# Patient Record
Sex: FEMALE | Race: BLACK OR AFRICAN AMERICAN | NOT HISPANIC OR LATINO | Employment: OTHER | URBAN - METROPOLITAN AREA
[De-identification: names, ages, dates, MRNs, and addresses within clinical notes are randomized per-mention and may not be internally consistent; named-entity substitution may affect disease eponyms.]

---

## 2018-01-26 ENCOUNTER — HOSPITAL ENCOUNTER (EMERGENCY)
Facility: OTHER | Age: 68
Discharge: HOME | End: 2018-01-26
Attending: EMERGENCY MEDICINE
Payer: MEDICARE

## 2018-01-26 VITALS
DIASTOLIC BLOOD PRESSURE: 98 MMHG | HEART RATE: 65 BPM | WEIGHT: 190 LBS | SYSTOLIC BLOOD PRESSURE: 124 MMHG | RESPIRATION RATE: 19 BRPM | TEMPERATURE: 98 F | OXYGEN SATURATION: 99 % | HEIGHT: 64 IN | BODY MASS INDEX: 32.44 KG/M2

## 2018-01-26 DIAGNOSIS — N28.89 RIGHT RENAL MASS: Primary | ICD-10-CM

## 2018-01-26 DIAGNOSIS — R10.9 LEFT FLANK PAIN: ICD-10-CM

## 2018-01-26 LAB
ALBUMIN SERPL-MCNC: 4.1 G/DL (ref 3.3–5.5)
ALBUMIN SERPL-MCNC: 4.4 G/DL (ref 3.3–5.5)
ALP SERPL-CCNC: 107 U/L (ref 42–141)
ALP SERPL-CCNC: 98 U/L (ref 42–141)
BILIRUB SERPL-MCNC: 0.4 MG/DL (ref 0.2–1.6)
BILIRUB SERPL-MCNC: 0.4 MG/DL (ref 0.2–1.6)
BILIRUBIN, POC UA: NEGATIVE
BLOOD, POC UA: ABNORMAL
BUN SERPL-MCNC: 13 MG/DL (ref 7–22)
CALCIUM SERPL-MCNC: 9.5 MG/DL (ref 8–10.3)
CHLORIDE SERPL-SCNC: 105 MMOL/L (ref 98–108)
CLARITY, POC UA: CLEAR
COLOR, POC UA: YELLOW
CREAT SERPL-MCNC: 0.7 MG/DL (ref 0.6–1.2)
GLUCOSE SERPL-MCNC: 120 MG/DL (ref 73–118)
GLUCOSE, POC UA: NEGATIVE
KETONES, POC UA: NEGATIVE
LEUKOCYTE EST, POC UA: NEGATIVE
NITRITE, POC UA: NEGATIVE
PH UR STRIP: 6.5 [PH]
POC ALT (SGPT): 10 U/L (ref 10–47)
POC ALT (SGPT): 11 U/L (ref 10–47)
POC AMYLASE: 61 U/L (ref 14–97)
POC AST (SGOT): 21 U/L (ref 11–38)
POC AST (SGOT): 21 U/L (ref 11–38)
POC GGT: 22 U/L (ref 5–65)
POC TCO2: 29 MMOL/L (ref 18–33)
POCT GLUCOSE: 108 MG/DL (ref 70–110)
POTASSIUM BLD-SCNC: 3.8 MMOL/L (ref 3.6–5.1)
PROTEIN, POC UA: NEGATIVE
PROTEIN, POC: 8.1 G/DL (ref 6.4–8.1)
PROTEIN, POC: 8.1 G/DL (ref 6.4–8.1)
SODIUM BLD-SCNC: 146 MMOL/L (ref 128–145)
SPECIFIC GRAVITY, POC UA: 1.02
UROBILINOGEN, POC UA: 1 E.U./DL

## 2018-01-26 PROCEDURE — 81003 URINALYSIS AUTO W/O SCOPE: CPT

## 2018-01-26 PROCEDURE — 99284 EMERGENCY DEPT VISIT MOD MDM: CPT

## 2018-01-26 PROCEDURE — 85025 COMPLETE CBC W/AUTO DIFF WBC: CPT

## 2018-01-26 PROCEDURE — 80053 COMPREHEN METABOLIC PANEL: CPT

## 2018-01-26 PROCEDURE — 25000003 PHARM REV CODE 250: Performed by: EMERGENCY MEDICINE

## 2018-01-26 PROCEDURE — 25500020 PHARM REV CODE 255

## 2018-01-26 PROCEDURE — 82150 ASSAY OF AMYLASE: CPT

## 2018-01-26 RX ORDER — KETOROLAC TROMETHAMINE 30 MG/ML
15 INJECTION, SOLUTION INTRAMUSCULAR; INTRAVENOUS
Status: DISCONTINUED | OUTPATIENT
Start: 2018-01-26 | End: 2018-01-26 | Stop reason: HOSPADM

## 2018-01-26 RX ORDER — TRAMADOL HYDROCHLORIDE 50 MG/1
50 TABLET ORAL EVERY 6 HOURS PRN
Qty: 12 TABLET | Refills: 0 | Status: SHIPPED | OUTPATIENT
Start: 2018-01-26 | End: 2018-02-05

## 2018-01-26 RX ORDER — METHOCARBAMOL 500 MG/1
1000 TABLET, FILM COATED ORAL 3 TIMES DAILY
Qty: 24 TABLET | Refills: 0 | Status: SHIPPED | OUTPATIENT
Start: 2018-01-26 | End: 2018-01-31

## 2018-01-26 RX ORDER — HYDROCODONE BITARTRATE AND ACETAMINOPHEN 5; 325 MG/1; MG/1
2 TABLET ORAL
Status: COMPLETED | OUTPATIENT
Start: 2018-01-26 | End: 2018-01-26

## 2018-01-26 RX ADMIN — HYDROCODONE BITARTRATE AND ACETAMINOPHEN 2 TABLET: 5; 325 TABLET ORAL at 07:01

## 2018-01-26 RX ADMIN — IOHEXOL 100 ML: 350 INJECTION, SOLUTION INTRAVENOUS at 06:01

## 2018-01-26 NOTE — LETTER
[address] [facility]  [place] [address]  Phone: [phone]  Fax: [phone]   Dr. Hernandez,   I evaluated Mrs. Foster in the emergency department on Friday.  She presented with left flank pain.  Workup was unremarkable except for a mass on her right kidney.  Etiology of her pain is unclear at this time.  I started her on tramadol and Robaxin.  However she does need close follow-up.  I would appreciate it if you could read the radiology report and follow her up in your office.  Thanks, Brooke Martinez

## 2018-01-26 NOTE — ED PROVIDER NOTES
"Encounter Date: 1/26/2018       History     Chief Complaint   Patient presents with    Abdominal Pain     LEft sided abdominal pain and rib pain, denies injury, started two weeks ago. Pt states she recently had a cold with lots of coughing. Pt also states she feels a bump under her left breast. Denies urinary symptoms, last BM today but "smaller than normal"     Chief complaint: Rib pain  67-year-old complains of pain to her left flank area.  Patient's pain has been ongoing for 2 weeks.  The pain worsens when she moves certain ways.  She has been taken Aleve with some improvement.  Patient has normal appetite.  No vomiting.  She has a mild cough.  She is having bowel movements.  Patient says she feels a lump underneath her breast.          Review of patient's allergies indicates:  No Known Allergies  Past Medical History:   Diagnosis Date    Diabetes mellitus     Hypertension      Past Surgical History:   Procedure Laterality Date    HYSTERECTOMY       History reviewed. No pertinent family history.  Social History   Substance Use Topics    Smoking status: Never Smoker    Smokeless tobacco: Not on file    Alcohol use No     Review of Systems   Constitutional: Negative for fever.   HENT: Negative for sore throat.    Respiratory: Negative for shortness of breath.    Cardiovascular: Negative for chest pain.   Gastrointestinal: Positive for abdominal pain. Negative for nausea.   Genitourinary: Negative for dysuria.   Musculoskeletal: Negative for back pain.   Skin: Negative for rash.   Neurological: Negative for weakness.   Hematological: Does not bruise/bleed easily.       Physical Exam     Initial Vitals [01/26/18 1632]   BP Pulse Resp Temp SpO2   (!) 174/87 77 18 97.9 °F (36.6 °C) 100 %      MAP       116         Physical Exam    Nursing note and vitals reviewed.  Constitutional: She appears well-developed and well-nourished.   HENT:   Head: Normocephalic and atraumatic.   Eyes: Conjunctivae and EOM are normal. " Pupils are equal, round, and reactive to light.   Neck: Normal range of motion. Neck supple.   Cardiovascular: Normal rate and regular rhythm.   Pulmonary/Chest: Breath sounds normal.   Abdominal: Soft. There is no tenderness. There is no rebound and no guarding. A hernia is present.       Musculoskeletal: Normal range of motion.   Neurological: She is alert and oriented to person, place, and time. She has normal strength.   Skin: Skin is warm and dry.   Psychiatric: She has a normal mood and affect.         ED Course   Procedures  Labs Reviewed   POCT URINALYSIS W/O SCOPE - Abnormal; Notable for the following:        Result Value    Glucose, UA Negative (*)     Bilirubin, UA Negative (*)     Ketones, UA Negative (*)     Blood, UA Trace-intact (*)     Protein, UA Negative (*)     Nitrite, UA Negative (*)     Leukocytes, UA Negative (*)     All other components within normal limits   POCT CBC   POCT GLUCOSE   POCT CMP   POCT AMYLASE             Medical Decision Making:   Initial Assessment:   67-year-old complains of tenderness to her left flank area.  On exam patient has a hernia to her left upper quadrant that is reducible.  She is nontoxic-appearing  ED Management:  CT of abdomen and pelvis will be done.  Etiology of the patient's pain on the left flank is unclear.  She does have a small mass on her right kidney that is concerning.  I did send a note to the patient's primary care physician regarding this.  Patient's labs and urinalysis and a significant abnormalities.  Patient will be discharged on tramadol and Robaxin.  She was informed of her abnormal CT and the need to follow-up.                   ED Course      Clinical Impression:   The primary encounter diagnosis was Right renal mass. A diagnosis of Left flank pain was also pertinent to this visit.                           Brooke Martinez MD  01/26/18 7002

## 2018-01-29 ENCOUNTER — LAB VISIT (OUTPATIENT)
Dept: LAB | Facility: HOSPITAL | Age: 68
End: 2018-01-29
Attending: NURSE PRACTITIONER
Payer: MEDICARE

## 2018-01-29 ENCOUNTER — OFFICE VISIT (OUTPATIENT)
Dept: FAMILY MEDICINE | Facility: CLINIC | Age: 68
End: 2018-01-29
Payer: MEDICARE

## 2018-01-29 VITALS
HEART RATE: 85 BPM | WEIGHT: 194.31 LBS | HEIGHT: 64 IN | BODY MASS INDEX: 33.17 KG/M2 | OXYGEN SATURATION: 95 % | SYSTOLIC BLOOD PRESSURE: 164 MMHG | TEMPERATURE: 98 F | DIASTOLIC BLOOD PRESSURE: 92 MMHG

## 2018-01-29 DIAGNOSIS — E78.5 HYPERLIPIDEMIA, UNSPECIFIED HYPERLIPIDEMIA TYPE: ICD-10-CM

## 2018-01-29 DIAGNOSIS — E11.9 TYPE 2 DIABETES MELLITUS WITHOUT COMPLICATION, WITHOUT LONG-TERM CURRENT USE OF INSULIN: ICD-10-CM

## 2018-01-29 DIAGNOSIS — K44.9 HIATAL HERNIA: ICD-10-CM

## 2018-01-29 DIAGNOSIS — I10 BENIGN HYPERTENSION: ICD-10-CM

## 2018-01-29 DIAGNOSIS — N28.89 RIGHT KIDNEY MASS: Primary | ICD-10-CM

## 2018-01-29 DIAGNOSIS — Z23 NEED FOR PNEUMOCOCCAL VACCINE: ICD-10-CM

## 2018-01-29 DIAGNOSIS — Z23 NEED FOR INFLUENZA VACCINATION: ICD-10-CM

## 2018-01-29 DIAGNOSIS — M62.830 SPASM OF THORACIC BACK MUSCLE: ICD-10-CM

## 2018-01-29 LAB
ALBUMIN SERPL BCP-MCNC: 3.5 G/DL
ALP SERPL-CCNC: 104 U/L
ALT SERPL W/O P-5'-P-CCNC: 6 U/L
ANION GAP SERPL CALC-SCNC: 9 MMOL/L
AST SERPL-CCNC: 15 U/L
BASOPHILS # BLD AUTO: 0.01 K/UL
BASOPHILS NFR BLD: 0.2 %
BILIRUB SERPL-MCNC: 0.2 MG/DL
BUN SERPL-MCNC: 14 MG/DL
CALCIUM SERPL-MCNC: 9.1 MG/DL
CHLORIDE SERPL-SCNC: 105 MMOL/L
CHOLEST SERPL-MCNC: 191 MG/DL
CHOLEST/HDLC SERPL: 3.5 {RATIO}
CO2 SERPL-SCNC: 26 MMOL/L
CREAT SERPL-MCNC: 0.8 MG/DL
DIFFERENTIAL METHOD: ABNORMAL
EOSINOPHIL # BLD AUTO: 0 K/UL
EOSINOPHIL NFR BLD: 0.5 %
ERYTHROCYTE [DISTWIDTH] IN BLOOD BY AUTOMATED COUNT: 14.6 %
EST. GFR  (AFRICAN AMERICAN): >60 ML/MIN/1.73 M^2
EST. GFR  (NON AFRICAN AMERICAN): >60 ML/MIN/1.73 M^2
GLUCOSE SERPL-MCNC: 104 MG/DL
HCT VFR BLD AUTO: 39.6 %
HDLC SERPL-MCNC: 54 MG/DL
HDLC SERPL: 28.3 %
HGB BLD-MCNC: 12.6 G/DL
IMM GRANULOCYTES # BLD AUTO: 0.02 K/UL
IMM GRANULOCYTES NFR BLD AUTO: 0.3 %
LDLC SERPL CALC-MCNC: 111.4 MG/DL
LYMPHOCYTES # BLD AUTO: 1.5 K/UL
LYMPHOCYTES NFR BLD: 23.8 %
MCH RBC QN AUTO: 27 PG
MCHC RBC AUTO-ENTMCNC: 31.8 G/DL
MCV RBC AUTO: 85 FL
MONOCYTES # BLD AUTO: 0.4 K/UL
MONOCYTES NFR BLD: 6.7 %
NEUTROPHILS # BLD AUTO: 4.2 K/UL
NEUTROPHILS NFR BLD: 68.5 %
NONHDLC SERPL-MCNC: 137 MG/DL
NRBC BLD-RTO: 0 /100 WBC
PLATELET # BLD AUTO: 287 K/UL
PMV BLD AUTO: 11.7 FL
POTASSIUM SERPL-SCNC: 4.1 MMOL/L
PROT SERPL-MCNC: 7.6 G/DL
RBC # BLD AUTO: 4.66 M/UL
SODIUM SERPL-SCNC: 140 MMOL/L
TRIGL SERPL-MCNC: 128 MG/DL
TSH SERPL DL<=0.005 MIU/L-ACNC: 0.57 UIU/ML
WBC # BLD AUTO: 6.1 K/UL

## 2018-01-29 PROCEDURE — 80061 LIPID PANEL: CPT

## 2018-01-29 PROCEDURE — G0009 ADMIN PNEUMOCOCCAL VACCINE: HCPCS | Mod: S$GLB,,, | Performed by: FAMILY MEDICINE

## 2018-01-29 PROCEDURE — 36415 COLL VENOUS BLD VENIPUNCTURE: CPT | Mod: PO

## 2018-01-29 PROCEDURE — 99999 PR PBB SHADOW E&M-EST. PATIENT-LVL V: CPT | Mod: PBBFAC,,, | Performed by: NURSE PRACTITIONER

## 2018-01-29 PROCEDURE — 85025 COMPLETE CBC W/AUTO DIFF WBC: CPT

## 2018-01-29 PROCEDURE — 90662 IIV NO PRSV INCREASED AG IM: CPT | Mod: S$GLB,,, | Performed by: FAMILY MEDICINE

## 2018-01-29 PROCEDURE — 99214 OFFICE O/P EST MOD 30 MIN: CPT | Mod: S$GLB,,, | Performed by: NURSE PRACTITIONER

## 2018-01-29 PROCEDURE — G0008 ADMIN INFLUENZA VIRUS VAC: HCPCS | Mod: S$GLB,,, | Performed by: FAMILY MEDICINE

## 2018-01-29 PROCEDURE — 84443 ASSAY THYROID STIM HORMONE: CPT

## 2018-01-29 PROCEDURE — 80053 COMPREHEN METABOLIC PANEL: CPT

## 2018-01-29 PROCEDURE — 90670 PCV13 VACCINE IM: CPT | Mod: S$GLB,,, | Performed by: FAMILY MEDICINE

## 2018-01-29 PROCEDURE — 83036 HEMOGLOBIN GLYCOSYLATED A1C: CPT

## 2018-01-29 NOTE — PROGRESS NOTES
Pt tolerated flu vaccine to right deltoid without difficulty; no adverse reaction noted; VIS given; pt also tolerated pneumococcal 13 vaccine to left deltoid without difficulty; no adverse reaction noted; VIS given

## 2018-01-29 NOTE — PROGRESS NOTES
Subjective:       Patient ID: Valarie Foster is a 67 y.o. female.    Chief Complaint: Hospital Follow Up (pt states right renal mass ) and Flank Pain (left side pain)    66 yo female, new to me and new to this clinic, presents for a hospital follow up. She was seen at the ED on 01/26/2018 for left flank pain, but a mass was found on the right kidney. She denies dysuria, hematuria, or urinary frequency. She is still having pain. She rates the pain 9/10. She has tramadol and muscle relaxer. She reports medication not really working. Patient reports metformin, Lipitor and a blood pressure medication. She stopped taking a year ago. She denies numbness or tingling in your feet. She denies chest pain, dizziness, shortness of breath or headache.         Past Medical History:   Diagnosis Date    Diabetes mellitus     Hypertension        Social History     Social History    Marital status:      Spouse name: N/A    Number of children: N/A    Years of education: N/A     Occupational History    Not on file.     Social History Main Topics    Smoking status: Never Smoker    Smokeless tobacco: Never Used    Alcohol use No    Drug use: No    Sexual activity: Not on file     Other Topics Concern    Not on file     Social History Narrative    No narrative on file       Past Surgical History:   Procedure Laterality Date    HYSTERECTOMY         Review of Systems   Constitutional: Negative for fatigue and unexpected weight change.   Eyes: Negative for photophobia, redness and visual disturbance.   Respiratory: Negative for chest tightness and shortness of breath.    Cardiovascular: Negative for chest pain, palpitations and leg swelling.   Gastrointestinal: Negative for abdominal pain, nausea and vomiting.   Genitourinary: Positive for flank pain (Left). Negative for decreased urine volume, difficulty urinating, dysuria, frequency, hematuria and urgency.   Skin: Negative for pallor.   Neurological: Negative for  "dizziness, tremors, seizures, syncope, weakness, numbness and headaches.   Hematological: Does not bruise/bleed easily.   All other systems reviewed and are negative.      Objective:   BP (!) 164/92 (BP Location: Left arm, Patient Position: Sitting, BP Method: Medium (Manual))   Pulse 85   Temp 97.9 °F (36.6 °C) (Oral)   Ht 5' 4" (1.626 m)   Wt 88.2 kg (194 lb 5.4 oz)   SpO2 95%   BMI 33.36 kg/m²      Physical Exam   Constitutional: She is oriented to person, place, and time. She appears well-developed and well-nourished.   HENT:   Head: Normocephalic and atraumatic.   Right Ear: Hearing, tympanic membrane, external ear and ear canal normal.   Left Ear: Hearing, tympanic membrane, external ear and ear canal normal.   Nose: No epistaxis.   Eyes: Conjunctivae, EOM and lids are normal. Pupils are equal, round, and reactive to light.   Neck: Normal carotid pulses and no JVD present. Carotid bruit is not present. No thyroid mass and no thyromegaly present.   Cardiovascular: Normal rate, regular rhythm and normal heart sounds.    Pulmonary/Chest: Effort normal and breath sounds normal. No respiratory distress. She has no decreased breath sounds.   Abdominal: Soft. Bowel sounds are normal. She exhibits no distension and no mass. There is no tenderness. There is no rebound and no guarding.   Musculoskeletal: Normal range of motion.        Arms:  Neurological: She is alert and oriented to person, place, and time.   Skin: Skin is warm, dry and intact. She is not diaphoretic. No pallor.   Psychiatric: She has a normal mood and affect. Her speech is normal and behavior is normal.       Assessment:       1. Right kidney mass    2. Type 2 diabetes mellitus without complication, without long-term current use of insulin    3. Benign hypertension    4. Hyperlipidemia, unspecified hyperlipidemia type    5. Hiatal hernia    6. Spasm of thoracic back muscle    7. Need for influenza vaccination    8. Need for pneumococcal vaccine  "       Plan:       Valarie was seen today for hospital follow up and flank pain.    Diagnoses and all orders for this visit:    Right kidney mass  -     Ambulatory referral to Urology    Type 2 diabetes mellitus without complication, without long-term current use of insulin  -     TSH; Future  -     Hemoglobin A1c; Future    Benign hypertension  -     CBC auto differential; Future  -     Comprehensive metabolic panel; Future    Hyperlipidemia, unspecified hyperlipidemia type  -     TSH; Future  -     Lipid panel; Future    Hiatal hernia  -     Ambulatory referral to General Surgery    Need for influenza vaccination  -     Influenza - High Dose (65+) (PF) (IM)    Need for pneumococcal vaccine  -     Pneumococcal Conjugate Vaccine (13 Valent) (IM)    Problem List Items Addressed This Visit     Type 2 diabetes mellitus without complication, without long-term current use of insulin    Current Assessment & Plan     This problem is currently not controlled. Please follow up with your PCP as planned to discuss adjustments to your treatment plan.  The patient is asked to make an attempt to improve diet and exercise patterns to aid in medical management of this problem.  Will get labs. She stopped medication over a year ago.         Relevant Orders    TSH    Hemoglobin A1c    Hyperlipemia    Current Assessment & Plan     This problem is currently not controlled. Please follow up with your PCP as planned to discuss adjustments to your treatment plan.  The patient is asked to make an attempt to improve diet and exercise patterns to aid in medical management of this problem.  Will get labs. She stopped medication over a year ago.         Relevant Orders    TSH    Lipid panel    Benign hypertension    Current Assessment & Plan     This problem is currently not controlled. Please follow up with your PCP as planned to discuss adjustments to your treatment plan.  The patient is asked to make an attempt to improve diet and exercise  patterns to aid in medical management of this problem.  Will get labs. She stopped medication over a year ago.         Relevant Orders    CBC auto differential    Comprehensive metabolic panel      Other Visit Diagnoses     Right kidney mass    -  Primary    Relevant Orders    Ambulatory referral to Urology    Hiatal hernia        Relevant Orders    Ambulatory referral to General Surgery    Spasm of thoracic back muscle        Need for influenza vaccination        Relevant Orders    Influenza - High Dose (65+) (PF) (IM) (Completed)    Need for pneumococcal vaccine        Relevant Orders    Pneumococcal Conjugate Vaccine (13 Valent) (IM) (Completed)          She will call back with her medication list. Will schedule an appt for her to follow up with Dr. Hernandez once lab results available as she has not seen her.  Follow-up if symptoms worsen or fail to improve.

## 2018-01-29 NOTE — ASSESSMENT & PLAN NOTE
This problem is currently not controlled. Please follow up with your PCP as planned to discuss adjustments to your treatment plan.  The patient is asked to make an attempt to improve diet and exercise patterns to aid in medical management of this problem.  Will get labs. She stopped medication over a year ago.

## 2018-01-29 NOTE — PATIENT INSTRUCTIONS
Myalgias  Myalgias are another word for muscle aches and soreness. This is a symptom, not a disease. Myalgias can have many causes. A cold, the flu, or an acute infection can cause them. So can any illness with a high fever. They may happen after exertion (such as heavy exercise) or injury (such as an accident or fall). Some medicines (such as statins and certain antidepressants) can cause myalgias. They can also be a symptom of chronic or ongoing medical problems (such as lupus, chronic fatigue, or hypothyroidism). With these illnesses, other serious symptoms often occur in addition to muscle pain and soreness.    Myalgias most often go away on their own. If they don't go away, come back, or are severe, testing may be needed to help find the cause.  Home care  · Rest until you feel better.  · Follow instructions that you were given for how to care for yourself. This may depend on the cause of your myalgias.   · If myalgia is thought to be due to a medicine, be sure to talk to the doctor that prescribed the medicine about the best course of action.  · To control pain, take prescription or over-the-counter medicines as directed. Unless told not to, you can try acetaminophen or ibuprofen.  Follow-up care  Follow up with your healthcare provider or as advised. If your symptoms do not go away in a few days or if they come back, follow up with your healthcare provider for an exam and testing.  When to see medical advice  Call your healthcare provider for any of the following:  · Fever of 100.4°F (38ºC) or higher, or as directed by your healthcare provider  · Pain that gets worse and not better, or that goes away and comes back  · New joint pains  · New rash  · Severe headache, neck pain, drowsiness, or confusion  Date Last Reviewed: 3/1/2017  © 3491-0113 [employer]. [address] [place] [address], [place] [address]. All rights reserved. This information is not intended as a substitute for professional medical  care. Always follow your healthcare professional's instructions.        Back Spasm (No Trauma)    Spasm of the back muscles can occur after a sudden forceful twisting or bending force (such as in a car accident), after a simple awkward movement, or after lifting something heavy with poor body positioning. In any case, muscle spasm adds to the pain. Sleeping in an awkward position or on a poor quality mattress can also cause this. Some people respond to emotional stress by tensing the muscles of their back.  Pain that continues may need further evaluation or other types of treatment such as physical therapy.  You don't always need X-rays for the initial evaluation of back pain, unless you had a physical injury such as from a car accident or fall. If your pain continues and doesn't respond to medical treatment, X-rays and other tests may then be done.   Home care  · As soon as possible, start sitting or walking again to avoid problems from prolonged bed rest (muscle weakness, worsening back stiffness and pain, blood clots in the legs).  · When in bed, try to find a position of comfort. A firm mattress is best. Try lying flat on your back with pillows under your knees. You can also try lying on your side with your knees bent up toward your chest and a pillow between your knees.  · Avoid prolonged sitting, long car rides, or travel. This puts more stress on the lower back than standing or walking.   · During the first 24 to 72 hours after an injury or flare-up, apply an ice pack to the painful area for 20 minutes, then remove it for 20 minutes. Do this over a period of 60 to 90 minutes or several times a day. This will reduce swelling and pain. Always wrap ice packs in a thin towel.  · You can start with ice, then switch to heat. Heat (hot shower, hot bath, or heating pad) reduces pain, and works well for muscle spasms. Apply heat to the painful area for 20 minutes, then remove it for 20 minutes. Do this over a period  of 60 to 90 minutes or several times a day. Do not sleep on a heating pad as it can burn or damage skin.  · Alternate ice and heat therapies.  · Be aware of safe lifting methods and do not lift anything over 15 pounds until all the pain is gone.  Gentle stretching will help your back heal faster. Do this simple routine 2 to 3 times a day until your back is feeling better.  · Lie on your back with your knees bent and both feet on the ground  · Slowly raise your left knee to your chest as you flatten your lower back against the floor. Hold for 20 to 30 seconds.  · Relax and repeat the exercise with your right knee.  · Do 2 to 3 of these exercises for each leg.  · Repeat, hugging both knees to your chest at the same time.  · Do not bounce, but use a gentle pull.  Medicines  Talk to your doctor before using medicine, especially if you have other medical problems or are taking other medicines.  You may use acetaminophen or ibuprofen to control pain, unless your healthcare provider prescribed another pain medicine. If you have a chronic condition such as diabetes, liver or kidney disease, stomach ulcer, or gastrointestinal bleeding, or are taking blood thinners, talk with your healthcare provider before taking any medicines.  Be careful if you are given prescription pain medicine, narcotics, or medicine for muscle spasm. They can cause drowsiness, affect your coordination, reflexes, or judgment. Do not drive or operate heavy machinery when taking these medicines. Take pain medicine only as prescribed by your healthcare provider.  Follow-up care  Follow up with your doctor, or as advised. Physical therapy or further tests may be needed.  If X-rays were taken, they may be reviewed by a radiologist. You will be notified of any new findings that may affect your care.  Call 911  Seek emergency medical care if any of these occur:  · Trouble breathing  · Confusion  · Drowsiness or trouble awakening  · Fainting or loss of  consciousness  · Rapid or very slow heart rate  · Loss of bowel or bladder control  When to seek medical advice  Call your healthcare provider right away if any of these occur:  · Pain becomes worse or spreads to your legs  · Weakness or numbness in one or both legs  · Numbness in the groin or genital area  · Unexplained fever over 100.4ºF (38.0ºC)  · Burning or pain when passing urine  Date Last Reviewed: 6/1/2016  © 6473-6108 [employer]. [address] [place] [address]. All rights reserved. This information is not intended as a substitute for professional medical care. Always follow your healthcare professional's instructions.

## 2018-01-30 ENCOUNTER — TELEPHONE (OUTPATIENT)
Dept: FAMILY MEDICINE | Facility: CLINIC | Age: 68
End: 2018-01-30

## 2018-01-30 LAB
ESTIMATED AVG GLUCOSE: 131 MG/DL
HBA1C MFR BLD HPLC: 6.2 %

## 2018-01-30 NOTE — TELEPHONE ENCOUNTER
Patient informed regarding results. States she will call our office back with the name of her BP medication.   Also  scheduled patient with her PCP for follow up on 1/31/18.

## 2018-01-30 NOTE — TELEPHONE ENCOUNTER
----- Message from KULDEEP Bass-DILLON sent at 1/30/2018  7:40 AM CST -----  Please inform patient her blood count, cholesterol, kidney and liver labs are normal. Her thyroid labs are normal. Her HgbA1c is 6.2. Her diabetes is well controlled. Will not restart diabetes medication at this time. Please ask her if she found the name of her blood pressure medication?

## 2018-01-31 ENCOUNTER — TELEPHONE (OUTPATIENT)
Dept: FAMILY MEDICINE | Facility: CLINIC | Age: 68
End: 2018-01-31

## 2018-01-31 ENCOUNTER — OFFICE VISIT (OUTPATIENT)
Dept: FAMILY MEDICINE | Facility: CLINIC | Age: 68
End: 2018-01-31
Payer: MEDICARE

## 2018-01-31 VITALS
DIASTOLIC BLOOD PRESSURE: 78 MMHG | HEIGHT: 64 IN | OXYGEN SATURATION: 97 % | TEMPERATURE: 98 F | HEART RATE: 94 BPM | WEIGHT: 191.56 LBS | SYSTOLIC BLOOD PRESSURE: 140 MMHG | BODY MASS INDEX: 32.7 KG/M2

## 2018-01-31 DIAGNOSIS — E11.9 TYPE 2 DIABETES MELLITUS WITHOUT COMPLICATION, WITHOUT LONG-TERM CURRENT USE OF INSULIN: ICD-10-CM

## 2018-01-31 DIAGNOSIS — I10 BENIGN HYPERTENSION: ICD-10-CM

## 2018-01-31 DIAGNOSIS — Z12.31 ENCOUNTER FOR SCREENING MAMMOGRAM FOR MALIGNANT NEOPLASM OF BREAST: ICD-10-CM

## 2018-01-31 DIAGNOSIS — Z12.11 SCREENING FOR MALIGNANT NEOPLASM OF COLON: ICD-10-CM

## 2018-01-31 DIAGNOSIS — E78.5 HYPERLIPIDEMIA, UNSPECIFIED HYPERLIPIDEMIA TYPE: ICD-10-CM

## 2018-01-31 DIAGNOSIS — R10.9 LEFT FLANK PAIN: Primary | ICD-10-CM

## 2018-01-31 LAB
BILIRUB SERPL-MCNC: NORMAL MG/DL
BLOOD URINE, POC: 50
COLOR, POC UA: YELLOW
GLUCOSE UR QL STRIP: NORMAL
KETONES UR QL STRIP: NORMAL
LEUKOCYTE ESTERASE URINE, POC: NORMAL
NITRITE, POC UA: NORMAL
PH, POC UA: 5
PROTEIN, POC: NORMAL
SPECIFIC GRAVITY, POC UA: 1.02
UROBILINOGEN, POC UA: NORMAL

## 2018-01-31 PROCEDURE — 1125F AMNT PAIN NOTED PAIN PRSNT: CPT | Mod: S$GLB,,, | Performed by: FAMILY MEDICINE

## 2018-01-31 PROCEDURE — 99999 PR PBB SHADOW E&M-EST. PATIENT-LVL IV: CPT | Mod: PBBFAC,,, | Performed by: FAMILY MEDICINE

## 2018-01-31 PROCEDURE — 99214 OFFICE O/P EST MOD 30 MIN: CPT | Mod: 25,S$GLB,, | Performed by: FAMILY MEDICINE

## 2018-01-31 PROCEDURE — 1159F MED LIST DOCD IN RCRD: CPT | Mod: S$GLB,,, | Performed by: FAMILY MEDICINE

## 2018-01-31 PROCEDURE — 3008F BODY MASS INDEX DOCD: CPT | Mod: S$GLB,,, | Performed by: FAMILY MEDICINE

## 2018-01-31 PROCEDURE — 81002 URINALYSIS NONAUTO W/O SCOPE: CPT | Mod: S$GLB,,, | Performed by: FAMILY MEDICINE

## 2018-01-31 PROCEDURE — 87086 URINE CULTURE/COLONY COUNT: CPT

## 2018-01-31 RX ORDER — LISINOPRIL 10 MG/1
10 TABLET ORAL DAILY
COMMUNITY
End: 2018-01-31 | Stop reason: SDUPTHER

## 2018-01-31 RX ORDER — LISINOPRIL 10 MG/1
10 TABLET ORAL DAILY
Qty: 90 TABLET | Refills: 1 | Status: SHIPPED | OUTPATIENT
Start: 2018-01-31 | End: 2018-04-30 | Stop reason: SDUPTHER

## 2018-01-31 NOTE — TELEPHONE ENCOUNTER
----- Message from Earl Sharma sent at 1/30/2018  3:52 PM CST -----  Contact: Self  Pt called to provide name of BP medication-Lisinopril 10MG. Pt can be reached @ [phone].

## 2018-01-31 NOTE — LETTER
January 31, 2018      Margarito Love, FNP-C  [address] [facility]  [place] [address]           [facility]  [address] [facility]  [place] [address]  Phone: [phone]  Fax: [phone]          Patient: Valarie Foster   MR Number: [id number]   YOB: 1950   Date of Visit: 1/31/2018       Dear Margarito Love:    Thank you for referring Valarie Foster to me for evaluation. Attached you will find relevant portions of my assessment and plan of care.    If you have questions, please do not hesitate to call me. I look forward to following Valarie Foster along with you.    Sincerely,    Alena Hernandez MD    Enclosure  CC:  No Recipients    If you would like to receive this communication electronically, please contact [email or web address] or [phone] to request more information on [employer] [facility] access.    For providers and/or their staff who would like to refer a patient to [facility], please contact us through our one-stop-shop provider referral line, [facility], at [phone].    If you feel you have received this communication in error or would no longer like to receive these types of communications, please e-mail [email or web address]

## 2018-01-31 NOTE — PROGRESS NOTES
Routine Office Visit    Patient Name: Valarie Foster    : 1950  MRN: [id number]    Subjective:  Valarie is a 67 y.o. female who presents today for     1. Left side / flank pain - started approximately 2 weeks ago - pain is described as a throbbing pain that radiates down her side and to her lower pelvis. Pain is aggravated with walking and laying down. Pain is alleviated with pain medication prescribed and hot and cold compresses. Patient states pain is sometimes a gas-like pain, associated with pressure and bloat.  Pt states pain has improved with current regimen, but not resolved. Pt was evaluated for this pain in the ER. She was noted to have a right renal mass. She is scheduled to see urology - Dr. Palm tomorrow. No urinary / bowel complaints. No history of colonoscopy.   2. Establish care / new to me     Review of Systems   Constitutional: Negative for chills and fever.   HENT: Negative for congestion.    Eyes: Negative for blurred vision.   Respiratory: Negative for cough.    Cardiovascular: Negative for chest pain.   Gastrointestinal: Positive for abdominal pain. Negative for constipation, diarrhea, heartburn, nausea and vomiting.   Genitourinary: Negative for dysuria.   Musculoskeletal: Negative for myalgias.   Skin: Negative for itching and rash.   Neurological: Negative for dizziness and headaches.   Psychiatric/Behavioral: Negative for depression.       Active Problem List  Patient Active Problem List   Diagnosis    Type 2 diabetes mellitus without complication, without long-term current use of insulin    Benign hypertension    Hyperlipemia       Past Surgical History  Past Surgical History:   Procedure Laterality Date    HYSTERECTOMY         Family History  History reviewed. No pertinent family history.    Social History  Social History     Social History    Marital status:      Spouse name: N/A    Number of children: N/A    Years of education: N/A     Occupational History    Not on  "file.     Social History Main Topics    Smoking status: Never Smoker    Smokeless tobacco: Never Used    Alcohol use No    Drug use: No    Sexual activity: Not on file     Other Topics Concern    Not on file     Social History Narrative    No narrative on file       Medications and Allergies  Reviewed and updated.   Current Outpatient Prescriptions   Medication Sig    lisinopril 10 MG tablet Take 1 tablet (10 mg total) by mouth once daily.    methocarbamol (ROBAXIN) 500 MG Tab Take 2 tablets (1,000 mg total) by mouth 3 (three) times daily.    traMADol (ULTRAM) 50 mg tablet Take 1 tablet (50 mg total) by mouth every 6 (six) hours as needed for Pain.     No current facility-administered medications for this visit.        Physical Exam  BP (!) 140/78 (BP Location: Left arm, Patient Position: Sitting, BP Method: Large (Manual))   Pulse 94   Temp 98 °F (36.7 °C) (Oral)   Ht 5' 4" (1.626 m)   Wt 86.9 kg (191 lb 9.3 oz)   SpO2 97%   BMI 32.88 kg/m²   Physical Exam   Constitutional: She is oriented to person, place, and time. She appears well-developed and well-nourished.   HENT:   Head: Normocephalic and atraumatic.   Eyes: Conjunctivae and EOM are normal. Pupils are equal, round, and reactive to light.   Neck: Normal range of motion. Neck supple.   Cardiovascular: Normal rate, regular rhythm and normal heart sounds.  Exam reveals no gallop and no friction rub.    No murmur heard.  Pulmonary/Chest: Breath sounds normal. No respiratory distress.   Abdominal: Soft. Bowel sounds are normal. She exhibits no distension. There is no tenderness.   Musculoskeletal: Normal range of motion.   Lymphadenopathy:     She has no cervical adenopathy.   Neurological: She is alert and oriented to person, place, and time.   Skin: Skin is warm.   Psychiatric: She has a normal mood and affect.         Assessment/Plan:  Valarie Foster is a 67 y.o. female who presents today for :    Problem List Items Addressed This Visit        " Cardiac/Vascular    Benign hypertension (Chronic)    Relevant Medications    lisinopril 10 MG tablet  The current medical regimen is effective;  continue present plan and medications.      Hyperlipemia (Chronic)  Noted in patient history; lipids on lab WNL  Continue to monitor          Endocrine    Type 2 diabetes mellitus without complication, without long-term current use of insulin (Chronic)  a1c 6.2  Well controlled. Diet controlled.   continue present plan and medications.        Other Visit Diagnoses     Left flank pain    -  Primary    Relevant Orders    POCT URINE DIPSTICK WITHOUT MICROSCOPE (Completed)    Urine culture  F/u with urine culture   Continue conservative treatment with stretching / tylenol and muscle relaxer       Screening for malignant neoplasm of colon        Relevant Orders    Case request GI: COLONOSCOPY (Completed)    Encounter for screening mammogram for malignant neoplasm of breast         Relevant Orders    Mammo Digital Screening Bilat with CAD            Follow-up in about 3 months (around 4/30/2018).

## 2018-01-31 NOTE — PATIENT INSTRUCTIONS
Flank Pain, Uncertain Cause  The flank is the area between your upper abdomen and your back. Pain there is often caused by a problem with your kidneys. It might be a kidney infection or a kidney stone. Other causes of flank pain include spinal arthritis, a pinched nerve from a back injury, or a back muscle strain or spasm.  The cause of your flank pain is not certain. You may need other tests.  Home care  Follow these tips when caring for yourself at home:  · You may use acetaminophen or ibuprofen to control pain, unless your health care provider prescribed another medicine. If you have chronic liver or kidney disease, talk with your provider before taking these medicines. Also talk with your provider first if youve ever had a stomach ulcer or GI bleeding.  · If the pain is coming from your muscles, you may get relief with ice or heat. During the first 2 days after the injury, put an ice pack on the painful area for 20 minutes every 2 to 4 hours. This will reduce swelling and pain. A hot shower, hot bath, or heating pad works well for a muscle spasm. You can start with ice, then switch to heat after 2 days. You might find that alternating ice and heat works well. Use the method that feels the best to you.  Follow-up care  Follow up with your healthcare provider if your symptoms dont get better over the next few days.  When to seek medical advice  Call your healthcare provider right away if any of these happen:  · Repeated vomiting  · Fever of 100.4ºF (38ºC) or higher, or as directed by your health care provider  · Flank pain that gets worse  · Pain that spreads to the front of your belly (abdomen)  · Dizziness, weakness, or fainting  · Blood in your urine  · Burning feeling when you urinate or the need to urinate often  · Pain in one of your legs that gets worse  · Numbness or weakness in a leg  Date Last Reviewed: 10/1/2016  © 1465-8690 [employer]. [address] [place] [address], [place] [address]. All  rights reserved. This information is not intended as a substitute for professional medical care. Always follow your healthcare professional's instructions.

## 2018-02-01 ENCOUNTER — TELEPHONE (OUTPATIENT)
Dept: UROLOGY | Facility: CLINIC | Age: 68
End: 2018-02-01

## 2018-02-01 ENCOUNTER — OFFICE VISIT (OUTPATIENT)
Dept: UROLOGY | Facility: CLINIC | Age: 68
End: 2018-02-01
Payer: MEDICARE

## 2018-02-01 VITALS
DIASTOLIC BLOOD PRESSURE: 84 MMHG | RESPIRATION RATE: 14 BRPM | WEIGHT: 191.13 LBS | HEIGHT: 64 IN | BODY MASS INDEX: 32.63 KG/M2 | SYSTOLIC BLOOD PRESSURE: 128 MMHG | HEART RATE: 60 BPM

## 2018-02-01 DIAGNOSIS — R31.29 MICROHEMATURIA: ICD-10-CM

## 2018-02-01 DIAGNOSIS — N28.89 RIGHT RENAL MASS: Primary | ICD-10-CM

## 2018-02-01 LAB
BACTERIA #/AREA URNS HPF: ABNORMAL /HPF
MICROSCOPIC COMMENT: ABNORMAL
RBC #/AREA URNS HPF: 0 /HPF (ref 0–4)
SQUAMOUS #/AREA URNS HPF: 5 /HPF
WBC #/AREA URNS HPF: 2 /HPF (ref 0–5)
YEAST URNS QL MICRO: ABNORMAL

## 2018-02-01 PROCEDURE — 99204 OFFICE O/P NEW MOD 45 MIN: CPT | Mod: S$GLB,,, | Performed by: UROLOGY

## 2018-02-01 PROCEDURE — 99999 PR PBB SHADOW E&M-EST. PATIENT-LVL III: CPT | Mod: PBBFAC,,, | Performed by: UROLOGY

## 2018-02-01 PROCEDURE — 1159F MED LIST DOCD IN RCRD: CPT | Mod: S$GLB,,, | Performed by: UROLOGY

## 2018-02-01 PROCEDURE — 81000 URINALYSIS NONAUTO W/SCOPE: CPT

## 2018-02-01 PROCEDURE — 1125F AMNT PAIN NOTED PAIN PRSNT: CPT | Mod: S$GLB,,, | Performed by: UROLOGY

## 2018-02-01 PROCEDURE — 3008F BODY MASS INDEX DOCD: CPT | Mod: S$GLB,,, | Performed by: UROLOGY

## 2018-02-01 NOTE — PROGRESS NOTES
"  Subjective:       Valarie Foster is a 67 y.o. female who is a new patient who was referred by Ivan CARDONA for evaluation of renal mass.      CT a/p with contrast 1/26/18 showed a 2.6cm endophytic lesion in R MP, concerning for solid mass vs complex cyst.     She reports L flank pain, contralateral to renal lesion. L flank pain has almost completely resolved. Denies hematuria, LUTS, UTIs. UA macro did show microhematuria recently. Denies family history of  malignancy. She reports being told she had a small kidney on one side in the past. Denies nephrolithiasis. Nonsmoker. Denies previous abdominal surgery though she is s/p hysterectomy. +DM2/HTN.     Cr (1/18) - 0.8  A1c (1/18) - 6.2      The following portions of the patient's history were reviewed and updated as appropriate: allergies, current medications, past family history, past medical history, past social history, past surgical history and problem list.    Review of Systems  Constitutional: no fever or chills  ENT: no nasal congestion or sore throat  Respiratory: no cough or shortness of breath  Cardiovascular: no chest pain or palpitations  Gastrointestinal: no nausea or vomiting, tolerating diet  Genitourinary: as per HPI  Hematologic/Lymphatic: no easy bruising or lymphadenopathy  Musculoskeletal: no arthralgias or myalgias  Skin: no rashes or lesions  Neurological: no seizures or tremors  Behavioral/Psych: no auditory or visual hallucinations        Objective:    Vitals: /84   Pulse 60   Resp 14   Ht 5' 4" (1.626 m)   Wt 86.7 kg (191 lb 2.2 oz)   BMI 32.81 kg/m²     Physical Exam   General: well developed, well nourished in no acute distress  Head: normocephalic, atraumatic  Neck: supple, trachea midline, no obvious enlargement of thyroid  HEENT: EOMI, mucus membranes moist, sclera anicteric, no hearing impairment  Lungs: symmetric expansion, non-labored breathing  Cardiovascular: regular rate and rhythm, normal pulses  Abdomen: soft, non " tender, non distended, no palpable masses, no hepatosplenomegaly, no hernias, no CVA tenderness  Musculoskeletal: no peripheral edema, normal ROM in bilateral upper and lower extremities  Lymphatics: no cervical or inguinal lymphadenopathy  Skin: no rashes or lesions  Neuro: alert and oriented x 3, no gross deficits  Psych: normal judgment and insight, normal mood/affect and non-anxious  Genitourinary:   patient declined exam      Lab Review   Urine analysis today in clinic shows positive for red blood cells 5-10    Lab Results   Component Value Date    WBC 6.10 01/29/2018    HGB 12.6 01/29/2018    HCT 39.6 01/29/2018    MCV 85 01/29/2018     01/29/2018     Lab Results   Component Value Date    CREATININE 0.8 01/29/2018    BUN 14 01/29/2018         Imaging  Images and reports were personally reviewed by me and discussed with patient  CT reviewed       Assessment/Plan:      1. Right renal mass    - 2.6cm lesion in R MP kidney.   - Recommend CT renal protocol for better evaluation of lesion (?enhancement)   - If RCC suspected after CT, will possibly require radical nephrectomy due to location of lesion. Partial nephrectomy would be difficult due to location of endophytic lesion. Lesion is deep to the collecting system.   - Discussed possibility of perc biopsy. Discussed possibility of surgical excision of lesion and/or kidney.    - CXR     2. Microhematuria    - UA micro for confirmation today         Follow up in 1 week

## 2018-02-01 NOTE — LETTER
February 1, 2018      Margarito Love, FNP-C  [address] [facility] [address]           [facility]  [address] [facility] [address]  [facility] [address]  Phone: [phone]          Patient: Valarie Foster   MR Number: [id number]   YOB: 1950   Date of Visit: 2/1/2018       Dear Margarito Love:    Thank you for referring Valarie Foster to me for evaluation. Attached you will find relevant portions of my assessment and plan of care.    If you have questions, please do not hesitate to call me. I look forward to following Valarie Foster along with you.    Sincerely,    Alejandra Palm MD    Enclosure  CC:  No Recipients    If you would like to receive this communication electronically, please contact [email or web address] or [phone] to request more information on [employer] access.    For providers and/or their staff who would like to refer a patient to [facility], please contact us through our one-stop-shop provider referral line, [facility], at [phone].    If you feel you have received this communication in error or would no longer like to receive these types of communications, please e-mail [email or web address]

## 2018-02-01 NOTE — TELEPHONE ENCOUNTER
----- Message from Valentine Dawkins sent at 2/1/2018  2:23 PM CST -----  Contact: Self   Patient returned your call. Please call again at [phone].

## 2018-02-02 ENCOUNTER — HOSPITAL ENCOUNTER (OUTPATIENT)
Dept: RADIOLOGY | Facility: HOSPITAL | Age: 68
Discharge: HOME | End: 2018-02-02
Attending: FAMILY MEDICINE
Payer: MEDICARE

## 2018-02-02 ENCOUNTER — TELEPHONE (OUTPATIENT)
Dept: FAMILY MEDICINE | Facility: CLINIC | Age: 68
End: 2018-02-02

## 2018-02-02 DIAGNOSIS — Z12.31 ENCOUNTER FOR SCREENING MAMMOGRAM FOR MALIGNANT NEOPLASM OF BREAST: ICD-10-CM

## 2018-02-02 LAB
BACTERIA UR CULT: NORMAL
BACTERIA UR CULT: NORMAL

## 2018-02-02 PROCEDURE — 77063 BREAST TOMOSYNTHESIS BI: CPT | Mod: 26,,, | Performed by: RADIOLOGY

## 2018-02-02 PROCEDURE — 77067 SCR MAMMO BI INCL CAD: CPT | Mod: TC,PO

## 2018-02-02 PROCEDURE — 77067 SCR MAMMO BI INCL CAD: CPT | Mod: 26,,, | Performed by: RADIOLOGY

## 2018-02-02 NOTE — TELEPHONE ENCOUNTER
----- Message from Alena Hernandez MD sent at 2/2/2018  8:23 AM CST -----  Urine culture did not grow out a specific organism.   No antibiotics needed at this time.

## 2018-02-05 ENCOUNTER — ANESTHESIA (OUTPATIENT)
Dept: ENDOSCOPY | Facility: HOSPITAL | Age: 68
End: 2018-02-05
Payer: MEDICARE

## 2018-02-05 ENCOUNTER — ANESTHESIA EVENT (OUTPATIENT)
Dept: ENDOSCOPY | Facility: HOSPITAL | Age: 68
End: 2018-02-05
Payer: MEDICARE

## 2018-02-05 ENCOUNTER — HOSPITAL ENCOUNTER (OUTPATIENT)
Facility: HOSPITAL | Age: 68
Discharge: HOME | End: 2018-02-05
Attending: INTERNAL MEDICINE | Admitting: INTERNAL MEDICINE
Payer: MEDICARE

## 2018-02-05 ENCOUNTER — SURGERY (OUTPATIENT)
Age: 68
End: 2018-02-05

## 2018-02-05 VITALS
OXYGEN SATURATION: 98 % | SYSTOLIC BLOOD PRESSURE: 164 MMHG | HEART RATE: 61 BPM | TEMPERATURE: 98 F | RESPIRATION RATE: 18 BRPM | DIASTOLIC BLOOD PRESSURE: 79 MMHG

## 2018-02-05 DIAGNOSIS — Z12.11 COLON CANCER SCREENING: ICD-10-CM

## 2018-02-05 PROCEDURE — 63600175 PHARM REV CODE 636 W HCPCS: Performed by: NURSE ANESTHETIST, CERTIFIED REGISTERED

## 2018-02-05 PROCEDURE — D9220A PRA ANESTHESIA: Mod: PT,CRNA,, | Performed by: NURSE ANESTHETIST, CERTIFIED REGISTERED

## 2018-02-05 PROCEDURE — 88305 TISSUE EXAM BY PATHOLOGIST: CPT | Performed by: PATHOLOGY

## 2018-02-05 PROCEDURE — 37000009 HC ANESTHESIA EA ADD 15 MINS: Performed by: INTERNAL MEDICINE

## 2018-02-05 PROCEDURE — D9220A PRA ANESTHESIA: Mod: PT,ANES,, | Performed by: ANESTHESIOLOGY

## 2018-02-05 PROCEDURE — 27201012 HC FORCEPS, HOT/COLD, DISP: Performed by: INTERNAL MEDICINE

## 2018-02-05 PROCEDURE — 37000008 HC ANESTHESIA 1ST 15 MINUTES: Performed by: INTERNAL MEDICINE

## 2018-02-05 PROCEDURE — 25000003 PHARM REV CODE 250: Performed by: ANESTHESIOLOGY

## 2018-02-05 PROCEDURE — 88305 TISSUE EXAM BY PATHOLOGIST: CPT | Mod: 26,,, | Performed by: PATHOLOGY

## 2018-02-05 PROCEDURE — 45380 COLONOSCOPY AND BIOPSY: CPT | Performed by: INTERNAL MEDICINE

## 2018-02-05 RX ORDER — LIDOCAINE HYDROCHLORIDE 20 MG/ML
INJECTION, SOLUTION EPIDURAL; INFILTRATION; INTRACAUDAL; PERINEURAL
Status: DISCONTINUED
Start: 2018-02-05 | End: 2018-02-05 | Stop reason: HOSPADM

## 2018-02-05 RX ORDER — SODIUM CHLORIDE 9 MG/ML
INJECTION, SOLUTION INTRAVENOUS CONTINUOUS
Status: DISCONTINUED | OUTPATIENT
Start: 2018-02-05 | End: 2018-02-05 | Stop reason: HOSPADM

## 2018-02-05 RX ORDER — PROPOFOL 10 MG/ML
VIAL (ML) INTRAVENOUS
Status: COMPLETED
Start: 2018-02-05 | End: 2018-02-05

## 2018-02-05 RX ORDER — PROPOFOL 10 MG/ML
VIAL (ML) INTRAVENOUS
Status: DISCONTINUED | OUTPATIENT
Start: 2018-02-05 | End: 2018-02-05

## 2018-02-05 RX ORDER — LIDOCAINE HCL/PF 100 MG/5ML
SYRINGE (ML) INTRAVENOUS
Status: DISCONTINUED | OUTPATIENT
Start: 2018-02-05 | End: 2018-02-05

## 2018-02-05 RX ADMIN — LIDOCAINE HYDROCHLORIDE 100 MG: 20 INJECTION, SOLUTION INTRAVENOUS at 10:02

## 2018-02-05 RX ADMIN — SODIUM CHLORIDE: 0.9 INJECTION, SOLUTION INTRAVENOUS at 09:02

## 2018-02-05 RX ADMIN — PROPOFOL 50 MG: 10 INJECTION, EMULSION INTRAVENOUS at 10:02

## 2018-02-05 RX ADMIN — PROPOFOL 100 MG: 10 INJECTION, EMULSION INTRAVENOUS at 10:02

## 2018-02-05 NOTE — DISCHARGE INSTRUCTIONS
I have to just get a check up. I have a right Kidney  mass. My doctor wants me to get a few things checked

## 2018-02-05 NOTE — H&P (VIEW-ONLY)
Routine Office Visit    Patient Name: Valarie Foster    : 1950  MRN: [id number]    Subjective:  Valarie is a 67 y.o. female who presents today for     1. Left side / flank pain - started approximately 2 weeks ago - pain is described as a throbbing pain that radiates down her side and to her lower pelvis. Pain is aggravated with walking and laying down. Pain is alleviated with pain medication prescribed and hot and cold compresses. Patient states pain is sometimes a gas-like pain, associated with pressure and bloat.  Pt states pain has improved with current regimen, but not resolved. Pt was evaluated for this pain in the ER. She was noted to have a right renal mass. She is scheduled to see urology - Dr. Palm tomorrow. No urinary / bowel complaints. No history of colonoscopy.   2. Establish care / new to me     Review of Systems   Constitutional: Negative for chills and fever.   HENT: Negative for congestion.    Eyes: Negative for blurred vision.   Respiratory: Negative for cough.    Cardiovascular: Negative for chest pain.   Gastrointestinal: Positive for abdominal pain. Negative for constipation, diarrhea, heartburn, nausea and vomiting.   Genitourinary: Negative for dysuria.   Musculoskeletal: Negative for myalgias.   Skin: Negative for itching and rash.   Neurological: Negative for dizziness and headaches.   Psychiatric/Behavioral: Negative for depression.       Active Problem List  Patient Active Problem List   Diagnosis    Type 2 diabetes mellitus without complication, without long-term current use of insulin    Benign hypertension    Hyperlipemia       Past Surgical History  Past Surgical History:   Procedure Laterality Date    HYSTERECTOMY         Family History  History reviewed. No pertinent family history.    Social History  Social History     Social History    Marital status:      Spouse name: N/A    Number of children: N/A    Years of education: N/A     Occupational History    Not on  "file.     Social History Main Topics    Smoking status: Never Smoker    Smokeless tobacco: Never Used    Alcohol use No    Drug use: No    Sexual activity: Not on file     Other Topics Concern    Not on file     Social History Narrative    No narrative on file       Medications and Allergies  Reviewed and updated.   Current Outpatient Prescriptions   Medication Sig    lisinopril 10 MG tablet Take 1 tablet (10 mg total) by mouth once daily.    methocarbamol (ROBAXIN) 500 MG Tab Take 2 tablets (1,000 mg total) by mouth 3 (three) times daily.    traMADol (ULTRAM) 50 mg tablet Take 1 tablet (50 mg total) by mouth every 6 (six) hours as needed for Pain.     No current facility-administered medications for this visit.        Physical Exam  BP (!) 140/78 (BP Location: Left arm, Patient Position: Sitting, BP Method: Large (Manual))   Pulse 94   Temp 98 °F (36.7 °C) (Oral)   Ht 5' 4" (1.626 m)   Wt 86.9 kg (191 lb 9.3 oz)   SpO2 97%   BMI 32.88 kg/m²   Physical Exam   Constitutional: She is oriented to person, place, and time. She appears well-developed and well-nourished.   HENT:   Head: Normocephalic and atraumatic.   Eyes: Conjunctivae and EOM are normal. Pupils are equal, round, and reactive to light.   Neck: Normal range of motion. Neck supple.   Cardiovascular: Normal rate, regular rhythm and normal heart sounds.  Exam reveals no gallop and no friction rub.    No murmur heard.  Pulmonary/Chest: Breath sounds normal. No respiratory distress.   Abdominal: Soft. Bowel sounds are normal. She exhibits no distension. There is no tenderness.   Musculoskeletal: Normal range of motion.   Lymphadenopathy:     She has no cervical adenopathy.   Neurological: She is alert and oriented to person, place, and time.   Skin: Skin is warm.   Psychiatric: She has a normal mood and affect.         Assessment/Plan:  Valarie Foster is a 67 y.o. female who presents today for :    Problem List Items Addressed This Visit        " Cardiac/Vascular    Benign hypertension (Chronic)    Relevant Medications    lisinopril 10 MG tablet  The current medical regimen is effective;  continue present plan and medications.      Hyperlipemia (Chronic)  Noted in patient history; lipids on lab WNL  Continue to monitor          Endocrine    Type 2 diabetes mellitus without complication, without long-term current use of insulin (Chronic)  a1c 6.2  Well controlled. Diet controlled.   continue present plan and medications.        Other Visit Diagnoses     Left flank pain    -  Primary    Relevant Orders    POCT URINE DIPSTICK WITHOUT MICROSCOPE (Completed)    Urine culture  F/u with urine culture   Continue conservative treatment with stretching / tylenol and muscle relaxer       Screening for malignant neoplasm of colon        Relevant Orders    Case request GI: COLONOSCOPY (Completed)    Encounter for screening mammogram for malignant neoplasm of breast         Relevant Orders    Mammo Digital Screening Bilat with CAD            Follow-up in about 3 months (around 4/30/2018).

## 2018-02-05 NOTE — TRANSFER OF CARE
Anesthesia Transfer of Care Note    Patient: Valarie Foster    Procedure(s) Performed: Procedure(s) (LRB):  COLONOSCOPY (N/A)    Patient location: PACU    Anesthesia Type: general    Transport from OR: Transported from OR on room air with adequate spontaneous ventilation    Post pain: adequate analgesia    Post assessment: no apparent anesthetic complications and tolerated procedure well    Post vital signs: stable    Level of consciousness: awake, alert and oriented    Nausea/Vomiting: no nausea/vomiting    Complications: none    Transfer of care protocol was followed      Last vitals:   Visit Vitals  BP (!) 140/74   Pulse 68   Temp 36.5 °C (97.7 °F) (Oral)   Resp 14   SpO2 100%   Breastfeeding? No

## 2018-02-06 ENCOUNTER — HOSPITAL ENCOUNTER (OUTPATIENT)
Dept: RADIOLOGY | Facility: HOSPITAL | Age: 68
Discharge: HOME | End: 2018-02-06
Attending: UROLOGY
Payer: MEDICARE

## 2018-02-06 DIAGNOSIS — N28.89 RIGHT RENAL MASS: ICD-10-CM

## 2018-02-06 PROCEDURE — 25500020 PHARM REV CODE 255: Performed by: UROLOGY

## 2018-02-06 PROCEDURE — 74178 CT ABD&PLV WO CNTR FLWD CNTR: CPT | Mod: 26,,, | Performed by: RADIOLOGY

## 2018-02-06 PROCEDURE — 71046 X-RAY EXAM CHEST 2 VIEWS: CPT | Mod: TC,FY

## 2018-02-06 PROCEDURE — 74178 CT ABD&PLV WO CNTR FLWD CNTR: CPT | Mod: TC

## 2018-02-06 PROCEDURE — 71046 X-RAY EXAM CHEST 2 VIEWS: CPT | Mod: 26,,, | Performed by: RADIOLOGY

## 2018-02-06 RX ADMIN — IOHEXOL 100 ML: 350 INJECTION, SOLUTION INTRAVENOUS at 09:02

## 2018-02-06 RX ADMIN — IOHEXOL 30 ML: 300 INJECTION, SOLUTION INTRAVENOUS at 09:02

## 2018-02-06 NOTE — ANESTHESIA PREPROCEDURE EVALUATION
02/06/2018  Valarie Foster is a 67 y.o., female.    Anesthesia Evaluation     I have reviewed the Nursing Notes.      Review of Systems  Anesthesia Hx:  No problems with previous Anesthesia   Social:  Non-Smoker    Cardiovascular:   Exercise tolerance: good Denies Pacemaker. Hypertension  Denies Valvular problems/Murmurs.  Denies MI.  Denies CAD.    Denies CABG/stent.  Denies Dysrhythmias.   Denies Angina.             hyperlipidemia    Pulmonary:  Pulmonary Normal    Renal/:   Right renal mass   Hepatic/GI:   Bowel Prep. Denies PUD. Denies Hiatal Hernia.  Denies GERD. Denies Liver Disease.  Denies Hepatitis.    Neurological:  Neurology Normal    Endocrine:   Diabetes, type 2 Denies Hypothyroidism. Denies Hyperthyroidism.        Physical Exam  General:  Obesity    Airway/Jaw/Neck:  AIRWAY FINDINGS: Normal           Mental Status:  Mental Status Findings: Normal        Anesthesia Plan  Type of Anesthesia, risks & benefits discussed:  Anesthesia Type:  general  Patient's Preference:   Intra-op Monitoring Plan: standard ASA monitors  Intra-op Monitoring Plan Comments:   Post Op Pain Control Plan:   Post Op Pain Control Plan Comments:   Induction:   IV  Beta Blocker:  Patient is not currently on a Beta-Blocker (No further documentation required).       Informed Consent: Patient understands risks and agrees with Anesthesia plan.  Questions answered. Anesthesia consent signed with patient.  ASA Score: 2     Day of Surgery Review of History & Physical:    H&P update referred to the surgeon.         Ready For Surgery From Anesthesia Perspective.

## 2018-02-06 NOTE — ANESTHESIA POSTPROCEDURE EVALUATION
Anesthesia Post Evaluation    Patient: Valarie Foster    Procedure(s) Performed: Procedure(s) (LRB):  COLONOSCOPY (N/A)    Final Anesthesia Type: general  Patient location during evaluation: GI PACU  Patient participation: Yes- Able to Participate  Level of consciousness: awake and alert  Post-procedure vital signs: reviewed and stable  Pain management: adequate  Airway patency: patent  PONV status at discharge: No PONV  Anesthetic complications: no      Cardiovascular status: blood pressure returned to baseline and hemodynamically stable  Respiratory status: unassisted and spontaneous ventilation  Hydration status: euvolemic  Follow-up not needed.        Visit Vitals  BP (!) 164/79 (BP Location: Left arm, Patient Position: Lying)   Pulse 61   Temp 36.5 °C (97.7 °F) (Oral)   Resp 18   SpO2 98%   Breastfeeding? No       Pain/Fabricio Score: Pain Assessment Performed: Yes (2/5/2018 11:00 AM)  Presence of Pain: denies (2/5/2018 11:00 AM)  Fabricio Score: 10 (2/5/2018 11:00 AM)

## 2018-02-08 ENCOUNTER — TELEPHONE (OUTPATIENT)
Dept: FAMILY MEDICINE | Facility: CLINIC | Age: 68
End: 2018-02-08

## 2018-02-08 ENCOUNTER — OFFICE VISIT (OUTPATIENT)
Dept: UROLOGY | Facility: CLINIC | Age: 68
End: 2018-02-08
Payer: MEDICARE

## 2018-02-08 VITALS
DIASTOLIC BLOOD PRESSURE: 70 MMHG | HEIGHT: 64 IN | WEIGHT: 191.81 LBS | HEART RATE: 60 BPM | RESPIRATION RATE: 14 BRPM | SYSTOLIC BLOOD PRESSURE: 122 MMHG | BODY MASS INDEX: 32.74 KG/M2

## 2018-02-08 DIAGNOSIS — N28.89 RIGHT RENAL MASS: Primary | ICD-10-CM

## 2018-02-08 DIAGNOSIS — R10.9 LEFT FLANK PAIN: ICD-10-CM

## 2018-02-08 DIAGNOSIS — R31.29 MICROHEMATURIA: ICD-10-CM

## 2018-02-08 PROCEDURE — 1159F MED LIST DOCD IN RCRD: CPT | Mod: S$GLB,,, | Performed by: UROLOGY

## 2018-02-08 PROCEDURE — 99999 PR PBB SHADOW E&M-EST. PATIENT-LVL III: CPT | Mod: PBBFAC,,, | Performed by: UROLOGY

## 2018-02-08 PROCEDURE — 3008F BODY MASS INDEX DOCD: CPT | Mod: S$GLB,,, | Performed by: UROLOGY

## 2018-02-08 PROCEDURE — 99213 OFFICE O/P EST LOW 20 MIN: CPT | Performed by: UROLOGY

## 2018-02-08 PROCEDURE — 99214 OFFICE O/P EST MOD 30 MIN: CPT | Mod: S$GLB,,, | Performed by: UROLOGY

## 2018-02-08 PROCEDURE — 1126F AMNT PAIN NOTED NONE PRSNT: CPT | Mod: S$GLB,,, | Performed by: UROLOGY

## 2018-02-08 NOTE — TELEPHONE ENCOUNTER
----- Message from Corey Alcocer sent at 2/8/2018  1:50 PM CST -----  Contact: Valarie [phone]  REFILL: tramodol 50mg    PHARMACY:[facility] [address] [place], [place] [address] ([place], [place] - [address] [place] [address]

## 2018-02-08 NOTE — PROGRESS NOTES
"  Subjective:       Valarie Foster is a 67 y.o. female who is an established patient who was referred by Ivan CARDONA for evaluation of renal mass.      CT a/p with contrast 1/26/18 showed a 2.6cm endophytic lesion in R MP, concerning for solid mass vs complex cyst.     She reports L flank pain, contralateral to renal lesion. L flank pain has almost completely resolved. Denies hematuria, LUTS, UTIs. UA macro did show microhematuria recently. Denies family history of  malignancy. She reports being told she had a small kidney on one side in the past. Denies nephrolithiasis. Nonsmoker. Denies previous abdominal surgery though she is s/p hysterectomy. +DM2/HTN.     Cr (1/18) - 0.8  A1c (1/18) - 6.2  UA micro (2/18) - 0 RBCs    CT renal protocol - 2.9cm completely endophytic solid enhancing renal mass. Homogenous in appearance.     Still with L flank pain, contralateral to mass.       The following portions of the patient's history were reviewed and updated as appropriate: allergies, current medications, past family history, past medical history, past social history, past surgical history and problem list.    Review of Systems  Constitutional: no fever or chills  ENT: no nasal congestion or sore throat  Respiratory: no cough or shortness of breath  Cardiovascular: no chest pain or palpitations  Gastrointestinal: no nausea or vomiting, tolerating diet  Genitourinary: as per HPI  Hematologic/Lymphatic: no easy bruising or lymphadenopathy  Musculoskeletal: no arthralgias or myalgias  Skin: no rashes or lesions  Neurological: no seizures or tremors  Behavioral/Psych: no auditory or visual hallucinations        Objective:    Vitals: /70   Pulse 60   Resp 14   Ht 5' 4" (1.626 m)   Wt 87 kg (191 lb 12.8 oz)   BMI 32.92 kg/m²     Physical Exam   General: well developed, well nourished in no acute distress  Head: normocephalic, atraumatic  Neck: supple, trachea midline, no obvious enlargement of thyroid  HEENT: EOMI, " mucus membranes moist, sclera anicteric, no hearing impairment  Lungs: symmetric expansion, non-labored breathing  Cardiovascular: regular rate and rhythm, normal pulses  Abdomen: soft, non tender, non distended, no palpable masses, no hepatosplenomegaly, no hernias, no CVA tenderness  Musculoskeletal: no peripheral edema, normal ROM in bilateral upper and lower extremities  Lymphatics: no cervical or inguinal lymphadenopathy  Skin: no rashes or lesions  Neuro: alert and oriented x 3, no gross deficits  Psych: normal judgment and insight, normal mood/affect and non-anxious  Genitourinary:   patient declined exam      Lab Review   Urine analysis today in clinic shows positive for red blood cells 5-10    Lab Results   Component Value Date    WBC 6.10 01/29/2018    HGB 12.6 01/29/2018    HCT 39.6 01/29/2018    MCV 85 01/29/2018     01/29/2018     Lab Results   Component Value Date    CREATININE 0.8 01/29/2018    BUN 14 01/29/2018         Imaging  Images and reports were personally reviewed by me and discussed with patient  CT reviewed       Assessment/Plan:      1. Right renal mass    - 2.6cm lesion in R MP kidney.   - CT renal protocol 2.5cm endophytic solid enhancing renal mass   - Due to location of tumor, open partial would be difficult with higher complication rate of bleeding and/or urine leak. Radical nephrectomy with less complication rate.   - Due to homogenous look of renal mass, possibility of oncocytoma remains. Concern for performing radical nephrectomy in diabetic patient for possible benign lesion.   - Recommend perc biopsy to evaluate for possible benign lesion prior to radical nephrectomy. Discussed limitations/risks of perc biopsy. Understands that perc biopsy may also not be definitive in diagnosis   - Discussed with Dr Crouch, reviewed images. Agrees with perc biopsy.     2. Microhematuria    - UA micro - 0 RBCs     3. L flank pain   - Unsure etiology   - CT negative      Follow up in 2  weeks

## 2018-02-12 ENCOUNTER — OFFICE VISIT (OUTPATIENT)
Dept: OPTOMETRY | Facility: CLINIC | Age: 68
End: 2018-02-12
Payer: MEDICARE

## 2018-02-12 DIAGNOSIS — H52.7 REFRACTIVE ERROR: ICD-10-CM

## 2018-02-12 DIAGNOSIS — H25.13 NUCLEAR SCLEROSIS OF BOTH EYES: ICD-10-CM

## 2018-02-12 DIAGNOSIS — H04.123 DRY EYE SYNDROME, BILATERAL: ICD-10-CM

## 2018-02-12 DIAGNOSIS — Z01.00 DIABETIC EYE EXAM: Primary | ICD-10-CM

## 2018-02-12 DIAGNOSIS — E11.9 DIABETIC EYE EXAM: Primary | ICD-10-CM

## 2018-02-12 PROCEDURE — 92015 DETERMINE REFRACTIVE STATE: CPT | Mod: S$GLB,,, | Performed by: OPTOMETRIST

## 2018-02-12 PROCEDURE — 99999 PR PBB SHADOW E&M-EST. PATIENT-LVL II: CPT | Mod: PBBFAC,,, | Performed by: OPTOMETRIST

## 2018-02-12 PROCEDURE — 92004 COMPRE OPH EXAM NEW PT 1/>: CPT | Mod: S$GLB,,, | Performed by: OPTOMETRIST

## 2018-02-12 NOTE — PROGRESS NOTES
Subjective:       Patient ID: Valarie Foster is a 67 y.o. female      Chief Complaint   Patient presents with    Diabetic Eye Exam     A1c = 6.2 (1/29/18), LBS 98 this AM     History of Present Illness  Last Eye Exam: 2-3 years ago.    Pt here for diabetic eye exam. Pt states no problems with near vision.Pt   c/o of blurry vision at distance.    (+) Eye pain/discomfort; od  (+) Itching  (+) Watery Eyes  (+) Dryness  (+) Floaters/Black Spots; ou  (--) Headaches  (+) Photophobia; am and pm    Eye Meds: none  Glasses: PALs  Contacts: none    Hemoglobin A1C       Date                     Value               Ref Range           Status                01/29/2018               6.2 (H)             4.0 - 5.6 %         Final             ----------     Assessment/Plan:     1. Diabetic eye exam  - Eyemed vision exam  - No diabetic retinopathy. Educated patient on importance of good blood sugar control, compliance with meds, and follow up care with PCP. Return in 1 year for dilated eye exam, sooner PRN.    2. Nuclear sclerosis of both eyes  No diabetic retinopathy. Educated patient on importance of good blood sugar control, compliance with meds, and follow up care with PCP. Return in 1 year for dilated eye exam, sooner PRN.    3. Dry eye syndrome, bilateral  Recommend artificial tears. 1 drop 4x per day and PRN. Discussed different drop options - AT/PFAT/gel/ointment. Chronicity of disease and treatment discussed.    4. Refractive error  Educated patient on refractive error and discussed lens options. Dispensed updated spectacle Rx. Educated about adaptation period to new specs.    Eyeglass Final Rx     Eyeglass Final Rx       Sphere Cylinder Axis Add    Right +1.75 +0.50 015 +2.75    Left +1.75 +0.25 005 +2.75    Type:  PAL    Expiration Date:  2/13/2019                  Follow-up in about 1 year (around 2/12/2019) for Diabetic Eye Exam.

## 2018-02-14 ENCOUNTER — TELEPHONE (OUTPATIENT)
Dept: UROLOGY | Facility: CLINIC | Age: 68
End: 2018-02-14

## 2018-02-14 NOTE — TELEPHONE ENCOUNTER
----- Message from Fabi Quezada sent at 2/14/2018  2:09 PM CST -----  Contact: [phone]  Pt is requesting a call back to find out when she is scheduled to come in for her biopsy

## 2018-02-14 NOTE — TELEPHONE ENCOUNTER
Spoke with patient, she was notified per Davin in IR that she would receive a phone call today before 5:00pm to schedule her biopsy.

## 2018-02-16 ENCOUNTER — HOSPITAL ENCOUNTER (OUTPATIENT)
Dept: PREADMISSION TESTING | Facility: HOSPITAL | Age: 68
Discharge: HOME | End: 2018-02-16
Attending: RADIOLOGY
Payer: MEDICARE

## 2018-02-16 VITALS
TEMPERATURE: 98 F | RESPIRATION RATE: 18 BRPM | HEART RATE: 70 BPM | OXYGEN SATURATION: 99 % | SYSTOLIC BLOOD PRESSURE: 188 MMHG | WEIGHT: 188 LBS | BODY MASS INDEX: 31.32 KG/M2 | HEIGHT: 65 IN | DIASTOLIC BLOOD PRESSURE: 87 MMHG

## 2018-02-16 DIAGNOSIS — N28.89 RENAL MASS: ICD-10-CM

## 2018-02-16 DIAGNOSIS — Z01.818 PRE-OP TESTING: Primary | ICD-10-CM

## 2018-02-16 DIAGNOSIS — Z01.811 PRE-OP CHEST EXAM: ICD-10-CM

## 2018-02-16 DIAGNOSIS — N28.89 KIDNEY MASS: ICD-10-CM

## 2018-02-16 LAB
INR PPP: 1.1
PROTHROMBIN TIME: 11.3 SEC

## 2018-02-16 PROCEDURE — 85610 PROTHROMBIN TIME: CPT

## 2018-02-16 PROCEDURE — 36415 COLL VENOUS BLD VENIPUNCTURE: CPT

## 2018-02-16 NOTE — DISCHARGE INSTRUCTIONS
Your procedure is scheduled for____2/19/2018_____________.        Report to SAME DAY SURGERY UNIT at __8:00_____am on the 2nd floor of the hospital.  Use the front entrance of the hospital before 6 am.  If you need wheelchair assistance, call [phone] from your cell phone,  or call 0 from the courtesy phone in the hospital lobby.    Important instructions:   Do not eat or drink after 12 midnight, including water.  It is okay to brush your teeth.  Do not have gum, candy or mints.     Take only these medications with a small swallow of water on the morning of your surgery.___lisinopril__________         Please shower the night before and the morning of your surgery.       Use Hibiclens soap to your surgery site if instructed by your pre op nurse.   If your surgery is on your abdomen, be sure to wash your naval.  Be sure to rinse off Hibiclens after it is on your skin for several minutes.  Do not use Hibiclens on your face or genitals.      Do not wear make- up, including mascara.     You may wear deodorant only.      Do not wear powder, body lotion or cologne.     Do not wear any jewelry or have any metal on your body.     Please bring any documents given to you by your doctor.     If you are going home on the same day of surgery, you must have arrangements for a ride home.  You will not be able to drive home if you were given anesthesia or sedation.     Do not take any diabetic medication on the morning of surgery unless instructed to do so by your doctor or pre op nurse.     Stop taking Aspirin, Ibuprofen, Motrin and Aleve at least 7 days before your surgery. You may use Tylenol.     Stop taking fish oil and vitamin E for least 7 days before surgery.     Wear loose fitting clothes allowing for bandages.     Please leave money and valuables home.       You may bring your cell phone.     Call the doctor if fever or illness should occur before your surgery.    Call [phone] to contact us here at [facility] if needed.

## 2018-02-19 ENCOUNTER — HOSPITAL ENCOUNTER (OUTPATIENT)
Facility: HOSPITAL | Age: 68
Discharge: HOME | End: 2018-02-19
Attending: RADIOLOGY | Admitting: RADIOLOGY
Payer: MEDICARE

## 2018-02-19 ENCOUNTER — SURGERY (OUTPATIENT)
Age: 68
End: 2018-02-19

## 2018-02-19 VITALS
RESPIRATION RATE: 14 BRPM | HEART RATE: 70 BPM | OXYGEN SATURATION: 100 % | TEMPERATURE: 98 F | DIASTOLIC BLOOD PRESSURE: 59 MMHG | WEIGHT: 188 LBS | BODY MASS INDEX: 31.32 KG/M2 | HEIGHT: 65 IN | SYSTOLIC BLOOD PRESSURE: 127 MMHG

## 2018-02-19 DIAGNOSIS — N28.89 RENAL MASS: ICD-10-CM

## 2018-02-19 DIAGNOSIS — N28.89 RIGHT RENAL MASS: ICD-10-CM

## 2018-02-19 DIAGNOSIS — N28.89 KIDNEY MASS: ICD-10-CM

## 2018-02-19 PROCEDURE — 63600175 PHARM REV CODE 636 W HCPCS: Performed by: RADIOLOGY

## 2018-02-19 PROCEDURE — 88342 IMHCHEM/IMCYTCHM 1ST ANTB: CPT | Mod: 26,,, | Performed by: PATHOLOGY

## 2018-02-19 PROCEDURE — 88341 IMHCHEM/IMCYTCHM EA ADD ANTB: CPT | Mod: 26,,, | Performed by: PATHOLOGY

## 2018-02-19 PROCEDURE — 88305 TISSUE EXAM BY PATHOLOGIST: CPT | Performed by: PATHOLOGY

## 2018-02-19 PROCEDURE — 88305 TISSUE EXAM BY PATHOLOGIST: CPT | Mod: 26,,, | Performed by: PATHOLOGY

## 2018-02-19 PROCEDURE — 25000003 PHARM REV CODE 250: Performed by: RADIOLOGY

## 2018-02-19 RX ORDER — SODIUM CHLORIDE 9 MG/ML
INJECTION, SOLUTION INTRAVENOUS CONTINUOUS
Status: DISCONTINUED | OUTPATIENT
Start: 2018-02-19 | End: 2018-02-19 | Stop reason: HOSPADM

## 2018-02-19 RX ORDER — HYDROCODONE BITARTRATE AND ACETAMINOPHEN 5; 325 MG/1; MG/1
1 TABLET ORAL EVERY 4 HOURS PRN
Status: DISCONTINUED | OUTPATIENT
Start: 2018-02-19 | End: 2018-02-19 | Stop reason: HOSPADM

## 2018-02-19 RX ORDER — MIDAZOLAM HYDROCHLORIDE 1 MG/ML
INJECTION INTRAMUSCULAR; INTRAVENOUS CODE/TRAUMA/SEDATION MEDICATION
Status: COMPLETED | OUTPATIENT
Start: 2018-02-19 | End: 2018-02-19

## 2018-02-19 RX ORDER — FENTANYL CITRATE 50 UG/ML
INJECTION, SOLUTION INTRAMUSCULAR; INTRAVENOUS CODE/TRAUMA/SEDATION MEDICATION
Status: COMPLETED | OUTPATIENT
Start: 2018-02-19 | End: 2018-02-19

## 2018-02-19 RX ADMIN — SODIUM CHLORIDE: 0.9 INJECTION, SOLUTION INTRAVENOUS at 08:02

## 2018-02-19 RX ADMIN — MIDAZOLAM HYDROCHLORIDE 1 MG: 1 INJECTION, SOLUTION INTRAMUSCULAR; INTRAVENOUS at 10:02

## 2018-02-19 RX ADMIN — HYDROCODONE BITARTRATE AND ACETAMINOPHEN 1 TABLET: 5; 325 TABLET ORAL at 02:02

## 2018-02-19 RX ADMIN — HYDROCODONE BITARTRATE AND ACETAMINOPHEN 1 TABLET: 5; 325 TABLET ORAL at 10:02

## 2018-02-19 RX ADMIN — FENTANYL CITRATE 50 MCG: 50 INJECTION INTRAMUSCULAR; INTRAVENOUS at 10:02

## 2018-02-19 RX ADMIN — FENTANYL CITRATE 25 MCG: 50 INJECTION INTRAMUSCULAR; INTRAVENOUS at 10:02

## 2018-02-19 NOTE — DISCHARGE INSTRUCTIONS
BATHING:  ? You may shower tomorrow.  DRESSING:  ? Remove dressing tomorrow.        ACTIVITY LEVEL: If you have received sedation or an anesthetic, you may feel sleepy for several hours. Rest until you are more awake. Gradually resume your normal activities    Do not drive, drink alcohol, or sign legal documents for 24 hours, or if taking narcotic pain medication.      DIET: You may resume your home diet. If nausea is present, increase your diet gradually with fluids and bland foods.    Medications: Pain medication should be taken only if needed and as directed. If antibiotics are prescribed, the medication should be taken until completed. You will be given an updated list of you medications.  ? No driving, alcoholic beverages or signing legal documents for next 24 hours if you have had sedation, or while taking pain medication    CALL THE DOCTOR:   For any obvious bleeding (some dried blood over the incision is normal).     Redness, swelling, foul smell around incision or fever over 101.  Shortness of breath.  Persistent pain or nausea not relieved by medication.  Call  [phone]     to speak with an Interventional Radiologist    If any unusual problems or difficulties occur contact your doctor. If you cannot contact your doctor but feel your signs and symptoms warrant a physicians attention return to the emergency room.       Fall Prevention  Millions of people fall every year and injure themselves. You may have had anesthesia or sedation which may increase your risk of falling. You may have health issues that put you at an increased risk of falling.     Here are ways to reduce your risk of falling.  ·   · Make your home safe by keeping walkways clear of objects you may trip over.  · Use non-slip pads under rugs. Do not use area rugs or small throw rugs.  · Use non-slip mats in bathtubs and showers.  · Install handrails and lights on staircases.  · Do not walk in poorly lit areas.  · Do not stand on chairs or  wobbly ladders.  · Use caution when reaching overhead or looking upward. This position can cause a loss of balance.  · Be sure your shoes fit properly, have non-slip bottoms and are in good condition.   · Wear shoes both inside and out. Avoid going barefoot or wearing slippers.  · Be cautious when going up and down stairs, curbs, and when walking on uneven sidewalks.  · If your balance is poor, consider using a cane or walker.  · If your fall was related to alcohol use, stop or limit alcohol intake.   · If your fall was related to use of sleeping medicines, talk to your doctor about this. You may need to reduce your dosage at bedtime if you awaken during the night to go to the bathroom.    · To reduce the need for nighttime bathroom trips:  ¨ Avoid drinking fluids for several hours before going to bed  ¨ Empty your bladder before going to bed  ¨ Men can keep a urinal at the bedside  · Stay as active as you can. Balance, flexibility, strength, and endurance all come from exercise. They all play a role in preventing falls. Ask your healthcare provider which types of activity are right for you.  · Get your vision checked on a regular basis.  · If you have pets, know where they are before you stand up or walk so you don't trip over them.  Use night lights.      Discharge Instructions for Kidney Biopsy  You had a procedure called a kidney biopsy. Your healthcare provider used a special needle to remove a small piece of tissue from your kidney to examine it for signs of damage and disease. A kidney biopsy is ordered after other tests have shown that there may be a problem with your kidney. Kidney biopsies are also performed when kidney disease is suspected and to rule out cancer.  Home care  · Rest for 24 hours to 48 hours. Get up only to use the bathroom.  · Dont drive for 24 hours to after the procedure.  · Dont shower for 24 hours after the biopsy. If you wish, you may wash yourself with a sponge or washcloth. When  you are able to shower, dont scrub the site. Gently wash the area and pat it dry.  · Remove the bandage covering the biopsy site 24 hours to 48 hours after the procedure.  · Dont lift anything heavier than 10 pounds for 3 days to 4 days after the procedure.  · Ask your healthcare provider when you can return to work. Be sure to tell your healthcare provider if your job involves heavy lifting.  When to seek medical care  Call your healthcare provider right away if you have any of the following:  · Blood in your urine  · Exhaustion or extreme weakness  · Dizziness or lightheadedness  · Sudden or increased shortness of breath  · Sudden chest pain  · Fever of 100.4°F (38°C) or higher, or chills  · Increasing redness, tenderness, or swelling at the biopsy site  · Opening of or drainage or bleeding from the biopsy site  · Increasing pain, with or without activity

## 2018-02-19 NOTE — DISCHARGE SUMMARY
Radiology Discharge Summary      Admit date: 2/19/2018  7:42 AM  Discharge date: February 19, 2018    Instructions Given to patient: YesVerbal    Diet: Regular    Activity:Restriction as listed: No strenuous activities for 48 hours    Medications on discharge (List): Refer to Discharge Medication List    Hospital Course: CT guided biopsy of right kidney mass.    Description of Condition on Discharge: stable    Discharge Disposition: Home    Discharge Diagnosis: Right renal mass    Follow up with Dr. Palm as scheduled.

## 2018-02-19 NOTE — OP NOTE
[facility]  Interventional Radiology  Procedure - Outpatient    Date: 02/19/2018 Time: 10:41 AM    Pre-Op Diagnosis: Right renal mass    Post-Op Diagnosis: same    Procedure Performed by: Iglesia Byrd MD    Assistant: none    Procedure: CT guided right kidney mass biopsy    Specimen/Tissue Removed: 4 x 18 gauge cores    Estimated Blood Loss: Less than 10 mL    Procedure Note/Findings: CT guided right renal mass biopsy performed with 17 gauge coaxial needle and 18 gauge cores obtained. No immediate post-procedure complications noted.    Please refer to dictated report for additional details.

## 2018-03-01 ENCOUNTER — TELEPHONE (OUTPATIENT)
Dept: FAMILY MEDICINE | Facility: CLINIC | Age: 68
End: 2018-03-01

## 2018-03-01 ENCOUNTER — OFFICE VISIT (OUTPATIENT)
Dept: UROLOGY | Facility: CLINIC | Age: 68
End: 2018-03-01
Payer: MEDICARE

## 2018-03-01 VITALS — HEIGHT: 64 IN | WEIGHT: 187.38 LBS | BODY MASS INDEX: 31.99 KG/M2 | RESPIRATION RATE: 16 BRPM

## 2018-03-01 DIAGNOSIS — R31.29 MICROHEMATURIA: ICD-10-CM

## 2018-03-01 DIAGNOSIS — N28.89 RIGHT RENAL MASS: Primary | ICD-10-CM

## 2018-03-01 DIAGNOSIS — R10.9 LEFT FLANK PAIN: ICD-10-CM

## 2018-03-01 PROCEDURE — 99999 PR PBB SHADOW E&M-EST. PATIENT-LVL III: CPT | Mod: PBBFAC,,, | Performed by: UROLOGY

## 2018-03-01 PROCEDURE — 99214 OFFICE O/P EST MOD 30 MIN: CPT | Mod: S$GLB,,, | Performed by: UROLOGY

## 2018-03-01 NOTE — TELEPHONE ENCOUNTER
----- Message from Kati Ospina sent at 3/1/2018  3:24 PM CST -----  Contact: Self   Patient still is experiencing side pains. Please call at [phone]

## 2018-03-01 NOTE — PROGRESS NOTES
"  Subjective:       Valarie Foster is a 67 y.o. female who is an established patient who was referred by Ivan CARDONA for evaluation of renal mass.      CT a/p with contrast 1/26/18 showed a 2.6cm endophytic lesion in R MP, concerning for solid mass vs complex cyst.     She reports L flank pain, contralateral to renal lesion. L flank pain has almost completely resolved. Denies hematuria, LUTS, UTIs. UA macro did show microhematuria recently. Denies family history of  malignancy. She reports being told she had a small kidney on one side in the past. Denies nephrolithiasis. Nonsmoker. Denies previous abdominal surgery though she is s/p hysterectomy. +DM2/HTN.     Cr (1/18) - 0.8  A1c (1/18) - 6.2  UA micro (2/18) - 0 RBCs    CT renal protocol - 2.9cm completely endophytic solid enhancing renal mass. Homogenous in appearance.     She is now s/p perc renal biopsy to better evaluate lesion - oncocytic neoplasm. Mild hematuria < 24 hrs.            The following portions of the patient's history were reviewed and updated as appropriate: allergies, current medications, past family history, past medical history, past social history, past surgical history and problem list.    Review of Systems  Constitutional: no fever or chills  ENT: no nasal congestion or sore throat  Respiratory: no cough or shortness of breath  Cardiovascular: no chest pain or palpitations  Gastrointestinal: no nausea or vomiting, tolerating diet  Genitourinary: as per HPI  Hematologic/Lymphatic: no easy bruising or lymphadenopathy  Musculoskeletal: no arthralgias or myalgias  Skin: no rashes or lesions  Neurological: no seizures or tremors  Behavioral/Psych: no auditory or visual hallucinations        Objective:    Vitals: Resp 16   Ht 5' 4" (1.626 m)   Wt 85 kg (187 lb 6.3 oz)   BMI 32.17 kg/m²     Physical Exam   General: well developed, well nourished in no acute distress  Head: normocephalic, atraumatic  Neck: supple, trachea midline, no obvious " enlargement of thyroid  HEENT: EOMI, mucus membranes moist, sclera anicteric, no hearing impairment  Lungs: symmetric expansion, non-labored breathing  Cardiovascular: regular rate and rhythm, normal pulses  Abdomen: soft, non tender, non distended, no palpable masses, no hepatosplenomegaly, no hernias, no CVA tenderness  Musculoskeletal: no peripheral edema, normal ROM in bilateral upper and lower extremities  Lymphatics: no cervical or inguinal lymphadenopathy  Skin: no rashes or lesions  Neuro: alert and oriented x 3, no gross deficits  Psych: normal judgment and insight, normal mood/affect and non-anxious  Genitourinary:   patient declined exam      Lab Review   Urine analysis today in clinic shows positive for red blood cells 5-10    Lab Results   Component Value Date    WBC 6.10 01/29/2018    HGB 12.6 01/29/2018    HCT 39.6 01/29/2018    MCV 85 01/29/2018     01/29/2018     Lab Results   Component Value Date    CREATININE 0.8 01/29/2018    BUN 14 01/29/2018         Imaging  Images and reports were personally reviewed by me and discussed with patient  CT reviewed       Assessment/Plan:      1. Right renal mass    - 2.6cm lesion in R MP kidney.   - CT renal protocol 2.5cm endophytic solid enhancing renal mass   - Due to location of tumor, open partial would be difficult with higher complication rate of bleeding and/or urine leak. Radical nephrectomy with less complication rate.   - Due to homogenous look of renal mass, possibility of oncocytoma remains. Concern for performing radical nephrectomy in diabetic patient for possible benign lesion.   - Recommend perc biopsy to evaluate for possible benign lesion prior to radical nephrectomy. Discussed limitations/risks of perc biopsy. Understands that perc biopsy may also not be definitive in diagnosis   - Perc biopsy - oncocytic neoplasm   - Discussed options: observation, lap radical Nx, open partial Nx. She has opted to observe for now.    - CT in 3  months.     2. Microhematuria    - UA micro - 0 RBCs     3. L flank pain   - Unsure etiology   - CT negative   - f/u with PCP      Follow up in 3 months with CT scan

## 2018-03-02 ENCOUNTER — OFFICE VISIT (OUTPATIENT)
Dept: FAMILY MEDICINE | Facility: CLINIC | Age: 68
End: 2018-03-02
Payer: MEDICARE

## 2018-03-02 VITALS
BODY MASS INDEX: 31.69 KG/M2 | HEART RATE: 68 BPM | WEIGHT: 185.63 LBS | DIASTOLIC BLOOD PRESSURE: 80 MMHG | OXYGEN SATURATION: 99 % | SYSTOLIC BLOOD PRESSURE: 140 MMHG | TEMPERATURE: 98 F | HEIGHT: 64 IN

## 2018-03-02 DIAGNOSIS — E11.40 TYPE 2 DIABETES MELLITUS WITH DIABETIC NEUROPATHY, WITHOUT LONG-TERM CURRENT USE OF INSULIN: ICD-10-CM

## 2018-03-02 DIAGNOSIS — E78.5 HYPERLIPIDEMIA, UNSPECIFIED HYPERLIPIDEMIA TYPE: Chronic | ICD-10-CM

## 2018-03-02 DIAGNOSIS — N28.89 RIGHT RENAL MASS: ICD-10-CM

## 2018-03-02 DIAGNOSIS — I10 BENIGN HYPERTENSION: Primary | Chronic | ICD-10-CM

## 2018-03-02 DIAGNOSIS — R10.9 LEFT FLANK PAIN: ICD-10-CM

## 2018-03-02 PROCEDURE — 99499 UNLISTED E&M SERVICE: CPT | Mod: S$GLB,,, | Performed by: FAMILY MEDICINE

## 2018-03-02 PROCEDURE — 99214 OFFICE O/P EST MOD 30 MIN: CPT | Mod: 25,S$GLB,, | Performed by: FAMILY MEDICINE

## 2018-03-02 PROCEDURE — 96372 THER/PROPH/DIAG INJ SC/IM: CPT | Mod: S$GLB,,, | Performed by: FAMILY MEDICINE

## 2018-03-02 PROCEDURE — 3077F SYST BP >= 140 MM HG: CPT | Mod: S$GLB,,, | Performed by: FAMILY MEDICINE

## 2018-03-02 PROCEDURE — 99999 PR PBB SHADOW E&M-EST. PATIENT-LVL III: CPT | Mod: PBBFAC,,, | Performed by: FAMILY MEDICINE

## 2018-03-02 PROCEDURE — 3079F DIAST BP 80-89 MM HG: CPT | Mod: S$GLB,,, | Performed by: FAMILY MEDICINE

## 2018-03-02 RX ORDER — KETOROLAC TROMETHAMINE 30 MG/ML
30 INJECTION, SOLUTION INTRAMUSCULAR; INTRAVENOUS
Status: COMPLETED | OUTPATIENT
Start: 2018-03-02 | End: 2018-03-02

## 2018-03-02 RX ORDER — GABAPENTIN 300 MG/1
300 CAPSULE ORAL NIGHTLY
Qty: 90 CAPSULE | Refills: 1 | Status: SHIPPED | OUTPATIENT
Start: 2018-03-02 | End: 2018-04-30 | Stop reason: SDUPTHER

## 2018-03-02 RX ORDER — HYDROCODONE BITARTRATE AND ACETAMINOPHEN 5; 325 MG/1; MG/1
1 TABLET ORAL EVERY 12 HOURS PRN
Qty: 60 TABLET | Refills: 0 | Status: SHIPPED | OUTPATIENT
Start: 2018-03-02 | End: 2018-04-30 | Stop reason: SDUPTHER

## 2018-03-02 RX ADMIN — KETOROLAC TROMETHAMINE 30 MG: 30 INJECTION, SOLUTION INTRAMUSCULAR; INTRAVENOUS at 02:03

## 2018-03-02 NOTE — PROGRESS NOTES
Routine Office Visit    Patient Name: Valarie Foster    : 1950  MRN: [id number]    Subjective:  Valarie is a 67 y.o. female who presents today for     1.  Back pain - started approximately 2 months ago - pain is described as a throbbing pain that radiates down her side and to her lower pelvis. Pain is left sided flank and radiates to the front. Pain is aggravated with walking and laying down. Pain is alleviated with pain medication prescribed and hot and cold compresses. Patient states pain is sometimes a gas-like pain, associated with pressure and bloat.  pt did have colonoscopy and noted to have 1 polyp. She is being evaluated by urology for right kidney mass.     Review of Systems   Constitutional: Negative for chills and fever.   HENT: Negative for congestion.    Eyes: Negative for blurred vision.   Respiratory: Negative for cough.    Cardiovascular: Negative for chest pain.   Gastrointestinal: Negative for abdominal pain, constipation, diarrhea, heartburn, nausea and vomiting.   Genitourinary: Negative for dysuria.   Musculoskeletal: Positive for back pain. Negative for myalgias.   Skin: Negative for itching and rash.   Neurological: Negative for dizziness and headaches.   Psychiatric/Behavioral: Negative for depression.       Active Problem List  Patient Active Problem List   Diagnosis    Type 2 diabetes mellitus with diabetic neuropathy, without long-term current use of insulin    Benign hypertension    Hyperlipemia    Right renal mass    Microhematuria    Left flank pain    Kidney mass       Past Surgical History  Past Surgical History:   Procedure Laterality Date    COLONOSCOPY N/A 2018    Procedure: COLONOSCOPY;  Surgeon: Bacilio Mancia MD;  Location: [facility];  Service: Endoscopy;  Laterality: N/A;  appt confirmed-ss    HYSTERECTOMY      OOPHORECTOMY         Family History  Family History   Problem Relation Age of Onset    Glaucoma Sister     Cancer Sister      breast cancer    No  "Known Problems Mother     Glaucoma Father     Diabetes Brother     No Known Problems Maternal Aunt     No Known Problems Maternal Uncle     No Known Problems Paternal Aunt     No Known Problems Paternal Uncle     No Known Problems Maternal Grandmother     No Known Problems Maternal Grandfather     No Known Problems Paternal Grandmother     No Known Problems Paternal Grandfather     Thyroid disease Sister     Blindness Sister      due to trauma during car accident    Diabetes Sister     Amblyopia Neg Hx     Cataracts Neg Hx     Hypertension Neg Hx     Macular degeneration Neg Hx     Retinal detachment Neg Hx     Strabismus Neg Hx     Stroke Neg Hx     Breast cancer Neg Hx        Social History  Social History     Social History    Marital status: Legally      Spouse name: N/A    Number of children: N/A    Years of education: N/A     Occupational History    Not on file.     Social History Main Topics    Smoking status: Never Smoker    Smokeless tobacco: Never Used    Alcohol use No    Drug use: No    Sexual activity: Not on file     Other Topics Concern    Not on file     Social History Narrative    No narrative on file       Medications and Allergies  Reviewed and updated.   Current Outpatient Prescriptions   Medication Sig    lisinopril 10 MG tablet Take 1 tablet (10 mg total) by mouth once daily.    gabapentin (NEURONTIN) 300 MG capsule Take 1 capsule (300 mg total) by mouth every evening.    hydrocodone-acetaminophen 5-325mg (NORCO) 5-325 mg per tablet Take 1 tablet by mouth every 12 (twelve) hours as needed for Pain.     No current facility-administered medications for this visit.        Physical Exam  BP (!) 140/80 (BP Location: Right arm, Patient Position: Sitting)   Pulse 68   Temp 97.8 °F (36.6 °C) (Oral)   Ht 5' 4" (1.626 m)   Wt 84.2 kg (185 lb 10 oz)   SpO2 99%   BMI 31.86 kg/m²   Physical Exam   Constitutional: She is oriented to person, place, and time. " She appears well-developed and well-nourished.   HENT:   Head: Normocephalic and atraumatic.   Eyes: Conjunctivae and EOM are normal. Pupils are equal, round, and reactive to light.   Neck: Normal range of motion. Neck supple.   Cardiovascular: Normal rate, regular rhythm and normal heart sounds.  Exam reveals no gallop and no friction rub.    No murmur heard.  Pulmonary/Chest: Breath sounds normal. No respiratory distress.   Abdominal: Soft. Bowel sounds are normal. She exhibits no distension. There is no tenderness.   Musculoskeletal: Normal range of motion.   Lymphadenopathy:     She has no cervical adenopathy.   Neurological: She is alert and oriented to person, place, and time.   Skin: Skin is warm.   Psychiatric: She has a normal mood and affect.         Assessment/Plan:  Valarie Foster is a 67 y.o. female who presents today for :    Problem List Items Addressed This Visit        Cardiac/Vascular    Benign hypertension - Primary (Chronic)  The current medical regimen is effective;  continue present plan and medications.      Hyperlipemia (Chronic)  The current medical regimen is effective;  continue present plan and medications.         Renal/    Right renal mass  Pt is following up with urology - Dr. Palm       Endocrine    Type 2 diabetes mellitus with diabetic neuropathy, without long-term current use of insulin (Chronic)    Relevant Medications    gabapentin (NEURONTIN) 300 MG capsule  The current medical regimen is effective;  continue present plan and medications.         GI    Left flank pain    Relevant Medications    hydrocodone-acetaminophen 5-325mg (NORCO) 5-325 mg per tablet    ketorolac injection 30 mg (Completed)  Common side effects of this medication were discussed with the patient. Questions regarding medications were discussed during this visit.   Advise to use tylenol prn pain and norco for severe pain   Unknown cause. Reviewed imaging. Reviewed ultrasound. Reviewed blood work.    Advised will refer to pain management if pt needs chronic pain medications             Follow-up if symptoms worsen or fail to improve.

## 2018-03-14 ENCOUNTER — TELEPHONE (OUTPATIENT)
Dept: UROLOGY | Facility: CLINIC | Age: 68
End: 2018-03-14

## 2018-03-14 NOTE — TELEPHONE ENCOUNTER
Spoke to pt advised per  pain she is having is not coming for mass or kidney. Pt states she did see her pcp an was told she had a pulled muscle an was given pain medication./wayne

## 2018-03-14 NOTE — TELEPHONE ENCOUNTER
----- Message from Luis Alberto Fox sent at 3/14/2018  4:30 PM CDT -----  Contact: self  Pt states she has additional questions regarding her kidney mass; states she is still feeling some discomfort. Contact pt at [phone].    Thanks-

## 2018-03-16 ENCOUNTER — PES CALL (OUTPATIENT)
Dept: ADMINISTRATIVE | Facility: CLINIC | Age: 68
End: 2018-03-16

## 2018-03-29 ENCOUNTER — LAB VISIT (OUTPATIENT)
Dept: LAB | Facility: HOSPITAL | Age: 68
End: 2018-03-29
Attending: NURSE PRACTITIONER
Payer: MEDICARE

## 2018-03-29 ENCOUNTER — OFFICE VISIT (OUTPATIENT)
Dept: FAMILY MEDICINE | Facility: CLINIC | Age: 68
End: 2018-03-29
Payer: MEDICARE

## 2018-03-29 VITALS
HEIGHT: 64 IN | HEART RATE: 77 BPM | WEIGHT: 185.06 LBS | DIASTOLIC BLOOD PRESSURE: 80 MMHG | TEMPERATURE: 98 F | OXYGEN SATURATION: 98 % | SYSTOLIC BLOOD PRESSURE: 150 MMHG | BODY MASS INDEX: 31.59 KG/M2

## 2018-03-29 DIAGNOSIS — Z78.0 POSTMENOPAUSAL STATE: ICD-10-CM

## 2018-03-29 DIAGNOSIS — E78.5 HYPERLIPIDEMIA, UNSPECIFIED HYPERLIPIDEMIA TYPE: Chronic | ICD-10-CM

## 2018-03-29 DIAGNOSIS — Z00.00 ENCOUNTER FOR PREVENTIVE HEALTH EXAMINATION: ICD-10-CM

## 2018-03-29 DIAGNOSIS — E11.40 TYPE 2 DIABETES MELLITUS WITH DIABETIC NEUROPATHY, WITHOUT LONG-TERM CURRENT USE OF INSULIN: Chronic | ICD-10-CM

## 2018-03-29 DIAGNOSIS — N28.89 KIDNEY MASS: ICD-10-CM

## 2018-03-29 DIAGNOSIS — I10 BENIGN HYPERTENSION: Chronic | ICD-10-CM

## 2018-03-29 DIAGNOSIS — Z00.00 ENCOUNTER FOR PREVENTIVE HEALTH EXAMINATION: Primary | ICD-10-CM

## 2018-03-29 LAB — HCV AB SERPL QL IA: NEGATIVE

## 2018-03-29 PROCEDURE — G0439 PPPS, SUBSEQ VISIT: HCPCS | Mod: S$GLB,,, | Performed by: NURSE PRACTITIONER

## 2018-03-29 PROCEDURE — 86803 HEPATITIS C AB TEST: CPT

## 2018-03-29 PROCEDURE — 99999 PR PBB SHADOW E&M-EST. PATIENT-LVL V: CPT | Mod: PBBFAC,,, | Performed by: NURSE PRACTITIONER

## 2018-03-29 PROCEDURE — 36415 COLL VENOUS BLD VENIPUNCTURE: CPT | Mod: PO

## 2018-03-29 PROCEDURE — 99499 UNLISTED E&M SERVICE: CPT | Mod: S$GLB,,, | Performed by: NURSE PRACTITIONER

## 2018-03-29 NOTE — PROGRESS NOTES
"Valarie Foster presented for an initial Medicare AWV today. The following components were reviewed and updated:    · Medical history  · Family History  · Social history  · Allergies and Current Medications  · Health Risk Assessment  · Health Maintenance  · Care Team    **See Completed Assessments for Annual Wellness visit with in the encounter summary    The following assessments were completed:  · Depression Screening  · Cognitive function Screening  · Timed Get Up Test  · Whisper Test    Vitals:    03/29/18 1041   BP: (!) 150/80   BP Location: Right arm   Patient Position: Sitting   BP Method: Small (Manual)   Pulse: 77   Temp: 98.2 °F (36.8 °C)   TempSrc: Oral   SpO2: 98%   Weight: 84 kg (185 lb 1.2 oz)   Height: 5' 4" (1.626 m)     Body mass index is 31.77 kg/m².   ]                Diagnoses and health risks identified today and associated recommendations/orders:  1. Encounter for preventive health examination  Education provided about preventive health examinations and procedures; discussed patient's health concerns, holistically addressed patient's health plan.    - Hepatitis C antibody; Future    2. Postmenopausal state  - DXA Bone Density Spine And Hip; Future    3. Type 2 diabetes mellitus with diabetic neuropathy, without long-term current use of insulin  Education provided about preventive health examinations and procedures; discussed patient's health concerns, holistically addressed patient's health plan.      - Ambulatory referral to Podiatry    4. Hyperlipidemia, unspecified hyperlipidemia type  We discussed low fat diet and regular exercise.The current medical regimen is effective; continue present plan and medications.       5. Benign hypertension  Discussed sodium restriction, maintaining ideal body weight and regular exercise program as physiologic means to achieve blood pressure control. The patient will strive towards this. The current medical regimen is effective; continue present plan and " medications. Recommended patient to check home readings to monitor and see me for followup as scheduled or sooner as needed. Patient was educated that both decongestant and anti-inflammatory medication may raise blood pressure      6. Kidney mass  The current medical regimen is effective;  continue present plan and medications. Patient followed by urology         Provided [place] with a 5-10 year written screening schedule and personal prevention plan. Recommendations were developed using the USPSTF age appropriate recommendations. Education, counseling, and referrals were provided as needed.  After Visit Summary printed and given to patient which includes a list of additional screenings\tests needed.    Follow-up in about 6 months (around 9/29/2018), or if symptoms worsen or fail to improve.      Monica Landeros, DULCE

## 2018-03-29 NOTE — PATIENT INSTRUCTIONS
Counseling and Referral of Other Preventative  (Italic type indicates deductible and co-insurance are waived)    Patient Name: Valarie Foster  Today's Date: 3/29/2018    Health Maintenance       Date Due Completion Date    Hepatitis C Screening 1950 ---    Foot Exam 05/31/1958 ---    TETANUS VACCINE 05/31/1966 ---    Sign Pain Contract 05/31/1966 ---    Complete Opioid Risk Tool 05/31/1966 ---    Low Dose Statin 05/31/1969 ---    DEXA SCAN 05/31/1988 ---    Zoster Vaccine 05/31/2008 ---    Hemoglobin A1c 07/29/2018 1/29/2018    Pneumococcal (65+) (2 of 2 - PPSV23) 01/29/2019 1/29/2018    Lipid Panel 01/29/2019 1/29/2018    Eye Exam 02/12/2019 2/12/2018    Override on 2/12/2018: Done    Mammogram 02/02/2020 2/2/2018    Colonoscopy 02/05/2028 2/5/2018        Orders Placed This Encounter   Procedures    DXA Bone Density Spine And Hip     The following information is provided to all patients.  This information is to help you find resources for any of the problems found today that may be affecting your health:                Living healthy guide: www.[facility].[place].gov      Understanding Diabetes: www.diabetes.org      Eating healthy: www.cdc.gov/healthyweight      CDC home safety checklist: www.cdc.gov/steadi/patient.html      Agency on Aging: www.goea.[place].[facility]      Alcoholics anonymous (AA): www.aa.org      Physical Activity: www.[place].nih.gov/do2nbft      Tobacco use: www.quitwithusla.org

## 2018-04-02 ENCOUNTER — HOSPITAL ENCOUNTER (OUTPATIENT)
Dept: RADIOLOGY | Facility: CLINIC | Age: 68
Discharge: HOME | End: 2018-04-02
Attending: NURSE PRACTITIONER
Payer: MEDICARE

## 2018-04-02 ENCOUNTER — OFFICE VISIT (OUTPATIENT)
Dept: FAMILY MEDICINE | Facility: CLINIC | Age: 68
End: 2018-04-02
Payer: MEDICARE

## 2018-04-02 VITALS
HEART RATE: 68 BPM | TEMPERATURE: 98 F | HEIGHT: 64 IN | WEIGHT: 184.44 LBS | SYSTOLIC BLOOD PRESSURE: 122 MMHG | OXYGEN SATURATION: 96 % | DIASTOLIC BLOOD PRESSURE: 90 MMHG | BODY MASS INDEX: 31.49 KG/M2

## 2018-04-02 DIAGNOSIS — M25.562 ACUTE PAIN OF LEFT KNEE: Primary | ICD-10-CM

## 2018-04-02 DIAGNOSIS — I10 BENIGN HYPERTENSION: ICD-10-CM

## 2018-04-02 DIAGNOSIS — J06.9 UPPER RESPIRATORY TRACT INFECTION, UNSPECIFIED TYPE: ICD-10-CM

## 2018-04-02 DIAGNOSIS — Z78.0 POSTMENOPAUSAL STATE: ICD-10-CM

## 2018-04-02 PROCEDURE — 77080 DXA BONE DENSITY AXIAL: CPT | Mod: TC,PO

## 2018-04-02 PROCEDURE — 99999 PR PBB SHADOW E&M-EST. PATIENT-LVL III: CPT | Mod: PBBFAC,,, | Performed by: FAMILY MEDICINE

## 2018-04-02 PROCEDURE — 3074F SYST BP LT 130 MM HG: CPT | Mod: CPTII,S$GLB,, | Performed by: FAMILY MEDICINE

## 2018-04-02 PROCEDURE — 3080F DIAST BP >= 90 MM HG: CPT | Mod: CPTII,S$GLB,, | Performed by: FAMILY MEDICINE

## 2018-04-02 PROCEDURE — 77080 DXA BONE DENSITY AXIAL: CPT | Mod: 26,,, | Performed by: INTERNAL MEDICINE

## 2018-04-02 PROCEDURE — 99214 OFFICE O/P EST MOD 30 MIN: CPT | Mod: S$GLB,,, | Performed by: FAMILY MEDICINE

## 2018-04-02 PROCEDURE — 99499 UNLISTED E&M SERVICE: CPT | Mod: S$GLB,,, | Performed by: FAMILY MEDICINE

## 2018-04-02 RX ORDER — PROMETHAZINE HYDROCHLORIDE AND DEXTROMETHORPHAN HYDROBROMIDE 6.25; 15 MG/5ML; MG/5ML
5 SYRUP ORAL 3 TIMES DAILY PRN
Qty: 118 ML | Refills: 1 | Status: SHIPPED | OUTPATIENT
Start: 2018-04-02 | End: 2018-04-30 | Stop reason: ALTCHOICE

## 2018-04-02 NOTE — PROGRESS NOTES
Office Visit    Patient Name: Valarie Foster    : 1950  MRN: [id number]      Assessment/Plan:  Valarie Foster is a 67 y.o. female who presents today for :    Acute pain of left knee    -activity modification d/w patient: avoid prolonged standing, avoid heavy lifting and provocative movements, advised icing and elevation  -knee ROM stretching and strengthening exercises demonstrated in clinic and handouts given to patient  -may use brace for additional support  -RTC in 4-6 weeks if not better, or sooner if pain worsens.    -patient advised to use OTC Tylenol (325mg tablets) once every 4-6 hours as needed for pain - avoid regular use of NSAIDs due to potential GI/BP side effects      Upper respiratory tract infection, unspecified type  -     promethazine-dextromethorphan (PROMETHAZINE-DM) 6.25-15 mg/5 mL Syrp; Take 5 mLs by mouth 3 (three) times daily as needed (cough).  Dispense: 118 mL; Refill: 1  -Mild symptoms, most likely viral etiology  -AFVSS - appears to be improving.  -Cepacol prn for sore throat  -advised pt that cough may last up to 2-3 weeks  -advised frequent hand washing, rest, and plenty of fluids.     Benign hypertension  -BP borderline - continue ACE  -advised medication compliance, and avoid regular use of NSAIDs as it can increase BP.  -advised DASH diet, regular exercise, and weight loss  - as well as portion control.   -advised to keep regular BP logs and bring it back with each f/u visit.    -f/u with BP logs in 2 weeks if BP is not consistently <140/90    Follow-up for worsening Sx. Urgent care/ED precautions provided.     This note was created by combination of typed  and Dragon dictation.  Transcription errors may be present.  If there are any questions, please contact me.                  ----------------------------------------------------------------------------------------------------------------------      HPI:  Valarie is a 67 y.o. female who presents today for:    Knee  Pain; Rash; Cough; and Nasal Congestion        This patient has multiple medical diagnoses as noted below.    This patient is new to me     Patient is here today for congestion and cough for the past week, feels like it's getting better. +cough productive of clear phlegm, +drainage. Medications tried at home: OTC meds.  No sore throat, No body aches, No ear pain, No F/C  She also has L knee pain the past 2 weeks, no injuries, pain is tolerable, but feels that sometimes her knees want to give out. No falls. Pain is tolerable, she had been taking some leftover Norco from a prior Rx a month ago for back pain, which helped.  She is also on Lisinopril for HTN - BP log at home usually in the 130s/80s. Pt tries to eat less salt in diet. No leg swelling.      Additional ROS    No dysphagia  No CP/SOB/palpitations/swelling  No nausea/vomiting/abd pain/no diarrhea  No rashes    Patient Active Problem List   Diagnosis    Type 2 diabetes mellitus with diabetic neuropathy, without long-term current use of insulin    Benign hypertension    Hyperlipemia    Right renal mass    Microhematuria    Left flank pain    Kidney mass       Current Medications  Medications reviewed and updated.       Current Outpatient Prescriptions:     gabapentin (NEURONTIN) 300 MG capsule, Take 1 capsule (300 mg total) by mouth every evening., Disp: 90 capsule, Rfl: 1    hydrocodone-acetaminophen 5-325mg (NORCO) 5-325 mg per tablet, Take 1 tablet by mouth every 12 (twelve) hours as needed for Pain., Disp: 60 tablet, Rfl: 0    lisinopril 10 MG tablet, Take 1 tablet (10 mg total) by mouth once daily., Disp: 90 tablet, Rfl: 1    promethazine-dextromethorphan (PROMETHAZINE-DM) 6.25-15 mg/5 mL Syrp, Take 5 mLs by mouth 3 (three) times daily as needed (cough)., Disp: 118 mL, Rfl: 1    Past Surgical History:   Procedure Laterality Date    COLONOSCOPY N/A 2/5/2018    Procedure: COLONOSCOPY;  Surgeon: Bacilio Mancia MD;  Location: [facility];   "Service: Endoscopy;  Laterality: N/A;  appt confirmed-ss    HYSTERECTOMY      OOPHORECTOMY         Family History   Problem Relation Age of Onset    Glaucoma Sister     Cancer Sister      breast cancer    No Known Problems Mother     Glaucoma Father     Diabetes Brother     No Known Problems Maternal Aunt     No Known Problems Maternal Uncle     No Known Problems Paternal Aunt     No Known Problems Paternal Uncle     No Known Problems Maternal Grandmother     No Known Problems Maternal Grandfather     No Known Problems Paternal Grandmother     No Known Problems Paternal Grandfather     Thyroid disease Sister     Blindness Sister      due to trauma during car accident    Diabetes Sister     Amblyopia Neg Hx     Cataracts Neg Hx     Hypertension Neg Hx     Macular degeneration Neg Hx     Retinal detachment Neg Hx     Strabismus Neg Hx     Stroke Neg Hx     Breast cancer Neg Hx        Social History     Social History    Marital status: Legally      Spouse name: N/A    Number of children: N/A    Years of education: N/A     Occupational History    Not on file.     Social History Main Topics    Smoking status: Never Smoker    Smokeless tobacco: Never Used    Alcohol use No    Drug use: No    Sexual activity: Not on file     Other Topics Concern    Not on file     Social History Narrative    No narrative on file             Allergies   Review of patient's allergies indicates:  No Known Allergies          Review of Systems  See HPI      Physical Exam  BP (!) 122/90   Pulse 68   Temp 98 °F (36.7 °C) (Oral)   Ht 5' 4" (1.626 m)   Wt 83.7 kg (184 lb 6.6 oz)   SpO2 96%   BMI 31.65 kg/m²     GEN: NAD, well developed, appears tired but nontoxic  HEENT: NCAT, PERRLA, EOMI, sclera clear, anicteric, TM clear bilaterally with normal light reflex, mild nasal turbinate swelling, MMM with no lesions, O/P clear - no tonsillar swelling/discharge, +mild drainage, +minimal clear nasal " discharge, no frontal/maxillary TTP  NECK: normal, supple with midline trachea, no LAD, no thyromegaly  LUNGS: CTAB, no w/r/r, no increased work of breathing  HEART: RRR, normal S1 and S2, no m/r/g  ABD: s/nt/nd, NABS  SKIN: normal turgor, no rashes, no other lesions.   KNEE: no gross abn on visual exam, bilateral knee with FROM.  LEFT knee with mild tenderness to deep palpation, no joint line TTP. NEG anterior/posterior drawer and Lachman's test. No laxity. Mild limping gait.  No swelling/redness.

## 2018-04-04 ENCOUNTER — TELEPHONE (OUTPATIENT)
Dept: FAMILY MEDICINE | Facility: CLINIC | Age: 68
End: 2018-04-04

## 2018-04-04 NOTE — TELEPHONE ENCOUNTER
----- Message from SerenaBroadClip Ruddy sent at 4/4/2018  2:23 PM CDT -----  Contact: self  Pt called requesting results of Bone Density Scan. Contact pt at [phone].    Thanks-

## 2018-04-06 ENCOUNTER — TELEPHONE (OUTPATIENT)
Dept: FAMILY MEDICINE | Facility: CLINIC | Age: 68
End: 2018-04-06

## 2018-04-06 NOTE — TELEPHONE ENCOUNTER
Patient notified she has osteopenia of the femoral neck and total hip. Adult scoliosis associated with increased lumbar spine BMD. FRAX calculation does not support treatment for osteoporosis.    RECOMMENDATIONS:  1) Adequate calcium and Vitamin D therapy  2) Appropriate exercise  3) Consider repeat BMD in 2- 4 years.

## 2018-04-06 NOTE — TELEPHONE ENCOUNTER
Patient notified she has osteopenia of the femoral neck and total hip. Adult scoliosis associated with increased lumbar spine BMD. FRAX calculation does not support treatment for osteoporosis.    RECOMMENDATIONS:  1) Adequate calcium and Vitamin D therapy  2) Appropriate exercise  3) Consider repeat BMD in 2- 4 years.       Patient verbalized her understanding of her BMD results.

## 2018-04-06 NOTE — TELEPHONE ENCOUNTER
----- Message from Valentine Dawkins sent at 4/6/2018  4:14 PM CDT -----  Contact: Self   Patient says she need to get her results from her Bone Density Test. Please call patient at [phone].

## 2018-04-30 ENCOUNTER — OFFICE VISIT (OUTPATIENT)
Dept: FAMILY MEDICINE | Facility: CLINIC | Age: 68
End: 2018-04-30
Payer: MEDICARE

## 2018-04-30 VITALS
TEMPERATURE: 98 F | BODY MASS INDEX: 31.54 KG/M2 | WEIGHT: 184.75 LBS | HEIGHT: 64 IN | DIASTOLIC BLOOD PRESSURE: 80 MMHG | HEART RATE: 72 BPM | OXYGEN SATURATION: 98 % | SYSTOLIC BLOOD PRESSURE: 130 MMHG

## 2018-04-30 DIAGNOSIS — M62.830 SPASM OF THORACIC BACK MUSCLE: ICD-10-CM

## 2018-04-30 DIAGNOSIS — R05.9 COUGH: ICD-10-CM

## 2018-04-30 DIAGNOSIS — R10.9 LEFT FLANK PAIN: ICD-10-CM

## 2018-04-30 DIAGNOSIS — M19.90 OSTEOARTHRITIS, UNSPECIFIED OSTEOARTHRITIS TYPE, UNSPECIFIED SITE: ICD-10-CM

## 2018-04-30 DIAGNOSIS — Z23 NEED FOR TD VACCINE: ICD-10-CM

## 2018-04-30 DIAGNOSIS — I10 BENIGN HYPERTENSION: ICD-10-CM

## 2018-04-30 DIAGNOSIS — E11.40 TYPE 2 DIABETES MELLITUS WITH DIABETIC NEUROPATHY, WITHOUT LONG-TERM CURRENT USE OF INSULIN: Primary | Chronic | ICD-10-CM

## 2018-04-30 PROCEDURE — 90471 IMMUNIZATION ADMIN: CPT | Mod: S$GLB,,, | Performed by: FAMILY MEDICINE

## 2018-04-30 PROCEDURE — 99999 PR PBB SHADOW E&M-EST. PATIENT-LVL IV: CPT | Mod: PBBFAC,,, | Performed by: FAMILY MEDICINE

## 2018-04-30 PROCEDURE — 90714 TD VACC NO PRESV 7 YRS+ IM: CPT | Mod: S$GLB,,, | Performed by: FAMILY MEDICINE

## 2018-04-30 PROCEDURE — 3079F DIAST BP 80-89 MM HG: CPT | Mod: CPTII,S$GLB,, | Performed by: FAMILY MEDICINE

## 2018-04-30 PROCEDURE — 99214 OFFICE O/P EST MOD 30 MIN: CPT | Mod: 25,S$GLB,, | Performed by: FAMILY MEDICINE

## 2018-04-30 PROCEDURE — 3075F SYST BP GE 130 - 139MM HG: CPT | Mod: CPTII,S$GLB,, | Performed by: FAMILY MEDICINE

## 2018-04-30 PROCEDURE — 3044F HG A1C LEVEL LT 7.0%: CPT | Mod: CPTII,S$GLB,, | Performed by: FAMILY MEDICINE

## 2018-04-30 PROCEDURE — 99499 UNLISTED E&M SERVICE: CPT | Mod: S$GLB,,, | Performed by: FAMILY MEDICINE

## 2018-04-30 RX ORDER — PROMETHAZINE HYDROCHLORIDE AND DEXTROMETHORPHAN HYDROBROMIDE 6.25; 15 MG/5ML; MG/5ML
5 SYRUP ORAL 3 TIMES DAILY PRN
Qty: 120 ML | Refills: 1 | Status: SHIPPED | OUTPATIENT
Start: 2018-04-30 | End: 2019-03-25 | Stop reason: SDUPTHER

## 2018-04-30 RX ORDER — HYDROCODONE BITARTRATE AND ACETAMINOPHEN 5; 325 MG/1; MG/1
1 TABLET ORAL
Qty: 30 TABLET | Refills: 0 | Status: SHIPPED | OUTPATIENT
Start: 2018-04-30 | End: 2018-09-10 | Stop reason: ALTCHOICE

## 2018-04-30 RX ORDER — GABAPENTIN 300 MG/1
600 CAPSULE ORAL NIGHTLY
Qty: 180 CAPSULE | Refills: 1 | Status: SHIPPED | OUTPATIENT
Start: 2018-04-30 | End: 2018-11-09 | Stop reason: SDUPTHER

## 2018-04-30 RX ORDER — LISINOPRIL 10 MG/1
10 TABLET ORAL DAILY
Qty: 30 TABLET | Refills: 5 | Status: ON HOLD | OUTPATIENT
Start: 2018-04-30 | End: 2018-10-16 | Stop reason: HOSPADM

## 2018-04-30 RX ORDER — TIZANIDINE 4 MG/1
4 TABLET ORAL
Qty: 90 TABLET | Refills: 1 | Status: SHIPPED | OUTPATIENT
Start: 2018-04-30 | End: 2018-05-10

## 2018-04-30 RX ORDER — LISINOPRIL 10 MG/1
10 TABLET ORAL DAILY
Qty: 90 TABLET | Refills: 1 | Status: SHIPPED | OUTPATIENT
Start: 2018-04-30 | End: 2018-04-30 | Stop reason: SDUPTHER

## 2018-04-30 RX ORDER — PRAVASTATIN SODIUM 10 MG/1
10 TABLET ORAL DAILY
Qty: 30 TABLET | Refills: 5 | Status: SHIPPED | OUTPATIENT
Start: 2018-04-30 | End: 2019-06-28

## 2018-04-30 RX ORDER — PRAVASTATIN SODIUM 10 MG/1
10 TABLET ORAL DAILY
Qty: 90 TABLET | Refills: 3 | Status: SHIPPED | OUTPATIENT
Start: 2018-04-30 | End: 2018-04-30 | Stop reason: SDUPTHER

## 2018-04-30 RX ORDER — DICLOFENAC SODIUM 30 MG/G
GEL TOPICAL 2 TIMES DAILY
Qty: 100 G | Refills: 1 | Status: SHIPPED | OUTPATIENT
Start: 2018-04-30 | End: 2018-09-27

## 2018-04-30 NOTE — PROGRESS NOTES
Routine Office Visit    Patient Name: Valarie Foster    : 1950  MRN: [id number]    Subjective:  Valarie is a 67 y.o. female who presents today for     1. Cough - started 3 weeks ago - cough is dry, non productive and improving. No known triggers. Cough was alleviated by cough medication. Pt would like RX refill.   2. Left flank / rib pain - started approximately 4 months ago - pain is described as a throbbing pain that radiates down her side and to her lower pelvis. Pain is left sided flank and radiates to the front. Pain is aggravated with walking and laying down. Pain is alleviated with pain medication prescribed and hot and cold compresses. Patient states pain is sometimes a gas-like pain, associated with pressure and bloat.  pt did have colonoscopy and noted to have 1 polyp. Pt states she is out of pain medication. She is willing to try antiinflammatories.   3. Hypertension - pt needs prescriptions sent to the pharmacy for refill. She is doing well on current medication regimen/ no issues/complaints.   4. Left leg pain / neuropathy. Pt wants to see pediatry for her foot pain. She is taking gabapentin with some relief of symptoms but pain is still present throughout the day. Pain is described as a burning pain, associated with stiffness. There is also associated swelling of her left knee.     Review of Systems   Constitutional: Negative for chills and fever.   HENT: Negative for congestion.    Eyes: Negative for blurred vision.   Respiratory: Positive for cough and shortness of breath.    Cardiovascular: Negative for chest pain.   Gastrointestinal: Negative for abdominal pain, constipation, diarrhea, heartburn, nausea and vomiting.   Genitourinary: Negative for dysuria.   Musculoskeletal: Negative for myalgias.   Skin: Negative for itching and rash.   Neurological: Negative for dizziness and headaches.   Psychiatric/Behavioral: Negative for depression.       Active Problem List  Patient Active Problem List    Diagnosis    Type 2 diabetes mellitus with diabetic neuropathy, without long-term current use of insulin    Benign hypertension    Hyperlipemia    Right renal mass    Microhematuria    Left flank pain    Kidney mass       Past Surgical History  Past Surgical History:   Procedure Laterality Date    COLONOSCOPY N/A 2/5/2018    Procedure: COLONOSCOPY;  Surgeon: Bacilio Mancia MD;  Location: [facility];  Service: Endoscopy;  Laterality: N/A;  appt confirmed-ss    HYSTERECTOMY      OOPHORECTOMY         Family History  Family History   Problem Relation Age of Onset    Glaucoma Sister     Cancer Sister      breast cancer    No Known Problems Mother     Glaucoma Father     Diabetes Brother     No Known Problems Maternal Aunt     No Known Problems Maternal Uncle     No Known Problems Paternal Aunt     No Known Problems Paternal Uncle     No Known Problems Maternal Grandmother     No Known Problems Maternal Grandfather     No Known Problems Paternal Grandmother     No Known Problems Paternal Grandfather     Thyroid disease Sister     Blindness Sister      due to trauma during car accident    Diabetes Sister     Amblyopia Neg Hx     Cataracts Neg Hx     Hypertension Neg Hx     Macular degeneration Neg Hx     Retinal detachment Neg Hx     Strabismus Neg Hx     Stroke Neg Hx     Breast cancer Neg Hx        Social History  Social History     Social History    Marital status: Legally      Spouse name: N/A    Number of children: N/A    Years of education: N/A     Occupational History    Not on file.     Social History Main Topics    Smoking status: Never Smoker    Smokeless tobacco: Never Used    Alcohol use No    Drug use: No    Sexual activity: Not on file     Other Topics Concern    Not on file     Social History Narrative    No narrative on file       Medications and Allergies  Reviewed and updated.   Current Outpatient Prescriptions   Medication Sig    gabapentin  "(NEURONTIN) 300 MG capsule Take 2 capsules (600 mg total) by mouth every evening.    lisinopril 10 MG tablet Take 1 tablet (10 mg total) by mouth once daily.    diclofenac sodium (SOLARAZE) 3 % gel Apply topically 2 (two) times daily.    hydrocodone-acetaminophen 5-325mg (NORCO) 5-325 mg per tablet Take 1 tablet by mouth every 24 hours as needed for Pain.    pravastatin (PRAVACHOL) 10 MG tablet Take 1 tablet (10 mg total) by mouth once daily.    promethazine-dextromethorphan (PROMETHAZINE-DM) 6.25-15 mg/5 mL Syrp Take 5 mLs by mouth 3 (three) times daily as needed (cough).    tiZANidine (ZANAFLEX) 4 MG tablet Take 1 tablet (4 mg total) by mouth every 6 to 8 hours as needed (muscle spasm).     No current facility-administered medications for this visit.        Physical Exam  /80 (BP Location: Right arm, Patient Position: Sitting, BP Method: Medium (Manual))   Pulse 72   Temp 97.7 °F (36.5 °C) (Oral)   Ht 5' 4" (1.626 m)   Wt 83.8 kg (184 lb 11.9 oz)   SpO2 98%   BMI 31.71 kg/m²   Physical Exam   Constitutional: She is oriented to person, place, and time. She appears well-developed and well-nourished.   HENT:   Head: Normocephalic and atraumatic.   Eyes: Conjunctivae and EOM are normal. Pupils are equal, round, and reactive to light.   Neck: Normal range of motion. Neck supple.   Cardiovascular: Normal rate, regular rhythm and normal heart sounds.  Exam reveals no gallop and no friction rub.    No murmur heard.  Pulmonary/Chest: Breath sounds normal. No respiratory distress.   Abdominal: Soft. Bowel sounds are normal. She exhibits no distension. There is no tenderness.   Musculoskeletal: Normal range of motion.   Lymphadenopathy:     She has no cervical adenopathy.   Neurological: She is alert and oriented to person, place, and time.   Skin: Skin is warm.   Psychiatric: She has a normal mood and affect.     Protective Sensation (w/ 10 gram monofilament):  Right: Intact  Left: Absent    Visual " Inspection:  Normal -  Bilateral    Pedal Pulses:   Right: Present  Left: Present    Posterior tibialis:   Right:Present  Left: Present        Assessment/Plan:  Valarie Foster is a 67 y.o. female who presents today for :    Problem List Items Addressed This Visit        Cardiac/Vascular    Benign hypertension (Chronic)    Relevant Medications    lisinopril 10 MG tablet  The current medical regimen is effective;  continue present plan and medications.         Endocrine    Type 2 diabetes mellitus with diabetic neuropathy, without long-term current use of insulin - Primary (Chronic)    Relevant Medications    gabapentin (NEURONTIN) 300 MG capsule    pravastatin (PRAVACHOL) 10 MG tablet  The current medical regimen is effective;  continue present plan and medications.      Other Relevant Orders    Ambulatory referral to Podiatry      Other Visit Diagnoses     Need for Td vaccine        Relevant Orders    (In Office Administered) Td Vaccine - Preservative Free (Completed)    Cough        Relevant Medications    promethazine-dextromethorphan (PROMETHAZINE-DM) 6.25-15 mg/5 mL Syrp    Spasm of thoracic back muscle        Relevant Medications    tiZANidine (ZANAFLEX) 4 MG tablet  Common side effects of this medication were discussed with the patient. Questions regarding medications were discussed during this visit.   Pt also requesting pain medication refill (after patient left). norco sent to pharmacy   Reviewed [place]       Osteoarthritis, unspecified osteoarthritis type, unspecified site        Relevant Medications    diclofenac sodium (SOLARAZE) 3 % gel    tiZANidine (ZANAFLEX) 4 MG tablet            Follow-up in about 3 months (around 7/30/2018), or if symptoms worsen or fail to improve.

## 2018-04-30 NOTE — PATIENT INSTRUCTIONS

## 2018-04-30 NOTE — TELEPHONE ENCOUNTER
----- Message from Valentine Dawkins sent at 4/30/2018  2:25 PM CDT -----  Contact: Self   Patient says she need a refill on her pain pills. Please call patient at [phone].      hydrocodone-acetaminophen 5-325mg (NORCO) 5-325 mg per tablet        [facility] [place] - [address]

## 2018-05-21 ENCOUNTER — HOSPITAL ENCOUNTER (OUTPATIENT)
Dept: RADIOLOGY | Facility: HOSPITAL | Age: 68
Discharge: HOME | End: 2018-05-21
Attending: PODIATRIST
Payer: MEDICARE

## 2018-05-21 ENCOUNTER — TELEPHONE (OUTPATIENT)
Dept: PODIATRY | Facility: CLINIC | Age: 68
End: 2018-05-21

## 2018-05-21 ENCOUNTER — OFFICE VISIT (OUTPATIENT)
Dept: PODIATRY | Facility: CLINIC | Age: 68
End: 2018-05-21
Payer: MEDICARE

## 2018-05-21 VITALS
WEIGHT: 184 LBS | SYSTOLIC BLOOD PRESSURE: 132 MMHG | BODY MASS INDEX: 31.41 KG/M2 | HEIGHT: 64 IN | DIASTOLIC BLOOD PRESSURE: 78 MMHG

## 2018-05-21 DIAGNOSIS — S93.402A INVERSION SPRAIN OF ANKLE, LEFT, INITIAL ENCOUNTER: ICD-10-CM

## 2018-05-21 DIAGNOSIS — G89.29 CHRONIC PAIN OF LEFT ANKLE: ICD-10-CM

## 2018-05-21 DIAGNOSIS — E11.9 COMPREHENSIVE DIABETIC FOOT EXAMINATION, TYPE 2 DM, ENCOUNTER FOR: ICD-10-CM

## 2018-05-21 DIAGNOSIS — M20.5X1 HALLUX LIMITUS, ACQUIRED, RIGHT: ICD-10-CM

## 2018-05-21 DIAGNOSIS — E11.40 TYPE 2 DIABETES MELLITUS WITH DIABETIC NEUROPATHY, WITHOUT LONG-TERM CURRENT USE OF INSULIN: ICD-10-CM

## 2018-05-21 DIAGNOSIS — M25.572 CHRONIC PAIN OF LEFT ANKLE: ICD-10-CM

## 2018-05-21 DIAGNOSIS — M20.42 HAMMER TOES OF BOTH FEET: ICD-10-CM

## 2018-05-21 DIAGNOSIS — E11.9 COMPREHENSIVE DIABETIC FOOT EXAMINATION, TYPE 2 DM, ENCOUNTER FOR: Primary | ICD-10-CM

## 2018-05-21 DIAGNOSIS — M20.41 HAMMER TOES OF BOTH FEET: ICD-10-CM

## 2018-05-21 DIAGNOSIS — M20.5X2 HALLUX LIMITUS, ACQUIRED, LEFT: ICD-10-CM

## 2018-05-21 PROCEDURE — 73610 X-RAY EXAM OF ANKLE: CPT | Mod: 26,LT,, | Performed by: RADIOLOGY

## 2018-05-21 PROCEDURE — 99999 PR PBB SHADOW E&M-EST. PATIENT-LVL III: CPT | Mod: PBBFAC,,, | Performed by: PODIATRIST

## 2018-05-21 PROCEDURE — 99203 OFFICE O/P NEW LOW 30 MIN: CPT | Mod: S$GLB,,, | Performed by: PODIATRIST

## 2018-05-21 PROCEDURE — 3075F SYST BP GE 130 - 139MM HG: CPT | Mod: CPTII,S$GLB,, | Performed by: PODIATRIST

## 2018-05-21 PROCEDURE — 99499 UNLISTED E&M SERVICE: CPT | Mod: S$GLB,,, | Performed by: PODIATRIST

## 2018-05-21 PROCEDURE — 73610 X-RAY EXAM OF ANKLE: CPT | Mod: TC,FY,PO,LT

## 2018-05-21 PROCEDURE — 3078F DIAST BP <80 MM HG: CPT | Mod: CPTII,S$GLB,, | Performed by: PODIATRIST

## 2018-05-21 PROCEDURE — 3044F HG A1C LEVEL LT 7.0%: CPT | Mod: CPTII,S$GLB,, | Performed by: PODIATRIST

## 2018-05-21 NOTE — LETTER
May 21, 2018      Monica Landeros, DULCE  [address]  [place] [address]           [facility]  [address]  [place] [address]  Phone: [phone]          Patient: Valarie Foster   MR Number: [id number]   YOB: 1950   Date of Visit: 5/21/2018       Dear Monica Landeros:    Thank you for referring Valarie Foster to me for evaluation. Attached you will find relevant portions of my assessment and plan of care.    If you have questions, please do not hesitate to call me. I look forward to following Valarie Foster along with you.    Sincerely,    Lashay Dee, MJ    Enclosure  CC:  No Recipients    If you would like to receive this communication electronically, please contact [email or web address] or [phone] to request more information on [email or web address] Link access.    For providers and/or their staff who would like to refer a patient to [facility], please contact us through our one-stop-shop provider referral line, [facility], at [phone].    If you feel you have received this communication in error or would no longer like to receive these types of communications, please e-mail [email or web address]

## 2018-05-21 NOTE — TELEPHONE ENCOUNTER
----- Message from Lashay Dee DPM sent at 5/21/2018 12:27 PM CDT -----  No fracture seen on x-ray.  Continue treatment plan.

## 2018-05-21 NOTE — PROGRESS NOTES
Subjective:      Patient ID: Valarie Foster is a 67 y.o. female.    Chief Complaint: Diabetes Mellitus (both feet; 4/30/18 Dr. Hernandez); Ankle Pain; and Foot Pain    Valarie is a 67 y.o. female who presents to the clinic for evaluation and treatment of high risk feet. Valarie has a past medical history of Diabetes mellitus; Diabetes with neurologic complications; and Hypertension. The patient's chief complaint is left lateral ankle and foot pain . Description: moderate Nature: aching and burning. Onset of the symptoms was several months ago. Precipitating event: none known.  History of injury: no Current symptoms include: worsening symptoms after a period of activity. Aggravating factors: any weight bearing, standing and walking. Alleviating factors: rest Symptoms have gradually worsened. Patient has had prior foot problems. Evaluation to date: none. Treatment to date: ice. Patients rates pain 8/10 on pain scale.     This patient has documented high risk feet requiring routine maintenance secondary to diabetes mellitis and those secondary complications of diabetes, as mentioned..    PCP: Alena Hernandez MD    Date Last Seen by PCP:   Chief Complaint   Patient presents with    Diabetes Mellitus     both feet; 4/30/18 Dr. Hernandez    Ankle Pain    Foot Pain       Current shoe gear: ballet flat    Hemoglobin A1C   Date Value Ref Range Status   01/29/2018 6.2 (H) 4.0 - 5.6 % Final     Comment:     According to ADA guidelines, hemoglobin A1c <7.0% represents  optimal control in non-pregnant diabetic patients. Different  metrics may apply to specific patient populations.   Standards of Medical Care in Diabetes-2016.  For the purpose of screening for the presence of diabetes:  <5.7%     Consistent with the absence of diabetes  5.7-6.4%  Consistent with increasing risk for diabetes   (prediabetes)  >or=6.5%  Consistent with diabetes  Currently, no consensus exists for use of hemoglobin A1c  for diagnosis of diabetes for  children.  This Hemoglobin A1c assay has significant interference with fetal   hemoglobin   (HbF). The results are invalid for patients with abnormal amounts of   HbF,   including those with known Hereditary Persistence   of Fetal Hemoglobin. Heterozygous hemoglobin variants (HbAS, HbAC,   HbAD, HbAE, HbA2) do not significantly interfere with this assay;   however, presence of multiple variants in a sample may impact the %   interference.             Patient Active Problem List   Diagnosis    Type 2 diabetes mellitus with diabetic neuropathy, without long-term current use of insulin    Benign hypertension    Hyperlipemia    Right renal mass    Microhematuria    Left flank pain    Kidney mass       Current Outpatient Prescriptions on File Prior to Visit   Medication Sig Dispense Refill    diclofenac sodium (SOLARAZE) 3 % gel Apply topically 2 (two) times daily. 100 g 1    gabapentin (NEURONTIN) 300 MG capsule Take 2 capsules (600 mg total) by mouth every evening. 180 capsule 1    hydrocodone-acetaminophen 5-325mg (NORCO) 5-325 mg per tablet Take 1 tablet by mouth every 24 hours as needed for Pain. 30 tablet 0    lisinopril 10 MG tablet Take 1 tablet (10 mg total) by mouth once daily. 30 tablet 5    pravastatin (PRAVACHOL) 10 MG tablet Take 1 tablet (10 mg total) by mouth once daily. 30 tablet 5    promethazine-dextromethorphan (PROMETHAZINE-DM) 6.25-15 mg/5 mL Syrp Take 5 mLs by mouth 3 (three) times daily as needed (cough). 120 mL 1     No current facility-administered medications on file prior to visit.        Review of patient's allergies indicates:  No Known Allergies    Past Surgical History:   Procedure Laterality Date    COLONOSCOPY N/A 2/5/2018    Procedure: COLONOSCOPY;  Surgeon: Bacilio Mancia MD;  Location: [facility];  Service: Endoscopy;  Laterality: N/A;  appt confirmed-ss    HYSTERECTOMY      OOPHORECTOMY         Family History   Problem Relation Age of Onset    Glaucoma Sister      "Cancer Sister         breast cancer    No Known Problems Mother     Glaucoma Father     Diabetes Brother     No Known Problems Maternal Aunt     No Known Problems Maternal Uncle     No Known Problems Paternal Aunt     No Known Problems Paternal Uncle     No Known Problems Maternal Grandmother     No Known Problems Maternal Grandfather     No Known Problems Paternal Grandmother     No Known Problems Paternal Grandfather     Thyroid disease Sister     Blindness Sister         due to trauma during car accident    Diabetes Sister     Amblyopia Neg Hx     Cataracts Neg Hx     Hypertension Neg Hx     Macular degeneration Neg Hx     Retinal detachment Neg Hx     Strabismus Neg Hx     Stroke Neg Hx     Breast cancer Neg Hx        Social History     Social History    Marital status: Legally      Spouse name: N/A    Number of children: N/A    Years of education: N/A     Occupational History    Not on file.     Social History Main Topics    Smoking status: Never Smoker    Smokeless tobacco: Never Used    Alcohol use No    Drug use: No    Sexual activity: Not on file     Other Topics Concern    Not on file     Social History Narrative    No narrative on file       Review of Systems   Constitution: Negative for chills, fever and weakness.   Cardiovascular: Positive for leg swelling. Negative for claudication.   Respiratory: Negative for cough and shortness of breath.    Skin: Positive for dry skin. Negative for itching, nail changes and rash.   Musculoskeletal: Positive for back pain, joint pain and myalgias. Negative for falls, joint swelling and muscle weakness.   Gastrointestinal: Negative for diarrhea, nausea and vomiting.   Neurological: Positive for numbness and paresthesias. Negative for tremors.   Psychiatric/Behavioral: Negative for altered mental status and hallucinations.           Objective:      Vitals:    05/21/18 0945   BP: 132/78   Weight: 83.5 kg (184 lb)   Height: 5' 4" " (1.626 m)   PainSc:   8   PainLoc: Foot       Physical Exam   Constitutional: She appears well-developed and well-nourished.  Non-toxic appearance. She does not have a sickly appearance. No distress.   alert and oriented x 3.    Cardiovascular:   Pulses:       Dorsalis pedis pulses are 2+ on the right side, and 2+ on the left side.        Posterior tibial pulses are 2+ on the right side, and 2+ on the left side.    Capillary refill time is within normal limits. Digital hair present.    Pulmonary/Chest: No respiratory distress.   Musculoskeletal: She exhibits no deformity.        Right ankle: No tenderness. No lateral malleolus, no medial malleolus, no AITFL, no CF ligament and no posterior TFL tenderness found. Achilles tendon exhibits no pain, no defect and normal Maciel's test results.        Left ankle: She exhibits swelling. Tenderness. AITFL and CF ligament tenderness found. No lateral malleolus, no medial malleolus and no posterior TFL tenderness found. Achilles tendon exhibits no pain, no defect and normal Maciel's test results.        Right foot: There is no tenderness and no bony tenderness.        Left foot: There is no tenderness and no bony tenderness.   Muscle strength is 5/5 in all groups bilaterally.    Patient has hammertoes of digits 2-5 bilateral partially reducible without symptom today.    Decreased first MPJ range of motion both weightbearing and nonweightbearing, no crepitus observed the first MP joint, + dorsal flag sign.Mild  bunion deformity is observed .     Feet:   Right Foot:   Protective Sensation: 5 sites tested. 5 sites sensed.   Left Foot:   Protective Sensation: 5 sites tested. 5 sites sensed.   Lymphadenopathy:   No lymphatic streaking     Neurological: She displays no atrophy. No sensory deficit.   Light touch present    Paresthesias, and hyperesthesia bilateral feet at toes with no clearly identified trigger or source.       Skin: Skin is warm, dry and intact. No abrasion, no  bruising, no burn, no ecchymosis, no lesion and no rash noted. She is not diaphoretic. No cyanosis or erythema. No pallor. Nails show no clubbing.   Normal temperature gradient.  Examination of the skin reveals no evidence of significant rashes, open lesions, suspicious appearing nevi or other concerning lesions.    Psychiatric: Her mood appears not anxious. Her affect is not inappropriate. Her speech is not slurred. She is not combative. She is communicative. She is attentive.   Nursing note and vitals reviewed.            Assessment:       Encounter Diagnoses   Name Primary?    Comprehensive diabetic foot examination, type 2 DM, encounter for Yes    Type 2 diabetes mellitus with diabetic neuropathy, without long-term current use of insulin     Inversion sprain of ankle, left, initial encounter     Chronic pain of left ankle     Hammer toes of both feet     Hallux limitus, acquired, right     Hallux limitus, acquired, left          Plan:       Valarie was seen today for diabetes mellitus, ankle pain and foot pain.    Diagnoses and all orders for this visit:    Comprehensive diabetic foot examination, type 2 DM, encounter for  -     X-Ray Ankle Complete Left; Future  -     DIABETIC SHOES FOR HOME USE    Type 2 diabetes mellitus with diabetic neuropathy, without long-term current use of insulin  -     X-Ray Ankle Complete Left; Future  -     DIABETIC SHOES FOR HOME USE    Inversion sprain of ankle, left, initial encounter  -     X-Ray Ankle Complete Left; Future  -     DIABETIC SHOES FOR HOME USE    Chronic pain of left ankle  -     DIABETIC SHOES FOR HOME USE    Hammer toes of both feet  -     DIABETIC SHOES FOR HOME USE    Hallux limitus, acquired, right  -     DIABETIC SHOES FOR HOME USE    Hallux limitus, acquired, left  -     DIABETIC SHOES FOR HOME USE      I counseled the patient on her conditions, their implications and medical management.    Greater than 50% of this visit spent on counseling and  coordination of care.    Education about the diabetic foot, neuropathy, and prevention of limb loss.    Shoe inspection. Diabetic Foot Education. Patient reminded of the importance of good nutrition/healthy diet/weight management and blood sugar control to help prevent podiatric complications of diabetes. Patient instructed on proper foot hygeine. Wear comfortable, proper fitting shoes. Wash feet daily. Dry well. After drying, apply moisturizer to feet (no lotion to webspaces). Inspect feet daily for skin breaks, blisters, swelling, or redness. Wear cotton socks (preferably white)  Change socks every day. Do NOT walk barefoot. Do NOT use heating pads or hot water soaks. We discussed wearing proper shoe gear, daily foot inspections, never walking without protective shoe gear.     Discussed edema control and the continued use of compression stockings.     Discussed importance of daily moisturizer to the feet such as Gold bonds diabetic foot cream    Rx diabetic shoes for protection and support    Discussed ankle sprains and importance of immobilization for healing as well as the lengthy course of treatment for complete resolution.    1. CAM boot for immobilization  2. Patient instructed to rest affected limb, avoid excessive WB and use crutch or walker assistance if needed.  3. Instructions on elevation to reduce pain and swelling. When sleeping, place a pillow under the injured leg. When sitting, support the injured leg so it is level with your waist.   4. Patient instructed on adequate icing techniques. Patient should ice the affected area at least once per day x 10 minutes for 10 days . I advised the  patient that extra icing would also be beneficial to ensure adequate anti inflammatory effect   5. xrays to evaluate joint spaces    She will continue to monitor the areas daily, inspect her feet, wear protective shoe gear when ambulatory, moisturizer to maintain skin integrity and follow in this office in  approximately 2-4 months, sooner p.r.n.

## 2018-05-21 NOTE — PATIENT INSTRUCTIONS
Treating Ankle Sprains  Treatment will depend on how bad your sprain is. For a severe sprain, healing may take 3 months or more.  Right after your injury: Use R.I.C.E.  · Rest: At first, keep weight off the ankle as much as you can. You may be given crutches to help you walk without putting weight on the ankle.  · Ice: Put an ice pack on the ankle for 15 minutes. Remove the pack and wait at least 30 minutes. Repeat for up to 3 days. This helps reduce swelling.  · Compression: To reduce swelling and keep the joint stable, you may need to wrap the ankle with an elastic bandage. For more severe sprains, you may need an ankle brace or a cast.  · Elevation: To reduce swelling, keep your ankle raised above your heart when you sit or lie down.  Medicine  Your healthcare provider may suggest oral non-steroidal anti-inflammatory medicine (NSAIDs), such as ibuprofen. This relieves the pain and helps reduce any swelling. Be sure to take your medicine as directed.  Contrast baths  After 3 days, soak your ankle in warm water for 30 seconds, then in cool water for 30 seconds. Go back and forth for 5 minutes. Doing this every 2 hours will help keep the swelling down.  Exercises    After about 2 to 3 weeks, you may be given exercises to strengthen the ligaments and muscles in the ankle. Doing these exercises will help prevent another ankle sprain. Exercises may include standing on your toes and then on your heels and doing ankle curls.  Ankle curls  · Sit on the edge of a sturdy table or lie on your back.  · Pull your toes toward you. Then point them away from you. Repeat for 2 to 3 minutes.   Date Last Reviewed: 9/28/2015  © 7650-4498 [employer]. [address] [place] [address], [place] [address]. All rights reserved. This information is not intended as a substitute for professional medical care. Always follow your healthcare professional's instructions.        Understanding Ankle Sprain    The ankle is the joint where the  leg and foot meet. Bones are held in place by connective tissue called ligaments. When ankle ligaments are stretched to the point of pain and injury, it is called an ankle sprain. A sprain can tear the ligaments. These tears can be very small but still cause pain. Ankle sprains can be mild or severe.  What causes an ankle sprain?  A sprain may occur when you twist your ankle or bend it too far. This can happen when you stumble or fall. Things that can make an ankle sprain more likely include:  · Having had an ankle sprain before  · Playing sports that involve running and jumping. Or playing contact sports such as football or hockey.  · Wearing shoes that dont support your feet and ankles well  · Having ankles with poor strength and flexibility  Symptoms of an ankle sprain  Symptoms may include:  · Pain or soreness in the ankle  · Swelling  · Redness or bruising  · Not being able to walk or put weight on the affected foot  · Reduced range of motion in the ankle  · A popping or tearing feeling at the time the sprain occurs  · An abnormal or dislocated look to the ankle  · Instability or too much range of motion in the ankle  Treatment for an ankle sprain  Treatment focuses on reducing pain and swelling, and avoiding further injury. Treatments may include:  · Resting the ankle. Avoid putting weight on it. This may mean using crutches until the sprain heals.  · Prescription or over-the-counter pain medicines. These help reduce swelling and pain.  · Cold packs. These help reduce pain and swelling.  · Raising your ankle above your heart. This helps reduce swelling.  · Wrapping the ankle with an elastic bandage or ankle brace. This helps reduce swelling and gives some support to the ankle. In rare cases, you may need a cast or boot.  · Stretching and other exercises. These improve flexibility and strength.  · Heat packs. These may be recommended before doing ankle exercises.  Possible complications of an ankle sprain  An  ankle that has been weakened by a sprain can be more likely to have repeated sprains afterward. Doing exercises to strengthen your ankle and improve balance can reduce your risk for repeated sprains. Other possible complications are long-term (chronic) pain or an ankle that remains unstable.  When to call your healthcare provider  Call your healthcare provider right away if you have any of these:  · Fever of 100.4°F (38°C) or higher, or as directed  · Pain, numbness, discoloration, or coldness in the foot or toes  · Pain that gets worse  · Symptoms that dont get better, or get worse  · New symptoms   Date Last Reviewed: 3/10/2016  © 3916-9165 [employer]. [address] [place] [address], [place] [address]. All rights reserved. This information is not intended as a substitute for professional medical care. Always follow your healthcare professional's instructions.      Recommend lotions: eucerin, eucerin for diabetics, aquaphor, A&D ointment, gold bond for diabetics, sween, Treutlen's Bees all purpose baby ointment,  urea 40 with aloe (found on amazon.com)    Shoe recommendations: (try 6pm.com, zappos[employer] , nordstromStreetHub, or shoes.[employer] for discounted prices) you can visit DSW shoes in [place]  or [employer] [facility] in the [facility] (there are also several shoe brand outlets in the [facility])    Asics (GT 2000 or gel foundations), new balance stability type shoes, samuel (stabil c3),  [place] (GTS or Beast or transcend), vionic, propet (tennis shoe)    sofft brand, clarks, crocs, aerosoles, naturalizers, SAS, ecco, born, josefina boggs, abbey (dress shoes)    Vionic, burkenstocks, fitflops, propet (sandals)  Jassone comfort thong sandals, crocs, propet (house shoes)    Nail Home remedy:  Vicks Vapor rub to nails for easier managability    Occasional soaks for 15-20 mins in luke warm water with 1 cup of listerine and 1 cup of apple cider vinegar are ok You may add several drops of oil of oregano or tea tree oil as  well        Diabetes: Inspecting Your Feet  Diabetes increases your chances of developing foot problems. So inspect your feet every day. This helps you find small skin irritations before they become serious infections. If you have trouble seeing the bottoms of your feet, use a mirror or ask a family member or friend to help.     Pressure spots on the bottom of the foot are common areas where problems develop.   How to check your feet  Below are tips to help you look for foot problems. Try to check your feet at the same time each day, such as when you get out of bed in the morning:  · Check the top of each foot. The tops of toes, back of the heel, and outer edge of the foot can get a lot of rubbing from poor-fitting shoes.  · Check the bottom of each foot. Daily wear and tear often leads to problems at pressure spots.  · Check the toes and nails. Fungal infections often occur between toes. Toenail problems can also be a sign of fungal infections or lead to breaks in the skin.  · Check your shoes, too. Loose objects inside a shoe can injure the foot. Use your hand to feel inside your shoes for things like kalpana, loose stitching, or rough areas that could irritate your skin.  Warning signs  Look for any color changes in the foot. Redness with streaks can signal a severe infection, which needs immediate medical attention. Tell your doctor right away if you have any of these problems:  · Swelling, sometimes with color changes, may be a sign of poor blood flow or infection. Symptoms include tenderness and an increase in the size of your foot.  · Warm or hot areas on your feet may be signs of infection. A foot that is cold may not be getting enough blood.  · Sensations such as burning, tingling, or pins and needles can be signs of a problem. Also check for areas that may be numb.  · Hot spots are caused by friction or pressure. Look for hot spots in areas that get a lot of rubbing. Hot spots can turn into blisters,  calluses, or sores.  · Cracks and sores are caused by dry or irritated skin. They are a sign that the skin is breaking down, which can lead to infection.  · Toenail problems to watch for include nails growing into the skin (ingrown toenail) and causing redness or pain. Thick, yellow, or discolored nails can signal a fungal infection.  · Drainage and odor can develop from untreated sores and ulcers. Call your doctor right away if you notice white or yellow drainage, bleeding, or unpleasant odor.   © 8986-2517 [employer]. [address] [place] [address]. All rights reserved. This information is not intended as a substitute for professional medical care. Always follow your healthcare professional's instructions.        Step-by-Step:  Inspecting Your Feet (Diabetes)    Date Last Reviewed: 10/1/2016  © 2100-1254 [employer]. [address] [place] [address]. All rights reserved. This information is not intended as a substitute for professional medical care. Always follow your healthcare professional's instructions.

## 2018-06-01 ENCOUNTER — HOSPITAL ENCOUNTER (OUTPATIENT)
Dept: RADIOLOGY | Facility: HOSPITAL | Age: 68
Discharge: HOME | End: 2018-06-01
Attending: UROLOGY
Payer: MEDICARE

## 2018-06-01 DIAGNOSIS — N28.89 RIGHT RENAL MASS: ICD-10-CM

## 2018-06-01 PROCEDURE — 25500020 PHARM REV CODE 255: Performed by: UROLOGY

## 2018-06-01 PROCEDURE — 74178 CT ABD&PLV WO CNTR FLWD CNTR: CPT | Mod: TC

## 2018-06-01 PROCEDURE — 74178 CT ABD&PLV WO CNTR FLWD CNTR: CPT | Mod: 26,,, | Performed by: RADIOLOGY

## 2018-06-01 RX ADMIN — IOHEXOL 100 ML: 350 INJECTION, SOLUTION INTRAVENOUS at 08:06

## 2018-06-04 ENCOUNTER — OFFICE VISIT (OUTPATIENT)
Dept: UROLOGY | Facility: CLINIC | Age: 68
End: 2018-06-04
Payer: MEDICARE

## 2018-06-04 VITALS
SYSTOLIC BLOOD PRESSURE: 158 MMHG | HEIGHT: 64 IN | RESPIRATION RATE: 14 BRPM | HEART RATE: 68 BPM | DIASTOLIC BLOOD PRESSURE: 84 MMHG | WEIGHT: 179.25 LBS | BODY MASS INDEX: 30.6 KG/M2

## 2018-06-04 DIAGNOSIS — R31.29 MICROHEMATURIA: ICD-10-CM

## 2018-06-04 DIAGNOSIS — N28.89 RIGHT RENAL MASS: Primary | ICD-10-CM

## 2018-06-04 DIAGNOSIS — R10.9 LEFT FLANK PAIN: ICD-10-CM

## 2018-06-04 PROCEDURE — 3077F SYST BP >= 140 MM HG: CPT | Mod: CPTII,S$GLB,, | Performed by: UROLOGY

## 2018-06-04 PROCEDURE — 99499 UNLISTED E&M SERVICE: CPT | Mod: S$GLB,,, | Performed by: UROLOGY

## 2018-06-04 PROCEDURE — 3079F DIAST BP 80-89 MM HG: CPT | Mod: CPTII,S$GLB,, | Performed by: UROLOGY

## 2018-06-04 PROCEDURE — 99214 OFFICE O/P EST MOD 30 MIN: CPT | Mod: S$GLB,,, | Performed by: UROLOGY

## 2018-06-04 PROCEDURE — 99999 PR PBB SHADOW E&M-EST. PATIENT-LVL III: CPT | Mod: PBBFAC,,, | Performed by: UROLOGY

## 2018-06-04 NOTE — PROGRESS NOTES
"  Subjective:       Valarie Foster is a 68 y.o. female who is an established patient who was referred by Ivan CARDONA for evaluation of renal mass.      CT a/p with contrast 1/26/18 showed a 2.6cm endophytic lesion in R MP, concerning for solid mass vs complex cyst.     She reports L flank pain, contralateral to renal lesion. L flank pain has almost completely resolved. Denies hematuria, LUTS, UTIs. UA macro did show microhematuria recently. Denies family history of  malignancy. She reports being told she had a small kidney on one side in the past. Denies nephrolithiasis. Nonsmoker. Denies previous abdominal surgery though she is s/p hysterectomy. +DM2/HTN.     Cr (1/18) - 0.8  A1c (1/18) - 6.2  UA micro (2/18) - 0 RBCs    CT renal protocol (2/18) - 2.9cm completely endophytic solid enhancing renal mass. Homogenous in appearance.     She is s/p perc renal biopsy to better evaluate lesion (2/18) - oncocytic neoplasm. Mild hematuria < 24 hrs.          CT (6/18) - stable, endophytic enhancing 2.4cm R renal mass. Subtle 6mm focus of enhancement in R lobe of liver - nonspecific.       The following portions of the patient's history were reviewed and updated as appropriate: allergies, current medications, past family history, past medical history, past social history, past surgical history and problem list.    Review of Systems  Constitutional: no fever or chills  ENT: no nasal congestion or sore throat  Respiratory: no cough or shortness of breath  Cardiovascular: no chest pain or palpitations  Gastrointestinal: no nausea or vomiting, tolerating diet  Genitourinary: as per HPI  Hematologic/Lymphatic: no easy bruising or lymphadenopathy  Musculoskeletal: no arthralgias or myalgias  Skin: no rashes or lesions  Neurological: no seizures or tremors  Behavioral/Psych: no auditory or visual hallucinations        Objective:    Vitals: BP (!) 158/84   Pulse 68   Resp 14   Ht 5' 4" (1.626 m)   Wt 81.3 kg (179 lb 3.7 oz)   BMI " 30.77 kg/m²     Physical Exam   General: well developed, well nourished in no acute distress  Head: normocephalic, atraumatic  Neck: supple, trachea midline, no obvious enlargement of thyroid  HEENT: EOMI, mucus membranes moist, sclera anicteric, no hearing impairment  Lungs: symmetric expansion, non-labored breathing  Cardiovascular: regular rate and rhythm, normal pulses  Abdomen: soft, non tender, non distended, no palpable masses, no hepatosplenomegaly, no hernias, no CVA tenderness  Musculoskeletal: no peripheral edema, normal ROM in bilateral upper and lower extremities  Lymphatics: no cervical or inguinal lymphadenopathy  Skin: no rashes or lesions  Neuro: alert and oriented x 3, no gross deficits  Psych: normal judgment and insight, normal mood/affect and non-anxious  Genitourinary:   patient declined exam      Lab Review   Urine analysis today in clinic shows positive for ++LE, red blood cells 5-10    Lab Results   Component Value Date    WBC 6.10 01/29/2018    HGB 12.6 01/29/2018    HCT 39.6 01/29/2018    MCV 85 01/29/2018     01/29/2018     Lab Results   Component Value Date    CREATININE 0.8 06/01/2018    BUN 14 01/29/2018         Imaging  Images and reports were personally reviewed by me and discussed with patient  CT reviewed       Assessment/Plan:      1. Right renal mass    - 2.6cm lesion in R MP kidney.   - CT renal protocol 2.5cm endophytic solid enhancing renal mass   - Due to location of tumor, open partial would be difficult with higher complication rate of bleeding and/or urine leak. Radical nephrectomy with less complication rate.   - Concern for performing radical nephrectomy in diabetic patient for possible benign lesion.   - Recommend perc biopsy to evaluate for possible benign lesion prior to radical nephrectomy. Discussed limitations/risks of perc biopsy. Understands that perc biopsy may also not be definitive in diagnosis   - Perc biopsy 2/18 - oncocytic neoplasm (less favors  oncocytoma)   - Discussed options: observation, lap radical Nx, open partial Nx. She has opted to observe for now.    - CT 6/18 - stable 2.4cm R renal mass   - KISHAN in 3 months   - Recommend referral to Dr Crouch to evaluate for RAL-PNx if she would like treatment options. She declines this for now.      2. Microhematuria    - UA micro - 0 RBCs     3. L flank pain   - Unsure etiology   - CT negative for L flank pain   - f/u with PCP      Follow up in 3 months with KISHAN

## 2018-07-31 ENCOUNTER — HOSPITAL ENCOUNTER (EMERGENCY)
Facility: HOSPITAL | Age: 68
Discharge: HOME | End: 2018-07-31
Attending: EMERGENCY MEDICINE
Payer: MEDICARE

## 2018-07-31 VITALS
DIASTOLIC BLOOD PRESSURE: 80 MMHG | WEIGHT: 180 LBS | SYSTOLIC BLOOD PRESSURE: 175 MMHG | HEART RATE: 81 BPM | BODY MASS INDEX: 31.89 KG/M2 | TEMPERATURE: 98 F | HEIGHT: 63 IN | RESPIRATION RATE: 16 BRPM | OXYGEN SATURATION: 100 %

## 2018-07-31 DIAGNOSIS — M25.511 RIGHT SHOULDER PAIN: ICD-10-CM

## 2018-07-31 DIAGNOSIS — S43.401A SPRAIN OF RIGHT SHOULDER, UNSPECIFIED SHOULDER SPRAIN TYPE, INITIAL ENCOUNTER: Primary | ICD-10-CM

## 2018-07-31 PROCEDURE — 25000003 PHARM REV CODE 250: Performed by: PHYSICIAN ASSISTANT

## 2018-07-31 PROCEDURE — 99283 EMERGENCY DEPT VISIT LOW MDM: CPT | Mod: 25

## 2018-07-31 RX ORDER — LIDOCAINE 50 MG/G
1 PATCH TOPICAL DAILY
Qty: 15 PATCH | Refills: 0 | Status: SHIPPED | OUTPATIENT
Start: 2018-07-31 | End: 2018-10-09

## 2018-07-31 RX ORDER — LIDOCAINE 50 MG/G
1 PATCH TOPICAL
Status: DISCONTINUED | OUTPATIENT
Start: 2018-07-31 | End: 2018-07-31 | Stop reason: HOSPADM

## 2018-07-31 RX ORDER — ACETAMINOPHEN 325 MG/1
650 TABLET ORAL
Status: COMPLETED | OUTPATIENT
Start: 2018-07-31 | End: 2018-07-31

## 2018-07-31 RX ADMIN — LIDOCAINE 1 PATCH: 50 PATCH TOPICAL at 04:07

## 2018-07-31 RX ADMIN — ACETAMINOPHEN 650 MG: 325 TABLET, FILM COATED ORAL at 03:07

## 2018-07-31 NOTE — ED PROVIDER NOTES
"Encounter Date: 7/31/2018    SORT:  68 y.o. female presenting to ED for R shoulder pain after reaching activity. Denies CP, SOB, numbness, and fever. Limited triage exam:  Non-toxic. If orders were placed, they are pending.     Raul Dunlap PA-C  07/31/2018  3:39 PM   SCRIBE #1 NOTE: IRaul am scribing for, and in the presence of,  Rebecca Mcginnis PA-C. I have scribed the following portions of the note - Other sections scribed: HPI, ROS.       History     Chief Complaint   Patient presents with    Arm Pain     States she can't raise her right arm and is painful.  Doesn't know how she injured it     CC: Arm Pain    HPI: This 68 y.o. Female with chronic R thumb/wrist pain, diabetes mellitus and HTN presents to the ED c/o R arm pain x3 days after installing curtains in a home. Pt reports pain with movement and extension/flexion. She admits hearing a "pop" while moving her R arm installing curtains. Pt states she can not lay on her arm at night. She has not taken any meds for her symptoms b/c "ibuprofen/tylenol" usually does not work. Pt denies fall.      The history is provided by the patient. No  was used.     Review of patient's allergies indicates:  No Known Allergies  Past Medical History:   Diagnosis Date    Diabetes mellitus     diet controlled    Diabetes with neurologic complications     Hypertension      Past Surgical History:   Procedure Laterality Date    COLONOSCOPY N/A 2/5/2018    Procedure: COLONOSCOPY;  Surgeon: Bacilio Mancia MD;  [facility];  Service: Endoscopy;  Laterality: N/A;  appt confirmed-ss    HYSTERECTOMY      OOPHORECTOMY       Family History   Problem Relation Age of Onset    Glaucoma Sister     Cancer Sister         breast cancer    No Known Problems Mother     Glaucoma Father     Diabetes Brother     No Known Problems Maternal Aunt     No Known Problems Maternal Uncle     No Known Problems Paternal Aunt     No Known Problems " Paternal Uncle     No Known Problems Maternal Grandmother     No Known Problems Maternal Grandfather     No Known Problems Paternal Grandmother     No Known Problems Paternal Grandfather     Thyroid disease Sister     Blindness Sister         due to trauma during car accident    Diabetes Sister     Amblyopia Neg Hx     Cataracts Neg Hx     Hypertension Neg Hx     Macular degeneration Neg Hx     Retinal detachment Neg Hx     Strabismus Neg Hx     Stroke Neg Hx     Breast cancer Neg Hx      Social History   Substance Use Topics    Smoking status: Never Smoker    Smokeless tobacco: Never Used    Alcohol use No     Review of Systems   Constitutional: Negative for chills and fever.   HENT: Negative for ear pain, rhinorrhea and sore throat.    Eyes: Negative for redness.   Respiratory: Negative for shortness of breath.    Cardiovascular: Negative for chest pain.   Gastrointestinal: Negative for abdominal pain, diarrhea, nausea and vomiting.   Genitourinary: Negative for dysuria and hematuria.   Musculoskeletal: Positive for arthralgias (R arm). Negative for back pain and neck pain.   Skin: Negative for rash.   Neurological: Negative for weakness, numbness and headaches.   Hematological: Does not bruise/bleed easily.   Psychiatric/Behavioral: The patient is not nervous/anxious.        Physical Exam     Initial Vitals [07/31/18 1537]   BP Pulse Resp Temp SpO2   (!) 185/88 79 16 98.4 °F (36.9 °C) 100 %      MAP       --         Physical Exam    Nursing note and vitals reviewed.  Constitutional: Vital signs are normal. She appears well-developed and well-nourished. She is not diaphoretic. She is cooperative.  Non-toxic appearance. She does not have a sickly appearance. She does not appear ill. No distress.   HENT:   Head: Normocephalic and atraumatic.   Right Ear: External ear normal.   Left Ear: External ear normal.   Nose: Nose normal.   Mouth/Throat: Uvula is midline, oropharynx is clear and moist and  mucous membranes are normal. No oropharyngeal exudate.   Eyes: Conjunctivae, EOM and lids are normal. Pupils are equal, round, and reactive to light.   Neck: Trachea normal, normal range of motion, full passive range of motion without pain and phonation normal. Neck supple.   Cardiovascular: Normal rate, regular rhythm, normal heart sounds and intact distal pulses. Exam reveals no gallop and no friction rub.    No murmur heard.  Pulmonary/Chest: Effort normal and breath sounds normal. No respiratory distress. She has no decreased breath sounds. She has no wheezes. She has no rhonchi. She has no rales.   Abdominal: Soft. Normal appearance and bowel sounds are normal. She exhibits no distension and no mass. There is no tenderness.   Musculoskeletal:        Right shoulder: She exhibits decreased range of motion. She exhibits no tenderness, no bony tenderness, no swelling, no effusion, no deformity, no laceration, no pain and no spasm.   There is decreased ROM of the right shoulder secondary to pain. No obvious deformity or tenderness to palpation. No effusion. No swelling. Full ROM of the right elbow, right wrist, right hand and all digits. Peripheral pulses intact. Sensation and strength intact and equal bilaterally. Upper extremity compartments are soft.    Neurological: She is alert and oriented to person, place, and time. She has normal strength. She exhibits normal muscle tone.   Skin: Skin is warm and dry. Capillary refill takes less than 2 seconds. No rash noted.   Psychiatric: She has a normal mood and affect. Her speech is normal and behavior is normal. Judgment and thought content normal. Cognition and memory are normal.         ED Course   Procedures  Labs Reviewed - No data to display       Imaging Results          X-Ray Shoulder Complete 2 View Right (Final result)  Result time 07/31/18 16:14:18    Final result by Dali Reynolds MD (07/31/18 16:14:18)                 Impression:      Please see  above.      Electronically signed by: Dali Reynolds MD  Date:    07/31/2018  Time:    16:14             Narrative:    EXAMINATION:  XR SHOULDER COMPLETE 2 OR MORE VIEWS RIGHT    CLINICAL HISTORY:  Pain in right shoulder    TECHNIQUE:  Two or three views of the right shoulder were performed.    COMPARISON:  None    FINDINGS:  I detect no recent fracture, dislocation, radiopaque retained foreign body, lytic or blastic lesion, erosion or chondrocalcinosis.  There may be remote injury to the acromion; dedicated views might provide additional information if this corresponds to the site of the patient's pain.  There is a small spur at the inferomedial aspect of the humeral head best seen on AP view.    I detect no abnormality in the imaged portion of the chest.                                       APC / Resident Notes:   This is an evaluation of a 68 y.o. female that presents to the Emergency Department for arm pain. Patient reports right shoulder pain after hanging curtains 2 days ago. She denies trauma or fall. She reports exacerbation of pain with movement. She denies attempted tx. Pt unable to take NSAIDs secondary to cyst of kidney.    Physical Exam shows a non-toxic, afebrile, and well appearing female. There is decreased ROM of the right shoulder secondary to pain. No obvious deformity or tenderness to palpation. No effusion. No swelling. Full ROM of the right elbow, right wrist, right hand and all digits. Peripheral pulses intact. Capillary refill < 2 seconds in BUE. Sensation and strength intact and equal bilaterally. Upper extremity compartments are soft.     Vital Signs Are Reassuring. If available, previous records reviewed.   RESULTS:   Xray right shoulder unrevealing for acute fracture, dislocation or acute osseous process. There is a possible remote injury to the acromion and a small spur at the inferomedial aspect of the humeral head.    My overall impression is right shoulder sprain.  DDx: sprain,  spasm, ligament injury, fracture, dislocation, other    ED Course: Tylenol, Lidoderm patch, arm sling. I feel his patient is stable for discharge. I will recommend pendulum shoulder exercises daily, Tylenol prn pain and close follow-up with orthopedics. The diagnosis, treatment plan, instructions for follow-up and reevaluation with orthopedics, as well as ED return precautions were discussed and understanding was verbalized. All questions or concerns have been addressed. Patient was discharged home with an instructional sheet which gave not only information regarding the most likely diagnoses but also information regarding when to return to the emergency department for alarming symptoms and when to seek further care.      This case was discussed with Dr. Robertson who is in agreement with my assessment and plan.     Rebecca Mcginnis PA-C         Scribe Attestation:   Scribe #1: I performed the above scribed service and the documentation accurately describes the services I performed. I attest to the accuracy of the note.    Attending Attestation:           Physician Attestation for Scribe:  Physician Attestation Statement for Scribe #1: I, Rebecca Mcginnis PA-C, reviewed documentation, as scribed by Raul Tai in my presence, and it is both accurate and complete.                    Clinical Impression:   The primary encounter diagnosis was Sprain of right shoulder, unspecified shoulder sprain type, initial encounter. A diagnosis of Right shoulder pain was also pertinent to this visit.      Disposition:   Disposition: Discharged  Condition: Stable                        Rebecca Mcginnis PA-C  07/31/18 2018

## 2018-07-31 NOTE — DISCHARGE INSTRUCTIONS
You can take Tylenol as needed for pain.    You can apply lidocaine patches to the region of your shoulder pain every 12-24 hours.    Please do the shoulder exercises that we discussed.    Follow up with an orthopedist if your symptoms continue.    Return to the ER for any concerns.

## 2018-08-16 ENCOUNTER — OFFICE VISIT (OUTPATIENT)
Dept: PODIATRY | Facility: CLINIC | Age: 68
End: 2018-08-16
Payer: MEDICARE

## 2018-08-16 VITALS
DIASTOLIC BLOOD PRESSURE: 82 MMHG | WEIGHT: 180 LBS | BODY MASS INDEX: 31.89 KG/M2 | SYSTOLIC BLOOD PRESSURE: 150 MMHG | HEIGHT: 63 IN

## 2018-08-16 DIAGNOSIS — G89.29 CHRONIC PAIN OF LEFT ANKLE: ICD-10-CM

## 2018-08-16 DIAGNOSIS — R20.2 PARESTHESIA OF BOTH FEET: ICD-10-CM

## 2018-08-16 DIAGNOSIS — M20.42 HAMMER TOES OF BOTH FEET: ICD-10-CM

## 2018-08-16 DIAGNOSIS — S93.402D INVERSION SPRAIN OF ANKLE, LEFT, SUBSEQUENT ENCOUNTER: ICD-10-CM

## 2018-08-16 DIAGNOSIS — E11.40 TYPE 2 DIABETES MELLITUS WITH DIABETIC NEUROPATHY, WITHOUT LONG-TERM CURRENT USE OF INSULIN: Primary | ICD-10-CM

## 2018-08-16 DIAGNOSIS — M20.5X1 HALLUX LIMITUS, ACQUIRED, RIGHT: ICD-10-CM

## 2018-08-16 DIAGNOSIS — M20.41 HAMMER TOES OF BOTH FEET: ICD-10-CM

## 2018-08-16 DIAGNOSIS — M25.572 CHRONIC PAIN OF LEFT ANKLE: ICD-10-CM

## 2018-08-16 DIAGNOSIS — M20.5X2 HALLUX LIMITUS, ACQUIRED, LEFT: ICD-10-CM

## 2018-08-16 PROCEDURE — 3077F SYST BP >= 140 MM HG: CPT | Mod: CPTII,S$GLB,, | Performed by: PODIATRIST

## 2018-08-16 PROCEDURE — 99214 OFFICE O/P EST MOD 30 MIN: CPT | Mod: S$GLB,,, | Performed by: PODIATRIST

## 2018-08-16 PROCEDURE — 3079F DIAST BP 80-89 MM HG: CPT | Mod: CPTII,S$GLB,, | Performed by: PODIATRIST

## 2018-08-16 PROCEDURE — 3044F HG A1C LEVEL LT 7.0%: CPT | Mod: CPTII,S$GLB,, | Performed by: PODIATRIST

## 2018-08-16 PROCEDURE — 99999 PR PBB SHADOW E&M-EST. PATIENT-LVL III: CPT | Mod: PBBFAC,,, | Performed by: PODIATRIST

## 2018-08-16 NOTE — PROGRESS NOTES
Subjective:      Patient ID: Valarie Foster is a 68 y.o. female.    Chief Complaint: Diabetes Mellitus (toenail pain/left ankle pain PCp Dr. Hernandez ); Diabetic Foot Exam; Nail Care; and Ankle Pain    Valarie is a 68 y.o. female who presents to the clinic for evaluation and treatment of high risk feet. Valarie has a past medical history of Diabetes mellitus, Diabetes with neurologic complications, and Hypertension. The patient RTC for follow up of left lateral ankle and foot pain  She has been icing, and immobilizing and relates significant improvement to ankle  But relates burning to forefoot..     This patient has documented high risk feet requiring routine maintenance secondary to diabetes mellitis and those secondary complications of diabetes, as mentioned..    PCP: Alena Hernandez MD    Date Last Seen by PCP:   Chief Complaint   Patient presents with    Diabetes Mellitus     toenail pain/left ankle pain PCp Dr. Hernandez     Diabetic Foot Exam    Nail Care    Ankle Pain       Current shoe gear: flexible tennis shoes    Hemoglobin A1C   Date Value Ref Range Status   01/29/2018 6.2 (H) 4.0 - 5.6 % Final     Comment:     According to ADA guidelines, hemoglobin A1c <7.0% represents  optimal control in non-pregnant diabetic patients. Different  metrics may apply to specific patient populations.   Standards of Medical Care in Diabetes-2016.  For the purpose of screening for the presence of diabetes:  <5.7%     Consistent with the absence of diabetes  5.7-6.4%  Consistent with increasing risk for diabetes   (prediabetes)  >or=6.5%  Consistent with diabetes  Currently, no consensus exists for use of hemoglobin A1c  for diagnosis of diabetes for children.  This Hemoglobin A1c assay has significant interference with fetal   hemoglobin   (HbF). The results are invalid for patients with abnormal amounts of   HbF,   including those with known Hereditary Persistence   of Fetal Hemoglobin. Heterozygous hemoglobin variants (HbAS, HbAC,    HbAD, HbAE, HbA2) do not significantly interfere with this assay;   however, presence of multiple variants in a sample may impact the %   interference.             Patient Active Problem List   Diagnosis    Type 2 diabetes mellitus with diabetic neuropathy, without long-term current use of insulin    Benign hypertension    Hyperlipemia    Right renal mass    Microhematuria    Left flank pain       Current Outpatient Medications on File Prior to Visit   Medication Sig Dispense Refill    diclofenac sodium (SOLARAZE) 3 % gel Apply topically 2 (two) times daily. 100 g 1    gabapentin (NEURONTIN) 300 MG capsule Take 2 capsules (600 mg total) by mouth every evening. 180 capsule 1    hydrocodone-acetaminophen 5-325mg (NORCO) 5-325 mg per tablet Take 1 tablet by mouth every 24 hours as needed for Pain. 30 tablet 0    lidocaine (LIDODERM) 5 % Place 1 patch onto the skin once daily. Remove & Discard patch within 12 hours or as directed by MD 15 patch 0    lisinopril 10 MG tablet Take 1 tablet (10 mg total) by mouth once daily. 30 tablet 5    pravastatin (PRAVACHOL) 10 MG tablet Take 1 tablet (10 mg total) by mouth once daily. 30 tablet 5    promethazine-dextromethorphan (PROMETHAZINE-DM) 6.25-15 mg/5 mL Syrp Take 5 mLs by mouth 3 (three) times daily as needed (cough). 120 mL 1     No current facility-administered medications on file prior to visit.        Review of patient's allergies indicates:  No Known Allergies    Past Surgical History:   Procedure Laterality Date    HYSTERECTOMY      OOPHORECTOMY         Family History   Problem Relation Age of Onset    Glaucoma Sister     Cancer Sister         breast cancer    No Known Problems Mother     Glaucoma Father     Diabetes Brother     No Known Problems Maternal Aunt     No Known Problems Maternal Uncle     No Known Problems Paternal Aunt     No Known Problems Paternal Uncle     No Known Problems Maternal Grandmother     No Known Problems Maternal  "Grandfather     No Known Problems Paternal Grandmother     No Known Problems Paternal Grandfather     Thyroid disease Sister     Blindness Sister         due to trauma during car accident    Diabetes Sister     Amblyopia Neg Hx     Cataracts Neg Hx     Hypertension Neg Hx     Macular degeneration Neg Hx     Retinal detachment Neg Hx     Strabismus Neg Hx     Stroke Neg Hx     Breast cancer Neg Hx        Social History     Socioeconomic History    Marital status:      Spouse name: Not on file    Number of children: Not on file    Years of education: Not on file    Highest education level: Not on file   Social Needs    Financial resource strain: Not on file    Food insecurity - worry: Not on file    Food insecurity - inability: Not on file    Transportation needs - medical: Not on file    Transportation needs - non-medical: Not on file   Occupational History    Not on file   Tobacco Use    Smoking status: Never Smoker    Smokeless tobacco: Never Used   Substance and Sexual Activity    Alcohol use: No    Drug use: No    Sexual activity: Not on file   Other Topics Concern    Not on file   Social History Narrative    Not on file       Review of Systems   Constitution: Negative for chills, fever and weakness.   Cardiovascular: Positive for leg swelling. Negative for claudication.   Respiratory: Negative for cough and shortness of breath.    Skin: Positive for dry skin. Negative for itching, nail changes and rash.   Musculoskeletal: Positive for back pain, joint pain and myalgias. Negative for falls, joint swelling and muscle weakness.   Gastrointestinal: Negative for diarrhea, nausea and vomiting.   Neurological: Positive for numbness and paresthesias. Negative for tremors.   Psychiatric/Behavioral: Negative for altered mental status and hallucinations.           Objective:      Vitals:    08/16/18 1440   BP: (!) 150/82   Weight: 81.6 kg (180 lb)   Height: 5' 3" (1.6 m)   PainSc:   8 "   PainLoc: Toe       Physical Exam   Constitutional: She appears well-developed and well-nourished.  Non-toxic appearance. She does not have a sickly appearance. No distress.   alert and oriented x 3.    Cardiovascular:   Pulses:       Dorsalis pedis pulses are 2+ on the right side, and 2+ on the left side.        Posterior tibial pulses are 2+ on the right side, and 2+ on the left side.    Capillary refill time is within normal limits. Digital hair present.    Pulmonary/Chest: No respiratory distress.   Musculoskeletal: She exhibits no deformity.        Right ankle: No tenderness. No lateral malleolus, no medial malleolus, no AITFL, no CF ligament and no posterior TFL tenderness found. Achilles tendon exhibits no pain, no defect and normal Maciel's test results.        Left ankle: She exhibits swelling. Tenderness. AITFL and CF ligament tenderness found. No lateral malleolus, no medial malleolus and no posterior TFL tenderness found. Achilles tendon exhibits no pain, no defect and normal Maciel's test results.        Right foot: There is no tenderness and no bony tenderness.        Left foot: There is no tenderness and no bony tenderness.   Muscle strength is 5/5 in all groups bilaterally.    Patient has hammertoes of digits 2-5 bilateral partially reducible without symptom today.    Decreased first MPJ range of motion both weightbearing and nonweightbearing, no crepitus observed the first MP joint, + dorsal flag sign.Mild  bunion deformity is observed .     Feet:   Right Foot:   Protective Sensation: 5 sites tested. 5 sites sensed.   Left Foot:   Protective Sensation: 5 sites tested. 5 sites sensed.   Lymphadenopathy:   No lymphatic streaking     Neurological: She displays no atrophy. No sensory deficit.   Light touch present    Paresthesias, and hyperesthesia bilateral feet at toes with no clearly identified trigger or source.       Skin: Skin is warm, dry and intact. No abrasion, no bruising, no burn, no  ecchymosis, no lesion and no rash noted. She is not diaphoretic. No cyanosis or erythema. No pallor. Nails show no clubbing.   Normal temperature gradient.  Examination of the skin reveals no evidence of significant rashes, open lesions, suspicious appearing nevi or other concerning lesions.    Psychiatric: Her mood appears not anxious. Her affect is not inappropriate. Her speech is not slurred. She is not combative. She is communicative. She is attentive.   Nursing note and vitals reviewed.            Assessment:       Encounter Diagnoses   Name Primary?    Type 2 diabetes mellitus with diabetic neuropathy, without long-term current use of insulin Yes    Paresthesia of both feet     Inversion sprain of ankle, left, subsequent encounter     Chronic pain of left ankle     Hammer toes of both feet     Hallux limitus, acquired, right     Hallux limitus, acquired, left          Plan:       Valarie was seen today for diabetes mellitus, diabetic foot exam, nail care and ankle pain.    Diagnoses and all orders for this visit:    Type 2 diabetes mellitus with diabetic neuropathy, without long-term current use of insulin  -     Ambulatory consult to Neurology  -     DIABETIC SHOES FOR HOME USE    Paresthesia of both feet  -     Ambulatory consult to Neurology    Inversion sprain of ankle, left, subsequent encounter    Chronic pain of left ankle  -     DIABETIC SHOES FOR HOME USE    Hammer toes of both feet  -     DIABETIC SHOES FOR HOME USE    Hallux limitus, acquired, right  -     DIABETIC SHOES FOR HOME USE    Hallux limitus, acquired, left  -     DIABETIC SHOES FOR HOME USE      I counseled the patient on her conditions, their implications and medical management.    Greater than 50% of this visit spent on counseling and coordination of care.    Education about the diabetic foot, neuropathy, and prevention of limb loss.    Shoe inspection. Diabetic Foot Education. Patient reminded of the importance of good  nutrition/healthy diet/weight management and blood sugar control to help prevent podiatric complications of diabetes.    Discussed ankle sprains and importance of immobilization for healing as well as the lengthy course of treatment for complete resolution. Significant improvement noted on exam today.    She is to begin transitioning into supportive shoes and stretching. Rx diabetic shoes for protection and support    Explained possible causes of patients paresthesias including but not limited to systemic illness vs idiopathic vs edema vs low back pathology vs shoe gear. Counseled patient on conservative management of her complaint including compression stockings, inserts, extra depth and width shoe gear avoiding flats, slippers, sandals, and going barefoot.  Referral placed to neurology.  Rx topical pain cream from [facility] to be delivered to patient home.    She will continue to monitor the areas daily, inspect her feet, wear protective shoe gear when ambulatory, moisturizer to maintain skin integrity and follow in this office in approximately 2-4 months, sooner p.r.n.

## 2018-08-16 NOTE — PATIENT INSTRUCTIONS
Over the counter pain cream: Arnicare, Biofreeze, Bengay, tiger balm, two old goat, lidocaine gel,  Absorbine Veterinary Liniment Gel Topical Analgesic Sore Muscle and Joint Pain Relief    Recommend lotions: eucerin, eucerin for diabetics, aquaphor, A&D ointment, gold bond for diabetics, sween, [place]'s Bees all purpose baby ointment,  urea 40 with aloe (found on amazon.com)    Shoe recommendations: (try 6pm.com, zappos.com , [email or web address].[email or web address], or shoes[email or web address] for discounted prices) you can visit [employer] in [place]  or [facility] in the [facility] (there are also several shoe brand outlets in the [facility])    Asics (GT 2000 or gel foundations), new balance stability type shoes, saucony (stabil c3),  Guthrie (GTS or Beast or transcend), vionic, propet (tennis shoe)    sofft brand, clarks, crocs, aerosoles, naturalizers, SAS, ecco, born, josefina boggs, rockports (dress shoes)    Vionic, burkenstocks, fitflops, propet (sandals)  Nike comfort thong sandals, crocs, propet (house shoes)    Nail Home remedy:  Vicks Vapor rub to nails for easier managability    Occasional soaks for 15-20 mins in luke warm water with 1 cup of listerine and 1 cup of apple cider vinegar are ok You may add several drops of oil of oregano or tea tree oil as well        Diabetes: Inspecting Your Feet  Diabetes increases your chances of developing foot problems. So inspect your feet every day. This helps you find small skin irritations before they become serious infections. If you have trouble seeing the bottoms of your feet, use a mirror or ask a family member or friend to help.     Pressure spots on the bottom of the foot are common areas where problems develop.   How to check your feet  Below are tips to help you look for foot problems. Try to check your feet at the same time each day, such as when you get out of bed in the morning:  · Check the top of each foot. The tops of toes, back of the heel, and outer edge of the foot can get a lot of  rubbing from poor-fitting shoes.  · Check the bottom of each foot. Daily wear and tear often leads to problems at pressure spots.  · Check the toes and nails. Fungal infections often occur between toes. Toenail problems can also be a sign of fungal infections or lead to breaks in the skin.  · Check your shoes, too. Loose objects inside a shoe can injure the foot. Use your hand to feel inside your shoes for things like kalpana, loose stitching, or rough areas that could irritate your skin.  Warning signs  Look for any color changes in the foot. Redness with streaks can signal a severe infection, which needs immediate medical attention. Tell your doctor right away if you have any of these problems:  · Swelling, sometimes with color changes, may be a sign of poor blood flow or infection. Symptoms include tenderness and an increase in the size of your foot.  · Warm or hot areas on your feet may be signs of infection. A foot that is cold may not be getting enough blood.  · Sensations such as burning, tingling, or pins and needles can be signs of a problem. Also check for areas that may be numb.  · Hot spots are caused by friction or pressure. Look for hot spots in areas that get a lot of rubbing. Hot spots can turn into blisters, calluses, or sores.  · Cracks and sores are caused by dry or irritated skin. They are a sign that the skin is breaking down, which can lead to infection.  · Toenail problems to watch for include nails growing into the skin (ingrown toenail) and causing redness or pain. Thick, yellow, or discolored nails can signal a fungal infection.  · Drainage and odor can develop from untreated sores and ulcers. Call your doctor right away if you notice white or yellow drainage, bleeding, or unpleasant odor.   © 3765-5957 [employer]. [address] [place] [address], [place] [address]. All rights reserved. This information is not intended as a substitute for professional medical care. Always follow  your healthcare professional's instructions.        Step-by-Step:  Inspecting Your Feet (Diabetes)    Date Last Reviewed: 10/1/2016  © 9092-5569 [employer], [employer]. [address] [place] [address], [place] [address]. All rights reserved. This information is not intended as a substitute for professional medical care. Always follow your healthcare professional's instructions.

## 2018-09-06 ENCOUNTER — HOSPITAL ENCOUNTER (OUTPATIENT)
Dept: RADIOLOGY | Facility: HOSPITAL | Age: 68
Discharge: HOME | End: 2018-09-06
Attending: UROLOGY
Payer: MEDICARE

## 2018-09-06 DIAGNOSIS — N28.89 RIGHT RENAL MASS: ICD-10-CM

## 2018-09-06 PROCEDURE — 76770 US EXAM ABDO BACK WALL COMP: CPT | Mod: TC

## 2018-09-06 PROCEDURE — 76770 US EXAM ABDO BACK WALL COMP: CPT | Mod: 26,,, | Performed by: RADIOLOGY

## 2018-09-10 ENCOUNTER — OFFICE VISIT (OUTPATIENT)
Dept: UROLOGY | Facility: CLINIC | Age: 68
End: 2018-09-10
Payer: MEDICARE

## 2018-09-10 VITALS
SYSTOLIC BLOOD PRESSURE: 158 MMHG | BODY MASS INDEX: 32.03 KG/M2 | HEIGHT: 63 IN | HEART RATE: 60 BPM | DIASTOLIC BLOOD PRESSURE: 82 MMHG | WEIGHT: 180.75 LBS | RESPIRATION RATE: 14 BRPM

## 2018-09-10 DIAGNOSIS — R31.29 MICROHEMATURIA: ICD-10-CM

## 2018-09-10 DIAGNOSIS — N28.89 RIGHT RENAL MASS: Primary | ICD-10-CM

## 2018-09-10 PROCEDURE — 3079F DIAST BP 80-89 MM HG: CPT | Mod: CPTII,,, | Performed by: UROLOGY

## 2018-09-10 PROCEDURE — 99999 PR PBB SHADOW E&M-EST. PATIENT-LVL IV: CPT | Mod: PBBFAC,,, | Performed by: UROLOGY

## 2018-09-10 PROCEDURE — 99214 OFFICE O/P EST MOD 30 MIN: CPT | Mod: S$PBB,,, | Performed by: UROLOGY

## 2018-09-10 PROCEDURE — 99214 OFFICE O/P EST MOD 30 MIN: CPT | Mod: PBBFAC | Performed by: UROLOGY

## 2018-09-10 PROCEDURE — 3077F SYST BP >= 140 MM HG: CPT | Mod: CPTII,,, | Performed by: UROLOGY

## 2018-09-10 PROCEDURE — 1101F PT FALLS ASSESS-DOCD LE1/YR: CPT | Mod: CPTII,,, | Performed by: UROLOGY

## 2018-09-10 RX ORDER — ACETAMINOPHEN 10 MG/ML
1000 INJECTION, SOLUTION INTRAVENOUS ONCE
Status: CANCELLED | OUTPATIENT
Start: 2018-09-10 | End: 2018-09-11

## 2018-09-10 NOTE — PROGRESS NOTES
"  Subjective:       Valarie Foster is a 68 y.o. female who is an established patient who was referred by Ivan CARDONA for evaluation of renal mass.      CT a/p with contrast 1/26/18 showed a 2.6cm endophytic lesion in R MP, concerning for solid mass vs complex cyst.     She reports L flank pain, contralateral to renal lesion. L flank pain has almost completely resolved. Denies hematuria, LUTS, UTIs. UA macro did show microhematuria recently. Denies family history of  malignancy. She reports being told she had a small kidney on one side in the past. Denies nephrolithiasis. Nonsmoker. Denies previous abdominal surgery though she is s/p hysterectomy. +DM2/HTN.     Cr (1/18) - 0.8  A1c (1/18) - 6.2  UA micro (2/18) - 0 RBCs    CT renal protocol (2/18) - 2.9cm completely endophytic solid enhancing renal mass. Homogenous in appearance.      She is s/p perc renal biopsy to better evaluate lesion (2/18) - oncocytic neoplasm. Mild hematuria < 24 hrs.          CT (6/18) - stable, endophytic enhancing 2.4cm R renal mass. Subtle 6mm focus of enhancement in R lobe of liver - nonspecific.     KISHAN (9/18) - stable 2.8cm R renal mass      The following portions of the patient's history were reviewed and updated as appropriate: allergies, current medications, past family history, past medical history, past social history, past surgical history and problem list.    Review of Systems  Constitutional: no fever or chills  ENT: no nasal congestion or sore throat  Respiratory: no cough or shortness of breath  Cardiovascular: no chest pain or palpitations  Gastrointestinal: no nausea or vomiting, tolerating diet  Genitourinary: as per HPI  Hematologic/Lymphatic: no easy bruising or lymphadenopathy  Musculoskeletal: no arthralgias or myalgias  Skin: no rashes or lesions  Neurological: no seizures or tremors  Behavioral/Psych: no auditory or visual hallucinations        Objective:    Vitals: BP (!) 158/82   Pulse 60   Resp 14   Ht 5' 3" (1.6 " m)   Wt 82 kg (180 lb 12.4 oz)   BMI 32.02 kg/m²     Physical Exam   General: well developed, well nourished in no acute distress  Head: normocephalic, atraumatic  Neck: supple, trachea midline, no obvious enlargement of thyroid  HEENT: EOMI, mucus membranes moist, sclera anicteric, no hearing impairment  Lungs: symmetric expansion, non-labored breathing  Cardiovascular: regular rate and rhythm, normal pulses  Abdomen: soft, non tender, non distended, no palpable masses, no hepatosplenomegaly, no hernias, no CVA tenderness  Musculoskeletal: no peripheral edema, normal ROM in bilateral upper and lower extremities  Lymphatics: no cervical or inguinal lymphadenopathy  Skin: no rashes or lesions  Neuro: alert and oriented x 3, no gross deficits  Psych: normal judgment and insight, normal mood/affect and non-anxious  Genitourinary:   patient declined exam      Lab Review   Urine analysis today in clinic shows - negative    Lab Results   Component Value Date    WBC 6.10 01/29/2018    HGB 12.6 01/29/2018    HCT 39.6 01/29/2018    MCV 85 01/29/2018     01/29/2018     Lab Results   Component Value Date    CREATININE 0.8 06/01/2018    BUN 14 01/29/2018         Imaging  Images and reports were personally reviewed by me and discussed with patient  CT/KISHAN reviewed       Assessment/Plan:      1. Right renal mass    - 2.6cm lesion in R MP kidney.   - CT renal protocol 2.5cm endophytic solid enhancing renal mass   - Due to location of tumor, open partial would be difficult with higher complication rate of bleeding and/or urine leak. Radical nephrectomy with less complication rate.   - Concern for performing radical nephrectomy in diabetic patient for possible benign lesion.   - Recommend perc biopsy to evaluate for possible benign lesion prior to radical nephrectomy. Discussed limitations/risks of perc biopsy. 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 She has opted to observe for now.    - CT 6/18 - stable 2.4cm R renal mass   - KISHAN 9/18 - 2.8cm R renal mass   - Discussed with Dr Crouch to evaluate for RAL-PNx - he recommend open PNx, not robotic approach.    - Will proceed with R open PNx on 10/12/18, Dr Arenas to assist.    - Discussed risks including but not limited to: bleeding, infection, urine leak, hemorrhage, damage to surrounding structures, need for nephrectomy as well as HD dependency, CVA, MI, DVT/PE, and death.      2. Microhematuria    - UA micro - 0 RBCs     3. L flank pain   - Unsure etiology   - CT negative for L flank pain   - f/u with PCP      Follow up in 1 week post-op

## 2018-09-27 ENCOUNTER — OFFICE VISIT (OUTPATIENT)
Dept: FAMILY MEDICINE | Facility: CLINIC | Age: 68
End: 2018-09-27
Payer: MEDICARE

## 2018-09-27 VITALS
WEIGHT: 177.5 LBS | OXYGEN SATURATION: 99 % | BODY MASS INDEX: 31.45 KG/M2 | TEMPERATURE: 98 F | HEART RATE: 77 BPM | SYSTOLIC BLOOD PRESSURE: 130 MMHG | HEIGHT: 63 IN | DIASTOLIC BLOOD PRESSURE: 80 MMHG

## 2018-09-27 DIAGNOSIS — G47.00 INSOMNIA, UNSPECIFIED TYPE: ICD-10-CM

## 2018-09-27 DIAGNOSIS — I10 BENIGN HYPERTENSION: Chronic | ICD-10-CM

## 2018-09-27 DIAGNOSIS — B96.89 BACTERIAL SINUSITIS: ICD-10-CM

## 2018-09-27 DIAGNOSIS — N28.89 RIGHT RENAL MASS: ICD-10-CM

## 2018-09-27 DIAGNOSIS — E78.5 HYPERLIPIDEMIA, UNSPECIFIED HYPERLIPIDEMIA TYPE: Chronic | ICD-10-CM

## 2018-09-27 DIAGNOSIS — J32.9 BACTERIAL SINUSITIS: ICD-10-CM

## 2018-09-27 DIAGNOSIS — E11.40 TYPE 2 DIABETES MELLITUS WITH DIABETIC NEUROPATHY, WITHOUT LONG-TERM CURRENT USE OF INSULIN: Chronic | ICD-10-CM

## 2018-09-27 DIAGNOSIS — Z23 NEED FOR PROPHYLACTIC VACCINATION AND INOCULATION AGAINST INFLUENZA: Primary | ICD-10-CM

## 2018-09-27 PROCEDURE — 3079F DIAST BP 80-89 MM HG: CPT | Mod: CPTII,,, | Performed by: FAMILY MEDICINE

## 2018-09-27 PROCEDURE — 90662 IIV NO PRSV INCREASED AG IM: CPT | Mod: PBBFAC,PO

## 2018-09-27 PROCEDURE — 1101F PT FALLS ASSESS-DOCD LE1/YR: CPT | Mod: CPTII,,, | Performed by: FAMILY MEDICINE

## 2018-09-27 PROCEDURE — 99999 PR PBB SHADOW E&M-EST. PATIENT-LVL III: CPT | Mod: PBBFAC,,, | Performed by: FAMILY MEDICINE

## 2018-09-27 PROCEDURE — 99215 OFFICE O/P EST HI 40 MIN: CPT | Mod: S$PBB,,, | Performed by: FAMILY MEDICINE

## 2018-09-27 PROCEDURE — 3044F HG A1C LEVEL LT 7.0%: CPT | Mod: CPTII,,, | Performed by: FAMILY MEDICINE

## 2018-09-27 PROCEDURE — 3075F SYST BP GE 130 - 139MM HG: CPT | Mod: CPTII,,, | Performed by: FAMILY MEDICINE

## 2018-09-27 PROCEDURE — 96372 THER/PROPH/DIAG INJ SC/IM: CPT | Mod: PBBFAC,59,PO

## 2018-09-27 PROCEDURE — 99213 OFFICE O/P EST LOW 20 MIN: CPT | Mod: PBBFAC,PO | Performed by: FAMILY MEDICINE

## 2018-09-27 RX ORDER — FLUTICASONE PROPIONATE 50 MCG
1 SPRAY, SUSPENSION (ML) NASAL DAILY
Qty: 16 G | Refills: 0 | Status: SHIPPED | OUTPATIENT
Start: 2018-09-27 | End: 2018-11-21 | Stop reason: SDUPTHER

## 2018-09-27 RX ORDER — BENZONATATE 200 MG/1
200 CAPSULE ORAL 3 TIMES DAILY PRN
Qty: 60 CAPSULE | Refills: 0 | Status: SHIPPED | OUTPATIENT
Start: 2018-09-27 | End: 2018-10-07

## 2018-09-27 RX ORDER — TRIAMCINOLONE ACETONIDE 40 MG/ML
40 INJECTION, SUSPENSION INTRA-ARTICULAR; INTRAMUSCULAR
Status: COMPLETED | OUTPATIENT
Start: 2018-09-27 | End: 2018-09-27

## 2018-09-27 RX ORDER — SULFAMETHOXAZOLE AND TRIMETHOPRIM 800; 160 MG/1; MG/1
1 TABLET ORAL 2 TIMES DAILY
Qty: 20 TABLET | Refills: 0 | Status: SHIPPED | OUTPATIENT
Start: 2018-09-27 | End: 2018-10-07

## 2018-09-27 RX ADMIN — TRIAMCINOLONE ACETONIDE 40 MG: 40 INJECTION, SUSPENSION INTRA-ARTICULAR; INTRAMUSCULAR at 08:09

## 2018-09-27 NOTE — PATIENT INSTRUCTIONS
Melatonin for sleep      Insomnia  Insomnia is repeated difficulty going to sleep or staying asleep, or both. Whether you have insomnia is not defined by a specific amount of sleep. Different people need different amounts of sleep, and you may need more or less sleep at different times of your life.  There are 3 major types of insomnia:  short-term, chronic, and other.  Short-term, or acute insomnia lasts less than 3 months.  The symptoms are temporary and can be linked directly to a stressor, such as the death of a loved one, financial problems, or a new physical problem.  Short-term insomnia stops when the stressor resolves or the person adapts to its presence.  Chronic insomnia occurs at least 3 times a week and lasts longer than 3 months.  Chronic insomnia can occur when either the cause of the sleeping problem is not clear, or the insomnia does not get better when the stressor is resolved. A number of other criteria are also used to make the diagnosis of chronic insomnia.   Other insomnia is the third type of insomnia-related sleep disorders.  This description applies to people who have problems getting to sleep or staying asleep, but do not meet all of the factors that describe either short-term or chronic insomnia.    Many things cause insomnia. Different people may have different causes. It can be from an underlying medical or psychological condition, or lifestyle. It can also be primary insomnia, which means no cause can be found.  Causes of insomnia include:  · Chronic medical problems- heart disease, gastrointestinal problems, hormonal changes, breathing problems  · Anxiety  · Stress  · Depression  · Pain  · Work schedule  · Sleep apnea  · Illegal drugs  · Certain medicines  Many different medidcines can affect your sleep, such as stimulants, caffeine, alcohol, some decongestants, and diet pills. Other medicines may include some types of blood pressure pills, steroids, asthma medicines,  antihistamines, antidepressants, seizure medicines and statins. Not all of these will affect your sleep, and they shouldnt be stopped without talking to your doctor.  Symptoms of insomnia can include:  · Lying awake for long periods at night before falling asleep  · Waking up several times during the night  · Waking up early in the morning and not being able to get back to sleep  · Feeling tired and not refreshed by sleep  · Not being able to function properly during the day and finding it hard to concentrate  · Irritability  · Tiredness and fatigue during the day  Home care  1. Review your medicines with your doctor or pharmacist to find out if they can cause insomnia. Not all medicines will affect your sleep, but they shouldn't be stopped without reviewing them with your doctor. There may be serious side effects and consequences from suddenly stopping your medicines. Not taking them may cause strokes, heart attacks, and many other problems.  2. Caffeine, smoking and alcohol also affect sleep. Limit your daily use and do not use these before bedtime. Alcohol may make you sleepy at first, but as its effects wear off, you may awaken a few hours later and have trouble returning to sleep.  3. Do not exercise, eat or drink large amounts of liquid within 2 hours of your bedtime.  4. Improve your sleep habits. Have a fixed bed and wake-up time. Try to keep noise, light and heat in your bedroom at a comfortable level. Try using earplugs or eyeshades if needed.   5. Avoid watching TV in bed.  6. If you do not fall asleep within 30 minutes, try to relax by reading or listening to soft music.  7. Limit daytime napping to one 30 minute period, early in the day.  8. Get regular exercise. Find other ways to lessen your stress level.  9. If a medicine was prescribed to help reset your sleep patterns, take it as directed. Sleeping pills are intended for short-term use, only. If taken for too long, the effect wears off while the  risk of physical addiction and psychological dependence increases.  Sleep diary  If the cause isnt obvious and it is not improving, try keeping a sleep diary for a couple of weeks. Include in it:  · The time you go to bed  · How long it takes to fall asleep  · How many times you wake up  · What time you wake up  · Your meal times and what you eat  · What time you drink alcohol  · Your exercise habits and times  Follow-up care  Follow up with your healthcare provider, or as advised. If X-rays or CT scans were done, you will be notified if there is a change in the reading, especially if it affects treatment.  Call 911  Call 911 if any of these occur:  · Trouble breathing  · Confusion or trouble waking  · Fainting or loss of consciousness  · Rapid heart rate  · New chest, arm, shoulder, neck or upper back pain  · Trouble with speech or vision, weakness of an arm or leg  · Trouble walking or talking, loss of balance, numbness or weakness in one side of your body, facial droop  When to seek medical advice  Call your healthcare provider right away if any of these occur:  · Extreme restlessness or irritability  · Confusion or hallucinations (seeing or hearing things that are not there)  · Anxiety, depression  · Several days without sleeping  Date Last Reviewed: 11/19/2015  © 7267-4925 [employer]. [address] [place] [address], [place] [address]. All rights reserved. This information is not intended as a substitute for professional medical care. Always follow your healthcare professional's instructions.

## 2018-09-27 NOTE — PROGRESS NOTES
Injections  given, tolerated well, verbalized understanding to wait 15 min after, notify staff with any concerns.

## 2018-09-27 NOTE — PROGRESS NOTES
Routine Office Visit    Patient Name: Valarie Foster    : 1950  MRN: [id number]    Subjective:  Valarie is a 68 y.o. female who presents today for:     1.   Chief Complaint   Patient presents with    Sinus Problem    Cough     three weeks    Edema     mouth swollen       Started 3 weeks ago. Symptoms have been gradually worsening since that time. The cough is productive of clear sputum and is aggravated by nothing. Associated symptoms include: postnasal drip and upper lip swelling. Pt does not have difficulty breathing / swallowing or speaking. No changes in recent diet / medications. Patient does not have a history of asthma. Patient does not have a history of smoking. Patient does have a history of environmental allergens. Patient has not traveled recently.   Patient has not had the flu vaccine. Patient has tried otc medicaitons with some relief of symptoms. Sick contacts include: none.     2. Upper lip swelling - unknown cause. Some pain associated with swelling. Pt thinks it may be related to sinuses. She states she had similar episode in the past, received steroid shot with good success.     3. Kidney mass requiring nephrectomy - pt is scheduled for nephrectomy in a few weeks with Dr. Palm. Pt expresses some concern regarding the surgery but is prepared for it.     4. Insomnia - pt is unable to sleep at night. Pt c/o difficulty falling asleep. She does watch TV prior to going to bed. She has not tried any otc medications to help with sleep.     Review of Systems   Constitutional: Negative for chills and fever.   HENT: Negative for congestion.    Eyes: Negative for blurred vision.   Respiratory: Negative for cough.    Cardiovascular: Negative for chest pain.   Gastrointestinal: Negative for abdominal pain, constipation, diarrhea, heartburn, nausea and vomiting.   Genitourinary: Negative for dysuria.   Musculoskeletal: Negative for myalgias.   Skin: Negative for itching and rash.   Neurological: Negative  for dizziness and headaches.   Psychiatric/Behavioral: Negative for depression.        Active Problem List  Patient Active Problem List   Diagnosis    Type 2 diabetes mellitus with diabetic neuropathy, without long-term current use of insulin    Benign hypertension    Hyperlipemia    Right renal mass    Microhematuria    Left flank pain       Past Medical History  Past Medical History:   Diagnosis Date    Diabetes mellitus     diet controlled    Diabetes with neurologic complications     Hypertension        Past Surgical History  Past Surgical History:   Procedure Laterality Date    BIOPSY-KIDNEY Right 2/19/2018    Performed by [facility] at [facility] OR    COLONOSCOPY N/A 2/5/2018    Procedure: COLONOSCOPY;  Surgeon: Bacilio Mancia MD;  Location: [facility] ENDO;  Service: Endoscopy;  Laterality: N/A;  appt confirmed-ss    COLONOSCOPY N/A 2/5/2018    Performed by Bacilio Mancia MD at [facility] ENDO    HYSTERECTOMY      OOPHORECTOMY         Family History  Family History   Problem Relation Age of Onset    Glaucoma Sister     Cancer Sister         breast cancer    No Known Problems Mother     Glaucoma Father     Diabetes Brother     No Known Problems Maternal Aunt     No Known Problems Maternal Uncle     No Known Problems Paternal Aunt     No Known Problems Paternal Uncle     No Known Problems Maternal Grandmother     No Known Problems Maternal Grandfather     No Known Problems Paternal Grandmother     No Known Problems Paternal Grandfather     Thyroid disease Sister     Blindness Sister         due to trauma during car accident    Diabetes Sister     Amblyopia Neg Hx     Cataracts Neg Hx     Hypertension Neg Hx     Macular degeneration Neg Hx     Retinal detachment Neg Hx     Strabismus Neg Hx     Stroke Neg Hx     Breast cancer Neg Hx        Social History  Social History     Socioeconomic History    Marital status: Legally      Spouse name: Not on file    Number  "of children: Not on file    Years of education: Not on file    Highest education level: Not on file   Social Needs    Financial resource strain: Not on file    Food insecurity - worry: Not on file    Food insecurity - inability: Not on file    Transportation needs - medical: Not on file    Transportation needs - non-medical: Not on file   Occupational History    Not on file   Tobacco Use    Smoking status: Never Smoker    Smokeless tobacco: Never Used   Substance and Sexual Activity    Alcohol use: No    Drug use: No    Sexual activity: Not on file   Other Topics Concern    Not on file   Social History Narrative    Not on file       Medications and Allergies  Reviewed and updated.       Physical Exam  /80 (BP Location: Left arm, Patient Position: Sitting, BP Method: Medium (Manual))   Pulse 77   Temp 97.9 °F (36.6 °C) (Oral)   Ht 5' 3" (1.6 m)   Wt 80.5 kg (177 lb 7.5 oz)   SpO2 99%   BMI 31.44 kg/m²   Physical Exam   Constitutional: She is oriented to person, place, and time. She appears well-developed and well-nourished.   HENT:   Head: Normocephalic and atraumatic.   Nose: Mucosal edema and sinus tenderness present.   Mouth/Throat: Oropharynx is clear and moist and mucous membranes are normal. No posterior oropharyngeal edema or posterior oropharyngeal erythema.   Upper lip swelling;    Eyes: Conjunctivae and EOM are normal. Pupils are equal, round, and reactive to light.   Neck: Normal range of motion. Neck supple.   Cardiovascular: Normal rate, regular rhythm and normal heart sounds. Exam reveals no gallop and no friction rub.   No murmur heard.  Pulmonary/Chest: Breath sounds normal. No respiratory distress.   Abdominal: Soft. Bowel sounds are normal. She exhibits no distension. There is no tenderness.   Musculoskeletal: Normal range of motion.   Lymphadenopathy:     She has no cervical adenopathy.   Neurological: She is alert and oriented to person, place, and time.   Skin: Skin is " warm.   Psychiatric: She has a normal mood and affect.       Assessment/Plan:  Valarie Foster is a 68 y.o. female who presents today for :    Need for prophylactic vaccination and inoculation against influenza  -     Influenza - High Dose (65+) (PF) (IM)    Bacterial sinusitis / upper lip swelling  -     sulfamethoxazole-trimethoprim 800-160mg (BACTRIM DS) 800-160 mg Tab; Take 1 tablet by mouth 2 (two) times daily. for 10 days  Dispense: 20 tablet; Refill: 0  -     benzonatate (TESSALON) 200 MG capsule; Take 1 capsule (200 mg total) by mouth 3 (three) times daily as needed for Cough.  Dispense: 60 capsule; Refill: 0  -     fluticasone (FLONASE) 50 mcg/actuation nasal spray; 1 spray (50 mcg total) by Each Nare route once daily.  Dispense: 16 g; Refill: 0  -     triamcinolone acetonide injection 40 mg; Inject 1 mL (40 mg total) into the muscle one time.  Recommend to return or go to ER if symptoms worsen / progress  Common side effects of this medication were discussed with the patient. Questions regarding medications were discussed during this visit.     Benign hypertension  The current medical regimen is effective;  continue present plan and medications.    Hyperlipidemia, unspecified hyperlipidemia type  The current medical regimen is effective;  continue present plan and medications.    Type 2 diabetes mellitus with diabetic neuropathy, without long-term current use of insulin  The current medical regimen is effective;  continue present plan and medications.    Right renal mass  Scheduled for nephrectomy    Insomnia, unspecified type  Recommend melatonin for sleep   Recommend returning if symptoms worsen / progress  Discussed sleep hygiene   Recommend avoid blue light and avoid tv prior to going to bed.       Follow-up if symptoms worsen or fail to improve.

## 2018-10-04 ENCOUNTER — TELEPHONE (OUTPATIENT)
Dept: UROLOGY | Facility: CLINIC | Age: 68
End: 2018-10-04

## 2018-10-04 NOTE — TELEPHONE ENCOUNTER
----- Message from Valentine Dawkins sent at 10/4/2018 11:13 AM CDT -----  Contact: Self   Patient would like to speak with the nurse regarding her surgery. Please call at [phone] ( Patient say she is at the hosp and there is a mix up)

## 2018-10-09 ENCOUNTER — HOSPITAL ENCOUNTER (OUTPATIENT)
Dept: PREADMISSION TESTING | Facility: HOSPITAL | Age: 68
Discharge: HOME | DRG: 657 | End: 2018-10-09
Attending: UROLOGY
Payer: MEDICARE

## 2018-10-09 ENCOUNTER — HOSPITAL ENCOUNTER (OUTPATIENT)
Dept: RADIOLOGY | Facility: HOSPITAL | Age: 68
Discharge: HOME | DRG: 657 | End: 2018-10-09
Attending: UROLOGY
Payer: MEDICARE

## 2018-10-09 VITALS
BODY MASS INDEX: 29.88 KG/M2 | SYSTOLIC BLOOD PRESSURE: 153 MMHG | WEIGHT: 175 LBS | HEIGHT: 64 IN | DIASTOLIC BLOOD PRESSURE: 93 MMHG | RESPIRATION RATE: 18 BRPM | OXYGEN SATURATION: 100 % | HEART RATE: 75 BPM | TEMPERATURE: 97 F

## 2018-10-09 DIAGNOSIS — N28.89 RENAL MASS: ICD-10-CM

## 2018-10-09 DIAGNOSIS — Z01.818 PRE-OP TESTING: Primary | ICD-10-CM

## 2018-10-09 DIAGNOSIS — N28.89 RIGHT RENAL MASS: ICD-10-CM

## 2018-10-09 LAB
ANION GAP SERPL CALC-SCNC: 6 MMOL/L
BASOPHILS # BLD AUTO: 0 K/UL
BASOPHILS NFR BLD: 0 %
BUN SERPL-MCNC: 11 MG/DL
CALCIUM SERPL-MCNC: 9.8 MG/DL
CHLORIDE SERPL-SCNC: 103 MMOL/L
CO2 SERPL-SCNC: 28 MMOL/L
CREAT SERPL-MCNC: 0.8 MG/DL
DIFFERENTIAL METHOD: ABNORMAL
EOSINOPHIL # BLD AUTO: 0 K/UL
EOSINOPHIL NFR BLD: 0 %
ERYTHROCYTE [DISTWIDTH] IN BLOOD BY AUTOMATED COUNT: 14.7 %
EST. GFR  (AFRICAN AMERICAN): >60 ML/MIN/1.73 M^2
EST. GFR  (NON AFRICAN AMERICAN): >60 ML/MIN/1.73 M^2
GLUCOSE SERPL-MCNC: 98 MG/DL
HCT VFR BLD AUTO: 41 %
HGB BLD-MCNC: 13.5 G/DL
INR PPP: 1.1
LYMPHOCYTES # BLD AUTO: 1.1 K/UL
LYMPHOCYTES NFR BLD: 24.2 %
MCH RBC QN AUTO: 27.8 PG
MCHC RBC AUTO-ENTMCNC: 32.9 G/DL
MCV RBC AUTO: 84 FL
MONOCYTES # BLD AUTO: 0.3 K/UL
MONOCYTES NFR BLD: 7.3 %
NEUTROPHILS # BLD AUTO: 3.1 K/UL
NEUTROPHILS NFR BLD: 68.3 %
PLATELET # BLD AUTO: 242 K/UL
PMV BLD AUTO: 10.8 FL
POTASSIUM SERPL-SCNC: 4.1 MMOL/L
PROTHROMBIN TIME: 11.1 SEC
RBC # BLD AUTO: 4.86 M/UL
SODIUM SERPL-SCNC: 137 MMOL/L
WBC # BLD AUTO: 4.5 K/UL

## 2018-10-09 PROCEDURE — 93010 ELECTROCARDIOGRAM REPORT: CPT | Mod: ,,, | Performed by: INTERNAL MEDICINE

## 2018-10-09 PROCEDURE — 93005 ELECTROCARDIOGRAM TRACING: CPT

## 2018-10-09 PROCEDURE — 71046 X-RAY EXAM CHEST 2 VIEWS: CPT | Mod: TC,FY

## 2018-10-09 PROCEDURE — 85610 PROTHROMBIN TIME: CPT

## 2018-10-09 PROCEDURE — 71046 X-RAY EXAM CHEST 2 VIEWS: CPT | Mod: 26,,, | Performed by: RADIOLOGY

## 2018-10-09 PROCEDURE — 80048 BASIC METABOLIC PNL TOTAL CA: CPT

## 2018-10-09 PROCEDURE — 85025 COMPLETE CBC W/AUTO DIFF WBC: CPT

## 2018-10-09 PROCEDURE — 36415 COLL VENOUS BLD VENIPUNCTURE: CPT

## 2018-10-09 NOTE — DISCHARGE INSTRUCTIONS
Your surgery is scheduled for_____10/12/2018____________.    Call [phone] between 2 pm and 5 pm ___10/11/2018_________ to find out your arrival time for the day of surgery.    Report to SAME DAY SURGERY UNIT at _______am on the 2nd floor of the hospital.  Use the front entrance of the hospital.  The front doors of the hospital open promptly at 5:30 am.    If you need wheelchair assistance, call [phone] from your cell phone,  or call 0 from the courtesy phone in the hospital lobby.    Important instructions:   Do not eat or drink after 12 midnight, including water.  It is okay to brush your teeth.  Do not have gum, candy or mints.     Take only these medications with a small swallow of water on the morning of your surgery.__gabapentin___________    Do not take any diabetic medication on the morning of surgery unless instructed to do so by your doctor or pre op nurse.    Stop taking Aspirin, Ibuprofen, Motrin and Aleve , Fish oil, and Vitamin E for at least 7 days before your surgery. You may use Tylenol unless otherwise instructed by your doctor.          Return to the hospital lab on __10/10/2018 or 10/11/2018________for additional blood test.       Please shower the night before and the morning of your surgery.        Use Hibiclens soap to your surgery site if instructed by your pre op nurse.   If your surgery is on your abdomen, be sure to wash your naval.  Be sure to rinse off Hibiclens after it is on your skin for several minutes.  Do not use Hibiclens on your face or genitals. Please place clean linens on your bed the night before surgery. Please wear fresh clean clothing after each shower.     No shaving of procedural area at least 4-5 days before surgery due to increased risk of skin irritation and/or possible infection.      You may be asked to take a third shower on arrival to Same Day Surgery depending on the type of surgery you are having.     Do not wear make- up, including mascara.     You  may wear deodorant only.      Do not wear powder, body lotion or cologne.     Do not wear any jewelry or have any metal on your body.     Please bring any documents given to you by your doctor.     If you are going home on the same day of surgery, you must arrange for a family member or a friend to drive you home.  Public transportation is prohibited.    You will not be able to drive home if you were given anesthesia or sedation.     Wear loose fitting clothes allowing for bandages.     Please leave money and valuables home.       You may bring your cell phone.     Call the doctor if fever or illness should occur before your surgery.    Call [phone] to contact us here at [facility] if needed.

## 2018-10-12 ENCOUNTER — ANESTHESIA (OUTPATIENT)
Dept: SURGERY | Facility: HOSPITAL | Age: 68
DRG: 657 | End: 2018-10-12
Payer: MEDICARE

## 2018-10-12 ENCOUNTER — ANESTHESIA EVENT (OUTPATIENT)
Dept: SURGERY | Facility: HOSPITAL | Age: 68
DRG: 657 | End: 2018-10-12
Payer: MEDICARE

## 2018-10-12 ENCOUNTER — HOSPITAL ENCOUNTER (INPATIENT)
Facility: HOSPITAL | Age: 68
LOS: 6 days | Discharge: HOME HEALTH | DRG: 657 | End: 2018-10-18
Attending: UROLOGY | Admitting: UROLOGY
Payer: MEDICARE

## 2018-10-12 DIAGNOSIS — E11.40 TYPE 2 DIABETES MELLITUS WITH DIABETIC NEUROPATHY, WITHOUT LONG-TERM CURRENT USE OF INSULIN: Chronic | ICD-10-CM

## 2018-10-12 DIAGNOSIS — R10.9 LEFT FLANK PAIN: ICD-10-CM

## 2018-10-12 DIAGNOSIS — N28.89 RIGHT RENAL MASS: Primary | ICD-10-CM

## 2018-10-12 DIAGNOSIS — I10 BENIGN HYPERTENSION: Chronic | ICD-10-CM

## 2018-10-12 LAB
ANION GAP SERPL CALC-SCNC: 6 MMOL/L
BACTERIA #/AREA URNS HPF: ABNORMAL /HPF
BASOPHILS # BLD AUTO: 0 K/UL
BASOPHILS NFR BLD: 0 %
BILIRUB UR QL STRIP: NEGATIVE
BUN SERPL-MCNC: 14 MG/DL
CALCIUM SERPL-MCNC: 8.6 MG/DL
CHLORIDE SERPL-SCNC: 103 MMOL/L
CLARITY UR: CLEAR
CO2 SERPL-SCNC: 26 MMOL/L
COLOR UR: YELLOW
CREAT SERPL-MCNC: 0.8 MG/DL
DIFFERENTIAL METHOD: ABNORMAL
EOSINOPHIL # BLD AUTO: 0 K/UL
EOSINOPHIL NFR BLD: 0 %
ERYTHROCYTE [DISTWIDTH] IN BLOOD BY AUTOMATED COUNT: 14.5 %
EST. GFR  (AFRICAN AMERICAN): >60 ML/MIN/1.73 M^2
EST. GFR  (NON AFRICAN AMERICAN): >60 ML/MIN/1.73 M^2
GLUCOSE SERPL-MCNC: 194 MG/DL
GLUCOSE UR QL STRIP: ABNORMAL
HCT VFR BLD AUTO: 32.8 %
HGB BLD-MCNC: 11.2 G/DL
HGB UR QL STRIP: ABNORMAL
HYALINE CASTS #/AREA URNS LPF: 0 /LPF
KETONES UR QL STRIP: ABNORMAL
LEUKOCYTE ESTERASE UR QL STRIP: NEGATIVE
LYMPHOCYTES # BLD AUTO: 0.7 K/UL
LYMPHOCYTES NFR BLD: 7.8 %
MCH RBC QN AUTO: 28.6 PG
MCHC RBC AUTO-ENTMCNC: 34.1 G/DL
MCV RBC AUTO: 84 FL
MICROSCOPIC COMMENT: ABNORMAL
MONOCYTES # BLD AUTO: 0.4 K/UL
MONOCYTES NFR BLD: 4.7 %
NEUTROPHILS # BLD AUTO: 7.9 K/UL
NEUTROPHILS NFR BLD: 87.5 %
NITRITE UR QL STRIP: NEGATIVE
PH UR STRIP: 7 [PH] (ref 5–8)
PLATELET # BLD AUTO: 203 K/UL
PMV BLD AUTO: 10.7 FL
POCT GLUCOSE: 107 MG/DL (ref 70–110)
POCT GLUCOSE: 181 MG/DL (ref 70–110)
POTASSIUM SERPL-SCNC: 3.7 MMOL/L
PROT UR QL STRIP: ABNORMAL
RBC # BLD AUTO: 3.91 M/UL
RBC #/AREA URNS HPF: >100 /HPF (ref 0–4)
SODIUM SERPL-SCNC: 135 MMOL/L
SP GR UR STRIP: 1.02 (ref 1–1.03)
SQUAMOUS #/AREA URNS HPF: ABNORMAL /HPF
URN SPEC COLLECT METH UR: ABNORMAL
UROBILINOGEN UR STRIP-ACNC: NEGATIVE EU/DL
WBC # BLD AUTO: 9.07 K/UL
WBC #/AREA URNS HPF: 0 /HPF (ref 0–5)

## 2018-10-12 PROCEDURE — 82962 GLUCOSE BLOOD TEST: CPT | Performed by: UROLOGY

## 2018-10-12 PROCEDURE — 20000000 HC ICU ROOM

## 2018-10-12 PROCEDURE — 63600175 PHARM REV CODE 636 W HCPCS: Performed by: ANESTHESIOLOGY

## 2018-10-12 PROCEDURE — 94799 UNLISTED PULMONARY SVC/PX: CPT

## 2018-10-12 PROCEDURE — 88342 IMHCHEM/IMCYTCHM 1ST ANTB: CPT | Mod: 26,,, | Performed by: PATHOLOGY

## 2018-10-12 PROCEDURE — 88305 TISSUE EXAM BY PATHOLOGIST: CPT | Mod: 26,,, | Performed by: PATHOLOGY

## 2018-10-12 PROCEDURE — 37000009 HC ANESTHESIA EA ADD 15 MINS: Performed by: UROLOGY

## 2018-10-12 PROCEDURE — 25000003 PHARM REV CODE 250: Performed by: REGISTERED NURSE

## 2018-10-12 PROCEDURE — 27201423 OPTIME MED/SURG SUP & DEVICES STERILE SUPPLY: Performed by: UROLOGY

## 2018-10-12 PROCEDURE — 80048 BASIC METABOLIC PNL TOTAL CA: CPT

## 2018-10-12 PROCEDURE — 25000003 PHARM REV CODE 250: Performed by: UROLOGY

## 2018-10-12 PROCEDURE — 50240 NEPHRECTOMY PARTIAL: CPT | Mod: RT,,, | Performed by: UROLOGY

## 2018-10-12 PROCEDURE — 0BQP0ZZ REPAIR LEFT PLEURA, OPEN APPROACH: ICD-10-PCS | Performed by: UROLOGY

## 2018-10-12 PROCEDURE — C9290 INJ, BUPIVACAINE LIPOSOME: HCPCS | Performed by: UROLOGY

## 2018-10-12 PROCEDURE — 71000039 HC RECOVERY, EACH ADD'L HOUR: Performed by: UROLOGY

## 2018-10-12 PROCEDURE — 36000708 HC OR TIME LEV III 1ST 15 MIN: Performed by: UROLOGY

## 2018-10-12 PROCEDURE — 63600175 PHARM REV CODE 636 W HCPCS

## 2018-10-12 PROCEDURE — 63600175 PHARM REV CODE 636 W HCPCS: Performed by: UROLOGY

## 2018-10-12 PROCEDURE — 88307 TISSUE EXAM BY PATHOLOGIST: CPT | Mod: 26,,, | Performed by: PATHOLOGY

## 2018-10-12 PROCEDURE — 88342 IMHCHEM/IMCYTCHM 1ST ANTB: CPT | Performed by: PATHOLOGY

## 2018-10-12 PROCEDURE — 88341 IMHCHEM/IMCYTCHM EA ADD ANTB: CPT | Mod: 26,,, | Performed by: PATHOLOGY

## 2018-10-12 PROCEDURE — 0TB00ZZ EXCISION OF RIGHT KIDNEY, OPEN APPROACH: ICD-10-PCS | Performed by: UROLOGY

## 2018-10-12 PROCEDURE — D9220A PRA ANESTHESIA: Mod: ANES,,, | Performed by: ANESTHESIOLOGY

## 2018-10-12 PROCEDURE — D9220A PRA ANESTHESIA: Mod: CRNA,,, | Performed by: REGISTERED NURSE

## 2018-10-12 PROCEDURE — 81000 URINALYSIS NONAUTO W/SCOPE: CPT

## 2018-10-12 PROCEDURE — 71000033 HC RECOVERY, INTIAL HOUR: Performed by: UROLOGY

## 2018-10-12 PROCEDURE — 36000709 HC OR TIME LEV III EA ADD 15 MIN: Performed by: UROLOGY

## 2018-10-12 PROCEDURE — 36620 INSERTION CATHETER ARTERY: CPT | Mod: 59,,, | Performed by: ANESTHESIOLOGY

## 2018-10-12 PROCEDURE — 88311 DECALCIFY TISSUE: CPT | Mod: 26,,, | Performed by: PATHOLOGY

## 2018-10-12 PROCEDURE — 88311 DECALCIFY TISSUE: CPT | Performed by: PATHOLOGY

## 2018-10-12 PROCEDURE — A4216 STERILE WATER/SALINE, 10 ML: HCPCS | Performed by: UROLOGY

## 2018-10-12 PROCEDURE — C1751 CATH, INF, PER/CENT/MIDLINE: HCPCS | Performed by: REGISTERED NURSE

## 2018-10-12 PROCEDURE — 88341 IMHCHEM/IMCYTCHM EA ADD ANTB: CPT | Performed by: PATHOLOGY

## 2018-10-12 PROCEDURE — 85025 COMPLETE CBC W/AUTO DIFF WBC: CPT

## 2018-10-12 PROCEDURE — 27200677 HC TRANSDUCER MONITOR KIT SINGLE: Performed by: REGISTERED NURSE

## 2018-10-12 PROCEDURE — 37000008 HC ANESTHESIA 1ST 15 MINUTES: Performed by: UROLOGY

## 2018-10-12 PROCEDURE — 63600175 PHARM REV CODE 636 W HCPCS: Performed by: REGISTERED NURSE

## 2018-10-12 RX ORDER — PRAVASTATIN SODIUM 10 MG/1
10 TABLET ORAL DAILY
Status: DISCONTINUED | OUTPATIENT
Start: 2018-10-12 | End: 2018-10-18 | Stop reason: HOSPADM

## 2018-10-12 RX ORDER — ACETAMINOPHEN 10 MG/ML
1000 INJECTION, SOLUTION INTRAVENOUS ONCE
Status: COMPLETED | OUTPATIENT
Start: 2018-10-12 | End: 2018-10-12

## 2018-10-12 RX ORDER — LIDOCAINE HCL/PF 100 MG/5ML
SYRINGE (ML) INTRAVENOUS
Status: DISCONTINUED | OUTPATIENT
Start: 2018-10-12 | End: 2018-10-12

## 2018-10-12 RX ORDER — PHENYLEPHRINE HYDROCHLORIDE 10 MG/ML
INJECTION INTRAVENOUS
Status: DISCONTINUED | OUTPATIENT
Start: 2018-10-12 | End: 2018-10-12

## 2018-10-12 RX ORDER — METOCLOPRAMIDE HYDROCHLORIDE 5 MG/ML
INJECTION INTRAMUSCULAR; INTRAVENOUS
Status: DISCONTINUED | OUTPATIENT
Start: 2018-10-12 | End: 2018-10-12

## 2018-10-12 RX ORDER — SUCCINYLCHOLINE CHLORIDE 20 MG/ML
INJECTION INTRAMUSCULAR; INTRAVENOUS
Status: DISCONTINUED | OUTPATIENT
Start: 2018-10-12 | End: 2018-10-12

## 2018-10-12 RX ORDER — SODIUM CHLORIDE, SODIUM LACTATE, POTASSIUM CHLORIDE, CALCIUM CHLORIDE 600; 310; 30; 20 MG/100ML; MG/100ML; MG/100ML; MG/100ML
INJECTION, SOLUTION INTRAVENOUS CONTINUOUS PRN
Status: DISCONTINUED | OUTPATIENT
Start: 2018-10-12 | End: 2018-10-12

## 2018-10-12 RX ORDER — OXYCODONE AND ACETAMINOPHEN 5; 325 MG/1; MG/1
1 TABLET ORAL EVERY 4 HOURS PRN
Status: DISCONTINUED | OUTPATIENT
Start: 2018-10-12 | End: 2018-10-18 | Stop reason: HOSPADM

## 2018-10-12 RX ORDER — PROMETHAZINE HYDROCHLORIDE 25 MG/ML
6.25 INJECTION, SOLUTION INTRAMUSCULAR; INTRAVENOUS ONCE
Status: COMPLETED | OUTPATIENT
Start: 2018-10-12 | End: 2018-10-12

## 2018-10-12 RX ORDER — MANNITOL 250 MG/ML
INJECTION, SOLUTION INTRAVENOUS
Status: DISCONTINUED | OUTPATIENT
Start: 2018-10-12 | End: 2018-10-12

## 2018-10-12 RX ORDER — HYDROMORPHONE HYDROCHLORIDE 2 MG/ML
0.2 INJECTION, SOLUTION INTRAMUSCULAR; INTRAVENOUS; SUBCUTANEOUS EVERY 5 MIN PRN
Status: DISCONTINUED | OUTPATIENT
Start: 2018-10-12 | End: 2018-10-12 | Stop reason: HOSPADM

## 2018-10-12 RX ORDER — SODIUM CHLORIDE 0.9 % (FLUSH) 0.9 %
3 SYRINGE (ML) INJECTION
Status: DISCONTINUED | OUTPATIENT
Start: 2018-10-12 | End: 2018-10-18 | Stop reason: HOSPADM

## 2018-10-12 RX ORDER — OXYCODONE HYDROCHLORIDE 5 MG/1
10 TABLET ORAL EVERY 4 HOURS PRN
Status: DISCONTINUED | OUTPATIENT
Start: 2018-10-12 | End: 2018-10-18 | Stop reason: HOSPADM

## 2018-10-12 RX ORDER — LISINOPRIL 5 MG/1
10 TABLET ORAL DAILY
Status: DISCONTINUED | OUTPATIENT
Start: 2018-10-12 | End: 2018-10-13

## 2018-10-12 RX ORDER — EPHEDRINE SULFATE 50 MG/ML
INJECTION, SOLUTION INTRAVENOUS
Status: DISCONTINUED | OUTPATIENT
Start: 2018-10-12 | End: 2018-10-12

## 2018-10-12 RX ORDER — SODIUM CHLORIDE 0.9 % (FLUSH) 0.9 %
3 SYRINGE (ML) INJECTION
Status: DISCONTINUED | OUTPATIENT
Start: 2018-10-12 | End: 2018-10-12

## 2018-10-12 RX ORDER — ONDANSETRON 2 MG/ML
INJECTION INTRAMUSCULAR; INTRAVENOUS
Status: DISCONTINUED | OUTPATIENT
Start: 2018-10-12 | End: 2018-10-12

## 2018-10-12 RX ORDER — CEFAZOLIN SODIUM 2 G/50ML
2 SOLUTION INTRAVENOUS
Status: DISCONTINUED | OUTPATIENT
Start: 2018-10-12 | End: 2018-10-12 | Stop reason: HOSPADM

## 2018-10-12 RX ORDER — MIDAZOLAM HYDROCHLORIDE 1 MG/ML
INJECTION, SOLUTION INTRAMUSCULAR; INTRAVENOUS
Status: DISCONTINUED | OUTPATIENT
Start: 2018-10-12 | End: 2018-10-12

## 2018-10-12 RX ORDER — LABETALOL HYDROCHLORIDE 5 MG/ML
10 INJECTION, SOLUTION INTRAVENOUS
Status: DISCONTINUED | OUTPATIENT
Start: 2018-10-12 | End: 2018-10-18 | Stop reason: HOSPADM

## 2018-10-12 RX ORDER — PROMETHAZINE HYDROCHLORIDE 25 MG/ML
INJECTION, SOLUTION INTRAMUSCULAR; INTRAVENOUS
Status: COMPLETED
Start: 2018-10-12 | End: 2018-10-12

## 2018-10-12 RX ORDER — GABAPENTIN 300 MG/1
600 CAPSULE ORAL NIGHTLY
Status: DISCONTINUED | OUTPATIENT
Start: 2018-10-12 | End: 2018-10-18 | Stop reason: HOSPADM

## 2018-10-12 RX ORDER — ACETAMINOPHEN 10 MG/ML
1000 INJECTION, SOLUTION INTRAVENOUS EVERY 8 HOURS
Status: COMPLETED | OUTPATIENT
Start: 2018-10-12 | End: 2018-10-13

## 2018-10-12 RX ORDER — LABETALOL HYDROCHLORIDE 5 MG/ML
INJECTION, SOLUTION INTRAVENOUS
Status: DISCONTINUED | OUTPATIENT
Start: 2018-10-12 | End: 2018-10-12

## 2018-10-12 RX ORDER — ONDANSETRON 2 MG/ML
4 INJECTION INTRAMUSCULAR; INTRAVENOUS ONCE
Status: COMPLETED | OUTPATIENT
Start: 2018-10-12 | End: 2018-10-12

## 2018-10-12 RX ORDER — SODIUM CHLORIDE 0.9 % (FLUSH) 0.9 %
3 SYRINGE (ML) INJECTION EVERY 8 HOURS
Status: DISCONTINUED | OUTPATIENT
Start: 2018-10-12 | End: 2018-10-18 | Stop reason: HOSPADM

## 2018-10-12 RX ORDER — FENTANYL CITRATE 50 UG/ML
INJECTION, SOLUTION INTRAMUSCULAR; INTRAVENOUS
Status: DISCONTINUED | OUTPATIENT
Start: 2018-10-12 | End: 2018-10-12

## 2018-10-12 RX ORDER — GLYCOPYRROLATE 0.2 MG/ML
INJECTION INTRAMUSCULAR; INTRAVENOUS
Status: DISCONTINUED | OUTPATIENT
Start: 2018-10-12 | End: 2018-10-12

## 2018-10-12 RX ORDER — SODIUM CHLORIDE, SODIUM LACTATE, POTASSIUM CHLORIDE, CALCIUM CHLORIDE 600; 310; 30; 20 MG/100ML; MG/100ML; MG/100ML; MG/100ML
INJECTION, SOLUTION INTRAVENOUS CONTINUOUS
Status: DISCONTINUED | OUTPATIENT
Start: 2018-10-12 | End: 2018-10-14

## 2018-10-12 RX ORDER — ONDANSETRON 2 MG/ML
4 INJECTION INTRAMUSCULAR; INTRAVENOUS EVERY 6 HOURS PRN
Status: DISCONTINUED | OUTPATIENT
Start: 2018-10-12 | End: 2018-10-13

## 2018-10-12 RX ORDER — PROPOFOL 10 MG/ML
VIAL (ML) INTRAVENOUS
Status: DISCONTINUED | OUTPATIENT
Start: 2018-10-12 | End: 2018-10-12

## 2018-10-12 RX ORDER — HYDROMORPHONE HYDROCHLORIDE 2 MG/ML
1 INJECTION, SOLUTION INTRAMUSCULAR; INTRAVENOUS; SUBCUTANEOUS
Status: DISCONTINUED | OUTPATIENT
Start: 2018-10-12 | End: 2018-10-18 | Stop reason: HOSPADM

## 2018-10-12 RX ORDER — CISATRACURIUM BESYLATE 10 MG/ML
INJECTION, SOLUTION INTRAVENOUS
Status: DISCONTINUED | OUTPATIENT
Start: 2018-10-12 | End: 2018-10-12

## 2018-10-12 RX ADMIN — GABAPENTIN 600 MG: 300 CAPSULE ORAL at 09:10

## 2018-10-12 RX ADMIN — PRAVASTATIN SODIUM 10 MG: 10 TABLET ORAL at 04:10

## 2018-10-12 RX ADMIN — PROMETHAZINE HYDROCHLORIDE 6.25 MG: 25 INJECTION, SOLUTION INTRAMUSCULAR; INTRAVENOUS at 02:10

## 2018-10-12 RX ADMIN — ONDANSETRON HYDROCHLORIDE 4 MG: 2 INJECTION INTRAMUSCULAR; INTRAVENOUS at 05:10

## 2018-10-12 RX ADMIN — METOCLOPRAMIDE 10 MG: 5 INJECTION, SOLUTION INTRAMUSCULAR; INTRAVENOUS at 07:10

## 2018-10-12 RX ADMIN — LIDOCAINE HYDROCHLORIDE 100 MG: 20 INJECTION, SOLUTION INTRAVENOUS at 07:10

## 2018-10-12 RX ADMIN — Medication 3 ML: at 09:10

## 2018-10-12 RX ADMIN — SUCCINYLCHOLINE CHLORIDE 80 MG: 20 INJECTION, SOLUTION INTRAMUSCULAR; INTRAVENOUS at 07:10

## 2018-10-12 RX ADMIN — CISATRACURIUM BESYLATE 10 MG: 10 INJECTION INTRAVENOUS at 08:10

## 2018-10-12 RX ADMIN — HYDROMORPHONE HYDROCHLORIDE 0.2 MG: 2 INJECTION, SOLUTION INTRAMUSCULAR; INTRAVENOUS; SUBCUTANEOUS at 12:10

## 2018-10-12 RX ADMIN — CEFAZOLIN SODIUM 2 G: 1 POWDER, FOR SOLUTION INTRAMUSCULAR; INTRAVENOUS at 07:10

## 2018-10-12 RX ADMIN — PROPOFOL 100 MG: 10 INJECTION, EMULSION INTRAVENOUS at 08:10

## 2018-10-12 RX ADMIN — CISATRACURIUM BESYLATE 2 MG: 10 INJECTION INTRAVENOUS at 09:10

## 2018-10-12 RX ADMIN — PROPOFOL 200 MG: 10 INJECTION, EMULSION INTRAVENOUS at 07:10

## 2018-10-12 RX ADMIN — PROMETHAZINE HYDROCHLORIDE 6.25 MG: 25 INJECTION INTRAMUSCULAR; INTRAVENOUS at 02:10

## 2018-10-12 RX ADMIN — SODIUM CHLORIDE, SODIUM LACTATE, POTASSIUM CHLORIDE, AND CALCIUM CHLORIDE: .6; .31; .03; .02 INJECTION, SOLUTION INTRAVENOUS at 07:10

## 2018-10-12 RX ADMIN — LABETALOL HYDROCHLORIDE 10 MG: 5 INJECTION, SOLUTION INTRAVENOUS at 08:10

## 2018-10-12 RX ADMIN — ONDANSETRON 4 MG: 2 INJECTION, SOLUTION INTRAMUSCULAR; INTRAVENOUS at 11:10

## 2018-10-12 RX ADMIN — GLYCOPYRROLATE 0.2 MG: 0.2 INJECTION, SOLUTION INTRAMUSCULAR; INTRAVENOUS at 07:10

## 2018-10-12 RX ADMIN — LABETALOL HYDROCHLORIDE 15 MG: 5 INJECTION, SOLUTION INTRAVENOUS at 11:10

## 2018-10-12 RX ADMIN — LISINOPRIL 10 MG: 5 TABLET ORAL at 03:10

## 2018-10-12 RX ADMIN — ONDANSETRON 4 MG: 2 SOLUTION INTRAMUSCULAR; INTRAVENOUS at 01:10

## 2018-10-12 RX ADMIN — FENTANYL CITRATE 100 MCG: 50 INJECTION INTRAMUSCULAR; INTRAVENOUS at 07:10

## 2018-10-12 RX ADMIN — FENTANYL CITRATE 50 MCG: 50 INJECTION INTRAMUSCULAR; INTRAVENOUS at 08:10

## 2018-10-12 RX ADMIN — FENTANYL CITRATE 50 MCG: 50 INJECTION INTRAMUSCULAR; INTRAVENOUS at 11:10

## 2018-10-12 RX ADMIN — EPHEDRINE SULFATE 15 MG: 50 INJECTION, SOLUTION INTRAMUSCULAR; INTRAVENOUS; SUBCUTANEOUS at 09:10

## 2018-10-12 RX ADMIN — OXYCODONE HYDROCHLORIDE 10 MG: 5 TABLET ORAL at 09:10

## 2018-10-12 RX ADMIN — HYDROMORPHONE HYDROCHLORIDE 1 MG: 2 INJECTION, SOLUTION INTRAMUSCULAR; INTRAVENOUS; SUBCUTANEOUS at 04:10

## 2018-10-12 RX ADMIN — MANNITOL 12.5 G: 12.5 INJECTION, SOLUTION INTRAVENOUS at 10:10

## 2018-10-12 RX ADMIN — LABETALOL HYDROCHLORIDE 10 MG: 5 INJECTION, SOLUTION INTRAVENOUS at 11:10

## 2018-10-12 RX ADMIN — ACETAMINOPHEN 1000 MG: 10 INJECTION, SOLUTION INTRAVENOUS at 08:10

## 2018-10-12 RX ADMIN — ACETAMINOPHEN 1000 MG: 10 INJECTION, SOLUTION INTRAVENOUS at 09:10

## 2018-10-12 RX ADMIN — GLYCOPYRROLATE 0.2 MG: 0.2 INJECTION, SOLUTION INTRAMUSCULAR; INTRAVENOUS at 12:10

## 2018-10-12 RX ADMIN — MIDAZOLAM HYDROCHLORIDE 2 MG: 1 INJECTION, SOLUTION INTRAMUSCULAR; INTRAVENOUS at 07:10

## 2018-10-12 RX ADMIN — PHENYLEPHRINE HYDROCHLORIDE 100 MCG: 10 INJECTION INTRAVENOUS at 12:10

## 2018-10-12 RX ADMIN — SODIUM CHLORIDE, SODIUM LACTATE, POTASSIUM CHLORIDE, AND CALCIUM CHLORIDE: .6; .31; .03; .02 INJECTION, SOLUTION INTRAVENOUS at 03:10

## 2018-10-12 RX ADMIN — CISATRACURIUM BESYLATE 2 MG: 10 INJECTION INTRAVENOUS at 10:10

## 2018-10-12 NOTE — BRIEF OP NOTE
[facility]  Brief Operative Note    SUMMARY     Surgery Date: 10/12/2018     Surgeon(s) and Role:     * Alejandra Palm MD - Primary    Assisting Surgeon: FRAN Arenas MD    Pre-op Diagnosis:  Right renal mass [N28.89]    Post-op Diagnosis:  Post-Op Diagnosis Codes:     * Right renal mass [N28.89]    Procedure(s) (LRB):  OPEN RIGHT PARTIAL NEPHRECTOMY     Anesthesia: General    Description of Procedure: 2.5cm endophytic lesion removed from anterior lower pole of R kidney. Small pleurotomy noted and closed.     Description of the findings of the procedure: 19Fr round RAFI drain. 16Fr easton catheter.     Estimated Blood Loss: 100cc         Specimens:   Specimen (12h ago, onward)    Start     Ordered    10/12/18 1030  Specimen to Pathology - Surgery  Once     Comments:  Right renal mass Right 12th rib     Start Status   10/12/18 1030 Collected (10/12/18 1025)       10/12/18 1124

## 2018-10-12 NOTE — ANESTHESIA PROCEDURE NOTES
Arterial    Diagnosis: right nephrectomy    Patient location during procedure: done in OR  Procedure start time: 10/12/2018 8:03 AM  Timeout: 10/12/2018 8:02 AM  Procedure end time: 10/12/2018 8:04 AM  Staffing  Anesthesiologist: Jessa Dewey MD  Performed: anesthesiologist   Anesthesiologist was present at the time of the procedure.  Preanesthetic Checklist  Completed: patient identified, site marked, surgical consent, pre-op evaluation, timeout performed, IV checked, risks and benefits discussed, monitors and equipment checked and anesthesia consent givenArterial  Skin Prep: chlorhexidine gluconate  Local Infiltration: lidocaine  Orientation: left  Location: radial  Catheter Size: 20 G  Catheter placement by Ultrasound guidance. Heme positive aspiration all ports.  Vessel Caliber: small  Needle advanced into vessel with real time Ultrasound guidance.  Image recorded and saved.Insertion Attempts: 1

## 2018-10-12 NOTE — PLAN OF CARE
Problem: Patient Care Overview  Goal: Plan of Care Review  Outcome: Outcome(s) achieved Date Met: 10/12/18  Patient awake and easily awaken when sleeping. Patient received from surgery and admitted to room  267 and placed on cardiac monitor. Patient has A-Line to left radial that correlates to cuff pressure. Scds applied to lower extremities. Dressing to right lateral flank area dry and intact . Montero cath to gravity draining pink color urine at adequate amount.  Plan of care reviewed with patient and sister at bedside.

## 2018-10-12 NOTE — OP NOTE
DATE OF PROCEDURE:  10/12/2018.    SURGEON:  Alejandra Palm M.D.    ASSISTANT SURGEON:  Terrance Arenas M.D.    PREOPERATIVE DIAGNOSIS:  Right renal mass.    POSTOPERATIVE DIAGNOSIS:  Right renal mass.    PROCEDURE PERFORMED:  Open right partial nephrectomy.    INDICATIONS FOR PROCEDURE:  Mr. Foster is a 68-year-old female who presented to   me with imaging showing a 2.5 cm endophytic lesion in the right mid pole.  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  She   wanted to watch this mass for several months and it was noted to be persistent   and possibly have grown slightly and therefore she was apprised of our options   and she has elected to undergo open right partial nephrectomy.  All risks of the   procedure were thoroughly discussed with the patient preoperatively.    DESCRIPTION OF PROCEDURE:  Ms. Foster was brought to the Operating Room and   placed under general endotracheal anesthesia.  Full timeout procedures were   performed identifying patient, procedure and laterality.  Appropriate IV   antibiotics with Ancef was given prior to commencement of surgery.  Adequate   vascular access was obtained by Anesthesia prior to positioning.  A 16-Yakut   Montero catheter was placed per urethra under sterile technique.  She was then   placed in a left lateral position with right side up.  The bed was flexed,   axillary roll was placed and the patient was secured to the bed appropriately.    All pressure points were padded.  An over armboard was used and again was   appropriately padded.    The patient was then prepped and draped in usual sterile fashion.  The tip of   the 12th rib was palpated and marked.  An incision was made over the 12th rib   and dissection was carried down to the fascia.  The fascia was entered with   Bovie electrocautery and carried down to the retroperitoneal space. The   retroperitoneum was entered bluntly and the retroperitoneal space was  developed   and the remainder of the fascia was opened.  The 12th rib was cleaned with   Amado and allowing access to the tip of the 12th rib.  This was then cut to   allow for bladder access to the renal bed.  The tip of the 12th rib after   transection was cauterized for hemostasis.  At the conclusion of the procedure,   Exparel was placed as a nerve block around the area of the 12th rib.    Next, Gerota's fascia was encountered and was incised to allow access to the   right kidney.  The right kidney was mobilized free of Gerota's fascia and the   lesion was able to be visualized in the anterior aspect of the lower to mid   pole.  The kidney was fully mobilized to allow better access to the lesion.    At this point, there was noted to be a small possible pleurotomy near the 12th   rib.  Dissection irrigation was placed into the wound and bubbles were noted   indicating a small pleurotomy.  Red rubber catheter was placed into the   pleurotomy and with full inspiration pursestring suture was placed around this   for airtight seal.  This was evaluated again at the conclusion of the procedure.    Next, dissection to the renal hilum was done, the ureter was identified.  The   renal artery was able to be identified and commencement of posterior approach   first branch of the artery was tagged with a vessel loop and then followed down   to the main renal artery, which was again tagged with a vessel loop. Coming   anteriorly, the renal vein was identified.  It was noted to be very short in   nature.  This was again tagged with a vessel loop.    Next, attention was turned back to the renal tumor.  The border of the tumor was   outlined with Bovie electrocautery prior to clamping of the renal artery.  Once   dissection was planned, mannitol 12.5 mg was given by Anesthesia.  The renal   artery was then clamped with 2 bulldog vascular clamps and ice was placed on the   kidney for 10 minutes.  After complete cooling of the  kidney, the ice was   removed and the kidney was noted to be pale in color, was consistent with the   renal artery being clamped.  Bovie cautery was then used to dissect through the   renal cortex around the tumor and a scalpel blade handle was then used to help   bluntly dissected the tumor away from the kidney.  The tumor was enucleated.    There were no obvious defects on the tumor itself.  Once the tumor was   completely removed, this was sent as a specimen, labeled as right renal mass.    Next, we proceeded with hemostasis of the renal tumor bed.  Stratafix suture   with a Lapra-Ty in the end was placed to the renal cortex and a running suture   was done through the bed of the kidney tumor for hemostasis.  There was no   obvious collecting system defects identified.  Once the bleeding had subsided,   the Stratafix needle was taken out through the opposite end of the renal cortex   and a Lapra-Ty placed on the end.    Next, an argon beam was used for further hemostasis of the tumor bed.    Next, I proceeded with closure of the renal defect.  Surgicel was placed into   the defect and the defect was closed with interrupted 0 chromic sutures with   Weck clips on each end for compression.  A total of three chromic sutures were   required for closure of the renal defect.  FloSeal was placed over this.  The   renal artery was unclamped for a total clamp time of 30 minutes including 10   minutes of ice time.  Mannitol 12.5 mg was again given by Anesthesia.    The kidney was noted to pink up nicely and no bleeding was noted upon   revascularization of the kidney.    Next, the Gerota's fascia was closed with a running 2-0 Vicryl.  The surgical bed was filled with sterile water and full inspiration was given by anesthesia team. There were bubbles noted from residual pleurotomy approximately 2-3mm in size. Due to the small size, catheter was not inserted. The area was closed with 2-0 Vicryl with complete closure with full  deep inspiration with manual ventilation. After pleurotomy closure, no further pleural defects were noted.     Next, the RAFI drain was placed in through the fascia under direct visualization.  A #19 round   RAFI was placed around the area of the kidney.  This was secured to the skin with   a nylon suture.  Exparel was placed into the fascial layers for postoperative   analgesia and we proceeded with closure of the wound.  The wound was closed with   2 layers of #1 PDS, the transversalis and internal obliques were closed   together and a second layer of the external oblique fascia was closed in a   running fashion.  The fascial layers noted to come together well.      Next, the skin was reapproximated with a running 2-0 Vicryl and skin was   reapproximated with staples.  Island dressing was placed over the incision and   appropriate drain sponge was placed around the RAFI site.  All counts were correct   x2 prior to the completion of the procedure.  The patient was placed back in a   supine position and was awakened from general anesthesia and transferred to the   PACU in stable condition with plans for monitoring in the ICU overnight.    COMPLICATIONS:  Small pleurotomy with appropriate closure.    FINDINGS:  A 2.5 cm endophytic right renal mass in the anterior mid to lower   pole of the right kidney.    DRAINS:  A 16-Malawian Montero catheter, 19-Malawian round RAFI drain.    ESTIMATED BLOOD LOSS:  100 mL.    SPECIMENS:  12th rib and right renal mass.    PATIENT DISPOSITION:  The patient will be transferred to the ICU for close   postoperative monitoring with plans for tight control of her blood pressure.      EKP/IN  dd: 10/12/2018 12:01:00 (CDT)  td: 10/12/2018 13:55:44 (CDT)  Doc ID   #[id number]  Job ID #[id number]    CC:

## 2018-10-12 NOTE — PLAN OF CARE
Problem: Pressure Ulcer Risk (Abdoul Scale) (Adult,Obstetrics,Pediatric)  Goal: Identify Related Risk Factors and Signs and Symptoms  Related risk factors and signs and symptoms are identified upon initiation of Human Response Clinical Practice Guideline (CPG)  Outcome: Ongoing (interventions implemented as appropriate)   10/12/18 1608   Pressure Ulcer Risk (Abdoul Scale)   Related Risk Factors (Pressure Ulcer Risk (Abdoul Scale)) age extremes;critical care admission;length of surgery

## 2018-10-12 NOTE — TRANSFER OF CARE
"Anesthesia Transfer of Care Note    Patient: Valarie Foster    Procedure(s) Performed: Procedure(s) (LRB):  NEPHRECTOMY, PARTIAL open. Dr Arenas to assist. (Right)    Patient location: PACU    Anesthesia Type: general    Transport from OR: Transported from OR on room air with adequate spontaneous ventilation    Post pain: adequate analgesia    Post assessment: no apparent anesthetic complications and tolerated procedure well    Post vital signs: stable    Level of consciousness: sedated and responds to stimulation    Nausea/Vomiting: no nausea/vomiting    Complications: none    Transfer of care protocol was followed      Last vitals:   Visit Vitals  BP (!) 91/51 (BP Location: Right arm, Patient Position: Lying)   Pulse (!) 56   Temp 35.8 °C (96.4 °F) (Tympanic)   Resp 16   Ht 5' 4" (1.626 m)   Wt 79.4 kg (175 lb)   SpO2 97   Breastfeeding? No   BMI 30.04 kg/m²     "

## 2018-10-12 NOTE — ANESTHESIA PREPROCEDURE EVALUATION
10/12/2018  Valarie Foster is a 68 y.o., female.    Pre-op Assessment     I have reviewed the Nursing Notes.      Review of Systems  Anesthesia Hx:  No problems with previous Anesthesia    Social:  Non-Smoker    Cardiovascular:   Exercise tolerance: good Denies Pacemaker. Hypertension  Denies Valvular problems/Murmurs.  Denies MI.  Denies CAD.    Denies CABG/stent.  Denies Dysrhythmias.   Denies Angina.             hyperlipidemia    Pulmonary:  Pulmonary Normal    Renal/:   Right renal mass   Hepatic/GI:   Bowel Prep. Denies PUD. Denies Hiatal Hernia.  Denies GERD. Denies Liver Disease.  Denies Hepatitis.    Neurological:  Neurology Normal    Endocrine:   Diabetes, type 2 Denies Hypothyroidism. Denies Hyperthyroidism.        Physical Exam  General:  Obesity    Airway/Jaw/Neck:  AIRWAY FINDINGS: Normal           Mental Status:  Mental Status Findings: Normal        Anesthesia Plan  Type of Anesthesia, risks & benefits discussed:  Anesthesia Type:  general  Patient's Preference:   Intra-op Monitoring Plan: standard ASA monitors  Intra-op Monitoring Plan Comments:   Post Op Pain Control Plan:   Post Op Pain Control Plan Comments:   Induction:   IV  Beta Blocker:  Patient is not currently on a Beta-Blocker (No further documentation required).       Informed Consent: Patient understands risks and agrees with Anesthesia plan.  Questions answered. Anesthesia consent signed with patient.  ASA Score: 2     Day of Surgery Review of History & Physical:    H&P update referred to the surgeon.         Ready For Surgery From Anesthesia Perspective.

## 2018-10-13 LAB
ANION GAP SERPL CALC-SCNC: 6 MMOL/L
BASOPHILS # BLD AUTO: 0 K/UL
BASOPHILS NFR BLD: 0 %
BUN SERPL-MCNC: 10 MG/DL
CALCIUM SERPL-MCNC: 9 MG/DL
CHLORIDE SERPL-SCNC: 103 MMOL/L
CO2 SERPL-SCNC: 27 MMOL/L
CREAT SERPL-MCNC: 0.7 MG/DL
DIFFERENTIAL METHOD: ABNORMAL
EOSINOPHIL # BLD AUTO: 0 K/UL
EOSINOPHIL NFR BLD: 0 %
ERYTHROCYTE [DISTWIDTH] IN BLOOD BY AUTOMATED COUNT: 14.6 %
EST. GFR  (AFRICAN AMERICAN): >60 ML/MIN/1.73 M^2
EST. GFR  (NON AFRICAN AMERICAN): >60 ML/MIN/1.73 M^2
ESTIMATED AVG GLUCOSE: 131 MG/DL
GLUCOSE SERPL-MCNC: 126 MG/DL
HBA1C MFR BLD HPLC: 6.2 %
HCT VFR BLD AUTO: 32.9 %
HGB BLD-MCNC: 10.9 G/DL
LYMPHOCYTES # BLD AUTO: 0.9 K/UL
LYMPHOCYTES NFR BLD: 9 %
MCH RBC QN AUTO: 27.9 PG
MCHC RBC AUTO-ENTMCNC: 33.1 G/DL
MCV RBC AUTO: 84 FL
MONOCYTES # BLD AUTO: 0.5 K/UL
MONOCYTES NFR BLD: 5.4 %
NEUTROPHILS # BLD AUTO: 8.1 K/UL
NEUTROPHILS NFR BLD: 85.6 %
PLATELET # BLD AUTO: 232 K/UL
PMV BLD AUTO: 10.8 FL
POCT GLUCOSE: 112 MG/DL (ref 70–110)
POCT GLUCOSE: 114 MG/DL (ref 70–110)
POCT GLUCOSE: 129 MG/DL (ref 70–110)
POCT GLUCOSE: 142 MG/DL (ref 70–110)
POTASSIUM SERPL-SCNC: 4.1 MMOL/L
RBC # BLD AUTO: 3.9 M/UL
SODIUM SERPL-SCNC: 136 MMOL/L
WBC # BLD AUTO: 9.42 K/UL

## 2018-10-13 PROCEDURE — 25000003 PHARM REV CODE 250: Performed by: UROLOGY

## 2018-10-13 PROCEDURE — 25000003 PHARM REV CODE 250: Performed by: ANESTHESIOLOGY

## 2018-10-13 PROCEDURE — 21400001 HC TELEMETRY ROOM

## 2018-10-13 PROCEDURE — 94799 UNLISTED PULMONARY SVC/PX: CPT

## 2018-10-13 PROCEDURE — 63600175 PHARM REV CODE 636 W HCPCS: Performed by: UROLOGY

## 2018-10-13 PROCEDURE — 80048 BASIC METABOLIC PNL TOTAL CA: CPT

## 2018-10-13 PROCEDURE — A4216 STERILE WATER/SALINE, 10 ML: HCPCS | Performed by: UROLOGY

## 2018-10-13 PROCEDURE — A4216 STERILE WATER/SALINE, 10 ML: HCPCS | Performed by: ANESTHESIOLOGY

## 2018-10-13 PROCEDURE — 94761 N-INVAS EAR/PLS OXIMETRY MLT: CPT

## 2018-10-13 PROCEDURE — 85025 COMPLETE CBC W/AUTO DIFF WBC: CPT

## 2018-10-13 PROCEDURE — 36415 COLL VENOUS BLD VENIPUNCTURE: CPT

## 2018-10-13 PROCEDURE — 63600175 PHARM REV CODE 636 W HCPCS: Performed by: HOSPITALIST

## 2018-10-13 PROCEDURE — 83036 HEMOGLOBIN GLYCOSYLATED A1C: CPT

## 2018-10-13 PROCEDURE — 27000221 HC OXYGEN, UP TO 24 HOURS

## 2018-10-13 PROCEDURE — 25000003 PHARM REV CODE 250: Performed by: HOSPITALIST

## 2018-10-13 RX ORDER — CLONIDINE HYDROCHLORIDE 0.1 MG/1
0.2 TABLET ORAL EVERY 6 HOURS PRN
Status: DISCONTINUED | OUTPATIENT
Start: 2018-10-13 | End: 2018-10-14

## 2018-10-13 RX ORDER — GLUCAGON 1 MG
1 KIT INJECTION
Status: DISCONTINUED | OUTPATIENT
Start: 2018-10-13 | End: 2018-10-18 | Stop reason: HOSPADM

## 2018-10-13 RX ORDER — LISINOPRIL 5 MG/1
10 TABLET ORAL DAILY
Status: DISCONTINUED | OUTPATIENT
Start: 2018-10-13 | End: 2018-10-14

## 2018-10-13 RX ORDER — ONDANSETRON 2 MG/ML
8 INJECTION INTRAMUSCULAR; INTRAVENOUS EVERY 8 HOURS PRN
Status: DISCONTINUED | OUTPATIENT
Start: 2018-10-13 | End: 2018-10-18 | Stop reason: HOSPADM

## 2018-10-13 RX ORDER — INSULIN ASPART 100 [IU]/ML
0-5 INJECTION, SOLUTION INTRAVENOUS; SUBCUTANEOUS EVERY 6 HOURS PRN
Status: DISCONTINUED | OUTPATIENT
Start: 2018-10-13 | End: 2018-10-16

## 2018-10-13 RX ADMIN — Medication 3 ML: at 10:10

## 2018-10-13 RX ADMIN — HYDROMORPHONE HYDROCHLORIDE 1 MG: 2 INJECTION, SOLUTION INTRAMUSCULAR; INTRAVENOUS; SUBCUTANEOUS at 08:10

## 2018-10-13 RX ADMIN — PRAVASTATIN SODIUM 10 MG: 10 TABLET ORAL at 08:10

## 2018-10-13 RX ADMIN — LISINOPRIL 10 MG: 5 TABLET ORAL at 08:10

## 2018-10-13 RX ADMIN — OXYCODONE HYDROCHLORIDE 10 MG: 5 TABLET ORAL at 03:10

## 2018-10-13 RX ADMIN — GABAPENTIN 600 MG: 300 CAPSULE ORAL at 08:10

## 2018-10-13 RX ADMIN — SODIUM CHLORIDE, SODIUM LACTATE, POTASSIUM CHLORIDE, AND CALCIUM CHLORIDE: .6; .31; .03; .02 INJECTION, SOLUTION INTRAVENOUS at 03:10

## 2018-10-13 RX ADMIN — ACETAMINOPHEN 1000 MG: 10 INJECTION, SOLUTION INTRAVENOUS at 05:10

## 2018-10-13 RX ADMIN — Medication 3 ML: at 02:10

## 2018-10-13 RX ADMIN — OXYCODONE HYDROCHLORIDE AND ACETAMINOPHEN 1 TABLET: 5; 325 TABLET ORAL at 09:10

## 2018-10-13 RX ADMIN — Medication 3 ML: at 08:10

## 2018-10-13 RX ADMIN — HYDROMORPHONE HYDROCHLORIDE 1 MG: 2 INJECTION, SOLUTION INTRAMUSCULAR; INTRAVENOUS; SUBCUTANEOUS at 05:10

## 2018-10-13 RX ADMIN — SODIUM CHLORIDE, SODIUM LACTATE, POTASSIUM CHLORIDE, AND CALCIUM CHLORIDE: .6; .31; .03; .02 INJECTION, SOLUTION INTRAVENOUS at 01:10

## 2018-10-13 RX ADMIN — HYDROMORPHONE HYDROCHLORIDE 1 MG: 2 INJECTION, SOLUTION INTRAMUSCULAR; INTRAVENOUS; SUBCUTANEOUS at 12:10

## 2018-10-13 RX ADMIN — Medication 3 ML: at 05:10

## 2018-10-13 RX ADMIN — ONDANSETRON HYDROCHLORIDE 8 MG: 2 INJECTION INTRAMUSCULAR; INTRAVENOUS at 04:10

## 2018-10-13 RX ADMIN — OXYCODONE HYDROCHLORIDE 10 MG: 5 TABLET ORAL at 04:10

## 2018-10-13 RX ADMIN — ONDANSETRON HYDROCHLORIDE 4 MG: 2 INJECTION INTRAMUSCULAR; INTRAVENOUS at 03:10

## 2018-10-13 RX ADMIN — OXYCODONE HYDROCHLORIDE 10 MG: 5 TABLET ORAL at 12:10

## 2018-10-13 RX ADMIN — ACETAMINOPHEN 1000 MG: 10 INJECTION, SOLUTION INTRAVENOUS at 01:10

## 2018-10-13 NOTE — SUBJECTIVE & OBJECTIVE
Interval History: POD1 s/p right open partial nephrectomy. Stable overnight. Tolerating CLD but reports some nausea. Hasn't been OOB. Reports pain but controlled w/ medications.    Review of Systems  Objective:     Temp:  [96.3 °F (35.7 °C)-98.3 °F (36.8 °C)] 98.1 °F (36.7 °C)  Pulse:  [54-71] 60  Resp:  [10-32] 13  SpO2:  [89 %-100 %] 100 %  BP: ()/(51-80) 105/58  Arterial Line BP: ()/(44-74) 104/59     Body mass index is 31.86 kg/m².    Date 10/13/18 0700 - 10/14/18 0659   Shift 8943-1219 4506-3627 2545-0042 24 Hour Total   INTAKE   P.O. 240   240   I.V.(mL/kg) 400(4.8)   400(4.8)   Shift Total(mL/kg) 640(7.6)   640(7.6)   OUTPUT   Urine(mL/kg/hr) 345   345   Shift Total(mL/kg) 345(4.1)   345(4.1)   Weight (kg) 84.2 84.2 84.2 84.2          Drains     Drain                 Closed/Suction Drain 10/12/18 1042 Right Abdomen Bulb 19 Fr. 1 day         Urethral Catheter 10/12/18 0830 Non-latex 16 Fr. 1 day                Physical Exam   Constitutional: She appears well-developed. No distress.   Pulmonary/Chest: Effort normal. No respiratory distress.   Abdominal: Soft. She exhibits no distension.   Incision c/d/i   Genitourinary:   Genitourinary Comments: Montero in place with clear yellow urine        Significant Labs:    BMP:  Recent Labs   Lab  10/09/18   0955  10/12/18   1311  10/13/18   0314   NA  137  135*  136   K  4.1  3.7  4.1   CL  103  103  103   CO2  28  26  27   BUN  11  14  10   CREATININE  0.8  0.8  0.7   CALCIUM  9.8  8.6*  9.0       CBC:   Recent Labs   Lab  10/09/18   0955  10/12/18   1311  10/13/18   0314   WBC  4.50  9.07  9.42   HGB  13.5  11.2*  10.9*   HCT  41.0  32.8*  32.9*   PLT  242  203  232

## 2018-10-13 NOTE — NURSING TRANSFER
Nursing Transfer Note      10/13/2018     Transfer To: Room 322    Transfer via wheelchair    Transfer with  O2, cardiac monitoring    Transported by NURSE AND TRANSPORTER  Medicines sent:NONE  Chart send with patient: Yes    Notified: SISTERS    Patient reassessed at: 10/13/2018  1200    Upon arrival to floor: cardiac monitor applied, patient oriented to room, call bell in reach and bed in lowest position

## 2018-10-13 NOTE — CONSULTS
[facility] Hospital Medicine  Consult Note    Patient Name: Valarie Foster  MRN: [id number]  Admission Date: 10/12/2018  Hospital Length of Stay: 1 days  Attending Physician: Alejandra Palm MD         PCP:     Alena Hernandez MD    CC:     Chief Complaint   Patient presents with    Flank Pain     Admitted for elective right open partial nephrectomy with Urology.       HISTORY OF PRESENT ILLNESS:     Valarie Foster is a 68 y.o. female that (in part)  has a past medical history of Arthritis, Diabetes mellitus, Diabetes with neurologic complications, and Hypertension.  has a past surgical history that includes Hysterectomy; Oophorectomy; BIOPSY-KIDNEY (Right, 2/19/2018); and COLONOSCOPY (N/A, 2/5/2018). Presents to [facility] for admission for elective right open partial nephrectomy.  She is postop day #1.  Prior to surgery she reported left flank pain which was contralateral to her lesion on the right. Denies hematuria, LUTS, UTIs.  Denies family history of  malignancy prior to February 2018. Denies nephrolithiasis. Nonsmoker.  In February CT renal protocol demonstrated a 2.9cm completely endophytic solid enhancing renal mass.  This was watched through June with a follow-up CT which showed a stable renal lesion as well as the development of an enhancement of a lesion in the right lobe of the liver.  Percutaneous biopsy obtained which showed renal cell carcinoma.  Patient was scheduled for surgery.   The 2.5 cm lesion was removed from the anterior lower pole of the right kidney.  No apparent anesthetic complications and the patient tolerated procedure well.      Hospital Medicine was consulted for postoperative medical management of her chronic medical conditions.          REVIEW OF SYSTEMS:     -- Constitutional: No fever or chills.  -- Eyes: No visual changes, diplopia, pain, tearing, blind spots, or discharge.   -- Ears, nose, mouth, throat, and face: No congestion,  sore throat, epistaxis, d/c, bleeding gums, neck stiffness masses, or dental issues.  -- Respiratory: No cough, shortness of breath, hemoptysis, stridor, wheezing, or night sweats.  -- Cardiovascular: No chest pain, LOVE, syncope, PND, edema, cyanosis, or palpitations.   -- Gastrointestinal:  Flank pain at the surgical site.  No vomiting, hematemesis, melena, dyspepsia, or change in bowel habits.  -- Genitourinary:  As above in the HPI  -- Integument/breast: No rash, pruritis, pigmentation changes, dryness, or changes in hair  -- Hematologic/lymphatic: No easy bruising or lymphadenopathy.   -- Musculoskeletal: No acute arthralgias, acute myalgias, joint swelling, acute limitations of ROM, or acute muscular weakness.  -- Neurological: No seizures, headaches, incoordination, paraesthesias, ataxia, vertigo, or tremors.  -- Behavioral/Psych: No auditory or visual hallucinations, depression, or suicidal/homicidal ideations.  -- Endocrine: No heat or cold intolerance, polydipsia, or unintentional weight gain / loss.  -- Allergy/Immunologic: No recurrent infections or adverse reaction to food, insects, or difficulty breathing.        PAST MEDICAL / SURGICAL HISTORY:     Past Medical History:   Diagnosis Date    Arthritis     Diabetes mellitus     diet controlled    Diabetes with neurologic complications     Hypertension      Past Surgical History:   Procedure Laterality Date    BIOPSY-KIDNEY Right 2/19/2018    Performed by [facility] Provider at [facility] OR    COLONOSCOPY N/A 2/5/2018    Procedure: COLONOSCOPY;  Surgeon: Bacilio Mancia MD;  Location: [facility] ENDO;  Service: Endoscopy;  Laterality: N/A;  appt confirmed-ss    COLONOSCOPY N/A 2/5/2018    Performed by Bacilio Mancia MD at [facility] ENDO    HYSTERECTOMY      OOPHORECTOMY           FAMILY HISTORY:     Family History   Problem Relation Age of Onset    Glaucoma Sister     Cancer Sister         breast cancer    No Known Problems Mother     Glaucoma  Father     Diabetes Brother     No Known Problems Maternal Aunt     No Known Problems Maternal Uncle     No Known Problems Paternal Aunt     No Known Problems Paternal Uncle     No Known Problems Maternal Grandmother     No Known Problems Maternal Grandfather     No Known Problems Paternal Grandmother     No Known Problems Paternal Grandfather     Thyroid disease Sister     Blindness Sister         due to trauma during car accident    Diabetes Sister     Amblyopia Neg Hx     Cataracts Neg Hx     Hypertension Neg Hx     Macular degeneration Neg Hx     Retinal detachment Neg Hx     Strabismus Neg Hx     Stroke Neg Hx     Breast cancer Neg Hx          SOCIAL HISTORY:     Social History     Socioeconomic History    Marital status: Legally      Spouse name: None    Number of children: None    Years of education: None    Highest education level: None   Social Needs    Financial resource strain: None    Food insecurity - worry: None    Food insecurity - inability: None    Transportation needs - medical: None    Transportation needs - non-medical: None   Occupational History    None   Tobacco Use    Smoking status: Never Smoker    Smokeless tobacco: Never Used   Substance and Sexual Activity    Alcohol use: No    Drug use: No    Sexual activity: None   Other Topics Concern    None   Social History Narrative    None         ALLERGIES:       Review of patient's allergies indicates:  No Known Allergies      HEALTH SCREENING:     Influenza vaccine up-to-date for this season.  Prevnar 13 pneumonia vaccine =  evidence of previous vaccination found in the medical record      HOME MEDICATIONS:     Prior to Admission medications    Medication Sig Start Date End Date Taking? Authorizing Provider   fluticasone (FLONASE) 50 mcg/actuation nasal spray 1 spray (50 mcg total) by Each Nare route once daily. 9/27/18  Yes Alena Hernandez MD   gabapentin (NEURONTIN) 300 MG capsule Take 2 capsules (600  "mg total) by mouth every evening. 4/30/18 4/30/19 Yes Alena Hernandez MD   lisinopril 10 MG tablet Take 1 tablet (10 mg total) by mouth once daily. 4/30/18  Yes Alena Hernandez MD   pravastatin (PRAVACHOL) 10 MG tablet Take 1 tablet (10 mg total) by mouth once daily. 4/30/18 4/30/19 Yes Alena Hernandez MD   promethazine-dextromethorphan (PROMETHAZINE-DM) 6.25-15 mg/5 mL Syrp Take 5 mLs by mouth 3 (three) times daily as needed (cough). 4/30/18   Alena Hernandez MD          HOSPITAL MEDICATIONS:     Scheduled Meds:    acetaminophen  1,000 mg Intravenous Q8H    gabapentin  600 mg Oral QHS    lisinopril  10 mg Oral Daily    pravastatin  10 mg Oral Daily    sodium chloride 0.9%  3 mL Intravenous Q8H     Continuous Infusions:    lactated ringers 100 mL/hr at 10/12/18 1800     PRN Meds: HYDROmorphone, labetalol, ondansetron, oxyCODONE, oxyCODONE-acetaminophen, sodium chloride 0.9%      PHYSICAL EXAM:     Wt Readings from Last 1 Encounters:   10/12/18 2155 84.4 kg (186 lb 1.1 oz)   10/11/18 0934 79.4 kg (175 lb)     Body mass index is 31.94 kg/m².  Vitals:    10/12/18 1745 10/12/18 1800 10/12/18 1815 10/12/18 2155   BP: 135/75 (!) 155/77 136/71 (!) 149/80   BP Location:       Patient Position:       Pulse: (!) 58 (!) 54 60 (!) 58   Resp: 11 14 10 18   Temp:    96.3 °F (35.7 °C)   TempSrc:       SpO2: 99% 100% 98%    Weight:    84.4 kg (186 lb 1.1 oz)   Height:    5' 4" (1.626 m)          -- General appearance: well developed. appears stated age   -- Head: normocephalic, atraumatic   -- Eyes: conjunctivae clear. Extraocular muscles intact  -- Nose: Nares normal. Septum midline.   -- Mouth/Throat: lips, mucosa, and tongue normal. no throat erythema.   -- Neck: supple, symmetrical, trachea midline, no JVD and thyroid not grossly enlarged, appears symmetric  -- Lungs: clear to auscultation bilaterally. normal respiratory effort. No use of accessory muscles.   -- Chest wall: no tenderness. equal bilateral chest rise   -- Heart: " regular rate and regular rhythm. S1, S2 normal.  no click, rub or gallop   -- Abdomen:  Surgical wound is dressed.  soft, non-tender, non-distended, non-tympanic; bowel sounds normal  -- Extremities: no cyanosis, clubbing or edema.   -- Pulses: 2+ and symmetric   -- Skin: color normal, texture normal, turgor normal. No rashes or lesions.   -- Neurologic:  Somnolent.  Normal strength and tone. No focal numbness or weakness. CNII-XII intact. Doris coma scale: eyes open spontaneously-4, oriented & converses-5, obeys commands-6.      LABORATORY STUDIES:     Recent Results (from the past 36 hour(s))   POCT glucose    Collection Time: 10/12/18  7:02 AM   Result Value Ref Range    POCT Glucose 107 70 - 110 mg/dL   POCT glucose    Collection Time: 10/12/18 12:05 PM   Result Value Ref Range    POCT Glucose 181 (H) 70 - 110 mg/dL   Urinalysis    Collection Time: 10/12/18 12:11 PM   Result Value Ref Range    Specimen UA Urine, Catheterized     Color, UA Yellow Yellow, Straw, Ita    Appearance, UA Clear Clear    pH, UA 7.0 5.0 - 8.0    Specific Gravity, UA 1.020 1.005 - 1.030    Protein, UA 1+ (A) Negative    Glucose, UA 1+ (A) Negative    Ketones, UA Trace (A) Negative    Bilirubin (UA) Negative Negative    Occult Blood UA 3+ (A) Negative    Nitrite, UA Negative Negative    Urobilinogen, UA Negative <2.0 EU/dL    Leukocytes, UA Negative Negative   Urinalysis Microscopic    Collection Time: 10/12/18 12:11 PM   Result Value Ref Range    RBC, UA >100 (H) 0 - 4 /hpf    WBC, UA 0 0 - 5 /hpf    Bacteria, UA None None-Occ /hpf    Squam Epithel, UA Few /hpf    Hyaline Casts, UA 0 0-1/lpf /lpf    Microscopic Comment SEE COMMENT    Basic metabolic panel    Collection Time: 10/12/18  1:11 PM   Result Value Ref Range    Sodium 135 (L) 136 - 145 mmol/L    Potassium 3.7 3.5 - 5.1 mmol/L    Chloride 103 95 - 110 mmol/L    CO2 26 23 - 29 mmol/L    Glucose 194 (H) 70 - 110 mg/dL    BUN, Bld 14 8 - 23 mg/dL    Creatinine 0.8 0.5 - 1.4 mg/dL     Calcium 8.6 (L) 8.7 - 10.5 mg/dL    Anion Gap 6 (L) 8 - 16 mmol/L    eGFR if African American >60 >60 mL/min/1.73 m^2    eGFR if non African American >60 >60 mL/min/1.73 m^2   CBC auto differential    Collection Time: 10/12/18  1:11 PM   Result Value Ref Range    WBC 9.07 3.90 - 12.70 K/uL    RBC 3.91 (L) 4.00 - 5.40 M/uL    Hemoglobin 11.2 (L) 12.0 - 16.0 g/dL    Hematocrit 32.8 (L) 37.0 - 48.5 %    MCV 84 82 - 98 fL    MCH 28.6 27.0 - 31.0 pg    MCHC 34.1 32.0 - 36.0 g/dL    RDW 14.5 11.5 - 14.5 %    Platelets 203 150 - 350 K/uL    MPV 10.7 9.2 - 12.9 fL    Gran # (ANC) 7.9 (H) 1.8 - 7.7 K/uL    Lymph # 0.7 (L) 1.0 - 4.8 K/uL    Mono # 0.4 0.3 - 1.0 K/uL    Eos # 0.0 0.0 - 0.5 K/uL    Baso # 0.00 0.00 - 0.20 K/uL    Gran% 87.5 (H) 38.0 - 73.0 %    Lymph% 7.8 (L) 18.0 - 48.0 %    Mono% 4.7 4.0 - 15.0 %    Eosinophil% 0.0 0.0 - 8.0 %    Basophil% 0.0 0.0 - 1.9 %    Differential Method Automated        Lab Results   Component Value Date    INR 1.1 10/09/2018    INR 1.1 02/16/2018     Lab Results   Component Value Date    HGBA1C 6.2 (H) 01/29/2018     Recent Labs      10/12/18   0702  10/12/18   1205   POCTGLUCOSE  107  181*             CONSULTS:     IP CONSULT TO HOSPITALIST  IP CONSULT TO SOCIAL WORK/CASE MANAGEMENT       ASSESSMENT & PLAN:     Primary Diagnosis:  Right renal mass    Active Hospital Problems    Diagnosis  POA    *Right renal mass [N28.89]  Yes     Priority: 1 - High    Microhematuria [R31.29]  Yes    Type 2 diabetes mellitus with diabetic neuropathy, without long-term current use of insulin [E11.40]  Yes     Chronic    Benign hypertension [I10]  Yes     Chronic    Hyperlipemia [E78.5]  Yes     Chronic      Resolved Hospital Problems   No resolved problems to display.         Right renal mass status post partial nephrectomy  · postoperative day 1  · Appears to be doing well postoperatively  · Pain well controlled  · Start bowel regimen  · TEDs and SCDs for DVT prophylaxis  · Further  management per primary team and Oncology    Diabetes mellitus type 2  · BG in acceptable range at this time  · Maintain w/ subcutaneous insulin management order set  · Hold oral diabetic meds  · ADA 1800 kcal diet  · BG goal while in patient is <180mg/dL  · HgA1c = Pending    Hypertension  · Goal while inpatient is a systolic blood pressure less than 160mmHg  · BP in acceptable range at this time  · Continue current home regimen with hold parameters  · PRN antihypertensives available    Hyperlipidemia   · Lipid panel - as an outpatient  · Cardiac diet  · Continue statin          VTE Risk Mitigation (From admission, onward)        Ordered     IP VTE HIGH RISK PATIENT  Once      10/13/18 0314     Place SEB hose  Until discontinued      10/13/18 0314     Place sequential compression device  Until discontinued      10/13/18 0314            Adult PRN medications available   DVT prophylaxis given       DISPOSITION:       Chart reviewed and updated where applicable.        ===============================================================    Kayode Rausch MD, MPH  Department of Hospital Medicine   [facility]  [phone] pg  (7pm - 6am)          This note is dictated using voice recognition software.  There are word recognition mistakes that are occasionally missed on review.          Consults

## 2018-10-13 NOTE — NURSING TRANSFER
Nursing Transfer Note      10/13/2018     Transfer To: Telemetry     Transfer via wheelchair    Transfer with cardiac monitor and O2    Transported by ICU nurse and transport    Medicines sent: N/A    Chart send with patient: Yes    Notified: Pt notified family     Patient reassessed at: 10/13/2018, 1615    Upon arrival to floor: patient oriented to room, call bell in reach and bed in lowest position

## 2018-10-13 NOTE — PLAN OF CARE
Problem: Patient Care Overview  Goal: Plan of Care Review  Outcome: Ongoing (interventions implemented as appropriate)  Patient awakens easily. NSR on cardiac monitor. Dressing to right lateral side dry and intact with blub suction with small amount of drainage noted. Vital signs stable and patient tolerating clear liquid diet without difficulty. Montero cath to gravity draining slightly pink urine. Patient states pain is often 10 out of 10 but when left alone falls right back to sleep. Patient pulling 750 on IS when encouraged. Plan of care reviewed with family and patient at bedside.Orders received to transfer to tele.    Problem: Infection, Risk/Actual (Adult)  Intervention: Prevent Infection/Maximize Resistance   10/12/18 1737 10/13/18 1200   Respiratory Interventions   Airway/Ventilation Management airway patency maintained;calming measures promoted --    Pain/Comfort/Sleep Interventions   Sleep/Rest Enhancement --  awakenings minimized;regular sleep/rest pattern promoted;family presence promoted   Nutrition Interventions   Glycemic Management --  blood glucose monitoring       Goal: Infection Prevention/Resolution  Patient will demonstrate the desired outcomes by discharge/transition of care.  Outcome: Ongoing (interventions implemented as appropriate)   10/12/18 1737   Infection, Risk/Actual (Adult)   Infection Prevention/Resolution making progress toward outcome       Problem: Pressure Ulcer Risk (Abdoul Scale) (Adult,Obstetrics,Pediatric)  Intervention: Prevent/Manage Excess Moisture   10/12/18 1802 10/13/18 0859   Hygiene Care   Perineal Care catheter care provided --    Skin Interventions   Skin Protection --  Electrode sites     Intervention: Maintain Head of Bed Elevation Less Than 30 Degrees as Tolerated   10/13/18 1336   Positioning   Head of Bed (HOB) HOB at 60-90 degrees     Intervention: Prevent/Minimize Sheer/Friction Injuries   10/13/18 1200   Positioning   Positioning/Transfer Devices pillows;in  use   Skin Interventions   Pressure Reduction Devices Pressure-redistributing mattress utilized   Pressure Reduction Techniques frequent weight shift encouraged;heels elevated off bed       Goal: Identify Related Risk Factors and Signs and Symptoms  Related risk factors and signs and symptoms are identified upon initiation of Human Response Clinical Practice Guideline (CPG)  Outcome: Ongoing (interventions implemented as appropriate)   10/12/18 1737   Pressure Ulcer Risk (Abdoul Scale)   Related Risk Factors (Pressure Ulcer Risk (Abdoul Scale)) age extremes;critical care admission;length of surgery     Goal: Skin Integrity  Patient will demonstrate the desired outcomes by discharge/transition of care.  Outcome: Ongoing (interventions implemented as appropriate)   10/12/18 1802   Pressure Ulcer Risk (Abdoul Scale) (Adult,Obstetrics,Pediatric)   Skin Integrity making progress toward outcome

## 2018-10-13 NOTE — PROGRESS NOTES
[facility]  Urology  Progress Note    Patient Name: Valarie Foster  MRN: [id number]  Admission Date: 10/12/2018  Hospital Length of Stay: 1 days  Code Status: Full Code   Attending Provider: Alejandra Palm MD   Primary Care Physician: Alena Hernandez MD    Subjective:     HPI:  No notes on file    Interval History: POD1 s/p right open partial nephrectomy. Stable overnight. Tolerating CLD but reports some nausea. Hasn't been OOB. Reports pain but controlled w/ medications.    Review of Systems  Objective:     Temp:  [96.3 °F (35.7 °C)-98.3 °F (36.8 °C)] 98.1 °F (36.7 °C)  Pulse:  [54-71] 60  Resp:  [10-32] 13  SpO2:  [89 %-100 %] 100 %  BP: ()/(51-80) 105/58  Arterial Line BP: ()/(44-74) 104/59     Body mass index is 31.86 kg/m².    Date 10/13/18 0700 - 10/14/18 0659   Shift 1760-9894 [id number] 0187-4336 24 Hour Total   INTAKE   P.O. 240   240   I.V.(mL/kg) 400(4.8)   400(4.8)   Shift Total(mL/kg) 640(7.6)   640(7.6)   OUTPUT   Urine(mL/kg/hr) 345   345   Shift Total(mL/kg) 345(4.1)   345(4.1)   Weight (kg) 84.2 84.2 84.2 84.2          Drains     Drain                 Closed/Suction Drain 10/12/18 1042 Right Abdomen Bulb 19 Fr. 1 day         Urethral Catheter 10/12/18 0830 Non-latex 16 Fr. 1 day                Physical Exam   Constitutional: She appears well-developed. No distress.   Pulmonary/Chest: Effort normal. No respiratory distress.   Abdominal: Soft. She exhibits no distension.   Incision c/d/i   Genitourinary:   Genitourinary Comments: Montero in place with clear yellow urine        Significant Labs:    BMP:  Recent Labs   Lab  10/09/18   0955  10/12/18   1311  10/13/18   0314   NA  137  135*  136   K  4.1  3.7  4.1   CL  103  103  103   CO2  28  26  27   BUN  11  14  10   CREATININE  0.8  0.8  0.7   CALCIUM  9.8  8.6*  9.0       CBC:   Recent Labs   Lab  10/09/18   0955  10/12/18   1311  10/13/18   0314   WBC  4.50  9.07  9.42   HGB  13.5  11.2*  10.9*   HCT  41.0  32.8*  32.9*    PLT  242  203  232             Assessment/Plan:     * Right renal mass    -- S/p right open partial nephrectomy  -- Transfer to tele  -- Keep easton and RAFI for now  -- Continue CLD  -- IS, ambulate   -- Medical management per hospitalist - appreciate assistance            VTE Risk Mitigation (From admission, onward)        Ordered     IP VTE HIGH RISK PATIENT  Once      10/13/18 0314     Place SEB hose  Until discontinued      10/13/18 0314     Place sequential compression device  Until discontinued      10/13/18 0314          Onur Shi MD  Urology  [facility]

## 2018-10-13 NOTE — NURSING
Patient arrived to floor from ICU via wheelchair in stable condition, escorted by ICU nurse and transport personnel. Visualized patient and assessed patient's overall condition and appearance. NAD noted. Will continue to monitor.

## 2018-10-13 NOTE — PLAN OF CARE
10/13/18 1640   Discharge Assessment   Assessment Type Discharge Planning Assessment   Patient/Family In Agreement With Plan other (see comments)  (TN attempted d/c assessment but patient transferred from ICU)

## 2018-10-13 NOTE — PLAN OF CARE
Problem: Pain, Acute (Adult)  Goal: Identify Related Risk Factors and Signs and Symptoms  Related risk factors and signs and symptoms are identified upon initiation of Human Response Clinical Practice Guideline (CPG)  Intervention: assess pain level and characteristics    Administer analgesics as ordered    encourage increase in activity as tolerated

## 2018-10-13 NOTE — ASSESSMENT & PLAN NOTE
-- S/p right open partial nephrectomy  -- Transfer to tele  -- Keep rustam and RAFI for now  -- Continue CLD  -- IS, ambulate   -- Medical management per hospitalist - appreciate assistance

## 2018-10-14 LAB
ANION GAP SERPL CALC-SCNC: 6 MMOL/L
BUN SERPL-MCNC: 5 MG/DL
CALCIUM SERPL-MCNC: 9 MG/DL
CHLORIDE SERPL-SCNC: 104 MMOL/L
CO2 SERPL-SCNC: 28 MMOL/L
CREAT SERPL-MCNC: 0.8 MG/DL
EST. GFR  (AFRICAN AMERICAN): >60 ML/MIN/1.73 M^2
EST. GFR  (NON AFRICAN AMERICAN): >60 ML/MIN/1.73 M^2
GLUCOSE SERPL-MCNC: 125 MG/DL
MAGNESIUM SERPL-MCNC: 1.7 MG/DL
PHOSPHATE SERPL-MCNC: 2.8 MG/DL
POCT GLUCOSE: 110 MG/DL (ref 70–110)
POCT GLUCOSE: 113 MG/DL (ref 70–110)
POCT GLUCOSE: 143 MG/DL (ref 70–110)
POCT GLUCOSE: 156 MG/DL (ref 70–110)
POTASSIUM SERPL-SCNC: 3.9 MMOL/L
SODIUM SERPL-SCNC: 138 MMOL/L

## 2018-10-14 PROCEDURE — 25000003 PHARM REV CODE 250: Performed by: HOSPITALIST

## 2018-10-14 PROCEDURE — 21400001 HC TELEMETRY ROOM

## 2018-10-14 PROCEDURE — 94799 UNLISTED PULMONARY SVC/PX: CPT

## 2018-10-14 PROCEDURE — 25000003 PHARM REV CODE 250: Performed by: UROLOGY

## 2018-10-14 PROCEDURE — 25000003 PHARM REV CODE 250: Performed by: ANESTHESIOLOGY

## 2018-10-14 PROCEDURE — 36415 COLL VENOUS BLD VENIPUNCTURE: CPT

## 2018-10-14 PROCEDURE — A4216 STERILE WATER/SALINE, 10 ML: HCPCS | Performed by: ANESTHESIOLOGY

## 2018-10-14 PROCEDURE — A4216 STERILE WATER/SALINE, 10 ML: HCPCS | Performed by: UROLOGY

## 2018-10-14 PROCEDURE — 63600175 PHARM REV CODE 636 W HCPCS: Performed by: HOSPITALIST

## 2018-10-14 PROCEDURE — 83735 ASSAY OF MAGNESIUM: CPT

## 2018-10-14 PROCEDURE — 80048 BASIC METABOLIC PNL TOTAL CA: CPT

## 2018-10-14 PROCEDURE — 84100 ASSAY OF PHOSPHORUS: CPT

## 2018-10-14 RX ORDER — POLYETHYLENE GLYCOL 3350 17 G/17G
17 POWDER, FOR SOLUTION ORAL DAILY
Status: DISCONTINUED | OUTPATIENT
Start: 2018-10-14 | End: 2018-10-18 | Stop reason: HOSPADM

## 2018-10-14 RX ORDER — DOCUSATE SODIUM 100 MG/1
100 CAPSULE, LIQUID FILLED ORAL 2 TIMES DAILY
Status: DISCONTINUED | OUTPATIENT
Start: 2018-10-14 | End: 2018-10-18 | Stop reason: HOSPADM

## 2018-10-14 RX ORDER — LISINOPRIL 20 MG/1
40 TABLET ORAL DAILY
Status: DISCONTINUED | OUTPATIENT
Start: 2018-10-15 | End: 2018-10-15

## 2018-10-14 RX ORDER — CLONIDINE HYDROCHLORIDE 0.1 MG/1
0.2 TABLET ORAL EVERY 4 HOURS PRN
Status: DISCONTINUED | OUTPATIENT
Start: 2018-10-14 | End: 2018-10-15

## 2018-10-14 RX ADMIN — OXYCODONE HYDROCHLORIDE 10 MG: 5 TABLET ORAL at 11:10

## 2018-10-14 RX ADMIN — LISINOPRIL 10 MG: 5 TABLET ORAL at 09:10

## 2018-10-14 RX ADMIN — OXYCODONE HYDROCHLORIDE 10 MG: 5 TABLET ORAL at 03:10

## 2018-10-14 RX ADMIN — ONDANSETRON HYDROCHLORIDE 8 MG: 2 INJECTION INTRAMUSCULAR; INTRAVENOUS at 06:10

## 2018-10-14 RX ADMIN — DOCUSATE SODIUM 100 MG: 100 CAPSULE, LIQUID FILLED ORAL at 11:10

## 2018-10-14 RX ADMIN — OXYCODONE HYDROCHLORIDE 10 MG: 5 TABLET ORAL at 06:10

## 2018-10-14 RX ADMIN — SODIUM CHLORIDE, SODIUM LACTATE, POTASSIUM CHLORIDE, AND CALCIUM CHLORIDE: .6; .31; .03; .02 INJECTION, SOLUTION INTRAVENOUS at 10:10

## 2018-10-14 RX ADMIN — PRAVASTATIN SODIUM 10 MG: 10 TABLET ORAL at 09:10

## 2018-10-14 RX ADMIN — Medication 3 ML: at 05:10

## 2018-10-14 RX ADMIN — DOCUSATE SODIUM 100 MG: 100 CAPSULE, LIQUID FILLED ORAL at 08:10

## 2018-10-14 RX ADMIN — Medication 3 ML: at 08:10

## 2018-10-14 RX ADMIN — SODIUM CHLORIDE, SODIUM LACTATE, POTASSIUM CHLORIDE, AND CALCIUM CHLORIDE: .6; .31; .03; .02 INJECTION, SOLUTION INTRAVENOUS at 12:10

## 2018-10-14 RX ADMIN — OXYCODONE HYDROCHLORIDE 10 MG: 5 TABLET ORAL at 07:10

## 2018-10-14 RX ADMIN — Medication 3 ML: at 12:10

## 2018-10-14 RX ADMIN — Medication 3 ML: at 06:10

## 2018-10-14 RX ADMIN — POLYETHYLENE GLYCOL 3350 17 G: 17 POWDER, FOR SOLUTION ORAL at 12:10

## 2018-10-14 RX ADMIN — GABAPENTIN 600 MG: 300 CAPSULE ORAL at 08:10

## 2018-10-14 RX ADMIN — CLONIDINE HYDROCHLORIDE 0.2 MG: 0.1 TABLET ORAL at 09:10

## 2018-10-14 NOTE — PLAN OF CARE
Problem: Diabetes, Type 2 (Adult)  Intervention: Support/Optimize Psychosocial Response to Condition   10/13/18 2011 10/14/18 0450   Psychosocial Support   Family/Support System Care --  self-care encouraged   Coping/Psychosocial Interventions   Supportive Measures active listening utilized;self-care encouraged;verbalization of feelings encouraged --    Environmental Support calm environment promoted;comfort object encouraged;distractions minimized;environmental consistency promoted --      Intervention: Optimize Glycemic Control   10/13/18 2011   Nutrition Interventions   Glycemic Management blood glucose monitoring       Goal: Signs and Symptoms of Listed Potential Problems Will be Absent, Minimized or Managed (Diabetes, Type 2)  Signs and symptoms of listed potential problems will be absent, minimized or managed by discharge/transition of care (reference Diabetes, Type 2 (Adult) CPG).  Outcome: Ongoing (interventions implemented as appropriate)   10/13/18 1931   Diabetes, Type 2   Problems Assessed (Type 2 Diabetes) all   Problems Present (Type 2 Diabetes) none       Problem: Patient Care Overview  Goal: Interdisciplinary Rounds/Family Conf  Outcome: Ongoing (interventions implemented as appropriate)   10/14/18 0450   Interdisciplinary Rounds/Family Conf   Participants patient;nursing       Problem: Fall Risk (Adult)  Intervention: Monitor/Assist with Self Care   10/13/18 2011   Functional Level Current   Ambulation 2 - assistive person   Transferring 2 - assistive person   Toileting 2 - assistive person   Bathing 2 - assistive person   Dressing 2 - assistive person   Eating 0 - independent   Communication 0 - understands/communicates without difficulty   Swallowing 0 - swallows foods/liquids without difficulty   Daily Care Interventions   Self-Care Promotion independence encouraged;BADL personal objects within reach;BADL personal routines maintained   Activity   Activity Assistance Provided assistance, 1 person      Intervention: Reduce Risk/Promote Restraint Free Environment   10/14/18 0450   Safety Interventions   Environmental Safety Modification assistive device/personal items within reach;clutter free environment maintained;lighting adjusted;room near unit station;room organization consistent   Prevent  Drop/Fall   Safety/Security Measures bed alarm set     Intervention: Review Medications/Identify Contributors to Fall Risk   10/14/18 0450   Safety Interventions   Medication Review/Management medications reviewed     Intervention: Patient Rounds   10/13/18 2027   Safety Interventions   Patient Rounds bed in low position;bed wheels locked;call light in reach;clutter free environment maintained;ID band on;placement of personal items at bedside;toileting offered;visualized patient     Intervention: Safety Promotion/Fall Prevention   10/13/18 2027   Safety Interventions   Safety Promotion/Fall Prevention assistive device/personal item within reach;bed alarm set;Fall Risk reviewed with patient/family;high risk medications identified;lighting adjusted;medications reviewed;nonskid shoes/socks when out of bed;room near unit station;side rails raised x 2;supervised activity;toileting scheduled;instructed to call staff for mobility       Goal: Identify Related Risk Factors and Signs and Symptoms  Related risk factors and signs and symptoms are identified upon initiation of Human Response Clinical Practice Guideline (CPG)  Outcome: Ongoing (interventions implemented as appropriate)   10/14/18 0450   Fall Risk   Related Risk Factors (Fall Risk) inadequate lighting;objects hard to reach;polypharmacy;slipper/uneven surfaces;environment unfamiliar   Signs and Symptoms (Fall Risk) presence of risk factors     Goal: Absence of Falls  Patient will demonstrate the desired outcomes by discharge/transition of care.  Outcome: Ongoing (interventions implemented as appropriate)   10/13/18 1931   Fall Risk (Adult)   Absence of Falls  making progress toward outcome       Problem: Infection, Risk/Actual (Adult)  Intervention: Manage Suspected/Actual Infection   10/13/18 1200 10/13/18 2011 10/14/18 0450   Safety Interventions   Isolation Precautions --  --  environmental surveillance   Infection Management --  aseptic technique maintained --    Prevent/Manage Colorectal Surgical Infection   Fever Reduction/Comfort Measures lightweight bedding --  --      Intervention: Prevent Infection/Maximize Resistance   10/13/18 2011   Respiratory Interventions   Airway/Ventilation Management airway patency maintained;calming measures promoted   Hygiene Care   Bathing/Skin Care bedtime care   Pain/Comfort/Sleep Interventions   Sleep/Rest Enhancement awakenings minimized;noise level reduced;regular sleep/rest pattern promoted   Nutrition Interventions   Glycemic Management blood glucose monitoring       Goal: Identify Related Risk Factors and Signs and Symptoms  Related risk factors and signs and symptoms are identified upon initiation of Human Response Clinical Practice Guideline (CPG)  Outcome: Ongoing (interventions implemented as appropriate)   10/14/18 0450   Infection, Risk/Actual   Related Risk Factors (Infection, Risk/Actual) surgery/procedure;skin integrity impairment;prolonged hospitalization   Signs and Symptoms (Infection, Risk/Actual) pain;weakness     Goal: Infection Prevention/Resolution  Patient will demonstrate the desired outcomes by discharge/transition of care.  Outcome: Ongoing (interventions implemented as appropriate)   10/13/18 1931   Infection, Risk/Actual (Adult)   Infection Prevention/Resolution making progress toward outcome       Problem: Pressure Ulcer Risk (Abdoul Scale) (Adult,Obstetrics,Pediatric)  Intervention: Prevent/Manage Excess Moisture   10/13/18 2011 10/14/18 0450   Hygiene Care   Bathing/Skin Care bedtime care --    Skin Interventions   Skin Protection --  Tubing/devices free from skin contact     Intervention: Maintain  Head of Bed Elevation Less Than 30 Degrees as Tolerated   10/13/18 2027   Positioning   Head of Bed (HOB) HOB at 15 degrees     Intervention: Prevent/Minimize Sheer/Friction Injuries   10/13/18 2027 10/14/18 0450   Positioning   Positioning/Transfer Devices pillows --    Skin Interventions   Pressure Reduction Techniques --  frequent weight shift encouraged;weight shift assistance provided     Intervention: Turn/Reposition Often   10/13/18 2027 10/14/18 0450   Positioning   Body Position supine, head elevated;weight shift assistance provided --    Skin Interventions   Pressure Reduction Techniques --  frequent weight shift encouraged;weight shift assistance provided       Goal: Identify Related Risk Factors and Signs and Symptoms  Related risk factors and signs and symptoms are identified upon initiation of Human Response Clinical Practice Guideline (CPG)  Outcome: Ongoing (interventions implemented as appropriate)   10/14/18 0450   Pressure Ulcer Risk (Abdoul Scale)   Related Risk Factors (Pressure Ulcer Risk (Abdoul Scale)) hospitalization prolonged;length of surgery;medication     Goal: Skin Integrity  Patient will demonstrate the desired outcomes by discharge/transition of care.  Outcome: Ongoing (interventions implemented as appropriate)   10/13/18 1931   Pressure Ulcer Risk (Abdoul Scale) (Adult,Obstetrics,Pediatric)   Skin Integrity making progress toward outcome       Problem: Pain, Acute (Adult)  Intervention: Monitor/Manage Analgesia   10/14/18 0450   Safety Interventions   Medication Review/Management medications reviewed   Manage Acute Burn Pain   Bowel Intervention adequate fluid intake promoted;privacy promoted;diet adjusted     Intervention: Mutually Develop/Implement Acute Pain Management Plan   10/14/18 0450   Pain/Comfort/Sleep Interventions   Pain Management Interventions awakened for pain meds per patient request;care clustered;medication;pain management plan reviewed with  patient/caregiver;pillow support provided;quiet environment facilitated   Cognitive Interventions   Sensory Stimulation Regulation care clustered;lighting decreased;quiet environment promoted     Intervention: Support/Optimize Psychosocial Response to Acute Pain   10/13/18 2011 10/14/18 0450   Psychosocial Support   Family/Support System Care --  self-care encouraged   Trust Relationship/Rapport care explained;choices provided;questions answered --    Coping/Psychosocial Interventions   Supportive Measures active listening utilized;self-care encouraged;verbalization of feelings encouraged --        Goal: Identify Related Risk Factors and Signs and Symptoms  Related risk factors and signs and symptoms are identified upon initiation of Human Response Clinical Practice Guideline (CPG)  Outcome: Ongoing (interventions implemented as appropriate)   10/14/18 0450   Pain, Acute   Related Risk Factors (Acute Pain) surgery;positioning;persistent pain;patient perception;knowledge deficit   Signs and Symptoms (Acute Pain) BADLs/IADLs reluctance/inability to perform;fatigue/weakness;verbalization of pain descriptors     Goal: Acceptable Pain Control/Comfort Level  Patient will demonstrate the desired outcomes by discharge/transition of care.  Outcome: Ongoing (interventions implemented as appropriate)   10/13/18 1931   Pain, Acute (Adult)   Acceptable Pain Control/Comfort Level making progress toward outcome

## 2018-10-14 NOTE — PROGRESS NOTES
[facility] Hospital Medicine  Progress Note    Patient Name: Valarie Foster  MRN: [id number]  Patient Class: IP- Inpatient   Admission Date: 10/12/2018  Length of Stay: 2 days  Attending Physician: Alejandra Palm MD  Primary Care Provider: Alena Hernandez MD        Subjective:     Principal Problem:Right renal mass    HPI:  See H&P.    Hospital Course:  Patient has johnny admitted for elective right partial nephrectomy per urology.S/P partail right nephrectomy on 10.12.18 .  hospital medicine is following for medical conditions  of patient,she has slight elevated BP today,increased lisinopril,DC IVF,  PT,OT.  Started on stool softner.    Interval History: has pain on surgery side.    Review of Systems   Constitutional: Positive for activity change and appetite change.   HENT: Negative for congestion and dental problem.    Eyes: Negative for itching.   Respiratory: Negative for apnea and chest tightness.    Gastrointestinal: Negative for abdominal distention.   Musculoskeletal: Positive for arthralgias.     Objective:     Vital Signs (Most Recent):  Temp: 98 °F (36.7 °C) (10/14/18 1115)  Pulse: 70 (10/14/18 1115)  Resp: 18 (10/14/18 1115)  BP: (!) 113/54 (10/14/18 1115)  SpO2: 98 % (10/14/18 1115) Vital Signs (24h Range):  Temp:  [97.5 °F (36.4 °C)-98.8 °F (37.1 °C)] 98 °F (36.7 °C)  Pulse:  [61-74] 70  Resp:  [10-26] 18  SpO2:  [97 %-100 %] 98 %  BP: (113-178)/(54-77) 113/54  Arterial Line BP: (124-175)/(53-74) 175/74     Weight: 84.2 kg (185 lb 10 oz)  Body mass index is 31.86 kg/m².    Intake/Output Summary (Last 24 hours) at 10/14/2018 1121  Last data filed at 10/14/2018 0500  Gross per 24 hour   Intake 1700 ml   Output 2590 ml   Net -890 ml      Physical Exam   Constitutional: She is oriented to person, place, and time. No distress.   HENT:   Head: Atraumatic.   Eyes: EOM are normal. Pupils are equal, round, and reactive to light.   Neck: Normal range of motion.   Cardiovascular: Normal rate  and regular rhythm.   Pulmonary/Chest: Effort normal and breath sounds normal.   Abdominal: Soft.   Musculoskeletal: Normal range of motion.   Neurological: She is oriented to person, place, and time. No cranial nerve deficit. Coordination normal.       Significant Labs:   BMP:   Recent Labs   Lab  10/14/18   0419   GLU  125*   NA  138   K  3.9   CL  104   CO2  28   BUN  5*   CREATININE  0.8   CALCIUM  9.0   MG  1.7     CBC:   Recent Labs   Lab  10/12/18   1311  10/13/18   0314   WBC  9.07  9.42   HGB  11.2*  10.9*   HCT  32.8*  32.9*   PLT  203  232       Significant Imaging: reviewed.    Assessment/Plan:      * Right renal mass    S/P partial nephrectomy,per urology,consulted PT,OT,statrted on stool softner.          Hyperlipemia    On statin.          Benign hypertension    elevated increased lisinopril,on prn clonidine.          Type 2 diabetes mellitus with diabetic neuropathy, without long-term current use of insulin    On SSI.            VTE Risk Mitigation (From admission, onward)        Ordered     IP VTE HIGH RISK PATIENT  Once      10/13/18 0314     Place SEB hose  Until discontinued      10/13/18 0314     Place sequential compression device  Until discontinued      10/13/18 0314              Danilo Navarro MD  Department of Hospital Medicine   [facility]

## 2018-10-14 NOTE — SUBJECTIVE & OBJECTIVE
Interval History: has pain on surgery side.    Review of Systems   Constitutional: Positive for activity change and appetite change.   HENT: Negative for congestion and dental problem.    Eyes: Negative for itching.   Respiratory: Negative for apnea and chest tightness.    Gastrointestinal: Negative for abdominal distention.   Musculoskeletal: Positive for arthralgias.     Objective:     Vital Signs (Most Recent):  Temp: 98 °F (36.7 °C) (10/14/18 1115)  Pulse: 70 (10/14/18 1115)  Resp: 18 (10/14/18 1115)  BP: (!) 113/54 (10/14/18 1115)  SpO2: 98 % (10/14/18 1115) Vital Signs (24h Range):  Temp:  [97.5 °F (36.4 °C)-98.8 °F (37.1 °C)] 98 °F (36.7 °C)  Pulse:  [61-74] 70  Resp:  [10-26] 18  SpO2:  [97 %-100 %] 98 %  BP: (113-178)/(54-77) 113/54  Arterial Line BP: (124-175)/(53-74) 175/74     Weight: 84.2 kg (185 lb 10 oz)  Body mass index is 31.86 kg/m².    Intake/Output Summary (Last 24 hours) at 10/14/2018 1121  Last data filed at 10/14/2018 0500  Gross per 24 hour   Intake 1700 ml   Output 2590 ml   Net -890 ml      Physical Exam   Constitutional: She is oriented to person, place, and time. No distress.   HENT:   Head: Atraumatic.   Eyes: EOM are normal. Pupils are equal, round, and reactive to light.   Neck: Normal range of motion.   Cardiovascular: Normal rate and regular rhythm.   Pulmonary/Chest: Effort normal and breath sounds normal.   Abdominal: Soft.   Musculoskeletal: Normal range of motion.   Neurological: She is oriented to person, place, and time. No cranial nerve deficit. Coordination normal.       Significant Labs:   BMP:   Recent Labs   Lab  10/14/18   0419   GLU  125*   NA  138   K  3.9   CL  104   CO2  28   BUN  5*   CREATININE  0.8   CALCIUM  9.0   MG  1.7     CBC:   Recent Labs   Lab  10/12/18   1311  10/13/18   0314   WBC  9.07  9.42   HGB  11.2*  10.9*   HCT  32.8*  32.9*   PLT  203  232       Significant Imaging: reviewed.

## 2018-10-14 NOTE — SUBJECTIVE & OBJECTIVE
Interval History: Continues to report pain and nausea this AM. No emesis. Is taking in clear liquids but doesn't feel ready for food. Sat in chair yesterday but has not ambulated.    Review of Systems  Objective:     Temp:  [97.5 °F (36.4 °C)-98.8 °F (37.1 °C)] 98 °F (36.7 °C)  Pulse:  [61-74] 70  Resp:  [10-26] 18  SpO2:  [97 %-100 %] 98 %  BP: (113-178)/(54-77) 113/54  Arterial Line BP: (124-175)/(53-74) 175/74     Body mass index is 31.86 kg/m².            Drains     Drain                 Closed/Suction Drain 10/12/18 1042 Right Abdomen Bulb 19 Fr. 2 days         Urethral Catheter 10/12/18 0830 Non-latex 16 Fr. 2 days                Physical Exam    Significant Labs:    BMP:  Recent Labs   Lab  10/12/18   1311  10/13/18   0314  10/14/18   0419   NA  135*  136  138   K  3.7  4.1  3.9   CL  103  103  104   CO2  26  27  28   BUN  14  10  5*   CREATININE  0.8  0.7  0.8   CALCIUM  8.6*  9.0  9.0       CBC:   Recent Labs   Lab  10/09/18   0955  10/12/18   1311  10/13/18   0314   WBC  4.50  9.07  9.42   HGB  13.5  11.2*  10.9*   HCT  41.0  32.8*  32.9*   PLT  242  203  232

## 2018-10-14 NOTE — HOSPITAL COURSE
Patient has been admitted for elective right partial nephrectomy per urology. S/P partail right nephrectomy on 10.12.18 .  Hospital Medicine is following for medical management.  BP elevated and Lisinopril increased.  Patient with apparent angioedema to Lisinopril.  Medication stopped and started on Benadryl and steroids.  Started on Norvasc.

## 2018-10-14 NOTE — PLAN OF CARE
Problem: Diabetes, Type 2 (Adult)  Goal: Signs and Symptoms of Listed Potential Problems Will be Absent, Minimized or Managed (Diabetes, Type 2)  Signs and symptoms of listed potential problems will be absent, minimized or managed by discharge/transition of care (reference Diabetes, Type 2 (Adult) CPG).  Outcome: Ongoing (interventions implemented as appropriate)   10/13/18 1931   Diabetes, Type 2   Problems Assessed (Type 2 Diabetes) all   Problems Present (Type 2 Diabetes) none       Problem: Patient Care Overview  Goal: Plan of Care Review  Outcome: Ongoing (interventions implemented as appropriate)   10/13/18 1931   Coping/Psychosocial   Plan Of Care Reviewed With patient       Problem: Fall Risk (Adult)  Goal: Absence of Falls  Patient will demonstrate the desired outcomes by discharge/transition of care.  Outcome: Ongoing (interventions implemented as appropriate)   10/13/18 1931   Fall Risk (Adult)   Absence of Falls making progress toward outcome       Problem: Infection, Risk/Actual (Adult)  Goal: Infection Prevention/Resolution  Patient will demonstrate the desired outcomes by discharge/transition of care.  Outcome: Ongoing (interventions implemented as appropriate)   10/13/18 1931   Infection, Risk/Actual (Adult)   Infection Prevention/Resolution making progress toward outcome       Problem: Pressure Ulcer Risk (Abdoul Scale) (Adult,Obstetrics,Pediatric)  Goal: Skin Integrity  Patient will demonstrate the desired outcomes by discharge/transition of care.  Outcome: Ongoing (interventions implemented as appropriate)   10/13/18 1931   Pressure Ulcer Risk (Abdoul Scale) (Adult,Obstetrics,Pediatric)   Skin Integrity making progress toward outcome       Problem: Pain, Acute (Adult)  Goal: Acceptable Pain Control/Comfort Level  Patient will demonstrate the desired outcomes by discharge/transition of care.  Outcome: Ongoing (interventions implemented as appropriate)   10/13/18 1931   Pain, Acute (Adult)    Acceptable Pain Control/Comfort Level making progress toward outcome

## 2018-10-14 NOTE — ASSESSMENT & PLAN NOTE
-- S/p right open partial nephrectomy  -- DC [place]   -- Monitor RAFI output  -- Continue CLD for now - hopefully can advance tomorrow  -- IS, ambulate   -- Medical management per hospitalist - appreciate assistance

## 2018-10-14 NOTE — PROGRESS NOTES
[facility]  Urology  Progress Note    Patient Name: Valarie Foster  MRN: [id number]  Admission Date: 10/12/2018  Hospital Length of Stay: 2 days  Code Status: Full Code   Attending Provider: Alejandra Palm MD   Primary Care Physician: Alena Hernandez MD    Subjective:     HPI:  No notes on file    Interval History: Continues to report pain and nausea this AM. No emesis. Is taking in clear liquids but doesn't feel ready for food. Sat in chair yesterday but has not ambulated.    Review of Systems  Objective:     Temp:  [97.5 °F (36.4 °C)-98.8 °F (37.1 °C)] 98 °F (36.7 °C)  Pulse:  [61-74] 70  Resp:  [10-26] 18  SpO2:  [97 %-100 %] 98 %  BP: (113-178)/(54-77) 113/54  Arterial Line BP: (124-175)/(53-74) 175/74     Body mass index is 31.86 kg/m².            Drains     Drain                 Closed/Suction Drain 10/12/18 1042 Right Abdomen Bulb 19 Fr. 2 days         Urethral Catheter 10/12/18 0830 Non-latex 16 Fr. 2 days                Physical Exam  Constitutional: She appears well-developed. No distress.   Pulmonary/Chest: Effort normal. No respiratory distress.   Abdominal: Soft. She exhibits no distension.   Incision c/d/i. RAFI with SS fluid.  Genitourinary:   Genitourinary Comments: [place] in place with clear yellow urine       Significant Labs:    BMP:  Recent Labs   Lab  10/12/18   1311  10/13/18   0314  10/14/18   0419   NA  135*  136  138   K  3.7  4.1  3.9   CL  103  103  104   CO2  26  27  28   BUN  14  10  5*   CREATININE  0.8  0.7  0.8   CALCIUM  8.6*  9.0  9.0       CBC:   Recent Labs   Lab  10/09/18   0955  10/12/18   1311  10/13/18   0314   WBC  4.50  9.07  9.42   HGB  13.5  11.2*  10.9*   HCT  41.0  32.8*  32.9*   PLT  242  203  232             Assessment/Plan:     * Right renal mass    -- S/p right open partial nephrectomy  -- DC [place]   -- Monitor RAFI output  -- Continue CLD for now - hopefully can advance tomorrow  -- IS, ambulate   -- Medical management per hospitalist - appreciate  assistance            VTE Risk Mitigation (From admission, onward)        Ordered     IP VTE HIGH RISK PATIENT  Once      10/13/18 0314     Place SEB hose  Until discontinued      10/13/18 0314     Place sequential compression device  Until discontinued      10/13/18 0314          Onur Shi MD  Urology  [facility]

## 2018-10-14 NOTE — NURSING
Received report from night shift. Pt Awake, alert, and without any signs of distress. Will continue to monitor

## 2018-10-14 NOTE — PLAN OF CARE
10/14/18 1636   Discharge Assessment   Assessment Type Discharge Planning Assessment   Patient/Family In Agreement With Plan (Attempted d/c needs assessment)

## 2018-10-14 NOTE — NURSING
Bedside Report given to night nurse. Walking rounds completed. Visualized and assessed patient; Pt c/o flank pain and chest pain, reports thinking it is gas pain. Upon turning on her right side, pt states her pain subsided. NAD noted. Safety precautions maintained and call light within reach.    Chart check completed.

## 2018-10-15 LAB
ANION GAP SERPL CALC-SCNC: 9 MMOL/L
BASOPHILS # BLD AUTO: 0 K/UL
BASOPHILS NFR BLD: 0 %
BUN SERPL-MCNC: 6 MG/DL
CALCIUM SERPL-MCNC: 9.3 MG/DL
CHLORIDE SERPL-SCNC: 102 MMOL/L
CO2 SERPL-SCNC: 27 MMOL/L
CREAT SERPL-MCNC: 0.7 MG/DL
DIFFERENTIAL METHOD: ABNORMAL
EOSINOPHIL # BLD AUTO: 0 K/UL
EOSINOPHIL NFR BLD: 0 %
ERYTHROCYTE [DISTWIDTH] IN BLOOD BY AUTOMATED COUNT: 14.4 %
EST. GFR  (AFRICAN AMERICAN): >60 ML/MIN/1.73 M^2
EST. GFR  (NON AFRICAN AMERICAN): >60 ML/MIN/1.73 M^2
GLUCOSE SERPL-MCNC: 80 MG/DL
HCT VFR BLD AUTO: 32.7 %
HGB BLD-MCNC: 10.7 G/DL
LYMPHOCYTES # BLD AUTO: 1 K/UL
LYMPHOCYTES NFR BLD: 13.1 %
MCH RBC QN AUTO: 27.9 PG
MCHC RBC AUTO-ENTMCNC: 32.7 G/DL
MCV RBC AUTO: 85 FL
MONOCYTES # BLD AUTO: 0.4 K/UL
MONOCYTES NFR BLD: 6.1 %
NEUTROPHILS # BLD AUTO: 5.8 K/UL
NEUTROPHILS NFR BLD: 80.5 %
PLATELET # BLD AUTO: 211 K/UL
PMV BLD AUTO: 10.7 FL
POCT GLUCOSE: 110 MG/DL (ref 70–110)
POCT GLUCOSE: 134 MG/DL (ref 70–110)
POCT GLUCOSE: 169 MG/DL (ref 70–110)
POCT GLUCOSE: 91 MG/DL (ref 70–110)
POTASSIUM SERPL-SCNC: 3.9 MMOL/L
RBC # BLD AUTO: 3.84 M/UL
SODIUM SERPL-SCNC: 138 MMOL/L
WBC # BLD AUTO: 7.25 K/UL

## 2018-10-15 PROCEDURE — 36415 COLL VENOUS BLD VENIPUNCTURE: CPT

## 2018-10-15 PROCEDURE — 21400001 HC TELEMETRY ROOM

## 2018-10-15 PROCEDURE — 25000003 PHARM REV CODE 250: Performed by: HOSPITALIST

## 2018-10-15 PROCEDURE — 25000003 PHARM REV CODE 250: Performed by: UROLOGY

## 2018-10-15 PROCEDURE — G8987 SELF CARE CURRENT STATUS: HCPCS | Mod: CK

## 2018-10-15 PROCEDURE — 94761 N-INVAS EAR/PLS OXIMETRY MLT: CPT

## 2018-10-15 PROCEDURE — 97165 OT EVAL LOW COMPLEX 30 MIN: CPT

## 2018-10-15 PROCEDURE — 85025 COMPLETE CBC W/AUTO DIFF WBC: CPT

## 2018-10-15 PROCEDURE — G8979 MOBILITY GOAL STATUS: HCPCS | Mod: CI

## 2018-10-15 PROCEDURE — G8978 MOBILITY CURRENT STATUS: HCPCS | Mod: CJ

## 2018-10-15 PROCEDURE — 94799 UNLISTED PULMONARY SVC/PX: CPT

## 2018-10-15 PROCEDURE — G8988 SELF CARE GOAL STATUS: HCPCS | Mod: CI

## 2018-10-15 PROCEDURE — A4216 STERILE WATER/SALINE, 10 ML: HCPCS | Performed by: UROLOGY

## 2018-10-15 PROCEDURE — 63600175 PHARM REV CODE 636 W HCPCS: Performed by: UROLOGY

## 2018-10-15 PROCEDURE — 63600175 PHARM REV CODE 636 W HCPCS: Performed by: HOSPITALIST

## 2018-10-15 PROCEDURE — 99024 POSTOP FOLLOW-UP VISIT: CPT | Mod: ,,, | Performed by: UROLOGY

## 2018-10-15 PROCEDURE — 80048 BASIC METABOLIC PNL TOTAL CA: CPT

## 2018-10-15 PROCEDURE — 27000221 HC OXYGEN, UP TO 24 HOURS

## 2018-10-15 PROCEDURE — 97161 PT EVAL LOW COMPLEX 20 MIN: CPT

## 2018-10-15 RX ORDER — DIPHENHYDRAMINE HYDROCHLORIDE 50 MG/ML
25 INJECTION INTRAMUSCULAR; INTRAVENOUS EVERY 6 HOURS
Status: DISCONTINUED | OUTPATIENT
Start: 2018-10-16 | End: 2018-10-16

## 2018-10-15 RX ORDER — DIPHENHYDRAMINE HYDROCHLORIDE 50 MG/ML
25 INJECTION INTRAMUSCULAR; INTRAVENOUS EVERY 6 HOURS PRN
Status: DISCONTINUED | OUTPATIENT
Start: 2018-10-15 | End: 2018-10-15

## 2018-10-15 RX ORDER — DIPHENHYDRAMINE HCL 25 MG
25 CAPSULE ORAL ONCE
Status: COMPLETED | OUTPATIENT
Start: 2018-10-15 | End: 2018-10-15

## 2018-10-15 RX ORDER — METHYLPREDNISOLONE SOD SUCC 125 MG
125 VIAL (EA) INJECTION EVERY 8 HOURS
Status: DISCONTINUED | OUTPATIENT
Start: 2018-10-15 | End: 2018-10-16

## 2018-10-15 RX ORDER — CLONIDINE HYDROCHLORIDE 0.1 MG/1
0.1 TABLET ORAL EVERY 4 HOURS PRN
Status: DISCONTINUED | OUTPATIENT
Start: 2018-10-15 | End: 2018-10-18 | Stop reason: HOSPADM

## 2018-10-15 RX ADMIN — DOCUSATE SODIUM 100 MG: 100 CAPSULE, LIQUID FILLED ORAL at 09:10

## 2018-10-15 RX ADMIN — OXYCODONE HYDROCHLORIDE AND ACETAMINOPHEN 1 TABLET: 5; 325 TABLET ORAL at 09:10

## 2018-10-15 RX ADMIN — METHYLPREDNISOLONE SODIUM SUCCINATE 125 MG: 125 INJECTION, POWDER, FOR SOLUTION INTRAMUSCULAR; INTRAVENOUS at 06:10

## 2018-10-15 RX ADMIN — DIPHENHYDRAMINE HYDROCHLORIDE 25 MG: 50 INJECTION, SOLUTION INTRAMUSCULAR; INTRAVENOUS at 06:10

## 2018-10-15 RX ADMIN — Medication 3 ML: at 02:10

## 2018-10-15 RX ADMIN — DOCUSATE SODIUM 100 MG: 100 CAPSULE, LIQUID FILLED ORAL at 08:10

## 2018-10-15 RX ADMIN — LISINOPRIL 40 MG: 20 TABLET ORAL at 08:10

## 2018-10-15 RX ADMIN — ONDANSETRON HYDROCHLORIDE 8 MG: 2 INJECTION INTRAMUSCULAR; INTRAVENOUS at 11:10

## 2018-10-15 RX ADMIN — GABAPENTIN 600 MG: 300 CAPSULE ORAL at 09:10

## 2018-10-15 RX ADMIN — Medication 3 ML: at 10:10

## 2018-10-15 RX ADMIN — OXYCODONE HYDROCHLORIDE 10 MG: 5 TABLET ORAL at 11:10

## 2018-10-15 RX ADMIN — DIPHENHYDRAMINE HYDROCHLORIDE 25 MG: 25 CAPSULE ORAL at 05:10

## 2018-10-15 RX ADMIN — OXYCODONE HYDROCHLORIDE 10 MG: 5 TABLET ORAL at 03:10

## 2018-10-15 RX ADMIN — ONDANSETRON HYDROCHLORIDE 8 MG: 2 INJECTION INTRAMUSCULAR; INTRAVENOUS at 09:10

## 2018-10-15 RX ADMIN — ONDANSETRON HYDROCHLORIDE 8 MG: 2 INJECTION INTRAMUSCULAR; INTRAVENOUS at 03:10

## 2018-10-15 RX ADMIN — PRAVASTATIN SODIUM 10 MG: 10 TABLET ORAL at 08:10

## 2018-10-15 NOTE — PLAN OF CARE
Problem: Infection, Risk/Actual (Adult)  Intervention: Manage Suspected/Actual Infection   10/13/18 1200 10/15/18 0424   Safety Interventions   Isolation Precautions --  environmental surveillance   Infection Management --  aseptic technique maintained   Prevent/Manage Colorectal Surgical Infection   Fever Reduction/Comfort Measures lightweight bedding --      Intervention: Prevent Infection/Maximize Resistance   10/14/18 2030 10/15/18 0820 10/15/18 0900   Respiratory Interventions   Airway/Ventilation Management --  airway patency maintained --    Hygiene Care   Bathing/Skin Care --  --  bath, partial;linen changed   Pain/Comfort/Sleep Interventions   Sleep/Rest Enhancement awakenings minimized;noise level reduced;regular sleep/rest pattern promoted --  --    Nutrition Interventions   Glycemic Management blood glucose monitoring --  --        Goal: Identify Related Risk Factors and Signs and Symptoms  Related risk factors and signs and symptoms are identified upon initiation of Human Response Clinical Practice Guideline (CPG)  Outcome: Ongoing (interventions implemented as appropriate)   10/14/18 0450   Infection, Risk/Actual   Related Risk Factors (Infection, Risk/Actual) surgery/procedure;skin integrity impairment;prolonged hospitalization   Signs and Symptoms (Infection, Risk/Actual) pain;weakness     Goal: Infection Prevention/Resolution  Patient will demonstrate the desired outcomes by discharge/transition of care.  Outcome: Ongoing (interventions implemented as appropriate)   10/13/18 1931   Infection, Risk/Actual (Adult)   Infection Prevention/Resolution making progress toward outcome       Problem: Pain, Acute (Adult)  Intervention: Monitor/Manage Analgesia   10/14/18 2030 10/15/18 0424   Safety Interventions   Medication Review/Management --  medications reviewed   Manage Acute Burn Pain   Bowel Intervention adequate fluid intake promoted;ambulation promoted;diet adjusted;privacy promoted --       Intervention: Support/Optimize Psychosocial Response to Acute Pain   10/14/18 2030   Psychosocial Support   Family/Support System Care self-care encouraged   Trust Relationship/Rapport care explained;choices provided;questions answered   Coping/Psychosocial Interventions   Supportive Measures active listening utilized;self-care encouraged;verbalization of feelings encouraged       Goal: Acceptable Pain Control/Comfort Level  Patient will demonstrate the desired outcomes by discharge/transition of care.  Outcome: Ongoing (interventions implemented as appropriate)   10/13/18 1931   Pain, Acute (Adult)   Acceptable Pain Control/Comfort Level making progress toward outcome

## 2018-10-15 NOTE — SUBJECTIVE & OBJECTIVE
Interval History: +flatus/BM. Denies further nausea with CLD. Minimally ambulating - to BSC. Pain moderately controlled.     Review of Systems   Constitutional: Negative for appetite change, chills and fever.   HENT: Negative for congestion, sore throat and trouble swallowing.    Eyes: Negative for pain and itching.   Respiratory: Negative for cough and shortness of breath.    Cardiovascular: Negative for chest pain, palpitations and leg swelling.   Gastrointestinal: Negative for abdominal distention, abdominal pain, constipation, diarrhea, nausea and vomiting.   Genitourinary: Positive for flank pain. Negative for difficulty urinating, dysuria, hematuria, nocturia and urgency.   Musculoskeletal: Negative for back pain, neck pain and neck stiffness.   Skin: Negative for rash and wound.   Neurological: Negative for dizziness and seizures.   Hematological: Negative for adenopathy. Does not bruise/bleed easily.   Psychiatric/Behavioral: Negative for confusion. The patient is not nervous/anxious.    All other systems reviewed and are negative.    Objective:     Temp:  [98 °F (36.7 °C)-98.5 °F (36.9 °C)] 98 °F (36.7 °C)  Pulse:  [65-76] 76  Resp:  [16-18] 16  SpO2:  [94 %-100 %] 94 %  BP: (113-155)/(54-72) 155/72     Body mass index is 31.6 kg/m².    Date 10/15/18 0700 - 10/16/18 0659   Shift 0853-4733 7229-1529 7346-8716 24 Hour Total   INTAKE   P.O. 420   420   Shift Total(mL/kg) 420(5)   420(5)   OUTPUT   Drains 10   10   Shift Total(mL/kg) 10(0.1)   10(0.1)   Weight (kg) 83.5 83.5 83.5 83.5          Drains     Drain                 Closed/Suction Drain 10/12/18 1042 Right Abdomen Bulb 19 Fr. 2 days                Physical Exam   Vitals reviewed.  Constitutional: She is oriented to person, place, and time. She appears well-developed and well-nourished. No distress.   HENT:   Head: Normocephalic and atraumatic.   Neck: Normal range of motion.   Cardiovascular: Normal rate and regular rhythm.    Pulmonary/Chest: Effort  normal and breath sounds normal. No respiratory distress.   Abdominal: Soft. She exhibits no distension and no mass. There is tenderness. There is no rebound and no guarding.   Inc - c/d/i, staples  RAFI - minimal serosang   Musculoskeletal: Normal range of motion.   Neurological: She is alert and oriented to person, place, and time.   Skin: Skin is warm and dry. No rash noted. She is not diaphoretic. No erythema.     Psychiatric: She has a normal mood and affect. Her behavior is normal.       Significant Labs:    BMP:  Recent Labs   Lab  10/12/18   1311  10/13/18   0314  10/14/18   0419   NA  135*  136  138   K  3.7  4.1  3.9   CL  103  103  104   CO2  26  27  28   BUN  14  10  5*   CREATININE  0.8  0.7  0.8   CALCIUM  8.6*  9.0  9.0       CBC:   Recent Labs   Lab  10/12/18   1311  10/13/18   0314  10/15/18   0643   WBC  9.07  9.42  7.25   HGB  11.2*  10.9*  10.7*   HCT  32.8*  32.9*  32.7*   PLT  203  232  211       All pertinent labs results from the past 24 hours have been reviewed.    Significant Imaging:  All pertinent imaging results/findings from the past 24 hours have been reviewed.

## 2018-10-15 NOTE — PROGRESS NOTES
Pt complaining of lip swelling after eating dinner.  notified, new orders placed for Benadryl 25 mg oral tablet times one dose.

## 2018-10-15 NOTE — PLAN OF CARE
"TN met with pt and pt's family at bedside. TN explained her role in Care Management. Pt voiced understanding. TN inquired about HELP AT HOME. Pt stated that she will have Nely at home to help for support. TN voiced understanding. SW inquired about responsibilities when it comes to  MANAGING HER HEALTH at home and what it entails. Pt inquired about details. TN informed pt of RESPONSIBILITIES of:    1. Follow up appointments  2. Getting Prescriptions filled  3. Taking medications as prescribed.     Pt voiced understanding.  TN explained "My Health Packet" blue folder and the pink and green tabs that are on the folder as well. Pt voiced understanding.     Pt's pharmacy:   [facility] - [place] - [address]  [address]  [place] [address]  Phone: [phone] Fax: [phone]      Pt's preference for appointments: Prefers morning appts.       10/15/18 1650   Discharge Assessment   Assessment Type Discharge Planning Assessment   Confirmed/corrected address and phone number on facesheet? Yes   Assessment information obtained from? Patient   Communicated expected length of stay with patient/caregiver no   Prior to hospitilization cognitive status: Alert/Oriented   Prior to hospitalization functional status: Independent   Lives With sibling(s)  (Pt's sister, Devika Bowman lives with her.)   Able to Return to Prior Arrangements yes   Is patient able to care for self after discharge? Yes   Who are your caregiver(s) and their phone number(s)? (Ms. Mckay [phone])   Patient's perception of discharge disposition home or selfcare   Readmission Within The Last 30 Days no previous admission in last 30 days   Patient currently being followed by outpatient case management? No   Patient currently receives any other outside agency services? No   Equipment Currently Used at Home none   Do you have any problems affording any of your prescribed medications? No   Is the patient " taking medications as prescribed? yes   Does the patient have transportation home? Yes   Transportation Available family or friend will provide;car   Does the patient receive services at the [facility]? No   Discharge Plan A Home with family   Patient/Family In Agreement With Plan yes

## 2018-10-15 NOTE — PLAN OF CARE
Problem: Physical Therapy Goal  Goal: Physical Therapy Goal  Goals to be met by: 10/29/18     Patient will increase functional independence with mobility by performin. Supine to sit with Modified [place]  2. Rolling to Left and Right with Modified [place]  3. Sit to stand transfer with Modified [place] using RW  4. Bed to chair transfer with Modified [place] using Rolling Walker  5. Gait  x 250 feet with Modified [place] using Rolling Walker   6. Lower extremity exercise program 3 sets x10 reps per handout, with independence    Pt ambulated ~40 ft with SBA using RW, gait limited 2* pt with c/o dizziness and being hot.

## 2018-10-15 NOTE — NURSING
Report given to night nurse. Pt awake, alert, and without any signs of distress.  Chart check completed.

## 2018-10-15 NOTE — PLAN OF CARE
Problem: Diabetes, Type 2 (Adult)  Intervention: Support/Optimize Psychosocial Response to Condition   10/14/18 2030   Psychosocial Support   Family/Support System Care self-care encouraged   Coping/Psychosocial Interventions   Supportive Measures active listening utilized;self-care encouraged;verbalization of feelings encouraged   Environmental Support calm environment promoted;comfort object encouraged;distractions minimized;environmental consistency promoted;personal routine supported     Intervention: Optimize Glycemic Control   10/14/18 2030   Nutrition Interventions   Glycemic Management blood glucose monitoring       Goal: Signs and Symptoms of Listed Potential Problems Will be Absent, Minimized or Managed (Diabetes, Type 2)  Signs and symptoms of listed potential problems will be absent, minimized or managed by discharge/transition of care (reference Diabetes, Type 2 (Adult) CPG).  Outcome: Ongoing (interventions implemented as appropriate)   10/15/18 0424   Diabetes, Type 2   Problems Assessed (Type 2 Diabetes) all   Problems Present (Type 2 Diabetes) situational response       Problem: Patient Care Overview  Goal: Plan of Care Review  Outcome: Ongoing (interventions implemented as appropriate)   10/14/18 2030   Coping/Psychosocial   Plan Of Care Reviewed With patient     Goal: Interdisciplinary Rounds/Family Conf  Outcome: Ongoing (interventions implemented as appropriate)   10/15/18 0424   Interdisciplinary Rounds/Family Conf   Participants nursing;pharmacy       Problem: Fall Risk (Adult)  Intervention: Monitor/Assist with Self Care   10/14/18 2030   Functional Level Current   Ambulation 2 - assistive person   Transferring 2 - assistive person   Toileting 3 - assistive equipment and person   Bathing 2 - assistive person   Dressing 2 - assistive person   Eating 0 - independent   Communication 0 - understands/communicates without difficulty   Swallowing 0 - swallows foods/liquids without difficulty   Daily  Care Interventions   Self-Care Promotion independence encouraged;BADL personal objects within reach;BADL personal routines maintained   Activity   Activity Assistance Provided assistance, 1 person     Intervention: Reduce Risk/Promote Restraint Free Environment   10/15/18 0424   Safety Interventions   Environmental Safety Modification assistive device/personal items within reach;clutter free environment maintained;lighting adjusted;room near unit station;room organization consistent   Prevent [place] Drop/Fall   Safety/Security Measures bed alarm set     Intervention: Review Medications/Identify Contributors to Fall Risk   10/15/18 0424   Safety Interventions   Medication Review/Management medications reviewed     Intervention: Patient Rounds   10/15/18 0328   Safety Interventions   Patient Rounds bed in low position;bed wheels locked;call light in reach     Intervention: Safety Promotion/Fall Prevention   10/15/18 0424   Safety Interventions   Safety Promotion/Fall Prevention assistive device/personal item within reach;bed alarm set;Fall Risk reviewed with patient/family;high risk medications identified;lighting adjusted;medications reviewed;nonskid shoes/socks when out of bed;room near unit station;side rails raised x 2;instructed to call staff for mobility       Goal: Identify Related Risk Factors and Signs and Symptoms  Related risk factors and signs and symptoms are identified upon initiation of Human Response Clinical Practice Guideline (CPG)  Outcome: Ongoing (interventions implemented as appropriate)   10/14/18 0450   Fall Risk   Related Risk Factors (Fall Risk) inadequate lighting;objects hard to reach;polypharmacy;slipper/uneven surfaces;environment unfamiliar   Signs and Symptoms (Fall Risk) presence of risk factors     Goal: Absence of Falls  Patient will demonstrate the desired outcomes by discharge/transition of care.  Outcome: Ongoing (interventions implemented as appropriate)   10/13/18 1931   Fall  Risk (Adult)   Absence of Falls making progress toward outcome       Problem: Infection, Risk/Actual (Adult)  Intervention: Manage Suspected/Actual Infection   10/15/18 0424   Safety Interventions   Isolation Precautions environmental surveillance   Infection Management aseptic technique maintained     Intervention: Prevent Infection/Maximize Resistance   10/14/18 2030 10/15/18 0424   Respiratory Interventions   Airway/Ventilation Management airway patency maintained;calming measures promoted --    Hygiene Care   Bathing/Skin Care --  bedtime care   Pain/Comfort/Sleep Interventions   Sleep/Rest Enhancement awakenings minimized;noise level reduced;regular sleep/rest pattern promoted --    Nutrition Interventions   Glycemic Management blood glucose monitoring --        Goal: Identify Related Risk Factors and Signs and Symptoms  Related risk factors and signs and symptoms are identified upon initiation of Human Response Clinical Practice Guideline (CPG)  Outcome: Ongoing (interventions implemented as appropriate)   10/14/18 0450   Infection, Risk/Actual   Related Risk Factors (Infection, Risk/Actual) surgery/procedure;skin integrity impairment;prolonged hospitalization   Signs and Symptoms (Infection, Risk/Actual) pain;weakness     Goal: Infection Prevention/Resolution  Patient will demonstrate the desired outcomes by discharge/transition of care.  Outcome: Ongoing (interventions implemented as appropriate)   10/13/18 1931   Infection, Risk/Actual (Adult)   Infection Prevention/Resolution making progress toward outcome       Problem: Pressure Ulcer Risk (Abdoul Scale) (Adult,Obstetrics,Pediatric)  Intervention: Prevent/Manage Excess Moisture   10/14/18 2030 10/15/18 0424   Hygiene Care   Perineal Care --  absorbent pad changed   Bathing/Skin Care --  bedtime care   Skin Interventions   Skin Protection Incontinence pads;Transparent dressing;Tubing/devices free from skin contact --      Intervention: Maintain Head of Bed  Elevation Less Than 30 Degrees as Tolerated   10/15/18 0328   Positioning   Head of Bed (HOB) HOB elevated     Intervention: Prevent/Minimize Sheer/Friction Injuries   10/14/18 2030   Positioning   Positioning/Transfer Devices pillows   Skin Interventions   Pressure Reduction Techniques frequent weight shift encouraged     Intervention: Turn/Reposition Often   10/14/18 2030 10/15/18 0328   Positioning   Body Position --  supine   Skin Interventions   Pressure Reduction Techniques frequent weight shift encouraged --        Goal: Identify Related Risk Factors and Signs and Symptoms  Related risk factors and signs and symptoms are identified upon initiation of Human Response Clinical Practice Guideline (CPG)  Outcome: Ongoing (interventions implemented as appropriate)   10/15/18 0424   Pressure Ulcer Risk (Abdoul Scale)   Related Risk Factors (Pressure Ulcer Risk (Abdoul Scale)) hospitalization prolonged;length of surgery;medication     Goal: Skin Integrity  Patient will demonstrate the desired outcomes by discharge/transition of care.  Outcome: Ongoing (interventions implemented as appropriate)   10/13/18 1931   Pressure Ulcer Risk (Abdoul Scale) (Adult,Obstetrics,Pediatric)   Skin Integrity making progress toward outcome       Problem: Pain, Acute (Adult)  Intervention: Monitor/Manage Analgesia   10/14/18 2030 10/15/18 0424   Safety Interventions   Medication Review/Management --  medications reviewed   Manage Acute Burn Pain   Bowel Intervention adequate fluid intake promoted;ambulation promoted;diet adjusted;privacy promoted --      Intervention: Mutually Develop/Implement Acute Pain Management Plan   10/14/18 2030   Pain/Comfort/Sleep Interventions   Pain Management Interventions awakened for pain meds per patient request;care clustered;medication;pain management plan reviewed with patient/caregiver;pillow support provided;quiet environment facilitated   Cognitive Interventions   Sensory Stimulation Regulation  care clustered;lighting decreased;quiet environment promoted       Goal: Identify Related Risk Factors and Signs and Symptoms  Related risk factors and signs and symptoms are identified upon initiation of Human Response Clinical Practice Guideline (CPG)  Outcome: Ongoing (interventions implemented as appropriate)   10/15/18 0424   Pain, Acute   Related Risk Factors (Acute Pain) knowledge deficit   Signs and Symptoms (Acute Pain) BADLs/IADLs reluctance/inability to perform;fatigue/weakness;verbalization of pain descriptors;questions meaning of pain     Goal: Acceptable Pain Control/Comfort Level  Patient will demonstrate the desired outcomes by discharge/transition of care.  Outcome: Ongoing (interventions implemented as appropriate)   10/13/18 1931   Pain, Acute (Adult)   Acceptable Pain Control/Comfort Level making progress toward outcome

## 2018-10-15 NOTE — PROGRESS NOTES
[facility]  Urology  Progress Note    Patient Name: Valarie Foster  MRN: [id number]  Admission Date: 10/12/2018  Hospital Length of Stay: 3 days  Code Status: Full Code   Attending Provider: Alejandra Palm MD   Primary Care Physician: Alena Hernandez MD    Subjective:     HPI:  S/p open partial R nephrectomy 10/12/18    Interval History: +flatus/BM. Denies further nausea with CLD. Minimally ambulating - to BSC. Pain moderately controlled.     Review of Systems   Constitutional: Negative for appetite change, chills and fever.   HENT: Negative for congestion, sore throat and trouble swallowing.    Eyes: Negative for pain and itching.   Respiratory: Negative for cough and shortness of breath.    Cardiovascular: Negative for chest pain, palpitations and leg swelling.   Gastrointestinal: Negative for abdominal distention, abdominal pain, constipation, diarrhea, nausea and vomiting.   Genitourinary: Positive for flank pain. Negative for difficulty urinating, dysuria, hematuria, nocturia and urgency.   Musculoskeletal: Negative for back pain, neck pain and neck stiffness.   Skin: Negative for rash and wound.   Neurological: Negative for dizziness and seizures.   Hematological: Negative for adenopathy. Does not bruise/bleed easily.   Psychiatric/Behavioral: Negative for confusion. The patient is not nervous/anxious.    All other systems reviewed and are negative.    Objective:     Temp:  [98 °F (36.7 °C)-98.5 °F (36.9 °C)] 98 °F (36.7 °C)  Pulse:  [65-76] 76  Resp:  [16-18] 16  SpO2:  [94 %-100 %] 94 %  BP: (113-155)/(54-72) 155/72     Body mass index is 31.6 kg/m².    Date 10/15/18 0700 - 10/16/18 0659   Shift 8361-6648 1984-6816 7729-8099 24 Hour Total   INTAKE   P.O. 420   420   Shift Total(mL/kg) 420(5)   420(5)   OUTPUT   Drains 10   10   Shift Total(mL/kg) 10(0.1)   10(0.1)   Weight (kg) 83.5 83.5 83.5 83.5          Drains     Drain                 Closed/Suction Drain 10/12/18 1042 Right Abdomen  Bulb 19 Fr. 2 days                Physical Exam   Vitals reviewed.  Constitutional: She is oriented to person, place, and time. She appears well-developed and well-nourished. No distress.   HENT:   Head: Normocephalic and atraumatic.   Neck: Normal range of motion.   Cardiovascular: Normal rate and regular rhythm.    Pulmonary/Chest: Effort normal and breath sounds normal. No respiratory distress.   Abdominal: Soft. She exhibits no distension and no mass. There is tenderness. There is no rebound and no guarding.   Inc - c/d/i, staples  RAFI - minimal serosang   Musculoskeletal: Normal range of motion.   Neurological: She is alert and oriented to person, place, and time.   Skin: Skin is warm and dry. No rash noted. She is not diaphoretic. No erythema.     Psychiatric: She has a normal mood and affect. Her behavior is normal.       Significant Labs:    BMP:  Recent Labs   Lab  10/12/18   1311  10/13/18   0314  10/14/18   0419   NA  135*  136  138   K  3.7  4.1  3.9   CL  103  103  104   CO2  26  27  28   BUN  14  10  5*   CREATININE  0.8  0.7  0.8   CALCIUM  8.6*  9.0  9.0       CBC:   Recent Labs   Lab  10/12/18   1311  10/13/18   0314  10/15/18   0643   WBC  9.07  9.42  7.25   HGB  11.2*  10.9*  10.7*   HCT  32.8*  32.9*  32.7*   PLT  203  232  211       All pertinent labs results from the past 24 hours have been reviewed.    Significant Imaging:  All pertinent imaging results/findings from the past 24 hours have been reviewed.                  Assessment/Plan:     * Right renal mass    -- S/p right open partial nephrectomy 10/12/18  -- Montero out, good UOP. RAFI output remains low.   -- Advance diet to regular for lunch today  -- IS, ambulate   -- PT  -- Medical management per hospitalist - appreciate assistance            VTE Risk Mitigation (From admission, onward)        Ordered     IP VTE HIGH RISK PATIENT  Once      10/13/18 0314     Place SEB hose  Until discontinued      10/13/18 0314     Place sequential  compression device  Until discontinued      10/13/18 0314          Alejandra Palm MD  Urology  [facility]

## 2018-10-15 NOTE — PT/OT/SLP EVAL
Physical Therapy Evaluation    Patient Name:  Valarie Foster   MRN:  [id number]    Recommendations:     Discharge Recommendations:  home health PT(with family assistance)   Discharge Equipment Recommendations: walker, rolling, shower chair   Barriers to discharge: None    Assessment:     Valarie Foster is a 68 y.o. female admitted with a medical diagnosis of Right renal mass.  She presents with the following impairments/functional limitations:  weakness, impaired self care skills, impaired functional mobilty, gait instability, pain, impaired skin, edema.    Rehab Prognosis:  Fair+; patient would benefit from acute skilled PT services to address these deficits and reach maximum level of function.      Recent Surgery: Procedure(s) (LRB):  NEPHRECTOMY, PARTIAL open. Dr Arenas to assist. (Right) 3 Days Post-Op    Plan:     During this hospitalization, patient to be seen 5 x/week(M-F) to address the above listed problems via gait training, therapeutic activities, therapeutic exercises  · Plan of Care Expires:  10/29/18   Plan of Care Reviewed with: patient    Subjective     Communicated with nurse Mast prior to session.  Patient found in bed upon PT entry to room, agreeable to evaluation.      Chief Complaint: pain and nausea  Patient comments/goals: to get well   Pain/Comfort:  · Pain Rating 1: 8/10  · Location - Side 1: Right  · Location 1: flank(incisional pain)      Living Environment:  Pt lives with sister (who works) in a SS house with ~4 steps and R HR at entry.  Pt has no children, however also has a niece who lives right next door.    Prior to admission, patients level of function was independent.  Pt stopped driving recently.  Patient has the following equipment: none.  Upon discharge, patient will have assistance from sister, friend, and niece.    Objective:     Patient found with: peripheral IV, telemetry, RAFI drain     General Precautions: Standard, fall, diabetic   Orthopedic Precautions:N/A   Braces: N/A      Exams:  · Cognitive Exam:  Patient was able to follow multiple commands.   · Gross Motor Coordination:  WFL  · Postural Exam:  Patient presented with the following abnormalities:    · -       No postural abnormalities identified  · Sensation:    · -       Intact for LT to BLE  · Skin Integrity/Edema:      · -       Skin integrity: Visible skin intact and R flank incision/staples intact  · -       Edema: None noted BLE; minimal to abdomen  · RLE ROM: WFL  · RLE Strength: WFL  · LLE ROM: WFL  · LLE Strength: WFL    Functional Mobility:  · Bed Mobility:     · Scooting: supervision  · Supine to Sit: supervision with HOB elevated; Pt required extra time to complete 2* pain.   · Transfers:     · Sit to Stand:  stand by assistance and contact guard assistance with rolling walker  · Bed to Chair: stand by assistance and contact guard assistance with  rolling walker  using  Step Transfer  · Gait: Pt ambulated ~40 ft with CGA-SBA using RW.  Pt with decreased step length and lance, gait limited 2* pt with c/o dizziness, nausea, and being hot.    · Balance: Pt with fair+ balance.     AM-PAC 6 CLICK MOBILITY  Total Score:21       Therapeutic Activities and Exercises:  Pt educated on being OOB>chair and ambulating with assistance (therapy/nursing/family) while in the hospital.      Patient left up in chair with all lines intact, call button in reach and nurse Kezia notified.    GOALS:   Multidisciplinary Problems     Physical Therapy Goals        Problem: Physical Therapy Goal    Goal Priority Disciplines Outcome Goal Variances Interventions   Physical Therapy Goal     PT, PT/OT      Description:  Goals to be met by: 10/29/18     Patient will increase functional independence with mobility by performin. Supine to sit with Modified [place]  2. Rolling to Left and Right with Modified [place]  3. Sit to stand transfer with Modified [place] using RW  4. Bed to chair transfer with Modified [place]  using Rolling Walker  5. Gait  x 250 feet with Modified [place] using Rolling Walker   6. Lower extremity exercise program 3 sets x10 reps per handout, with independence                      History:     Past Medical History:   Diagnosis Date    Arthritis     Diabetes mellitus     diet controlled    Diabetes with neurologic complications     Hypertension        Past Surgical History:   Procedure Laterality Date    BIOPSY-KIDNEY Right 2/19/2018    Performed by [facility] Provider at [facility] OR    COLONOSCOPY N/A 2/5/2018    Procedure: COLONOSCOPY;  Surgeon: Bacilio Mancia MD;  Location: [facility] ENDO;  Service: Endoscopy;  Laterality: N/A;  appt confirmed-ss    COLONOSCOPY N/A 2/5/2018    Performed by Bacilio Mancia MD at [facility] ENDO    HYSTERECTOMY      NEPHRECTOMY, PARTIAL open. Dr Arenas to assist. Right 10/12/2018    Performed by Alejandra Palm MD at [facility] OR    OOPHORECTOMY      PARTIAL NEPHRECTOMY Right 10/12/2018    Procedure: NEPHRECTOMY, PARTIAL open. Dr Arenas to assist.;  Surgeon: Alejandra Palm MD;  Location: [facility] OR;  Service: Urology;  Laterality: Right;  RN PREOP 10/9/2018----NEEDS H/P       Clinical Decision Making:     History  Co-morbidities and personal factors that may impact the plan of care Examination  Body Structures and Functions, activity limitations and participation restrictions that may impact the plan of care Clinical Presentation   Decision Making/ Complexity Score   Co-morbidities:   [] Time since onset of injury / illness / exacerbation  [x] Status of current condition  []Patient's cognitive status and safety concerns    [x] Multiple Medical Problems (see med hx)  Personal Factors:   [] Patient's age  [] Prior Level of function   [x] Patient's home situation (environment and family support)  [] Patient's level of motivation  [] Expected progression of patient      HISTORY:(criteria)    [] 77316 - no personal factors/history    [] 44944 - has 1-2  personal factor/comorbidity     [x] 18038 - has >3 personal factor/comorbidity     Body Regions:  [x] Objective examination findings  [] Head     []  Neck  [] Trunk   [] Upper Extremity  [x] Lower Extremity    Body Systems:  [] For communication ability, affect, cognition, language, and learning style: the assessment of the ability to make needs known, consciousness, orientation (person, place, and time), expected emotional /behavioral responses, and learning preferences (eg, learning barriers, education  needs)  [x] For the neuromuscular system: a general assessment of gross coordinated movement (eg, balance, gait, locomotion, transfers, and transitions) and motor function  (motor control and motor learning)  [x] For the musculoskeletal system: the assessment of gross symmetry, gross range of motion, gross strength, height, and weight  [x] For the integumentary system: the assessment of pliability(texture), presence of scar formation, skin color, and skin integrity  [x] For cardiovascular/pulmonary system: the assessment of heart rate, respiratory rate, blood pressure, and edema     Activity limitations:    [] Patient's cognitive status and saf ety concerns          [] Status of current condition      [] Weight bearing restriction  [] Cardiopulmunary Restriction    Participation Restrictions:   [] Goals and goal agreement with the patient     [] Rehab potential (prognosis) and probable outcome      Examination of Body System: (criteria)    [] 31368 - addressing 1-2 elements    [] 13390 - addressing a total of 3 or more elements     [x] 90455 -  Addressing a total of 4 or more elements         Clinical Presentation: (criteria)  Stable - 05295     On examination of body system using standardized tests and measures patient presents with 4 or more elements from any of the following: body structures and functions, activity limitations, and/or participation restrictions.  Leading to a clinical presentation that is  considered stable and/or uncomplicated                              Clinical Decision Making  (Eval Complexity):  Low- 21776     Time Tracking:     PT Received On: 10/15/18  PT Start Time: 1041     PT Stop Time: 1057  PT Total Time (min): 16 min     Billable Minutes: Evaluation 16 min      Katarina Maxwell, PT  10/15/2018

## 2018-10-15 NOTE — PLAN OF CARE
Problem: Fall Risk (Adult)  Intervention: Monitor/Assist with Self Care   10/14/18 2030 10/15/18 0820 10/15/18 1200   Functional Level Current   Ambulation 2 - assistive person --  --    Transferring 2 - assistive person --  --    Toileting 3 - assistive equipment and person --  --    Bathing 2 - assistive person --  --    Dressing 2 - assistive person --  --    Eating 0 - independent --  --    Communication 0 - understands/communicates without difficulty --  --    Swallowing 0 - swallows foods/liquids without difficulty --  --    Daily Care Interventions   Self-Care Promotion --  independence encouraged --    Activity   Activity Assistance Provided --  --  assistance, stand-by     Intervention: Reduce Risk/Promote Restraint Free Environment   10/13/18 1600 10/15/18 0424   Safety Interventions   Safety Precautions emergency equipment at bedside --    Safety Interventions   Environmental Safety Modification --  assistive device/personal items within reach;clutter free environment maintained;lighting adjusted;room near unit station;room organization consistent   Prevent  Drop/Fall   Safety/Security Measures --  bed alarm set     Intervention: Review Medications/Identify Contributors to Fall Risk   10/15/18 0424   Safety Interventions   Medication Review/Management medications reviewed     Intervention: Patient Rounds   10/15/18 1204   Safety Interventions   Patient Rounds bed in low position;bed wheels locked;call light in reach     Intervention: Safety Promotion/Fall Prevention   10/15/18 1200   Safety Interventions   Safety Promotion/Fall Prevention assistive device/personal item within reach;bed alarm set;bedside commode chair;commode/urinal/bedpan at bedside;Fall Risk signage in place;medications reviewed;room near unit station;side rails raised x 2     Intervention: Safety Precautions   10/13/18 1600   Safety Interventions   Safety Precautions emergency equipment at bedside       Goal: Identify Related  Risk Factors and Signs and Symptoms  Related risk factors and signs and symptoms are identified upon initiation of Human Response Clinical Practice Guideline (CPG)  Outcome: Ongoing (interventions implemented as appropriate)   10/14/18 0450   Fall Risk   Related Risk Factors (Fall Risk) inadequate lighting;objects hard to reach;polypharmacy;slipper/uneven surfaces;environment unfamiliar   Signs and Symptoms (Fall Risk) presence of risk factors     Goal: Absence of Falls  Patient will demonstrate the desired outcomes by discharge/transition of care.  Outcome: Ongoing (interventions implemented as appropriate)   10/13/18 1931   Fall Risk (Adult)   Absence of Falls making progress toward outcome

## 2018-10-15 NOTE — ASSESSMENT & PLAN NOTE
-- S/p right open partial nephrectomy 10/12/18  -- Montero out, good UOP. RAFI output remains low.   -- Advance diet to regular for lunch today  -- IS, ambulate   -- PT  -- Medical management per hospitalist - appreciate assistance

## 2018-10-15 NOTE — PT/OT/SLP EVAL
Occupational Therapy   Evaluation    Name: Valarie Foster  MRN: [id number]  Admitting Diagnosis:  Right renal mass 3 Days Post-Op    Recommendations:     Discharge Recommendations: home health OT  Discharge Equipment Recommendations:  walker, rolling  Barriers to discharge:  None    History:     Occupational Profile:  Living Environment: Pt lives with her sister who works.  They live in a SS home with 4 steps to enter, and no handrails.   Previous level of function: The pt was I with all ADLs  Roles:  The pt is retired  Equipment Used at Home:  none  Assistance upon Discharge: Family will assist with ADLs if needed    Past Medical History:   Diagnosis Date    Arthritis     Diabetes mellitus     diet controlled    Diabetes with neurologic complications     Hypertension        Past Surgical History:   Procedure Laterality Date    BIOPSY-KIDNEY Right 2/19/2018    Performed by [facility] Diagnostic Provider at [facility] OR    COLONOSCOPY N/A 2/5/2018    Procedure: COLONOSCOPY;  Surgeon: Bacilio Mancia MD;  Location: [facility] ENDO;  Service: Endoscopy;  Laterality: N/A;  appt confirmed-ss    COLONOSCOPY N/A 2/5/2018    Performed by Bacilio Mancia MD at [facility] ENDO    HYSTERECTOMY      NEPHRECTOMY, PARTIAL open. Dr Arenas to assist. Right 10/12/2018    Performed by Alejandra Palm MD at [facility] OR    OOPHORECTOMY      PARTIAL NEPHRECTOMY Right 10/12/2018    Procedure: NEPHRECTOMY, PARTIAL open. Dr Arenas to assist.;  Surgeon: Alejandra Palm MD;  Location: [facility] OR;  Service: Urology;  Laterality: Right;  RN PREOP 10/9/2018----NEEDS H/P       Subjective     Chief Complaint: Incisional pain due to Right nephrectomy  Patient/Family Comments/goals: The pt would like to be completely independent with all ADLs.    Pain/Comfort:  · Pain Rating 1: 7/10  · Location - Side 1: Right  · Location - Orientation 1: posterior  · Location 1: back  · Pain Rating Post-Intervention 1: 4/10    Patients cultural, spiritual, Mormonism  "conflicts given the current situation: none    Objective:     Communicated with: Joanna  prior to session.  Patient found all lines intact, call button in reach and nephew present and peripheral IV, RAFI drain, telemetry upon OT entry to room.    General Precautions: Standard, fall   Orthopedic Precautions:N/A   Braces: N/A     Occupational Performance:    Bed Mobility:    · Patient completed Rolling/Turning to Right with supervision  · Patient completed Scooting/Bridging with supervision  · Patient completed Supine to Sit with supervision    Functional Mobility/Transfers:  · Patient completed Toilet Transfer Step Transfer technique with contact guard assistance with  rolling walker      Activities of Daily Living:  · Feeding:  supervision lying supine in bed the HOB elevated  · Grooming: supervision standing at the sink with RW  · Toileting: contact guard assistance    Cognitive/Visual Perceptual:  Pt is oriented to person, place, and situation    Physical Exam:   Upper Extremity Range of Motion:     -       Right Upper Extremity: WFL   -       Left Upper Extremity: WFL  Upper Extremity Strength:    -       Right Upper Extremity: WFL  -       Left Upper Extremity: WFL    AMPAC 6 Click ADL:  AMPAC Total Score: 17    Treatment & Education:  The pt was educated on the role of OT  Education:    Patient left supine with all lines intact and call button in reach    Assessment:     Valarie Foster is a 68 y.o. female with a medical diagnosis of Right renal mass.  She presents with the following performance deficits affecting function: impaired self care skills, weakness.      Rehab Prognosis: Good; patient would benefit from acute skilled OT services to address these deficits and reach maximum level of function.         Clinical Decision Makin.  OT Low:  "Pt evaluation falls under low complexity for evaluation coding due to performance deficits noted in 1-3 areas as stated above and 0 co-morbities affecting current " "functional status. Data obtained from problem focused assessments. No modifications or assistance was required for completion of evaluation. Only brief occupational profile and history review completed."     Plan:     Patient to be seen 5 x/week to address the above listed problems via self-care/home management, therapeutic exercises, therapeutic activities  · Plan of Care Expires: 10/29/18  · Plan of Care Reviewed with: patient    This Plan of care has been discussed with the patient who was involved in its development and understands and is in agreement with the identified goals and treatment plan    GOALS:   Multidisciplinary Problems     Occupational Therapy Goals        Problem: Occupational Therapy Goal    Goal Priority Disciplines Outcome Interventions   Occupational Therapy Goal     OT, PT/OT     Description:  Goals to be met by: 10/29/18     Patient will increase functional independence with ADLs by performing:    UE Dressing with Set-up Assistance.  LE Dressing with Stand-by Assistance.  Grooming while standing at sink with Set-up Assistance.  Toileting from bedside commode with Modified [place] for hygiene and clothing management.   Toilet transfer to bedside commode with Modified [place].  Upper extremity exercise program for BUEs with Supervision in order to increase strength to perform ADLs with increased I.                     Time Tracking:     OT Date of Treatment: 10/15/18  OT Start Time: 1345  OT Stop Time: 1400  OT Total Time (min): 15 min    Billable Minutes:Evaluation 15 minutes    Kelsi Phelan OT  10/15/2018    "

## 2018-10-15 NOTE — PROGRESS NOTES
[facility] Hospital Medicine  Progress Note    Patient Name: Valarie Foster  MRN: [id number]  Patient Class: IP- Inpatient   Admission Date: 10/12/2018  Length of Stay: 3 days  Attending Physician: Alejandra Palm MD  Primary Care Provider: Alena Hernandez MD        Subjective:     Principal Problem:Right renal mass    HPI:  See H&P.    Hospital Course:  Patient has been admitted for elective right partial nephrectomy per urology.S/P partail right nephrectomy on 10.12.18 .  hospital medicine is following for medical conditions  of patient,she has slight elevated BP ,increased lisinopril,DC IVF,on prn clonidine.  PT,OT.  Started on stool softner.had BP.diet has been advanced.    Interval History: has pain on surgery side.    Review of Systems   Constitutional: Positive for activity change and appetite change.   HENT: Negative for congestion and dental problem.    Eyes: Negative for itching.   Respiratory: Negative for apnea and chest tightness.    Gastrointestinal: Negative for abdominal distention.   Musculoskeletal: Positive for arthralgias.     Objective:     Vital Signs (Most Recent):  Temp: 98 °F (36.7 °C) (10/15/18 0750)  Pulse: 76 (10/15/18 0820)  Resp: 16 (10/15/18 0750)  BP: (!) 155/72 (10/15/18 0750)  SpO2: (!) 94 % (10/15/18 0750) Vital Signs (24h Range):  Temp:  [98 °F (36.7 °C)-98.5 °F (36.9 °C)] 98 °F (36.7 °C)  Pulse:  [65-76] 76  Resp:  [16-18] 16  SpO2:  [94 %-100 %] 94 %  BP: (113-155)/(54-72) 155/72     Weight: 83.5 kg (184 lb 1.4 oz)  Body mass index is 31.6 kg/m².    Intake/Output Summary (Last 24 hours) at 10/15/2018 1013  Last data filed at 10/15/2018 0903  Gross per 24 hour   Intake 1140 ml   Output 1345 ml   Net -205 ml      Physical Exam   Constitutional: She is oriented to person, place, and time. No distress.   HENT:   Head: Atraumatic.   Eyes: EOM are normal. Pupils are equal, round, and reactive to light.   Neck: Normal range of motion.   Cardiovascular: Normal rate  and regular rhythm.   Pulmonary/Chest: Effort normal and breath sounds normal.   Abdominal: Soft.   Musculoskeletal: Normal range of motion.   Neurological: She is oriented to person, place, and time. No cranial nerve deficit. Coordination normal.       Significant Labs:   BMP:   Recent Labs   Lab  10/14/18   0419  10/15/18   0643   GLU  125*  80   NA  138  138   K  3.9  3.9   CL  104  102   CO2  28  27   BUN  5*  6*   CREATININE  0.8  0.7   CALCIUM  9.0  9.3   MG  1.7   --      CBC:   Recent Labs   Lab  10/15/18   0643   WBC  7.25   HGB  10.7*   HCT  32.7*   PLT  211       Significant Imaging: reviewed.    Assessment/Plan:      * Right renal mass    S/P partial nephrectomy,per urology,consulted PT,OT,started on stool softner.had BM.          Hyperlipemia    On statin.          Benign hypertension    elevated increased lisinopril,on prn clonidine.          Type 2 diabetes mellitus with diabetic neuropathy, without long-term current use of insulin    On SSI.            VTE Risk Mitigation (From admission, onward)        Ordered     IP VTE HIGH RISK PATIENT  Once      10/13/18 0314     Place SEB hose  Until discontinued      10/13/18 0314     Place sequential compression device  Until discontinued      10/13/18 0314              Danilo Navarro MD  Department of Hospital Medicine   [facility] [place]

## 2018-10-15 NOTE — PROGRESS NOTES
Called regarding lip swelling after eating. She reports this has happened previously and resolved with Benadryl. No known food allergies.    Evaluated patient. No SOB or respiratory issues.     Benadryl 25mg PO x 1 now.   Will monitor

## 2018-10-15 NOTE — PROGRESS NOTES
Pts lip swelling has increased and is now covering the full portion of her upper lip. Pts denies any tongue swelling or airway issues.  notified and she informed me to call the Hospitalist. Paged . New orders placed for IV Solumedrol 125 mg Q8, IV Benadryl 25 mg Q6, continuous pulse ox and discontinue Lisinopril 40 mg oral tablet and add to allergy list for angioedema.     Respiratory at bedside setting up continuous pulse ox at this time.

## 2018-10-15 NOTE — PLAN OF CARE
Problem: Infection, Risk/Actual (Adult)  Intervention: Manage Suspected/Actual Infection   10/14/18 1919   Safety Interventions   Isolation Precautions environmental surveillance   Infection Management aseptic technique maintained     Intervention: Prevent Infection/Maximize Resistance   10/13/18 2011 10/14/18 1919   Respiratory Interventions   Airway/Ventilation Management airway patency maintained;calming measures promoted --    Hygiene Care   Bathing/Skin Care --  bath, complete   Pain/Comfort/Sleep Interventions   Sleep/Rest Enhancement awakenings minimized;noise level reduced;regular sleep/rest pattern promoted --    Nutrition Interventions   Glycemic Management blood glucose monitoring --        Goal: Identify Related Risk Factors and Signs and Symptoms  Related risk factors and signs and symptoms are identified upon initiation of Human Response Clinical Practice Guideline (CPG)  Outcome: Ongoing (interventions implemented as appropriate)   10/14/18 0450   Infection, Risk/Actual   Related Risk Factors (Infection, Risk/Actual) surgery/procedure;skin integrity impairment;prolonged hospitalization   Signs and Symptoms (Infection, Risk/Actual) pain;weakness     Goal: Infection Prevention/Resolution  Patient will demonstrate the desired outcomes by discharge/transition of care.  Outcome: Ongoing (interventions implemented as appropriate)   10/13/18 1931   Infection, Risk/Actual (Adult)   Infection Prevention/Resolution making progress toward outcome       Problem: Pain, Acute (Adult)  Intervention: Monitor/Manage Analgesia   10/14/18 1919   Safety Interventions   Medication Review/Management medications reviewed   Manage Acute Burn Pain   Bowel Intervention ambulation promoted;privacy promoted;adequate fluid intake promoted     Intervention: Mutually Develop/Implement Acute Pain Management Plan   10/14/18 0450   Pain/Comfort/Sleep Interventions   Pain Management Interventions awakened for pain meds per patient  request;care clustered;medication;pain management plan reviewed with patient/caregiver;pillow support provided;quiet environment facilitated   Cognitive Interventions   Sensory Stimulation Regulation care clustered;lighting decreased;quiet environment promoted     Intervention: Support/Optimize Psychosocial Response to Acute Pain   10/13/18 2011 10/14/18 6270   Psychosocial Support   Family/Support System Care --  self-care encouraged   Trust Relationship/Rapport care explained;choices provided;questions answered --    Coping/Psychosocial Interventions   Supportive Measures active listening utilized;self-care encouraged;verbalization of feelings encouraged --        Goal: Identify Related Risk Factors and Signs and Symptoms  Related risk factors and signs and symptoms are identified upon initiation of Human Response Clinical Practice Guideline (CPG)  Outcome: Ongoing (interventions implemented as appropriate)   10/14/18 1919   Pain, Acute   Related Risk Factors (Acute Pain) positioning;patient perception;surgery   Signs and Symptoms (Acute Pain) nausea/vomiting/anorexia;verbalization of pain descriptors;facial mask of pain/grimace     Goal: Acceptable Pain Control/Comfort Level  Patient will demonstrate the desired outcomes by discharge/transition of care.  Outcome: Ongoing (interventions implemented as appropriate)   10/13/18 1931   Pain, Acute (Adult)   Acceptable Pain Control/Comfort Level making progress toward outcome

## 2018-10-16 LAB
ANION GAP SERPL CALC-SCNC: 9 MMOL/L
BASOPHILS # BLD AUTO: 0 K/UL
BASOPHILS NFR BLD: 0 %
BUN SERPL-MCNC: 8 MG/DL
CALCIUM SERPL-MCNC: 9.7 MG/DL
CHLORIDE SERPL-SCNC: 100 MMOL/L
CO2 SERPL-SCNC: 29 MMOL/L
CREAT SERPL-MCNC: 0.7 MG/DL
DIFFERENTIAL METHOD: ABNORMAL
EOSINOPHIL # BLD AUTO: 0 K/UL
EOSINOPHIL NFR BLD: 0 %
ERYTHROCYTE [DISTWIDTH] IN BLOOD BY AUTOMATED COUNT: 14.3 %
EST. GFR  (AFRICAN AMERICAN): >60 ML/MIN/1.73 M^2
EST. GFR  (NON AFRICAN AMERICAN): >60 ML/MIN/1.73 M^2
GLUCOSE SERPL-MCNC: 182 MG/DL
HCT VFR BLD AUTO: 35.9 %
HGB BLD-MCNC: 12 G/DL
LYMPHOCYTES # BLD AUTO: 0.6 K/UL
LYMPHOCYTES NFR BLD: 8.3 %
MCH RBC QN AUTO: 28 PG
MCHC RBC AUTO-ENTMCNC: 33.4 G/DL
MCV RBC AUTO: 84 FL
MONOCYTES # BLD AUTO: 0.1 K/UL
MONOCYTES NFR BLD: 1.5 %
NEUTROPHILS # BLD AUTO: 6.1 K/UL
NEUTROPHILS NFR BLD: 90.2 %
PLATELET # BLD AUTO: 247 K/UL
PMV BLD AUTO: 10.6 FL
POCT GLUCOSE: 162 MG/DL (ref 70–110)
POCT GLUCOSE: 185 MG/DL (ref 70–110)
POCT GLUCOSE: 189 MG/DL (ref 70–110)
POCT GLUCOSE: 210 MG/DL (ref 70–110)
POCT GLUCOSE: 234 MG/DL (ref 70–110)
POTASSIUM SERPL-SCNC: 4.5 MMOL/L
RBC # BLD AUTO: 4.28 M/UL
SODIUM SERPL-SCNC: 138 MMOL/L
WBC # BLD AUTO: 6.76 K/UL

## 2018-10-16 PROCEDURE — 85025 COMPLETE CBC W/AUTO DIFF WBC: CPT

## 2018-10-16 PROCEDURE — 80048 BASIC METABOLIC PNL TOTAL CA: CPT

## 2018-10-16 PROCEDURE — A4216 STERILE WATER/SALINE, 10 ML: HCPCS | Performed by: UROLOGY

## 2018-10-16 PROCEDURE — 97535 SELF CARE MNGMENT TRAINING: CPT

## 2018-10-16 PROCEDURE — 63600175 PHARM REV CODE 636 W HCPCS: Performed by: HOSPITALIST

## 2018-10-16 PROCEDURE — 63600175 PHARM REV CODE 636 W HCPCS: Performed by: UROLOGY

## 2018-10-16 PROCEDURE — G8988 SELF CARE GOAL STATUS: HCPCS | Mod: CI

## 2018-10-16 PROCEDURE — 25000003 PHARM REV CODE 250: Performed by: HOSPITALIST

## 2018-10-16 PROCEDURE — 97116 GAIT TRAINING THERAPY: CPT

## 2018-10-16 PROCEDURE — 36415 COLL VENOUS BLD VENIPUNCTURE: CPT

## 2018-10-16 PROCEDURE — 21400001 HC TELEMETRY ROOM

## 2018-10-16 PROCEDURE — G8987 SELF CARE CURRENT STATUS: HCPCS | Mod: CK

## 2018-10-16 PROCEDURE — 97110 THERAPEUTIC EXERCISES: CPT

## 2018-10-16 PROCEDURE — 99024 POSTOP FOLLOW-UP VISIT: CPT | Mod: ,,, | Performed by: UROLOGY

## 2018-10-16 PROCEDURE — 25000003 PHARM REV CODE 250: Performed by: UROLOGY

## 2018-10-16 RX ORDER — FAMOTIDINE 20 MG/1
20 TABLET, FILM COATED ORAL 2 TIMES DAILY
Status: DISCONTINUED | OUTPATIENT
Start: 2018-10-16 | End: 2018-10-18 | Stop reason: HOSPADM

## 2018-10-16 RX ORDER — AMLODIPINE BESYLATE 5 MG/1
5 TABLET ORAL DAILY
Qty: 30 TABLET | Refills: 11 | Status: SHIPPED | OUTPATIENT
Start: 2018-10-16 | End: 2019-10-16 | Stop reason: SDUPTHER

## 2018-10-16 RX ORDER — AMLODIPINE BESYLATE 5 MG/1
5 TABLET ORAL DAILY
Status: DISCONTINUED | OUTPATIENT
Start: 2018-10-16 | End: 2018-10-18 | Stop reason: HOSPADM

## 2018-10-16 RX ORDER — DIPHENHYDRAMINE HCL 25 MG
25 CAPSULE ORAL 2 TIMES DAILY
Status: DISCONTINUED | OUTPATIENT
Start: 2018-10-16 | End: 2018-10-18 | Stop reason: HOSPADM

## 2018-10-16 RX ORDER — PREDNISONE 20 MG/1
40 TABLET ORAL DAILY
Qty: 6 TABLET | Refills: 0 | Status: SHIPPED | OUTPATIENT
Start: 2018-10-17 | End: 2018-10-20

## 2018-10-16 RX ORDER — INSULIN ASPART 100 [IU]/ML
0-5 INJECTION, SOLUTION INTRAVENOUS; SUBCUTANEOUS 4 TIMES DAILY PRN
Status: DISCONTINUED | OUTPATIENT
Start: 2018-10-16 | End: 2018-10-18 | Stop reason: HOSPADM

## 2018-10-16 RX ORDER — PREDNISONE 20 MG/1
40 TABLET ORAL DAILY
Status: DISCONTINUED | OUTPATIENT
Start: 2018-10-17 | End: 2018-10-18 | Stop reason: HOSPADM

## 2018-10-16 RX ADMIN — OXYCODONE HYDROCHLORIDE 10 MG: 5 TABLET ORAL at 11:10

## 2018-10-16 RX ADMIN — DOCUSATE SODIUM 100 MG: 100 CAPSULE, LIQUID FILLED ORAL at 08:10

## 2018-10-16 RX ADMIN — DIPHENHYDRAMINE HYDROCHLORIDE 25 MG: 25 CAPSULE ORAL at 11:10

## 2018-10-16 RX ADMIN — Medication 3 ML: at 02:10

## 2018-10-16 RX ADMIN — DIPHENHYDRAMINE HYDROCHLORIDE 25 MG: 50 INJECTION, SOLUTION INTRAMUSCULAR; INTRAVENOUS at 12:10

## 2018-10-16 RX ADMIN — Medication 3 ML: at 10:10

## 2018-10-16 RX ADMIN — GABAPENTIN 600 MG: 300 CAPSULE ORAL at 08:10

## 2018-10-16 RX ADMIN — DIPHENHYDRAMINE HYDROCHLORIDE 25 MG: 50 INJECTION, SOLUTION INTRAMUSCULAR; INTRAVENOUS at 05:10

## 2018-10-16 RX ADMIN — CLONIDINE HYDROCHLORIDE 0.1 MG: 0.1 TABLET ORAL at 08:10

## 2018-10-16 RX ADMIN — AMLODIPINE BESYLATE 5 MG: 5 TABLET ORAL at 11:10

## 2018-10-16 RX ADMIN — OXYCODONE HYDROCHLORIDE 10 MG: 5 TABLET ORAL at 10:10

## 2018-10-16 RX ADMIN — PRAVASTATIN SODIUM 10 MG: 10 TABLET ORAL at 08:10

## 2018-10-16 RX ADMIN — INSULIN ASPART 2 UNITS: 100 INJECTION, SOLUTION INTRAVENOUS; SUBCUTANEOUS at 08:10

## 2018-10-16 RX ADMIN — Medication 3 ML: at 06:10

## 2018-10-16 RX ADMIN — INSULIN ASPART 2 UNITS: 100 INJECTION, SOLUTION INTRAVENOUS; SUBCUTANEOUS at 04:10

## 2018-10-16 RX ADMIN — DIPHENHYDRAMINE HYDROCHLORIDE 25 MG: 25 CAPSULE ORAL at 08:10

## 2018-10-16 RX ADMIN — FAMOTIDINE 20 MG: 20 TABLET ORAL at 08:10

## 2018-10-16 RX ADMIN — ONDANSETRON HYDROCHLORIDE 8 MG: 2 INJECTION INTRAMUSCULAR; INTRAVENOUS at 10:10

## 2018-10-16 RX ADMIN — ONDANSETRON HYDROCHLORIDE 8 MG: 2 INJECTION INTRAMUSCULAR; INTRAVENOUS at 11:10

## 2018-10-16 RX ADMIN — FAMOTIDINE 20 MG: 20 TABLET ORAL at 11:10

## 2018-10-16 RX ADMIN — METHYLPREDNISOLONE SODIUM SUCCINATE 125 MG: 125 INJECTION, POWDER, FOR SOLUTION INTRAMUSCULAR; INTRAVENOUS at 05:10

## 2018-10-16 NOTE — PLAN OF CARE
Problem: Physical Therapy Goal  Goal: Physical Therapy Goal  Goals to be met by: 10/29/18     Patient will increase functional independence with mobility by performin. Supine to sit with Modified [place]  2. Rolling to Left and Right with Modified [place]  3. Sit to stand transfer with Modified [place] using RW  4. Bed to chair transfer with Modified [place] using Rolling Walker  5. Gait  x 250 feet with Modified [place] using Rolling Walker   6. Lower extremity exercise program 3 sets x10 reps per handout, with independence     Outcome: Ongoing (interventions implemented as appropriate)  Pt progressing well toward treatment goals. Pt ambulated ~ 200 ft with RW, CGA. Pt O2 SATS maintain from 94%-99% on RA with ambulation.

## 2018-10-16 NOTE — PLAN OF CARE
Problem: Pain, Acute (Adult)  Intervention: Monitor/Manage Analgesia   10/16/18 1815   Safety Interventions   Medication Review/Management medications reviewed;high risk medications identified   Manage Acute Burn Pain   Bowel Intervention adequate fluid intake promoted;ambulation promoted;commode/bedpan at bedside;privacy promoted     Intervention: Mutually Develop/Implement Acute Pain Management Plan   10/16/18 0600 10/16/18 1815   Pain/Comfort/Sleep Interventions   Pain Management Interventions breathing exercises;medication offered but refused --    Cognitive Interventions   Sensory Stimulation Regulation --  care clustered;music/television provided for relaxation;music/television provided for stimulation;quiet environment promoted     Intervention: Support/Optimize Psychosocial Response to Acute Pain   10/16/18 0507 10/16/18 1815   Psychosocial Support   Diversional Activities --  television;smartphone   Family/Support System Care --  self-care encouraged;support provided   Trust Relationship/Rapport --  care explained;choices provided;empathic listening provided;questions encouraged;emotional support provided;reassurance provided;thoughts/feelings acknowledged;questions answered   Coping/Psychosocial Interventions   Supportive Measures self-care encouraged;relaxation techniques promoted --        Goal: Identify Related Risk Factors and Signs and Symptoms  Related risk factors and signs and symptoms are identified upon initiation of Human Response Clinical Practice Guideline (CPG)  Outcome: Ongoing (interventions implemented as appropriate)   10/16/18 1815   Pain, Acute   Related Risk Factors (Acute Pain) disease process;knowledge deficit;patient perception;persistent pain;surgery   Signs and Symptoms (Acute Pain) fatigue/weakness;nausea/vomiting/anorexia     Goal: Acceptable Pain Control/Comfort Level  Patient will demonstrate the desired outcomes by discharge/transition of care.  Outcome: Ongoing  (interventions implemented as appropriate)   10/16/18 7475   Pain, Acute (Adult)   Acceptable Pain Control/Comfort Level making progress toward outcome

## 2018-10-16 NOTE — PT/OT/SLP PROGRESS
Physical Therapy Treatment    Patient Name:  Valarie Foster   MRN:  [id number]    Recommendations:     Discharge Recommendations:  home health PT ( with family assistance )   Discharge Equipment Recommendations: walker, rolling, shower chair   Barriers to discharge: None    Assessment:     Valarie Foster is a 68 y.o. female admitted with a medical diagnosis of Right renal mass.  She presents with the following impairments/functional limitations:  weakness, impaired endurance, impaired functional mobilty, gait instability, pain, decreased safety awareness, decreased lower extremity function, decreased upper extremity function . Pt progressing well toward treatment goals.    Rehab Prognosis:  good; patient would benefit from acute skilled PT services to address these deficits and reach maximum level of function.      Recent Surgery: Procedure(s) (LRB):  NEPHRECTOMY, PARTIAL open. Dr Arenas to assist. (Right) 4 Days Post-Op    Plan:     During this hospitalization, patient to be seen 5 x/week(M-F) to address the above listed problems via gait training, therapeutic activities, therapeutic exercises  · Plan of Care Expires:  10/29/18   Plan of Care Reviewed with: patient    Subjective     Communicated with nurse Mast prior to session.  Patient found in bed upon PT entry to room, agreeable to treatment.      Chief Complaint: pain ( incisional pain )   Patient comments/goals: to get back to PLOF  Pain/Comfort:  · Pain Rating 1: 7/10  · Pain Addressed 1: Pre-medicate for activity, Nurse notified  · Pain Rating Post-Intervention 1: 7/10    Patients cultural, spiritual, Christian conflicts given the current situation:      Objective:     Patient found with: bed alarm, oxygen, pulse ox (continuous), telemetry, RAFI drain     General Precautions: Standard, fall , diabetic   Orthopedic Precautions:N/A   Braces: N/A     Functional Mobility:  · Bed Mobility:     · Rolling Right: stand by assistance  · Scooting: contact guard  assistance  · Supine to Sit: contact guard assistance  · Transfers:     · Sit to Stand:  contact guard assistance with rolling walker  · Gait:  Pt ambulated ~ 200 ft with RW CGA. Noted with decreased lance,decreased step length. Pt O2 SATS maintain 94%-99% on RA with ambulation. VC's for safety technique and walker management.   · Balance: good in sitting balance, fair in standing.        AM-PAC 6 CLICK MOBILITY  Turning over in bed (including adjusting bedclothes, sheets and blankets)?: 4  Sitting down on and standing up from a chair with arms (e.g., wheelchair, bedside commode, etc.): 3  Moving from lying on back to sitting on the side of the bed?: 4  Moving to and from a bed to a chair (including a wheelchair)?: 3  Need to walk in hospital room?: 3  Climbing 3-5 steps with a railing?: 3  Basic Mobility Total Score: 20       Therapeutic Activities and Exercises:   pt performed bed mobility, transfer and gait training as above.   Pt performed seated BLE x 15 reps : ankle DF/PF, LAQ, HS, hip flexion and pillow squeezes. VC's for proper and sequence. Pt tolerated well.     Patient left up in chair with all lines intact, call button in reach and nurse' notified..    GOALS:   Multidisciplinary Problems     Physical Therapy Goals        Problem: Physical Therapy Goal    Goal Priority Disciplines Outcome Goal Variances Interventions   Physical Therapy Goal     PT, PT/OT Ongoing (interventions implemented as appropriate)     Description:  Goals to be met by: 10/29/18     Patient will increase functional independence with mobility by performin. Supine to sit with Modified [place]  2. Rolling to Left and Right with Modified [place]  3. Sit to stand transfer with Modified [place] using RW  4. Bed to chair transfer with Modified [place] using Rolling Walker  5. Gait  x 250 feet with Modified [place] using Rolling Walker   6. Lower extremity exercise program 3 sets x10 reps per handout, with  independence                      Time Tracking:     PT Received On: 10/16/18  PT Start Time: 1539     PT Stop Time: 1604  PT Total Time (min): 25 min     Billable Minutes: Gait Training 14 and Therapeutic Exercise 11    Treatment Type: Treatment  PT/PTA: PTA     PTA Visit Number: 1     Tasha Mosley, PTA  10/16/2018

## 2018-10-16 NOTE — ASSESSMENT & PLAN NOTE
-- S/p right open partial nephrectomy 10/12/18  -- Montero out, good UOP. RAFI output remains low. Plan to d/c tomorrow  -- Continue regular diet  -- IS, ambulate   -- PT  -- Medical management per hospitalist - appreciate assistance    She does not feel ready to go today, hopefully will be ready to go home tomorrow

## 2018-10-16 NOTE — PLAN OF CARE
[facility]  Home Health Hub: [phone]      HOME HEALTH ORDERS    MD following patients home care: ____________    Admit to Home Health    Diagnosis:   Patient Active Problem List   Diagnosis    Type 2 diabetes mellitus with diabetic neuropathy, without long-term current use of insulin    Benign hypertension    Hyperlipemia    Right renal mass    Microhematuria    Left flank pain       Patient is homebound due to:  Right renal mass    Allergies:   Review of patient's allergies indicates:   Allergen Reactions    Lisinopril Swelling       Diet: regular diet Instruct on diet as prescribed    Activities as tolerated: SN to instruct patient on importance of home exercise program. Utilize tool for education.    Nursing:    Evaluate fall risk and need for Physical Therapy by performing time up and go (TUG) and 30 second chair rise.  o If the patient scores at risk on any one of the two tests performed then SN to order PT evaluate and treat.   o If PT consulted based on at risk score, then PT to evaluate need for OT in the home on evaluation visit.     SN to complete comprehensive assessment within 24 hours of discharge from hospital or referral from ambulatory clinic:    Perform and record the following vital signs:   BP   Respiratory Rate   Pulse   O2 Sat  o If room air O2 sat below 88%, notify physician so they can order an O2 qualifying test.        Skilled nurse to perform medication reconciliation on admit visit comparing discharged medications with actual medications in the home to include OTCs.   SN to provide a weekly medication reminder (give filled pill box) if warranted and instruct patient on its use.  If a medication discrepancy exists please contact>>>>>>>>>>>>>    Skilled nurse to assess all home equipment such as oxygen, nebulizers, hospital beds etc to determine if the equipment is functioning properly and patient/caregiver understanding how to use properly.     If  equipment is not functioning properly please contact supplier/ to resolve.      SN to instruct patient caregiver on who to contact if symptoms develop.  SN to leave behind tool with contact numbers as developed by [facility].    Skilled nurse to verify patient follow-up appointments have been scheduled and patient/caregiver understands the importance of making all appointments. Determine if any transportation related issues exist. SW consult if needed to assist with coordination of transportation to and from MD offices.    SN to reinforce education as instructed on admit visit and continue education on the below items on subsequent visits:   Coping mechanisms   Fall prevention and home safety   Plan on self management after HH discharge    CONSULTS:    Physical Therapy to evaluate and treat. Evaluate for home safety and equipment needs; Establish/upgrade home exercise program. Perform / instruct on therapeutic exercises, gait training, transfer training, and Range of Motion.  Occupational Therapy to evaluate and treat. Evaluate home environment for safety and equipment needs. Perform/Instruct on transfers, ADL training, ROM, and therapeutic exercises.          Medications: Review discharge medications with patient and family and provide education.      I have seen and examined this patient face to face today. My clinical findings that support the need for the home health skilled services and home bound status are the following:  {SELECT AT LEAST ONE HOME HEALTH QUALIFYING STATUS:20022}    I certify that this patient is confined to her home and needs intermittent skilled nursing care, physical therapy, speech therapy and occupational therapy.    FRAN Arenas MD

## 2018-10-16 NOTE — SUBJECTIVE & OBJECTIVE
Interval History: Lip swelling and Lisinopril stopped.    Review of Systems   HENT: Negative for ear discharge and ear pain.    Eyes: Negative for pain and itching.   Endocrine: Negative for polyphagia and polyuria.   Neurological: Negative for seizures and syncope.     Objective:     Vital Signs (Most Recent):  Temp: 97.4 °F (36.3 °C) (10/16/18 0724)  Pulse: 71 (10/16/18 0814)  Resp: 16 (10/16/18 0724)  BP: (!) 179/74 (10/16/18 0724)  SpO2: 99 % (10/16/18 0814) Vital Signs (24h Range):  Temp:  [97 °F (36.1 °C)-98.5 °F (36.9 °C)] 97.4 °F (36.3 °C)  Pulse:  [63-78] 71  Resp:  [16-18] 16  SpO2:  [93 %-99 %] 99 %  BP: (138-179)/(64-78) 179/74     Weight: 83.5 kg (184 lb 1.4 oz)  Body mass index is 31.6 kg/m².    Intake/Output Summary (Last 24 hours) at 10/16/2018 1018  Last data filed at 10/16/2018 0600  Gross per 24 hour   Intake 123 ml   Output 907 ml   Net -784 ml      Physical Exam   Constitutional: She is oriented to person, place, and time. No distress.   HENT:   Head: Atraumatic.   Eyes: EOM are normal. Pupils are equal, round, and reactive to light.   Neck: Normal range of motion.   Cardiovascular: Normal rate and regular rhythm.   Pulmonary/Chest: Effort normal and breath sounds normal.   Abdominal: Soft.   Musculoskeletal: Normal range of motion.   Neurological: She is oriented to person, place, and time. No cranial nerve deficit. Coordination normal.       Significant Labs:   BMP:   Recent Labs   Lab  10/16/18   0545   GLU  182*   NA  138   K  4.5   CL  100   CO2  29   BUN  8   CREATININE  0.7   CALCIUM  9.7     CBC:   Recent Labs   Lab  10/15/18   0643  10/16/18   0545   WBC  7.25  6.76   HGB  10.7*  12.0   HCT  32.7*  35.9*   PLT  211  247

## 2018-10-16 NOTE — ASSESSMENT & PLAN NOTE
BP elevated and Lisinopril increased.  Apparent Angioedema secondary to ACEI.  Lisinopril stopped.  Started on Benadryl and Solumedrol.  Angioedema basically resolved.  Would treat with 3 days of Prednisone 40 mg daily and Benadryl prn.  Will start Norvasc to treat BP.

## 2018-10-16 NOTE — PLAN OF CARE
10/16/18 1152   Medicare Message   Important Message from Medicare regarding Discharge Appeal Rights Given to patient/caregiver;Explained to patient/caregiver;Signed/date by patient/caregiver   Date IMM was signed 10/16/18   Time IMM was signed 1121

## 2018-10-16 NOTE — PLAN OF CARE
Problem: Occupational Therapy Goal  Goal: Occupational Therapy Goal  Goals to be met by: 10/29/18     Patient will increase functional independence with ADLs by performing:    UE Dressing with Set-up Assistance.  LE Dressing with Stand-by Assistance.  Grooming while standing at sink with Set-up Assistance.  Toileting from bedside commode with Modified [place] for hygiene and clothing management.   Toilet transfer to bedside commode with Modified [place].  Upper extremity exercise program for BUEs with Supervision in order to increase strength to perform ADLs with increased I.    Outcome: Ongoing (interventions implemented as appropriate)  The pt is making steady progress towards meeting goals.

## 2018-10-16 NOTE — PROGRESS NOTES
[facility] Hospital Medicine  Progress Note    Patient Name: Valarie Foster  MRN: [id number]  Patient Class: IP- Inpatient   Admission Date: 10/12/2018  Length of Stay: 4 days  Attending Physician: Alejandra Palm MD  Primary Care Provider: Alena Hernandez MD        Subjective:     Principal Problem:Right renal mass    HPI:  See H&P.    Hospital Course:  Patient has been admitted for elective right partial nephrectomy per urology. S/P partail right nephrectomy on 10.12.18 .  Hospital Medicine is following for medical management.  BP elevated and Lisinopril increased.  Patient with apparent angioedema to Lisinopril.  Medication stopped and started on Benadryl and steroids.      Interval History: Lip swelling and Lisinopril stopped.    Review of Systems   HENT: Negative for ear discharge and ear pain.    Eyes: Negative for pain and itching.   Endocrine: Negative for polyphagia and polyuria.   Neurological: Negative for seizures and syncope.     Objective:     Vital Signs (Most Recent):  Temp: 97.4 °F (36.3 °C) (10/16/18 0724)  Pulse: 71 (10/16/18 0814)  Resp: 16 (10/16/18 0724)  BP: (!) 179/74 (10/16/18 0724)  SpO2: 99 % (10/16/18 0814) Vital Signs (24h Range):  Temp:  [97 °F (36.1 °C)-98.5 °F (36.9 °C)] 97.4 °F (36.3 °C)  Pulse:  [63-78] 71  Resp:  [16-18] 16  SpO2:  [93 %-99 %] 99 %  BP: (138-179)/(64-78) 179/74     Weight: 83.5 kg (184 lb 1.4 oz)  Body mass index is 31.6 kg/m².    Intake/Output Summary (Last 24 hours) at 10/16/2018 1018  Last data filed at 10/16/2018 0600  Gross per 24 hour   Intake 123 ml   Output 907 ml   Net -784 ml      Physical Exam   Constitutional: She is oriented to person, place, and time. No distress.   HENT:   Head: Atraumatic.   Eyes: EOM are normal. Pupils are equal, round, and reactive to light.   Neck: Normal range of motion.   Cardiovascular: Normal rate and regular rhythm.   Pulmonary/Chest: Effort normal and breath sounds normal.   Abdominal: Soft.    Musculoskeletal: Normal range of motion.   Neurological: She is oriented to person, place, and time. No cranial nerve deficit. Coordination normal.       Significant Labs:   BMP:   Recent Labs   Lab  10/16/18   0545   GLU  182*   NA  138   K  4.5   CL  100   CO2  29   BUN  8   CREATININE  0.7   CALCIUM  9.7     CBC:   Recent Labs   Lab  10/15/18   0643  10/16/18   0545   WBC  7.25  6.76   HGB  10.7*  12.0   HCT  32.7*  35.9*   PLT  211  247         Assessment/Plan:      * Right renal mass    S/P partial nephrectomy.  Treatment and plan per .          Hyperlipemia    On statin.          Benign hypertension    BP elevated and Lisinopril increased.  Apparent Angioedema secondary to ACEI.  Lisinopril stopped.  Started on Benadryl and Solumedrol.  Angioedema basically resolved.  Would treat with 3 days of Prednisone 40 mg daily and Benadryl prn.  Will start Norvasc to treat BP.          Type 2 diabetes mellitus with diabetic neuropathy, without long-term current use of insulin    On SSI.  Patient started on steroids for angioedema.  This will temporarily increase sugars, but would not make any changes.  Change IV to PO Prednisone.            VTE Risk Mitigation (From admission, onward)        Ordered     IP VTE HIGH RISK PATIENT  Once      10/13/18 0314     Place SEB hose  Until discontinued      10/13/18 0314     Place sequential compression device  Until discontinued      10/13/18 0314              Ethan Humphrey MD  Department of Hospital Medicine   [facility]

## 2018-10-16 NOTE — ANESTHESIA POSTPROCEDURE EVALUATION
"Anesthesia Post Evaluation    Patient: Valarie Foster    Procedure(s) Performed: Procedure(s) (LRB):  NEPHRECTOMY, PARTIAL open. Dr Arenas to assist. (Right)    Final Anesthesia Type: general  Patient location during evaluation: PACU  Patient participation: Yes- Able to Participate  Level of consciousness: awake and alert, oriented and awake  Post-procedure vital signs: reviewed and stable  Airway patency: patent  PONV status at discharge: No PONV  Anesthetic complications: no      Cardiovascular status: blood pressure returned to baseline  Respiratory status: unassisted, spontaneous ventilation and room air  Hydration status: euvolemic  Follow-up not needed.        Visit Vitals  BP (!) 147/74 (BP Location: Right arm, Patient Position: Lying)   Pulse 63   Temp 36.1 °C (97 °F) (Oral)   Resp 16   Ht 5' 4" (1.626 m)   Wt 83.5 kg (184 lb 1.4 oz)   SpO2 99%   Breastfeeding? No   BMI 31.60 kg/m²       Pain/Fabricio Score: Pain Assessment Performed: Yes (10/16/2018  6:00 AM)  Presence of Pain: complains of pain/discomfort (10/16/2018  6:00 AM)  Pain Rating Prior to Med Admin: 6 (10/15/2018 10:00 PM)  Pain Rating Post Med Admin: 2 (10/16/2018 12:00 AM)        "

## 2018-10-16 NOTE — SUBJECTIVE & OBJECTIVE
Interval History: voiding.  Tolerating regular food.  Ambulating with some difficulty due to pain, but working with PT.      Review of Systems   Constitutional: Negative.  Negative for fever.   HENT: Negative.    Eyes: Negative.    Respiratory: Negative for cough, chest tightness and shortness of breath.    Cardiovascular: Negative for chest pain.   Gastrointestinal: Negative.  Negative for constipation, diarrhea and nausea.   Genitourinary: Positive for flank pain.   Neurological: Negative.    Psychiatric/Behavioral: Negative.      Objective:     Temp:  [97 °F (36.1 °C)-98.5 °F (36.9 °C)] 97.4 °F (36.3 °C)  Pulse:  [63-78] 69  Resp:  [16-18] 16  SpO2:  [93 %-99 %] 96 %  BP: (138-179)/(64-78) 179/74     Body mass index is 31.6 kg/m².            Drains     Drain                 Closed/Suction Drain 10/12/18 1042 Right Abdomen Bulb 19 Fr. 3 days                Physical Exam   Nursing note and vitals reviewed.  Constitutional: She is oriented to person, place, and time. She appears well-developed.   HENT:   Head: Normocephalic.   Eyes: Conjunctivae are normal.   Neck: Normal range of motion. No tracheal deviation present. No thyromegaly present.   Cardiovascular: Normal rate, normal heart sounds and normal pulses.    Pulmonary/Chest: Effort normal and breath sounds normal. No respiratory distress. She has no wheezes.   Abdominal: Soft. She exhibits no distension and no mass. There is no hepatosplenomegaly. There is no tenderness. There is no rebound, no guarding and no CVA tenderness. No hernia.   Incision C/D/I, staples in place.  RAFI with minimal serosanguinous fluid   Musculoskeletal: Normal range of motion. She exhibits no edema or tenderness.   Lymphadenopathy:     She has no cervical adenopathy.   Neurological: She is alert and oriented to person, place, and time.   Skin: Skin is warm and dry. No rash noted. No erythema.     Psychiatric: She has a normal mood and affect. Her behavior is normal. Judgment and  thought content normal.       Significant Labs:    BMP:  Recent Labs   Lab  10/14/18   0419  10/15/18   0643  10/16/18   0545   NA  138  138  138   K  3.9  3.9  4.5   CL  104  102  100   CO2  28  27  29   BUN  5*  6*  8   CREATININE  0.8  0.7  0.7   CALCIUM  9.0  9.3  9.7       CBC:   Recent Labs   Lab  10/13/18   0314  10/15/18   0643  10/16/18   0545   WBC  9.42  7.25  6.76   HGB  10.9*  10.7*  12.0   HCT  32.9*  32.7*  35.9*   PLT  232  211  247       Blood Culture: No results for input(s): LABBLOO in the last 168 hours.  Urine Culture: No results for input(s): LABURIN in the last 168 hours.    Significant Imaging:

## 2018-10-16 NOTE — ASSESSMENT & PLAN NOTE
On SSI.  Patient started on steroids for angioedema.  This will temporarily increase sugars, but would not make any changes.  Change IV to PO Prednisone.

## 2018-10-17 LAB
ANION GAP SERPL CALC-SCNC: 4 MMOL/L
BASOPHILS # BLD AUTO: 0 K/UL
BASOPHILS NFR BLD: 0 %
BUN SERPL-MCNC: 11 MG/DL
CALCIUM SERPL-MCNC: 9.4 MG/DL
CHLORIDE SERPL-SCNC: 102 MMOL/L
CO2 SERPL-SCNC: 33 MMOL/L
CREAT SERPL-MCNC: 0.8 MG/DL
DIFFERENTIAL METHOD: ABNORMAL
EOSINOPHIL # BLD AUTO: 0 K/UL
EOSINOPHIL NFR BLD: 0 %
ERYTHROCYTE [DISTWIDTH] IN BLOOD BY AUTOMATED COUNT: 14.5 %
EST. GFR  (AFRICAN AMERICAN): >60 ML/MIN/1.73 M^2
EST. GFR  (NON AFRICAN AMERICAN): >60 ML/MIN/1.73 M^2
GLUCOSE SERPL-MCNC: 114 MG/DL
HCT VFR BLD AUTO: 33.5 %
HGB BLD-MCNC: 11.5 G/DL
LYMPHOCYTES # BLD AUTO: 1.5 K/UL
LYMPHOCYTES NFR BLD: 14.6 %
MCH RBC QN AUTO: 27.7 PG
MCHC RBC AUTO-ENTMCNC: 34.3 G/DL
MCV RBC AUTO: 81 FL
MONOCYTES # BLD AUTO: 0.8 K/UL
MONOCYTES NFR BLD: 7.5 %
NEUTROPHILS # BLD AUTO: 7.8 K/UL
NEUTROPHILS NFR BLD: 77.5 %
PLATELET # BLD AUTO: 279 K/UL
PMV BLD AUTO: 10.5 FL
POCT GLUCOSE: 114 MG/DL (ref 70–110)
POCT GLUCOSE: 171 MG/DL (ref 70–110)
POCT GLUCOSE: 186 MG/DL (ref 70–110)
POCT GLUCOSE: 192 MG/DL (ref 70–110)
POTASSIUM SERPL-SCNC: 4 MMOL/L
RBC # BLD AUTO: 4.15 M/UL
SODIUM SERPL-SCNC: 139 MMOL/L
WBC # BLD AUTO: 10.01 K/UL

## 2018-10-17 PROCEDURE — 99024 POSTOP FOLLOW-UP VISIT: CPT | Mod: ,,, | Performed by: UROLOGY

## 2018-10-17 PROCEDURE — 21400001 HC TELEMETRY ROOM

## 2018-10-17 PROCEDURE — 36415 COLL VENOUS BLD VENIPUNCTURE: CPT

## 2018-10-17 PROCEDURE — 97530 THERAPEUTIC ACTIVITIES: CPT

## 2018-10-17 PROCEDURE — 25000003 PHARM REV CODE 250: Performed by: HOSPITALIST

## 2018-10-17 PROCEDURE — 85025 COMPLETE CBC W/AUTO DIFF WBC: CPT

## 2018-10-17 PROCEDURE — A4216 STERILE WATER/SALINE, 10 ML: HCPCS | Performed by: UROLOGY

## 2018-10-17 PROCEDURE — 63600175 PHARM REV CODE 636 W HCPCS: Performed by: HOSPITALIST

## 2018-10-17 PROCEDURE — 94799 UNLISTED PULMONARY SVC/PX: CPT

## 2018-10-17 PROCEDURE — 80048 BASIC METABOLIC PNL TOTAL CA: CPT

## 2018-10-17 PROCEDURE — 25000003 PHARM REV CODE 250: Performed by: UROLOGY

## 2018-10-17 PROCEDURE — 97116 GAIT TRAINING THERAPY: CPT

## 2018-10-17 RX ADMIN — Medication 3 ML: at 08:10

## 2018-10-17 RX ADMIN — Medication 3 ML: at 02:10

## 2018-10-17 RX ADMIN — FAMOTIDINE 20 MG: 20 TABLET ORAL at 08:10

## 2018-10-17 RX ADMIN — GABAPENTIN 600 MG: 300 CAPSULE ORAL at 08:10

## 2018-10-17 RX ADMIN — PREDNISONE 40 MG: 20 TABLET ORAL at 09:10

## 2018-10-17 RX ADMIN — POLYETHYLENE GLYCOL 3350 17 G: 17 POWDER, FOR SOLUTION ORAL at 09:10

## 2018-10-17 RX ADMIN — OXYCODONE HYDROCHLORIDE 10 MG: 5 TABLET ORAL at 07:10

## 2018-10-17 RX ADMIN — FAMOTIDINE 20 MG: 20 TABLET ORAL at 09:10

## 2018-10-17 RX ADMIN — ONDANSETRON HYDROCHLORIDE 8 MG: 2 INJECTION INTRAMUSCULAR; INTRAVENOUS at 07:10

## 2018-10-17 RX ADMIN — DOCUSATE SODIUM 100 MG: 100 CAPSULE, LIQUID FILLED ORAL at 09:10

## 2018-10-17 RX ADMIN — DIPHENHYDRAMINE HYDROCHLORIDE 25 MG: 25 CAPSULE ORAL at 08:10

## 2018-10-17 RX ADMIN — OXYCODONE HYDROCHLORIDE AND ACETAMINOPHEN 1 TABLET: 5; 325 TABLET ORAL at 02:10

## 2018-10-17 RX ADMIN — AMLODIPINE BESYLATE 5 MG: 5 TABLET ORAL at 09:10

## 2018-10-17 RX ADMIN — DOCUSATE SODIUM 100 MG: 100 CAPSULE, LIQUID FILLED ORAL at 08:10

## 2018-10-17 RX ADMIN — PRAVASTATIN SODIUM 10 MG: 10 TABLET ORAL at 09:10

## 2018-10-17 RX ADMIN — CLONIDINE HYDROCHLORIDE 0.1 MG: 0.1 TABLET ORAL at 08:10

## 2018-10-17 RX ADMIN — Medication 3 ML: at 06:10

## 2018-10-17 RX ADMIN — OXYCODONE HYDROCHLORIDE 10 MG: 5 TABLET ORAL at 08:10

## 2018-10-17 RX ADMIN — DIPHENHYDRAMINE HYDROCHLORIDE 25 MG: 25 CAPSULE ORAL at 09:10

## 2018-10-17 NOTE — PROGRESS NOTES
SWAPNIL contacted [facility] ([facility]) to determine if pt qualifies for RW and if equipment can be pulled from closet. [employer] approved equipment. Pt declined shower chair.

## 2018-10-17 NOTE — ASSESSMENT & PLAN NOTE
BP elevated and Lisinopril increased.  Apparent Angioedema secondary to ACEI.  Lisinopril stopped.  Started on Benadryl and Solumedrol.  Angioedema basically resolved.  Would treat with 3 days of Prednisone 40 mg daily and Benadryl prn.  Started on Norvasc.

## 2018-10-17 NOTE — ASSESSMENT & PLAN NOTE
On SSI.  Patient started on steroids for angioedema.  This will temporarily increase sugars, but would not make any changes.  Changed IV to PO Prednisone.  Prednisone for 3 days.

## 2018-10-17 NOTE — PROGRESS NOTES
[facility]  Urology  Progress Note    Patient Name: Valarie Foster  MRN: [id number]  Admission Date: 10/12/2018  Hospital Length of Stay: 5 days  Code Status: Full Code   Attending Provider: Alejandra Palm MD   Primary Care Physician: Alena Hernandez MD    Subjective:     HPI:  S/p open partial R nephrectomy 10/12/18    Interval History: Pain is still present but controlled.  Tolerating Diet.  Ambulating with some difficulty and is working with PT, she does feel ready to go home.    Review of Systems   Constitutional: Negative.  Negative for fever.   HENT: Negative.    Eyes: Negative.    Respiratory: Negative for cough, chest tightness and shortness of breath.    Cardiovascular: Negative for chest pain.   Gastrointestinal: Negative.  Negative for constipation, diarrhea and nausea.   Genitourinary: Positive for flank pain.   Neurological: Negative.    Psychiatric/Behavioral: Negative.      Objective:     Temp:  [97.5 °F (36.4 °C)-98.6 °F (37 °C)] 98.2 °F (36.8 °C)  Pulse:  [61-69] 61  Resp:  [16-20] 16  SpO2:  [93 %-96 %] 93 %  BP: (127-165)/(66-74) 165/74     Body mass index is 31.03 kg/m².            Drains     Drain                 Closed/Suction Drain 10/12/18 1042 Right Abdomen Bulb 19 Fr. 4 days                Physical Exam   Nursing note and vitals reviewed.  Constitutional: She is oriented to person, place, and time. She appears well-developed.   HENT:   Head: Normocephalic.   Eyes: Conjunctivae are normal.   Neck: Normal range of motion. No tracheal deviation present. No thyromegaly present.   Cardiovascular: Normal rate, normal heart sounds and normal pulses.    Pulmonary/Chest: Effort normal and breath sounds normal. No respiratory distress. She has no wheezes.   Abdominal: Soft. She exhibits no distension and no mass. There is no hepatosplenomegaly. There is no tenderness. There is no rebound, no guarding and no CVA tenderness. No hernia.   Incision intact, clean  RAFI serous    Musculoskeletal: Normal range of motion. She exhibits no edema or tenderness.   Lymphadenopathy:     She has no cervical adenopathy.   Neurological: She is alert and oriented to person, place, and time.   Skin: Skin is warm and dry. No rash noted. No erythema.     Psychiatric: She has a normal mood and affect. Her behavior is normal. Judgment and thought content normal.       Significant Labs:    BMP:  Recent Labs   Lab  10/15/18   0643  10/16/18   0545  10/17/18   0631   NA  138  138  139   K  3.9  4.5  4.0   CL  102  100  102   CO2  27  29  33*   BUN  6*  8  11   CREATININE  0.7  0.7  0.8   CALCIUM  9.3  9.7  9.4       CBC:   Recent Labs   Lab  10/15/18   0643  10/16/18   0545  10/17/18   0631   WBC  7.25  6.76  10.01   HGB  10.7*  12.0  11.5*   HCT  32.7*  35.9*  33.5*   PLT  211  247  279       Blood Culture: No results for input(s): LABBLOO in the last 168 hours.  Urine Culture: No results for input(s): LABURIN in the last 168 hours.    Significant Imaging:                    Assessment/Plan:     * Right renal mass    -- S/p right open partial nephrectomy 10/12/18  -- Montero out, good UOP. RAFI output remains low. Plan to d/c tomorrow  -- Continue regular diet  -- IS, ambulate   -- PT  -- Medical management per hospitalist - appreciate assistance    She does not feel ready to go today, hopefully will be ready to go home tomorrow            VTE Risk Mitigation (From admission, onward)        Ordered     IP VTE HIGH RISK PATIENT  Once      10/13/18 0314     Place SEB hose  Until discontinued      10/13/18 0314     Place sequential compression device  Until discontinued      10/13/18 0314          YESICA Arenas MD  Urology  [facility]

## 2018-10-17 NOTE — PLAN OF CARE
Problem: Physical Therapy Goal  Goal: Physical Therapy Goal  Goals to be met by: 10/29/18     Patient will increase functional independence with mobility by performin. Supine to sit with Modified [place]  2. Rolling to Left and Right with Modified [place]  3. Sit to stand transfer with Modified [place] using RW  4. Bed to chair transfer with Modified [place] using Rolling Walker  5. Gait  x 250 feet with Modified [place] using Rolling Walker   6. Lower extremity exercise program 3 sets x10 reps per handout, with independence     Outcome: Ongoing (interventions implemented as appropriate)  Pt ambulated ~ 200 ft and ~ 20 ft in room with no AD, CGA/SBA. Pt progressing well toward treatment goals.

## 2018-10-17 NOTE — PLAN OF CARE
Problem: Diabetes, Type 2 (Adult)  Intervention: Support/Optimize Psychosocial Response to Condition   10/17/18 0529   Psychosocial Support   Family/Support System Care support provided   Coping/Psychosocial Interventions   Supportive Measures active listening utilized;self-care encouraged;verbalization of feelings encouraged   Environmental Support calm environment promoted;distractions minimized;rest periods encouraged     Intervention: Optimize Glycemic Control   10/17/18 0529   Nutrition Interventions   Glycemic Management blood glucose monitoring       Goal: Signs and Symptoms of Listed Potential Problems Will be Absent, Minimized or Managed (Diabetes, Type 2)  Signs and symptoms of listed potential problems will be absent, minimized or managed by discharge/transition of care (reference Diabetes, Type 2 (Adult) CPG).  Outcome: Ongoing (interventions implemented as appropriate)   10/17/18 0529   Diabetes, Type 2   Problems Assessed (Type 2 Diabetes) all   Problems Present (Type 2 Diabetes) hyperglycemia

## 2018-10-17 NOTE — PT/OT/SLP PROGRESS
Physical Therapy Treatment    Patient Name:  Valaire Foster   MRN:  [id number]    Recommendations:     Discharge Recommendations:  home health PT (with family assistance )   Discharge Equipment Recommendations: walker, rolling, shower chair   Barriers to discharge: None    Assessment:     Valarie Foster is a 68 y.o. female admitted with a medical diagnosis of Right renal mass.  She presents with the following impairments/functional limitations:  weakness, impaired endurance, gait instability, impaired balance, impaired functional mobilty, decreased lower extremity function, pain, decreased safety awareness, decreased ROM, impaired cardiopulmonary response to activity . Pt progressing well toward treatment goals.    Rehab Prognosis:  good; patient would benefit from acute skilled PT services to address these deficits and reach maximum level of function.      Recent Surgery: Procedure(s) (LRB):  NEPHRECTOMY, PARTIAL open. Dr Arenas to assist. (Right) 5 Days Post-Op    Plan:     During this hospitalization, patient to be seen 5 x/week(M-F) to address the above listed problems via gait training, therapeutic activities, therapeutic exercises  · Plan of Care Expires:  10/29/18   Plan of Care Reviewed with: patient    Subjective     Communicated with nurse Chung prior to session.  Patient found in bed upon PT entry to room, agreeable to treatment.      Chief Complaint: incisional site pain   Patient comments/goals: to get back to PLOF.  Pain/Comfort:  · Pain Rating 1: 5/10  · Pain Addressed 1: Pre-medicate for activity, Nurse notified  · Pain Rating Post-Intervention 1: 5/10    Patients cultural, spiritual, Muslim conflicts given the current situation:      Objective:     Patient found with: bed alarm, telemetry, RAFI drain, pulse ox (continuous)     General Precautions: Standard, fall, respiratory   Orthopedic Precautions:N/A   Braces: N/A     Functional Mobility:  · Bed Mobility:     · Rolling Right: modified  independence  · Scooting: supervision and stand by assistance  · Supine to Sit: supervision  · Sit to Supine: supervision  · Transfers:     · Sit to Stand:  supervision with no AD  · Toilet Transfer: stand by assistance and contact guard assistance with  no AD  using  Stand Pivot and Step Transfer  · Gait:  Pt ambulated ~ 200 ft and ~ 20 ft with no AD, CGA/SBA. Noted with decreased lance, decreased step length, decreased swing to stance phase. Pt required 2 standing rest breaks during gait training 2* fatigue and SOB ( O2 SATS : 81%-96% on RW with ambulation ) . VC's for pursed lip breathing technique in order to prevent SOB and increase O2 SATS. VC's for safety awareness with all mobility .  · Balance:  Good in sitting, fair + in standing.       AM-PAC 6 CLICK MOBILITY  Turning over in bed (including adjusting bedclothes, sheets and blankets)?: 4  Sitting down on and standing up from a chair with arms (e.g., wheelchair, bedside commode, etc.): 4  Moving from lying on back to sitting on the side of the bed?: 4  Moving to and from a bed to a chair (including a wheelchair)?: 3  Need to walk in hospital room?: 3  Climbing 3-5 steps with a railing?: 3  Basic Mobility Total Score: 21       Therapeutic Activities and Exercises:   pt performed bed mobility, transfer and gait training as above.  HEP given with instructions and demonstration. Pt verbalize understanding.   Pt performed seated BLE x 15 reps : AP, LAQ, hip flexion.     Patient left HOB elevated with all lines intact, call button in reach and nurse notified..    GOALS:   Multidisciplinary Problems     Physical Therapy Goals        Problem: Physical Therapy Goal    Goal Priority Disciplines Outcome Goal Variances Interventions   Physical Therapy Goal     PT, PT/OT Ongoing (interventions implemented as appropriate)     Description:  Goals to be met by: 10/29/18     Patient will increase functional independence with mobility by performin. Supine to sit  with Modified [place]  2. Rolling to Left and Right with Modified [place]  3. Sit to stand transfer with Modified [place] using RW  4. Bed to chair transfer with Modified [place] using Rolling Walker  5. Gait  x 250 feet with Modified [place] using Rolling Walker   6. Lower extremity exercise program 3 sets x10 reps per handout, with independence                      Time Tracking:     PT Received On: 10/17/18  PT Start Time: 1105     PT Stop Time: 1131  PT Total Time (min): 26 min     Billable Minutes: Gait Training 15 and Therapeutic Activity 11    Treatment Type: Treatment  PT/PTA: PTA     PTA Visit Number: 2     Tram T Melany, PTA  10/17/2018

## 2018-10-17 NOTE — SUBJECTIVE & OBJECTIVE
Interval History: Pain is still present but controlled.  Tolerating Diet.  Ambulating with some difficulty and is working with PT, she does feel ready to go home.    Review of Systems   Constitutional: Negative.  Negative for fever.   HENT: Negative.    Eyes: Negative.    Respiratory: Negative for cough, chest tightness and shortness of breath.    Cardiovascular: Negative for chest pain.   Gastrointestinal: Negative.  Negative for constipation, diarrhea and nausea.   Genitourinary: Positive for flank pain.   Neurological: Negative.    Psychiatric/Behavioral: Negative.      Objective:     Temp:  [97.5 °F (36.4 °C)-98.6 °F (37 °C)] 98.2 °F (36.8 °C)  Pulse:  [61-69] 61  Resp:  [16-20] 16  SpO2:  [93 %-96 %] 93 %  BP: (127-165)/(66-74) 165/74     Body mass index is 31.03 kg/m².            Drains     Drain                 Closed/Suction Drain 10/12/18 1042 Right Abdomen Bulb 19 Fr. 4 days                Physical Exam   Nursing note and vitals reviewed.  Constitutional: She is oriented to person, place, and time. She appears well-developed.   HENT:   Head: Normocephalic.   Eyes: Conjunctivae are normal.   Neck: Normal range of motion. No tracheal deviation present. No thyromegaly present.   Cardiovascular: Normal rate, normal heart sounds and normal pulses.    Pulmonary/Chest: Effort normal and breath sounds normal. No respiratory distress. She has no wheezes.   Abdominal: Soft. She exhibits no distension and no mass. There is no hepatosplenomegaly. There is no tenderness. There is no rebound, no guarding and no CVA tenderness. No hernia.   Incision intact, clean  RAFI serous   Musculoskeletal: Normal range of motion. She exhibits no edema or tenderness.   Lymphadenopathy:     She has no cervical adenopathy.   Neurological: She is alert and oriented to person, place, and time.   Skin: Skin is warm and dry. No rash noted. No erythema.     Psychiatric: She has a normal mood and affect. Her behavior is normal. Judgment and  thought content normal.       Significant Labs:    BMP:  Recent Labs   Lab  10/15/18   0643  10/16/18   0545  10/17/18   0631   NA  138  138  139   K  3.9  4.5  4.0   CL  102  100  102   CO2  27  29  33*   BUN  6*  8  11   CREATININE  0.7  0.7  0.8   CALCIUM  9.3  9.7  9.4       CBC:   Recent Labs   Lab  10/15/18   0643  10/16/18   0545  10/17/18   0631   WBC  7.25  6.76  10.01   HGB  10.7*  12.0  11.5*   HCT  32.7*  35.9*  33.5*   PLT  211  247  279       Blood Culture: No results for input(s): LABBLOO in the last 168 hours.  Urine Culture: No results for input(s): LABURIN in the last 168 hours.    Significant Imaging:

## 2018-10-17 NOTE — NURSING
Report given to SANTOS Chung during walking rounds. NAD noted. Pt complained of pain 7/10 and given pain medication as ordered. Nurse SANTOS Chung informed. 12 hour chart check done.

## 2018-10-17 NOTE — SUBJECTIVE & OBJECTIVE
Interval History: Lip swelling and Lisinopril stopped.    Review of Systems   HENT: Negative for ear discharge and ear pain.    Eyes: Negative for pain and itching.   Endocrine: Negative for polyphagia and polyuria.   Neurological: Negative for seizures and syncope.     Objective:     Vital Signs (Most Recent):  Temp: 98.2 °F (36.8 °C) (10/17/18 0735)  Pulse: 67 (10/17/18 0809)  Resp: 16 (10/17/18 0735)  BP: (!) 165/74 (10/17/18 0735)  SpO2: (!) 93 % (10/17/18 0735) Vital Signs (24h Range):  Temp:  [97.5 °F (36.4 °C)-98.6 °F (37 °C)] 98.2 °F (36.8 °C)  Pulse:  [61-69] 67  Resp:  [16-20] 16  SpO2:  [93 %-96 %] 93 %  BP: (127-165)/(66-74) 165/74     Weight: 82 kg (180 lb 12.4 oz)  Body mass index is 31.03 kg/m².    Intake/Output Summary (Last 24 hours) at 10/17/2018 0953  Last data filed at 10/17/2018 0600  Gross per 24 hour   Intake --   Output 325 ml   Net -325 ml      Physical Exam   Constitutional: She is oriented to person, place, and time. No distress.   HENT:   Head: Atraumatic.   Eyes: EOM are normal. Pupils are equal, round, and reactive to light.   Neck: Normal range of motion.   Cardiovascular: Normal rate and regular rhythm.   Pulmonary/Chest: Effort normal and breath sounds normal.   Abdominal: Soft.   Musculoskeletal: Normal range of motion.   Neurological: She is oriented to person, place, and time. No cranial nerve deficit. Coordination normal.       Significant Labs:   BMP:   Recent Labs   Lab  10/17/18   0631   GLU  114*   NA  139   K  4.0   CL  102   CO2  33*   BUN  11   CREATININE  0.8   CALCIUM  9.4     CBC:   Recent Labs   Lab  10/16/18   0545  10/17/18   0631   WBC  6.76  10.01   HGB  12.0  11.5*   HCT  35.9*  33.5*   PLT  247  279

## 2018-10-17 NOTE — PROGRESS NOTES
[facility] Hospital Medicine  Progress Note    Patient Name: Valarie Foster  MRN: [id number]  Patient Class: IP- Inpatient   Admission Date: 10/12/2018  Length of Stay: 5 days  Attending Physician: Alejandra Palm MD  Primary Care Provider: Alena Hernandez MD        Subjective:     Principal Problem:Right renal mass    HPI:  See H&P.    Hospital Course:  Patient has been admitted for elective right partial nephrectomy per urology. S/P partail right nephrectomy on 10.12.18 .  Hospital Medicine is following for medical management.  BP elevated and Lisinopril increased.  Patient with apparent angioedema to Lisinopril.  Medication stopped and started on Benadryl and steroids.  Started on Norvasc.    Interval History: Lip swelling and Lisinopril stopped.    Review of Systems   HENT: Negative for ear discharge and ear pain.    Eyes: Negative for pain and itching.   Endocrine: Negative for polyphagia and polyuria.   Neurological: Negative for seizures and syncope.     Objective:     Vital Signs (Most Recent):  Temp: 98.2 °F (36.8 °C) (10/17/18 0735)  Pulse: 67 (10/17/18 0809)  Resp: 16 (10/17/18 0735)  BP: (!) 165/74 (10/17/18 0735)  SpO2: (!) 93 % (10/17/18 0735) Vital Signs (24h Range):  Temp:  [97.5 °F (36.4 °C)-98.6 °F (37 °C)] 98.2 °F (36.8 °C)  Pulse:  [61-69] 67  Resp:  [16-20] 16  SpO2:  [93 %-96 %] 93 %  BP: (127-165)/(66-74) 165/74     Weight: 82 kg (180 lb 12.4 oz)  Body mass index is 31.03 kg/m².    Intake/Output Summary (Last 24 hours) at 10/17/2018 0953  Last data filed at 10/17/2018 0600  Gross per 24 hour   Intake --   Output 325 ml   Net -325 ml      Physical Exam   Constitutional: She is oriented to person, place, and time. No distress.   HENT:   Head: Atraumatic.   Eyes: EOM are normal. Pupils are equal, round, and reactive to light.   Neck: Normal range of motion.   Cardiovascular: Normal rate and regular rhythm.   Pulmonary/Chest: Effort normal and breath sounds normal.   Abdominal:  Soft.   Musculoskeletal: Normal range of motion.   Neurological: She is oriented to person, place, and time. No cranial nerve deficit. Coordination normal.       Significant Labs:   BMP:   Recent Labs   Lab  10/17/18   0631   GLU  114*   NA  139   K  4.0   CL  102   CO2  33*   BUN  11   CREATININE  0.8   CALCIUM  9.4     CBC:   Recent Labs   Lab  10/16/18   0545  10/17/18   0631   WBC  6.76  10.01   HGB  12.0  11.5*   HCT  35.9*  33.5*   PLT  247  279         Assessment/Plan:      * Right renal mass    S/P partial nephrectomy.  Treatment and plan per .          Hyperlipemia    On statin.          Benign hypertension    BP elevated and Lisinopril increased.  Apparent Angioedema secondary to ACEI.  Lisinopril stopped.  Started on Benadryl and Solumedrol.  Angioedema basically resolved.  Would treat with 3 days of Prednisone 40 mg daily and Benadryl prn.  Started on Norvasc.            Type 2 diabetes mellitus with diabetic neuropathy, without long-term current use of insulin    On SSI.  Patient started on steroids for angioedema.  This will temporarily increase sugars, but would not make any changes.  Changed IV to PO Prednisone.  Prednisone for 3 days.            VTE Risk Mitigation (From admission, onward)        Ordered     IP VTE HIGH RISK PATIENT  Once      10/13/18 0314     Place SEB hose  Until discontinued      10/13/18 0314     Place sequential compression device  Until discontinued      10/13/18 0314        Continue Prednisone for 3 days and Norvasc.  Prescriptions sent to pharmacy.  Will sign off, but please call with any questions.      Ethan Humphrey MD  Department of Hospital Medicine   [facility]

## 2018-10-18 ENCOUNTER — TELEPHONE (OUTPATIENT)
Dept: UROLOGY | Facility: CLINIC | Age: 68
End: 2018-10-18

## 2018-10-18 VITALS
OXYGEN SATURATION: 97 % | SYSTOLIC BLOOD PRESSURE: 141 MMHG | BODY MASS INDEX: 30.86 KG/M2 | HEIGHT: 64 IN | WEIGHT: 180.75 LBS | HEART RATE: 62 BPM | TEMPERATURE: 99 F | DIASTOLIC BLOOD PRESSURE: 68 MMHG | RESPIRATION RATE: 18 BRPM

## 2018-10-18 LAB
ANION GAP SERPL CALC-SCNC: 8 MMOL/L
BASOPHILS # BLD AUTO: 0 K/UL
BASOPHILS NFR BLD: 0 %
BUN SERPL-MCNC: 13 MG/DL
CALCIUM SERPL-MCNC: 9.5 MG/DL
CHLORIDE SERPL-SCNC: 101 MMOL/L
CO2 SERPL-SCNC: 29 MMOL/L
CREAT SERPL-MCNC: 0.8 MG/DL
DIFFERENTIAL METHOD: ABNORMAL
EOSINOPHIL # BLD AUTO: 0 K/UL
EOSINOPHIL NFR BLD: 0.1 %
ERYTHROCYTE [DISTWIDTH] IN BLOOD BY AUTOMATED COUNT: 14.3 %
EST. GFR  (AFRICAN AMERICAN): >60 ML/MIN/1.73 M^2
EST. GFR  (NON AFRICAN AMERICAN): >60 ML/MIN/1.73 M^2
GLUCOSE SERPL-MCNC: 128 MG/DL
HCT VFR BLD AUTO: 34.2 %
HGB BLD-MCNC: 10.9 G/DL
LYMPHOCYTES # BLD AUTO: 1.4 K/UL
LYMPHOCYTES NFR BLD: 15.8 %
MCH RBC QN AUTO: 27.3 PG
MCHC RBC AUTO-ENTMCNC: 31.9 G/DL
MCV RBC AUTO: 86 FL
MONOCYTES # BLD AUTO: 0.8 K/UL
MONOCYTES NFR BLD: 8.8 %
NEUTROPHILS # BLD AUTO: 6.8 K/UL
NEUTROPHILS NFR BLD: 74.4 %
PLATELET # BLD AUTO: 272 K/UL
PMV BLD AUTO: 10.9 FL
POCT GLUCOSE: 138 MG/DL (ref 70–110)
POCT GLUCOSE: 93 MG/DL (ref 70–110)
POTASSIUM SERPL-SCNC: 4.3 MMOL/L
RBC # BLD AUTO: 3.99 M/UL
SODIUM SERPL-SCNC: 138 MMOL/L
WBC # BLD AUTO: 9.1 K/UL

## 2018-10-18 PROCEDURE — 85025 COMPLETE CBC W/AUTO DIFF WBC: CPT

## 2018-10-18 PROCEDURE — 99024 POSTOP FOLLOW-UP VISIT: CPT | Mod: S$PBB,,, | Performed by: UROLOGY

## 2018-10-18 PROCEDURE — 97530 THERAPEUTIC ACTIVITIES: CPT

## 2018-10-18 PROCEDURE — G8978 MOBILITY CURRENT STATUS: HCPCS | Mod: CJ

## 2018-10-18 PROCEDURE — 94761 N-INVAS EAR/PLS OXIMETRY MLT: CPT

## 2018-10-18 PROCEDURE — 36415 COLL VENOUS BLD VENIPUNCTURE: CPT

## 2018-10-18 PROCEDURE — A4216 STERILE WATER/SALINE, 10 ML: HCPCS | Performed by: UROLOGY

## 2018-10-18 PROCEDURE — 80048 BASIC METABOLIC PNL TOTAL CA: CPT

## 2018-10-18 PROCEDURE — G8980 MOBILITY D/C STATUS: HCPCS | Mod: CJ

## 2018-10-18 PROCEDURE — 25000003 PHARM REV CODE 250: Performed by: UROLOGY

## 2018-10-18 PROCEDURE — G8979 MOBILITY GOAL STATUS: HCPCS | Mod: CI

## 2018-10-18 PROCEDURE — 25000003 PHARM REV CODE 250: Performed by: HOSPITALIST

## 2018-10-18 PROCEDURE — 63600175 PHARM REV CODE 636 W HCPCS: Performed by: HOSPITALIST

## 2018-10-18 RX ORDER — OXYCODONE AND ACETAMINOPHEN 5; 325 MG/1; MG/1
1 TABLET ORAL EVERY 4 HOURS PRN
Qty: 30 TABLET | Refills: 0 | Status: SHIPPED | OUTPATIENT
Start: 2018-10-18 | End: 2018-10-22 | Stop reason: SDUPTHER

## 2018-10-18 RX ORDER — DOCUSATE SODIUM 100 MG/1
100 CAPSULE, LIQUID FILLED ORAL 2 TIMES DAILY
Qty: 60 CAPSULE | Refills: 0 | Status: SHIPPED | OUTPATIENT
Start: 2018-10-18 | End: 2022-11-07 | Stop reason: ALTCHOICE

## 2018-10-18 RX ORDER — OXYCODONE AND ACETAMINOPHEN 5; 325 MG/1; MG/1
1 TABLET ORAL EVERY 4 HOURS PRN
Qty: 30 TABLET | Refills: 0 | Status: SHIPPED | OUTPATIENT
Start: 2018-10-18 | End: 2018-10-18

## 2018-10-18 RX ADMIN — PREDNISONE 40 MG: 20 TABLET ORAL at 08:10

## 2018-10-18 RX ADMIN — Medication 3 ML: at 06:10

## 2018-10-18 RX ADMIN — DIPHENHYDRAMINE HYDROCHLORIDE 25 MG: 25 CAPSULE ORAL at 08:10

## 2018-10-18 RX ADMIN — FAMOTIDINE 20 MG: 20 TABLET ORAL at 08:10

## 2018-10-18 RX ADMIN — POLYETHYLENE GLYCOL 3350 17 G: 17 POWDER, FOR SOLUTION ORAL at 08:10

## 2018-10-18 RX ADMIN — OXYCODONE HYDROCHLORIDE 10 MG: 5 TABLET ORAL at 03:10

## 2018-10-18 RX ADMIN — PRAVASTATIN SODIUM 10 MG: 10 TABLET ORAL at 08:10

## 2018-10-18 RX ADMIN — AMLODIPINE BESYLATE 5 MG: 5 TABLET ORAL at 08:10

## 2018-10-18 RX ADMIN — DOCUSATE SODIUM 100 MG: 100 CAPSULE, LIQUID FILLED ORAL at 08:10

## 2018-10-18 RX ADMIN — OXYCODONE HYDROCHLORIDE 10 MG: 5 TABLET ORAL at 09:10

## 2018-10-18 NOTE — TELEPHONE ENCOUNTER
Floor nurse is calling for Home Health orders to be signed= can you please advise if you ordered home health or can sign orders?

## 2018-10-18 NOTE — PROGRESS NOTES
[facility]  Urology  Progress Note    Patient Name: Valarie Foster  MRN: [id number]  Admission Date: 10/12/2018  Hospital Length of Stay: 6 days  Code Status: Full Code   Attending Provider: Alejandra Palm MD   Primary Care Physician: Alena Hernandez MD    Subjective:     HPI:  S/p open partial R nephrectomy 10/12/18    Interval History: Doing well.  She feels ready to go home.  Pain controlled, ambulating, passing flatus.    Review of Systems   Constitutional: Negative.    HENT: Negative.    Eyes: Negative.    Respiratory: Negative for cough, chest tightness and shortness of breath.    Cardiovascular: Negative for chest pain.   Gastrointestinal: Negative.  Negative for constipation, diarrhea and nausea.   Musculoskeletal: Negative.    Neurological: Negative.    Psychiatric/Behavioral: Negative.      Objective:     Temp:  [97.6 °F (36.4 °C)-98.3 °F (36.8 °C)] 97.7 °F (36.5 °C)  Pulse:  [52-74] 58  Resp:  [16-18] 18  SpO2:  [92 %-97 %] 96 %  BP: (142-194)/(67-79) 162/74     Body mass index is 31.03 kg/m².    Date 10/18/18 0700 - 10/19/18 0659   Shift 5721-8516 4078-2690 0392-4511 24 Hour Total   INTAKE   P.O. 120   120   Shift Total(mL/kg) 120(1.5)   120(1.5)   OUTPUT   Shift Total(mL/kg)       Weight (kg) 82 82 82 82          Drains     Drain                 Closed/Suction Drain 10/12/18 1042 Right Abdomen Bulb 19 Fr. 5 days                Physical Exam   Nursing note and vitals reviewed.  Constitutional: She is oriented to person, place, and time. She appears well-developed.   HENT:   Head: Normocephalic.   Eyes: Conjunctivae are normal.   Neck: Normal range of motion. No tracheal deviation present. No thyromegaly present.   Cardiovascular: Normal rate, normal heart sounds and normal pulses.    Pulmonary/Chest: Effort normal and breath sounds normal. No respiratory distress. She has no wheezes.   Abdominal: Soft. She exhibits no distension and no mass. There is no hepatosplenomegaly. There is no  tenderness. There is no rebound, no guarding and no CVA tenderness. No hernia.   Staples in place, incision, C/D/I  RAFI with minimal SS fluid, removed.   Musculoskeletal: Normal range of motion. She exhibits no edema or tenderness.   Lymphadenopathy:     She has no cervical adenopathy.   Neurological: She is alert and oriented to person, place, and time.   Skin: Skin is warm and dry. No rash noted. No erythema.     Psychiatric: She has a normal mood and affect. Her behavior is normal. Judgment and thought content normal.       Significant Labs:    BMP:  Recent Labs   Lab 10/16/18  0545 10/17/18  0631 10/18/18  0434    139 138   K 4.5 4.0 4.3    102 101   CO2 29 33* 29   BUN 8 11 13   CREATININE 0.7 0.8 0.8   CALCIUM 9.7 9.4 9.5       CBC:   Recent Labs   Lab 10/16/18  0545 10/17/18  0631 10/18/18  0434   WBC 6.76 10.01 9.10   HGB 12.0 11.5* 10.9*   HCT 35.9* 33.5* 34.2*    279 272       Blood Culture: No results for input(s): LABBLOO in the last 168 hours.  Urine Culture: No results for input(s): LABURIN in the last 168 hours.    Significant Imaging:                    Assessment/Plan:     * Right renal mass    -- S/p right open partial nephrectomy 10/12/18  -- Montero out, good UOP. RAFI removed  -- Continue regular diet  -- IS, ambulate   -- PT  -- Medical management per hospitalist - appreciate assistance    D/C Home today         VTE Risk Mitigation (From admission, onward)        Ordered     IP VTE HIGH RISK PATIENT  Once      10/13/18 0314     Place SEB hose  Until discontinued      10/13/18 0314     Place sequential compression device  Until discontinued      10/13/18 0314          YESICA Arenas MD  Urology  [facility] [place]

## 2018-10-18 NOTE — PT/OT/SLP PROGRESS
Physical Therapy Treatment    Patient Name:  Valarie Foster   MRN:  [id number]    Recommendations:     Discharge Recommendations:  home health PT   Discharge Equipment Recommendations: walker, rolling, shower chair   Barriers to discharge: None    Assessment:     Valarie Foster is a 68 y.o. female admitted with a medical diagnosis of Right renal mass.  She presents with the following impairments/functional limitations:  weakness, impaired functional mobilty, gait instability, impaired endurance, pain, decreased lower extremity function, decreased upper extremity function, decreased safety awareness, impaired cardiopulmonary response to activity . HEP given with instructions and demonstration. Educated pt on proper technique for adjusting RW. Pt verbalize and demonstrated good understanding.        Rehab Prognosis:  good; patient would benefit from acute skilled PT services to address these deficits and reach maximum level of function.      Recent Surgery: Procedure(s) (LRB):  NEPHRECTOMY, PARTIAL open. Dr Arenas to assist. (Right) 6 Days Post-Op    Plan:     During this hospitalization, patient to be seen 5 x/week(M-F) to address the above listed problems via gait training, therapeutic activities, therapeutic exercises  · Plan of Care Expires:  10/29/18   Plan of Care Reviewed with: patient    Subjective     Communicated with nurse  prior to session.  Patient found in bed upon PT entry to room, agreeable to treatment.      Chief Complaint: none  Patient comments/goals: ready to go home  Pain/Comfort:  · Pain Rating 1: 0/10  · Pain Rating Post-Intervention 1: 0/10    Patients cultural, spiritual, Jainism conflicts given the current situation:      Objective:     Patient found with: bed alarm, telemetry, SCD     General Precautions: Standard, fall   Orthopedic Precautions:N/A   Braces: N/A       AM-PAC 6 CLICK MOBILITY  Turning over in bed (including adjusting bedclothes, sheets and blankets)?: 4  Sitting down on  and standing up from a chair with arms (e.g., wheelchair, bedside commode, etc.): 4  Moving from lying on back to sitting on the side of the bed?: 4  Moving to and from a bed to a chair (including a wheelchair)?: 3  Need to walk in hospital room?: 3  Climbing 3-5 steps with a railing?: 3  Basic Mobility Total Score: 21       Therapeutic Activities and Exercises:   HEP given with instructions and demonstration. Educated pt on proper technique for adjusting RW. Pt verbalize and demonstrated good understanding.    Educated pt on safety awareness with all OOB mobility, transfer and gait training .      Patient left HOB elevated with all lines intact, call button in reach, bed alarm on and nurse notified..    GOALS:   Multidisciplinary Problems     Physical Therapy Goals        Problem: Physical Therapy Goal    Goal Priority Disciplines Outcome Goal Variances Interventions   Physical Therapy Goal     PT, PT/OT Ongoing (interventions implemented as appropriate)     Description:  Goals to be met by: 10/29/18     Patient will increase functional independence with mobility by performin. Supine to sit with Modified [place]  2. Rolling to Left and Right with Modified [place]  3. Sit to stand transfer with Modified [place] using RW  4. Bed to chair transfer with Modified [place] using Rolling Walker  5. Gait  x 250 feet with Modified [place] using Rolling Walker   6. Lower extremity exercise program 3 sets x10 reps per handout, with independence                       Time Tracking:     PT Received On: 10/18/18  PT Start Time: 1055     PT Stop Time: 1105  PT Total Time (min): 10 min     Billable Minutes: Therapeutic Activity 10    Treatment Type: Treatment  PT/PTA: PTA     PTA Visit Number: 3     Tasha Mosley PTA  10/18/2018

## 2018-10-18 NOTE — TELEPHONE ENCOUNTER
----- Message from Stephanie Phillips sent at 10/18/2018 10:52 AM CDT -----  Contact: [facility] david  Sravanthi needs home health orders to be signed. [phone].

## 2018-10-18 NOTE — PROGRESS NOTES
WRITTEN HEALTHCARE DISCHARGE INFORMATION     Things that YOU are RESPONSIBLE for to Manage Your Care At Home:    1. Getting your prescriptions filled.  2. Taking you medications as directed. DO NOT MISS ANY DOSES!  3. Going to your follow-up doctor appointments. This is important because it allows the doctor to monitor your progress and to determine if any changes need to be made to your treatment plan.    If you are unable to make your follow up appointments, please call the number listed and reschedule this appointment.     ____________HELP AT HOME____________________    Experiencing any SIGNS or SYMPTOMS: YOU CAN    Schedule a same day appopintment with your Primary Care Doctor or  you can call [facility] On Call Nurse Care Line for 24/7 assistance at [phone]    If you are experience any signs or symptoms that have become severe, Call 911 and come to your nearest Emergency Room.    Thank you for choosing [facility] and allowing us to care for you.   From your care management team: Sravanthi [facility] [phone]    You should receive a call from [facility]charge [facility] within 48-72 hours to help manage your care after discharge. Please try to make sure that you answer your phone for this important phone call.  Follow-up Information     Alejandra Palm MD On 10/22/2018.    Specialty:  Urology  Why:  Outpatient Services follow-up @ 9:20AM. '  Contact information:  [address]  [address]  [place] [facility] [address]  [phone]             [facility].    Specialty:  Home Health Services  Why:  Home Health  Contact information:  [address]  [facility] [place] [facility] [address]  [phone]             [facility]    Specialty:  DME Provider  Why:  Tomas Urias  Contact information:  [address]  [address]  [facility] [id number] [phone]

## 2018-10-18 NOTE — PLAN OF CARE
"   10/18/18 1048   Final Note   Assessment Type Final Discharge Note   Discharge Disposition Home-Health   What phone number can be called within the next 1-3 days to see how you are doing after discharge? [phone]   Hospital Follow Up  Appt(s) scheduled? Yes   Discharge plans and expectations educations in teach back method with documentation complete? Yes   Right Care Referral Info   Post Acute Recommendation Home-care   Referral Type Home-Care    Facility Name [facility] [address] [place] [id number]     EDUCATION:  Pt provided with educational information on Abdominal Pain.  Information reviewed and placed in :My Healthcare Packet" to be brought home for  use as resource after discharge.  Information included:  signs and symptoms to look for at discharge. SWAPNIL instructed pt to call the doctor if experiencing symptoms that may indicate a medical emergency: CALL 911 if signs and symptoms worsen. Reminded pt things she will be responsible for to manage her healthcare at home: getting Rx filled, attending follow up appointments, and taking medication as prescribed were discussed.   Teach back method used.  All questions answered.  Patient verbalized understanding of all information.     SWAPNIL provided pt with a copy of follow-up appointments. SWAPNIL explained/highlighted date, time, and location of each appointment. SWAPNIL provided pt with a blue folder and instructed pt to place all medical documents in blue folder. SWAPNIL explained to pt the nurse will provide an AVS with diagnosis and instructed pt to place in blue folder and bring to follow-up appointment.  SWAPNIL notified pt nurse, Steph, all CM needs have been met.     "

## 2018-10-18 NOTE — ASSESSMENT & PLAN NOTE
-- S/p right open partial nephrectomy 10/12/18  -- Montero out, good UOP. RAFI removed  -- Continue regular diet  -- IS, ambulate   -- PT  -- Medical management per hospitalist - appreciate assistance    D/C Home today

## 2018-10-18 NOTE — DISCHARGE SUMMARY
[facility]  Urology  Discharge Summary      Patient Name: Valarie Foster  MRN: [id number]  Admission Date: 10/12/2018  Hospital Length of Stay: 6 days  Discharge Date and Time:  10/18/2018 8:56 AM  Attending Physician: Alejandra Palm MD   Discharging Provider: YESICA Arenas MD  Primary Care Physician: Alena Hernandez MD    HPI:   S/p open partial R nephrectomy 10/12/18    Procedure(s) (LRB):  NEPHRECTOMY, PARTIAL open. Dr Arenas to assist. (Right)     Indwelling Lines/Drains at time of discharge:   Lines/Drains/Airways     Drain                 Closed/Suction Drain 10/12/18 1042 Right Abdomen Bulb 19 Fr. 5 days                Hospital Course (synopsis of major diagnoses, care, treatment, and services provided during the course of the hospital stay): She was admitted after a planned right open partial nephrectomy.  She worked with PT afterwards.  Pain was controlled.  Her diet was advanced as tolerated.  She voided without a Montero and her ARFI was removed on day of discharge.  At the time of discharge, she was ambulating, tolerating a regular diet, pain was controlled, and she felt ready to go home.    Consults:   Consults (From admission, onward)        Status Ordering Provider     IP consult to case management  Once     Provider:  (Not yet assigned)    Acknowledged ALEJANDRA PALM     IP CONSULT TO HOSPITALIST  Once     Provider:  Piyush Reddy MD    Acknowledged ALEJANDRA PALM          Significant Diagnostic Studies: 0    Pending Diagnostic Studies:     None          Final Active Diagnoses:    Diagnosis Date Noted POA    PRINCIPAL PROBLEM:  Right renal mass [N28.89] 02/01/2018 Yes    Microhematuria [R31.29] 02/08/2018 Yes    Type 2 diabetes mellitus with diabetic neuropathy, without long-term current use of insulin [E11.40] 01/29/2018 Yes     Chronic    Benign hypertension [I10] 01/29/2018 Yes     Chronic    Hyperlipemia [E78.5] 01/29/2018 Yes     Chronic     "  Problems Resolved During this Admission:         Discharged Condition: stable    Disposition: Home or Self Care    Follow Up:  Follow-up Information     Alejandra Palm MD On 10/22/2018.    Specialty:  Urology  Why:  Outpatient Services follow-up @ 9:20AM. '  Contact information:  [address]  [address]  [place] [address]  [phone]                 Patient Instructions:      WALKER FOR HOME USE     Order Specific Question Answer Comments   Type of Walker: Adult (5'4"-6'6")    With wheels? Yes    Height: 5' 4" (1.626 m)    Weight: 83.5 kg (184 lb 1.4 oz)    Length of need (1-99 months): 99    Does patient have medical equipment at home? none    Please check all that apply: Walker will be used for gait training.    Please check all that apply: Patient is unable to safely ambulate without equipment.      Diet Adult Regular     Lifting restrictions   Order Comments: 10 Pounds for 6 weeks     Notify your health care provider if you experience any of the following:  temperature >100.4     Notify your health care provider if you experience any of the following:  persistent nausea and vomiting or diarrhea     Notify your health care provider if you experience any of the following:  severe uncontrolled pain     Notify your health care provider if you experience any of the following:  redness, tenderness, or signs of infection (pain, swelling, redness, odor or green/yellow discharge around incision site)     Remove dressing in 24 hours     Activity as tolerated     Medications:  Reconciled Home Medications:      Medication List      START taking these medications    amLODIPine 5 MG tablet  Commonly known as:  NORVASC  Take 1 tablet (5 mg total) by mouth once daily.     docusate sodium 100 MG capsule  Commonly known as:  COLACE  Take 1 capsule (100 mg total) by mouth 2 (two) times daily.     oxyCODONE-acetaminophen 5-325 mg per tablet  Commonly known as:  PERCOCET  Take 1 tablet by mouth every 4 (four) hours as " needed.     predniSONE 20 MG tablet  Commonly known as:  DELTASONE  Take 2 tablets (40 mg total) by mouth once daily. for 3 days        CONTINUE taking these medications    fluticasone 50 mcg/actuation nasal spray  Commonly known as:  FLONASE  1 spray (50 mcg total) by Each Nare route once daily.     gabapentin 300 MG capsule  Commonly known as:  NEURONTIN  Take 2 capsules (600 mg total) by mouth every evening.     pravastatin 10 MG tablet  Commonly known as:  PRAVACHOL  Take 1 tablet (10 mg total) by mouth once daily.     promethazine-dextromethorphan 6.25-15 mg/5 mL Syrp  Commonly known as:  PROMETHAZINE-DM  Take 5 mLs by mouth 3 (three) times daily as needed (cough).        STOP taking these medications    lisinopril 10 MG tablet            Time spent on the discharge of patient: 20 minutes    W Jose Manuel Arenas MD  Urology  [facility]

## 2018-10-18 NOTE — NURSING
Reviewed all discharge instructions with patient, new medications, continued medications, follow-up appointments and signs/symptoms to report to PCP or seek emergency medical care. Patient verbalized understanding to all instructions and education. Patient denies any complaints or concerns at this time. Patient was wheeled off unit to car for discharge.

## 2018-10-18 NOTE — PLAN OF CARE
Problem: Diabetes, Type 2 (Adult)  Intervention: Support/Optimize Psychosocial Response to Condition   10/17/18 2017   Psychosocial Support   Family/Support System Care self-care encouraged   Coping/Psychosocial Interventions   Supportive Measures active listening utilized;self-care encouraged;verbalization of feelings encouraged   Environmental Support calm environment promoted;comfort object encouraged;distractions minimized;environmental consistency promoted;personal routine supported     Intervention: Optimize Glycemic Control   10/17/18 2017   Nutrition Interventions   Glycemic Management blood glucose monitoring       Goal: Signs and Symptoms of Listed Potential Problems Will be Absent, Minimized or Managed (Diabetes, Type 2)  Signs and symptoms of listed potential problems will be absent, minimized or managed by discharge/transition of care (reference Diabetes, Type 2 (Adult) CPG).  Outcome: Ongoing (interventions implemented as appropriate)   10/18/18 0105   Diabetes, Type 2   Problems Assessed (Type 2 Diabetes) all   Problems Present (Type 2 Diabetes) situational response       Problem: Patient Care Overview  Goal: Plan of Care Review  Outcome: Ongoing (interventions implemented as appropriate)   10/18/18 0105   Coping/Psychosocial   Plan Of Care Reviewed With patient     Goal: Interdisciplinary Rounds/Family Conf  Outcome: Ongoing (interventions implemented as appropriate)   10/18/18 0105   Interdisciplinary Rounds/Family Conf   Participants patient;nursing       Problem: Fall Risk (Adult)  Intervention: Monitor/Assist with Self Care   10/17/18 2017 10/17/18 6821   Activity   Activity Assistance Provided --  assistance, stand-by   Daily Care Interventions   Self-Care Promotion independence encouraged;BADL personal objects within reach;BADL personal routines maintained --    Functional Level Current   Ambulation 2 - assistive person  (Standby) --    Transferring 2 - assistive person  (Standby) --     Toileting 2 - assistive person  (Up to toilet with standby assist) --    Bathing 2 - assistive person --    Dressing 2 - assistive person --    Eating 0 - independent --    Communication 0 - understands/communicates without difficulty --    Swallowing 0 - swallows foods/liquids without difficulty --      Intervention: Reduce Risk/Promote Restraint Free Environment   10/18/18 0105   Safety Interventions   Environmental Safety Modification assistive device/personal items within reach;clutter free environment maintained;lighting adjusted;room near unit station;room organization consistent   Prevent  Drop/Fall   Safety/Security Measures bed alarm set     Intervention: Review Medications/Identify Contributors to Fall Risk   10/18/18 0105   Safety Interventions   Medication Review/Management medications reviewed     Intervention: Patient Rounds   10/18/18 0105   Safety Interventions   Patient Rounds bed in low position;bed wheels locked;call light in reach;clutter free environment maintained;ID band on;placement of personal items at bedside;toileting offered;visualized patient     Intervention: Safety Promotion/Fall Prevention   10/17/18 2300   Safety Interventions   Safety Promotion/Fall Prevention assistive device/personal item within reach;bed alarm set;Fall Risk reviewed with patient/family;high risk medications identified;lighting adjusted;medications reviewed;nonskid shoes/socks when out of bed;room near unit station;side rails raised x 2;supervised activity;toileting scheduled;instructed to call staff for mobility       Goal: Identify Related Risk Factors and Signs and Symptoms  Related risk factors and signs and symptoms are identified upon initiation of Human Response Clinical Practice Guideline (CPG)  Outcome: Ongoing (interventions implemented as appropriate)   10/16/18 0269   Fall Risk   Related Risk Factors (Fall Risk) age-related changes;fatigue/slow reaction;polypharmacy;objects hard to reach    Signs and Symptoms (Fall Risk) presence of risk factors     Goal: Absence of Falls  Patient will demonstrate the desired outcomes by discharge/transition of care.  Outcome: Ongoing (interventions implemented as appropriate)   10/13/18 1931   Fall Risk (Adult)   Absence of Falls making progress toward outcome       Problem: Infection, Risk/Actual (Adult)  Intervention: Manage Suspected/Actual Infection   10/17/18 2017   Safety Interventions   Isolation Precautions environmental surveillance   Infection Management aseptic technique maintained     Intervention: Prevent Infection/Maximize Resistance   10/17/18 2017   Respiratory Interventions   Airway/Ventilation Management airway patency maintained;calming measures promoted   Hygiene Care   Bathing/Skin Care bedtime care   Pain/Comfort/Sleep Interventions   Sleep/Rest Enhancement awakenings minimized;consistent schedule promoted;noise level reduced;regular sleep/rest pattern promoted   Nutrition Interventions   Glycemic Management blood glucose monitoring       Goal: Identify Related Risk Factors and Signs and Symptoms  Related risk factors and signs and symptoms are identified upon initiation of Human Response Clinical Practice Guideline (CPG)  Outcome: Ongoing (interventions implemented as appropriate)   10/18/18 0105   Infection, Risk/Actual   Related Risk Factors (Infection, Risk/Actual) prolonged hospitalization;skin integrity impairment;surgery/procedure   Signs and Symptoms (Infection, Risk/Actual) pain     Goal: Infection Prevention/Resolution  Patient will demonstrate the desired outcomes by discharge/transition of care.  Outcome: Ongoing (interventions implemented as appropriate)   10/18/18 0105   Infection, Risk/Actual (Adult)   Infection Prevention/Resolution making progress toward outcome       Problem: Pressure Ulcer Risk (Abdoul Scale) (Adult,Obstetrics,Pediatric)  Intervention: Prevent/Manage Excess Moisture   10/17/18 2017   Hygiene Care    Bathing/Skin Care bedtime care   Skin Interventions   Skin Protection Incontinence pads;Tubing/devices free from skin contact     Intervention: Maintain Head of Bed Elevation Less Than 30 Degrees as Tolerated   10/18/18 0105   Positioning   Head of Bed (HOB) HOB at 15 degrees     Intervention: Prevent/Minimize Sheer/Friction Injuries   10/17/18 2017 10/17/18 2200   Positioning   Positioning/Transfer Devices --  pillows   Skin Interventions   Pressure Reduction Techniques frequent weight shift encouraged;positioned off wounds --      Intervention: Turn/Reposition Often   10/17/18 2017 10/18/18 0029   Positioning   Body Position --  supine   Skin Interventions   Pressure Reduction Techniques frequent weight shift encouraged;positioned off wounds --        Goal: Identify Related Risk Factors and Signs and Symptoms  Related risk factors and signs and symptoms are identified upon initiation of Human Response Clinical Practice Guideline (CPG)  Outcome: Ongoing (interventions implemented as appropriate)   10/18/18 0105   Pressure Ulcer Risk (Abdoul Scale)   Related Risk Factors (Pressure Ulcer Risk (Abdoul Scale)) hospitalization prolonged;length of surgery;medication;tissue perfusion altered     Goal: Skin Integrity  Patient will demonstrate the desired outcomes by discharge/transition of care.  Outcome: Ongoing (interventions implemented as appropriate)   10/18/18 0105   Pressure Ulcer Risk (Abdoul Scale) (Adult,Obstetrics,Pediatric)   Skin Integrity making progress toward outcome       Problem: Pain, Acute (Adult)  Intervention: Monitor/Manage Analgesia   10/18/18 0105   Safety Interventions   Medication Review/Management medications reviewed   Manage Acute Burn Pain   Bowel Intervention adequate fluid intake promoted;ambulation promoted;diet adjusted;privacy promoted     Intervention: Mutually Develop/Implement Acute Pain Management Plan   10/18/18 0105   Pain/Comfort/Sleep Interventions   Pain Management  Interventions awakened for pain meds per patient request;care clustered;medication;pain management plan reviewed with patient/caregiver;pillow support provided;position adjusted;quiet environment facilitated   Cognitive Interventions   Sensory Stimulation Regulation care clustered;lighting decreased;quiet environment promoted     Intervention: Support/Optimize Psychosocial Response to Acute Pain   10/17/18 2017 10/18/18 0105   Psychosocial Support   Diversional Activities --  television;smartphone;handheld device   Family/Support System Care self-care encouraged --    Trust Relationship/Rapport care explained;choices provided;questions answered --    Coping/Psychosocial Interventions   Supportive Measures active listening utilized;self-care encouraged;verbalization of feelings encouraged --        Goal: Identify Related Risk Factors and Signs and Symptoms  Related risk factors and signs and symptoms are identified upon initiation of Human Response Clinical Practice Guideline (CPG)  Outcome: Ongoing (interventions implemented as appropriate)   10/18/18 0105   Pain, Acute   Related Risk Factors (Acute Pain) disease process;positioning;procedure/treatment;surgery   Signs and Symptoms (Acute Pain) BADLs/IADLs reluctance/inability to perform;verbalization of pain descriptors     Goal: Acceptable Pain Control/Comfort Level  Patient will demonstrate the desired outcomes by discharge/transition of care.  Outcome: Ongoing (interventions implemented as appropriate)   10/18/18 0105   Pain, Acute (Adult)   Acceptable Pain Control/Comfort Level making progress toward outcome

## 2018-10-18 NOTE — PLAN OF CARE
Problem: Physical Therapy Goal  Goal: Physical Therapy Goal  Goals to be met by: 10/29/18     Patient will increase functional independence with mobility by performin. Supine to sit with Modified [place]  2. Rolling to Left and Right with Modified [place]  3. Sit to stand transfer with Modified [place] using RW  4. Bed to chair transfer with Modified [place] using Rolling Walker  5. Gait  x 250 feet with Modified [place] using Rolling Walker   6. Lower extremity exercise program 3 sets x10 reps per handout, with independence      Outcome: Ongoing (interventions implemented as appropriate)  HEP given with instructions and demonstration. Educated pt on proper technique for adjusting RW. Pt verbalize and demonstrated good understanding.

## 2018-10-18 NOTE — SUBJECTIVE & OBJECTIVE
Interval History: Doing well.  She feels ready to go home.  Pain controlled, ambulating, passing flatus.    Review of Systems   Constitutional: Negative.    HENT: Negative.    Eyes: Negative.    Respiratory: Negative for cough, chest tightness and shortness of breath.    Cardiovascular: Negative for chest pain.   Gastrointestinal: Negative.  Negative for constipation, diarrhea and nausea.   Musculoskeletal: Negative.    Neurological: Negative.    Psychiatric/Behavioral: Negative.      Objective:     Temp:  [97.6 °F (36.4 °C)-98.3 °F (36.8 °C)] 97.7 °F (36.5 °C)  Pulse:  [52-74] 58  Resp:  [16-18] 18  SpO2:  [92 %-97 %] 96 %  BP: (142-194)/(67-79) 162/74     Body mass index is 31.03 kg/m².    Date 10/18/18 0700 - 10/19/18 0659   Shift 9099-1572 7966-4182 1241-2259 24 Hour Total   INTAKE   P.O. 120   120   Shift Total(mL/kg) 120(1.5)   120(1.5)   OUTPUT   Shift Total(mL/kg)       Weight (kg) 82 82 82 82          Drains     Drain                 Closed/Suction Drain 10/12/18 1042 Right Abdomen Bulb 19 Fr. 5 days                Physical Exam   Nursing note and vitals reviewed.  Constitutional: She is oriented to person, place, and time. She appears well-developed.   HENT:   Head: Normocephalic.   Eyes: Conjunctivae are normal.   Neck: Normal range of motion. No tracheal deviation present. No thyromegaly present.   Cardiovascular: Normal rate, normal heart sounds and normal pulses.    Pulmonary/Chest: Effort normal and breath sounds normal. No respiratory distress. She has no wheezes.   Abdominal: Soft. She exhibits no distension and no mass. There is no hepatosplenomegaly. There is no tenderness. There is no rebound, no guarding and no CVA tenderness. No hernia.   Staples in place, incision, C/D/I  RAFI with minimal SS fluid, removed.   Musculoskeletal: Normal range of motion. She exhibits no edema or tenderness.   Lymphadenopathy:     She has no cervical adenopathy.   Neurological: She is alert and oriented to person,  place, and time.   Skin: Skin is warm and dry. No rash noted. No erythema.     Psychiatric: She has a normal mood and affect. Her behavior is normal. Judgment and thought content normal.       Significant Labs:    BMP:  Recent Labs   Lab 10/16/18  0545 10/17/18  0631 10/18/18  0434    139 138   K 4.5 4.0 4.3    102 101   CO2 29 33* 29   BUN 8 11 13   CREATININE 0.7 0.8 0.8   CALCIUM 9.7 9.4 9.5       CBC:   Recent Labs   Lab 10/16/18  0545 10/17/18  0631 10/18/18  0434   WBC 6.76 10.01 9.10   HGB 12.0 11.5* 10.9*   HCT 35.9* 33.5* 34.2*    279 272       Blood Culture: No results for input(s): LABBLOO in the last 168 hours.  Urine Culture: No results for input(s): LABURIN in the last 168 hours.    Significant Imaging:

## 2018-10-19 ENCOUNTER — TELEPHONE (OUTPATIENT)
Dept: UROLOGY | Facility: CLINIC | Age: 68
End: 2018-10-19

## 2018-10-19 NOTE — TELEPHONE ENCOUNTER
Spoke to gisele with [facility] advised to continue colace have pt walk around at least 4 times a day,sit up as much as possible, small frequent meals if this doesn't help add miralax an ducolax supp. Twice daily. Per

## 2018-10-19 NOTE — TELEPHONE ENCOUNTER
gisele with [facility] is calling stating pt had surgery last Friday an since Monday pt hasn't had a bowel movement. She states colace is not working would like to know if  can call in something or recommend a otc medication. Please advise-wayne

## 2018-10-19 NOTE — TELEPHONE ENCOUNTER
----- Message from Valentine Dawkins sent at 10/19/2018  9:14 AM CDT -----  Contact: [facility]  Patient has not had a Bowel Movement since Monday. Her surgery was last Friday. [facility]etal would like to know if the doctor would like to call something in for patient please call at [phone]. ( Colace is not working)      [facility] [address] [place] [address] ([place] [place] [facility]

## 2018-10-19 NOTE — TELEPHONE ENCOUNTER
Continue Colace for stool softener. Walk around at least 4 times daily. Sit up as much as possible. Eat frequent small meals. Keep well hydrated.     If no bowel movement, start adding these medications:   1. Miralax powder daily   2. Dulcolax suppository twice a day as needed    If no improvement, can add these to encourage bowel movement:  1. Milk of Magnesia orally twice a day  2. Magnesium citrate 1/2-1 bottle orally  3. Fleets enema    All these medications are available over the counter. Okay to use generic versions.

## 2018-10-19 NOTE — PT/OT/SLP DISCHARGE
Physical Therapy Discharge Summary    Name: Valarie Foster  MRN: [id number]   Principal Problem: Right renal mass     Patient Discharged from acute Physical Therapy on 10/18/18.  Please refer to prior PT notes for functional status.     Assessment:     Patient was discharged unexpectedly.  Information required to complete an accurate discharge summary is unknown.  Refer to therapy initial evaluation and last progress note for initial and most recent functional status and goal achievement.  Recommendations made may be found in medical record.    Objective:     GOALS:   Multidisciplinary Problems     Physical Therapy Goals        Problem: Physical Therapy Goal    Goal Priority Disciplines Outcome Goal Variances Interventions   Physical Therapy Goal     PT, PT/OT Ongoing (interventions implemented as appropriate)     Description:  Goals to be met by: 10/29/18     Patient will increase functional independence with mobility by performin. Supine to sit with Modified [place]  2. Rolling to Left and Right with Modified [place]  3. Sit to stand transfer with Modified [place] using RW  4. Bed to chair transfer with Modified [place] using Rolling Walker  5. Gait  x 250 feet with Modified [place] using Rolling Walker   6. Lower extremity exercise program 3 sets x10 reps per handout, with independence                       Reasons for Discontinuation of Therapy Services  Transfer to alternate level of care.      Plan:     Patient Discharged to: Home with Home Health Service.    Katarina Maxwell, PT  10/19/2018

## 2018-10-22 ENCOUNTER — OFFICE VISIT (OUTPATIENT)
Dept: UROLOGY | Facility: CLINIC | Age: 68
End: 2018-10-22
Payer: MEDICARE

## 2018-10-22 VITALS
HEIGHT: 64 IN | HEART RATE: 60 BPM | WEIGHT: 180 LBS | DIASTOLIC BLOOD PRESSURE: 78 MMHG | BODY MASS INDEX: 30.73 KG/M2 | SYSTOLIC BLOOD PRESSURE: 138 MMHG | RESPIRATION RATE: 14 BRPM

## 2018-10-22 DIAGNOSIS — N28.89 RIGHT RENAL MASS: ICD-10-CM

## 2018-10-22 DIAGNOSIS — E11.40 TYPE 2 DIABETES MELLITUS WITH DIABETIC NEUROPATHY, WITHOUT LONG-TERM CURRENT USE OF INSULIN: Chronic | ICD-10-CM

## 2018-10-22 DIAGNOSIS — C64.1 RENAL CELL CARCINOMA OF RIGHT KIDNEY: Primary | ICD-10-CM

## 2018-10-22 DIAGNOSIS — I10 BENIGN HYPERTENSION: Chronic | ICD-10-CM

## 2018-10-22 LAB
BILIRUB SERPL-MCNC: NEGATIVE MG/DL
BLOOD URINE, POC: 50
COLOR, POC UA: YELLOW
GLUCOSE UR QL STRIP: NEGATIVE
KETONES UR QL STRIP: NEGATIVE
LEUKOCYTE ESTERASE URINE, POC: NORMAL
NITRITE, POC UA: NEGATIVE
PH, POC UA: 6
PROTEIN, POC: NORMAL
SPECIFIC GRAVITY, POC UA: 1030
UROBILINOGEN, POC UA: 1

## 2018-10-22 PROCEDURE — 81002 URINALYSIS NONAUTO W/O SCOPE: CPT | Mod: PBBFAC | Performed by: UROLOGY

## 2018-10-22 PROCEDURE — 99999 PR PBB SHADOW E&M-EST. PATIENT-LVL III: CPT | Mod: PBBFAC,,, | Performed by: UROLOGY

## 2018-10-22 PROCEDURE — 99213 OFFICE O/P EST LOW 20 MIN: CPT | Mod: PBBFAC | Performed by: UROLOGY

## 2018-10-22 PROCEDURE — 99024 POSTOP FOLLOW-UP VISIT: CPT | Mod: ,,, | Performed by: UROLOGY

## 2018-10-22 RX ORDER — OXYCODONE AND ACETAMINOPHEN 5; 325 MG/1; MG/1
1 TABLET ORAL EVERY 4 HOURS PRN
Qty: 30 TABLET | Refills: 0 | Status: SHIPPED | OUTPATIENT
Start: 2018-10-22 | End: 2019-02-13

## 2018-10-22 RX ORDER — CYCLOBENZAPRINE HCL 5 MG
5 TABLET ORAL 3 TIMES DAILY PRN
Qty: 25 TABLET | Refills: 0 | Status: SHIPPED | OUTPATIENT
Start: 2018-10-22 | End: 2018-10-25 | Stop reason: SDUPTHER

## 2018-10-22 NOTE — PROGRESS NOTES
Subjective:       Valarie Foster is a 68 y.o. female who is an established patient who was referred by Ivan CARDONA for evaluation of renal mass.      CT a/p with contrast 1/26/18 showed a 2.6cm endophytic lesion in R MP, concerning for solid mass vs complex cyst.     She reports L flank pain, contralateral to renal lesion. L flank pain has almost completely resolved. Denies hematuria, LUTS, UTIs. UA macro did show microhematuria recently. Denies family history of  malignancy. She reports being told she had a small kidney on one side in the past. Denies nephrolithiasis. Nonsmoker. Denies previous abdominal surgery though she is s/p hysterectomy. +DM2/HTN.     Cr (1/18) - 0.8  A1c (1/18) - 6.2  UA micro (2/18) - 0 RBCs    CT renal protocol (2/18) - 2.9cm completely endophytic solid enhancing renal mass. Homogenous in appearance.      She is s/p perc renal biopsy to better evaluate lesion (2/18) - oncocytic neoplasm. Mild hematuria < 24 hrs.          CT (6/18) - stable, endophytic enhancing 2.4cm R renal mass. Subtle 6mm focus of enhancement in R lobe of liver - nonspecific.     KISHAN (9/18) - stable 2.8cm R renal mass    She is now s/p open R PNx 10/12/18.     Path: 1.9cm unclassified RCC, FG 3/4, negative margins. [id number]      The following portions of the patient's history were reviewed and updated as appropriate: allergies, current medications, past family history, past medical history, past social history, past surgical history and problem list.    Review of Systems  Constitutional: no fever or chills  ENT: no nasal congestion or sore throat  Respiratory: no cough or shortness of breath  Cardiovascular: no chest pain or palpitations  Gastrointestinal: no nausea or vomiting, tolerating diet  Genitourinary: as per HPI  Hematologic/Lymphatic: no easy bruising or lymphadenopathy  Musculoskeletal: no arthralgias or myalgias  Skin: no rashes or lesions  Neurological: no seizures or tremors  Behavioral/Psych: no auditory  "or visual hallucinations        Objective:    Vitals: /78   Pulse 60   Resp 14   Ht 5' 4" (1.626 m)   Wt 81.6 kg (180 lb)   BMI 30.90 kg/m²     Physical Exam   General: well developed, well nourished in no acute distress  Head: normocephalic, atraumatic  Neck: supple, trachea midline, no obvious enlargement of thyroid  HEENT: EOMI, mucus membranes moist, sclera anicteric, no hearing impairment  Lungs: symmetric expansion, non-labored breathing  Cardiovascular: regular rate and rhythm, normal pulses  Abdomen: soft, non tender, non distended, no palpable masses, no hepatosplenomegaly, no hernias, no CVA tenderness  Musculoskeletal: no peripheral edema, normal ROM in bilateral upper and lower extremities  Lymphatics: no cervical or inguinal lymphadenopathy  Skin: no rashes or lesions  Neuro: alert and oriented x 3, no gross deficits  Psych: normal judgment and insight, normal mood/affect and non-anxious  Genitourinary:   patient declined exam      Lab Review   Urine analysis today in clinic shows - negative    Lab Results   Component Value Date    WBC 9.10 10/18/2018    HGB 10.9 (L) 10/18/2018    HCT 34.2 (L) 10/18/2018    MCV 86 10/18/2018     10/18/2018     Lab Results   Component Value Date    CREATININE 0.8 10/18/2018    BUN 13 10/18/2018         Imaging  Images and reports were personally reviewed by me and discussed with patient  CT/KISHAN reviewed       Assessment/Plan:      1. Right RCC    - 2.6cm lesion in R MP kidney.   - CT renal protocol 2.5cm endophytic solid enhancing renal mass   - Due to location of tumor, open partial would be difficult with higher complication rate of bleeding and/or urine leak. Radical nephrectomy with less complication rate.   - Concern for performing radical nephrectomy in diabetic patient for possible benign lesion.   - Recommend perc biopsy to evaluate for possible benign lesion prior to radical nephrectomy. Discussed limitations/risks of perc biopsy. Understands " that perc biopsy may also not be definitive in diagnosis   - Perc biopsy 2/18 - oncocytic neoplasm (less favors oncocytoma)   - Discussed options: observation, lap radical Nx, open partial Nx. She has opted to observe for now.    - CT 6/18 - stable 2.4cm R renal mass   - KISHAN 9/18 - 2.8cm R renal mass   - s/p R open PNx on 10/12/18    - unclassified RCC pT1a   - CT c/a/p 6 months (4/18)   - Flexeril and Percocet given today   - Follow up 3 months with labs      2. Microhematuria    - UA micro - 0 RBCs     3. L flank pain   - Unsure etiology   - CT negative for L flank pain   - f/u with PCP      Follow up in 1 week for staple removal with NP

## 2018-10-23 NOTE — PHYSICIAN QUERY
PT Name: Valarie Foster  MR #: [id number]    Physician Query Form - Pathology Findings Clarification     CDS: Morena Byrd RN     Contact information:  [email or web address]    Phone: [phone]  This form is a permanent document in the medical record.     Query Date: October 23, 2018      By submitting this query, we are merely seeking further clarification of documentation.  Please utilize your independent clinical judgment when addressing the question(s) below.      The medical record contains the following:     Findings Supporting Clinical Information Location in Medical Record   Renal Cell Carcinoma     Kidney: Right, partial excision  Tumor site: Renal parenchyma  Tumor size: 1.9 cm.  Tumor focality: Unifocal  Anatomic extent of tumor: Limited to the kidney  Histologic type: Renal cell carcinoma, type unclassified  ndGndrndanddndend:nd nd2nd of 4.  Sarcomatoid features: Not identified.  Rhabdoid Features: Not identified  Tumor Necrosis: Not identified  Adrenal gland: Absent  Margins: No tumor identified in inked margin  Kidney apart from tumor: There is compression     Pathology report     Please document the clinical significance of the Pathologists findings of _Renal Cell Carcinoma_.          [ x ] I agree with the Pathology Findings        [  ] I do not agree with the Pathology Findings        [  ] Clinically Insignificant        [  ] Clinically Undetermined        [  ] Other/Clarification of Findings: ______________________________________________    Please document in your progress notes daily for the duration of treatment until resolved and include in your discharge summary.

## 2018-10-24 ENCOUNTER — TELEPHONE (OUTPATIENT)
Dept: ADMINISTRATIVE | Facility: CLINIC | Age: 68
End: 2018-10-24

## 2018-10-24 NOTE — TELEPHONE ENCOUNTER
Home Health SOC 10/19/2018 - 12/17/2018 with [facility]) - Dr. Alejandra Palm. Patient received SN, PT and OT services.

## 2018-10-25 RX ORDER — CYCLOBENZAPRINE HCL 5 MG
5 TABLET ORAL 3 TIMES DAILY PRN
Qty: 30 TABLET | Refills: 0 | Status: SHIPPED | OUTPATIENT
Start: 2018-10-25 | End: 2018-11-04

## 2018-10-29 ENCOUNTER — OFFICE VISIT (OUTPATIENT)
Dept: UROLOGY | Facility: CLINIC | Age: 68
End: 2018-10-29
Payer: MEDICARE

## 2018-10-29 VITALS
DIASTOLIC BLOOD PRESSURE: 70 MMHG | WEIGHT: 170.88 LBS | HEIGHT: 64 IN | SYSTOLIC BLOOD PRESSURE: 118 MMHG | BODY MASS INDEX: 29.17 KG/M2

## 2018-10-29 DIAGNOSIS — R30.0 DYSURIA: ICD-10-CM

## 2018-10-29 DIAGNOSIS — C64.1 RENAL CELL CARCINOMA OF RIGHT KIDNEY: Primary | ICD-10-CM

## 2018-10-29 LAB
BILIRUB SERPL-MCNC: NEGATIVE MG/DL
BLOOD URINE, POC: NEGATIVE
COLOR, POC UA: YELLOW
GLUCOSE UR QL STRIP: NEGATIVE
KETONES UR QL STRIP: NEGATIVE
LEUKOCYTE ESTERASE URINE, POC: NORMAL
NITRITE, POC UA: NEGATIVE
PH, POC UA: 5
PROTEIN, POC: NEGATIVE
SPECIFIC GRAVITY, POC UA: 1020
UROBILINOGEN, POC UA: NEGATIVE

## 2018-10-29 PROCEDURE — 81001 URINALYSIS AUTO W/SCOPE: CPT | Mod: PBBFAC | Performed by: NURSE PRACTITIONER

## 2018-10-29 PROCEDURE — 87086 URINE CULTURE/COLONY COUNT: CPT

## 2018-10-29 PROCEDURE — 99024 POSTOP FOLLOW-UP VISIT: CPT | Mod: ,,, | Performed by: NURSE PRACTITIONER

## 2018-10-29 PROCEDURE — 99499 UNLISTED E&M SERVICE: CPT | Mod: HCNC,S$GLB,, | Performed by: NURSE PRACTITIONER

## 2018-10-29 PROCEDURE — 99999 PR PBB SHADOW E&M-EST. PATIENT-LVL III: CPT | Mod: PBBFAC,,, | Performed by: NURSE PRACTITIONER

## 2018-10-29 PROCEDURE — 99213 OFFICE O/P EST LOW 20 MIN: CPT | Mod: PBBFAC | Performed by: NURSE PRACTITIONER

## 2018-10-29 NOTE — PROGRESS NOTES
Subjective:       Patient ID: Valarie Foster is a 68 y.o. female who is an established patient of Dr. Palm though new to me was last seen in this office 10/22/2018    Chief Complaint:   Chief Complaint   Patient presents with    Follow-up     Patient here for staple removal... burning when urinating.. blood spotting     Per Dr. Palm:    CT a/p with contrast 1/26/18 showed a 2.6cm endophytic lesion in R MP, concerning for solid mass vs complex cyst.     She reports L flank pain, contralateral to renal lesion. L flank pain has almost completely resolved. Denies hematuria, LUTS, UTIs. UA macro did show microhematuria recently. Denies family history of  malignancy. She reports being told she had a small kidney on one side in the past. Denies nephrolithiasis. Nonsmoker. Denies previous abdominal surgery though she is s/p hysterectomy. +DM2/HTN.     Cr (1/18) - 0.8  A1c (1/18) - 6.2  UA micro (2/18) - 0 RBCs    CT renal protocol (2/18) - 2.9cm completely endophytic solid enhancing renal mass. Homogenous in appearance.      She is s/p perc renal biopsy to better evaluate lesion (2/18) - oncocytic neoplasm. Mild hematuria < 24 hrs.          CT (6/18) - stable, endophytic enhancing 2.4cm R renal mass. Subtle 6mm focus of enhancement in R lobe of liver - nonspecific.     KISHAN (9/18) - stable 2.8cm R renal mass    She is now s/p open R PNx 10/12/18.     Path: 1.9cm unclassified RCC, FG 3/4, negative margins. [id number]    She returns today to have her staples removed from her incision. She reports mild intermittent dysuria today. Also noted scant amount of blood when wiping--this has currently resolved. Flank pain controlled with Percocet. Denies n/v or fever    ACTIVE MEDICAL ISSUES:  Patient Active Problem List   Diagnosis    Type 2 diabetes mellitus with diabetic neuropathy, without long-term current use of insulin    Benign hypertension    Hyperlipemia    Microhematuria    Left flank pain    Renal cell  carcinoma of right kidney       ALLERGIES AND MEDICATIONS: updated and reviewed.  Review of patient's allergies indicates:   Allergen Reactions    Lisinopril Swelling     Current Outpatient Medications   Medication Sig    amLODIPine (NORVASC) 5 MG tablet Take 1 tablet (5 mg total) by mouth once daily.    cyclobenzaprine (FLEXERIL) 5 MG tablet Take 1 tablet (5 mg total) by mouth 3 (three) times daily as needed for Muscle spasms.    docusate sodium (COLACE) 100 MG capsule Take 1 capsule (100 mg total) by mouth 2 (two) times daily.    fluticasone (FLONASE) 50 mcg/actuation nasal spray 1 spray (50 mcg total) by Each Nare route once daily.    gabapentin (NEURONTIN) 300 MG capsule Take 2 capsules (600 mg total) by mouth every evening.    oxyCODONE-acetaminophen (PERCOCET) 5-325 mg per tablet Take 1 tablet by mouth every 4 (four) hours as needed.    pravastatin (PRAVACHOL) 10 MG tablet Take 1 tablet (10 mg total) by mouth once daily.    promethazine-dextromethorphan (PROMETHAZINE-DM) 6.25-15 mg/5 mL Syrp Take 5 mLs by mouth 3 (three) times daily as needed (cough).     No current facility-administered medications for this visit.        Review of Systems   Constitutional: Negative for activity change, chills, fatigue, fever and unexpected weight change.   Eyes: Negative for discharge, redness and visual disturbance.   Respiratory: Negative for cough, shortness of breath and wheezing.    Cardiovascular: Negative for chest pain and leg swelling.   Gastrointestinal: Negative for abdominal distention, abdominal pain, constipation, diarrhea, nausea and vomiting.   Genitourinary: Positive for dysuria. Negative for decreased urine volume, difficulty urinating, flank pain, frequency, hematuria, pelvic pain and urgency.   Musculoskeletal: Negative for arthralgias, joint swelling and myalgias.   Skin: Negative for color change and rash.   Neurological: Negative for dizziness and light-headedness.   Psychiatric/Behavioral:  "Negative for behavioral problems and confusion. The patient is not nervous/anxious.        Objective:      Vitals:    10/29/18 0925   BP: 118/70   Weight: 77.5 kg (170 lb 13.7 oz)   Height: 5' 4" (1.626 m)     Physical Exam   Constitutional: She is oriented to person, place, and time. She appears well-developed.   HENT:   Head: Normocephalic and atraumatic.   Nose: Nose normal.   Eyes: Conjunctivae are normal. Right eye exhibits no discharge. Left eye exhibits no discharge.   Neck: Normal range of motion. Neck supple. No tracheal deviation present. No thyromegaly present.   Cardiovascular: Normal rate and regular rhythm.    Pulmonary/Chest: Effort normal. No respiratory distress. She has no wheezes.   Abdominal: Soft. She exhibits no distension. There is no hepatosplenomegaly. There is no tenderness. There is no CVA tenderness. No hernia.   Genitourinary:   Genitourinary Comments: Right flank incision with staples c/d/i   Musculoskeletal: Normal range of motion. She exhibits no edema.   Neurological: She is alert and oriented to person, place, and time.   Skin: Skin is warm and dry. No rash noted. No erythema.     Psychiatric: She has a normal mood and affect. Her behavior is normal. Judgment normal.       Urine dipstick shows ++LE.      Staples removed from right flank incision without difficulty. Steri strips applied. Patient tolerated well    Assessment:       1. Renal cell carcinoma of right kidney    2. Dysuria          Plan:       1. Renal cell carcinoma of right kidney   - 2.6cm lesion in R MP kidney.   - CT renal protocol 2.5cm endophytic solid enhancing renal mass   - Due to location of tumor, open partial would be difficult with higher complication rate of bleeding and/or urine leak. Radical nephrectomy with less complication rate.   - Concern for performing radical nephrectomy in diabetic patient for possible benign lesion.   - Recommend perc biopsy to evaluate for possible benign lesion prior to radical " nephrectomy. Discussed limitations/risks of perc biopsy. Understands that perc biopsy may also not be definitive in diagnosis   - Perc biopsy 2/18 - oncocytic neoplasm (less favors oncocytoma)   - Discussed options: observation, lap radical Nx, open partial Nx. She has opted to observe for now.    - CT 6/18 - stable 2.4cm R renal mass   - KISHAN 9/18 - 2.8cm R renal mass   - s/p R open PNx on 10/12/18    - unclassified RCC pT1a   -staples removed without difficulty on 10/29/18   - CT c/a/p 6 months (4/18)   - Follow up 3 months with labs  - POCT urinalysis, dipstick or tablet reag  - CBC auto differential; Future  - Comprehensive metabolic panel; Future    2. Dysuria  - Urine culture          Follow-up in about 3 months (around 1/29/2019) for Review labs.

## 2018-10-30 ENCOUNTER — TELEPHONE (OUTPATIENT)
Dept: FAMILY MEDICINE | Facility: CLINIC | Age: 68
End: 2018-10-30

## 2018-10-30 ENCOUNTER — TELEPHONE (OUTPATIENT)
Dept: UROLOGY | Facility: CLINIC | Age: 68
End: 2018-10-30

## 2018-10-30 RX ORDER — PHENAZOPYRIDINE HYDROCHLORIDE 200 MG/1
200 TABLET, FILM COATED ORAL 3 TIMES DAILY PRN
Qty: 20 TABLET | Refills: 0 | Status: SHIPPED | OUTPATIENT
Start: 2018-10-30 | End: 2018-11-09

## 2018-10-30 NOTE — TELEPHONE ENCOUNTER
Patient's pharmacy says her medication is not covered phenazopyridine (PYRIDIUM) 200 MG tablet  But  Methenamine 1gram  is covered.- please advise if medication change is appropriate.

## 2018-10-30 NOTE — TELEPHONE ENCOUNTER
----- Message from Valentine Dawkins sent at 10/30/2018  1:30 PM CDT -----  Contact: Andressa Santos   Patient's pharmacy says her medication is not covered phenazopyridine (PYRIDIUM) 200 MG tablet  But  Methenamine 1gram  is covered.        [facility] [address] [place], [place] [address], [place] - [address] [facility]

## 2018-10-30 NOTE — TELEPHONE ENCOUNTER
----- Message from Lady Grant sent at 10/29/2018  4:35 PM CDT -----  Contact: pt            Name of Who is Calling: pt      What is the request in detail: pt needs to discuss not receiving her diabetic machine. Call pt      Can the clinic reply by [facility]: no      What Number to Call Back if not in [facility]: [phone]

## 2018-10-30 NOTE — TELEPHONE ENCOUNTER
----- Message from Guerline Crouch sent at 10/30/2018  9:48 AM CDT -----  Contact: [facility]  [facility] called to check the status on the physician's paperwork being filled out for diabetic supplies (acucheck clifford+meter, acucheck clifford+ test strips, acucheck softclix lancets, acucheck clifford solution, and BD single use swabs). please call to discuss at [phone]

## 2018-10-30 NOTE — TELEPHONE ENCOUNTER
Pyridium sent to pharmacy.    She should increase water intake. Urine culture from yesterday is still pending at this time. Will prescribe antibiotics if results are positive for a UTI

## 2018-10-30 NOTE — TELEPHONE ENCOUNTER
Spoke to [facility] to give a verbal on diabetic supplies.  Patient is not on insulin only needs to test one time.

## 2018-10-30 NOTE — TELEPHONE ENCOUNTER
Patient is calling stating that she is having burning/dysuria and was told to call the office is symptoms started.

## 2018-10-31 ENCOUNTER — TELEPHONE (OUTPATIENT)
Dept: UROLOGY | Facility: CLINIC | Age: 68
End: 2018-10-31

## 2018-10-31 LAB — BACTERIA UR CULT: NORMAL

## 2018-10-31 NOTE — TELEPHONE ENCOUNTER
----- Message from Jocelin Cox sent at 10/31/2018  9:49 AM CDT -----  Contact: [phone]  Patient returned the staff's call. Thank you.

## 2018-10-31 NOTE — TELEPHONE ENCOUNTER
Please inform patient that recent urine culture was negative for infection. No need for antibiotics at this time. Dysuria/burning with urination is likely related to recent easton placement during surgery.     Continue to take otc AZO as needed and increase water intake to keep bladder flushed

## 2018-10-31 NOTE — TELEPHONE ENCOUNTER
Spoke to pt advised urine culture negative for infection no antibiotics needed at this time. Dysuria/burning likely caused from easton placement advised pt to increase fluid in take an azo otc. Pt fully understands an is grateful for all help juancho magallon assisted her with.-wayne

## 2018-11-09 DIAGNOSIS — E11.40 TYPE 2 DIABETES MELLITUS WITH DIABETIC NEUROPATHY, WITHOUT LONG-TERM CURRENT USE OF INSULIN: Chronic | ICD-10-CM

## 2018-11-09 RX ORDER — GABAPENTIN 300 MG/1
600 CAPSULE ORAL NIGHTLY
Qty: 180 CAPSULE | Refills: 1 | Status: SHIPPED | OUTPATIENT
Start: 2018-11-09 | End: 2019-05-02 | Stop reason: SDUPTHER

## 2018-11-09 NOTE — TELEPHONE ENCOUNTER
----- Message from Giovana Oshea sent at 11/9/2018  8:06 AM CST -----  Contact: SELF  Pt is requesting a refill on Medicine gabapentin (NEURONTIN) 300 MG capsule she just had surgery. Please call patient at [phone] thanks  [facility]

## 2018-11-14 ENCOUNTER — PATIENT OUTREACH (OUTPATIENT)
Dept: ADMINISTRATIVE | Facility: HOSPITAL | Age: 68
End: 2018-11-14

## 2018-11-14 RX ORDER — BLOOD SUGAR DIAGNOSTIC
STRIP MISCELLANEOUS
COMMUNITY
Start: 2018-10-31 | End: 2019-10-14 | Stop reason: SDUPTHER

## 2018-11-14 RX ORDER — BLOOD GLUCOSE CONTROL HIGH,LOW
EACH MISCELLANEOUS
COMMUNITY
Start: 2018-10-31 | End: 2023-03-23

## 2018-11-14 RX ORDER — LANCETS
EACH MISCELLANEOUS
COMMUNITY
Start: 2018-10-31 | End: 2019-10-14 | Stop reason: SDUPTHER

## 2018-11-14 RX ORDER — ISOPROPYL ALCOHOL 0.75 G/1
SWAB TOPICAL
COMMUNITY
Start: 2018-10-31 | End: 2019-11-27 | Stop reason: SDUPTHER

## 2018-11-19 ENCOUNTER — OFFICE VISIT (OUTPATIENT)
Dept: FAMILY MEDICINE | Facility: CLINIC | Age: 68
End: 2018-11-19
Payer: MEDICARE

## 2018-11-19 VITALS
HEIGHT: 64 IN | OXYGEN SATURATION: 99 % | TEMPERATURE: 98 F | SYSTOLIC BLOOD PRESSURE: 140 MMHG | HEART RATE: 77 BPM | WEIGHT: 173.31 LBS | DIASTOLIC BLOOD PRESSURE: 70 MMHG | BODY MASS INDEX: 29.59 KG/M2

## 2018-11-19 DIAGNOSIS — E11.40 TYPE 2 DIABETES MELLITUS WITH DIABETIC NEUROPATHY, WITHOUT LONG-TERM CURRENT USE OF INSULIN: Primary | ICD-10-CM

## 2018-11-19 DIAGNOSIS — C64.1 RENAL CELL CARCINOMA OF RIGHT KIDNEY: ICD-10-CM

## 2018-11-19 DIAGNOSIS — I10 BENIGN HYPERTENSION: Chronic | ICD-10-CM

## 2018-11-19 DIAGNOSIS — E78.5 HYPERLIPIDEMIA, UNSPECIFIED HYPERLIPIDEMIA TYPE: Chronic | ICD-10-CM

## 2018-11-19 PROCEDURE — 99214 OFFICE O/P EST MOD 30 MIN: CPT | Mod: HCNC,S$GLB,, | Performed by: FAMILY MEDICINE

## 2018-11-19 PROCEDURE — 99999 PR PBB SHADOW E&M-EST. PATIENT-LVL IV: CPT | Mod: PBBFAC,HCNC,, | Performed by: FAMILY MEDICINE

## 2018-11-19 PROCEDURE — 3044F HG A1C LEVEL LT 7.0%: CPT | Mod: CPTII,HCNC,S$GLB, | Performed by: FAMILY MEDICINE

## 2018-11-19 PROCEDURE — 3078F DIAST BP <80 MM HG: CPT | Mod: CPTII,HCNC,S$GLB, | Performed by: FAMILY MEDICINE

## 2018-11-19 PROCEDURE — 1101F PT FALLS ASSESS-DOCD LE1/YR: CPT | Mod: CPTII,HCNC,S$GLB, | Performed by: FAMILY MEDICINE

## 2018-11-19 PROCEDURE — 3077F SYST BP >= 140 MM HG: CPT | Mod: CPTII,HCNC,S$GLB, | Performed by: FAMILY MEDICINE

## 2018-11-19 NOTE — PROGRESS NOTES
Routine Office Visit    Patient Name: Valarie Foster    : 1950  MRN: [id number]    Subjective:  Valarie is a 68 y.o. female who presents today for     1. S/p right partial nephrectomy - pt has follow-up scheduled with Dr. Palm in January. She is doing well after her surgery. She has completed physical therapy and home health has been discharged. She continues to exercise on her own regularly. She plans on remaining active. She is unable to do as much since her surgery. She has had less of an appetite.  2. Diabetes mellitus - diet controlled - she is not on medications. She was advised to monitor it at home. She has noticed blood glucose ranging from 140s-170s. She sometimes has no appetite and has not been eating. She c/o increase fatigue.     Review of Systems   Constitutional: Negative for chills and fever.   HENT: Negative for congestion.    Eyes: Negative for blurred vision.   Respiratory: Negative for cough.    Cardiovascular: Negative for chest pain.   Gastrointestinal: Negative for abdominal pain, constipation, diarrhea, heartburn, nausea and vomiting.   Genitourinary: Negative for dysuria.   Musculoskeletal: Negative for myalgias.   Skin: Negative for itching and rash.   Neurological: Negative for dizziness and headaches.   Psychiatric/Behavioral: Negative for depression.       Active Problem List  Patient Active Problem List   Diagnosis    Type 2 diabetes mellitus with diabetic neuropathy, without long-term current use of insulin    Benign hypertension    Hyperlipemia    Microhematuria    Left flank pain    Renal cell carcinoma of right kidney       Past Surgical History  Past Surgical History:   Procedure Laterality Date    BIOPSY-KIDNEY Right 2018    [facility] OR    COLONOSCOPY N/A 2018    Procedure: COLONOSCOPY;  Surgeon: Bacilio Mancia MD;  Location: [facility];  Service: Endoscopy;  Laterality: N/A;  appt confirmed-ss    COLONOSCOPY N/A  2/5/2018    Performed by Bacilio [facility]ancia MD at [facility] ENDO    HYSTERECTOMY      NEPHRECTOMY, PARTIAL open. Dr Arenas to assist. Right 10/12/2018    Performed by Alejandra Palm MD at [facility] OR    OOPHORECTOMY      PARTIAL NEPHRECTOMY Right 10/12/2018    Procedure: NEPHRECTOMY, PARTIAL open. Dr Arenas to assist.;  Surgeon: Alejandra Palm MD;  Location: [facility] OR;  Service: Urology;  Laterality: Right;  RN PREOP 10/9/2018----NEEDS H/P       Family History  Family History   Problem Relation Age of Onset    Glaucoma Sister     Cancer Sister         breast cancer    No Known Problems Mother     Glaucoma Father     Diabetes Brother     No Known Problems Maternal Aunt     No Known Problems Maternal Uncle     No Known Problems Paternal Aunt     No Known Problems Paternal Uncle     No Known Problems Maternal Grandmother     No Known Problems Maternal Grandfather     No Known Problems Paternal Grandmother     No Known Problems Paternal Grandfather     Thyroid disease Sister     Blindness Sister         due to trauma during car accident    Diabetes Sister     Amblyopia Neg Hx     Cataracts Neg Hx     Hypertension Neg Hx     Macular degeneration Neg Hx     Retinal detachment Neg Hx     Strabismus Neg Hx     Stroke Neg Hx     Breast cancer Neg Hx        Social History  Social History     Socioeconomic History    Marital status: Legally      Spouse name: Not on file    Number of children: Not on file    Years of education: Not on file    Highest education level: Not on file   Social Needs    Financial resource strain: Not on file    Food insecurity - worry: Not on file    Food insecurity - inability: Not on file    Transportation needs - medical: Not on file    Transportation needs - non-medical: Not on file   Occupational History    Not on file   Tobacco Use    Smoking status: Never Smoker    Smokeless tobacco: Never Used   Substance and Sexual Activity    Alcohol use:  "No    Drug use: No    Sexual activity: Not on file   Other Topics Concern    Not on file   Social History Narrative    Not on file       Medications and Allergies  Reviewed and updated.   Current Outpatient Medications   Medication Sig    ACCU-CHEK ONEIL CONTROL SOLN Soln     ACCU-CHEK ONEIL PLUS METER Misc     ACCU-CHEK ONEIL PLUS TEST STRP Strp     ACCU-CHEK SOFTCLIX LANCETS Misc     amLODIPine (NORVASC) 5 MG tablet Take 1 tablet (5 mg total) by mouth once daily.    BD ALCOHOL SWABS PadM     docusate sodium (COLACE) 100 MG capsule Take 1 capsule (100 mg total) by mouth 2 (two) times daily.    fluticasone (FLONASE) 50 mcg/actuation nasal spray 1 spray (50 mcg total) by Each Nare route once daily.    gabapentin (NEURONTIN) 300 MG capsule Take 2 capsules (600 mg total) by mouth every evening.    oxyCODONE-acetaminophen (PERCOCET) 5-325 mg per tablet Take 1 tablet by mouth every 4 (four) hours as needed.    pravastatin (PRAVACHOL) 10 MG tablet Take 1 tablet (10 mg total) by mouth once daily.    promethazine-dextromethorphan (PROMETHAZINE-DM) 6.25-15 mg/5 mL Syrp Take 5 mLs by mouth 3 (three) times daily as needed (cough).     No current facility-administered medications for this visit.        Physical Exam  BP (!) 140/70 (BP Location: Right arm, Patient Position: Sitting, BP Method: Medium (Manual))   Pulse 77   Temp 97.7 °F (36.5 °C) (Oral)   Ht 5' 4" (1.626 m)   Wt 78.6 kg (173 lb 4.5 oz)   SpO2 99%   BMI 29.74 kg/m²   Physical Exam   Constitutional: She is oriented to person, place, and time. She appears well-developed and well-nourished.   HENT:   Head: Normocephalic and atraumatic.   Eyes: Conjunctivae and EOM are normal. Pupils are equal, round, and reactive to light.   Neck: Normal range of motion. Neck supple.   Cardiovascular: Normal rate, regular rhythm and normal heart sounds. Exam reveals no gallop and no friction rub.   No murmur heard.  Pulmonary/Chest: Breath sounds normal. No " respiratory distress.   Abdominal: Soft. Bowel sounds are normal. She exhibits no distension. There is no tenderness.   Musculoskeletal: Normal range of motion.   Lymphadenopathy:     She has no cervical adenopathy.   Neurological: She is alert and oriented to person, place, and time.   Skin: Skin is warm.   Psychiatric: She has a normal mood and affect.         Assessment/Plan:  Valarie Foster is a 68 y.o. female who presents today for :    Problem List Items Addressed This Visit        Cardiac/Vascular    Benign hypertension (Chronic)  The current medical regimen is effective;  continue present plan and medications.      Hyperlipemia (Chronic)  The current medical regimen is effective;  continue present plan and medications.         Oncology    Renal cell carcinoma of right kidney  S/p right partial nephrectomy  Continue f/u with urology - Dr. Palm        Endocrine    Type 2 diabetes mellitus with diabetic neuropathy, without long-term current use of insulin - Primary (Chronic)    Relevant Orders    Hemoglobin A1c    Lipid panel    TSH    Ambulatory Referral to Diabetes Education  Diet controlled  Continue to monitor   Recommend increase protein and vegetables               Follow-up in about 3 months (around 2/19/2019).

## 2018-11-21 DIAGNOSIS — B96.89 BACTERIAL SINUSITIS: ICD-10-CM

## 2018-11-21 DIAGNOSIS — J32.9 BACTERIAL SINUSITIS: ICD-10-CM

## 2018-11-21 RX ORDER — FLUTICASONE PROPIONATE 50 MCG
SPRAY, SUSPENSION (ML) NASAL
Qty: 16 G | Refills: 0 | Status: SHIPPED | OUTPATIENT
Start: 2018-11-21 | End: 2019-03-25 | Stop reason: SDUPTHER

## 2018-12-05 ENCOUNTER — CLINICAL SUPPORT (OUTPATIENT)
Dept: DIABETES | Facility: CLINIC | Age: 68
End: 2018-12-05
Payer: MEDICARE

## 2018-12-05 VITALS — WEIGHT: 175.31 LBS | BODY MASS INDEX: 30.09 KG/M2

## 2018-12-05 DIAGNOSIS — E11.40 TYPE 2 DIABETES MELLITUS WITH DIABETIC NEUROPATHY, WITHOUT LONG-TERM CURRENT USE OF INSULIN: Primary | ICD-10-CM

## 2018-12-05 PROCEDURE — G0108 DIAB MANAGE TRN  PER INDIV: HCPCS | Mod: HCNC,S$GLB,, | Performed by: DIETITIAN, REGISTERED

## 2018-12-05 NOTE — PROGRESS NOTES
Diabetes Education  Author: Nely Simpson RD  Date: 12/5/2018    Diabetes Care Management Summary  Pt visit today focused on introducing pt to diabetes self management w emphasis on CHO awareness/counting, meal planning/label reading, SMBG, exercise, and meds. Discussed ways to reduce Na+ and offered suggestions to enhance healing from recent surgery  Diabetes Education Record Assessment/Progress: Initial  Current Diabetes Risk Level: Low   Diabetes History  Diabetes Diagnosis: 1-3 years  Current Treatment: Diet  Reviewed Problem List with Patient: Yes    Health Maintenance was reviewed today with patient. Discussed with patient importance of routine eye exams, foot exams/foot care, blood work (i.e.: A1c, microalbumin, and lipid), dental visits, yearly flu vaccine, and pneumonia vaccine as indicated by PCP. Patient verbalized understanding.     Health Maintenance Topics with due status: Not Due       Topic Last Completion Date    Pneumococcal Vaccine (65+ Low/Medium Risk) 01/29/2018    Lipid Panel 01/29/2018    Mammogram 02/02/2018    Colonoscopy 02/05/2018    Eye Exam 02/12/2018    DEXA SCAN 04/02/2018    TETANUS VACCINE 04/30/2018    Foot Exam 05/21/2018    Hemoglobin A1c 10/13/2018    Low Dose Statin 11/19/2018     Health Maintenance Due   Topic Date Due    Zoster Vaccine  05/31/2008    Urine Microalbumin  01/31/2019       Nutrition  Meal Planning: skipping meals, water, eats out seldom, snacks between meal  What type of beverages do you drink?: juice  Meal Plan 24 Hour Recall - Breakfast: 730am: 1 sl toast, 1pkg apple cinnamon Oatmal made w water, coffee w powdered creamer  Meal Plan 24 Hour Recall - Lunch: 1130am - eats only if hungry; small vegsalad w italian dressing  Meal Plan 24 Hour Recall - Dinner: 1130PM: susannah pies or cakes, or banana moon pie    Monitoring   Self Monitoring : BG check 0-1x/day  Blood Glucose Logs: No  Do you use a personal continuous glucose monitor?: No  In the last month, how often  have you had a low blood sugar reaction?: never  Can you tell when your blood sugar is too high?: no    Exercise   Exercise Type: none(limited post surgery; stated walking w kids in afternoon)  Intensity: Low    Current Diabetes Treatment   Current Treatment: Diet    Social History  Preferred Learning Method: Face to Face, Group Education  Primary Support: Self, Family  Smoking Status: Never a Smoker  Alcohol Use: Never  Barriers to Change: None  Learning Challenges : None  Readiness to Learn : Acceptance  Cultural Influences: No    Diabetes Education Assessment/Progress  Diabetes Disease Process (diabetes disease process and treatment options): Individual Session, Written Materials Provided, Instructed, Discussion, Comprehends Key Points  Nutrition (Incorporating nutritional management into one's lifestyle): Individual Session, Written Materials Provided, Instructed, Discussion, Comprehends Key Points, Needs Review  -pt states he has no appetite and tends to skip lunch and dinner but snacks heavily at late night snack. Diet recall notes diet lackng in variety; no whole grains, fresh fruit or veg consumed regularly. Pt does drink 1 cup of juice several times per day but often will not eat during day. Advised pt to add HBV protein at each meals to enhance nutritive value of meals  -Discussed carb vs non-carb foods. Discussed appropriate amount of carbs to have at meals/snacks. Discussed appropriate serving sizes of individual carb items.  - In order to assist w BG control pt Instructed  to aim for 3 evenly-spaced meals with 30-45 gm carb/meal and 0-15 gm at snacks    Physical Activity (incorporating physical activity into one's lifestyle): Individual Session, Written Materials Provided, Instructed, Discussion  Medications (states correct name, dose, onset, peak, duration, side effects & timing of meds): Discussion, Written Materials Provided  Monitoring (monitoring blood glucose/other parameters & using results):  Individual Session, Written Materials Provided, Discussion, Instructed, Comprehends Key Points  Acute Complications (preventing, detecting, and treating acute complications): Individual Session, Written Materials Provided, Instructed, Discussion, Comprehends Key Points  Chronic Complications (preventing, detecting, and treating chronic complications): Individual Session, Written Materials Provided, Instructed, Discussion, Comprehends Key Points  Clinical (diabetes, other pertinent medical history, and relevant comorbidities reviewed during visit): Discussion  Cognitive (knowledge of self-management skills, functional health literacy): Discussion  Psychosocial (emotional response to diabetes): Discussion  Diabetes Distress and Support Systems: Discussion  Behavioral (readiness for change, lifestyle practices, self-care behaviors): Discussion, Individual Session    Goals  Patient has selected/evaluated goals during today's session: Yes, selected  Healthy Eating: Set(consume 3 evenly spaced meals.day, 30-45 g CHO/meal)  Start Date: 12/05/19  Target Date: 01/05/19    Diabetes Care Plan/Intervention  Education Plan/Intervention: Individual Follow-Up DSMT(contact information provided; pt will schedule follow up as needed)    Diabetes Meal Plan  Restrictions: Restricted Carbohydrate, Low Sodium  Carbohydrate Per Meal: 30-45g  Carbohydrate Per Snack : 15-20g    Today's Self-Management Care Plan was developed with the patient's input and is based on barriers identified during today's assessment.    The long and short-term goals in the care plan were written with the patient/caregiver's input. The patient has agreed to work toward these goals to improve her overall diabetes control.      The patient received a copy of today's self-management plan and verbalized understanding of the care plan, goals, and all of today's instructions.      The patient was encouraged to communicate with her physician and care team regarding her  condition(s) and treatment.  I provided the patient with my contact information today and encouraged her to contact me via phone or patient portal as needed.     Education Units of Time   Time Spent: 60 min

## 2018-12-11 ENCOUNTER — TELEPHONE (OUTPATIENT)
Dept: UROLOGY | Facility: CLINIC | Age: 68
End: 2018-12-11

## 2018-12-11 ENCOUNTER — TELEPHONE (OUTPATIENT)
Dept: FAMILY MEDICINE | Facility: CLINIC | Age: 68
End: 2018-12-11

## 2018-12-11 NOTE — TELEPHONE ENCOUNTER
Patient notified physician would like her to stay away from narcotics this far out from surgery. She can take ibuprofen TID to help with pain and inflammation. If that does not help enough, patient notified to call back to schedule an appointment with NP.

## 2018-12-11 NOTE — TELEPHONE ENCOUNTER
I would want her to stay away from narcotics for pain this long after surgery. She can try ibuprofen TID at this time - will help with pain and inflammation. If that doesn't help enough, please have her come in to see NP for evaluation.

## 2018-12-11 NOTE — TELEPHONE ENCOUNTER
----- Message from Stephanie Phillips sent at 12/11/2018  2:31 PM CST -----  Contact: Self   Refill :oxyCODONE-acetaminophen (PERCOCET) 5-325 mg per tablet        Please call when ready [phone].

## 2018-12-11 NOTE — TELEPHONE ENCOUNTER
----- Message from Macy Joshua sent at 12/11/2018 11:14 AM CST -----  Contact: self [phone]  Pt is requesting a referral to see a allergy doctor. She keeps breaking out. Pls call pt [phone]. Thanks.......Justina

## 2018-12-11 NOTE — TELEPHONE ENCOUNTER
Spoke with patient she states she is having pain around her incision. She is requesting pain medication or OTC alternative to help with pain.

## 2019-01-16 ENCOUNTER — TELEPHONE (OUTPATIENT)
Dept: UROLOGY | Facility: CLINIC | Age: 69
End: 2019-01-16

## 2019-01-16 NOTE — TELEPHONE ENCOUNTER
----- Message from Olinda Katz sent at 1/16/2019  9:25 AM CST -----  Contact: self [phone]  Pt is requesting orders for bloodwork & urine test

## 2019-01-16 NOTE — TELEPHONE ENCOUNTER
Spoke to pt she was just calling to confirm labwork appt an appt with . Dates an times were given to pt.-wayne

## 2019-01-22 ENCOUNTER — LAB VISIT (OUTPATIENT)
Dept: LAB | Facility: HOSPITAL | Age: 69
End: 2019-01-22
Attending: NURSE PRACTITIONER
Payer: MEDICARE

## 2019-01-22 DIAGNOSIS — C64.1 RENAL CELL CARCINOMA OF RIGHT KIDNEY: ICD-10-CM

## 2019-01-22 LAB
ALBUMIN SERPL BCP-MCNC: 3.6 G/DL
ALP SERPL-CCNC: 90 U/L
ALT SERPL W/O P-5'-P-CCNC: 7 U/L
ANION GAP SERPL CALC-SCNC: 9 MMOL/L
AST SERPL-CCNC: 11 U/L
BASOPHILS # BLD AUTO: 0 K/UL
BASOPHILS NFR BLD: 0 %
BILIRUB SERPL-MCNC: 0.4 MG/DL
BUN SERPL-MCNC: 11 MG/DL
CALCIUM SERPL-MCNC: 9.6 MG/DL
CHLORIDE SERPL-SCNC: 105 MMOL/L
CO2 SERPL-SCNC: 27 MMOL/L
CREAT SERPL-MCNC: 0.8 MG/DL
DIFFERENTIAL METHOD: NORMAL
EOSINOPHIL # BLD AUTO: 0 K/UL
EOSINOPHIL NFR BLD: 0 %
ERYTHROCYTE [DISTWIDTH] IN BLOOD BY AUTOMATED COUNT: 13.7 %
EST. GFR  (AFRICAN AMERICAN): >60 ML/MIN/1.73 M^2
EST. GFR  (NON AFRICAN AMERICAN): >60 ML/MIN/1.73 M^2
GLUCOSE SERPL-MCNC: 102 MG/DL
HCT VFR BLD AUTO: 41.3 %
HGB BLD-MCNC: 13.2 G/DL
IMM GRANULOCYTES # BLD AUTO: 0.01 K/UL
IMM GRANULOCYTES NFR BLD AUTO: 0.2 %
LYMPHOCYTES # BLD AUTO: 1.3 K/UL
LYMPHOCYTES NFR BLD: 27 %
MCH RBC QN AUTO: 27.2 PG
MCHC RBC AUTO-ENTMCNC: 32 G/DL
MCV RBC AUTO: 85 FL
MONOCYTES # BLD AUTO: 0.3 K/UL
MONOCYTES NFR BLD: 6.1 %
NEUTROPHILS # BLD AUTO: 3.2 K/UL
NEUTROPHILS NFR BLD: 66.7 %
NRBC BLD-RTO: 0 /100 WBC
PLATELET # BLD AUTO: 272 K/UL
PMV BLD AUTO: 11.4 FL
POTASSIUM SERPL-SCNC: 4.6 MMOL/L
PROT SERPL-MCNC: 7.3 G/DL
RBC # BLD AUTO: 4.85 M/UL
SODIUM SERPL-SCNC: 141 MMOL/L
WBC # BLD AUTO: 4.78 K/UL

## 2019-01-22 PROCEDURE — 80053 COMPREHEN METABOLIC PANEL: CPT | Mod: HCNC

## 2019-01-22 PROCEDURE — 85025 COMPLETE CBC W/AUTO DIFF WBC: CPT | Mod: HCNC

## 2019-01-22 PROCEDURE — 36415 COLL VENOUS BLD VENIPUNCTURE: CPT | Mod: HCNC,PO

## 2019-01-28 ENCOUNTER — OFFICE VISIT (OUTPATIENT)
Dept: UROLOGY | Facility: CLINIC | Age: 69
End: 2019-01-28
Payer: MEDICARE

## 2019-01-28 VITALS
SYSTOLIC BLOOD PRESSURE: 138 MMHG | HEART RATE: 64 BPM | BODY MASS INDEX: 29.96 KG/M2 | WEIGHT: 175.5 LBS | DIASTOLIC BLOOD PRESSURE: 78 MMHG | HEIGHT: 64 IN | RESPIRATION RATE: 14 BRPM

## 2019-01-28 DIAGNOSIS — R31.29 MICROHEMATURIA: ICD-10-CM

## 2019-01-28 DIAGNOSIS — C64.1 RENAL CELL CARCINOMA OF RIGHT KIDNEY: Primary | ICD-10-CM

## 2019-01-28 PROCEDURE — 99499 RISK ADDL DX/OHS AUDIT: ICD-10-PCS | Mod: S$GLB,,, | Performed by: UROLOGY

## 2019-01-28 PROCEDURE — 3078F PR MOST RECENT DIASTOLIC BLOOD PRESSURE < 80 MM HG: ICD-10-PCS | Mod: HCNC,CPTII,S$GLB, | Performed by: UROLOGY

## 2019-01-28 PROCEDURE — 99999 PR PBB SHADOW E&M-EST. PATIENT-LVL III: CPT | Mod: PBBFAC,HCNC,, | Performed by: UROLOGY

## 2019-01-28 PROCEDURE — 3075F SYST BP GE 130 - 139MM HG: CPT | Mod: HCNC,CPTII,S$GLB, | Performed by: UROLOGY

## 2019-01-28 PROCEDURE — 99499 UNLISTED E&M SERVICE: CPT | Mod: S$GLB,,, | Performed by: UROLOGY

## 2019-01-28 PROCEDURE — 1101F PT FALLS ASSESS-DOCD LE1/YR: CPT | Mod: HCNC,CPTII,S$GLB, | Performed by: UROLOGY

## 2019-01-28 PROCEDURE — 3078F DIAST BP <80 MM HG: CPT | Mod: HCNC,CPTII,S$GLB, | Performed by: UROLOGY

## 2019-01-28 PROCEDURE — 1101F PR PT FALLS ASSESS DOC 0-1 FALLS W/OUT INJ PAST YR: ICD-10-PCS | Mod: HCNC,CPTII,S$GLB, | Performed by: UROLOGY

## 2019-01-28 PROCEDURE — 3075F PR MOST RECENT SYSTOLIC BLOOD PRESS GE 130-139MM HG: ICD-10-PCS | Mod: HCNC,CPTII,S$GLB, | Performed by: UROLOGY

## 2019-01-28 PROCEDURE — 99999 PR PBB SHADOW E&M-EST. PATIENT-LVL III: ICD-10-PCS | Mod: PBBFAC,HCNC,, | Performed by: UROLOGY

## 2019-01-28 PROCEDURE — 99214 PR OFFICE/OUTPT VISIT, EST, LEVL IV, 30-39 MIN: ICD-10-PCS | Mod: HCNC,S$GLB,, | Performed by: UROLOGY

## 2019-01-28 PROCEDURE — 99214 OFFICE O/P EST MOD 30 MIN: CPT | Mod: HCNC,S$GLB,, | Performed by: UROLOGY

## 2019-01-28 NOTE — PROGRESS NOTES
Subjective:       Valarie Foster is a 68 y.o. female who is an established patient who was referred by Ivan CARDONA for evaluation of renal mass.      CT a/p with contrast 1/26/18 showed a 2.6cm endophytic lesion in R MP, concerning for solid mass vs complex cyst.     She reports L flank pain, contralateral to renal lesion. L flank pain has almost completely resolved. Denies hematuria, LUTS, UTIs. UA macro did show microhematuria recently. Denies family history of  malignancy. She reports being told she had a small kidney on one side in the past. Denies nephrolithiasis. Nonsmoker. Denies previous abdominal surgery though she is s/p hysterectomy. +DM2/HTN.     Cr (1/18) - 0.8  A1c (1/18) - 6.2  UA micro (2/18) - 0 RBCs    CT renal protocol (2/18) - 2.9cm completely endophytic solid enhancing renal mass. Homogenous in appearance.      She is s/p perc renal biopsy to better evaluate lesion (2/18) - oncocytic neoplasm. Mild hematuria < 24 hrs.          CT (6/18) - stable, endophytic enhancing 2.4cm R renal mass. Subtle 6mm focus of enhancement in R lobe of liver - nonspecific.     KISHAN (9/18) - stable 2.8cm R renal mass    She is now s/p open R PNx 10/12/18.     Path: 1.9cm unclassified RCC, FG 3/4, negative margins. [id number]    Cr 1/19 - 0.8       The following portions of the patient's history were reviewed and updated as appropriate: allergies, current medications, past family history, past medical history, past social history, past surgical history and problem list.    Review of Systems  Constitutional: no fever or chills  ENT: no nasal congestion or sore throat  Respiratory: no cough or shortness of breath  Cardiovascular: no chest pain or palpitations  Gastrointestinal: no nausea or vomiting, tolerating diet  Genitourinary: as per HPI  Hematologic/Lymphatic: no easy bruising or lymphadenopathy  Musculoskeletal: no arthralgias or myalgias  Skin: no rashes or lesions  Neurological: no seizures or  "tremors  Behavioral/Psych: no auditory or visual hallucinations        Objective:    Vitals: /78   Pulse 64   Resp 14   Ht 5' 4" (1.626 m)   Wt 79.6 kg (175 lb 7.8 oz)   BMI 30.12 kg/m²     Physical Exam   General: well developed, well nourished in no acute distress  Head: normocephalic, atraumatic  Neck: supple, trachea midline, no obvious enlargement of thyroid  HEENT: EOMI, mucus membranes moist, sclera anicteric, no hearing impairment  Lungs: symmetric expansion, non-labored breathing  Cardiovascular: regular rate and rhythm, normal pulses  Abdomen: soft, non tender, non distended, no palpable masses, no hepatosplenomegaly, no hernias, no CVA tenderness  Musculoskeletal: no peripheral edema, normal ROM in bilateral upper and lower extremities  Lymphatics: no cervical or inguinal lymphadenopathy  Skin: no rashes or lesions  Neuro: alert and oriented x 3, no gross deficits  Psych: normal judgment and insight, normal mood/affect and non-anxious  Genitourinary:   patient declined exam    Inc - well healed, minor superior fullness of incision    Lab Review   Urine analysis today in clinic shows - ++LE, trace protein, 5-10 RBCs    Lab Results   Component Value Date    WBC 4.78 01/22/2019    HGB 13.2 01/22/2019    HCT 41.3 01/22/2019    MCV 85 01/22/2019     01/22/2019     Lab Results   Component Value Date    CREATININE 0.8 01/22/2019    BUN 11 01/22/2019         Imaging  Images and reports were personally reviewed by me and discussed with patient  CT/KISHAN reviewed       Assessment/Plan:      1. Right RCC    - 2.6cm lesion in R MP kidney.   - CT renal protocol 2.5cm endophytic solid enhancing renal mass   - Due to location of tumor, open partial would be difficult with higher complication rate of bleeding and/or urine leak. Radical nephrectomy with less complication rate.   - Concern for performing radical nephrectomy in diabetic patient for possible benign lesion.   - Recommend perc biopsy to " evaluate for possible benign lesion prior to radical nephrectomy. Discussed limitations/risks of perc biopsy. Understands that perc biopsy may also not be definitive in diagnosis   - Perc biopsy 2/18 - oncocytic neoplasm (less favors oncocytoma)   - Discussed options: observation, lap radical Nx, open partial Nx. She has opted to observe for now.    - CT 6/18 - stable 2.4cm R renal mass   - KISHAN 9/18 - 2.8cm R renal mass   - s/p R open PNx on 10/12/18    - unclassified RCC pT1a   - CT c/a/p 6 months (4/18)       2. Microhematuria    - UA micro - 0 RBCs     3. L flank pain   - Unsure etiology   - CT negative for L flank pain   - f/u with PCP      Follow up in  3 months with CT

## 2019-02-05 ENCOUNTER — PATIENT OUTREACH (OUTPATIENT)
Dept: ADMINISTRATIVE | Facility: HOSPITAL | Age: 69
End: 2019-02-05

## 2019-02-05 DIAGNOSIS — E11.9 DIABETES MELLITUS WITHOUT COMPLICATION: Primary | ICD-10-CM

## 2019-02-13 ENCOUNTER — OFFICE VISIT (OUTPATIENT)
Dept: FAMILY MEDICINE | Facility: CLINIC | Age: 69
End: 2019-02-13
Payer: MEDICARE

## 2019-02-13 ENCOUNTER — CLINICAL SUPPORT (OUTPATIENT)
Dept: OPHTHALMOLOGY | Facility: CLINIC | Age: 69
End: 2019-02-13
Attending: NURSE PRACTITIONER
Payer: MEDICARE

## 2019-02-13 VITALS
DIASTOLIC BLOOD PRESSURE: 88 MMHG | WEIGHT: 177.5 LBS | HEIGHT: 64 IN | HEART RATE: 79 BPM | OXYGEN SATURATION: 97 % | BODY MASS INDEX: 30.3 KG/M2 | TEMPERATURE: 98 F | SYSTOLIC BLOOD PRESSURE: 158 MMHG

## 2019-02-13 DIAGNOSIS — Z23 NEED FOR PNEUMOCOCCAL VACCINATION: ICD-10-CM

## 2019-02-13 DIAGNOSIS — E11.40 TYPE 2 DIABETES MELLITUS WITH DIABETIC NEUROPATHY, WITHOUT LONG-TERM CURRENT USE OF INSULIN: Chronic | ICD-10-CM

## 2019-02-13 DIAGNOSIS — L50.9 URTICARIAL RASH: Primary | ICD-10-CM

## 2019-02-13 DIAGNOSIS — L50.9 HIVES: ICD-10-CM

## 2019-02-13 DIAGNOSIS — I10 BENIGN HYPERTENSION: Chronic | ICD-10-CM

## 2019-02-13 DIAGNOSIS — E11.40 TYPE 2 DIABETES MELLITUS WITH DIABETIC NEUROPATHY, WITHOUT LONG-TERM CURRENT USE OF INSULIN: ICD-10-CM

## 2019-02-13 PROCEDURE — 90732 PNEUMOCOCCAL POLYSACCHARIDE VACCINE 23-VALENT =>2YO SQ IM: ICD-10-PCS | Mod: HCNC,S$GLB,, | Performed by: NURSE PRACTITIONER

## 2019-02-13 PROCEDURE — 3044F PR MOST RECENT HEMOGLOBIN A1C LEVEL <7.0%: ICD-10-PCS | Mod: HCNC,CPTII,S$GLB, | Performed by: NURSE PRACTITIONER

## 2019-02-13 PROCEDURE — 3079F DIAST BP 80-89 MM HG: CPT | Mod: HCNC,CPTII,S$GLB, | Performed by: NURSE PRACTITIONER

## 2019-02-13 PROCEDURE — 92250 FUNDUS PHOTOGRAPHY W/I&R: CPT | Mod: HCNC,S$GLB,, | Performed by: OPHTHALMOLOGY

## 2019-02-13 PROCEDURE — 3077F SYST BP >= 140 MM HG: CPT | Mod: HCNC,CPTII,S$GLB, | Performed by: NURSE PRACTITIONER

## 2019-02-13 PROCEDURE — G0009 ADMIN PNEUMOCOCCAL VACCINE: HCPCS | Mod: 59,HCNC,S$GLB, | Performed by: NURSE PRACTITIONER

## 2019-02-13 PROCEDURE — 99999 PR PBB SHADOW E&M-EST. PATIENT-LVL IV: ICD-10-PCS | Mod: PBBFAC,HCNC,, | Performed by: NURSE PRACTITIONER

## 2019-02-13 PROCEDURE — 3044F HG A1C LEVEL LT 7.0%: CPT | Mod: HCNC,CPTII,S$GLB, | Performed by: NURSE PRACTITIONER

## 2019-02-13 PROCEDURE — 99999 PR PBB SHADOW E&M-EST. PATIENT-LVL II: ICD-10-PCS | Mod: PBBFAC,HCNC,,

## 2019-02-13 PROCEDURE — 99214 OFFICE O/P EST MOD 30 MIN: CPT | Mod: HCNC,25,S$GLB, | Performed by: NURSE PRACTITIONER

## 2019-02-13 PROCEDURE — 99999 PR PBB SHADOW E&M-EST. PATIENT-LVL IV: CPT | Mod: PBBFAC,HCNC,, | Performed by: NURSE PRACTITIONER

## 2019-02-13 PROCEDURE — 1101F PT FALLS ASSESS-DOCD LE1/YR: CPT | Mod: HCNC,CPTII,S$GLB, | Performed by: NURSE PRACTITIONER

## 2019-02-13 PROCEDURE — 1101F PR PT FALLS ASSESS DOC 0-1 FALLS W/OUT INJ PAST YR: ICD-10-PCS | Mod: HCNC,CPTII,S$GLB, | Performed by: NURSE PRACTITIONER

## 2019-02-13 PROCEDURE — 3077F PR MOST RECENT SYSTOLIC BLOOD PRESSURE >= 140 MM HG: ICD-10-PCS | Mod: HCNC,CPTII,S$GLB, | Performed by: NURSE PRACTITIONER

## 2019-02-13 PROCEDURE — 99999 PR PBB SHADOW E&M-EST. PATIENT-LVL II: CPT | Mod: PBBFAC,HCNC,,

## 2019-02-13 PROCEDURE — G0009 PNEUMOCOCCAL POLYSACCHARIDE VACCINE 23-VALENT =>2YO SQ IM: ICD-10-PCS | Mod: 59,HCNC,S$GLB, | Performed by: NURSE PRACTITIONER

## 2019-02-13 PROCEDURE — 92250 DIABETIC EYE SCREENING PHOTO: ICD-10-PCS | Mod: HCNC,S$GLB,, | Performed by: OPHTHALMOLOGY

## 2019-02-13 PROCEDURE — 3079F PR MOST RECENT DIASTOLIC BLOOD PRESSURE 80-89 MM HG: ICD-10-PCS | Mod: HCNC,CPTII,S$GLB, | Performed by: NURSE PRACTITIONER

## 2019-02-13 PROCEDURE — 96372 THER/PROPH/DIAG INJ SC/IM: CPT | Mod: HCNC,S$GLB,, | Performed by: NURSE PRACTITIONER

## 2019-02-13 PROCEDURE — 99214 PR OFFICE/OUTPT VISIT, EST, LEVL IV, 30-39 MIN: ICD-10-PCS | Mod: HCNC,25,S$GLB, | Performed by: NURSE PRACTITIONER

## 2019-02-13 PROCEDURE — 96372 PR INJECTION,THERAP/PROPH/DIAG2ST, IM OR SUBCUT: ICD-10-PCS | Mod: HCNC,S$GLB,, | Performed by: NURSE PRACTITIONER

## 2019-02-13 PROCEDURE — 90732 PPSV23 VACC 2 YRS+ SUBQ/IM: CPT | Mod: HCNC,S$GLB,, | Performed by: NURSE PRACTITIONER

## 2019-02-13 RX ORDER — METHYLPREDNISOLONE 4 MG/1
TABLET ORAL
Qty: 1 PACKAGE | Refills: 0 | Status: SHIPPED | OUTPATIENT
Start: 2019-02-13 | End: 2019-03-25

## 2019-02-13 RX ORDER — DEXAMETHASONE SODIUM PHOSPHATE 4 MG/ML
8 INJECTION, SOLUTION INTRA-ARTICULAR; INTRALESIONAL; INTRAMUSCULAR; INTRAVENOUS; SOFT TISSUE
Status: COMPLETED | OUTPATIENT
Start: 2019-02-13 | End: 2019-02-13

## 2019-02-13 RX ADMIN — DEXAMETHASONE SODIUM PHOSPHATE 8 MG: 4 INJECTION, SOLUTION INTRA-ARTICULAR; INTRALESIONAL; INTRAMUSCULAR; INTRAVENOUS; SOFT TISSUE at 10:02

## 2019-02-13 NOTE — PATIENT INSTRUCTIONS
"  Contact Dermatitis  Contact dermatitis is a skin rash caused by something that touches the skin and makes it irritated and inflamed. Your skin may be red, swollen, dry, and may be cracked. Blisters may form and ooze. The rash will itch.  Contact dermatitis can form on the face and neck, backs of hands, forearms, genitals, and lower legs.  People can get contact dermatitis from lots of sources. These include:  · Plants such as poison ivy, oak, or sumac  · Chemicals in hair dyes and rinses, soaps, solvents, waxes, fingernail polish, and deodorants   · Jewelry or watchbands made of nickel  Contact dermatitis is not passed from person to person.  Talk with your healthcare provider about what may have caused the rash. A type of allergy testing called "patch testing" may be used to discover what you are allergic to. You will need to avoid the source of your rash in the future to prevent it from coming back.  Treatment is done to relieve itching and prevent the rash from coming back. The rash should go away in a few days to a few weeks.  Home care  Your healthcare provider may prescribe medicine to relieve swelling and itching. Follow all instructions when using these medicines.  General care:  · Avoid anything that heats up your skin, such as hot showers or baths, or direct sunlight. This can make itching worse.  · Apply cold compresses to soothe your sores to help relieve your symptoms. Do this for 30 minutes 3 to 4 times a day. You can make a cold compress by soaking a cloth in cold water. Squeeze out excess water. You can add colloidal oatmeal to the water to help reduce itching. For severe itching in a small area, apply an ice pack wrapped in a thin towel. Do this for 20 minutes 3 to 4 times a day.  · You can also try wet dressings. One way to do this is to wear a wet piece of clothing under a dry one. Wear a damp shirt under a dry shirt if your upper body is affected. This can relieve itching and prevent you from " scratching the affected area.  · You can also help relieve large areas of itching by taking a lukewarm bath with colloidal oatmeal added to the water.  · Use hydrocortisone cream for redness and irritation, unless another medicine was prescribed. You can also use benzocaine anesthetic cream or spray. Calamine lotion can also relieve mild symptoms.  · Use oral diphenhydramine to help reduce itching. You can buy this antihistamine at drug and grocery stores. It can make you sleepy, so use lower doses during the daytime. Or you can use loratadine. This is an antihistamine that will not make you sleepy. Do not use diphenhydramine if you have glaucoma or have trouble urinating due to an enlarged prostate.  · If a plant causes your rash, make sure to wash your skin and the clothes you were wearing when you came into contact with the plant. This is to wash away the plant oils that gave you the rash and prevent more or worse symptoms.  · Stay away from the substance or object that causes your symptoms. If you cant avoid it, wear gloves or some other type of protection.  Follow-up care  Follow up with your healthcare provider, or as advised.  When to seek medical advice  Call your healthcare provider right away if any of these occur:  · Spreading of the rash to other parts of your body  · Severe swelling of your face, eyelids, mouth, throat or tongue  · Trouble urinating due to swelling in the genital area  · Fever of 100.4°F (38°C) or higher  · Redness or swelling that gets worse  · Pain that gets worse  · Foul-smelling fluid leaking from the skin  · Yellow-brown crusts on the open blisters  Date Last Reviewed: 9/1/2016  © 4806-2399 [employer]. [address] [place] [address], [place] [address]. All rights reserved. This information is not intended as a substitute for professional medical care. Always follow your healthcare professional's instructions.        Self-Care for Skin Rashes     Pat your skin dry. Do not  rub.     When your skin reacts to a substance your body is sensitive to, it can cause a rash. You can treat most rashes at home by keeping the skin clean and dry. Many rashes may get better on their own within 2 to 3 days. You may need medical attention if your rash itches, drains, or hurts, particularly if the rash is getting worse.  What can cause a skin rash?  · Sun poisoning, caused by too much exposure to the sun  · An irritant or allergic reaction to a certain type of food, plant, or chemical, such as  shellfish, poison ivy, and or cleaning products  · An infection caused by a fungus (ringworm), virus (chickenpox), or bacteria (strep)  · Bites or infestation caused by insects or pests, such as ticks, lice, or mites  · Dry skin, which is often seen during the winter months and in older people  How can I control itching and skin damage?  · Take soothing lukewarm baths in a colloidal oatmeal product. You can buy this at the [employer].  · Do your best not to scratch. Clip fingernails short, especially in young children, to reduce skin damage if scratching does occur.  · Use moisturizing skin lotion instead of scratching your dry skin.  · Use sunscreen whenever going out into direct sun.  · Use only mild cleansing agents whenever possible.  · Wash with mild, nonirritating soap and warm water.  · Wear clothing that breathes, such as cotton shirts or canvas shoes.  · If fluid is seeping from the rash, cover it loosely with clean gauze to absorb the discharge.  · Many rashes are contagious. Prevent the rash from spreading to others by washing your hands often before or after touching others with any skin rash.  Use medicine  · Antihistamines such as diphenhydramine can help control itching. But use with caution because they can make you drowsy.  · Using over-the-counter hydrocortisone cream on small rashes may help reduce swelling and itching  · Most over-the-counter antifungal medicines can treat athletes foot and  many other fungal infections of the skin.  Check with your healthcare provider  Call your healthcare provider if:  · You were told that you have a fungal infection on your skin to make sure you have the correct type of medicine.  · You have questions or concerns about medicines or their side effects.     Call 911  Call 911 if either of these occur:  · Your tongue or lips start to swell  · You have difficulty breathing      Call your healthcare provider  Call your healthcare provider if any of these occur:  · Temperature of more than 101.0°F (38.3°C), or as directed  · Sore throat, a cough, or unusual fatigue  · Red, oozy, or painful rash gets worse. These are signs of infection.  · Rash covers your face, genitals, or most of your body  · Crusty sores or red rings that begin to spread  · You were exposed to someone who has a contagious rash, such as scabies or lice.  · Red bulls-eye rash with a white center (a sign of Lyme disease)  · You were told that you have resistant bacteria (MRSA) on your skin.   Date Last Reviewed: 5/12/2015  © 4686-0653 [employer]. [address] [place] [address]. All rights reserved. This information is not intended as a substitute for professional medical care. Always follow your healthcare professional's instructions.        Eating Heart-Healthy Food: Using the DASH Plan    Eating for your heart doesnt have to be hard or boring. You just need to know how to make healthier choices. The DASH eating plan has been developed to help you do just that. DASH stands for Dietary Approaches to Stop Hypertension. It is a plan that has been proven to be healthier for your heart and to lower your risk for high blood pressure. It can also help lower your risk for cancer, heart disease, osteoporosis, and diabetes.  Choosing from each food group  Choose foods from each of the food groups below each day. Try to get the recommended number of servings for each food group. The  serving numbers are based on a diet of 2,000 calories a day. Talk to your doctor if youre unsure about your calorie needs. Along with getting the correct servings, the DASH plan also recommends a sodium intake less than 2,300 mg per day.        Grains  Servings: 6 to 8 a day  A serving is:  · 1 slice bread  · 1 ounce dry cereal  · Half a cup cooked rice, pasta or cereal  Best choices: Whole grains and any grains high in fiber. Vegetables  Servings: 4 to 5 a day  A serving is:  · 1 cup raw leafy vegetable  · Half a cup cut-up raw or cooked vegetable  · Half a cup vegetable juice  Best choices: Fresh or frozen vegetables prepared without added salt or fat.   Fruits  Servings: 4 to 5 a day  A serving is:  · 1 medium fruit  · One-quarter cup dried fruit  · Half a cup fresh, frozen, or canned fruit  · Half a cup of 100% fruit juices  Best choices: A variety of fresh fruits of different colors. Whole fruits are a better choice than fruit juices. Low-fat or fat-free dairy  Servings: 2 to 3 a day  A serving is:  · 1 cup milk  · 1 cup yogurt  · One and a half ounces cheese  Best choices: Skim or 1% milk, low-fat or fat-free yogurt or buttermilk, and low-fat cheeses.         Lean meats, poultry, fish  Servings: 6 or fewer a day  A serving is:  · 1 ounce cooked meats, poultry, or fish  · 1 egg  Best choices: Lean poultry and fish. Trim away visible fat. Broil, grill, roast, or boil instead of frying. Remove skin from poultry before eating. Limit how much red meat you eat.  Nuts, seeds, beans  Servings: 4 to 5 a week  A serving is:  · One-third cup nuts (one and a half ounces)  · 2 tablespoons nut butter or seeds  · Half a cup cooked dry beans or legumes  Best choices: Dry roasted nuts with no salt added, lentils, kidney beans, garbanzo beans, and whole smith beans.   Fats and oils  Servings: 2 to 3 a day  A serving is:  · 1 teaspoon vegetable oil  · 1 teaspoon soft margarine  · 1 tablespoon mayonnaise  · 2 tablespoons salad  dressing  Best choices: Nut and vegetable oils (nontropical vegetable oils), such as olive and canola oil. Sweets  Servings: 5 a week or fewer  A serving is:  · 1 tablespoon sugar, maple syrup, or honey  · 1 tablespoon jam or jelly  · 1 half-ounce jelly beans (about 15)  · 1 cup lemonade  Best choices: Dried fruit can be a satisfying sweet. Choose low-fat sweets. And watch your serving sizes!      For more on the DASH eating plan, visit:  www.nhlbi.nih.gov/health/health-topics/topics/dash   Date Last Reviewed: 6/1/2016  © 0465-9315 [employer]. [address] [place] [address], [place] [address]. All rights reserved. This information is not intended as a substitute for professional medical care. Always follow your healthcare professional's instructions.        Controlling High Blood Pressure  High blood pressure (hypertension) is often called the silent killer. This is because many people who have it dont know it. High blood pressure is defined as 140/90 mm Hg or higher. Know your blood pressure and remember to check it regularly. Doing so can save your life. Here are some things you can do to help control your blood pressure.    Choose heart-healthy foods  · Select low-salt, low-fat foods. Limit sodium intake to 2,400 mg per day or the amount suggested by your healthcare provider.  · Limit canned, dried, cured, packaged, and fast foods. These can contain a lot of salt.  · Eat 8 to 10 servings of fruits and vegetables every day.  · Choose lean meats, fish, or chicken.  · Eat whole-grain pasta, brown rice, and beans.  · Eat 2 to 3 servings of low-fat or fat-free dairy products.  · Ask your doctor about the DASH eating plan. This plan helps reduce blood pressure.  · When you go to a restaurant, ask that your meal be prepared with no added salt.  Maintain a healthy weight  · Ask your healthcare provider how many calories to eat a day. Then stick to that number.  · Ask your healthcare provider what weight range is  healthiest for you. If you are overweight, a weight loss of only 3% to 5% of your body weight can help lower blood pressure. Generally, a good weight loss goal is to lose 10% of your body weight in a year.  · Limit snacks and sweets.  · Get regular exercise.  Get up and get active  · Choose activities you enjoy. Find ones you can do with friends or family. This includes bicycling, dancing, walking, and jogging.  · Park farther away from building entrances.  · Use stairs instead of the elevator.  · When you can, walk or bike instead of driving.  · Struthers leaves, garden, or do household repairs.  · Be active at a moderate to vigorous level of physical activity for at least 40 minutes for a minimum of 3 to 4 days a week.   Manage stress  · Make time to relax and enjoy life. Find time to laugh.  · Communicate your concerns with your loved ones and your healthcare provider.  · Visit with family and friends, and keep up with hobbies.  Limit alcohol and quit smoking  · Men should have no more than 2 drinks per day.  · Women should have no more than 1 drink per day.  · Talk with your healthcare provider about quitting smoking. Smoking significantly increases your risk for heart disease and stroke. Ask your healthcare provider about community smoking cessation programs and other options.  Medicines  If lifestyle changes arent enough, your healthcare provider may prescribe high blood pressure medicine. Take all medicines as prescribed. If you have any questions about your medicines, ask your healthcare provider before stopping or changing them.   Date Last Reviewed: 4/27/2016 © 2000-2017 [employer]. [address] [place] [address], [place] [address]. All rights reserved. This information is not intended as a substitute for professional medical care. Always follow your healthcare professional's instructions.        Established High Blood Pressure    High blood pressure (hypertension) is a chronic disease. Often,  healthcare providers dont know what causes it. But it can be caused by certain health conditions and medicines.  If you have high blood pressure, you may not have any symptoms. If you do have symptoms, they may include headache, dizziness, changes in your vision, chest pain, and shortness of breath. But even without symptoms, high blood pressure thats not treated raises your risk for heart attack and stroke. High blood pressure is a serious health risk and shouldnt be ignored.  A blood pressure reading is made up of two numbers: a higher number over a lower number. The top number is the systolic pressure. The bottom number is the diastolic pressure. A normal blood pressure is a systolic pressure of  less than 120 over a diastolic pressure of less than 80. You will see your blood pressure readings written together. For example, a person with a systolic pressure of 188 and a diastolic pressure of 78 will have 118/78 written in the medical record.  High blood pressure is when either the top number is 140 or higher, or the bottom number is 90 or higher. This must be the result when taking your blood pressure a number of times. The blood pressures between normal and high are called prehypertension.  Home care  If you have high blood pressure, you should do what is listed below to lower your blood pressure. If you are taking medicines for high blood pressure, these methods may reduce or end your need for medicines in the future.  · Begin a weight-loss program if you are overweight.  · Cut back on how much salt you get in your diet. Heres how to do this:  ¨ Dont eat foods that have a lot of salt. These include olives, pickles, smoked meats, and salted potato chips.  ¨ Dont add salt to your food at the table.  ¨ Use only small amounts of salt when cooking.  · Start an exercise program. Talk with your healthcare provider about the type of exercise program that would be best for you. It doesn't have to be hard. Even  brisk walking for 20 minutes 3 times a week is a good form of exercise.  · Dont take medicines that stimulate the heart. This includes many over-the-counter cold and sinus decongestant pills and sprays, as well as diet pills. Check the warnings about hypertension on the label. Before buying any over-the-counter medicines or supplements, always ask the pharmacist about the product's potential interaction with your high blood pressure and your high blood pressure medicines.  · Stimulants such as amphetamine or cocaine could be deadly for someone with high blood pressure. Never take these.  · Limit how much caffeine you get in your diet. Switch to caffeine-free products.  · Stop smoking. If you are a long-time smoker, this can be hard. Talk to your healthcare provider about medicines and nicotine replacement options to help you. Also, enroll in a stop-smoking program to make it more likely that you will quit for good.  · Learn how to handle stress. This is an important part of any program to lower blood pressure. Learn about relaxation methods like meditation, yoga, or biofeedback.  · If your provider prescribed medicines, take them exactly as directed. Missing doses may cause your blood pressure get out of control.  · If you miss a dose or doses, check with your healthcare provider or pharmacist about what to do.  · Consider buying an automatic blood pressure machine. Ask your provider for a recommendation. You can get one of these at most pharmacies.     The American Heart Association recommends the following guidelines for home blood pressure monitoring:  · Don't smoke or drink coffee for 30 minutes before taking your blood pressure.  · Go to the bathroom before the test.  · Relax for 5 minutes before taking the measurement.  · Sit with your back supported (don't sit on a couch or soft chair); keep your feet on the floor uncrossed. Place your arm on a solid flat surface (like a table) with the upper part of the arm  at heart level. Place the middle of the cuff directly above the eye of the elbow. Check the monitor's instruction manual for an illustration.  · Take multiple readings. When you measure, take 2 to 3 readings one minute apart and record all of the results.  · Take your blood pressure at the same time every day, or as your healthcare provider recommends.  · Record the date, time, and blood pressure reading.  · Take the record with you to your next medical appointment. If your blood pressure monitor has a built-in memory, simply take the monitor with you to your next appointment.  · Call your provider if you have several high readings. Don't be frightened by a single high blood pressure reading, but if you get several high readings, check in with your healthcare provider.  · Note: When blood pressure reaches a systolic (top number) of 180 or higher OR diastolic (bottom number) of 110 or higher, seek emergency medical treatment.  Follow-up care  You will need to see your healthcare provider regularly. This is to check your blood pressure and to make changes to your medicines. Make a follow-up appointment as directed. Bring the record of your home blood pressure readings to the appointment.  When to seek medical advice  Call your healthcare provider right away if any of these occur:  · Blood pressure reaches a systolic (upper number) of 180 or higher OR a diastolic (bottom number) of 110 or higher  · Chest pain or shortness of breath  · Severe headache  · Throbbing or rushing sound in the ears  · Nosebleed  · Sudden severe pain in your belly (abdomen)  · Extreme drowsiness, confusion, or fainting  · Dizziness or spinning sensation (vertigo)  · Weakness of an arm or leg or one side of the face  · You have problems speaking or seeing   Date Last Reviewed: 12/1/2016  © 6182-9591 [employer]. [address] [place] [address], [place] [address]. All rights reserved. This information is not intended as a substitute for  professional medical care. Always follow your healthcare professional's instructions.

## 2019-02-13 NOTE — PROGRESS NOTES
HPI     69 Y/o here for screening for diabetic retinopathy with non-dilated   fundus photos per Dr. Love     Last edited by Miriam Ronquillo MA on 2/13/2019 10:27 AM. (History)            Assessment /Plan     For exam results, see Encounter Report.    Type 2 diabetes mellitus with diabetic neuropathy, without long-term current use of insulin  -     Diabetic Eye Screening Photo      Please see Dr. Saab progress note for interpretation

## 2019-02-13 NOTE — PROGRESS NOTES
Subjective:       Patient ID: Valarie Foster is a 68 y.o. female.    Chief Complaint: Urticaria    Urticaria   This is a new problem. The current episode started 1 to 4 weeks ago (Last Monday). The problem has been gradually worsening since onset. The affected locations include the back, left arm, right arm, left lower leg, left upper leg, right upper leg and right lower leg. The rash is characterized by itchiness and redness. She was exposed to nothing. Past treatments include antihistamine.       Past Medical History:   Diagnosis Date    Arthritis     Diabetes mellitus     diet controlled    Diabetes with neurologic complications     Hypertension        Social History     Socioeconomic History    Marital status: Legally      Spouse name: Not on file    Number of children: Not on file    Years of education: Not on file    Highest education level: Not on file   Social Needs    Financial resource strain: Not on file    Food insecurity - worry: Not on file    Food insecurity - inability: Not on file    Transportation needs - medical: Not on file    Transportation needs - non-medical: Not on file   Occupational History    Not on file   Tobacco Use    Smoking status: Never Smoker    Smokeless tobacco: Never Used   Substance and Sexual Activity    Alcohol use: No    Drug use: No    Sexual activity: Not on file   Other Topics Concern    Not on file   Social History Narrative    Not on file       Past Surgical History:   Procedure Laterality Date    BIOPSY-KIDNEY Right 2/19/2018    [facility]    COLONOSCOPY N/A 2/5/2018    Per[facility] by Bac[facility] ENDO    HYSTERECTOMY      NEPHRECTOMY, PARTIAL open. Dr Arenas to assist. Right 10/12/2018    Per[facility] by [facility] OR    OOPHORECTOMY         Review of Systems   Skin: Positive for rash.       Objective:   BP (!) 158/88 (BP Location: Right arm, Patient Position:  "Sitting, BP Method: Medium (Manual))   Pulse 79   Temp 97.7 °F (36.5 °C) (Oral)   Ht 5' 4" (1.626 m)   Wt 80.5 kg (177 lb 7.5 oz)   SpO2 97%   BMI 30.46 kg/m²      Physical Exam   Constitutional: She is oriented to person, place, and time. She appears well-developed and well-nourished.   HENT:   Head: Normocephalic and atraumatic.   Cardiovascular: Normal rate, regular rhythm and normal heart sounds.   Pulmonary/Chest: Effort normal and breath sounds normal. No stridor. No respiratory distress. She has no decreased breath sounds. She has no wheezes. She has no rhonchi. She has no rales.   Neurological: She is alert and oriented to person, place, and time.   Skin: Rash noted. Rash is urticarial.   Psychiatric: She has a normal mood and affect. Her speech is normal and behavior is normal.       Assessment:       1. Urticarial rash    2. Hives    3. Need for pneumococcal vaccination    4. Benign hypertension    5. Type 2 diabetes mellitus with diabetic neuropathy, without long-term current use of insulin        Plan:       Valarie was seen today for urticaria.    Diagnoses and all orders for this visit:    Urticarial rash  -     dexamethasone injection 8 mg  -     methylPREDNISolone (MEDROL DOSEPACK) 4 mg tablet; use as directed  -     She can take OTC benadryl for itching    Hives  -     dexamethasone injection 8 mg  -     methylPREDNISolone (MEDROL DOSEPACK) 4 mg tablet; use as directed    Need for pneumococcal vaccination  -     (In Office Administered) Pneumococcal Polysaccharide Vaccine (23 Valent) (SQ/IM)    Benign hypertension    Type 2 diabetes mellitus with diabetic neuropathy, without long-term current use of insulin  -     Diabetic Eye Screening Photo; Future    Problem List Items Addressed This Visit     Type 2 diabetes mellitus with diabetic neuropathy, without long-term current use of insulin (Chronic)    Current Assessment & Plan     Stable and controlled. Continue current treatment plan as " previously prescribed with your PCP.   The patient is asked to make an attempt to improve diet and exercise patterns to aid in medical management of this problem.  Followed by PCP           Relevant Orders    Diabetic Eye Screening Photo    Benign hypertension (Chronic)    Current Assessment & Plan     Stable and controlled. Continue current treatment plan as previously prescribed with your PCP.   The patient is asked to make an attempt to improve diet and exercise patterns to aid in medical management of this problem.  Elevated in clinic today. She reports blood pressures at home are controlled             Other Visit Diagnoses     Urticarial rash    -  Primary    Hives        Need for pneumococcal vaccination        Relevant Orders    (In Office Administered) Pneumococcal Polysaccharide Vaccine (23 Valent) (SQ/IM) (Completed)        Patient is scheduled for blood work.  Follow-up if symptoms worsen or fail to improve.

## 2019-02-13 NOTE — ASSESSMENT & PLAN NOTE
Stable and controlled. Continue current treatment plan as previously prescribed with your PCP.   The patient is asked to make an attempt to improve diet and exercise patterns to aid in medical management of this problem.  Elevated in clinic today. She reports blood pressures at home are controlled

## 2019-02-14 LAB
LEFT EYE DM RETINOPATHY: NEGATIVE
RIGHT EYE DM RETINOPATHY: NEGATIVE

## 2019-02-15 ENCOUNTER — LAB VISIT (OUTPATIENT)
Dept: LAB | Facility: HOSPITAL | Age: 69
End: 2019-02-15
Attending: FAMILY MEDICINE
Payer: MEDICARE

## 2019-02-15 DIAGNOSIS — E11.40 TYPE 2 DIABETES MELLITUS WITH DIABETIC NEUROPATHY, WITHOUT LONG-TERM CURRENT USE OF INSULIN: ICD-10-CM

## 2019-02-15 LAB
CHOLEST SERPL-MCNC: 208 MG/DL
CHOLEST/HDLC SERPL: 2.8 {RATIO}
ESTIMATED AVG GLUCOSE: 140 MG/DL
HBA1C MFR BLD HPLC: 6.5 %
HDLC SERPL-MCNC: 73 MG/DL
HDLC SERPL: 35.1 %
LDLC SERPL CALC-MCNC: 114.2 MG/DL
NONHDLC SERPL-MCNC: 135 MG/DL
TRIGL SERPL-MCNC: 104 MG/DL
TSH SERPL DL<=0.005 MIU/L-ACNC: 2.12 UIU/ML

## 2019-02-15 PROCEDURE — 84443 ASSAY THYROID STIM HORMONE: CPT | Mod: HCNC

## 2019-02-15 PROCEDURE — 83036 HEMOGLOBIN GLYCOSYLATED A1C: CPT | Mod: HCNC

## 2019-02-15 PROCEDURE — 36415 COLL VENOUS BLD VENIPUNCTURE: CPT | Mod: HCNC,PO

## 2019-02-15 PROCEDURE — 80061 LIPID PANEL: CPT | Mod: HCNC

## 2019-02-17 ENCOUNTER — HOSPITAL ENCOUNTER (EMERGENCY)
Facility: HOSPITAL | Age: 69
Discharge: HOME | End: 2019-02-17
Attending: EMERGENCY MEDICINE
Payer: MEDICARE

## 2019-02-17 VITALS
TEMPERATURE: 98 F | HEART RATE: 69 BPM | DIASTOLIC BLOOD PRESSURE: 78 MMHG | WEIGHT: 174 LBS | OXYGEN SATURATION: 98 % | RESPIRATION RATE: 16 BRPM | HEIGHT: 64 IN | SYSTOLIC BLOOD PRESSURE: 174 MMHG | BODY MASS INDEX: 29.71 KG/M2

## 2019-02-17 DIAGNOSIS — L50.9 URTICARIA: ICD-10-CM

## 2019-02-17 DIAGNOSIS — R21 RASH: Primary | ICD-10-CM

## 2019-02-17 PROCEDURE — 99284 EMERGENCY DEPT VISIT MOD MDM: CPT | Mod: 25,HCNC

## 2019-02-17 PROCEDURE — 96374 THER/PROPH/DIAG INJ IV PUSH: CPT | Mod: HCNC

## 2019-02-17 PROCEDURE — 96375 TX/PRO/DX INJ NEW DRUG ADDON: CPT | Mod: 59,HCNC

## 2019-02-17 PROCEDURE — 25000003 PHARM REV CODE 250: Mod: HCNC | Performed by: PHYSICIAN ASSISTANT

## 2019-02-17 PROCEDURE — 63600175 PHARM REV CODE 636 W HCPCS: Mod: HCNC | Performed by: PHYSICIAN ASSISTANT

## 2019-02-17 PROCEDURE — S0028 INJECTION, FAMOTIDINE, 20 MG: HCPCS | Mod: HCNC | Performed by: PHYSICIAN ASSISTANT

## 2019-02-17 RX ORDER — HYDROXYZINE HYDROCHLORIDE 25 MG/1
25 TABLET, FILM COATED ORAL 3 TIMES DAILY PRN
Qty: 15 TABLET | Refills: 0 | Status: SHIPPED | OUTPATIENT
Start: 2019-02-17 | End: 2019-02-25 | Stop reason: SDUPTHER

## 2019-02-17 RX ORDER — DIPHENHYDRAMINE HYDROCHLORIDE 50 MG/ML
25 INJECTION INTRAMUSCULAR; INTRAVENOUS
Status: COMPLETED | OUTPATIENT
Start: 2019-02-17 | End: 2019-02-17

## 2019-02-17 RX ORDER — METHYLPREDNISOLONE SOD SUCC 125 MG
125 VIAL (EA) INJECTION
Status: DISCONTINUED | OUTPATIENT
Start: 2019-02-17 | End: 2019-02-17

## 2019-02-17 RX ORDER — FAMOTIDINE 10 MG/ML
40 INJECTION INTRAVENOUS
Status: COMPLETED | OUTPATIENT
Start: 2019-02-17 | End: 2019-02-17

## 2019-02-17 RX ORDER — CETIRIZINE HYDROCHLORIDE 10 MG/1
10 TABLET ORAL DAILY
Qty: 15 TABLET | Refills: 0 | Status: SHIPPED | OUTPATIENT
Start: 2019-02-17 | End: 2019-03-13 | Stop reason: SDUPTHER

## 2019-02-17 RX ORDER — FAMOTIDINE 20 MG/1
20 TABLET, FILM COATED ORAL 2 TIMES DAILY
Qty: 30 TABLET | Refills: 0 | Status: SHIPPED | OUTPATIENT
Start: 2019-02-17 | End: 2019-03-25 | Stop reason: SDUPTHER

## 2019-02-17 RX ADMIN — FAMOTIDINE 40 MG: 10 INJECTION INTRAVENOUS at 12:02

## 2019-02-17 RX ADMIN — DIPHENHYDRAMINE HYDROCHLORIDE 25 MG: 50 INJECTION, SOLUTION INTRAMUSCULAR; INTRAVENOUS at 12:02

## 2019-02-17 NOTE — DISCHARGE INSTRUCTIONS
Continue taking Medrol dosepack as directed by your primary care provider.    You have been prescribed Zyrtec and Atarax to help with the rash and itching.  This medication causes drowsiness.  Do not drink alcohol or operate motor vehicles while taking.      Take Famotidine for itching/rash as directed.    Follow-up with dermatology and allergy/immunology as directed.    Return to the ER for any concerns.

## 2019-02-17 NOTE — ED TRIAGE NOTES
"Patient reports a generalized rash x 1 week.  Was seen by PCP for rash and given a steroid injection and a Medrol dose Yaniv.  Patient reports rash has not gotten better. Patient reports the rash is painful to touch and "hot" to bilateral legs.  "

## 2019-02-17 NOTE — ED PROVIDER NOTES
"Encounter Date: 2/17/2019       History     Chief Complaint   Patient presents with    Rash     pt here for generlaized rash x1 week. was seen by pcp, given steroids. pt denies any improvement. reports pain, itching. describes as "stickpins sticking me". denies any new lotions, soaps. no drainage from rash.      CC: Rash    HPI:  68-year-old female with diabetes mellitus presents for consideration of rash. Patient reports a pruritic rash that began on Tuesday.  Patient reports being seen by her primary care on Wednesday and was prescribed a prednisone Dosepak and given a steroid shot which she denies any relief with.  She reports that the rash seems to be worsening and is now more red and warm to the touch.  She reports attempted treatment with Benadryl and OTC hydrocortisone cream with no relief.  She denies difficulty breathing, shortness of breath, oral swelling, fever.  She denies any new medications, new washing detergent, soaps or cosmetic products.            Review of patient's allergies indicates:   Allergen Reactions    Lisinopril Swelling     Past Medical History:   Diagnosis Date    Arthritis     Diabetes mellitus     diet controlled    Diabetes with neurologic complications     Hypertension      Past Surgical History:   Procedure Laterality Date    BIOPSY-KIDNEY Right 2/19/2018    [facility]    COLONOSCOPY N/A 2/5/2018    [facility]formed [facility] at [facility] ENDO    HYSTERECTOMY      NEPHRECTOMY, PARTIAL open. Dr Arenas to assist. Right 10/12/2018    [facility]formed by [facility] at [facility] OR    OOPHORECTOMY       Family History   Problem Relation Age of Onset    Glaucoma Sister     Cancer Sister         breast cancer    No Known Problems Mother     Glaucoma Father     Diabetes Brother     No Known Problems Maternal Aunt     No Known Problems Maternal Uncle     No Known Problems Paternal Aunt     No Known Problems Paternal Uncle     " No Known Problems Maternal Grandmother     No Known Problems Maternal Grandfather     No Known Problems Paternal Grandmother     No Known Problems Paternal Grandfather     Thyroid disease Sister     Blindness Sister         due to trauma during car accident    Diabetes Sister     Amblyopia Neg Hx     Cataracts Neg Hx     Hypertension Neg Hx     Macular degeneration Neg Hx     Retinal detachment Neg Hx     Strabismus Neg Hx     Stroke Neg Hx     Breast cancer Neg Hx      Social History     Tobacco Use    Smoking status: Never Smoker    Smokeless tobacco: Never Used   Substance Use Topics    Alcohol use: No    Drug use: No     Review of Systems   Constitutional: Negative for chills and fever.   HENT: Negative for congestion, ear discharge, ear pain, sinus pressure, sinus pain, sore throat and trouble swallowing.    Eyes: Negative for pain.   Respiratory: Negative for cough and shortness of breath.    Cardiovascular: Negative for chest pain.   Gastrointestinal: Negative for abdominal pain, constipation, diarrhea, nausea and vomiting.   Genitourinary: Negative for decreased urine volume and dysuria.   Musculoskeletal: Negative for back pain and neck pain.   Skin: Positive for rash.   Neurological: Negative for weakness and headaches.   Hematological: Does not bruise/bleed easily.   Psychiatric/Behavioral: Negative for confusion.       Physical Exam     Initial Vitals [02/17/19 1137]   BP Pulse Resp Temp SpO2   (!) 179/83 82 18 98 °F (36.7 °C) 100 %      MAP       --         Physical Exam    Nursing note and vitals reviewed.  Constitutional: Vital signs are normal. She appears well-developed and well-nourished. She is not diaphoretic. She is cooperative.  Non-toxic appearance. She does not have a sickly appearance. She does not appear ill. No distress.   HENT:   Head: Normocephalic and atraumatic.   Right Ear: Tympanic membrane, external ear and ear canal normal.   Left Ear: Tympanic membrane, external  ear and ear canal normal.   Nose: Nose normal.   Mouth/Throat: Uvula is midline, oropharynx is clear and moist and mucous membranes are normal. No oral lesions. No trismus in the jaw. No uvula swelling. No oropharyngeal exudate, posterior oropharyngeal edema or posterior oropharyngeal erythema.   Eyes: Conjunctivae, EOM and lids are normal. Pupils are equal, round, and reactive to light.   Neck: Trachea normal, normal range of motion, full passive range of motion without pain and phonation normal. Neck supple.   Cardiovascular: Normal rate, regular rhythm, normal heart sounds and intact distal pulses. Exam reveals no gallop and no friction rub.    No murmur heard.  Pulmonary/Chest: Effort normal and breath sounds normal. No respiratory distress. She has no decreased breath sounds. She has no wheezes. She has no rhonchi. She has no rales.   Abdominal: Soft. Normal appearance and bowel sounds are normal. She exhibits no distension and no mass. There is no tenderness. There is no rigidity, no rebound and no guarding.   Musculoskeletal: Normal range of motion.   Neurological: She is alert and oriented to person, place, and time. She has normal strength. No cranial nerve deficit or sensory deficit.   Skin: Skin is warm and dry. Capillary refill takes less than 2 seconds. Rash noted. No petechiae and no purpura noted. Rash is macular and urticarial. Rash is not papular, not maculopapular, not nodular, not pustular and not vesicular.   Erythematous pruritic urticarial macules on the face, abdomen and lower extremities bilaterally. No desquamation. No purpura or vesicles. No evidence of cellulitis or abscess. No mucosal membrane involvement. No palmar or plantar involvement.    Psychiatric: She has a normal mood and affect. Her speech is normal and behavior is normal. Judgment and thought content normal. Cognition and memory are normal.         ED Course   Procedures  Labs Reviewed - No data to display       Imaging Results     None                APC / Resident Notes:    68-year-old female with diabetes mellitus presents for consideration of rash. Patient reports a pruritic rash that began on Tuesday.  Patient reports being seen by her primary care on Wednesday and was prescribed a prednisone Dosepak and given a steroid shot which she denies any relief with.  She reports that the rash seems to be worsening and is now more red and warm to the touch.  She reports attempted treatment with Benadryl and OTC hydrocortisone cream with no relief.  She denies difficulty breathing, shortness of breath, oral swelling, fever.  She denies any new medications, new washing detergent, soaps or cosmetic products.      Exam findings: Patient is non-toxic, afebrile and well appearing. Erythematous pruritic urticarial macules on the face, abdomen and lower extremities bilaterally. No desquamation. No purpura or vesicles. No evidence of cellulitis or abscess. No mucosal membrane involvement. No palmar or plantar involvement.     If available, past records have been reviewed.  Vitals are reassuring.    My overall impression: urticaria  DDx: rash, urticaria, contact dermatitis  I do not suspect emergent process at this time.    ED course: IV Benadryl and Famotidine given.  I will recommend continued use of Medrol Dosepak as directed by primary care.  I will prescribe famotidine, Atarax and Zyrtec for symptomatic relief.  I will recommend follow-up with Dermatology an allergy/immunology if symptoms persist.  Patient remained stable and there continues to be no sign of anaphylaxis or acute respiratory distress. I feel this patient is stable for discharge.  ED return precautions given.    The diagnosis and treatment plan have been discussed with the patient. All questions and concerns have been addressed. Patient expressed understanding. An educational information sheet was given to the patient prior to discharge.     Rebecca Rivera PA-C                    Clinical Impression:   The primary encounter diagnosis was Rash. A diagnosis of Urticaria was also pertinent to this visit.      Disposition:   Disposition: Discharged  Condition: Stable                        Rebecca Mcginnis PA-C  02/17/19 [id number]

## 2019-02-18 ENCOUNTER — PES CALL (OUTPATIENT)
Dept: ADMINISTRATIVE | Facility: CLINIC | Age: 69
End: 2019-02-18

## 2019-02-18 ENCOUNTER — TELEPHONE (OUTPATIENT)
Dept: OPTOMETRY | Facility: CLINIC | Age: 69
End: 2019-02-18

## 2019-02-18 NOTE — TELEPHONE ENCOUNTER
Called patient regarding diabetic eye screening ; gave patient the results and she stated she go see the doctor tomorrow.

## 2019-02-21 ENCOUNTER — TELEPHONE (OUTPATIENT)
Dept: FAMILY MEDICINE | Facility: CLINIC | Age: 69
End: 2019-02-21

## 2019-02-25 ENCOUNTER — TELEPHONE (OUTPATIENT)
Dept: FAMILY MEDICINE | Facility: CLINIC | Age: 69
End: 2019-02-25

## 2019-02-25 DIAGNOSIS — Z12.31 ENCOUNTER FOR SCREENING MAMMOGRAM FOR BREAST CANCER: Primary | ICD-10-CM

## 2019-02-25 RX ORDER — HYDROXYZINE HYDROCHLORIDE 25 MG/1
25 TABLET, FILM COATED ORAL 3 TIMES DAILY PRN
Qty: 90 TABLET | Refills: 0 | Status: SHIPPED | OUTPATIENT
Start: 2019-02-25 | End: 2019-06-03 | Stop reason: SDUPTHER

## 2019-02-25 NOTE — TELEPHONE ENCOUNTER
----- Message from Lady Grant sent at 2/25/2019  9:33 AM CST -----  Contact: pt  Name of Who is Calling: pt      What is the request in detail: pt is requesting order for mammo. Please call pt      Can the clinic reply by [facility]: no      What Number to Call Back if not in [facility]: [phone]

## 2019-02-25 NOTE — TELEPHONE ENCOUNTER
----- Message from Lady Grant sent at 2/25/2019  9:35 AM CST -----  Contact: pt  Type: RX Refill Request    Who Called:pt    Refill or New Rx:pt is requesting refill that she got from hospital for hydrOXYzine HCl (ATARAX) 25 MG tablet. Call pt    RX Name and Strength:    How is the patient currently taking it? (ex. 1XDay)    Is this a 30 day or 90 day RX:pt    Preferred Pharmacy with phone number:[facility] [address]     Local or Mail Order:    Ordering Provider:    Best Call Back [phone]    Additional Information:

## 2019-03-06 ENCOUNTER — HOSPITAL ENCOUNTER (OUTPATIENT)
Dept: RADIOLOGY | Facility: HOSPITAL | Age: 69
Discharge: HOME | End: 2019-03-06
Attending: FAMILY MEDICINE
Payer: MEDICARE

## 2019-03-06 DIAGNOSIS — Z12.31 ENCOUNTER FOR SCREENING MAMMOGRAM FOR BREAST CANCER: ICD-10-CM

## 2019-03-06 PROCEDURE — 77067 SCR MAMMO BI INCL CAD: CPT | Mod: TC,HCNC,PO

## 2019-03-06 PROCEDURE — 77063 BREAST TOMOSYNTHESIS BI: CPT | Mod: 26,HCNC,, | Performed by: RADIOLOGY

## 2019-03-06 PROCEDURE — 77067 MAMMO DIGITAL SCREENING BILAT WITH TOMOSYNTHESIS_CAD: ICD-10-PCS | Mod: 26,HCNC,, | Performed by: RADIOLOGY

## 2019-03-06 PROCEDURE — 77063 MAMMO DIGITAL SCREENING BILAT WITH TOMOSYNTHESIS_CAD: ICD-10-PCS | Mod: 26,HCNC,, | Performed by: RADIOLOGY

## 2019-03-06 PROCEDURE — 77067 SCR MAMMO BI INCL CAD: CPT | Mod: 26,HCNC,, | Performed by: RADIOLOGY

## 2019-03-11 ENCOUNTER — PATIENT OUTREACH (OUTPATIENT)
Dept: ADMINISTRATIVE | Facility: HOSPITAL | Age: 69
End: 2019-03-11

## 2019-03-11 RX ORDER — CYCLOBENZAPRINE HCL 5 MG
TABLET ORAL
COMMUNITY
Start: 2014-05-06 | End: 2019-07-03 | Stop reason: SDUPTHER

## 2019-03-13 RX ORDER — CETIRIZINE HYDROCHLORIDE 10 MG/1
10 TABLET ORAL DAILY
Qty: 90 TABLET | Refills: 0 | Status: SHIPPED | OUTPATIENT
Start: 2019-03-13 | End: 2024-03-15

## 2019-03-13 RX ORDER — CETIRIZINE HYDROCHLORIDE 10 MG/1
10 TABLET ORAL DAILY
Qty: 15 TABLET | Refills: 0 | Status: SHIPPED | OUTPATIENT
Start: 2019-03-13 | End: 2019-03-13

## 2019-03-13 NOTE — TELEPHONE ENCOUNTER
----- Message from Corey Alcocer sent at 3/13/2019 10:15 AM CDT -----  Contact: Valarie [phone]  Type: RX Refill Request    Who Called: Valarie    Refill or New Rx:refill    RX Name and Strength:cetirizine (ZYRTEC) 10 MG tablet ()    Is this a 30 day or 90 day RX:30 day    Preferred Pharmacy with phone number:[facility] - [place] ([address] [address], [address]    Ordering Provider:Dr. David Roberts Call Back Number:[phone]

## 2019-03-25 ENCOUNTER — OFFICE VISIT (OUTPATIENT)
Dept: FAMILY MEDICINE | Facility: CLINIC | Age: 69
End: 2019-03-25
Payer: MEDICARE

## 2019-03-25 VITALS
HEART RATE: 70 BPM | SYSTOLIC BLOOD PRESSURE: 120 MMHG | OXYGEN SATURATION: 99 % | HEIGHT: 64 IN | BODY MASS INDEX: 30.34 KG/M2 | WEIGHT: 177.69 LBS | DIASTOLIC BLOOD PRESSURE: 80 MMHG | TEMPERATURE: 98 F

## 2019-03-25 DIAGNOSIS — J30.9 ALLERGIC RHINITIS, UNSPECIFIED SEASONALITY, UNSPECIFIED TRIGGER: ICD-10-CM

## 2019-03-25 DIAGNOSIS — I10 BENIGN HYPERTENSION: Chronic | ICD-10-CM

## 2019-03-25 DIAGNOSIS — K21.9 GASTROESOPHAGEAL REFLUX DISEASE, ESOPHAGITIS PRESENCE NOT SPECIFIED: ICD-10-CM

## 2019-03-25 DIAGNOSIS — R05.9 COUGH: ICD-10-CM

## 2019-03-25 DIAGNOSIS — E11.40 TYPE 2 DIABETES MELLITUS WITH DIABETIC NEUROPATHY, WITHOUT LONG-TERM CURRENT USE OF INSULIN: Chronic | ICD-10-CM

## 2019-03-25 DIAGNOSIS — M25.562 ACUTE PAIN OF LEFT KNEE: Primary | ICD-10-CM

## 2019-03-25 DIAGNOSIS — E78.5 HYPERLIPIDEMIA, UNSPECIFIED HYPERLIPIDEMIA TYPE: Chronic | ICD-10-CM

## 2019-03-25 PROCEDURE — 3074F PR MOST RECENT SYSTOLIC BLOOD PRESSURE < 130 MM HG: ICD-10-PCS | Mod: HCNC,CPTII,S$GLB, | Performed by: FAMILY MEDICINE

## 2019-03-25 PROCEDURE — 3079F PR MOST RECENT DIASTOLIC BLOOD PRESSURE 80-89 MM HG: ICD-10-PCS | Mod: HCNC,CPTII,S$GLB, | Performed by: FAMILY MEDICINE

## 2019-03-25 PROCEDURE — 1101F PR PT FALLS ASSESS DOC 0-1 FALLS W/OUT INJ PAST YR: ICD-10-PCS | Mod: HCNC,CPTII,S$GLB, | Performed by: FAMILY MEDICINE

## 2019-03-25 PROCEDURE — 3044F PR MOST RECENT HEMOGLOBIN A1C LEVEL <7.0%: ICD-10-PCS | Mod: HCNC,CPTII,S$GLB, | Performed by: FAMILY MEDICINE

## 2019-03-25 PROCEDURE — 1101F PT FALLS ASSESS-DOCD LE1/YR: CPT | Mod: HCNC,CPTII,S$GLB, | Performed by: FAMILY MEDICINE

## 2019-03-25 PROCEDURE — 2024F PR 7 FIELD PHOTOS WITH INTERP/ REVIEW: ICD-10-PCS | Mod: HCNC,S$GLB,, | Performed by: FAMILY MEDICINE

## 2019-03-25 PROCEDURE — 3074F SYST BP LT 130 MM HG: CPT | Mod: HCNC,CPTII,S$GLB, | Performed by: FAMILY MEDICINE

## 2019-03-25 PROCEDURE — 2024F 7 FLD RTA PHOTO EVC RTNOPTHY: CPT | Mod: HCNC,S$GLB,, | Performed by: FAMILY MEDICINE

## 2019-03-25 PROCEDURE — 3044F HG A1C LEVEL LT 7.0%: CPT | Mod: HCNC,CPTII,S$GLB, | Performed by: FAMILY MEDICINE

## 2019-03-25 PROCEDURE — 99999 PR PBB SHADOW E&M-EST. PATIENT-LVL IV: CPT | Mod: PBBFAC,HCNC,, | Performed by: FAMILY MEDICINE

## 2019-03-25 PROCEDURE — 99214 PR OFFICE/OUTPT VISIT, EST, LEVL IV, 30-39 MIN: ICD-10-PCS | Mod: HCNC,S$GLB,, | Performed by: FAMILY MEDICINE

## 2019-03-25 PROCEDURE — 99999 PR PBB SHADOW E&M-EST. PATIENT-LVL IV: ICD-10-PCS | Mod: PBBFAC,HCNC,, | Performed by: FAMILY MEDICINE

## 2019-03-25 PROCEDURE — 99214 OFFICE O/P EST MOD 30 MIN: CPT | Mod: HCNC,S$GLB,, | Performed by: FAMILY MEDICINE

## 2019-03-25 PROCEDURE — 3079F DIAST BP 80-89 MM HG: CPT | Mod: HCNC,CPTII,S$GLB, | Performed by: FAMILY MEDICINE

## 2019-03-25 RX ORDER — PROMETHAZINE HYDROCHLORIDE AND DEXTROMETHORPHAN HYDROBROMIDE 6.25; 15 MG/5ML; MG/5ML
5 SYRUP ORAL 3 TIMES DAILY PRN
Qty: 120 ML | Refills: 1 | Status: SHIPPED | OUTPATIENT
Start: 2019-03-25 | End: 2019-08-27 | Stop reason: ALTCHOICE

## 2019-03-25 RX ORDER — FAMOTIDINE 20 MG/1
20 TABLET, FILM COATED ORAL 2 TIMES DAILY
Qty: 180 TABLET | Refills: 0 | Status: SHIPPED | OUTPATIENT
Start: 2019-03-25 | End: 2019-04-09

## 2019-03-25 RX ORDER — FLUTICASONE PROPIONATE 50 MCG
SPRAY, SUSPENSION (ML) NASAL
Qty: 16 G | Refills: 1 | Status: SHIPPED | OUTPATIENT
Start: 2019-03-25 | End: 2020-12-21 | Stop reason: SDUPTHER

## 2019-03-25 NOTE — PATIENT INSTRUCTIONS
Osteoarthritis  Osteoarthritis (also called degenerative joint disease) happens when the cartilage in a joint becomes damaged and worn. This may be due to age, wear and tear, overuse of the joint, or other problems. Osteoarthritis can affect any joint. But it is most common in hands, knees, spine, hips, and feet. Symptoms include joint stiffness, pain, and swelling.  Home care  · When a joint is more sore than usual, rest it for a day or two.  · Heat can help relieve stiffness. Take a hot bath or apply a heating pad for up to 30 minutes at a time. If symptoms are worse in the morning, using heat just after awakening can help relax the muscle and soothe the joints.   · Ice helps relieve pain and swelling. It is often used after activity. Use a cold pack wrapped in a thin cloth on the joint for 10 to 15 minutes at a time.   · Alternating hot and cold can also help relieve pain. Try this for 20 minutes at a time, several times per day.  · Exercise helps prevent the muscles and ligaments around the joint from becoming weak. It also helps maintain function in the joint.  Be as active as you can. Talk to your healthcare provider about what activity program is best for you.  · Excess weight puts a lot of extra strain on weight-bearing joints of the lower back, hips, knees, feet and ankles. If you are overweight, talk to your healthcare provider about a safe and effective weight loss program.  · Use anti-inflammatory medicines as prescribed for pain. This includes acetaminophen or NSAIDs such as ibuprofen or naproxen. If needed, topical or injected medicines may be recommended. Talk to your healthcare provider if these options are not enough to manage your pain.  · Talk with your healthcare provider about devices that might help improve your function and reduce pain.  Follow-up care  Follow up with your healthcare provider as advised by our staff.  When to seek medical advice  Call your healthcare provider right away if  any of these occur:  · Redness or swelling of a painful joint  · Discharge or pus from a painful joint  · Fever of 100.4ºF (38ºC) or higher, or as directed by your healthcare provider  · Worsening joint pain  · Decreased ability to move the joint or bear weight on the joint  Date Last Reviewed: 3/1/2017  © 0090-9529 [employer]. [address] [place] [address]. All rights reserved. This information is not intended as a substitute for professional medical care. Always follow your healthcare professional's instructions.

## 2019-03-25 NOTE — PROGRESS NOTES
Routine Office Visit    Patient Name: Valarie Foster    : 1950  MRN: [id number]    Subjective:  Valarie is a 68 y.o. female who presents today for     1. Left knee pain - started approximately a few weeks ago - pain suddenly started in her left knee pain. Pain is described as a sharp pain that starts in left knee and radiates up to left thigh. Pain is aggravated with walking or at night. Pain is alleviated with ice and tylenol.   2. Diabetes mellitus - diet controlled - she is not on medications. She was advised to monitor it at home. She has noticed blood glucose ranging from 140s-170s. She sometimes has no appetite and has not been eating. She c/o increase fatigue.        Review of Systems   Constitutional: Negative for chills and fever.   HENT: Negative for congestion.    Eyes: Negative for blurred vision.   Respiratory: Negative for cough.    Cardiovascular: Negative for chest pain.   Gastrointestinal: Negative for abdominal pain, constipation, diarrhea, heartburn, nausea and vomiting.   Genitourinary: Negative for dysuria.   Musculoskeletal: Negative for myalgias.   Skin: Negative for itching and rash.   Neurological: Negative for dizziness and headaches.   Psychiatric/Behavioral: Negative for depression.       Active Problem List  Patient Active Problem List   Diagnosis    Type 2 diabetes mellitus with diabetic neuropathy, without long-term current use of insulin    Benign hypertension    Hyperlipemia    Microhematuria    Left flank pain    Renal cell carcinoma of right kidney       Past Surgical History  Past Surgical History:   Procedure Laterality Date    BIOPSY-KIDNEY Right 2018    [facility]    COLONOSCOPY N/A 2018    [facility] ENDO    HYSTERECTOMY      NEPHRECTOMY, PARTIAL open. Dr Arenas to assist. Right 10/12/2018    [facility] OR    OOPHORECTOMY         Family  History  Family History   Problem Relation Age of Onset    Glaucoma Sister     Cancer Sister         breast cancer    No Known Problems Mother     Glaucoma Father     Diabetes Brother     No Known Problems Maternal Aunt     No Known Problems Maternal Uncle     No Known Problems Paternal Aunt     No Known Problems Paternal Uncle     No Known Problems Maternal Grandmother     No Known Problems Maternal Grandfather     No Known Problems Paternal Grandmother     No Known Problems Paternal Grandfather     Thyroid disease Sister     Blindness Sister         due to trauma during car accident    Diabetes Sister     Amblyopia Neg Hx     Cataracts Neg Hx     Hypertension Neg Hx     Macular degeneration Neg Hx     Retinal detachment Neg Hx     Strabismus Neg Hx     Stroke Neg Hx     Breast cancer Neg Hx        Social History  Social History     Socioeconomic History    Marital status: Single     Spouse name: Not on file    Number of children: Not on file    Years of education: Not on file    Highest education level: Not on file   Occupational History    Not on file   Social Needs    Financial resource strain: Not on file    Food insecurity:     Worry: Not on file     Inability: Not on file    Transportation needs:     Medical: Not on file     Non-medical: Not on file   Tobacco Use    Smoking status: Never Smoker    Smokeless tobacco: Never Used   Substance and Sexual Activity    Alcohol use: No    Drug use: No    Sexual activity: Not on file   Lifestyle    Physical activity:     Days per week: Not on file     Minutes per session: Not on file    Stress: Not on file   Relationships    Social connections:     Talks on phone: Not on file     Gets together: Not on file     Attends Confucianism service: Not on file     Active member of club or organization: Not on file     Attends meetings of clubs or organizations: Not on file     Relationship status: Not on file    Intimate partner violence:  "    Fear of current or ex partner: Not on file     Emotionally abused: Not on file     Physically abused: Not on file     Forced sexual activity: Not on file   Other Topics Concern    Not on file   Social History Narrative    Not on file       Medications and Allergies  Reviewed and updated.   Current Outpatient Medications   Medication Sig    famotidine (PEPCID) 20 MG tablet Take 1 tablet (20 mg total) by mouth 2 (two) times daily. for 15 days    pravastatin (PRAVACHOL) 10 MG tablet Take 1 tablet (10 mg total) by mouth once daily.    ACCU-CHEK ONEIL CONTROL SOLN Soln     ACCU-CHEK ONEIL PLUS METER Misc     ACCU-CHEK ONEIL PLUS TEST STRP Strp     ACCU-CHEK SOFTCLIX LANCETS Misc     amLODIPine (NORVASC) 5 MG tablet Take 1 tablet (5 mg total) by mouth once daily.    BD ALCOHOL SWABS PadM     cetirizine (ZYRTEC) 10 MG tablet Take 1 tablet (10 mg total) by mouth once daily.    cyclobenzaprine (FLEXERIL) 5 MG tablet Take 1 taab  2  To 3 times day if needed for muscle spasm .<<caution use >>>    docusate sodium (COLACE) 100 MG capsule Take 1 capsule (100 mg total) by mouth 2 (two) times daily.    fluticasone (FLONASE) 50 mcg/actuation nasal spray USE 1 SPRAY(S) IN EACH NOSTRIL ONCE DAILY    gabapentin (NEURONTIN) 300 MG capsule Take 2 capsules (600 mg total) by mouth every evening.    hydrOXYzine HCl (ATARAX) 25 MG tablet Take 1 tablet (25 mg total) by mouth 3 (three) times daily as needed for Itching. This medication causes drowsiness.  Do not drink alcohol or operate motor vehicles while taking.    promethazine-dextromethorphan (PROMETHAZINE-DM) 6.25-15 mg/5 mL Syrp Take 5 mLs by mouth 3 (three) times daily as needed (cough).     No current facility-administered medications for this visit.        Physical Exam  /80 (BP Location: Right arm, Patient Position: Sitting, BP Method: Small (Manual))   Pulse 70   Temp 97.7 °F (36.5 °C) (Oral)   Ht 5' 4" (1.626 m)   Wt 80.6 kg (177 lb 11.1 oz)   SpO2 " 99%   BMI 30.50 kg/m²   Physical Exam   Constitutional: She is oriented to person, place, and time. She appears well-developed and well-nourished.   HENT:   Head: Normocephalic and atraumatic.   Eyes: Pupils are equal, round, and reactive to light. Conjunctivae and EOM are normal.   Neck: Normal range of motion. Neck supple.   Cardiovascular: Normal rate, regular rhythm and normal heart sounds. Exam reveals no gallop and no friction rub.   No murmur heard.  Pulmonary/Chest: Breath sounds normal. No respiratory distress.   Abdominal: Soft. Bowel sounds are normal. She exhibits no distension. There is no tenderness.   Musculoskeletal: Normal range of motion.   Lymphadenopathy:     She has no cervical adenopathy.   Neurological: She is alert and oriented to person, place, and time.   Skin: Skin is warm.   Psychiatric: She has a normal mood and affect.         Assessment/Plan:  Valarie Foster is a 68 y.o. female who presents today for :    Problem List Items Addressed This Visit        Cardiac/Vascular    Benign hypertension (Chronic)  The current medical regimen is effective;  continue present plan and medications.      Hyperlipemia (Chronic)  The current medical regimen is effective;  continue present plan and medications.         Endocrine    Type 2 diabetes mellitus with diabetic neuropathy, without long-term current use of insulin (Chronic)  The current medical regimen is effective;  continue present plan and medications.  a1c 6.5        Other Visit Diagnoses     Acute pain of left knee    -  Primary    Relevant Orders    Ambulatory Referral to Physical/Occupational Therapy  Recommend RICE  Recommend knee exercises - handout given  Continue with PT if symptoms worsen / progress      Cough        Relevant Medications    promethazine-dextromethorphan (PROMETHAZINE-DM) 6.25-15 mg/5 mL Syrp    Allergic Rhinitis        Relevant Medications    fluticasone (FLONASE) 50 mcg/actuation nasal spray    Gastroesophageal reflux  disease, esophagitis presence not specified        Relevant Medications    famotidine (PEPCID) 20 MG tablet  The current medical regimen is effective;  continue present plan and medications.              Follow up in about 6 months (around 9/25/2019).

## 2019-03-28 ENCOUNTER — TELEPHONE (OUTPATIENT)
Dept: UROLOGY | Facility: HOSPITAL | Age: 69
End: 2019-03-28

## 2019-03-28 NOTE — TELEPHONE ENCOUNTER
----- Message from Holly Almeida, Patient Care Assistant sent at 3/28/2019 12:54 PM CDT -----  Contact: [phone]      ----- Message -----  From: Macy Joshua  Sent: 3/28/2019   9:18 AM  To: N[facility]st[facility] Roberts Staff    Pt is requesting to speak to you regarding her surgery she had last year, pt wants to know what kind of tumor she had. Pls call pt [phone]. Thanks.......Justina

## 2019-04-10 ENCOUNTER — TELEPHONE (OUTPATIENT)
Dept: UROLOGY | Facility: CLINIC | Age: 69
End: 2019-04-10

## 2019-04-10 DIAGNOSIS — C64.1 RENAL CELL CARCINOMA OF RIGHT KIDNEY: Primary | ICD-10-CM

## 2019-04-10 NOTE — TELEPHONE ENCOUNTER
----- Message from Holly Almeida, Patient Care Assistant sent at 4/10/2019  1:17 PM CDT -----      ----- Message -----  From: Katharina Ayala  Sent: 4/10/2019  12:36 PM  To: N[facility]st[facility] Roberts Staff    Patient will be coming in for a Ct scan patient will need lab orders for Creatinine please. Thanks

## 2019-04-15 ENCOUNTER — HOSPITAL ENCOUNTER (OUTPATIENT)
Dept: RADIOLOGY | Facility: HOSPITAL | Age: 69
Discharge: HOME | End: 2019-04-15
Attending: UROLOGY
Payer: MEDICARE

## 2019-04-15 DIAGNOSIS — C64.1 RENAL CELL CARCINOMA OF RIGHT KIDNEY: ICD-10-CM

## 2019-04-15 PROCEDURE — 74178 CT CHEST ABDOMEN PELVIS W W/O CONTRAST (XPD): ICD-10-PCS | Mod: 26,HCNC,, | Performed by: RADIOLOGY

## 2019-04-15 PROCEDURE — 74178 CT ABD&PLV WO CNTR FLWD CNTR: CPT | Mod: 26,HCNC,, | Performed by: RADIOLOGY

## 2019-04-15 PROCEDURE — 71260 CT THORAX DX C+: CPT | Mod: 26,HCNC,, | Performed by: RADIOLOGY

## 2019-04-15 PROCEDURE — 25500020 PHARM REV CODE 255: Mod: HCNC | Performed by: UROLOGY

## 2019-04-15 PROCEDURE — 71260 CT CHEST ABDOMEN PELVIS W W/O CONTRAST (XPD): ICD-10-PCS | Mod: 26,HCNC,, | Performed by: RADIOLOGY

## 2019-04-15 PROCEDURE — 74178 CT ABD&PLV WO CNTR FLWD CNTR: CPT | Mod: TC,HCNC

## 2019-04-15 RX ADMIN — IOHEXOL 75 ML: 350 INJECTION, SOLUTION INTRAVENOUS at 10:04

## 2019-04-29 ENCOUNTER — HOSPITAL ENCOUNTER (EMERGENCY)
Facility: HOSPITAL | Age: 69
Discharge: HOME | End: 2019-04-29
Attending: EMERGENCY MEDICINE
Payer: MEDICARE

## 2019-04-29 ENCOUNTER — OFFICE VISIT (OUTPATIENT)
Dept: UROLOGY | Facility: CLINIC | Age: 69
End: 2019-04-29
Payer: MEDICARE

## 2019-04-29 VITALS
HEIGHT: 64 IN | SYSTOLIC BLOOD PRESSURE: 139 MMHG | DIASTOLIC BLOOD PRESSURE: 78 MMHG | BODY MASS INDEX: 30.9 KG/M2 | HEART RATE: 88 BPM | TEMPERATURE: 98 F | RESPIRATION RATE: 18 BRPM | WEIGHT: 181 LBS | OXYGEN SATURATION: 100 %

## 2019-04-29 VITALS
HEIGHT: 64 IN | WEIGHT: 181.13 LBS | DIASTOLIC BLOOD PRESSURE: 78 MMHG | SYSTOLIC BLOOD PRESSURE: 132 MMHG | BODY MASS INDEX: 30.92 KG/M2

## 2019-04-29 DIAGNOSIS — L29.9 PRURITUS: Primary | ICD-10-CM

## 2019-04-29 DIAGNOSIS — R31.29 MICROHEMATURIA: ICD-10-CM

## 2019-04-29 DIAGNOSIS — C64.1 RENAL CELL CARCINOMA OF RIGHT KIDNEY: Primary | ICD-10-CM

## 2019-04-29 DIAGNOSIS — L76.82 INCISIONAL PAIN: ICD-10-CM

## 2019-04-29 LAB
ALBUMIN SERPL BCP-MCNC: 3.8 G/DL (ref 3.5–5.2)
ALP SERPL-CCNC: 103 U/L (ref 55–135)
ALT SERPL W/O P-5'-P-CCNC: 7 U/L (ref 10–44)
ANION GAP SERPL CALC-SCNC: 7 MMOL/L (ref 8–16)
AST SERPL-CCNC: 12 U/L (ref 10–40)
BILIRUB SERPL-MCNC: 0.3 MG/DL (ref 0.1–1)
BUN SERPL-MCNC: 12 MG/DL (ref 8–23)
CALCIUM SERPL-MCNC: 9.7 MG/DL (ref 8.7–10.5)
CHLORIDE SERPL-SCNC: 105 MMOL/L (ref 95–110)
CO2 SERPL-SCNC: 25 MMOL/L (ref 23–29)
CREAT SERPL-MCNC: 0.8 MG/DL (ref 0.5–1.4)
EST. GFR  (AFRICAN AMERICAN): >60 ML/MIN/1.73 M^2
EST. GFR  (NON AFRICAN AMERICAN): >60 ML/MIN/1.73 M^2
GLUCOSE SERPL-MCNC: 111 MG/DL (ref 70–110)
POTASSIUM SERPL-SCNC: 4.3 MMOL/L (ref 3.5–5.1)
PROT SERPL-MCNC: 7 G/DL (ref 6–8.4)
SODIUM SERPL-SCNC: 137 MMOL/L (ref 136–145)

## 2019-04-29 PROCEDURE — 99214 OFFICE O/P EST MOD 30 MIN: CPT | Mod: HCNC,S$GLB,, | Performed by: UROLOGY

## 2019-04-29 PROCEDURE — 1101F PT FALLS ASSESS-DOCD LE1/YR: CPT | Mod: HCNC,CPTII,S$GLB, | Performed by: UROLOGY

## 2019-04-29 PROCEDURE — 96375 TX/PRO/DX INJ NEW DRUG ADDON: CPT | Mod: HCNC

## 2019-04-29 PROCEDURE — 99214 PR OFFICE/OUTPT VISIT, EST, LEVL IV, 30-39 MIN: ICD-10-PCS | Mod: HCNC,S$GLB,, | Performed by: UROLOGY

## 2019-04-29 PROCEDURE — 3078F DIAST BP <80 MM HG: CPT | Mod: HCNC,CPTII,S$GLB, | Performed by: UROLOGY

## 2019-04-29 PROCEDURE — 96374 THER/PROPH/DIAG INJ IV PUSH: CPT | Mod: HCNC

## 2019-04-29 PROCEDURE — 99499 RISK ADDL DX/OHS AUDIT: ICD-10-PCS | Mod: HCNC,S$GLB,, | Performed by: UROLOGY

## 2019-04-29 PROCEDURE — S0028 INJECTION, FAMOTIDINE, 20 MG: HCPCS | Mod: HCNC | Performed by: NURSE PRACTITIONER

## 2019-04-29 PROCEDURE — 99499 UNLISTED E&M SERVICE: CPT | Mod: HCNC,S$GLB,, | Performed by: UROLOGY

## 2019-04-29 PROCEDURE — 25000003 PHARM REV CODE 250: Mod: HCNC | Performed by: NURSE PRACTITIONER

## 2019-04-29 PROCEDURE — 99284 EMERGENCY DEPT VISIT MOD MDM: CPT | Mod: 25,HCNC

## 2019-04-29 PROCEDURE — 80053 COMPREHEN METABOLIC PANEL: CPT | Mod: HCNC

## 2019-04-29 PROCEDURE — 63600175 PHARM REV CODE 636 W HCPCS: Mod: HCNC | Performed by: NURSE PRACTITIONER

## 2019-04-29 PROCEDURE — 3075F SYST BP GE 130 - 139MM HG: CPT | Mod: HCNC,CPTII,S$GLB, | Performed by: UROLOGY

## 2019-04-29 PROCEDURE — 99999 PR PBB SHADOW E&M-EST. PATIENT-LVL III: ICD-10-PCS | Mod: PBBFAC,HCNC,, | Performed by: UROLOGY

## 2019-04-29 PROCEDURE — 1101F PR PT FALLS ASSESS DOC 0-1 FALLS W/OUT INJ PAST YR: ICD-10-PCS | Mod: HCNC,CPTII,S$GLB, | Performed by: UROLOGY

## 2019-04-29 PROCEDURE — 99999 PR PBB SHADOW E&M-EST. PATIENT-LVL III: CPT | Mod: PBBFAC,HCNC,, | Performed by: UROLOGY

## 2019-04-29 PROCEDURE — 3075F PR MOST RECENT SYSTOLIC BLOOD PRESS GE 130-139MM HG: ICD-10-PCS | Mod: HCNC,CPTII,S$GLB, | Performed by: UROLOGY

## 2019-04-29 PROCEDURE — 3078F PR MOST RECENT DIASTOLIC BLOOD PRESSURE < 80 MM HG: ICD-10-PCS | Mod: HCNC,CPTII,S$GLB, | Performed by: UROLOGY

## 2019-04-29 RX ORDER — FAMOTIDINE 10 MG/ML
20 INJECTION INTRAVENOUS
Status: COMPLETED | OUTPATIENT
Start: 2019-04-29 | End: 2019-04-29

## 2019-04-29 RX ORDER — DIPHENHYDRAMINE HYDROCHLORIDE 50 MG/ML
25 INJECTION INTRAMUSCULAR; INTRAVENOUS
Status: COMPLETED | OUTPATIENT
Start: 2019-04-29 | End: 2019-04-29

## 2019-04-29 RX ORDER — DIPHENHYDRAMINE HCL 25 MG
25 CAPSULE ORAL EVERY 6 HOURS PRN
Qty: 20 CAPSULE | Refills: 0 | Status: SHIPPED | OUTPATIENT
Start: 2019-04-29

## 2019-04-29 RX ADMIN — DIPHENHYDRAMINE HYDROCHLORIDE 25 MG: 50 INJECTION INTRAMUSCULAR; INTRAVENOUS at 11:04

## 2019-04-29 RX ADMIN — FAMOTIDINE 20 MG: 10 INJECTION INTRAVENOUS at 11:04

## 2019-04-29 NOTE — ED TRIAGE NOTES
"Pt c/o all over body rash and itching for over 2 months. Pt seen in this ED on 2/10/2019 for same complaint and states her symptoms were relieved by " a shot" but then rash came back about 2 weeks ago. Rash it itchy and burning. Pt in no acute distress at this time. Pt has been taking Benadryl, applying hydrocortisone at home to no relief  "

## 2019-04-29 NOTE — PROGRESS NOTES
Subjective:       Valarie Foster is a 68 y.o. female who is an established patient who was referred by Ivan CARDONA for evaluation of renal mass.      CT a/p with contrast 1/26/18 showed a 2.6cm endophytic lesion in R MP, concerning for solid mass vs complex cyst.     She reports L flank pain, contralateral to renal lesion. L flank pain has almost completely resolved. Denies hematuria, LUTS, UTIs. UA macro did show microhematuria recently. Denies family history of  malignancy. She reports being told she had a small kidney on one side in the past. Denies nephrolithiasis. Nonsmoker. Denies previous abdominal surgery though she is s/p hysterectomy. +DM2/HTN.     Cr (1/18) - 0.8  A1c (1/18) - 6.2  UA micro (2/18) - 0 RBCs    CT renal protocol (2/18) - 2.9cm completely endophytic solid enhancing renal mass. Homogenous in appearance.      She is s/p perc renal biopsy to better evaluate lesion (2/18) - oncocytic neoplasm. Mild hematuria < 24 hrs.          CT (6/18) - stable, endophytic enhancing 2.4cm R renal mass. Subtle 6mm focus of enhancement in R lobe of liver - nonspecific.     KISHAN (9/18) - stable 2.8cm R renal mass    She is now s/p open R PNx 10/12/18. Still having incisional pain, worse with palpation. No hernia on CT, mild stranding in subQ.    Path: 1.9cm unclassified RCC, FG 3/4, negative margins. [id number]    CT c/a/p 4/19 - no mets/recurrence    Cr 1/19 - 0.8, 4/19 - 0.8       The following portions of the patient's history were reviewed and updated as appropriate: allergies, current medications, past family history, past medical history, past social history, past surgical history and problem list.    Review of Systems  Constitutional: no fever or chills  ENT: no nasal congestion or sore throat  Respiratory: no cough or shortness of breath  Cardiovascular: no chest pain or palpitations  Gastrointestinal: no nausea or vomiting, tolerating diet  Genitourinary: as per HPI  Hematologic/Lymphatic: no easy bruising  "or lymphadenopathy  Musculoskeletal: no arthralgias or myalgias  Skin: no rashes or lesions  Neurological: no seizures or tremors  Behavioral/Psych: no auditory or visual hallucinations        Objective:    Vitals: /78   Ht 5' 4" (1.626 m)   Wt 82.2 kg (181 lb 1.7 oz)   BMI 31.09 kg/m²     Physical Exam   General: well developed, well nourished in no acute distress  Head: normocephalic, atraumatic  Neck: supple, trachea midline, no obvious enlargement of thyroid  HEENT: EOMI, mucus membranes moist, sclera anicteric, no hearing impairment  Lungs: symmetric expansion, non-labored breathing  Cardiovascular: regular rate and rhythm, normal pulses  Abdomen: soft, non tender, non distended, no palpable masses, no hepatosplenomegaly, no hernias, no CVA tenderness  Musculoskeletal: no peripheral edema, normal ROM in bilateral upper and lower extremities  Lymphatics: no cervical or inguinal lymphadenopathy  Skin: no rashes or lesions  Neuro: alert and oriented x 3, no gross deficits  Psych: normal judgment and insight, normal mood/affect and non-anxious  Genitourinary:   patient declined exam    Inc - well healed, minor superior fullness of incision    Lab Review   Urine analysis today in clinic shows - ++LE, 5-10 RBCs    Lab Results   Component Value Date    WBC 4.78 01/22/2019    HGB 13.2 01/22/2019    HCT 41.3 01/22/2019    MCV 85 01/22/2019     01/22/2019     Lab Results   Component Value Date    CREATININE 0.8 04/15/2019    BUN 11 01/22/2019         Imaging  Images and reports were personally reviewed by me and discussed with patient  CT/KISHAN reviewed       Assessment/Plan:      1. Right RCC    - 2.6cm lesion in R MP kidney.   - CT renal protocol 2.5cm endophytic solid enhancing renal mass   - Due to location of tumor, open partial would be difficult with higher complication rate of bleeding and/or urine leak. Radical nephrectomy with less complication rate.   - Concern for performing radical nephrectomy " in diabetic patient for possible benign lesion.   - Recommend perc biopsy to evaluate for possible benign lesion prior to radical nephrectomy. Discussed limitations/risks of perc biopsy. Understands that perc biopsy may also not be definitive in diagnosis   - Perc biopsy 2/18 - oncocytic neoplasm (less favors oncocytoma)   - Discussed options: observation, lap radical Nx, open partial Nx. She has opted to observe for now.    - CT 6/18 - stable 2.4cm R renal mass   - KISHAN 9/18 - 2.8cm R renal mass   - s/p R open PNx on 10/12/18    - unclassified RCC pT1a   - CT c/a/p 6 months (4/18) - no recurrence/mets   - CXR, CT, BMP in 6 months      2. Microhematuria    - UA micro - 0 RBCs     3. L flank pain   - Unsure etiology   - CT negative for L flank pain   - f/u with PCP      Follow up in  6 months with CXR, CT

## 2019-04-29 NOTE — MEDICAL/APP STUDENT
"  History     Chief Complaint   Patient presents with    Itching     States all over for over 2 months , taking zyrtec, benadryl cream, hydroxyxyne but needs a "shot"     CC:  Itching    HPI:  Patient is a 68 year old female with PMH of DMII, hypertension, RCC s/p partial nephrectomy (10/12/18) who presents for evaluation of itching to her entire body for the past 2 months which has kept her from sleeping. States she has tried multiple OTC treatments such as PO Benadryl, Benadryl topical cream, and hydrocortisone cream. States she has seen her PCP and urologist for this issue previously who also prescribed hydroxyzine for her symptoms which has not provided relief. Denies using any new soaps, detergents, or lotions, and states everything she uses is unscented. States this occurred one time prior while she was hospitalized for her partial nephrectomy, but was due to an allergy to Lisinopril. Denies any oral/lip swelling, SOB, chest tightness, chest pain, palpitations, rashes, fevers/chills, or urinary symptoms.       Past Medical History:   Diagnosis Date    Arthritis     Diabetes mellitus     diet controlled    Diabetes with neurologic complications     Hypertension     Renal cell carcinoma        Past Surgical History:   Procedure Laterality Date    BIOPSY-KIDNEY Right 2/19/2018    [facility]    COLONOSCOPY N/A 2/5/2018    [facility] ENDO    HYSTERECTOMY      NEPHRECTOMY, PARTIAL open. Dr Arenas to assist. Right 10/12/2018    [facility] OR    OOPHORECTOMY         Family History   Problem Relation Age of Onset    Glaucoma Sister     Cancer Sister         breast cancer    No Known Problems Mother     Glaucoma Father     Diabetes Brother     No Known Problems Maternal Aunt     No Known Problems Maternal Uncle     No Known Problems Paternal Aunt     No Known Problems Paternal Uncle     No Known " "Problems Maternal Grandmother     No Known Problems Maternal Grandfather     No Known Problems Paternal Grandmother     No Known Problems Paternal Grandfather     Thyroid disease Sister     Blindness Sister         due to trauma during car accident    Diabetes Sister     Amblyopia Neg Hx     Cataracts Neg Hx     Hypertension Neg Hx     Macular degeneration Neg Hx     Retinal detachment Neg Hx     Strabismus Neg Hx     Stroke Neg Hx     Breast cancer Neg Hx        Social History     Tobacco Use    Smoking status: Never Smoker    Smokeless tobacco: Never Used   Substance Use Topics    Alcohol use: No    Drug use: No       Review of Systems   Constitutional: Negative for chills, fatigue and fever.   HENT: Negative for congestion, sore throat and trouble swallowing.    Eyes: Negative for itching.   Respiratory: Negative for apnea, choking, shortness of breath and wheezing.    Cardiovascular: Negative for chest pain.   Gastrointestinal: Negative for abdominal distention, abdominal pain and nausea.   Genitourinary: Negative for difficulty urinating, dysuria and frequency.   Musculoskeletal: Negative for back pain.   Skin: Negative for rash.        (+) Itching   Allergic/Immunologic: Negative.    Neurological: Negative for dizziness, weakness, light-headedness and headaches.   Hematological: Does not bruise/bleed easily.   Psychiatric/Behavioral: Negative.  Negative for agitation and confusion.       Physical Exam   BP (!) 182/84   Pulse 72   Temp 97.6 °F (36.4 °C) (Oral)   Resp 16   Ht 5' 4" (1.626 m)   Wt 82.1 kg (181 lb)   SpO2 98%   Breastfeeding? No   BMI 31.07 kg/m²     Physical Exam    Nursing note and vitals reviewed.  Constitutional: She appears well-developed and well-nourished. No distress.   HENT:   Head: Normocephalic and atraumatic.   Mouth/Throat: Oropharynx is clear and moist.   Eyes: Conjunctivae and EOM are normal. Pupils are equal, round, and reactive to light. Right eye " exhibits no discharge. Left eye exhibits no discharge. No scleral icterus.   Neck: Normal range of motion. Neck supple. No thyromegaly present.   Cardiovascular: Normal rate, regular rhythm, normal heart sounds and intact distal pulses. Exam reveals no gallop and no friction rub.    No murmur heard.  Pulmonary/Chest: Breath sounds normal. No respiratory distress. She has no wheezes. She has no rhonchi. She has no rales.   Abdominal: Soft. Bowel sounds are normal. She exhibits no distension and no mass. There is no tenderness. There is no rebound and no guarding.   Musculoskeletal: Normal range of motion.   Lymphadenopathy:     She has no cervical adenopathy.   Neurological: She is alert and oriented to person, place, and time. She has normal strength. No cranial nerve deficit.   Skin: Skin is warm and dry. Capillary refill takes less than 2 seconds. No rash noted.        Multiple areas of excoriations and dry skin noted to back and lower legs. No apparent rash. No warmth, erythema, edema, discharge, or fluctuance.    Psychiatric: She has a normal mood and affect.         ED Course

## 2019-04-29 NOTE — DISCHARGE INSTRUCTIONS

## 2019-04-30 ENCOUNTER — PES CALL (OUTPATIENT)
Dept: ADMINISTRATIVE | Facility: CLINIC | Age: 69
End: 2019-04-30

## 2019-05-02 DIAGNOSIS — E11.40 TYPE 2 DIABETES MELLITUS WITH DIABETIC NEUROPATHY, WITHOUT LONG-TERM CURRENT USE OF INSULIN: Chronic | ICD-10-CM

## 2019-05-02 RX ORDER — GABAPENTIN 300 MG/1
CAPSULE ORAL
Qty: 180 CAPSULE | Refills: 1 | Status: SHIPPED | OUTPATIENT
Start: 2019-05-02 | End: 2019-10-16 | Stop reason: SDUPTHER

## 2019-05-04 NOTE — ED PROVIDER NOTES
"Encounter Date: 4/29/2019       History     Chief Complaint   Patient presents with    Itching     States all over for over 2 months , taking zyrtec, benadryl cream, hydroxyxyne but needs a "shot"        CC:  Itching     HPI:  Patient is a 68 year old female with PMH of DMII, hypertension, RCC s/p partial nephrectomy (10/12/18) who presents for evaluation of itching to her entire body for the past 2 months which has kept her from sleeping. States she has tried multiple OTC treatments such as PO Benadryl, Benadryl topical cream, and hydrocortisone cream. States she has seen her PCP and urologist for this issue previously who also prescribed hydroxyzine for her symptoms which has not provided relief. Denies using any new soaps, detergents, or lotions, and states everything she uses is unscented. States this occurred one time prior while she was hospitalized for her partial nephrectomy, but was due to an allergy to Lisinopril. Denies any oral/lip swelling, SOB, chest tightness, chest pain, palpitations, rashes, fevers/chills, or urinary symptoms.          The history is provided by the patient. No  was used.     Review of patient's allergies indicates:   Allergen Reactions    Lisinopril Swelling    Ascorbic acid-ascorbate calc      And citrus fruits     Past Medical History:   Diagnosis Date    Arthritis     Diabetes mellitus     diet controlled    Diabetes with neurologic complications     Hypertension     Renal cell carcinoma      Past Surgical History:   Procedure Laterality Date    BIOPSY-KIDNEY Right 2/19/2018    [facility]    COLONOSCOPY N/A 2/5/2018    Performed by Bacilio [facility]ancia [facility] at [facility] ENDO    HYSTERECTOMY      NEPHRECTOMY, PARTIAL open. Dr Arenas to assist. Right 10/12/2018    [facility] by [facility] Palm [facility] at [facility] OR    OOPHORECTOMY       Family History   Problem Relation Age of Onset    Glaucoma Sister     Cancer Sister  "        breast cancer    No Known Problems Mother     Glaucoma Father     Diabetes Brother     No Known Problems Maternal Aunt     No Known Problems Maternal Uncle     No Known Problems Paternal Aunt     No Known Problems Paternal Uncle     No Known Problems Maternal Grandmother     No Known Problems Maternal Grandfather     No Known Problems Paternal Grandmother     No Known Problems Paternal Grandfather     Thyroid disease Sister     Blindness Sister         due to trauma during car accident    Diabetes Sister     Amblyopia Neg Hx     Cataracts Neg Hx     Hypertension Neg Hx     Macular degeneration Neg Hx     Retinal detachment Neg Hx     Strabismus Neg Hx     Stroke Neg Hx     Breast cancer Neg Hx      Social History     Tobacco Use    Smoking status: Never Smoker    Smokeless tobacco: Never Used   Substance Use Topics    Alcohol use: No    Drug use: No     Review of Systems   Constitutional: Negative for fever.   HENT: Negative for facial swelling, sore throat and trouble swallowing.    Eyes: Negative for itching.   Respiratory: Negative for cough and shortness of breath.    Cardiovascular: Negative for chest pain.   Gastrointestinal: Negative for abdominal distention, abdominal pain, diarrhea, nausea and vomiting.   Genitourinary: Negative for decreased urine volume and dysuria.   Musculoskeletal: Negative for back pain.   Skin: Negative for color change and rash.        itch   Neurological: Negative for weakness.   Hematological: Does not bruise/bleed easily.   Psychiatric/Behavioral: Negative for confusion.       Physical Exam     Initial Vitals [04/29/19 1048]   BP Pulse Resp Temp SpO2   (!) 182/84 72 16 97.6 °F (36.4 °C) 98 %      MAP       --         Physical Exam    Constitutional: She appears well-developed and well-nourished. She is not diaphoretic. No distress.   HENT:   Head: Normocephalic and atraumatic.   Right Ear: External ear normal.   Left Ear: External ear normal.    Nose: Nose normal.   Mouth/Throat: Oropharynx is clear and moist. No oropharyngeal exudate.   Eyes: Conjunctivae and EOM are normal. Pupils are equal, round, and reactive to light.   Neck: Normal range of motion.   Cardiovascular: Normal rate, regular rhythm and normal heart sounds.   Pulmonary/Chest: Breath sounds normal. No respiratory distress.   Abdominal: Soft. Bowel sounds are normal. There is no tenderness.   Musculoskeletal: Normal range of motion.   Neurological: She is alert and oriented to person, place, and time.   Skin: Skin is warm and dry.   Multiple areas of excoriations and dry skin noted to back and lower legs. No apparent rash. No warmth, erythema, edema, discharge, or fluctuance.    Psychiatric: She has a normal mood and affect. Her behavior is normal.         ED Course   Procedures  Labs Reviewed   COMPREHENSIVE METABOLIC PANEL - Abnormal; Notable for the following components:       Result Value    Glucose 111 (*)     ALT 7 (*)     Anion Gap 7 (*)     All other components within normal limits          Imaging Results    None          Medical Decision Making:   Differential Diagnosis:    Xerosis, dermatitis, urticaria, cholecystitis pruritus, uremic pruritus, hematologic malignancy, drug reaction, others  ED Management:  This is an evaluation of a 68 y.o. female that presents to the Emergency Department for itch.  The patient is a non-toxic, afebrile, and well appearing female. On physical exam,  There are some excoriations from itching.  No rash appreciated.There are no oral mucosa lesions, lesions in the webspace's of the fingers or toes, lesions on the palms or soles, vesicular lesions, desquamation, or sloughing of the skin. No herald patch or satellite lesions.    Vital Signs are Reassuring. RESULTS:  The chemistry was negative for hypo-or hyper natremia, kalemia, chloridemia, or other electrolyte abnormalities; BUN and creatinine were within normal limits indicating normal kidney  function, ALT and AST were within normal limits indicating normal liver function, there was no transaminitis.      Doubt emergent etiology of itch.  Likely xerosis.   Given Benadryl with improvement in symptoms.  Encourage moisturizing at home.  Follow up with PCP  And Dermatology.    This case was discussed with Dr. Ronquillo who is in agreement with my assessment and plan.                      Clinical Impression:       ICD-10-CM ICD-9-CM   1. Pruritus L29.9 698.9         Disposition:   Disposition: Discharged  Condition: Stable                        Nohelia Mayen NP  05/03/19 2104       Nohelia Mayen NP  06/12/19 7412

## 2019-06-03 ENCOUNTER — TELEPHONE (OUTPATIENT)
Dept: FAMILY MEDICINE | Facility: CLINIC | Age: 69
End: 2019-06-03

## 2019-06-03 RX ORDER — HYDROXYZINE HYDROCHLORIDE 25 MG/1
25 TABLET, FILM COATED ORAL 3 TIMES DAILY PRN
Qty: 90 TABLET | Refills: 0 | Status: SHIPPED | OUTPATIENT
Start: 2019-06-03 | End: 2019-06-04 | Stop reason: SDUPTHER

## 2019-06-03 RX ORDER — HYDROXYZINE HYDROCHLORIDE 25 MG/1
25 TABLET, FILM COATED ORAL 3 TIMES DAILY PRN
Qty: 90 TABLET | Refills: 0 | Status: SHIPPED | OUTPATIENT
Start: 2019-06-03 | End: 2019-06-03 | Stop reason: SDUPTHER

## 2019-06-03 NOTE — TELEPHONE ENCOUNTER
----- Message from Nohelia Matamoros sent at 6/3/2019 10:42 AM CDT -----  ..Type: RX Refill Request    Who Called: pt     Refill or New Rx: refill     RX Name and Strength: hydrOXYzine HCl (ATARAX) 25 MG tablet    Preferred Pharmacy with phone number:..  [facility] - [place] ([place], [place] [address]  [address] ([place] [address]  Phone: [phone] Fax: [phone]    Would the patient rather a call back or a response via [facility]? Call back     Best Call Back Number: [phone]    Additional Information: It appear mediation was not approved but did not transmit successfully.

## 2019-06-03 NOTE — TELEPHONE ENCOUNTER
----- Message from Valentine Dawkins sent at 6/3/2019  7:54 AM CDT -----  Contact: Self   Type: RX Refill Request    Who Called:  Self     Refill or New Rx: refill     RX Name and Strength:hydrOXYzine HCl (ATARAX) 25 MG tablet    Preferred Pharmacy with phone number: [facility] - [place] ([place], [place] - [address] [place] [address]   [phone] (Phone)  [phone] (Fax)    Local or Mail Order: Local     Ordering Provider: Dr. Hernandez     Would the patient rather a call back or a response via [facility]?  Call     Best Call Back Number: [phone]

## 2019-06-03 NOTE — TELEPHONE ENCOUNTER
----- Message from Valentine Dawkins sent at 6/3/2019  1:02 PM CDT -----  Contact: Self   Type: Patient Call Back    Who called: Self     What is the request in detail: patient says her pharmacy does not have her medication she is there waiting   [facility] [address] [place] [address] ([place] [place] [address]  Can the clinic reply by [facility]? NO     Would the patient rather a call back or a response via [facility]? Call     Best call back number:[phone]

## 2019-06-04 RX ORDER — HYDROXYZINE HYDROCHLORIDE 25 MG/1
25 TABLET, FILM COATED ORAL 3 TIMES DAILY PRN
Qty: 90 TABLET | Refills: 0 | Status: SHIPPED | OUTPATIENT
Start: 2019-06-04 | End: 2019-08-27 | Stop reason: SDUPTHER

## 2019-06-04 NOTE — TELEPHONE ENCOUNTER
----- Message from Valentine Dawkins sent at 6/4/2019  7:40 AM CDT -----  Contact: Self   Type: Patient Call Back    Who called: self     What is the request in detail: patient says she is itching and need her medication she says the pharmacy still does not have her script . She says she has called several times and went to the pharmacy several times    Can the clinic reply by [facility]? No     Would the patient rather a call back or a response via [facility]? Call     Best call back number: [phone]

## 2019-06-04 NOTE — TELEPHONE ENCOUNTER
Patient left a message stating that her medication was not filled, I called patients pharmacy and medication was not filled.

## 2019-06-14 ENCOUNTER — PATIENT OUTREACH (OUTPATIENT)
Dept: ADMINISTRATIVE | Facility: HOSPITAL | Age: 69
End: 2019-06-14

## 2019-06-14 RX ORDER — FAMOTIDINE 20 MG/1
20 TABLET, FILM COATED ORAL 2 TIMES DAILY
Refills: 0 | COMMUNITY
Start: 2019-05-02 | End: 2019-08-27 | Stop reason: SDUPTHER

## 2019-06-14 RX ORDER — PREDNISONE 20 MG/1
TABLET ORAL
Refills: 0 | COMMUNITY
Start: 2019-05-10 | End: 2019-08-27 | Stop reason: ALTCHOICE

## 2019-06-14 RX ORDER — TRIAMCINOLONE ACETONIDE 1 MG/G
CREAM TOPICAL
COMMUNITY
Start: 2019-06-02 | End: 2023-10-25 | Stop reason: CLARIF

## 2019-06-27 ENCOUNTER — PES CALL (OUTPATIENT)
Dept: ADMINISTRATIVE | Facility: CLINIC | Age: 69
End: 2019-06-27

## 2019-06-28 ENCOUNTER — OFFICE VISIT (OUTPATIENT)
Dept: FAMILY MEDICINE | Facility: CLINIC | Age: 69
End: 2019-06-28
Payer: MEDICARE

## 2019-06-28 VITALS
HEART RATE: 72 BPM | DIASTOLIC BLOOD PRESSURE: 82 MMHG | TEMPERATURE: 98 F | SYSTOLIC BLOOD PRESSURE: 130 MMHG | OXYGEN SATURATION: 97 % | WEIGHT: 178.81 LBS | BODY MASS INDEX: 30.53 KG/M2 | HEIGHT: 64 IN

## 2019-06-28 DIAGNOSIS — I10 BENIGN HYPERTENSION: Chronic | ICD-10-CM

## 2019-06-28 DIAGNOSIS — F32.A DEPRESSION, UNSPECIFIED DEPRESSION TYPE: ICD-10-CM

## 2019-06-28 DIAGNOSIS — E11.40 TYPE 2 DIABETES MELLITUS WITH DIABETIC NEUROPATHY, WITHOUT LONG-TERM CURRENT USE OF INSULIN: ICD-10-CM

## 2019-06-28 DIAGNOSIS — I70.0 ABDOMINAL AORTIC ATHEROSCLEROSIS: ICD-10-CM

## 2019-06-28 DIAGNOSIS — E78.5 HYPERLIPIDEMIA, UNSPECIFIED HYPERLIPIDEMIA TYPE: Chronic | ICD-10-CM

## 2019-06-28 DIAGNOSIS — Z00.00 ANNUAL PHYSICAL EXAM: Primary | ICD-10-CM

## 2019-06-28 PROCEDURE — 3075F SYST BP GE 130 - 139MM HG: CPT | Mod: HCNC,CPTII,S$GLB, | Performed by: FAMILY MEDICINE

## 2019-06-28 PROCEDURE — 3044F PR MOST RECENT HEMOGLOBIN A1C LEVEL <7.0%: ICD-10-PCS | Mod: HCNC,CPTII,S$GLB, | Performed by: FAMILY MEDICINE

## 2019-06-28 PROCEDURE — 99999 PR PBB SHADOW E&M-EST. PATIENT-LVL IV: CPT | Mod: PBBFAC,HCNC,, | Performed by: FAMILY MEDICINE

## 2019-06-28 PROCEDURE — 3079F PR MOST RECENT DIASTOLIC BLOOD PRESSURE 80-89 MM HG: ICD-10-PCS | Mod: HCNC,CPTII,S$GLB, | Performed by: FAMILY MEDICINE

## 2019-06-28 PROCEDURE — 3075F PR MOST RECENT SYSTOLIC BLOOD PRESS GE 130-139MM HG: ICD-10-PCS | Mod: HCNC,CPTII,S$GLB, | Performed by: FAMILY MEDICINE

## 2019-06-28 PROCEDURE — 99499 UNLISTED E&M SERVICE: CPT | Mod: HCNC,S$GLB,, | Performed by: FAMILY MEDICINE

## 2019-06-28 PROCEDURE — 99397 PR PREVENTIVE VISIT,EST,65 & OVER: ICD-10-PCS | Mod: HCNC,S$GLB,, | Performed by: FAMILY MEDICINE

## 2019-06-28 PROCEDURE — 3079F DIAST BP 80-89 MM HG: CPT | Mod: HCNC,CPTII,S$GLB, | Performed by: FAMILY MEDICINE

## 2019-06-28 PROCEDURE — 99999 PR PBB SHADOW E&M-EST. PATIENT-LVL IV: ICD-10-PCS | Mod: PBBFAC,HCNC,, | Performed by: FAMILY MEDICINE

## 2019-06-28 PROCEDURE — 99499 RISK ADDL DX/OHS AUDIT: ICD-10-PCS | Mod: HCNC,S$GLB,, | Performed by: FAMILY MEDICINE

## 2019-06-28 PROCEDURE — 99397 PER PM REEVAL EST PAT 65+ YR: CPT | Mod: HCNC,S$GLB,, | Performed by: FAMILY MEDICINE

## 2019-06-28 PROCEDURE — 3044F HG A1C LEVEL LT 7.0%: CPT | Mod: HCNC,CPTII,S$GLB, | Performed by: FAMILY MEDICINE

## 2019-06-28 RX ORDER — FLUOXETINE HYDROCHLORIDE 20 MG/1
20 CAPSULE ORAL DAILY
Qty: 30 CAPSULE | Refills: 3 | Status: SHIPPED | OUTPATIENT
Start: 2019-06-28 | End: 2019-09-13 | Stop reason: SDUPTHER

## 2019-06-28 NOTE — PATIENT INSTRUCTIONS
We recommend that you to contact [facility] for an appointment.   For Adults, please call [phone]   For Children, please call [phone]  For counseling and evaluation, please contact our  for [facility]    Cassandra Rockweiler, LCSW    III - [facility]    [address] [place] [address]  [phone]        Other Psychiatry / Behavioral Health programs:     [facility]: [phone] ([place]) -  http://www.[facility]health[facility].com/  [facility]: [phone] - https://www.Kitchfix.net/[facility]): [phone]  [facility]): [phone] -  http://AudioSnapsGeorgetown Behavioral HospitalSport Nginpsychotherapy.Mobivox/   [facility] ([facility] Medicaid only): [phone] -   [facility]): [phone] - http://[employer].Mobivox/index.php?id=2&Milena=20  [facility]): [phone] - http://www.Health Wildcatters/  [facility]: [phone]  [facility]: [phone] -  https://www.medschool.[facility]/psychiatry/  [place] [facility]: [phone] - http://www.infantinstitute.org/  [place] Department of Psychiatry and Behavioral Sciences: [phone]  [facility]: [phone] - https://alliedhealth.[facility]/clinics/playtherapyabout.aspx  Sofie Mendez , PhD ([place] psychologist): [phone] - http://marquis.Mobivox/  Marita Maciel, PhD ([place]): [phone] - http://[employer].Mobivox/                Other resources:  [facility]: [phone]  - [facility]: [phone]  [place] [facility]:  6-121-254-COPE (or 211)  [facility] (death of a child) - http://www.CheckPass [employer] Solutions  [facility] Grief Groups: [phone]  [facility] support groups: 113.568.6751  [place] Domestic Violence Hotline: 1-399.219.9336  People  Program: 948.816.4198 - http://www.peopleprogram.org   [facility] - http://www.[facility].net/index.aspx?fcus=968  [facility]: [phone]  [facility]: [phone] option 2  [facility] Intensive Outpatient Program (older adults): [phone]  [facility] (medically assisted detox): [phone]  [facility] (inpatient detox): [phone] ext [phone]  Narcotics Anonymous: 834.731.7419 - http://www.noana.org  Alcohol Anonymous: 698.412.1274 - http://www.neworleansafg.org  [place] Alcoholic Anonymous - www.[place].org
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
## 2019-07-01 PROBLEM — I70.0 ABDOMINAL AORTIC ATHEROSCLEROSIS: Status: ACTIVE | Noted: 2019-07-01

## 2019-07-02 ENCOUNTER — OUTPATIENT CASE MANAGEMENT (OUTPATIENT)
Dept: ADMINISTRATIVE | Facility: OTHER | Age: 69
End: 2019-07-02

## 2019-07-02 ENCOUNTER — TELEPHONE (OUTPATIENT)
Dept: FAMILY MEDICINE | Facility: CLINIC | Age: 69
End: 2019-07-02
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
## 2019-07-02 NOTE — PATIENT INSTRUCTIONS
Depression Affects Your Mind and Body  Everyone feels sad or blue from time to time for a few days or weeks. Depression is when these feelings don't go away and they interfere with daily life.  Depression is a real illness that can develop at any age. It is one of the most common mental health problems in the [place] Depression makes you feel sad, helpless, and hopeless. It gets in the way of your life and relationships. It inhibits your ability to think and act. But, with help, you can feel better again.     When I was depressed, I felt awful. I was so tired all the time I could hardly think, but at night I couldnt fall asleep. My head hurt. My stomach hurt. I didnt know what was wrong with me.   Depression affects your whole body  Brain chemicals affect your body as well as your mood. So depression may do more than just make you feel low. You may also feel bad physically. Depression can:  · Cause trouble with mental tasks such as remembering, concentrating, or making decisions  · Make you feel nervous and jumpy  · Cause trouble sleeping. Or you may sleep too much  · Change your appetite  · Cause headaches, stomachaches, or other aches and pains  · Drain your body of energy  Depression and other illness  It is common for people who have chronic health problems to also have depression. It can often be hard to tell which one caused the other. A person might become depressed after finding out they have a health problem. But some studies suggest being depressed may make certain health problems more likely. And some depressed people stop taking care of themselves. This may make them more likely to get sick.  Date Last Reviewed: 1/1/2017 © 2000-2017 [employer]. [address] [place] [address], [place] [address]. All rights reserved. This information is not intended as a substitute for professional medical care. Always follow your healthcare professional's instructions.        Know the Signs and Symptoms of  "Depression  Everyone feels down at times. The blues are a natural part of life. But an unhappy period thats intense or lasts for more than a couple of weeks can be a sign of depression.  Depression is a serious illness. It is not a sign of weakness or a "character flaw," and it is not something you can "snap out of." In fact, most people with depression need treatment to get better. Depression can disrupt the lives of family and friends. If you know someone you think may be depressed, find out what you can do to help.    Recognizing signs of depression  People who are depressed may:  · Feel unhappy, sad, blue, down, or miserable nearly every day  · Feel helpless, hopeless, or worthless  · Lose interest in hobbies, friends, and activities that used to give pleasure  · Not sleep well or sleep too much  · Gain or lose weight  · Feel low on energy or constantly tired  · Have a hard time concentrating or making decisions  · Lose interest in sex  · Have physical symptoms, such as stomachaches, headaches, or backaches  Know the serious signals  Never ignore a person's comments about suicide or behaviors that can lead to self-harm. Warning signals for suicide include:  · Threats or talk of suicide  · Statements such as I wont be a problem much longer or Nothing matters  · Giving away possessions or making a will or  arrangements  · Buying a gun or other weapon  · Sudden, unexplained cheerfulness or calm after a period of depression  If you notice any of these signs, get help right away. Call a healthcare professional, mental health clinic, or suicide hotline and ask what action to take. In an emergency, dont hesitate to call the police.  Resources:  · National Institutes of Mental Ulknzk454-422-1284tbp.nimh.nih.gov  · National [place] on Mental Rbvpocc605-382-6030gci.gregg.org   · Mental Health Jqgqahv404-200-4216gqn.nmha.org  · National Suicide Yjkacrr574-664-2675 (800-SUICIDE)  · National Suicide Prevention " Zdpknchm168-288-6331 (104-066-CTWZ)www.suicidepreventionlifeline.org   Date Last Reviewed: 1/1/2017 © 2000-2017 [employer]. [address] [place] [address], [place] [address]. All rights reserved. This information is not intended as a substitute for professional medical care. Always follow your healthcare professional's instructions.        Counseling for Depression  For some people, counseling, also called talk therapy, has been found to be as effective as medicine for mild to moderate depression. When done by a trained professional, this treatment is a powerful way to better understand your thoughts and feelings. Like medicine, it may take time before you notice how much counseling is helping.    Kinds of talk therapy  Different counselors use different methods for talk therapy. But all therapy aims to help change how you think about your problem. Most therapy for depression is often done one-on-one. But it may also be done in a group setting. You and your healthcare provider can discuss the type of therapy you think would work best for you. You can also discuss who the best person is to provide the therapy.  How therapy helps  Talking about your problems can help them seem less overwhelming. It can help work through problems you have with your life and your relationships. It can also help you understand how depression is clouding your thinking, not letting you see the world the way it really is. Therapy can give you:  · Insight about your emotions  · New tools for dealing with your problems  · Emotional support for making progress  Getting better takes time  Talk therapy can help you feel better. But change doesnt happen right away. Depression takes away your energy and motivation. So it can be hard to feel like going to therapy and sticking with it. But therapy has been proven to be very valuable in the treatment of depression. Therapy for depression is often done for a set number of sessions. In other  cases, you and your therapist decide together at what point you no longer need therapy.  Additional sources of help  In addition to a professional counselor, it may help to talk to other people in your life. You may find support and insight from:  · A close friend or family member  · A  trained in counseling  · A local support group or community group  · A 12-step program (such as Alcoholics Anonymous) for dealing with problems that can contribute to depression, such as alcohol or drug addiction  Date Last Reviewed: 1/1/2017 © 2000-2017 [employer]. [address] [place] [address]. All rights reserved. This information is not intended as a substitute for professional medical care. Always follow your healthcare professional's instructions.        Depression: Tips to Help Yourself  As your healthcare providers help treat your depression, you can also help yourself. Keep in mind that your illness affects you emotionally, physically, mentally, and socially. So full recovery will take time. Take care of your body and your soul, and be patient with yourself as you get better.    Self-care  · Educate yourself. Read about treatment and medicine options. If you have the energy, attend local conferences or support groups. Keep a list of useful websites and helpful books and use them as needed. This illness is not your fault. Dont blame yourself for your depression.  · Manage early symptoms. If you notice symptoms returning, experience triggers, or identify other factors that may lead to a depressive episode, get help as soon as possible. Ask trusted friends and family to monitor your behavior and let you know if they see anything of concern.  · Work with your provider. Find a provider you can trust. Communicate honestly with that person and share information on your treatment for depression and your reaction to medicines.  · Be prepared for a crisis. Know what to do if you experience a  crisis. Keep the phone number of a crisis hotline and know the location of your community's urgent care centers and the closest emergency department.  · Hold off on big decisions. Depression can cloud your judgment. So wait until you feel better before making major life decisions, such as changing jobs, moving, or getting  or .  · Be patient. Recovering from depression is a process. Dont be discouraged if it takes some time to feel better.  · Keep it simple. Depression saps your energy and concentration. So you wont be able to do all the things you used to do. Set small goals and do what you can.  · Be with others. Dont isolate yourself--youll only feel worse. Try to be with other people. And take part in fun activities when you can. Go to a movie, ballgame, [employer] service, or social event. Talk openly with people you can trust. And accept help when its offered.  Take care of your body  People with depression often lose the desire to take care of themselves. That only makes their problems worse. During treatment and afterward, make a point to:  · Exercise. Its a great way to take care of your body. And studies have shown that exercise helps fight depression.  · Avoid drugs and alcohol. These may ease the pain in the short term. But theyll only make your problems worse in the long run.  · Get relief from stress. Ask your healthcare provider for relaxation exercises and techniques to help relieve stress.  · Eat right. A balanced and healthy diet helps keep your body healthy.  Date Last Reviewed: 1/1/2017  © 3946-6357 [employer]. [address] [place] [address], [place] [address]. All rights reserved. This information is not intended as a substitute for professional medical care. Always follow your healthcare professional's instructions.

## 2019-07-02 NOTE — LETTER
July 2, 2019    Valarie Foster  [address] [place] [address]             [facility] [address] [facility] [place] [address] Dear Miss Valarie Foster,    Welcome to [facility] Complex Care Management Program.  It was a pleasure talking with you today.  My name is Aniyah Marks, RN and I look forward to being your Care Manager.  My goal is to help you function at the healthiest and highest level possible.  You can contact me directly at [phone].    As an [facility] patient with [employer], some of the services we may be able to provide include:      Development of an individualized care plan with a Registered Nurse    Connection with a    Connection with available resources and services     Coordinate communication among your care team members    Provide coaching and education    Help you understand your doctors treatment plan   Help you obtain information about your insurance coverage.     All services provided by [facility]s Complex Care Managers and other care team members are coordinated with and communicated to your primary care team.    As part of your enrollment, you will be receiving education materials and more information about these services in your [facility] account, by phone or through the mail.  If you do not wish to participate or receive information, please contact our office at [phone].      Sincerely,        Aniyah Marks, RN  [facility]   Out-patient RN Complex Care Manager

## 2019-07-02 NOTE — TELEPHONE ENCOUNTER
----- Message from Aniyah Marks RN sent at 7/2/2019  3:09 PM CDT -----  Contact: SANTOS Chandler this is Aniyah with [facility]. I enrolled the pt this am into OPCM services. Pt's PHQ-9 score of 12 at the time. Pt reports that she does not have any thoughts of harming herself. Pt reports that she was started on antidepressant medication. Pt reports that she is aware that it may taky 2 weeks to start feeling the effects of the medication.    Pt is referred to LCSW services with OPCM.    Please advise    Thank you

## 2019-07-03 ENCOUNTER — TELEPHONE (OUTPATIENT)
Dept: FAMILY MEDICINE | Facility: CLINIC | Age: 69
End: 2019-07-03

## 2019-07-03 ENCOUNTER — OUTPATIENT CASE MANAGEMENT (OUTPATIENT)
Dept: ADMINISTRATIVE | Facility: OTHER | Age: 69
End: 2019-07-03

## 2019-07-03 RX ORDER — CYCLOBENZAPRINE HCL 5 MG
TABLET ORAL
Qty: 60 TABLET | Refills: 1 | Status: SHIPPED | OUTPATIENT
Start: 2019-07-03 | End: 2019-11-22 | Stop reason: SDUPTHER

## 2019-07-03 NOTE — TELEPHONE ENCOUNTER
Spoke with patient who states medication has begun and she feels it is helping her, she does not need to go to ED at this time.

## 2019-07-03 NOTE — PROGRESS NOTES
Summary: Received a call from the pt to go over the medications on the medication list. Pt reports that she has not slept for two days. Pt reports that she purchased the melatonin OTC as per MD's recommendations, but the medication is not helping her to fall asleep. Pt reports that she feels a little depress this morning. Pt reports that she walked down the street earlier this morning which help to improve her mood. Pt reports that she would be interested in going to the [facility].    Interventions: Reviewed med list with pt.                         Informed her of activities available for seniors at the [facility] which may improve her mood.    Plan:Contact [facility].    Todays OPCM Self-Management Care Plan was developed with the patients/caregivers input and was based on identified barriers from todays assessment.  Goals were written today with the patient/caregiver and the patient has agreed to work towards these goals to improve his/her overall well-being. Patient verbalized understanding of the care plan, goals, and all of today's instructions. Encouraged patient/caregiver to communicate with his/her physician and health care team about health conditions and the treatment plan.  Provided my contact information today and encouraged patient/caregiver to call me with any questions as needed.

## 2019-07-03 NOTE — TELEPHONE ENCOUNTER
Spoke with family member rubin who states she spoke with patient only minutes ago and she is doing great, states she is having a water leak fixed in her home. Will have her to return a call to the clinic when she can

## 2019-07-03 NOTE — TELEPHONE ENCOUNTER
----- Message from Aniyah Marks RN sent at 7/3/2019 11:34 AM CDT -----  Contact: SANTOS Chandler this is Aniyah with [facility]. Pt reports that she has not slept for 2 days. Pt reports that she is taking the melatonin as per Dr. Hernandez's recommendations with no results. Pt also reports that she is out of the flexeril and it has no refills left. Pt reports that she has c/o muscle spasms in the leg.    Please advise    Thank you

## 2019-07-03 NOTE — TELEPHONE ENCOUNTER
Patient said that she called initially because she was feeling depressed but she took her Fluoxetine 20 mg capsule and feels better right now.  She denied any intention to harm herself at any point.  She also said that she hasn't gotten much sleep in the last 2 days and is requesting a refill of her Flexeril for leg spasms that have contributed to keeping her awake.

## 2019-07-03 NOTE — TELEPHONE ENCOUNTER
----- Message from Lady Grant sent at 7/3/2019  9:15 AM CDT -----  Contact: pt  Type: Patient Call Back    Who called:pt    What is the request in detail:pt returned the nurse's phone call. Call pt    Can the clinic reply by [facility]?    Would the patient rather a call back or a response via [facility]? Call back    Best call back number:[phone]    Additional Information:

## 2019-07-06 ENCOUNTER — LAB VISIT (OUTPATIENT)
Dept: LAB | Facility: HOSPITAL | Age: 69
End: 2019-07-06
Attending: FAMILY MEDICINE
Payer: MEDICARE

## 2019-07-06 DIAGNOSIS — E78.5 HYPERLIPIDEMIA, UNSPECIFIED HYPERLIPIDEMIA TYPE: Chronic | ICD-10-CM

## 2019-07-06 DIAGNOSIS — E11.40 TYPE 2 DIABETES MELLITUS WITH DIABETIC NEUROPATHY, WITHOUT LONG-TERM CURRENT USE OF INSULIN: ICD-10-CM

## 2019-07-06 LAB
ALBUMIN SERPL BCP-MCNC: 3.7 G/DL (ref 3.5–5.2)
ALP SERPL-CCNC: 99 U/L (ref 55–135)
ALT SERPL W/O P-5'-P-CCNC: 5 U/L (ref 10–44)
ANION GAP SERPL CALC-SCNC: 10 MMOL/L (ref 8–16)
AST SERPL-CCNC: 11 U/L (ref 10–40)
BILIRUB SERPL-MCNC: 0.4 MG/DL (ref 0.1–1)
BUN SERPL-MCNC: 10 MG/DL (ref 8–23)
CALCIUM SERPL-MCNC: 9.7 MG/DL (ref 8.7–10.5)
CHLORIDE SERPL-SCNC: 104 MMOL/L (ref 95–110)
CHOLEST SERPL-MCNC: 220 MG/DL (ref 120–199)
CHOLEST/HDLC SERPL: 3.7 {RATIO} (ref 2–5)
CO2 SERPL-SCNC: 26 MMOL/L (ref 23–29)
CREAT SERPL-MCNC: 0.9 MG/DL (ref 0.5–1.4)
EST. GFR  (AFRICAN AMERICAN): >60 ML/MIN/1.73 M^2
EST. GFR  (NON AFRICAN AMERICAN): >60 ML/MIN/1.73 M^2
ESTIMATED AVG GLUCOSE: 143 MG/DL (ref 68–131)
GLUCOSE SERPL-MCNC: 107 MG/DL (ref 70–110)
HBA1C MFR BLD HPLC: 6.6 % (ref 4–5.6)
HDLC SERPL-MCNC: 60 MG/DL (ref 40–75)
HDLC SERPL: 27.3 % (ref 20–50)
LDLC SERPL CALC-MCNC: 138.2 MG/DL (ref 63–159)
NONHDLC SERPL-MCNC: 160 MG/DL
POTASSIUM SERPL-SCNC: 4.5 MMOL/L (ref 3.5–5.1)
PROT SERPL-MCNC: 7.2 G/DL (ref 6–8.4)
SODIUM SERPL-SCNC: 140 MMOL/L (ref 136–145)
TRIGL SERPL-MCNC: 109 MG/DL (ref 30–150)

## 2019-07-06 PROCEDURE — 83036 HEMOGLOBIN GLYCOSYLATED A1C: CPT | Mod: HCNC

## 2019-07-06 PROCEDURE — 36415 COLL VENOUS BLD VENIPUNCTURE: CPT | Mod: HCNC,PO

## 2019-07-06 PROCEDURE — 80061 LIPID PANEL: CPT | Mod: HCNC

## 2019-07-06 PROCEDURE — 80053 COMPREHEN METABOLIC PANEL: CPT | Mod: HCNC

## 2019-07-08 ENCOUNTER — TELEPHONE (OUTPATIENT)
Dept: FAMILY MEDICINE | Facility: CLINIC | Age: 69
End: 2019-07-08

## 2019-07-08 NOTE — TELEPHONE ENCOUNTER
Spoke with pt informed of results, pt states she is still taking her pravastatin she doesn't know why her levels would be high      Please advise

## 2019-07-08 NOTE — TELEPHONE ENCOUNTER
It is recommend that you follow a low fat diet and exercise regularly to help decrease your total cholesterol and continue medicine.

## 2019-07-08 NOTE — PROGRESS NOTES
Please contact patient.   a1c is 6.8 meaning diabetes is well controlled. Continue current medication regimen.   Cholesterol level has increased. Is patient still on pravastatin? If not, I recommend that she restarts this medication.   Liver and kidney function are within normal limits.

## 2019-07-08 NOTE — TELEPHONE ENCOUNTER
----- Message from Alena Hernandez MD sent at 7/8/2019  2:39 AM CDT -----  Please contact patient.   a1c is 6.8 meaning diabetes is well controlled. Continue current medication regimen.   Cholesterol level has increased. Is patient still on pravastatin? If not, I recommend that she restarts this medication.   Liver and kidney function are within normal limits.

## 2019-07-09 ENCOUNTER — OUTPATIENT CASE MANAGEMENT (OUTPATIENT)
Dept: ADMINISTRATIVE | Facility: OTHER | Age: 69
End: 2019-07-09

## 2019-07-09 NOTE — PROGRESS NOTES
Summary:Received a call from the pt this am. Pt reports that she was calling to inform this cm that she is trying to get out of the house more to do activities. Pt reports that she is trying to stay busy. Pt reports that she feels like her mood is improving. Pt reports that her blood sugars are good. Pt reports that she received a call to say that her labs had good results. Pt reports that she is trying to avoid being around her sister. Pt reviewed with this cm her emergency contact information.      Interventions:  [x] Reviewed disaster plan with patient/caregiver.  Provided emergency phone number for patient's [place].   [] Attempted to reach patient/caregiver to review disaster plan.  Left a voice message with the phone number for patient's [place] Office of Emergency Preparedness.     Reviewed with Patient/Caregiver:  [x] Patient to have a supply of water, non-perishable food items, flashlights and batteries  [x] Patient to bring all medications if evacuates:  at least a five day supply preferably in the medication bottles, in case displaced for longer than 5 days  [] Patient to bring any DME needed (walkers, wheelchairs, shower chairs, nebulizer machine, etc.)   [] If Diabetic, patient to bring glucometer and monitoring supplies.   [] If Hypertension, patient to bring blood pressure cuff  [] If CHF, patient to bring scale  [] If tube feeds, patient to bring tube feedings and feeding supplies.  [] If on oxygen,  patient to bring all oxygen supplies (Cannula, portable tanks, concentrator).  Patient will contact oxygen supply company to find out the nearest location of a supply company near evacuated location. ([employer]: [phone], [facility]: [phone], [facility]:[phone], [employer]: [phone]), [facility] -[phone].  [] If on hemodialysis, patient should already have a plan in place with dialysis center. If patient does not know where to evacuate to in order to be close to a  dialysis center, patient/caregiver to reach out to regular dialysis center for further direction. ([facility] service line: [phone], [facility]  service line [phone])  [] If receiving treatment at an infusion center, patient/caregiver to contact the infusion center to find out which infusion center they have a contract with where patient plans to evacuate.      Office of Emergency Preparedness Phone number:  [x] [facility]: [phone]  [] [facility]: [phone]  [] [facility]: [phone]  [] [facility]: [phone]  [] [facility]: [phone]  [] [facility]: [phone]  [] [facility]: [phone]  [] [facility]: [phone]  [] [facility]: [phone]  [] [facility]: [phone]  [] [facility]: [phone]  [] [facility]: [phone]  [] [facility]: [phone]    [] [facility]:  [phone]   [] [facility]:  [phone]   [] [facility]:  [phone]   [] [facility]:  [phone]   [] [facility]:  [phone]   [] [facility]: [phone]   [] [facility]:  [phone]   [] [facility]:  [phone]   [] [facility]:  [phone]   [] [facility]:  [phone]   [] [facility]:  [phone]   [] [facility]:  [phone]   [] [facility]:  [phone]   [] [facility]:  [phone]   [] [facility]:  [phone]   [] [facility]: [phone]   [] [facility]:  [phone]   [] [facility]: [phone]   [] [facility]: [phone]      Plan: Review with pt s/s DM    Todays OPCM Self-Management Care Plan was developed with the patients/caregivers input and was based on identified barriers from todays assessment.  Goals were written today with the patient/caregiver and the patient has agreed to work towards these goals to improve his/her overall well-being. Patient  verbalized understanding of the care plan, goals, and all of today's instructions. Encouraged patient/caregiver to communicate with his/her physician and health care team about health conditions and the treatment plan.  Provided my contact information today and encouraged patient/caregiver to call me with any questions as needed.

## 2019-07-11 ENCOUNTER — OUTPATIENT CASE MANAGEMENT (OUTPATIENT)
Dept: ADMINISTRATIVE | Facility: OTHER | Age: 69
End: 2019-07-11

## 2019-07-11 NOTE — PROGRESS NOTES
[] Called and reviewed disaster plan with patient/caregiver.  Provided emergency phone number for patient's [place].   [x] Attempted to reach patient/caregiver to review disaster plan.  Left a voice message with the phone number for patient's [place] Office of Emergency Preparedness.     Reviewed with Patient/Caregiver:  [] Patient to have a supply of water, non-perishable food items, flashlights and batteries  [] Patient to bring all medications if evacuates:  at least a five day supply preferably in the medication bottles, in case displaced for longer than 5 days  [] Patient to bring any DME needed (walkers, wheelchairs, shower chairs, nebulizer machine, etc.)   [] If Diabetic, patient to bring glucometer and monitoring supplies.   [] If Hypertension, patient to bring blood pressure cuff  [] If CHF, patient to bring scale  [] If tube feeds, patient to bring tube feedings and feeding supplies.  [] If on oxygen,  patient to bring all oxygen supplies (Cannula, portable tanks, concentrator).  Patient will contact oxygen supply company to find out the nearest location of a supply company near evacuated location. ([employer]: [phone], [facility] [place]: [phone], [facility]:[phone], [employer]: [phone]), [facility] -[phone].  [] If on hemodialysis, patient should already have a plan in place with dialysis center. If patient does not know where to evacuate to in order to be close to a dialysis center, patient/caregiver to reach out to regular dialysis center for further direction. ([facility]  service line: [phone], [facility]  service line [phone])  [] If receiving treatment at an infusion center, patient/caregiver to contact the infusion center to find out which infusion center they have a contract with where patient plans to evacuate.      Office of Emergency Preparedness Phone number:  [x] [place]: [phone]  [] [place]: [phone]  [] .  [facility]: [phone]  [] [facility]: [phone]  [] [facility]: [phone]  [] [place]: [phone]  [] [facility]: [phone]  [] [facility]: [phone]  [] [place] [facility]: [phone]  [] [facility]: [phone]  [] [facility]: [phone]  [] [facility]: [phone]  [] [facility]: [phone]    [] [facility]:  [phone]   [] [facility]:  [phone]   [] [facility]:  [phone]   [] [facility]:  [phone]   [] [facility]:  [phone]   [] [facility]: [phone]   [] [facility]:  [phone]   [] [facility]:  [phone]   [] [facility]:  [phone]   [] [facility]:  [phone]   [] [facility]:  [phone]   [] [facility]:  [phone]   [] [facility]:  [phone]   [] [facility]:  [phone]   [] [facility]:  [phone]   [] [facility]: [phone]   [] [facility]:  [phone]   [] [facility]: [phone]   [] [facility]: [phone]

## 2019-07-17 ENCOUNTER — OUTPATIENT CASE MANAGEMENT (OUTPATIENT)
Dept: ADMINISTRATIVE | Facility: OTHER | Age: 69
End: 2019-07-17

## 2019-07-17 NOTE — PROGRESS NOTES
Summary: LCSW received referral from OPCM RN for housing and PHQ9 (score = 12). Phoned patient, she stated that she was out of the house visiting a cousin who was sick. She requested call back in the morning.     Interventions: Scheduled social assessment for in the morning. Prefers early morning.     Plan: Complete social assessment in the morning.     Todays OPC Self-Management Care Plan was developed with the patients/caregivers input and was based on identified barriers from todays assessment.  Goals were written today with the patient/caregiver and the patient has agreed to work towards these goals to improve his/her overall well-being. Patient verbalized understanding of the care plan, goals, and all of today's instructions. Encouraged patient/caregiver to communicate with his/her physician and health care team about health conditions and the treatment plan.  Provided my contact information today and encouraged patient/caregiver to call me with any questions as needed.

## 2019-07-18 ENCOUNTER — OUTPATIENT CASE MANAGEMENT (OUTPATIENT)
Dept: ADMINISTRATIVE | Facility: OTHER | Age: 69
End: 2019-07-18

## 2019-07-18 RX ORDER — PRAVASTATIN SODIUM 10 MG/1
TABLET ORAL
Qty: 60 TABLET | Refills: 5 | Status: SHIPPED | OUTPATIENT
Start: 2019-07-18 | End: 2019-09-03 | Stop reason: SDUPTHER
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
## 2019-07-18 NOTE — PROGRESS NOTES
[facility] Health [facility] advanced [facility] and housing resources.   Agency Representative: [facility]  Outcome of Call: CHW spoke with patient about helping with resources. Patient stated that she lives with her sister right now and would like to move. Pt stated she has not applied for Section 8 before or [facility] but was willing to do what she needed. I asked did she need assistance with documents and I could meet with her once I gathered everything. She said that her sister reads he mail and is always around so not to mail anything. She does have a friend who can help her as well that is supportive. I asked if meeting her in the clinic would be best for her. She stated that being a possibility she would speak with her friend to see about either me coming to home or clinic. I told the patient that I would gather some resources for housing and get the applications. I will check back with her on the best way for us to meet.    Follow up needed: Yes  Next Scheduled follow up: Next week

## 2019-07-19 ENCOUNTER — OUTPATIENT CASE MANAGEMENT (OUTPATIENT)
Dept: ADMINISTRATIVE | Facility: OTHER | Age: 69
End: 2019-07-19

## 2019-07-19 NOTE — PROGRESS NOTES
Summary: Pt reports that she doing good this morning. Pt reports that she is taking her medications. Pt reports that her blood sugar this morning was 110. Pt reports that she feels that the fluoxetine is working. Reports that she will not harm herself. Reports that she has a friend that she calls at times when depressed. Reports that she received a call from the  on yesterday. Reports that she is trying to stay active, but is limited with walking d/t the high temperatures outside.    Next follow-up scheduled with pt:  2 weeks  Pt agreed: yes      Interventions:Reviewed s/s of depression with pt.                         Changed to episodic status.     Plan:Will review risk factors and prevention of complications of depression with pt.    Todays OPCM Self-Management Care Plan was developed with the patients/caregivers input and was based on identified barriers from todays assessment.  Goals were written today with the patient/caregiver and the patient has agreed to work towards these goals to improve his/her overall well-being. Patient verbalized understanding of the care plan, goals, and all of today's instructions. Encouraged patient/caregiver to communicate with his/her physician and health care team about health conditions and the treatment plan.  Provided my contact information today and encouraged patient/caregiver to call me with any questions as needed.

## 2019-07-29 ENCOUNTER — OUTPATIENT CASE MANAGEMENT (OUTPATIENT)
Dept: ADMINISTRATIVE | Facility: OTHER | Age: 69
End: 2019-07-29

## 2019-07-29 NOTE — PROGRESS NOTES
7/29/19  1st Attempt to complete SW follow-up for Outpatient Care Management; left message requesting return call. Noted that she has follow up with CSW this week.  LCSW will reattempt at a later date.

## 2019-07-30 NOTE — PROGRESS NOTES
Received voice mail from patient. Patient stated that she was on vacation and will return next week. Requested that CSW be advised. Sent Skype message to REJI, Fide Byers LMSW.

## 2019-08-05 ENCOUNTER — OUTPATIENT CASE MANAGEMENT (OUTPATIENT)
Dept: ADMINISTRATIVE | Facility: OTHER | Age: 69
End: 2019-08-05

## 2019-08-05 NOTE — PROGRESS NOTES
Chart review. Noted that patient is still out of town. Visit re-scheduled. LCSW will be on vacation next week. Rescheduled follow up in 2 weeks.

## 2019-08-05 NOTE — PROGRESS NOTES
Summary:Pt  reports that she is out of town in [place] with two friends. Pt reports that she is feeling good at this time and is still taking the medications. Pt reports that she had to get away from the house because she has to lock her door and belongs when at home with her sister. Pt reports that she is not use to that and would still like to move to another apt.     Interventions:Reviewed with pt the prevention of complications of depression.    Plan:Review with pt monitoring BS.    Today OPCM Self-Management Care Plan was developed with the patients/caregivers input and was based on identified barriers from todays assessment.  Goals were written today with the patient/caregiver and the patient has agreed to work towards these goals to improve his/her overall well-being. Patient verbalized understanding of the care plan, goals, and all of today's instructions. Encouraged patient/caregiver to communicate with his/her physician and health care team about health conditions and the treatment plan.  Provided my contact information today and encouraged patient/caregiver to call me with any questions as needed.

## 2019-08-19 NOTE — PROGRESS NOTES
"Summary: Phoned patient. Patient stated that she had returned from vacation. She said that she "caught a bad cold", requested call back later this week. She stated that she is still interested in finding housing.     Interventions: Initiated referral/review of needs.     Plan: Follow up next week.    Todays OPCM Self-Management Care Plan was developed with the patients/caregivers input and was based on identified barriers from todays assessment.  Goals were written today with the patient/caregiver and the patient has agreed to work towards these goals to improve his/her overall well-being. Patient verbalized understanding of the care plan, goals, and all of today's instructions. Encouraged patient/caregiver to communicate with his/her physician and health care team about health conditions and the treatment plan.  Provided my contact information today and encouraged patient/caregiver to call me with any questions as needed.    "

## 2019-08-20 ENCOUNTER — OUTPATIENT CASE MANAGEMENT (OUTPATIENT)
Dept: ADMINISTRATIVE | Facility: OTHER | Age: 69
End: 2019-08-20

## 2019-08-20 NOTE — PROGRESS NOTES
Community Health Worker continues to follow this patient for housing resources. Spoke with pt about meeting after her clinic appointment on 8-27-19 to provide resources requested. Pt was in agreement with meeting with CHW.   Location of meeting: [facility]  Additional resources provided: Housing & Advanced Care Planning  Follow up required: Yes  Next scheduled follow up: 8-26-19 to remind pt about appointment.

## 2019-08-21 ENCOUNTER — OUTPATIENT CASE MANAGEMENT (OUTPATIENT)
Dept: ADMINISTRATIVE | Facility: OTHER | Age: 69
End: 2019-08-21

## 2019-08-23 ENCOUNTER — TELEPHONE (OUTPATIENT)
Dept: UROLOGY | Facility: CLINIC | Age: 69
End: 2019-08-23

## 2019-08-23 ENCOUNTER — OUTPATIENT CASE MANAGEMENT (OUTPATIENT)
Dept: ADMINISTRATIVE | Facility: OTHER | Age: 69
End: 2019-08-23

## 2019-08-23 NOTE — PROGRESS NOTES
Summary: Pt reports that she currently has a cold. Pt reports that she is taking sugar-free Robitussin which is suppressing  the cough.Pt reports that her blood sugar last night was 110 and 102 this am. Pt reports that she has had readings in the 40's. Pt reports that she now recognizes that when she feels shaky her bs is low. Pt reports that she is checking her blood sugar twice a day. Pt reports that she does not sleep the whole night. Pt reports that she usually wakes up at 3 am and stay up for a while before going back to bed. Pt reports that she sometimes take a nap during the day. Pt reports that she tried taking Melatonin, but it did not increase her hours of sleep.  Pt reports that she feels a pain at the site of her surgery (Feb.2019) Pt reports that it feels like a pulling/soreness. Pt reports that 3 days ago she felt a burning during urination. Pt denies having increase urination. Pt also denies not feeling completely empty after she voids. Pt reports that the pain is relieved with 1/2 tab of tylenol. Pt reports that she has had no episode of elevated temp.Pt reports that she is drinking a lot of water. Pt reports that she will call Dr. Palm to notify about the pain.    Next follow-up scheduled with pt: 2 weeks  Pt agreed: yes             Interventions: Reviewed with pt to notify the PCP if she has yellow or green secretions when she coughs. Also notify Md if she starts to have an elevated temp.                         Message sent to Dr. Palm's staff to notify of pt's c/o pain at surgery site and occurrence of burning on urination.                         Reviewed with pt s/s of hypoglycemia and prevention of complications.                         Reviewed with pt frequency of checking her bs.  Plan: Review with pt the prevention of complications of depression.    Todays OPCM Self-Management Care Plan was developed with the patients/caregivers input and was based on identified barriers from  todays assessment.  Goals were written today with the patient/caregiver and the patient has agreed to work towards these goals to improve his/her overall well-being. Patient verbalized understanding of the care plan, goals, and all of today's instructions. Encouraged patient/caregiver to communicate with his/her physician and health care team about health conditions and the treatment plan.  Provided my contact information today and encouraged patient/caregiver to call me with any questions as needed.

## 2019-08-23 NOTE — TELEPHONE ENCOUNTER
Called and checked on patient she will follow up with family [facility] on Tuesday at her already scheduled apt- Patient does not have a ride to come into the office to be assessed.

## 2019-08-23 NOTE — TELEPHONE ENCOUNTER
----- Message from Aniyah Marks RN sent at 8/23/2019  9:44 AM CDT -----  Contact: SANTOS Chandler this is Aniyah with [facility]. Pt reports that she feels a pain at the site of her surgery (Feb.2019) Pt reports that it feels like a pulling/soreness. Pt reports that 3 days ago she felt a burning during urination. Pt denies having increase urination. Pt also denies not feeling completely empty after she voids. Pt reports that she has had no eppisode of elevated temp.    Please advise  Thank you

## 2019-08-27 ENCOUNTER — OFFICE VISIT (OUTPATIENT)
Dept: FAMILY MEDICINE | Facility: CLINIC | Age: 69
End: 2019-08-27
Payer: MEDICARE

## 2019-08-27 ENCOUNTER — OUTPATIENT CASE MANAGEMENT (OUTPATIENT)
Dept: ADMINISTRATIVE | Facility: OTHER | Age: 69
End: 2019-08-27

## 2019-08-27 ENCOUNTER — TELEPHONE (OUTPATIENT)
Dept: FAMILY MEDICINE | Facility: CLINIC | Age: 69
End: 2019-08-27

## 2019-08-27 VITALS
HEIGHT: 64 IN | BODY MASS INDEX: 30.56 KG/M2 | HEART RATE: 64 BPM | TEMPERATURE: 98 F | DIASTOLIC BLOOD PRESSURE: 84 MMHG | OXYGEN SATURATION: 97 % | WEIGHT: 179 LBS | SYSTOLIC BLOOD PRESSURE: 146 MMHG

## 2019-08-27 DIAGNOSIS — K21.9 GASTROESOPHAGEAL REFLUX DISEASE, ESOPHAGITIS PRESENCE NOT SPECIFIED: ICD-10-CM

## 2019-08-27 DIAGNOSIS — R31.29 MICROHEMATURIA: ICD-10-CM

## 2019-08-27 DIAGNOSIS — R10.9 LEFT FLANK PAIN: ICD-10-CM

## 2019-08-27 DIAGNOSIS — E11.40 TYPE 2 DIABETES MELLITUS WITH DIABETIC NEUROPATHY, WITHOUT LONG-TERM CURRENT USE OF INSULIN: Chronic | ICD-10-CM

## 2019-08-27 DIAGNOSIS — Z00.00 ENCOUNTER FOR PREVENTIVE HEALTH EXAMINATION: Primary | ICD-10-CM

## 2019-08-27 DIAGNOSIS — E78.5 HYPERLIPIDEMIA, UNSPECIFIED HYPERLIPIDEMIA TYPE: Chronic | ICD-10-CM

## 2019-08-27 DIAGNOSIS — Z85.528 HISTORY OF RENAL CELL CANCER: ICD-10-CM

## 2019-08-27 DIAGNOSIS — I10 BENIGN HYPERTENSION: Chronic | ICD-10-CM

## 2019-08-27 DIAGNOSIS — L29.9 ITCHING: ICD-10-CM

## 2019-08-27 DIAGNOSIS — I70.0 ABDOMINAL AORTIC ATHEROSCLEROSIS: ICD-10-CM

## 2019-08-27 PROCEDURE — 3079F DIAST BP 80-89 MM HG: CPT | Mod: HCNC,CPTII,S$GLB, | Performed by: NURSE PRACTITIONER

## 2019-08-27 PROCEDURE — G0439 PR MEDICARE ANNUAL WELLNESS SUBSEQUENT VISIT: ICD-10-PCS | Mod: HCNC,S$GLB,, | Performed by: NURSE PRACTITIONER

## 2019-08-27 PROCEDURE — G0439 PPPS, SUBSEQ VISIT: HCPCS | Mod: HCNC,S$GLB,, | Performed by: NURSE PRACTITIONER

## 2019-08-27 PROCEDURE — 99499 UNLISTED E&M SERVICE: CPT | Mod: HCNC,S$GLB,, | Performed by: NURSE PRACTITIONER

## 2019-08-27 PROCEDURE — 3077F SYST BP >= 140 MM HG: CPT | Mod: HCNC,CPTII,S$GLB, | Performed by: NURSE PRACTITIONER

## 2019-08-27 PROCEDURE — 3044F HG A1C LEVEL LT 7.0%: CPT | Mod: HCNC,CPTII,S$GLB, | Performed by: NURSE PRACTITIONER

## 2019-08-27 PROCEDURE — 99999 PR PBB SHADOW E&M-EST. PATIENT-LVL V: CPT | Mod: PBBFAC,HCNC,, | Performed by: NURSE PRACTITIONER

## 2019-08-27 PROCEDURE — 3077F PR MOST RECENT SYSTOLIC BLOOD PRESSURE >= 140 MM HG: ICD-10-PCS | Mod: HCNC,CPTII,S$GLB, | Performed by: NURSE PRACTITIONER

## 2019-08-27 PROCEDURE — 99999 PR PBB SHADOW E&M-EST. PATIENT-LVL V: ICD-10-PCS | Mod: PBBFAC,HCNC,, | Performed by: NURSE PRACTITIONER

## 2019-08-27 PROCEDURE — 3044F PR MOST RECENT HEMOGLOBIN A1C LEVEL <7.0%: ICD-10-PCS | Mod: HCNC,CPTII,S$GLB, | Performed by: NURSE PRACTITIONER

## 2019-08-27 PROCEDURE — 3079F PR MOST RECENT DIASTOLIC BLOOD PRESSURE 80-89 MM HG: ICD-10-PCS | Mod: HCNC,CPTII,S$GLB, | Performed by: NURSE PRACTITIONER

## 2019-08-27 PROCEDURE — 99499 RISK ADDL DX/OHS AUDIT: ICD-10-PCS | Mod: HCNC,S$GLB,, | Performed by: NURSE PRACTITIONER

## 2019-08-27 RX ORDER — FAMOTIDINE 20 MG/1
20 TABLET, FILM COATED ORAL 2 TIMES DAILY
Qty: 180 TABLET | Refills: 1 | Status: SHIPPED | OUTPATIENT
Start: 2019-08-27 | End: 2019-11-12 | Stop reason: SDUPTHER

## 2019-08-27 RX ORDER — HYDROXYZINE HYDROCHLORIDE 25 MG/1
TABLET, FILM COATED ORAL
Qty: 90 TABLET | Refills: 1 | Status: SHIPPED | OUTPATIENT
Start: 2019-08-27 | End: 2019-09-03 | Stop reason: SDUPTHER

## 2019-08-27 RX ORDER — HYDROXYZINE HYDROCHLORIDE 25 MG/1
25 TABLET, FILM COATED ORAL 3 TIMES DAILY PRN
Qty: 90 TABLET | Refills: 1 | Status: SHIPPED | OUTPATIENT
Start: 2019-08-27 | End: 2019-08-27 | Stop reason: SDUPTHER

## 2019-08-27 NOTE — PROGRESS NOTES
Community Health Worker continues to follow this patient for housing resources.  Location of meeting: [facility]    8/27/19. Meeting with pt to provide resources requested.    CHW met with pt and provided resources.

## 2019-08-27 NOTE — TELEPHONE ENCOUNTER
----- Message from Cynthia Cade sent at 8/27/2019 12:38 PM CDT -----  Contact: [facility] [phone]  Type:  Pharmacy Calling to Clarify an RX    Name of Caller: Nohemy    Pharmacy Name:   [facility] [address] [place] [address] ([place], [place] - [address]  [address] [facility] [address]  Phone: [phone] Fax: [phone]    Prescription Name: hydrOXYzine HCl (ATARAX) 25 MG tablet    What do they need to clarify? Pharmacy is calling for directions the RX was sent without any directions    Best Call Back Number: [phone]

## 2019-08-27 NOTE — PATIENT INSTRUCTIONS
Counseling and Referral of Other Preventative  (Italic type indicates deductible and co-insurance are waived)    Patient Name: Valarie Foster  Today's Date: 8/27/2019    Health Maintenance       Date Due Completion Date    Shingles Vaccine (1 of 2) 05/31/1998 ---    Foot Exam 08/16/2019 8/16/2018 (Done)    Override on 8/16/2018: Done (Dr. Lashay Dee ( Podiatry ))    Influenza Vaccine (1) 09/01/2019 9/27/2018    Hemoglobin A1c 01/06/2020 7/6/2019    Colonoscopy 02/05/2020 2/5/2018    Eye Exam 02/14/2020 2/14/2019    Urine Microalbumin 02/15/2020 2/15/2019    Lipid Panel 07/06/2020 7/6/2019    High Dose Statin 08/27/2020 8/27/2019    Mammogram 03/06/2021 3/6/2019    DEXA SCAN 04/02/2021 4/2/2018    TETANUS VACCINE 04/30/2028 4/30/2018        No orders of the defined types were placed in this encounter.    The following information is provided to all patients.  This information is to help you find resources for any of the problems found today that may be affecting your health:                Living healthy guide: www.[facility].[place].gov      Understanding Diabetes: www.diabetes.org      Eating healthy: www.cdc.gov/healthyweight      CDC home safety checklist: www.cdc.gov/steadi/patient.html      Agency on Aging: www.goea.[place].[facility]      Alcoholics anonymous (AA): www.aa.org      Physical Activity: www.[place].nih.gov/qm2hjmo      Tobacco use: www.quitwithusla.org       Go to pharmacy and get Shingles vaccine series

## 2019-08-27 NOTE — TELEPHONE ENCOUNTER
I spoke with the pharmacist and explained that I wanted Atarax 50 mg one every 8 hours as needed for itching. She verbalized understanding of above.

## 2019-08-27 NOTE — PROGRESS NOTES
Subjective:       Patient ID: Valarie Foster is a 69 y.o. female.    Chief Complaint: Health Risk Assessment    HPI  Past Medical History:   Diagnosis Date    Arthritis     Diabetes mellitus     diet controlled    Diabetes with neurologic complications     Hypertension     Renal cell carcinoma      Past Surgical History:   Procedure Laterality Date    BIOPSY-KIDNEY Right 2/19/2018    Performed [facility]    COLONOSCOPY N/A 2/5/2018    Performed by Bacilio Mancia MD at [facility] ENDO    HYSTERECTOMY      NEPHRECTOMY, PARTIAL open. Dr Arenas to assist. Right 10/12/2018    Performed by Alejandra Palm MD at [facility] OR    OOPHORECTOMY        reports that she has never smoked. She has never used smokeless tobacco. She reports that she does not drink alcohol or use drugs.  Review of Systems    Objective:      Physical Exam   Constitutional: She appears well-developed and well-nourished. No distress.   Musculoskeletal: She exhibits no edema.   Skin: She is not diaphoretic.     Protective Sensation (w/ 10 gram monofilament):  Right: Intact  Left: Intact    Visual Inspection:  Normal -  Bilateral    Pedal Pulses:   Right: Present  Left: Present    Posterior tibialis:   Right:Present  Left: Present      Assessment:       1. Encounter for preventive health examination    2. Abdominal aortic atherosclerosis    3. Benign hypertension    4. Hyperlipidemia, unspecified hyperlipidemia type    5. Left flank pain    6. Microhematuria    7. History of renal cell cancer    8. Type 2 diabetes mellitus with diabetic neuropathy, without long-term current use of insulin    9. Itching    10. Gastroesophageal reflux disease, esophagitis presence not specified        Plan:         Encounter for preventive health examination  - foot exam done     Abdominal aortic atherosclerosis  - Stable / Asymptomatic is on blood pressure and cholesterol lowering medications    Benign hypertension  - continue  Norvasc  - see Dr. Hernandez on 9/19/2019 for blood pressure check up    Hyperlipidemia, unspecified hyperlipidemia type  - The current medical regimen is effective;  continue present plan and medications.    Left flank pain  - pain from scar secondary on renal cancer    Microhematuria  - followed by Dr. Palm    History of renal cell cancer  - followed by Dr. Palm    Type 2 diabetes mellitus with diabetic neuropathy, without long-term current use of insulin  - diet controlled     Itching  -     Discontinue: hydrOXYzine HCl (ATARAX) 25 MG tablet; Take 1 tablet (25 mg total) by mouth 3 (three) times daily as needed for Itching. This medication causes drowsiness.  Do not drink alcohol or operate motor vehicles while taking.  Dispense: 90 tablet; Refill: 1  -     hydrOXYzine HCl (ATARAX) 25 MG tablet; This medication causes drowsiness.  Do not drink alcohol or operate motor vehicles while taking.  Dispense: 90 tablet; Refill: 1  - The current medical regimen is effective;  continue present plan and medications.    Gastroesophageal reflux disease, esophagitis presence not specified  -     famotidine (PEPCID) 20 MG tablet; Take 1 tablet (20 mg total) by mouth 2 (two) times daily.  Dispense: 180 tablet; Refill: 1  - The current medical regimen is effective;  continue present plan and medications.        Follow up in about 23 days (around 9/19/2019) for with Dr. Hernandez .

## 2019-08-29 ENCOUNTER — OUTPATIENT CASE MANAGEMENT (OUTPATIENT)
Dept: ADMINISTRATIVE | Facility: OTHER | Age: 69
End: 2019-08-29

## 2019-08-29 NOTE — PROGRESS NOTES
1st Attempt to complete SW follow-up for Outpatient Care Management; left message requesting return call.  LCSW will reattempt at a later date.

## 2019-08-29 NOTE — PROGRESS NOTES
Summary: Patient returned call. Reviewed housing resources. She stated that she is looking into the resources. Expressed concerns about the cost of [place]. She agreed to call LCSW with questions about the application process.   Patient stated that she was feeling down today. Explored issues. Patient coping with situation with sister. No immediate needs identified.     Interventions: Review of housing resources. Provided supportive counseling.     Plan: Follow up in two weeks.    [facility] Self-Management Care Plan was developed with the patients/caregivers input and was based on identified barriers from todays assessment.  Goals were written today with the patient/caregiver and the patient has agreed to work towards these goals to improve his/her overall well-being. Patient verbalized understanding of the care plan, goals, and all of today's instructions. Encouraged patient/caregiver to communicate with his/her physician and health care team about health conditions and the treatment plan.  Provided my contact information today and encouraged patient/caregiver to call me with any questions as needed.

## 2019-09-03 DIAGNOSIS — L29.9 ITCHING: ICD-10-CM

## 2019-09-03 NOTE — TELEPHONE ENCOUNTER
----- Message from Olga Talamantes sent at 9/3/2019  4:47 PM CDT -----  Contact: Self      Can the clinic reply in [facility]: N      Please refill the medication(s) listed below. Please call the patient when the prescription(s) is ready for  at this phone number  [phone]      Medication #1 pravastatin (PRAVACHOL) 10 MG tablet    Medication #2 Pt states the medication for her stomach    Medication #3 hydrOXYzine HCl (ATARAX) 25 MG tablet      Preferred Pharmacy: [facility] at [phone]

## 2019-09-04 RX ORDER — HYDROXYZINE HYDROCHLORIDE 25 MG/1
TABLET, FILM COATED ORAL
Qty: 90 TABLET | Refills: 1 | Status: SHIPPED | OUTPATIENT
Start: 2019-09-04 | End: 2019-09-06 | Stop reason: SDUPTHER

## 2019-09-04 RX ORDER — PRAVASTATIN SODIUM 10 MG/1
10 TABLET ORAL DAILY
Qty: 60 TABLET | Refills: 5 | Status: SHIPPED | OUTPATIENT
Start: 2019-09-04 | End: 2020-05-10

## 2019-09-06 ENCOUNTER — TELEPHONE (OUTPATIENT)
Dept: FAMILY MEDICINE | Facility: CLINIC | Age: 69
End: 2019-09-06

## 2019-09-06 DIAGNOSIS — L29.9 ITCHING: ICD-10-CM

## 2019-09-06 RX ORDER — HYDROXYZINE HYDROCHLORIDE 25 MG/1
25 TABLET, FILM COATED ORAL 3 TIMES DAILY PRN
Qty: 90 TABLET | Refills: 1 | Status: SHIPPED | OUTPATIENT
Start: 2019-09-06 | End: 2019-10-31 | Stop reason: SDUPTHER

## 2019-09-06 NOTE — TELEPHONE ENCOUNTER
[facility] pharmacy sent an e-mail wanting to know how the patient is using Hydroxyzine HCL 25MG.  Please clarify directions.

## 2019-09-12 ENCOUNTER — OUTPATIENT CASE MANAGEMENT (OUTPATIENT)
Dept: ADMINISTRATIVE | Facility: OTHER | Age: 69
End: 2019-09-12

## 2019-09-12 NOTE — PROGRESS NOTES
Summary: Phoned patient. Informed patient of applications being open at the [facility] for [place]. Encouraged patient to apply, she agreed. She stated that she is still waiting for [employer] to interview her for transportation to the [facility]. She is also waiting to complete her Food Stamp application. Provided options for applying for SNAP (food stamps). Will re-submit application on line for transportation to the [facility].   Patient agreed to follow up next week to ensure connection with services.     Interventions: Re-submitted application for transportation to [facility] on line. Provided information about housing resources.     Plan: Follow up next week, ensure that patient has been contacted regarding [employer] transportation to [facility].     Todays [facility] Self-Management Care Plan was developed with the patients/caregivers input and was based on identified barriers from todays assessment.  Goals were written today with the patient/caregiver and the patient has agreed to work towards these goals to improve his/her overall well-being. Patient verbalized understanding of the care plan, goals, and all of today's instructions. Encouraged patient/caregiver to communicate with his/her physician and health care team about health conditions and the treatment plan.  Provided my contact information today and encouraged patient/caregiver to call me with any questions as needed.

## 2019-09-13 DIAGNOSIS — F32.A DEPRESSION, UNSPECIFIED DEPRESSION TYPE: ICD-10-CM

## 2019-09-13 RX ORDER — FLUOXETINE HYDROCHLORIDE 20 MG/1
20 CAPSULE ORAL DAILY
Qty: 30 CAPSULE | Refills: 3 | Status: SHIPPED | OUTPATIENT
Start: 2019-09-13 | End: 2019-09-19 | Stop reason: SDUPTHER

## 2019-09-13 NOTE — TELEPHONE ENCOUNTER
----- Message from Cynthiasandeep Cade sent at 9/13/2019 11:49 AM CDT -----  Contact: Self [phone]  Type: RX Refill Request    Who Called: Self    Refill or New Rx:Refill    RX Name and Strength:FLUoxetine 20 MG capsule    Is this a 30 day or 90 day RX:90 day    Preferred Pharmacy with phone number:  [facility] - [place], [place] - [address]  [address] [facility] [address]  Phone: [phone] Fax: [phone]    Local or Mail Order:Local    Would the patient rather a call back or a response via [facility]? Call back    Best Call Back Number: [phone]

## 2019-09-19 ENCOUNTER — OUTPATIENT CASE MANAGEMENT (OUTPATIENT)
Dept: ADMINISTRATIVE | Facility: OTHER | Age: 69
End: 2019-09-19

## 2019-09-19 DIAGNOSIS — F32.A DEPRESSION, UNSPECIFIED DEPRESSION TYPE: ICD-10-CM

## 2019-09-19 RX ORDER — FLUOXETINE HYDROCHLORIDE 20 MG/1
20 CAPSULE ORAL DAILY
Qty: 90 CAPSULE | Refills: 1 | Status: SHIPPED | OUTPATIENT
Start: 2019-09-19 | End: 2019-09-20 | Stop reason: SDUPTHER

## 2019-09-19 NOTE — TELEPHONE ENCOUNTER
----- Message from Zuleyka Middleton sent at 9/19/2019  1:57 PM CDT -----  Contact: [facility]  [phone] or tate [phone]  Type: RX Refill Request    Who Called:  [facility]    Refill or New Rx: Refill    RX Name and Strength: FLUoxetine 20 MG capsule    Is this a 30 day or 90 day RX: 90 day    Preferred Pharmacy with phone number:  .  [facility] - [place], [place] - [address]  [address] [address]  Phone: [phone] Fax: [phone]    Local or Mail Order: Mail Order    Best Call Back Number:  [phone] or [place] [phone]

## 2019-09-20 DIAGNOSIS — F32.A DEPRESSION, UNSPECIFIED DEPRESSION TYPE: ICD-10-CM

## 2019-09-20 RX ORDER — FLUOXETINE HYDROCHLORIDE 20 MG/1
20 CAPSULE ORAL DAILY
Qty: 90 CAPSULE | Refills: 1 | Status: SHIPPED | OUTPATIENT
Start: 2019-09-20 | End: 2019-12-18 | Stop reason: SDUPTHER

## 2019-09-20 NOTE — PROGRESS NOTES
Summary: Returned phone call, patient stated that she had received contact from Hector YOUNG but had the wrong phone number to return their call. Provided her with the number for the [facility] center. She stated that she had slept all day yesterday. She stated that she is feeling depressed, no SI. She stated that she had applied for housing but nothing was available. Explained the need to apply to be on the waiting list. She expressed understanding and stated that she will call [employer] today and will let LCSW of contact information.     Interventions: Provided clarification of resources. Provided supportive counseling.     Plan: Follow up next week as planned.     Todays [facility] Self-Management Care Plan was developed with the patients/caregivers input and was based on identified barriers from todays assessment.  Goals were written today with the patient/caregiver and the patient has agreed to work towards these goals to improve his/her overall well-being. Patient verbalized understanding of the care plan, goals, and all of today's instructions. Encouraged patient/caregiver to communicate with his/her physician and health care team about health conditions and the treatment plan.  Provided my contact information today and encouraged patient/caregiver to call me with any questions as needed.

## 2019-09-24 ENCOUNTER — OFFICE VISIT (OUTPATIENT)
Dept: FAMILY MEDICINE | Facility: CLINIC | Age: 69
End: 2019-09-24
Payer: MEDICARE

## 2019-09-24 ENCOUNTER — TELEPHONE (OUTPATIENT)
Dept: FAMILY MEDICINE | Facility: CLINIC | Age: 69
End: 2019-09-24

## 2019-09-24 ENCOUNTER — OUTPATIENT CASE MANAGEMENT (OUTPATIENT)
Dept: ADMINISTRATIVE | Facility: OTHER | Age: 69
End: 2019-09-24

## 2019-09-24 ENCOUNTER — LAB VISIT (OUTPATIENT)
Dept: LAB | Facility: HOSPITAL | Age: 69
End: 2019-09-24
Payer: MEDICARE

## 2019-09-24 VITALS
OXYGEN SATURATION: 97 % | DIASTOLIC BLOOD PRESSURE: 70 MMHG | TEMPERATURE: 98 F | HEIGHT: 64 IN | HEART RATE: 72 BPM | WEIGHT: 199.75 LBS | BODY MASS INDEX: 34.1 KG/M2 | SYSTOLIC BLOOD PRESSURE: 158 MMHG

## 2019-09-24 DIAGNOSIS — M25.562 CHRONIC PAIN OF BOTH KNEES: ICD-10-CM

## 2019-09-24 DIAGNOSIS — G89.29 CHRONIC PAIN OF BOTH KNEES: ICD-10-CM

## 2019-09-24 DIAGNOSIS — M79.602 LEFT ARM PAIN: ICD-10-CM

## 2019-09-24 DIAGNOSIS — M25.561 CHRONIC PAIN OF BOTH KNEES: ICD-10-CM

## 2019-09-24 DIAGNOSIS — M62.838 MUSCLE SPASMS OF BOTH LOWER EXTREMITIES: ICD-10-CM

## 2019-09-24 DIAGNOSIS — M79.605 PAIN IN BOTH LOWER EXTREMITIES: Primary | ICD-10-CM

## 2019-09-24 DIAGNOSIS — Z23 NEED FOR INFLUENZA VACCINATION: ICD-10-CM

## 2019-09-24 DIAGNOSIS — M79.604 PAIN IN BOTH LOWER EXTREMITIES: Primary | ICD-10-CM

## 2019-09-24 DIAGNOSIS — R11.0 NAUSEA: ICD-10-CM

## 2019-09-24 LAB
CRP SERPL-MCNC: 14.8 MG/L (ref 0–8.2)
ERYTHROCYTE [SEDIMENTATION RATE] IN BLOOD BY WESTERGREN METHOD: 21 MM/HR (ref 0–36)
RHEUMATOID FACT SERPL-ACNC: <10 IU/ML (ref 0–15)

## 2019-09-24 PROCEDURE — 99999 PR PBB SHADOW E&M-EST. PATIENT-LVL V: ICD-10-PCS | Mod: PBBFAC,HCNC,, | Performed by: NURSE PRACTITIONER

## 2019-09-24 PROCEDURE — 96372 THER/PROPH/DIAG INJ SC/IM: CPT | Mod: 59,HCNC,S$GLB, | Performed by: NURSE PRACTITIONER

## 2019-09-24 PROCEDURE — G0008 FLU VACCINE - HIGH DOSE (65+) PRESERVATIVE FREE IM: ICD-10-PCS | Mod: HCNC,S$GLB,, | Performed by: NURSE PRACTITIONER

## 2019-09-24 PROCEDURE — 90662 FLU VACCINE - HIGH DOSE (65+) PRESERVATIVE FREE IM: ICD-10-PCS | Mod: HCNC,S$GLB,, | Performed by: NURSE PRACTITIONER

## 2019-09-24 PROCEDURE — G0008 ADMIN INFLUENZA VIRUS VAC: HCPCS | Mod: HCNC,S$GLB,, | Performed by: NURSE PRACTITIONER

## 2019-09-24 PROCEDURE — 99214 OFFICE O/P EST MOD 30 MIN: CPT | Mod: 25,HCNC,S$GLB, | Performed by: NURSE PRACTITIONER

## 2019-09-24 PROCEDURE — 99999 PR PBB SHADOW E&M-EST. PATIENT-LVL V: CPT | Mod: PBBFAC,HCNC,, | Performed by: NURSE PRACTITIONER

## 2019-09-24 PROCEDURE — 86140 C-REACTIVE PROTEIN: CPT | Mod: HCNC

## 2019-09-24 PROCEDURE — 86038 ANTINUCLEAR ANTIBODIES: CPT | Mod: HCNC

## 2019-09-24 PROCEDURE — 1101F PT FALLS ASSESS-DOCD LE1/YR: CPT | Mod: HCNC,CPTII,S$GLB, | Performed by: NURSE PRACTITIONER

## 2019-09-24 PROCEDURE — 85652 RBC SED RATE AUTOMATED: CPT | Mod: HCNC

## 2019-09-24 PROCEDURE — 3077F PR MOST RECENT SYSTOLIC BLOOD PRESSURE >= 140 MM HG: ICD-10-PCS | Mod: HCNC,CPTII,S$GLB, | Performed by: NURSE PRACTITIONER

## 2019-09-24 PROCEDURE — 90662 IIV NO PRSV INCREASED AG IM: CPT | Mod: HCNC,S$GLB,, | Performed by: NURSE PRACTITIONER

## 2019-09-24 PROCEDURE — 3078F PR MOST RECENT DIASTOLIC BLOOD PRESSURE < 80 MM HG: ICD-10-PCS | Mod: HCNC,CPTII,S$GLB, | Performed by: NURSE PRACTITIONER

## 2019-09-24 PROCEDURE — 3077F SYST BP >= 140 MM HG: CPT | Mod: HCNC,CPTII,S$GLB, | Performed by: NURSE PRACTITIONER

## 2019-09-24 PROCEDURE — 99214 PR OFFICE/OUTPT VISIT, EST, LEVL IV, 30-39 MIN: ICD-10-PCS | Mod: 25,HCNC,S$GLB, | Performed by: NURSE PRACTITIONER

## 2019-09-24 PROCEDURE — 3078F DIAST BP <80 MM HG: CPT | Mod: HCNC,CPTII,S$GLB, | Performed by: NURSE PRACTITIONER

## 2019-09-24 PROCEDURE — 1101F PR PT FALLS ASSESS DOC 0-1 FALLS W/OUT INJ PAST YR: ICD-10-PCS | Mod: HCNC,CPTII,S$GLB, | Performed by: NURSE PRACTITIONER

## 2019-09-24 PROCEDURE — 36415 COLL VENOUS BLD VENIPUNCTURE: CPT | Mod: HCNC,PO

## 2019-09-24 PROCEDURE — 86431 RHEUMATOID FACTOR QUANT: CPT | Mod: HCNC

## 2019-09-24 PROCEDURE — 96372 PR INJECTION,THERAP/PROPH/DIAG2ST, IM OR SUBCUT: ICD-10-PCS | Mod: 59,HCNC,S$GLB, | Performed by: NURSE PRACTITIONER

## 2019-09-24 RX ORDER — FLUOXETINE HYDROCHLORIDE 20 MG/1
20 CAPSULE ORAL DAILY
Qty: 90 CAPSULE | Refills: 3 | Status: SHIPPED | OUTPATIENT
Start: 2019-09-24 | End: 2019-12-19 | Stop reason: SDUPTHER

## 2019-09-24 RX ORDER — DEXAMETHASONE SODIUM PHOSPHATE 4 MG/ML
8 INJECTION, SOLUTION INTRA-ARTICULAR; INTRALESIONAL; INTRAMUSCULAR; INTRAVENOUS; SOFT TISSUE
Status: COMPLETED | OUTPATIENT
Start: 2019-09-24 | End: 2019-09-24

## 2019-09-24 RX ADMIN — DEXAMETHASONE SODIUM PHOSPHATE 8 MG: 4 INJECTION, SOLUTION INTRA-ARTICULAR; INTRALESIONAL; INTRAMUSCULAR; INTRAVENOUS; SOFT TISSUE at 09:09

## 2019-09-24 NOTE — PROGRESS NOTES
Summary:Received a message from the pt. Pt reports that she saw the doctor today due to pain in both legs. Pt reports that she has pains in her legs during the day , but especially at night. Pt reports that the pain keeps her up at night. Pt reports that the pain started about 2 weeks ago. Pt reports that she is taking tylenol at night with no relief. Pt reports that she was given a shot while at the doctor's office with no relief at present time. Pt reports that her left foot is swollen. Pt reports that her left foot is not swollen. Pt reports that she tried putting a heating pad on her leg which increased the pain. Pt reports that the doctor told her to take the gabapetin and flexeril. Pt reports that she is holding onto objects to help steady her gait during ambulation. Pt reports that she received her flu shot during the visit today. Pt reports that she received the walker in October of 2018. Pt reports that she would like to get a rolator.        Interventions: Educated pt on safety measures. Educated pt to ambulate with her walker to help to steady her gait.                           Call placed to [employer]. Informed that the doctor has to submit an order and authorization for a rolator. Reports that her getting a walker in 2018 will not affect her ability to get a rolator.                         [employer] message sent to PCP's staff with pt's request for a rolator.                         Educated pt on ways to decrease complications of depression. Pt reports that she has been reaching out to friends.                                              Plan:Educate pt on ways to prevent complications of DM.           F/U on obtaining rolator      Todays OPCM Self-Management Care Plan was developed with the patients/caregivers input and was based on identified barriers from todays assessment.  Goals were written today with the patient/caregiver and the patient has agreed to work towards these goals to improve  his/her overall well-being. Patient verbalized understanding of the care plan, goals, and all of today's instructions. Encouraged patient/caregiver to communicate with his/her physician and health care team about health conditions and the treatment plan.  Provided my contact information today and encouraged patient/caregiver to call me with any questions as needed.

## 2019-09-24 NOTE — PATIENT INSTRUCTIONS
Leg Cramps  A muscle cramp or spasm is a strong contraction of the muscle fibers. It is also called a charley horse. This may occur in the foot, calf, or thigh at night when the legs are elevated. If the spasm is prolonged, it can become very painful.  This may be caused by sleeping in an uncomfortable position, muscle fatigue, poor muscle tone from lack of exercise and stretching, dehydration, electrolyte imbalance, diabetes, alcohol use, and certain medicine.  Home care  · Drink plenty of fluids during the day to prevent dehydration.  · Stretch your legs before bedtime.  · Eat a diet high in potassium. These foods include fresh fruit, such as bananas, oranges, cantaloupe, and honeydew melon. It also includes apple, prune, orange, grape and pineapple juices. Other foods high in potassium are white, red, and smith beans, baked potatoes, raw spinach, cod, flounder, halibut, salmon, and scallops.  · Talk with your healthcare provider about taking mineral and vitamin supplements that contain magnesium and vitamin B-12 if you are not already taking these. Other prescription medicines may also be used.  · Avoid stimulants such as caffeine, nicotine, decongestants.  How to relieve an acute leg cramp  · For mild pain, getting out of the bed and walking may help. Some people find relief with heat and massage. You can apply heat with a warm shower, bath, or compress. Some people feel better with a cold packs. You can make an ice pack by filling a plastic bag that seals at the top with ice cubes and then wrapping it with a thin towel. Try both and use the method that feels best for 15 to 20 minutes at a time.  · For severe pain, stretching the muscle that is in spasm may quickly relieve the pain.  · When the spasm is in your foot, your toes may curl up or down. To stretch the muscle in spasm, bend your toes in the opposite direction. If the spasm pulls your toes up, bend them down. If the spasm pulls them down, bend them  "up.  · When the spasm is in your calf, bend the ankle so the foot points upward toward your knee.  · When the spasm is in your thigh, bend or straighten the knee and hip until you feel relief.  Follow-up care  Follow up with your healthcare provider, or as advised.  When to seek medical advice  Call your healthcare provider right away if any of these occur:  · Walking makes your pain worse and rest makes it better  · You develop weakness in the affected leg  · Pain or frequency of spasms increases and is not controlled by the above measures  Date Last Reviewed: 11/23/2015  © 7523-0685 [employer]. [address] [place] [address]. All rights reserved. This information is not intended as a substitute for professional medical care. Always follow your healthcare professional's instructions.        Paraesthesias  Paraesthesia is a burning or prickling sensation that is sometimes felt in the hands, arms, legs or feet. It can also occur in other parts of the body. It can also feel like tingling or numbness, skin crawling, or itching. The feeling is not comfortable, but it is not painful. (The "pins and needles" feeling that happens when a foot or hand "falls asleep" is a temporary paraesthesia.)  Paraesthesias that last or come and go may be caused by medical issues that need to be treated. These include stroke, a bulging disk pressing on a nerve, a trapped nerve, vitamin deficiencies, or even certain medicines.  Tests are often done. These tests may include blood tests, X-ray, CT (computerized tomography) scan, or a muscle test (electromyography). Depending on the cause, treatment may include physical therapy.  Home care  · Tell the healthcare provider about all medicines you take. This includes prescription and over-the-counter medicines, vitamins, and herbs. Ask if any of the medicines may be causing your problems. Do not make any changes to prescription medicines without talking to your " healthcare provider first.  · You may be prescribed medicines to help relieve the tingling feeling or for pain. Take all medicines as directed.  · A numb hand or foot may be more prone to injury. To help protect it:  ¨ Always use oven mitts.  ¨ Test water with an unaffected hand or foot.  ¨ Use caution when trimming nails. File sharp areas.  ¨ Wear shoes that fit well to avoid pressure points, blisters, and ulcers.  ¨ Inspect your hands and feet carefully (including the soles of your feet and between your toes) at least once a week. If you see red areas, sores, or other problems, tell your healthcare provider.  Follow-up care  Follow up with your doctor or as advised by our staff. You may need further testing or evaluation.  When to seek medical advice  Call your healthcare provider right away if any of the following occur:  · Numbness or weakness of the face, one arm, or one leg  · Slurred speech, confusion, trouble speaking, walking, or seeing  · Severe headache, fainting spell, dizziness, or seizure  · Chest, arm, neck, or upper back pain  · Loss of bladder or bowel control  · Open wound with redness, swelling, or pus  Date Last Reviewed: 9/25/2015  © 6145-4745 [employer]. [address] [place] [address], [place] [address]. All rights reserved. This information is not intended as a substitute for professional medical care. Always follow your healthcare professional's instructions.

## 2019-09-24 NOTE — TELEPHONE ENCOUNTER
----- Message from Olinda Sterling sent at 9/24/2019  1:25 PM CDT -----  Contact: [facility] mail order  [phone]  .Type: RX Refill Request    Who Called:[facility] mail [facility]     Refill or New Rx: refill     RX Name and Strength: FLUoxetine 20 MG capsule    Is this a 30 day or 90 day RX: 90 day     Preferred Pharmacy with phone number: .  [facility] - [place], [place] - [address]  [address] [address]  Phone: [phone] Fax: [phone]    Local or Mail Order: local     Would the patient rather a call back or a response via [facility]? Call back     Best Call Back Number:  [phone]

## 2019-09-24 NOTE — TELEPHONE ENCOUNTER
----- Message from Aniyah Marks RN sent at 9/24/2019  1:00 PM CDT -----  Contact: SANTOS Chandler this is Aniyah with [facility]. Pt reports that she would like to get a rolator. Pt reports that due to the leg pains and swelling her gait is unsteady. Pt reports that she becomes tired and SOB at times. Pt has a walker but reports that the rolator will allow her to sit down if needed.    Please advise  Thank you

## 2019-09-24 NOTE — PROGRESS NOTES
Patient tolerate Decadron 8 mg IM injection and Flu Vaccine IM. Patient advise to wait 15 min after injection  for assessment of any posssible side effects. VIS information was given to patient.

## 2019-09-24 NOTE — PROGRESS NOTES
Subjective:       Patient ID: Valarie Foster is a 69 y.o. female.    Chief Complaint: Leg Pain (pt states bilateral leg X 3 weeks ); Arm Pain (pt states left arm pain X 3 weeks ); and Nausea (X this morning )    Leg Pain    The incident occurred more than 1 week ago (about 3 weeks but has been going on a while). There was no injury mechanism. The pain is present in the right leg, left leg, left knee and right knee. The quality of the pain is described as aching. The pain is at a severity of 9/10. The pain has been fluctuating since onset. Associated symptoms include numbness and tingling. She reports no foreign bodies present. Nothing aggravates the symptoms. Treatments tried: flexeril and gabapentin.   Arm Pain    The incident occurred more than 1 week ago (about 3 weeks). There was no injury mechanism. The pain is present in the left forearm. The quality of the pain is described as aching. The pain does not radiate. The pain is at a severity of 9/10. Associated symptoms include numbness and tingling. She has tried nothing for the symptoms.   Nausea   This is a new problem. The current episode started today. Associated symptoms include nausea, numbness and weakness. Pertinent negatives include no vomiting. Associated symptoms comments: Didn't eat anything prior to taking blood pressure medication. Nausea improving. Nothing aggravates the symptoms. She has tried nothing for the symptoms.       Past Medical History:   Diagnosis Date    Arthritis     Diabetes mellitus     diet controlled    Diabetes with neurologic complications     Hypertension     Renal cell carcinoma        Social History     Socioeconomic History    Marital status: Single     Spouse name: Not on file    Number of children: Not on file    Years of education: Not on file    Highest education level: Not on file   Occupational History    Not on file   Social Needs    Financial resource strain: Not on file    Food insecurity:     Worry: Not  "on file     Inability: Not on file    Transportation needs:     Medical: Not on file     Non-medical: Not on file   Tobacco Use    Smoking status: Never Smoker    Smokeless tobacco: Never Used   Substance and Sexual Activity    Alcohol use: No    Drug use: No    Sexual activity: Not on file   Lifestyle    Physical activity:     Days per week: Not on file     Minutes per session: Not on file    Stress: Not on file   Relationships    Social connections:     Talks on phone: Not on file     Gets together: Not on file     Attends Church service: Not on file     Active member of club or organization: Not on file     Attends meetings of clubs or organizations: Not on file     Relationship status: Not on file   Other Topics Concern    Not on file   Social History Narrative    Not on file       Past Surgical History:   Procedure Laterality Date    COLONOSCOPY N/A 2/5/2018    Procedure: COLONOSCOPY;  Surgeon: Bacilio Mancia MD;  Location: [facility] ENDO;  Service: Endoscopy;  Laterality: N/A;  appt confirmed-ss    HYSTERECTOMY      OOPHORECTOMY      PARTIAL NEPHRECTOMY Right 10/12/2018    Procedure: NEPHRECTOMY, PARTIAL open. Dr Arenas to assist.;  Surgeon: Alejandra Palm MD;  Location: [facility] OR;  Service: Urology;  Laterality: Right;  RN PREOP 10/9/2018----NEEDS H/P       Review of Systems   Gastrointestinal: Positive for nausea. Negative for vomiting.   Musculoskeletal: Positive for gait problem.   Neurological: Positive for tingling, weakness and numbness.   All other systems reviewed and are negative.      Objective:   BP (!) 158/70 (BP Location: Left arm, Patient Position: Sitting, BP Method: Large (Manual))   Pulse 72   Temp 98.1 °F (36.7 °C) (Oral)   Ht 5' 4" (1.626 m)   Wt 90.6 kg (199 lb 11.8 oz)   SpO2 97%   BMI 34.28 kg/m²      Physical Exam   Constitutional: She is oriented to person, place, and time. She appears well-developed and well-nourished.   HENT:   Head: Normocephalic and " atraumatic.   Cardiovascular: Normal rate, regular rhythm and normal heart sounds.   Pulmonary/Chest: Effort normal and breath sounds normal. No respiratory distress. She has no decreased breath sounds.   Musculoskeletal:        Right knee: She exhibits decreased range of motion. She exhibits no swelling. Tenderness found. Medial joint line and lateral joint line tenderness noted.        Left knee: She exhibits decreased range of motion. She exhibits no swelling. Tenderness found. Medial joint line and lateral joint line tenderness noted.        Left forearm: She exhibits tenderness. She exhibits no swelling and no edema.        Right lower leg: She exhibits tenderness. She exhibits no swelling and no edema.        Left lower leg: She exhibits tenderness. She exhibits no swelling and no edema.   Neurological: She is alert and oriented to person, place, and time.   Skin: She is not diaphoretic. No pallor.   Psychiatric: She has a normal mood and affect. Her speech is normal and behavior is normal.       Assessment:       1. Pain in both lower extremities    2. Muscle spasms of both lower extremities    3. Chronic pain of both knees    4. Left arm pain    5. Nausea    6. FH: lupus    7. Need for influenza vaccination        Plan:       Valarie was seen today for leg pain, arm pain and nausea.    Diagnoses and all orders for this visit:    Pain in both lower extremities  -     dexamethasone injection 8 mg  -     Continue Gabapentin and flexeril    Muscle spasms of both lower extremities  -     dexamethasone injection 8 mg  -     Continue Gabapentin and flexeril    Chronic pain of both knees  -     dexamethasone injection 8 mg    Left arm pain  -     dexamethasone injection 8 mg  -     Continue Gabapentin and flexeril    FH: lupus  -     Sedimentation rate; Future  -     C-reactive protein; Future  -     Rheumatoid factor; Future  -     MELISSA Screen w/Reflex; Future    Need for influenza vaccination  -     Influenza - High  Dose (65+) (PF) (IM)    Nausea        -     Resolved    If labs abnormal, will refer to Rheumatology  If labs normal, will refer to Neurology for EMG of extremities  She verbalized understanding.   Follow up if symptoms worsen or fail to improve.

## 2019-09-25 ENCOUNTER — TELEPHONE (OUTPATIENT)
Dept: FAMILY MEDICINE | Facility: CLINIC | Age: 69
End: 2019-09-25

## 2019-09-25 LAB — ANA SER QL IF: NORMAL

## 2019-09-25 NOTE — TELEPHONE ENCOUNTER
----- Message from SHANTANU Bass sent at 9/25/2019  3:55 PM CDT -----  Please inform patient last lab received. Labs are normal.

## 2019-09-25 NOTE — TELEPHONE ENCOUNTER
----- Message from Guerline Paredes sent at 9/25/2019 12:39 PM CDT -----  Contact: pt    Name of Who is Calling:DARIA DE LA FUENTE [[id number]]    What is the request in detail: Patient would like a call back regarding test results . Patient would like to have all her medication to go to [facility] [place],  Please contact to further discuss and advise    \Can the clinic reply by [facility]: no    What Number to Call Back if not in [facility]: [phone]

## 2019-09-25 NOTE — TELEPHONE ENCOUNTER
Please inform patient test so far has been within acceptable range. Still awaiting the lab that looks for autoimmune disorders.

## 2019-09-25 NOTE — TELEPHONE ENCOUNTER
----- Message from SHANTANU Bass sent at 9/25/2019  3:10 PM CDT -----  Please inform patient test so far has been within acceptable range. Still awaiting the lab that looks for autoimmune disorders.

## 2019-09-26 ENCOUNTER — OUTPATIENT CASE MANAGEMENT (OUTPATIENT)
Dept: ADMINISTRATIVE | Facility: OTHER | Age: 69
End: 2019-09-26

## 2019-09-26 NOTE — PROGRESS NOTES
" Summary: Phoned patient. Patient stated that she is still waiting having pain in her leg. She stated that she does not qualify for a rollator at this time due to getting a walker last year. She stated that she has "slippers" on the walker but it is still difficult to push across carpet. Patient stated that she has an appointment to see a house for rent next Wednesday. It is affordable and she is excited about the possibility of moving. She stated that she continues to take her anti-depressant and is doing well now.   Patient stated that none of her calls have been returned from [employer].   Patient agreed to follow up next week.     Interventions: Provided supportive counseling.     Plan: Follow up next week, intervention [facility], update on housing.     Todays OPC Self-Management Care Plan was developed with the patients/caregivers input and was based on identified barriers from todays assessment.  Goals were written today with the patient/caregiver and the patient has agreed to work towards these goals to improve his/her overall well-being. Patient verbalized understanding of the care plan, goals, and all of today's instructions. Encouraged patient/caregiver to communicate with his/her physician and health care team about health conditions and the treatment plan.  Provided my contact information today and encouraged patient/caregiver to call me with any questions as needed.    "

## 2019-10-03 ENCOUNTER — OUTPATIENT CASE MANAGEMENT (OUTPATIENT)
Dept: ADMINISTRATIVE | Facility: OTHER | Age: 69
End: 2019-10-03

## 2019-10-03 NOTE — PROGRESS NOTES
Summary: Phoned patient. Patient stated that she had heard from the owner of the house that she is interested in. She stated that she has had some work done on the house and re-scheduled her coming to view the house until Saturday. She said that her friend said that she will help her with the move.   Phone connection was bad, patient stated that her friend had taken her to [employer] for some food. She said that she continues to have difficulty walking. Patient agreed to call next week due to bad connection.     Interventions: Attempted contact to assist with housing and [facility] referral for transportation to [facility].     Plan: Fpllow up next week. Update [employer] on address and need if patient has arranged for the move.     Todays OPCM Self-Management Care Plan was developed with the patients/caregivers input and was based on identified barriers from todays assessment.  Goals were written today with the patient/caregiver and the patient has agreed to work towards these goals to improve his/her overall well-being. Patient verbalized understanding of the care plan, goals, and all of today's instructions. Encouraged patient/caregiver to communicate with his/her physician and health care team about health conditions and the treatment plan.  Provided my contact information today and encouraged patient/caregiver to call me with any questions as needed.

## 2019-10-07 ENCOUNTER — OUTPATIENT CASE MANAGEMENT (OUTPATIENT)
Dept: ADMINISTRATIVE | Facility: OTHER | Age: 69
End: 2019-10-07

## 2019-10-07 ENCOUNTER — PATIENT OUTREACH (OUTPATIENT)
Dept: ADMINISTRATIVE | Facility: HOSPITAL | Age: 69
End: 2019-10-07

## 2019-10-07 NOTE — PROGRESS NOTES
Summary: Phoned patient. The appointment to see the potential house to rent has been moved to Wednesday. Addressed transportation to the [facility] and need for updated address. She reviewed that she had returned a message from [employer] which she returned, left a message with her number and address and has not heard from them. She stated that she also needs transportation for medical appointments because her friend works and is not always available to take her to appointments. Provided phone number and procedure for accessing [employer] transportation.   She addressed continued mobility issues with her knee.     Interventions: Provided [employer] transportation information.   In basket message to OPCM RN.     Plan: Follow up in one week, update address information and referral to [employer].     Todays OPCM Self-Management Care Plan was developed with the patients/caregivers input and was based on identified barriers from todays assessment.  Goals were written today with the patient/caregiver and the patient has agreed to work towards these goals to improve his/her overall well-being. Patient verbalized understanding of the care plan, goals, and all of today's instructions. Encouraged patient/caregiver to communicate with his/her physician and health care team about health conditions and the treatment plan.  Provided my contact information today and encouraged patient/caregiver to call me with any questions as needed.

## 2019-10-14 ENCOUNTER — OUTPATIENT CASE MANAGEMENT (OUTPATIENT)
Dept: ADMINISTRATIVE | Facility: OTHER | Age: 69
End: 2019-10-14

## 2019-10-14 RX ORDER — BLOOD SUGAR DIAGNOSTIC
STRIP MISCELLANEOUS
Qty: 100 STRIP | Refills: 2 | Status: SHIPPED | OUTPATIENT
Start: 2019-10-14 | End: 2020-04-18

## 2019-10-14 RX ORDER — LANCETS
EACH MISCELLANEOUS
Qty: 100 EACH | Refills: 2 | Status: SHIPPED | OUTPATIENT
Start: 2019-10-14 | End: 2020-04-18

## 2019-10-14 NOTE — PROGRESS NOTES
Summary: Phoned patient. Patient stated that she continues to have issues with her legs. Patient requested information about upcoming MD appointments, provided information.   Patient stated that she continues to wait on the landlord to complete repairs to the house before moving in. She stated that she wants to wait to provide the address so that a new application can be made to the [facility] for transportation to the [facility].   Patient noted improvement in mood. She stated that she was having difficulty with her eyesight and will schedule optometry appointment. She said that she will have her friend take her to the [employer] store to buy a magnifying glass.     Interventions: Update on housing, [facility] referral.     Plan: Follow up in two weeks.     Todays OPCM Self-Management Care Plan was developed with the patients/caregivers input and was based on identified barriers from todays assessment.  Goals were written today with the patient/caregiver and the patient has agreed to work towards these goals to improve his/her overall well-being. Patient verbalized understanding of the care plan, goals, and all of today's instructions. Encouraged patient/caregiver to communicate with his/her physician and health care team about health conditions and the treatment plan.  Provided my contact information today and encouraged patient/caregiver to call me with any questions as needed.

## 2019-10-16 DIAGNOSIS — E11.40 TYPE 2 DIABETES MELLITUS WITH DIABETIC NEUROPATHY, WITHOUT LONG-TERM CURRENT USE OF INSULIN: Chronic | ICD-10-CM

## 2019-10-16 RX ORDER — AMLODIPINE BESYLATE 5 MG/1
5 TABLET ORAL DAILY
Qty: 30 TABLET | Refills: 11 | Status: SHIPPED | OUTPATIENT
Start: 2019-10-16 | End: 2020-06-03

## 2019-10-16 RX ORDER — GABAPENTIN 300 MG/1
600 CAPSULE ORAL NIGHTLY
Qty: 180 CAPSULE | Refills: 1 | Status: SHIPPED | OUTPATIENT
Start: 2019-10-16 | End: 2020-05-04

## 2019-10-16 NOTE — TELEPHONE ENCOUNTER
----- Message from Svetlana Castro sent at 10/16/2019 12:05 PM CDT -----  Contact: Beba/ [facility]/  [phone]  Type: RX Refill Request    Who Called:  Beba    Refill or New Rx:  Refill    RX Name and Strength:  amLODIPine (NORVASC) 5 MG tablet                                          gabapentin (NEURONTIN) 300 MG capsule     Preferred Pharmacy with phone number: [email or web address] - [facility] [address]     Local or Mail Order: Mail In    Ordering Provider:  KARLIE Hernandez    Would the patient rather a call back or a response via [facility]?   Call back    Best Call Back Number:  [phone]

## 2019-10-17 ENCOUNTER — OUTPATIENT CASE MANAGEMENT (OUTPATIENT)
Dept: ADMINISTRATIVE | Facility: OTHER | Age: 69
End: 2019-10-17

## 2019-10-17 NOTE — PROGRESS NOTES
Outpatient Care Management  Plan of Care Follow Up Visit    Patient: Valarie Foster  MRN: [id number]  Date of Service: 10/17/2019  Completed by: Aniyah Marks RN  Referral Date: 06/28/2019  Program: Case Management (High Risk)    No chief complaint on file.      Patient Summary     Involvement of Care:  Do I have permission to speak with other family members about your care?       Problem List     Patient Active Problem List   Diagnosis    Type 2 diabetes mellitus with diabetic neuropathy, without long-term current use of insulin    Benign hypertension    Hyperlipemia    Microhematuria    Left flank pain    Abdominal aortic atherosclerosis    History of renal cell cancer       Reviewed Active Problem List with patient and/or Caregiver.    Patient Reported Labs & Vitals:  1.  Any Patient Reported Labs & Vitals?     2.  Patient Reported Blood Pressure:     3.  Patient Reported Pulse:     4.  Patient Reported Weight (Kg):     5.  Patient Reported Blood Glucose (mg/dl):       Medical History:  Reviewed medical history with patient and/or caregiver    Social History:  Reviewed social history with patient and/or caregiver    Clinical Assessment     Reviewed and provided basic information on available community resources for mental health, transportation, wellness resources, and palliative care programs with patient and/or caregiver.    Complex Care Plan     Care plan was discussed and completed today with input from patient and/or caregiver.    Goals      Patient/caregiver will accept life style changes to manage and improve Depression prior to discharge from OPCM. - Priority: High      Overall Time to Completion  2 months from 07/02/2019    OPC Identified Patient Barriers:  Health Literacy: Care plan created  Depression: Care plan created; LCSW referred  Financial Support: Care plan created      RN Identified Patient Barriers:        Short Term Goals  Patient/caregiver will verbalize 2 signs and symptoms of  Depression within 2 weeks.   Interventions   · Assess patient's ability to perform ADLs.  · Collaborate with Physician as appropriate to meet patient needs.  · Complete medication reconciliation.  · Empower patient/caregiver to discuss treatment plan with Physician/care team.  · Encourage Dietary Compliance.  · Encourage Exercise.  · Encourage Medication Compliance.  · Facilitate referral for counseling.  · Provide contact information for 24 hour Crisis Line number- [facility].  · Provide contact information for [facility] on [facility] contact information.  · Recognize and provide educational material (AIMEE).  · Refer to Outpatient Case Management Social Worker.     Status  · Met      Patient/caregiver will verbalize 2 ways of preventing complications due to disease process within 3 weeks.  Interventions   · Collaborate with Physician as appropriate to meet patient needs.  · Encourage Exercise.  · Encourage Medication Compliance.  · Facilitate referral for counseling.  · Provide contact information for 24 hour Crisis Line number- [facility] LINE.  · Recognize and provide educational material (KRAFERMIN).  · Refer to Outpatient Case Management Social Worker.     Status  · Met           Clinical Reference Documents Added to Patient Instructions       Document    DEPRESSION AFFECTS YOUR MIND AND BODY (ENGLISH)    DEPRESSION, KNOW THE SIGNS AND SYMPTOMS (ENGLISH)    DEPRESSION, COUNSELING FOR (ENGLISH)    DEPRESSION: TIPS TO HELP YOURSELF  (ENGLISH)             Patient/caregiver will accept life style changes to manage and improve diabetes prior to discharge from OPCM. - Priority: High      Overall Time to Completion  2 months from 07/02/2019    OPC Identified Patient Barriers:  Health Literacy: Care plan created  Depression: Care plan created; LCSW referred  Financial Support: Care plan created      RN Identified Patient Barriers:      Short Term Goals  Patient/caregiver will measure and record the capillary blood glucose as per  MD's recommendations for 2 weeks.  Interventions   · Assess for availability of working glucometer in home setting.  · Collaborate with Physician as appropriate to meet patient needs.  · Educate regarding Physician's recommended Blood Sugar range.  · Mail Blood glucose logs for home use.  · Recognize and provide educational material (AIMEE).     Status  Met    Patient/caregiver will verbalize 2 ways of preventing complications due to disease process within 3 weeks.  Interventions   · Assess for availability of working glucometer in home setting.  · Assess for retention of the signs and symptoms of disease specific exacerbation.  · Collaborate with Physician as appropriate to meet patient needs.  · Encourage Exercise.  · Encourage Medication Compliance.  · Recogonize and provide educational matererial (AIMEE).  ·      Status  Met    Patient/caregiver will verbalize importance of asking questions during appointment with physician and taking an active role during the appointment with physician within 3 weeks.  Interventions   · Collaborate with Physician as appropriate to meet patient needs.  · Educate regarding Physician's recommended Blood Sugar range.  · Empower patient/caregiver to discuss treatment plan with Physician/care team.  · Encourage compliance with Physician follow-ups.  · Encourage Dietary Compliance.  · Encourage Exercise.  · Facilitate to referral to Diabetic educator per pt's request.  · Facilitate to referral to Endocrinologist.  · Recognize and provide educational material (AIMEE).     Status               Met       Clinical Reference Documents Added to Patient Instructions       Document    DEPRESSION AFFECTS YOUR MIND AND BODY (ENGLISH)    DEPRESSION, KNOW THE SIGNS AND SYMPTOMS (ENGLISH)    DEPRESSION, COUNSELING FOR (ENGLISH)    DEPRESSION: TIPS TO HELP YOURSELF  (ENGLISH)  DIABETIC GUIDE(ENGLISH)             Patient/caregiver will have adequate mental health support/resources prior to  discharge from OPCM. - Priority: high      Overall Time to Completion  2 months from 07/18/2019      OPC Identified Patient Barriers:  Health Literacy: Care plan created  Depression: Care plan created; LCSW referred, SW Assessment Completed, Care Plan Created  Financial Support: Care plan created      RN Identified Patient Barriers    SW Identified Patient Barriers:  Advanced Care Planning, Care Plan Created  Financial Support, Care Plan Created  Mental Health, Care Plan Created      Short Term Goals  Patient/caregiver will contact [facility] for information/enrollment to increase socialization in community within 1 month.  Interventions   · Collaborate with OPCM RN as appropriate to meet patient needs.  · Discuss and provide [facility] on [facility].  · Encourage counseling referral.      Status  · Partially met               Patient/caregiver will have knowledge of resources available in order to obtain the services that are needed prior to discharge from OPC. - Priority: high      Overall Time to Completion  2 months from 07/18/2019    [facility] Identified Patient Barriers:  Health Literacy: Care plan created  Depression: Care plan created; LCSW referred, SW Assessment Completed, Care Plan Created  Financial Support: Care plan created      RN Identified Patient Barriers    SW Identified Patient Barriers:  Advanced Care Planning, Care Plan Created  Financial Support, Care Plan Created  Mental Health, Care Plan Created      Short Term Goals  Patient/caregiver will identify 3 community resources to promote aging in place within 1 month.  Interventions   · Collaborate with OPCM RN as appropriate to meet patient needs.  · Discuss and provide Advance Directive Form.  · Discuss and provide Food Application and Housing Application.     Status  · Partially met                    Patient Instructions     Instructions were provided via the [employer] patient resources and are available for the patient to view on the patient  portal.    No follow-ups on file.   S- Pt reports that she is having some pain from her right leg. Pt reports that she has received corticosteroids injections which helps for a short period of time. Pt reports that the cool weather has not increased the pain. Pt reports that she has cold systems. Pt reports that her BS are fine. Unable to give a value this am. Pt reports that she is waiting to receive her BP meds from [facility]. Pt reports that she has been out of her medicine for 2 days. Pt is on Norvasc 5 mg daily. Pt reports that she was informed per [facility] that the medication is in route. Pt reports that she will wait until her appt with PCP to talk about the treatment for the pain in her right leg. Pt reports that she has not moved and has informed her sister about her plans to move.       Todays OPCM Self-Management Care Plan was developed with the patients/caregivers input and was based on identified barriers from todays assessment.  Goals were written today with the patient/caregiver and the patient has agreed to work towards these goals to improve his/her overall well-being. Patient verbalized understanding of the care plan, goals, and all of today's instructions. Encouraged patient/caregiver to communicate with his/her physician and health care team about health conditions and the treatment plan.  Provided my contact information today and encouraged patient/caregiver to call me with any questions as needed.

## 2019-10-28 ENCOUNTER — OUTPATIENT CASE MANAGEMENT (OUTPATIENT)
Dept: ADMINISTRATIVE | Facility: OTHER | Age: 69
End: 2019-10-28

## 2019-10-28 NOTE — PROGRESS NOTES
Summary: Phoned patient. Patient stated that she fell on Saturday, tripping on the carpet in the house and is very sore. She stated that she is still having issues with her legs and has a CT scheduled for tomorrow. She stated that she plans to report the fall to MD at that time. She also addressed increased confusion and noted that she had taken her medication twice one day. She stated that she called her niece who lives next door. She stayed with her. She said that she was nauseated and did not take her medication on the following day. Asked if she uses a pill box to organize her medications. She answered that her niece is purchasing one for her today. Informed her that [facility] would inform OPCM RN. Patient expressed appreciation.   Patient stated that the owner of the house that she was going to rent has decided to sell the house. She stated that she has started looking for another possibility but has had no luck. Discussed options in Senior Housing which all have waiting lists.   Discussed referral to [facility]. She stated that [facility] has not returned her call. Agreed to contact [employer] on her behalf. Patient expressed appreciation.   Contacted OPCM RN, informed her of concerns. Sent new referral to [facility].     Outpatient Care Management   - Care Plan Follow Up    Patient: Valarie Foster  MRN:  [id number]  Date of Service:  10/28/2019  Completed by:  Manasa Bob LCSW  Referral Date: 06/28/2019  Program: Case Management (High Risk)    Reason for Visit   Patient presents with    Update Plan Of Care       Complex Care Plan     Care plan was discussed and completed today with input from patient and/or caregiver.    Goals      Patient/caregiver will accept life style changes to manage and improve Depression prior to discharge from OPCM. - Priority: High      Overall Time to Completion  2 months from 07/02/2019    OPCM Identified Patient Barriers:  Health Literacy: Care plan created  Depression: Care plan  created; LCSW referred  Financial Support: Care plan created      RN Identified Patient Barriers:        Short Term Goals  Patient/caregiver will verbalize 2 signs and symptoms of Depression within 2 weeks.   Interventions   · Assess patient's ability to perform ADLs.  · Collaborate with Physician as appropriate to meet patient needs.  · Complete medication reconciliation.  · Empower patient/caregiver to discuss treatment plan with Physician/care team.  · Encourage Dietary Compliance.  · Encourage Exercise.  · Encourage Medication Compliance.  · Facilitate referral for counseling.  · Provide contact information for 24 hour Crisis Line number- [facility].  · Provide contact information for [facility] contact information.  · Recognize and provide educational material (AIMEE).  · Refer to Outpatient Case Management Social Worker.     Status  · Met      Patient/caregiver will verbalize 2 ways of preventing complications due to disease process within 3 weeks.  Interventions   · Collaborate with Physician as appropriate to meet patient needs.  · Encourage Exercise.  · Encourage Medication Compliance.  · Facilitate referral for counseling.  · Provide contact information for 24 hour Crisis Line number- [facility].  · Recognize and provide educational material (AIMEE).  · Refer to Outpatient Case Management Social Worker.     Status  · Met           Clinical Reference Documents Added to Patient Instructions       Document    DEPRESSION AFFECTS YOUR MIND AND BODY (ENGLISH)    DEPRESSION, KNOW THE SIGNS AND SYMPTOMS (ENGLISH)    DEPRESSION, COUNSELING FOR (ENGLISH)    DEPRESSION: TIPS TO HELP YOURSELF  (ENGLISH)             Patient/caregiver will accept life style changes to manage and improve diabetes prior to discharge from OPCM. - Priority: High      Overall Time to Completion  2 months from 07/02/2019    hospitals Identified Patient Barriers:  Health Literacy: Care plan created  Depression: Care plan created; LCSW  referred  Financial Support: Care plan created      RN Identified Patient Barriers:      Short Term Goals  Patient/caregiver will measure and record the capillary blood glucose as per MD's recommendations for 2 weeks.  Interventions   · Assess for availability of working glucometer in home setting.  · Collaborate with Physician as appropriate to meet patient needs.  · Educate regarding Physician's recommended Blood Sugar range.  · Mail Blood glucose logs for home use.  · Recognize and provide educational material (AIMEE).     Status  Met    Patient/caregiver will verbalize 2 ways of preventing complications due to disease process within 3 weeks.  Interventions   · Assess for availability of working glucometer in home setting.  · Assess for retention of the signs and symptoms of disease specific exacerbation.  · Collaborate with Physician as appropriate to meet patient needs.  · Encourage Exercise.  · Encourage Medication Compliance.  · Recogonize and provide educational matererial (AIMEE).  ·      Status  Met    Patient/caregiver will verbalize importance of asking questions during appointment with physician and taking an active role during the appointment with physician within 3 weeks.  Interventions   · Collaborate with Physician as appropriate to meet patient needs.  · Educate regarding Physician's recommended Blood Sugar range.  · Empower patient/caregiver to discuss treatment plan with Physician/care team.  · Encourage compliance with Physician follow-ups.  · Encourage Dietary Compliance.  · Encourage Exercise.  · Facilitate to referral to Diabetic educator per pt's request.  · Facilitate to referral to Endocrinologist.  · Recognize and provide educational material (AIMEE).     Status               Met       Clinical Reference Documents Added to Patient Instructions       Document    DEPRESSION AFFECTS YOUR MIND AND BODY (ENGLISH)    DEPRESSION, KNOW THE SIGNS AND SYMPTOMS (ENGLISH)    DEPRESSION, COUNSELING  FOR (ENGLISH)    DEPRESSION: TIPS TO HELP YOURSELF  (ENGLISH)  DIABETIC GUIDE(ENGLISH)             Patient/caregiver will have adequate mental health support/resources prior to discharge from OPCM. - Priority: high      Overall Time to Completion  6 months from 07/18/2019      OPCM Identified Patient Barriers:  Health Literacy: Care plan created  Depression: Care plan created; LCSW referred, SW Assessment Completed, Care Plan Created  Financial Support: Care plan created      RN Identified Patient Barriers    SW Identified Patient Barriers:  Advanced Care Planning, Care Plan Created  Financial Support, Care Plan Created  Mental Health, Care Plan Created      Short Term Goals  Patient/caregiver will contact [facility] for information/enrollment to increase socialization in community within 1 month.  Interventions   · Collaborate with OPCM RN as appropriate to meet patient needs.  · Discuss and provide [facility].  · Encourage counseling referral.      Status  · Partially met                    Patient Instructions     Instructions were provided via the [employer] patient resources and are available for the patient to view on the patient portal.    No follow-ups on file.    Todays OPC Self-Management Care Plan was developed with the patients/caregivers input and was based on identified barriers from todays assessment.  Goals were written today with the patient/caregiver and the patient has agreed to work towards these goals to improve his/her overall well-being. Patient verbalized understanding of the care plan, goals, and all of today's instructions. Encouraged patient/caregiver to communicate with his/her physician and health care team about health conditions and the treatment plan.  Provided my contact information today and encouraged patient/caregiver to call me with any questions as needed.

## 2019-10-28 NOTE — PROGRESS NOTES
.  Outpatient Care Management  Plan of Care Follow Up Visit    Patient: Valarie Foster  MRN: [id number]  Date of Service: 10/28/2019  Completed by: Aniyah Marks RN  Referral Date: 06/28/2019  Program: Case Management (High Risk)    No chief complaint on file.      Patient Summary     Involvement of Care:  Do I have permission to speak with other family members about your care?       Problem List     Patient Active Problem List   Diagnosis    Type 2 diabetes mellitus with diabetic neuropathy, without long-term current use of insulin    Benign hypertension    Hyperlipemia    Microhematuria    Left flank pain    Abdominal aortic atherosclerosis    History of renal cell cancer       Reviewed Active Problem List with patient and/or Caregiver.    Patient Reported Labs & Vitals:  1.  Any Patient Reported Labs & Vitals?     2.  Patient Reported Blood Pressure:     3.  Patient Reported Pulse:     4.  Patient Reported Weight (Kg):     5.  Patient Reported Blood Glucose (mg/dl):       Medical History:  Reviewed medical history with patient and/or caregiver    Social History:  Reviewed social history with patient and/or caregiver    Clinical Assessment     Reviewed and provided basic information on available community resources for mental health, transportation, wellness resources, and palliative care programs with patient and/or caregiver.    Complex Care Plan     Care plan was discussed and completed today with input from patient and/or caregiver.    Goals      Patient/caregiver will accept life style changes to manage and improve Depression prior to discharge from [facility]. - Priority: High      Overall Time to Completion  2 months from 07/02/2019    [facility] Identified Patient Barriers:  Health Literacy: Care plan created  Depression: Care plan created; LCSW referred  Financial Support: Care plan created      RN Identified Patient Barriers:        Short Term Goals  Patient/caregiver will verbalize 2 signs and symptoms  of Depression within 2 weeks.   Interventions   · Assess patient's ability to perform ADLs.  · Collaborate with Physician as appropriate to meet patient needs.  · Complete medication reconciliation.  · Empower patient/caregiver to discuss treatment plan with Physician/care team.  · Encourage Dietary Compliance.  · Encourage Exercise.  · Encourage Medication Compliance.  · Facilitate referral for counseling.  · Provide contact information for 24 hour Crisis Line number- [facility].  · Provide contact information for [facility] contact information.  · Recognize and provide educational material (AIMEE).  · Refer to Outpatient Case Management Social Worker.     Status  · Met      Patient/caregiver will verbalize 2 ways of preventing complications due to disease process within 3 weeks.  Interventions   · Collaborate with Physician as appropriate to meet patient needs.  · Encourage Exercise.  · Encourage Medication Compliance.  · Facilitate referral for counseling.  · Provide contact information for 24 hour Crisis Line number- [facility] LINE.  · Recognize and provide educational material (KRAFERMIN).  · Refer to Outpatient Case Management Social Worker.     Status  · Met           Clinical Reference Documents Added to Patient Instructions       Document    DEPRESSION AFFECTS YOUR MIND AND BODY (ENGLISH)    DEPRESSION, KNOW THE SIGNS AND SYMPTOMS (ENGLISH)    DEPRESSION, COUNSELING FOR (ENGLISH)    DEPRESSION: TIPS TO HELP YOURSELF  (ENGLISH)             Patient/caregiver will accept life style changes to manage and improve diabetes prior to discharge from [facility]. - Priority: High      Overall Time to Completion  2 months from 07/02/2019    [facility] Identified Patient Barriers:  Health Literacy: Care plan created  Depression: Care plan created; LCSW referred  Financial Support: Care plan created      RN Identified Patient Barriers:      Short Term Goals  Patient/caregiver will measure and record the capillary blood glucose as per  MD's recommendations for 2 weeks.  Interventions   · Assess for availability of working glucometer in home setting.  · Collaborate with Physician as appropriate to meet patient needs.  · Educate regarding Physician's recommended Blood Sugar range.  · Mail Blood glucose logs for home use.  · Recognize and provide educational material (AIMEE).     Status  Met    Patient/caregiver will verbalize 2 ways of preventing complications due to disease process within 3 weeks.  Interventions   · Assess for availability of working glucometer in home setting.  · Assess for retention of the signs and symptoms of disease specific exacerbation.  · Collaborate with Physician as appropriate to meet patient needs.  · Encourage Exercise.  · Encourage Medication Compliance.  · Recogonize and provide educational matererial (AIMEE).  ·      Status  Met    Patient/caregiver will verbalize importance of asking questions during appointment with physician and taking an active role during the appointment with physician within 3 weeks.  Interventions   · Collaborate with Physician as appropriate to meet patient needs.  · Educate regarding Physician's recommended Blood Sugar range.  · Empower patient/caregiver to discuss treatment plan with Physician/care team.  · Encourage compliance with Physician follow-ups.  · Encourage Dietary Compliance.  · Encourage Exercise.  · Facilitate to referral to Diabetic educator per pt's request.  · Facilitate to referral to Endocrinologist.  · Recognize and provide educational material (AIMEE).     Status               Met       Clinical Reference Documents Added to Patient Instructions       Document    DEPRESSION AFFECTS YOUR MIND AND BODY (ENGLISH)    DEPRESSION, KNOW THE SIGNS AND SYMPTOMS (ENGLISH)    DEPRESSION, COUNSELING FOR (ENGLISH)    DEPRESSION: TIPS TO HELP YOURSELF  (ENGLISH)  DIABETIC GUIDE(ENGLISH)             Patient/caregiver will have adequate mental health support/resources prior to  discharge from [facility]. - Priority: high      Overall Time to Completion  6 months from 07/18/2019      [facility] Identified Patient Barriers:  Health Literacy: Care plan created  Depression: Care plan created; LCSW referred, SW Assessment Completed, Care Plan Created  Financial Support: Care plan created      RN Identified Patient Barriers    SW Identified Patient Barriers:  Advanced Care Planning, Care Plan Created  Financial Support, Care Plan Created  Mental Health, Care Plan Created      Short Term Goals  Patient/caregiver will contact [facility] for information/enrollment to increase socialization in community within 1 month.  Interventions   · Collaborate with [facility] RN as appropriate to meet patient needs.  · Discuss and provide [facility] on Aging.  · Encourage counseling referral.      Status  · Partially met                    Patient Instructions     Instructions were provided via the [facility] patient resources and are available for the patient to view on the patient portal.    No follow-ups on file.    Summary:Pt reported the she fell 2 days ago. Pt reports that she has some pains in her legs. Pt reports that she tripped on the carpet. Pt reports that she was able to get herself up from the floor. Pt reports that she is taking tylenol and muscle relaxant for the pain. Pt reports that she has a CT scan scheduled for tomorrow. Pt reports that she has an appt scheduled to see the doctor this week. Pt reports that she called transportation to schedule a ride for her upcoming appt. Pt reports that she took two doses of her medications on Saturday. Pt reports that she is confused at times. Pt reports that she takes her medications from the bottles. Pt reports that she would like a pillbox. Pt reports that she is still taking her medication for the depression.    Interventions: Mail a pillbox to the pt.                         Reviewed the case with the LCSW for progress and pending case closure.      Plan:F/U  s/p fall           Case closure      Todays [facility] Self-Management Care Plan was developed with the patients/caregivers input and was based on identified barriers from todays assessment.  Goals were written today with the patient/caregiver and the patient has agreed to work towards these goals to improve his/her overall well-being. Patient verbalized understanding of the care plan, goals, and all of today's instructions. Encouraged patient/caregiver to communicate with his/her physician and health care team about health conditions and the treatment plan.  Provided my contact information today and encouraged patient/caregiver to call me with any questions as needed.

## 2019-10-29 ENCOUNTER — HOSPITAL ENCOUNTER (OUTPATIENT)
Dept: RADIOLOGY | Facility: HOSPITAL | Age: 69
Discharge: HOME | End: 2019-10-29
Attending: UROLOGY
Payer: MEDICARE

## 2019-10-29 DIAGNOSIS — C64.1 RENAL CELL CARCINOMA OF RIGHT KIDNEY: ICD-10-CM

## 2019-10-29 PROCEDURE — 25500020 PHARM REV CODE 255: Mod: HCNC | Performed by: UROLOGY

## 2019-10-29 PROCEDURE — 74178 CT ABD&PLV WO CNTR FLWD CNTR: CPT | Mod: TC,HCNC

## 2019-10-29 PROCEDURE — 71046 XR CHEST PA AND LATERAL: ICD-10-PCS | Mod: 26,HCNC,, | Performed by: RADIOLOGY

## 2019-10-29 PROCEDURE — 74178 CT ABD&PLV WO CNTR FLWD CNTR: CPT | Mod: 26,HCNC,, | Performed by: RADIOLOGY

## 2019-10-29 PROCEDURE — 74178 CT ABDOMEN PELVIS W WO CONTRAST: ICD-10-PCS | Mod: 26,HCNC,, | Performed by: RADIOLOGY

## 2019-10-29 PROCEDURE — 71046 X-RAY EXAM CHEST 2 VIEWS: CPT | Mod: 26,HCNC,, | Performed by: RADIOLOGY

## 2019-10-29 PROCEDURE — 71046 X-RAY EXAM CHEST 2 VIEWS: CPT | Mod: TC,HCNC,FY

## 2019-10-29 RX ADMIN — IOHEXOL 75 ML: 350 INJECTION, SOLUTION INTRAVENOUS at 11:10

## 2019-10-31 ENCOUNTER — OFFICE VISIT (OUTPATIENT)
Dept: UROLOGY | Facility: CLINIC | Age: 69
End: 2019-10-31
Payer: MEDICARE

## 2019-10-31 VITALS
HEIGHT: 64 IN | WEIGHT: 181.44 LBS | HEART RATE: 60 BPM | BODY MASS INDEX: 30.98 KG/M2 | SYSTOLIC BLOOD PRESSURE: 112 MMHG | RESPIRATION RATE: 16 BRPM | DIASTOLIC BLOOD PRESSURE: 70 MMHG

## 2019-10-31 DIAGNOSIS — R31.29 MICROHEMATURIA: ICD-10-CM

## 2019-10-31 DIAGNOSIS — C64.1 RENAL CELL CARCINOMA OF RIGHT KIDNEY: Primary | ICD-10-CM

## 2019-10-31 DIAGNOSIS — L29.9 ITCHING: ICD-10-CM

## 2019-10-31 PROCEDURE — 99214 PR OFFICE/OUTPT VISIT, EST, LEVL IV, 30-39 MIN: ICD-10-PCS | Mod: HCNC,S$GLB,, | Performed by: UROLOGY

## 2019-10-31 PROCEDURE — 99999 PR PBB SHADOW E&M-EST. PATIENT-LVL III: CPT | Mod: PBBFAC,HCNC,, | Performed by: UROLOGY

## 2019-10-31 PROCEDURE — 1101F PR PT FALLS ASSESS DOC 0-1 FALLS W/OUT INJ PAST YR: ICD-10-PCS | Mod: HCNC,CPTII,S$GLB, | Performed by: UROLOGY

## 2019-10-31 PROCEDURE — 99499 UNLISTED E&M SERVICE: CPT | Mod: HCNC,S$GLB,, | Performed by: UROLOGY

## 2019-10-31 PROCEDURE — 3078F PR MOST RECENT DIASTOLIC BLOOD PRESSURE < 80 MM HG: ICD-10-PCS | Mod: HCNC,CPTII,S$GLB, | Performed by: UROLOGY

## 2019-10-31 PROCEDURE — 3078F DIAST BP <80 MM HG: CPT | Mod: HCNC,CPTII,S$GLB, | Performed by: UROLOGY

## 2019-10-31 PROCEDURE — 99214 OFFICE O/P EST MOD 30 MIN: CPT | Mod: HCNC,S$GLB,, | Performed by: UROLOGY

## 2019-10-31 PROCEDURE — 99499 RISK ADDL DX/OHS AUDIT: ICD-10-PCS | Mod: HCNC,S$GLB,, | Performed by: UROLOGY

## 2019-10-31 PROCEDURE — 3074F PR MOST RECENT SYSTOLIC BLOOD PRESSURE < 130 MM HG: ICD-10-PCS | Mod: HCNC,CPTII,S$GLB, | Performed by: UROLOGY

## 2019-10-31 PROCEDURE — 99999 PR PBB SHADOW E&M-EST. PATIENT-LVL III: ICD-10-PCS | Mod: PBBFAC,HCNC,, | Performed by: UROLOGY

## 2019-10-31 PROCEDURE — 3074F SYST BP LT 130 MM HG: CPT | Mod: HCNC,CPTII,S$GLB, | Performed by: UROLOGY

## 2019-10-31 PROCEDURE — 1101F PT FALLS ASSESS-DOCD LE1/YR: CPT | Mod: HCNC,CPTII,S$GLB, | Performed by: UROLOGY

## 2019-10-31 NOTE — PROGRESS NOTES
Subjective:       Valarie Foster is a 69 y.o. female who is an established patient who was referred by Ivan CARDONA for evaluation of renal mass.      CT a/p with contrast 1/26/18 showed a 2.6cm endophytic lesion in R MP, concerning for solid mass vs complex cyst.     She reports L flank pain, contralateral to renal lesion. L flank pain has almost completely resolved. Denies hematuria, LUTS, UTIs. UA macro did show microhematuria recently. Denies family history of  malignancy. She reports being told she had a small kidney on one side in the past. Denies nephrolithiasis. Nonsmoker. Denies previous abdominal surgery though she is s/p hysterectomy. +DM2/HTN.     Cr (1/18) - 0.8  A1c (1/18) - 6.2  UA micro (2/18) - 0 RBCs    CT renal protocol (2/18) - 2.9cm completely endophytic solid enhancing renal mass. Homogenous in appearance.      She is s/p perc renal biopsy to better evaluate lesion (2/18) - oncocytic neoplasm. Mild hematuria < 24 hrs.          CT (6/18) - stable, endophytic enhancing 2.4cm R renal mass. Subtle 6mm focus of enhancement in R lobe of liver - nonspecific.     KISHAN (9/18) - stable 2.8cm R renal mass    She is now s/p open R PNx 10/12/18. Still having incisional pain, worse with palpation. No hernia on CT, mild stranding in subQ.    Path: 1.9cm unclassified RCC, FG 3/4, negative margins. [id number]    CT c/a/p 4/19 - no mets/recurrence  CT ap (10/19) - no recurrence/mets. Abnormal area in tail of pancreas - recommend PCP follow up. CXR - clear.    Cr 1/19 - 0.8, 4/19 - 0.8, 10/19 - 0.9       The following portions of the patient's history were reviewed and updated as appropriate: allergies, current medications, past family history, past medical history, past social history, past surgical history and problem list.    Review of Systems  Constitutional: no fever or chills  ENT: no nasal congestion or sore throat  Respiratory: no cough or shortness of breath  Cardiovascular: no chest pain or  "palpitations  Gastrointestinal: no nausea or vomiting, tolerating diet  Genitourinary: as per HPI  Hematologic/Lymphatic: no easy bruising or lymphadenopathy  Musculoskeletal: no arthralgias or myalgias  Skin: no rashes or lesions  Neurological: no seizures or tremors  Behavioral/Psych: no auditory or visual hallucinations        Objective:    Vitals: /70   Pulse 60   Resp 16   Ht 5' 4" (1.626 m)   Wt 82.3 kg (181 lb 7 oz)   BMI 31.14 kg/m²     Physical Exam   General: well developed, well nourished in no acute distress  Head: normocephalic, atraumatic  Neck: supple, trachea midline, no obvious enlargement of thyroid  HEENT: EOMI, mucus membranes moist, sclera anicteric, no hearing impairment  Lungs: symmetric expansion, non-labored breathing  Cardiovascular: regular rate and rhythm, normal pulses  Abdomen: soft, non tender, non distended, no palpable masses, no hepatosplenomegaly, no hernias, no CVA tenderness  Musculoskeletal: no peripheral edema, normal ROM in bilateral upper and lower extremities  Lymphatics: no cervical or inguinal lymphadenopathy  Skin: no rashes or lesions  Neuro: alert and oriented x 3, no gross deficits  Psych: normal judgment and insight, normal mood/affect and non-anxious  Genitourinary:   patient declined exam    Inc - well healed, minor superior fullness of incision    Lab Review   Urine analysis today in clinic shows - negative    Lab Results   Component Value Date    WBC 4.78 01/22/2019    HGB 13.2 01/22/2019    HCT 41.3 01/22/2019    MCV 85 01/22/2019     01/22/2019     Lab Results   Component Value Date    CREATININE 0.9 10/29/2019    BUN 17 10/29/2019         Imaging  Images and reports were personally reviewed by me and discussed with patient  CT/KISHAN reviewed       Assessment/Plan:      1. Right RCC    - 2.6cm lesion in R MP kidney.   - CT renal protocol 2.5cm endophytic solid enhancing renal mass   - Due to location of tumor, open partial would be difficult with " higher complication rate of bleeding and/or urine leak. Radical nephrectomy with less complication rate.   - Concern for performing radical nephrectomy in diabetic patient for possible benign lesion.   - Recommend perc biopsy to evaluate for possible benign lesion prior to radical nephrectomy. Discussed limitations/risks of perc biopsy. Understands that perc biopsy may also not be definitive in diagnosis   - Perc biopsy 2/18 - oncocytic neoplasm (less favors oncocytoma)   - Discussed options: observation, lap radical Nx, open partial Nx. She has opted to observe for now.    - CT 6/18 - stable 2.4cm R renal mass   - KISHAN 9/18 - 2.8cm R renal mass   - s/p R open PNx on 10/12/18    - unclassified RCC pT1a   - CT c/a/p 6 months (4/18) - no recurrence/mets   - CT ap (10/19) - no recurrence/mets. Abnormal area in tail of pancreas - recommend PCP follow up. CXR - clear. Cr 0.9.    - CXR, CT, BMP in 12 months      2. Microhematuria    - UA micro - 0 RBCs     3. L flank pain   - Unsure etiology   - CT negative for L flank pain   - f/u with PCP      Follow up in  12 months with CXR, CT, labs  Follow up with PCP re: pancreatic lesion

## 2019-11-01 RX ORDER — HYDROXYZINE HYDROCHLORIDE 25 MG/1
TABLET, FILM COATED ORAL
Qty: 90 TABLET | Refills: 1 | Status: SHIPPED | OUTPATIENT
Start: 2019-11-01 | End: 2019-12-18 | Stop reason: SDUPTHER

## 2019-11-04 ENCOUNTER — OUTPATIENT CASE MANAGEMENT (OUTPATIENT)
Dept: ADMINISTRATIVE | Facility: OTHER | Age: 69
End: 2019-11-04

## 2019-11-04 NOTE — PROGRESS NOTES
Summary: Phoned patient. Patient confirmed use of [employer] transportation, stated that she had a positive experience. Patient stated that she has not heard from [facility] yet but will call to follow up. Patient stated that [facility] RN has stated that she will call for an appointment with psychiatry.   [facility]. Patient agreed to case closure.     In basket message sent to O[facility] RN.     Outpatient Care Management   - Care Plan Follow Up    Patient: Valarie Foster  MRN:  [id number]  Date of Service:  11/4/2019  Completed by:  Manasa Bob LCSW  Referral Date: 06/28/2019  Program: Case Management (High Risk)    Reason for Visit   Patient presents with    Update Plan Of Care    Case Closure       Complex Care Plan     Care plan was discussed and completed today with input from patient and/or caregiver.    Goals      Patient/caregiver will accept life style changes to manage and improve Depression prior to discharge from [facility]. - Priority: High      Overall Time to Completion  2 months from 07/02/2019    [facility] Identified Patient Barriers:  Health Literacy: Care plan created  Depression: Care plan created; LCSW referred  Financial Support: Care plan created      RN Identified Patient Barriers:        Short Term Goals  Patient/caregiver will verbalize 2 signs and symptoms of Depression within 2 weeks.   Interventions   · Assess patient's ability to perform ADLs.  · Collaborate with Physician as appropriate to meet patient needs.  · Complete medication reconciliation.  · Empower patient/caregiver to discuss treatment plan with Physician/care team.  · Encourage Dietary Compliance.  · Encourage Exercise.  · Encourage Medication Compliance.  · Facilitate referral for counseling.  · Provide contact information for 24 hour Crisis Line number- [facility] LINE.  · Provide contact information for [facility] contact information.  · Recognize and provide  educational material (AIMEE).  · Refer to Outpatient Case Management Social Worker.     Status  · Met      Patient/caregiver will verbalize 2 ways of preventing complications due to disease process within 3 weeks.  Interventions   · Collaborate with Physician as appropriate to meet patient needs.  · Encourage Exercise.  · Encourage Medication Compliance.  · Facilitate referral for counseling.  · Provide contact information for 24 hour Crisis Line number- [facility] LINE.  · Recognize and provide educational material (AIMEE).  · Refer to Outpatient Case Management Social Worker.     Status  · Met           Clinical Reference Documents Added to Patient Instructions       Document    DEPRESSION AFFECTS YOUR MIND AND BODY (ENGLISH)    DEPRESSION, KNOW THE SIGNS AND SYMPTOMS (ENGLISH)    DEPRESSION, COUNSELING FOR (ENGLISH)    DEPRESSION: TIPS TO HELP YOURSELF  (ENGLISH)             Patient/caregiver will accept life style changes to manage and improve diabetes prior to discharge from [facility]. - Priority: High      Overall Time to Completion  2 months from 07/02/2019    [facility] Identified Patient Barriers:  Health Literacy: Care plan created  Depression: Care plan created; LCSW referred  Financial Support: Care plan created      RN Identified Patient Barriers:      Short Term Goals  Patient/caregiver will measure and record the capillary blood glucose as per MD's recommendations for 2 weeks.  Interventions   · Assess for availability of working glucometer in home setting.  · Collaborate with Physician as appropriate to meet patient needs.  · Educate regarding Physician's recommended Blood Sugar range.  · Mail Blood glucose logs for home use.  · Recognize and provide educational material (AIMEE).     Status  Met    Patient/caregiver will verbalize 2 ways of preventing complications due to disease process within 3 weeks.  Interventions   · Assess for availability of working glucometer in home setting.  · Assess for retention of  the signs and symptoms of disease specific exacerbation.  · Collaborate with Physician as appropriate to meet patient needs.  · Encourage Exercise.  · Encourage Medication Compliance.  · Recogonize and provide educational matererial (AIMEE).  ·      Status  Met    Patient/caregiver will verbalize importance of asking questions during appointment with physician and taking an active role during the appointment with physician within 3 weeks.  Interventions   · Collaborate with Physician as appropriate to meet patient needs.  · Educate regarding Physician's recommended Blood Sugar range.  · Empower patient/caregiver to discuss treatment plan with Physician/care team.  · Encourage compliance with Physician follow-ups.  · Encourage Dietary Compliance.  · Encourage Exercise.  · Facilitate to referral to Diabetic educator per pt's request.  · Facilitate to referral to Endocrinologist.  · Recognize and provide educational material (AIMEE).     Status               Met       Clinical Reference Documents Added to Patient Instructions       Document    DEPRESSION AFFECTS YOUR MIND AND BODY (ENGLISH)    DEPRESSION, KNOW THE SIGNS AND SYMPTOMS (ENGLISH)    DEPRESSION, COUNSELING FOR (ENGLISH)    DEPRESSION: TIPS TO HELP YOURSELF  (ENGLISH)  DIABETIC GUIDE(ENGLISH)                  Patient Instructions     Instructions were provided via the [employer] patient resources and are available for the patient to view on the patient portal.    No follow-ups on file.    Todays [facility] Self-Management Care Plan was developed with the patients/caregivers input and was based on identified barriers from todays assessment.  Goals were written today with the patient/caregiver and the patient has agreed to work towards these goals to improve his/her overall well-being. Patient verbalized understanding of the care plan, goals, and all of today's instructions. Encouraged patient/caregiver to communicate with his/her physician and health care team  about health conditions and the treatment plan.  Provided my contact information today and encouraged patient/caregiver to call me with any questions as needed.

## 2019-11-07 ENCOUNTER — OUTPATIENT CASE MANAGEMENT (OUTPATIENT)
Dept: ADMINISTRATIVE | Facility: OTHER | Age: 69
End: 2019-11-07

## 2019-11-07 ENCOUNTER — TELEPHONE (OUTPATIENT)
Dept: FAMILY MEDICINE | Facility: CLINIC | Age: 69
End: 2019-11-07

## 2019-11-07 NOTE — TELEPHONE ENCOUNTER
----- Message from Aniyah Marks RN sent at 11/7/2019  1:49 PM CST -----  Contact: SANTOS Chandler this is Aniyah with [facility].Pt reports that she has pain in the lower extremities. Pt reports that that she is dragging her right leg at times. Pt reports that she is having a difficult time with ambulating even with the use of a walker. Pt reports that the pain is not relieved by positioning or use of meds. Pt reports that she is taking extra strength tylenol and muscle relaxant.     Please advise  Thank you

## 2019-11-07 NOTE — TELEPHONE ENCOUNTER
----- Message from Valentine Dawkins sent at 11/7/2019  3:11 PM CST -----  Contact: self   Type:  Patient Returning Call    Who Called:  Self     Who Left Message for Patient:  Jessenia     Does the patient know what this is regarding? No     Would the patient rather a call back or a response via [facility]?  Call     Best Call Back Number:[phone]

## 2019-11-07 NOTE — PROGRESS NOTES
Outpatient Care Management  Plan of Care Follow Up Visit    Patient: Valarie Foster  MRN: [id number]  Date of Service: 11/7/2019  Completed by: Aniyah Marks RN  Referral Date: 06/28/2019  Program: Case Management (High Risk)    No chief complaint on file.      Patient Summary     Involvement of Care:  Do I have permission to speak with other family members about your care?       Problem List     Patient Active Problem List   Diagnosis    Type 2 diabetes mellitus with diabetic neuropathy, without long-term current use of insulin    Benign hypertension    Hyperlipemia    Microhematuria    Left flank pain    Abdominal aortic atherosclerosis    History of renal cell cancer       Reviewed Active Problem List with patient and/or Caregiver.    Patient Reported Labs & Vitals:  1.  Any Patient Reported Labs & Vitals?     2.  Patient Reported Blood Pressure:     3.  Patient Reported Pulse:     4.  Patient Reported Weight (Kg):     5.  Patient Reported Blood Glucose (mg/dl):       Medical History:  Reviewed medical history with patient and/or caregiver    Social History:  Reviewed social history with patient and/or caregiver    Clinical Assessment     Reviewed and provided basic information on available community resources for mental health, transportation, wellness resources, and palliative care programs with patient and/or caregiver.    Complex Care Plan     Care plan was discussed and completed today with input from patient and/or caregiver.    Goals      Patient/caregiver will accept life style changes to manage and improve Depression prior to discharge from [facility]. - Priority: High      Overall Time to Completion  2 months from 07/02/2019    [facility] Identified Patient Barriers:  Health Literacy: Care plan created  Depression: Care plan created; LCSW referred  Financial Support: Care plan created      RN Identified Patient Barriers:        Short Term Goals  Patient/caregiver will verbalize 2 signs and symptoms of  Depression within 2 weeks.   Interventions   · Assess patient's ability to perform ADLs.  · Collaborate with Physician as appropriate to meet patient needs.  · Complete medication reconciliation.  · Empower patient/caregiver to discuss treatment plan with Physician/care team.  · Encourage Dietary Compliance.  · Encourage Exercise.  · Encourage Medication Compliance.  · Facilitate referral for counseling.  · Provide contact information for 24 hour Crisis Line number- [facility].  · Provide contact information for [facility] contact information.  · Recognize and provide educational material (AIMEE).  · Refer to Outpatient Case Management Social Worker.     Status  · Met      Patient/caregiver will verbalize 2 ways of preventing complications due to disease process within 3 weeks.  Interventions   · Collaborate with Physician as appropriate to meet patient needs.  · Encourage Exercise.  · Encourage Medication Compliance.  · Facilitate referral for counseling.  · Provide contact information for 24 hour Crisis Line number- [facility] LINE.  · Recognize and provide educational material (KRAFERMIN).  · Refer to Outpatient Case Management Social Worker.     Status  · Met           Clinical Reference Documents Added to Patient Instructions       Document    DEPRESSION AFFECTS YOUR MIND AND BODY (ENGLISH)    DEPRESSION, KNOW THE SIGNS AND SYMPTOMS (ENGLISH)    DEPRESSION, COUNSELING FOR (ENGLISH)    DEPRESSION: TIPS TO HELP YOURSELF  (ENGLISH)             Patient/caregiver will accept life style changes to manage and improve diabetes prior to discharge from [facility]. - Priority: High      Overall Time to Completion  2 months from 07/02/2019    OPC Identified Patient Barriers:  Health Literacy: Care plan created  Depression: Care plan created; LCSW referred  Financial Support: Care plan created      RN Identified Patient Barriers:      Short Term Goals  Patient/caregiver will measure and record the capillary blood glucose as per  MD's recommendations for 2 weeks.  Interventions   · Assess for availability of working glucometer in home setting.  · Collaborate with Physician as appropriate to meet patient needs.  · Educate regarding Physician's recommended Blood Sugar range.  · Mail Blood glucose logs for home use.  · Recognize and provide educational material (AIMEE).     Status  Met    Patient/caregiver will verbalize 2 ways of preventing complications due to disease process within 3 weeks.  Interventions   · Assess for availability of working glucometer in home setting.  · Assess for retention of the signs and symptoms of disease specific exacerbation.  · Collaborate with Physician as appropriate to meet patient needs.  · Encourage Exercise.  · Encourage Medication Compliance.  · Recogonize and provide educational matererial (AIMEE).  ·      Status  Met    Patient/caregiver will verbalize importance of asking questions during appointment with physician and taking an active role during the appointment with physician within 3 weeks.  Interventions   · Collaborate with Physician as appropriate to meet patient needs.  · Educate regarding Physician's recommended Blood Sugar range.  · Empower patient/caregiver to discuss treatment plan with Physician/care team.  · Encourage compliance with Physician follow-ups.  · Encourage Dietary Compliance.  · Encourage Exercise.  · Facilitate to referral to Diabetic educator per pt's request.  · Facilitate to referral to Endocrinologist.  · Recognize and provide educational material (AIMEE).     Status               Met       Clinical Reference Documents Added to Patient Instructions       Document    DEPRESSION AFFECTS YOUR MIND AND BODY (ENGLISH)    DEPRESSION, KNOW THE SIGNS AND SYMPTOMS (ENGLISH)    DEPRESSION, COUNSELING FOR (ENGLISH)    DEPRESSION: TIPS TO HELP YOURSELF  (ENGLISH)  DIABETIC GUIDE(ENGLISH)                  Patient Instructions     Instructions were provided via the [facility] patient  resources and are available for the patient to view on the patient portal.    No follow-ups on file.   Summary:.Pt reports that she has pain in the lower extremities. Pt reports that that she is dragging her right leg at times. Pt reports that she is having a difficult time with ambulating even with the use of a walker. Pt reports that the pain is not relieved by positioning or use of meds. Pt reports that she is taking extra strength tylenol and muscle relaxant. Pt reports that she plans to purchase a shower chair. Pt reports that her bp and bs are fine. Pt reports that she had a visit with the nephrologist. Reports that she was informed that the fall did not cause any problems with the incision. Informed pt of case closure. Informed pt a message will be sent to PCP to notify about c/o pain.    Interventions: Message sent to PCP's staff to notify about pt's c/o pain to both legs                         Case closure      Plan:na          Todays [facility] Self-Management Care Plan was developed with the patients/caregivers input and was based on identified barriers from todays assessment.  Goals were written today with the patient/caregiver and the patient has agreed to work towards these goals to improve his/her overall well-being. Patient verbalized understanding of the care plan, goals, and all of today's instructions. Encouraged patient/caregiver to communicate with his/her physician and health care team about health conditions and the treatment plan.  Provided my contact information today and encouraged patient/caregiver to call me with any questions as needed.

## 2019-11-08 ENCOUNTER — TELEPHONE (OUTPATIENT)
Dept: FAMILY MEDICINE | Facility: CLINIC | Age: 69
End: 2019-11-08

## 2019-11-08 NOTE — TELEPHONE ENCOUNTER
----- Message from Guerline Crouch sent at 11/8/2019 10:31 AM CST -----  Contact: Self  Type: Patient Call Back    Who called:Valarie    What is the request in detail:Patient returned call to office to discuss previous message    Can the clinic reply by [facility]?    Would the patient rather a call back or a response via [facility]? Call    Best call back number:[phone]

## 2019-11-12 ENCOUNTER — OUTPATIENT CASE MANAGEMENT (OUTPATIENT)
Dept: ADMINISTRATIVE | Facility: OTHER | Age: 69
End: 2019-11-12

## 2019-11-12 DIAGNOSIS — K21.9 GASTROESOPHAGEAL REFLUX DISEASE, ESOPHAGITIS PRESENCE NOT SPECIFIED: ICD-10-CM

## 2019-11-12 RX ORDER — FAMOTIDINE 20 MG/1
20 TABLET, FILM COATED ORAL 2 TIMES DAILY
Qty: 180 TABLET | Refills: 1 | Status: SHIPPED | OUTPATIENT
Start: 2019-11-12 | End: 2019-11-12 | Stop reason: SDUPTHER

## 2019-11-12 RX ORDER — FAMOTIDINE 20 MG/1
20 TABLET, FILM COATED ORAL 2 TIMES DAILY
Qty: 180 TABLET | Refills: 1 | Status: SHIPPED | OUTPATIENT
Start: 2019-11-12 | End: 2020-03-17

## 2019-11-12 NOTE — TELEPHONE ENCOUNTER
----- Message from Aniyah Marks RN sent at 11/12/2019  9:48 AM CST -----  Contact: SANTOS Chandler this is Aniyah with [facility]. I spoke with the pt this am. This pt's case is closed with [facility], but she requested a referral for a psychiatrist or counselor due to depression.    Please advise

## 2019-11-12 NOTE — TELEPHONE ENCOUNTER
----- Message from Olga Talamantes sent at 11/12/2019 10:54 AM CST -----  Contact: Self      Can the clinic reply in [facility]: N      Please refill the medication(s) listed below. Please call the patient when the prescription(s) is ready for  at this phone number  [phone]      Medication #1 famotidine (PEPCID) 20 MG tablet    Medication #2       Preferred Pharmacy: [facility] at [phone] fax [phone]

## 2019-11-12 NOTE — TELEPHONE ENCOUNTER
----- Message from Olga Talamantes sent at 11/12/2019 10:54 AM CST -----  Contact: Self      Can the clinic reply in [facility]: N      Please refill the medication(s) listed below. Please call the patient when the prescription(s) is ready for  at this phone number  [phone]      Medication #1 famotidine (PEPCID) 20 MG tablet    Medication #2       Preferred Pharmacy: [facility] at [phone] fax [phone]

## 2019-11-12 NOTE — TELEPHONE ENCOUNTER
Referral to psychiatry placed  Please give Patient # for psych appt      For Adults, please call [phone]   For Children, please call [phone]    Famotidine sent to pharmacy

## 2019-11-12 NOTE — PROGRESS NOTES
Summary    Pt left a message for this CM. Pt reports that she received some calls from [facility] and did not know who was calling and if they were really from [facility]. Pt reports that she is interested in talking with a psychiatrist or counselor for depression.  Interventions:Message sent to PCP'S staff to inform of pt's request.      Plan:na

## 2019-11-22 ENCOUNTER — OFFICE VISIT (OUTPATIENT)
Dept: FAMILY MEDICINE | Facility: CLINIC | Age: 69
End: 2019-11-22
Payer: MEDICARE

## 2019-11-22 VITALS
BODY MASS INDEX: 30.63 KG/M2 | OXYGEN SATURATION: 97 % | HEIGHT: 64 IN | DIASTOLIC BLOOD PRESSURE: 74 MMHG | WEIGHT: 179.44 LBS | TEMPERATURE: 98 F | HEART RATE: 80 BPM | SYSTOLIC BLOOD PRESSURE: 115 MMHG

## 2019-11-22 DIAGNOSIS — E11.40 TYPE 2 DIABETES MELLITUS WITH DIABETIC NEUROPATHY, WITHOUT LONG-TERM CURRENT USE OF INSULIN: ICD-10-CM

## 2019-11-22 DIAGNOSIS — G89.29 CHRONIC PAIN OF BOTH KNEES: ICD-10-CM

## 2019-11-22 DIAGNOSIS — C64.1 RENAL CELL CARCINOMA OF RIGHT KIDNEY: ICD-10-CM

## 2019-11-22 DIAGNOSIS — I70.0 ABDOMINAL AORTIC ATHEROSCLEROSIS: ICD-10-CM

## 2019-11-22 DIAGNOSIS — Q45.3 PANCREATIC ABNORMALITY: Primary | ICD-10-CM

## 2019-11-22 DIAGNOSIS — M25.562 CHRONIC PAIN OF BOTH KNEES: ICD-10-CM

## 2019-11-22 DIAGNOSIS — M25.561 CHRONIC PAIN OF BOTH KNEES: ICD-10-CM

## 2019-11-22 PROCEDURE — 20610 LARGE JOINT ASPIRATION/INJECTION: R KNEE: ICD-10-PCS | Mod: HCNC,RT,S$GLB, | Performed by: FAMILY MEDICINE

## 2019-11-22 PROCEDURE — 3288F PR FALLS RISK ASSESSMENT DOCUMENTED: ICD-10-PCS | Mod: HCNC,CPTII,S$GLB, | Performed by: FAMILY MEDICINE

## 2019-11-22 PROCEDURE — 3044F PR MOST RECENT HEMOGLOBIN A1C LEVEL <7.0%: ICD-10-PCS | Mod: HCNC,CPTII,S$GLB, | Performed by: FAMILY MEDICINE

## 2019-11-22 PROCEDURE — 3074F PR MOST RECENT SYSTOLIC BLOOD PRESSURE < 130 MM HG: ICD-10-PCS | Mod: HCNC,CPTII,S$GLB, | Performed by: FAMILY MEDICINE

## 2019-11-22 PROCEDURE — 3044F HG A1C LEVEL LT 7.0%: CPT | Mod: HCNC,CPTII,S$GLB, | Performed by: FAMILY MEDICINE

## 2019-11-22 PROCEDURE — 1159F PR MEDICATION LIST DOCUMENTED IN MEDICAL RECORD: ICD-10-PCS | Mod: HCNC,S$GLB,, | Performed by: FAMILY MEDICINE

## 2019-11-22 PROCEDURE — 3074F SYST BP LT 130 MM HG: CPT | Mod: HCNC,CPTII,S$GLB, | Performed by: FAMILY MEDICINE

## 2019-11-22 PROCEDURE — 3078F DIAST BP <80 MM HG: CPT | Mod: HCNC,CPTII,S$GLB, | Performed by: FAMILY MEDICINE

## 2019-11-22 PROCEDURE — 1125F AMNT PAIN NOTED PAIN PRSNT: CPT | Mod: HCNC,S$GLB,, | Performed by: FAMILY MEDICINE

## 2019-11-22 PROCEDURE — 99999 PR PBB SHADOW E&M-EST. PATIENT-LVL IV: CPT | Mod: PBBFAC,HCNC,, | Performed by: FAMILY MEDICINE

## 2019-11-22 PROCEDURE — 20610 DRAIN/INJ JOINT/BURSA W/O US: CPT | Mod: HCNC,RT,S$GLB, | Performed by: FAMILY MEDICINE

## 2019-11-22 PROCEDURE — 1125F PR PAIN SEVERITY QUANTIFIED, PAIN PRESENT: ICD-10-PCS | Mod: HCNC,S$GLB,, | Performed by: FAMILY MEDICINE

## 2019-11-22 PROCEDURE — 1159F MED LIST DOCD IN RCRD: CPT | Mod: HCNC,S$GLB,, | Performed by: FAMILY MEDICINE

## 2019-11-22 PROCEDURE — 99999 PR PBB SHADOW E&M-EST. PATIENT-LVL IV: ICD-10-PCS | Mod: PBBFAC,HCNC,, | Performed by: FAMILY MEDICINE

## 2019-11-22 PROCEDURE — 2024F PR 7 FIELD PHOTOS WITH INTERP/ REVIEW: ICD-10-PCS | Mod: HCNC,S$GLB,, | Performed by: FAMILY MEDICINE

## 2019-11-22 PROCEDURE — 99214 OFFICE O/P EST MOD 30 MIN: CPT | Mod: 25,HCNC,S$GLB, | Performed by: FAMILY MEDICINE

## 2019-11-22 PROCEDURE — 3078F PR MOST RECENT DIASTOLIC BLOOD PRESSURE < 80 MM HG: ICD-10-PCS | Mod: HCNC,CPTII,S$GLB, | Performed by: FAMILY MEDICINE

## 2019-11-22 PROCEDURE — 1100F PR PT FALLS ASSESS DOC 2+ FALLS/FALL W/INJURY/YR: ICD-10-PCS | Mod: HCNC,CPTII,S$GLB, | Performed by: FAMILY MEDICINE

## 2019-11-22 PROCEDURE — 3288F FALL RISK ASSESSMENT DOCD: CPT | Mod: HCNC,CPTII,S$GLB, | Performed by: FAMILY MEDICINE

## 2019-11-22 PROCEDURE — 1100F PTFALLS ASSESS-DOCD GE2>/YR: CPT | Mod: HCNC,CPTII,S$GLB, | Performed by: FAMILY MEDICINE

## 2019-11-22 PROCEDURE — 2024F 7 FLD RTA PHOTO EVC RTNOPTHY: CPT | Mod: HCNC,S$GLB,, | Performed by: FAMILY MEDICINE

## 2019-11-22 PROCEDURE — 99214 PR OFFICE/OUTPT VISIT, EST, LEVL IV, 30-39 MIN: ICD-10-PCS | Mod: 25,HCNC,S$GLB, | Performed by: FAMILY MEDICINE

## 2019-11-22 RX ORDER — CYCLOBENZAPRINE HCL 5 MG
5 TABLET ORAL 3 TIMES DAILY PRN
Qty: 90 TABLET | Refills: 1 | Status: SHIPPED | OUTPATIENT
Start: 2019-11-22 | End: 2019-11-22 | Stop reason: SDUPTHER

## 2019-11-22 RX ORDER — CYCLOBENZAPRINE HCL 5 MG
5 TABLET ORAL 3 TIMES DAILY PRN
Qty: 270 TABLET | Refills: 1 | Status: SHIPPED | OUTPATIENT
Start: 2019-11-22 | End: 2019-11-22 | Stop reason: SDUPTHER

## 2019-11-22 RX ORDER — CYCLOBENZAPRINE HCL 5 MG
5 TABLET ORAL 3 TIMES DAILY PRN
Qty: 30 TABLET | Refills: 0 | Status: SHIPPED | OUTPATIENT
Start: 2019-11-22 | End: 2020-03-17

## 2019-11-22 RX ADMIN — TRIAMCINOLONE ACETONIDE 80 MG: 40 INJECTION, SUSPENSION INTRA-ARTICULAR; INTRAMUSCULAR at 08:11

## 2019-11-22 NOTE — PROGRESS NOTES
Routine Office Visit    Patient Name: Valarie Foster    : 1950  MRN: [id number]    Subjective:  Valarie is a 69 y.o. female who presents today for     1. Knee pain - chronic condition for patient but flare up a few weeks ago. Pain is greater on the right knee. Pain is described as throbbing pain that does not radiate. Pain is constant. Patient had recent fall a few weeks ago, but pain started before fall. Patient has had injection in the past with relief of symptoms.   2. Pancreatic abnormality noted on CT - Patient was advised to follow-up with me.     Review of Systems   Constitutional: Negative for chills and fever.   HENT: Negative for congestion.    Eyes: Negative for blurred vision.   Respiratory: Negative for cough.    Cardiovascular: Negative for chest pain.   Gastrointestinal: Negative for abdominal pain, constipation, diarrhea, heartburn, nausea and vomiting.   Genitourinary: Negative for dysuria.   Musculoskeletal: Negative for myalgias.   Skin: Negative for itching and rash.   Neurological: Negative for dizziness and headaches.   Psychiatric/Behavioral: Negative for depression.       Active Problem List  Patient Active Problem List   Diagnosis    Type 2 diabetes mellitus with diabetic neuropathy, without long-term current use of insulin    Benign hypertension    Hyperlipemia    Microhematuria    Left flank pain    Abdominal aortic atherosclerosis    History of renal cell cancer    Renal cell carcinoma of right kidney       Past Surgical History  Past Surgical History:   Procedure Laterality Date    COLONOSCOPY N/A 2018    Procedure: COLONOSCOPY;  Surgeon: Bacilio Mancia MD;  Location: [facility] ENDO;  Service: Endoscopy;  Laterality: N/A;  appt confirmed-ss    HYSTERECTOMY      OOPHORECTOMY      PARTIAL NEPHRECTOMY Right 10/12/2018    Procedure: NEPHRECTOMY, PARTIAL open. Dr Arenas to assist.;  Surgeon: Alejandra Palm MD;  Location: [facility] OR;  Service: Urology;  Laterality: Right;   RN PREOP 10/9/2018----NEEDS H/P       Family History  Family History   Problem Relation Age of Onset    Glaucoma Sister     Cancer Sister         breast cancer    No Known Problems Mother     Glaucoma Father     Diabetes Brother     No Known Problems Maternal Aunt     No Known Problems Maternal Uncle     No Known Problems Paternal Aunt     No Known Problems Paternal Uncle     No Known Problems Maternal Grandmother     No Known Problems Maternal Grandfather     No Known Problems Paternal Grandmother     No Known Problems Paternal Grandfather     Thyroid disease Sister     Blindness Sister         due to trauma during car accident    Diabetes Sister     Amblyopia Neg Hx     Cataracts Neg Hx     Hypertension Neg Hx     Macular degeneration Neg Hx     Retinal detachment Neg Hx     Strabismus Neg Hx     Stroke Neg Hx     Breast cancer Neg Hx        Social History  Social History     Socioeconomic History    Marital status:      Spouse name: Not on file    Number of children: Not on file    Years of education: Not on file    Highest education level: Not on file   Occupational History    Not on file   Social Needs    Financial resource strain: Not on file    Food insecurity:     Worry: Not on file     Inability: Not on file    Transportation needs:     Medical: Not on file     Non-medical: Not on file   Tobacco Use    Smoking status: Never Smoker    Smokeless tobacco: Never Used   Substance and Sexual Activity    Alcohol use: No    Drug use: No    Sexual activity: Not on file   Lifestyle    Physical activity:     Days per week: Not on file     Minutes per session: Not on file    Stress: Only a little   Relationships    Social connections:     Talks on phone: Not on file     Gets together: Not on file     Attends Christianity service: Not on file     Active member of club or organization: Not on file     Attends meetings of clubs or organizations: Not on file     Relationship  "status: Not on file   Other Topics Concern    Not on file   Social History Narrative    Not on file       Medications and Allergies  Reviewed and updated.   Current Outpatient Medications   Medication Sig    ACCU-CHEK ONEIL CONTROL SOLN Soln     ACCU-CHEK ONEIL PLUS METER Misc USE AS DIRECTED    ACCU-CHEK ONEIL PLUS TEST STRP Strp TEST ONE TIME DAILY    ACCU-CHEK SOFTCLIX LANCETS Misc TEST ONE TIME DAILY    amLODIPine (NORVASC) 5 MG tablet Take 1 tablet (5 mg total) by mouth once daily.    BD ALCOHOL SWABS PadM     cetirizine (ZYRTEC) 10 MG tablet Take 1 tablet (10 mg total) by mouth once daily.    cyclobenzaprine (FLEXERIL) 5 MG tablet Take 1 tablet (5 mg total) by mouth 3 (three) times daily as needed for Muscle spasms.    diphenhydrAMINE (BENADRYL) 25 mg capsule Take 1 capsule (25 mg total) by mouth every 6 (six) hours as needed for Itching.    docusate sodium (COLACE) 100 MG capsule Take 1 capsule (100 mg total) by mouth 2 (two) times daily.    famotidine (PEPCID) 20 MG tablet Take 1 tablet (20 mg total) by mouth 2 (two) times daily.    FLUoxetine 20 MG capsule Take 1 capsule (20 mg total) by mouth once daily.    FLUoxetine 20 MG capsule Take 1 capsule (20 mg total) by mouth once daily.    fluticasone (FLONASE) 50 mcg/actuation nasal spray USE 1 SPRAY(S) IN EACH NOSTRIL ONCE DAILY    gabapentin (NEURONTIN) 300 MG capsule Take 2 capsules (600 mg total) by mouth every evening.    hydrOXYzine HCl (ATARAX) 25 MG tablet TAKE 1 TABLET THREE TIMES DAILY AS NEEDED FOR  ITCHING    pravastatin (PRAVACHOL) 10 MG tablet Take 1 tablet (10 mg total) by mouth once daily.    triamcinolone acetonide 0.1% (KENALOG) 0.1 % cream      Current Facility-Administered Medications   Medication    triamcinolone acetonide injection 80 mg       Physical Exam  /74 (BP Location: Left arm, Patient Position: Sitting, BP Method: Medium (Manual))   Pulse 80   Temp 97.8 °F (36.6 °C) (Oral)   Ht 5' 4" (1.626 m)   Wt " 81.4 kg (179 lb 7.3 oz)   SpO2 97%   BMI 30.80 kg/m²   Physical Exam   Constitutional: She is oriented to person, place, and time. She appears well-developed and well-nourished.   HENT:   Head: Normocephalic and atraumatic.   Eyes: Pupils are equal, round, and reactive to light. Conjunctivae and EOM are normal.   Neck: Normal range of motion. Neck supple.   Cardiovascular: Normal rate, regular rhythm and normal heart sounds. Exam reveals no gallop and no friction rub.   No murmur heard.  Pulmonary/Chest: Breath sounds normal. No respiratory distress.   Abdominal: Soft. Bowel sounds are normal. She exhibits no distension. There is no tenderness.   Musculoskeletal: Normal range of motion.   Lymphadenopathy:     She has no cervical adenopathy.   Neurological: She is alert and oriented to person, place, and time.   Skin: Skin is warm.   Psychiatric: She has a normal mood and affect.         Assessment/Plan:  Valarie Foster is a 69 y.o. female who presents today for :    Problem List Items Addressed This Visit        Cardiac/Vascular    Abdominal aortic atherosclerosis    Overview     Patient with Atherosclerosis of the Aorta noted on CT 4/2019.  Stable/asymptomatic. Currently stable on lipid and b/p monitoring.              Oncology    Renal cell carcinoma of right kidney  Noted in chart         Endocrine    Type 2 diabetes mellitus with diabetic neuropathy, without long-term current use of insulin (Chronic)  The current medical regimen is effective;  continue present plan and medications.        Other Visit Diagnoses     Pancreatic abnormality    -  Primary    Relevant Orders    US Abdomen Complete    Chronic pain of both knees        Relevant Medications    cyclobenzaprine (FLEXERIL) 5 MG tablet    triamcinolone acetonide injection 80 mg    Other Relevant Orders    Large Joint Aspiration/Injection: R knee (Completed)            Follow up if symptoms worsen or fail to improve.

## 2019-11-22 NOTE — PATIENT INSTRUCTIONS
Reducing Knee Pain and Swelling    Many treatments can help reduce pain and swelling in your knee. Your healthcare provider or physical therapist may suggest one or more of the following treatments:  · Icing your knee helps reduce swelling. You may be asked to ice your knee once a day or more. Apply ice for about 15 to 20 minutes at a time, with at least 40 minutes between sessions. Always keep a towel between the ice and your skin.   · Keeping your leg raised above your heart helps excess fluid flow out of your knee joint. This reduces swelling.  · Compression means wrapping an elastic bandage or neoprene sleeve snugly around your knees. This keeps fluid from collecting in your knee joint.  · Electrical stimulation, done by a physical therapist or , can help reduce excess fluid in your knee joint.  · Anti-inflammatory medicines may be prescribed by your healthcare provider. You may take pills or receive injections in your knee.  · Isometric (aileen) exercises strengthen the muscles that support your knee joint. They also help reduce excess fluid in your knee.  · Massage helps fluid drain away from your knee.  Date Last Reviewed: 10/13/2015  © 6581-0556 [employer]. [address] [place] [address], [place] [address]. All rights reserved. This information is not intended as a substitute for professional medical care. Always follow your healthcare professional's instructions.

## 2019-11-23 PROBLEM — C64.1 RENAL CELL CARCINOMA OF RIGHT KIDNEY: Status: ACTIVE | Noted: 2019-11-23

## 2019-11-23 RX ORDER — TRIAMCINOLONE ACETONIDE 40 MG/ML
80 INJECTION, SUSPENSION INTRA-ARTICULAR; INTRAMUSCULAR
Status: DISCONTINUED | OUTPATIENT
Start: 2019-11-22 | End: 2019-11-23 | Stop reason: HOSPADM

## 2019-11-23 NOTE — PROCEDURES
Large Joint Aspiration/Injection: R knee  Date/Time: 11/22/2019 8:20 AM  Performed by: Alena Hernandez MD  Authorized by: Alena Hernandez MD     Consent Done?:  Yes (Verbal)  Anesthesia  Local anesthesia not used  Anesthesia: local infiltration    Location:  Knee  Site:  R knee  Needle size:  22 G  Ultrasonic Guidance for needle placement: No  Medications:  80 mg triamcinolone acetonide 40 mg/mL

## 2019-11-27 RX ORDER — ISOPROPYL ALCOHOL 0.75 G/1
SWAB TOPICAL
Qty: 100 EACH | Refills: 1 | Status: SHIPPED | OUTPATIENT
Start: 2019-11-27 | End: 2020-03-17

## 2019-12-17 ENCOUNTER — TELEPHONE (OUTPATIENT)
Dept: FAMILY MEDICINE | Facility: CLINIC | Age: 69
End: 2019-12-17

## 2019-12-17 ENCOUNTER — HOSPITAL ENCOUNTER (OUTPATIENT)
Dept: RADIOLOGY | Facility: HOSPITAL | Age: 69
Discharge: HOME | End: 2019-12-17
Attending: FAMILY MEDICINE
Payer: MEDICARE

## 2019-12-17 DIAGNOSIS — Q45.3 PANCREATIC ABNORMALITY: ICD-10-CM

## 2019-12-17 PROCEDURE — 76700 US ABDOMEN COMPLETE: ICD-10-PCS | Mod: 26,HCNC,, | Performed by: RADIOLOGY

## 2019-12-17 PROCEDURE — 76700 US EXAM ABDOM COMPLETE: CPT | Mod: 26,HCNC,, | Performed by: RADIOLOGY

## 2019-12-17 PROCEDURE — 76700 US EXAM ABDOM COMPLETE: CPT | Mod: TC,HCNC

## 2019-12-17 NOTE — TELEPHONE ENCOUNTER
----- Message from Alena Hernandez MD sent at 12/17/2019 11:27 AM CST -----  No pancreatic abnormality  Gall stones noted in the gallbladder. It is not causing inflammation at this time.   I recommend decreasing fatty foods that you eat   NUTRITIONAL ASSESSMENT GLYCEMIC CONTROL/ PLAN OF CARE     Cyn Mccarthy           48 y.o.           5/2/2017                 1. Sepsis, due to unspecified organism ([address]    2. Acute encephalopathy    3. Hyperglycemia    4. Urinary tract infection without hematuria, site unspecified    5. ESRD on hemodialysis (HCC)       INTERVENTIONS/PLAN:   Continue basal and correctional insulin coverage  Diabetes education (refer to education notes)   Continue inpatient monitoring and intervention  ASSESSMENT:   Pt is a 48year old female with a past medical history significant for CAD, diabetes, ESRD on dialysis, hypertension, lupus, and stroke. Blood glucose has been fluctuating. Noted episode of hypoglycemia yesterday morning. Lantus insulin decreased. Pt is also receiving prednisone 5 mg daily with breakfast likely contributing to blood glucose fluctuations. Pt was seen for diabetes education on 5/04/17 and 5/05/17 including insulin teaching. Provided pt with glucometer. Refer to education notes.      Diabetes Management:   Recent blood glucose:  5/7/2017 08:06 5/7/2017 11:46 5/7/2017 15:59 5/7/2017 21:28 5/8/2017 08:31   134 (H) 222 (H) 86 194 (H) 136 (H)   Within target range (non-ICU: <140; ICU<180): [x] Yes   []  No    Current Insulin regimen:  Lantus insulin 15 units daily   correctional Lispro insulin 4 times daily ACHS (very insulin resistant scale)   Home medication/insulin regimen: none  HbA1c: 12.4% (estimated average glucose 309 mg/dL)   Adequate glycemic control PTA:  [] Yes  [x] No     SUBJECTIVE/OBJECTIVE:   Information obtained from: patient, chart review    Diet: Diabetic Consistent Carbohydrate Soft Solids, Renal, Heart Healthy    Patient Vitals for the past 100 hrs:   % Diet Eaten   05/04/17 1844 75 %     Medications: [x]  Reviewed     Most Recent POC Glucose: Recent Labs      05/08/17   0333  05/07/17   0135  05/06/17   0222   GLU  124*  35*  120*      Labs:   Lab Results   Component Value Date/Time Hemoglobin A1c 12.4 05/03/2017 05:20 AM     Lab Results   Component Value Date/Time    Sodium 136 05/08/2017 03:33 AM    Potassium 3.2 05/08/2017 03:33 AM    Chloride 99 05/08/2017 03:33 AM    CO2 27 05/08/2017 03:33 AM    Anion gap 10 05/08/2017 03:33 AM    Glucose 124 05/08/2017 03:33 AM    BUN 18 05/08/2017 03:33 AM    Creatinine 6.50 05/08/2017 03:33 AM    Calcium 8.6 05/08/2017 03:33 AM    Magnesium 2.0 05/02/2017 11:30 AM    Phosphorus 4.1 05/06/2017 02:22 AM    Albumin 3.2 05/02/2017 11:30 AM     Anthropometrics:   BMI (calculated): 21.7  Wt Readings from Last 1 Encounters:   05/08/17 66.6 kg (146 lb 12.8 oz)    Ht Readings from Last 1 Encounters:   05/03/17 5' 9\" (1.753 m)     Estimated Nutrition Needs: 2,380 Kcal/day, 82 grams protein/day   Based on:   []  Actual BW    [x] SBW (68 kg)             [] Adjusted BW      Nutrition Diagnoses:    Altered nutrition related lab value related to diabetes as evidenced by Hemoglobin A1c of 12.4%  Nutrition Interventions: diabetes education, coordination of care  Goal: Blood glucose will be within target range of  mg/dL by 5/11/17      Nutrition Monitoring and Evaluation    []     Monitor po intake on meal rounds  [x]     Continue inpatient monitoring and intervention  []     Other:    Yuki Ramirez RD, CDE

## 2019-12-17 NOTE — PROGRESS NOTES
No pancreatic abnormality  Gall stones noted in the gallbladder. It is not causing inflammation at this time.   I recommend decreasing fatty foods that you eat

## 2019-12-18 DIAGNOSIS — F32.A DEPRESSION, UNSPECIFIED DEPRESSION TYPE: ICD-10-CM

## 2019-12-18 DIAGNOSIS — L29.9 ITCHING: ICD-10-CM

## 2019-12-18 RX ORDER — FLUOXETINE HYDROCHLORIDE 20 MG/1
20 CAPSULE ORAL DAILY
Qty: 90 CAPSULE | Refills: 1 | Status: SHIPPED | OUTPATIENT
Start: 2019-12-18 | End: 2020-12-17

## 2019-12-18 NOTE — TELEPHONE ENCOUNTER
----- Message from Ginny Naqvi sent at 12/18/2019 11:34 AM CST -----  Contact: Self  Type: RX Refill Request    Who Called: self    Have you contacted your pharmacy: no  Refill or New Rx: refill   RX Name and Strength: FLUoxetine 20 MG capsule    Preferred Pharmacy with phone number: [facility] [address] [facility] [phone] (Phone)  [phone] (Fax)    Local or Mail Order: local    Ordering Provider: graciela    Would the patient rather a call back or a response via [facility]? Call     Best Call Back Number: [phone]

## 2019-12-19 RX ORDER — FLUOXETINE HYDROCHLORIDE 20 MG/1
20 CAPSULE ORAL DAILY
Qty: 90 CAPSULE | Refills: 3 | Status: SHIPPED | OUTPATIENT
Start: 2019-12-19 | End: 2020-12-18

## 2019-12-19 RX ORDER — HYDROXYZINE HYDROCHLORIDE 25 MG/1
TABLET, FILM COATED ORAL
Qty: 90 TABLET | Refills: 0 | Status: SHIPPED | OUTPATIENT
Start: 2019-12-19 | End: 2020-04-22

## 2019-12-19 NOTE — TELEPHONE ENCOUNTER
Spoke with the pt and she would like this rx sent to [facility].  Pt states she is having memory loss.  Pt is always shaking and she has pin stick feelings in her feet.  Patient thinking about relative, who are dead.  Patient can't focus and she lives a long.  Pt don't want to hurt her self and but is runs through her mind, for the last couple of months.  I was only calling, to see where the pt want her refill sent to sent.

## 2019-12-19 NOTE — TELEPHONE ENCOUNTER
----- Message from Valentine Dawkins sent at 12/19/2019  7:43 AM CST -----  Contact: [facility]  Type: RX Refill Request    Who Called:  Emerson Rodríguez     Have you contacted your pharmacy: yes    Refill or New Rx: refill     RX Name and Strength: FLUoxetine 20 MG capsule    Preferred Pharmacy with phone number:[facility] - [place] [id number] [facility] [phone] (Phone)  [phone] (Fax)        Local or Mail Order:Local     Ordering Provider: Dr. Hernandez     Would the patient rather a call back or a response via [facility]?  Call

## 2019-12-30 ENCOUNTER — OFFICE VISIT (OUTPATIENT)
Dept: FAMILY MEDICINE | Facility: CLINIC | Age: 69
End: 2019-12-30
Payer: MEDICARE

## 2019-12-30 VITALS
HEIGHT: 64 IN | SYSTOLIC BLOOD PRESSURE: 114 MMHG | TEMPERATURE: 98 F | OXYGEN SATURATION: 96 % | BODY MASS INDEX: 29.96 KG/M2 | DIASTOLIC BLOOD PRESSURE: 76 MMHG | WEIGHT: 175.5 LBS | HEART RATE: 77 BPM

## 2019-12-30 DIAGNOSIS — C64.1 RENAL CELL CARCINOMA OF RIGHT KIDNEY: ICD-10-CM

## 2019-12-30 DIAGNOSIS — E11.40 TYPE 2 DIABETES MELLITUS WITH DIABETIC NEUROPATHY, WITHOUT LONG-TERM CURRENT USE OF INSULIN: ICD-10-CM

## 2019-12-30 DIAGNOSIS — E78.5 HYPERLIPIDEMIA, UNSPECIFIED HYPERLIPIDEMIA TYPE: Chronic | ICD-10-CM

## 2019-12-30 DIAGNOSIS — I10 BENIGN HYPERTENSION: Chronic | ICD-10-CM

## 2019-12-30 DIAGNOSIS — M25.562 CHRONIC PAIN OF BOTH KNEES: Primary | ICD-10-CM

## 2019-12-30 DIAGNOSIS — G89.29 CHRONIC PAIN OF BOTH KNEES: Primary | ICD-10-CM

## 2019-12-30 DIAGNOSIS — R49.0 VOICE HOARSENESS: ICD-10-CM

## 2019-12-30 DIAGNOSIS — M25.561 CHRONIC PAIN OF BOTH KNEES: Primary | ICD-10-CM

## 2019-12-30 PROCEDURE — 1125F PR PAIN SEVERITY QUANTIFIED, PAIN PRESENT: ICD-10-PCS | Mod: HCNC,S$GLB,, | Performed by: FAMILY MEDICINE

## 2019-12-30 PROCEDURE — 1159F MED LIST DOCD IN RCRD: CPT | Mod: HCNC,S$GLB,, | Performed by: FAMILY MEDICINE

## 2019-12-30 PROCEDURE — 1101F PT FALLS ASSESS-DOCD LE1/YR: CPT | Mod: HCNC,CPTII,S$GLB, | Performed by: FAMILY MEDICINE

## 2019-12-30 PROCEDURE — 1101F PR PT FALLS ASSESS DOC 0-1 FALLS W/OUT INJ PAST YR: ICD-10-PCS | Mod: HCNC,CPTII,S$GLB, | Performed by: FAMILY MEDICINE

## 2019-12-30 PROCEDURE — 2024F PR 7 FIELD PHOTOS WITH INTERP/ REVIEW: ICD-10-PCS | Mod: HCNC,S$GLB,, | Performed by: FAMILY MEDICINE

## 2019-12-30 PROCEDURE — 3044F HG A1C LEVEL LT 7.0%: CPT | Mod: HCNC,CPTII,S$GLB, | Performed by: FAMILY MEDICINE

## 2019-12-30 PROCEDURE — 3078F DIAST BP <80 MM HG: CPT | Mod: HCNC,CPTII,S$GLB, | Performed by: FAMILY MEDICINE

## 2019-12-30 PROCEDURE — 99214 OFFICE O/P EST MOD 30 MIN: CPT | Mod: 25,HCNC,S$GLB, | Performed by: FAMILY MEDICINE

## 2019-12-30 PROCEDURE — 20610 LARGE JOINT ASPIRATION/INJECTION: ICD-10-PCS | Mod: HCNC,LT,S$GLB, | Performed by: FAMILY MEDICINE

## 2019-12-30 PROCEDURE — 1125F AMNT PAIN NOTED PAIN PRSNT: CPT | Mod: HCNC,S$GLB,, | Performed by: FAMILY MEDICINE

## 2019-12-30 PROCEDURE — 3074F PR MOST RECENT SYSTOLIC BLOOD PRESSURE < 130 MM HG: ICD-10-PCS | Mod: HCNC,CPTII,S$GLB, | Performed by: FAMILY MEDICINE

## 2019-12-30 PROCEDURE — 99214 PR OFFICE/OUTPT VISIT, EST, LEVL IV, 30-39 MIN: ICD-10-PCS | Mod: 25,HCNC,S$GLB, | Performed by: FAMILY MEDICINE

## 2019-12-30 PROCEDURE — 99999 PR PBB SHADOW E&M-EST. PATIENT-LVL III: CPT | Mod: PBBFAC,HCNC,, | Performed by: FAMILY MEDICINE

## 2019-12-30 PROCEDURE — 99999 PR PBB SHADOW E&M-EST. PATIENT-LVL III: ICD-10-PCS | Mod: PBBFAC,HCNC,, | Performed by: FAMILY MEDICINE

## 2019-12-30 PROCEDURE — 3044F PR MOST RECENT HEMOGLOBIN A1C LEVEL <7.0%: ICD-10-PCS | Mod: HCNC,CPTII,S$GLB, | Performed by: FAMILY MEDICINE

## 2019-12-30 PROCEDURE — 3074F SYST BP LT 130 MM HG: CPT | Mod: HCNC,CPTII,S$GLB, | Performed by: FAMILY MEDICINE

## 2019-12-30 PROCEDURE — 1159F PR MEDICATION LIST DOCUMENTED IN MEDICAL RECORD: ICD-10-PCS | Mod: HCNC,S$GLB,, | Performed by: FAMILY MEDICINE

## 2019-12-30 PROCEDURE — 2024F 7 FLD RTA PHOTO EVC RTNOPTHY: CPT | Mod: HCNC,S$GLB,, | Performed by: FAMILY MEDICINE

## 2019-12-30 PROCEDURE — 3078F PR MOST RECENT DIASTOLIC BLOOD PRESSURE < 80 MM HG: ICD-10-PCS | Mod: HCNC,CPTII,S$GLB, | Performed by: FAMILY MEDICINE

## 2019-12-30 PROCEDURE — 20610 DRAIN/INJ JOINT/BURSA W/O US: CPT | Mod: HCNC,LT,S$GLB, | Performed by: FAMILY MEDICINE

## 2019-12-30 RX ORDER — TRIAMCINOLONE ACETONIDE 40 MG/ML
80 INJECTION, SUSPENSION INTRA-ARTICULAR; INTRAMUSCULAR
Status: DISCONTINUED | OUTPATIENT
Start: 2019-12-30 | End: 2019-12-30 | Stop reason: HOSPADM

## 2019-12-30 RX ADMIN — TRIAMCINOLONE ACETONIDE 80 MG: 40 INJECTION, SUSPENSION INTRA-ARTICULAR; INTRAMUSCULAR at 08:12

## 2019-12-30 NOTE — PROCEDURES
Large Joint Aspiration/Injection  Date/Time: 12/30/2019 8:00 AM  Performed by: Alena Hernandez MD  Authorized by: Alena Hernandez MD     Consent Done?:  Yes (Verbal)  Indications:  Pain  Procedure site marked: Yes    Anesthesia  Local anesthesia used  Anesthetic: lidocaine 2% with epinephrine    Location:  Knee  Needle size:  22 G  Ultrasonic Guidance for needle placement: No  Medications:  80 mg triamcinolone acetonide 40 mg/mL  Patient tolerance:  Patient tolerated the procedure well with no immediate complications

## 2019-12-30 NOTE — PROGRESS NOTES
Routine Office Visit    Patient Name: Valarie Foster    : 1950  MRN: [id number]    Subjective:  Valarie is a 69 y.o. female who presents today for     1. Left knee pain - chronic condition for patient, however, recent flare up started approximately a few weeks ago - Pain is described as a sharp pain that starts in left knee and radiates up to left thigh. Pain is aggravated with walking or at night. Pain is alleviated with ice and tylenol. Patient had injection in the past with relief of pain. No recent injections. She is requesting an injection into left knee.   2. Diabetes mellitus - diet controlled - she is not on medications. She was advised to monitor it at home. She has noticed blood glucose ranging from 140s-170s.    3. Voice hoarseness - occurred gradually. Patient states hoarseness is constant. No post nasal drip. No reflux symptoms. No pain. No difficulty swallowing / eating.     Review of Systems   Constitutional: Negative for chills and fever.   HENT: Negative for congestion.    Eyes: Negative for blurred vision.   Respiratory: Negative for cough.    Cardiovascular: Negative for chest pain.   Gastrointestinal: Negative for abdominal pain, constipation, diarrhea, heartburn, nausea and vomiting.   Genitourinary: Negative for dysuria.   Musculoskeletal: Negative for myalgias.   Skin: Negative for itching and rash.   Neurological: Negative for dizziness and headaches.   Psychiatric/Behavioral: Negative for depression.        Active Problem List  Patient Active Problem List   Diagnosis    Type 2 diabetes mellitus with diabetic neuropathy, without long-term current use of insulin    Benign hypertension    Hyperlipemia    Microhematuria    Left flank pain    Abdominal aortic atherosclerosis    History of renal cell cancer    Renal cell carcinoma of right kidney       Past Surgical History  Past Surgical History:   Procedure Laterality Date    COLONOSCOPY N/A 2018    Procedure: COLONOSCOPY;   Surgeon: Bacilio Mancia MD;  Location: [facility] END[facility];  Service: Endoscopy;  Laterality: N/A;  appt confirmed-ss    HYSTERECTOMY      OOPHORECTOMY      PARTIAL NEPHRECTOMY Right 10/12/2018    Procedure: NEPHRECTOMY, PARTIAL open. Dr Arenas to assist.;  Surgeon: Alejandra Palm MD;  Location: [facility] OR;  Service: Urology;  Laterality: Right;  RN PREOP 10/9/2018----NEEDS H/P       Family History  Family History   Problem Relation Age of Onset    Glaucoma Sister     Cancer Sister         breast cancer    No Known Problems Mother     Glaucoma Father     Diabetes Brother     No Known Problems Maternal Aunt     No Known Problems Maternal Uncle     No Known Problems Paternal Aunt     No Known Problems Paternal Uncle     No Known Problems Maternal Grandmother     No Known Problems Maternal Grandfather     No Known Problems Paternal Grandmother     No Known Problems Paternal Grandfather     Thyroid disease Sister     Blindness Sister         due to trauma during car accident    Diabetes Sister     Amblyopia Neg Hx     Cataracts Neg Hx     Hypertension Neg Hx     Macular degeneration Neg Hx     Retinal detachment Neg Hx     Strabismus Neg Hx     Stroke Neg Hx     Breast cancer Neg Hx        Social History  Social History     Socioeconomic History    Marital status:      Spouse name: Not on file    Number of children: Not on file    Years of education: Not on file    Highest education level: Not on file   Occupational History    Not on file   Social Needs    Financial resource strain: Not on file    Food insecurity:     Worry: Not on file     Inability: Not on file    Transportation needs:     Medical: Not on file     Non-medical: Not on file   Tobacco Use    Smoking status: Never Smoker    Smokeless tobacco: Never Used   Substance and Sexual Activity    Alcohol use: No    Drug use: No    Sexual activity: Not on file   Lifestyle    Physical activity:     Days per week: Not on  file     Minutes per session: Not on file    Stress: Only a little   Relationships    Social connections:     Talks on phone: Not on file     Gets together: Not on file     Attends [employer] service: Not on file     Active member of club or organization: Not on file     Attends meetings of clubs or organizations: Not on file     Relationship status: Not on file   Other Topics Concern    Not on file   Social History Narrative    Not on file       Medications and Allergies  Reviewed and updated.   Current Outpatient Medications   Medication Sig    ACCU-CHEK ONEIL CONTROL SOLN Soln     ACCU-CHEK ONEIL PLUS METER Misc USE AS DIRECTED    ACCU-CHEK ONEIL PLUS TEST STRP Strp TEST ONE TIME DAILY    ACCU-CHEK SOFTCLIX LANCETS Misc TEST ONE TIME DAILY    amLODIPine (NORVASC) 5 MG tablet Take 1 tablet (5 mg total) by mouth once daily.    BD ALCOHOL SWABS PadM TEST ONE TIME DAILY    cyclobenzaprine (FLEXERIL) 5 MG tablet Take 1 tablet (5 mg total) by mouth 3 (three) times daily as needed for Muscle spasms.    diphenhydrAMINE (BENADRYL) 25 mg capsule Take 1 capsule (25 mg total) by mouth every 6 (six) hours as needed for Itching.    docusate sodium (COLACE) 100 MG capsule Take 1 capsule (100 mg total) by mouth 2 (two) times daily.    famotidine (PEPCID) 20 MG tablet Take 1 tablet (20 mg total) by mouth 2 (two) times daily.    FLUoxetine 20 MG capsule Take 1 capsule (20 mg total) by mouth once daily.    FLUoxetine 20 MG capsule Take 1 capsule (20 mg total) by mouth once daily.    fluticasone (FLONASE) 50 mcg/actuation nasal spray USE 1 SPRAY(S) IN EACH NOSTRIL ONCE DAILY    gabapentin (NEURONTIN) 300 MG capsule Take 2 capsules (600 mg total) by mouth every evening.    hydrOXYzine HCl (ATARAX) 25 MG tablet TAKE 1 TABLET THREE TIMES DAILY AS NEEDED FOR  ITCHING    pravastatin (PRAVACHOL) 10 MG tablet Take 1 tablet (10 mg total) by mouth once daily.    triamcinolone acetonide 0.1% (KENALOG) 0.1 % cream      "cetirizine (ZYRTEC) 10 MG tablet Take 1 tablet (10 mg total) by mouth once daily.     Current Facility-Administered Medications   Medication    triamcinolone acetonide injection 80 mg       Physical Exam  /76 (BP Location: Left arm, Patient Position: Sitting, BP Method: Large (Manual))   Pulse 77   Temp 97.7 °F (36.5 °C) (Oral)   Ht 5' 4" (1.626 m)   Wt 79.6 kg (175 lb 7.8 oz)   SpO2 96%   BMI 30.12 kg/m²   Physical Exam   Constitutional: She is oriented to person, place, and time. She appears well-developed and well-nourished.   HENT:   Head: Normocephalic and atraumatic.   Eyes: Pupils are equal, round, and reactive to light. Conjunctivae and EOM are normal.   Neck: Normal range of motion. Neck supple.   Cardiovascular: Normal rate, regular rhythm and normal heart sounds. Exam reveals no gallop and no friction rub.   No murmur heard.  Pulmonary/Chest: Breath sounds normal. No respiratory distress.   Abdominal: Soft. Bowel sounds are normal. She exhibits no distension. There is no tenderness.   Musculoskeletal: Normal range of motion.        Left knee: She exhibits normal range of motion and no MCL laxity. Tenderness found.   Lymphadenopathy:     She has no cervical adenopathy.   Neurological: She is alert and oriented to person, place, and time.   Skin: Skin is warm.   Psychiatric: She has a normal mood and affect.         Assessment/Plan:  Valarie Foster is a 69 y.o. female who presents today for :    Problem List Items Addressed This Visit        Cardiac/Vascular    Benign hypertension (Chronic)  The current medical regimen is effective;  continue present plan and medications.       Hyperlipemia (Chronic)  The current medical regimen is effective;  continue present plan and medications.         Oncology    Renal cell carcinoma of right kidney  Continue f/u with Dr. Palm         Endocrine    Type 2 diabetes mellitus with diabetic neuropathy, without long-term current use of insulin (Chronic)    " Relevant Orders    CBC auto differential    Comprehensive metabolic panel    Lipid panel    Hemoglobin A1c    TSH      Other Visit Diagnoses     Chronic pain of both knees    -  Primary    Relevant Medications    triamcinolone acetonide injection 80 mg  Patient tolerated procedure well       Other Relevant Orders    Large Joint Aspiration/Injection (Completed)    Voice hoarseness        Relevant Orders    Ambulatory referral to ENT            Follow up in about 6 months (around 6/30/2020), or if symptoms worsen or fail to improve.

## 2020-01-17 ENCOUNTER — PATIENT OUTREACH (OUTPATIENT)
Dept: ADMINISTRATIVE | Facility: OTHER | Age: 70
End: 2020-01-17

## 2020-02-28 DIAGNOSIS — E11.9 TYPE 2 DIABETES MELLITUS WITHOUT COMPLICATION, UNSPECIFIED WHETHER LONG TERM INSULIN USE: ICD-10-CM

## 2020-03-17 DIAGNOSIS — M25.561 CHRONIC PAIN OF BOTH KNEES: ICD-10-CM

## 2020-03-17 DIAGNOSIS — G89.29 CHRONIC PAIN OF BOTH KNEES: ICD-10-CM

## 2020-03-17 DIAGNOSIS — K21.9 GASTROESOPHAGEAL REFLUX DISEASE, ESOPHAGITIS PRESENCE NOT SPECIFIED: ICD-10-CM

## 2020-03-17 DIAGNOSIS — M25.562 CHRONIC PAIN OF BOTH KNEES: ICD-10-CM

## 2020-03-17 RX ORDER — CYCLOBENZAPRINE HCL 5 MG
TABLET ORAL
Qty: 270 TABLET | Refills: 1 | Status: SHIPPED | OUTPATIENT
Start: 2020-03-17 | End: 2020-09-02

## 2020-03-17 RX ORDER — ISOPROPYL ALCOHOL 0.75 G/1
SWAB TOPICAL
Qty: 100 EACH | Refills: 1 | Status: SHIPPED | OUTPATIENT
Start: 2020-03-17 | End: 2020-09-02

## 2020-03-17 RX ORDER — FAMOTIDINE 20 MG/1
TABLET, FILM COATED ORAL
Qty: 180 TABLET | Refills: 1 | Status: SHIPPED | OUTPATIENT
Start: 2020-03-17 | End: 2020-09-14 | Stop reason: SDUPTHER

## 2020-03-17 NOTE — TELEPHONE ENCOUNTER
----- Message from Kendell Jeter sent at 3/17/2020 10:33 AM CDT -----  Contact: Self      Name of Who is Calling: DARIA DE LA FUENTE [[id number]]      What is the request in detail: Pt wanted to know if she could come in for a flu shot.Please contact to further discuss and advise.        Can the clinic reply by [facility]: N      What Number to Call Back if not in [facility]: [phone]

## 2020-04-18 RX ORDER — LANCETS
EACH MISCELLANEOUS
Qty: 100 EACH | Refills: 1 | Status: SHIPPED | OUTPATIENT
Start: 2020-04-18 | End: 2020-08-27

## 2020-04-18 RX ORDER — BLOOD SUGAR DIAGNOSTIC
STRIP MISCELLANEOUS
Qty: 100 STRIP | Refills: 2 | Status: SHIPPED | OUTPATIENT
Start: 2020-04-18 | End: 2020-11-12

## 2020-04-21 DIAGNOSIS — L29.9 ITCHING: ICD-10-CM

## 2020-04-22 RX ORDER — HYDROXYZINE HYDROCHLORIDE 25 MG/1
TABLET, FILM COATED ORAL
Qty: 90 TABLET | Refills: 0 | Status: SHIPPED | OUTPATIENT
Start: 2020-04-22 | End: 2020-06-03

## 2020-04-24 ENCOUNTER — TELEPHONE (OUTPATIENT)
Dept: FAMILY MEDICINE | Facility: CLINIC | Age: 70
End: 2020-04-24

## 2020-04-24 NOTE — TELEPHONE ENCOUNTER
Patient calling stating that she has been nervous and jittery, she states she has been hearing things in her house but no one else is home and states that the other day she couldn't remember her birthday. She states she feels out of it and not herself. Please advise.

## 2020-04-24 NOTE — TELEPHONE ENCOUNTER
Is there someone there to help her?  How long has she been feeling this way  What's her sugar?    She may need to go to there ER

## 2020-05-02 DIAGNOSIS — E11.40 TYPE 2 DIABETES MELLITUS WITH DIABETIC NEUROPATHY, WITHOUT LONG-TERM CURRENT USE OF INSULIN: Chronic | ICD-10-CM

## 2020-05-04 RX ORDER — GABAPENTIN 300 MG/1
CAPSULE ORAL
Qty: 180 CAPSULE | Refills: 1 | Status: SHIPPED | OUTPATIENT
Start: 2020-05-04 | End: 2020-09-14 | Stop reason: SDUPTHER

## 2020-05-07 DIAGNOSIS — E11.9 TYPE 2 DIABETES MELLITUS WITHOUT COMPLICATION: ICD-10-CM

## 2020-05-10 RX ORDER — PRAVASTATIN SODIUM 10 MG/1
TABLET ORAL
Qty: 90 TABLET | Refills: 1 | Status: SHIPPED | OUTPATIENT
Start: 2020-05-10 | End: 2020-09-14 | Stop reason: SDUPTHER

## 2020-06-30 ENCOUNTER — OFFICE VISIT (OUTPATIENT)
Dept: FAMILY MEDICINE | Facility: CLINIC | Age: 70
End: 2020-06-30
Payer: MEDICARE

## 2020-06-30 ENCOUNTER — LAB VISIT (OUTPATIENT)
Dept: LAB | Facility: HOSPITAL | Age: 70
End: 2020-06-30
Attending: FAMILY MEDICINE
Payer: MEDICARE

## 2020-06-30 VITALS
HEART RATE: 79 BPM | BODY MASS INDEX: 32.18 KG/M2 | TEMPERATURE: 98 F | HEIGHT: 64 IN | WEIGHT: 188.5 LBS | OXYGEN SATURATION: 95 % | SYSTOLIC BLOOD PRESSURE: 110 MMHG | DIASTOLIC BLOOD PRESSURE: 74 MMHG

## 2020-06-30 DIAGNOSIS — E78.5 HYPERLIPIDEMIA, UNSPECIFIED HYPERLIPIDEMIA TYPE: ICD-10-CM

## 2020-06-30 DIAGNOSIS — Z86.010 HISTORY OF COLON POLYPS: ICD-10-CM

## 2020-06-30 DIAGNOSIS — G89.29 CHRONIC PAIN OF BOTH KNEES: ICD-10-CM

## 2020-06-30 DIAGNOSIS — M25.561 CHRONIC PAIN OF BOTH KNEES: ICD-10-CM

## 2020-06-30 DIAGNOSIS — C64.1 RENAL CELL CARCINOMA OF RIGHT KIDNEY: ICD-10-CM

## 2020-06-30 DIAGNOSIS — I70.0 ABDOMINAL AORTIC ATHEROSCLEROSIS: ICD-10-CM

## 2020-06-30 DIAGNOSIS — I10 BENIGN HYPERTENSION: Chronic | ICD-10-CM

## 2020-06-30 DIAGNOSIS — M25.562 CHRONIC PAIN OF BOTH KNEES: ICD-10-CM

## 2020-06-30 DIAGNOSIS — F32.0 MILD MAJOR DEPRESSION: ICD-10-CM

## 2020-06-30 DIAGNOSIS — M79.671 INFLAMMATORY HEEL PAIN, RIGHT: Primary | ICD-10-CM

## 2020-06-30 DIAGNOSIS — E11.40 TYPE 2 DIABETES MELLITUS WITH DIABETIC NEUROPATHY, WITHOUT LONG-TERM CURRENT USE OF INSULIN: ICD-10-CM

## 2020-06-30 DIAGNOSIS — M18.10 OSTEOARTHRITIS OF THUMB, UNSPECIFIED LATERALITY: ICD-10-CM

## 2020-06-30 LAB
ALBUMIN SERPL BCP-MCNC: 3.8 G/DL (ref 3.5–5.2)
ALP SERPL-CCNC: 109 U/L (ref 55–135)
ALT SERPL W/O P-5'-P-CCNC: 8 U/L (ref 10–44)
ANION GAP SERPL CALC-SCNC: 8 MMOL/L (ref 8–16)
AST SERPL-CCNC: 12 U/L (ref 10–40)
BASOPHILS # BLD AUTO: 0 K/UL (ref 0–0.2)
BASOPHILS NFR BLD: 0 % (ref 0–1.9)
BILIRUB SERPL-MCNC: 0.3 MG/DL (ref 0.1–1)
BUN SERPL-MCNC: 14 MG/DL (ref 8–23)
CALCIUM SERPL-MCNC: 10.1 MG/DL (ref 8.7–10.5)
CHLORIDE SERPL-SCNC: 102 MMOL/L (ref 95–110)
CHOLEST SERPL-MCNC: 203 MG/DL (ref 120–199)
CHOLEST/HDLC SERPL: 3.1 {RATIO} (ref 2–5)
CO2 SERPL-SCNC: 30 MMOL/L (ref 23–29)
CREAT SERPL-MCNC: 0.8 MG/DL (ref 0.5–1.4)
DIFFERENTIAL METHOD: ABNORMAL
EOSINOPHIL # BLD AUTO: 0 K/UL (ref 0–0.5)
EOSINOPHIL NFR BLD: 0 % (ref 0–8)
ERYTHROCYTE [DISTWIDTH] IN BLOOD BY AUTOMATED COUNT: 14.3 % (ref 11.5–14.5)
EST. GFR  (AFRICAN AMERICAN): >60 ML/MIN/1.73 M^2
EST. GFR  (NON AFRICAN AMERICAN): >60 ML/MIN/1.73 M^2
ESTIMATED AVG GLUCOSE: 146 MG/DL (ref 68–131)
GLUCOSE SERPL-MCNC: 108 MG/DL (ref 70–110)
HBA1C MFR BLD HPLC: 6.7 % (ref 4–5.6)
HCT VFR BLD AUTO: 40.9 % (ref 37–48.5)
HDLC SERPL-MCNC: 66 MG/DL (ref 40–75)
HDLC SERPL: 32.5 % (ref 20–50)
HGB BLD-MCNC: 12.8 G/DL (ref 12–16)
IMM GRANULOCYTES # BLD AUTO: 0.04 K/UL (ref 0–0.04)
IMM GRANULOCYTES NFR BLD AUTO: 0.7 % (ref 0–0.5)
LDLC SERPL CALC-MCNC: 120 MG/DL (ref 63–159)
LYMPHOCYTES # BLD AUTO: 1.2 K/UL (ref 1–4.8)
LYMPHOCYTES NFR BLD: 21.9 % (ref 18–48)
MCH RBC QN AUTO: 26.7 PG (ref 27–31)
MCHC RBC AUTO-ENTMCNC: 31.3 G/DL (ref 32–36)
MCV RBC AUTO: 85 FL (ref 82–98)
MONOCYTES # BLD AUTO: 0.4 K/UL (ref 0.3–1)
MONOCYTES NFR BLD: 6.3 % (ref 4–15)
NEUTROPHILS # BLD AUTO: 3.9 K/UL (ref 1.8–7.7)
NEUTROPHILS NFR BLD: 71.1 % (ref 38–73)
NONHDLC SERPL-MCNC: 137 MG/DL
NRBC BLD-RTO: 0 /100 WBC
PLATELET # BLD AUTO: 278 K/UL (ref 150–350)
PMV BLD AUTO: 11.1 FL (ref 9.2–12.9)
POTASSIUM SERPL-SCNC: 4.5 MMOL/L (ref 3.5–5.1)
PROT SERPL-MCNC: 7.5 G/DL (ref 6–8.4)
RBC # BLD AUTO: 4.8 M/UL (ref 4–5.4)
SODIUM SERPL-SCNC: 140 MMOL/L (ref 136–145)
TRIGL SERPL-MCNC: 85 MG/DL (ref 30–150)
TSH SERPL DL<=0.005 MIU/L-ACNC: 0.77 UIU/ML (ref 0.4–4)
WBC # BLD AUTO: 5.53 K/UL (ref 3.9–12.7)

## 2020-06-30 PROCEDURE — 36415 COLL VENOUS BLD VENIPUNCTURE: CPT | Mod: HCNC,PO

## 2020-06-30 PROCEDURE — 3078F PR MOST RECENT DIASTOLIC BLOOD PRESSURE < 80 MM HG: ICD-10-PCS | Mod: HCNC,CPTII,S$GLB, | Performed by: FAMILY MEDICINE

## 2020-06-30 PROCEDURE — 80061 LIPID PANEL: CPT | Mod: HCNC

## 2020-06-30 PROCEDURE — 99999 PR PBB SHADOW E&M-EST. PATIENT-LVL V: CPT | Mod: PBBFAC,HCNC,, | Performed by: FAMILY MEDICINE

## 2020-06-30 PROCEDURE — 3074F PR MOST RECENT SYSTOLIC BLOOD PRESSURE < 130 MM HG: ICD-10-PCS | Mod: HCNC,CPTII,S$GLB, | Performed by: FAMILY MEDICINE

## 2020-06-30 PROCEDURE — 3074F SYST BP LT 130 MM HG: CPT | Mod: HCNC,CPTII,S$GLB, | Performed by: FAMILY MEDICINE

## 2020-06-30 PROCEDURE — 80053 COMPREHEN METABOLIC PANEL: CPT | Mod: HCNC

## 2020-06-30 PROCEDURE — 99499 UNLISTED E&M SERVICE: CPT | Mod: HCNC,S$GLB,, | Performed by: FAMILY MEDICINE

## 2020-06-30 PROCEDURE — 3078F DIAST BP <80 MM HG: CPT | Mod: HCNC,CPTII,S$GLB, | Performed by: FAMILY MEDICINE

## 2020-06-30 PROCEDURE — 99397 PER PM REEVAL EST PAT 65+ YR: CPT | Mod: HCNC,S$GLB,, | Performed by: FAMILY MEDICINE

## 2020-06-30 PROCEDURE — 3044F PR MOST RECENT HEMOGLOBIN A1C LEVEL <7.0%: ICD-10-PCS | Mod: HCNC,CPTII,S$GLB, | Performed by: FAMILY MEDICINE

## 2020-06-30 PROCEDURE — 99999 PR PBB SHADOW E&M-EST. PATIENT-LVL V: ICD-10-PCS | Mod: PBBFAC,HCNC,, | Performed by: FAMILY MEDICINE

## 2020-06-30 PROCEDURE — 99499 RISK ADDL DX/OHS AUDIT: ICD-10-PCS | Mod: HCNC,S$GLB,, | Performed by: FAMILY MEDICINE

## 2020-06-30 PROCEDURE — 84443 ASSAY THYROID STIM HORMONE: CPT | Mod: HCNC

## 2020-06-30 PROCEDURE — 83036 HEMOGLOBIN GLYCOSYLATED A1C: CPT | Mod: HCNC

## 2020-06-30 PROCEDURE — 99397 PR PREVENTIVE VISIT,EST,65 & OVER: ICD-10-PCS | Mod: HCNC,S$GLB,, | Performed by: FAMILY MEDICINE

## 2020-06-30 PROCEDURE — 85025 COMPLETE CBC W/AUTO DIFF WBC: CPT | Mod: HCNC

## 2020-06-30 PROCEDURE — 3044F HG A1C LEVEL LT 7.0%: CPT | Mod: HCNC,CPTII,S$GLB, | Performed by: FAMILY MEDICINE

## 2020-06-30 RX ORDER — CELECOXIB 200 MG/1
200 CAPSULE ORAL DAILY
Qty: 90 CAPSULE | Refills: 1 | Status: SHIPPED | OUTPATIENT
Start: 2020-06-30 | End: 2020-11-27

## 2020-06-30 RX ORDER — CELECOXIB 200 MG/1
200 CAPSULE ORAL 2 TIMES DAILY
Qty: 60 CAPSULE | Refills: 1 | Status: SHIPPED | OUTPATIENT
Start: 2020-06-30 | End: 2020-06-30 | Stop reason: SDUPTHER

## 2020-06-30 NOTE — PROGRESS NOTES
Routine Office Visit    Patient Name: Valarie Foster    : 1950  MRN: [id number]    Subjective:  Valarie is a 70 y.o. female who presents today for     1. Left knee pain - chronic condition for patient, however, recent flare up started approximately a few weeks ago - Pain is described as a sharp pain that starts in left knee and radiates up to left thigh. Pain is aggravated with walking or at night. Pain is alleviated with ice and tylenol. Patient had injection in the past with relief of pain. No recent injections. She is requesting an injection into left knee.   2. Right heel pain - started a few weeks ago - Patient got up and had severe pain. Pain is located in back of heel. Pain is worse with walking. Pain is throughout the day.   3. Diabetes mellitus - diet controlled - she is not on medications. She was advised to monitor it at home. She has noticed blood glucose ranging from 140s-170s  4. Right thumb - Patient has history of thumb fracture - approximately 3 years ago - Patient now has intermittent episodes of pain in her thumb. Pain was aggravated after walking her dog and dog pulled on leash.   5. Annual physical     Review of Systems   Constitutional: Negative for chills and fever.   HENT: Negative for congestion.    Eyes: Negative for blurred vision.   Respiratory: Negative for cough.    Cardiovascular: Negative for chest pain.   Gastrointestinal: Negative for abdominal pain, constipation, diarrhea, heartburn, nausea and vomiting.   Genitourinary: Negative for dysuria.   Musculoskeletal: Negative for myalgias.   Skin: Negative for itching and rash.   Neurological: Negative for dizziness and headaches.   Psychiatric/Behavioral: Negative for depression.       Active Problem List  Patient Active Problem List   Diagnosis    Type 2 diabetes mellitus with diabetic neuropathy, without long-term current use of insulin    Benign hypertension    Hyperlipemia    Microhematuria    Left flank pain    Abdominal  aortic atherosclerosis    History of renal cell cancer    Renal cell carcinoma of right kidney       Past Surgical History  Past Surgical History:   Procedure Laterality Date    COLONOSCOPY N/A 2/5/2018    Procedure: COLONOSCOPY;  Surgeon: Bacilio Mancia MD;  Location: [facility] ENDO;  Service: Endoscopy;  Laterality: N/A;  appt confirmed-ss    HYSTERECTOMY      OOPHORECTOMY      PARTIAL NEPHRECTOMY Right 10/12/2018    Procedure: NEPHRECTOMY, PARTIAL open. Dr Arenas to assist.;  Surgeon: Alejandra Palm MD;  Location: [facility] OR;  Service: Urology;  Laterality: Right;  RN PREOP 10/9/2018----NEEDS H/P       Family History  Family History   Problem Relation Age of Onset    Glaucoma Sister     Cancer Sister         breast cancer    No Known Problems Mother     Glaucoma Father     Diabetes Brother     No Known Problems Maternal Aunt     No Known Problems Maternal Uncle     No Known Problems Paternal Aunt     No Known Problems Paternal Uncle     No Known Problems Maternal Grandmother     No Known Problems Maternal Grandfather     No Known Problems Paternal Grandmother     No Known Problems Paternal Grandfather     Thyroid disease Sister     Blindness Sister         due to trauma during car accident    Diabetes Sister     Amblyopia Neg Hx     Cataracts Neg Hx     Hypertension Neg Hx     Macular degeneration Neg Hx     Retinal detachment Neg Hx     Strabismus Neg Hx     Stroke Neg Hx     Breast cancer Neg Hx        Social History  Social History     Socioeconomic History    Marital status:      Spouse name: Not on file    Number of children: Not on file    Years of education: Not on file    Highest education level: Not on file   Occupational History    Not on file   Social Needs    Financial resource strain: Not on file    Food insecurity     Worry: Not on file     Inability: Not on file    Transportation needs     Medical: Not on file     Non-medical: Not on file   Tobacco  Use    Smoking status: Never Smoker    Smokeless tobacco: Never Used   Substance and Sexual Activity    Alcohol use: No    Drug use: No    Sexual activity: Not on file   Lifestyle    Physical activity     Days per week: Not on file     Minutes per session: Not on file    Stress: Only a little   Relationships    Social connections     Talks on phone: Not on file     Gets together: Not on file     Attends [employer] service: Not on file     Active member of club or organization: Not on file     Attends meetings of clubs or organizations: Not on file     Relationship status: Not on file   Other Topics Concern    Not on file   Social History Narrative    Not on file       Medications and Allergies  Reviewed and updated.   Current Outpatient Medications   Medication Sig    ACCU-CHEK ONEIL CONTROL SOLN Soln     ACCU-CHEK ONEIL PLUS METER Misc USE AS DIRECTED    ACCU-CHEK ONEIL PLUS TEST STRP Strp TEST ONE TIME DAILY    ACCU-CHEK SOFTCLIX LANCETS Misc TEST ONE TIME DAILY    amLODIPine (NORVASC) 5 MG tablet TAKE 1 TABLET EVERY DAY    BD ALCOHOL SWABS PadM TEST ONE TIME DAILY    cetirizine (ZYRTEC) 10 MG tablet Take 1 tablet (10 mg total) by mouth once daily.    cyclobenzaprine (FLEXERIL) 5 MG tablet TAKE 1 TABLET THREE TIMES DAILY AS NEEDED FOR MUSCLE SPASMS    diphenhydrAMINE (BENADRYL) 25 mg capsule Take 1 capsule (25 mg total) by mouth every 6 (six) hours as needed for Itching.    docusate sodium (COLACE) 100 MG capsule Take 1 capsule (100 mg total) by mouth 2 (two) times daily.    famotidine (PEPCID) 20 MG tablet TAKE 1 TABLET TWICE DAILY    FLUoxetine 20 MG capsule Take 1 capsule (20 mg total) by mouth once daily.    FLUoxetine 20 MG capsule Take 1 capsule (20 mg total) by mouth once daily.    fluticasone (FLONASE) 50 mcg/actuation nasal spray USE 1 SPRAY(S) IN EACH NOSTRIL ONCE DAILY    gabapentin (NEURONTIN) 300 MG capsule TAKE 2 CAPSULES EVERY EVENING    hydrOXYzine HCL (ATARAX) 25 MG tablet  "TAKE 1 TABLET THREE TIMES DAILY AS NEEDED FOR  ITCHING    pravastatin (PRAVACHOL) 10 MG tablet TAKE 1 TABLET ONE TIME DAILY    triamcinolone acetonide 0.1% (KENALOG) 0.1 % cream     celecoxib (CELEBREX) 200 MG capsule Take 1 capsule (200 mg total) by mouth once daily.     No current facility-administered medications for this visit.        Physical Exam  /74 (BP Location: Left arm, Patient Position: Sitting, BP Method: Large (Manual))   Pulse 79   Temp 98.1 °F (36.7 °C) (Oral)   Ht 5' 4" (1.626 m)   Wt 85.5 kg (188 lb 7.9 oz)   SpO2 95%   BMI 32.35 kg/m²   Physical Exam  Constitutional:       Appearance: She is well-developed.   HENT:      Head: Normocephalic and atraumatic.   Eyes:      Conjunctiva/sclera: Conjunctivae normal.      Pupils: Pupils are equal, round, and reactive to light.   Neck:      Musculoskeletal: Normal range of motion and neck supple.   Cardiovascular:      Rate and Rhythm: Normal rate and regular rhythm.      Heart sounds: Normal heart sounds. No murmur. No friction rub. No gallop.    Pulmonary:      Effort: No respiratory distress.      Breath sounds: Normal breath sounds.   Abdominal:      General: Bowel sounds are normal. There is no distension.      Palpations: Abdomen is soft.      Tenderness: There is no abdominal tenderness.   Musculoskeletal: Normal range of motion.      Left knee: She exhibits normal meniscus and no MCL laxity. Tenderness found.        Hands:    Lymphadenopathy:      Cervical: No cervical adenopathy.   Skin:     General: Skin is warm.   Neurological:      Mental Status: She is alert and oriented to person, place, and time.           Assessment/Plan:  Valarie Foster is a 70 y.o. female who presents today for :    Problem List Items Addressed This Visit        Cardiac/Vascular    Abdominal aortic atherosclerosis (Chronic)    Overview     Patient with Atherosclerosis of the Aorta noted on CT 4/2019.  Stable/asymptomatic. Currently stable on lipid and b/p " monitoring.           Benign hypertension (Chronic)  The current medical regimen is effective;  continue present plan and medications.      Hyperlipemia (Chronic)  The current medical regimen is effective;  continue present plan and medications.         Oncology    Renal cell carcinoma of right kidney  Recommend to f/u with urology         Endocrine    Type 2 diabetes mellitus with diabetic neuropathy, without long-term current use of insulin (Chronic)  The current medical regimen is effective;  continue present plan and medications.        Other Visit Diagnoses     Inflammatory heel pain, right    -  Primary    Relevant Orders    Ambulatory referral/consult to Podiatry    Chronic pain of both knees    / thumb pain arthralgia     Relevant Orders    Ambulatory referral/consult to Orthopedics  Start celebrex  Advise that this medication can affect kidneys  Advise to only take for severe pain   Recommend f/u with ortho       History of colon polyps        Relevant Orders    Case request GI: COLONOSCOPY (Completed)      Mild major depression  The current medical regimen is effective;  continue present plan and medications.    Annual physical  Health Maintenance       Date Due Completion Date    Shingles Vaccine (1 of 2) 05/31/1998 ---    Hemoglobin A1c 01/06/2020 7/6/2019    Colorectal Cancer Screening 02/05/2020 2/5/2018    Eye Exam 02/14/2020 2/14/2019    Urine Microalbumin 02/15/2020 2/15/2019    Foot Exam 08/27/2020 8/27/2019    Override on 8/27/2019: Done (normal)    Override on 8/16/2018: Done (Dr. Lashay Dee ( Podiatry ))    Lipid Panel 07/06/2020 7/6/2019    Mammogram 03/06/2021 3/6/2019    DEXA SCAN 04/02/2021 4/2/2018    High Dose Statin 06/30/2021 6/30/2020    TETANUS VACCINE 04/30/2028 4/30/2018        I addressed all major concerns as it related to health maintenance.  All were ordered and scheduled based on the patients wishes.  Any additional health maintenance will be readdressed at the next physical if  declined or deferred by the patient.    Follow up in about 6 months (around 12/30/2020).

## 2020-07-01 DIAGNOSIS — L29.9 ITCHING: ICD-10-CM

## 2020-07-01 RX ORDER — HYDROXYZINE HYDROCHLORIDE 25 MG/1
TABLET, FILM COATED ORAL
Qty: 90 TABLET | Refills: 0 | Status: SHIPPED | OUTPATIENT
Start: 2020-07-01 | End: 2020-07-20

## 2020-07-02 PROBLEM — F32.0 MILD MAJOR DEPRESSION: Status: ACTIVE | Noted: 2020-07-02

## 2020-07-02 NOTE — TELEPHONE ENCOUNTER
----- Message from Zulema Brady RN sent at 6/29/2020 10:35 AM CDT -----  Regarding: HCC Query  Hi Dr. Hernandez,  An Ambulatory CDI pre-visit query was placed on  today's appointment asking for more specificity of Dx: Depression if possible.  You can locate the query in Pre Charting , the Rooming tab of the visit encounter or on your daily patient list.    Please place on Problem List and then add to encounter notes and Visit Diagnosis in order to populate onto the Active Diagnosis Review (HCC Scorecard).    Please let me know if you have any questions.      Thank you,  Zulema Brady RN  [facility] Documentation [facility]  [phone]

## 2020-07-21 ENCOUNTER — PATIENT OUTREACH (OUTPATIENT)
Dept: ADMINISTRATIVE | Facility: OTHER | Age: 70
End: 2020-07-21

## 2020-07-21 DIAGNOSIS — Z12.11 COLON CANCER SCREENING: Primary | ICD-10-CM

## 2020-07-21 NOTE — PROGRESS NOTES
Requested updates within Care Everywhere.  Patient's chart was reviewed for overdue MEGAN topics.  Immunizations reconciled.

## 2020-07-23 ENCOUNTER — HOSPITAL ENCOUNTER (OUTPATIENT)
Dept: RADIOLOGY | Facility: HOSPITAL | Age: 70
Discharge: HOME | End: 2020-07-23
Attending: PODIATRIST
Payer: MEDICARE

## 2020-07-23 ENCOUNTER — OFFICE VISIT (OUTPATIENT)
Dept: PODIATRY | Facility: CLINIC | Age: 70
End: 2020-07-23
Payer: MEDICARE

## 2020-07-23 VITALS — BODY MASS INDEX: 32.18 KG/M2 | HEIGHT: 64 IN | WEIGHT: 188.5 LBS

## 2020-07-23 DIAGNOSIS — M76.60 ACHILLES TENDINITIS, UNSPECIFIED LATERALITY: ICD-10-CM

## 2020-07-23 DIAGNOSIS — M76.60 ACHILLES TENDINITIS, UNSPECIFIED LATERALITY: Primary | ICD-10-CM

## 2020-07-23 DIAGNOSIS — M79.671 INFLAMMATORY HEEL PAIN, RIGHT: ICD-10-CM

## 2020-07-23 PROCEDURE — 99999 PR PBB SHADOW E&M-EST. PATIENT-LVL IV: ICD-10-PCS | Mod: PBBFAC,HCNC,, | Performed by: PODIATRIST

## 2020-07-23 PROCEDURE — 1159F MED LIST DOCD IN RCRD: CPT | Mod: HCNC,S$GLB,, | Performed by: PODIATRIST

## 2020-07-23 PROCEDURE — 1101F PR PT FALLS ASSESS DOC 0-1 FALLS W/OUT INJ PAST YR: ICD-10-PCS | Mod: HCNC,CPTII,S$GLB, | Performed by: PODIATRIST

## 2020-07-23 PROCEDURE — 73650 X-RAY EXAM OF HEEL: CPT | Mod: TC,HCNC,FY,PO,RT

## 2020-07-23 PROCEDURE — 73650 XR CALCANEUS 2 VIEW RIGHT: ICD-10-PCS | Mod: 26,HCNC,RT, | Performed by: RADIOLOGY

## 2020-07-23 PROCEDURE — 3008F BODY MASS INDEX DOCD: CPT | Mod: HCNC,CPTII,S$GLB, | Performed by: PODIATRIST

## 2020-07-23 PROCEDURE — 73650 X-RAY EXAM OF HEEL: CPT | Mod: 26,HCNC,RT, | Performed by: RADIOLOGY

## 2020-07-23 PROCEDURE — 1101F PT FALLS ASSESS-DOCD LE1/YR: CPT | Mod: HCNC,CPTII,S$GLB, | Performed by: PODIATRIST

## 2020-07-23 PROCEDURE — 3008F PR BODY MASS INDEX (BMI) DOCUMENTED: ICD-10-PCS | Mod: HCNC,CPTII,S$GLB, | Performed by: PODIATRIST

## 2020-07-23 PROCEDURE — 99999 PR PBB SHADOW E&M-EST. PATIENT-LVL IV: CPT | Mod: PBBFAC,HCNC,, | Performed by: PODIATRIST

## 2020-07-23 PROCEDURE — 1125F AMNT PAIN NOTED PAIN PRSNT: CPT | Mod: HCNC,S$GLB,, | Performed by: PODIATRIST

## 2020-07-23 PROCEDURE — 99214 OFFICE O/P EST MOD 30 MIN: CPT | Mod: HCNC,S$GLB,, | Performed by: PODIATRIST

## 2020-07-23 PROCEDURE — 1125F PR PAIN SEVERITY QUANTIFIED, PAIN PRESENT: ICD-10-PCS | Mod: HCNC,S$GLB,, | Performed by: PODIATRIST

## 2020-07-23 PROCEDURE — 99214 PR OFFICE/OUTPT VISIT, EST, LEVL IV, 30-39 MIN: ICD-10-PCS | Mod: HCNC,S$GLB,, | Performed by: PODIATRIST

## 2020-07-23 PROCEDURE — 1159F PR MEDICATION LIST DOCUMENTED IN MEDICAL RECORD: ICD-10-PCS | Mod: HCNC,S$GLB,, | Performed by: PODIATRIST

## 2020-07-23 RX ORDER — DICLOFENAC SODIUM 10 MG/G
2 GEL TOPICAL DAILY
Qty: 100 G | Refills: 3 | Status: SHIPPED | OUTPATIENT
Start: 2020-07-23 | End: 2021-03-09 | Stop reason: SDUPTHER

## 2020-07-23 NOTE — LETTER
July 25, 2020      Alena Hernandez MD  [address] [place] [address]           [facility]  [address] [place] [address]  Phone: [phone]          Patient: Valarie Foster   MR Number: [id number]   YOB: 1950   Date of Visit: 7/23/2020       Dear Dr. Alena Hernandez:    Thank you for referring Valarie Foster to me for evaluation. Attached you will find relevant portions of my assessment and plan of care.    If you have questions, please do not hesitate to call me. I look forward to following Valarie Foster along with you.    Sincerely,    Geetha Marino, DPVINCENT    Enclosure  CC:  No Recipients    If you would like to receive this communication electronically, please contact [email or web address] or [phone] to request more information on [employer] Link access.    For providers and/or their staff who would like to refer a patient to [facility], please contact us through our one-stop-shop provider referral line, [facility] , at [phone].    If you feel you have received this communication in error or would no longer like to receive these types of communications, please e-mail [email or web address]

## 2020-07-23 NOTE — PATIENT INSTRUCTIONS
Understanding Achilles Tendonitis    Achilles tendonitis is an overuse injury. It results in inflammation of the Achilles tendon. This tendon is found on the back of the ankle. It links the calf muscle to the heel bone. It helps you do pushing-off movements like running or standing on your toes.     How to say it  uh-GENEVIEVE-zoyaz ten-dun-I-tis   What causes Achilles tendonitis?  Achilles tendonitis can happen if you do an activity like running, walking, or jumping too much. This overuse can strain, or pull, the tendon. It may lead to minor tearing of the tendon. An injury to the lower leg or foot can also cause it.  If you dont warm up before taking part in sports such as basketball, you are more likely to suffer from this condition. You are also more prone to it if you do too much of such an activity too quickly. Proper training and rest can help prevent it.  Symptoms of Achilles tendonitis  The main symptom of Achilles tendonitis is pain. This pain mostly happens when you move the ankle. The tendon may also feel stiff after a period of no activity, such as sleeping. It may also become swollen. You may hear a crackling sound when you move your ankle.  Treatment for Achilles tendonitis  Symptoms often get better after starting treatment. A full recovery may take several months. Treatments include:  · Rest. You should stop or change the activity that caused the injury. The tendon will then have time to heal.  · Cold or heat pack. These help reduce pain and swelling.  · Prescription or over-the-counter pain medicines. These help reduce pain and swelling.  · Shoe inserts. These devices can reduce strain on the Achilles tendon when you move. You may then feel less pain.  · Stretching and strengthening exercises. Certain exercises can help you regain flexibility and strength in your Achilles tendon.  · Surgery. This option can fix the injured tendon. But you dont often need it unless other treatments dont work.     When  to call your healthcare provider   Call your healthcare provider right away if you have any of these:  · Fever of 100.4°F (38°C) or higher, or as directed  · Pain that gets worse  · Symptoms that dont get better, or get worse  · New symptoms    Date Last Reviewed: 3/10/2016  © 3840-2671 [employer]. [address] [place] [address]. All rights reserved. This information is not intended as a substitute for professional medical care. Always follow your healthcare professional's instructions.      Shoe recommendations: (try 6pm.com, zappos.com , nordstromrack.com, or shoes.SocialStay for discounted prices) you can visit DSW shoes in [place] as well    Asics (GT 1000 or gel foundations), new balance, saucony (stabil c3),  [place] (transcend), vionic, propet, Hoka (One)  (tennis shoe)    soft brand, clarks, crocs, naot, aerosoles, naturalizers, SAS, ecco, claudia, josefina abbey boggs (dress shoes)    Vionic, volitiles, burkenstocks, fitflops, naot, propet (sandals)    Nike comfort thong sandals, crocs,dr comfort  (house shoes)

## 2020-07-24 DIAGNOSIS — M25.562 PAIN IN BOTH KNEES, UNSPECIFIED CHRONICITY: Primary | ICD-10-CM

## 2020-07-24 DIAGNOSIS — M25.561 PAIN IN BOTH KNEES, UNSPECIFIED CHRONICITY: Primary | ICD-10-CM

## 2020-07-26 NOTE — PROGRESS NOTES
Subjective:      Patient ID: Valarie Foster is a 70 y.o. female.    Chief Complaint: Heel Pain (right foot)    Valarie is a 70 y.o. female who presents to the podiatry clinic  with complaint of  right foot pain. Onset of the symptoms was several weeks ago. Precipitating event: none known. Current symptoms include: ability to bear weight, but with some pain. Aggravating factors: any weight bearing. Symptoms have progressed to a point and plateaued. Patient has had no prior foot problems. Evaluation to date: none. Treatment to date: none. Patients rates pain 5/10 on pain scale.        Review of Systems   Constitution: Negative for chills.   Cardiovascular: Negative for chest pain and claudication.   Respiratory: Negative for cough.    Skin: Positive for color change, dry skin and nail changes.   Musculoskeletal: Positive for joint pain.   Gastrointestinal: Negative for nausea.   Neurological: Positive for paresthesias. Negative for numbness.           Objective:      Physical Exam  Constitutional:       Appearance: She is well-developed.      Comments: Oriented to time, place, and person.   Cardiovascular:      Comments: DP and PT pulses are palpable bilaterally. 3 sec capillary refill time and toes and feet are warm to touch proximally .  There is  hair growth on the feet and toes b/l. There is no edema b/l. No spider veins or varicosities present b/l.     Musculoskeletal:      Comments: Equinus noted b/l ankles with < 10 deg DF noted. MMT 5/5 in DF/PF/Inv/Ev resistance with no reproduction of pain in any direction. Passive range of motion of ankle and pedal joints is painless b/l.    Decreased right ankle joint ROM, pain with palpation of posterior 1/3 calcaneus at region of Achilles tendon insertion.      Feet:      Right foot:      Skin integrity: No callus or dry skin.      Left foot:      Skin integrity: No callus or dry skin.   Lymphadenopathy:      Comments: Negative lymphadenopathy bilateral popliteal fossa and  tarsal tunnel.   Skin:     Comments: No open lesions, lacerations or wounds noted.Interdigital spaces clean, dry and intact b/l. No erythema noted to b/l foot.  Nails normal color and trophic qualities.     Neurological:      Mental Status: She is alert.      Comments: Light touch, proprioception, and sharp/dull sensation are all intact bilaterally. Protective threshold with the Lagrange-Wienstein monofilament is intact bilaterally.    Psychiatric:         Behavior: Behavior is cooperative.               Assessment:       Encounter Diagnoses   Name Primary?    Inflammatory heel pain, right     Achilles tendinitis, unspecified laterality Yes         Plan:       Valarie was seen today for heel pain.    Diagnoses and all orders for this visit:    Achilles tendinitis, unspecified laterality  -     X-Ray Calcaneus 2 View Right; Future    Inflammatory heel pain, right  -     Ambulatory referral/consult to Podiatry    Other orders  -     diclofenac sodium (VOLTAREN) 1 % Gel; Apply 2 g topically once daily.      I counseled the patient on her conditions, their implications and medical management.    Right foot xray to assess underlying deformity and for underlying osseous pathology.     Rx Voltaren gel to be applied to affected area up to 3-4 x daily as needed for pain    - Patient will stretch the tendo achilles complex three times daily as demonstrated in the office to lengthen heel cord and increase motion at ankle offloading the load on the forefoot and MTPJ..  Literature was dispensed illustrating proper stretching technique.  - Patient will obtain over the counter arch supports and wear them in shoes whenever possible to support the arches and decrease motion at the MTPJ.        CAM boot dispensed and applied to affected foot. Advised to use at all times while weightbearing and ambulating even for few minutes. Advised to use sneaker style shoe in opposite foot to maintain balance. Ok to remove  at night but reapply if  patient is to get up in the middle of the night. Verbalized understanding. Advised to use for 3 -4 weeks.     F/u 6 weeks.

## 2020-07-27 ENCOUNTER — OFFICE VISIT (OUTPATIENT)
Dept: ORTHOPEDICS | Facility: CLINIC | Age: 70
End: 2020-07-27
Attending: ORTHOPAEDIC SURGERY
Payer: MEDICARE

## 2020-07-27 VITALS
WEIGHT: 198.63 LBS | BODY MASS INDEX: 33.91 KG/M2 | RESPIRATION RATE: 17 BRPM | OXYGEN SATURATION: 99 % | TEMPERATURE: 98 F | HEART RATE: 80 BPM | DIASTOLIC BLOOD PRESSURE: 82 MMHG | SYSTOLIC BLOOD PRESSURE: 142 MMHG | HEIGHT: 64 IN

## 2020-07-27 DIAGNOSIS — M25.561 CHRONIC PAIN OF BOTH KNEES: ICD-10-CM

## 2020-07-27 DIAGNOSIS — M25.562 CHRONIC PAIN OF BOTH KNEES: ICD-10-CM

## 2020-07-27 DIAGNOSIS — G89.29 CHRONIC PAIN OF BOTH KNEES: ICD-10-CM

## 2020-07-27 DIAGNOSIS — M17.0 PRIMARY OSTEOARTHRITIS OF BOTH KNEES: Primary | ICD-10-CM

## 2020-07-27 DIAGNOSIS — M18.10 OSTEOARTHRITIS OF THUMB, UNSPECIFIED LATERALITY: ICD-10-CM

## 2020-07-27 PROCEDURE — 3077F PR MOST RECENT SYSTOLIC BLOOD PRESSURE >= 140 MM HG: ICD-10-PCS | Mod: HCNC,CPTII,S$GLB, | Performed by: ORTHOPAEDIC SURGERY

## 2020-07-27 PROCEDURE — 99999 PR PBB SHADOW E&M-EST. PATIENT-LVL V: CPT | Mod: PBBFAC,HCNC,, | Performed by: ORTHOPAEDIC SURGERY

## 2020-07-27 PROCEDURE — 3008F BODY MASS INDEX DOCD: CPT | Mod: HCNC,CPTII,S$GLB, | Performed by: ORTHOPAEDIC SURGERY

## 2020-07-27 PROCEDURE — 99999 PR PBB SHADOW E&M-EST. PATIENT-LVL V: ICD-10-PCS | Mod: PBBFAC,HCNC,, | Performed by: ORTHOPAEDIC SURGERY

## 2020-07-27 PROCEDURE — 1125F AMNT PAIN NOTED PAIN PRSNT: CPT | Mod: HCNC,S$GLB,, | Performed by: ORTHOPAEDIC SURGERY

## 2020-07-27 PROCEDURE — 1101F PT FALLS ASSESS-DOCD LE1/YR: CPT | Mod: HCNC,CPTII,S$GLB, | Performed by: ORTHOPAEDIC SURGERY

## 2020-07-27 PROCEDURE — 1159F MED LIST DOCD IN RCRD: CPT | Mod: HCNC,S$GLB,, | Performed by: ORTHOPAEDIC SURGERY

## 2020-07-27 PROCEDURE — 1125F PR PAIN SEVERITY QUANTIFIED, PAIN PRESENT: ICD-10-PCS | Mod: HCNC,S$GLB,, | Performed by: ORTHOPAEDIC SURGERY

## 2020-07-27 PROCEDURE — 1159F PR MEDICATION LIST DOCUMENTED IN MEDICAL RECORD: ICD-10-PCS | Mod: HCNC,S$GLB,, | Performed by: ORTHOPAEDIC SURGERY

## 2020-07-27 PROCEDURE — 20610 LARGE JOINT ASPIRATION/INJECTION: BILATERAL KNEE: ICD-10-PCS | Mod: 50,HCNC,S$GLB, | Performed by: ORTHOPAEDIC SURGERY

## 2020-07-27 PROCEDURE — 3008F PR BODY MASS INDEX (BMI) DOCUMENTED: ICD-10-PCS | Mod: HCNC,CPTII,S$GLB, | Performed by: ORTHOPAEDIC SURGERY

## 2020-07-27 PROCEDURE — 3079F PR MOST RECENT DIASTOLIC BLOOD PRESSURE 80-89 MM HG: ICD-10-PCS | Mod: HCNC,CPTII,S$GLB, | Performed by: ORTHOPAEDIC SURGERY

## 2020-07-27 PROCEDURE — 99204 OFFICE O/P NEW MOD 45 MIN: CPT | Mod: 25,HCNC,S$GLB, | Performed by: ORTHOPAEDIC SURGERY

## 2020-07-27 PROCEDURE — 20610 DRAIN/INJ JOINT/BURSA W/O US: CPT | Mod: 50,HCNC,S$GLB, | Performed by: ORTHOPAEDIC SURGERY

## 2020-07-27 PROCEDURE — 99204 PR OFFICE/OUTPT VISIT, NEW, LEVL IV, 45-59 MIN: ICD-10-PCS | Mod: 25,HCNC,S$GLB, | Performed by: ORTHOPAEDIC SURGERY

## 2020-07-27 PROCEDURE — 3079F DIAST BP 80-89 MM HG: CPT | Mod: HCNC,CPTII,S$GLB, | Performed by: ORTHOPAEDIC SURGERY

## 2020-07-27 PROCEDURE — 3077F SYST BP >= 140 MM HG: CPT | Mod: HCNC,CPTII,S$GLB, | Performed by: ORTHOPAEDIC SURGERY

## 2020-07-27 PROCEDURE — 1101F PR PT FALLS ASSESS DOC 0-1 FALLS W/OUT INJ PAST YR: ICD-10-PCS | Mod: HCNC,CPTII,S$GLB, | Performed by: ORTHOPAEDIC SURGERY

## 2020-07-27 NOTE — PROCEDURES
Large Joint Aspiration/Injection: bilateral knee    Date/Time: 7/27/2020 10:45 AM  Performed by: Monisha Pandya MD  Authorized by: Monisha Pandya MD     Consent Done?:  Yes (Verbal)  Indications:  Pain  Timeout: prior to procedure the correct patient, procedure, and site was verified    Prep: patient was prepped and draped in usual sterile fashion      Local anesthesia used?: Yes    Local anesthetic:  Topical anesthetic    Details:  Needle Size:  22 G  Approach:  Superior  Location:  Knee  Laterality:  Bilateral  Site:  Bilateral knee  Medications (Right):  20 mg sodium hyaluronate (EUFLEXXA) 10 mg/mL(mw 2.4 -3.6 million)  Medications (Left):  20 mg sodium hyaluronate (EUFLEXXA) 10 mg/mL(mw 2.4 -3.6 million)  Patient tolerance:  Patient tolerated the procedure well with no immediate complications

## 2020-07-27 NOTE — LETTER
July 27, 2020      Alena Hernandez MD  [address] [place] [address]           [facility]  [address]  [place] [address]  Phone: [phone]          Patient: Valarie Foster   MR Number: [id number]   YOB: 1950   Date of Visit: 7/27/2020       Dear Dr. Alena Hernandez:    Thank you for referring Valarie Foster to me for evaluation. Attached you will find relevant portions of my assessment and plan of care.    If you have questions, please do not hesitate to call me. I look forward to following Valarie Foster along with you.    Sincerely,    Monisha Pandya MD    Enclosure  CC:  No Recipients    If you would like to receive this communication electronically, please contact [email or web address] or [phone] to request more information on [employer] Link access.    For providers and/or their staff who would like to refer a patient to [facility], please contact us through our one-stop-shop provider referral line, [facility], at [phone].    If you feel you have received this communication in error or would no longer like to receive these types of communications, please e-mail [email or web address]
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
## 2020-07-31 ENCOUNTER — CLINICAL SUPPORT (OUTPATIENT)
Dept: REHABILITATION | Facility: HOSPITAL | Age: 70
End: 2020-07-31
Attending: ORTHOPAEDIC SURGERY
Payer: MEDICARE

## 2020-07-31 DIAGNOSIS — Z74.09 IMPAIRED FUNCTIONAL MOBILITY, BALANCE, GAIT, AND ENDURANCE: ICD-10-CM

## 2020-07-31 DIAGNOSIS — M25.561 CHRONIC PAIN OF BOTH KNEES: ICD-10-CM

## 2020-07-31 DIAGNOSIS — G89.29 CHRONIC PAIN OF BOTH KNEES: ICD-10-CM

## 2020-07-31 DIAGNOSIS — M25.661 DECREASED RANGE OF MOTION OF BOTH KNEES: ICD-10-CM

## 2020-07-31 DIAGNOSIS — M17.0 PRIMARY OSTEOARTHRITIS OF BOTH KNEES: ICD-10-CM

## 2020-07-31 DIAGNOSIS — M62.81 MUSCLE WEAKNESS OF LOWER EXTREMITY: ICD-10-CM

## 2020-07-31 DIAGNOSIS — M25.662 DECREASED RANGE OF MOTION OF BOTH KNEES: ICD-10-CM

## 2020-07-31 DIAGNOSIS — M25.562 CHRONIC PAIN OF BOTH KNEES: ICD-10-CM

## 2020-07-31 PROCEDURE — 97161 PT EVAL LOW COMPLEX 20 MIN: CPT | Mod: HCNC,PN

## 2020-07-31 NOTE — PLAN OF CARE
[facility]  Physical Therapy Initial Evaluation    Date: 7/31/2020   Name: Valarie Foster  Clinic Number: [id number]    Therapy Diagnosis:   Encounter Diagnoses   Name Primary?    Primary osteoarthritis of both knees     Chronic pain of both knees     Muscle weakness of lower extremity     Impaired functional mobility, balance, gait, and endurance     Decreased range of motion of both knees      Physician: Monisha Pandya MD    Physician Orders: PT Eval and Treat   Medical Diagnosis from Referral: Primary osteoarthritis of both knees  Evaluation Date: 7/31/2020  Authorization Period Expiration: 8/30/20  Plan of Care Expiration: 10/31/20  Visit # / Visits authorized: 1/ 1    Time In: 1034  Time Out: 1110  Total Appointment Time (timed & untimed codes): 36 minutes    Precautions: HTN, past kidney cancer, depression, frequent falls, DM 2    Subjective   Date of onset: insidious about 1 month ago  History of current condition - Valarie reports: she reports about 3 years of B knee pain. She had B knee injections on 7/27/20 with little improvements. Two more injections on this coming Monday. Depression due to parents and siblings passing away. No harmful thoughts or actions. She had 2 falls this week falling on her knees. No other serious injuries. Recently acquired a hurriCANE and has been using that to avoid falls. L knee hurts more than R. Pt reports about 3 years ago she broke her R thumb and middle finger. R heel spur reported that causes R foot pain.      Medical History:   Past Medical History:   Diagnosis Date    Arthritis     Diabetes mellitus     diet controlled    Diabetes with neurologic complications     Hypertension     Renal cell carcinoma        Surgical History:   Valarie Foster  has a past surgical history that includes Hysterectomy; Oophorectomy; Colonoscopy (N/A, 2/5/2018); and Partial nephrectomy (Right, 10/12/2018).    Medications:   Valarie has a current  "medication list which includes the following prescription(s): accu-chek clifford control soln, accu-chek clifford plus meter, accu-chek clifford plus test strp, accu-chek softclix lancets, amlodipine, bd alcohol swabs, celecoxib, cetirizine, cyclobenzaprine, diclofenac sodium, diphenhydramine, docusate sodium, famotidine, fluoxetine, fluoxetine, fluticasone propionate, gabapentin, hydroxyzine hcl, polyethylene glycol, pravastatin, and triamcinolone acetonide 0.1%.    Allergies:   Review of patient's allergies indicates:   Allergen Reactions    Lisinopril Swelling    Ascorbic acid-ascorbate calc      And citrus fruits        Imagin20 B knee x-ray: "There is some hypertrophic new bone about the medial femoral tibial compartments. Narrowing at the patellofemoral joints. Small effusion on the left."    Prior Therapy: none  Home environment: 1 story apt with 2 steps to enter  Social History: lives alone  Physical activity: none  Occupation: not currently working  Prior Level of Function: independent with all ADLs, never was a   Current Level of Function: difficulty with prolonged standing, walking long distances, bending down/squatting, tub transfers, household chores    Pain:  Current 8.5/10, worst 10/10, best 0/10   Location: bilateral knees (L>R)  Description: Aching  Aggravating Factors: Standing, Walking, Morning and Getting out of bed/chair  Easing Factors: ice, lying down and rest    Patients goals: to get legs strong and back to normal    Objective     Observation: pleasant and cooperative   Gait: antalgic, decreased B knee flexion, increased B lateral trunk lean, slight posterior trunk lean, decreased B toe off and heel strike, B ankle pronation, B genu varus   Alignment: B genu varus, B ankle pronation    Range of Motion (deg):   Knee Right AROM/PROM Left AROM/PROM   Flexion 115 115   Extension 0 0     Lower Extremity Strength  Right LE  Left LE    Knee extension: 5/5 Knee extension: 5/5   Knee flexion: " 5/5 Knee flexion: 5/5   Hip flexion: 3+/5 Hip flexion: 3+/5   Hip extension:  4-/5 Hip extension: 4-/5   Hip abduction: 4+/5 Hip abduction: 4+/5   Hip adduction: 5/5 Hip adduction 5/5   Ankle dorsiflexion: 5/5 Ankle dorsiflexion: 5/5   Ankle plantarflexion: 5/5 Ankle plantarflexion: 5/5     Joint Mobility:    Patellar sup./inf: B hypomobile   Patellar med/lat: B hypomobile    Palpation:    Crepitus: B present   Joint line: B medial joint line TTP   Patella: B TTP   Quad contraction: B fair    Sensation: B LEs grossly intact to light touch    Flexibility:   Ely's test: B 90 deg   Popliteal Angle: B WNL    Limitation/Restriction for FOTO knee Survey    Therapist reviewed FOTO scores for Valarie Foster on 7/31/2020.   FOTO documents entered into EPIC - see Media section.    Limitation Score: 33%       TREATMENT     No treatment provided this session.    Home Exercises and Patient Education Provided    Education provided:   - pain management techniques    Assessment   Valarie is a 70 y.o. female referred to outpatient Physical Therapy with a medical diagnosis of Primary osteoarthritis of both knees. Patient presents with reports of B knee pain, LE weakness, decreased knee AROM, patella hypomobility, impaired gait, and decreased functional mobility, endurance, and balance. Symptoms related to B knee OA effecting function.     Patient prognosis is Fair.   Patientt will benefit from skilled outpatient Physical Therapy to address the deficits stated above and in the chart below, provide patient /family education, and to maximize patientt's level of independence.     Plan of care discussed with patient: Yes  Patient's spiritual, cultural and educational needs considered and patient is agreeable to the plan of care and goals as stated below:     Anticipated Barriers for therapy: chronicity of condition    Medical Necessity is demonstrated by the following  History  Co-morbidities and personal factors that may impact the plan of  care Co-morbidities:   advanced age, depression, diabetes, history of cancer and HTN    Personal Factors:   lifestyle     high   Examination  Body Structures and Functions, activity limitations and participation restrictions that may impact the plan of care Body Regions:   lower extremities  trunk    Body Systems:    gross symmetry  ROM  strength  gross coordinated movement  balance  gait  transfers  transitions  motor control  motor learning  edema    Participation Restrictions:   ADLs, IADLs, domestic duties    Activity limitations:   Learning and applying knowledge  no deficits    General Tasks and Commands  no deficits    Communication  no deficits    Mobility  lifting and carrying objects  walking  using transportation (bus, train, plane, car)    Self care  no deficits    Domestic Life  shopping  cooking  doing house work (cleaning house, washing dishes, laundry)    Interactions/Relationships  no deficits    Life Areas  no deficits    Community and Social Life  community life  recreation and leisure         high   Clinical Presentation stable and uncomplicated low   Decision Making/ Complexity Score: low     Medical necessity is demonstrated by the following IMPAIRMENTS/PROBLEM LIST:   1) Increase in pain level limiting function   2) LE weakness   3) Difficulty walking long distances   4) Decreased B knee AROM   5) Lack of HEP    GOALS: Short Term Goals:  6 weeks  1. Report decreased B pain  <  / =  5/10 at worst to increase tolerance for prolonged standing.   2. Pt will be able to tolerate multi-directional LE strengthening in order to improve ability to perform household chores.  3. Pt will report 50% improvement in ability to walk long distances since start of care to indicate improved functional mobility.   4. Pt will increase B knee flexion AROM to 120 deg to indicate improve ability to squat.  5. Pt to tolerate HEP to improve ROM and independence with ADL's.    Long Term Goals: 12 weeks  1. Report  decreased B pain  <  / =  3/10 at worst to increase tolerance for prolonged standing.   2. Pt will be able to perform 2 x 10 multi-directional LE strengthening without fatigue in order to improve ability to perform household chores.  3. Pt will report 80% improvement in ability to walk long distances since start of care to indicate improved functional mobility.   4. Pt will increase B knee flexion AROM to 125 deg to indicate improve ability to squat  5. Pt to be Independent with HEP to improve ROM and independence with ADL's.    Plan   Plan of care Certification: 7/31/2020 to 10/31/20.    Outpatient Physical Therapy 2 times weekly for 12 weeks to include the following interventions: Gait Training, Manual Therapy, Moist Heat/ Ice, Neuromuscular Re-ed, Patient Education, Therapeutic Activites and Therapeutic Exercise.     Michelle Shipman, PT

## 2020-08-03 ENCOUNTER — OFFICE VISIT (OUTPATIENT)
Dept: ORTHOPEDICS | Facility: CLINIC | Age: 70
End: 2020-08-03
Payer: MEDICARE

## 2020-08-03 VITALS
DIASTOLIC BLOOD PRESSURE: 80 MMHG | SYSTOLIC BLOOD PRESSURE: 140 MMHG | BODY MASS INDEX: 33.88 KG/M2 | HEART RATE: 79 BPM | WEIGHT: 198.44 LBS | HEIGHT: 64 IN | OXYGEN SATURATION: 99 % | RESPIRATION RATE: 18 BRPM

## 2020-08-03 DIAGNOSIS — M17.0 PRIMARY OSTEOARTHRITIS OF BOTH KNEES: Primary | ICD-10-CM

## 2020-08-03 PROCEDURE — 99499 UNLISTED E&M SERVICE: CPT | Mod: HCNC,S$GLB,, | Performed by: ORTHOPAEDIC SURGERY

## 2020-08-03 PROCEDURE — 20610 LARGE JOINT ASPIRATION/INJECTION: BILATERAL KNEE: ICD-10-PCS | Mod: 50,HCNC,S$GLB, | Performed by: ORTHOPAEDIC SURGERY

## 2020-08-03 PROCEDURE — 20610 DRAIN/INJ JOINT/BURSA W/O US: CPT | Mod: 50,HCNC,S$GLB, | Performed by: ORTHOPAEDIC SURGERY

## 2020-08-03 PROCEDURE — 99999 PR PBB SHADOW E&M-EST. PATIENT-LVL IV: ICD-10-PCS | Mod: PBBFAC,HCNC,, | Performed by: ORTHOPAEDIC SURGERY

## 2020-08-03 PROCEDURE — 99999 PR PBB SHADOW E&M-EST. PATIENT-LVL IV: CPT | Mod: PBBFAC,HCNC,, | Performed by: ORTHOPAEDIC SURGERY

## 2020-08-03 PROCEDURE — 99499 NO LOS: ICD-10-PCS | Mod: HCNC,S$GLB,, | Performed by: ORTHOPAEDIC SURGERY

## 2020-08-03 NOTE — PROGRESS NOTES
"Follow up visit    History of Present Illness:   Valarie Foster comes to the office for follow up evaluation of bilateral knee arthritis   She has failed other treatment modalities including medications,  activity modification and steroid injection. She is here today for viscosupplementation - Euflexxa # 2  She reports some relief of pain after injection #1    ROS: unremarkable and no change since last visit    Physical Examination:    Vitals:    08/03/20 0855   BP: (!) 140/80   Pulse: 79   Resp: 18   SpO2: 99%   Weight: 90 kg (198 lb 6.6 oz)   Height: 5' 4" (1.626 m)   PainSc:   9   PainLoc: Knee      NAD  bilateral knee  No change in exam   No evidence of adverse effect of injection     Radiographic imaging: no new imaging    Assessment/Plan:  61 y.o. female with bilateral  knee arthritis     1. Injection to Bilateral  knee  performed, please see procedure note for details.  We discussed the risks and benefits of injection including pain, infection, bleeding, failure to improve pain, temporary increase in pain, synovitis, and damage to surrounding structures including nerves, blood vessels, tendons and muscles.   2.   Return for follow up visit: 1 week for last injection    All questions were answered in detail. The patient  verbalized the understanding of the treatment plan and is in full agreement with the treatment plan.    "

## 2020-08-03 NOTE — PROCEDURES
Large Joint Aspiration/Injection: bilateral knee    Date/Time: 8/3/2020 9:00 AM  Performed by: Monisha Pandya MD  Authorized by: Monisha Pandya MD     Consent Done?:  Yes (Verbal)  Indications:  Arthritis  Timeout: prior to procedure the correct patient, procedure, and site was verified    Prep: patient was prepped and draped in usual sterile fashion      Local anesthesia used?: Yes    Local anesthetic:  Topical anesthetic    Details:  Needle Size:  22 G  Approach:  Superior  Location:  Knee  Laterality:  Bilateral  Site:  Bilateral knee  Medications (Right):  20 mg sodium hyaluronate (EUFLEXXA) 10 mg/mL(mw 2.4 -3.6 million)  Medications (Left):  20 mg sodium hyaluronate (EUFLEXXA) 10 mg/mL(mw 2.4 -3.6 million)  Patient tolerance:  Patient tolerated the procedure well with no immediate complications

## 2020-08-06 ENCOUNTER — PATIENT OUTREACH (OUTPATIENT)
Dept: ADMINISTRATIVE | Facility: OTHER | Age: 70
End: 2020-08-06

## 2020-08-10 ENCOUNTER — OFFICE VISIT (OUTPATIENT)
Dept: ORTHOPEDICS | Facility: CLINIC | Age: 70
End: 2020-08-10
Payer: MEDICARE

## 2020-08-10 ENCOUNTER — LAB VISIT (OUTPATIENT)
Dept: FAMILY MEDICINE | Facility: CLINIC | Age: 70
End: 2020-08-10
Payer: MEDICARE

## 2020-08-10 VITALS
DIASTOLIC BLOOD PRESSURE: 80 MMHG | SYSTOLIC BLOOD PRESSURE: 140 MMHG | BODY MASS INDEX: 33.8 KG/M2 | WEIGHT: 198 LBS | RESPIRATION RATE: 18 BRPM | HEIGHT: 64 IN | OXYGEN SATURATION: 95 % | HEART RATE: 75 BPM

## 2020-08-10 DIAGNOSIS — M17.0 PRIMARY OSTEOARTHRITIS OF BOTH KNEES: Primary | ICD-10-CM

## 2020-08-10 DIAGNOSIS — Z12.11 COLON CANCER SCREENING: ICD-10-CM

## 2020-08-10 DIAGNOSIS — M79.641 PAIN OF RIGHT HAND: Primary | ICD-10-CM

## 2020-08-10 PROCEDURE — 99499 NO LOS: ICD-10-PCS | Mod: HCNC,S$GLB,, | Performed by: ORTHOPAEDIC SURGERY

## 2020-08-10 PROCEDURE — 99999 PR PBB SHADOW E&M-EST. PATIENT-LVL III: CPT | Mod: PBBFAC,HCNC,, | Performed by: ORTHOPAEDIC SURGERY

## 2020-08-10 PROCEDURE — 20610 LARGE JOINT ASPIRATION/INJECTION: BILATERAL KNEE: ICD-10-PCS | Mod: 50,HCNC,S$GLB, | Performed by: ORTHOPAEDIC SURGERY

## 2020-08-10 PROCEDURE — 20610 DRAIN/INJ JOINT/BURSA W/O US: CPT | Mod: 50,HCNC,S$GLB, | Performed by: ORTHOPAEDIC SURGERY

## 2020-08-10 PROCEDURE — 99499 UNLISTED E&M SERVICE: CPT | Mod: HCNC,S$GLB,, | Performed by: ORTHOPAEDIC SURGERY

## 2020-08-10 PROCEDURE — U0003 INFECTIOUS AGENT DETECTION BY NUCLEIC ACID (DNA OR RNA); SEVERE ACUTE RESPIRATORY SYNDROME CORONAVIRUS 2 (SARS-COV-2) (CORONAVIRUS DISEASE [COVID-19]), AMPLIFIED PROBE TECHNIQUE, MAKING USE OF HIGH THROUGHPUT TECHNOLOGIES AS DESCRIBED BY CMS-2020-01-R: HCPCS | Mod: HCNC

## 2020-08-10 PROCEDURE — 99999 PR PBB SHADOW E&M-EST. PATIENT-LVL III: ICD-10-PCS | Mod: PBBFAC,HCNC,, | Performed by: ORTHOPAEDIC SURGERY

## 2020-08-10 NOTE — PROGRESS NOTES
"Follow up visit    History of Present Illness:   Valarie Foster comes to the office for follow up evaluation of bilateral knee arthritis   She has failed other treatment modalities including medications,  activity modification and steroid injection. She is here today for viscosupplementation - Euflexxa #3  She reports good relief of pain    ROS: unremarkable and no change since last visit    Physical Examination:    Vitals:    08/10/20 0940   BP: (!) 140/80   Pulse: 75   Resp: 18   SpO2: 95%   Weight: 89.8 kg (198 lb)   Height: 5' 4" (1.626 m)   PainSc:   8   PainLoc: Knee      NAD  bilateral knee  No change in exam   No evidence of adverse effect of injection     Radiographic imaging: no new imaging    Assessment/Plan:  61 y.o. female with bilateral  knee arthritis     1. Injection to Bilateral  knee  performed, please see procedure note for details.  We discussed the risks and benefits of injection including pain, infection, bleeding, failure to improve pain, temporary increase in pain, synovitis, and damage to surrounding structures including nerves, blood vessels, tendons and muscles.   2.   Return for follow up visit: for thumb at her convenience    All questions were answered in detail. The patient  verbalized the understanding of the treatment plan and is in full agreement with the treatment plan.    "

## 2020-08-10 NOTE — PROCEDURES
Large Joint Aspiration/Injection: bilateral knee    Date/Time: 8/10/2020 9:45 AM  Performed by: Monisha Pandya MD  Authorized by: Monisha Pandya MD     Consent Done?:  Yes (Verbal)  Indications:  Pain and arthritis  Timeout: prior to procedure the correct patient, procedure, and site was verified    Prep: patient was prepped and draped in usual sterile fashion      Local anesthesia used?: Yes    Local anesthetic:  Topical anesthetic    Details:  Needle Size:  22 G  Approach:  Superior  Location:  Knee  Laterality:  Bilateral  Site:  Bilateral knee  Medications (Right):  20 mg sodium hyaluronate (EUFLEXXA) 10 mg/mL(mw 2.4 -3.6 million)  Medications (Left):  20 mg sodium hyaluronate (EUFLEXXA) 10 mg/mL(mw 2.4 -3.6 million)  Patient tolerance:  Patient tolerated the procedure well with no immediate complications

## 2020-08-11 LAB — SARS-COV-2 RNA RESP QL NAA+PROBE: NOT DETECTED

## 2020-08-12 ENCOUNTER — ANESTHESIA EVENT (OUTPATIENT)
Dept: ENDOSCOPY | Facility: HOSPITAL | Age: 70
End: 2020-08-12
Payer: MEDICARE

## 2020-08-13 ENCOUNTER — HOSPITAL ENCOUNTER (OUTPATIENT)
Facility: HOSPITAL | Age: 70
Discharge: HOME | End: 2020-08-13
Attending: INTERNAL MEDICINE | Admitting: INTERNAL MEDICINE
Payer: MEDICARE

## 2020-08-13 ENCOUNTER — ANESTHESIA (OUTPATIENT)
Dept: ENDOSCOPY | Facility: HOSPITAL | Age: 70
End: 2020-08-13
Payer: MEDICARE

## 2020-08-13 VITALS
OXYGEN SATURATION: 98 % | DIASTOLIC BLOOD PRESSURE: 66 MMHG | TEMPERATURE: 98 F | RESPIRATION RATE: 18 BRPM | SYSTOLIC BLOOD PRESSURE: 138 MMHG | HEART RATE: 78 BPM

## 2020-08-13 DIAGNOSIS — Z12.11 COLON CANCER SCREENING: ICD-10-CM

## 2020-08-13 PROCEDURE — 25000003 PHARM REV CODE 250: Mod: HCNC | Performed by: NURSE ANESTHETIST, CERTIFIED REGISTERED

## 2020-08-13 PROCEDURE — D9220A PRA ANESTHESIA: ICD-10-PCS | Mod: HCNC,ANES,, | Performed by: ANESTHESIOLOGY

## 2020-08-13 PROCEDURE — 63600175 PHARM REV CODE 636 W HCPCS: Mod: HCNC | Performed by: NURSE ANESTHETIST, CERTIFIED REGISTERED

## 2020-08-13 PROCEDURE — G0105 COLORECTAL SCRN; HI RISK IND: HCPCS | Mod: HCNC | Performed by: INTERNAL MEDICINE

## 2020-08-13 PROCEDURE — D9220A PRA ANESTHESIA: Mod: HCNC,CRNA,, | Performed by: NURSE ANESTHETIST, CERTIFIED REGISTERED

## 2020-08-13 PROCEDURE — 37000008 HC ANESTHESIA 1ST 15 MINUTES: Mod: HCNC | Performed by: INTERNAL MEDICINE

## 2020-08-13 PROCEDURE — G0105 COLORECTAL SCRN; HI RISK IND: HCPCS | Mod: HCNC,,, | Performed by: INTERNAL MEDICINE

## 2020-08-13 PROCEDURE — G0105 COLORECTAL SCRN; HI RISK IND: ICD-10-PCS | Mod: HCNC,,, | Performed by: INTERNAL MEDICINE

## 2020-08-13 PROCEDURE — D9220A PRA ANESTHESIA: ICD-10-PCS | Mod: HCNC,CRNA,, | Performed by: NURSE ANESTHETIST, CERTIFIED REGISTERED

## 2020-08-13 PROCEDURE — 37000009 HC ANESTHESIA EA ADD 15 MINS: Mod: HCNC | Performed by: INTERNAL MEDICINE

## 2020-08-13 PROCEDURE — D9220A PRA ANESTHESIA: Mod: HCNC,ANES,, | Performed by: ANESTHESIOLOGY

## 2020-08-13 PROCEDURE — 25000003 PHARM REV CODE 250: Mod: HCNC | Performed by: ANESTHESIOLOGY

## 2020-08-13 RX ORDER — LIDOCAINE HYDROCHLORIDE 20 MG/ML
INJECTION, SOLUTION EPIDURAL; INFILTRATION; INTRACAUDAL; PERINEURAL
Status: DISCONTINUED
Start: 2020-08-13 | End: 2020-08-13 | Stop reason: HOSPADM

## 2020-08-13 RX ORDER — SODIUM CHLORIDE 9 MG/ML
INJECTION, SOLUTION INTRAVENOUS CONTINUOUS
Status: DISCONTINUED | OUTPATIENT
Start: 2020-08-13 | End: 2020-08-13 | Stop reason: HOSPADM

## 2020-08-13 RX ORDER — PROPOFOL 10 MG/ML
INJECTION, EMULSION INTRAVENOUS
Status: DISCONTINUED
Start: 2020-08-13 | End: 2020-08-13 | Stop reason: HOSPADM

## 2020-08-13 RX ORDER — PROPOFOL 10 MG/ML
VIAL (ML) INTRAVENOUS
Status: DISCONTINUED | OUTPATIENT
Start: 2020-08-13 | End: 2020-08-13

## 2020-08-13 RX ORDER — LIDOCAINE HCL/PF 100 MG/5ML
SYRINGE (ML) INTRAVENOUS
Status: DISCONTINUED | OUTPATIENT
Start: 2020-08-13 | End: 2020-08-13

## 2020-08-13 RX ORDER — LIDOCAINE HYDROCHLORIDE 10 MG/ML
1 INJECTION, SOLUTION EPIDURAL; INFILTRATION; INTRACAUDAL; PERINEURAL ONCE
Status: DISCONTINUED | OUTPATIENT
Start: 2020-08-13 | End: 2020-08-13 | Stop reason: HOSPADM

## 2020-08-13 RX ADMIN — LIDOCAINE HYDROCHLORIDE 100 MG: 20 INJECTION, SOLUTION INTRAVENOUS at 08:08

## 2020-08-13 RX ADMIN — SODIUM CHLORIDE: 0.9 INJECTION, SOLUTION INTRAVENOUS at 08:08

## 2020-08-13 RX ADMIN — PROPOFOL 40 MG: 10 INJECTION, EMULSION INTRAVENOUS at 08:08

## 2020-08-13 RX ADMIN — PROPOFOL 80 MG: 10 INJECTION, EMULSION INTRAVENOUS at 08:08

## 2020-08-13 NOTE — TRANSFER OF CARE
Anesthesia Transfer of Care Note    Patient: Valarie Foster    Procedure(s) Performed: Procedure(s) (LRB):  COLONOSCOPY (N/A)    Patient location: GI    Anesthesia Type: general    Transport from OR: Transported from OR on room air with adequate spontaneous ventilation    Post pain: adequate analgesia    Post assessment: no apparent anesthetic complications and tolerated procedure well    Post vital signs: stable    Level of consciousness: sedated and responds to stimulation    Nausea/Vomiting: no nausea/vomiting    Complications: none    Transfer of care protocol was followed      Last vitals:   Visit Vitals  /78 (BP Location: Left arm, Patient Position: Lying)   Pulse 86   Temp 36.4 °C (97.5 °F) (Oral)   Resp 12   SpO2 99%   Breastfeeding No

## 2020-08-13 NOTE — H&P
Pre-Procedure H&P:  Reason for Procedure: h/o colon polyps    HPI:  Pt is a 70 y.o. female h/o colon polyps    Past Medical History:   Diagnosis Date    Arthritis     Colon polyp     Diabetes mellitus     diet controlled    Diabetes with neurologic complications     Hypertension     Renal cell carcinoma        Past Surgical History:   Procedure Laterality Date    COLONOSCOPY N/A 2/5/2018    Procedure: COLONOSCOPY;  Surgeon: Bacilio Mancia MD;  Location: [facility] ENDO;  Service: Endoscopy;  Laterality: N/A;  appt confirmed-ss    HYSTERECTOMY      OOPHORECTOMY      PARTIAL NEPHRECTOMY Right 10/12/2018    Procedure: NEPHRECTOMY, PARTIAL open. Dr Arenas to assist.;  Surgeon: Alejandra Palm MD;  Location: [facility] OR;  Service: Urology;  Laterality: Right;  RN PREOP 10/9/2018----NEEDS H/P       Family History   Problem Relation Age of Onset    Glaucoma Sister     Cancer Sister         breast cancer    No Known Problems Mother     Glaucoma Father     Diabetes Brother     No Known Problems Maternal Aunt     No Known Problems Maternal Uncle     No Known Problems Paternal Aunt     No Known Problems Paternal Uncle     No Known Problems Maternal Grandmother     No Known Problems Maternal Grandfather     No Known Problems Paternal Grandmother     No Known Problems Paternal Grandfather     Thyroid disease Sister     Blindness Sister         due to trauma during car accident    Diabetes Sister     Amblyopia Neg Hx     Cataracts Neg Hx     Hypertension Neg Hx     Macular degeneration Neg Hx     Retinal detachment Neg Hx     Strabismus Neg Hx     Stroke Neg Hx     Breast cancer Neg Hx        Social History     Socioeconomic History    Marital status:      Spouse name: Not on file    Number of children: Not on file    Years of education: Not on file    Highest education level: Not on file   Occupational History    Not on file   Social Needs    Financial resource strain: Not on file     Food insecurity     Worry: Not on file     Inability: Not on file    Transportation needs     Medical: Not on file     Non-medical: Not on file   Tobacco Use    Smoking status: Never Smoker    Smokeless tobacco: Never Used   Substance and Sexual Activity    Alcohol use: No    Drug use: No    Sexual activity: Not on file   Lifestyle    Physical activity     Days per week: Not on file     Minutes per session: Not on file    Stress: Only a little   Relationships    Social connections     Talks on phone: Not on file     Gets together: Not on file     Attends Hoahaoism service: Not on file     Active member of club or organization: Not on file     Attends meetings of clubs or organizations: Not on file     Relationship status: Not on file   Other Topics Concern    Not on file   Social History Narrative    Not on file       Endoscopic History:      Review of patient's allergies indicates:   Allergen Reactions    Lisinopril Swelling    Ascorbic acid-ascorbate calc      And citrus fruits       No current facility-administered medications on file prior to encounter.      Current Outpatient Medications on File Prior to Encounter   Medication Sig Dispense Refill    amLODIPine (NORVASC) 5 MG tablet TAKE 1 TABLET EVERY DAY 90 tablet 1    celecoxib (CELEBREX) 200 MG capsule Take 1 capsule (200 mg total) by mouth once daily. 90 capsule 1    cyclobenzaprine (FLEXERIL) 5 MG tablet TAKE 1 TABLET THREE TIMES DAILY AS NEEDED FOR MUSCLE SPASMS 270 tablet 1    famotidine (PEPCID) 20 MG tablet TAKE 1 TABLET TWICE DAILY 180 tablet 1    FLUoxetine 20 MG capsule Take 1 capsule (20 mg total) by mouth once daily. 90 capsule 1    gabapentin (NEURONTIN) 300 MG capsule TAKE 2 CAPSULES EVERY EVENING 180 capsule 1    pravastatin (PRAVACHOL) 10 MG tablet TAKE 1 TABLET ONE TIME DAILY 90 tablet 1    ACCU-CHEK ONEIL CONTROL SOLN Soln       ACCU-CHEK ONEIL PLUS METER [facility] USE AS DIRECTED 1 each 0    ACCU-CHEK ONEIL PLUS TEST  STRP Strp TEST ONE TIME DAILY 100 strip 2    ACCU-CHEK SOFTCLIX LANCETS Misc TEST ONE TIME DAILY 100 each 1    BD ALCOHOL SWABS PadM TEST ONE TIME DAILY 100 each 1    cetirizine (ZYRTEC) 10 MG tablet Take 1 tablet (10 mg total) by mouth once daily. 90 tablet 0    diphenhydrAMINE (BENADRYL) 25 mg capsule Take 1 capsule (25 mg total) by mouth every 6 (six) hours as needed for Itching. 20 capsule 0    docusate sodium (COLACE) 100 MG capsule Take 1 capsule (100 mg total) by mouth 2 (two) times daily. 60 capsule 0    FLUoxetine 20 MG capsule Take 1 capsule (20 mg total) by mouth once daily. 90 capsule 3    fluticasone (FLONASE) 50 mcg/actuation nasal spray USE 1 SPRAY(S) IN EACH NOSTRIL ONCE DAILY 16 g 1    triamcinolone acetonide 0.1% (KENALOG) 0.1 % cream          Current Facility-Administered Medications:     0.9%  NaCl infusion, , Intravenous, Continuous, Philippe Reina MD, Last Rate: 10 mL/hr at 08/13/20 0841    lidocaine (PF) 10 mg/ml (1%) injection 10 mg, 1 mL, Intradermal, Once, Philippe Reina MD    ROS: Negative x 10    Patient Vitals for the past 24 hrs:   BP Temp Temp src Pulse Resp SpO2   08/13/20 0843 (!) 164/82 97.8 °F (36.6 °C) Oral 81 18 98 %       Gen: Well developed, well nourished, no apparent distress  HEENT: Anicteric, PERRLA  CV: S1, S2, no murmers/rubs, non-displaced PMI  Lungs: CTA-B, normal excursion  Abd: Soft, NT, ND, normal BS's, no HSM  Ext: No c/c/e, 1+ DP pulses to BLE's  Neuro: CN II-XII grossly intact, no asterixis.  Skin: No rashes/lesions.  Psych: AA&O x 4    Assessment:  Pt. Is a 70 y.o. female with:  1. H/o colon polyps    Recommendations:  1. Colonoscopy  2. F/U with PCP      I would like to take this opportunity to thank you for this consult.  If you have any questions or concerns, please do not hesitate to contact me.

## 2020-08-13 NOTE — ANESTHESIA POSTPROCEDURE EVALUATION
Anesthesia Post Evaluation    Patient: Valarie Foster    Procedure(s) Performed: Procedure(s) (LRB):  COLONOSCOPY (N/A)    Final Anesthesia Type: general    Patient location during evaluation: GI PACU  Patient participation: Yes- Able to Participate  Level of consciousness: awake and alert  Post-procedure vital signs: reviewed and stable  Pain management: adequate  Airway patency: patent    PONV status at discharge: No PONV  Anesthetic complications: no      Cardiovascular status: hemodynamically stable  Respiratory status: unassisted and spontaneous ventilation  Hydration status: euvolemic  Follow-up not needed.          Vitals Value Taken Time   /66 08/13/20 0937   Temp 36.4 °C (97.5 °F) 08/13/20 0907   Pulse 78 08/13/20 0937   Resp 18 08/13/20 0937   SpO2 98 % 08/13/20 0937         Event Time   Out of Recovery 09:47:36         Pain/Fabricio Score: Fabricio Score: 10 (8/13/2020  9:37 AM)

## 2020-08-13 NOTE — [employer] PATIENT INSTRUCTIONS
Discharge Summary/Instructions after an Endoscopic Procedure  Patient Name: Valarie Foster  Patient MRN: [id number]  Patient YOB: 1950 Thursday, August 13, 2020  Jose West MD  RESTRICTIONS:  During your procedure today, you received medications for sedation.  These   medications may affect your judgment, balance and coordination.  Therefore,   for 24 hours, you have the following restrictions:   - DO NOT drive a car, operate machinery, make legal/financial decisions,   sign important papers or drink alcohol.    ACTIVITY:  Today: no heavy lifting, straining or running due to procedural   sedation/anesthesia.  The following day: return to full activity including work.  DIET:  Eat and drink normally unless instructed otherwise.     TREATMENT FOR COMMON SIDE EFFECTS:  - Mild abdominal pain, nausea, belching, bloating or excessive gas:  rest,   eat lightly and use a heating pad.  - Sore Throat: treat with throat lozenges and/or gargle with warm salt   water.  - Because air was used during the procedure, expelling large amounts of air   from your rectum or belching is normal.  - If a bowel prep was taken, you may not have a bowel movement for 1-3 days.    This is normal.  SYMPTOMS TO WATCH FOR AND REPORT TO YOUR PHYSICIAN:  1. Abdominal pain or bloating, other than gas cramps.  2. Chest pain.  3. Back pain.  4. Signs of infection such as: chills or fever occurring within 24 hours   after the procedure.  5. Rectal bleeding, which would show as bright red, maroon, or black stools.   (A tablespoon of blood from the rectum is not serious, especially if   hemorrhoids are present.)  6. Vomiting.  7. Weakness or dizziness.  GO DIRECTLY TO THE NEAREST EMERGENCY ROOM IF YOU HAVE ANY OF THE FOLLOWING:      Difficulty breathing              Chills and/or fever over 101 F   Persistent vomiting and/or vomiting blood   Severe abdominal pain   Severe chest pain   Black, tarry stools   Bleeding- more than one  tablespoon   Any other symptom or condition that you feel may need urgent attention  Your doctor recommends these additional instructions:  If any biopsies were taken, your doctors clinic will contact you in 1 to 2   weeks with any results.  - Discharge patient to home (with escort).   - Resume previous diet indefinitely.   - Repeat colonoscopy in 5 years for surveillance.   - Return to primary care physician as previously scheduled.  For questions, problems or results please call your physician - Jose West MD at Work:  [phone].  [facility] can be reached at [phone]     IF A COMPLICATION OR EMERGENCY SITUATION ARISES AND YOU ARE UNABLE TO REACH   YOUR PHYSICIAN - GO DIRECTLY TO THE EMERGENCY ROOM.  Jose West MD  8/13/2020 9:19:39 AM  This report has been verified and signed electronically.  [employer]

## 2020-08-13 NOTE — ANESTHESIA PREPROCEDURE EVALUATION
08/13/2020  Valarie Foster is a 70 y.o., female.    Anesthesia Evaluation    I have reviewed the Patient Summary Reports.    I have reviewed the Nursing Notes.       Review of Systems  Anesthesia Hx:  No problems with previous Anesthesia  Denies Family Hx of Anesthesia complications.   Denies Personal Hx of Anesthesia complications.   Social:  Non-Smoker, No Alcohol Use    Hematology/Oncology:         -- Cancer in past history: Other (see Oncology comments) Oncology Comments: Renal cell carcinoma     Cardiovascular:   Exercise tolerance: poor Hypertension Denies MI.   Denies Dysrhythmias.      hyperlipidemia Poor exercise tolerance due to knee pain; denies any CP   Pulmonary:  Pulmonary Normal    Renal/:   Chronic Renal Disease S/p partial nephrectomy 2/2 RCC   Hepatic/GI:  Hepatic/GI Normal    Musculoskeletal:   Arthritis     Neurological:  Neurology Normal    Endocrine:   Diabetes    Psych:   Psychiatric History depression          Physical Exam  General:  Well nourished, Obesity    Airway/Jaw/Neck:  Airway Findings: Mouth Opening: Small, but > 3cm Tongue: Normal  Mallampati: III  TM Distance: 4 - 6 cm      Dental:  Dental Findings: Upper partial dentures   Chest/Lungs:  Chest/Lungs Findings: Normal Respiratory Rate, Clear to auscultation     Heart/Vascular:  Heart Findings: Rate: Normal  Rhythm: Regular Rhythm        Mental Status:  Mental Status Findings:  Cooperative, Alert and Oriented       Wt Readings from Last 3 Encounters:   08/10/20 89.8 kg (198 lb)   08/03/20 90 kg (198 lb 6.6 oz)   07/27/20 90.1 kg (198 lb 10.2 oz)     Temp Readings from Last 3 Encounters:   07/27/20 36.6 °C (97.9 °F) (Oral)   06/30/20 36.7 °C (98.1 °F) (Oral)   12/30/19 36.5 °C (97.7 °F) (Oral)     BP Readings from Last 3 Encounters:   08/10/20 (!) 140/80   08/03/20 (!) 140/80   07/27/20 (!) 142/82     Pulse Readings from Last  3 Encounters:   08/10/20 75   08/03/20 79   07/27/20 80     Lab Results   Component Value Date    WBC 5.53 06/30/2020    HGB 12.8 06/30/2020    HCT 40.9 06/30/2020    MCV 85 06/30/2020     06/30/2020       CMP  Sodium   Date Value Ref Range Status   06/30/2020 140 136 - 145 mmol/L Final     Potassium   Date Value Ref Range Status   06/30/2020 4.5 3.5 - 5.1 mmol/L Final     Chloride   Date Value Ref Range Status   06/30/2020 102 95 - 110 mmol/L Final     CO2   Date Value Ref Range Status   06/30/2020 30 (H) 23 - 29 mmol/L Final     Glucose   Date Value Ref Range Status   06/30/2020 108 70 - 110 mg/dL Final     BUN, Bld   Date Value Ref Range Status   06/30/2020 14 8 - 23 mg/dL Final     Creatinine   Date Value Ref Range Status   06/30/2020 0.8 0.5 - 1.4 mg/dL Final     Calcium   Date Value Ref Range Status   06/30/2020 10.1 8.7 - 10.5 mg/dL Final     Total Protein   Date Value Ref Range Status   06/30/2020 7.5 6.0 - 8.4 g/dL Final     Albumin   Date Value Ref Range Status   06/30/2020 3.8 3.5 - 5.2 g/dL Final     Total Bilirubin   Date Value Ref Range Status   06/30/2020 0.3 0.1 - 1.0 mg/dL Final     Comment:     For infants and newborns, interpretation of results should be based  on gestational age, weight and in agreement with clinical  observations.  Premature Infant recommended reference ranges:  Up to 24 hours.............<8.0 mg/dL  Up to 48 hours............<12.0 mg/dL  3-5 days..................<15.0 mg/dL  6-29 days.................<15.0 mg/dL       Alkaline Phosphatase   Date Value Ref Range Status   06/30/2020 109 55 - 135 U/L Final     AST   Date Value Ref Range Status   06/30/2020 12 10 - 40 U/L Final     ALT   Date Value Ref Range Status   06/30/2020 8 (L) 10 - 44 U/L Final     Anion Gap   Date Value Ref Range Status   06/30/2020 8 8 - 16 mmol/L Final     eGFR if    Date Value Ref Range Status   06/30/2020 >60.0 >60 mL/min/1.73 m^2 Final     eGFR if non    Date  Value Ref Range Status   06/30/2020 >60.0 >60 mL/min/1.73 m^2 Final     Comment:     Calculation used to obtain the estimated glomerular filtration  rate (eGFR) is the CKD-EPI equation.        8/10/2020 COVID negative    Anesthesia Plan  Type of Anesthesia, risks & benefits discussed:  Anesthesia Type:  general, MAC  Patient's Preference:   Intra-op Monitoring Plan: standard ASA monitors  Intra-op Monitoring Plan Comments:   Post Op Pain Control Plan: multimodal analgesia and per primary service following discharge from PACU  Post Op Pain Control Plan Comments:   Induction:   IV  Beta Blocker:  Patient is not currently on a Beta-Blocker (No further documentation required).       Informed Consent: Patient understands risks and agrees with Anesthesia plan.  Questions answered. Anesthesia consent signed with patient.  ASA Score: 3     Day of Surgery Review of History & Physical: I have interviewed and examined the patient. I have reviewed the patient's H&P dated:            Ready For Surgery From Anesthesia Perspective.

## 2020-08-18 ENCOUNTER — OFFICE VISIT (OUTPATIENT)
Dept: ORTHOPEDICS | Facility: CLINIC | Age: 70
End: 2020-08-18
Payer: MEDICARE

## 2020-08-18 VITALS
DIASTOLIC BLOOD PRESSURE: 80 MMHG | SYSTOLIC BLOOD PRESSURE: 160 MMHG | RESPIRATION RATE: 18 BRPM | WEIGHT: 196.19 LBS | HEART RATE: 75 BPM | TEMPERATURE: 99 F | OXYGEN SATURATION: 97 % | HEIGHT: 64 IN | BODY MASS INDEX: 33.49 KG/M2

## 2020-08-18 DIAGNOSIS — M18.11 DEGENERATIVE ARTHRITIS OF THUMB, RIGHT: Primary | ICD-10-CM

## 2020-08-18 PROCEDURE — 1159F PR MEDICATION LIST DOCUMENTED IN MEDICAL RECORD: ICD-10-PCS | Mod: HCNC,S$GLB,, | Performed by: ORTHOPAEDIC SURGERY

## 2020-08-18 PROCEDURE — 3008F PR BODY MASS INDEX (BMI) DOCUMENTED: ICD-10-PCS | Mod: HCNC,CPTII,S$GLB, | Performed by: ORTHOPAEDIC SURGERY

## 2020-08-18 PROCEDURE — 1159F MED LIST DOCD IN RCRD: CPT | Mod: HCNC,S$GLB,, | Performed by: ORTHOPAEDIC SURGERY

## 2020-08-18 PROCEDURE — 3077F PR MOST RECENT SYSTOLIC BLOOD PRESSURE >= 140 MM HG: ICD-10-PCS | Mod: HCNC,CPTII,S$GLB, | Performed by: ORTHOPAEDIC SURGERY

## 2020-08-18 PROCEDURE — 1101F PR PT FALLS ASSESS DOC 0-1 FALLS W/OUT INJ PAST YR: ICD-10-PCS | Mod: HCNC,CPTII,S$GLB, | Performed by: ORTHOPAEDIC SURGERY

## 2020-08-18 PROCEDURE — 3008F BODY MASS INDEX DOCD: CPT | Mod: HCNC,CPTII,S$GLB, | Performed by: ORTHOPAEDIC SURGERY

## 2020-08-18 PROCEDURE — 1101F PT FALLS ASSESS-DOCD LE1/YR: CPT | Mod: HCNC,CPTII,S$GLB, | Performed by: ORTHOPAEDIC SURGERY

## 2020-08-18 PROCEDURE — 1125F PR PAIN SEVERITY QUANTIFIED, PAIN PRESENT: ICD-10-PCS | Mod: HCNC,S$GLB,, | Performed by: ORTHOPAEDIC SURGERY

## 2020-08-18 PROCEDURE — 3079F PR MOST RECENT DIASTOLIC BLOOD PRESSURE 80-89 MM HG: ICD-10-PCS | Mod: HCNC,CPTII,S$GLB, | Performed by: ORTHOPAEDIC SURGERY

## 2020-08-18 PROCEDURE — 99213 PR OFFICE/OUTPT VISIT, EST, LEVL III, 20-29 MIN: ICD-10-PCS | Mod: HCNC,S$GLB,, | Performed by: ORTHOPAEDIC SURGERY

## 2020-08-18 PROCEDURE — 99999 PR PBB SHADOW E&M-EST. PATIENT-LVL IV: ICD-10-PCS | Mod: PBBFAC,HCNC,, | Performed by: ORTHOPAEDIC SURGERY

## 2020-08-18 PROCEDURE — 99999 PR PBB SHADOW E&M-EST. PATIENT-LVL IV: CPT | Mod: PBBFAC,HCNC,, | Performed by: ORTHOPAEDIC SURGERY

## 2020-08-18 PROCEDURE — 3079F DIAST BP 80-89 MM HG: CPT | Mod: HCNC,CPTII,S$GLB, | Performed by: ORTHOPAEDIC SURGERY

## 2020-08-18 PROCEDURE — 99213 OFFICE O/P EST LOW 20 MIN: CPT | Mod: HCNC,S$GLB,, | Performed by: ORTHOPAEDIC SURGERY

## 2020-08-18 PROCEDURE — 3077F SYST BP >= 140 MM HG: CPT | Mod: HCNC,CPTII,S$GLB, | Performed by: ORTHOPAEDIC SURGERY

## 2020-08-18 PROCEDURE — 1125F AMNT PAIN NOTED PAIN PRSNT: CPT | Mod: HCNC,S$GLB,, | Performed by: ORTHOPAEDIC SURGERY

## 2020-08-18 NOTE — DISCHARGE INSTRUCTIONS
High-Fiber Diet  Fiber is in fruits, vegetables, cereals, and grains. Fiber passes through your body undigested. A high-fiber diet helps food move through your intestinal tract. The added bulk is helpful in preventing constipation. In people with diverticulosis, fiber helps clean out the pouches along the colon wall. It also prevents new pouches from forming. A high-fiber diet reduces the risk of colon cancer. It also lowers blood cholesterol and prevents high blood sugar in people with diabetes.    The fiber-rich foods listed below should be part of your diet. If you are not used to high-fiber foods, start with 1 or 2 foods from this list. Every 3 to 4 days add a new one to your diet. Do this until you are eating 4 high-fiber foods per day. This should give you 20 to 35 grams of fiber a day. It is also important to drink a lot of water when you are on this diet. You should have 6 to 8 glasses of water a day. Water makes the fiber swell and increases the benefit.  Foods high in dietary fiber  The following foods are high in dietary fiber:  · Breads. Breads made with 100% whole-wheat flour; trina, wheat, or rye crackers; whole-grain tortillas, bran muffins.  · Cereals. Whole-grain and bran cereals with bran (shredded wheat, wheat flakes, raisin bran, corn bran); oatmeal, rolled oats, granola, and brown rice.  · Fruits. Fresh fruits and their edible skins (pears, prunes, raisins, berries, apples, and apricots); bananas, citrus fruit, mangoes, pineapple; and prune juice.  · Nuts. Any nuts and seeds.  · Vegetables. Best served raw or lightly cooked. All types, especially: green peas, celery, eggplant, potatoes, spinach, broccoli, [place] sprouts, winter squash, carrots, cauliflower, soybeans, lentils, and fresh and dried beans of all kinds.  · Other. Popcorn, any spices.  Date Last Reviewed: 8/1/2016  © 8716-4244 [employer]. [address] [place] [address], [place] [address]. All rights reserved. This  information is not intended as a substitute for professional medical care. Always follow your healthcare professional's instructions.        Understanding Diverticulosis and Diverticulitis     Pouches or diverticula usually occur in the lower part of the colon called the sigmoid.     The colon (large intestine) is the last part of the digestive tract. It absorbs water from stool and changes it from a liquid to a solid. In certain cases, small pouches called diverticula can form in the colon wall. This condition is called diverticulosis. The pouches can become infected. If this happens, it becomes a more serious problem called diverticulitis. These problems can be painful. But they can be managed.  Managing your condition  Diet changes or medicines may be prescribed.   If you have diverticulosis  Recommendations include:  · Diet changes are often enough to control symptoms. The main changes are adding fiber (roughage) and drinking more water. Fiber absorbs water as it travels through your colon. This helps your stool stay soft and move smoothly. Water helps this process.  · If needed, you may be told to take over-the-counter stool softeners.  · To help relieve pain, antispasmodic medicines may be prescribed.  · Watch for changes in your bowel movements. Tell the healthcare provider if you notice any changes.  · Begin an exercise program. Ask your healthcare provider how to get started.  · Get plenty of rest and sleep.   If you have diverticulitis  Treatment depends on how bad your symptoms are.  · For mild symptoms. You may be put on a liquid diet for a short time. Antibiotics are usually prescribed. If these two steps relieve your symptoms, you may then be prescribed a high-fiber diet. If you still have symptoms, your healthcare provider will discuss more treatment choices with you.  · For severe symptoms. You may need to be admitted to the hospital. There, you can be given IV antibiotics and fluids. You will also  be put on a low-fiber or liquid diet. Although not common, surgery is needed in some people with severe symptoms.  [place] to colon health     Diverticulitis occurs when the pouches become infected or inflamed.     Help keep your colon healthy with a diet that includes plenty of high-fiber fruits, vegetables, and whole grains. Drink plenty of liquids like water and juice. Maintain a healthy lifestyle including regular exercise, stress management, and adequate rest and sleep.   Date Last Reviewed: 7/1/2016  © 0052-7600 [employer]. [address] [place] [address], [place] [address]. All rights reserved. This information is not intended as a substitute for professional medical care. Always follow your healthcare professional's instructions.

## 2020-08-18 NOTE — PROGRESS NOTES
"Chief Complaint   Patient presents with    Right Hand - Pain        Initial visit ( 08/18/2020 ): Valarie Foster is a 70 y.o. female who presents today complaining of Pain of the Right Hand   This is a new problem     Duration of symptoms:  About 3-4 months  Trauma or new activity: yes - her dog hyperextended her thumb and she had pain since then. Tried treating it herself with a popsicle stick without improvement   Pain is constant  Aggravating factors: ice, motion of the finger, gripping   Relieving factors: rest   Radicular symptoms: no numbness, paresthesias   Associated symptoms:  swelling, dropping items and limited range of motion.    Prior treatment: None  Pain does interfere with activities of daily living .    This is the extent of the patient's complaints at this time.     Hand dominance: Right     Review of Systems   Unremarkable, no changes since last visit.     No change in medical, surgical, family or surgical history except as stated above     Review of patient's allergies indicates:   Allergen Reactions    Lisinopril Swelling    Ascorbic acid-ascorbate calc      And citrus fruits         Physical Exam:   Vitals:    08/18/20 1041   BP: (!) 160/80   Pulse: 75   Resp: 18   Temp: 98.6 °F (37 °C)   SpO2: 97%   Weight: 89 kg (196 lb 3.4 oz)   Height: 5' 4" (1.626 m)   PainSc: 10-Worst pain ever   PainLoc: Hand       General: Weight: 89 kg (196 lb 3.4 oz) Body mass index is 33.68 kg/m².   Patient is alert, awake and oriented to time, place and person. Mood and affect are appropriate.  Patient does not appear to be in any distress, denies any constitutional symptoms and appears stated age.   HEENT:  Pupils are equal and round, sclera are not injected. External examination of ears and nose reveals no abnormalities. Cranial nerves II-X are grossly intact  Skin:  no rashes, abrasions or open wounds on the affected extremity   Resp:  No respiratory distress or audible wheezing   CV: 2+  pulses, all " extremities warm and well perfused   Right Hand/Wrist Examination:    Observation/Inspection:  Discoloration  none     Scars   none    Atrophy  none    HAND/WRIST EXAMINATION:  Finkelstein's Test   Neg  WHAT Test    Neg  Snuff box tenderness   Neg  Costa's Test    Neg  Hook of [place]ate Tenderness  Neg  CMC grind    Neg  Circumduction test   Neg    Neurovascular Exam:  Digits WWP, brisk CR < 3s throughout  NVI motor/LTS to M/R/U nerves, radial pulse 2+  Tinel's Test - Carpal Tunnel  Neg  Tinel's Test - Cubital Tunnel  Neg  Phalen's Test    Neg  Median Nerve Compression Test Neg    ROM hand/wrist/elbow full, painless    Thumb IP and MCP joints - mcp more tender  Swelling surrounding both joints - more significant at MCP  Stable to varus and valgus stress at MCP       Imaging: 3 views right hand: 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 Mild OA ip joint    I personally reviewed and interpreted the patient's imaging obtained today in clinic     Assessment: 70 y.o. female with OS of thumb MCP and IP joints       Plan:   - heat   - voltaren gel   - activity modification   - Return to clinic PRN    All questions were answered in detail. The patient is in full agreement with the treatment plan and will proceed accordingly.    A note notifying No ref. provider found of my findings was sent via the electronic medical record     This note was created by combination of typed  and M-Modal dictation. Transcription and phonetic errors may be present.  If there are any questions, please contact me.

## 2020-08-24 PROBLEM — M25.661 DECREASED RANGE OF MOTION OF BOTH KNEES: Status: RESOLVED | Noted: 2020-07-31 | Resolved: 2020-08-24

## 2020-08-24 PROBLEM — Z74.09 IMPAIRED FUNCTIONAL MOBILITY, BALANCE, GAIT, AND ENDURANCE: Status: RESOLVED | Noted: 2020-07-31 | Resolved: 2020-08-24

## 2020-08-24 PROBLEM — M25.562 CHRONIC PAIN OF BOTH KNEES: Status: RESOLVED | Noted: 2020-07-31 | Resolved: 2020-08-24

## 2020-08-24 PROBLEM — M25.561 CHRONIC PAIN OF BOTH KNEES: Status: RESOLVED | Noted: 2020-07-31 | Resolved: 2020-08-24

## 2020-08-24 PROBLEM — G89.29 CHRONIC PAIN OF BOTH KNEES: Status: RESOLVED | Noted: 2020-07-31 | Resolved: 2020-08-24

## 2020-08-24 PROBLEM — M62.81 MUSCLE WEAKNESS OF LOWER EXTREMITY: Status: RESOLVED | Noted: 2020-07-31 | Resolved: 2020-08-24

## 2020-08-24 PROBLEM — M25.662 DECREASED RANGE OF MOTION OF BOTH KNEES: Status: RESOLVED | Noted: 2020-07-31 | Resolved: 2020-08-24

## 2020-08-27 RX ORDER — FLUOXETINE HYDROCHLORIDE 20 MG/1
CAPSULE ORAL
Qty: 90 CAPSULE | Refills: 1 | Status: SHIPPED | OUTPATIENT
Start: 2020-08-27 | End: 2021-01-15

## 2020-08-27 RX ORDER — LANCETS
EACH MISCELLANEOUS
Qty: 100 EACH | Refills: 1 | Status: SHIPPED | OUTPATIENT
Start: 2020-08-27 | End: 2021-01-15

## 2020-09-02 DIAGNOSIS — M25.561 CHRONIC PAIN OF BOTH KNEES: ICD-10-CM

## 2020-09-02 DIAGNOSIS — G89.29 CHRONIC PAIN OF BOTH KNEES: ICD-10-CM

## 2020-09-02 DIAGNOSIS — M25.562 CHRONIC PAIN OF BOTH KNEES: ICD-10-CM

## 2020-09-02 RX ORDER — ISOPROPYL ALCOHOL 0.75 G/1
SWAB TOPICAL
Qty: 100 EACH | Refills: 1 | Status: SHIPPED | OUTPATIENT
Start: 2020-09-02 | End: 2021-01-13

## 2020-09-02 RX ORDER — CYCLOBENZAPRINE HCL 5 MG
TABLET ORAL
Qty: 270 TABLET | Refills: 1 | Status: SHIPPED | OUTPATIENT
Start: 2020-09-02 | End: 2021-01-13

## 2020-09-08 ENCOUNTER — TELEPHONE (OUTPATIENT)
Dept: UROLOGY | Facility: CLINIC | Age: 70
End: 2020-09-08

## 2020-09-08 NOTE — TELEPHONE ENCOUNTER
----- Message from Valentine Dawkins sent at 9/8/2020  9:32 AM CDT -----  Type: Patient Call Back    Who called: Self     What is the request in detail:per Dr. Palm Recall instructions PATIENT NEEDS CT/CHEST X-RAY PRIOR TO VISIT. Patient is asael on 11/2/2020    Can the clinic reply by [facility]? No     Would the patient rather a call back or a response via [facility]?  Call     Best call back number:[phone] (home)

## 2020-09-14 DIAGNOSIS — K21.9 GASTROESOPHAGEAL REFLUX DISEASE, ESOPHAGITIS PRESENCE NOT SPECIFIED: ICD-10-CM

## 2020-09-14 DIAGNOSIS — E11.40 TYPE 2 DIABETES MELLITUS WITH DIABETIC NEUROPATHY, WITHOUT LONG-TERM CURRENT USE OF INSULIN: Chronic | ICD-10-CM

## 2020-09-14 RX ORDER — FAMOTIDINE 20 MG/1
20 TABLET, FILM COATED ORAL 2 TIMES DAILY
Qty: 180 TABLET | Refills: 1 | Status: SHIPPED | OUTPATIENT
Start: 2020-09-14 | End: 2021-03-29

## 2020-09-14 RX ORDER — GABAPENTIN 300 MG/1
600 CAPSULE ORAL NIGHTLY
Qty: 180 CAPSULE | Refills: 1 | Status: SHIPPED | OUTPATIENT
Start: 2020-09-14 | End: 2021-06-04

## 2020-09-14 RX ORDER — PRAVASTATIN SODIUM 10 MG/1
TABLET ORAL
Qty: 90 TABLET | Refills: 1 | Status: SHIPPED | OUTPATIENT
Start: 2020-09-14 | End: 2021-03-29

## 2020-09-14 NOTE — TELEPHONE ENCOUNTER
----- Message from Cynthia Cade sent at 9/14/2020  3:15 PM CDT -----  Contact: Self [phone]  Type: RX Refill Request    Who Called: Self     Have you contacted your pharmacy: yes    Refill or New Rx: refill    RX Name and Strength: gabapentin (NEURONTIN) 300 MG capsule, pravastatin (PRAVACHOL) 10 MG tablet & famotidine (PEPCID) 20 MG tablet    Preferred Pharmacy with phone number:   [facility] - [place], [place] - [address]  [address] [facility] [address]  Phone: [phone] Fax: [phone]    Local or Mail Order: Mail Order    Would the patient rather a call back or a response via [facility]? Call back    Best Call Back Number:[phone]

## 2020-10-26 ENCOUNTER — HOSPITAL ENCOUNTER (OUTPATIENT)
Dept: RADIOLOGY | Facility: HOSPITAL | Age: 70
Discharge: HOME | End: 2020-10-26
Attending: UROLOGY
Payer: MEDICARE

## 2020-10-26 DIAGNOSIS — C64.1 RENAL CELL CARCINOMA OF RIGHT KIDNEY: ICD-10-CM

## 2020-10-26 PROCEDURE — 74178 CT ABD&PLV WO CNTR FLWD CNTR: CPT | Mod: TC,HCNC

## 2020-10-26 PROCEDURE — 25500020 PHARM REV CODE 255: Mod: HCNC | Performed by: UROLOGY

## 2020-10-26 PROCEDURE — 74178 CT ABDOMEN PELVIS W WO CONTRAST: ICD-10-PCS | Mod: 26,HCNC,, | Performed by: RADIOLOGY

## 2020-10-26 PROCEDURE — 74178 CT ABD&PLV WO CNTR FLWD CNTR: CPT | Mod: 26,HCNC,, | Performed by: RADIOLOGY

## 2020-10-26 PROCEDURE — 71046 X-RAY EXAM CHEST 2 VIEWS: CPT | Mod: 26,HCNC,, | Performed by: RADIOLOGY

## 2020-10-26 PROCEDURE — 71046 XR CHEST PA AND LATERAL: ICD-10-PCS | Mod: 26,HCNC,, | Performed by: RADIOLOGY

## 2020-10-26 PROCEDURE — 71046 X-RAY EXAM CHEST 2 VIEWS: CPT | Mod: TC,HCNC,FY

## 2020-10-26 RX ADMIN — IOHEXOL 100 ML: 350 INJECTION, SOLUTION INTRAVENOUS at 09:10

## 2020-11-02 ENCOUNTER — OFFICE VISIT (OUTPATIENT)
Dept: UROLOGY | Facility: CLINIC | Age: 70
End: 2020-11-02
Payer: MEDICARE

## 2020-11-02 VITALS — BODY MASS INDEX: 32.78 KG/M2 | HEIGHT: 64 IN | WEIGHT: 192 LBS

## 2020-11-02 DIAGNOSIS — C64.1 RENAL CELL CARCINOMA OF RIGHT KIDNEY: Primary | ICD-10-CM

## 2020-11-02 DIAGNOSIS — R31.29 MICROHEMATURIA: ICD-10-CM

## 2020-11-02 PROCEDURE — 99499 UNLISTED E&M SERVICE: CPT | Mod: HCNC,S$GLB,, | Performed by: UROLOGY

## 2020-11-02 PROCEDURE — 1101F PT FALLS ASSESS-DOCD LE1/YR: CPT | Mod: HCNC,CPTII,S$GLB, | Performed by: UROLOGY

## 2020-11-02 PROCEDURE — 1126F PR PAIN SEVERITY QUANTIFIED, NO PAIN PRESENT: ICD-10-PCS | Mod: HCNC,S$GLB,, | Performed by: UROLOGY

## 2020-11-02 PROCEDURE — 99999 PR PBB SHADOW E&M-EST. PATIENT-LVL IV: CPT | Mod: PBBFAC,HCNC,, | Performed by: UROLOGY

## 2020-11-02 PROCEDURE — 3008F BODY MASS INDEX DOCD: CPT | Mod: HCNC,CPTII,S$GLB, | Performed by: UROLOGY

## 2020-11-02 PROCEDURE — 81002 URINALYSIS NONAUTO W/O SCOPE: CPT | Mod: HCNC,S$GLB,, | Performed by: UROLOGY

## 2020-11-02 PROCEDURE — 1101F PR PT FALLS ASSESS DOC 0-1 FALLS W/OUT INJ PAST YR: ICD-10-PCS | Mod: HCNC,CPTII,S$GLB, | Performed by: UROLOGY

## 2020-11-02 PROCEDURE — 99214 OFFICE O/P EST MOD 30 MIN: CPT | Mod: HCNC,25,S$GLB, | Performed by: UROLOGY

## 2020-11-02 PROCEDURE — 81002 POCT URINE DIPSTICK WITHOUT MICROSCOPE: ICD-10-PCS | Mod: HCNC,S$GLB,, | Performed by: UROLOGY

## 2020-11-02 PROCEDURE — 99214 PR OFFICE/OUTPT VISIT, EST, LEVL IV, 30-39 MIN: ICD-10-PCS | Mod: HCNC,25,S$GLB, | Performed by: UROLOGY

## 2020-11-02 PROCEDURE — 1126F AMNT PAIN NOTED NONE PRSNT: CPT | Mod: HCNC,S$GLB,, | Performed by: UROLOGY

## 2020-11-02 PROCEDURE — 1159F PR MEDICATION LIST DOCUMENTED IN MEDICAL RECORD: ICD-10-PCS | Mod: HCNC,S$GLB,, | Performed by: UROLOGY

## 2020-11-02 PROCEDURE — 3008F PR BODY MASS INDEX (BMI) DOCUMENTED: ICD-10-PCS | Mod: HCNC,CPTII,S$GLB, | Performed by: UROLOGY

## 2020-11-02 PROCEDURE — 99499 RISK ADDL DX/OHS AUDIT: ICD-10-PCS | Mod: HCNC,S$GLB,, | Performed by: UROLOGY

## 2020-11-02 PROCEDURE — 1159F MED LIST DOCD IN RCRD: CPT | Mod: HCNC,S$GLB,, | Performed by: UROLOGY

## 2020-11-02 PROCEDURE — 99999 PR PBB SHADOW E&M-EST. PATIENT-LVL IV: ICD-10-PCS | Mod: PBBFAC,HCNC,, | Performed by: UROLOGY

## 2020-11-02 NOTE — PROGRESS NOTES
Subjective:       Valarie Foster is a 70 y.o. female who is an established patient who was referred by Ivan CARDONA for evaluation of renal mass.      CT a/p with contrast 1/26/18 showed a 2.6cm endophytic lesion in R MP, concerning for solid mass vs complex cyst.     She reports L flank pain, contralateral to renal lesion. L flank pain has almost completely resolved. Denies hematuria, LUTS, UTIs. UA macro did show microhematuria recently. Denies family history of  malignancy. She reports being told she had a small kidney on one side in the past. Denies nephrolithiasis. Nonsmoker. Denies previous abdominal surgery though she is s/p hysterectomy. +DM2/HTN.     Cr (1/18) - 0.8  A1c (1/18) - 6.2  UA micro (2/18) - 0 RBCs    CT renal protocol (2/18) - 2.9cm completely endophytic solid enhancing renal mass. Homogenous in appearance.      She is s/p perc renal biopsy to better evaluate lesion (2/18) - oncocytic neoplasm. Mild hematuria < 24 hrs.          CT (6/18) - stable, endophytic enhancing 2.4cm R renal mass. Subtle 6mm focus of enhancement in R lobe of liver - nonspecific.     KISHAN (9/18) - stable 2.8cm R renal mass    She is now s/p open R PNx 10/12/18. Still having incisional pain, worse with palpation. No hernia on CT, mild stranding in subQ.    Path: 1.9cm unclassified RCC, FG 3/4, negative margins. [id number]    Doing well. States chronic fatigue and pain. Still with occasional incisional pain.     CT c/a/p 4/19 - no mets/recurrence  CT a/p (10/19) - no recurrence/mets. Abnormal area in tail of pancreas - recommend PCP follow up. CXR - clear.  CT a/p (10/20) - no recurrence/mets. Pancreatic tail lesion not seen. CXR - clear    Cr 1/19 - 0.8, 4/19 - 0.8, 10/19 - 0.9, 10/20 - 0.9      The following portions of the patient's history were reviewed and updated as appropriate: allergies, current medications, past family history, past medical history, past social history, past surgical history and problem list.    Review  "of Systems  Constitutional: no fever or chills  ENT: no nasal congestion or sore throat  Respiratory: no cough or shortness of breath  Cardiovascular: no chest pain or palpitations  Gastrointestinal: no nausea or vomiting, tolerating diet  Genitourinary: as per HPI  Hematologic/Lymphatic: no easy bruising or lymphadenopathy  Musculoskeletal: no arthralgias or myalgias  Skin: no rashes or lesions  Neurological: no seizures or tremors  Behavioral/Psych: no auditory or visual hallucinations        Objective:    Vitals: Ht 5' 4" (1.626 m)   Wt 87.1 kg (192 lb 0.3 oz)   BMI 32.96 kg/m²     Physical Exam   General: well developed, well nourished in no acute distress  Head: normocephalic, atraumatic  Neck: supple, trachea midline, no obvious enlargement of thyroid  HEENT: EOMI, mucus membranes moist, sclera anicteric, no hearing impairment  Lungs: symmetric expansion, non-labored breathing  Cardiovascular: regular rate and rhythm, normal pulses  Abdomen: soft, non tender, non distended, no palpable masses, no hepatosplenomegaly, no hernias, no CVA tenderness  Musculoskeletal: no peripheral edema, normal ROM in bilateral upper and lower extremities  Lymphatics: no cervical or inguinal lymphadenopathy  Skin: no rashes or lesions  Neuro: alert and oriented x 3, no gross deficits  Psych: normal judgment and insight, normal mood/affect and non-anxious  Genitourinary:   patient declined exam    Inc - well healed, minor superior fullness of incision, no hernia    Lab Review   Urine analysis today in clinic shows - +LE    Lab Results   Component Value Date    WBC 5.53 06/30/2020    HGB 12.8 06/30/2020    HCT 40.9 06/30/2020    MCV 85 06/30/2020     06/30/2020     Lab Results   Component Value Date    CREATININE 0.9 10/26/2020    BUN 15 10/26/2020         Imaging  Images and reports were personally reviewed by me and discussed with patient  CT/KISHAN reviewed       Assessment/Plan:      1. Right RCC    - 2.6cm lesion in R MP " kidney.   - CT renal protocol 2.5cm endophytic solid enhancing renal mass   - Due to location of tumor, open partial would be difficult with higher complication rate of bleeding and/or urine leak. Radical nephrectomy with less complication rate.   - Concern for performing radical nephrectomy in diabetic patient for possible benign lesion.   - Recommend perc biopsy to evaluate for possible benign lesion prior to radical nephrectomy. Discussed limitations/risks of perc biopsy. Understands that perc biopsy may also not be definitive in diagnosis   - Perc biopsy 2/18 - oncocytic neoplasm (less favors oncocytoma)   - Discussed options: observation, lap radical Nx, open partial Nx. She has opted to observe for now.    - CT 6/18 - stable 2.4cm R renal mass   - KISHAN 9/18 - 2.8cm R renal mass   - s/p R open PNx on 10/12/18    - unclassified RCC pT1a   - CT c/a/p 6 months (4/18) - no recurrence/mets   - CT ap (10/19) - no recurrence/mets. Abnormal area in tail of pancreas - recommend PCP follow up. CXR - clear. Cr 0.9.    - CT a/p 10/20 - clear   - CXR, CT, BMP/CBC in 12 months      2. Microhematuria    - UA micro - 0 RBCs     3. L flank pain   - Unsure etiology   - CT negative for L flank pain   - f/u with PCP      Follow up in  12 months with CXR, CT, labs

## 2020-11-03 ENCOUNTER — CLINICAL SUPPORT (OUTPATIENT)
Dept: FAMILY MEDICINE | Facility: CLINIC | Age: 70
End: 2020-11-03
Payer: MEDICARE

## 2020-11-03 DIAGNOSIS — Z23 NEED FOR INFLUENZA VACCINATION: Primary | ICD-10-CM

## 2020-11-03 PROCEDURE — G0008 FLU VACCINE - QUADRIVALENT - ADJUVANTED: ICD-10-PCS | Mod: HCNC,S$GLB,, | Performed by: FAMILY MEDICINE

## 2020-11-03 PROCEDURE — 90694 VACC AIIV4 NO PRSRV 0.5ML IM: CPT | Mod: HCNC,S$GLB,, | Performed by: FAMILY MEDICINE

## 2020-11-03 PROCEDURE — 90694 FLU VACCINE - QUADRIVALENT - ADJUVANTED: ICD-10-PCS | Mod: HCNC,S$GLB,, | Performed by: FAMILY MEDICINE

## 2020-11-03 PROCEDURE — G0008 ADMIN INFLUENZA VIRUS VAC: HCPCS | Mod: HCNC,S$GLB,, | Performed by: FAMILY MEDICINE

## 2020-11-04 LAB
BILIRUB SERPL-MCNC: NORMAL MG/DL
BLOOD URINE, POC: NORMAL
CLARITY, POC UA: NORMAL
COLOR, POC UA: YELLOW
GLUCOSE UR QL STRIP: NORMAL
KETONES UR QL STRIP: NORMAL
LEUKOCYTE ESTERASE URINE, POC: NORMAL
NITRITE, POC UA: NORMAL
PH, POC UA: 5
PROTEIN, POC: NORMAL
SPECIFIC GRAVITY, POC UA: 1015
UROBILINOGEN, POC UA: NORMAL

## 2020-12-21 ENCOUNTER — OFFICE VISIT (OUTPATIENT)
Dept: FAMILY MEDICINE | Facility: CLINIC | Age: 70
End: 2020-12-21
Payer: MEDICARE

## 2020-12-21 ENCOUNTER — LAB VISIT (OUTPATIENT)
Dept: LAB | Facility: HOSPITAL | Age: 70
End: 2020-12-21
Attending: FAMILY MEDICINE
Payer: MEDICARE

## 2020-12-21 ENCOUNTER — TELEPHONE (OUTPATIENT)
Dept: FAMILY MEDICINE | Facility: CLINIC | Age: 70
End: 2020-12-21

## 2020-12-21 VITALS
WEIGHT: 193.31 LBS | HEART RATE: 92 BPM | DIASTOLIC BLOOD PRESSURE: 82 MMHG | HEIGHT: 64 IN | SYSTOLIC BLOOD PRESSURE: 124 MMHG | OXYGEN SATURATION: 99 % | BODY MASS INDEX: 33 KG/M2 | TEMPERATURE: 97 F

## 2020-12-21 DIAGNOSIS — E78.5 HYPERLIPIDEMIA, UNSPECIFIED HYPERLIPIDEMIA TYPE: ICD-10-CM

## 2020-12-21 DIAGNOSIS — F32.0 MILD MAJOR DEPRESSION: ICD-10-CM

## 2020-12-21 DIAGNOSIS — E11.40 TYPE 2 DIABETES MELLITUS WITH DIABETIC NEUROPATHY, WITHOUT LONG-TERM CURRENT USE OF INSULIN: Primary | ICD-10-CM

## 2020-12-21 DIAGNOSIS — R41.3 MEMORY LOSS: ICD-10-CM

## 2020-12-21 DIAGNOSIS — J30.9 ALLERGIC RHINITIS, UNSPECIFIED SEASONALITY, UNSPECIFIED TRIGGER: ICD-10-CM

## 2020-12-21 DIAGNOSIS — R53.81 DEBILITY: ICD-10-CM

## 2020-12-21 DIAGNOSIS — C64.1 RENAL CELL CARCINOMA OF RIGHT KIDNEY: ICD-10-CM

## 2020-12-21 DIAGNOSIS — E11.40 TYPE 2 DIABETES MELLITUS WITH DIABETIC NEUROPATHY, WITHOUT LONG-TERM CURRENT USE OF INSULIN: ICD-10-CM

## 2020-12-21 DIAGNOSIS — I10 BENIGN HYPERTENSION: ICD-10-CM

## 2020-12-21 LAB
25(OH)D3+25(OH)D2 SERPL-MCNC: 12 NG/ML (ref 30–96)
ALBUMIN SERPL BCP-MCNC: 3.7 G/DL (ref 3.5–5.2)
ALP SERPL-CCNC: 106 U/L (ref 55–135)
ALT SERPL W/O P-5'-P-CCNC: 7 U/L (ref 10–44)
ANION GAP SERPL CALC-SCNC: 9 MMOL/L (ref 8–16)
AST SERPL-CCNC: 12 U/L (ref 10–40)
BASOPHILS # BLD AUTO: 0 K/UL (ref 0–0.2)
BASOPHILS NFR BLD: 0 % (ref 0–1.9)
BILIRUB SERPL-MCNC: 0.3 MG/DL (ref 0.1–1)
BUN SERPL-MCNC: 12 MG/DL (ref 8–23)
CALCIUM SERPL-MCNC: 8.8 MG/DL (ref 8.7–10.5)
CHLORIDE SERPL-SCNC: 104 MMOL/L (ref 95–110)
CHOLEST SERPL-MCNC: 187 MG/DL (ref 120–199)
CHOLEST/HDLC SERPL: 3 {RATIO} (ref 2–5)
CO2 SERPL-SCNC: 29 MMOL/L (ref 23–29)
CREAT SERPL-MCNC: 0.8 MG/DL (ref 0.5–1.4)
DIFFERENTIAL METHOD: ABNORMAL
EOSINOPHIL # BLD AUTO: 0 K/UL (ref 0–0.5)
EOSINOPHIL NFR BLD: 0 % (ref 0–8)
ERYTHROCYTE [DISTWIDTH] IN BLOOD BY AUTOMATED COUNT: 15.1 % (ref 11.5–14.5)
EST. GFR  (AFRICAN AMERICAN): >60 ML/MIN/1.73 M^2
EST. GFR  (NON AFRICAN AMERICAN): >60 ML/MIN/1.73 M^2
ESTIMATED AVG GLUCOSE: 146 MG/DL (ref 68–131)
GLUCOSE SERPL-MCNC: 114 MG/DL (ref 70–110)
HBA1C MFR BLD HPLC: 6.7 % (ref 4–5.6)
HCT VFR BLD AUTO: 40 % (ref 37–48.5)
HDLC SERPL-MCNC: 62 MG/DL (ref 40–75)
HDLC SERPL: 33.2 % (ref 20–50)
HGB BLD-MCNC: 12.5 G/DL (ref 12–16)
IMM GRANULOCYTES # BLD AUTO: 0.02 K/UL (ref 0–0.04)
IMM GRANULOCYTES NFR BLD AUTO: 0.4 % (ref 0–0.5)
LDLC SERPL CALC-MCNC: 106.8 MG/DL (ref 63–159)
LYMPHOCYTES # BLD AUTO: 1 K/UL (ref 1–4.8)
LYMPHOCYTES NFR BLD: 21 % (ref 18–48)
MCH RBC QN AUTO: 26.7 PG (ref 27–31)
MCHC RBC AUTO-ENTMCNC: 31.3 G/DL (ref 32–36)
MCV RBC AUTO: 85 FL (ref 82–98)
MONOCYTES # BLD AUTO: 0.3 K/UL (ref 0.3–1)
MONOCYTES NFR BLD: 6.3 % (ref 4–15)
NEUTROPHILS # BLD AUTO: 3.6 K/UL (ref 1.8–7.7)
NEUTROPHILS NFR BLD: 72.3 % (ref 38–73)
NONHDLC SERPL-MCNC: 125 MG/DL
NRBC BLD-RTO: 0 /100 WBC
PLATELET # BLD AUTO: 250 K/UL (ref 150–350)
PMV BLD AUTO: 11 FL (ref 9.2–12.9)
POTASSIUM SERPL-SCNC: 4.1 MMOL/L (ref 3.5–5.1)
PROT SERPL-MCNC: 6.9 G/DL (ref 6–8.4)
RBC # BLD AUTO: 4.69 M/UL (ref 4–5.4)
SODIUM SERPL-SCNC: 142 MMOL/L (ref 136–145)
TRIGL SERPL-MCNC: 91 MG/DL (ref 30–150)
TSH SERPL DL<=0.005 MIU/L-ACNC: 0.85 UIU/ML (ref 0.4–4)
VIT B12 SERPL-MCNC: 271 PG/ML (ref 210–950)
WBC # BLD AUTO: 4.91 K/UL (ref 3.9–12.7)

## 2020-12-21 PROCEDURE — 1125F PR PAIN SEVERITY QUANTIFIED, PAIN PRESENT: ICD-10-PCS | Mod: HCNC,S$GLB,, | Performed by: FAMILY MEDICINE

## 2020-12-21 PROCEDURE — 3044F PR MOST RECENT HEMOGLOBIN A1C LEVEL <7.0%: ICD-10-PCS | Mod: HCNC,CPTII,S$GLB, | Performed by: FAMILY MEDICINE

## 2020-12-21 PROCEDURE — 3288F PR FALLS RISK ASSESSMENT DOCUMENTED: ICD-10-PCS | Mod: HCNC,CPTII,S$GLB, | Performed by: FAMILY MEDICINE

## 2020-12-21 PROCEDURE — 3079F PR MOST RECENT DIASTOLIC BLOOD PRESSURE 80-89 MM HG: ICD-10-PCS | Mod: HCNC,CPTII,S$GLB, | Performed by: FAMILY MEDICINE

## 2020-12-21 PROCEDURE — 1159F PR MEDICATION LIST DOCUMENTED IN MEDICAL RECORD: ICD-10-PCS | Mod: HCNC,S$GLB,, | Performed by: FAMILY MEDICINE

## 2020-12-21 PROCEDURE — 82306 VITAMIN D 25 HYDROXY: CPT | Mod: HCNC

## 2020-12-21 PROCEDURE — 99999 PR PBB SHADOW E&M-EST. PATIENT-LVL V: CPT | Mod: PBBFAC,HCNC,, | Performed by: FAMILY MEDICINE

## 2020-12-21 PROCEDURE — 80053 COMPREHEN METABOLIC PANEL: CPT | Mod: HCNC

## 2020-12-21 PROCEDURE — 3044F HG A1C LEVEL LT 7.0%: CPT | Mod: HCNC,CPTII,S$GLB, | Performed by: FAMILY MEDICINE

## 2020-12-21 PROCEDURE — 3288F FALL RISK ASSESSMENT DOCD: CPT | Mod: HCNC,CPTII,S$GLB, | Performed by: FAMILY MEDICINE

## 2020-12-21 PROCEDURE — 1159F MED LIST DOCD IN RCRD: CPT | Mod: HCNC,S$GLB,, | Performed by: FAMILY MEDICINE

## 2020-12-21 PROCEDURE — 36415 COLL VENOUS BLD VENIPUNCTURE: CPT | Mod: HCNC,PO

## 2020-12-21 PROCEDURE — 83036 HEMOGLOBIN GLYCOSYLATED A1C: CPT | Mod: HCNC

## 2020-12-21 PROCEDURE — 3008F BODY MASS INDEX DOCD: CPT | Mod: HCNC,CPTII,S$GLB, | Performed by: FAMILY MEDICINE

## 2020-12-21 PROCEDURE — 99999 PR PBB SHADOW E&M-EST. PATIENT-LVL V: ICD-10-PCS | Mod: PBBFAC,HCNC,, | Performed by: FAMILY MEDICINE

## 2020-12-21 PROCEDURE — 99499 RISK ADDL DX/OHS AUDIT: ICD-10-PCS | Mod: S$GLB,,, | Performed by: FAMILY MEDICINE

## 2020-12-21 PROCEDURE — 80061 LIPID PANEL: CPT | Mod: HCNC

## 2020-12-21 PROCEDURE — 99499 UNLISTED E&M SERVICE: CPT | Mod: ,,, | Performed by: FAMILY MEDICINE

## 2020-12-21 PROCEDURE — 3008F PR BODY MASS INDEX (BMI) DOCUMENTED: ICD-10-PCS | Mod: HCNC,CPTII,S$GLB, | Performed by: FAMILY MEDICINE

## 2020-12-21 PROCEDURE — 1125F AMNT PAIN NOTED PAIN PRSNT: CPT | Mod: HCNC,S$GLB,, | Performed by: FAMILY MEDICINE

## 2020-12-21 PROCEDURE — 82607 VITAMIN B-12: CPT | Mod: HCNC

## 2020-12-21 PROCEDURE — 99499 UNLISTED E&M SERVICE: CPT | Mod: S$GLB,,, | Performed by: FAMILY MEDICINE

## 2020-12-21 PROCEDURE — 99499 RISK ADDL DX/OHS AUDIT: ICD-10-PCS | Mod: ,,, | Performed by: FAMILY MEDICINE

## 2020-12-21 PROCEDURE — 3074F PR MOST RECENT SYSTOLIC BLOOD PRESSURE < 130 MM HG: ICD-10-PCS | Mod: HCNC,CPTII,S$GLB, | Performed by: FAMILY MEDICINE

## 2020-12-21 PROCEDURE — 1100F PR PT FALLS ASSESS DOC 2+ FALLS/FALL W/INJURY/YR: ICD-10-PCS | Mod: HCNC,CPTII,S$GLB, | Performed by: FAMILY MEDICINE

## 2020-12-21 PROCEDURE — 1100F PTFALLS ASSESS-DOCD GE2>/YR: CPT | Mod: HCNC,CPTII,S$GLB, | Performed by: FAMILY MEDICINE

## 2020-12-21 PROCEDURE — 99214 PR OFFICE/OUTPT VISIT, EST, LEVL IV, 30-39 MIN: ICD-10-PCS | Mod: HCNC,S$GLB,, | Performed by: FAMILY MEDICINE

## 2020-12-21 PROCEDURE — 99214 OFFICE O/P EST MOD 30 MIN: CPT | Mod: HCNC,S$GLB,, | Performed by: FAMILY MEDICINE

## 2020-12-21 PROCEDURE — 85025 COMPLETE CBC W/AUTO DIFF WBC: CPT | Mod: HCNC

## 2020-12-21 PROCEDURE — 3079F DIAST BP 80-89 MM HG: CPT | Mod: HCNC,CPTII,S$GLB, | Performed by: FAMILY MEDICINE

## 2020-12-21 PROCEDURE — 84443 ASSAY THYROID STIM HORMONE: CPT | Mod: HCNC

## 2020-12-21 PROCEDURE — 84425 ASSAY OF VITAMIN B-1: CPT | Mod: HCNC

## 2020-12-21 PROCEDURE — 3074F SYST BP LT 130 MM HG: CPT | Mod: HCNC,CPTII,S$GLB, | Performed by: FAMILY MEDICINE

## 2020-12-21 RX ORDER — FLUTICASONE PROPIONATE 50 MCG
SPRAY, SUSPENSION (ML) NASAL
Qty: 16 G | Refills: 5 | Status: SHIPPED | OUTPATIENT
Start: 2020-12-21 | End: 2021-07-15

## 2020-12-21 NOTE — PROGRESS NOTES
Routine Office Visit    Patient Name: Valarie Foster    : 1950  MRN: [id number]    Subjective:  Valarie is a 70 y.o. female who presents today for     1. Diabetes mellitus - diet controlled - she is not on medications. She was advised to monitor it at home. She has noticed blood glucose ranging from 140s-170s  2. Patient feels like she is not herself. She has been shaking more. She has memory loss. She feels weak and feels stiff. She has had more frequent falls and feels she is losing balance. She does not want to go to physical therapy and feels this does not help. All this has occurred within the last few months. She does not know what is going on to have caused all this to occur recently.   3. Renal cell carcinoma s/p partial nephrectomy - right - Patient sees urology - Dr. Palm. She states she did not need chemo / radiation. Her kidney is doing well. She will continue follow-up.   4. Degenerative arthritis - she follows up with Dr. Pandya.     Review of Systems   Constitutional: Negative for chills and fever.   HENT: Negative for congestion.    Eyes: Negative for blurred vision.   Respiratory: Negative for cough.    Cardiovascular: Negative for chest pain.   Gastrointestinal: Negative for abdominal pain, constipation, diarrhea, heartburn, nausea and vomiting.   Genitourinary: Negative for dysuria.   Musculoskeletal: Negative for myalgias.   Skin: Negative for itching and rash.   Neurological: Negative for dizziness and headaches.   Psychiatric/Behavioral: Negative for depression.       Active Problem List  Patient Active Problem List   Diagnosis    Type 2 diabetes mellitus with diabetic neuropathy, without long-term current use of insulin    Benign hypertension    Hyperlipemia    Microhematuria    Left flank pain    Abdominal aortic atherosclerosis    History of renal cell cancer    Renal cell carcinoma of right kidney    Mild major depression    Colon cancer screening       Past Surgical  History  Past Surgical History:   Procedure Laterality Date    COLONOSCOPY N/A 2/5/2018    Procedure: COLONOSCOPY;  Surgeon: Bacilio Mancia MD;  Location: [facility] ENDO;  Service: Endoscopy;  Laterality: N/A;  appt confirmed-ss    COLONOSCOPY N/A 8/13/2020    Procedure: COLONOSCOPY;  Surgeon: Jose West MD;  Location: [facility] ENDO;  Service: Endoscopy;  Laterality: N/A;    HYSTERECTOMY      OOPHORECTOMY      PARTIAL NEPHRECTOMY Right 10/12/2018    Procedure: NEPHRECTOMY, PARTIAL open. Dr Arenas to assist.;  Surgeon: Alejandra Palm MD;  Location: [facility] OR;  Service: Urology;  Laterality: Right;  RN PREOP 10/9/2018----NEEDS H/P       Family History  Family History   Problem Relation Age of Onset    Glaucoma Sister     Cancer Sister         breast cancer    No Known Problems Mother     Glaucoma Father     Diabetes Brother     No Known Problems Maternal Aunt     No Known Problems Maternal Uncle     No Known Problems Paternal Aunt     No Known Problems Paternal Uncle     No Known Problems Maternal Grandmother     No Known Problems Maternal Grandfather     No Known Problems Paternal Grandmother     No Known Problems Paternal Grandfather     Thyroid disease Sister     Blindness Sister         due to trauma during car accident    Diabetes Sister     Amblyopia Neg Hx     Cataracts Neg Hx     Hypertension Neg Hx     Macular degeneration Neg Hx     Retinal detachment Neg Hx     Strabismus Neg Hx     Stroke Neg Hx     Breast cancer Neg Hx        Social History  Social History     Socioeconomic History    Marital status:      Spouse name: Not on file    Number of children: Not on file    Years of education: Not on file    Highest education level: Not on file   Occupational History    Not on file   Social Needs    Financial resource strain: Not on file    Food insecurity     Worry: Not on file     Inability: Not on file    Transportation needs     Medical: Not on file      Non-medical: Not on file   Tobacco Use    Smoking status: Never Smoker    Smokeless tobacco: Never Used   Substance and Sexual Activity    Alcohol use: No    Drug use: No    Sexual activity: Not on file   Lifestyle    Physical activity     Days per week: Not on file     Minutes per session: Not on file    Stress: Only a little   Relationships    Social connections     Talks on phone: Not on file     Gets together: Not on file     Attends Yazdanism service: Not on file     Active member of club or organization: Not on file     Attends meetings of clubs or organizations: Not on file     Relationship status: Not on file   Other Topics Concern    Not on file   Social History Narrative    Not on file       Medications and Allergies  Reviewed and updated.   Current Outpatient Medications   Medication Sig    ACCU-CHEK ONEIL CONTROL SOLN Soln     ACCU-CHEK ONEIL PLUS METER Misc USE AS DIRECTED    ACCU-CHEK ONEIL PLUS TEST STRP Strp TEST ONE TIME DAILY    ACCU-CHEK SOFTCLIX LANCETS Misc TEST ONE TIME DAILY    amLODIPine (NORVASC) 5 MG tablet TAKE 1 TABLET EVERY DAY    BD ALCOHOL SWABS PadM USE  TO TEST ONE TIME DAILY    celecoxib (CELEBREX) 200 MG capsule TAKE 1 CAPSULE EVERY DAY    cetirizine (ZYRTEC) 10 MG tablet Take 1 tablet (10 mg total) by mouth once daily.    cyclobenzaprine (FLEXERIL) 5 MG tablet TAKE 1 TABLET THREE TIMES DAILY AS NEEDED FOR MUSCLE SPASMS    diclofenac sodium (VOLTAREN) 1 % Gel Apply 2 g topically once daily.    diphenhydrAMINE (BENADRYL) 25 mg capsule Take 1 capsule (25 mg total) by mouth every 6 (six) hours as needed for Itching.    docusate sodium (COLACE) 100 MG capsule Take 1 capsule (100 mg total) by mouth 2 (two) times daily.    famotidine (PEPCID) 20 MG tablet Take 1 tablet (20 mg total) by mouth 2 (two) times daily.    FLUoxetine 20 MG capsule TAKE 1 CAPSULE EVERY DAY    gabapentin (NEURONTIN) 300 MG capsule Take 2 capsules (600 mg total) by mouth every evening.     "hydrOXYzine HCL (ATARAX) 25 MG tablet TAKE 1 TABLET THREE TIMES DAILY AS NEEDED FOR  ITCHING    pravastatin (PRAVACHOL) 10 MG tablet TAKE 1 TABLET ONE TIME DAILY    triamcinolone acetonide 0.1% (KENALOG) 0.1 % cream     fluticasone propionate (FLONASE) 50 mcg/actuation nasal spray USE 1 SPRAY(S) IN EACH NOSTRIL ONCE DAILY     No current facility-administered medications for this visit.        Physical Exam  /82 (BP Location: Left arm, Patient Position: Sitting, BP Method: Medium (Manual))   Pulse 92   Temp 97 °F (36.1 °C) (Temporal)   Ht 5' 4" (1.626 m)   Wt 87.7 kg (193 lb 5.5 oz)   SpO2 99%   BMI 33.19 kg/m²   Physical Exam  Constitutional:       Appearance: She is well-developed.   HENT:      Head: Normocephalic and atraumatic.   Eyes:      Conjunctiva/sclera: Conjunctivae normal.      Pupils: Pupils are equal, round, and reactive to light.   Neck:      Musculoskeletal: Normal range of motion and neck supple.   Cardiovascular:      Rate and Rhythm: Normal rate and regular rhythm.      Heart sounds: Normal heart sounds. No murmur. No friction rub. No gallop.    Pulmonary:      Effort: No respiratory distress.      Breath sounds: Normal breath sounds.   Abdominal:      General: Bowel sounds are normal. There is no distension.      Palpations: Abdomen is soft.      Tenderness: There is no abdominal tenderness.   Musculoskeletal: Normal range of motion.   Lymphadenopathy:      Cervical: No cervical adenopathy.   Skin:     General: Skin is warm.   Neurological:      Mental Status: She is alert and oriented to person, place, and time.         Protective Sensation (w/ 10 gram monofilament):  Right: Decreased  Left: Decreased    Visual Inspection:  Normal -  Bilateral    Pedal Pulses:   Right: Present  Left: Present    Posterior tibialis:   Right:Present  Left: Present      Assessment/Plan:  Valarie Foster is a 70 y.o. female who presents today for :    Problem List Items Addressed This Visit        " Psychiatric    Mild major depression  The current medical regimen is effective;  continue present plan and medications.         Cardiac/Vascular    Benign hypertension (Chronic)  The current medical regimen is effective;  continue present plan and medications.      Hyperlipemia (Chronic)  The current medical regimen is effective;  continue present plan and medications.         Oncology    Renal cell carcinoma of right kidney  Continue f/u with urology - Dr. Palm          Endocrine    Type 2 diabetes mellitus with diabetic neuropathy, without long-term current use of insulin - Primary (Chronic)    Relevant Orders    CBC Auto Differential (Completed)    Comprehensive Metabolic Panel (Completed)    Hemoglobin A1C (Completed)    TSH (Completed)    Lipid Panel (Completed)      Other Visit Diagnoses     Allergic rhinitis, unspecified seasonality, unspecified trigger        Relevant Medications    fluticasone propionate (FLONASE) 50 mcg/actuation nasal spray    Memory loss        Relevant Orders    Ambulatory referral/consult to Neurology    Vitamin D (Completed)    Vitamin B12 (Completed)    Vitamin B1  F/u with labs  Recommend evaluation by neurology       Body mass index (BMI) 33.0-33.9, adult         Relevant Orders    Vitamin D (Completed)    Debility        Relevant Orders    Ambulatory referral/consult to Neurology          Follow up in about 3 months (around 3/21/2021), or if symptoms worsen or fail to improve.

## 2020-12-22 RX ORDER — ERGOCALCIFEROL 1.25 MG/1
50000 CAPSULE ORAL
Qty: 12 CAPSULE | Refills: 0 | Status: SHIPPED | OUTPATIENT
Start: 2020-12-22 | End: 2020-12-22 | Stop reason: SDUPTHER

## 2020-12-22 RX ORDER — ERGOCALCIFEROL 1.25 MG/1
50000 CAPSULE ORAL
Qty: 12 CAPSULE | Refills: 0 | Status: SHIPPED | OUTPATIENT
Start: 2020-12-22 | End: 2021-01-21

## 2020-12-22 NOTE — PROGRESS NOTES
Vitamin D level was low. I will send in vitamin D to take.   Diabetes is well controlled.   Cholesterol is well controlled  Normal vitamin b12   Normal thyroid

## 2020-12-24 LAB — VIT B1 BLD-MCNC: 41 UG/L (ref 38–122)

## 2020-12-30 ENCOUNTER — TELEPHONE (OUTPATIENT)
Dept: NEUROLOGY | Facility: CLINIC | Age: 70
End: 2020-12-30

## 2020-12-30 NOTE — TELEPHONE ENCOUNTER
----- Message from Otilia Fischer sent at 12/30/2020  2:19 PM CST -----  Type: Patient Call Back    Who called: Self     What is the request in detail: pt. Is calling in regards to scheduling a np appt. With  Stated she was referred by PCP. No openings until April.     Can the clinic reply by [facility]? Call back     Would the patient rather a call back or a response via [facility]? Call back     Best call back number: [phone]      Appointment booked, information left on voicemail

## 2020-12-30 NOTE — TELEPHONE ENCOUNTER
----- Message from Sue Villegas sent at 12/30/2020  3:03 PM CST -----  Type:  Patient Returning Call    Who Called:pt  Who Left Message for Patient: pt  Does the patient know what this is regarding?: pt had a missed call   Would the patient rather a call back or a response via [facility]?  Call   Best Call Back Number:[phone]  Additional Information:  call back        Confirmed appointment with patient

## 2021-01-13 ENCOUNTER — PATIENT OUTREACH (OUTPATIENT)
Dept: ADMINISTRATIVE | Facility: HOSPITAL | Age: 71
End: 2021-01-13

## 2021-01-13 DIAGNOSIS — G89.29 CHRONIC PAIN OF BOTH KNEES: ICD-10-CM

## 2021-01-13 DIAGNOSIS — Z12.31 ENCOUNTER FOR SCREENING MAMMOGRAM FOR MALIGNANT NEOPLASM OF BREAST: Primary | ICD-10-CM

## 2021-01-13 DIAGNOSIS — M25.562 CHRONIC PAIN OF BOTH KNEES: ICD-10-CM

## 2021-01-13 DIAGNOSIS — M25.561 CHRONIC PAIN OF BOTH KNEES: ICD-10-CM

## 2021-01-13 RX ORDER — CYCLOBENZAPRINE HCL 5 MG
TABLET ORAL
Qty: 270 TABLET | Refills: 1 | Status: SHIPPED | OUTPATIENT
Start: 2021-01-13 | End: 2021-06-03

## 2021-01-13 RX ORDER — ISOPROPYL ALCOHOL 0.75 G/1
SWAB TOPICAL
Qty: 100 EACH | Refills: 1 | Status: SHIPPED | OUTPATIENT
Start: 2021-01-13 | End: 2021-06-03

## 2021-01-14 ENCOUNTER — TELEPHONE (OUTPATIENT)
Dept: ADMINISTRATIVE | Facility: HOSPITAL | Age: 71
End: 2021-01-14

## 2021-01-29 ENCOUNTER — CLINICAL SUPPORT (OUTPATIENT)
Dept: FAMILY MEDICINE | Facility: CLINIC | Age: 71
End: 2021-01-29
Payer: MEDICARE

## 2021-01-29 ENCOUNTER — TELEPHONE (OUTPATIENT)
Dept: FAMILY MEDICINE | Facility: CLINIC | Age: 71
End: 2021-01-29

## 2021-01-29 DIAGNOSIS — Z23 NEED FOR ZOSTER VACCINE: Primary | ICD-10-CM

## 2021-01-29 PROCEDURE — 99499 NO LOS: ICD-10-PCS | Mod: HCNC,S$GLB,, | Performed by: FAMILY MEDICINE

## 2021-01-29 PROCEDURE — 90750 HZV VACC RECOMBINANT IM: CPT | Mod: HCNC,S$GLB,, | Performed by: FAMILY MEDICINE

## 2021-01-29 PROCEDURE — 90471 IMMUNIZATION ADMIN: CPT | Mod: HCNC,S$GLB,, | Performed by: FAMILY MEDICINE

## 2021-01-29 PROCEDURE — 90750 ZOSTER RECOMBINANT VACCINE: ICD-10-PCS | Mod: HCNC,S$GLB,, | Performed by: FAMILY MEDICINE

## 2021-01-29 PROCEDURE — 90471 ZOSTER RECOMBINANT VACCINE: ICD-10-PCS | Mod: HCNC,S$GLB,, | Performed by: FAMILY MEDICINE

## 2021-01-29 PROCEDURE — 99499 UNLISTED E&M SERVICE: CPT | Mod: HCNC,S$GLB,, | Performed by: FAMILY MEDICINE

## 2021-01-30 ENCOUNTER — NURSE TRIAGE (OUTPATIENT)
Dept: ADMINISTRATIVE | Facility: CLINIC | Age: 71
End: 2021-01-30

## 2021-01-30 DIAGNOSIS — U07.1 COVID-19: Primary | ICD-10-CM

## 2021-02-01 DIAGNOSIS — U07.1 COVID-19: Primary | ICD-10-CM

## 2021-02-02 ENCOUNTER — LAB VISIT (OUTPATIENT)
Dept: FAMILY MEDICINE | Facility: CLINIC | Age: 71
End: 2021-02-02
Payer: MEDICARE

## 2021-02-02 DIAGNOSIS — U07.1 COVID-19: ICD-10-CM

## 2021-02-02 PROCEDURE — U0003 INFECTIOUS AGENT DETECTION BY NUCLEIC ACID (DNA OR RNA); SEVERE ACUTE RESPIRATORY SYNDROME CORONAVIRUS 2 (SARS-COV-2) (CORONAVIRUS DISEASE [COVID-19]), AMPLIFIED PROBE TECHNIQUE, MAKING USE OF HIGH THROUGHPUT TECHNOLOGIES AS DESCRIBED BY CMS-2020-01-R: HCPCS | Mod: HCNC

## 2021-02-03 ENCOUNTER — TELEPHONE (OUTPATIENT)
Dept: FAMILY MEDICINE | Facility: CLINIC | Age: 71
End: 2021-02-03

## 2021-02-03 LAB — SARS-COV-2 RNA RESP QL NAA+PROBE: NOT DETECTED

## 2021-02-10 ENCOUNTER — IMMUNIZATION (OUTPATIENT)
Dept: PRIMARY CARE CLINIC | Facility: CLINIC | Age: 71
End: 2021-02-10
Payer: MEDICARE

## 2021-02-10 DIAGNOSIS — Z23 NEED FOR VACCINATION: Primary | ICD-10-CM

## 2021-02-10 PROCEDURE — 91300 PR SARS-COV- 2 COVID-19 VACCINE, NO PRSV, 30MCG/0.3ML, IM: CPT | Mod: PBBFAC | Performed by: INTERNAL MEDICINE

## 2021-02-10 PROCEDURE — 0001A PR IMMUNIZ ADMIN, SARS-COV-2 COVID-19 VACC, 30MCG/0.3ML, 1ST DOSE: CPT | Mod: PBBFAC | Performed by: INTERNAL MEDICINE

## 2021-02-10 RX ADMIN — RNA INGREDIENT BNT-162B2 0.3 ML: 0.23 INJECTION, SUSPENSION INTRAMUSCULAR at 01:02

## 2021-02-17 ENCOUNTER — TELEPHONE (OUTPATIENT)
Dept: FAMILY MEDICINE | Facility: CLINIC | Age: 71
End: 2021-02-17

## 2021-02-18 ENCOUNTER — TELEPHONE (OUTPATIENT)
Dept: NEUROLOGY | Facility: CLINIC | Age: 71
End: 2021-02-18

## 2021-02-18 ENCOUNTER — PATIENT OUTREACH (OUTPATIENT)
Dept: ADMINISTRATIVE | Facility: OTHER | Age: 71
End: 2021-02-18

## 2021-03-03 ENCOUNTER — IMMUNIZATION (OUTPATIENT)
Dept: PRIMARY CARE CLINIC | Facility: CLINIC | Age: 71
End: 2021-03-03
Payer: MEDICARE

## 2021-03-03 DIAGNOSIS — Z23 NEED FOR VACCINATION: Primary | ICD-10-CM

## 2021-03-03 DIAGNOSIS — E11.9 TYPE 2 DIABETES MELLITUS WITHOUT COMPLICATION, UNSPECIFIED WHETHER LONG TERM INSULIN USE: ICD-10-CM

## 2021-03-03 PROCEDURE — 0002A PR IMMUNIZ ADMIN, SARS-COV-2 COVID-19 VACC, 30MCG/0.3ML, 2ND DOSE: ICD-10-PCS | Mod: CV19,S$GLB,, | Performed by: INTERNAL MEDICINE

## 2021-03-03 PROCEDURE — 91300 PR SARS-COV- 2 COVID-19 VACCINE, NO PRSV, 30MCG/0.3ML, IM: ICD-10-PCS | Mod: S$GLB,,, | Performed by: INTERNAL MEDICINE

## 2021-03-03 PROCEDURE — 91300 PR SARS-COV- 2 COVID-19 VACCINE, NO PRSV, 30MCG/0.3ML, IM: CPT | Mod: S$GLB,,, | Performed by: INTERNAL MEDICINE

## 2021-03-03 PROCEDURE — 0002A PR IMMUNIZ ADMIN, SARS-COV-2 COVID-19 VACC, 30MCG/0.3ML, 2ND DOSE: CPT | Mod: CV19,S$GLB,, | Performed by: INTERNAL MEDICINE

## 2021-03-03 RX ADMIN — Medication 0.3 ML: at 01:03

## 2021-03-08 ENCOUNTER — PATIENT OUTREACH (OUTPATIENT)
Dept: ADMINISTRATIVE | Facility: HOSPITAL | Age: 71
End: 2021-03-08

## 2021-03-09 ENCOUNTER — TELEPHONE (OUTPATIENT)
Dept: FAMILY MEDICINE | Facility: CLINIC | Age: 71
End: 2021-03-09

## 2021-03-09 RX ORDER — DICLOFENAC SODIUM 10 MG/G
2 GEL TOPICAL DAILY
Qty: 100 G | Refills: 3 | Status: SHIPPED | OUTPATIENT
Start: 2021-03-09 | End: 2021-08-16

## 2021-04-07 ENCOUNTER — TELEPHONE (OUTPATIENT)
Dept: FAMILY MEDICINE | Facility: CLINIC | Age: 71
End: 2021-04-07

## 2021-04-09 ENCOUNTER — PATIENT OUTREACH (OUTPATIENT)
Dept: ADMINISTRATIVE | Facility: OTHER | Age: 71
End: 2021-04-09

## 2021-04-13 ENCOUNTER — PATIENT OUTREACH (OUTPATIENT)
Dept: ADMINISTRATIVE | Facility: HOSPITAL | Age: 71
End: 2021-04-13

## 2021-04-16 ENCOUNTER — OFFICE VISIT (OUTPATIENT)
Dept: NEUROLOGY | Facility: CLINIC | Age: 71
End: 2021-04-16
Payer: MEDICARE

## 2021-04-16 VITALS
DIASTOLIC BLOOD PRESSURE: 81 MMHG | BODY MASS INDEX: 32.41 KG/M2 | HEIGHT: 64 IN | HEART RATE: 85 BPM | WEIGHT: 189.81 LBS | SYSTOLIC BLOOD PRESSURE: 175 MMHG

## 2021-04-16 DIAGNOSIS — R41.3 MEMORY LOSS: Primary | ICD-10-CM

## 2021-04-16 DIAGNOSIS — R53.81 DEBILITY: ICD-10-CM

## 2021-04-16 PROCEDURE — 3288F FALL RISK ASSESSMENT DOCD: CPT | Mod: CPTII,S$GLB,, | Performed by: NEUROLOGICAL SURGERY

## 2021-04-16 PROCEDURE — 3288F PR FALLS RISK ASSESSMENT DOCUMENTED: ICD-10-PCS | Mod: CPTII,S$GLB,, | Performed by: NEUROLOGICAL SURGERY

## 2021-04-16 PROCEDURE — 1125F PR PAIN SEVERITY QUANTIFIED, PAIN PRESENT: ICD-10-PCS | Mod: S$GLB,,, | Performed by: NEUROLOGICAL SURGERY

## 2021-04-16 PROCEDURE — 99204 OFFICE O/P NEW MOD 45 MIN: CPT | Mod: S$GLB,,, | Performed by: NEUROLOGICAL SURGERY

## 2021-04-16 PROCEDURE — 3008F PR BODY MASS INDEX (BMI) DOCUMENTED: ICD-10-PCS | Mod: CPTII,S$GLB,, | Performed by: NEUROLOGICAL SURGERY

## 2021-04-16 PROCEDURE — 99204 PR OFFICE/OUTPT VISIT, NEW, LEVL IV, 45-59 MIN: ICD-10-PCS | Mod: S$GLB,,, | Performed by: NEUROLOGICAL SURGERY

## 2021-04-16 PROCEDURE — 99999 PR PBB SHADOW E&M-EST. PATIENT-LVL V: CPT | Mod: PBBFAC,,, | Performed by: NEUROLOGICAL SURGERY

## 2021-04-16 PROCEDURE — 1125F AMNT PAIN NOTED PAIN PRSNT: CPT | Mod: S$GLB,,, | Performed by: NEUROLOGICAL SURGERY

## 2021-04-16 PROCEDURE — 1101F PT FALLS ASSESS-DOCD LE1/YR: CPT | Mod: CPTII,S$GLB,, | Performed by: NEUROLOGICAL SURGERY

## 2021-04-16 PROCEDURE — 1159F MED LIST DOCD IN RCRD: CPT | Mod: S$GLB,,, | Performed by: NEUROLOGICAL SURGERY

## 2021-04-16 PROCEDURE — 3008F BODY MASS INDEX DOCD: CPT | Mod: CPTII,S$GLB,, | Performed by: NEUROLOGICAL SURGERY

## 2021-04-16 PROCEDURE — 99999 PR PBB SHADOW E&M-EST. PATIENT-LVL V: ICD-10-PCS | Mod: PBBFAC,,, | Performed by: NEUROLOGICAL SURGERY

## 2021-04-16 PROCEDURE — 1101F PR PT FALLS ASSESS DOC 0-1 FALLS W/OUT INJ PAST YR: ICD-10-PCS | Mod: CPTII,S$GLB,, | Performed by: NEUROLOGICAL SURGERY

## 2021-04-16 PROCEDURE — 1159F PR MEDICATION LIST DOCUMENTED IN MEDICAL RECORD: ICD-10-PCS | Mod: S$GLB,,, | Performed by: NEUROLOGICAL SURGERY

## 2021-04-21 ENCOUNTER — OFFICE VISIT (OUTPATIENT)
Dept: PODIATRY | Facility: CLINIC | Age: 71
End: 2021-04-21
Payer: MEDICARE

## 2021-04-21 ENCOUNTER — HOSPITAL ENCOUNTER (OUTPATIENT)
Dept: RADIOLOGY | Facility: HOSPITAL | Age: 71
Discharge: HOME | End: 2021-04-21
Attending: NEUROLOGICAL SURGERY
Payer: MEDICARE

## 2021-04-21 VITALS — BODY MASS INDEX: 32.41 KG/M2 | HEIGHT: 64 IN | WEIGHT: 189.81 LBS

## 2021-04-21 DIAGNOSIS — E11.40 TYPE 2 DIABETES MELLITUS WITH DIABETIC NEUROPATHY, WITHOUT LONG-TERM CURRENT USE OF INSULIN: Primary | ICD-10-CM

## 2021-04-21 DIAGNOSIS — L60.0 INGROWN NAIL: ICD-10-CM

## 2021-04-21 DIAGNOSIS — R41.3 MEMORY LOSS: ICD-10-CM

## 2021-04-21 PROCEDURE — 3008F BODY MASS INDEX DOCD: CPT | Mod: CPTII,S$GLB,, | Performed by: PODIATRIST

## 2021-04-21 PROCEDURE — 1159F MED LIST DOCD IN RCRD: CPT | Mod: S$GLB,,, | Performed by: PODIATRIST

## 2021-04-21 PROCEDURE — 99499 UNLISTED E&M SERVICE: CPT | Mod: HCNC,S$GLB,, | Performed by: PODIATRIST

## 2021-04-21 PROCEDURE — 70551 MRI BRAIN STEM W/O DYE: CPT | Mod: 26,,, | Performed by: RADIOLOGY

## 2021-04-21 PROCEDURE — 3288F PR FALLS RISK ASSESSMENT DOCUMENTED: ICD-10-PCS | Mod: CPTII,S$GLB,, | Performed by: PODIATRIST

## 2021-04-21 PROCEDURE — 99999 PR PBB SHADOW E&M-EST. PATIENT-LVL IV: ICD-10-PCS | Mod: PBBFAC,,, | Performed by: PODIATRIST

## 2021-04-21 PROCEDURE — 1125F PR PAIN SEVERITY QUANTIFIED, PAIN PRESENT: ICD-10-PCS | Mod: S$GLB,,, | Performed by: PODIATRIST

## 2021-04-21 PROCEDURE — 70551 MRI BRAIN WITHOUT CONTRAST: ICD-10-PCS | Mod: 26,,, | Performed by: RADIOLOGY

## 2021-04-21 PROCEDURE — 3288F FALL RISK ASSESSMENT DOCD: CPT | Mod: CPTII,S$GLB,, | Performed by: PODIATRIST

## 2021-04-21 PROCEDURE — 99999 PR PBB SHADOW E&M-EST. PATIENT-LVL IV: CPT | Mod: PBBFAC,,, | Performed by: PODIATRIST

## 2021-04-21 PROCEDURE — 99214 PR OFFICE/OUTPT VISIT, EST, LEVL IV, 30-39 MIN: ICD-10-PCS | Mod: S$GLB,,, | Performed by: PODIATRIST

## 2021-04-21 PROCEDURE — 99214 OFFICE O/P EST MOD 30 MIN: CPT | Mod: S$GLB,,, | Performed by: PODIATRIST

## 2021-04-21 PROCEDURE — 1101F PT FALLS ASSESS-DOCD LE1/YR: CPT | Mod: CPTII,S$GLB,, | Performed by: PODIATRIST

## 2021-04-21 PROCEDURE — 1159F PR MEDICATION LIST DOCUMENTED IN MEDICAL RECORD: ICD-10-PCS | Mod: S$GLB,,, | Performed by: PODIATRIST

## 2021-04-21 PROCEDURE — 70551 MRI BRAIN STEM W/O DYE: CPT | Mod: TC,PO

## 2021-04-21 PROCEDURE — 3008F PR BODY MASS INDEX (BMI) DOCUMENTED: ICD-10-PCS | Mod: CPTII,S$GLB,, | Performed by: PODIATRIST

## 2021-04-21 PROCEDURE — 1125F AMNT PAIN NOTED PAIN PRSNT: CPT | Mod: S$GLB,,, | Performed by: PODIATRIST

## 2021-04-21 PROCEDURE — 1101F PR PT FALLS ASSESS DOC 0-1 FALLS W/OUT INJ PAST YR: ICD-10-PCS | Mod: CPTII,S$GLB,, | Performed by: PODIATRIST

## 2021-04-21 PROCEDURE — 99499 RISK ADDL DX/OHS AUDIT: ICD-10-PCS | Mod: HCNC,S$GLB,, | Performed by: PODIATRIST

## 2021-04-27 ENCOUNTER — HOSPITAL ENCOUNTER (OUTPATIENT)
Dept: RADIOLOGY | Facility: CLINIC | Age: 71
Discharge: HOME | End: 2021-04-27
Attending: FAMILY MEDICINE
Payer: MEDICARE

## 2021-04-27 ENCOUNTER — CLINICAL SUPPORT (OUTPATIENT)
Dept: FAMILY MEDICINE | Facility: CLINIC | Age: 71
End: 2021-04-27
Payer: MEDICARE

## 2021-04-27 ENCOUNTER — CLINICAL SUPPORT (OUTPATIENT)
Dept: OPHTHALMOLOGY | Facility: CLINIC | Age: 71
End: 2021-04-27
Attending: FAMILY MEDICINE
Payer: MEDICARE

## 2021-04-27 ENCOUNTER — HOSPITAL ENCOUNTER (OUTPATIENT)
Dept: RADIOLOGY | Facility: HOSPITAL | Age: 71
Discharge: HOME | End: 2021-04-27
Attending: FAMILY MEDICINE
Payer: MEDICARE

## 2021-04-27 VITALS
HEART RATE: 90 BPM | DIASTOLIC BLOOD PRESSURE: 80 MMHG | SYSTOLIC BLOOD PRESSURE: 140 MMHG | BODY MASS INDEX: 33.46 KG/M2 | OXYGEN SATURATION: 95 % | TEMPERATURE: 98 F | HEIGHT: 64 IN | WEIGHT: 196 LBS

## 2021-04-27 DIAGNOSIS — C64.1 RENAL CELL CARCINOMA OF RIGHT KIDNEY: ICD-10-CM

## 2021-04-27 DIAGNOSIS — Z78.0 ASYMPTOMATIC MENOPAUSAL STATE: ICD-10-CM

## 2021-04-27 DIAGNOSIS — I10 BENIGN HYPERTENSION: ICD-10-CM

## 2021-04-27 DIAGNOSIS — E11.9 TYPE 2 DIABETES MELLITUS WITHOUT COMPLICATION, UNSPECIFIED WHETHER LONG TERM INSULIN USE: ICD-10-CM

## 2021-04-27 DIAGNOSIS — R53.81 DEBILITY: ICD-10-CM

## 2021-04-27 DIAGNOSIS — E11.40 TYPE 2 DIABETES MELLITUS WITH DIABETIC NEUROPATHY, WITHOUT LONG-TERM CURRENT USE OF INSULIN: ICD-10-CM

## 2021-04-27 DIAGNOSIS — Z12.31 ENCOUNTER FOR SCREENING MAMMOGRAM FOR BREAST CANCER: ICD-10-CM

## 2021-04-27 DIAGNOSIS — Z23 NEED FOR SHINGLES VACCINE: ICD-10-CM

## 2021-04-27 DIAGNOSIS — I70.0 ABDOMINAL AORTIC ATHEROSCLEROSIS: ICD-10-CM

## 2021-04-27 DIAGNOSIS — M89.9 DISORDER OF BONE: ICD-10-CM

## 2021-04-27 DIAGNOSIS — E78.5 HYPERLIPIDEMIA, UNSPECIFIED HYPERLIPIDEMIA TYPE: Primary | ICD-10-CM

## 2021-04-27 DIAGNOSIS — F32.0 MILD MAJOR DEPRESSION: ICD-10-CM

## 2021-04-27 LAB
LEFT EYE DM RETINOPATHY: NEGATIVE
RIGHT EYE DM RETINOPATHY: NEGATIVE

## 2021-04-27 PROCEDURE — 90750 ZOSTER RECOMBINANT VACCINE: ICD-10-PCS | Mod: S$GLB,,, | Performed by: FAMILY MEDICINE

## 2021-04-27 PROCEDURE — 92228 IMG RTA DETC/MNTR DS PHY/QHP: CPT | Mod: 26,S$GLB,, | Performed by: OPTOMETRIST

## 2021-04-27 PROCEDURE — 77080 DXA BONE DENSITY AXIAL: CPT | Mod: 26,,, | Performed by: INTERNAL MEDICINE

## 2021-04-27 PROCEDURE — 92228 DIABETIC EYE SCREENING PHOTO: ICD-10-PCS | Mod: TC,S$GLB,, | Performed by: FAMILY MEDICINE

## 2021-04-27 PROCEDURE — 77080 DXA BONE DENSITY AXIAL: CPT | Mod: TC,PO

## 2021-04-27 PROCEDURE — 99214 PR OFFICE/OUTPT VISIT, EST, LEVL IV, 30-39 MIN: ICD-10-PCS | Mod: 25,S$GLB,, | Performed by: FAMILY MEDICINE

## 2021-04-27 PROCEDURE — 77080 DEXA BONE DENSITY SPINE HIP: ICD-10-PCS | Mod: 26,,, | Performed by: INTERNAL MEDICINE

## 2021-04-27 PROCEDURE — 99499 RISK ADDL DX/OHS AUDIT: ICD-10-PCS | Mod: HCNC,S$GLB,, | Performed by: FAMILY MEDICINE

## 2021-04-27 PROCEDURE — 99499 UNLISTED E&M SERVICE: CPT | Mod: HCNC,S$GLB,, | Performed by: FAMILY MEDICINE

## 2021-04-27 PROCEDURE — 99999 PR PBB SHADOW E&M-EST. PATIENT-LVL V: ICD-10-PCS | Mod: PBBFAC,,, | Performed by: FAMILY MEDICINE

## 2021-04-27 PROCEDURE — 77067 SCR MAMMO BI INCL CAD: CPT | Mod: TC,PO

## 2021-04-27 PROCEDURE — 77067 SCR MAMMO BI INCL CAD: CPT | Mod: 26,,, | Performed by: RADIOLOGY

## 2021-04-27 PROCEDURE — 99999 PR PBB SHADOW E&M-EST. PATIENT-LVL V: CPT | Mod: PBBFAC,,, | Performed by: FAMILY MEDICINE

## 2021-04-27 PROCEDURE — 90471 IMMUNIZATION ADMIN: CPT | Mod: S$GLB,,, | Performed by: FAMILY MEDICINE

## 2021-04-27 PROCEDURE — 90471 ZOSTER RECOMBINANT VACCINE: ICD-10-PCS | Mod: S$GLB,,, | Performed by: FAMILY MEDICINE

## 2021-04-27 PROCEDURE — 90750 HZV VACC RECOMBINANT IM: CPT | Mod: S$GLB,,, | Performed by: FAMILY MEDICINE

## 2021-04-27 PROCEDURE — 99214 OFFICE O/P EST MOD 30 MIN: CPT | Mod: 25,S$GLB,, | Performed by: FAMILY MEDICINE

## 2021-04-27 PROCEDURE — 92228 DIABETIC EYE SCREENING PHOTO: ICD-10-PCS | Mod: 26,S$GLB,, | Performed by: OPTOMETRIST

## 2021-04-27 PROCEDURE — 77067 MAMMO DIGITAL SCREENING BILAT: ICD-10-PCS | Mod: 26,,, | Performed by: RADIOLOGY

## 2021-04-27 PROCEDURE — 92228 IMG RTA DETC/MNTR DS PHY/QHP: CPT | Mod: TC,S$GLB,, | Performed by: FAMILY MEDICINE

## 2021-04-28 ENCOUNTER — TELEPHONE (OUTPATIENT)
Dept: OPTOMETRY | Facility: CLINIC | Age: 71
End: 2021-04-28

## 2021-05-21 ENCOUNTER — TELEPHONE (OUTPATIENT)
Dept: FAMILY MEDICINE | Facility: CLINIC | Age: 71
End: 2021-05-21

## 2021-07-08 ENCOUNTER — PATIENT OUTREACH (OUTPATIENT)
Dept: ADMINISTRATIVE | Facility: OTHER | Age: 71
End: 2021-07-08

## 2021-07-19 ENCOUNTER — PES CALL (OUTPATIENT)
Dept: ADMINISTRATIVE | Facility: CLINIC | Age: 71
End: 2021-07-19

## 2021-08-17 ENCOUNTER — TELEPHONE (OUTPATIENT)
Dept: UROLOGY | Facility: CLINIC | Age: 71
End: 2021-08-17

## 2021-08-20 ENCOUNTER — PES CALL (OUTPATIENT)
Dept: ADMINISTRATIVE | Facility: CLINIC | Age: 71
End: 2021-08-20

## 2021-08-24 ENCOUNTER — IMMUNIZATION (OUTPATIENT)
Dept: OBSTETRICS AND GYNECOLOGY | Facility: CLINIC | Age: 71
End: 2021-08-24
Payer: MEDICARE

## 2021-08-24 DIAGNOSIS — Z23 NEED FOR VACCINATION: Primary | ICD-10-CM

## 2021-08-24 PROCEDURE — 0003A COVID-19, MRNA, LNP-S, PF, 30 MCG/0.3 ML DOSE VACCINE: CPT | Mod: CV19,,, | Performed by: FAMILY MEDICINE

## 2021-08-24 PROCEDURE — 91300 COVID-19, MRNA, LNP-S, PF, 30 MCG/0.3 ML DOSE VACCINE: ICD-10-PCS | Mod: ,,, | Performed by: FAMILY MEDICINE

## 2021-08-24 PROCEDURE — 91300 COVID-19, MRNA, LNP-S, PF, 30 MCG/0.3 ML DOSE VACCINE: CPT | Mod: ,,, | Performed by: FAMILY MEDICINE

## 2021-08-24 PROCEDURE — 0003A COVID-19, MRNA, LNP-S, PF, 30 MCG/0.3 ML DOSE VACCINE: ICD-10-PCS | Mod: CV19,,, | Performed by: FAMILY MEDICINE

## 2021-10-04 ENCOUNTER — PES CALL (OUTPATIENT)
Dept: ADMINISTRATIVE | Facility: CLINIC | Age: 71
End: 2021-10-04

## 2021-10-28 ENCOUNTER — LAB VISIT (OUTPATIENT)
Dept: LAB | Facility: HOSPITAL | Age: 71
End: 2021-10-28
Attending: FAMILY MEDICINE
Payer: MEDICARE

## 2021-10-28 ENCOUNTER — OFFICE VISIT (OUTPATIENT)
Dept: ORTHOPEDICS | Facility: CLINIC | Age: 71
End: 2021-10-28
Payer: MEDICARE

## 2021-10-28 ENCOUNTER — PATIENT OUTREACH (OUTPATIENT)
Dept: ADMINISTRATIVE | Facility: OTHER | Age: 71
End: 2021-10-28
Payer: MEDICARE

## 2021-10-28 VITALS
OXYGEN SATURATION: 98 % | HEIGHT: 64 IN | HEART RATE: 96 BPM | BODY MASS INDEX: 32.18 KG/M2 | TEMPERATURE: 98 F | DIASTOLIC BLOOD PRESSURE: 94 MMHG | SYSTOLIC BLOOD PRESSURE: 156 MMHG | WEIGHT: 188.5 LBS | RESPIRATION RATE: 18 BRPM

## 2021-10-28 DIAGNOSIS — E11.40 TYPE 2 DIABETES MELLITUS WITH DIABETIC NEUROPATHY, WITHOUT LONG-TERM CURRENT USE OF INSULIN: ICD-10-CM

## 2021-10-28 DIAGNOSIS — E78.5 HYPERLIPIDEMIA, UNSPECIFIED HYPERLIPIDEMIA TYPE: ICD-10-CM

## 2021-10-28 DIAGNOSIS — I10 BENIGN HYPERTENSION: ICD-10-CM

## 2021-10-28 DIAGNOSIS — M18.11 DEGENERATIVE ARTHRITIS OF THUMB, RIGHT: Primary | ICD-10-CM

## 2021-10-28 LAB
ALBUMIN SERPL BCP-MCNC: 3.8 G/DL (ref 3.5–5.2)
ALP SERPL-CCNC: 106 U/L (ref 55–135)
ALT SERPL W/O P-5'-P-CCNC: 11 U/L (ref 10–44)
ANION GAP SERPL CALC-SCNC: 10 MMOL/L (ref 8–16)
AST SERPL-CCNC: 15 U/L (ref 10–40)
BASOPHILS # BLD AUTO: 0 K/UL (ref 0–0.2)
BASOPHILS NFR BLD: 0 % (ref 0–1.9)
BILIRUB SERPL-MCNC: 0.5 MG/DL (ref 0.1–1)
BUN SERPL-MCNC: 12 MG/DL (ref 8–23)
CALCIUM SERPL-MCNC: 10 MG/DL (ref 8.7–10.5)
CHLORIDE SERPL-SCNC: 105 MMOL/L (ref 95–110)
CHOLEST SERPL-MCNC: 207 MG/DL (ref 120–199)
CHOLEST/HDLC SERPL: 3.2 {RATIO} (ref 2–5)
CO2 SERPL-SCNC: 29 MMOL/L (ref 23–29)
CREAT SERPL-MCNC: 0.8 MG/DL (ref 0.5–1.4)
DIFFERENTIAL METHOD: ABNORMAL
EOSINOPHIL # BLD AUTO: 0 K/UL (ref 0–0.5)
EOSINOPHIL NFR BLD: 0.5 % (ref 0–8)
ERYTHROCYTE [DISTWIDTH] IN BLOOD BY AUTOMATED COUNT: 15 % (ref 11.5–14.5)
EST. GFR  (AFRICAN AMERICAN): >60 ML/MIN/1.73 M^2
EST. GFR  (NON AFRICAN AMERICAN): >60 ML/MIN/1.73 M^2
ESTIMATED AVG GLUCOSE: 148 MG/DL (ref 68–131)
GLUCOSE SERPL-MCNC: 120 MG/DL (ref 70–110)
HBA1C MFR BLD: 6.8 % (ref 4–5.6)
HCT VFR BLD AUTO: 41.8 % (ref 37–48.5)
HDLC SERPL-MCNC: 65 MG/DL (ref 40–75)
HDLC SERPL: 31.4 % (ref 20–50)
HGB BLD-MCNC: 13 G/DL (ref 12–16)
IMM GRANULOCYTES # BLD AUTO: 0.02 K/UL (ref 0–0.04)
IMM GRANULOCYTES NFR BLD AUTO: 0.4 % (ref 0–0.5)
LDLC SERPL CALC-MCNC: 123 MG/DL (ref 63–159)
LYMPHOCYTES # BLD AUTO: 1.4 K/UL (ref 1–4.8)
LYMPHOCYTES NFR BLD: 25.6 % (ref 18–48)
MCH RBC QN AUTO: 26.4 PG (ref 27–31)
MCHC RBC AUTO-ENTMCNC: 31.1 G/DL (ref 32–36)
MCV RBC AUTO: 85 FL (ref 82–98)
MONOCYTES # BLD AUTO: 0.5 K/UL (ref 0.3–1)
MONOCYTES NFR BLD: 8.6 % (ref 4–15)
NEUTROPHILS # BLD AUTO: 3.6 K/UL (ref 1.8–7.7)
NEUTROPHILS NFR BLD: 64.9 % (ref 38–73)
NONHDLC SERPL-MCNC: 142 MG/DL
NRBC BLD-RTO: 0 /100 WBC
PLATELET # BLD AUTO: 293 K/UL (ref 150–450)
PMV BLD AUTO: 11.1 FL (ref 9.2–12.9)
POTASSIUM SERPL-SCNC: 5 MMOL/L (ref 3.5–5.1)
PROT SERPL-MCNC: 7.3 G/DL (ref 6–8.4)
RBC # BLD AUTO: 4.92 M/UL (ref 4–5.4)
SODIUM SERPL-SCNC: 144 MMOL/L (ref 136–145)
TRIGL SERPL-MCNC: 95 MG/DL (ref 30–150)
TSH SERPL DL<=0.005 MIU/L-ACNC: 0.76 UIU/ML (ref 0.4–4)
WBC # BLD AUTO: 5.59 K/UL (ref 3.9–12.7)

## 2021-10-28 PROCEDURE — 3288F FALL RISK ASSESSMENT DOCD: CPT | Mod: HCNC,CPTII,S$GLB, | Performed by: ORTHOPAEDIC SURGERY

## 2021-10-28 PROCEDURE — 3080F PR MOST RECENT DIASTOLIC BLOOD PRESSURE >= 90 MM HG: ICD-10-PCS | Mod: HCNC,CPTII,S$GLB, | Performed by: ORTHOPAEDIC SURGERY

## 2021-10-28 PROCEDURE — 3077F PR MOST RECENT SYSTOLIC BLOOD PRESSURE >= 140 MM HG: ICD-10-PCS | Mod: HCNC,CPTII,S$GLB, | Performed by: ORTHOPAEDIC SURGERY

## 2021-10-28 PROCEDURE — 99999 PR PBB SHADOW E&M-EST. PATIENT-LVL IV: ICD-10-PCS | Mod: PBBFAC,HCNC,, | Performed by: ORTHOPAEDIC SURGERY

## 2021-10-28 PROCEDURE — 3044F HG A1C LEVEL LT 7.0%: CPT | Mod: HCNC,CPTII,S$GLB, | Performed by: ORTHOPAEDIC SURGERY

## 2021-10-28 PROCEDURE — 1125F PR PAIN SEVERITY QUANTIFIED, PAIN PRESENT: ICD-10-PCS | Mod: HCNC,CPTII,S$GLB, | Performed by: ORTHOPAEDIC SURGERY

## 2021-10-28 PROCEDURE — 84443 ASSAY THYROID STIM HORMONE: CPT | Mod: HCNC | Performed by: FAMILY MEDICINE

## 2021-10-28 PROCEDURE — 1125F AMNT PAIN NOTED PAIN PRSNT: CPT | Mod: HCNC,CPTII,S$GLB, | Performed by: ORTHOPAEDIC SURGERY

## 2021-10-28 PROCEDURE — 3008F PR BODY MASS INDEX (BMI) DOCUMENTED: ICD-10-PCS | Mod: HCNC,CPTII,S$GLB, | Performed by: ORTHOPAEDIC SURGERY

## 2021-10-28 PROCEDURE — 36415 COLL VENOUS BLD VENIPUNCTURE: CPT | Mod: HCNC,PO | Performed by: FAMILY MEDICINE

## 2021-10-28 PROCEDURE — 3288F PR FALLS RISK ASSESSMENT DOCUMENTED: ICD-10-PCS | Mod: HCNC,CPTII,S$GLB, | Performed by: ORTHOPAEDIC SURGERY

## 2021-10-28 PROCEDURE — 1101F PT FALLS ASSESS-DOCD LE1/YR: CPT | Mod: HCNC,CPTII,S$GLB, | Performed by: ORTHOPAEDIC SURGERY

## 2021-10-28 PROCEDURE — 3080F DIAST BP >= 90 MM HG: CPT | Mod: HCNC,CPTII,S$GLB, | Performed by: ORTHOPAEDIC SURGERY

## 2021-10-28 PROCEDURE — 1101F PR PT FALLS ASSESS DOC 0-1 FALLS W/OUT INJ PAST YR: ICD-10-PCS | Mod: HCNC,CPTII,S$GLB, | Performed by: ORTHOPAEDIC SURGERY

## 2021-10-28 PROCEDURE — 80061 LIPID PANEL: CPT | Mod: HCNC | Performed by: FAMILY MEDICINE

## 2021-10-28 PROCEDURE — 1159F MED LIST DOCD IN RCRD: CPT | Mod: HCNC,CPTII,S$GLB, | Performed by: ORTHOPAEDIC SURGERY

## 2021-10-28 PROCEDURE — 1160F RVW MEDS BY RX/DR IN RCRD: CPT | Mod: HCNC,CPTII,S$GLB, | Performed by: ORTHOPAEDIC SURGERY

## 2021-10-28 PROCEDURE — 99213 PR OFFICE/OUTPT VISIT, EST, LEVL III, 20-29 MIN: ICD-10-PCS | Mod: HCNC,S$GLB,, | Performed by: ORTHOPAEDIC SURGERY

## 2021-10-28 PROCEDURE — 3077F SYST BP >= 140 MM HG: CPT | Mod: HCNC,CPTII,S$GLB, | Performed by: ORTHOPAEDIC SURGERY

## 2021-10-28 PROCEDURE — 3044F PR MOST RECENT HEMOGLOBIN A1C LEVEL <7.0%: ICD-10-PCS | Mod: HCNC,CPTII,S$GLB, | Performed by: ORTHOPAEDIC SURGERY

## 2021-10-28 PROCEDURE — 80053 COMPREHEN METABOLIC PANEL: CPT | Mod: HCNC | Performed by: FAMILY MEDICINE

## 2021-10-28 PROCEDURE — 83036 HEMOGLOBIN GLYCOSYLATED A1C: CPT | Mod: HCNC | Performed by: FAMILY MEDICINE

## 2021-10-28 PROCEDURE — 85025 COMPLETE CBC W/AUTO DIFF WBC: CPT | Mod: HCNC | Performed by: FAMILY MEDICINE

## 2021-10-28 PROCEDURE — 99213 OFFICE O/P EST LOW 20 MIN: CPT | Mod: HCNC,S$GLB,, | Performed by: ORTHOPAEDIC SURGERY

## 2021-10-28 PROCEDURE — 3008F BODY MASS INDEX DOCD: CPT | Mod: HCNC,CPTII,S$GLB, | Performed by: ORTHOPAEDIC SURGERY

## 2021-10-28 PROCEDURE — 1159F PR MEDICATION LIST DOCUMENTED IN MEDICAL RECORD: ICD-10-PCS | Mod: HCNC,CPTII,S$GLB, | Performed by: ORTHOPAEDIC SURGERY

## 2021-10-28 PROCEDURE — 99999 PR PBB SHADOW E&M-EST. PATIENT-LVL IV: CPT | Mod: PBBFAC,HCNC,, | Performed by: ORTHOPAEDIC SURGERY

## 2021-10-28 PROCEDURE — 1160F PR REVIEW ALL MEDS BY PRESCRIBER/CLIN PHARMACIST DOCUMENTED: ICD-10-PCS | Mod: HCNC,CPTII,S$GLB, | Performed by: ORTHOPAEDIC SURGERY

## 2021-11-01 ENCOUNTER — OFFICE VISIT (OUTPATIENT)
Dept: FAMILY MEDICINE | Facility: CLINIC | Age: 71
End: 2021-11-01
Payer: MEDICARE

## 2021-11-01 VITALS
HEIGHT: 64 IN | OXYGEN SATURATION: 98 % | HEART RATE: 88 BPM | TEMPERATURE: 98 F | DIASTOLIC BLOOD PRESSURE: 82 MMHG | SYSTOLIC BLOOD PRESSURE: 130 MMHG | WEIGHT: 188.25 LBS | BODY MASS INDEX: 32.14 KG/M2

## 2021-11-01 DIAGNOSIS — Z00.00 ANNUAL PHYSICAL EXAM: Primary | ICD-10-CM

## 2021-11-01 DIAGNOSIS — E11.40 TYPE 2 DIABETES MELLITUS WITH DIABETIC NEUROPATHY, WITHOUT LONG-TERM CURRENT USE OF INSULIN: ICD-10-CM

## 2021-11-01 DIAGNOSIS — Z23 FLU VACCINE NEED: ICD-10-CM

## 2021-11-01 DIAGNOSIS — F32.0 MILD MAJOR DEPRESSION: ICD-10-CM

## 2021-11-01 DIAGNOSIS — R53.81 DEBILITY: ICD-10-CM

## 2021-11-01 DIAGNOSIS — I70.0 ABDOMINAL AORTIC ATHEROSCLEROSIS: ICD-10-CM

## 2021-11-01 PROCEDURE — 3044F HG A1C LEVEL LT 7.0%: CPT | Mod: HCNC,CPTII,S$GLB, | Performed by: FAMILY MEDICINE

## 2021-11-01 PROCEDURE — 3079F DIAST BP 80-89 MM HG: CPT | Mod: HCNC,CPTII,S$GLB, | Performed by: FAMILY MEDICINE

## 2021-11-01 PROCEDURE — G0008 FLU VACCINE - QUADRIVALENT - ADJUVANTED: ICD-10-PCS | Mod: HCNC,S$GLB,, | Performed by: FAMILY MEDICINE

## 2021-11-01 PROCEDURE — 99499 UNLISTED E&M SERVICE: CPT | Mod: S$GLB,,, | Performed by: FAMILY MEDICINE

## 2021-11-01 PROCEDURE — 1159F MED LIST DOCD IN RCRD: CPT | Mod: HCNC,CPTII,S$GLB, | Performed by: FAMILY MEDICINE

## 2021-11-01 PROCEDURE — 99999 PR PBB SHADOW E&M-EST. PATIENT-LVL V: ICD-10-PCS | Mod: PBBFAC,HCNC,, | Performed by: FAMILY MEDICINE

## 2021-11-01 PROCEDURE — 1159F PR MEDICATION LIST DOCUMENTED IN MEDICAL RECORD: ICD-10-PCS | Mod: HCNC,CPTII,S$GLB, | Performed by: FAMILY MEDICINE

## 2021-11-01 PROCEDURE — 99397 PER PM REEVAL EST PAT 65+ YR: CPT | Mod: HCNC,S$GLB,, | Performed by: FAMILY MEDICINE

## 2021-11-01 PROCEDURE — G0008 ADMIN INFLUENZA VIRUS VAC: HCPCS | Mod: HCNC,S$GLB,, | Performed by: FAMILY MEDICINE

## 2021-11-01 PROCEDURE — 3079F PR MOST RECENT DIASTOLIC BLOOD PRESSURE 80-89 MM HG: ICD-10-PCS | Mod: HCNC,CPTII,S$GLB, | Performed by: FAMILY MEDICINE

## 2021-11-01 PROCEDURE — 1101F PT FALLS ASSESS-DOCD LE1/YR: CPT | Mod: HCNC,CPTII,S$GLB, | Performed by: FAMILY MEDICINE

## 2021-11-01 PROCEDURE — 1125F PR PAIN SEVERITY QUANTIFIED, PAIN PRESENT: ICD-10-PCS | Mod: HCNC,CPTII,S$GLB, | Performed by: FAMILY MEDICINE

## 2021-11-01 PROCEDURE — 3288F PR FALLS RISK ASSESSMENT DOCUMENTED: ICD-10-PCS | Mod: HCNC,CPTII,S$GLB, | Performed by: FAMILY MEDICINE

## 2021-11-01 PROCEDURE — 3288F FALL RISK ASSESSMENT DOCD: CPT | Mod: HCNC,CPTII,S$GLB, | Performed by: FAMILY MEDICINE

## 2021-11-01 PROCEDURE — 90694 VACC AIIV4 NO PRSRV 0.5ML IM: CPT | Mod: HCNC,S$GLB,, | Performed by: FAMILY MEDICINE

## 2021-11-01 PROCEDURE — 1101F PR PT FALLS ASSESS DOC 0-1 FALLS W/OUT INJ PAST YR: ICD-10-PCS | Mod: HCNC,CPTII,S$GLB, | Performed by: FAMILY MEDICINE

## 2021-11-01 PROCEDURE — 3008F BODY MASS INDEX DOCD: CPT | Mod: HCNC,CPTII,S$GLB, | Performed by: FAMILY MEDICINE

## 2021-11-01 PROCEDURE — 1160F RVW MEDS BY RX/DR IN RCRD: CPT | Mod: HCNC,CPTII,S$GLB, | Performed by: FAMILY MEDICINE

## 2021-11-01 PROCEDURE — 3075F PR MOST RECENT SYSTOLIC BLOOD PRESS GE 130-139MM HG: ICD-10-PCS | Mod: HCNC,CPTII,S$GLB, | Performed by: FAMILY MEDICINE

## 2021-11-01 PROCEDURE — 99397 PR PREVENTIVE VISIT,EST,65 & OVER: ICD-10-PCS | Mod: HCNC,S$GLB,, | Performed by: FAMILY MEDICINE

## 2021-11-01 PROCEDURE — 99499 RISK ADDL DX/OHS AUDIT: ICD-10-PCS | Mod: S$GLB,,, | Performed by: FAMILY MEDICINE

## 2021-11-01 PROCEDURE — 90694 FLU VACCINE - QUADRIVALENT - ADJUVANTED: ICD-10-PCS | Mod: HCNC,S$GLB,, | Performed by: FAMILY MEDICINE

## 2021-11-01 PROCEDURE — 1125F AMNT PAIN NOTED PAIN PRSNT: CPT | Mod: HCNC,CPTII,S$GLB, | Performed by: FAMILY MEDICINE

## 2021-11-01 PROCEDURE — 1160F PR REVIEW ALL MEDS BY PRESCRIBER/CLIN PHARMACIST DOCUMENTED: ICD-10-PCS | Mod: HCNC,CPTII,S$GLB, | Performed by: FAMILY MEDICINE

## 2021-11-01 PROCEDURE — 3008F PR BODY MASS INDEX (BMI) DOCUMENTED: ICD-10-PCS | Mod: HCNC,CPTII,S$GLB, | Performed by: FAMILY MEDICINE

## 2021-11-01 PROCEDURE — 99999 PR PBB SHADOW E&M-EST. PATIENT-LVL V: CPT | Mod: PBBFAC,HCNC,, | Performed by: FAMILY MEDICINE

## 2021-11-01 PROCEDURE — 3044F PR MOST RECENT HEMOGLOBIN A1C LEVEL <7.0%: ICD-10-PCS | Mod: HCNC,CPTII,S$GLB, | Performed by: FAMILY MEDICINE

## 2021-11-01 PROCEDURE — 3075F SYST BP GE 130 - 139MM HG: CPT | Mod: HCNC,CPTII,S$GLB, | Performed by: FAMILY MEDICINE

## 2021-11-08 ENCOUNTER — OFFICE VISIT (OUTPATIENT)
Dept: PODIATRY | Facility: CLINIC | Age: 71
End: 2021-11-08
Payer: MEDICARE

## 2021-11-08 VITALS
HEART RATE: 76 BPM | SYSTOLIC BLOOD PRESSURE: 155 MMHG | DIASTOLIC BLOOD PRESSURE: 89 MMHG | HEIGHT: 64 IN | BODY MASS INDEX: 32.14 KG/M2 | WEIGHT: 188.25 LBS

## 2021-11-08 DIAGNOSIS — E11.40 TYPE 2 DIABETES MELLITUS WITH DIABETIC NEUROPATHY, WITHOUT LONG-TERM CURRENT USE OF INSULIN: ICD-10-CM

## 2021-11-08 DIAGNOSIS — L60.0 INGROWN NAIL: ICD-10-CM

## 2021-11-08 DIAGNOSIS — B35.1 ONYCHOMYCOSIS DUE TO DERMATOPHYTE: Primary | ICD-10-CM

## 2021-11-08 PROCEDURE — 3288F FALL RISK ASSESSMENT DOCD: CPT | Mod: HCNC,CPTII,S$GLB, | Performed by: PODIATRIST

## 2021-11-08 PROCEDURE — 1159F PR MEDICATION LIST DOCUMENTED IN MEDICAL RECORD: ICD-10-PCS | Mod: HCNC,CPTII,S$GLB, | Performed by: PODIATRIST

## 2021-11-08 PROCEDURE — 3008F PR BODY MASS INDEX (BMI) DOCUMENTED: ICD-10-PCS | Mod: HCNC,CPTII,S$GLB, | Performed by: PODIATRIST

## 2021-11-08 PROCEDURE — 99214 OFFICE O/P EST MOD 30 MIN: CPT | Mod: 25,HCNC,S$GLB, | Performed by: PODIATRIST

## 2021-11-08 PROCEDURE — 99999 PR PBB SHADOW E&M-EST. PATIENT-LVL V: CPT | Mod: PBBFAC,HCNC,, | Performed by: PODIATRIST

## 2021-11-08 PROCEDURE — 1101F PT FALLS ASSESS-DOCD LE1/YR: CPT | Mod: HCNC,CPTII,S$GLB, | Performed by: PODIATRIST

## 2021-11-08 PROCEDURE — 3079F DIAST BP 80-89 MM HG: CPT | Mod: HCNC,CPTII,S$GLB, | Performed by: PODIATRIST

## 2021-11-08 PROCEDURE — 3077F PR MOST RECENT SYSTOLIC BLOOD PRESSURE >= 140 MM HG: ICD-10-PCS | Mod: HCNC,CPTII,S$GLB, | Performed by: PODIATRIST

## 2021-11-08 PROCEDURE — 3008F BODY MASS INDEX DOCD: CPT | Mod: HCNC,CPTII,S$GLB, | Performed by: PODIATRIST

## 2021-11-08 PROCEDURE — 99999 PR PBB SHADOW E&M-EST. PATIENT-LVL V: ICD-10-PCS | Mod: PBBFAC,HCNC,, | Performed by: PODIATRIST

## 2021-11-08 PROCEDURE — 1125F AMNT PAIN NOTED PAIN PRSNT: CPT | Mod: HCNC,CPTII,S$GLB, | Performed by: PODIATRIST

## 2021-11-08 PROCEDURE — 1101F PR PT FALLS ASSESS DOC 0-1 FALLS W/OUT INJ PAST YR: ICD-10-PCS | Mod: HCNC,CPTII,S$GLB, | Performed by: PODIATRIST

## 2021-11-08 PROCEDURE — 3288F PR FALLS RISK ASSESSMENT DOCUMENTED: ICD-10-PCS | Mod: HCNC,CPTII,S$GLB, | Performed by: PODIATRIST

## 2021-11-08 PROCEDURE — 3044F PR MOST RECENT HEMOGLOBIN A1C LEVEL <7.0%: ICD-10-PCS | Mod: HCNC,CPTII,S$GLB, | Performed by: PODIATRIST

## 2021-11-08 PROCEDURE — 1125F PR PAIN SEVERITY QUANTIFIED, PAIN PRESENT: ICD-10-PCS | Mod: HCNC,CPTII,S$GLB, | Performed by: PODIATRIST

## 2021-11-08 PROCEDURE — 1159F MED LIST DOCD IN RCRD: CPT | Mod: HCNC,CPTII,S$GLB, | Performed by: PODIATRIST

## 2021-11-08 PROCEDURE — 3077F SYST BP >= 140 MM HG: CPT | Mod: HCNC,CPTII,S$GLB, | Performed by: PODIATRIST

## 2021-11-08 PROCEDURE — 3079F PR MOST RECENT DIASTOLIC BLOOD PRESSURE 80-89 MM HG: ICD-10-PCS | Mod: HCNC,CPTII,S$GLB, | Performed by: PODIATRIST

## 2021-11-08 PROCEDURE — 11721 PR DEBRIDEMENT OF NAILS, 6 OR MORE: ICD-10-PCS | Mod: Q9,HCNC,S$GLB, | Performed by: PODIATRIST

## 2021-11-08 PROCEDURE — 99214 PR OFFICE/OUTPT VISIT, EST, LEVL IV, 30-39 MIN: ICD-10-PCS | Mod: 25,HCNC,S$GLB, | Performed by: PODIATRIST

## 2021-11-08 PROCEDURE — 3044F HG A1C LEVEL LT 7.0%: CPT | Mod: HCNC,CPTII,S$GLB, | Performed by: PODIATRIST

## 2021-11-08 PROCEDURE — 11721 DEBRIDE NAIL 6 OR MORE: CPT | Mod: Q9,HCNC,S$GLB, | Performed by: PODIATRIST

## 2021-11-08 PROCEDURE — 1160F PR REVIEW ALL MEDS BY PRESCRIBER/CLIN PHARMACIST DOCUMENTED: ICD-10-PCS | Mod: HCNC,CPTII,S$GLB, | Performed by: PODIATRIST

## 2021-11-08 PROCEDURE — 1160F RVW MEDS BY RX/DR IN RCRD: CPT | Mod: HCNC,CPTII,S$GLB, | Performed by: PODIATRIST

## 2021-11-08 RX ORDER — CICLOPIROX 80 MG/ML
SOLUTION TOPICAL NIGHTLY
Qty: 6.6 ML | Refills: 11 | Status: SHIPPED | OUTPATIENT
Start: 2021-11-08 | End: 2022-02-21

## 2021-11-15 ENCOUNTER — PROCEDURE VISIT (OUTPATIENT)
Dept: PODIATRY | Facility: CLINIC | Age: 71
End: 2021-11-15
Payer: MEDICARE

## 2021-11-15 VITALS — WEIGHT: 188 LBS | HEIGHT: 64 IN | BODY MASS INDEX: 32.1 KG/M2

## 2021-11-15 DIAGNOSIS — L60.0 INGROWN NAIL: Primary | ICD-10-CM

## 2021-11-15 PROCEDURE — 11750 EXCISION NAIL&NAIL MATRIX: CPT | Mod: 51,TA,HCNC,S$GLB | Performed by: PODIATRIST

## 2021-11-15 PROCEDURE — 11750 NAIL REMOVAL: ICD-10-PCS | Mod: 51,TA,HCNC,S$GLB | Performed by: PODIATRIST

## 2021-11-15 RX ORDER — TOBRAMYCIN 3 MG/ML
SOLUTION/ DROPS OPHTHALMIC
Qty: 5 ML | Refills: 3 | Status: SHIPPED | OUTPATIENT
Start: 2021-11-15 | End: 2022-11-07 | Stop reason: ALTCHOICE

## 2021-11-21 ENCOUNTER — HOSPITAL ENCOUNTER (EMERGENCY)
Facility: HOSPITAL | Age: 71
Discharge: HOME | End: 2021-11-21
Attending: EMERGENCY MEDICINE
Payer: MEDICARE

## 2021-11-21 VITALS
SYSTOLIC BLOOD PRESSURE: 182 MMHG | OXYGEN SATURATION: 98 % | HEIGHT: 64 IN | DIASTOLIC BLOOD PRESSURE: 79 MMHG | WEIGHT: 188 LBS | TEMPERATURE: 98 F | HEART RATE: 93 BPM | BODY MASS INDEX: 32.1 KG/M2 | RESPIRATION RATE: 18 BRPM

## 2021-11-21 DIAGNOSIS — M79.10 MYALGIA: Primary | ICD-10-CM

## 2021-11-21 DIAGNOSIS — E13.9 DIABETES MELLITUS OF OTHER TYPE WITHOUT COMPLICATION, UNSPECIFIED WHETHER LONG TERM INSULIN USE: ICD-10-CM

## 2021-11-21 DIAGNOSIS — I10 HYPERTENSION, UNSPECIFIED TYPE: ICD-10-CM

## 2021-11-21 DIAGNOSIS — R07.9 CHEST PAIN: ICD-10-CM

## 2021-11-21 LAB
ALBUMIN SERPL-MCNC: 3.7 G/DL (ref 3.3–5.5)
ALP SERPL-CCNC: 99 U/L (ref 42–141)
BILIRUB SERPL-MCNC: 0.5 MG/DL (ref 0.2–1.6)
BUN SERPL-MCNC: 17 MG/DL (ref 7–22)
CALCIUM SERPL-MCNC: 9.7 MG/DL (ref 8–10.3)
CHLORIDE SERPL-SCNC: 107 MMOL/L (ref 98–108)
CREAT SERPL-MCNC: 0.8 MG/DL (ref 0.6–1.2)
GLUCOSE SERPL-MCNC: 138 MG/DL (ref 73–118)
POC ALT (SGPT): 13 U/L (ref 10–47)
POC AST (SGOT): 25 U/L (ref 11–38)
POC CARDIAC TROPONIN I: 0 NG/ML
POC TCO2: 29 MMOL/L (ref 18–33)
POTASSIUM BLD-SCNC: 4.2 MMOL/L (ref 3.6–5.1)
PROTEIN, POC: 6.9 G/DL (ref 6.4–8.1)
SAMPLE: NORMAL
SODIUM BLD-SCNC: 145 MMOL/L (ref 128–145)

## 2021-11-21 PROCEDURE — 85025 COMPLETE CBC W/AUTO DIFF WBC: CPT | Mod: HCNC,ER

## 2021-11-21 PROCEDURE — 93005 ELECTROCARDIOGRAM TRACING: CPT | Mod: HCNC,ER

## 2021-11-21 PROCEDURE — 84484 ASSAY OF TROPONIN QUANT: CPT | Mod: HCNC,ER

## 2021-11-21 PROCEDURE — 93010 EKG 12-LEAD: ICD-10-PCS | Mod: HCNC,,, | Performed by: INTERNAL MEDICINE

## 2021-11-21 PROCEDURE — 80053 COMPREHEN METABOLIC PANEL: CPT | Mod: HCNC,ER

## 2021-11-21 PROCEDURE — 93010 ELECTROCARDIOGRAM REPORT: CPT | Mod: HCNC,,, | Performed by: INTERNAL MEDICINE

## 2021-11-21 PROCEDURE — 99285 EMERGENCY DEPT VISIT HI MDM: CPT | Mod: 25,HCNC,ER

## 2021-11-29 ENCOUNTER — OFFICE VISIT (OUTPATIENT)
Dept: PODIATRY | Facility: CLINIC | Age: 71
End: 2021-11-29
Payer: MEDICARE

## 2021-11-29 VITALS — BODY MASS INDEX: 32.1 KG/M2 | WEIGHT: 188 LBS | HEIGHT: 64 IN

## 2021-11-29 DIAGNOSIS — Z98.890 POST-OPERATIVE STATE: Primary | ICD-10-CM

## 2021-11-29 PROCEDURE — 99999 PR PBB SHADOW E&M-EST. PATIENT-LVL III: CPT | Mod: PBBFAC,HCNC,, | Performed by: PODIATRIST

## 2021-11-29 PROCEDURE — 99024 PR POST-OP FOLLOW-UP VISIT: ICD-10-PCS | Mod: HCNC,S$GLB,, | Performed by: PODIATRIST

## 2021-11-29 PROCEDURE — 99024 POSTOP FOLLOW-UP VISIT: CPT | Mod: HCNC,S$GLB,, | Performed by: PODIATRIST

## 2021-11-29 PROCEDURE — 99999 PR PBB SHADOW E&M-EST. PATIENT-LVL III: ICD-10-PCS | Mod: PBBFAC,HCNC,, | Performed by: PODIATRIST

## 2021-12-03 ENCOUNTER — PES CALL (OUTPATIENT)
Dept: ADMINISTRATIVE | Facility: CLINIC | Age: 71
End: 2021-12-03
Payer: MEDICARE

## 2021-12-08 ENCOUNTER — OFFICE VISIT (OUTPATIENT)
Dept: FAMILY MEDICINE | Facility: CLINIC | Age: 71
End: 2021-12-08
Payer: MEDICARE

## 2021-12-08 VITALS
HEART RATE: 97 BPM | WEIGHT: 187.81 LBS | DIASTOLIC BLOOD PRESSURE: 84 MMHG | OXYGEN SATURATION: 98 % | SYSTOLIC BLOOD PRESSURE: 160 MMHG | TEMPERATURE: 98 F | BODY MASS INDEX: 32.24 KG/M2

## 2021-12-08 DIAGNOSIS — M79.662 PAIN OF LEFT CALF: Primary | ICD-10-CM

## 2021-12-08 DIAGNOSIS — I10 BENIGN HYPERTENSION: Chronic | ICD-10-CM

## 2021-12-08 DIAGNOSIS — L29.9 ITCHING: ICD-10-CM

## 2021-12-08 DIAGNOSIS — E78.5 HYPERLIPIDEMIA, UNSPECIFIED HYPERLIPIDEMIA TYPE: Chronic | ICD-10-CM

## 2021-12-08 DIAGNOSIS — F32.0 MILD MAJOR DEPRESSION: ICD-10-CM

## 2021-12-08 DIAGNOSIS — E11.40 TYPE 2 DIABETES MELLITUS WITH DIABETIC NEUROPATHY, WITHOUT LONG-TERM CURRENT USE OF INSULIN: Chronic | ICD-10-CM

## 2021-12-08 PROCEDURE — 99214 PR OFFICE/OUTPT VISIT, EST, LEVL IV, 30-39 MIN: ICD-10-PCS | Mod: HCNC,S$GLB,, | Performed by: NURSE PRACTITIONER

## 2021-12-08 PROCEDURE — 99999 PR PBB SHADOW E&M-EST. PATIENT-LVL V: CPT | Mod: PBBFAC,HCNC,, | Performed by: NURSE PRACTITIONER

## 2021-12-08 PROCEDURE — 99999 PR PBB SHADOW E&M-EST. PATIENT-LVL V: ICD-10-PCS | Mod: PBBFAC,HCNC,, | Performed by: NURSE PRACTITIONER

## 2021-12-08 PROCEDURE — 99214 OFFICE O/P EST MOD 30 MIN: CPT | Mod: HCNC,S$GLB,, | Performed by: NURSE PRACTITIONER

## 2021-12-09 ENCOUNTER — HOSPITAL ENCOUNTER (OUTPATIENT)
Dept: RADIOLOGY | Facility: HOSPITAL | Age: 71
Discharge: HOME | End: 2021-12-09
Attending: NURSE PRACTITIONER
Payer: MEDICARE

## 2021-12-09 DIAGNOSIS — M79.662 PAIN OF LEFT CALF: ICD-10-CM

## 2021-12-09 PROCEDURE — 93971 US LOWER EXTREMITY VEINS LEFT: ICD-10-PCS | Mod: 26,HCNC,LT, | Performed by: RADIOLOGY

## 2021-12-09 PROCEDURE — 93971 EXTREMITY STUDY: CPT | Mod: 26,HCNC,LT, | Performed by: RADIOLOGY

## 2021-12-09 PROCEDURE — 93971 EXTREMITY STUDY: CPT | Mod: TC,HCNC,LT

## 2021-12-09 RX ORDER — HYDROXYZINE HYDROCHLORIDE 25 MG/1
TABLET, FILM COATED ORAL
Qty: 90 TABLET | Refills: 0 | Status: SHIPPED | OUTPATIENT
Start: 2021-12-09 | End: 2022-01-04

## 2021-12-28 ENCOUNTER — HOSPITAL ENCOUNTER (EMERGENCY)
Facility: HOSPITAL | Age: 71
Discharge: HOME | End: 2021-12-28
Attending: EMERGENCY MEDICINE
Payer: MEDICARE

## 2021-12-28 ENCOUNTER — LAB VISIT (OUTPATIENT)
Dept: PRIMARY CARE CLINIC | Facility: OTHER | Age: 71
End: 2021-12-28
Attending: INTERNAL MEDICINE
Payer: MEDICARE

## 2021-12-28 VITALS
HEIGHT: 64 IN | WEIGHT: 187 LBS | HEART RATE: 95 BPM | SYSTOLIC BLOOD PRESSURE: 134 MMHG | OXYGEN SATURATION: 100 % | TEMPERATURE: 98 F | BODY MASS INDEX: 31.92 KG/M2 | RESPIRATION RATE: 18 BRPM | DIASTOLIC BLOOD PRESSURE: 72 MMHG

## 2021-12-28 DIAGNOSIS — M79.605 LEFT LEG PAIN: Primary | ICD-10-CM

## 2021-12-28 DIAGNOSIS — Z20.822 ENCOUNTER FOR LABORATORY TESTING FOR COVID-19 VIRUS: ICD-10-CM

## 2021-12-28 DIAGNOSIS — M79.606 LEG PAIN: ICD-10-CM

## 2021-12-28 PROCEDURE — 99284 EMERGENCY DEPT VISIT MOD MDM: CPT | Mod: 25,HCNC

## 2021-12-28 PROCEDURE — U0003 INFECTIOUS AGENT DETECTION BY NUCLEIC ACID (DNA OR RNA); SEVERE ACUTE RESPIRATORY SYNDROME CORONAVIRUS 2 (SARS-COV-2) (CORONAVIRUS DISEASE [COVID-19]), AMPLIFIED PROBE TECHNIQUE, MAKING USE OF HIGH THROUGHPUT TECHNOLOGIES AS DESCRIBED BY CMS-2020-01-R: HCPCS | Mod: HCNC | Performed by: INTERNAL MEDICINE

## 2021-12-28 NOTE — ED TRIAGE NOTES
Patient presents to ED with c/o LLE pain. Patient states she came in to [facility] 2-3 weels ago and had an US LE performed and it was negative for DVT. Patient states her PCP recommended she come here again to be re-evaluated. Patient states her pain starts at her left ankle and travels to her left hip.

## 2021-12-28 NOTE — DISCHARGE INSTRUCTIONS
Call and make an appointment with orthopedic provided for your continuous left leg pain.  You may take over-the-counter Tylenol or Motrin for your pain.  Follow-up with your primary care doctor this week.  Return to the emergency department if you develop fever, worsening pain or any other symptoms.

## 2021-12-28 NOTE — FIRST PROVIDER EVALUATION
Medical screening exam completed.  I have conducted a focused provider triage encounter, findings are as follows:    Brief history of present illness:  Pt sent from pt doctors office due to left leg pain that initially started 1 month ago. Pt recently had ultrasound on leg and was negative for a blood clot. Last US of left calf 12/09/2021.    There were no vitals filed for this visit.    Pertinent physical exam:  AAO, NAD    Brief workup plan:  ADDISON and repeat US left calf    Preliminary workup initiated; this workup will be continued and followed by the physician or advanced practice provider that is assigned to the patient when roomed.

## 2021-12-28 NOTE — ED PROVIDER NOTES
Encounter Date: 12/28/2021    SCRIBE #1 NOTE: I, Dinora Desai, am scribing for, and in the presence of,  Shelley Maxwell PA-C. I have scribed the following portions of the note - Other sections scribed: HPI; ROS.       History     Chief Complaint   Patient presents with    Leg Pain     Pt sent from pt doctors office due to left leg pain that initially started 1 month ago. Pt recently had ultrasound on leg and was negative for a blood clot      Valarie Foster is a 71 y.o. female with Hx of DM and HTN who presents to the ED for chief complaint of severe, constant left leg pain onset 1 month ago. Patient reports having a previous visit with her PCP who recommended that she come to the ED to get a US. She states that she has attempted treatment for the pain with Tylenol and other prescribed medications with no relief of the pain. Patient notes that her pain has only been getting worse. No recent falls/injuries. She denies rash, knee pain, or difficulty urinating.    The history is provided by the patient. No  was used.     Review of patient's allergies indicates:   Allergen Reactions    Lisinopril Swelling    Ascorbic acid-ascorbate calc      And citrus fruits     Past Medical History:   Diagnosis Date    Arthritis     Colon polyp     Diabetes mellitus     diet controlled    Diabetes with neurologic complications     Hypertension     Renal cell carcinoma      Past Surgical History:   Procedure Laterality Date    COLONOSCOPY N/A 2/5/2018    Procedure: COLONOSCOPY;  Surgeon: Bacilio Mancia MD;  Location: [facility];  Service: Endoscopy;  Laterality: N/A;  appt confirmed-ss    COLONOSCOPY N/A 8/13/2020    Procedure: COLONOSCOPY;  Surgeon: Jose West MD;  Location: [facility];  Service: Endoscopy;  Laterality: N/A;    HYSTERECTOMY      OOPHORECTOMY      PARTIAL NEPHRECTOMY Right 10/12/2018    Procedure: NEPHRECTOMY, PARTIAL open. Dr Arenas to assist.;  Surgeon: Alejandra CALDERÓN  MD Néstor;  Location: [facility];  Service: Urology;  Laterality: Right;  RN PREOP 10/9/2018----NEEDS H/P     Family History   Problem Relation Age of Onset    Glaucoma Sister     Cancer Sister         breast cancer    No Known Problems Mother     Glaucoma Father     Diabetes Brother     No Known Problems Maternal Aunt     No Known Problems Maternal Uncle     No Known Problems Paternal Aunt     No Known Problems Paternal Uncle     No Known Problems Maternal Grandmother     No Known Problems Maternal Grandfather     No Known Problems Paternal Grandmother     No Known Problems Paternal Grandfather     Thyroid disease Sister     Blindness Sister         due to trauma during car accident    Diabetes Sister     Amblyopia Neg Hx     Cataracts Neg Hx     Hypertension Neg Hx     Macular degeneration Neg Hx     Retinal detachment Neg Hx     Strabismus Neg Hx     Stroke Neg Hx     Breast cancer Neg Hx      Social History     Tobacco Use    Smoking status: Never Smoker    Smokeless tobacco: Never Used   Substance Use Topics    Alcohol use: No    Drug use: No     Review of Systems   Constitutional: Negative for fever.   HENT: Negative for congestion.    Eyes: Negative for visual disturbance.   Respiratory: Negative for cough.    Cardiovascular: Negative for chest pain.   Gastrointestinal: Negative for abdominal pain.   Endocrine: Negative.    Genitourinary: Negative for dysuria.   Musculoskeletal: Negative for arthralgias.        (+) leg pain   Skin: Negative for rash.   Allergic/Immunologic: Negative.    Neurological: Negative for headaches.   Hematological: Negative.    Psychiatric/Behavioral: Negative for confusion.   All other systems reviewed and are negative.      Physical Exam     Initial Vitals [12/28/21 1101]   BP Pulse Resp Temp SpO2   (!) 143/78 101 20 97.9 °F (36.6 °C) 98 %      MAP       --         Physical Exam    Nursing note and vitals reviewed.  Constitutional: She appears  well-developed and well-nourished. She is not diaphoretic. No distress.   HENT:   Head: Normocephalic and atraumatic.   Nose: Nose normal.   Eyes: EOM are normal. Pupils are equal, round, and reactive to light.   Neck: Neck supple.   Normal range of motion.  Cardiovascular: Normal rate and regular rhythm.   No murmur heard.  Pulmonary/Chest: Breath sounds normal. No respiratory distress. She has no wheezes. She has no rhonchi. She has no rales.   Abdominal: Abdomen is soft. Bowel sounds are normal. She exhibits no distension. There is no abdominal tenderness. There is no rebound and no guarding.   Musculoskeletal:         General: No tenderness or edema. Normal range of motion.      Cervical back: Normal range of motion and neck supple.     Neurological: She is alert and oriented to person, place, and time. No cranial nerve deficit.   Skin: Skin is warm. No rash noted. No erythema.         ED Course   Procedures  Labs Reviewed - No data to display       Imaging Results          US Lower Extremity Veins Left (Final result)  Result time 12/28/21 12:00:01    Final result by Iglesia Byrd MD (12/28/21 12:00:01)                 Impression:      No evidence of deep venous thrombosis in the left lower extremity.      Electronically signed by: Iglesia Byrd MD  Date:    12/28/2021  Time:    12:00             Narrative:    EXAMINATION:  US LOWER EXTREMITY VEINS LEFT    CLINICAL HISTORY:  Pain in leg, unspecified    TECHNIQUE:  Duplex and color flow Doppler evaluation and graded compression of the left lower extremity veins was performed.    COMPARISON:  None    FINDINGS:  Left thigh veins: The common femoral, femoral, popliteal, upper greater saphenous, and upper deep femoral veins are patent and free of thrombus. The veins are normally compressible and have normal phasic flow and augmentation response.    Left calf veins: The visualized calf veins are patent.    Contralateral CFV: The contralateral (right) common femoral  vein is patent and free of thrombus.    Miscellaneous: None                                 Medications - No data to display  Medical Decision Making:   History:   Old Medical Records: I decided to obtain old medical records.  Initial Assessment:   This is an urgent evaluation of a 71-year-old female presents to the emergency department requesting a repeat ultrasound of the left lower extremity.  She states that she has had pain going from her left hip to her ankle for several months now.  She is seen by primary care doctor and had an ultrasound performed which revealed no DVT.  She called her PCP today complaining of increased pain and they told her to come to the emergency department for repeat ultrasound.    The patient is currently afebrile and nontoxic in appearance.  Vital signs are stable.  On physical exam, there is no pain at could be elicited to the left lower extremity.  She ambulates with a normal steady gait.  There is no swelling noted to the bilateral lower extremities.  Sensation is intact.  The remaining physical exam is unremarkable.  She denies trauma.  I doubt fracture.  An ultrasound of the left lower extremity was repeated which was negative for DVT.  She will need to follow up with her primary care doctor/orthopedist regarding persistent left lower leg pain.  Over-the-counter analgesics encouraged.  She verbalized understanding and agreement.  She is currently safe and stable for discharge at this time.  Clinical Tests:   Radiological Study: Ordered and Reviewed          Scribe Attestation:   Scribe #1: I performed the above scribed service and the documentation accurately describes the services I performed. I attest to the accuracy of the note.               I, Shelley Maxwell PA-C, personally performed the services described in this documentation.  All medical record entries made by the scribe were at my direction and in my presence.  I have reviewed the chart and agree that the record  reflects my personal performance and is accurate and complete.  Clinical Impression:   Final diagnoses:  [M79.606] Leg pain  [M79.605] Left leg pain (Primary)          ED Disposition Condition    Discharge Stable        ED Prescriptions     None        Follow-up Information     Follow up With Specialties Details Why Contact Info    Monisha Pandya MD Orthopedic Surgery   [address]  [place] [address]  [phone]             Shelley Maxwell PA-C  12/28/21 4135

## 2021-12-30 ENCOUNTER — TELEPHONE (OUTPATIENT)
Dept: INTERNAL MEDICINE | Facility: CLINIC | Age: 71
End: 2021-12-30
Payer: MEDICARE

## 2021-12-30 LAB
SARS-COV-2 RNA RESP QL NAA+PROBE: NOT DETECTED
SARS-COV-2- CYCLE NUMBER: NORMAL

## 2022-01-03 DIAGNOSIS — L29.9 ITCHING: ICD-10-CM

## 2022-01-04 RX ORDER — HYDROXYZINE HYDROCHLORIDE 25 MG/1
TABLET, FILM COATED ORAL
Qty: 90 TABLET | Refills: 0 | Status: SHIPPED | OUTPATIENT
Start: 2022-01-04 | End: 2022-02-18

## 2022-01-05 ENCOUNTER — OFFICE VISIT (OUTPATIENT)
Dept: ORTHOPEDICS | Facility: CLINIC | Age: 72
End: 2022-01-05
Payer: MEDICARE

## 2022-01-05 VITALS
WEIGHT: 194.25 LBS | RESPIRATION RATE: 18 BRPM | OXYGEN SATURATION: 99 % | DIASTOLIC BLOOD PRESSURE: 90 MMHG | HEART RATE: 94 BPM | HEIGHT: 64 IN | BODY MASS INDEX: 33.16 KG/M2 | SYSTOLIC BLOOD PRESSURE: 142 MMHG

## 2022-01-05 DIAGNOSIS — M17.0 PRIMARY OSTEOARTHRITIS OF BOTH KNEES: Primary | ICD-10-CM

## 2022-01-05 PROCEDURE — 99999 PR PBB SHADOW E&M-EST. PATIENT-LVL V: ICD-10-PCS | Mod: PBBFAC,HCNC,, | Performed by: ORTHOPAEDIC SURGERY

## 2022-01-05 PROCEDURE — 1160F PR REVIEW ALL MEDS BY PRESCRIBER/CLIN PHARMACIST DOCUMENTED: ICD-10-PCS | Mod: HCNC,CPTII,S$GLB, | Performed by: ORTHOPAEDIC SURGERY

## 2022-01-05 PROCEDURE — 1125F AMNT PAIN NOTED PAIN PRSNT: CPT | Mod: HCNC,CPTII,S$GLB, | Performed by: ORTHOPAEDIC SURGERY

## 2022-01-05 PROCEDURE — 1159F PR MEDICATION LIST DOCUMENTED IN MEDICAL RECORD: ICD-10-PCS | Mod: HCNC,CPTII,S$GLB, | Performed by: ORTHOPAEDIC SURGERY

## 2022-01-05 PROCEDURE — 99212 PR OFFICE/OUTPT VISIT, EST, LEVL II, 10-19 MIN: ICD-10-PCS | Mod: HCNC,S$GLB,, | Performed by: ORTHOPAEDIC SURGERY

## 2022-01-05 PROCEDURE — 3288F PR FALLS RISK ASSESSMENT DOCUMENTED: ICD-10-PCS | Mod: HCNC,CPTII,S$GLB, | Performed by: ORTHOPAEDIC SURGERY

## 2022-01-05 PROCEDURE — 3077F PR MOST RECENT SYSTOLIC BLOOD PRESSURE >= 140 MM HG: ICD-10-PCS | Mod: HCNC,CPTII,S$GLB, | Performed by: ORTHOPAEDIC SURGERY

## 2022-01-05 PROCEDURE — 99212 OFFICE O/P EST SF 10 MIN: CPT | Mod: HCNC,S$GLB,, | Performed by: ORTHOPAEDIC SURGERY

## 2022-01-05 PROCEDURE — 3008F BODY MASS INDEX DOCD: CPT | Mod: HCNC,CPTII,S$GLB, | Performed by: ORTHOPAEDIC SURGERY

## 2022-01-05 PROCEDURE — 3080F PR MOST RECENT DIASTOLIC BLOOD PRESSURE >= 90 MM HG: ICD-10-PCS | Mod: HCNC,CPTII,S$GLB, | Performed by: ORTHOPAEDIC SURGERY

## 2022-01-05 PROCEDURE — 3077F SYST BP >= 140 MM HG: CPT | Mod: HCNC,CPTII,S$GLB, | Performed by: ORTHOPAEDIC SURGERY

## 2022-01-05 PROCEDURE — 1101F PR PT FALLS ASSESS DOC 0-1 FALLS W/OUT INJ PAST YR: ICD-10-PCS | Mod: HCNC,CPTII,S$GLB, | Performed by: ORTHOPAEDIC SURGERY

## 2022-01-05 PROCEDURE — 1160F RVW MEDS BY RX/DR IN RCRD: CPT | Mod: HCNC,CPTII,S$GLB, | Performed by: ORTHOPAEDIC SURGERY

## 2022-01-05 PROCEDURE — 1125F PR PAIN SEVERITY QUANTIFIED, PAIN PRESENT: ICD-10-PCS | Mod: HCNC,CPTII,S$GLB, | Performed by: ORTHOPAEDIC SURGERY

## 2022-01-05 PROCEDURE — 1159F MED LIST DOCD IN RCRD: CPT | Mod: HCNC,CPTII,S$GLB, | Performed by: ORTHOPAEDIC SURGERY

## 2022-01-05 PROCEDURE — 3008F PR BODY MASS INDEX (BMI) DOCUMENTED: ICD-10-PCS | Mod: HCNC,CPTII,S$GLB, | Performed by: ORTHOPAEDIC SURGERY

## 2022-01-05 PROCEDURE — 99999 PR PBB SHADOW E&M-EST. PATIENT-LVL V: CPT | Mod: PBBFAC,HCNC,, | Performed by: ORTHOPAEDIC SURGERY

## 2022-01-05 PROCEDURE — 3288F FALL RISK ASSESSMENT DOCD: CPT | Mod: HCNC,CPTII,S$GLB, | Performed by: ORTHOPAEDIC SURGERY

## 2022-01-05 PROCEDURE — 1101F PT FALLS ASSESS-DOCD LE1/YR: CPT | Mod: HCNC,CPTII,S$GLB, | Performed by: ORTHOPAEDIC SURGERY

## 2022-01-05 PROCEDURE — 3080F DIAST BP >= 90 MM HG: CPT | Mod: HCNC,CPTII,S$GLB, | Performed by: ORTHOPAEDIC SURGERY

## 2022-01-05 NOTE — PROGRESS NOTES
Chief Complaint   Patient presents with    Left Knee - Pain    Right Knee - Pain       This patient was seen in consultation at the request of No ref. provider found     HPI: Valarie Foster is a 71 y.o. female who presents today complaining of Pain of the Left Knee and Pain of the Right Knee     Duration of symptoms:  Several years  Trauma or new activity: no  Pain is constant  Aggravating factors: weight bearing activity, stairs, going from sit to stand   Relieving factors: rest   Radicular symptoms: no numbness, paresthesias   Associated symptoms:  swelling, popping and giving way.    Prior treatment:  prescription NSAIDs and steroid injection  with improvement in pain.  Has been several months since her lat injection - she cannot remember exact date but is sure that it has been more than three months since her last one. Only got about 1 month of relief after this last injection. Previously done by her PCP   Pain does interfere with activities of daily living .      10/28/21  Did very well with euflexxa injections into the bilateral knees last year  Pain came back sometime this year, she is unsure of when   Tylenol not helpful  Pain was bed enough to make her cry last night     1/5/22  Follow up for bilateral knee pain   Euflexxa series done 8/2020 with relief for over 1 year  Did steroid injection in October 2021 because she presented with severe pain    Had good pain relief for about 1 month, pain has returned and is severe     This is the extent of the patient's complaints at this time.      Occupation: Retired     Review of Systems   All other systems reviewed and are negative.        Review of patient's allergies indicates:   Allergen Reactions    Lisinopril Swelling    Ascorbic acid-ascorbate calc      And citrus fruits         Current Outpatient Medications:     ACCU-CHEK ONEIL CONTROL SONYA [facility]-Baptiste, , Disp: , Rfl:     ACCU-CHEK ONEIL PLUS METER [facility], USE AS DIRECTED, Disp: 1 each, Rfl: 0    ACCU-CHEK  SOFTCLIX LANCETS Misc, TEST BLOOD SUGAR ONE TIME DAILY, Disp: 100 each, Rfl: 1    amLODIPine (NORVASC) 5 MG tablet, TAKE 1 TABLET EVERY DAY, Disp: 90 tablet, Rfl: 1    BD ALCOHOL SWABS PadM, USE ONE TIME DAILY, Disp: 100 each, Rfl: 6    blood sugar diagnostic (TRUE METRIX GLUCOSE TEST STRIP) Strp, 1 each by Misc.(Non-Drug; Combo Route) route 2 (two) times a day., Disp: 200 each, Rfl: 0    celecoxib (CELEBREX) 200 MG capsule, TAKE 1 CAPSULE EVERY DAY, Disp: 90 capsule, Rfl: 1    ciclopirox (PENLAC) 8 % Soln, Apply topically nightly., Disp: 6.6 mL, Rfl: 11    cyclobenzaprine (FLEXERIL) 5 MG tablet, TAKE 1 TABLET THREE TIMES DAILY AS NEEDED FOR MUSCLE SPASM(S), Disp: 270 tablet, Rfl: 1    diclofenac sodium (VOLTAREN) 1 % Gel, APPLY 2 GRAMS TOPICALLY ONCE DAILY., Disp: 100 g, Rfl: 3    diphenhydrAMINE (BENADRYL) 25 mg capsule, Take 1 capsule (25 mg total) by mouth every 6 (six) hours as needed for Itching., Disp: 20 capsule, Rfl: 0    docusate sodium (COLACE) 100 MG capsule, Take 1 capsule (100 mg total) by mouth 2 (two) times daily., Disp: 60 capsule, Rfl: 0    ergocalciferol (ERGOCALCIFEROL) 50,000 unit Cap, TAKE 1 CAPSULE (50,000 UNITS TOTAL) BY MOUTH EVERY 7 DAYS., Disp: 12 capsule, Rfl: 1    famotidine (PEPCID) 20 MG tablet, TAKE 1 TABLET TWICE DAILY, Disp: 180 tablet, Rfl: 1    FLUoxetine 20 MG capsule, TAKE 1 CAPSULE EVERY DAY, Disp: 90 capsule, Rfl: 1    fluticasone propionate (FLONASE) 50 mcg/actuation nasal spray, USE 1 SPRAY IN EACH NOSTRIL ONCE DAILY, Disp: 32 g, Rfl: 1    gabapentin (NEURONTIN) 300 MG capsule, TAKE 2 CAPSULES (600 MG TOTAL) BY MOUTH EVERY EVENING., Disp: 180 capsule, Rfl: 1    hydrOXYzine HCL (ATARAX) 25 MG tablet, TAKE 1 TABLET THREE TIMES DAILY AS NEEDED FOR  ITCHING, Disp: 90 tablet, Rfl: 0    lancets (TRUEPLUS LANCETS) 33 gauge Misc, 1 lancet by Misc.(Non-Drug; Combo Route) route 2 (two) times a day., Disp: 200 each, Rfl: 1    pravastatin (PRAVACHOL) 10 MG tablet, TAKE  1 TABLET EVERY DAY, Disp: 90 tablet, Rfl: 1    tobramycin sulfate 0.3% (TOBREX) 0.3 % ophthalmic solution, 1-2 drops topically twice daily to affected toe(s)., Disp: 5 mL, Rfl: 3    triamcinolone acetonide 0.1% (KENALOG) 0.1 % cream, , Disp: , Rfl:     TRUE METRIX GLUCOSE METER kit, USE AS DIRECTED, Disp: 1 each, Rfl: 0    blood glucose control, low (TRUE METRIX LEVEL 1) Soln, 1 each by Misc.(Non-Drug; Combo Route) route once as needed., Disp: 1 each, Rfl: 3    cetirizine (ZYRTEC) 10 MG tablet, Take 1 tablet (10 mg total) by mouth once daily., Disp: 90 tablet, Rfl: 0    Past Medical History:   Diagnosis Date    Arthritis     Colon polyp     Diabetes mellitus     diet controlled    Diabetes with neurologic complications     Hypertension     Renal cell carcinoma        Patient Active Problem List   Diagnosis    Type 2 diabetes mellitus with diabetic neuropathy, without long-term current use of insulin    Benign hypertension    Hyperlipemia    Microhematuria    Left flank pain    Abdominal aortic atherosclerosis    History of renal cell cancer    Renal cell carcinoma of right kidney    Mild major depression    Colon cancer screening    Pain of left calf       Past Surgical History:   Procedure Laterality Date    COLONOSCOPY N/A 2/5/2018    Procedure: COLONOSCOPY;  Surgeon: Bacilio Mancia MD;  Location: [facility] ENDO;  Service: Endoscopy;  Laterality: N/A;  appt confirmed-ss    COLONOSCOPY N/A 8/13/2020    Procedure: COLONOSCOPY;  Surgeon: Jose West MD;  Location: [facility] ENDO;  Service: Endoscopy;  Laterality: N/A;    HYSTERECTOMY      OOPHORECTOMY      PARTIAL NEPHRECTOMY Right 10/12/2018    Procedure: NEPHRECTOMY, PARTIAL open. Dr Arenas to assist.;  Surgeon: Alejandra Palm MD;  Location: [facility] OR;  Service: Urology;  Laterality: Right;  RN PREOP 10/9/2018----NEEDS H/P       Social History     Tobacco Use    Smoking status: Never Smoker    Smokeless tobacco: Never Used  "  Substance Use Topics    Alcohol use: No    Drug use: No       Family History   Problem Relation Age of Onset    Glaucoma Sister     Cancer Sister         breast cancer    No Known Problems Mother     Glaucoma Father     Diabetes Brother     No Known Problems Maternal Aunt     No Known Problems Maternal Uncle     No Known Problems Paternal Aunt     No Known Problems Paternal Uncle     No Known Problems Maternal Grandmother     No Known Problems Maternal Grandfather     No Known Problems Paternal Grandmother     No Known Problems Paternal Grandfather     Thyroid disease Sister     Blindness Sister         due to trauma during car accident    Diabetes Sister     Amblyopia Neg Hx     Cataracts Neg Hx     Hypertension Neg Hx     Macular degeneration Neg Hx     Retinal detachment Neg Hx     Strabismus Neg Hx     Stroke Neg Hx     Breast cancer Neg Hx        Physical Exam:   Vitals:    01/05/22 0902   BP: (!) 142/90   Pulse: 94   Resp: 18   SpO2: 99%   Weight: 88.1 kg (194 lb 3.6 oz)   Height: 5' 4" (1.626 m)   PainSc: 10-Worst pain ever   PainLoc: Knee       General: Weight: 88.1 kg (194 lb 3.6 oz) Body mass index is 33.34 kg/m².  Patient is alert, awake and oriented to time, place and person. Mood and affect are appropriate.  Patient does not appear to be in any distress, denies any constitutional symptoms and appears stated age.   HEENT: Pupils are equal and round, sclera are not injected. External examination of ears and nose reveals no abnormalities. Cranial nerves II-X are grossly intact  Skin: no rashes, abrasions or open wounds on the affected extremity   Resp: No respiratory distress or audible wheezing   CV: 2+  pulses, all extremities warm and well perfused     Bilateral  knee(s)  Localizes pain over MJL and anterior knee   no swelling, effusion, or erythema   Active ROM: 0 - 90 (R), 0-130 (L)  Passive ROM: 0-120 ( R) 0 - 130 (L)  no varus/valgus deformity   Tender to palpation over " patella and medial joint line   fair  quadricep muscle tone and bulk; no atrophy compared to contralateral extremity   normal (0 - 2 mm) Anterior drawer   normal (0 - 2 mm) posterior drawer   negative valgus instability   negative varus instability   Normal  patellar tracking   + pain with patellar compression   ltsi s/s/sp/dp/t   + ehl/fhl/ta/gs  2 + DP      Imaging:  3 views bilateral knees:  moderate tricompartmental OA with subchondral sclerosis, joint space narrowing, osteophyte formation. Worse in patellofemoral compartment. KL3      I personally reviewed and interpreted the patient's imaging obtained today in clinic     Assessment: 71 y.o. female with Bilateral  knee osteoarthritis     Plan:   - She is interesed in TKA - referred to Dr Carbajal     All questions were answered in detail. The patient is in full agreement with the treatment plan and will proceed accordingly.    This note was created by combination of typed  and M-Modal dictation. Transcription and phonetic errors may be present.  If there are any questions, please contact me.

## 2022-01-06 DIAGNOSIS — M25.562 PAIN IN BOTH KNEES, UNSPECIFIED CHRONICITY: Primary | ICD-10-CM

## 2022-01-06 DIAGNOSIS — M25.561 PAIN IN BOTH KNEES, UNSPECIFIED CHRONICITY: Primary | ICD-10-CM

## 2022-01-07 ENCOUNTER — OFFICE VISIT (OUTPATIENT)
Dept: ORTHOPEDICS | Facility: CLINIC | Age: 72
End: 2022-01-07
Payer: MEDICARE

## 2022-01-07 VITALS
WEIGHT: 193.38 LBS | OXYGEN SATURATION: 97 % | SYSTOLIC BLOOD PRESSURE: 142 MMHG | RESPIRATION RATE: 18 BRPM | HEIGHT: 64 IN | DIASTOLIC BLOOD PRESSURE: 86 MMHG | HEART RATE: 99 BPM | BODY MASS INDEX: 33.02 KG/M2

## 2022-01-07 DIAGNOSIS — M17.0 PRIMARY OSTEOARTHRITIS OF BOTH KNEES: Primary | ICD-10-CM

## 2022-01-07 PROCEDURE — 99999 PR PBB SHADOW E&M-EST. PATIENT-LVL IV: ICD-10-PCS | Mod: PBBFAC,HCNC,, | Performed by: ORTHOPAEDIC SURGERY

## 2022-01-07 PROCEDURE — 1159F PR MEDICATION LIST DOCUMENTED IN MEDICAL RECORD: ICD-10-PCS | Mod: HCNC,CPTII,S$GLB, | Performed by: ORTHOPAEDIC SURGERY

## 2022-01-07 PROCEDURE — 3077F PR MOST RECENT SYSTOLIC BLOOD PRESSURE >= 140 MM HG: ICD-10-PCS | Mod: HCNC,CPTII,S$GLB, | Performed by: ORTHOPAEDIC SURGERY

## 2022-01-07 PROCEDURE — 1125F PR PAIN SEVERITY QUANTIFIED, PAIN PRESENT: ICD-10-PCS | Mod: HCNC,CPTII,S$GLB, | Performed by: ORTHOPAEDIC SURGERY

## 2022-01-07 PROCEDURE — 99214 OFFICE O/P EST MOD 30 MIN: CPT | Mod: 25,HCNC,S$GLB, | Performed by: ORTHOPAEDIC SURGERY

## 2022-01-07 PROCEDURE — 3077F SYST BP >= 140 MM HG: CPT | Mod: HCNC,CPTII,S$GLB, | Performed by: ORTHOPAEDIC SURGERY

## 2022-01-07 PROCEDURE — 1159F MED LIST DOCD IN RCRD: CPT | Mod: HCNC,CPTII,S$GLB, | Performed by: ORTHOPAEDIC SURGERY

## 2022-01-07 PROCEDURE — 99999 PR PBB SHADOW E&M-EST. PATIENT-LVL IV: CPT | Mod: PBBFAC,HCNC,, | Performed by: ORTHOPAEDIC SURGERY

## 2022-01-07 PROCEDURE — 1125F AMNT PAIN NOTED PAIN PRSNT: CPT | Mod: HCNC,CPTII,S$GLB, | Performed by: ORTHOPAEDIC SURGERY

## 2022-01-07 PROCEDURE — 20610 DRAIN/INJ JOINT/BURSA W/O US: CPT | Mod: 50,HCNC,S$GLB, | Performed by: ORTHOPAEDIC SURGERY

## 2022-01-07 PROCEDURE — 3008F PR BODY MASS INDEX (BMI) DOCUMENTED: ICD-10-PCS | Mod: HCNC,CPTII,S$GLB, | Performed by: ORTHOPAEDIC SURGERY

## 2022-01-07 PROCEDURE — 20610 LARGE JOINT ASPIRATION/INJECTION: BILATERAL KNEE: ICD-10-PCS | Mod: 50,HCNC,S$GLB, | Performed by: ORTHOPAEDIC SURGERY

## 2022-01-07 PROCEDURE — 3079F PR MOST RECENT DIASTOLIC BLOOD PRESSURE 80-89 MM HG: ICD-10-PCS | Mod: HCNC,CPTII,S$GLB, | Performed by: ORTHOPAEDIC SURGERY

## 2022-01-07 PROCEDURE — 3079F DIAST BP 80-89 MM HG: CPT | Mod: HCNC,CPTII,S$GLB, | Performed by: ORTHOPAEDIC SURGERY

## 2022-01-07 PROCEDURE — 99214 PR OFFICE/OUTPT VISIT, EST, LEVL IV, 30-39 MIN: ICD-10-PCS | Mod: 25,HCNC,S$GLB, | Performed by: ORTHOPAEDIC SURGERY

## 2022-01-07 PROCEDURE — 3008F BODY MASS INDEX DOCD: CPT | Mod: HCNC,CPTII,S$GLB, | Performed by: ORTHOPAEDIC SURGERY

## 2022-01-07 RX ORDER — TRIAMCINOLONE ACETONIDE 40 MG/ML
40 INJECTION, SUSPENSION INTRA-ARTICULAR; INTRAMUSCULAR
Status: DISCONTINUED | OUTPATIENT
Start: 2022-01-07 | End: 2022-01-07 | Stop reason: HOSPADM

## 2022-01-07 RX ADMIN — TRIAMCINOLONE ACETONIDE 40 MG: 40 INJECTION, SUSPENSION INTRA-ARTICULAR; INTRAMUSCULAR at 03:01

## 2022-01-07 NOTE — PROCEDURES
Large Joint Aspiration/Injection: bilateral knee    Date/Time: 1/7/2022 3:00 PM  Performed by: Eric Carbajal MD  Authorized by: Eric Carbajal MD     Consent Done?:  Yes (Verbal)  Indications:  Arthritis  Site marked: the procedure site was marked    Timeout: prior to procedure the correct patient, procedure, and site was verified    Local anesthetic:  Lidocaine 1% without epinephrine    Details:  Needle Size:  22 G  Approach:  Anterolateral  Location:  Knee  Laterality:  Bilateral  Site:  Bilateral knee  Medications (Right):  40 mg triamcinolone acetonide 40 mg/mL  Medications (Left):  40 mg triamcinolone acetonide 40 mg/mL  Patient tolerance:  Patient tolerated the procedure well with no immediate complications

## 2022-01-07 NOTE — PROGRESS NOTES
NEW PATIENT ORTHOPAEDIC: Knee    PRIMARY CARE PHYSICIAN: Alena Hernandez MD   REFERRING PROVIDER: No referring provider defined for this encounter.     ASSESSMENT & PLAN:    Impression:  Bilateral Knee Moderate Osteoarthritis, primary     Follow Up Plan: 3 months    Injection:     Valarie Foster has physical exam evidence of above and wishes to pursue an injection. We discussed alternative non operative modalities including physical therapy, anti-inflammatories, bracing, maintenance of appropriate weight, rest, ambulatory devices. We discussed the risk, benefits, and expectations regarding injection. They have elected to proceed with injection. Should injections fail next step would be consideration of geniculate nerve ablations, referral to pain management or surgery. See procedure note for billing purposes.     Non operative care:    Valarie Foster has physical exam evidence of above and wishes to pursue an non-operative care. I am recommending the following: injections.  Patient has bilateral knee pain left worse than right.  She has radiographic imaging that is consistent with moderate arthritis.  I am hesitant to consider surgical intervention on this patient is she has pain that is out of proportion to what would be expected for this level of arthritis.  I think she would be someone who would be worrisome for continued pain an development of arthrofibrosis.  She has calf pain which was recently worked up and negative for any blood clot.  She has significant tenderness to palpation over her patella and quadriceps as well as along her medial joint line which is just more consistent with a global issue.  She has some irritation and irritability of her hip but CT scan from 2020 does not demonstrate any significant arthritis here.  She does have some upper lumbar degenerative disc disease on that same CT scan.  She denies any lumbar back issues.    The patient has been ordered:  Office Intraarticular  injection    CONSULTS:   None    ACTIVE PROBLEM LIST  Patient Active Problem List   Diagnosis    Type 2 diabetes mellitus with diabetic neuropathy, without long-term current use of insulin    Benign hypertension    Hyperlipemia    Microhematuria    Left flank pain    Abdominal aortic atherosclerosis    History of renal cell cancer    Renal cell carcinoma of right kidney    Mild major depression    Colon cancer screening    Pain of left calf           SUBJECTIVE    CHIEF COMPLAINT: Knee Pain    HPI:   Valarie Foster is a 71 y.o. female here for evaluation and management of bilateral knee pain. There is not a specific incident that brought about this pain. she has had progressive problems with the knee(s) starting 2 years ago but is now progressing to interfere with activities which include: walking, rising from a sitting position, standing for prolonged periods of time and climbing stairs     Currently the pain in the joint is rated at mild with activity. The pain is constant and is located in the knee, at level of joint line, located medially, located laterally, located anterior, located posterior and with radiation. The pain is described as aching, severe, sharp, stabbing and stiffness. Relieving factors include ice or heat, prescription medication and repositioning.     There is associated Catching, Clicking and Popping.     Valarie Foster has no additional complaints.     PROGRESSIVE SYMPTOMS:  Pain impacting sleep  Pain worsened by weight bearing    FUNCTIONAL STATUS:   Walk a block or two on level ground     PREVIOUS TREATMENTS:  Medical: Steroid Injections and Biologic Injections  Physical Therapy: Activities Modified  and Formal Physical Therapy for 6 weeks  Previous Orthopaedic Surgery: None    REVIEW OF SYSTEMS:  PAIN ASSESSMENT:  See HPI.  MUSCULOSKELETAL: See HPI.  OTHER 10 point review of systems is negative except as stated in HPI above    PAST MEDICAL HISTORY   has a past medical history of  Arthritis, Colon polyp, Diabetes mellitus, Diabetes with neurologic complications, Hypertension, and Renal cell carcinoma.     PAST SURGICAL HISTORY   has a past surgical history that includes Hysterectomy; Oophorectomy; Colonoscopy (N/A, 2/5/2018); Partial nephrectomy (Right, 10/12/2018); and Colonoscopy (N/A, 8/13/2020).     FAMILY HISTORY  family history includes Blindness in her sister; Cancer in her sister; Diabetes in her brother and sister; Glaucoma in her father and sister; No Known Problems in her maternal aunt, maternal grandfather, maternal grandmother, maternal uncle, mother, paternal aunt, paternal grandfather, paternal grandmother, and paternal uncle; Thyroid disease in her sister.     SOCIAL HISTORY   reports that she has never smoked. She has never used smokeless tobacco. She reports that she does not drink alcohol and does not use drugs.     ALLERGIES   Review of patient's allergies indicates:   Allergen Reactions    Lisinopril Swelling    Ascorbic acid-ascorbate calc      And citrus fruits        MEDICATIONS  Current Outpatient Medications on File Prior to Visit   Medication Sig Dispense Refill    ACCU-CHEK ONEIL CONTROL SOLN Soln       ACCU-CHEK ONEIL PLUS METER Misc USE AS DIRECTED 1 each 0    ACCU-CHEK SOFTCLIX LANCETS Misc TEST BLOOD SUGAR ONE TIME DAILY 100 each 1    amLODIPine (NORVASC) 5 MG tablet TAKE 1 TABLET EVERY DAY 90 tablet 1    BD ALCOHOL SWABS PadM USE ONE TIME DAILY 100 each 6    blood sugar diagnostic (TRUE METRIX GLUCOSE TEST STRIP) Strp 1 each by Misc.(Non-Drug; Combo Route) route 2 (two) times a day. 200 each 0    celecoxib (CELEBREX) 200 MG capsule TAKE 1 CAPSULE EVERY DAY 90 capsule 1    ciclopirox (PENLAC) 8 % Soln Apply topically nightly. 6.6 mL 11    cyclobenzaprine (FLEXERIL) 5 MG tablet TAKE 1 TABLET THREE TIMES DAILY AS NEEDED FOR MUSCLE SPASM(S) 270 tablet 1    diclofenac sodium (VOLTAREN) 1 % Gel APPLY 2 GRAMS TOPICALLY ONCE DAILY. 100 g 3     "diphenhydrAMINE (BENADRYL) 25 mg capsule Take 1 capsule (25 mg total) by mouth every 6 (six) hours as needed for Itching. 20 capsule 0    docusate sodium (COLACE) 100 MG capsule Take 1 capsule (100 mg total) by mouth 2 (two) times daily. 60 capsule 0    ergocalciferol (ERGOCALCIFEROL) 50,000 unit Cap TAKE 1 CAPSULE (50,000 UNITS TOTAL) BY MOUTH EVERY 7 DAYS. 12 capsule 1    famotidine (PEPCID) 20 MG tablet TAKE 1 TABLET TWICE DAILY 180 tablet 1    FLUoxetine 20 MG capsule TAKE 1 CAPSULE EVERY DAY 90 capsule 1    fluticasone propionate (FLONASE) 50 mcg/actuation nasal spray USE 1 SPRAY IN EACH NOSTRIL ONCE DAILY 32 g 1    gabapentin (NEURONTIN) 300 MG capsule TAKE 2 CAPSULES (600 MG TOTAL) BY MOUTH EVERY EVENING. 180 capsule 1    hydrOXYzine HCL (ATARAX) 25 MG tablet TAKE 1 TABLET THREE TIMES DAILY AS NEEDED FOR  ITCHING 90 tablet 0    lancets (TRUEPLUS LANCETS) 33 gauge Misc 1 lancet by Misc.(Non-Drug; Combo Route) route 2 (two) times a day. 200 each 1    pravastatin (PRAVACHOL) 10 MG tablet TAKE 1 TABLET EVERY DAY 90 tablet 1    tobramycin sulfate 0.3% (TOBREX) 0.3 % ophthalmic solution 1-2 drops topically twice daily to affected toe(s). 5 mL 3    triamcinolone acetonide 0.1% (KENALOG) 0.1 % cream       TRUE METRIX GLUCOSE METER kit USE AS DIRECTED 1 each 0    blood glucose control, low (TRUE METRIX LEVEL 1) Soln 1 each by Misc.(Non-Drug; Combo Route) route once as needed. 1 each 3    cetirizine (ZYRTEC) 10 MG tablet Take 1 tablet (10 mg total) by mouth once daily. 90 tablet 0     No current facility-administered medications on file prior to visit.          PHYSICAL EXAM   height is 5' 4" (1.626 m) and weight is 87.7 kg (193 lb 6.4 oz). Her pulse is 99. Her respiration is 18 and oxygen saturation is 97%.   Body mass index is 33.2 kg/m².      All other systems deferred.  GENERAL:  No acute distress  HABITUS: Normal  GAIT: Antalgic  SKIN: Normal  and No erythema, warmth, fluctuance     KNEE " EXAM:    bilateral:   Effusion: Minimal joint effusion  TTP: yes over Medial Joint Line, Lateral Joint Line, IT Band and Pes Bursa   no crepitus with passive knee ROM  Passive ROM: Extension 15, Flexion 100  No pain with manipulation of patella  Stable to varus/valgus stress. No increased laxity to anterior/posterior drawer testing  positive Constantin's test  No pain with IR/ER rotation of the hip  5/5 strength in knee flexion and extension, ankle plantarflexion and dorsiflexion  Neurovascular Status: Sensation intact to light touch in Sural, Saphenous, SPN, DPN, Tibial nerve distribution  2+ pulse DP/PT, normal capillary refill, foot has normal warmth    DATA:  Diagnostic tests reviewed for today's visit:     4v of the knee reveal Moderate degenerative changes of the Medial and Patellofemoral compartment. There is evidence of advanced osteoarthritis changes with Subchondral sclerosis, Osteophyte formation and Joint space narrowing. The limb is in netural alignment. The patella is tracking midline.

## 2022-01-24 ENCOUNTER — PATIENT OUTREACH (OUTPATIENT)
Dept: ADMINISTRATIVE | Facility: HOSPITAL | Age: 72
End: 2022-01-24
Payer: MEDICARE

## 2022-01-24 RX ORDER — PRAVASTATIN SODIUM 10 MG/1
1 TABLET ORAL
COMMUNITY
Start: 2021-09-16 | End: 2022-05-06 | Stop reason: SDUPTHER

## 2022-01-24 RX ORDER — FAMOTIDINE 20 MG/1
1 TABLET, FILM COATED ORAL
COMMUNITY
Start: 2021-09-16 | End: 2022-11-16

## 2022-01-24 RX ORDER — INFLUENZA VACCINE, ADJUVANTED 15; 15; 15; 15 UG/.5ML; UG/.5ML; UG/.5ML; UG/.5ML
INJECTION, SUSPENSION INTRAMUSCULAR
COMMUNITY
Start: 2021-11-01 | End: 2023-03-29

## 2022-01-27 ENCOUNTER — TELEPHONE (OUTPATIENT)
Dept: PAIN MEDICINE | Facility: CLINIC | Age: 72
End: 2022-01-27
Payer: MEDICARE

## 2022-01-27 DIAGNOSIS — M17.0 PRIMARY OSTEOARTHRITIS OF BOTH KNEES: Primary | ICD-10-CM

## 2022-02-04 ENCOUNTER — TELEPHONE (OUTPATIENT)
Dept: PAIN MEDICINE | Facility: CLINIC | Age: 72
End: 2022-02-04
Payer: MEDICARE

## 2022-02-04 NOTE — TELEPHONE ENCOUNTER
Called patient and confirmed her appointment time is 2:30PM at the [place] location. Patient confirmed that she will be here for her appointment. Nothing further discussed.

## 2022-02-04 NOTE — TELEPHONE ENCOUNTER
----- Message from Janelle aBrnett sent at 2/4/2022  9:05 AM CST -----  Name Of Caller: Valarie     Provider Name: Kayode Solares Jr    Does patient feel the need to be seen today? No     Relationship to the Pt?: pt    Contact Preference?: [phone]    What is the nature of the call?:Pt called and said you told her appt is at 10:30am but it shows 2:30pm for Monday please call her back

## 2022-02-14 ENCOUNTER — OFFICE VISIT (OUTPATIENT)
Dept: OPTOMETRY | Facility: CLINIC | Age: 72
End: 2022-02-14
Payer: MEDICARE

## 2022-02-14 DIAGNOSIS — H25.13 NUCLEAR SCLEROSIS OF BOTH EYES: ICD-10-CM

## 2022-02-14 DIAGNOSIS — E11.36 TYPE 2 DIABETES MELLITUS WITH DIABETIC CATARACT, WITHOUT LONG-TERM CURRENT USE OF INSULIN: Primary | ICD-10-CM

## 2022-02-14 DIAGNOSIS — H52.7 REFRACTIVE ERROR: ICD-10-CM

## 2022-02-14 LAB
LEFT EYE DM RETINOPATHY: NEGATIVE
RIGHT EYE DM RETINOPATHY: NEGATIVE

## 2022-02-14 PROCEDURE — 3288F PR FALLS RISK ASSESSMENT DOCUMENTED: ICD-10-PCS | Mod: HCNC,CPTII,S$GLB, | Performed by: OPTOMETRIST

## 2022-02-14 PROCEDURE — 92004 PR EYE EXAM, NEW PATIENT,COMPREHESV: ICD-10-PCS | Mod: HCNC,S$GLB,, | Performed by: OPTOMETRIST

## 2022-02-14 PROCEDURE — 92004 COMPRE OPH EXAM NEW PT 1/>: CPT | Mod: HCNC,S$GLB,, | Performed by: OPTOMETRIST

## 2022-02-14 PROCEDURE — 99499 RISK ADDL DX/OHS AUDIT: ICD-10-PCS | Mod: S$GLB,,, | Performed by: OPTOMETRIST

## 2022-02-14 PROCEDURE — 1160F RVW MEDS BY RX/DR IN RCRD: CPT | Mod: HCNC,CPTII,S$GLB, | Performed by: OPTOMETRIST

## 2022-02-14 PROCEDURE — 2023F DILAT RTA XM W/O RTNOPTHY: CPT | Mod: HCNC,CPTII,S$GLB, | Performed by: OPTOMETRIST

## 2022-02-14 PROCEDURE — 1126F AMNT PAIN NOTED NONE PRSNT: CPT | Mod: HCNC,CPTII,S$GLB, | Performed by: OPTOMETRIST

## 2022-02-14 PROCEDURE — 1159F MED LIST DOCD IN RCRD: CPT | Mod: HCNC,CPTII,S$GLB, | Performed by: OPTOMETRIST

## 2022-02-14 PROCEDURE — 99499 UNLISTED E&M SERVICE: CPT | Mod: S$GLB,,, | Performed by: OPTOMETRIST

## 2022-02-14 PROCEDURE — 92015 DETERMINE REFRACTIVE STATE: CPT | Mod: HCNC,S$GLB,, | Performed by: OPTOMETRIST

## 2022-02-14 PROCEDURE — 99999 PR PBB SHADOW E&M-EST. PATIENT-LVL II: CPT | Mod: PBBFAC,HCNC,, | Performed by: OPTOMETRIST

## 2022-02-14 PROCEDURE — 2023F PR DILATED RETINAL EXAM W/O EVID OF RETINOPATHY: ICD-10-PCS | Mod: HCNC,CPTII,S$GLB, | Performed by: OPTOMETRIST

## 2022-02-14 PROCEDURE — 99999 PR PBB SHADOW E&M-EST. PATIENT-LVL II: ICD-10-PCS | Mod: PBBFAC,HCNC,, | Performed by: OPTOMETRIST

## 2022-02-14 PROCEDURE — 1126F PR PAIN SEVERITY QUANTIFIED, NO PAIN PRESENT: ICD-10-PCS | Mod: HCNC,CPTII,S$GLB, | Performed by: OPTOMETRIST

## 2022-02-14 PROCEDURE — 1101F PT FALLS ASSESS-DOCD LE1/YR: CPT | Mod: HCNC,CPTII,S$GLB, | Performed by: OPTOMETRIST

## 2022-02-14 PROCEDURE — 1160F PR REVIEW ALL MEDS BY PRESCRIBER/CLIN PHARMACIST DOCUMENTED: ICD-10-PCS | Mod: HCNC,CPTII,S$GLB, | Performed by: OPTOMETRIST

## 2022-02-14 PROCEDURE — 1159F PR MEDICATION LIST DOCUMENTED IN MEDICAL RECORD: ICD-10-PCS | Mod: HCNC,CPTII,S$GLB, | Performed by: OPTOMETRIST

## 2022-02-14 PROCEDURE — 92015 PR REFRACTION: ICD-10-PCS | Mod: HCNC,S$GLB,, | Performed by: OPTOMETRIST

## 2022-02-14 PROCEDURE — 3288F FALL RISK ASSESSMENT DOCD: CPT | Mod: HCNC,CPTII,S$GLB, | Performed by: OPTOMETRIST

## 2022-02-14 PROCEDURE — 1101F PR PT FALLS ASSESS DOC 0-1 FALLS W/OUT INJ PAST YR: ICD-10-PCS | Mod: HCNC,CPTII,S$GLB, | Performed by: OPTOMETRIST

## 2022-02-14 NOTE — PROGRESS NOTES
Subjective:       Patient ID: Valarie Foster is a 71 y.o. female      Chief Complaint   Patient presents with    Diabetic Eye Exam    Concerns About Ocular Health     History of Present Illness   Dls 2/12/18 dr. Esquivel    72 Y/o female is here for diabetic eye exam with C/o cannot see up close or when reading small print   Pt states she's floaters in both eyes everyday pt wears progressive glasses  Pt denies pain and discomfort       Eye med: no gtt     Hemoglobin A1C       Date                     Value               Ref Range             Status                10/28/2021               6.8 (H)             4.0 - 5.6 %           Final                  12/21/2020               6.7 (H)             4.0 - 5.6 %           Final                  06/30/2020               6.7 (H)             4.0 - 5.6 %           Final                   Assessment/Plan:     1. Type 2 diabetes mellitus with diabetic cataract, without long-term current use of insulin  No diabetic retinopathy. Discussed with pt the effects of diabetes on vision, importance of good blood sugar control, compliance with meds, and follow up care with PCP. Return in 1 year for dilated eye exam, sooner PRN.    2. Nuclear sclerosis of both eyes  Pt c/o foggy vision. Wants referral for CEIOL.   - Ambulatory referral/consult to Ophthalmology    3. Refractive error  Hold on Mrx if proceeding with CEIOL    Follow up for Cataract consult with Dr. Novoa.

## 2022-02-21 ENCOUNTER — OFFICE VISIT (OUTPATIENT)
Dept: PAIN MEDICINE | Facility: CLINIC | Age: 72
End: 2022-02-21
Payer: MEDICARE

## 2022-02-21 ENCOUNTER — PROCEDURE VISIT (OUTPATIENT)
Dept: PAIN MEDICINE | Facility: CLINIC | Age: 72
End: 2022-02-21
Payer: MEDICARE

## 2022-02-21 VITALS
HEART RATE: 97 BPM | SYSTOLIC BLOOD PRESSURE: 161 MMHG | BODY MASS INDEX: 31.92 KG/M2 | HEIGHT: 64 IN | DIASTOLIC BLOOD PRESSURE: 74 MMHG | WEIGHT: 187 LBS | OXYGEN SATURATION: 97 %

## 2022-02-21 DIAGNOSIS — M25.562 BILATERAL CHRONIC KNEE PAIN: Primary | ICD-10-CM

## 2022-02-21 DIAGNOSIS — M25.562 BILATERAL CHRONIC KNEE PAIN: ICD-10-CM

## 2022-02-21 DIAGNOSIS — M25.561 BILATERAL CHRONIC KNEE PAIN: ICD-10-CM

## 2022-02-21 DIAGNOSIS — M17.0 PRIMARY OSTEOARTHRITIS OF KNEES, BILATERAL: ICD-10-CM

## 2022-02-21 DIAGNOSIS — G89.29 BILATERAL CHRONIC KNEE PAIN: Primary | ICD-10-CM

## 2022-02-21 DIAGNOSIS — M17.0 PRIMARY OSTEOARTHRITIS OF KNEES, BILATERAL: Primary | ICD-10-CM

## 2022-02-21 DIAGNOSIS — M25.561 BILATERAL CHRONIC KNEE PAIN: Primary | ICD-10-CM

## 2022-02-21 DIAGNOSIS — G89.29 BILATERAL CHRONIC KNEE PAIN: ICD-10-CM

## 2022-02-21 DIAGNOSIS — M17.0 PRIMARY OSTEOARTHRITIS OF BOTH KNEES: ICD-10-CM

## 2022-02-21 PROCEDURE — 99999 PR PBB SHADOW E&M-EST. PATIENT-LVL III: CPT | Mod: PBBFAC,HCNC,, | Performed by: PAIN MEDICINE

## 2022-02-21 PROCEDURE — 1101F PT FALLS ASSESS-DOCD LE1/YR: CPT | Mod: HCNC,CPTII,S$GLB, | Performed by: PAIN MEDICINE

## 2022-02-21 PROCEDURE — 64454 PR NERVE BLOCK INJ, ANES/STEROID, GENICULAR NERVE, W/IMG: ICD-10-PCS | Mod: 50,HCNC,S$GLB, | Performed by: PAIN MEDICINE

## 2022-02-21 PROCEDURE — 1160F RVW MEDS BY RX/DR IN RCRD: CPT | Mod: HCNC,CPTII,S$GLB, | Performed by: PAIN MEDICINE

## 2022-02-21 PROCEDURE — 3008F BODY MASS INDEX DOCD: CPT | Mod: HCNC,CPTII,S$GLB, | Performed by: PAIN MEDICINE

## 2022-02-21 PROCEDURE — 3008F PR BODY MASS INDEX (BMI) DOCUMENTED: ICD-10-PCS | Mod: HCNC,CPTII,S$GLB, | Performed by: PAIN MEDICINE

## 2022-02-21 PROCEDURE — 64454 NJX AA&/STRD GNCLR NRV BRNCH: CPT | Mod: 50,HCNC,S$GLB, | Performed by: PAIN MEDICINE

## 2022-02-21 PROCEDURE — 3288F PR FALLS RISK ASSESSMENT DOCUMENTED: ICD-10-PCS | Mod: HCNC,CPTII,S$GLB, | Performed by: PAIN MEDICINE

## 2022-02-21 PROCEDURE — 99999 PR PBB SHADOW E&M-EST. PATIENT-LVL III: ICD-10-PCS | Mod: PBBFAC,HCNC,, | Performed by: PAIN MEDICINE

## 2022-02-21 PROCEDURE — 1160F PR REVIEW ALL MEDS BY PRESCRIBER/CLIN PHARMACIST DOCUMENTED: ICD-10-PCS | Mod: HCNC,CPTII,S$GLB, | Performed by: PAIN MEDICINE

## 2022-02-21 PROCEDURE — 99204 OFFICE O/P NEW MOD 45 MIN: CPT | Mod: 25,HCNC,S$GLB, | Performed by: PAIN MEDICINE

## 2022-02-21 PROCEDURE — 1159F PR MEDICATION LIST DOCUMENTED IN MEDICAL RECORD: ICD-10-PCS | Mod: HCNC,CPTII,S$GLB, | Performed by: PAIN MEDICINE

## 2022-02-21 PROCEDURE — 1125F AMNT PAIN NOTED PAIN PRSNT: CPT | Mod: HCNC,CPTII,S$GLB, | Performed by: PAIN MEDICINE

## 2022-02-21 PROCEDURE — 1159F MED LIST DOCD IN RCRD: CPT | Mod: HCNC,CPTII,S$GLB, | Performed by: PAIN MEDICINE

## 2022-02-21 PROCEDURE — 3077F PR MOST RECENT SYSTOLIC BLOOD PRESSURE >= 140 MM HG: ICD-10-PCS | Mod: HCNC,CPTII,S$GLB, | Performed by: PAIN MEDICINE

## 2022-02-21 PROCEDURE — 1125F PR PAIN SEVERITY QUANTIFIED, PAIN PRESENT: ICD-10-PCS | Mod: HCNC,CPTII,S$GLB, | Performed by: PAIN MEDICINE

## 2022-02-21 PROCEDURE — 3078F PR MOST RECENT DIASTOLIC BLOOD PRESSURE < 80 MM HG: ICD-10-PCS | Mod: HCNC,CPTII,S$GLB, | Performed by: PAIN MEDICINE

## 2022-02-21 PROCEDURE — 3288F FALL RISK ASSESSMENT DOCD: CPT | Mod: HCNC,CPTII,S$GLB, | Performed by: PAIN MEDICINE

## 2022-02-21 PROCEDURE — 99204 PR OFFICE/OUTPT VISIT, NEW, LEVL IV, 45-59 MIN: ICD-10-PCS | Mod: 25,HCNC,S$GLB, | Performed by: PAIN MEDICINE

## 2022-02-21 PROCEDURE — 3078F DIAST BP <80 MM HG: CPT | Mod: HCNC,CPTII,S$GLB, | Performed by: PAIN MEDICINE

## 2022-02-21 PROCEDURE — 3077F SYST BP >= 140 MM HG: CPT | Mod: HCNC,CPTII,S$GLB, | Performed by: PAIN MEDICINE

## 2022-02-21 PROCEDURE — 1101F PR PT FALLS ASSESS DOC 0-1 FALLS W/OUT INJ PAST YR: ICD-10-PCS | Mod: HCNC,CPTII,S$GLB, | Performed by: PAIN MEDICINE

## 2022-02-21 RX ORDER — TRIAMCINOLONE ACETONIDE 40 MG/ML
INJECTION, SUSPENSION INTRA-ARTICULAR; INTRAMUSCULAR
COMMUNITY
Start: 2022-01-07 | End: 2023-09-19

## 2022-02-21 RX ORDER — AMOXICILLIN 875 MG/1
TABLET, FILM COATED ORAL
COMMUNITY
Start: 2022-01-13 | End: 2022-05-06 | Stop reason: ALTCHOICE

## 2022-02-21 NOTE — PROCEDURES
Procedures     Date of procedure: 02/21/2022    Procedure:  Bilateral genicular nerve blocks under ultrasound    Pre-op diagnosis: Chronic bilateral knee pain     Post-op diagnosis: Same     Physician:  Kayode Solares M.D.    Assistant: None    Anesthestia: local     EBL: None    Specimens: None    All medications, allergies, and relevant histories were reviewed. No recent antibiotics or infections. A time-out was taken to verify the correct patient, procedure, laterality, and appropriate medications/allergies.     Ultrasounded-guided Geniculate Nerve Blocks:    Informed consent obtained for the injection. The bilateral knee(s) was/were prepped with chlor prep. Using US guidance, the lateral femoral condyle was identified. Under live US, a 27-gauge needle was advanced to the superior aspect of the lateral femoral condyle. After negative aspiration, 1 cc 0.25% bupivacaine was injected. US showed good spread of local anesthestic. This was the repeated at the level of the suprapatellar region, the medial femoral condyle and the medial tibial condyle. The needle was removed and a Band-Aid placed over the entry point. No complications.     Future Management:     Follow up via phone to tell me the results and plan future treatment steps..

## 2022-02-21 NOTE — PROGRESS NOTES
Subjective:     Patient ID: Valarie Foster is a 71 y.o. female    Chief Complaint: Knee Pain (Referred by Dr Eric Carbajal for OA of both knees)      Referred by: Eric Carbajal MD      HPI:    Initial Encounter (2/21/22):  Valarie Foster is a 71 y.o. female who presents today with chronic bilateral knee pain secondary to osteoarthritis.  Patient has been evaluated by Orthopedic surgery.  Has tried and failed intra-articular steroid injections.  She was referred here to discuss potential genicular nerve blocks/RFA.   This pain is described in detail below.    Physical Therapy:  Yes.    Non-pharmacologic Treatment:  Rest helps         · TENS?  No    Pain Medications:         · Currently taking:  Celebrex, Flexeril, Voltaren gel, gabapentin    · Has tried in the past:      · Has not tried:  Opioids, Tylenol, TCAs, SNRIs    Blood thinners:  None    Interventional Therapies:  None    Relevant Surgeries:  None    Affecting sleep?  Yes    Affecting daily activities? yes    Depressive symptoms? no          · SI/HI? No    Work status: Retired    Pain Scores:    Best:       9/10  Worst:     10/10  Usually:   10/10  Today:    9/10    Review of Systems   Constitutional: Negative for activity change, appetite change, chills, fatigue, fever and unexpected weight change.   HENT: Negative for hearing loss.    Eyes: Negative for visual disturbance.   Respiratory: Negative for chest tightness and shortness of breath.    Cardiovascular: Negative for chest pain.   Gastrointestinal: Negative for abdominal pain, constipation, diarrhea, nausea and vomiting.   Genitourinary: Negative for difficulty urinating.   Musculoskeletal: Positive for arthralgias, gait problem and myalgias. Negative for back pain and neck pain.   Skin: Negative for rash.   Neurological: Negative for dizziness, weakness, light-headedness, numbness and headaches.   Psychiatric/Behavioral: Positive for sleep disturbance. Negative for hallucinations and suicidal  ideas. The patient is not nervous/anxious.        Past Medical History:   Diagnosis Date    Arthritis     Colon polyp     Diabetes mellitus     diet controlled    Diabetes with neurologic complications     Hypertension     Nuclear sclerosis of both eyes 2/14/2022    Renal cell carcinoma        Past Surgical History:   Procedure Laterality Date    COLONOSCOPY N/A 2/5/2018    Procedure: COLONOSCOPY;  Surgeon: Bacilio Mancia MD;  Location: [facility] ENDO;  Service: Endoscopy;  Laterality: N/A;  appt confirmed-ss    COLONOSCOPY N/A 8/13/2020    Procedure: COLONOSCOPY;  Surgeon: Jose West MD;  Location: [facility] ENDO;  Service: Endoscopy;  Laterality: N/A;    HYSTERECTOMY      OOPHORECTOMY      PARTIAL NEPHRECTOMY Right 10/12/2018    Procedure: NEPHRECTOMY, PARTIAL open. Dr Arenas to assist.;  Surgeon: Alejandra Palm MD;  Location: [facility] OR;  Service: Urology;  Laterality: Right;  RN PREOP 10/9/2018----NEEDS H/P       Social History     Socioeconomic History    Marital status:    Tobacco Use    Smoking status: Never Smoker    Smokeless tobacco: Never Used   Substance and Sexual Activity    Alcohol use: No    Drug use: No       Review of patient's allergies indicates:   Allergen Reactions    Lisinopril Swelling and Shortness Of Breath    Ascorbic acid-ascorbate calc      And citrus fruits       Current Outpatient Medications on File Prior to Visit   Medication Sig Dispense Refill    ACCU-CHEK ONEIL CONTROL SOLN Soln       ACCU-CHEK ONEIL PLUS METER Misc USE AS DIRECTED 1 each 0    ACCU-CHEK ONEIL PLUS TEST STRP Strp TEST BLOOD SUGAR ONE TIME DAILY 100 strip 3    ACCU-CHEK SOFTCLIX LANCETS Misc TEST BLOOD SUGAR ONE TIME DAILY 100 each 1    amLODIPine (NORVASC) 5 MG tablet TAKE 1 TABLET EVERY DAY 90 tablet 1    amoxicillin (AMOXIL) 875 MG tablet       BD ALCOHOL SWABS PadM USE ONE TIME DAILY 100 each 6    celecoxib (CELEBREX) 200 MG capsule TAKE 1 CAPSULE EVERY DAY 90 capsule 1     cyclobenzaprine (FLEXERIL) 5 MG tablet TAKE 1 TABLET THREE TIMES DAILY AS NEEDED FOR MUSCLE SPASM(S) 270 tablet 1    diclofenac sodium (VOLTAREN) 1 % Gel APPLY 2 GRAMS TOPICALLY ONCE DAILY. 100 g 3    diphenhydrAMINE (BENADRYL) 25 mg capsule Take 1 capsule (25 mg total) by mouth every 6 (six) hours as needed for Itching. 20 capsule 0    docusate sodium (COLACE) 100 MG capsule Take 1 capsule (100 mg total) by mouth 2 (two) times daily. 60 capsule 0    ergocalciferol (ERGOCALCIFEROL) 50,000 unit Cap TAKE 1 CAPSULE (50,000 UNITS TOTAL) BY MOUTH EVERY 7 DAYS. 12 capsule 1    famotidine (PEPCID) 20 MG tablet TAKE 1 TABLET TWICE DAILY 180 tablet 1    famotidine (PEPCID) 20 MG tablet Take 1 tablet by mouth.      FLUAD QUAD 2021-22,65Y UP,,PF, 60 mcg (15 mcg x 4)/0.5 mL Syrg       FLUoxetine 20 MG capsule TAKE 1 CAPSULE EVERY DAY 90 capsule 1    fluticasone propionate (FLONASE) 50 mcg/actuation nasal spray USE 1 SPRAY IN EACH NOSTRIL ONCE DAILY 32 g 1    gabapentin (NEURONTIN) 300 MG capsule TAKE 2 CAPSULES (600 MG TOTAL) BY MOUTH EVERY EVENING. 180 capsule 1    hydrOXYzine HCL (ATARAX) 25 MG tablet TAKE 1 TABLET THREE TIMES DAILY AS NEEDED FOR  ITCHING 90 tablet 0    KENALOG 40 mg/mL injection       lancets (TRUEPLUS LANCETS) 33 gauge Misc 1 lancet by Misc.(Non-Drug; Combo Route) route 2 (two) times a day. 200 each 1    pravastatin (PRAVACHOL) 10 MG tablet TAKE 1 TABLET EVERY DAY 90 tablet 1    pravastatin (PRAVACHOL) 10 MG tablet Take 1 tablet by mouth.      tobramycin sulfate 0.3% (TOBREX) 0.3 % ophthalmic solution 1-2 drops topically twice daily to affected toe(s). 5 mL 3    triamcinolone acetonide 0.1% (KENALOG) 0.1 % cream       TRUE METRIX GLUCOSE METER kit USE AS DIRECTED 1 each 0    blood glucose control, low (TRUE METRIX LEVEL 1) Soln 1 each by Misc.(Non-Drug; Combo Route) route once as needed. 1 each 3    cetirizine (ZYRTEC) 10 MG tablet Take 1 tablet (10 mg total) by mouth once daily. 90  "tablet 0    [DISCONTINUED] ciclopirox (PENLAC) 8 % Soln Apply topically nightly. (Patient not taking: Reported on 2/21/2022) 6.6 mL 11     No current facility-administered medications on file prior to visit.       Objective:      BP (!) 161/74 (BP Location: Left arm, Patient Position: Sitting, BP Method: Medium (Automatic))   Pulse 97   Ht 5' 4" (1.626 m)   Wt 84.8 kg (187 lb)   SpO2 97%   BMI 32.10 kg/m²     Exam:  GEN:  Well developed, well nourished.  No acute distress.   HEENT:  No trauma.  Mucous membranes moist.  Nares patent bilaterally.  PSYCH: Normal affect. Thought content appropriate.  CHEST:  Breathing symmetric.  No audible wheezing.  ABD: Soft, non-distended.  SKIN:  Warm, pink, dry.  No rash on exposed areas.    EXT:  No cyanosis, clubbing, or edema.  No color change or changes in nail or hair growth.  NEURO/MUSCULOSKELETAL:  Fully alert, oriented, and appropriate. Speech normal lance. No cranial nerve deficits.   Gait:  Antalgic.  No focal motor deficits.     No gross warmth, swelling, erythema to bilateral knees   Full active range of motion bilateral knees  No joint laxity instability noted on examination      Imaging:    Narrative & Impression    EXAMINATION:  XR KNEE COMP 4 OR MORE VIEWS BILAT     CLINICAL HISTORY:  Pain in right knee     TECHNIQUE:  Multiple views of bilateral knees.     COMPARISON:  Bilateral knee radiographs from 07/27/2020.     FINDINGS:  Normal osseous mineralization.  Bilateral moderate medial tibiofemoral joint space narrowing with osteophyte formation.  Degenerative changes within the bilateral tibiofemoral joints with joint space narrowing, sclerosis, and osteophyte formation.  Small volume suprapatellar joint fluid on the left.  No significant soft tissue abnormality.     Impression:     As above.        Electronically signed by: Emanuel Huggins  Date:                                            01/07/2022  Time:                                           16:09 "         Assessment:       Encounter Diagnoses   Name Primary?    Primary osteoarthritis of both knees     Bilateral chronic knee pain Yes    Primary osteoarthritis of knees, bilateral          Plan:       Valarie was seen today for knee pain.    Diagnoses and all orders for this visit:    Bilateral chronic knee pain    Primary osteoarthritis of both knees  -     Ambulatory referral/consult to Pain Clinic    Primary osteoarthritis of knees, bilateral        Valarie Foster is a 71 y.o. female with chronic bilateral knee pain secondary to osteoarthritis.  Has failed intra-articular steroid injections..    1.  Pertinent imaging studies reviewed by me. Imaging results were discussed with patient.  2.  Schedule for bilateral genicular nerve blocks x2.  If diagnostic can proceed with cooled radiofrequency ablations.  3.  Return to clinic for nerve blocks.            This note was created by combination of typed  and M-Modal dictation. Transcription and phonetic errors may be present.  If there are any questions, please contact me.

## 2022-02-22 ENCOUNTER — TELEPHONE (OUTPATIENT)
Dept: PAIN MEDICINE | Facility: CLINIC | Age: 72
End: 2022-02-22
Payer: MEDICARE

## 2022-02-22 NOTE — TELEPHONE ENCOUNTER
MsEitan Soledad reports minimal pain relief following the nerve blocks done yesterday.  Dr. Solares updated.

## 2022-02-24 ENCOUNTER — TELEPHONE (OUTPATIENT)
Dept: PAIN MEDICINE | Facility: CLINIC | Age: 72
End: 2022-02-24
Payer: MEDICARE

## 2022-02-24 NOTE — TELEPHONE ENCOUNTER
----- Message from Corey Alcocer sent at 2/24/2022  1:31 PM CST -----  Regarding: questions about procedure on that was on 2/21  Type: Patient Call Back    Who called:Valarie     What is the request in detail: the patient is requesting a call back from the staff in regards to a procedure that she had on Monday. She stated that her legs and knees are swollen and she can barely walk. She wants to know if this is normal. Please return call back at earliest convenience.    Can the clinic reply by [facility]?no    Would the patient rather a call back or a response via [facility]? Call back     Best call back number:[phone]

## 2022-02-24 NOTE — TELEPHONE ENCOUNTER
Returned patient's call nut, NA/NoVM option. I will forward this message to both Dr Solares and Jennifer RN, for advise for patient.

## 2022-02-24 NOTE — TELEPHONE ENCOUNTER
----- Message from Jennifer Chambers RN sent at 2/24/2022  2:39 PM CST -----  Regarding: RE: questions about procedure on that was on 2/21  That is not to be expected, but I will defer to Dr. Solares.  ----- Message -----  From: Lila Lares MA  Sent: 2/24/2022   2:29 PM CST  To: Jennifer Chambers RN, #  Subject: FW: questions about procedure on that was on#    Please advise me on what to tell patient and I weill try to call her again later today.  Thank you  ----- Message -----  From: Corey Alcocer  Sent: 2/24/2022   1:36 PM CST  To: Sujatha Headley Staff  Subject: questions about procedure on that was on 2/21    Type: Patient Call Back    Who called:Valarie     What is the request in detail: the patient is requesting a call back from the staff in regards to a procedure that she had on Monday. She stated that her legs and knees are swollen and she can barely walk. She wants to know if this is normal. Please return call back at earliest convenience.    Can the clinic reply by [facility]?no    Would the patient rather a call back or a response via [facility]? Call back     Best call back number:[phone]

## 2022-02-24 NOTE — TELEPHONE ENCOUNTER
Called patient back to give her Dr Solares' instruction of reaching out to her PCP because that is not a typical reaction to these injections. Patient stated that she has already seen her PCP and an Ortho dr. I offered her an appt of 4/4/22 at 10:45 AM and patient accepted. Nothing further discussed.

## 2022-02-24 NOTE — TELEPHONE ENCOUNTER
----- Message from Svetlana Castro sent at 2/24/2022  2:51 PM CST -----  Regarding: Self/  [phone]  Type: Patient Call Back    Who called:  Patient    What is the request in detail:  Patient returned staffs call and would like a call back please.  Thank you    Would the patient rather a call back or a response via [facility]?   Call back    Best call back number:  [phone] (home)             Thank you

## 2022-02-28 RX ORDER — ERGOCALCIFEROL 1.25 MG/1
CAPSULE ORAL
Qty: 12 CAPSULE | Refills: 1 | Status: SHIPPED | OUTPATIENT
Start: 2022-02-28 | End: 2022-07-22

## 2022-02-28 NOTE — TELEPHONE ENCOUNTER
RX refill requested:    VITAMIN D 1.25 MG (37416 UT) Capsule  
This Rx Request does not qualify for assessment with the OR.    Please review protocol details and the Care Due Message for extra clinical information    Reasons Rx Request may be deferred:  1. Labs/Vitals overdue  2. Labs/Vitals abnormal  3. Patient has been seen in the ED/Hospital since the last PCP visit  4. Medication is non-delegated  5. Provider is a non-participating provider   
retired

## 2022-03-10 ENCOUNTER — OFFICE VISIT (OUTPATIENT)
Dept: UROLOGY | Facility: CLINIC | Age: 72
End: 2022-03-10
Payer: MEDICARE

## 2022-03-10 VITALS — HEIGHT: 64 IN | BODY MASS INDEX: 31.91 KG/M2 | WEIGHT: 186.94 LBS

## 2022-03-10 DIAGNOSIS — R10.9 FLANK PAIN: ICD-10-CM

## 2022-03-10 DIAGNOSIS — R31.29 MICROHEMATURIA: ICD-10-CM

## 2022-03-10 DIAGNOSIS — C64.1 RENAL CELL CARCINOMA OF RIGHT KIDNEY: Primary | ICD-10-CM

## 2022-03-10 PROCEDURE — 1160F RVW MEDS BY RX/DR IN RCRD: CPT | Mod: CPTII,S$GLB,, | Performed by: UROLOGY

## 2022-03-10 PROCEDURE — 1160F PR REVIEW ALL MEDS BY PRESCRIBER/CLIN PHARMACIST DOCUMENTED: ICD-10-PCS | Mod: CPTII,S$GLB,, | Performed by: UROLOGY

## 2022-03-10 PROCEDURE — 1125F AMNT PAIN NOTED PAIN PRSNT: CPT | Mod: CPTII,S$GLB,, | Performed by: UROLOGY

## 2022-03-10 PROCEDURE — 1159F MED LIST DOCD IN RCRD: CPT | Mod: CPTII,S$GLB,, | Performed by: UROLOGY

## 2022-03-10 PROCEDURE — 99214 OFFICE O/P EST MOD 30 MIN: CPT | Mod: S$GLB,,, | Performed by: UROLOGY

## 2022-03-10 PROCEDURE — 99999 PR PBB SHADOW E&M-EST. PATIENT-LVL III: ICD-10-PCS | Mod: PBBFAC,,, | Performed by: UROLOGY

## 2022-03-10 PROCEDURE — 1101F PT FALLS ASSESS-DOCD LE1/YR: CPT | Mod: CPTII,S$GLB,, | Performed by: UROLOGY

## 2022-03-10 PROCEDURE — 3288F PR FALLS RISK ASSESSMENT DOCUMENTED: ICD-10-PCS | Mod: CPTII,S$GLB,, | Performed by: UROLOGY

## 2022-03-10 PROCEDURE — 3288F FALL RISK ASSESSMENT DOCD: CPT | Mod: CPTII,S$GLB,, | Performed by: UROLOGY

## 2022-03-10 PROCEDURE — 1125F PR PAIN SEVERITY QUANTIFIED, PAIN PRESENT: ICD-10-PCS | Mod: CPTII,S$GLB,, | Performed by: UROLOGY

## 2022-03-10 PROCEDURE — 99999 PR PBB SHADOW E&M-EST. PATIENT-LVL III: CPT | Mod: PBBFAC,,, | Performed by: UROLOGY

## 2022-03-10 PROCEDURE — 99214 PR OFFICE/OUTPT VISIT, EST, LEVL IV, 30-39 MIN: ICD-10-PCS | Mod: S$GLB,,, | Performed by: UROLOGY

## 2022-03-10 PROCEDURE — 1101F PR PT FALLS ASSESS DOC 0-1 FALLS W/OUT INJ PAST YR: ICD-10-PCS | Mod: CPTII,S$GLB,, | Performed by: UROLOGY

## 2022-03-10 PROCEDURE — 1159F PR MEDICATION LIST DOCUMENTED IN MEDICAL RECORD: ICD-10-PCS | Mod: CPTII,S$GLB,, | Performed by: UROLOGY

## 2022-03-10 PROCEDURE — 3008F BODY MASS INDEX DOCD: CPT | Mod: CPTII,S$GLB,, | Performed by: UROLOGY

## 2022-03-10 PROCEDURE — 3008F PR BODY MASS INDEX (BMI) DOCUMENTED: ICD-10-PCS | Mod: CPTII,S$GLB,, | Performed by: UROLOGY

## 2022-03-10 NOTE — PROGRESS NOTES
Subjective:       Valarie Foster is a 71 y.o. female who is an established patient who was referred by Ivan CARDONA for evaluation of renal mass.      CT a/p with contrast 1/26/18 showed a 2.6cm endophytic lesion in R MP, concerning for solid mass vs complex cyst.     She reports L flank pain, contralateral to renal lesion. L flank pain has almost completely resolved. Denies hematuria, LUTS, UTIs. UA macro did show microhematuria recently. Denies family history of  malignancy. She reports being told she had a small kidney on one side in the past. Denies nephrolithiasis. Nonsmoker. Denies previous abdominal surgery though she is s/p hysterectomy. +DM2/HTN.     Cr (1/18) - 0.8  A1c (1/18) - 6.2  UA micro (2/18) - 0 RBCs    CT renal protocol (2/18) - 2.9cm completely endophytic solid enhancing renal mass. Homogenous in appearance.      She is s/p perc renal biopsy to better evaluate lesion (2/18) - oncocytic neoplasm. Mild hematuria < 24 hrs.          CT (6/18) - stable, endophytic enhancing 2.4cm R renal mass. Subtle 6mm focus of enhancement in R lobe of liver - nonspecific.     KISHAN (9/18) - stable 2.8cm R renal mass    She is now s/p open R PNx 10/12/18. Still having incisional pain, worse with palpation. No hernia on CT, mild stranding in subQ.    Path: 1.9cm unclassified RCC, FG 3/4, negative margins. [id number]    Doing well. States chronic fatigue and pain. Still with occasional incisional pain.     CT c/a/p 4/19 - no mets/recurrence  CT a/p (10/19) - no recurrence/mets. Abnormal area in tail of pancreas - recommend PCP follow up. CXR - clear.  CT a/p (10/20) - no recurrence/mets. Pancreatic tail lesion not seen. CXR - clear    Cr 1/19 - 0.8, 4/19 - 0.8, 10/19 - 0.9, 10/20 - 0.9, 11/21 - 0.8    Last seen 11/2020. She reports pain in b/l back, present constantly. CT and CXR were not done.        The following portions of the patient's history were reviewed and updated as appropriate: allergies, current medications,  "past family history, past medical history, past social history, past surgical history and problem list.    Review of Systems  Constitutional: no fever or chills  ENT: no nasal congestion or sore throat  Respiratory: no cough or shortness of breath  Cardiovascular: no chest pain or palpitations  Gastrointestinal: no nausea or vomiting, tolerating diet  Genitourinary: as per HPI  Hematologic/Lymphatic: no easy bruising or lymphadenopathy  Musculoskeletal: no arthralgias or myalgias  Skin: no rashes or lesions  Neurological: no seizures or tremors  Behavioral/Psych: no auditory or visual hallucinations        Objective:    Vitals: Ht 5' 4" (1.626 m)   Wt 84.8 kg (186 lb 15.2 oz)   BMI 32.09 kg/m²     Physical Exam   General: well developed, well nourished in no acute distress  Head: normocephalic, atraumatic  Neck: supple, trachea midline, no obvious enlargement of thyroid  HEENT: EOMI, mucus membranes moist, sclera anicteric, no hearing impairment  Lungs: symmetric expansion, non-labored breathing  Skin: no rashes or lesions  Neuro: alert and oriented x 3, no gross deficits  Psych: normal judgment and insight, normal mood/affect and non-anxious  Genitourinary:   patient declined exam    Inc - well healed, minor superior fullness of incision, no hernia    Lab Review   Urine analysis today in clinic shows - +LE    Lab Results   Component Value Date    WBC 5.59 10/28/2021    HGB 13.0 10/28/2021    HCT 41.8 10/28/2021    MCV 85 10/28/2021     10/28/2021     Lab Results   Component Value Date    CREATININE 0.8 10/28/2021    BUN 12 10/28/2021         Imaging  Images and reports were personally reviewed by me and discussed with patient  CT/KISHAN reviewed       Assessment/Plan:      1. Right RCC    - 2.6cm lesion in R MP kidney.   - CT renal protocol 2.5cm endophytic solid enhancing renal mass   - Due to location of tumor, open partial would be difficult with higher complication rate of bleeding and/or urine leak. " Radical nephrectomy with less complication rate.   - Concern for performing radical nephrectomy in diabetic patient for possible benign lesion.   - Recommend perc biopsy to evaluate for possible benign lesion prior to radical nephrectomy. Discussed limitations/risks of perc biopsy. Understands that perc biopsy may also not be definitive in diagnosis   - Perc biopsy 2/18 - oncocytic neoplasm (less favors oncocytoma)   - Discussed options: observation, lap radical Nx, open partial Nx. She has opted to observe for now.    - CT 6/18 - stable 2.4cm R renal mass   - KISHAN 9/18 - 2.8cm R renal mass   - s/p R open PNx on 10/12/18    - unclassified RCC pT1a   - CT c/a/p 6 months (4/18) - no recurrence/mets   - CT ap (10/19) - no recurrence/mets. Abnormal area in tail of pancreas - recommend PCP follow up. CXR - clear. Cr 0.9.    - CT a/p 10/20 - clear   - CXR, CT, BMP/CBC in 12 months - not done. Will do now      2. Microhematuria    - UA micro - 0 RBCs     3. L flank pain   - Unsure etiology   - CT negative for L flank pain   - f/u with PCP   - Continues to report pain in b/l flanks      Follow up in  1 months with CXR, CT

## 2022-03-15 ENCOUNTER — TELEPHONE (OUTPATIENT)
Dept: RADIOLOGY | Facility: HOSPITAL | Age: 72
End: 2022-03-15
Payer: MEDICARE

## 2022-03-15 NOTE — TELEPHONE ENCOUNTER
Left VM for patient regarding CT Scan scheduled for 3/17/22. Lab work is needed to administer contrast. Lab appointment scheduled for 3/17/22 at 7:20 am.

## 2022-03-16 ENCOUNTER — TELEPHONE (OUTPATIENT)
Dept: UROLOGY | Facility: CLINIC | Age: 72
End: 2022-03-16
Payer: MEDICARE

## 2022-03-16 DIAGNOSIS — C64.1 RENAL CELL CARCINOMA OF RIGHT KIDNEY: Primary | ICD-10-CM

## 2022-03-16 NOTE — TELEPHONE ENCOUNTER
----- Message from Denia Jacob sent at 3/15/2022 12:43 PM CDT -----  Regarding: Lab order for CT  Hello,          This patient is scheduled for a ct. The patient will need a cmp, bmp, or creatinine lab prior to receiving contrast. Please put in the lab order as soon as possible to avoid any delay in patient care.    Also, order enter for CT Abdomen Pelvis W/W O Contrast (2020), please enter another order.        Thank you,    Denia Jacob

## 2022-03-17 ENCOUNTER — HOSPITAL ENCOUNTER (OUTPATIENT)
Dept: RADIOLOGY | Facility: HOSPITAL | Age: 72
Discharge: HOME | End: 2022-03-17
Attending: UROLOGY
Payer: MEDICARE

## 2022-03-17 DIAGNOSIS — C64.1 RENAL CELL CARCINOMA OF RIGHT KIDNEY: ICD-10-CM

## 2022-03-17 PROCEDURE — 71046 X-RAY EXAM CHEST 2 VIEWS: CPT | Mod: 26,,, | Performed by: RADIOLOGY

## 2022-03-17 PROCEDURE — 74178 CT ABDOMEN PELVIS W WO CONTRAST: ICD-10-PCS | Mod: 26,,, | Performed by: RADIOLOGY

## 2022-03-17 PROCEDURE — 25500020 PHARM REV CODE 255: Performed by: UROLOGY

## 2022-03-17 PROCEDURE — 71046 X-RAY EXAM CHEST 2 VIEWS: CPT | Mod: TC,FY

## 2022-03-17 PROCEDURE — 71046 XR CHEST PA AND LATERAL: ICD-10-PCS | Mod: 26,,, | Performed by: RADIOLOGY

## 2022-03-17 PROCEDURE — 74178 CT ABD&PLV WO CNTR FLWD CNTR: CPT | Mod: TC

## 2022-03-17 PROCEDURE — 74178 CT ABD&PLV WO CNTR FLWD CNTR: CPT | Mod: 26,,, | Performed by: RADIOLOGY

## 2022-03-17 RX ADMIN — IOHEXOL 85 ML: 350 INJECTION, SOLUTION INTRAVENOUS at 08:03

## 2022-03-22 ENCOUNTER — PATIENT OUTREACH (OUTPATIENT)
Dept: ADMINISTRATIVE | Facility: HOSPITAL | Age: 72
End: 2022-03-22
Payer: MEDICARE

## 2022-03-22 ENCOUNTER — PATIENT MESSAGE (OUTPATIENT)
Dept: ADMINISTRATIVE | Facility: HOSPITAL | Age: 72
End: 2022-03-22
Payer: MEDICARE

## 2022-03-22 NOTE — PROGRESS NOTES
[facility] BP Non-Compliant gap report - Left message for patient to call our office. Updated blood pressure reading needed.    Eye exam updated.

## 2022-03-28 DIAGNOSIS — M25.561 CHRONIC PAIN OF BOTH KNEES: ICD-10-CM

## 2022-03-28 DIAGNOSIS — G89.29 CHRONIC PAIN OF BOTH KNEES: ICD-10-CM

## 2022-03-28 DIAGNOSIS — K21.9 GASTROESOPHAGEAL REFLUX DISEASE: ICD-10-CM

## 2022-03-28 DIAGNOSIS — M25.562 CHRONIC PAIN OF BOTH KNEES: ICD-10-CM

## 2022-03-28 NOTE — TELEPHONE ENCOUNTER
No new care gaps identified.  Powered by [employer]. Reference number: [id number].   3/28/2022 4:35:48 PM CDT

## 2022-03-29 RX ORDER — LANCETS
EACH MISCELLANEOUS
Qty: 100 EACH | Refills: 1 | Status: SHIPPED | OUTPATIENT
Start: 2022-03-29 | End: 2022-10-03

## 2022-03-29 RX ORDER — CYCLOBENZAPRINE HCL 5 MG
TABLET ORAL
Qty: 270 TABLET | Refills: 1 | Status: SHIPPED | OUTPATIENT
Start: 2022-03-29 | End: 2022-11-16

## 2022-03-29 RX ORDER — PRAVASTATIN SODIUM 10 MG/1
TABLET ORAL
Qty: 90 TABLET | Refills: 1 | Status: SHIPPED | OUTPATIENT
Start: 2022-03-29 | End: 2022-11-07 | Stop reason: SDUPTHER

## 2022-03-29 RX ORDER — FLUOXETINE HYDROCHLORIDE 20 MG/1
CAPSULE ORAL
Qty: 90 CAPSULE | Refills: 1 | Status: SHIPPED | OUTPATIENT
Start: 2022-03-29 | End: 2022-09-02

## 2022-03-29 RX ORDER — FAMOTIDINE 20 MG/1
TABLET, FILM COATED ORAL
Qty: 180 TABLET | Refills: 1 | Status: SHIPPED | OUTPATIENT
Start: 2022-03-29 | End: 2022-05-06 | Stop reason: SDUPTHER

## 2022-04-04 ENCOUNTER — OFFICE VISIT (OUTPATIENT)
Dept: PAIN MEDICINE | Facility: CLINIC | Age: 72
End: 2022-04-04
Payer: MEDICARE

## 2022-04-04 ENCOUNTER — APPOINTMENT (OUTPATIENT)
Dept: RADIOLOGY | Facility: HOSPITAL | Age: 72
End: 2022-04-04
Attending: PAIN MEDICINE
Payer: MEDICARE

## 2022-04-04 VITALS
HEIGHT: 64 IN | BODY MASS INDEX: 32.69 KG/M2 | WEIGHT: 191.5 LBS | HEART RATE: 92 BPM | SYSTOLIC BLOOD PRESSURE: 134 MMHG | DIASTOLIC BLOOD PRESSURE: 76 MMHG | OXYGEN SATURATION: 99 %

## 2022-04-04 DIAGNOSIS — M25.561 BILATERAL CHRONIC KNEE PAIN: Primary | ICD-10-CM

## 2022-04-04 DIAGNOSIS — G89.29 BILATERAL CHRONIC KNEE PAIN: Primary | ICD-10-CM
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
## 2022-04-04 DIAGNOSIS — M25.562 BILATERAL CHRONIC KNEE PAIN: Primary | ICD-10-CM

## 2022-04-04 DIAGNOSIS — M17.0 PRIMARY OSTEOARTHRITIS OF KNEES, BILATERAL: ICD-10-CM

## 2022-04-04 PROCEDURE — 1160F PR REVIEW ALL MEDS BY PRESCRIBER/CLIN PHARMACIST DOCUMENTED: ICD-10-PCS | Mod: CPTII,S$GLB,, | Performed by: PAIN MEDICINE

## 2022-04-04 PROCEDURE — 1125F PR PAIN SEVERITY QUANTIFIED, PAIN PRESENT: ICD-10-PCS | Mod: CPTII,S$GLB,, | Performed by: PAIN MEDICINE

## 2022-04-04 PROCEDURE — 1100F PR PT FALLS ASSESS DOC 2+ FALLS/FALL W/INJURY/YR: ICD-10-PCS | Mod: CPTII,S$GLB,, | Performed by: PAIN MEDICINE

## 2022-04-04 PROCEDURE — 3008F PR BODY MASS INDEX (BMI) DOCUMENTED: ICD-10-PCS | Mod: CPTII,S$GLB,, | Performed by: PAIN MEDICINE

## 2022-04-04 PROCEDURE — 3075F SYST BP GE 130 - 139MM HG: CPT | Mod: CPTII,S$GLB,, | Performed by: PAIN MEDICINE

## 2022-04-04 PROCEDURE — 3288F FALL RISK ASSESSMENT DOCD: CPT | Mod: CPTII,S$GLB,, | Performed by: PAIN MEDICINE

## 2022-04-04 PROCEDURE — 1160F RVW MEDS BY RX/DR IN RCRD: CPT | Mod: CPTII,S$GLB,, | Performed by: PAIN MEDICINE

## 2022-04-04 PROCEDURE — 3075F PR MOST RECENT SYSTOLIC BLOOD PRESS GE 130-139MM HG: ICD-10-PCS | Mod: CPTII,S$GLB,, | Performed by: PAIN MEDICINE

## 2022-04-04 PROCEDURE — 3008F BODY MASS INDEX DOCD: CPT | Mod: CPTII,S$GLB,, | Performed by: PAIN MEDICINE

## 2022-04-04 PROCEDURE — 99214 PR OFFICE/OUTPT VISIT, EST, LEVL IV, 30-39 MIN: ICD-10-PCS | Mod: S$GLB,,, | Performed by: PAIN MEDICINE

## 2022-04-04 PROCEDURE — 72110 X-RAY EXAM L-2 SPINE 4/>VWS: CPT | Mod: 26,,, | Performed by: INTERNAL MEDICINE

## 2022-04-04 PROCEDURE — 1125F AMNT PAIN NOTED PAIN PRSNT: CPT | Mod: CPTII,S$GLB,, | Performed by: PAIN MEDICINE

## 2022-04-04 PROCEDURE — 3078F DIAST BP <80 MM HG: CPT | Mod: CPTII,S$GLB,, | Performed by: PAIN MEDICINE

## 2022-04-04 PROCEDURE — 3078F PR MOST RECENT DIASTOLIC BLOOD PRESSURE < 80 MM HG: ICD-10-PCS | Mod: CPTII,S$GLB,, | Performed by: PAIN MEDICINE

## 2022-04-04 PROCEDURE — 99999 PR PBB SHADOW E&M-EST. PATIENT-LVL III: CPT | Mod: PBBFAC,,, | Performed by: PAIN MEDICINE

## 2022-04-04 PROCEDURE — 1159F MED LIST DOCD IN RCRD: CPT | Mod: CPTII,S$GLB,, | Performed by: PAIN MEDICINE

## 2022-04-04 PROCEDURE — 99999 PR PBB SHADOW E&M-EST. PATIENT-LVL III: ICD-10-PCS | Mod: PBBFAC,,, | Performed by: PAIN MEDICINE

## 2022-04-04 PROCEDURE — 72110 X-RAY EXAM L-2 SPINE 4/>VWS: CPT | Mod: TC,FY,PN

## 2022-04-04 PROCEDURE — 72110 XR LUMBAR SPINE AP AND LAT WITH FLEX/EXT: ICD-10-PCS | Mod: 26,,, | Performed by: INTERNAL MEDICINE

## 2022-04-04 PROCEDURE — 1159F PR MEDICATION LIST DOCUMENTED IN MEDICAL RECORD: ICD-10-PCS | Mod: CPTII,S$GLB,, | Performed by: PAIN MEDICINE

## 2022-04-04 PROCEDURE — 3288F PR FALLS RISK ASSESSMENT DOCUMENTED: ICD-10-PCS | Mod: CPTII,S$GLB,, | Performed by: PAIN MEDICINE

## 2022-04-04 PROCEDURE — 1100F PTFALLS ASSESS-DOCD GE2>/YR: CPT | Mod: CPTII,S$GLB,, | Performed by: PAIN MEDICINE

## 2022-04-04 PROCEDURE — 99214 OFFICE O/P EST MOD 30 MIN: CPT | Mod: S$GLB,,, | Performed by: PAIN MEDICINE

## 2022-04-04 NOTE — PROGRESS NOTES
Subjective:     Patient ID: Valarie Foster is a 71 y.o. female    Chief Complaint: Knee Pain (Leg pain and swelling post  nerve blocks on 2/21)      Referred by: No ref. provider found      HPI:    Interval History (4/4/22):  She returns today for follow up.  She reports that bilateral genicular nerve blocks under ultrasound provided very good relief of her knee pain for prolonged period of time (greater than 4 weeks).  Otherwise, she denies any changes in the quality or location of her pain.  She does report that she has been falling recently.  States that her legs get weak while she is walking.  She denies persistent weakness in the lower extremities.  She denies any associated numbness, bowel bladder dysfunction, back pain.     Initial Encounter (2/21/22):  Valarie Foster is a 71 y.o. female who presents today with chronic bilateral knee pain secondary to osteoarthritis.  Patient has been evaluated by Orthopedic surgery.  Has tried and failed intra-articular steroid injections.  She was referred here to discuss potential genicular nerve blocks/RFA.   This pain is described in detail below.    Physical Therapy:  Yes.    Non-pharmacologic Treatment:  Rest helps         · TENS?  No    Pain Medications:         · Currently taking:  Celebrex, Flexeril, Voltaren gel, gabapentin    · Has tried in the past:      · Has not tried:  Opioids, Tylenol, TCAs, SNRIs    Blood thinners:  None    Interventional Therapies:    2/21/22 - bilateral genicular nerve blocks under ultrasound - 70% relief for 4 weeks.    Relevant Surgeries:  None    Affecting sleep?  Yes    Affecting daily activities? yes    Depressive symptoms? no          · SI/HI? No    Work status: Retired    Pain Scores:    Best:       8/10  Worst:     10/10  Usually:   10/10  Today:    10/10    Review of Systems   Constitutional: Negative for activity change, appetite change, chills, fatigue, fever and unexpected weight change.   HENT: Negative for hearing loss.     Eyes: Negative for visual disturbance.   Respiratory: Negative for chest tightness and shortness of breath.    Cardiovascular: Negative for chest pain.   Gastrointestinal: Negative for abdominal pain, constipation, diarrhea, nausea and vomiting.   Genitourinary: Negative for difficulty urinating.   Musculoskeletal: Positive for arthralgias, gait problem and myalgias. Negative for back pain and neck pain.   Skin: Negative for rash.   Neurological: Negative for dizziness, weakness, light-headedness, numbness and headaches.   Psychiatric/Behavioral: Positive for sleep disturbance. Negative for hallucinations and suicidal ideas. The patient is not nervous/anxious.        Past Medical History:   Diagnosis Date    Arthritis     Colon polyp     Diabetes mellitus     diet controlled    Diabetes with neurologic complications     Hypertension     Nuclear sclerosis of both eyes 2/14/2022    Renal cell carcinoma        Past Surgical History:   Procedure Laterality Date    COLONOSCOPY N/A 2/5/2018    Procedure: COLONOSCOPY;  Surgeon: Bacilio Mancia MD;  Location: [facility] ENDO;  Service: Endoscopy;  Laterality: N/A;  appt confirmed-ss    COLONOSCOPY N/A 8/13/2020    Procedure: COLONOSCOPY;  Surgeon: Jose West MD;  Location: [facility] ENDO;  Service: Endoscopy;  Laterality: N/A;    HYSTERECTOMY      OOPHORECTOMY      PARTIAL NEPHRECTOMY Right 10/12/2018    Procedure: NEPHRECTOMY, PARTIAL open. Dr Arenas to assist.;  Surgeon: Alejandra Palm MD;  Location: [facility] OR;  Service: Urology;  Laterality: Right;  RN PREOP 10/9/2018----NEEDS H/P       Social History     Socioeconomic History    Marital status:    Tobacco Use    Smoking status: Never Smoker    Smokeless tobacco: Never Used   Substance and Sexual Activity    Alcohol use: No    Drug use: No       Review of patient's allergies indicates:   Allergen Reactions    Lisinopril Swelling and Shortness Of Breath    Ascorbic acid-ascorbate calc       And citrus fruits       Current Outpatient Medications on File Prior to Visit   Medication Sig Dispense Refill    ACCU-CHEK ONEIL CONTROL SOLN Soln       ACCU-CHEK ONEIL PLUS METER Misc USE AS DIRECTED 1 each 0    ACCU-CHEK ONEIL PLUS TEST STRP Strp TEST BLOOD SUGAR ONE TIME DAILY 100 strip 3    ACCU-CHEK SOFTCLIX LANCETS Misc TEST BLOOD SUGAR ONE TIME DAILY 100 each 1    amLODIPine (NORVASC) 5 MG tablet TAKE 1 TABLET EVERY DAY 90 tablet 1    amoxicillin (AMOXIL) 875 MG tablet       BD ALCOHOL SWABS PadM USE ONE TIME DAILY 100 each 6    celecoxib (CELEBREX) 200 MG capsule TAKE 1 CAPSULE EVERY DAY 90 capsule 1    cyclobenzaprine (FLEXERIL) 5 MG tablet TAKE 1 TABLET THREE TIMES DAILY AS NEEDED FOR MUSCLE SPASM(S) 270 tablet 1    diclofenac sodium (VOLTAREN) 1 % Gel APPLY 2 GRAMS TOPICALLY ONCE DAILY. 100 g 3    diphenhydrAMINE (BENADRYL) 25 mg capsule Take 1 capsule (25 mg total) by mouth every 6 (six) hours as needed for Itching. 20 capsule 0    docusate sodium (COLACE) 100 MG capsule Take 1 capsule (100 mg total) by mouth 2 (two) times daily. 60 capsule 0    ergocalciferol (ERGOCALCIFEROL) 50,000 unit Cap TAKE 1 CAPSULE (50,000 UNITS TOTAL) BY MOUTH EVERY 7 DAYS. 12 capsule 1    famotidine (PEPCID) 20 MG tablet Take 1 tablet by mouth.      famotidine (PEPCID) 20 MG tablet TAKE 1 TABLET TWICE DAILY 180 tablet 1    FLUAD QUAD 2021-22,65Y UP,,PF, 60 mcg (15 mcg x 4)/0.5 mL Syrg       FLUoxetine 20 MG capsule TAKE 1 CAPSULE EVERY DAY 90 capsule 1    fluticasone propionate (FLONASE) 50 mcg/actuation nasal spray USE 1 SPRAY IN EACH NOSTRIL ONCE DAILY 32 g 1    gabapentin (NEURONTIN) 300 MG capsule TAKE 2 CAPSULES (600 MG TOTAL) BY MOUTH EVERY EVENING. 180 capsule 1    hydrOXYzine HCL (ATARAX) 25 MG tablet TAKE 1 TABLET THREE TIMES DAILY AS NEEDED FOR  ITCHING 90 tablet 0    KENALOG 40 mg/mL injection       lancets (TRUEPLUS LANCETS) 33 gauge Misc 1 lancet by Misc.(Non-Drug; Combo Route) route 2 (two)  "times a day. 200 each 1    pravastatin (PRAVACHOL) 10 MG tablet Take 1 tablet by mouth.      pravastatin (PRAVACHOL) 10 MG tablet TAKE 1 TABLET EVERY DAY 90 tablet 1    tobramycin sulfate 0.3% (TOBREX) 0.3 % ophthalmic solution 1-2 drops topically twice daily to affected toe(s). 5 mL 3    triamcinolone acetonide 0.1% (KENALOG) 0.1 % cream       TRUE METRIX GLUCOSE METER kit USE AS DIRECTED 1 each 0    blood glucose control, low (TRUE METRIX LEVEL 1) Soln 1 each by Misc.(Non-Drug; Combo Route) route once as needed. 1 each 3    cetirizine (ZYRTEC) 10 MG tablet Take 1 tablet (10 mg total) by mouth once daily. 90 tablet 0     No current facility-administered medications on file prior to visit.       Objective:      /76 (BP Location: Left arm, Patient Position: Sitting, BP Method: Medium (Manual))   Pulse 92   Ht 5' 4" (1.626 m)   Wt 86.9 kg (191 lb 8 oz)   SpO2 99%   BMI 32.87 kg/m²     Exam:  GEN:  Well developed, well nourished.  No acute distress.   HEENT:  No trauma.  Mucous membranes moist.  Nares patent bilaterally.  PSYCH: Normal affect. Thought content appropriate.  CHEST:  Breathing symmetric.  No audible wheezing.  ABD: Soft, non-distended.  SKIN:  Warm, pink, dry.  No rash on exposed areas.    EXT:  No cyanosis, clubbing, or edema.  No color change or changes in nail or hair growth.  NEURO/MUSCULOSKELETAL:  Fully alert, oriented, and appropriate. Speech normal lance. No cranial nerve deficits.   Gait:  Antalgic.  No focal motor deficits.     5/5 strength throughout bilateral lower extremities  No upper motor neuron signs on examination          Imaging:    Narrative & Impression    EXAMINATION:  XR KNEE COMP 4 OR MORE VIEWS BILAT     CLINICAL HISTORY:  Pain in right knee     TECHNIQUE:  Multiple views of bilateral knees.     COMPARISON:  Bilateral knee radiographs from 07/27/2020.     FINDINGS:  Normal osseous mineralization.  Bilateral moderate medial tibiofemoral joint space narrowing " with osteophyte formation.  Degenerative changes within the bilateral tibiofemoral joints with joint space narrowing, sclerosis, and osteophyte formation.  Small volume suprapatellar joint fluid on the left.  No significant soft tissue abnormality.     Impression:     As above.        Electronically signed by: Emanuel Huggins  Date:                                            01/07/2022  Time:                                           16:09         Assessment:       Encounter Diagnoses   Name Primary?    Bilateral chronic knee pain Yes    Fall, initial encounter     Primary osteoarthritis of knees, bilateral          Plan:       Valarie was seen today for knee pain.    Diagnoses and all orders for this visit:    Bilateral chronic knee pain  -     Ambulatory referral/consult to Physical/Occupational Therapy; Future    Fall, initial encounter  -     Ambulatory referral/consult to Physical/Occupational Therapy; Future  -     X-Ray Lumbar Spine Ap Lateral w/Flex Ext; Future    Primary osteoarthritis of knees, bilateral        Valarie Foster is a 71 y.o. female with chronic bilateral knee pain secondary to osteoarthritis.  Has failed intra-articular steroid injections.  Did get good relief from bilateral genicular nerve blocks, however relief from these injections lasted much longer than expected.  Patient also no having frequent falls.  No overt neurologic deficits noted on examination.  No subjective reports concerning for acute nerve injury.    1.  Pertinent imaging studies reviewed by me. Imaging results were discussed with patient.  2.  Lumbar x-rays to rule out occult lumbar pathology.  3.  Insert physical therapy to address lower extremity weakness/falls.  4.  Follow-up with Orthopedic surgery for further evaluation of knee pain.  5.  I do not feel that additional genicular nerve blocks/RFA are warranted given atypical response to previous genicular nerve blocks.          This note was created by combination of  typed  and M-Modal dictation. Transcription and phonetic errors may be present.  If there are any questions, please contact me.

## 2022-04-07 ENCOUNTER — OFFICE VISIT (OUTPATIENT)
Dept: ORTHOPEDICS | Facility: CLINIC | Age: 72
End: 2022-04-07
Payer: MEDICARE

## 2022-04-07 VITALS
HEART RATE: 100 BPM | OXYGEN SATURATION: 99 % | DIASTOLIC BLOOD PRESSURE: 74 MMHG | SYSTOLIC BLOOD PRESSURE: 130 MMHG | BODY MASS INDEX: 32.79 KG/M2 | RESPIRATION RATE: 18 BRPM | WEIGHT: 191 LBS

## 2022-04-07 DIAGNOSIS — M17.0 PRIMARY OSTEOARTHRITIS OF BOTH KNEES: Primary | ICD-10-CM

## 2022-04-07 PROCEDURE — 1101F PT FALLS ASSESS-DOCD LE1/YR: CPT | Mod: CPTII,S$GLB,, | Performed by: ORTHOPAEDIC SURGERY

## 2022-04-07 PROCEDURE — 3008F PR BODY MASS INDEX (BMI) DOCUMENTED: ICD-10-PCS | Mod: CPTII,S$GLB,, | Performed by: ORTHOPAEDIC SURGERY

## 2022-04-07 PROCEDURE — 1159F PR MEDICATION LIST DOCUMENTED IN MEDICAL RECORD: ICD-10-PCS | Mod: CPTII,S$GLB,, | Performed by: ORTHOPAEDIC SURGERY

## 2022-04-07 PROCEDURE — 3078F PR MOST RECENT DIASTOLIC BLOOD PRESSURE < 80 MM HG: ICD-10-PCS | Mod: CPTII,S$GLB,, | Performed by: ORTHOPAEDIC SURGERY

## 2022-04-07 PROCEDURE — 1125F AMNT PAIN NOTED PAIN PRSNT: CPT | Mod: CPTII,S$GLB,, | Performed by: ORTHOPAEDIC SURGERY

## 2022-04-07 PROCEDURE — 99999 PR PBB SHADOW E&M-EST. PATIENT-LVL III: ICD-10-PCS | Mod: PBBFAC,,, | Performed by: ORTHOPAEDIC SURGERY

## 2022-04-07 PROCEDURE — 1159F MED LIST DOCD IN RCRD: CPT | Mod: CPTII,S$GLB,, | Performed by: ORTHOPAEDIC SURGERY

## 2022-04-07 PROCEDURE — 3078F DIAST BP <80 MM HG: CPT | Mod: CPTII,S$GLB,, | Performed by: ORTHOPAEDIC SURGERY

## 2022-04-07 PROCEDURE — 3288F FALL RISK ASSESSMENT DOCD: CPT | Mod: CPTII,S$GLB,, | Performed by: ORTHOPAEDIC SURGERY

## 2022-04-07 PROCEDURE — 99213 PR OFFICE/OUTPT VISIT, EST, LEVL III, 20-29 MIN: ICD-10-PCS | Mod: S$GLB,,, | Performed by: ORTHOPAEDIC SURGERY

## 2022-04-07 PROCEDURE — 3288F PR FALLS RISK ASSESSMENT DOCUMENTED: ICD-10-PCS | Mod: CPTII,S$GLB,, | Performed by: ORTHOPAEDIC SURGERY

## 2022-04-07 PROCEDURE — 1101F PR PT FALLS ASSESS DOC 0-1 FALLS W/OUT INJ PAST YR: ICD-10-PCS | Mod: CPTII,S$GLB,, | Performed by: ORTHOPAEDIC SURGERY

## 2022-04-07 PROCEDURE — 1125F PR PAIN SEVERITY QUANTIFIED, PAIN PRESENT: ICD-10-PCS | Mod: CPTII,S$GLB,, | Performed by: ORTHOPAEDIC SURGERY

## 2022-04-07 PROCEDURE — 3008F BODY MASS INDEX DOCD: CPT | Mod: CPTII,S$GLB,, | Performed by: ORTHOPAEDIC SURGERY

## 2022-04-07 PROCEDURE — 99999 PR PBB SHADOW E&M-EST. PATIENT-LVL III: CPT | Mod: PBBFAC,,, | Performed by: ORTHOPAEDIC SURGERY

## 2022-04-07 PROCEDURE — 99213 OFFICE O/P EST LOW 20 MIN: CPT | Mod: S$GLB,,, | Performed by: ORTHOPAEDIC SURGERY

## 2022-04-07 PROCEDURE — 3075F SYST BP GE 130 - 139MM HG: CPT | Mod: CPTII,S$GLB,, | Performed by: ORTHOPAEDIC SURGERY

## 2022-04-07 PROCEDURE — 3075F PR MOST RECENT SYSTOLIC BLOOD PRESS GE 130-139MM HG: ICD-10-PCS | Mod: CPTII,S$GLB,, | Performed by: ORTHOPAEDIC SURGERY

## 2022-04-07 RX ORDER — MELOXICAM 15 MG/1
15 TABLET ORAL DAILY
Qty: 30 TABLET | Refills: 1 | Status: SHIPPED | OUTPATIENT
Start: 2022-04-07 | End: 2022-08-24

## 2022-04-07 NOTE — PROGRESS NOTES
Follow up PATIENT ORTHOPAEDIC: Knee    PRIMARY CARE PHYSICIAN: Alena Hernandez MD   REFERRING PROVIDER: No referring provider defined for this encounter.     ASSESSMENT & PLAN:    Impression:  Bilateral Knee Moderate Osteoarthritis, primary     Follow Up Plan: for visco injections     Non operative care:    Valarie Foster has physical exam evidence of above and wishes to pursue an non-operative care. I am recommending the following: injections.  Patient has bilateral knee pain left worse than right.  She has radiographic imaging that is consistent with moderate arthritis.  I am hesitant to consider surgical intervention on this patient is she has pain that is out of proportion to what would be expected for this level of arthritis.  I think she would be someone who would be worrisome for continued pain an development of arthrofibrosis.  She has calf pain which was recently worked up and negative for any blood clot.  She has significant tenderness to palpation over her patella and quadriceps as well as along her medial joint line which is just more consistent with a global issue.  She has some rritability of her hip but CT scan from 2020 does not demonstrate any significant arthritis here.  She does have some upper lumbar degenerative disc disease on that same CT scan.  She denies any lumbar back issues. [facility] which she was not deemed a candidate for. At this point, my only offer is for visco injections. Will place referral as she has failed steroid injections.     The patient has been ordered:  None    CONSULTS:   None    ACTIVE PROBLEM LIST  Patient Active Problem List   Diagnosis    Type 2 diabetes mellitus with diabetic neuropathy, without long-term current use of insulin    Benign hypertension    Hyperlipemia    Microhematuria    Left flank pain    Abdominal aortic atherosclerosis    History of renal cell cancer    Renal cell carcinoma of right kidney    Mild major  depression    Bilateral chronic knee pain    Colon cancer screening    Pain of left calf    Nuclear sclerosis of both eyes    Refractive error    Primary osteoarthritis of knees, bilateral    Fall           SUBJECTIVE    CHIEF COMPLAINT: Knee Pain    HPI:   Valarie Foster is a 71 y.o. female here for evaluation and management of bilateral knee pain. There is not a specific incident that brought about this pain. she has had progressive problems with the knee(s) starting 2 years ago but is now progressing to interfere with activities which include: walking, rising from a sitting position, standing for prolonged periods of time and climbing stairs     Currently the pain in the joint is rated at mild with activity. The pain is constant and is located in the knee, at level of joint line, located medially, located laterally, located anterior, located posterior and with radiation. The pain is described as aching, severe, sharp, stabbing and stiffness. Relieving factors include ice or heat, prescription medication and repositioning.     There is associated Catching, Clicking and Popping.     Valarie Foster has no additional complaints.     Interval History 4/7/22: Continued pain. No changes. Saw pain management, not candidate for genicular nerve ablations.     PROGRESSIVE SYMPTOMS:  Pain impacting sleep  Pain worsened by weight bearing    FUNCTIONAL STATUS:   Walk a block or two on level ground     PREVIOUS TREATMENTS:  Medical: Steroid Injections and Biologic Injections  Physical Therapy: Activities Modified  and Formal Physical Therapy for 6 weeks  Previous Orthopaedic Surgery: None    REVIEW OF SYSTEMS:  PAIN ASSESSMENT:  See HPI.  MUSCULOSKELETAL: See HPI.  OTHER 10 point review of systems is negative except as stated in HPI above    PAST MEDICAL HISTORY   has a past medical history of Arthritis, Colon polyp, Diabetes mellitus, Diabetes with neurologic complications, Hypertension, Nuclear sclerosis of both eyes  (2/14/2022), and Renal cell carcinoma.     PAST SURGICAL HISTORY   has a past surgical history that includes Hysterectomy; Oophorectomy; Colonoscopy (N/A, 2/5/2018); Partial nephrectomy (Right, 10/12/2018); and Colonoscopy (N/A, 8/13/2020).     FAMILY HISTORY  family history includes Blindness in her sister; Cancer in her sister; Diabetes in her brother and sister; Glaucoma in her father and sister; No Known Problems in her maternal aunt, maternal grandfather, maternal grandmother, maternal uncle, mother, paternal aunt, paternal grandfather, paternal grandmother, and paternal uncle; Thyroid disease in her sister.     SOCIAL HISTORY   reports that she has never smoked. She has never used smokeless tobacco. She reports that she does not drink alcohol and does not use drugs.     ALLERGIES   Review of patient's allergies indicates:   Allergen Reactions    Lisinopril Swelling and Shortness Of Breath    Ascorbic acid-ascorbate calc      And citrus fruits        MEDICATIONS  Current Outpatient Medications on File Prior to Visit   Medication Sig Dispense Refill    ACCU-CHEK ONEIL CONTROL SOLN Soln       ACCU-CHEK ONEIL PLUS METER Misc USE AS DIRECTED 1 each 0    ACCU-CHEK ONEIL PLUS TEST STRP Strp TEST BLOOD SUGAR ONE TIME DAILY 100 strip 3    ACCU-CHEK SOFTCLIX LANCETS Misc TEST BLOOD SUGAR ONE TIME DAILY 100 each 1    amLODIPine (NORVASC) 5 MG tablet TAKE 1 TABLET EVERY DAY 90 tablet 1    amoxicillin (AMOXIL) 875 MG tablet       BD ALCOHOL SWABS PadM USE ONE TIME DAILY 100 each 6    cyclobenzaprine (FLEXERIL) 5 MG tablet TAKE 1 TABLET THREE TIMES DAILY AS NEEDED FOR MUSCLE SPASM(S) 270 tablet 1    diclofenac sodium (VOLTAREN) 1 % Gel APPLY 2 GRAMS TOPICALLY ONCE DAILY. 100 g 3    diphenhydrAMINE (BENADRYL) 25 mg capsule Take 1 capsule (25 mg total) by mouth every 6 (six) hours as needed for Itching. 20 capsule 0    docusate sodium (COLACE) 100 MG capsule Take 1 capsule (100 mg total) by mouth 2 (two) times  daily. 60 capsule 0    ergocalciferol (ERGOCALCIFEROL) 50,000 unit Cap TAKE 1 CAPSULE (50,000 UNITS TOTAL) BY MOUTH EVERY 7 DAYS. 12 capsule 1    famotidine (PEPCID) 20 MG tablet Take 1 tablet by mouth.      famotidine (PEPCID) 20 MG tablet TAKE 1 TABLET TWICE DAILY 180 tablet 1    FLUAD QUAD 2021-22,65Y UP,,PF, 60 mcg (15 mcg x 4)/0.5 mL Syrg       FLUoxetine 20 MG capsule TAKE 1 CAPSULE EVERY DAY 90 capsule 1    fluticasone propionate (FLONASE) 50 mcg/actuation nasal spray USE 1 SPRAY IN EACH NOSTRIL ONCE DAILY 32 g 1    gabapentin (NEURONTIN) 300 MG capsule TAKE 2 CAPSULES (600 MG TOTAL) BY MOUTH EVERY EVENING. 180 capsule 1    hydrOXYzine HCL (ATARAX) 25 MG tablet TAKE 1 TABLET THREE TIMES DAILY AS NEEDED FOR  ITCHING 90 tablet 0    KENALOG 40 mg/mL injection       lancets (TRUEPLUS LANCETS) 33 gauge Misc 1 lancet by Misc.(Non-Drug; Combo Route) route 2 (two) times a day. 200 each 1    pravastatin (PRAVACHOL) 10 MG tablet Take 1 tablet by mouth.      pravastatin (PRAVACHOL) 10 MG tablet TAKE 1 TABLET EVERY DAY 90 tablet 1    tobramycin sulfate 0.3% (TOBREX) 0.3 % ophthalmic solution 1-2 drops topically twice daily to affected toe(s). 5 mL 3    triamcinolone acetonide 0.1% (KENALOG) 0.1 % cream       TRUE METRIX GLUCOSE METER kit USE AS DIRECTED 1 each 0    [DISCONTINUED] celecoxib (CELEBREX) 200 MG capsule TAKE 1 CAPSULE EVERY DAY 90 capsule 1    blood glucose control, low (TRUE METRIX LEVEL 1) Soln 1 each by Misc.(Non-Drug; Combo Route) route once as needed. 1 each 3    cetirizine (ZYRTEC) 10 MG tablet Take 1 tablet (10 mg total) by mouth once daily. 90 tablet 0     No current facility-administered medications on file prior to visit.          PHYSICAL EXAM   weight is 86.6 kg (191 lb). Her blood pressure is 130/74 and her pulse is 100. Her respiration is 18 and oxygen saturation is 99%.   Body mass index is 32.79 kg/m².      All other systems deferred.  GENERAL:  No acute distress  HABITUS:  Normal  GAIT: Antalgic  SKIN: Normal  and No erythema, warmth, fluctuance     KNEE EXAM:    bilateral:   Effusion: Minimal joint effusion  TTP: yes over Medial Joint Line, Lateral Joint Line, IT Band and Pes Bursa   no crepitus with passive knee ROM  Passive ROM: Extension 15, Flexion 100  No pain with manipulation of patella  Stable to varus/valgus stress. No increased laxity to anterior/posterior drawer testing  positive Constantin's test  No pain with IR/ER rotation of the hip  5/5 strength in knee flexion and extension, ankle plantarflexion and dorsiflexion  Neurovascular Status: Sensation intact to light touch in Sural, Saphenous, SPN, DPN, Tibial nerve distribution  2+ pulse DP/PT, normal capillary refill, foot has normal warmth    DATA:  Diagnostic tests reviewed for today's visit:     4v of the knee reveal Moderate degenerative changes of the Medial and Patellofemoral compartment. There is evidence of advanced osteoarthritis changes with Subchondral sclerosis, Osteophyte formation and Joint space narrowing. The limb is in netural alignment. The patella is tracking midline. Kellegren-Lawerence grade 3 changes

## 2022-04-11 DIAGNOSIS — L29.9 ITCHING: ICD-10-CM

## 2022-04-12 RX ORDER — HYDROXYZINE HYDROCHLORIDE 25 MG/1
TABLET, FILM COATED ORAL
Qty: 90 TABLET | Refills: 0 | Status: SHIPPED | OUTPATIENT
Start: 2022-04-12 | End: 2022-05-31

## 2022-04-12 NOTE — TELEPHONE ENCOUNTER
Refill Routing Note   Medication(s) are not appropriate for processing by [facility] for the following reason(s):      - Outside of protocol    [facility] action(s):  Route          Medication reconciliation completed: No     Appointments  past 12m or future 3m with PCP    Date Provider   Last Visit   11/1/2021 Alena Hernandez MD   Next Visit   5/6/2022 Alena Hernandez MD   ED visits in past 90 days: 0        Note composed:8:47 AM 04/12/2022

## 2022-04-14 ENCOUNTER — OFFICE VISIT (OUTPATIENT)
Dept: UROLOGY | Facility: CLINIC | Age: 72
End: 2022-04-14
Payer: MEDICARE

## 2022-04-14 VITALS — WEIGHT: 192.88 LBS | BODY MASS INDEX: 33.11 KG/M2

## 2022-04-14 DIAGNOSIS — C64.1 RENAL CELL CARCINOMA OF RIGHT KIDNEY: Primary | ICD-10-CM

## 2022-04-14 DIAGNOSIS — R31.29 MICROHEMATURIA: ICD-10-CM

## 2022-04-14 DIAGNOSIS — R10.9 FLANK PAIN: ICD-10-CM

## 2022-04-14 PROCEDURE — 3288F FALL RISK ASSESSMENT DOCD: CPT | Mod: CPTII,S$GLB,, | Performed by: UROLOGY

## 2022-04-14 PROCEDURE — 3008F PR BODY MASS INDEX (BMI) DOCUMENTED: ICD-10-PCS | Mod: CPTII,S$GLB,, | Performed by: UROLOGY

## 2022-04-14 PROCEDURE — 99214 PR OFFICE/OUTPT VISIT, EST, LEVL IV, 30-39 MIN: ICD-10-PCS | Mod: S$GLB,,, | Performed by: UROLOGY

## 2022-04-14 PROCEDURE — 99999 PR PBB SHADOW E&M-EST. PATIENT-LVL V: CPT | Mod: PBBFAC,,, | Performed by: UROLOGY

## 2022-04-14 PROCEDURE — 3008F BODY MASS INDEX DOCD: CPT | Mod: CPTII,S$GLB,, | Performed by: UROLOGY

## 2022-04-14 PROCEDURE — 99499 UNLISTED E&M SERVICE: CPT | Mod: S$GLB,,, | Performed by: UROLOGY

## 2022-04-14 PROCEDURE — 1126F AMNT PAIN NOTED NONE PRSNT: CPT | Mod: CPTII,S$GLB,, | Performed by: UROLOGY

## 2022-04-14 PROCEDURE — 1100F PTFALLS ASSESS-DOCD GE2>/YR: CPT | Mod: CPTII,S$GLB,, | Performed by: UROLOGY

## 2022-04-14 PROCEDURE — 99214 OFFICE O/P EST MOD 30 MIN: CPT | Mod: S$GLB,,, | Performed by: UROLOGY

## 2022-04-14 PROCEDURE — 3288F PR FALLS RISK ASSESSMENT DOCUMENTED: ICD-10-PCS | Mod: CPTII,S$GLB,, | Performed by: UROLOGY

## 2022-04-14 PROCEDURE — 99999 PR PBB SHADOW E&M-EST. PATIENT-LVL V: ICD-10-PCS | Mod: PBBFAC,,, | Performed by: UROLOGY

## 2022-04-14 PROCEDURE — 1126F PR PAIN SEVERITY QUANTIFIED, NO PAIN PRESENT: ICD-10-PCS | Mod: CPTII,S$GLB,, | Performed by: UROLOGY

## 2022-04-14 PROCEDURE — 99499 RISK ADDL DX/OHS AUDIT: ICD-10-PCS | Mod: S$GLB,,, | Performed by: UROLOGY

## 2022-04-14 PROCEDURE — 1159F MED LIST DOCD IN RCRD: CPT | Mod: CPTII,S$GLB,, | Performed by: UROLOGY

## 2022-04-14 PROCEDURE — 1159F PR MEDICATION LIST DOCUMENTED IN MEDICAL RECORD: ICD-10-PCS | Mod: CPTII,S$GLB,, | Performed by: UROLOGY

## 2022-04-14 PROCEDURE — 1100F PR PT FALLS ASSESS DOC 2+ FALLS/FALL W/INJURY/YR: ICD-10-PCS | Mod: CPTII,S$GLB,, | Performed by: UROLOGY

## 2022-04-14 NOTE — PROGRESS NOTES
Subjective:       Valarie Foster is a 71 y.o. female who is an established patient who was referred by Ivan CARDONA for evaluation of renal mass.      CT a/p with contrast 1/26/18 showed a 2.6cm endophytic lesion in R MP, concerning for solid mass vs complex cyst.     She reports L flank pain, contralateral to renal lesion. L flank pain has almost completely resolved. Denies hematuria, LUTS, UTIs. UA macro did show microhematuria recently. Denies family history of  malignancy. She reports being told she had a small kidney on one side in the past. Denies nephrolithiasis. Nonsmoker. Denies previous abdominal surgery though she is s/p hysterectomy. +DM2/HTN.     Cr (1/18) - 0.8  A1c (1/18) - 6.2  UA micro (2/18) - 0 RBCs    CT renal protocol (2/18) - 2.9cm completely endophytic solid enhancing renal mass. Homogenous in appearance.      She is s/p perc renal biopsy to better evaluate lesion (2/18) - oncocytic neoplasm. Mild hematuria < 24 hrs.          CT (6/18) - stable, endophytic enhancing 2.4cm R renal mass. Subtle 6mm focus of enhancement in R lobe of liver - nonspecific.     KISHAN (9/18) - stable 2.8cm R renal mass    She is now s/p open R PNx 10/12/18. Still having incisional pain, worse with palpation. No hernia on CT, mild stranding in subQ.    Path: 1.9cm unclassified RCC, FG 3/4, negative margins. [id number]    Doing well. States chronic fatigue and pain. Still with occasional incisional pain.     CT c/a/p 4/19 - no mets/recurrence  CT a/p (10/19) - no recurrence/mets. Abnormal area in tail of pancreas - recommend PCP follow up. CXR - clear.  CT a/p (10/20) - no recurrence/mets. Pancreatic tail lesion not seen. CXR - clear  CT a/p 3/22 - no recurrence or mets. Post-op changes to R kidney. CXR - wnl    Cr 1/19 - 0.8, 4/19 - 0.8, 10/19 - 0.9, 10/20 - 0.9, 11/21 - 0.8, 3/22 - 0.8    She reports pain in b/l back, present constantly. CT and CXR now done - wnl.            The following portions of the patient's history  were reviewed and updated as appropriate: allergies, current medications, past family history, past medical history, past social history, past surgical history and problem list.    Review of Systems  Constitutional: no fever or chills  ENT: no nasal congestion or sore throat  Respiratory: no cough or shortness of breath  Cardiovascular: no chest pain or palpitations  Gastrointestinal: no nausea or vomiting, tolerating diet  Genitourinary: as per HPI  Hematologic/Lymphatic: no easy bruising or lymphadenopathy  Musculoskeletal: no arthralgias or myalgias  Skin: no rashes or lesions  Neurological: no seizures or tremors  Behavioral/Psych: no auditory or visual hallucinations        Objective:    Vitals: Wt 87.5 kg (192 lb 14.4 oz)   BMI 33.11 kg/m²     Physical Exam   General: well developed, well nourished in no acute distress  Head: normocephalic, atraumatic  Neck: supple, trachea midline, no obvious enlargement of thyroid  HEENT: EOMI, mucus membranes moist, sclera anicteric, no hearing impairment  Lungs: symmetric expansion, non-labored breathing  Skin: no rashes or lesions  Neuro: alert and oriented x 3, no gross deficits  Psych: normal judgment and insight, normal mood/affect and non-anxious  Genitourinary:   patient declined exam    Inc - well healed, minor superior fullness of incision, no hernia    Lab Review   Urine analysis today in clinic shows - +LE    Lab Results   Component Value Date    WBC 5.59 10/28/2021    HGB 13.0 10/28/2021    HCT 41.8 10/28/2021    MCV 85 10/28/2021     10/28/2021     Lab Results   Component Value Date    CREATININE 0.8 03/17/2022    BUN 15 03/17/2022         Imaging  Images and reports were personally reviewed by me and discussed with patient  CT/KISHAN reviewed       Assessment/Plan:      1. Right RCC    - 2.6cm lesion in R MP kidney.   - CT renal protocol 2.5cm endophytic solid enhancing renal mass   - Due to location of tumor, open partial would be difficult with higher  complication rate of bleeding and/or urine leak. Radical nephrectomy with less complication rate.   - Concern for performing radical nephrectomy in diabetic patient for possible benign lesion.   - Recommend perc biopsy to evaluate for possible benign lesion prior to radical nephrectomy. Discussed limitations/risks of perc biopsy. Understands that perc biopsy may also not be definitive in diagnosis   - Perc biopsy 2/18 - oncocytic neoplasm (less favors oncocytoma)   - Discussed options: observation, lap radical Nx, open partial Nx. She has opted to observe for now.    - CT 6/18 - stable 2.4cm R renal mass   - KISHAN 9/18 - 2.8cm R renal mass   - s/p R open PNx on 10/12/18    - unclassified RCC pT1a   - CT c/a/p 6 months (4/18) - no recurrence/mets   - CT ap (10/19) - no recurrence/mets. Abnormal area in tail of pancreas - recommend PCP follow up. CXR - clear. Cr 0.9.    - CT a/p 3/22 - clear   - CXR, CT, BMP/CBC in 12 months. Will be 5 years out at that time, consider stopping surveillance.       2. Microhematuria    - UA micro - 0 RBCs     3. L flank pain   - Unsure etiology   - CT negative for L flank pain   - f/u with PCP   - Continues to report pain in b/l flanks      Follow up in  12 months with CT/CXR

## 2022-04-28 ENCOUNTER — OFFICE VISIT (OUTPATIENT)
Dept: ORTHOPEDICS | Facility: CLINIC | Age: 72
End: 2022-04-28
Payer: MEDICARE

## 2022-04-28 ENCOUNTER — PATIENT OUTREACH (OUTPATIENT)
Dept: ADMINISTRATIVE | Facility: OTHER | Age: 72
End: 2022-04-28
Payer: MEDICARE

## 2022-04-28 VITALS
DIASTOLIC BLOOD PRESSURE: 112 MMHG | OXYGEN SATURATION: 99 % | RESPIRATION RATE: 18 BRPM | WEIGHT: 194 LBS | SYSTOLIC BLOOD PRESSURE: 192 MMHG | BODY MASS INDEX: 33.3 KG/M2 | HEART RATE: 99 BPM

## 2022-04-28 DIAGNOSIS — M17.0 PRIMARY OSTEOARTHRITIS OF BOTH KNEES: Primary | ICD-10-CM

## 2022-04-28 PROCEDURE — 20610 DRAIN/INJ JOINT/BURSA W/O US: CPT | Mod: 50,S$GLB,, | Performed by: ORTHOPAEDIC SURGERY

## 2022-04-28 PROCEDURE — 3288F FALL RISK ASSESSMENT DOCD: CPT | Mod: CPTII,S$GLB,, | Performed by: ORTHOPAEDIC SURGERY

## 2022-04-28 PROCEDURE — 3008F BODY MASS INDEX DOCD: CPT | Mod: CPTII,S$GLB,, | Performed by: ORTHOPAEDIC SURGERY

## 2022-04-28 PROCEDURE — 99999 PR PBB SHADOW E&M-EST. PATIENT-LVL IV: CPT | Mod: PBBFAC,,, | Performed by: ORTHOPAEDIC SURGERY

## 2022-04-28 PROCEDURE — 3080F PR MOST RECENT DIASTOLIC BLOOD PRESSURE >= 90 MM HG: ICD-10-PCS | Mod: CPTII,S$GLB,, | Performed by: ORTHOPAEDIC SURGERY

## 2022-04-28 PROCEDURE — 3288F PR FALLS RISK ASSESSMENT DOCUMENTED: ICD-10-PCS | Mod: CPTII,S$GLB,, | Performed by: ORTHOPAEDIC SURGERY

## 2022-04-28 PROCEDURE — 99499 NO LOS: ICD-10-PCS | Mod: S$GLB,,, | Performed by: ORTHOPAEDIC SURGERY

## 2022-04-28 PROCEDURE — 3077F SYST BP >= 140 MM HG: CPT | Mod: CPTII,S$GLB,, | Performed by: ORTHOPAEDIC SURGERY

## 2022-04-28 PROCEDURE — 1125F PR PAIN SEVERITY QUANTIFIED, PAIN PRESENT: ICD-10-PCS | Mod: CPTII,S$GLB,, | Performed by: ORTHOPAEDIC SURGERY

## 2022-04-28 PROCEDURE — 1101F PR PT FALLS ASSESS DOC 0-1 FALLS W/OUT INJ PAST YR: ICD-10-PCS | Mod: CPTII,S$GLB,, | Performed by: ORTHOPAEDIC SURGERY

## 2022-04-28 PROCEDURE — 1159F MED LIST DOCD IN RCRD: CPT | Mod: CPTII,S$GLB,, | Performed by: ORTHOPAEDIC SURGERY

## 2022-04-28 PROCEDURE — 1101F PT FALLS ASSESS-DOCD LE1/YR: CPT | Mod: CPTII,S$GLB,, | Performed by: ORTHOPAEDIC SURGERY

## 2022-04-28 PROCEDURE — 99999 PR PBB SHADOW E&M-EST. PATIENT-LVL IV: ICD-10-PCS | Mod: PBBFAC,,, | Performed by: ORTHOPAEDIC SURGERY

## 2022-04-28 PROCEDURE — 99499 UNLISTED E&M SERVICE: CPT | Mod: S$GLB,,, | Performed by: ORTHOPAEDIC SURGERY

## 2022-04-28 PROCEDURE — 1159F PR MEDICATION LIST DOCUMENTED IN MEDICAL RECORD: ICD-10-PCS | Mod: CPTII,S$GLB,, | Performed by: ORTHOPAEDIC SURGERY

## 2022-04-28 PROCEDURE — 20610 LARGE JOINT ASPIRATION/INJECTION: BILATERAL KNEE: ICD-10-PCS | Mod: 50,S$GLB,, | Performed by: ORTHOPAEDIC SURGERY

## 2022-04-28 PROCEDURE — 3077F PR MOST RECENT SYSTOLIC BLOOD PRESSURE >= 140 MM HG: ICD-10-PCS | Mod: CPTII,S$GLB,, | Performed by: ORTHOPAEDIC SURGERY

## 2022-04-28 PROCEDURE — 3008F PR BODY MASS INDEX (BMI) DOCUMENTED: ICD-10-PCS | Mod: CPTII,S$GLB,, | Performed by: ORTHOPAEDIC SURGERY

## 2022-04-28 PROCEDURE — 3080F DIAST BP >= 90 MM HG: CPT | Mod: CPTII,S$GLB,, | Performed by: ORTHOPAEDIC SURGERY

## 2022-04-28 PROCEDURE — 1125F AMNT PAIN NOTED PAIN PRSNT: CPT | Mod: CPTII,S$GLB,, | Performed by: ORTHOPAEDIC SURGERY

## 2022-04-28 NOTE — PROCEDURES
Large Joint Aspiration/Injection: bilateral knee    Date/Time: 4/28/2022 10:40 AM  Performed by: Eric Carbajal MD  Authorized by: Eric Carbajal MD     Consent Done?:  Yes (Verbal)  Indications:  Arthritis  Site marked: the procedure site was marked    Timeout: prior to procedure the correct patient, procedure, and site was verified    Prep: patient was prepped and draped in usual sterile fashion      Local anesthesia used?: Yes    Local anesthetic:  Topical anesthetic    Details:  Needle Size:  22 G  Approach:  Anterolateral  Location:  Knee  Laterality:  Bilateral  Site:  Bilateral knee  Medications (Right):  30 mg sodium hyaluronate (orthovisc) 30 mg/2 mL  Medications (Left):  30 mg sodium hyaluronate (orthovisc) 30 mg/2 mL

## 2022-04-28 NOTE — PROGRESS NOTES
Follow up PATIENT ORTHOPAEDIC: Knee    PRIMARY CARE PHYSICIAN: Alena Hernandez MD   REFERRING PROVIDER: Eric Carbajal MD  [address] [facility]  [place] [id number]     ASSESSMENT & PLAN:    Impression:  Bilateral Knee Moderate Osteoarthritis, primary     Follow Up Plan: for visco injections     Non operative care:    Valarie Foster has physical exam evidence of above and wishes to pursue an non-operative care. I am recommending the following: injections.  Patient has bilateral knee pain left worse than right.  She has radiographic imaging that is consistent with moderate arthritis.  I am hesitant to consider surgical intervention on this patient is she has pain that is out of proportion to what would be expected for this level of arthritis.  I think she would be someone who would be worrisome for continued pain an development of arthrofibrosis.  She has calf pain which was recently worked up and negative for any blood clot.  She has significant tenderness to palpation over her patella and quadriceps as well as along her medial joint line which is just more consistent with a global issue.  She has some rritability of her hip but CT scan from 2020 does not demonstrate any significant arthritis here.  She does have some upper lumbar degenerative disc disease on that same CT scan.  She denies any lumbar back issues. She saw [facility] for geniculate nerve ablations which she was not deemed a candidate for. At this point, my only offer is for visco injections. Will place referral as she has failed steroid injections.     Here for series 1 of 3 Orthovisc    The patient has been ordered:  None    CONSULTS:   None    ACTIVE PROBLEM LIST  Patient Active Problem List   Diagnosis    Type 2 diabetes mellitus with diabetic neuropathy, without long-term current use of insulin    Benign hypertension    Hyperlipemia    Microhematuria    Left flank pain    Abdominal aortic atherosclerosis    History of renal cell cancer    Renal  cell carcinoma of right kidney    Mild major depression    Bilateral chronic knee pain    Colon cancer screening    Pain of left calf    Nuclear sclerosis of both eyes    Refractive error    Primary osteoarthritis of knees, bilateral    Fall           SUBJECTIVE    CHIEF COMPLAINT: Knee Pain    HPI:   Valarie Foster is a 71 y.o. female here for evaluation and management of bilateral knee pain. There is not a specific incident that brought about this pain. she has had progressive problems with the knee(s) starting 2 years ago but is now progressing to interfere with activities which include: walking, rising from a sitting position, standing for prolonged periods of time and climbing stairs     Currently the pain in the joint is rated at mild with activity. The pain is constant and is located in the knee, at level of joint line, located medially, located laterally, located anterior, located posterior and with radiation. The pain is described as aching, severe, sharp, stabbing and stiffness. Relieving factors include ice or heat, prescription medication and repositioning.     There is associated Catching, Clicking and Popping.     Valarie Foster has no additional complaints.     Interval History 4/7/22: Continued pain. No changes. Saw pain management, not candidate for genicular nerve ablations.   Interval History 4/28/22: Continued pain. Some fear of falling. Follows with [facility]. DDD on lumbar spine. Waiting for PT eval. Here for start of Orthovisc series      PROGRESSIVE SYMPTOMS:  Pain impacting sleep  Pain worsened by weight bearing    FUNCTIONAL STATUS:   Walk a block or two on level ground     PREVIOUS TREATMENTS:  Medical: Steroid Injections and Biologic Injections  Physical Therapy: Activities Modified  and Formal Physical Therapy for 6 weeks  Previous Orthopaedic Surgery: None    REVIEW OF SYSTEMS:  PAIN ASSESSMENT:  See HPI.  MUSCULOSKELETAL: See HPI.  OTHER 10 point review of systems is negative  except as stated in HPI above    PAST MEDICAL HISTORY   has a past medical history of Arthritis, Colon polyp, Diabetes mellitus, Diabetes with neurologic complications, Hypertension, Nuclear sclerosis of both eyes (2/14/2022), and Renal cell carcinoma.     PAST SURGICAL HISTORY   has a past surgical history that includes Hysterectomy; Oophorectomy; Colonoscopy (N/A, 2/5/2018); Partial nephrectomy (Right, 10/12/2018); and Colonoscopy (N/A, 8/13/2020).     FAMILY HISTORY  family history includes Blindness in her sister; Cancer in her sister; Diabetes in her brother and sister; Glaucoma in her father and sister; No Known Problems in her maternal aunt, maternal grandfather, maternal grandmother, maternal uncle, mother, paternal aunt, paternal grandfather, paternal grandmother, and paternal uncle; Thyroid disease in her sister.     SOCIAL HISTORY   reports that she has never smoked. She has never used smokeless tobacco. She reports that she does not drink alcohol and does not use drugs.     ALLERGIES   Review of patient's allergies indicates:   Allergen Reactions    Lisinopril Swelling and Shortness Of Breath    Ascorbic acid-ascorbate calc      And citrus fruits        MEDICATIONS  Current Outpatient Medications on File Prior to Visit   Medication Sig Dispense Refill    ACCU-CHEK ONEIL CONTROL SOLN Soln       ACCU-CHEK ONEIL PLUS METER Misc USE AS DIRECTED 1 each 0    ACCU-CHEK ONEIL PLUS TEST STRP Strp TEST BLOOD SUGAR ONE TIME DAILY 100 strip 3    ACCU-CHEK SOFTCLIX LANCETS Misc TEST BLOOD SUGAR ONE TIME DAILY 100 each 1    amLODIPine (NORVASC) 5 MG tablet TAKE 1 TABLET EVERY DAY 90 tablet 1    amoxicillin (AMOXIL) 875 MG tablet       BD ALCOHOL SWABS PadM USE ONE TIME DAILY 100 each 6    cyclobenzaprine (FLEXERIL) 5 MG tablet TAKE 1 TABLET THREE TIMES DAILY AS NEEDED FOR MUSCLE SPASM(S) 270 tablet 1    diphenhydrAMINE (BENADRYL) 25 mg capsule Take 1 capsule (25 mg total) by mouth every 6 (six) hours as  needed for Itching. 20 capsule 0    docusate sodium (COLACE) 100 MG capsule Take 1 capsule (100 mg total) by mouth 2 (two) times daily. 60 capsule 0    ergocalciferol (ERGOCALCIFEROL) 50,000 unit Cap TAKE 1 CAPSULE (50,000 UNITS TOTAL) BY MOUTH EVERY 7 DAYS. 12 capsule 1    famotidine (PEPCID) 20 MG tablet Take 1 tablet by mouth.      famotidine (PEPCID) 20 MG tablet TAKE 1 TABLET TWICE DAILY 180 tablet 1    FLUAD QUAD 2021-22,65Y UP,,PF, 60 mcg (15 mcg x 4)/0.5 mL Syrg       FLUoxetine 20 MG capsule TAKE 1 CAPSULE EVERY DAY 90 capsule 1    fluticasone propionate (FLONASE) 50 mcg/actuation nasal spray USE 1 SPRAY IN EACH NOSTRIL ONCE DAILY 32 g 1    gabapentin (NEURONTIN) 300 MG capsule TAKE 2 CAPSULES (600 MG TOTAL) BY MOUTH EVERY EVENING. 180 capsule 1    hydrOXYzine HCL (ATARAX) 25 MG tablet TAKE 1 TABLET THREE TIMES DAILY AS NEEDED FOR  ITCHING 90 tablet 0    KENALOG 40 mg/mL injection       lancets (TRUEPLUS LANCETS) 33 gauge Misc 1 lancet by Misc.(Non-Drug; Combo Route) route 2 (two) times a day. 200 each 1    meloxicam (MOBIC) 15 MG tablet Take 1 tablet (15 mg total) by mouth once daily. 30 tablet 1    pravastatin (PRAVACHOL) 10 MG tablet Take 1 tablet by mouth.      pravastatin (PRAVACHOL) 10 MG tablet TAKE 1 TABLET EVERY DAY 90 tablet 1    tobramycin sulfate 0.3% (TOBREX) 0.3 % ophthalmic solution 1-2 drops topically twice daily to affected toe(s). 5 mL 3    triamcinolone acetonide 0.1% (KENALOG) 0.1 % cream       TRUE METRIX GLUCOSE METER kit USE AS DIRECTED 1 each 0    blood glucose control, low (TRUE METRIX LEVEL 1) Soln 1 each by Misc.(Non-Drug; Combo Route) route once as needed. 1 each 3    cetirizine (ZYRTEC) 10 MG tablet Take 1 tablet (10 mg total) by mouth once daily. 90 tablet 0    diclofenac sodium (VOLTAREN) 1 % Gel APPLY 2 GRAMS TOPICALLY ONCE DAILY. (Patient not taking: Reported on 4/28/2022) 100 g 3     Current Facility-Administered Medications on File Prior to Visit    Medication Dose Route Frequency Provider Last Rate Last Admin    [] sodium hyaluronate (orthovisc) 30 mg  30 mg Intra-articular Weekly Eric Carbajal MD              PHYSICAL EXAM   weight is 88 kg (194 lb). Her blood pressure is 192/112 (abnormal) and her pulse is 99. Her respiration is 18 and oxygen saturation is 99%.   Body mass index is 33.3 kg/m².      All other systems deferred.  GENERAL:  No acute distress  HABITUS: Normal  GAIT: Antalgic  SKIN: Normal  and No erythema, warmth, fluctuance     KNEE EXAM:    bilateral:   Effusion: Minimal joint effusion  TTP: yes over Medial Joint Line, Lateral Joint Line, IT Band and Pes Bursa   no crepitus with passive knee ROM  Passive ROM: Extension 15, Flexion 100  No pain with manipulation of patella  Stable to varus/valgus stress. No increased laxity to anterior/posterior drawer testing  positive Constantin's test  No pain with IR/ER rotation of the hip  5/5 strength in knee flexion and extension, ankle plantarflexion and dorsiflexion  Neurovascular Status: Sensation intact to light touch in Sural, Saphenous, SPN, DPN, Tibial nerve distribution  2+ pulse DP/PT, normal capillary refill, foot has normal warmth    DATA:  Diagnostic tests reviewed for today's visit:     4v of the knee reveal Moderate degenerative changes of the Medial and Patellofemoral compartment. There is evidence of advanced osteoarthritis changes with Subchondral sclerosis, Osteophyte formation and Joint space narrowing. The limb is in netural alignment. The patella is tracking midline. Kellegren-Lawerence grade 3 changes

## 2022-04-28 NOTE — PROGRESS NOTES
Health Maintenance Due   Topic Date Due    Diabetes Urine Screening  06/30/2021    COVID-19 Vaccine (4 - Booster for Pfizer series) 11/24/2021    Mammogram  04/27/2022    Hemoglobin A1c  04/28/2022     Updates were requested from care everywhere.  Chart was reviewed for overdue Proactive [facility] Encounters (MEGAN) topics (CRS, Breast Cancer Screening, Eye exam)  Health Maintenance has been updated.  LINKS immunization registry triggered.  Immunizations were reconciled.

## 2022-05-02 ENCOUNTER — LAB VISIT (OUTPATIENT)
Dept: LAB | Facility: HOSPITAL | Age: 72
End: 2022-05-02
Attending: FAMILY MEDICINE
Payer: MEDICARE

## 2022-05-02 DIAGNOSIS — E11.40 TYPE 2 DIABETES MELLITUS WITH DIABETIC NEUROPATHY, WITHOUT LONG-TERM CURRENT USE OF INSULIN: ICD-10-CM

## 2022-05-02 LAB
ALBUMIN SERPL BCP-MCNC: 3.7 G/DL (ref 3.5–5.2)
ALP SERPL-CCNC: 97 U/L (ref 55–135)
ALT SERPL W/O P-5'-P-CCNC: 11 U/L (ref 10–44)
ANION GAP SERPL CALC-SCNC: 6 MMOL/L (ref 8–16)
AST SERPL-CCNC: 13 U/L (ref 10–40)
BILIRUB SERPL-MCNC: 0.3 MG/DL (ref 0.1–1)
BUN SERPL-MCNC: 12 MG/DL (ref 8–23)
CALCIUM SERPL-MCNC: 9.5 MG/DL (ref 8.7–10.5)
CHLORIDE SERPL-SCNC: 106 MMOL/L (ref 95–110)
CO2 SERPL-SCNC: 30 MMOL/L (ref 23–29)
CREAT SERPL-MCNC: 0.8 MG/DL (ref 0.5–1.4)
EST. GFR  (AFRICAN AMERICAN): >60 ML/MIN/1.73 M^2
EST. GFR  (NON AFRICAN AMERICAN): >60 ML/MIN/1.73 M^2
ESTIMATED AVG GLUCOSE: 171 MG/DL (ref 68–131)
GLUCOSE SERPL-MCNC: 130 MG/DL (ref 70–110)
HBA1C MFR BLD: 7.6 % (ref 4–5.6)
POTASSIUM SERPL-SCNC: 4.2 MMOL/L (ref 3.5–5.1)
PROT SERPL-MCNC: 6.8 G/DL (ref 6–8.4)
SODIUM SERPL-SCNC: 142 MMOL/L (ref 136–145)

## 2022-05-02 PROCEDURE — 83036 HEMOGLOBIN GLYCOSYLATED A1C: CPT | Performed by: FAMILY MEDICINE

## 2022-05-02 PROCEDURE — 80053 COMPREHEN METABOLIC PANEL: CPT | Performed by: FAMILY MEDICINE

## 2022-05-02 PROCEDURE — 36415 COLL VENOUS BLD VENIPUNCTURE: CPT | Mod: PO | Performed by: FAMILY MEDICINE

## 2022-05-03 ENCOUNTER — CLINICAL SUPPORT (OUTPATIENT)
Dept: REHABILITATION | Facility: HOSPITAL | Age: 72
End: 2022-05-03
Attending: PAIN MEDICINE
Payer: MEDICARE

## 2022-05-03 DIAGNOSIS — R29.898 DECREASED STRENGTH OF LOWER EXTREMITY: ICD-10-CM

## 2022-05-03 DIAGNOSIS — Z74.09 DECREASED MOBILITY AND ENDURANCE: ICD-10-CM

## 2022-05-03 DIAGNOSIS — G89.29 BILATERAL CHRONIC KNEE PAIN: ICD-10-CM

## 2022-05-03 DIAGNOSIS — M25.562 BILATERAL CHRONIC KNEE PAIN: ICD-10-CM

## 2022-05-03 DIAGNOSIS — M25.662 DECREASED RANGE OF MOTION OF BOTH LOWER EXTREMITIES: ICD-10-CM

## 2022-05-03 DIAGNOSIS — M25.561 BILATERAL CHRONIC KNEE PAIN: ICD-10-CM

## 2022-05-03 DIAGNOSIS — M25.661 DECREASED RANGE OF MOTION OF BOTH LOWER EXTREMITIES: ICD-10-CM
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
## 2022-05-03 PROBLEM — M25.669 DECREASED ROM OF LOWER EXTREMITY: Status: ACTIVE | Noted: 2022-05-03

## 2022-05-03 PROCEDURE — 97110 THERAPEUTIC EXERCISES: CPT | Mod: PN

## 2022-05-03 PROCEDURE — 97161 PT EVAL LOW COMPLEX 20 MIN: CPT | Mod: PN
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
## 2022-05-03 NOTE — PROGRESS NOTES
"See Plan of Care for complete Physical Therapy Evaluation.                                                     Physical Therapy Initial Evaluation     Name: Valarie Foster  Clinic Number: [id number]    Therapy Diagnosis:   Encounter Diagnoses   Name Primary?    Bilateral chronic knee pain     Fall, initial encounter     Decreased range of motion of both lower extremities     Decreased strength of lower extremity     Decreased mobility and endurance      Physician: Kayode Solares Jr*    Physician Orders: PT Eval and Treat   Medical Diagnosis from Referral:   M25.561,M25.562,G89.29 (ICD-10-CM) - Bilateral chronic knee pain   W19.XXXA (ICD-10-CM) - Fall, initial encounter       Evaluation Date: 5/3/2022  Authorization Period Expiration: 04/04/2023  Plan of Care Expiration: 8/3/22  Visit # / Visits authorized: 1/ 1    Time In: 1300  Time Out: 1350  Total Billable Time: 50 minutes    Precautions: Standard, Diabetes and HTN    TREATMENT     Treatment Time In: 1327  Treatment Time Out: 1350  Total Treatment time separate from Evaluation: 23 minutes    Valarie received therapeutic exercises to develop strength, endurance and flexibility for 23 minutes including:    QS 5"x20 ea  SLR 10x  Calf str c/ strap 30"x2 ea  SL hip abd 10x ea  Prone hip ext 10x ea  Bridges 10x  LAQ 10x ea    NV: Nustep, HSS, STS, step ups,     Short Term GOALS: 6 weeks. Pt agrees with goals set.  1. Patient demonstrates independence with HEP.   2. Patient demonstrates independence with Postural Awareness.   3. Patient demonstrates independence with body mechanics.   4. Patient will report pain of </=6/10 at worst, on 0-10 pain scale, with all activity  5. Patient demonstrates ability to walk between rooms with little to no difficulty to improve tolerance to functional tasks without pain  6. Patient demonstrates ability to perform 10 STS in 30 seconds to display improvements in bilateral lower extremity strength    Long Term GOALS: 12 weeks. Pt " agrees with goals set.  1. Patient demonstrates increased right knee extension  to 5 degrees of knee flexion or less to improve tolerance to functional activities pain free.   2. Patient demonstrates increased strength BLE's to 4+/5 or greater to improve tolerance to functional activities pain free.   3. Patient demonstrates improved overall function per FOTO Knee Survey to 60% Limitation or less.   4. Patient will report pain of </=3/10 at worst, on 0-10 pain scale, with all activity  5. Patient demonstrates ability to perform 14 STS in 30 seconds to display improvements in bilateral lower extremity strength  6. Patient demonstrates increased bilateral knee flexion to 115 degrees to improve to functional activities pain free.     PLAN       Plan of care Certification: 5/3/2022 to 8/3/22.    Outpatient Physical Therapy 1-2 times weekly for 12 weeks to include the following interventions: Gait Training, Manual Therapy, Moist Heat/ Ice, Neuromuscular Re-ed, Patient Education, Therapeutic Activities, Therapeutic Exercise and FDN.  Pt may be seen by PTA as part of the rehabilitation team.     Sunita Mcginnis, PT,DPT  5/3/2022

## 2022-05-06 ENCOUNTER — OFFICE VISIT (OUTPATIENT)
Dept: FAMILY MEDICINE | Facility: CLINIC | Age: 72
End: 2022-05-06
Payer: MEDICARE

## 2022-05-06 ENCOUNTER — OFFICE VISIT (OUTPATIENT)
Dept: ORTHOPEDICS | Facility: CLINIC | Age: 72
End: 2022-05-06
Payer: MEDICARE

## 2022-05-06 VITALS
HEIGHT: 64 IN | DIASTOLIC BLOOD PRESSURE: 89 MMHG | HEART RATE: 99 BPM | BODY MASS INDEX: 33.63 KG/M2 | SYSTOLIC BLOOD PRESSURE: 142 MMHG | WEIGHT: 197 LBS | RESPIRATION RATE: 18 BRPM | OXYGEN SATURATION: 99 %

## 2022-05-06 VITALS
TEMPERATURE: 98 F | DIASTOLIC BLOOD PRESSURE: 88 MMHG | HEART RATE: 101 BPM | BODY MASS INDEX: 33.16 KG/M2 | WEIGHT: 194.25 LBS | HEIGHT: 64 IN | OXYGEN SATURATION: 96 % | SYSTOLIC BLOOD PRESSURE: 130 MMHG

## 2022-05-06 DIAGNOSIS — F32.0 MILD MAJOR DEPRESSION: ICD-10-CM

## 2022-05-06 DIAGNOSIS — E11.40 TYPE 2 DIABETES MELLITUS WITH DIABETIC NEUROPATHY, WITHOUT LONG-TERM CURRENT USE OF INSULIN: ICD-10-CM

## 2022-05-06 DIAGNOSIS — M17.0 PRIMARY OSTEOARTHRITIS OF BOTH KNEES: Primary | ICD-10-CM

## 2022-05-06 DIAGNOSIS — I10 BENIGN HYPERTENSION: Chronic | ICD-10-CM

## 2022-05-06 DIAGNOSIS — E78.5 HYPERLIPIDEMIA, UNSPECIFIED HYPERLIPIDEMIA TYPE: Chronic | ICD-10-CM

## 2022-05-06 DIAGNOSIS — I70.0 ABDOMINAL AORTIC ATHEROSCLEROSIS: ICD-10-CM

## 2022-05-06 DIAGNOSIS — Z12.31 ENCOUNTER FOR SCREENING MAMMOGRAM FOR BREAST CANCER: Primary | ICD-10-CM

## 2022-05-06 PROCEDURE — 3008F PR BODY MASS INDEX (BMI) DOCUMENTED: ICD-10-PCS | Mod: CPTII,S$GLB,, | Performed by: FAMILY MEDICINE

## 2022-05-06 PROCEDURE — 99999 PR PBB SHADOW E&M-EST. PATIENT-LVL V: ICD-10-PCS | Mod: PBBFAC,,, | Performed by: FAMILY MEDICINE

## 2022-05-06 PROCEDURE — 99499 RISK ADDL DX/OHS AUDIT: ICD-10-PCS | Mod: S$GLB,,, | Performed by: FAMILY MEDICINE

## 2022-05-06 PROCEDURE — 1160F RVW MEDS BY RX/DR IN RCRD: CPT | Mod: CPTII,S$GLB,, | Performed by: FAMILY MEDICINE

## 2022-05-06 PROCEDURE — 3051F PR MOST RECENT HEMOGLOBIN A1C LEVEL 7.0 - < 8.0%: ICD-10-PCS | Mod: CPTII,S$GLB,, | Performed by: FAMILY MEDICINE

## 2022-05-06 PROCEDURE — 3066F PR DOCUMENTATION OF TREATMENT FOR NEPHROPATHY: ICD-10-PCS | Mod: CPTII,S$GLB,, | Performed by: FAMILY MEDICINE

## 2022-05-06 PROCEDURE — 1159F PR MEDICATION LIST DOCUMENTED IN MEDICAL RECORD: ICD-10-PCS | Mod: CPTII,S$GLB,, | Performed by: ORTHOPAEDIC SURGERY

## 2022-05-06 PROCEDURE — 99499 UNLISTED E&M SERVICE: CPT | Mod: S$GLB,,, | Performed by: FAMILY MEDICINE

## 2022-05-06 PROCEDURE — 3061F NEG MICROALBUMINURIA REV: CPT | Mod: CPTII,S$GLB,, | Performed by: FAMILY MEDICINE

## 2022-05-06 PROCEDURE — 99999 PR PBB SHADOW E&M-EST. PATIENT-LVL IV: CPT | Mod: PBBFAC,,, | Performed by: ORTHOPAEDIC SURGERY

## 2022-05-06 PROCEDURE — 3079F DIAST BP 80-89 MM HG: CPT | Mod: CPTII,S$GLB,, | Performed by: ORTHOPAEDIC SURGERY

## 2022-05-06 PROCEDURE — 3008F BODY MASS INDEX DOCD: CPT | Mod: CPTII,S$GLB,, | Performed by: ORTHOPAEDIC SURGERY

## 2022-05-06 PROCEDURE — 1125F PR PAIN SEVERITY QUANTIFIED, PAIN PRESENT: ICD-10-PCS | Mod: CPTII,S$GLB,, | Performed by: FAMILY MEDICINE

## 2022-05-06 PROCEDURE — 1160F PR REVIEW ALL MEDS BY PRESCRIBER/CLIN PHARMACIST DOCUMENTED: ICD-10-PCS | Mod: CPTII,S$GLB,, | Performed by: FAMILY MEDICINE

## 2022-05-06 PROCEDURE — 1159F PR MEDICATION LIST DOCUMENTED IN MEDICAL RECORD: ICD-10-PCS | Mod: CPTII,S$GLB,, | Performed by: FAMILY MEDICINE

## 2022-05-06 PROCEDURE — 1159F MED LIST DOCD IN RCRD: CPT | Mod: CPTII,S$GLB,, | Performed by: ORTHOPAEDIC SURGERY

## 2022-05-06 PROCEDURE — 3066F NEPHROPATHY DOC TX: CPT | Mod: CPTII,S$GLB,, | Performed by: FAMILY MEDICINE

## 2022-05-06 PROCEDURE — 3066F NEPHROPATHY DOC TX: CPT | Mod: CPTII,S$GLB,, | Performed by: ORTHOPAEDIC SURGERY

## 2022-05-06 PROCEDURE — 99214 PR OFFICE/OUTPT VISIT, EST, LEVL IV, 30-39 MIN: ICD-10-PCS | Mod: S$GLB,,, | Performed by: FAMILY MEDICINE

## 2022-05-06 PROCEDURE — 99499 UNLISTED E&M SERVICE: CPT | Mod: S$GLB,,, | Performed by: ORTHOPAEDIC SURGERY

## 2022-05-06 PROCEDURE — 3051F HG A1C>EQUAL 7.0%<8.0%: CPT | Mod: CPTII,S$GLB,, | Performed by: ORTHOPAEDIC SURGERY

## 2022-05-06 PROCEDURE — 1125F PR PAIN SEVERITY QUANTIFIED, PAIN PRESENT: ICD-10-PCS | Mod: CPTII,S$GLB,, | Performed by: ORTHOPAEDIC SURGERY

## 2022-05-06 PROCEDURE — 3288F FALL RISK ASSESSMENT DOCD: CPT | Mod: CPTII,S$GLB,, | Performed by: FAMILY MEDICINE

## 2022-05-06 PROCEDURE — 3008F PR BODY MASS INDEX (BMI) DOCUMENTED: ICD-10-PCS | Mod: CPTII,S$GLB,, | Performed by: ORTHOPAEDIC SURGERY

## 2022-05-06 PROCEDURE — 99999 PR PBB SHADOW E&M-EST. PATIENT-LVL V: CPT | Mod: PBBFAC,,, | Performed by: FAMILY MEDICINE

## 2022-05-06 PROCEDURE — 3075F SYST BP GE 130 - 139MM HG: CPT | Mod: CPTII,S$GLB,, | Performed by: FAMILY MEDICINE

## 2022-05-06 PROCEDURE — 3061F NEG MICROALBUMINURIA REV: CPT | Mod: CPTII,S$GLB,, | Performed by: ORTHOPAEDIC SURGERY

## 2022-05-06 PROCEDURE — 1159F MED LIST DOCD IN RCRD: CPT | Mod: CPTII,S$GLB,, | Performed by: FAMILY MEDICINE

## 2022-05-06 PROCEDURE — 99499 NO LOS: ICD-10-PCS | Mod: S$GLB,,, | Performed by: ORTHOPAEDIC SURGERY

## 2022-05-06 PROCEDURE — 3079F PR MOST RECENT DIASTOLIC BLOOD PRESSURE 80-89 MM HG: ICD-10-PCS | Mod: CPTII,S$GLB,, | Performed by: ORTHOPAEDIC SURGERY

## 2022-05-06 PROCEDURE — 3077F SYST BP >= 140 MM HG: CPT | Mod: CPTII,S$GLB,, | Performed by: ORTHOPAEDIC SURGERY

## 2022-05-06 PROCEDURE — 3075F PR MOST RECENT SYSTOLIC BLOOD PRESS GE 130-139MM HG: ICD-10-PCS | Mod: CPTII,S$GLB,, | Performed by: FAMILY MEDICINE

## 2022-05-06 PROCEDURE — 3079F PR MOST RECENT DIASTOLIC BLOOD PRESSURE 80-89 MM HG: ICD-10-PCS | Mod: CPTII,S$GLB,, | Performed by: FAMILY MEDICINE

## 2022-05-06 PROCEDURE — 20610 DRAIN/INJ JOINT/BURSA W/O US: CPT | Mod: 50,S$GLB,, | Performed by: ORTHOPAEDIC SURGERY

## 2022-05-06 PROCEDURE — 3079F DIAST BP 80-89 MM HG: CPT | Mod: CPTII,S$GLB,, | Performed by: FAMILY MEDICINE

## 2022-05-06 PROCEDURE — 99999 PR PBB SHADOW E&M-EST. PATIENT-LVL IV: ICD-10-PCS | Mod: PBBFAC,,, | Performed by: ORTHOPAEDIC SURGERY

## 2022-05-06 PROCEDURE — 1125F AMNT PAIN NOTED PAIN PRSNT: CPT | Mod: CPTII,S$GLB,, | Performed by: FAMILY MEDICINE

## 2022-05-06 PROCEDURE — 3051F HG A1C>EQUAL 7.0%<8.0%: CPT | Mod: CPTII,S$GLB,, | Performed by: FAMILY MEDICINE

## 2022-05-06 PROCEDURE — 3077F PR MOST RECENT SYSTOLIC BLOOD PRESSURE >= 140 MM HG: ICD-10-PCS | Mod: CPTII,S$GLB,, | Performed by: ORTHOPAEDIC SURGERY

## 2022-05-06 PROCEDURE — 3061F PR NEG MICROALBUMINURIA RESULT DOCUMENTED/REVIEW: ICD-10-PCS | Mod: CPTII,S$GLB,, | Performed by: FAMILY MEDICINE

## 2022-05-06 PROCEDURE — 99214 OFFICE O/P EST MOD 30 MIN: CPT | Mod: S$GLB,,, | Performed by: FAMILY MEDICINE

## 2022-05-06 PROCEDURE — 1101F PT FALLS ASSESS-DOCD LE1/YR: CPT | Mod: CPTII,S$GLB,, | Performed by: FAMILY MEDICINE

## 2022-05-06 PROCEDURE — 1101F PR PT FALLS ASSESS DOC 0-1 FALLS W/OUT INJ PAST YR: ICD-10-PCS | Mod: CPTII,S$GLB,, | Performed by: FAMILY MEDICINE

## 2022-05-06 PROCEDURE — 3288F PR FALLS RISK ASSESSMENT DOCUMENTED: ICD-10-PCS | Mod: CPTII,S$GLB,, | Performed by: FAMILY MEDICINE

## 2022-05-06 PROCEDURE — 3061F PR NEG MICROALBUMINURIA RESULT DOCUMENTED/REVIEW: ICD-10-PCS | Mod: CPTII,S$GLB,, | Performed by: ORTHOPAEDIC SURGERY

## 2022-05-06 PROCEDURE — 3008F BODY MASS INDEX DOCD: CPT | Mod: CPTII,S$GLB,, | Performed by: FAMILY MEDICINE

## 2022-05-06 PROCEDURE — 3051F PR MOST RECENT HEMOGLOBIN A1C LEVEL 7.0 - < 8.0%: ICD-10-PCS | Mod: CPTII,S$GLB,, | Performed by: ORTHOPAEDIC SURGERY

## 2022-05-06 PROCEDURE — 3066F PR DOCUMENTATION OF TREATMENT FOR NEPHROPATHY: ICD-10-PCS | Mod: CPTII,S$GLB,, | Performed by: ORTHOPAEDIC SURGERY

## 2022-05-06 PROCEDURE — 1125F AMNT PAIN NOTED PAIN PRSNT: CPT | Mod: CPTII,S$GLB,, | Performed by: ORTHOPAEDIC SURGERY

## 2022-05-06 PROCEDURE — 20610 LARGE JOINT ASPIRATION/INJECTION: BILATERAL KNEE: ICD-10-PCS | Mod: 50,S$GLB,, | Performed by: ORTHOPAEDIC SURGERY

## 2022-05-06 RX ORDER — METFORMIN HYDROCHLORIDE 500 MG/1
500 TABLET ORAL 2 TIMES DAILY WITH MEALS
Qty: 180 TABLET | Refills: 3 | Status: SHIPPED | OUTPATIENT
Start: 2022-05-06 | End: 2024-03-15

## 2022-05-06 NOTE — PROGRESS NOTES
Follow up PATIENT ORTHOPAEDIC: Knee    PRIMARY CARE PHYSICIAN: Alena Hernandez MD   REFERRING PROVIDER: Eric Carbajal MD  [address] [facility]  [place] [id number]     ASSESSMENT & PLAN:    Impression:  Bilateral Knee Moderate Osteoarthritis, primary     Follow Up Plan: for visco injections     Non operative care:    Valarie Foster has physical exam evidence of above and wishes to pursue an non-operative care. I am recommending the following: injections.  Patient has bilateral knee pain left worse than right.  She has radiographic imaging that is consistent with moderate arthritis.  I am hesitant to consider surgical intervention on this patient is she has pain that is out of proportion to what would be expected for this level of arthritis.  I think she would be someone who would be worrisome for continued pain an development of arthrofibrosis.  She has calf pain which was recently worked up and negative for any blood clot.  She has significant tenderness to palpation over her patella and quadriceps as well as along her medial joint line which is just more consistent with a global issue.  She has some rritability of her hip but CT scan from 2020 does not demonstrate any significant arthritis here.  She does have some upper lumbar degenerative disc disease on that same CT scan.  She denies any lumbar back issues. She saw [facility] for geniculate nerve ablations which she was not deemed a candidate for. At this point, my only offer is for visco injections. Will place referral as she has failed steroid injections.     Here for series 2 of 3 Orthovisc    The patient has been ordered:  None    CONSULTS:   None    ACTIVE PROBLEM LIST  Patient Active Problem List   Diagnosis    Type 2 diabetes mellitus with diabetic neuropathy, without long-term current use of insulin    Benign hypertension    Hyperlipemia    Microhematuria    Left flank pain    Abdominal aortic atherosclerosis    History of renal cell cancer    Renal  cell carcinoma of right kidney    Mild major depression    Bilateral chronic knee pain    Colon cancer screening    Pain of left calf    Nuclear sclerosis of both eyes    Refractive error    Primary osteoarthritis of knees, bilateral    Fall    Decreased ROM of lower extremity    Decreased strength of lower extremity    Decreased mobility and endurance           SUBJECTIVE    CHIEF COMPLAINT: Knee Pain    HPI:   Valarie Foster is a 71 y.o. female here for evaluation and management of bilateral knee pain. There is not a specific incident that brought about this pain. she has had progressive problems with the knee(s) starting 2 years ago but is now progressing to interfere with activities which include: walking, rising from a sitting position, standing for prolonged periods of time and climbing stairs     Currently the pain in the joint is rated at mild with activity. The pain is constant and is located in the knee, at level of joint line, located medially, located laterally, located anterior, located posterior and with radiation. The pain is described as aching, severe, sharp, stabbing and stiffness. Relieving factors include ice or heat, prescription medication and repositioning.     There is associated Catching, Clicking and Popping.     Valarie Foster has no additional complaints.     Interval History 4/7/22: Continued pain. No changes. Saw pain management, not candidate for genicular nerve ablations.   Interval History 4/28/22: Continued pain. Some fear of falling. Follows with [facility]. DDD on lumbar spine. Waiting for PT eval. Here for start of Orthovisc series    Interval History 4/28/22: Here for 2 of 3 visco injections. Pain mildly improved compared to previous.     PROGRESSIVE SYMPTOMS:  Pain impacting sleep  Pain worsened by weight bearing    FUNCTIONAL STATUS:   Walk a block or two on level ground     PREVIOUS TREATMENTS:  Medical: Steroid Injections and Biologic Injections  Physical Therapy:  Activities Modified  and Formal Physical Therapy for 6 weeks  Previous Orthopaedic Surgery: None    REVIEW OF SYSTEMS:  PAIN ASSESSMENT:  See HPI.  MUSCULOSKELETAL: See HPI.  OTHER 10 point review of systems is negative except as stated in HPI above    PAST MEDICAL HISTORY   has a past medical history of Arthritis, Colon polyp, Diabetes mellitus, Diabetes with neurologic complications, Hypertension, Nuclear sclerosis of both eyes (2/14/2022), and Renal cell carcinoma.     PAST SURGICAL HISTORY   has a past surgical history that includes Hysterectomy; Oophorectomy; Colonoscopy (N/A, 2/5/2018); Partial nephrectomy (Right, 10/12/2018); and Colonoscopy (N/A, 8/13/2020).     FAMILY HISTORY  family history includes Blindness in her sister; Cancer in her sister; Diabetes in her brother and sister; Glaucoma in her father and sister; No Known Problems in her maternal aunt, maternal grandfather, maternal grandmother, maternal uncle, mother, paternal aunt, paternal grandfather, paternal grandmother, and paternal uncle; Thyroid disease in her sister.     SOCIAL HISTORY   reports that she has never smoked. She has never used smokeless tobacco. She reports that she does not drink alcohol and does not use drugs.     ALLERGIES   Review of patient's allergies indicates:   Allergen Reactions    Lisinopril Swelling and Shortness Of Breath    Ascorbic acid-ascorbate calc      And citrus fruits        MEDICATIONS  Current Outpatient Medications on File Prior to Visit   Medication Sig Dispense Refill    ACCU-CHEK ONEIL CONTROL SOLN Soln       ACCU-CHEK ONEIL PLUS METER Misc USE AS DIRECTED 1 each 0    ACCU-CHEK SOFTCLIX LANCETS Misc TEST BLOOD SUGAR ONE TIME DAILY 100 each 1    amLODIPine (NORVASC) 5 MG tablet TAKE 1 TABLET EVERY DAY 90 tablet 1    amoxicillin (AMOXIL) 875 MG tablet       BD ALCOHOL SWABS PadM USE ONE TIME DAILY 100 each 6    celecoxib (CELEBREX) 200 MG capsule TAKE 1 CAPSULE EVERY DAY 90 capsule 1     cyclobenzaprine (FLEXERIL) 5 MG tablet TAKE 1 TABLET THREE TIMES DAILY AS NEEDED FOR MUSCLE SPASM(S) 270 tablet 1    diclofenac sodium (VOLTAREN) 1 % Gel APPLY 2 GRAMS TOPICALLY ONCE DAILY. 100 g 3    diphenhydrAMINE (BENADRYL) 25 mg capsule Take 1 capsule (25 mg total) by mouth every 6 (six) hours as needed for Itching. 20 capsule 0    docusate sodium (COLACE) 100 MG capsule Take 1 capsule (100 mg total) by mouth 2 (two) times daily. 60 capsule 0    ergocalciferol (ERGOCALCIFEROL) 50,000 unit Cap TAKE 1 CAPSULE (50,000 UNITS TOTAL) BY MOUTH EVERY 7 DAYS. 12 capsule 1    famotidine (PEPCID) 20 MG tablet Take 1 tablet by mouth.      famotidine (PEPCID) 20 MG tablet TAKE 1 TABLET TWICE DAILY 180 tablet 1    FLUAD QUAD 2021-22,65Y UP,,PF, 60 mcg (15 mcg x 4)/0.5 mL Syrg       FLUoxetine 20 MG capsule TAKE 1 CAPSULE EVERY DAY 90 capsule 1    fluticasone propionate (FLONASE) 50 mcg/actuation nasal spray USE 1 SPRAY IN EACH NOSTRIL ONCE DAILY 32 g 1    gabapentin (NEURONTIN) 300 MG capsule TAKE 2 CAPSULES (600 MG TOTAL) BY MOUTH EVERY EVENING. 180 capsule 1    hydrOXYzine HCL (ATARAX) 25 MG tablet TAKE 1 TABLET THREE TIMES DAILY AS NEEDED FOR  ITCHING 90 tablet 0    KENALOG 40 mg/mL injection       lancets (TRUEPLUS LANCETS) 33 gauge Misc 1 lancet by Misc.(Non-Drug; Combo Route) route 2 (two) times a day. 200 each 1    meloxicam (MOBIC) 15 MG tablet Take 1 tablet (15 mg total) by mouth once daily. 30 tablet 1    pravastatin (PRAVACHOL) 10 MG tablet Take 1 tablet by mouth.      pravastatin (PRAVACHOL) 10 MG tablet TAKE 1 TABLET EVERY DAY 90 tablet 1    tobramycin sulfate 0.3% (TOBREX) 0.3 % ophthalmic solution 1-2 drops topically twice daily to affected toe(s). 5 mL 3    triamcinolone acetonide 0.1% (KENALOG) 0.1 % cream       TRUE METRIX GLUCOSE METER kit USE AS DIRECTED 1 each 0    TRUE METRIX GLUCOSE TEST STRIP Strp TEST BLOOD SUGAR TWICE DAILY 200 strip 1    blood glucose control, low (TRUE METRIX  "LEVEL 1) Soln 1 each by Misc.(Non-Drug; Combo Route) route once as needed. 1 each 3    cetirizine (ZYRTEC) 10 MG tablet Take 1 tablet (10 mg total) by mouth once daily. 90 tablet 0     No current facility-administered medications on file prior to visit.          PHYSICAL EXAM   height is 5' 4" (1.626 m) and weight is 89.4 kg (197 lb). Her blood pressure is 142/89 (abnormal) and her pulse is 99. Her respiration is 18 and oxygen saturation is 99%.   Body mass index is 33.81 kg/m².      All other systems deferred.  GENERAL:  No acute distress  HABITUS: Normal  GAIT: Antalgic  SKIN: Normal  and No erythema, warmth, fluctuance     KNEE EXAM:    bilateral:   Effusion: Minimal joint effusion  TTP: yes over Medial Joint Line, Lateral Joint Line, IT Band and Pes Bursa   no crepitus with passive knee ROM  Passive ROM: Extension 15, Flexion 100  No pain with manipulation of patella  Stable to varus/valgus stress. No increased laxity to anterior/posterior drawer testing  positive Constantin's test  No pain with IR/ER rotation of the hip  5/5 strength in knee flexion and extension, ankle plantarflexion and dorsiflexion  Neurovascular Status: Sensation intact to light touch in Sural, Saphenous, SPN, DPN, Tibial nerve distribution  2+ pulse DP/PT, normal capillary refill, foot has normal warmth    DATA:  Diagnostic tests reviewed for today's visit:     4v of the knee reveal Moderate degenerative changes of the Medial and Patellofemoral compartment. There is evidence of advanced osteoarthritis changes with Subchondral sclerosis, Osteophyte formation and Joint space narrowing. The limb is in netural alignment. The patella is tracking midline. Kellegren-Lawerence grade 3 changes      "

## 2022-05-06 NOTE — PROCEDURES
Large Joint Aspiration/Injection: bilateral knee    Date/Time: 5/6/2022 10:40 AM  Performed by: Eric Carbajal MD  Authorized by: Eric Carbajal MD     Consent Done?:  Yes (Verbal)  Indications:  Arthritis  Site marked: the procedure site was marked    Timeout: prior to procedure the correct patient, procedure, and site was verified    Prep: patient was prepped and draped in usual sterile fashion      Local anesthesia used?: Yes    Local anesthetic:  Topical anesthetic    Details:  Needle Size:  22 G  Approach:  Anterolateral  Location:  Knee  Laterality:  Bilateral  Site:  Bilateral knee  Medications (Right):  30 mg sodium hyaluronate (orthovisc) 30 mg/2 mL  Medications (Left):  30 mg sodium hyaluronate (orthovisc) 30 mg/2 mL  Patient tolerance:  Patient tolerated the procedure well with no immediate complications

## 2022-05-06 NOTE — PROGRESS NOTES
"Routine Office Visit    Valarie Foster  1950  [id number]      Subjective     Valarie is a 71 y.o. female who presents today for:    1. Diabetes mellitus - diet controlled - she has noticed that her blood glucose has increased. She has not changed her diet. She was previously on metformin in the past. She is willing to restart metformin.   2. Renal cell carcinoma s/p partial nephrectomy - right - Patient sees urology - Dr. Palm. She states she did not need chemo / radiation. Her kidney is doing well. She will continue follow-up.   3. Degenerative arthritis - she had recent injection into the knees. She is doing better. She continues to see physical therapy. She uses a cane to ambulate. She is careful with how she walks. She is concerned she might fall     Objective     Review of Systems   Constitutional: Negative for chills and fever.   HENT: Negative for congestion.    Eyes: Negative for blurred vision.   Respiratory: Negative for cough.    Cardiovascular: Negative for chest pain.   Gastrointestinal: Negative for abdominal pain, constipation, diarrhea, heartburn, nausea and vomiting.   Genitourinary: Negative for dysuria.   Musculoskeletal: Negative for myalgias.   Skin: Negative for itching and rash.   Neurological: Negative for dizziness and headaches.   Psychiatric/Behavioral: Negative for depression.       /88 (BP Location: Left arm, Patient Position: Sitting, BP Method: Large (Manual))   Pulse 101   Temp 97.7 °F (36.5 °C) (Oral)   Ht 5' 4" (1.626 m)   Wt 88.1 kg (194 lb 3.6 oz)   SpO2 96%   BMI 33.34 kg/m²   Physical Exam  Constitutional:       Appearance: She is well-developed.   HENT:      Head: Normocephalic and atraumatic.   Eyes:      Conjunctiva/sclera: Conjunctivae normal.      Pupils: Pupils are equal, round, and reactive to light.   Cardiovascular:      Rate and Rhythm: Normal rate and regular rhythm.      Heart sounds: Normal heart sounds. No murmur heard.    No friction rub. No gallop. "   Pulmonary:      Effort: No respiratory distress.      Breath sounds: Normal breath sounds.   Abdominal:      General: Bowel sounds are normal. There is no distension.      Palpations: Abdomen is soft.      Tenderness: There is no abdominal tenderness.   Musculoskeletal:         General: Normal range of motion.      Cervical back: Normal range of motion and neck supple.   Lymphadenopathy:      Cervical: No cervical adenopathy.   Skin:     General: Skin is warm.   Neurological:      Mental Status: She is alert and oriented to person, place, and time.           Assessment     Problem List Items Addressed This Visit        Psychiatric    Mild major depression    Relevant Orders    CBC Auto Differential    Comprehensive Metabolic Panel    Lipid Panel    Hemoglobin A1C    TSH  The current medical regimen is effective;  continue present plan and medications.         Cardiac/Vascular    Abdominal aortic atherosclerosis (Chronic)    Overview     Patient with Atherosclerosis of the Aorta noted on CT 4/2019.  Stable/asymptomatic. Currently stable on lipid and b/p monitoring.             Benign hypertension (Chronic)    Hyperlipemia (Chronic)  The current medical regimen is effective;  continue present plan and medications.         Endocrine    Type 2 diabetes mellitus with diabetic neuropathy, without long-term current use of insulin (Chronic)    Relevant Medications    metFORMIN (GLUCOPHAGE) 500 MG tablet  Patient was diet control  Restart metformin       Other Relevant Orders    CBC Auto Differential    Comprehensive Metabolic Panel    Lipid Panel    Hemoglobin A1C    TSH      Other Visit Diagnoses     Encounter for screening mammogram for breast cancer    -  Primary    Relevant Orders    Mammo Digital Screening Bilat w/ Les          Follow up in about 6 months (around 11/6/2022), or if symptoms worsen or fail to improve.

## 2022-05-12 ENCOUNTER — OFFICE VISIT (OUTPATIENT)
Dept: ORTHOPEDICS | Facility: CLINIC | Age: 72
End: 2022-05-12
Payer: MEDICARE

## 2022-05-12 VITALS
RESPIRATION RATE: 15 BRPM | DIASTOLIC BLOOD PRESSURE: 78 MMHG | OXYGEN SATURATION: 99 % | SYSTOLIC BLOOD PRESSURE: 125 MMHG | HEART RATE: 73 BPM | WEIGHT: 195 LBS | HEIGHT: 64 IN | BODY MASS INDEX: 33.29 KG/M2

## 2022-05-12 DIAGNOSIS — M17.0 PRIMARY OSTEOARTHRITIS OF BOTH KNEES: Primary | ICD-10-CM

## 2022-05-12 PROCEDURE — 99999 PR PBB SHADOW E&M-EST. PATIENT-LVL IV: CPT | Mod: PBBFAC,,, | Performed by: ORTHOPAEDIC SURGERY

## 2022-05-12 PROCEDURE — 1159F PR MEDICATION LIST DOCUMENTED IN MEDICAL RECORD: ICD-10-PCS | Mod: CPTII,S$GLB,, | Performed by: ORTHOPAEDIC SURGERY

## 2022-05-12 PROCEDURE — 1125F AMNT PAIN NOTED PAIN PRSNT: CPT | Mod: CPTII,S$GLB,, | Performed by: ORTHOPAEDIC SURGERY

## 2022-05-12 PROCEDURE — 1125F PR PAIN SEVERITY QUANTIFIED, PAIN PRESENT: ICD-10-PCS | Mod: CPTII,S$GLB,, | Performed by: ORTHOPAEDIC SURGERY

## 2022-05-12 PROCEDURE — 99999 PR PBB SHADOW E&M-EST. PATIENT-LVL IV: ICD-10-PCS | Mod: PBBFAC,,, | Performed by: ORTHOPAEDIC SURGERY

## 2022-05-12 PROCEDURE — 3008F BODY MASS INDEX DOCD: CPT | Mod: CPTII,S$GLB,, | Performed by: ORTHOPAEDIC SURGERY

## 2022-05-12 PROCEDURE — 99499 UNLISTED E&M SERVICE: CPT | Mod: S$GLB,,, | Performed by: ORTHOPAEDIC SURGERY

## 2022-05-12 PROCEDURE — 3074F PR MOST RECENT SYSTOLIC BLOOD PRESSURE < 130 MM HG: ICD-10-PCS | Mod: CPTII,S$GLB,, | Performed by: ORTHOPAEDIC SURGERY

## 2022-05-12 PROCEDURE — 3066F PR DOCUMENTATION OF TREATMENT FOR NEPHROPATHY: ICD-10-PCS | Mod: CPTII,S$GLB,, | Performed by: ORTHOPAEDIC SURGERY

## 2022-05-12 PROCEDURE — 3008F PR BODY MASS INDEX (BMI) DOCUMENTED: ICD-10-PCS | Mod: CPTII,S$GLB,, | Performed by: ORTHOPAEDIC SURGERY

## 2022-05-12 PROCEDURE — 3074F SYST BP LT 130 MM HG: CPT | Mod: CPTII,S$GLB,, | Performed by: ORTHOPAEDIC SURGERY

## 2022-05-12 PROCEDURE — 3061F PR NEG MICROALBUMINURIA RESULT DOCUMENTED/REVIEW: ICD-10-PCS | Mod: CPTII,S$GLB,, | Performed by: ORTHOPAEDIC SURGERY

## 2022-05-12 PROCEDURE — 3078F DIAST BP <80 MM HG: CPT | Mod: CPTII,S$GLB,, | Performed by: ORTHOPAEDIC SURGERY

## 2022-05-12 PROCEDURE — 3078F PR MOST RECENT DIASTOLIC BLOOD PRESSURE < 80 MM HG: ICD-10-PCS | Mod: CPTII,S$GLB,, | Performed by: ORTHOPAEDIC SURGERY

## 2022-05-12 PROCEDURE — 20610 LARGE JOINT ASPIRATION/INJECTION: BILATERAL KNEE: ICD-10-PCS | Mod: 50,S$GLB,, | Performed by: ORTHOPAEDIC SURGERY

## 2022-05-12 PROCEDURE — 20610 DRAIN/INJ JOINT/BURSA W/O US: CPT | Mod: 50,S$GLB,, | Performed by: ORTHOPAEDIC SURGERY

## 2022-05-12 PROCEDURE — 3061F NEG MICROALBUMINURIA REV: CPT | Mod: CPTII,S$GLB,, | Performed by: ORTHOPAEDIC SURGERY

## 2022-05-12 PROCEDURE — 3066F NEPHROPATHY DOC TX: CPT | Mod: CPTII,S$GLB,, | Performed by: ORTHOPAEDIC SURGERY

## 2022-05-12 PROCEDURE — 3051F HG A1C>EQUAL 7.0%<8.0%: CPT | Mod: CPTII,S$GLB,, | Performed by: ORTHOPAEDIC SURGERY

## 2022-05-12 PROCEDURE — 99499 NO LOS: ICD-10-PCS | Mod: S$GLB,,, | Performed by: ORTHOPAEDIC SURGERY

## 2022-05-12 PROCEDURE — 3051F PR MOST RECENT HEMOGLOBIN A1C LEVEL 7.0 - < 8.0%: ICD-10-PCS | Mod: CPTII,S$GLB,, | Performed by: ORTHOPAEDIC SURGERY

## 2022-05-12 PROCEDURE — 1159F MED LIST DOCD IN RCRD: CPT | Mod: CPTII,S$GLB,, | Performed by: ORTHOPAEDIC SURGERY

## 2022-05-12 NOTE — PROGRESS NOTES
Follow up PATIENT ORTHOPAEDIC: Knee    PRIMARY CARE PHYSICIAN: Alena Hernandez MD   REFERRING PROVIDER: Eric Carbajal MD  [address] [facility]  [place] [id number]     ASSESSMENT & PLAN:    Impression:  Bilateral Knee Moderate Osteoarthritis, primary     Follow Up Plan: 1 month or PRN     Non operative care:    Valarie Foster has physical exam evidence of above and wishes to pursue an non-operative care. I am recommending the following: injections.  Patient has bilateral knee pain left worse than right.  She has radiographic imaging that is consistent with moderate arthritis.  I am hesitant to consider surgical intervention on this patient is she has pain that is out of proportion to what would be expected for this level of arthritis.  I think she would be someone who would be worrisome for continued pain an development of arthrofibrosis.  She has calf pain which was recently worked up and negative for any blood clot.  She has significant tenderness to palpation over her patella and quadriceps as well as along her medial joint line which is just more consistent with a global issue.  She has some rritability of her hip but CT scan from 2020 does not demonstrate any significant arthritis here.  She does have some upper lumbar degenerative disc disease on that same CT scan.  She denies any lumbar back issues. She saw [facility] for geniculate nerve ablations which she was not deemed a candidate for. At this point, my only offer is for visco injections. Will place referral as she has failed steroid injections.     Here for series 3 of 3 Orthovisc    The patient has been ordered:  None    CONSULTS:   None    ACTIVE PROBLEM LIST  Patient Active Problem List   Diagnosis    Type 2 diabetes mellitus with diabetic neuropathy, without long-term current use of insulin    Benign hypertension    Hyperlipemia    Microhematuria    Left flank pain    Abdominal aortic atherosclerosis    History of renal cell cancer    Renal cell  carcinoma of right kidney    Mild major depression    Bilateral chronic knee pain    Colon cancer screening    Pain of left calf    Nuclear sclerosis of both eyes    Refractive error    Primary osteoarthritis of knees, bilateral    Fall    Decreased ROM of lower extremity    Decreased strength of lower extremity    Decreased mobility and endurance           SUBJECTIVE    CHIEF COMPLAINT: Knee Pain    HPI:   Valarie Foster is a 71 y.o. female here for evaluation and management of bilateral knee pain. There is not a specific incident that brought about this pain. she has had progressive problems with the knee(s) starting 2 years ago but is now progressing to interfere with activities which include: walking, rising from a sitting position, standing for prolonged periods of time and climbing stairs     Currently the pain in the joint is rated at mild with activity. The pain is constant and is located in the knee, at level of joint line, located medially, located laterally, located anterior, located posterior and with radiation. The pain is described as aching, severe, sharp, stabbing and stiffness. Relieving factors include ice or heat, prescription medication and repositioning.     There is associated Catching, Clicking and Popping.     Valarie Foster has no additional complaints.     Interval History 4/7/22: Continued pain. No changes. Saw pain management, not candidate for genicular nerve ablations.   Interval History 4/28/22: Continued pain. Some fear of falling. Follows with [facility]. DDD on lumbar spine. Waiting for PT eval. Here for start of Orthovisc series    Interval History 5/3/22: Here for 2 of 3 visco injections. Pain mildly improved compared to previous.  Interval History 5/12/22: Here for 3 of 3 visco injections. Pain moderately improved compared to previous. Left knee, minimal pain.       PROGRESSIVE SYMPTOMS:  Pain impacting sleep  Pain worsened by weight bearing    FUNCTIONAL STATUS:   Walk  a block or two on level ground     PREVIOUS TREATMENTS:  Medical: Steroid Injections and Biologic Injections  Physical Therapy: Activities Modified  and Formal Physical Therapy for 6 weeks  Previous Orthopaedic Surgery: None    REVIEW OF SYSTEMS:  PAIN ASSESSMENT:  See HPI.  MUSCULOSKELETAL: See HPI.  OTHER 10 point review of systems is negative except as stated in HPI above    PAST MEDICAL HISTORY   has a past medical history of Arthritis, Colon polyp, Diabetes mellitus, Diabetes with neurologic complications, Hypertension, Nuclear sclerosis of both eyes (2/14/2022), and Renal cell carcinoma.     PAST SURGICAL HISTORY   has a past surgical history that includes Hysterectomy; Oophorectomy; Colonoscopy (N/A, 2/5/2018); Partial nephrectomy (Right, 10/12/2018); and Colonoscopy (N/A, 8/13/2020).     FAMILY HISTORY  family history includes Blindness in her sister; Cancer in her sister; Diabetes in her brother and sister; Glaucoma in her father and sister; No Known Problems in her maternal aunt, maternal grandfather, maternal grandmother, maternal uncle, mother, paternal aunt, paternal grandfather, paternal grandmother, and paternal uncle; Thyroid disease in her sister.     SOCIAL HISTORY   reports that she has never smoked. She has never used smokeless tobacco. She reports that she does not drink alcohol and does not use drugs.     ALLERGIES   Review of patient's allergies indicates:   Allergen Reactions    Lisinopril Swelling and Shortness Of Breath    Ascorbic acid-ascorbate calc      And citrus fruits        MEDICATIONS  Current Outpatient Medications on File Prior to Visit   Medication Sig Dispense Refill    ACCU-CHEK ONEIL CONTROL SOLN Soln       ACCU-CHEK ONEIL PLUS METER Misc USE AS DIRECTED 1 each 0    ACCU-CHEK SOFTCLIX LANCETS Misc TEST BLOOD SUGAR ONE TIME DAILY 100 each 1    amLODIPine (NORVASC) 5 MG tablet TAKE 1 TABLET EVERY DAY 90 tablet 1    BD ALCOHOL SWABS PadM USE ONE TIME DAILY 100 each 6     blood glucose control, low (TRUE METRIX LEVEL 1) Soln 1 each by Misc.(Non-Drug; Combo Route) route once as needed. 1 each 3    celecoxib (CELEBREX) 200 MG capsule TAKE 1 CAPSULE EVERY DAY 90 capsule 1    cetirizine (ZYRTEC) 10 MG tablet Take 1 tablet (10 mg total) by mouth once daily. 90 tablet 0    cyclobenzaprine (FLEXERIL) 5 MG tablet TAKE 1 TABLET THREE TIMES DAILY AS NEEDED FOR MUSCLE SPASM(S) 270 tablet 1    diphenhydrAMINE (BENADRYL) 25 mg capsule Take 1 capsule (25 mg total) by mouth every 6 (six) hours as needed for Itching. 20 capsule 0    docusate sodium (COLACE) 100 MG capsule Take 1 capsule (100 mg total) by mouth 2 (two) times daily. 60 capsule 0    ergocalciferol (ERGOCALCIFEROL) 50,000 unit Cap TAKE 1 CAPSULE (50,000 UNITS TOTAL) BY MOUTH EVERY 7 DAYS. 12 capsule 1    famotidine (PEPCID) 20 MG tablet Take 1 tablet by mouth.      FLUAD QUAD 2021-22,65Y UP,,PF, 60 mcg (15 mcg x 4)/0.5 mL Syrg       FLUoxetine 20 MG capsule TAKE 1 CAPSULE EVERY DAY 90 capsule 1    fluticasone propionate (FLONASE) 50 mcg/actuation nasal spray USE 1 SPRAY IN EACH NOSTRIL ONCE DAILY 32 g 1    gabapentin (NEURONTIN) 300 MG capsule TAKE 2 CAPSULES (600 MG TOTAL) BY MOUTH EVERY EVENING. 180 capsule 1    hydrOXYzine HCL (ATARAX) 25 MG tablet TAKE 1 TABLET THREE TIMES DAILY AS NEEDED FOR  ITCHING 90 tablet 0    KENALOG 40 mg/mL injection       lancets (TRUEPLUS LANCETS) 33 gauge Misc 1 lancet by Misc.(Non-Drug; Combo Route) route 2 (two) times a day. 200 each 1    meloxicam (MOBIC) 15 MG tablet Take 1 tablet (15 mg total) by mouth once daily. 30 tablet 1    metFORMIN (GLUCOPHAGE) 500 MG tablet Take 1 tablet (500 mg total) by mouth 2 (two) times daily with meals. 180 tablet 3    pravastatin (PRAVACHOL) 10 MG tablet TAKE 1 TABLET EVERY DAY 90 tablet 1    tobramycin sulfate 0.3% (TOBREX) 0.3 % ophthalmic solution 1-2 drops topically twice daily to affected toe(s). 5 mL 3    triamcinolone acetonide 0.1% (KENALOG)  0.1 % cream       TRUE METRIX GLUCOSE METER kit USE AS DIRECTED 1 each 0    TRUE METRIX GLUCOSE TEST STRIP Strp TEST BLOOD SUGAR TWICE DAILY 200 strip 1     No current facility-administered medications on file prior to visit.          PHYSICAL EXAM   vitals were not taken for this visit.   There is no height or weight on file to calculate BMI.      All other systems deferred.  GENERAL:  No acute distress  HABITUS: Normal  GAIT: Antalgic  SKIN: Normal  and No erythema, warmth, fluctuance     KNEE EXAM:    bilateral:   Effusion: Minimal joint effusion  TTP: yes over Medial Joint Line, Lateral Joint Line, IT Band and Pes Bursa   no crepitus with passive knee ROM  Passive ROM: Extension 15, Flexion 100  No pain with manipulation of patella  Stable to varus/valgus stress. No increased laxity to anterior/posterior drawer testing  positive Constantin's test  No pain with IR/ER rotation of the hip  5/5 strength in knee flexion and extension, ankle plantarflexion and dorsiflexion  Neurovascular Status: Sensation intact to light touch in Sural, Saphenous, SPN, DPN, Tibial nerve distribution  2+ pulse DP/PT, normal capillary refill, foot has normal warmth    DATA:  Diagnostic tests reviewed for today's visit:     4v of the knee reveal Moderate degenerative changes of the Medial and Patellofemoral compartment. There is evidence of advanced osteoarthritis changes with Subchondral sclerosis, Osteophyte formation and Joint space narrowing. The limb is in netural alignment. The patella is tracking midline. Kellegren-Lawerence grade 3 changes

## 2022-05-12 NOTE — PROCEDURES
Large Joint Aspiration/Injection: bilateral knee    Date/Time: 5/12/2022 11:40 AM  Performed by: Eric Carbajal MD  Authorized by: Eric Carbajal MD     Consent Done?:  Yes (Verbal)  Indications:  Arthritis  Site marked: the procedure site was marked    Timeout: prior to procedure the correct patient, procedure, and site was verified    Prep: patient was prepped and draped in usual sterile fashion      Local anesthesia used?: Yes    Local anesthetic:  Topical anesthetic    Details:  Needle Size:  22 G  Approach:  Anterolateral  Location:  Knee  Laterality:  Bilateral  Site:  Bilateral knee  Medications (Right):  15 mg sodium hyaluronate (orthovisc) 30 mg/2 mL  Medications (Left):  15 mg sodium hyaluronate (orthovisc) 30 mg/2 mL  Patient tolerance:  Patient tolerated the procedure well with no immediate complications

## 2022-05-13 ENCOUNTER — OFFICE VISIT (OUTPATIENT)
Dept: OPHTHALMOLOGY | Facility: CLINIC | Age: 72
End: 2022-05-13
Payer: MEDICARE

## 2022-05-13 DIAGNOSIS — H26.9 CORTICAL CATARACT OF BOTH EYES: ICD-10-CM

## 2022-05-13 DIAGNOSIS — I10 BENIGN HYPERTENSION: ICD-10-CM

## 2022-05-13 DIAGNOSIS — H52.7 REFRACTIVE ERROR: ICD-10-CM

## 2022-05-13 DIAGNOSIS — H25.13 NUCLEAR SCLEROSIS OF BOTH EYES: Primary | ICD-10-CM

## 2022-05-13 DIAGNOSIS — E11.9 DM TYPE 2 WITHOUT RETINOPATHY: ICD-10-CM

## 2022-05-13 PROCEDURE — 3061F PR NEG MICROALBUMINURIA RESULT DOCUMENTED/REVIEW: ICD-10-PCS | Mod: CPTII,S$GLB,, | Performed by: OPHTHALMOLOGY

## 2022-05-13 PROCEDURE — 1159F MED LIST DOCD IN RCRD: CPT | Mod: CPTII,S$GLB,, | Performed by: OPHTHALMOLOGY

## 2022-05-13 PROCEDURE — 3061F NEG MICROALBUMINURIA REV: CPT | Mod: CPTII,S$GLB,, | Performed by: OPHTHALMOLOGY

## 2022-05-13 PROCEDURE — 1160F RVW MEDS BY RX/DR IN RCRD: CPT | Mod: CPTII,S$GLB,, | Performed by: OPHTHALMOLOGY

## 2022-05-13 PROCEDURE — 92136 BIOMETRY: ICD-10-PCS | Mod: LT,S$GLB,, | Performed by: OPHTHALMOLOGY

## 2022-05-13 PROCEDURE — 92004 COMPRE OPH EXAM NEW PT 1/>: CPT | Mod: S$GLB,,, | Performed by: OPHTHALMOLOGY

## 2022-05-13 PROCEDURE — 1160F PR REVIEW ALL MEDS BY PRESCRIBER/CLIN PHARMACIST DOCUMENTED: ICD-10-PCS | Mod: CPTII,S$GLB,, | Performed by: OPHTHALMOLOGY

## 2022-05-13 PROCEDURE — 99999 PR PBB SHADOW E&M-EST. PATIENT-LVL III: CPT | Mod: PBBFAC,,, | Performed by: OPHTHALMOLOGY

## 2022-05-13 PROCEDURE — 3288F FALL RISK ASSESSMENT DOCD: CPT | Mod: CPTII,S$GLB,, | Performed by: OPHTHALMOLOGY

## 2022-05-13 PROCEDURE — 1159F PR MEDICATION LIST DOCUMENTED IN MEDICAL RECORD: ICD-10-PCS | Mod: CPTII,S$GLB,, | Performed by: OPHTHALMOLOGY

## 2022-05-13 PROCEDURE — 1100F PR PT FALLS ASSESS DOC 2+ FALLS/FALL W/INJURY/YR: ICD-10-PCS | Mod: CPTII,S$GLB,, | Performed by: OPHTHALMOLOGY

## 2022-05-13 PROCEDURE — 3051F PR MOST RECENT HEMOGLOBIN A1C LEVEL 7.0 - < 8.0%: ICD-10-PCS | Mod: CPTII,S$GLB,, | Performed by: OPHTHALMOLOGY

## 2022-05-13 PROCEDURE — 3066F PR DOCUMENTATION OF TREATMENT FOR NEPHROPATHY: ICD-10-PCS | Mod: CPTII,S$GLB,, | Performed by: OPHTHALMOLOGY

## 2022-05-13 PROCEDURE — 99999 PR PBB SHADOW E&M-EST. PATIENT-LVL III: ICD-10-PCS | Mod: PBBFAC,,, | Performed by: OPHTHALMOLOGY

## 2022-05-13 PROCEDURE — 3288F PR FALLS RISK ASSESSMENT DOCUMENTED: ICD-10-PCS | Mod: CPTII,S$GLB,, | Performed by: OPHTHALMOLOGY

## 2022-05-13 PROCEDURE — 3051F HG A1C>EQUAL 7.0%<8.0%: CPT | Mod: CPTII,S$GLB,, | Performed by: OPHTHALMOLOGY

## 2022-05-13 PROCEDURE — 1125F PR PAIN SEVERITY QUANTIFIED, PAIN PRESENT: ICD-10-PCS | Mod: CPTII,S$GLB,, | Performed by: OPHTHALMOLOGY

## 2022-05-13 PROCEDURE — 92004 PR EYE EXAM, NEW PATIENT,COMPREHESV: ICD-10-PCS | Mod: S$GLB,,, | Performed by: OPHTHALMOLOGY

## 2022-05-13 PROCEDURE — 1125F AMNT PAIN NOTED PAIN PRSNT: CPT | Mod: CPTII,S$GLB,, | Performed by: OPHTHALMOLOGY

## 2022-05-13 PROCEDURE — 2023F DILAT RTA XM W/O RTNOPTHY: CPT | Mod: CPTII,S$GLB,, | Performed by: OPHTHALMOLOGY

## 2022-05-13 PROCEDURE — 92136 OPHTHALMIC BIOMETRY: CPT | Mod: LT,S$GLB,, | Performed by: OPHTHALMOLOGY

## 2022-05-13 PROCEDURE — 3066F NEPHROPATHY DOC TX: CPT | Mod: CPTII,S$GLB,, | Performed by: OPHTHALMOLOGY

## 2022-05-13 PROCEDURE — 1100F PTFALLS ASSESS-DOCD GE2>/YR: CPT | Mod: CPTII,S$GLB,, | Performed by: OPHTHALMOLOGY

## 2022-05-13 PROCEDURE — 2023F PR DILATED RETINAL EXAM W/O EVID OF RETINOPATHY: ICD-10-PCS | Mod: CPTII,S$GLB,, | Performed by: OPHTHALMOLOGY

## 2022-05-13 NOTE — PROGRESS NOTES
Subjective:       Patient ID: Valarie Foster is a 71 y.o. female.    Chief Complaint: Cataract    HPI     DLS: 2/14/22 Dr. Esquivel     71 y.o. female is here for Cataract Eval per Dr. Esquivel. A couple of month   start experiencing eye pain, bilateral. Upon awaking eye lids are swollen.   Denies flashes. Couple of months ago notice floaters left eye. Blurred   vision at distance and near with correction. Do have trouble with glare.   Light sensitivity, left eye. Diplopia vision one on top of the other with   and w/o correction, notice couple of months ago.     Eye Med's: No gtts    Last edited by SANTA Collier on 5/13/2022  4:01 PM. (History)             Assessment:       1. Nuclear sclerosis of both eyes    2. Cortical cataract of both eyes    3. DM type 2 without retinopathy    4. Benign hypertension    5. Refractive error        Plan:       Visually significant cataract OU -Pt. Wants Sx.     DM-No NPDR OU.  HTN-No retinopathy OU.  RE      Cataract Surgery Consent: Patient with a visually significant cataract with difficulties of ADLs, reading, driving, night vision, glare (any and all).  Discussed with Patient/Family/Caregiver: options, risks and benefits, expectations of cataract surgery, utilized an eye model with questions and answers to facilitate discussion.  Discussed lens options and patient understands that glasses may be required for optimal vision for distance and/or near vision after cataract surgery.  The Patient/Family/Caregiver  voice good understanding and patient wishes to proceed with surgery.  The patient will likely benefit from surgery and patient signed consent for Left Eye.  Will call Pt to schedule CE OS 1st SN60WF 21.5,                                              OD 2nd SN60WF 22.0.  Control DM & HTN.

## 2022-05-18 ENCOUNTER — TELEPHONE (OUTPATIENT)
Dept: OPHTHALMOLOGY | Facility: CLINIC | Age: 72
End: 2022-05-18
Payer: MEDICARE

## 2022-05-18 DIAGNOSIS — H25.13 NUCLEAR SCLEROTIC CATARACT OF BOTH EYES: Primary | ICD-10-CM

## 2022-05-18 RX ORDER — DIFLUPREDNATE OPHTHALMIC 0.5 MG/ML
1 EMULSION OPHTHALMIC 4 TIMES DAILY
Qty: 5 ML | Refills: 1 | Status: SHIPPED | OUTPATIENT
Start: 2022-07-07 | End: 2022-08-06

## 2022-05-18 RX ORDER — NEPAFENAC 3 MG/ML
1 SUSPENSION/ DROPS OPHTHALMIC DAILY
Qty: 3 ML | Refills: 1 | Status: SHIPPED | OUTPATIENT
Start: 2022-07-04 | End: 2022-08-03

## 2022-05-18 RX ORDER — OFLOXACIN 3 MG/ML
1 SOLUTION/ DROPS OPHTHALMIC 4 TIMES DAILY
Qty: 5 ML | Refills: 1 | Status: SHIPPED | OUTPATIENT
Start: 2022-07-04 | End: 2022-07-14

## 2022-05-18 NOTE — TELEPHONE ENCOUNTER
----- Message from Jo Bella sent at 5/18/2022  9:28 AM CDT -----  Contact: [phone]  Patient is calling to schedule surgery.   Please contact her to discuss at [phone]

## 2022-05-28 DIAGNOSIS — L29.9 ITCHING: ICD-10-CM

## 2022-05-31 RX ORDER — HYDROXYZINE HYDROCHLORIDE 25 MG/1
TABLET, FILM COATED ORAL
Qty: 90 TABLET | Refills: 0 | Status: ON HOLD | OUTPATIENT
Start: 2022-05-31 | End: 2022-07-21

## 2022-06-07 ENCOUNTER — DOCUMENTATION ONLY (OUTPATIENT)
Dept: REHABILITATION | Facility: HOSPITAL | Age: 72
End: 2022-06-07
Payer: MEDICARE

## 2022-06-07 NOTE — PROGRESS NOTES
Pt no showed her visits on 6/7/22. PT called pt and she stated that she has a cold and therefore would not make her visit. PT reminded pt of her next visit on 6/17/22. Pt stated that she will make next visit.    PT reminded pt of no show/cancel policy and informed pt she will be taken off the schedule if she does not come to her next session.     No show: 3  Cx: 1      Sunita [place], PT, DPT  06/07/2022

## 2022-06-17 ENCOUNTER — TELEPHONE (OUTPATIENT)
Dept: OPHTHALMOLOGY | Facility: CLINIC | Age: 72
End: 2022-06-17
Payer: MEDICARE

## 2022-06-17 DIAGNOSIS — H25.12 NS (NUCLEAR SCLEROSIS), LEFT: Primary | ICD-10-CM

## 2022-06-17 RX ORDER — LANCETS 33 GAUGE
EACH MISCELLANEOUS
Qty: 300 EACH | Refills: 0 | OUTPATIENT
Start: 2022-06-17

## 2022-06-17 RX ORDER — BLOOD-GLUCOSE METER
EACH MISCELLANEOUS
Qty: 1 EACH | Refills: 0 | OUTPATIENT
Start: 2022-06-17

## 2022-06-17 RX ORDER — INSULIN PUMP SYRINGE, 3 ML
EACH MISCELLANEOUS
Refills: 0 | OUTPATIENT
Start: 2022-06-17

## 2022-06-18 NOTE — TELEPHONE ENCOUNTER
No new care gaps identified.  [facility] Embedded Care Gaps. Reference number: [id number]. 6/17/2022   9:55:51 PM CDT

## 2022-06-18 NOTE — TELEPHONE ENCOUNTER
No new care gaps identified.  [facility] Embedded Care Gaps. Reference number: [id number]. 6/17/2022   9:54:30 PM CDT

## 2022-06-18 NOTE — TELEPHONE ENCOUNTER
No new care gaps identified.  [facility] Embedded Care Gaps. Reference number: [id number]. 6/17/2022   9:56:39 PM CDT

## 2022-06-18 NOTE — TELEPHONE ENCOUNTER
Refill Decision Note   Valarie Foster  is requesting a refill authorization.  Brief Assessment and Rationale for Refill:  Quick Discontinue     Medication Therapy Plan:    Pharmacy is requesting new scripts for the following medications without required information, (sig/ frequency/qty/etc)      Medication Reconciliation Completed: No     Comments: Pharmacies have been requesting medications for patients without required information, (sig, frequency, qty, etc.). In addition, requests are sent for medication(s) pt. are currently not taking, and medications patients have never taken.    We have spoken to the pharmacies about these request types and advised their teams previously that we are unable to assess these New Script requests and require all details for these requests. This is a known issue and has been reported.     Note composed:10:23 PM 06/17/2022

## 2022-06-18 NOTE — TELEPHONE ENCOUNTER
Refill Decision Note   Valarie Foster  is requesting a refill authorization.  Brief Assessment and Rationale for Refill:  Quick Discontinue     Medication Therapy Plan:    Pharmacy is requesting new scripts for the following medications without required information, (sig/ frequency/qty/etc)      Medication Reconciliation Completed: No     Comments: Pharmacies have been requesting medications for patients without required information, (sig, frequency, qty, etc.). In addition, requests are sent for medication(s) pt. are currently not taking, and medications patients have never taken.    We have spoken to the pharmacies about these request types and advised their teams previously that we are unable to assess these New Script requests and require all details for these requests. This is a known issue and has been reported.     Note composed:10:22 PM 06/17/2022

## 2022-06-18 NOTE — TELEPHONE ENCOUNTER
No new care gaps identified.  [facility] Embedded Care Gaps. Reference number: [id number]. 6/17/2022   9:55:15 PM CDT

## 2022-06-27 ENCOUNTER — ANESTHESIA EVENT (OUTPATIENT)
Dept: SURGERY | Facility: HOSPITAL | Age: 72
End: 2022-06-27
Payer: MEDICARE

## 2022-06-27 NOTE — PLAN OF CARE
Pre-operative instructions, medication directives and pain scales reviewed with patient. All questions the patient had were answered. Re-assurance about surgical procedure and day of surgery routine given as needed, patient verbalized understanding of the pre-op instructions.    Phone preop done.  Arrival 6:00 am

## 2022-07-04 NOTE — H&P
[facility] - Surgery  History & Physical    Subjective:      Chief Complaint/Reason for Admission:     Valarie Foster is a 72 y.o. female.    Past Medical History:   Diagnosis Date    Arthritis     Colon polyp     Diabetes mellitus     diet controlled    Diabetes with neurologic complications     Hypertension     Nuclear sclerosis of both eyes 2/14/2022    Renal cell carcinoma      Past Surgical History:   Procedure Laterality Date    COLONOSCOPY N/A 2/5/2018    Procedure: COLONOSCOPY;  Surgeon: Bacilio Mancia MD;  Location: [facility] ENDO;  Service: Endoscopy;  Laterality: N/A;  appt confirmed-ss    COLONOSCOPY N/A 8/13/2020    Procedure: COLONOSCOPY;  Surgeon: Jose West MD;  Location: [facility] ENDO;  Service: Endoscopy;  Laterality: N/A;    HYSTERECTOMY      OOPHORECTOMY      PARTIAL NEPHRECTOMY Right 10/12/2018    Procedure: NEPHRECTOMY, PARTIAL open. Dr Arenas to assist.;  Surgeon: Alejandra Palm MD;  Location: [facility] OR;  Service: Urology;  Laterality: Right;  RN PREOP 10/9/2018----NEEDS H/P     Family History   Problem Relation Age of Onset    Glaucoma Sister     Cancer Sister         breast cancer    No Known Problems Mother     Glaucoma Father     Diabetes Brother     No Known Problems Maternal Aunt     No Known Problems Maternal Uncle     No Known Problems Paternal Aunt     No Known Problems Paternal Uncle     No Known Problems Maternal Grandmother     No Known Problems Maternal Grandfather     No Known Problems Paternal Grandmother     No Known Problems Paternal Grandfather     Thyroid disease Sister     Blindness Sister         due to trauma during car accident    Diabetes Sister     Amblyopia Neg Hx     Cataracts Neg Hx     Hypertension Neg Hx     Macular degeneration Neg Hx     Retinal detachment Neg Hx     Strabismus Neg Hx     Stroke Neg Hx     Breast cancer Neg Hx      Social History     Tobacco Use    Smoking status: Never Smoker    Smokeless tobacco:  Never Used   Substance Use Topics    Alcohol use: No    Drug use: No       No medications prior to admission.     Review of patient's allergies indicates:   Allergen Reactions    Lisinopril Swelling and Shortness Of Breath    Ascorbic acid-ascorbate calc      And citrus fruits        Review of Systems   Eyes: Positive for blurred vision.   All other systems reviewed and are negative.      Objective:      Vital Signs (Most Recent)       Vital Signs Range (Last 24H):       Physical Exam  Constitutional:       Appearance: She is well-developed.   HENT:      Head: Normocephalic.   Eyes:      Conjunctiva/sclera: Conjunctivae normal.      Pupils: Pupils are equal, round, and reactive to light.   Cardiovascular:      Rate and Rhythm: Normal rate and regular rhythm.      Heart sounds: Normal heart sounds.   Pulmonary:      Effort: Pulmonary effort is normal.      Breath sounds: Normal breath sounds.   Abdominal:      General: Bowel sounds are normal.      Palpations: Abdomen is soft.   Musculoskeletal:         General: Normal range of motion.      Cervical back: Normal range of motion and neck supple.   Skin:     General: Skin is warm.   Neurological:      Mental Status: She is alert and oriented to person, place, and time.         Data Review:    ECG:     Assessment:      Cataract OS.    Plan:    CE OS.

## 2022-07-05 ENCOUNTER — TELEPHONE (OUTPATIENT)
Dept: OPHTHALMOLOGY | Facility: CLINIC | Age: 72
End: 2022-07-05
Payer: MEDICARE

## 2022-07-05 DIAGNOSIS — H25.11 NS (NUCLEAR SCLEROSIS), RIGHT: Primary | ICD-10-CM

## 2022-07-07 ENCOUNTER — ANESTHESIA (OUTPATIENT)
Dept: SURGERY | Facility: HOSPITAL | Age: 72
End: 2022-07-07
Payer: MEDICARE

## 2022-07-07 ENCOUNTER — HOSPITAL ENCOUNTER (OUTPATIENT)
Facility: HOSPITAL | Age: 72
Discharge: HOME | End: 2022-07-07
Attending: OPHTHALMOLOGY | Admitting: OPHTHALMOLOGY
Payer: MEDICARE

## 2022-07-07 VITALS
SYSTOLIC BLOOD PRESSURE: 150 MMHG | RESPIRATION RATE: 17 BRPM | HEIGHT: 64 IN | OXYGEN SATURATION: 97 % | WEIGHT: 189 LBS | BODY MASS INDEX: 32.27 KG/M2 | HEART RATE: 88 BPM | DIASTOLIC BLOOD PRESSURE: 84 MMHG | TEMPERATURE: 98 F

## 2022-07-07 DIAGNOSIS — H25.12 NUCLEAR SCLEROTIC CATARACT OF LEFT EYE: Primary | ICD-10-CM

## 2022-07-07 LAB — POCT GLUCOSE: 152 MG/DL (ref 70–110)

## 2022-07-07 PROCEDURE — D9220A PRA ANESTHESIA: Mod: ANES,,, | Performed by: ANESTHESIOLOGY

## 2022-07-07 PROCEDURE — 82962 GLUCOSE BLOOD TEST: CPT | Performed by: OPHTHALMOLOGY

## 2022-07-07 PROCEDURE — D9220A PRA ANESTHESIA: Mod: CRNA,,, | Performed by: STUDENT IN AN ORGANIZED HEALTH CARE EDUCATION/TRAINING PROGRAM

## 2022-07-07 PROCEDURE — 71000016 HC POSTOP RECOV ADDL HR: Performed by: OPHTHALMOLOGY

## 2022-07-07 PROCEDURE — D9220A PRA ANESTHESIA: ICD-10-PCS | Mod: CRNA,,, | Performed by: STUDENT IN AN ORGANIZED HEALTH CARE EDUCATION/TRAINING PROGRAM

## 2022-07-07 PROCEDURE — D9220A PRA ANESTHESIA: ICD-10-PCS | Mod: ANES,,, | Performed by: ANESTHESIOLOGY

## 2022-07-07 PROCEDURE — 63600175 PHARM REV CODE 636 W HCPCS: Performed by: OPHTHALMOLOGY

## 2022-07-07 PROCEDURE — 37000009 HC ANESTHESIA EA ADD 15 MINS: Performed by: OPHTHALMOLOGY

## 2022-07-07 PROCEDURE — 25000003 PHARM REV CODE 250: Performed by: OPHTHALMOLOGY

## 2022-07-07 PROCEDURE — 66984 PR REMOVAL, CATARACT, W/INSRT INTRAOC LENS, W/O ENDO CYCLO: ICD-10-PCS | Mod: LT,,, | Performed by: OPHTHALMOLOGY

## 2022-07-07 PROCEDURE — 63600175 PHARM REV CODE 636 W HCPCS: Performed by: ANESTHESIOLOGY

## 2022-07-07 PROCEDURE — 37000008 HC ANESTHESIA 1ST 15 MINUTES: Performed by: OPHTHALMOLOGY

## 2022-07-07 PROCEDURE — 66984 XCAPSL CTRC RMVL W/O ECP: CPT | Mod: LT,,, | Performed by: OPHTHALMOLOGY

## 2022-07-07 PROCEDURE — V2632 POST CHMBR INTRAOCULAR LENS: HCPCS | Performed by: OPHTHALMOLOGY

## 2022-07-07 PROCEDURE — 36000707: Performed by: OPHTHALMOLOGY

## 2022-07-07 PROCEDURE — 25000003 PHARM REV CODE 250

## 2022-07-07 PROCEDURE — 71000015 HC POSTOP RECOV 1ST HR: Performed by: OPHTHALMOLOGY

## 2022-07-07 PROCEDURE — 63600175 PHARM REV CODE 636 W HCPCS: Performed by: STUDENT IN AN ORGANIZED HEALTH CARE EDUCATION/TRAINING PROGRAM

## 2022-07-07 PROCEDURE — 36000706: Performed by: OPHTHALMOLOGY

## 2022-07-07 DEVICE — LENS CLAREON AUTONOME 21.5D: Type: IMPLANTABLE DEVICE | Site: EYE | Status: FUNCTIONAL

## 2022-07-07 RX ORDER — SODIUM CHLORIDE 0.9 % (FLUSH) 0.9 %
10 SYRINGE (ML) INJECTION
Status: DISCONTINUED | OUTPATIENT
Start: 2022-07-07 | End: 2023-09-19

## 2022-07-07 RX ORDER — ONDANSETRON 2 MG/ML
INJECTION INTRAMUSCULAR; INTRAVENOUS
Status: DISCONTINUED | OUTPATIENT
Start: 2022-07-07 | End: 2022-07-07

## 2022-07-07 RX ORDER — ACETAMINOPHEN 325 MG/1
650 TABLET ORAL EVERY 4 HOURS PRN
Status: DISCONTINUED | OUTPATIENT
Start: 2022-07-07 | End: 2022-07-07 | Stop reason: HOSPADM

## 2022-07-07 RX ORDER — LIDOCAINE HYDROCHLORIDE 10 MG/ML
1 INJECTION, SOLUTION EPIDURAL; INFILTRATION; INTRACAUDAL; PERINEURAL ONCE
Status: DISCONTINUED | OUTPATIENT
Start: 2022-07-07 | End: 2022-07-07 | Stop reason: HOSPADM

## 2022-07-07 RX ORDER — TETRACAINE HYDROCHLORIDE 5 MG/ML
1 SOLUTION OPHTHALMIC
Status: COMPLETED | OUTPATIENT
Start: 2022-07-07 | End: 2022-07-07

## 2022-07-07 RX ORDER — CYCLOPENTOLATE HYDROCHLORIDE 10 MG/ML
1 SOLUTION/ DROPS OPHTHALMIC
Status: DISCONTINUED | OUTPATIENT
Start: 2022-07-07 | End: 2023-09-19

## 2022-07-07 RX ORDER — SODIUM CHLORIDE, SODIUM LACTATE, POTASSIUM CHLORIDE, CALCIUM CHLORIDE 600; 310; 30; 20 MG/100ML; MG/100ML; MG/100ML; MG/100ML
INJECTION, SOLUTION INTRAVENOUS CONTINUOUS
Status: DISCONTINUED | OUTPATIENT
Start: 2022-07-07 | End: 2022-07-07 | Stop reason: HOSPADM

## 2022-07-07 RX ORDER — OFLOXACIN 3 MG/ML
1 SOLUTION/ DROPS OPHTHALMIC
Status: DISCONTINUED | OUTPATIENT
Start: 2022-07-07 | End: 2023-09-19

## 2022-07-07 RX ORDER — LIDOCAINE HYDROCHLORIDE 20 MG/ML
INJECTION, SOLUTION INFILTRATION; PERINEURAL
Status: DISCONTINUED | OUTPATIENT
Start: 2022-07-07 | End: 2022-07-07 | Stop reason: HOSPADM

## 2022-07-07 RX ORDER — HYDROCODONE BITARTRATE AND ACETAMINOPHEN 5; 325 MG/1; MG/1
1 TABLET ORAL EVERY 4 HOURS PRN
Status: DISCONTINUED | OUTPATIENT
Start: 2022-07-07 | End: 2022-07-07 | Stop reason: HOSPADM

## 2022-07-07 RX ORDER — POVIDONE-IODINE 5 %
SOLUTION, NON-ORAL OPHTHALMIC (EYE)
Status: DISCONTINUED | OUTPATIENT
Start: 2022-07-07 | End: 2022-07-07 | Stop reason: HOSPADM

## 2022-07-07 RX ORDER — PHENYLEPHRINE HYDROCHLORIDE 25 MG/ML
1 SOLUTION/ DROPS OPHTHALMIC
Status: COMPLETED | OUTPATIENT
Start: 2022-07-07 | End: 2022-07-07

## 2022-07-07 RX ORDER — PREDNISOLONE ACETATE 10 MG/ML
SUSPENSION/ DROPS OPHTHALMIC
Status: DISCONTINUED | OUTPATIENT
Start: 2022-07-07 | End: 2022-07-07 | Stop reason: HOSPADM

## 2022-07-07 RX ORDER — FENTANYL CITRATE 50 UG/ML
INJECTION, SOLUTION INTRAMUSCULAR; INTRAVENOUS
Status: DISCONTINUED | OUTPATIENT
Start: 2022-07-07 | End: 2022-07-07

## 2022-07-07 RX ORDER — TROPICAMIDE 10 MG/ML
1 SOLUTION/ DROPS OPHTHALMIC
Status: COMPLETED | OUTPATIENT
Start: 2022-07-07 | End: 2022-07-07

## 2022-07-07 RX ADMIN — SODIUM CHLORIDE, SODIUM LACTATE, POTASSIUM CHLORIDE, AND CALCIUM CHLORIDE: .6; .31; .03; .02 INJECTION, SOLUTION INTRAVENOUS at 06:07

## 2022-07-07 RX ADMIN — FENTANYL CITRATE 25 MCG: 50 INJECTION, SOLUTION INTRAMUSCULAR; INTRAVENOUS at 07:07

## 2022-07-07 RX ADMIN — TROPICAMIDE 1 DROP: 10 SOLUTION/ DROPS OPHTHALMIC at 06:07

## 2022-07-07 RX ADMIN — ONDANSETRON 4 MG: 2 INJECTION, SOLUTION INTRAMUSCULAR; INTRAVENOUS at 07:07

## 2022-07-07 RX ADMIN — OFLOXACIN 1 DROP: 3 SOLUTION OPHTHALMIC at 07:07

## 2022-07-07 RX ADMIN — PHENYLEPHRINE HYDROCHLORIDE 1 DROP: 25 SOLUTION/ DROPS OPHTHALMIC at 06:07

## 2022-07-07 RX ADMIN — HYDROCODONE BITARTRATE AND ACETAMINOPHEN 1 TABLET: 5; 325 TABLET ORAL at 08:07

## 2022-07-07 RX ADMIN — OFLOXACIN 1 DROP: 3 SOLUTION OPHTHALMIC at 06:07

## 2022-07-07 RX ADMIN — TETRACAINE HYDROCHLORIDE 1 DROP: 5 SOLUTION OPHTHALMIC at 06:07

## 2022-07-07 RX ADMIN — CYCLOPENTOLATE HYDROCHLORIDE 1 DROP: 10 SOLUTION/ DROPS OPHTHALMIC at 06:07

## 2022-07-07 RX ADMIN — FENTANYL CITRATE 25 MCG: 50 INJECTION, SOLUTION INTRAMUSCULAR; INTRAVENOUS at 08:07

## 2022-07-07 RX ADMIN — CYCLOPENTOLATE HYDROCHLORIDE 1 DROP: 10 SOLUTION/ DROPS OPHTHALMIC at 07:07

## 2022-07-07 RX ADMIN — TROPICAMIDE 1 DROP: 10 SOLUTION/ DROPS OPHTHALMIC at 07:07

## 2022-07-07 RX ADMIN — PHENYLEPHRINE HYDROCHLORIDE 1 DROP: 25 SOLUTION/ DROPS OPHTHALMIC at 07:07

## 2022-07-07 RX ADMIN — TETRACAINE HYDROCHLORIDE 1 DROP: 5 SOLUTION OPHTHALMIC at 07:07

## 2022-07-07 NOTE — BRIEF OP NOTE
[facility] [place] - Surgery  Brief Operative Note    Surgery Date: 7/7/2022     Surgeon(s) and Role:     * Candelario Novoa MD - Primary    Assisting Surgeon: None    Pre-op Diagnosis:  NS (nuclear sclerosis), left [H25.12]    Post-op Diagnosis:  Post-Op Diagnosis Codes:     * NS (nuclear sclerosis), left [H25.12]    Procedure(s) (LRB):  PHACOEMULSIFICATION, CATARACT (Left)  INSERTION, IOL PROSTHESIS (Left)    Anesthesia: Local MAC    Operative Findings:     Estimated Blood Loss: * No values recorded between 7/7/2022  7:54 AM and 7/7/2022  8:23 AM *         Specimens:   Specimen (24h ago, onward)            None            Discharge Note    OUTCOME: Patient tolerated treatment/procedure well without complication and is now ready for discharge.    DISPOSITION: Home or Self Care    FINAL DIAGNOSIS:  Nuclear sclerotic cataract of left eye    FOLLOWUP: In clinic    DISCHARGE INSTRUCTIONS:    Discharge Procedure Orders   Other restrictions (specify):   Order Comments: No heavy lifting or bending for 1 week.

## 2022-07-07 NOTE — DISCHARGE INSTRUCTIONS
ACTIVITY LEVEL:  If you received sedation or an anesthetic, you may feel sleepy for several hours. Rest until you are more awake. Gradually resume your normal activities. Normal activity will cause no undue risk to your eye. The white part of your eye might be red - this is nothing to worry about. Wear your old glasses or sunglasses that were given to you for protection during the day.    RESTRICTIONS - for the next 7 days  · Do not lift anything over 10 pounds.  · When bending, bend at the knees not the waist.  · Do not rub the eye.  · Do not get water in the eye.  · Do not sleep on the side that had surgery.  · Protect your eye at bedtime with the shield provided.    DIET: At home, continue with liquids. If there is no nausea, you may eat a soft diet and gradually resume your normal diet. Limit alcohol intake for 24 hours.    BATHING: You may shower or bathe as normal. You may take a warm wash cloth, which has been wrung out to remove excess water, and gently remove crusting on the lashes.    MEDICATIONS: You may take Extra Strength Tylenol every 4 hours for pain/headache.     Use your drops      Today: Pink-     12 noon,    4 pm,     8 pm      Beige -   12 noon,     4 pm,    8 pm      Gonsalez-      12 noon    Tomorrow:  Resume pink and beige cap drops four times a day.  Resume grey cap drops once a day.    Place one drop in the eye that had surgery from the first bottle. Wait 5 minutes and then use the second bottle. (It does not matter which bottle is used first.) CONTINUE all glaucoma drops.  No driving, alcoholic beverages or signing legal documents for next 24 hours or while taking pain medication      WHEN TO CALL THE DOCTOR:  · Redness that increases significantly  · Pain not relieved by Tylenol  RETURN APPOINTMENT:  You will need to see Dr. Novoa on Friday at the [facility] .  Bring your eye kit with you on your follow-up visit. Your kit contains sunglasses, eye shield, tape.  FOR EMERGENCIES:  If  any unusual problems or difficulties occur, contact Dr. Novoa at the Clinic office at [phone] during normal business hours. If after hours, call [phone].       Fall Prevention  Millions of people fall every year and injure themselves. You may have had anesthesia or sedation which may increase your risk of falling. You may have health issues that put you at an increased risk of falling.     Here are ways to reduce your risk of falling.    Make your home safe by keeping walkways clear of objects you may trip over.  Use non-slip pads under rugs. Do not use area rugs or small throw rugs.  Use non-slip mats in bathtubs and showers.  Install handrails and lights on staircases.  Do not walk in poorly lit areas.  Do not stand on chairs or wobbly ladders.  Use caution when reaching overhead or looking upward. This position can cause a loss of balance.  Be sure your shoes fit properly, have non-slip bottoms and are in good condition.   Wear shoes both inside and out. Avoid going barefoot or wearing slippers.  Be cautious when going up and down stairs, curbs, and when walking on uneven sidewalks.  If your balance is poor, consider using a cane or walker.  If your fall was related to alcohol use, stop or limit alcohol intake.   If your fall was related to use of sleeping medicines, talk to your doctor about this. You may need to reduce your dosage at bedtime if you awaken during the night to go to the bathroom.    To reduce the need for nighttime bathroom trips:  Avoid drinking fluids for several hours before going to bed  Empty your bladder before going to bed  Men can keep a urinal at the bedside  Stay as active as you can. Balance, flexibility, strength, and endurance all come from exercise. They all play a role in preventing falls. Ask your healthcare provider which types of activity are right for you.  Get your vision checked on a regular basis.  If you have pets, know where they are before you stand up  or walk so you don't trip over them.  Use night lights.

## 2022-07-07 NOTE — ANESTHESIA PREPROCEDURE EVALUATION
07/07/2022  Valarie Foster is a 72 y.o., female.      Pre-op Assessment          Review of Systems  Anesthesia Hx:  No previous Anesthesia    Social:  Non-Smoker    Hematology/Oncology:  Hematology Normal   Oncology Normal     EENT/Dental:EENT/Dental Normal   Cardiovascular:   Hypertension    Pulmonary:  Pulmonary Normal    Renal/:   Chronic Renal Disease    Hepatic/GI:  Hepatic/GI Normal    Musculoskeletal:   Arthritis     Neurological:  Neurology Normal    Endocrine:   Diabetes    Dermatological:  Skin Normal    Psych:   Psychiatric History          Physical Exam  General: Well nourished    Airway:  Mallampati: II   Mouth Opening: Normal  TM Distance: 4 - 6 cm  Tongue: Normal  Neck ROM: Normal ROM        Anesthesia Plan  Type of Anesthesia, risks & benefits discussed:    Anesthesia Type: MAC  Post Op Pain Control Plan: multimodal analgesia  Airway Plan: Direct and Video  Informed Consent: Patient consented to blood products? Yes  ASA Score: 3    Ready For Surgery From Anesthesia Perspective.     .

## 2022-07-07 NOTE — OP NOTE
Operative Date:  07/07/2022    Discharge Date:  07/07/2022    Discharge Patient Home    Report Title: Operative Note      SURGEON: Candelario Novoa MD     ASSISTANT:     PREOPERATIVE DIAGNOSIS: Visually significant NSC cataract,  Left Eye.    POSTOPERATIVE DIAGNOSIS: Visually-significant NSC cataract,  Left Eye.    PROCEDURE PERFORMED: Phacoemulsification of the cataract with posterior chamber intraocular lens Left Eye.    ANESTHESIA: Topical with MAC    COMPLICATIONS: None    ESTIMATED BLOOD LOSS: Minimal    INDICATIONS FOR PROCEDURE:   The patient is a pleasant 72 year old woman with increasing difficulties with activities of daily living secondary to a dense visually significant cataract in the Left Eye.  Discussions have been carried out with this patient concerning the options to surgery, risks, benefits and expectations.  The patient voiced good understanding and wished to proceed with the above procedure.    PROCEDURE IN DETAIL: The patient was brought to the operating room and received topical anesthetic to the eye and then was prepped and draped in the usual sterile fashion.  Using the Mandel ring and the guarded beth blade set at 0.37 mm, a partial thickness clear cornea incision was made temporally.  The paracentesis site was made at the six o'clock position.  Omidria was injected into the anterior chamber through the paracentesis.  Viscoat was then injected into the anterior chamber.  The eye was then reentered at the primary surgical site with a 2.4 mm keratome followed by continuous capsulotomy, hydrodissection, hydrodelineation and phacoemulsification of the cataract.  Residual cortical material was removed using automated irrigation-aspiration technique.  Healon was injected into the posterior chamber and a Clareon  21.5 diopter lens was placed in the bag without difficulty. Residual viscoelastic was removed using automated irrigation-aspiration technique. The eye was re-pressurized using  BSS solution and both the paracentesis site and the primary surgical site were demonstrated to be watertight at the end of the case with Weck--Eleanor manipulation.  One drop of Ofloxacin and one drop of Pred acetate 1% was applied to the Left Eye .The eye was closed, patched and a Reyes shield placed.  The patient was taken to the recovery room in good and stable condition.  The patient tolerated the procedure well.  The patient was instructed to refrain from any heavy lifting, bending, stooping or straining activities, discharged home  and to follow-up in the morning for routine postoperative care with Candelario Novoa MD.

## 2022-07-07 NOTE — TRANSFER OF CARE
"Anesthesia Transfer of Care Note    Patient: Valarie Foster    Procedure(s) Performed: Procedure(s) (LRB):  PHACOEMULSIFICATION, CATARACT (Left)  INSERTION, IOL PROSTHESIS (Left)    Patient location: [facility]    Anesthesia Type: MAC    Transport from OR: Transported from OR on room air with adequate spontaneous ventilation    Post pain: adequate analgesia    Post assessment: no apparent anesthetic complications and tolerated procedure well    Post vital signs: stable    Level of consciousness: awake, alert and oriented    Nausea/Vomiting: no nausea/vomiting    Complications: none    Transfer of care protocol was followed      Last vitals:   Visit Vitals  BP (!) 150/84   Pulse 88   Temp 36.5 °C (97.7 °F) (Oral)   Resp 17   Ht 5' 4" (1.626 m)   Wt 85.7 kg (189 lb)   SpO2 97%   Breastfeeding No   BMI 32.44 kg/m²     "

## 2022-07-07 NOTE — ANESTHESIA POSTPROCEDURE EVALUATION
Anesthesia Post Evaluation    Patient: Valarie Foster    Procedure(s) Performed: Procedure(s) (LRB):  PHACOEMULSIFICATION, CATARACT (Left)  INSERTION, IOL PROSTHESIS (Left)    Final Anesthesia Type: MAC      Patient location during evaluation: PACU  Patient participation: Yes- Able to Participate  Level of consciousness: awake and alert, oriented and awake  Post-procedure vital signs: reviewed and stable  Pain management: adequate  Airway patency: patent    PONV status at discharge: No PONV  Anesthetic complications: no      Cardiovascular status: blood pressure returned to baseline, hemodynamically stable and stable  Respiratory status: unassisted and spontaneous ventilation  Hydration status: euvolemic  Follow-up not needed.          Vitals Value Taken Time   /84 07/07/22 0827   Temp 36.5 °C (97.7 °F) 07/07/22 0827   Pulse 88 07/07/22 0827   Resp 17 07/07/22 0827   SpO2 97 % 07/07/22 0827         No case tracking events are documented in the log.      Pain/Fabricio Score: No data recorded

## 2022-07-08 ENCOUNTER — OFFICE VISIT (OUTPATIENT)
Dept: OPHTHALMOLOGY | Facility: CLINIC | Age: 72
End: 2022-07-08
Payer: MEDICARE

## 2022-07-08 DIAGNOSIS — Z98.890 POST-OPERATIVE STATE: Primary | ICD-10-CM

## 2022-07-08 PROCEDURE — 1159F MED LIST DOCD IN RCRD: CPT | Mod: CPTII,S$GLB,, | Performed by: OPHTHALMOLOGY

## 2022-07-08 PROCEDURE — 1101F PR PT FALLS ASSESS DOC 0-1 FALLS W/OUT INJ PAST YR: ICD-10-PCS | Mod: CPTII,S$GLB,, | Performed by: OPHTHALMOLOGY

## 2022-07-08 PROCEDURE — 3051F PR MOST RECENT HEMOGLOBIN A1C LEVEL 7.0 - < 8.0%: ICD-10-PCS | Mod: CPTII,S$GLB,, | Performed by: OPHTHALMOLOGY

## 2022-07-08 PROCEDURE — 3051F HG A1C>EQUAL 7.0%<8.0%: CPT | Mod: CPTII,S$GLB,, | Performed by: OPHTHALMOLOGY

## 2022-07-08 PROCEDURE — 1125F PR PAIN SEVERITY QUANTIFIED, PAIN PRESENT: ICD-10-PCS | Mod: CPTII,S$GLB,, | Performed by: OPHTHALMOLOGY

## 2022-07-08 PROCEDURE — 3061F NEG MICROALBUMINURIA REV: CPT | Mod: CPTII,S$GLB,, | Performed by: OPHTHALMOLOGY

## 2022-07-08 PROCEDURE — 3061F PR NEG MICROALBUMINURIA RESULT DOCUMENTED/REVIEW: ICD-10-PCS | Mod: CPTII,S$GLB,, | Performed by: OPHTHALMOLOGY

## 2022-07-08 PROCEDURE — 1159F PR MEDICATION LIST DOCUMENTED IN MEDICAL RECORD: ICD-10-PCS | Mod: CPTII,S$GLB,, | Performed by: OPHTHALMOLOGY

## 2022-07-08 PROCEDURE — 3288F FALL RISK ASSESSMENT DOCD: CPT | Mod: CPTII,S$GLB,, | Performed by: OPHTHALMOLOGY

## 2022-07-08 PROCEDURE — 3288F PR FALLS RISK ASSESSMENT DOCUMENTED: ICD-10-PCS | Mod: CPTII,S$GLB,, | Performed by: OPHTHALMOLOGY

## 2022-07-08 PROCEDURE — 3066F NEPHROPATHY DOC TX: CPT | Mod: CPTII,S$GLB,, | Performed by: OPHTHALMOLOGY

## 2022-07-08 PROCEDURE — 1101F PT FALLS ASSESS-DOCD LE1/YR: CPT | Mod: CPTII,S$GLB,, | Performed by: OPHTHALMOLOGY

## 2022-07-08 PROCEDURE — 1125F AMNT PAIN NOTED PAIN PRSNT: CPT | Mod: CPTII,S$GLB,, | Performed by: OPHTHALMOLOGY

## 2022-07-08 PROCEDURE — 3066F PR DOCUMENTATION OF TREATMENT FOR NEPHROPATHY: ICD-10-PCS | Mod: CPTII,S$GLB,, | Performed by: OPHTHALMOLOGY

## 2022-07-08 PROCEDURE — 99999 PR PBB SHADOW E&M-EST. PATIENT-LVL III: ICD-10-PCS | Mod: PBBFAC,,, | Performed by: OPHTHALMOLOGY

## 2022-07-08 PROCEDURE — 1160F PR REVIEW ALL MEDS BY PRESCRIBER/CLIN PHARMACIST DOCUMENTED: ICD-10-PCS | Mod: CPTII,S$GLB,, | Performed by: OPHTHALMOLOGY

## 2022-07-08 PROCEDURE — 1160F RVW MEDS BY RX/DR IN RCRD: CPT | Mod: CPTII,S$GLB,, | Performed by: OPHTHALMOLOGY

## 2022-07-08 PROCEDURE — 99999 PR PBB SHADOW E&M-EST. PATIENT-LVL III: CPT | Mod: PBBFAC,,, | Performed by: OPHTHALMOLOGY

## 2022-07-08 PROCEDURE — 99024 POSTOP FOLLOW-UP VISIT: CPT | Mod: S$GLB,,, | Performed by: OPHTHALMOLOGY

## 2022-07-08 PROCEDURE — 99024 PR POST-OP FOLLOW-UP VISIT: ICD-10-PCS | Mod: S$GLB,,, | Performed by: OPHTHALMOLOGY

## 2022-07-09 NOTE — PROGRESS NOTES
Subjective:       Patient ID: Valarie Foster is a 72 y.o. female.    Chief Complaint: No chief complaint on file.    HPI     Valarie Foster is a/an 72 y.o. female here for post-op. Patient complaints   serve pain OS pain level 10    Date of Procedure: 07/07/2021  Procedure: IOL OS  Oral antibiotics: none  Eye meds: Vigamox 4x  Durezol 4x  Ilevro 1 day          Last edited by Nadia Quiles, PCT on 7/8/2022 11:03 AM. (History)             Assessment:       1. Post-operative state        Plan:       S/p CE OS- Doing well.           CPM OS.  RTC 1 wk.

## 2022-07-12 NOTE — PLAN OF CARE
Pre-operative instructions, medication directives and pain scales reviewed with patient. All questions the patient had were answered. Re-assurance about surgical procedure and day of surgery routine given as needed, patient verbalized understanding of the pre-op instructions.    Phone preop done.  Arrival 12:00 noon

## 2022-07-14 ENCOUNTER — TELEPHONE (OUTPATIENT)
Dept: OPHTHALMOLOGY | Facility: CLINIC | Age: 72
End: 2022-07-14
Payer: MEDICARE

## 2022-07-14 NOTE — TELEPHONE ENCOUNTER
Pt will come in for 4:00 PM on 7/15/2022. Pt will not have transportaion for 10:30 AM. Inform pt will keep appt time the same but come in for 4:00 PM.

## 2022-07-15 ENCOUNTER — OFFICE VISIT (OUTPATIENT)
Dept: OPHTHALMOLOGY | Facility: CLINIC | Age: 72
End: 2022-07-15
Payer: MEDICARE

## 2022-07-15 DIAGNOSIS — H25.11 NS (NUCLEAR SCLEROSIS), RIGHT: ICD-10-CM

## 2022-07-15 DIAGNOSIS — Z98.890 POST-OPERATIVE STATE: Primary | ICD-10-CM

## 2022-07-15 PROCEDURE — 99999 PR PBB SHADOW E&M-EST. PATIENT-LVL III: ICD-10-PCS | Mod: PBBFAC,,, | Performed by: OPHTHALMOLOGY

## 2022-07-15 PROCEDURE — 3066F PR DOCUMENTATION OF TREATMENT FOR NEPHROPATHY: ICD-10-PCS | Mod: CPTII,S$GLB,, | Performed by: OPHTHALMOLOGY

## 2022-07-15 PROCEDURE — 3051F PR MOST RECENT HEMOGLOBIN A1C LEVEL 7.0 - < 8.0%: ICD-10-PCS | Mod: CPTII,S$GLB,, | Performed by: OPHTHALMOLOGY

## 2022-07-15 PROCEDURE — 99024 POSTOP FOLLOW-UP VISIT: CPT | Mod: S$GLB,,, | Performed by: OPHTHALMOLOGY

## 2022-07-15 PROCEDURE — 3061F PR NEG MICROALBUMINURIA RESULT DOCUMENTED/REVIEW: ICD-10-PCS | Mod: CPTII,S$GLB,, | Performed by: OPHTHALMOLOGY

## 2022-07-15 PROCEDURE — 1159F MED LIST DOCD IN RCRD: CPT | Mod: CPTII,S$GLB,, | Performed by: OPHTHALMOLOGY

## 2022-07-15 PROCEDURE — 3061F NEG MICROALBUMINURIA REV: CPT | Mod: CPTII,S$GLB,, | Performed by: OPHTHALMOLOGY

## 2022-07-15 PROCEDURE — 92136 PR OPHTHAL BIOMETRY,INTRAOC LENS POW CALC: ICD-10-PCS | Mod: 26,RT,S$GLB, | Performed by: OPHTHALMOLOGY

## 2022-07-15 PROCEDURE — 1101F PR PT FALLS ASSESS DOC 0-1 FALLS W/OUT INJ PAST YR: ICD-10-PCS | Mod: CPTII,S$GLB,, | Performed by: OPHTHALMOLOGY

## 2022-07-15 PROCEDURE — 1159F PR MEDICATION LIST DOCUMENTED IN MEDICAL RECORD: ICD-10-PCS | Mod: CPTII,S$GLB,, | Performed by: OPHTHALMOLOGY

## 2022-07-15 PROCEDURE — 99999 PR PBB SHADOW E&M-EST. PATIENT-LVL III: CPT | Mod: PBBFAC,,, | Performed by: OPHTHALMOLOGY

## 2022-07-15 PROCEDURE — 1160F PR REVIEW ALL MEDS BY PRESCRIBER/CLIN PHARMACIST DOCUMENTED: ICD-10-PCS | Mod: CPTII,S$GLB,, | Performed by: OPHTHALMOLOGY

## 2022-07-15 PROCEDURE — 99024 PR POST-OP FOLLOW-UP VISIT: ICD-10-PCS | Mod: S$GLB,,, | Performed by: OPHTHALMOLOGY

## 2022-07-15 PROCEDURE — 3066F NEPHROPATHY DOC TX: CPT | Mod: CPTII,S$GLB,, | Performed by: OPHTHALMOLOGY

## 2022-07-15 PROCEDURE — 92136 OPHTHALMIC BIOMETRY: CPT | Mod: 26,RT,S$GLB, | Performed by: OPHTHALMOLOGY

## 2022-07-15 PROCEDURE — 3051F HG A1C>EQUAL 7.0%<8.0%: CPT | Mod: CPTII,S$GLB,, | Performed by: OPHTHALMOLOGY

## 2022-07-15 PROCEDURE — 1101F PT FALLS ASSESS-DOCD LE1/YR: CPT | Mod: CPTII,S$GLB,, | Performed by: OPHTHALMOLOGY

## 2022-07-15 PROCEDURE — 3288F FALL RISK ASSESSMENT DOCD: CPT | Mod: CPTII,S$GLB,, | Performed by: OPHTHALMOLOGY

## 2022-07-15 PROCEDURE — 3288F PR FALLS RISK ASSESSMENT DOCUMENTED: ICD-10-PCS | Mod: CPTII,S$GLB,, | Performed by: OPHTHALMOLOGY

## 2022-07-15 PROCEDURE — 1160F RVW MEDS BY RX/DR IN RCRD: CPT | Mod: CPTII,S$GLB,, | Performed by: OPHTHALMOLOGY

## 2022-07-15 NOTE — PROGRESS NOTES
Subjective:       Patient ID: Valarie Foster is a 72 y.o. female.    Chief Complaint: Post-op Evaluation (Patient Valarie Alba is a 72 year old female.)    HPI     Post-op Evaluation      Additional comments: Patient Valarie Alba is a 72 year old female.              Comments     Pt here for 1 weekPCIOL OS post-op visit. Pt states that she had a   headache and 7/10 eye pain OS on yesterday but took Tylenol and it went   away. Pt states that VA OS has improved since last visit. Pt states some   watering OS. Post-op instructions OS reviewed with pt. Pre-op instructions   OD reviewed with pt.    DLS: 07/08/2022 with Dr. Novoa  S/p PCIOL OS: 07/07/2022  *2nd eye scheduled 07/21/2022. Pt to sign paperwork today.*    Gtts:  1. Durezol BID OS  2. Ilevro qday BID OS          Last edited by Tasneem Dennison on 7/15/2022  4:42 PM. (History)             Assessment:       1. Post-operative state    2. NS (nuclear sclerosis), right        Plan:       S/p CE OS- Doing well.     Visually significant cataract OD -Pt. Wants Sx.        Taper gtts OS.  Cataract Surgery Consent: Patient with a visually significant cataract with difficulties of ADLs, reading, driving, night vision, glare (any and all).  Discussed with Patient/Family/Caregiver: options, risks and benefits, expectations of cataract surgery, utilized an eye model with questions and answers to facilitate discussion.  Discussed lens options and patient understands that glasses may be required for optimal vision for distance and/or near vision after cataract surgery.  The Patient/Family/Caregiver  voice good understanding and patient wishes to proceed with surgery.  The patient will likely benefit from surgery and patient signed consent for Right Eye.  CE OD 7/21/22.

## 2022-07-18 NOTE — H&P
[facility] - [place] - Surgery  History & Physical    Subjective:      Chief Complaint/Reason for Admission:     Valarie Foster is a 72 y.o. female.    Past Medical History:   Diagnosis Date    Arthritis     Colon polyp     Diabetes mellitus     diet controlled    Diabetes with neurologic complications     Hypertension     Nuclear sclerosis of both eyes 2/14/2022    Renal cell carcinoma      Past Surgical History:   Procedure Laterality Date    COLONOSCOPY N/A 2/5/2018    Procedure: COLONOSCOPY;  Surgeon: Bacilio Mancia MD;  Location: Health system ENDO;  Service: Endoscopy;  Laterality: N/A;  appt confirmed-ss    COLONOSCOPY N/A 8/13/2020    Procedure: COLONOSCOPY;  Surgeon: Jose West MD;  Location: Health system ENDO;  Service: Endoscopy;  Laterality: N/A;    HYSTERECTOMY      INTRAOCULAR PROSTHESES INSERTION Left 7/7/2022    Procedure: INSERTION, IOL PROSTHESIS;  Surgeon: Candelario Novoa MD;  Location: Health system OR;  Service: Ophthalmology;  Laterality: Left;    OOPHORECTOMY      PARTIAL NEPHRECTOMY Right 10/12/2018    Procedure: NEPHRECTOMY, PARTIAL open. Dr Arenas to assist.;  Surgeon: Alejandra Palm MD;  Location: Health system OR;  Service: Urology;  Laterality: Right;  RN PREOP 10/9/2018----NEEDS H/P    PHACOEMULSIFICATION OF CATARACT Left 7/7/2022    Procedure: PHACOEMULSIFICATION, CATARACT;  Surgeon: Candelario Novoa MD;  Location: Health system OR;  Service: Ophthalmology;  Laterality: Left;  RN PHONE PREOP 6/27/2022   ARRIVAL 6:00 AM     Family History   Problem Relation Age of Onset    Glaucoma Sister     Cancer Sister         breast cancer    No Known Problems Mother     Glaucoma Father     Diabetes Brother     No Known Problems Maternal Aunt     No Known Problems Maternal Uncle     No Known Problems Paternal Aunt     No Known Problems Paternal Uncle     No Known Problems Maternal Grandmother     No Known Problems Maternal Grandfather     No Known Problems Paternal Grandmother     No Known  Problems Paternal Grandfather     Thyroid disease Sister     Blindness Sister         due to trauma during car accident    Diabetes Sister     Amblyopia Neg Hx     Cataracts Neg Hx     Hypertension Neg Hx     Macular degeneration Neg Hx     Retinal detachment Neg Hx     Strabismus Neg Hx     Stroke Neg Hx     Breast cancer Neg Hx      Social History     Tobacco Use    Smoking status: Never Smoker    Smokeless tobacco: Never Used   Substance Use Topics    Alcohol use: No    Drug use: No       No medications prior to admission.     Review of patient's allergies indicates:   Allergen Reactions    Lisinopril Swelling and Shortness Of Breath    Ascorbic acid-ascorbate calc      And citrus fruits        Review of Systems   Eyes: Positive for blurred vision.   All other systems reviewed and are negative.      Objective:      Vital Signs (Most Recent)       Vital Signs Range (Last 24H):       Physical Exam  Constitutional:       Appearance: She is well-developed.   HENT:      Head: Normocephalic.   Eyes:      Conjunctiva/sclera: Conjunctivae normal.      Pupils: Pupils are equal, round, and reactive to light.   Cardiovascular:      Rate and Rhythm: Normal rate and regular rhythm.      Heart sounds: Normal heart sounds.   Pulmonary:      Effort: Pulmonary effort is normal.      Breath sounds: Normal breath sounds.   Abdominal:      General: Bowel sounds are normal.      Palpations: Abdomen is soft.   Musculoskeletal:         General: Normal range of motion.      Cervical back: Normal range of motion and neck supple.   Skin:     General: Skin is warm.   Neurological:      Mental Status: She is alert and oriented to person, place, and time.         Data Review:   ECG:    Assessment:      Cataract OD.    Plan:    CE OD.

## 2022-07-21 ENCOUNTER — ANESTHESIA (OUTPATIENT)
Dept: SURGERY | Facility: HOSPITAL | Age: 72
End: 2022-07-21
Payer: MEDICARE

## 2022-07-21 ENCOUNTER — HOSPITAL ENCOUNTER (OUTPATIENT)
Facility: HOSPITAL | Age: 72
Discharge: HOME | End: 2022-07-21
Attending: OPHTHALMOLOGY | Admitting: OPHTHALMOLOGY
Payer: MEDICARE

## 2022-07-21 ENCOUNTER — ANESTHESIA EVENT (OUTPATIENT)
Dept: SURGERY | Facility: HOSPITAL | Age: 72
End: 2022-07-21
Payer: MEDICARE

## 2022-07-21 VITALS
RESPIRATION RATE: 20 BRPM | HEIGHT: 64 IN | OXYGEN SATURATION: 98 % | HEART RATE: 101 BPM | BODY MASS INDEX: 32.27 KG/M2 | SYSTOLIC BLOOD PRESSURE: 145 MMHG | TEMPERATURE: 98 F | DIASTOLIC BLOOD PRESSURE: 86 MMHG | WEIGHT: 189 LBS

## 2022-07-21 DIAGNOSIS — H25.11 NUCLEAR SCLEROTIC CATARACT OF RIGHT EYE: Primary | ICD-10-CM

## 2022-07-21 LAB — POCT GLUCOSE: 110 MG/DL (ref 70–110)

## 2022-07-21 PROCEDURE — 71000015 HC POSTOP RECOV 1ST HR: Performed by: OPHTHALMOLOGY

## 2022-07-21 PROCEDURE — 37000008 HC ANESTHESIA 1ST 15 MINUTES: Performed by: OPHTHALMOLOGY

## 2022-07-21 PROCEDURE — D9220A PRA ANESTHESIA: Mod: ANES,,, | Performed by: ANESTHESIOLOGY

## 2022-07-21 PROCEDURE — 66984 XCAPSL CTRC RMVL W/O ECP: CPT | Mod: 79,RT,, | Performed by: OPHTHALMOLOGY

## 2022-07-21 PROCEDURE — 82962 GLUCOSE BLOOD TEST: CPT | Performed by: OPHTHALMOLOGY

## 2022-07-21 PROCEDURE — D9220A PRA ANESTHESIA: Mod: CRNA,,, | Performed by: STUDENT IN AN ORGANIZED HEALTH CARE EDUCATION/TRAINING PROGRAM

## 2022-07-21 PROCEDURE — 25000003 PHARM REV CODE 250: Performed by: OPHTHALMOLOGY

## 2022-07-21 PROCEDURE — V2632 POST CHMBR INTRAOCULAR LENS: HCPCS | Performed by: OPHTHALMOLOGY

## 2022-07-21 PROCEDURE — 36000707: Performed by: OPHTHALMOLOGY

## 2022-07-21 PROCEDURE — 37000009 HC ANESTHESIA EA ADD 15 MINS: Performed by: OPHTHALMOLOGY

## 2022-07-21 PROCEDURE — 63600175 PHARM REV CODE 636 W HCPCS: Performed by: OPHTHALMOLOGY

## 2022-07-21 PROCEDURE — 63600175 PHARM REV CODE 636 W HCPCS: Performed by: ANESTHESIOLOGY

## 2022-07-21 PROCEDURE — D9220A PRA ANESTHESIA: ICD-10-PCS | Mod: CRNA,,, | Performed by: STUDENT IN AN ORGANIZED HEALTH CARE EDUCATION/TRAINING PROGRAM

## 2022-07-21 PROCEDURE — 63600175 PHARM REV CODE 636 W HCPCS: Performed by: STUDENT IN AN ORGANIZED HEALTH CARE EDUCATION/TRAINING PROGRAM

## 2022-07-21 PROCEDURE — A4217 STERILE WATER/SALINE, 500 ML: HCPCS | Performed by: OPHTHALMOLOGY

## 2022-07-21 PROCEDURE — D9220A PRA ANESTHESIA: ICD-10-PCS | Mod: ANES,,, | Performed by: ANESTHESIOLOGY

## 2022-07-21 PROCEDURE — 36000706: Performed by: OPHTHALMOLOGY

## 2022-07-21 PROCEDURE — 25000003 PHARM REV CODE 250

## 2022-07-21 PROCEDURE — 71000016 HC POSTOP RECOV ADDL HR: Performed by: OPHTHALMOLOGY

## 2022-07-21 PROCEDURE — 66984 PR REMOVAL, CATARACT, W/INSRT INTRAOC LENS, W/O ENDO CYCLO: ICD-10-PCS | Mod: 79,RT,, | Performed by: OPHTHALMOLOGY

## 2022-07-21 DEVICE — LENS CLAREON AUTONOME 22.0D: Type: IMPLANTABLE DEVICE | Site: EYE | Status: FUNCTIONAL

## 2022-07-21 RX ORDER — OFLOXACIN 3 MG/ML
1 SOLUTION/ DROPS OPHTHALMIC
Status: DISCONTINUED | OUTPATIENT
Start: 2022-07-21 | End: 2023-09-19

## 2022-07-21 RX ORDER — SODIUM CHLORIDE 0.9 % (FLUSH) 0.9 %
10 SYRINGE (ML) INJECTION
Status: DISCONTINUED | OUTPATIENT
Start: 2022-07-21 | End: 2023-09-19

## 2022-07-21 RX ORDER — LIDOCAINE HYDROCHLORIDE 20 MG/ML
INJECTION, SOLUTION INFILTRATION; PERINEURAL
Status: DISCONTINUED | OUTPATIENT
Start: 2022-07-21 | End: 2022-07-21 | Stop reason: HOSPADM

## 2022-07-21 RX ORDER — FENTANYL CITRATE 50 UG/ML
INJECTION, SOLUTION INTRAMUSCULAR; INTRAVENOUS
Status: DISCONTINUED | OUTPATIENT
Start: 2022-07-21 | End: 2022-07-21

## 2022-07-21 RX ORDER — ACETAMINOPHEN 325 MG/1
650 TABLET ORAL EVERY 4 HOURS PRN
Status: DISCONTINUED | OUTPATIENT
Start: 2022-07-21 | End: 2022-07-21 | Stop reason: HOSPADM

## 2022-07-21 RX ORDER — POVIDONE-IODINE 5 %
SOLUTION, NON-ORAL OPHTHALMIC (EYE)
Status: DISCONTINUED | OUTPATIENT
Start: 2022-07-21 | End: 2022-07-21 | Stop reason: HOSPADM

## 2022-07-21 RX ORDER — CYCLOPENTOLATE HYDROCHLORIDE 10 MG/ML
1 SOLUTION/ DROPS OPHTHALMIC
Status: DISCONTINUED | OUTPATIENT
Start: 2022-07-21 | End: 2023-09-19

## 2022-07-21 RX ORDER — SODIUM CHLORIDE, SODIUM LACTATE, POTASSIUM CHLORIDE, CALCIUM CHLORIDE 600; 310; 30; 20 MG/100ML; MG/100ML; MG/100ML; MG/100ML
INJECTION, SOLUTION INTRAVENOUS CONTINUOUS
Status: DISCONTINUED | OUTPATIENT
Start: 2022-07-21 | End: 2022-07-21 | Stop reason: HOSPADM

## 2022-07-21 RX ORDER — PREDNISOLONE ACETATE 10 MG/ML
SUSPENSION/ DROPS OPHTHALMIC
Status: DISCONTINUED | OUTPATIENT
Start: 2022-07-21 | End: 2022-07-21 | Stop reason: HOSPADM

## 2022-07-21 RX ORDER — MIDAZOLAM HYDROCHLORIDE 1 MG/ML
INJECTION, SOLUTION INTRAMUSCULAR; INTRAVENOUS
Status: DISCONTINUED | OUTPATIENT
Start: 2022-07-21 | End: 2022-07-21

## 2022-07-21 RX ORDER — WATER 1 ML/ML
IRRIGANT IRRIGATION
Status: DISCONTINUED | OUTPATIENT
Start: 2022-07-21 | End: 2022-07-21 | Stop reason: HOSPADM

## 2022-07-21 RX ORDER — HYDROCODONE BITARTRATE AND ACETAMINOPHEN 5; 325 MG/1; MG/1
1 TABLET ORAL EVERY 4 HOURS PRN
Status: DISCONTINUED | OUTPATIENT
Start: 2022-07-21 | End: 2022-07-21 | Stop reason: HOSPADM

## 2022-07-21 RX ORDER — TETRACAINE HYDROCHLORIDE 5 MG/ML
1 SOLUTION OPHTHALMIC
Status: COMPLETED | OUTPATIENT
Start: 2022-07-21 | End: 2022-07-21

## 2022-07-21 RX ORDER — TROPICAMIDE 10 MG/ML
1 SOLUTION/ DROPS OPHTHALMIC
Status: COMPLETED | OUTPATIENT
Start: 2022-07-21 | End: 2022-07-21

## 2022-07-21 RX ORDER — PHENYLEPHRINE HYDROCHLORIDE 25 MG/ML
1 SOLUTION/ DROPS OPHTHALMIC
Status: COMPLETED | OUTPATIENT
Start: 2022-07-21 | End: 2022-07-21

## 2022-07-21 RX ADMIN — PHENYLEPHRINE HYDROCHLORIDE 1 DROP: 25 SOLUTION/ DROPS OPHTHALMIC at 01:07

## 2022-07-21 RX ADMIN — OFLOXACIN 1 DROP: 3 SOLUTION OPHTHALMIC at 01:07

## 2022-07-21 RX ADMIN — TROPICAMIDE 1 DROP: 10 SOLUTION/ DROPS OPHTHALMIC at 01:07

## 2022-07-21 RX ADMIN — CYCLOPENTOLATE HYDROCHLORIDE 1 DROP: 10 SOLUTION/ DROPS OPHTHALMIC at 01:07

## 2022-07-21 RX ADMIN — MIDAZOLAM HYDROCHLORIDE 1 MG: 1 INJECTION, SOLUTION INTRAMUSCULAR; INTRAVENOUS at 04:07

## 2022-07-21 RX ADMIN — FENTANYL CITRATE 25 MCG: 50 INJECTION, SOLUTION INTRAMUSCULAR; INTRAVENOUS at 04:07

## 2022-07-21 RX ADMIN — TETRACAINE HYDROCHLORIDE 1 DROP: 5 SOLUTION OPHTHALMIC at 01:07

## 2022-07-21 RX ADMIN — SODIUM CHLORIDE, SODIUM LACTATE, POTASSIUM CHLORIDE, AND CALCIUM CHLORIDE: .6; .31; .03; .02 INJECTION, SOLUTION INTRAVENOUS at 02:07

## 2022-07-21 NOTE — OP NOTE
Operative Date:  07/21/2022    Discharge Date:  07/21/2022    Discharge Patient Home    Report Title: Operative Note      SURGEON: Candelario Novoa MD     ASSISTANT:     PREOPERATIVE DIAGNOSIS: Visually significant NSC cataract,  Right Eye.    POSTOPERATIVE DIAGNOSIS: Visually-significant NSC cataract,  Right Eye.    PROCEDURE PERFORMED: Phacoemulsification of the cataract with posterior chamber intraocular lens Right Eye.    ANESTHESIA: Topical with MAC    COMPLICATIONS: None    ESTIMATED BLOOD LOSS: Minimal    INDICATIONS FOR PROCEDURE:   The patient is a pleasant 72 year old woman with increasing difficulties with activities of daily living secondary to a dense visually significant cataract in the Right Eye.  Discussions have been carried out with this patient concerning the options to surgery, risks, benefits and expectations.  The patient voiced good understanding and wished to proceed with the above procedure.    PROCEDURE IN DETAIL: The patient was brought to the operating room and received topical anesthetic to the eye and then was prepped and draped in the usual sterile fashion.  Using the Mandel ring and the guarded beth blade set at 0.37 mm, a partial thickness clear cornea incision was made temporally.  The paracentesis site was made at the twelve o'clock position.  Omidria was injected into the anterior chamber through the paracentesis.  Viscoat was then injected into the anterior chamber.  The eye was then reentered at the primary surgical site with a 2.4 mm keratome followed by continuous capsulotomy, hydrodissection, hydrodelineation and phacoemulsification of the cataract.  Residual cortical material was removed using automated irrigation-aspiration technique.  Healon was injected into the posterior chamber and a Clareon 22.0 diopter lens was placed in the bag without difficulty. Residual viscoelastic was removed using automated irrigation-aspiration technique. The eye was re-pressurized  using BSS solution and both the paracentesis site and the primary surgical site were demonstrated to be watertight at the end of the case with Weck--Eleanor manipulation.  One drop of Ofloxacin and one drop of Pred acetate 1% was applied to the Right Eye .The eye was closed, patched and a Reyes shield placed.  The patient was taken to the recovery room in good and stable condition.  The patient tolerated the procedure well.  The patient was instructed to refrain from any heavy lifting, bending, stooping or straining activities, discharged home  and to follow-up in the morning for routine postoperative care with Candelario Novoa MD.

## 2022-07-21 NOTE — TRANSFER OF CARE
"Anesthesia Transfer of Care Note    Patient: Valarie Foster    Procedure(s) Performed: Procedure(s) (LRB):  PHACOEMULSIFICATION, CATARACT (Right)  INSERTION, IOL PROSTHESIS (Right)    Patient location: [facility]    Anesthesia Type: MAC    Transport from OR: Transported from OR on room air with adequate spontaneous ventilation    Post pain: adequate analgesia    Post assessment: no apparent anesthetic complications    Post vital signs: stable    Level of consciousness: awake, alert and oriented    Nausea/Vomiting: no nausea/vomiting    Complications: none    Transfer of care protocol was followed      Last vitals:   Visit Vitals  /76 (Patient Position: Sitting)   Pulse 100   Temp 36.8 °C (98.3 °F) (Oral)   Resp 16   Ht 5' 4" (1.626 m)   Wt 85.7 kg (189 lb)   SpO2 95%   Breastfeeding No   BMI 32.44 kg/m²     "

## 2022-07-21 NOTE — BRIEF OP NOTE
[facility] - [place] - Surgery  Brief Operative Note    Surgery Date: 7/21/2022     Surgeon(s) and Role:     * Candelario Novoa MD - Primary    Assisting Surgeon: None    Pre-op Diagnosis:  NS (nuclear sclerosis), right [H25.11]    Post-op Diagnosis:  Post-Op Diagnosis Codes:     * NS (nuclear sclerosis), right [H25.11]    Procedure(s) (LRB):  PHACOEMULSIFICATION, CATARACT (Right)  INSERTION, IOL PROSTHESIS (Right)    Anesthesia: Local MAC    Operative Findings:     Estimated Blood Loss: * No values recorded between 7/21/2022  4:19 PM and 7/21/2022  4:47 PM *         Specimens:   Specimen (24h ago, onward)            None            Discharge Note    OUTCOME: Patient tolerated treatment/procedure well without complication and is now ready for discharge.    DISPOSITION: Home or Self Care    FINAL DIAGNOSIS:  Nuclear sclerotic cataract of right eye    FOLLOWUP: In clinic    DISCHARGE INSTRUCTIONS:    Discharge Procedure Orders   Other restrictions (specify):   Order Comments: No heavy lifting or bending for 1 week.        Clinical Reference Documents Added to Patient Instructions       Document    CATARACT REMOVAL DISCHARGE INSTRUCTIONS (ENGLISH)    INTRAOCULAR LENS IMPLANT (ENGLISH)

## 2022-07-21 NOTE — ANESTHESIA PREPROCEDURE EVALUATION
07/21/2022  Valarie Foster is a 72 y.o., female.  To undergo Procedure(s) (LRB):  PHACOEMULSIFICATION, CATARACT (Right)  INSERTION, IOL PROSTHESIS (Right)       Past Medical History:  Past Medical History:   Diagnosis Date    Arthritis     Colon polyp     Diabetes mellitus     diet controlled    Diabetes with neurologic complications     Hypertension     Nuclear sclerosis of both eyes 2/14/2022    Renal cell carcinoma        Past Surgical History:  Past Surgical History:   Procedure Laterality Date    COLONOSCOPY N/A 2/5/2018    Procedure: COLONOSCOPY;  Surgeon: Bacilio Mancia MD;  Location: [facility] ENDO;  Service: Endoscopy;  Laterality: N/A;  appt confirmed-ss    COLONOSCOPY N/A 8/13/2020    Procedure: COLONOSCOPY;  Surgeon: Jose West MD;  Location: [facility] ENDO;  Service: Endoscopy;  Laterality: N/A;    HYSTERECTOMY      INTRAOCULAR PROSTHESES INSERTION Left 7/7/2022    Procedure: INSERTION, IOL PROSTHESIS;  Surgeon: Candelario Novoa MD;  Location: [facility] OR;  Service: Ophthalmology;  Laterality: Left;    OOPHORECTOMY      PARTIAL NEPHRECTOMY Right 10/12/2018    Procedure: NEPHRECTOMY, PARTIAL open. Dr Arenas to assist.;  Surgeon: Alejandra Palm MD;  Location: [facility] OR;  Service: Urology;  Laterality: Right;  RN PREOP 10/9/2018----NEEDS H/P    PHACOEMULSIFICATION OF CATARACT Left 7/7/2022    Procedure: PHACOEMULSIFICATION, CATARACT;  Surgeon: Candelario Novoa MD;  Location: [facility] OR;  Service: Ophthalmology;  Laterality: Left;  RN PHONE PREOP 6/27/2022   ARRIVAL 6:00 AM       Social History:  Social History     Socioeconomic History    Marital status:    Tobacco Use    Smoking status: Never Smoker    Smokeless tobacco: Never Used   Substance and Sexual Activity    Alcohol use: No    Drug use: No       Medications:  Current Facility-Administered Medications on  File Prior to Encounter   Medication Dose Route Frequency Provider Last Rate Last Admin    cyclopentolate 1% ophthalmic solution 1 drop  1 drop Left Eye On Call Procedure Candelario Novoa MD   1 drop at 07/07/22 0703    ofloxacin 0.3 % ophthalmic solution 1 drop  1 drop Left Eye On Call Procedure Cadnelario Novoa MD   2 drop at 07/07/22 0754    sodium chloride 0.9% flush 10 mL  10 mL Intravenous PRN Candelario Novoa MD         Current Outpatient Medications on File Prior to Encounter   Medication Sig Dispense Refill    ACCU-CHEK ONEIL CONTROL SOLN Soln       ACCU-CHEK ONEIL PLUS METER Misc USE AS DIRECTED 1 each 0    ACCU-CHEK SOFTCLIX LANCETS Misc TEST BLOOD SUGAR ONE TIME DAILY 100 each 1    alcohol swabs (BD ALCOHOL SWABS) PadM Apply 3 each topically once daily. 300 each 0    amLODIPine (NORVASC) 5 MG tablet TAKE 1 TABLET EVERY DAY 90 tablet 1    celecoxib (CELEBREX) 200 MG capsule TAKE 1 CAPSULE EVERY DAY 90 capsule 1    cetirizine (ZYRTEC) 10 MG tablet Take 1 tablet (10 mg total) by mouth once daily. 90 tablet 0    cyclobenzaprine (FLEXERIL) 5 MG tablet TAKE 1 TABLET THREE TIMES DAILY AS NEEDED FOR MUSCLE SPASM(S) 270 tablet 1    difluprednate (DUREZOL) 0.05 % Drop ophthalmic solution Place 1 drop into the left eye 4 (four) times daily. For 30 days 5 mL 1    diphenhydrAMINE (BENADRYL) 25 mg capsule Take 1 capsule (25 mg total) by mouth every 6 (six) hours as needed for Itching. 20 capsule 0    docusate sodium (COLACE) 100 MG capsule Take 1 capsule (100 mg total) by mouth 2 (two) times daily. 60 capsule 0    ergocalciferol (ERGOCALCIFEROL) 50,000 unit Cap TAKE 1 CAPSULE (50,000 UNITS TOTAL) BY MOUTH EVERY 7 DAYS. 12 capsule 1    famotidine (PEPCID) 20 MG tablet Take 1 tablet by mouth.      FLUAD QUAD 2021-22,65Y UP,,PF, 60 mcg (15 mcg x 4)/0.5 mL Syrg       FLUoxetine 20 MG capsule TAKE 1 CAPSULE EVERY DAY 90 capsule 1    fluticasone propionate (FLONASE) 50 mcg/actuation  nasal spray USE 1 SPRAY IN EACH NOSTRIL ONCE DAILY 32 g 1    gabapentin (NEURONTIN) 300 MG capsule TAKE 2 CAPSULES (600 MG TOTAL) BY MOUTH EVERY EVENING. 180 capsule 1    ILEVRO 0.3 % DrpS Place 1 drop into both eyes once daily. For 30 days 3 mL 1    KENALOG 40 mg/mL injection       lancets (TRUEPLUS LANCETS) 33 gauge Misc 1 lancet by Misc.(Non-Drug; Combo Route) route 2 (two) times a day. 200 each 1    meloxicam (MOBIC) 15 MG tablet Take 1 tablet (15 mg total) by mouth once daily. 30 tablet 1    metFORMIN (GLUCOPHAGE) 500 MG tablet Take 1 tablet (500 mg total) by mouth 2 (two) times daily with meals. 180 tablet 3    pravastatin (PRAVACHOL) 10 MG tablet TAKE 1 TABLET EVERY DAY 90 tablet 1    tobramycin sulfate 0.3% (TOBREX) 0.3 % ophthalmic solution 1-2 drops topically twice daily to affected toe(s). (Patient taking differently: 2,330 drops. 1-2 drops topically twice daily to affected toe(s).) 5 mL 3    triamcinolone acetonide 0.1% (KENALOG) 0.1 % cream       TRUE METRIX GLUCOSE METER kit USE AS DIRECTED 1 each 0    TRUE METRIX GLUCOSE TEST STRIP Strp TEST BLOOD SUGAR TWICE DAILY 200 strip 1    blood glucose control, low (TRUE METRIX LEVEL 1) Soln 1 each by Misc.(Non-Drug; Combo Route) route once as needed. 1 each 3       Allergies:  Review of patient's allergies indicates:   Allergen Reactions    Lisinopril Swelling and Shortness Of Breath    Ascorbic acid-ascorbate calc      And citrus fruits       Active Problems:  Patient Active Problem List   Diagnosis    Type 2 diabetes mellitus with diabetic neuropathy, without long-term current use of insulin    Benign hypertension    Hyperlipemia    Microhematuria    Left flank pain    Abdominal aortic atherosclerosis    History of renal cell cancer    Renal cell carcinoma of right kidney    Mild major depression    Bilateral chronic knee pain    Colon cancer screening    Pain of left calf    Nuclear sclerosis of both eyes    Refractive error     Primary osteoarthritis of knees, bilateral    Fall    Decreased ROM of lower extremity    Decreased strength of lower extremity    Decreased mobility and endurance    Nuclear sclerotic cataract of left eye     24 Hour Vitals:  Temp:  [36.9 °C (98.4 °F)] 36.9 °C (98.4 °F)  Pulse:  [98] 98  Resp:  [18] 18  SpO2:  [96 %] 96 %  BP: (152)/(83) 152/83   See Nursing Charting For Additional Vitals      Pre-op Assessment    I have reviewed the Patient Summary Reports.     I have reviewed the Nursing Notes.       Review of Systems  Anesthesia Hx:  No problems with previous Anesthesia   Denies Personal Hx of Anesthesia complications.   Social:  Non-Smoker, No Alcohol Use    Hematology/Oncology:         -- Cancer in past history: Oncology Comments: H/O R RCC s/p partial nephrectomy     Cardiovascular:   Hypertension    Pulmonary:  Pulmonary Normal    Renal/:   Chronic Renal Disease    Hepatic/GI:  Hepatic/GI Normal    Musculoskeletal:   Arthritis     Neurological:  Neurology Normal    Endocrine:   Diabetes        Physical Exam  General: Well nourished    Airway:  Mallampati: II   Mouth Opening: Normal  TM Distance: Normal      Dental:  Intact    Chest/Lungs:  Clear to auscultation    Heart:  Rate: Normal  Rhythm: Regular Rhythm        Anesthesia Plan  Type of Anesthesia, risks & benefits discussed:    Anesthesia Type: Gen ETT, MAC  Intra-op Monitoring Plan: Standard ASA Monitors  Post Op Pain Control Plan: multimodal analgesia  Induction:  IV  Informed Consent: Informed consent signed with the Patient and all parties understand the risks and agree with anesthesia plan.  All questions answered.   ASA Score: 2    Ready For Surgery From Anesthesia Perspective.     .

## 2022-07-21 NOTE — DISCHARGE INSTRUCTIONS
Fall Prevention  Millions of people fall every year and injure themselves. You may have had anesthesia or sedation which may increase your risk of falling. You may have health issues that put you at an increased risk of falling.     Here are ways to reduce your risk of falling.    Make your home safe by keeping walkways clear of objects you may trip over.  Use non-slip pads under rugs. Do not use area rugs or small throw rugs.  Use non-slip mats in bathtubs and showers.  Install handrails and lights on staircases.  Do not walk in poorly lit areas.  Do not stand on chairs or wobbly ladders.  Use caution when reaching overhead or looking upward. This position can cause a loss of balance.  Be sure your shoes fit properly, have non-slip bottoms and are in good condition.   Wear shoes both inside and out. Avoid going barefoot or wearing slippers.  Be cautious when going up and down stairs, curbs, and when walking on uneven sidewalks.  If your balance is poor, consider using a cane or walker.  If your fall was related to alcohol use, stop or limit alcohol intake.   If your fall was related to use of sleeping medicines, talk to your doctor about this. You may need to reduce your dosage at bedtime if you awaken during the night to go to the bathroom.    To reduce the need for nighttime bathroom trips:  Avoid drinking fluids for several hours before going to bed  Empty your bladder before going to bed  Men can keep a urinal at the bedside  Stay as active as you can. Balance, flexibility, strength, and endurance all come from exercise. They all play a role in preventing falls. Ask your healthcare provider which types of activity are right for you.  Get your vision checked on a regular basis.  If you have pets, know where they are before you stand up or walk so you don't trip over them.  Use night lights.       ACTIVITY LEVEL:  If you received sedation or an anesthetic, you may feel sleepy for several hours. Rest until you  are more awake. Gradually resume your normal activities. Normal activity will cause no undue risk to your eye. The white part of your eye might be red - this is nothing to worry about. Wear your old glasses or sunglasses that were given to you for protection during the day.    RESTRICTIONS - for the next 7 days  · Do not lift anything over 10 pounds.  · When bending, bend at the knees not the waist.  · Do not rub the eye.  · Do not get water in the eye.  · Do not sleep on the side that had surgery.  · Protect your eye at bedtime with the shield provided.    DIET: At home, continue with liquids. If there is no nausea, you may eat a soft diet and gradually resume your normal diet. Limit alcohol intake for 24 hours.    BATHING: You may shower or bathe as normal. You may take a warm wash cloth, which has been wrung out to remove excess water, and gently remove crusting on the lashes.    MEDICATIONS: You may take Extra Strength Tylenol every 4 hours for pain/headache.     Use your drops     Today: Pink- 7 pm, 9 pm, and 11 pm     Beige - 7 pm, 9 pm, and 11 pm     Gonsalez- 7 pm    Tomorrow:  Resume pink and beige cap drops four times a day.  Resume grey cap drops once a day.    Place one drop in the eye that had surgery from the first bottle. Wait 5 minutes and then use the second bottle. (It does not matter which bottle is used first.) CONTINUE all glaucoma drops.  No driving, alcoholic beverages or signing legal documents for next 24 hours or while taking pain medication      WHEN TO CALL THE DOCTOR:  · Redness that increases significantly  · Pain not relieved by Tylenol  RETURN APPOINTMENT:  You will need to see Dr. Novoa on Friday at the Health system clinic .  Bring your eye kit with you on your follow-up visit. Your kit contains sunglasses, eye shield, tape.  FOR EMERGENCIES:  If any unusual problems or difficulties occur, contact Dr. Novoa at the Clinic office at [phone] during normal business hours. If  after hours, call [phone].   Fall Prevention  Millions of people fall every year and injure themselves. You may have had anesthesia or sedation which may increase your risk of falling. You may have health issues that put you at an increased risk of falling.     Here are ways to reduce your risk of falling.    Make your home safe by keeping walkways clear of objects you may trip over.  Use non-slip pads under rugs. Do not use area rugs or small throw rugs.  Use non-slip mats in bathtubs and showers.  Install handrails and lights on staircases.  Do not walk in poorly lit areas.  Do not stand on chairs or wobbly ladders.  Use caution when reaching overhead or looking upward. This position can cause a loss of balance.  Be sure your shoes fit properly, have non-slip bottoms and are in good condition.   Wear shoes both inside and out. Avoid going barefoot or wearing slippers.  Be cautious when going up and down stairs, curbs, and when walking on uneven sidewalks.  If your balance is poor, consider using a cane or walker.  If your fall was related to alcohol use, stop or limit alcohol intake.   If your fall was related to use of sleeping medicines, talk to your doctor about this. You may need to reduce your dosage at bedtime if you awaken during the night to go to the bathroom.    To reduce the need for nighttime bathroom trips:  Avoid drinking fluids for several hours before going to bed  Empty your bladder before going to bed  Men can keep a urinal at the bedside  Stay as active as you can. Balance, flexibility, strength, and endurance all come from exercise. They all play a role in preventing falls. Ask your healthcare provider which types of activity are right for you.  Get your vision checked on a regular basis.  If you have pets, know where they are before you stand up or walk so you don't trip over them.  Use night lights.

## 2022-07-22 ENCOUNTER — OFFICE VISIT (OUTPATIENT)
Dept: OPHTHALMOLOGY | Facility: CLINIC | Age: 72
End: 2022-07-22
Payer: MEDICARE

## 2022-07-22 VITALS — SYSTOLIC BLOOD PRESSURE: 147 MMHG | DIASTOLIC BLOOD PRESSURE: 90 MMHG | HEART RATE: 113 BPM

## 2022-07-22 DIAGNOSIS — Z98.890 POST-OPERATIVE STATE: Primary | ICD-10-CM

## 2022-07-22 PROCEDURE — 1159F MED LIST DOCD IN RCRD: CPT | Mod: CPTII,S$GLB,, | Performed by: OPHTHALMOLOGY

## 2022-07-22 PROCEDURE — 3051F PR MOST RECENT HEMOGLOBIN A1C LEVEL 7.0 - < 8.0%: ICD-10-PCS | Mod: CPTII,S$GLB,, | Performed by: OPHTHALMOLOGY

## 2022-07-22 PROCEDURE — 3077F PR MOST RECENT SYSTOLIC BLOOD PRESSURE >= 140 MM HG: ICD-10-PCS | Mod: CPTII,S$GLB,, | Performed by: OPHTHALMOLOGY

## 2022-07-22 PROCEDURE — 1126F AMNT PAIN NOTED NONE PRSNT: CPT | Mod: CPTII,S$GLB,, | Performed by: OPHTHALMOLOGY

## 2022-07-22 PROCEDURE — 1159F PR MEDICATION LIST DOCUMENTED IN MEDICAL RECORD: ICD-10-PCS | Mod: CPTII,S$GLB,, | Performed by: OPHTHALMOLOGY

## 2022-07-22 PROCEDURE — 3288F FALL RISK ASSESSMENT DOCD: CPT | Mod: CPTII,S$GLB,, | Performed by: OPHTHALMOLOGY

## 2022-07-22 PROCEDURE — 3066F PR DOCUMENTATION OF TREATMENT FOR NEPHROPATHY: ICD-10-PCS | Mod: CPTII,S$GLB,, | Performed by: OPHTHALMOLOGY

## 2022-07-22 PROCEDURE — 1101F PR PT FALLS ASSESS DOC 0-1 FALLS W/OUT INJ PAST YR: ICD-10-PCS | Mod: CPTII,S$GLB,, | Performed by: OPHTHALMOLOGY

## 2022-07-22 PROCEDURE — 99024 PR POST-OP FOLLOW-UP VISIT: ICD-10-PCS | Mod: S$GLB,,, | Performed by: OPHTHALMOLOGY

## 2022-07-22 PROCEDURE — 3080F DIAST BP >= 90 MM HG: CPT | Mod: CPTII,S$GLB,, | Performed by: OPHTHALMOLOGY

## 2022-07-22 PROCEDURE — 3051F HG A1C>EQUAL 7.0%<8.0%: CPT | Mod: CPTII,S$GLB,, | Performed by: OPHTHALMOLOGY

## 2022-07-22 PROCEDURE — 3066F NEPHROPATHY DOC TX: CPT | Mod: CPTII,S$GLB,, | Performed by: OPHTHALMOLOGY

## 2022-07-22 PROCEDURE — 1160F PR REVIEW ALL MEDS BY PRESCRIBER/CLIN PHARMACIST DOCUMENTED: ICD-10-PCS | Mod: CPTII,S$GLB,, | Performed by: OPHTHALMOLOGY

## 2022-07-22 PROCEDURE — 3061F NEG MICROALBUMINURIA REV: CPT | Mod: CPTII,S$GLB,, | Performed by: OPHTHALMOLOGY

## 2022-07-22 PROCEDURE — 99999 PR PBB SHADOW E&M-EST. PATIENT-LVL IV: CPT | Mod: PBBFAC,,, | Performed by: OPHTHALMOLOGY

## 2022-07-22 PROCEDURE — 1101F PT FALLS ASSESS-DOCD LE1/YR: CPT | Mod: CPTII,S$GLB,, | Performed by: OPHTHALMOLOGY

## 2022-07-22 PROCEDURE — 99999 PR PBB SHADOW E&M-EST. PATIENT-LVL IV: ICD-10-PCS | Mod: PBBFAC,,, | Performed by: OPHTHALMOLOGY

## 2022-07-22 PROCEDURE — 1160F RVW MEDS BY RX/DR IN RCRD: CPT | Mod: CPTII,S$GLB,, | Performed by: OPHTHALMOLOGY

## 2022-07-22 PROCEDURE — 3077F SYST BP >= 140 MM HG: CPT | Mod: CPTII,S$GLB,, | Performed by: OPHTHALMOLOGY

## 2022-07-22 PROCEDURE — 1126F PR PAIN SEVERITY QUANTIFIED, NO PAIN PRESENT: ICD-10-PCS | Mod: CPTII,S$GLB,, | Performed by: OPHTHALMOLOGY

## 2022-07-22 PROCEDURE — 3288F PR FALLS RISK ASSESSMENT DOCUMENTED: ICD-10-PCS | Mod: CPTII,S$GLB,, | Performed by: OPHTHALMOLOGY

## 2022-07-22 PROCEDURE — 99024 POSTOP FOLLOW-UP VISIT: CPT | Mod: S$GLB,,, | Performed by: OPHTHALMOLOGY

## 2022-07-22 PROCEDURE — 3061F PR NEG MICROALBUMINURIA RESULT DOCUMENTED/REVIEW: ICD-10-PCS | Mod: CPTII,S$GLB,, | Performed by: OPHTHALMOLOGY

## 2022-07-22 PROCEDURE — 3080F PR MOST RECENT DIASTOLIC BLOOD PRESSURE >= 90 MM HG: ICD-10-PCS | Mod: CPTII,S$GLB,, | Performed by: OPHTHALMOLOGY

## 2022-07-22 NOTE — ANESTHESIA POSTPROCEDURE EVALUATION
Anesthesia Post Evaluation    Patient: Valarie Foster    Procedure(s) Performed: Procedure(s) (LRB):  PHACOEMULSIFICATION, CATARACT (Right)  INSERTION, IOL PROSTHESIS (Right)    Final Anesthesia Type: MAC      Patient location during evaluation: PACU  Patient participation: Yes- Able to Participate  Level of consciousness: awake and alert and oriented  Post-procedure vital signs: reviewed and stable  Pain management: adequate  Airway patency: patent    PONV status at discharge: No PONV  Anesthetic complications: no      Cardiovascular status: hemodynamically stable and blood pressure returned to baseline  Respiratory status: spontaneous ventilation, room air and unassisted  Hydration status: euvolemic  Follow-up not needed.          Vitals Value Taken Time   /86 07/21/22 1740   Temp 36.8 °C (98.3 °F) 07/21/22 1651   Pulse 101 07/21/22 1740   Resp 20 07/21/22 1740   SpO2 98 % 07/21/22 1740         No case tracking events are documented in the log.      Pain/Fabricio Score: Fabricio Score: 10 (7/21/2022  5:45 PM)

## 2022-07-29 ENCOUNTER — TELEPHONE (OUTPATIENT)
Dept: OPHTHALMOLOGY | Facility: CLINIC | Age: 72
End: 2022-07-29
Payer: MEDICARE

## 2022-07-29 NOTE — TELEPHONE ENCOUNTER
Spoke to pt RE: missed 07/29/2022 1 week PCIOL OD post-op visit with Dr. Novoa. Pt rescheduled to 08/01/2022 at 1:00 pm. Clinic confirmed post-op medication instructions with pt:    1. Durezol BID OD  2. Ilevro qday OD    Pt noted understanding.

## 2022-08-03 ENCOUNTER — TELEPHONE (OUTPATIENT)
Dept: OPHTHALMOLOGY | Facility: CLINIC | Age: 72
End: 2022-08-03
Payer: MEDICARE

## 2022-08-03 NOTE — TELEPHONE ENCOUNTER
Clinic spoke to pt to reschedule missed 08/01/2022 1 week PCIOL OD post-op visit with Dr. Novoa. Pt rescheduled to 08/04/2022 at 2:30 pm. Clinic reviewed post-op instructions with pt.    1. Durezol BID OD  2. Ilevro qday OD    Pt noted understanding.

## 2022-08-05 ENCOUNTER — TELEPHONE (OUTPATIENT)
Dept: OPHTHALMOLOGY | Facility: CLINIC | Age: 72
End: 2022-08-05
Payer: MEDICARE

## 2022-08-05 NOTE — TELEPHONE ENCOUNTER
Clinic spoke to pt about missed 08/04/2022 1 week PCIOL OD post-op visit with Dr. Novoa. Pt rescheduled to 08/11/2022 at 3:00 pm.

## 2022-08-11 ENCOUNTER — OFFICE VISIT (OUTPATIENT)
Dept: OPHTHALMOLOGY | Facility: CLINIC | Age: 72
End: 2022-08-11
Payer: MEDICARE

## 2022-08-11 DIAGNOSIS — Z98.890 POST-OPERATIVE STATE: Primary | ICD-10-CM

## 2022-08-11 DIAGNOSIS — H43.393 VITREOUS FLOATERS OF BOTH EYES: ICD-10-CM

## 2022-08-11 PROCEDURE — 3288F FALL RISK ASSESSMENT DOCD: CPT | Mod: CPTII,S$GLB,, | Performed by: OPHTHALMOLOGY

## 2022-08-11 PROCEDURE — 1101F PT FALLS ASSESS-DOCD LE1/YR: CPT | Mod: CPTII,S$GLB,, | Performed by: OPHTHALMOLOGY

## 2022-08-11 PROCEDURE — 99999 PR PBB SHADOW E&M-EST. PATIENT-LVL III: CPT | Mod: PBBFAC,,, | Performed by: OPHTHALMOLOGY

## 2022-08-11 PROCEDURE — 99999 PR PBB SHADOW E&M-EST. PATIENT-LVL III: ICD-10-PCS | Mod: PBBFAC,,, | Performed by: OPHTHALMOLOGY

## 2022-08-11 PROCEDURE — 3051F PR MOST RECENT HEMOGLOBIN A1C LEVEL 7.0 - < 8.0%: ICD-10-PCS | Mod: CPTII,S$GLB,, | Performed by: OPHTHALMOLOGY

## 2022-08-11 PROCEDURE — 1159F PR MEDICATION LIST DOCUMENTED IN MEDICAL RECORD: ICD-10-PCS | Mod: CPTII,S$GLB,, | Performed by: OPHTHALMOLOGY

## 2022-08-11 PROCEDURE — 1160F RVW MEDS BY RX/DR IN RCRD: CPT | Mod: CPTII,S$GLB,, | Performed by: OPHTHALMOLOGY

## 2022-08-11 PROCEDURE — 3066F NEPHROPATHY DOC TX: CPT | Mod: CPTII,S$GLB,, | Performed by: OPHTHALMOLOGY

## 2022-08-11 PROCEDURE — 1160F PR REVIEW ALL MEDS BY PRESCRIBER/CLIN PHARMACIST DOCUMENTED: ICD-10-PCS | Mod: CPTII,S$GLB,, | Performed by: OPHTHALMOLOGY

## 2022-08-11 PROCEDURE — 1125F AMNT PAIN NOTED PAIN PRSNT: CPT | Mod: CPTII,S$GLB,, | Performed by: OPHTHALMOLOGY

## 2022-08-11 PROCEDURE — 3061F PR NEG MICROALBUMINURIA RESULT DOCUMENTED/REVIEW: ICD-10-PCS | Mod: CPTII,S$GLB,, | Performed by: OPHTHALMOLOGY

## 2022-08-11 PROCEDURE — 1125F PR PAIN SEVERITY QUANTIFIED, PAIN PRESENT: ICD-10-PCS | Mod: CPTII,S$GLB,, | Performed by: OPHTHALMOLOGY

## 2022-08-11 PROCEDURE — 3061F NEG MICROALBUMINURIA REV: CPT | Mod: CPTII,S$GLB,, | Performed by: OPHTHALMOLOGY

## 2022-08-11 PROCEDURE — 3066F PR DOCUMENTATION OF TREATMENT FOR NEPHROPATHY: ICD-10-PCS | Mod: CPTII,S$GLB,, | Performed by: OPHTHALMOLOGY

## 2022-08-11 PROCEDURE — 1159F MED LIST DOCD IN RCRD: CPT | Mod: CPTII,S$GLB,, | Performed by: OPHTHALMOLOGY

## 2022-08-11 PROCEDURE — 3288F PR FALLS RISK ASSESSMENT DOCUMENTED: ICD-10-PCS | Mod: CPTII,S$GLB,, | Performed by: OPHTHALMOLOGY

## 2022-08-11 PROCEDURE — 3051F HG A1C>EQUAL 7.0%<8.0%: CPT | Mod: CPTII,S$GLB,, | Performed by: OPHTHALMOLOGY

## 2022-08-11 PROCEDURE — 99024 POSTOP FOLLOW-UP VISIT: CPT | Mod: S$GLB,,, | Performed by: OPHTHALMOLOGY

## 2022-08-11 PROCEDURE — 99024 PR POST-OP FOLLOW-UP VISIT: ICD-10-PCS | Mod: S$GLB,,, | Performed by: OPHTHALMOLOGY

## 2022-08-11 PROCEDURE — 1101F PR PT FALLS ASSESS DOC 0-1 FALLS W/OUT INJ PAST YR: ICD-10-PCS | Mod: CPTII,S$GLB,, | Performed by: OPHTHALMOLOGY

## 2022-08-11 NOTE — PROGRESS NOTES
Subjective:       Patient ID: Valarie Foster is a 72 y.o. female.    Chief Complaint: Post-op Evaluation (Patient Valarie Foster is a 72 year old female.)    HPI     Post-op Evaluation      Additional comments: Patient Valarie Foster is a 72 year old female.              Comments     Pt here for 1 week PCIOL OD (2nd eye) post-op visit. Pt states constant   floaters in VA OU for the past week. Pt states constant 7/10 eye pain OD   with headache for the past week. Pt states occasional blurry VA OU. Pt   states white discharge in lids in the mornings. Pt denies any flashes  in   VA.     DLS: 07/22/2022 with Dr. Novoa  S/p PCIOL OD: 07/21/2022  S/p PCIOL OS: 07/07/2022    Gtts:  1. Durezol BID OD  2. Ilevro BID OD    (+)DM  (+)HTN          Last edited by Tasneem Dennison on 8/11/2022  3:26 PM. (History)             Assessment:       1. Post-operative state    2. Vitreous floaters of both eyes        Plan:       S/p CE OU- Doing well.  Vx floaters OU-Stable.      Taper gtts OD.  RTC  8/19/22 as scheduled.

## 2022-08-19 ENCOUNTER — OFFICE VISIT (OUTPATIENT)
Dept: OPHTHALMOLOGY | Facility: CLINIC | Age: 72
End: 2022-08-19
Payer: MEDICARE

## 2022-08-19 DIAGNOSIS — E11.40 TYPE 2 DIABETES MELLITUS WITH DIABETIC NEUROPATHY, WITHOUT LONG-TERM CURRENT USE OF INSULIN: ICD-10-CM

## 2022-08-19 DIAGNOSIS — H52.7 REFRACTIVE ERROR: ICD-10-CM

## 2022-08-19 DIAGNOSIS — Z98.890 POST-OPERATIVE STATE: Primary | ICD-10-CM

## 2022-08-19 PROCEDURE — 3288F PR FALLS RISK ASSESSMENT DOCUMENTED: ICD-10-PCS | Mod: CPTII,S$GLB,, | Performed by: OPHTHALMOLOGY

## 2022-08-19 PROCEDURE — 1101F PR PT FALLS ASSESS DOC 0-1 FALLS W/OUT INJ PAST YR: ICD-10-PCS | Mod: CPTII,S$GLB,, | Performed by: OPHTHALMOLOGY

## 2022-08-19 PROCEDURE — 1160F PR REVIEW ALL MEDS BY PRESCRIBER/CLIN PHARMACIST DOCUMENTED: ICD-10-PCS | Mod: CPTII,S$GLB,, | Performed by: OPHTHALMOLOGY

## 2022-08-19 PROCEDURE — 3288F FALL RISK ASSESSMENT DOCD: CPT | Mod: CPTII,S$GLB,, | Performed by: OPHTHALMOLOGY

## 2022-08-19 PROCEDURE — 3051F HG A1C>EQUAL 7.0%<8.0%: CPT | Mod: CPTII,S$GLB,, | Performed by: OPHTHALMOLOGY

## 2022-08-19 PROCEDURE — 3061F NEG MICROALBUMINURIA REV: CPT | Mod: CPTII,S$GLB,, | Performed by: OPHTHALMOLOGY

## 2022-08-19 PROCEDURE — 3066F NEPHROPATHY DOC TX: CPT | Mod: CPTII,S$GLB,, | Performed by: OPHTHALMOLOGY

## 2022-08-19 PROCEDURE — 1126F PR PAIN SEVERITY QUANTIFIED, NO PAIN PRESENT: ICD-10-PCS | Mod: CPTII,S$GLB,, | Performed by: OPHTHALMOLOGY

## 2022-08-19 PROCEDURE — 3061F PR NEG MICROALBUMINURIA RESULT DOCUMENTED/REVIEW: ICD-10-PCS | Mod: CPTII,S$GLB,, | Performed by: OPHTHALMOLOGY

## 2022-08-19 PROCEDURE — 3051F PR MOST RECENT HEMOGLOBIN A1C LEVEL 7.0 - < 8.0%: ICD-10-PCS | Mod: CPTII,S$GLB,, | Performed by: OPHTHALMOLOGY

## 2022-08-19 PROCEDURE — 3066F PR DOCUMENTATION OF TREATMENT FOR NEPHROPATHY: ICD-10-PCS | Mod: CPTII,S$GLB,, | Performed by: OPHTHALMOLOGY

## 2022-08-19 PROCEDURE — 1126F AMNT PAIN NOTED NONE PRSNT: CPT | Mod: CPTII,S$GLB,, | Performed by: OPHTHALMOLOGY

## 2022-08-19 PROCEDURE — 1101F PT FALLS ASSESS-DOCD LE1/YR: CPT | Mod: CPTII,S$GLB,, | Performed by: OPHTHALMOLOGY

## 2022-08-19 PROCEDURE — 99024 PR POST-OP FOLLOW-UP VISIT: ICD-10-PCS | Mod: S$GLB,,, | Performed by: OPHTHALMOLOGY

## 2022-08-19 PROCEDURE — 99999 PR PBB SHADOW E&M-EST. PATIENT-LVL III: ICD-10-PCS | Mod: PBBFAC,,, | Performed by: OPHTHALMOLOGY

## 2022-08-19 PROCEDURE — 99999 PR PBB SHADOW E&M-EST. PATIENT-LVL III: CPT | Mod: PBBFAC,,, | Performed by: OPHTHALMOLOGY

## 2022-08-19 PROCEDURE — 1159F PR MEDICATION LIST DOCUMENTED IN MEDICAL RECORD: ICD-10-PCS | Mod: CPTII,S$GLB,, | Performed by: OPHTHALMOLOGY

## 2022-08-19 PROCEDURE — 1159F MED LIST DOCD IN RCRD: CPT | Mod: CPTII,S$GLB,, | Performed by: OPHTHALMOLOGY

## 2022-08-19 PROCEDURE — 99024 POSTOP FOLLOW-UP VISIT: CPT | Mod: S$GLB,,, | Performed by: OPHTHALMOLOGY

## 2022-08-19 PROCEDURE — 1160F RVW MEDS BY RX/DR IN RCRD: CPT | Mod: CPTII,S$GLB,, | Performed by: OPHTHALMOLOGY

## 2022-08-19 RX ORDER — HYDROCHLOROTHIAZIDE 25 MG/1
TABLET ORAL
Qty: 270 TABLET | Refills: 0 | OUTPATIENT
Start: 2022-08-19

## 2022-08-19 RX ORDER — METFORMIN HYDROCHLORIDE 500 MG/1
TABLET ORAL
Qty: 180 TABLET | Refills: 0 | OUTPATIENT
Start: 2022-08-19

## 2022-08-20 NOTE — TELEPHONE ENCOUNTER
Care Due:                  Date            Visit Type   Department     Provider  --------------------------------------------------------------------------------                                [facility]                              PRIMARY      MED/ INTERNAL  Last Visit: 05-      CARE ([facility])   MED/ PEDS      Alena Hernandez                              [facility]      MED/ INTERNAL  Next Visit: 11-      CARE ([facility])   MED/ PEDS      Alena Hernandez                                                            Last  Test          Frequency    Reason                     Performed    Due Date  --------------------------------------------------------------------------------    HBA1C.......  6 months...  metFORMIN................  05-   10-    Lipid Panel.  12 months..  pravastatin..............  10-   10-    [facility] Embedded Care Gaps. Reference number: [id number]. 8/19/2022   8:42:06 PM CDT

## 2022-08-20 NOTE — TELEPHONE ENCOUNTER
No new care gaps identified.  [facility] Embedded Care Gaps. Reference number: [id number]. 8/19/2022   8:43:12 PM CDT

## 2022-08-20 NOTE — TELEPHONE ENCOUNTER
Refill Decision Note   Valarie Foster  is requesting a refill authorization.  Brief Assessment and Rationale for Refill:  Quick Discontinue     Medication Therapy Plan:    Pharmacy is requesting new scripts for the following medications without required information, (sig/ frequency/qty/etc)      Medication Reconciliation Completed: No     Comments: Pharmacies have been requesting medications for patients without required information, (sig, frequency, qty, etc.). In addition, requests are sent for medication(s) pt. are currently not taking, and medications patients have never taken.    We have spoken to the pharmacies about these request types and advised their teams previously that we are unable to assess these New Script requests and require all details for these requests. This is a known issue and has been reported.     Note composed:9:39 PM 08/19/2022

## 2022-08-21 NOTE — PROGRESS NOTES
Subjective:       Patient ID: Valarie Foster is a 72 y.o. female.    Chief Complaint: Post-op Evaluation (Patient Valarie Foster is a 72 year old female.)    HPI     Post-op Evaluation      Additional comments: Patient Valarie Foster is a 72 year old female.              Comments     Pt here for 3 week PCIOL OU post-op visit. Pt states that VA OU has been   good since last visit. Pt states occasional floaters in VA OU. Pt states   occasion 6-7/10 eye pain OD only.    DLS: 08/11/2022 with Dr. Novoa  S/p PCIOL OD: 07/21/2022  S/p PCIOL OS: 07/07/2022    Gtts: None    (+)DM  (+)HTN    POHx:            Last edited by Tasneem Dennison on 8/19/2022  4:33 PM. (History)             Assessment:       1. Post-operative state    2. Refractive error        Plan:       S/p CE OU- Doing well.  RE-Pt wants MRx.      Give MRx.  RTC Dr Esquivel in 6 mos.

## 2022-08-24 DIAGNOSIS — M17.0 PRIMARY OSTEOARTHRITIS OF BOTH KNEES: ICD-10-CM

## 2022-08-24 RX ORDER — MELOXICAM 15 MG/1
15 TABLET ORAL DAILY
Qty: 90 TABLET | Refills: 0 | Status: SHIPPED | OUTPATIENT
Start: 2022-08-24 | End: 2023-03-09

## 2022-08-25 DIAGNOSIS — I73.9 PVD (PERIPHERAL VASCULAR DISEASE): Primary | ICD-10-CM

## 2022-08-25 NOTE — PROGRESS NOTES
Patient had ADDISON from [facility]   addison right 0.14  Left 1.01    Patient has peripheral vascular disease  Referral to vascular surgery placed

## 2022-08-25 NOTE — TELEPHONE ENCOUNTER
----- Message from Linda Monahan sent at 8/25/2022  2:08 PM CDT -----  Type: Patient Call Back    Who called: self     What is the request in detail: Requesting to speak with a nurse regarding paperwork she dropped off the other day from [employer]. Just wanted to make sure her provider got the paperwork.     Can the clinic reply by [facility]?no     Would the patient rather a call back or a response via [facility]? Call back     Best call back number: [phone]

## 2022-08-26 ENCOUNTER — TELEPHONE (OUTPATIENT)
Dept: FAMILY MEDICINE | Facility: CLINIC | Age: 72
End: 2022-08-26
Payer: MEDICARE

## 2022-08-26 ENCOUNTER — PES CALL (OUTPATIENT)
Dept: ADMINISTRATIVE | Facility: CLINIC | Age: 72
End: 2022-08-26
Payer: MEDICARE

## 2022-08-26 DIAGNOSIS — M79.662 PAIN OF LEFT CALF: Primary | ICD-10-CM

## 2022-08-26 NOTE — TELEPHONE ENCOUNTER
----- Message from Valencia Ronquillo sent at 8/26/2022 11:55 AM CDT -----  Regarding: Request a nurse to call back  Patient is request a call back to have an appointment for Vascular with referral in the system for leg pain.    Pt can be reached at [phone]    Please contact for assist for scheduling.  Thank you!

## 2022-09-27 ENCOUNTER — IMMUNIZATION (OUTPATIENT)
Dept: OBSTETRICS AND GYNECOLOGY | Facility: CLINIC | Age: 72
End: 2022-09-27
Payer: MEDICARE

## 2022-09-27 DIAGNOSIS — Z23 NEED FOR VACCINATION: Primary | ICD-10-CM

## 2022-09-27 PROCEDURE — 91312 COVID-19, MRNA, LNP-S, BIVALENT BOOSTER, PF, 30 MCG/0.3 ML DOSE: CPT | Mod: S$GLB,,, | Performed by: FAMILY MEDICINE

## 2022-09-27 PROCEDURE — 91312 COVID-19, MRNA, LNP-S, BIVALENT BOOSTER, PF, 30 MCG/0.3 ML DOSE: ICD-10-PCS | Mod: S$GLB,,, | Performed by: FAMILY MEDICINE

## 2022-09-27 PROCEDURE — 0124A COVID-19, MRNA, LNP-S, BIVALENT BOOSTER, PF, 30 MCG/0.3 ML DOSE: CPT | Mod: PBBFAC | Performed by: FAMILY MEDICINE

## 2022-09-30 DIAGNOSIS — M79.662 PAIN OF LEFT CALF: Primary | ICD-10-CM

## 2022-10-11 ENCOUNTER — PATIENT MESSAGE (OUTPATIENT)
Dept: ADMINISTRATIVE | Facility: HOSPITAL | Age: 72
End: 2022-10-11
Payer: MEDICARE

## 2022-10-13 ENCOUNTER — TELEPHONE (OUTPATIENT)
Dept: FAMILY MEDICINE | Facility: CLINIC | Age: 72
End: 2022-10-13
Payer: MEDICARE

## 2022-10-13 NOTE — TELEPHONE ENCOUNTER
----- Message from Wendy Vicente sent at 10/13/2022 12:32 PM CDT -----  Type: Patient Call Back    Who called: self     What is the request in detail:pt asking for a call back about her flu shot     Can the clinic reply by [facility]?    Would the patient rather a call back or a response via [facility]?     Best call back number: [phone] (home)

## 2022-10-20 ENCOUNTER — HOSPITAL ENCOUNTER (OUTPATIENT)
Dept: CARDIOLOGY | Facility: HOSPITAL | Age: 72
Discharge: HOME | End: 2022-10-20
Attending: SURGERY
Payer: MEDICARE

## 2022-10-20 ENCOUNTER — INITIAL CONSULT (OUTPATIENT)
Dept: VASCULAR SURGERY | Facility: CLINIC | Age: 72
End: 2022-10-20
Payer: MEDICARE

## 2022-10-20 VITALS — SYSTOLIC BLOOD PRESSURE: 184 MMHG | DIASTOLIC BLOOD PRESSURE: 98 MMHG | BODY MASS INDEX: 33.98 KG/M2 | WEIGHT: 198 LBS

## 2022-10-20 DIAGNOSIS — I73.9 PVD (PERIPHERAL VASCULAR DISEASE): Primary | ICD-10-CM

## 2022-10-20 DIAGNOSIS — I73.9 PVD (PERIPHERAL VASCULAR DISEASE): ICD-10-CM

## 2022-10-20 DIAGNOSIS — M79.662 PAIN OF LEFT CALF: ICD-10-CM

## 2022-10-20 PROCEDURE — 99999 PR PBB SHADOW E&M-EST. PATIENT-LVL IV: ICD-10-PCS | Mod: PBBFAC,,, | Performed by: SURGERY

## 2022-10-20 PROCEDURE — 93922 UPR/L XTREMITY ART 2 LEVELS: CPT | Mod: 26,,, | Performed by: SURGERY

## 2022-10-20 PROCEDURE — 93922 UPR/L XTREMITY ART 2 LEVELS: CPT

## 2022-10-20 PROCEDURE — 99204 OFFICE O/P NEW MOD 45 MIN: CPT | Mod: S$GLB,,, | Performed by: NURSE PRACTITIONER

## 2022-10-20 PROCEDURE — 99999 PR PBB SHADOW E&M-EST. PATIENT-LVL IV: CPT | Mod: PBBFAC,,, | Performed by: SURGERY

## 2022-10-20 PROCEDURE — 93922 ANKLE BRACHIAL INDICES (ABI): ICD-10-PCS | Mod: 26,,, | Performed by: SURGERY

## 2022-10-20 PROCEDURE — 99499 UNLISTED E&M SERVICE: CPT | Mod: S$GLB,,, | Performed by: NURSE PRACTITIONER

## 2022-10-20 PROCEDURE — 99204 PR OFFICE/OUTPT VISIT, NEW, LEVL IV, 45-59 MIN: ICD-10-PCS | Mod: S$GLB,,, | Performed by: NURSE PRACTITIONER

## 2022-10-20 PROCEDURE — 99499 RISK ADDL DX/OHS AUDIT: ICD-10-PCS | Mod: S$GLB,,, | Performed by: NURSE PRACTITIONER

## 2022-10-20 NOTE — PROGRESS NOTES
[facility]                         10/20/2022    HPI:  Valarie Foster is a 72 y.o. female with   Patient Active Problem List   Diagnosis    Type 2 diabetes mellitus with diabetic neuropathy, without long-term current use of insulin    Benign hypertension    Hyperlipemia    Microhematuria    Left flank pain    Abdominal aortic atherosclerosis    History of renal cell cancer    Renal cell carcinoma of right kidney    Mild major depression    Bilateral chronic knee pain    Colon cancer screening    Pain of left calf    Nuclear sclerosis of both eyes    Refractive error    Primary osteoarthritis of knees, bilateral    Fall    Decreased ROM of lower extremity    Decreased strength of lower extremity    Decreased mobility and endurance    Nuclear sclerotic cataract of left eye    Nuclear sclerotic cataract of right eye    being managed by PCP and specialists who is here today for evaluation of BLE pain.  Patient states location is bilateral thigh, calf and foot occurring for several months.  Associated signs and symptoms numbness and tingling.  Quality is dull and severity is 5/10.  Symptoms began several months ago.  Alleviating factors no factors reported.  Worsening factors weight bearing activity.    No MI  No Stroke  Tobacco use: No    Past Medical History:   Diagnosis Date    Arthritis     Colon polyp     Diabetes mellitus     diet controlled    Diabetes with neurologic complications     Hypertension     Nuclear sclerosis of both eyes 2/14/2022    Renal cell carcinoma      Past Surgical History:   Procedure Laterality Date    COLONOSCOPY N/A 2/5/2018    Procedure: COLONOSCOPY;  Surgeon: Bacilio Mancia MD;  Location: [facility] ENDO;  Service: Endoscopy;  Laterality: N/A;  appt confirmed-ss    COLONOSCOPY N/A 8/13/2020    Procedure: COLONOSCOPY;  Surgeon: Jose West MD;  Location: [facility] ENDO;  Service: Endoscopy;  Laterality: N/A;    HYSTERECTOMY      INTRAOCULAR  PROSTHESES INSERTION Left 7/7/2022    Procedure: INSERTION, IOL PROSTHESIS;  Surgeon: Candelario Novoa MD;  Location: [facility] OR;  Service: Ophthalmology;  Laterality: Left;    INTRAOCULAR PROSTHESES INSERTION Right 7/21/2022    Procedure: INSERTION, IOL PROSTHESIS;  Surgeon: Candelario Novoa MD;  Location: [facility] OR;  Service: Ophthalmology;  Laterality: Right;    OOPHORECTOMY      PARTIAL NEPHRECTOMY Right 10/12/2018    Procedure: NEPHRECTOMY, PARTIAL open. Dr Arenas to assist.;  Surgeon: Alejandra Palm MD;  Location: [facility] OR;  Service: Urology;  Laterality: Right;  RN PREOP 10/9/2018----NEEDS H/P    PHACOEMULSIFICATION OF CATARACT Left 7/7/2022    Procedure: PHACOEMULSIFICATION, CATARACT;  Surgeon: Candelario Novoa MD;  Location: [facility] OR;  Service: Ophthalmology;  Laterality: Left;  RN PHONE PREOP 6/27/2022   ARRIVAL 6:00 AM    PHACOEMULSIFICATION OF CATARACT Right 7/21/2022    Procedure: PHACOEMULSIFICATION, CATARACT;  Surgeon: Candelario Novoa MD;  Location: [facility] OR;  Service: Ophthalmology;  Laterality: Right;  RN PHONE PREOP 7/12/2022   ARRIVAL 12:00 NOON     Family History   Problem Relation Age of Onset    Glaucoma Sister     Cancer Sister         breast cancer    No Known Problems Mother     Glaucoma Father     Diabetes Brother     No Known Problems Maternal Aunt     No Known Problems Maternal Uncle     No Known Problems Paternal Aunt     No Known Problems Paternal Uncle     No Known Problems Maternal Grandmother     No Known Problems Maternal Grandfather     No Known Problems Paternal Grandmother     No Known Problems Paternal Grandfather     Thyroid disease Sister     Blindness Sister         due to trauma during car accident    Diabetes Sister     Amblyopia Neg Hx     Cataracts Neg Hx     Hypertension Neg Hx     Macular degeneration Neg Hx     Retinal detachment Neg Hx     Strabismus Neg Hx     Stroke Neg Hx     Breast cancer Neg Hx      Social History     Socioeconomic  History    Marital status:    Tobacco Use    Smoking status: Never    Smokeless tobacco: Never   Substance and Sexual Activity    Alcohol use: No    Drug use: No       Current Outpatient Medications:     ACCU-CHEK ONEIL CONTROL SONYA Jean-Baptiste, , Disp: , Rfl:     ACCU-CHEK SOFTCLIX LANCETS Misc, USE TO TEST BLOOD SUGAR ONE TIME DAILY, Disp: 100 each, Rfl: 3    alcohol swabs (BD ALCOHOL SWABS) PadM, Apply 3 each topically once daily., Disp: 300 each, Rfl: 0    amLODIPine (NORVASC) 5 MG tablet, TAKE 1 TABLET EVERY DAY, Disp: 90 tablet, Rfl: 1    blood-glucose meter (TRUE METRIX GLUCOSE METER) kit, Use as directed to test glucose, Disp: 1 each, Rfl: 0    celecoxib (CELEBREX) 200 MG capsule, Take 1 capsule (200 mg total) by mouth once daily., Disp: 90 capsule, Rfl: 0    cyclobenzaprine (FLEXERIL) 5 MG tablet, TAKE 1 TABLET THREE TIMES DAILY AS NEEDED FOR MUSCLE SPASM(S), Disp: 270 tablet, Rfl: 1    diphenhydrAMINE (BENADRYL) 25 mg capsule, Take 1 capsule (25 mg total) by mouth every 6 (six) hours as needed for Itching., Disp: 20 capsule, Rfl: 0    docusate sodium (COLACE) 100 MG capsule, Take 1 capsule (100 mg total) by mouth 2 (two) times daily., Disp: 60 capsule, Rfl: 0    ergocalciferol (ERGOCALCIFEROL) 50,000 unit Cap, TAKE 1 CAPSULE EVERY 7 DAYS., Disp: 12 capsule, Rfl: 1    famotidine (PEPCID) 20 MG tablet, Take 1 tablet by mouth., Disp: , Rfl:     FLUAD QUAD 2021-22,65Y UP,,PF, 60 mcg (15 mcg x 4)/0.5 mL Syrg, , Disp: , Rfl:     FLUoxetine 20 MG capsule, TAKE 1 CAPSULE EVERY DAY, Disp: 90 capsule, Rfl: 2    fluticasone propionate (FLONASE) 50 mcg/actuation nasal spray, USE 1 SPRAY IN EACH NOSTRIL ONCE DAILY, Disp: 32 g, Rfl: 2    gabapentin (NEURONTIN) 300 MG capsule, TAKE 2 CAPSULES (600 MG TOTAL) BY MOUTH EVERY EVENING., Disp: 180 capsule, Rfl: 1    hydrOXYzine HCL (ATARAX) 25 MG tablet, TAKE 1 TABLET THREE TIMES DAILY AS NEEDED FOR  ITCHING, Disp: 90 tablet, Rfl: 0    KENALOG 40 mg/mL injection, , Disp: ,  Rfl:     lancets (TRUEPLUS LANCETS) 33 gauge Misc, 1 lancet by Misc.(Non-Drug; Combo Route) route 2 (two) times a day., Disp: 200 each, Rfl: 1    meloxicam (MOBIC) 15 MG tablet, Take 1 tablet (15 mg total) by mouth once daily., Disp: 90 tablet, Rfl: 0    metFORMIN (GLUCOPHAGE) 500 MG tablet, Take 1 tablet (500 mg total) by mouth 2 (two) times daily with meals., Disp: 180 tablet, Rfl: 3    ofloxacin (OCUFLOX) 0.3 % ophthalmic solution, Place 1 drop into the left eye 4 (four) times daily., Disp: 24 mL, Rfl: 0    pravastatin (PRAVACHOL) 10 MG tablet, TAKE 1 TABLET EVERY DAY, Disp: 90 tablet, Rfl: 1    tobramycin sulfate 0.3% (TOBREX) 0.3 % ophthalmic solution, 1-2 drops topically twice daily to affected toe(s)., Disp: 5 mL, Rfl: 3    triamcinolone acetonide 0.1% (KENALOG) 0.1 % cream, , Disp: , Rfl:     TRUE METRIX GLUCOSE TEST STRIP Strp, TEST BLOOD SUGAR TWICE DAILY, Disp: 200 strip, Rfl: 1    blood glucose control, low (TRUE METRIX LEVEL 1) Soln, 1 each by Misc.(Non-Drug; Combo Route) route once as needed., Disp: 1 each, Rfl: 3    cetirizine (ZYRTEC) 10 MG tablet, Take 1 tablet (10 mg total) by mouth once daily., Disp: 90 tablet, Rfl: 0  No current facility-administered medications for this visit.    Facility-Administered Medications Ordered in Other Visits:     cyclopentolate 1% ophthalmic solution 1 drop, 1 drop, Left Eye, On Call Procedure, Candelario Novoa MD, 1 drop at 07/07/22 0703    cyclopentolate 1% ophthalmic solution 1 drop, 1 drop, Right Eye, On Call Procedure, Candelario Novoa MD, 1 drop at 07/21/22 1337    ofloxacin 0.3 % ophthalmic solution 1 drop, 1 drop, Left Eye, On Call Procedure, Candelario Novoa MD, 2 drop at 07/07/22 0754    ofloxacin 0.3 % ophthalmic solution 1 drop, 1 drop, Right Eye, On Call Procedure, Candelario Novoa MD, 2 drop at 07/21/22 1630    sodium chloride 0.9% flush 10 mL, 10 mL, Intravenous, PRN, Candelario Novoa MD    sodium chloride 0.9% flush 10  mL, 10 mL, Intravenous, PRN, Candelario Novoa MD    REVIEW OF SYSTEMS:  General: No fevers or chills; ENT: No sore throat; Allergy and Immunology: no persistent infections; Hematological and Lymphatic: No history of bleeding or easy bruising; Endocrine: negative; Respiratory: no cough, shortness of breath, or wheezing; Cardiovascular: no chest pain or dyspnea on exertion; Gastrointestinal: no abdominal pain/back, change in bowel habits, or bloody stools; Genito-Urinary: no dysuria, trouble voiding, or hematuria; Musculoskeletal: negative; Neurological: no TIA or stroke symptoms; numbness and tingling bilateral upper and lower extremities Psychiatric: no nervousness, anxiety or depression.    PHYSICAL EXAM:                General appearance:  Alert, well-appearing, and in no distress.  Oriented to person, place, and time                    Neurological: Normal speech, no focal findings noted; CN II - XII grossly intact. RLE with  sensation to light touch, LLE with  sensation to light touch.            Musculoskeletal: Digits/nail without cyanosis/clubbing.  Strength 5/5.                    Neck: Supple, no significant adenopathy.                  Chest:  Clear to auscultation, no wheezes, rales or rhonchi, symmetric air entry. No use of accessory muscles               Cardiac: Normal rate and regular rhythm, S1 and S2 normal            Abdomen: Soft, nontender, nondistended, no masses or organomegaly, no hernia     No rebound tenderness noted; bowel sounds normal        No groin adenopathy      Extremities:   2+ R femoral pulse, 2+ L femoral pulse     2+ R popliteal pulse, 2+ L popliteal pulse     2+ R PT pulse, 2+ L PT pulse     2+ R DP pulse, 2+ L DP pulse     0 RLE edema, 0 LLE edema    Skin: RLE CDI; LLE CDI    LAB RESULTS:  No results found for: CBC  Lab Results   Component Value Date    LABPROT 11.1 10/09/2018    INR 1.1 10/09/2018     Lab Results   Component Value Date     05/02/2022    K 4.2  05/02/2022     05/02/2022    CO2 30 (H) 05/02/2022     (H) 05/02/2022    BUN 12 05/02/2022    CREATININE 0.8 05/02/2022    CALCIUM 9.5 05/02/2022    ANIONGAP 6 (L) 05/02/2022    EGFRNONAA >60.0 05/02/2022     Lab Results   Component Value Date    WBC 5.59 10/28/2021    RBC 4.92 10/28/2021    HGB 13.0 10/28/2021    HCT 41.8 10/28/2021    MCV 85 10/28/2021    MCH 26.4 (L) 10/28/2021    MCHC 31.1 (L) 10/28/2021    RDW 15.0 (H) 10/28/2021     10/28/2021    MPV 11.1 10/28/2021    GRAN 3.6 10/28/2021    GRAN 64.9 10/28/2021    LYMPH 1.4 10/28/2021    LYMPH 25.6 10/28/2021    MONO 0.5 10/28/2021    MONO 8.6 10/28/2021    EOS 0.0 10/28/2021    BASO 0.00 10/28/2021    EOSINOPHIL 0.5 10/28/2021    BASOPHIL 0.0 10/28/2021    DIFFMETHOD Automated 10/28/2021     .  Lab Results   Component Value Date    HGBA1C 7.6 (H) 05/02/2022       IMAGING:  All pertinent imaging has been reviewed and interpreted independently.  ADDISON: 10/2022: Normal    Component Ref Range & Units 14:41    Left arm BP mmHg 174    Right arm BP mmHg 179    Left posterior tibial mmHg 232    Right posterior tibial mmHg 212    Left dorsalis pedis mmHg 212    Right dorsalis pedis mmHg 201    Left ADDISON  1.30    Right ADDISON  1.18    Left toe pressure mmHg 179    Right toe pressure mmHg 255    Left TBI  1.00    Right TBI  1.42      IMP/PLAN:  72 y.o. female with   Patient Active Problem List   Diagnosis    Type 2 diabetes mellitus with diabetic neuropathy, without long-term current use of insulin    Benign hypertension    Hyperlipemia    Microhematuria    Left flank pain    Abdominal aortic atherosclerosis    History of renal cell cancer    Renal cell carcinoma of right kidney    Mild major depression    Bilateral chronic knee pain    Colon cancer screening    Pain of left calf    Nuclear sclerosis of both eyes    Refractive error    Primary osteoarthritis of knees, bilateral    Fall    Decreased ROM of lower extremity    Decreased strength of lower  extremity    Decreased mobility and endurance    Nuclear sclerotic cataract of left eye    Nuclear sclerotic cataract of right eye    being managed by PCP and specialists who is here today for evaluation of BLE pain, numbness and tingling.     - Referral placed to Neurology(Dr. Waldron) for back pain/numbness/tingling that radiates down legs and often originates in the arms.   - ADDISON/TP within normal limits to left and right lower extremity  - Continue heart healthy diet, low sodium diet, and exercise.       I spent 20 minutes evaluating this patient and greater than 50% of the time was spent counseling, coordinator care and discussing the plan of care.  All questions were answered and patient stated understanding with agreement with the above treatment plan.    Aleksander Castillo NP   Vascular and Endovascular Surgery

## 2022-10-21 ENCOUNTER — TELEPHONE (OUTPATIENT)
Dept: NEUROLOGY | Facility: CLINIC | Age: 72
End: 2022-10-21
Payer: MEDICARE

## 2022-10-21 NOTE — TELEPHONE ENCOUNTER
Returned pts call; no answer, lvm calling her to schedule appt. Neuro call back number provided     ----- Message from Hank White sent at 10/21/2022  1:49 PM CDT -----  Type: Patient Call Back    Who called:self    What is the request in detail:Pt would like a call back as soon as possible to be scheduled for neurology. She has a referral, but I was unable to schedule.    Can the clinic reply by [facility]?    Would the patient rather a call back or a response via [facility]? call    Best call back number:[phone] (home)

## 2022-10-24 ENCOUNTER — TELEPHONE (OUTPATIENT)
Dept: NEUROLOGY | Facility: CLINIC | Age: 72
End: 2022-10-24
Payer: MEDICARE

## 2022-10-24 NOTE — TELEPHONE ENCOUNTER
Returned pts call. Appt made w/ Dr. Myers for 10/27 @ 7am. Pt verbally acknowledged, directions provided.   ----- Message from Tammy Martell sent at 10/24/2022 10:02 AM CDT -----  Regarding: Patient call back  Who called:DARIA DE LA FUENTE [[id number]]    What is the request in detail: Patient is requesting a call back. Pt would like to be seen this week for her leg pain. No aptps available. She would like to further discuss.   Please advise.    Can the clinic reply by [facility]? No    Best call back number: [phone]    Additional Information: N/A

## 2022-10-27 ENCOUNTER — OFFICE VISIT (OUTPATIENT)
Dept: NEUROLOGY | Facility: CLINIC | Age: 72
End: 2022-10-27
Payer: MEDICARE

## 2022-10-27 VITALS
HEIGHT: 64 IN | WEIGHT: 200.19 LBS | SYSTOLIC BLOOD PRESSURE: 162 MMHG | DIASTOLIC BLOOD PRESSURE: 80 MMHG | HEART RATE: 87 BPM | BODY MASS INDEX: 34.18 KG/M2

## 2022-10-27 DIAGNOSIS — G60.3 IDIOPATHIC PROGRESSIVE NEUROPATHY: ICD-10-CM

## 2022-10-27 DIAGNOSIS — M54.10 RADICULOPATHY, UNSPECIFIED SPINAL REGION: ICD-10-CM

## 2022-10-27 DIAGNOSIS — M79.2 NERVE PAIN: Primary | ICD-10-CM

## 2022-10-27 PROCEDURE — 3077F SYST BP >= 140 MM HG: CPT | Mod: CPTII,S$GLB,, | Performed by: STUDENT IN AN ORGANIZED HEALTH CARE EDUCATION/TRAINING PROGRAM

## 2022-10-27 PROCEDURE — 3079F PR MOST RECENT DIASTOLIC BLOOD PRESSURE 80-89 MM HG: ICD-10-PCS | Mod: CPTII,S$GLB,, | Performed by: STUDENT IN AN ORGANIZED HEALTH CARE EDUCATION/TRAINING PROGRAM

## 2022-10-27 PROCEDURE — 99214 PR OFFICE/OUTPT VISIT, EST, LEVL IV, 30-39 MIN: ICD-10-PCS | Mod: S$GLB,,, | Performed by: STUDENT IN AN ORGANIZED HEALTH CARE EDUCATION/TRAINING PROGRAM

## 2022-10-27 PROCEDURE — 3077F PR MOST RECENT SYSTOLIC BLOOD PRESSURE >= 140 MM HG: ICD-10-PCS | Mod: CPTII,S$GLB,, | Performed by: STUDENT IN AN ORGANIZED HEALTH CARE EDUCATION/TRAINING PROGRAM

## 2022-10-27 PROCEDURE — 3051F HG A1C>EQUAL 7.0%<8.0%: CPT | Mod: CPTII,S$GLB,, | Performed by: STUDENT IN AN ORGANIZED HEALTH CARE EDUCATION/TRAINING PROGRAM

## 2022-10-27 PROCEDURE — 3066F PR DOCUMENTATION OF TREATMENT FOR NEPHROPATHY: ICD-10-PCS | Mod: CPTII,S$GLB,, | Performed by: STUDENT IN AN ORGANIZED HEALTH CARE EDUCATION/TRAINING PROGRAM

## 2022-10-27 PROCEDURE — 99214 OFFICE O/P EST MOD 30 MIN: CPT | Mod: S$GLB,,, | Performed by: STUDENT IN AN ORGANIZED HEALTH CARE EDUCATION/TRAINING PROGRAM

## 2022-10-27 PROCEDURE — 3061F PR NEG MICROALBUMINURIA RESULT DOCUMENTED/REVIEW: ICD-10-PCS | Mod: CPTII,S$GLB,, | Performed by: STUDENT IN AN ORGANIZED HEALTH CARE EDUCATION/TRAINING PROGRAM

## 2022-10-27 PROCEDURE — 1125F AMNT PAIN NOTED PAIN PRSNT: CPT | Mod: CPTII,S$GLB,, | Performed by: STUDENT IN AN ORGANIZED HEALTH CARE EDUCATION/TRAINING PROGRAM

## 2022-10-27 PROCEDURE — 1101F PT FALLS ASSESS-DOCD LE1/YR: CPT | Mod: CPTII,S$GLB,, | Performed by: STUDENT IN AN ORGANIZED HEALTH CARE EDUCATION/TRAINING PROGRAM

## 2022-10-27 PROCEDURE — 3066F NEPHROPATHY DOC TX: CPT | Mod: CPTII,S$GLB,, | Performed by: STUDENT IN AN ORGANIZED HEALTH CARE EDUCATION/TRAINING PROGRAM

## 2022-10-27 PROCEDURE — 3288F FALL RISK ASSESSMENT DOCD: CPT | Mod: CPTII,S$GLB,, | Performed by: STUDENT IN AN ORGANIZED HEALTH CARE EDUCATION/TRAINING PROGRAM

## 2022-10-27 PROCEDURE — 1125F PR PAIN SEVERITY QUANTIFIED, PAIN PRESENT: ICD-10-PCS | Mod: CPTII,S$GLB,, | Performed by: STUDENT IN AN ORGANIZED HEALTH CARE EDUCATION/TRAINING PROGRAM

## 2022-10-27 PROCEDURE — 99999 PR PBB SHADOW E&M-EST. PATIENT-LVL IV: CPT | Mod: PBBFAC,,, | Performed by: STUDENT IN AN ORGANIZED HEALTH CARE EDUCATION/TRAINING PROGRAM

## 2022-10-27 PROCEDURE — 3079F DIAST BP 80-89 MM HG: CPT | Mod: CPTII,S$GLB,, | Performed by: STUDENT IN AN ORGANIZED HEALTH CARE EDUCATION/TRAINING PROGRAM

## 2022-10-27 PROCEDURE — 99999 PR PBB SHADOW E&M-EST. PATIENT-LVL IV: ICD-10-PCS | Mod: PBBFAC,,, | Performed by: STUDENT IN AN ORGANIZED HEALTH CARE EDUCATION/TRAINING PROGRAM

## 2022-10-27 PROCEDURE — 1159F PR MEDICATION LIST DOCUMENTED IN MEDICAL RECORD: ICD-10-PCS | Mod: CPTII,S$GLB,, | Performed by: STUDENT IN AN ORGANIZED HEALTH CARE EDUCATION/TRAINING PROGRAM

## 2022-10-27 PROCEDURE — 3051F PR MOST RECENT HEMOGLOBIN A1C LEVEL 7.0 - < 8.0%: ICD-10-PCS | Mod: CPTII,S$GLB,, | Performed by: STUDENT IN AN ORGANIZED HEALTH CARE EDUCATION/TRAINING PROGRAM

## 2022-10-27 PROCEDURE — 3288F PR FALLS RISK ASSESSMENT DOCUMENTED: ICD-10-PCS | Mod: CPTII,S$GLB,, | Performed by: STUDENT IN AN ORGANIZED HEALTH CARE EDUCATION/TRAINING PROGRAM

## 2022-10-27 PROCEDURE — 3061F NEG MICROALBUMINURIA REV: CPT | Mod: CPTII,S$GLB,, | Performed by: STUDENT IN AN ORGANIZED HEALTH CARE EDUCATION/TRAINING PROGRAM

## 2022-10-27 PROCEDURE — 1101F PR PT FALLS ASSESS DOC 0-1 FALLS W/OUT INJ PAST YR: ICD-10-PCS | Mod: CPTII,S$GLB,, | Performed by: STUDENT IN AN ORGANIZED HEALTH CARE EDUCATION/TRAINING PROGRAM

## 2022-10-27 PROCEDURE — 1159F MED LIST DOCD IN RCRD: CPT | Mod: CPTII,S$GLB,, | Performed by: STUDENT IN AN ORGANIZED HEALTH CARE EDUCATION/TRAINING PROGRAM

## 2022-10-27 NOTE — ASSESSMENT & PLAN NOTE
- length dependent peripheral sensory neuropathy based on clinical and exam findings, likely 2/2 to DMII. A1C is elevated  - will order other neuropathy labs to eval for other underlying causes - B12, B1, B6, folate, protein electrophoresis w immunofixation for gammopathies. TSH wnl.

## 2022-10-27 NOTE — ASSESSMENT & PLAN NOTE
- multifactorial etiology, from lumbar radiculopathy to a length dependent peripheral neuropathy from underlying DMII  - increase gabapentin to 300mg in AM, 300mg during day, and 600mg at night for neuropathic pain  - also contributing factors w muscle/overall body pain from degenerative arthritis, patient encouraged about lifestyle modifications and physical therapy exercises at home

## 2022-10-27 NOTE — ASSESSMENT & PLAN NOTE
- suspect lumbar radiculopathy given patient long time history of radiating pain from her lower back down her buttocks (L>R)  - patient already on gabapentin 600mg at night, discussed w patient okay to take 1 tab (300mg) in AM and 1 tab (300mg) during midday. Counseled on sedative effects and fall risk precautions, to titrate up as tolerated. Total 300mg in AM, 300mg during day, 600mg at night before bed. Total 1200mg.  - patient has done physical therapy in the past, encouraged home exercises and lifestyle modifications

## 2022-10-27 NOTE — PROGRESS NOTES
"  Chief Complaint and Duration     Chief Complaint   Patient presents with    Leg Pain   For "years"    History of Present Illness     Valarie Foster is a 72 y.o. female w history of long standing DM, HTN, and degenerative arthritis w significant knee pain (s/p injections) who comes in for "nerve pain" with pain in her chronic lower back that causes radiating numbness and tingling down her buttocks to her legs (L > R).    Denies any bowel or bladder incontinence, no recent falls. Feels stiff when she wakes up in the morning. Radiating pain felt worse w certain positions, she endorses leg stiffness and knee pain as well. Did physical therapy a few months prior w mild relief. Has been on gabapentin 600mg at night w again, mild relief. Sometimes the pain would wake her up from sleep at night.    No history of MVAs. No weakness.       Review of Systems: (positive bolded)  Constitutional: Negative for fatigue, activity change, fevers, or chills.   HENT:  Negative for new visual disturbances or difficulty swallowing.    Respiratory:  Negative for shortness of breath.    Cardiovascular:  Negative for palpitations.   Gastrointestinal:  Negative for constipation, nausea, or vomiting.   Endocrine: Negative for temperature instability/intolerance.   Genitourinary:  Negative for difficulty urinating.   Musculoskeletal:  Negative for neck pain, back pain, myalgias, joint swelling, or gait problem.  Skin:  Negative for rash or lesions.   Neurological:  Negative for seizures, headaches, dizziness, tremors, syncope, speech difficulty, weakness, light-headedness, or numbness.   Hematological:  Does not bruise/bleed easily.   Psychiatric/Behavioral: Negative for decreased concentration or sleep disturbance.    Review of patient's allergies indicates:   Allergen Reactions    Lisinopril Swelling and Shortness Of Breath    Ascorbic acid-ascorbate calc      And citrus fruits     Current Outpatient Medications   Medication Sig Dispense " Refill    ACCU-CHEK ONEIL CONTROL SOLN Soln       ACCU-CHEK SOFTCLIX LANCETS [facility] USE TO TEST BLOOD SUGAR ONE TIME DAILY 100 each 3    alcohol swabs (BD ALCOHOL SWABS) PadM Apply 3 each topically once daily. 300 each 0    amLODIPine (NORVASC) 5 MG tablet TAKE 1 TABLET EVERY DAY 90 tablet 1    blood glucose control, low (TRUE METRIX LEVEL 1) Soln 1 each by Misc.(Non-Drug; Combo Route) route once as needed. 1 each 3    blood-glucose meter (TRUE METRIX GLUCOSE METER) kit Use as directed to test glucose 1 each 0    celecoxib (CELEBREX) 200 MG capsule Take 1 capsule (200 mg total) by mouth once daily. 90 capsule 0    cetirizine (ZYRTEC) 10 MG tablet Take 1 tablet (10 mg total) by mouth once daily. 90 tablet 0    cyclobenzaprine (FLEXERIL) 5 MG tablet TAKE 1 TABLET THREE TIMES DAILY AS NEEDED FOR MUSCLE SPASM(S) 270 tablet 1    diphenhydrAMINE (BENADRYL) 25 mg capsule Take 1 capsule (25 mg total) by mouth every 6 (six) hours as needed for Itching. 20 capsule 0    docusate sodium (COLACE) 100 MG capsule Take 1 capsule (100 mg total) by mouth 2 (two) times daily. 60 capsule 0    ergocalciferol (ERGOCALCIFEROL) 50,000 unit Cap TAKE 1 CAPSULE EVERY 7 DAYS. 12 capsule 1    famotidine (PEPCID) 20 MG tablet Take 1 tablet by mouth.      FLUAD QUAD 2021-22,65Y UP,,PF, 60 mcg (15 mcg x 4)/0.5 mL Syrg       FLUoxetine 20 MG capsule TAKE 1 CAPSULE EVERY DAY 90 capsule 2    fluticasone propionate (FLONASE) 50 mcg/actuation nasal spray USE 1 SPRAY IN EACH NOSTRIL ONCE DAILY 32 g 2    gabapentin (NEURONTIN) 300 MG capsule TAKE 2 CAPSULES (600 MG TOTAL) BY MOUTH EVERY EVENING. 180 capsule 1    hydrOXYzine HCL (ATARAX) 25 MG tablet TAKE 1 TABLET THREE TIMES DAILY AS NEEDED FOR  ITCHING 90 tablet 0    KENALOG 40 mg/mL injection       lancets (TRUEPLUS LANCETS) 33 gauge Misc 1 lancet by Misc.(Non-Drug; Combo Route) route 2 (two) times a day. 200 each 1    meloxicam (MOBIC) 15 MG tablet Take 1 tablet (15 mg total) by mouth once daily. 90  tablet 0    metFORMIN (GLUCOPHAGE) 500 MG tablet Take 1 tablet (500 mg total) by mouth 2 (two) times daily with meals. 180 tablet 3    ofloxacin (OCUFLOX) 0.3 % ophthalmic solution Place 1 drop into the left eye 4 (four) times daily. 24 mL 0    pravastatin (PRAVACHOL) 10 MG tablet TAKE 1 TABLET EVERY DAY 90 tablet 1    tobramycin sulfate 0.3% (TOBREX) 0.3 % ophthalmic solution 1-2 drops topically twice daily to affected toe(s). 5 mL 3    triamcinolone acetonide 0.1% (KENALOG) 0.1 % cream       TRUE METRIX GLUCOSE TEST STRIP Strp TEST BLOOD SUGAR TWICE DAILY 200 strip 1     No current facility-administered medications for this visit.     Facility-Administered Medications Ordered in Other Visits   Medication Dose Route Frequency Provider Last Rate Last Admin    cyclopentolate 1% ophthalmic solution 1 drop  1 drop Left Eye On Call Procedure Candelario Novoa MD   1 drop at 07/07/22 0703    cyclopentolate 1% ophthalmic solution 1 drop  1 drop Right Eye On Call Procedure Candelario Novoa MD   1 drop at 07/21/22 1337    ofloxacin 0.3 % ophthalmic solution 1 drop  1 drop Left Eye On Call Procedure Candelario Novoa MD   2 drop at 07/07/22 0754    ofloxacin 0.3 % ophthalmic solution 1 drop  1 drop Right Eye On Call Procedure Candelario Novoa MD   2 drop at 07/21/22 1630    sodium chloride 0.9% flush 10 mL  10 mL Intravenous PRN Candelario Novoa MD        sodium chloride 0.9% flush 10 mL  10 mL Intravenous PRN Candelario Novoa MD           Medical History     Past Medical History:   Diagnosis Date    Arthritis     Colon polyp     Diabetes mellitus     diet controlled    Diabetes with neurologic complications     Hypertension     Nuclear sclerosis of both eyes 2/14/2022    Renal cell carcinoma      Past Surgical History:   Procedure Laterality Date    COLONOSCOPY N/A 2/5/2018    Procedure: COLONOSCOPY;  Surgeon: Bacilio Mancia MD;  Location: [facility];  Service: Endoscopy;  Laterality:  N/A;  appt confirmed-ss    COLONOSCOPY N/A 8/13/2020    Procedure: COLONOSCOPY;  Surgeon: Jose West MD;  Location: [facility] ENDO;  Service: Endoscopy;  Laterality: N/A;    HYSTERECTOMY      INTRAOCULAR PROSTHESES INSERTION Left 7/7/2022    Procedure: INSERTION, IOL PROSTHESIS;  Surgeon: Candelario Novoa MD;  Location: [facility] OR;  Service: Ophthalmology;  Laterality: Left;    INTRAOCULAR PROSTHESES INSERTION Right 7/21/2022    Procedure: INSERTION, IOL PROSTHESIS;  Surgeon: Candelario Novoa MD;  Location: [facility] OR;  Service: Ophthalmology;  Laterality: Right;    OOPHORECTOMY      PARTIAL NEPHRECTOMY Right 10/12/2018    Procedure: NEPHRECTOMY, PARTIAL open. Dr Arenas to assist.;  Surgeon: Alejandra Palm MD;  Location: [facility] OR;  Service: Urology;  Laterality: Right;  RN PREOP 10/9/2018----NEEDS H/P    PHACOEMULSIFICATION OF CATARACT Left 7/7/2022    Procedure: PHACOEMULSIFICATION, CATARACT;  Surgeon: Candelario Novoa MD;  Location: [facility] OR;  Service: Ophthalmology;  Laterality: Left;  RN PHONE PREOP 6/27/2022   ARRIVAL 6:00 AM    PHACOEMULSIFICATION OF CATARACT Right 7/21/2022    Procedure: PHACOEMULSIFICATION, CATARACT;  Surgeon: Candelario Novoa MD;  Location: [facility] OR;  Service: Ophthalmology;  Laterality: Right;  RN PHONE PREOP 7/12/2022   ARRIVAL 12:00 NOON     Family History   Problem Relation Age of Onset    Glaucoma Sister     Cancer Sister         breast cancer    No Known Problems Mother     Glaucoma Father     Diabetes Brother     No Known Problems Maternal Aunt     No Known Problems Maternal Uncle     No Known Problems Paternal Aunt     No Known Problems Paternal Uncle     No Known Problems Maternal Grandmother     No Known Problems Maternal Grandfather     No Known Problems Paternal Grandmother     No Known Problems Paternal Grandfather     Thyroid disease Sister     Blindness Sister         due to trauma during car accident    Diabetes Sister     Amblyopia Neg Hx      Cataracts Neg Hx     Hypertension Neg Hx     Macular degeneration Neg Hx     Retinal detachment Neg Hx     Strabismus Neg Hx     Stroke Neg Hx     Breast cancer Neg Hx      Social History     Socioeconomic History    Marital status:    Tobacco Use    Smoking status: Never    Smokeless tobacco: Never   Substance and Sexual Activity    Alcohol use: No    Drug use: No       Exam     Vitals:    10/27/22 0704   BP: (!) 162/80   Pulse: 87      Physical Exam:  General: She is not in acute distress. She is not ill-appearing.   HENT: Normocephalic and atraumatic. Moist mucous membranes.  Eyes: Conjunctivae normal.   Pulmonary: Pulmonary effort is normal.   Abdominal: Abdomen is soft and flat.   Skin: Skin is warm and dry. No rashes.   Psychiatric: Mood normal.        Neurologic Exam   Mental status: oriented to person, place, and time  Attention: Normal. Concentration: normal.  Speech: speech is normal.  Cranial Nerves: PERRL, EOMI intact, V1-V3 Facial sensation intact. Symmetric facies. Hearing grossly intact. Palate and uvula midline, symmetric. No tongue deviation. Trapezius strength intact.     Motor exam: bulk and tone normal. Strength 5/5 in bilateral upper extremities: deltoids, biceps, triceps, wrist flexion/extension, finger abduction/adduction. Strength 5/5 in bilateral lower extremities: hip flexion/extension, thigh adduction/abduction, knee flexion/extension, dorsiflexion/plantarflexion, foot eversion/inversion.    Reflexes: 2+ in bilateral upper extremities: biceps and brachiaradialis, 1+ patellar (patient difficulty w relaxing), difficult to elicit achilles  Plantar reflex: normal    Sensory exam: light touch intact, vibration sense decreased    Gait exam: normal although she walks w stiff gait and tense  Romberg: positive  Coordination: intact    Tremor: none    Labs and Imaging     Labs: reviewed  A1C 5/22 - 7.6. TSH wnl  Imaging: none    Assessment and Plan     Problem List Items Addressed This Visit           Neuro    Radiculopathy    Current Assessment & Plan     - suspect lumbar radiculopathy given patient long time history of radiating pain from her lower back down her buttocks (L>R)  - patient already on gabapentin 600mg at night, discussed w patient okay to take 1 tab (300mg) in AM and 1 tab (300mg) during midday. Counseled on sedative effects and fall risk precautions, to titrate up as tolerated. Total 300mg in AM, 300mg during day, 600mg at night before bed. Total 1200mg.  - patient has done physical therapy in the past, encouraged home exercises and lifestyle modifications         Relevant Orders    Vitamin B12    Idiopathic progressive neuropathy    Current Assessment & Plan     - length dependent peripheral sensory neuropathy based on clinical and exam findings, likely 2/2 to DMII. A1C is elevated  - will order other neuropathy labs to eval for other underlying causes - B12, B1, B6, folate, protein electrophoresis w immunofixation for gammopathies. TSH wnl.            Relevant Orders    Vitamin B1    Vitamin B12    Vitamin B6    Folate    Immunofixation Electrophoresis    Protein Electrophoresis, Serum    Nerve pain - Primary    Current Assessment & Plan     - multifactorial etiology, from lumbar radiculopathy to a length dependent peripheral neuropathy from underlying DMII  - increase gabapentin to 300mg in AM, 300mg during day, and 600mg at night for neuropathic pain  - also contributing factors w muscle/overall body pain from degenerative arthritis, patient encouraged about lifestyle modifications and physical therapy exercises at home            Follow-up: 3 months

## 2022-10-29 ENCOUNTER — CLINICAL SUPPORT (OUTPATIENT)
Dept: OBSTETRICS AND GYNECOLOGY | Facility: CLINIC | Age: 72
End: 2022-10-29
Payer: MEDICARE

## 2022-10-29 DIAGNOSIS — Z23 FLU VACCINE NEED: Primary | ICD-10-CM

## 2022-10-29 PROCEDURE — 90694 VACC AIIV4 NO PRSRV 0.5ML IM: CPT | Mod: S$GLB,,, | Performed by: FAMILY MEDICINE

## 2022-10-29 PROCEDURE — G0008 FLU VACCINE - QUADRIVALENT - ADJUVANTED: ICD-10-PCS | Mod: S$GLB,,, | Performed by: FAMILY MEDICINE

## 2022-10-29 PROCEDURE — 90694 FLU VACCINE - QUADRIVALENT - ADJUVANTED: ICD-10-PCS | Mod: S$GLB,,, | Performed by: FAMILY MEDICINE

## 2022-10-29 PROCEDURE — G0008 ADMIN INFLUENZA VIRUS VAC: HCPCS | Mod: S$GLB,,, | Performed by: FAMILY MEDICINE

## 2022-10-31 ENCOUNTER — LAB VISIT (OUTPATIENT)
Dept: LAB | Facility: HOSPITAL | Age: 72
End: 2022-10-31
Attending: FAMILY MEDICINE
Payer: MEDICARE

## 2022-10-31 DIAGNOSIS — M54.10 RADICULOPATHY, UNSPECIFIED SPINAL REGION: ICD-10-CM

## 2022-10-31 DIAGNOSIS — F32.0 MILD MAJOR DEPRESSION: ICD-10-CM

## 2022-10-31 DIAGNOSIS — E11.40 TYPE 2 DIABETES MELLITUS WITH DIABETIC NEUROPATHY, WITHOUT LONG-TERM CURRENT USE OF INSULIN: ICD-10-CM

## 2022-10-31 DIAGNOSIS — G60.3 IDIOPATHIC PROGRESSIVE NEUROPATHY: ICD-10-CM

## 2022-10-31 LAB
ALBUMIN SERPL BCP-MCNC: 3.8 G/DL (ref 3.5–5.2)
ALP SERPL-CCNC: 111 U/L (ref 55–135)
ALT SERPL W/O P-5'-P-CCNC: 11 U/L (ref 10–44)
ANION GAP SERPL CALC-SCNC: 11 MMOL/L (ref 8–16)
AST SERPL-CCNC: 14 U/L (ref 10–40)
BASOPHILS # BLD AUTO: 0 K/UL (ref 0–0.2)
BASOPHILS NFR BLD: 0 % (ref 0–1.9)
BILIRUB SERPL-MCNC: 0.3 MG/DL (ref 0.1–1)
BUN SERPL-MCNC: 16 MG/DL (ref 8–23)
CALCIUM SERPL-MCNC: 9.6 MG/DL (ref 8.7–10.5)
CHLORIDE SERPL-SCNC: 105 MMOL/L (ref 95–110)
CHOLEST SERPL-MCNC: 225 MG/DL (ref 120–199)
CHOLEST/HDLC SERPL: 3 {RATIO} (ref 2–5)
CO2 SERPL-SCNC: 25 MMOL/L (ref 23–29)
CREAT SERPL-MCNC: 0.8 MG/DL (ref 0.5–1.4)
DIFFERENTIAL METHOD: ABNORMAL
EOSINOPHIL # BLD AUTO: 0 K/UL (ref 0–0.5)
EOSINOPHIL NFR BLD: 0 % (ref 0–8)
ERYTHROCYTE [DISTWIDTH] IN BLOOD BY AUTOMATED COUNT: 15.3 % (ref 11.5–14.5)
EST. GFR  (NO RACE VARIABLE): >60 ML/MIN/1.73 M^2
ESTIMATED AVG GLUCOSE: 160 MG/DL (ref 68–131)
FOLATE SERPL-MCNC: 8.7 NG/ML (ref 4–24)
GLUCOSE SERPL-MCNC: 131 MG/DL (ref 70–110)
HBA1C MFR BLD: 7.2 % (ref 4–5.6)
HCT VFR BLD AUTO: 41.7 % (ref 37–48.5)
HDLC SERPL-MCNC: 74 MG/DL (ref 40–75)
HDLC SERPL: 32.9 % (ref 20–50)
HGB BLD-MCNC: 13.5 G/DL (ref 12–16)
IMM GRANULOCYTES # BLD AUTO: 0.04 K/UL (ref 0–0.04)
IMM GRANULOCYTES NFR BLD AUTO: 0.7 % (ref 0–0.5)
LDLC SERPL CALC-MCNC: 135.6 MG/DL (ref 63–159)
LYMPHOCYTES # BLD AUTO: 1 K/UL (ref 1–4.8)
LYMPHOCYTES NFR BLD: 17.4 % (ref 18–48)
MCH RBC QN AUTO: 27.2 PG (ref 27–31)
MCHC RBC AUTO-ENTMCNC: 32.4 G/DL (ref 32–36)
MCV RBC AUTO: 84 FL (ref 82–98)
MONOCYTES # BLD AUTO: 0.3 K/UL (ref 0.3–1)
MONOCYTES NFR BLD: 5 % (ref 4–15)
NEUTROPHILS # BLD AUTO: 4.5 K/UL (ref 1.8–7.7)
NEUTROPHILS NFR BLD: 76.9 % (ref 38–73)
NONHDLC SERPL-MCNC: 151 MG/DL
NRBC BLD-RTO: 0 /100 WBC
PLATELET # BLD AUTO: 287 K/UL (ref 150–450)
PMV BLD AUTO: 11.6 FL (ref 9.2–12.9)
POTASSIUM SERPL-SCNC: 4.4 MMOL/L (ref 3.5–5.1)
PROT SERPL-MCNC: 7.3 G/DL (ref 6–8.4)
RBC # BLD AUTO: 4.96 M/UL (ref 4–5.4)
SODIUM SERPL-SCNC: 141 MMOL/L (ref 136–145)
TRIGL SERPL-MCNC: 77 MG/DL (ref 30–150)
TSH SERPL DL<=0.005 MIU/L-ACNC: 1 UIU/ML (ref 0.4–4)
VIT B12 SERPL-MCNC: 282 PG/ML (ref 210–950)
WBC # BLD AUTO: 5.79 K/UL (ref 3.9–12.7)

## 2022-10-31 PROCEDURE — 84165 PATHOLOGIST INTERPRETATION SPE: ICD-10-PCS | Mod: 26,,, | Performed by: PATHOLOGY

## 2022-10-31 PROCEDURE — 83036 HEMOGLOBIN GLYCOSYLATED A1C: CPT | Performed by: FAMILY MEDICINE

## 2022-10-31 PROCEDURE — 82607 VITAMIN B-12: CPT | Performed by: STUDENT IN AN ORGANIZED HEALTH CARE EDUCATION/TRAINING PROGRAM

## 2022-10-31 PROCEDURE — 86334 IMMUNOFIX E-PHORESIS SERUM: CPT | Performed by: STUDENT IN AN ORGANIZED HEALTH CARE EDUCATION/TRAINING PROGRAM

## 2022-10-31 PROCEDURE — 80061 LIPID PANEL: CPT | Performed by: FAMILY MEDICINE

## 2022-10-31 PROCEDURE — 80053 COMPREHEN METABOLIC PANEL: CPT | Performed by: FAMILY MEDICINE

## 2022-10-31 PROCEDURE — 82746 ASSAY OF FOLIC ACID SERUM: CPT | Performed by: STUDENT IN AN ORGANIZED HEALTH CARE EDUCATION/TRAINING PROGRAM

## 2022-10-31 PROCEDURE — 84425 ASSAY OF VITAMIN B-1: CPT | Performed by: STUDENT IN AN ORGANIZED HEALTH CARE EDUCATION/TRAINING PROGRAM

## 2022-10-31 PROCEDURE — 84443 ASSAY THYROID STIM HORMONE: CPT | Performed by: FAMILY MEDICINE

## 2022-10-31 PROCEDURE — 86334 IMMUNOFIX E-PHORESIS SERUM: CPT | Mod: 26,,, | Performed by: PATHOLOGY

## 2022-10-31 PROCEDURE — 84207 ASSAY OF VITAMIN B-6: CPT | Performed by: STUDENT IN AN ORGANIZED HEALTH CARE EDUCATION/TRAINING PROGRAM

## 2022-10-31 PROCEDURE — 86334 PATHOLOGIST INTERPRETATION IFE: ICD-10-PCS | Mod: 26,,, | Performed by: PATHOLOGY

## 2022-10-31 PROCEDURE — 84165 PROTEIN E-PHORESIS SERUM: CPT | Mod: 26,,, | Performed by: PATHOLOGY

## 2022-10-31 PROCEDURE — 85025 COMPLETE CBC W/AUTO DIFF WBC: CPT | Performed by: FAMILY MEDICINE

## 2022-10-31 PROCEDURE — 36415 COLL VENOUS BLD VENIPUNCTURE: CPT | Mod: PO | Performed by: STUDENT IN AN ORGANIZED HEALTH CARE EDUCATION/TRAINING PROGRAM

## 2022-10-31 PROCEDURE — 84165 PROTEIN E-PHORESIS SERUM: CPT | Performed by: STUDENT IN AN ORGANIZED HEALTH CARE EDUCATION/TRAINING PROGRAM

## 2022-11-01 LAB
ALBUMIN SERPL ELPH-MCNC: 3.92 G/DL (ref 3.35–5.55)
ALPHA1 GLOB SERPL ELPH-MCNC: 0.32 G/DL (ref 0.17–0.41)
ALPHA2 GLOB SERPL ELPH-MCNC: 0.9 G/DL (ref 0.43–0.99)
B-GLOBULIN SERPL ELPH-MCNC: 0.78 G/DL (ref 0.5–1.1)
GAMMA GLOB SERPL ELPH-MCNC: 0.98 G/DL (ref 0.67–1.58)
INTERPRETATION SERPL IFE-IMP: NORMAL
LEFT ABI: 1.3
LEFT ARM BP: 174 MMHG
LEFT DORSALIS PEDIS: 212 MMHG
LEFT POSTERIOR TIBIAL: 232 MMHG
LEFT TBI: 1
LEFT TOE PRESSURE: 179 MMHG
PATHOLOGIST INTERPRETATION IFE: NORMAL
PATHOLOGIST INTERPRETATION SPE: NORMAL
PROT SERPL-MCNC: 6.9 G/DL (ref 6–8.4)
RIGHT ABI: 1.18
RIGHT ARM BP: 179 MMHG
RIGHT DORSALIS PEDIS: 201 MMHG
RIGHT POSTERIOR TIBIAL: 212 MMHG
RIGHT TBI: 1.42
RIGHT TOE PRESSURE: 255 MMHG

## 2022-11-04 LAB — VIT B1 BLD-MCNC: 62 UG/L (ref 38–122)

## 2022-11-07 ENCOUNTER — OFFICE VISIT (OUTPATIENT)
Dept: FAMILY MEDICINE | Facility: CLINIC | Age: 72
End: 2022-11-07
Payer: MEDICARE

## 2022-11-07 ENCOUNTER — HOSPITAL ENCOUNTER (OUTPATIENT)
Dept: RADIOLOGY | Facility: HOSPITAL | Age: 72
Discharge: HOME | End: 2022-11-07
Attending: FAMILY MEDICINE
Payer: MEDICARE

## 2022-11-07 VITALS
TEMPERATURE: 98 F | BODY MASS INDEX: 34.4 KG/M2 | DIASTOLIC BLOOD PRESSURE: 90 MMHG | HEIGHT: 64 IN | HEART RATE: 89 BPM | SYSTOLIC BLOOD PRESSURE: 160 MMHG | WEIGHT: 201.5 LBS | OXYGEN SATURATION: 98 %

## 2022-11-07 DIAGNOSIS — I70.0 ABDOMINAL AORTIC ATHEROSCLEROSIS: Chronic | ICD-10-CM

## 2022-11-07 DIAGNOSIS — E78.5 HYPERLIPIDEMIA, UNSPECIFIED HYPERLIPIDEMIA TYPE: Chronic | ICD-10-CM

## 2022-11-07 DIAGNOSIS — Z00.00 ANNUAL PHYSICAL EXAM: Primary | ICD-10-CM

## 2022-11-07 DIAGNOSIS — Z12.31 ENCOUNTER FOR SCREENING MAMMOGRAM FOR BREAST CANCER: ICD-10-CM

## 2022-11-07 DIAGNOSIS — I10 BENIGN HYPERTENSION: Chronic | ICD-10-CM

## 2022-11-07 DIAGNOSIS — F32.0 MILD MAJOR DEPRESSION: ICD-10-CM

## 2022-11-07 DIAGNOSIS — E11.40 TYPE 2 DIABETES MELLITUS WITH DIABETIC NEUROPATHY, WITHOUT LONG-TERM CURRENT USE OF INSULIN: Chronic | ICD-10-CM

## 2022-11-07 DIAGNOSIS — C64.1 RENAL CELL CARCINOMA OF RIGHT KIDNEY: ICD-10-CM

## 2022-11-07 PROCEDURE — 3008F BODY MASS INDEX DOCD: CPT | Mod: CPTII,S$GLB,, | Performed by: FAMILY MEDICINE

## 2022-11-07 PROCEDURE — 77067 MAMMO DIGITAL SCREENING BILAT WITH TOMO: ICD-10-PCS | Mod: 26,,, | Performed by: RADIOLOGY

## 2022-11-07 PROCEDURE — 77067 SCR MAMMO BI INCL CAD: CPT | Mod: 26,,, | Performed by: RADIOLOGY

## 2022-11-07 PROCEDURE — 3066F NEPHROPATHY DOC TX: CPT | Mod: CPTII,S$GLB,, | Performed by: FAMILY MEDICINE

## 2022-11-07 PROCEDURE — 1125F PR PAIN SEVERITY QUANTIFIED, PAIN PRESENT: ICD-10-PCS | Mod: CPTII,S$GLB,, | Performed by: FAMILY MEDICINE

## 2022-11-07 PROCEDURE — 77063 BREAST TOMOSYNTHESIS BI: CPT | Mod: TC,PO

## 2022-11-07 PROCEDURE — 1101F PT FALLS ASSESS-DOCD LE1/YR: CPT | Mod: CPTII,S$GLB,, | Performed by: FAMILY MEDICINE

## 2022-11-07 PROCEDURE — 99999 PR PBB SHADOW E&M-EST. PATIENT-LVL III: ICD-10-PCS | Mod: PBBFAC,,, | Performed by: FAMILY MEDICINE

## 2022-11-07 PROCEDURE — 1160F RVW MEDS BY RX/DR IN RCRD: CPT | Mod: CPTII,S$GLB,, | Performed by: FAMILY MEDICINE

## 2022-11-07 PROCEDURE — 99397 PR PREVENTIVE VISIT,EST,65 & OVER: ICD-10-PCS | Mod: S$GLB,,, | Performed by: FAMILY MEDICINE

## 2022-11-07 PROCEDURE — 77063 BREAST TOMOSYNTHESIS BI: CPT | Mod: 26,,, | Performed by: RADIOLOGY

## 2022-11-07 PROCEDURE — 3066F PR DOCUMENTATION OF TREATMENT FOR NEPHROPATHY: ICD-10-PCS | Mod: CPTII,S$GLB,, | Performed by: FAMILY MEDICINE

## 2022-11-07 PROCEDURE — 3051F PR MOST RECENT HEMOGLOBIN A1C LEVEL 7.0 - < 8.0%: ICD-10-PCS | Mod: CPTII,S$GLB,, | Performed by: FAMILY MEDICINE

## 2022-11-07 PROCEDURE — 99499 UNLISTED E&M SERVICE: CPT | Mod: S$GLB,,, | Performed by: FAMILY MEDICINE

## 2022-11-07 PROCEDURE — 3288F FALL RISK ASSESSMENT DOCD: CPT | Mod: CPTII,S$GLB,, | Performed by: FAMILY MEDICINE

## 2022-11-07 PROCEDURE — 3288F PR FALLS RISK ASSESSMENT DOCUMENTED: ICD-10-PCS | Mod: CPTII,S$GLB,, | Performed by: FAMILY MEDICINE

## 2022-11-07 PROCEDURE — 3008F PR BODY MASS INDEX (BMI) DOCUMENTED: ICD-10-PCS | Mod: CPTII,S$GLB,, | Performed by: FAMILY MEDICINE

## 2022-11-07 PROCEDURE — 3051F HG A1C>EQUAL 7.0%<8.0%: CPT | Mod: CPTII,S$GLB,, | Performed by: FAMILY MEDICINE

## 2022-11-07 PROCEDURE — 3080F PR MOST RECENT DIASTOLIC BLOOD PRESSURE >= 90 MM HG: ICD-10-PCS | Mod: CPTII,S$GLB,, | Performed by: FAMILY MEDICINE

## 2022-11-07 PROCEDURE — 1101F PR PT FALLS ASSESS DOC 0-1 FALLS W/OUT INJ PAST YR: ICD-10-PCS | Mod: CPTII,S$GLB,, | Performed by: FAMILY MEDICINE

## 2022-11-07 PROCEDURE — 1160F PR REVIEW ALL MEDS BY PRESCRIBER/CLIN PHARMACIST DOCUMENTED: ICD-10-PCS | Mod: CPTII,S$GLB,, | Performed by: FAMILY MEDICINE

## 2022-11-07 PROCEDURE — 1159F MED LIST DOCD IN RCRD: CPT | Mod: CPTII,S$GLB,, | Performed by: FAMILY MEDICINE

## 2022-11-07 PROCEDURE — 3080F DIAST BP >= 90 MM HG: CPT | Mod: CPTII,S$GLB,, | Performed by: FAMILY MEDICINE

## 2022-11-07 PROCEDURE — 3077F PR MOST RECENT SYSTOLIC BLOOD PRESSURE >= 140 MM HG: ICD-10-PCS | Mod: CPTII,S$GLB,, | Performed by: FAMILY MEDICINE

## 2022-11-07 PROCEDURE — 3077F SYST BP >= 140 MM HG: CPT | Mod: CPTII,S$GLB,, | Performed by: FAMILY MEDICINE

## 2022-11-07 PROCEDURE — 1125F AMNT PAIN NOTED PAIN PRSNT: CPT | Mod: CPTII,S$GLB,, | Performed by: FAMILY MEDICINE

## 2022-11-07 PROCEDURE — 3061F PR NEG MICROALBUMINURIA RESULT DOCUMENTED/REVIEW: ICD-10-PCS | Mod: CPTII,S$GLB,, | Performed by: FAMILY MEDICINE

## 2022-11-07 PROCEDURE — 3061F NEG MICROALBUMINURIA REV: CPT | Mod: CPTII,S$GLB,, | Performed by: FAMILY MEDICINE

## 2022-11-07 PROCEDURE — 1159F PR MEDICATION LIST DOCUMENTED IN MEDICAL RECORD: ICD-10-PCS | Mod: CPTII,S$GLB,, | Performed by: FAMILY MEDICINE

## 2022-11-07 PROCEDURE — 99999 PR PBB SHADOW E&M-EST. PATIENT-LVL III: CPT | Mod: PBBFAC,,, | Performed by: FAMILY MEDICINE

## 2022-11-07 PROCEDURE — 77063 MAMMO DIGITAL SCREENING BILAT WITH TOMO: ICD-10-PCS | Mod: 26,,, | Performed by: RADIOLOGY

## 2022-11-07 PROCEDURE — 99397 PER PM REEVAL EST PAT 65+ YR: CPT | Mod: S$GLB,,, | Performed by: FAMILY MEDICINE

## 2022-11-07 RX ORDER — AMLODIPINE BESYLATE 10 MG/1
10 TABLET ORAL DAILY
Qty: 90 TABLET | Refills: 3 | Status: SHIPPED | OUTPATIENT
Start: 2022-11-07 | End: 2023-10-03

## 2022-11-07 RX ORDER — PRAVASTATIN SODIUM 20 MG/1
20 TABLET ORAL DAILY
Qty: 90 TABLET | Refills: 1 | Status: SHIPPED | OUTPATIENT
Start: 2022-11-07 | End: 2023-03-31 | Stop reason: SDUPTHER

## 2022-11-07 NOTE — PROGRESS NOTES
"Routine Office Visit    Valarie Foster  1950  [id number]      Subjective     Valarie is a 72 y.o. female who presents today for:    Annual physical   Diabetes - Patient is doing well on current regimen. She is taking metformin as prescribed.   Neuropathy - sciatica - Patient  has followed up with neurology. She has follow-up scheduled with Dr. Myers   Renal cell carcinoma s/p partial nephrectomy - urology - Dr. Palm   Degenerative arthritis - f/u  with Dr. Carbajal   Short of breath / fatigue with exertion - Patient has noticed increase in fatigue and sometime shortness of breath when she walks. No pain. No recent falls. Patient is still able to complete her ADLS    Objective     Review of Systems   Constitutional:  Negative for chills and fever.   HENT:  Negative for congestion.    Eyes:  Negative for blurred vision.   Respiratory:  Negative for cough.    Cardiovascular:  Negative for chest pain.   Gastrointestinal:  Negative for abdominal pain, constipation, diarrhea, heartburn, nausea and vomiting.   Genitourinary:  Negative for dysuria.   Musculoskeletal:  Negative for myalgias.   Skin:  Negative for itching and rash.   Neurological:  Negative for dizziness and headaches.   Psychiatric/Behavioral:  Negative for depression.      BP (!) 160/90 (BP Location: Left arm, Patient Position: Sitting, BP Method: Medium (Manual))   Pulse 89   Temp 98.3 °F (36.8 °C) (Oral)   Ht 5' 4" (1.626 m)   Wt 91.4 kg (201 lb 8 oz)   SpO2 98%   BMI 34.59 kg/m²   Physical Exam  Constitutional:       Appearance: She is well-developed.   HENT:      Head: Normocephalic and atraumatic.   Eyes:      Conjunctiva/sclera: Conjunctivae normal.      Pupils: Pupils are equal, round, and reactive to light.   Cardiovascular:      Rate and Rhythm: Normal rate and regular rhythm.      Heart sounds: Normal heart sounds. No murmur heard.    No friction rub. No gallop.   Pulmonary:      Effort: No respiratory distress.      Breath sounds: Normal " breath sounds.   Abdominal:      General: Bowel sounds are normal. There is no distension.      Palpations: Abdomen is soft.      Tenderness: There is no abdominal tenderness.   Musculoskeletal:         General: Normal range of motion.      Cervical back: Normal range of motion and neck supple.   Lymphadenopathy:      Cervical: No cervical adenopathy.   Skin:     General: Skin is warm.   Neurological:      Mental Status: She is alert and oriented to person, place, and time.       Protective Sensation (w/ 10 gram monofilament):  Right: Intact  Left: Intact    Visual Inspection:  Normal -  Bilateral    Pedal Pulses:   Right: Present  Left: Present    Posterior tibialis:   Right:Present  Left: Present      Assessment     Health Maintenance         Date Due Completion Date    Mammogram 04/27/2022 4/27/2021    Foot Exam 11/08/2022 11/8/2021    Override on 11/1/2021: Done    Override on 8/16/2018: Done (Dr. Lashay Dee ( Podiatry ))    DEXA Scan 04/27/2023 4/27/2021    Hemoglobin A1c 04/30/2023 10/31/2022    Diabetes Urine Screening 05/02/2023 5/2/2022    Eye Exam 05/13/2023 5/13/2022    Lipid Panel 10/31/2023 10/31/2022    Colorectal Cancer Screening 08/13/2025 8/13/2020    TETANUS VACCINE 04/30/2028 4/30/2018              Problem List Items Addressed This Visit          Psychiatric    Mild major depression  The current medical regimen is effective;  continue present plan and medications.         Cardiac/Vascular    Abdominal aortic atherosclerosis (Chronic)    Overview     Patient with Atherosclerosis of the Aorta noted on CT 4/2019.  Stable/asymptomatic. Currently stable on lipid and b/p monitoring.           Benign hypertension (Chronic)    Relevant Medications    amLODIPine (NORVASC) 10 MG tablet  Increase amlodipine   Bp check in 2 weeks   F/u with 2d echo      Other Relevant Orders    Echo Saline Bubble? No    Hyperlipemia (Chronic)    Relevant Medications    pravastatin (PRAVACHOL) 20 MG tablet  Increase  pravastatin from 10-20mg          Oncology    Renal cell carcinoma of right kidney  Continue f/u with Dr. Palm        Endocrine    Type 2 diabetes mellitus with diabetic neuropathy, without long-term current use of insulin - Primary (Chronic)    Relevant Orders    Comprehensive Metabolic Panel    Hemoglobin A1C    CBC Auto Differential    Comprehensive Metabolic Panel    Hemoglobin A1C     Annual physical   I addressed all major concerns as it related to health maintenance.  All were ordered and scheduled based on the patients wishes.  Any additional health maintenance will be readdressed at the next physical if declined or deferred by the patient.        Follow up in about 6 months (around 5/7/2023), or if symptoms worsen or fail to improve.

## 2022-11-08 LAB — PYRIDOXAL SERPL-MCNC: 6 UG/L (ref 5–50)

## 2022-11-23 ENCOUNTER — TELEPHONE (OUTPATIENT)
Dept: WOUND CARE | Facility: HOSPITAL | Age: 72
End: 2022-11-23
Payer: MEDICARE

## 2022-11-23 VITALS — DIASTOLIC BLOOD PRESSURE: 98 MMHG | SYSTOLIC BLOOD PRESSURE: 100 MMHG

## 2022-11-23 RX ORDER — OFLOXACIN 3 MG/ML
1 SOLUTION/ DROPS OPHTHALMIC 4 TIMES DAILY
Qty: 25 ML | OUTPATIENT
Start: 2022-11-23

## 2022-11-23 NOTE — TELEPHONE ENCOUNTER
Yes. Please schedule a nurse visit. They may or may not check her blood pressure in ortho clinic     Diastolic blood pressure is low

## 2022-11-23 NOTE — TELEPHONE ENCOUNTER
Spoke with patient she says that her most recent BP was 100/98, notes that Monday it was 100/44. Patient says that she does not know if something is wrong with her machine, but she has been taking her medication regularly. Patient says she sees Ortho this upcoming Monday and they check her BP when she has an appointment, should I still schedule a nurse visit ?

## 2022-11-23 NOTE — TELEPHONE ENCOUNTER
Please contact Patient  Most recent blood pressure in clinic was high   Have Patient measured blood pressure recently?  Or does she need nurse visit?

## 2022-11-25 ENCOUNTER — TELEPHONE (OUTPATIENT)
Dept: FAMILY MEDICINE | Facility: CLINIC | Age: 72
End: 2022-11-25

## 2022-11-25 ENCOUNTER — CLINICAL SUPPORT (OUTPATIENT)
Dept: FAMILY MEDICINE | Facility: CLINIC | Age: 72
End: 2022-11-25
Payer: MEDICARE

## 2022-11-25 VITALS — SYSTOLIC BLOOD PRESSURE: 130 MMHG | HEART RATE: 78 BPM | OXYGEN SATURATION: 97 % | DIASTOLIC BLOOD PRESSURE: 90 MMHG

## 2022-11-25 DIAGNOSIS — I10 BENIGN HYPERTENSION: Primary | ICD-10-CM

## 2022-11-25 PROCEDURE — 99999 PR PBB SHADOW E&M-EST. PATIENT-LVL II: ICD-10-PCS | Mod: PBBFAC,,,

## 2022-11-25 PROCEDURE — 99999 PR PBB SHADOW E&M-EST. PATIENT-LVL II: CPT | Mod: PBBFAC,,,

## 2022-11-25 NOTE — PROGRESS NOTES
Valarie Foster 72 y.o. female is here today for Blood Pressure check.   History of HTN yes.    Review of patient's allergies indicates:   Allergen Reactions    Lisinopril Swelling and Shortness Of Breath    Ascorbic acid-ascorbate calc      And citrus fruits     Creatinine   Date Value Ref Range Status   10/31/2022 0.8 0.5 - 1.4 mg/dL Final     Sodium   Date Value Ref Range Status   10/31/2022 141 136 - 145 mmol/L Final     Potassium   Date Value Ref Range Status   10/31/2022 4.4 3.5 - 5.1 mmol/L Final   ]  Patient verifies taking blood pressure medications on a regular basis at the same time of the day.     Current Outpatient Medications:     ACCU-CHEK ONEIL CONTROL SOLN Soln, , Disp: , Rfl:     ACCU-CHEK SOFTCLIX LANCETS [facility] USE TO TEST BLOOD SUGAR ONE TIME DAILY, Disp: 100 each, Rfl: 3    alcohol swabs (DROPSAFE ALCOHOL PREP PADS) PadM, USE 3 EVERY DAY AS DIRECTED, Disp: 300 each, Rfl: 3    amLODIPine (NORVASC) 10 MG tablet, Take 1 tablet (10 mg total) by mouth once daily., Disp: 90 tablet, Rfl: 3    blood glucose control, low (TRUE METRIX LEVEL 1) Soln, 1 each by Misc.(Non-Drug; Combo Route) route once as needed., Disp: 1 each, Rfl: 3    blood-glucose meter (TRUE METRIX GLUCOSE METER) kit, Use as directed to test glucose, Disp: 1 each, Rfl: 0    celecoxib (CELEBREX) 200 MG capsule, Take 1 capsule (200 mg total) by mouth once daily., Disp: 90 capsule, Rfl: 0    cetirizine (ZYRTEC) 10 MG tablet, Take 1 tablet (10 mg total) by mouth once daily., Disp: 90 tablet, Rfl: 0    cyclobenzaprine (FLEXERIL) 5 MG tablet, TAKE 1 TABLET THREE TIMES DAILY AS NEEDED FOR MUSCLE SPASM(S), Disp: 270 tablet, Rfl: 1    diphenhydrAMINE (BENADRYL) 25 mg capsule, Take 1 capsule (25 mg total) by mouth every 6 (six) hours as needed for Itching., Disp: 20 capsule, Rfl: 0    ergocalciferol (ERGOCALCIFEROL) 50,000 unit Cap, TAKE 1 CAPSULE EVERY 7 DAYS., Disp: 12 capsule, Rfl: 1    famotidine (PEPCID) 20 MG tablet, TAKE 1 TABLET TWICE  DAILY, Disp: 180 tablet, Rfl: 1    FLUAD QUAD 2021-22,65Y UP,,PF, 60 mcg (15 mcg x 4)/0.5 mL Syrg, , Disp: , Rfl:     FLUoxetine 20 MG capsule, TAKE 1 CAPSULE EVERY DAY, Disp: 90 capsule, Rfl: 2    fluticasone propionate (FLONASE) 50 mcg/actuation nasal spray, USE 1 SPRAY IN EACH NOSTRIL ONCE DAILY, Disp: 32 g, Rfl: 2    gabapentin (NEURONTIN) 300 MG capsule, TAKE 2 CAPSULES (600 MG TOTAL) BY MOUTH EVERY EVENING., Disp: 180 capsule, Rfl: 1    hydrOXYzine HCL (ATARAX) 25 MG tablet, TAKE 1 TABLET THREE TIMES DAILY AS NEEDED FOR  ITCHING, Disp: 90 tablet, Rfl: 0    KENALOG 40 mg/mL injection, , Disp: , Rfl:     lancets (TRUEPLUS LANCETS) 33 gauge Misc, 1 lancet by Misc.(Non-Drug; Combo Route) route 2 (two) times a day., Disp: 200 each, Rfl: 1    meloxicam (MOBIC) 15 MG tablet, Take 1 tablet (15 mg total) by mouth once daily., Disp: 90 tablet, Rfl: 0    metFORMIN (GLUCOPHAGE) 500 MG tablet, Take 1 tablet (500 mg total) by mouth 2 (two) times daily with meals., Disp: 180 tablet, Rfl: 3    pravastatin (PRAVACHOL) 20 MG tablet, Take 1 tablet (20 mg total) by mouth once daily., Disp: 90 tablet, Rfl: 1    triamcinolone acetonide 0.1% (KENALOG) 0.1 % cream, , Disp: , Rfl:     TRUE METRIX GLUCOSE TEST STRIP Strp, TEST BLOOD SUGAR TWICE DAILY, Disp: 200 strip, Rfl: 1  No current facility-administered medications for this visit.    Facility-Administered Medications Ordered in Other Visits:     cyclopentolate 1% ophthalmic solution 1 drop, 1 drop, Left Eye, On Call Procedure, Candelario Novoa MD, 1 drop at 07/07/22 0703    cyclopentolate 1% ophthalmic solution 1 drop, 1 drop, Right Eye, On Call Procedure, Candelario Novoa MD, 1 drop at 07/21/22 1337    ofloxacin 0.3 % ophthalmic solution 1 drop, 1 drop, Left Eye, On Call Procedure, Candelario Novoa MD, 2 drop at 07/07/22 0652    ofloxacin 0.3 % ophthalmic solution 1 drop, 1 drop, Right Eye, On Call Procedure, Candelario Novoa MD, 2 drop at 07/21/22 9430     sodium chloride 0.9% flush 10 mL, 10 mL, Intravenous, PRN, Candelario Novoa MD    sodium chloride 0.9% flush 10 mL, 10 mL, Intravenous, PRN, Candelario Novoa MD  Does patient have record of home blood pressure readings no.   Last dose of blood pressure medication was taken at 5am.  Patient is asymptomatic.   Complains of none.    Vitals:    11/25/22 0939   BP: (!) 136/92   BP Location: Left arm   Patient Position: Sitting   BP Method: Large (Manual)   Pulse: 90   SpO2: 99%         Dr. Hernandez informed of nurse visit.

## 2022-11-28 ENCOUNTER — OFFICE VISIT (OUTPATIENT)
Dept: ORTHOPEDICS | Facility: CLINIC | Age: 72
End: 2022-11-28
Payer: MEDICARE

## 2022-11-28 VITALS — HEIGHT: 64 IN | BODY MASS INDEX: 34.31 KG/M2 | WEIGHT: 201 LBS

## 2022-11-28 DIAGNOSIS — M17.0 PRIMARY OSTEOARTHRITIS OF BOTH KNEES: Primary | ICD-10-CM

## 2022-11-28 PROCEDURE — 3051F HG A1C>EQUAL 7.0%<8.0%: CPT | Mod: CPTII,S$GLB,, | Performed by: ORTHOPAEDIC SURGERY

## 2022-11-28 PROCEDURE — 3061F PR NEG MICROALBUMINURIA RESULT DOCUMENTED/REVIEW: ICD-10-PCS | Mod: CPTII,S$GLB,, | Performed by: ORTHOPAEDIC SURGERY

## 2022-11-28 PROCEDURE — 3061F NEG MICROALBUMINURIA REV: CPT | Mod: CPTII,S$GLB,, | Performed by: ORTHOPAEDIC SURGERY

## 2022-11-28 PROCEDURE — 99999 PR PBB SHADOW E&M-EST. PATIENT-LVL III: CPT | Mod: PBBFAC,,, | Performed by: ORTHOPAEDIC SURGERY

## 2022-11-28 PROCEDURE — 1159F PR MEDICATION LIST DOCUMENTED IN MEDICAL RECORD: ICD-10-PCS | Mod: CPTII,S$GLB,, | Performed by: ORTHOPAEDIC SURGERY

## 2022-11-28 PROCEDURE — 3066F PR DOCUMENTATION OF TREATMENT FOR NEPHROPATHY: ICD-10-PCS | Mod: CPTII,S$GLB,, | Performed by: ORTHOPAEDIC SURGERY

## 2022-11-28 PROCEDURE — 3008F PR BODY MASS INDEX (BMI) DOCUMENTED: ICD-10-PCS | Mod: CPTII,S$GLB,, | Performed by: ORTHOPAEDIC SURGERY

## 2022-11-28 PROCEDURE — 99213 OFFICE O/P EST LOW 20 MIN: CPT | Mod: S$GLB,,, | Performed by: ORTHOPAEDIC SURGERY

## 2022-11-28 PROCEDURE — 1125F AMNT PAIN NOTED PAIN PRSNT: CPT | Mod: CPTII,S$GLB,, | Performed by: ORTHOPAEDIC SURGERY

## 2022-11-28 PROCEDURE — 3066F NEPHROPATHY DOC TX: CPT | Mod: CPTII,S$GLB,, | Performed by: ORTHOPAEDIC SURGERY

## 2022-11-28 PROCEDURE — 1159F MED LIST DOCD IN RCRD: CPT | Mod: CPTII,S$GLB,, | Performed by: ORTHOPAEDIC SURGERY

## 2022-11-28 PROCEDURE — 1125F PR PAIN SEVERITY QUANTIFIED, PAIN PRESENT: ICD-10-PCS | Mod: CPTII,S$GLB,, | Performed by: ORTHOPAEDIC SURGERY

## 2022-11-28 PROCEDURE — 3051F PR MOST RECENT HEMOGLOBIN A1C LEVEL 7.0 - < 8.0%: ICD-10-PCS | Mod: CPTII,S$GLB,, | Performed by: ORTHOPAEDIC SURGERY

## 2022-11-28 PROCEDURE — 99213 PR OFFICE/OUTPT VISIT, EST, LEVL III, 20-29 MIN: ICD-10-PCS | Mod: S$GLB,,, | Performed by: ORTHOPAEDIC SURGERY

## 2022-11-28 PROCEDURE — 3008F BODY MASS INDEX DOCD: CPT | Mod: CPTII,S$GLB,, | Performed by: ORTHOPAEDIC SURGERY

## 2022-11-28 PROCEDURE — 99999 PR PBB SHADOW E&M-EST. PATIENT-LVL III: ICD-10-PCS | Mod: PBBFAC,,, | Performed by: ORTHOPAEDIC SURGERY

## 2022-11-28 NOTE — PROGRESS NOTES
Follow up PATIENT ORTHOPAEDIC: Knee    PRIMARY CARE PHYSICIAN: Alena Hernandez MD   REFERRING PROVIDER: No referring provider defined for this encounter.     ASSESSMENT & PLAN:    Impression:  Bilateral Knee Moderate Osteoarthritis, primary     Follow Up Plan: For orthovisc series      Non operative care:    Valarie Foster has physical exam evidence of above and wishes to pursue an non-operative care. I am recommending the following: visco injections.      To review: Patient has bilateral knee pain left worse than right. She has radiographic imaging that is consistent with moderate arthritis.  I am hesitant to consider surgical intervention on this patient. She has significant tenderness to palpation over multiple areas, but medial joint line is the worst. She has some irritability of her hip but CT scan from 2020 does not demonstrate any significant arthritis here.  She does have some upper lumbar degenerative disc disease on that same CT scan.  She denies any lumbar back issues. She saw [facility] for geniculate nerve ablations which she was not deemed a candidate for. Failed steroid injections. She had visco injections previously, she had about 5-6 months of relief. She is interested in this modality.     The patient has been ordered:  Orthovisc    CONSULTS:   None    ACTIVE PROBLEM LIST  Patient Active Problem List   Diagnosis    Type 2 diabetes mellitus with diabetic neuropathy, without long-term current use of insulin    Benign hypertension    Hyperlipemia    Microhematuria    Left flank pain    Abdominal aortic atherosclerosis    History of renal cell cancer    Renal cell carcinoma of right kidney    Mild major depression    Bilateral chronic knee pain    Colon cancer screening    Pain of left calf    Nuclear sclerosis of both eyes    Refractive error    Primary osteoarthritis of knees, bilateral    Fall    Decreased ROM of lower extremity    Decreased strength of lower extremity    Decreased mobility and  endurance    Nuclear sclerotic cataract of left eye    Nuclear sclerotic cataract of right eye    Radiculopathy    Idiopathic progressive neuropathy    Nerve pain           SUBJECTIVE    CHIEF COMPLAINT: Knee Pain    HPI:   Valarie Foster is a 72 y.o. female here for evaluation and management of bilateral knee pain. There is not a specific incident that brought about this pain. she has had progressive problems with the knee(s) starting 2 years ago but is now progressing to interfere with activities which include: walking, rising from a sitting position, standing for prolonged periods of time and climbing stairs     Currently the pain in the joint is rated at mild with activity. The pain is constant and is located in the knee, at level of joint line, located medially, located laterally, located anterior, located posterior and with radiation. The pain is described as aching, severe, sharp, stabbing and stiffness. Relieving factors include ice or heat, prescription medication and repositioning.     There is associated Catching, Clicking and Popping.     Valarie Foster has no additional complaints.     Interval History 4/7/22: Continued pain. No changes. Saw pain management, not candidate for genicular nerve ablations.   Interval History 4/28/22: Continued pain. Some fear of falling. Follows with Sujatha. DDD on lumbar spine. Waiting for PT eval. Here for start of Orthovisc series    Interval History 5/3/22: Here for 2 of 3 visco injections. Pain mildly improved compared to previous.  Interval History 5/12/22: Here for 3 of 3 visco injections. Pain moderately improved compared to previous. Left knee, minimal pain.    Interval History 11/28/22: Here with return of bilateral knee pain. Previous injections help for about 6 months.     PROGRESSIVE SYMPTOMS:  Pain impacting sleep  Pain worsened by weight bearing    FUNCTIONAL STATUS:   Walk a block or two on level ground     PREVIOUS TREATMENTS:  Medical: Steroid Injections  and Biologic Injections  Physical Therapy: Activities Modified  and Formal Physical Therapy for 6 weeks  Previous Orthopaedic Surgery: None    REVIEW OF SYSTEMS:  PAIN ASSESSMENT:  See HPI.  MUSCULOSKELETAL: See HPI.  OTHER 10 point review of systems is negative except as stated in HPI above    PAST MEDICAL HISTORY   has a past medical history of Arthritis, Colon polyp, Diabetes mellitus, Diabetes with neurologic complications, Hypertension, Nuclear sclerosis of both eyes (2/14/2022), and Renal cell carcinoma.     PAST SURGICAL HISTORY   has a past surgical history that includes Hysterectomy; Oophorectomy; Colonoscopy (N/A, 2/5/2018); Partial nephrectomy (Right, 10/12/2018); Colonoscopy (N/A, 8/13/2020); Phacoemulsification of cataract (Left, 7/7/2022); Intraocular prosthesis insertion (Left, 7/7/2022); Phacoemulsification of cataract (Right, 7/21/2022); and Intraocular prosthesis insertion (Right, 7/21/2022).     FAMILY HISTORY  family history includes Blindness in her sister; Breast cancer in her sister; Cancer in her sister; Diabetes in her brother and sister; Glaucoma in her father and sister; No Known Problems in her maternal aunt, maternal grandfather, maternal grandmother, maternal uncle, mother, paternal aunt, paternal grandfather, paternal grandmother, and paternal uncle; Thyroid disease in her sister.     SOCIAL HISTORY   reports that she has never smoked. She has never used smokeless tobacco. She reports that she does not drink alcohol and does not use drugs.     ALLERGIES   Review of patient's allergies indicates:   Allergen Reactions    Lisinopril Swelling and Shortness Of Breath    Ascorbic acid-ascorbate calc      And citrus fruits        MEDICATIONS  Current Outpatient Medications on File Prior to Visit   Medication Sig Dispense Refill    ACCU-CHEK ONEIL CONTROL SOLN Soln       ACCU-CHEK SOFTCLIX LANCETS Misc USE TO TEST BLOOD SUGAR ONE TIME DAILY 100 each 3    alcohol swabs (DROPSAFE ALCOHOL PREP  PADS) PadM USE 3 EVERY DAY AS DIRECTED 300 each 3    amLODIPine (NORVASC) 10 MG tablet Take 1 tablet (10 mg total) by mouth once daily. 90 tablet 3    blood-glucose meter (TRUE METRIX GLUCOSE METER) kit Use as directed to test glucose 1 each 0    celecoxib (CELEBREX) 200 MG capsule Take 1 capsule (200 mg total) by mouth once daily. 90 capsule 0    cyclobenzaprine (FLEXERIL) 5 MG tablet TAKE 1 TABLET THREE TIMES DAILY AS NEEDED FOR MUSCLE SPASM(S) 270 tablet 1    diphenhydrAMINE (BENADRYL) 25 mg capsule Take 1 capsule (25 mg total) by mouth every 6 (six) hours as needed for Itching. 20 capsule 0    ergocalciferol (ERGOCALCIFEROL) 50,000 unit Cap TAKE 1 CAPSULE EVERY 7 DAYS. 12 capsule 1    famotidine (PEPCID) 20 MG tablet TAKE 1 TABLET TWICE DAILY 180 tablet 1    FLUAD QUAD 2021-22,65Y UP,,PF, 60 mcg (15 mcg x 4)/0.5 mL Syrg       FLUoxetine 20 MG capsule TAKE 1 CAPSULE EVERY DAY 90 capsule 2    fluticasone propionate (FLONASE) 50 mcg/actuation nasal spray USE 1 SPRAY IN EACH NOSTRIL ONCE DAILY 32 g 2    gabapentin (NEURONTIN) 300 MG capsule TAKE 2 CAPSULES (600 MG TOTAL) BY MOUTH EVERY EVENING. 180 capsule 1    hydrOXYzine HCL (ATARAX) 25 MG tablet TAKE 1 TABLET THREE TIMES DAILY AS NEEDED FOR  ITCHING 90 tablet 0    KENALOG 40 mg/mL injection       lancets (TRUEPLUS LANCETS) 33 gauge Misc 1 lancet by Misc.(Non-Drug; Combo Route) route 2 (two) times a day. 200 each 1    meloxicam (MOBIC) 15 MG tablet Take 1 tablet (15 mg total) by mouth once daily. 90 tablet 0    metFORMIN (GLUCOPHAGE) 500 MG tablet Take 1 tablet (500 mg total) by mouth 2 (two) times daily with meals. 180 tablet 3    pravastatin (PRAVACHOL) 20 MG tablet Take 1 tablet (20 mg total) by mouth once daily. 90 tablet 1    triamcinolone acetonide 0.1% (KENALOG) 0.1 % cream       TRUE METRIX GLUCOSE TEST STRIP Strp TEST BLOOD SUGAR TWICE DAILY 200 strip 1    blood glucose control, low (TRUE METRIX LEVEL 1) Soln 1 each by Misc.(Non-Drug; Combo Route) route  "once as needed. 1 each 3    cetirizine (ZYRTEC) 10 MG tablet Take 1 tablet (10 mg total) by mouth once daily. 90 tablet 0     Current Facility-Administered Medications on File Prior to Visit   Medication Dose Route Frequency Provider Last Rate Last Admin    cyclopentolate 1% ophthalmic solution 1 drop  1 drop Left Eye On Call Procedure Candelario Novoa MD   1 drop at 07/07/22 0703    cyclopentolate 1% ophthalmic solution 1 drop  1 drop Right Eye On Call Procedure Candelario Novoa MD   1 drop at 07/21/22 1337    ofloxacin 0.3 % ophthalmic solution 1 drop  1 drop Left Eye On Call Procedure Candelario Novoa MD   2 drop at 07/07/22 0754    ofloxacin 0.3 % ophthalmic solution 1 drop  1 drop Right Eye On Call Procedure Candelario Novoa MD   2 drop at 07/21/22 1630    sodium chloride 0.9% flush 10 mL  10 mL Intravenous PRN Candelario Novoa MD        sodium chloride 0.9% flush 10 mL  10 mL Intravenous PRN Candelario Novoa MD              PHYSICAL EXAM   height is 5' 4" (1.626 m) and weight is 91.2 kg (201 lb).   Body mass index is 34.5 kg/m².      All other systems deferred.  GENERAL:  No acute distress  HABITUS: Normal  GAIT: Antalgic  SKIN: Normal  and No erythema, warmth, fluctuance     KNEE EXAM:    bilateral:   Effusion: Minimal joint effusion  TTP: yes over Medial Joint Line,   no crepitus with passive knee ROM  Passive ROM: Extension 15, Flexion 100  No pain with manipulation of patella  Stable to varus/valgus stress. No increased laxity to anterior/posterior drawer testing  positive Constantin's test  No pain with IR/ER rotation of the hip  5/5 strength in knee flexion and extension, ankle plantarflexion and dorsiflexion  Neurovascular Status: Sensation intact to light touch in Sural, Saphenous, SPN, DPN, Tibial nerve distribution  2+ pulse DP/PT, normal capillary refill, foot has normal warmth    DATA:  Diagnostic tests reviewed for today's visit:     4v of the knee reveal Moderate " degenerative changes of the Medial and Patellofemoral compartment. There is evidence of advanced osteoarthritis changes with Subchondral sclerosis, Osteophyte formation and Joint space narrowing. The limb is in netural alignment. The patella is tracking midline. Kellegren-Lawerence grade 3 changes

## 2022-12-15 ENCOUNTER — PATIENT OUTREACH (OUTPATIENT)
Dept: ADMINISTRATIVE | Facility: HOSPITAL | Age: 72
End: 2022-12-15
Payer: MEDICARE

## 2023-01-11 ENCOUNTER — HOSPITAL ENCOUNTER (OUTPATIENT)
Facility: HOSPITAL | Age: 73
Discharge: HOME | End: 2023-01-12
Attending: EMERGENCY MEDICINE | Admitting: STUDENT IN AN ORGANIZED HEALTH CARE EDUCATION/TRAINING PROGRAM
Payer: MEDICARE

## 2023-01-11 DIAGNOSIS — R07.9 CHEST PAIN: ICD-10-CM

## 2023-01-11 PROBLEM — Z85.528 HISTORY OF RENAL CELL CANCER: Chronic | Status: ACTIVE | Noted: 2019-08-27

## 2023-01-11 PROBLEM — F32.0 MILD MAJOR DEPRESSION: Chronic | Status: ACTIVE | Noted: 2020-07-02

## 2023-01-11 LAB
ALBUMIN SERPL-MCNC: 3.9 G/DL (ref 3.3–5.5)
ALP SERPL-CCNC: 108 U/L (ref 42–141)
BILIRUB SERPL-MCNC: 0.4 MG/DL (ref 0.2–1.6)
BUN SERPL-MCNC: 12 MG/DL (ref 7–22)
CALCIUM SERPL-MCNC: 10.3 MG/DL (ref 8–10.3)
CHLORIDE SERPL-SCNC: 104 MMOL/L (ref 98–108)
CREAT SERPL-MCNC: 0.9 MG/DL (ref 0.6–1.2)
CTP QC/QA: YES
GLUCOSE SERPL-MCNC: 120 MG/DL (ref 73–118)
POC ALT (SGPT): 13 U/L (ref 10–47)
POC AST (SGOT): 24 U/L (ref 11–38)
POC B-TYPE NATRIURETIC PEPTIDE: 21.9 PG/ML (ref 0–100)
POC CARDIAC TROPONIN I: 0 NG/ML (ref 0–0.08)
POC PTINR: 1.1 (ref 0.9–1.2)
POC PTWBT: 13.1 SEC (ref 9.7–14.3)
POC TCO2: 31 MMOL/L (ref 18–33)
POTASSIUM BLD-SCNC: 4.2 MMOL/L (ref 3.6–5.1)
PROTEIN, POC: 7.7 G/DL (ref 6.4–8.1)
SAMPLE: NORMAL
SAMPLE: NORMAL
SARS-COV-2 RDRP RESP QL NAA+PROBE: NEGATIVE
SODIUM BLD-SCNC: 142 MMOL/L (ref 128–145)
TROPONIN I SERPL DL<=0.01 NG/ML-MCNC: <0.006 NG/ML (ref 0–0.03)

## 2023-01-11 PROCEDURE — 93010 ELECTROCARDIOGRAM REPORT: CPT | Mod: 76,,, | Performed by: INTERNAL MEDICINE

## 2023-01-11 PROCEDURE — 99285 EMERGENCY DEPT VISIT HI MDM: CPT | Mod: 25,ER

## 2023-01-11 PROCEDURE — 80053 COMPREHEN METABOLIC PANEL: CPT | Mod: ER

## 2023-01-11 PROCEDURE — 25000003 PHARM REV CODE 250: Performed by: HOSPITALIST

## 2023-01-11 PROCEDURE — 85025 COMPLETE CBC W/AUTO DIFF WBC: CPT | Mod: ER

## 2023-01-11 PROCEDURE — 93005 ELECTROCARDIOGRAM TRACING: CPT | Mod: ER

## 2023-01-11 PROCEDURE — 25000003 PHARM REV CODE 250: Mod: ER | Performed by: EMERGENCY MEDICINE

## 2023-01-11 PROCEDURE — 85610 PROTHROMBIN TIME: CPT | Mod: ER

## 2023-01-11 PROCEDURE — 25000242 PHARM REV CODE 250 ALT 637 W/ HCPCS: Mod: ER | Performed by: EMERGENCY MEDICINE

## 2023-01-11 PROCEDURE — 87635 SARS-COV-2 COVID-19 AMP PRB: CPT | Mod: ER | Performed by: EMERGENCY MEDICINE

## 2023-01-11 PROCEDURE — 84484 ASSAY OF TROPONIN QUANT: CPT | Mod: ER

## 2023-01-11 PROCEDURE — 93010 EKG 12-LEAD: ICD-10-PCS | Mod: 76,,, | Performed by: INTERNAL MEDICINE

## 2023-01-11 PROCEDURE — 96372 THER/PROPH/DIAG INJ SC/IM: CPT | Performed by: HOSPITALIST

## 2023-01-11 PROCEDURE — 63600175 PHARM REV CODE 636 W HCPCS: Performed by: HOSPITALIST

## 2023-01-11 PROCEDURE — 84484 ASSAY OF TROPONIN QUANT: CPT | Performed by: HOSPITALIST

## 2023-01-11 PROCEDURE — G0378 HOSPITAL OBSERVATION PER HR: HCPCS

## 2023-01-11 PROCEDURE — 93010 ELECTROCARDIOGRAM REPORT: CPT | Mod: ,,, | Performed by: INTERNAL MEDICINE

## 2023-01-11 PROCEDURE — 83880 ASSAY OF NATRIURETIC PEPTIDE: CPT | Mod: ER

## 2023-01-11 PROCEDURE — 36415 COLL VENOUS BLD VENIPUNCTURE: CPT | Performed by: HOSPITALIST

## 2023-01-11 RX ORDER — IBUPROFEN 200 MG
16 TABLET ORAL
Status: DISCONTINUED | OUTPATIENT
Start: 2023-01-11 | End: 2023-01-12 | Stop reason: HOSPADM

## 2023-01-11 RX ORDER — FLUOXETINE HYDROCHLORIDE 20 MG/1
20 CAPSULE ORAL DAILY
Status: DISCONTINUED | OUTPATIENT
Start: 2023-01-12 | End: 2023-01-12 | Stop reason: HOSPADM

## 2023-01-11 RX ORDER — MAG HYDROX/ALUMINUM HYD/SIMETH 200-200-20
30 SUSPENSION, ORAL (FINAL DOSE FORM) ORAL ONCE
Status: COMPLETED | OUTPATIENT
Start: 2023-01-11 | End: 2023-01-11

## 2023-01-11 RX ORDER — ONDANSETRON 2 MG/ML
4 INJECTION INTRAMUSCULAR; INTRAVENOUS EVERY 8 HOURS PRN
Status: DISCONTINUED | OUTPATIENT
Start: 2023-01-11 | End: 2023-01-12 | Stop reason: HOSPADM

## 2023-01-11 RX ORDER — TALC
6 POWDER (GRAM) TOPICAL NIGHTLY PRN
Status: DISCONTINUED | OUTPATIENT
Start: 2023-01-11 | End: 2023-01-12 | Stop reason: HOSPADM

## 2023-01-11 RX ORDER — NALOXONE HCL 0.4 MG/ML
0.02 VIAL (ML) INJECTION
Status: DISCONTINUED | OUTPATIENT
Start: 2023-01-11 | End: 2023-01-12 | Stop reason: HOSPADM

## 2023-01-11 RX ORDER — AMLODIPINE BESYLATE 5 MG/1
10 TABLET ORAL DAILY
Status: DISCONTINUED | OUTPATIENT
Start: 2023-01-12 | End: 2023-01-12 | Stop reason: HOSPADM

## 2023-01-11 RX ORDER — ACETAMINOPHEN 325 MG/1
650 TABLET ORAL EVERY 6 HOURS PRN
Status: DISCONTINUED | OUTPATIENT
Start: 2023-01-11 | End: 2023-01-12 | Stop reason: HOSPADM

## 2023-01-11 RX ORDER — MAG HYDROX/ALUMINUM HYD/SIMETH 200-200-20
30 SUSPENSION, ORAL (FINAL DOSE FORM) ORAL 4 TIMES DAILY PRN
Status: DISCONTINUED | OUTPATIENT
Start: 2023-01-11 | End: 2023-01-12 | Stop reason: HOSPADM

## 2023-01-11 RX ORDER — LIDOCAINE HYDROCHLORIDE 20 MG/ML
15 SOLUTION OROPHARYNGEAL ONCE
Status: COMPLETED | OUTPATIENT
Start: 2023-01-11 | End: 2023-01-11

## 2023-01-11 RX ORDER — IPRATROPIUM BROMIDE AND ALBUTEROL SULFATE 2.5; .5 MG/3ML; MG/3ML
3 SOLUTION RESPIRATORY (INHALATION) EVERY 4 HOURS PRN
Status: DISCONTINUED | OUTPATIENT
Start: 2023-01-11 | End: 2023-01-12 | Stop reason: HOSPADM

## 2023-01-11 RX ORDER — POLYETHYLENE GLYCOL 3350 17 G/17G
17 POWDER, FOR SOLUTION ORAL DAILY
Status: DISCONTINUED | OUTPATIENT
Start: 2023-01-12 | End: 2023-01-12 | Stop reason: HOSPADM

## 2023-01-11 RX ORDER — SODIUM CHLORIDE 0.9 % (FLUSH) 0.9 %
10 SYRINGE (ML) INJECTION EVERY 8 HOURS PRN
Status: DISCONTINUED | OUTPATIENT
Start: 2023-01-11 | End: 2023-01-12 | Stop reason: HOSPADM

## 2023-01-11 RX ORDER — INSULIN ASPART 100 [IU]/ML
1-10 INJECTION, SOLUTION INTRAVENOUS; SUBCUTANEOUS
Status: DISCONTINUED | OUTPATIENT
Start: 2023-01-11 | End: 2023-01-12 | Stop reason: HOSPADM

## 2023-01-11 RX ORDER — IBUPROFEN 200 MG
24 TABLET ORAL
Status: DISCONTINUED | OUTPATIENT
Start: 2023-01-11 | End: 2023-01-12 | Stop reason: HOSPADM

## 2023-01-11 RX ORDER — NITROGLYCERIN 0.4 MG/1
0.4 TABLET SUBLINGUAL EVERY 5 MIN PRN
Status: COMPLETED | OUTPATIENT
Start: 2023-01-11 | End: 2023-01-11

## 2023-01-11 RX ORDER — ASPIRIN 325 MG
325 TABLET ORAL
Status: COMPLETED | OUTPATIENT
Start: 2023-01-11 | End: 2023-01-11

## 2023-01-11 RX ORDER — GABAPENTIN 300 MG/1
600 CAPSULE ORAL NIGHTLY
Status: DISCONTINUED | OUTPATIENT
Start: 2023-01-11 | End: 2023-01-12 | Stop reason: HOSPADM

## 2023-01-11 RX ORDER — PRAVASTATIN SODIUM 10 MG/1
20 TABLET ORAL DAILY
Status: DISCONTINUED | OUTPATIENT
Start: 2023-01-12 | End: 2023-01-12 | Stop reason: HOSPADM

## 2023-01-11 RX ORDER — GLUCAGON 1 MG
1 KIT INJECTION
Status: DISCONTINUED | OUTPATIENT
Start: 2023-01-11 | End: 2023-01-12 | Stop reason: HOSPADM

## 2023-01-11 RX ORDER — ENOXAPARIN SODIUM 100 MG/ML
40 INJECTION SUBCUTANEOUS EVERY 24 HOURS
Status: DISCONTINUED | OUTPATIENT
Start: 2023-01-11 | End: 2023-01-12 | Stop reason: HOSPADM

## 2023-01-11 RX ORDER — SIMETHICONE 80 MG
1 TABLET,CHEWABLE ORAL 4 TIMES DAILY PRN
Status: DISCONTINUED | OUTPATIENT
Start: 2023-01-11 | End: 2023-01-12 | Stop reason: HOSPADM

## 2023-01-11 RX ORDER — PROCHLORPERAZINE EDISYLATE 5 MG/ML
5 INJECTION INTRAMUSCULAR; INTRAVENOUS EVERY 6 HOURS PRN
Status: DISCONTINUED | OUTPATIENT
Start: 2023-01-11 | End: 2023-01-12 | Stop reason: HOSPADM

## 2023-01-11 RX ADMIN — NITROGLYCERIN 0.4 MG: 0.4 TABLET, ORALLY DISINTEGRATING SUBLINGUAL at 05:01

## 2023-01-11 RX ADMIN — ALUMINUM HYDROXIDE, MAGNESIUM HYDROXIDE, AND SIMETHICONE 30 ML: 200; 200; 20 SUSPENSION ORAL at 06:01

## 2023-01-11 RX ADMIN — LIDOCAINE HYDROCHLORIDE 15 ML: 20 SOLUTION ORAL at 06:01

## 2023-01-11 RX ADMIN — ASPIRIN 325 MG ORAL TABLET 325 MG: 325 PILL ORAL at 05:01

## 2023-01-11 RX ADMIN — GABAPENTIN 600 MG: 300 CAPSULE ORAL at 11:01

## 2023-01-11 RX ADMIN — ENOXAPARIN SODIUM 40 MG: 40 INJECTION SUBCUTANEOUS at 11:01

## 2023-01-11 RX ADMIN — NITROGLYCERIN 0.4 MG: 0.4 TABLET, ORALLY DISINTEGRATING SUBLINGUAL at 06:01

## 2023-01-11 NOTE — ED PROVIDER NOTES
"Encounter Date: 1/11/2023    SCRIBE #1 NOTE: I, Flor Mota, am scribing for, and in the presence of,  July Dunbar DO. I have scribed the following portions of the note - Other sections scribed: HPI, ROS, PE, MDM.     History     Chief Complaint   Patient presents with    Chest Pain     Complains of L sided chest pain that radiates upward onset 30 min PTA. Pain described as burning.      Valarie Foster is a 72 y.o. female, with a PMHx of DM, HTN, Arthritis, who presents to the ED with "hard" chest pain that began 30 minutes PTA. Patient's spouse reports she was shopping when she was hit with a hard chest pain. Patient reports the chest pain is nonradiating, midsternal, and presently 9/10. Patient compliant with antihypertensive and 81 mg ASA. Patient additionally notes burning chest pain constant since yesterday which she attributed to gas. She took Pepcid in attempted treatment of her "gas pain". This pain is less severe than her current "hard" chest pain that began 30 minutes PTA. No other exacerbating or alleviating factors. Denies other associated symptoms. Patient has FHx of paternal MI at 91 and maternal MI at 83. Drug allergy to Lisinopril.    The history is provided by the patient and the spouse. No  was used.   Review of patient's allergies indicates:   Allergen Reactions    Lisinopril Swelling and Shortness Of Breath    Ascorbic acid-ascorbate calc      And citrus fruits     Past Medical History:   Diagnosis Date    Arthritis     Colon polyp     Diabetes mellitus     diet controlled    Diabetes with neurologic complications     Hypertension     Nuclear sclerosis of both eyes 2/14/2022    Renal cell carcinoma      Past Surgical History:   Procedure Laterality Date    COLONOSCOPY N/A 2/5/2018    Procedure: COLONOSCOPY;  Surgeon: Bacilio Mancia MD;  Location: [facility];  Service: Endoscopy;  Laterality: N/A;  appt confirmed-ss    COLONOSCOPY N/A 8/13/2020    Procedure: COLONOSCOPY;  " Surgeon: Jose West MD;  Location: [facility] ENDO;  Service: Endoscopy;  Laterality: N/A;    HYSTERECTOMY      INTRAOCULAR PROSTHESES INSERTION Left 7/7/2022    Procedure: INSERTION, IOL PROSTHESIS;  Surgeon: Candelario Novoa MD;  Location: [facility] OR;  Service: Ophthalmology;  Laterality: Left;    INTRAOCULAR PROSTHESES INSERTION Right 7/21/2022    Procedure: INSERTION, IOL PROSTHESIS;  Surgeon: Candelario Novoa MD;  Location: [facility] OR;  Service: Ophthalmology;  Laterality: Right;    OOPHORECTOMY      PARTIAL NEPHRECTOMY Right 10/12/2018    Procedure: NEPHRECTOMY, PARTIAL open. Dr Arenas to assist.;  Surgeon: Alejandra Palm MD;  Location: [facility] OR;  Service: Urology;  Laterality: Right;  RN PREOP 10/9/2018----NEEDS H/P    PHACOEMULSIFICATION OF CATARACT Left 7/7/2022    Procedure: PHACOEMULSIFICATION, CATARACT;  Surgeon: Candelario Novoa MD;  Location: [facility] OR;  Service: Ophthalmology;  Laterality: Left;  RN PHONE PREOP 6/27/2022   ARRIVAL 6:00 AM    PHACOEMULSIFICATION OF CATARACT Right 7/21/2022    Procedure: PHACOEMULSIFICATION, CATARACT;  Surgeon: Candelario Novao MD;  Location: [facility] OR;  Service: Ophthalmology;  Laterality: Right;  RN PHONE PREOP 7/12/2022   ARRIVAL 12:00 NOON     Family History   Problem Relation Age of Onset    No Known Problems Mother     Glaucoma Father     Breast cancer Sister     Glaucoma Sister     Cancer Sister         breast cancer    Thyroid disease Sister     Blindness Sister         due to trauma during car accident    Diabetes Sister     No Known Problems Maternal Aunt     No Known Problems Maternal Uncle     No Known Problems Paternal Aunt     No Known Problems Paternal Uncle     No Known Problems Maternal Grandmother     No Known Problems Maternal Grandfather     No Known Problems Paternal Grandmother     No Known Problems Paternal Grandfather     Diabetes Brother     Amblyopia Neg Hx     Cataracts Neg Hx     Hypertension Neg Hx     Macular  degeneration Neg Hx     Retinal detachment Neg Hx     Strabismus Neg Hx     Stroke Neg Hx      Social History     Tobacco Use    Smoking status: Never    Smokeless tobacco: Never   Substance Use Topics    Alcohol use: No    Drug use: No     Review of Systems   Constitutional:  Negative for fever.   HENT:  Negative for rhinorrhea and sore throat.    Eyes:  Negative for redness.   Respiratory:  Negative for shortness of breath.    Cardiovascular:  Positive for chest pain. Negative for leg swelling.   Gastrointestinal:  Negative for abdominal pain, diarrhea, nausea and vomiting.   Genitourinary:  Negative for dysuria.   Musculoskeletal:  Negative for back pain.   Skin:  Negative for rash.   Neurological:  Negative for syncope and headaches.   All other systems reviewed and are negative.    Physical Exam     Initial Vitals [01/11/23 1635]   BP Pulse Resp Temp SpO2   (!) 172/80 97 18 98.4 °F (36.9 °C) 98 %      MAP       --         Physical Exam    Nursing note and vitals reviewed.  Constitutional: She appears well-developed and well-nourished.   HENT:   Head: Normocephalic and atraumatic.   Right Ear: External ear normal.   Left Ear: External ear normal.   Nose: Nose normal.   Mouth/Throat: Oropharynx is clear and moist.   Eyes: Conjunctivae and EOM are normal. Pupils are equal, round, and reactive to light.   Neck: Phonation normal. Neck supple.   Normal range of motion.  Cardiovascular:  Normal rate, regular rhythm, normal heart sounds and intact distal pulses.     Exam reveals no gallop and no friction rub.       No murmur heard.  Pulmonary/Chest: Effort normal and breath sounds normal. No stridor. No respiratory distress. She has no wheezes. She has no rhonchi. She has no rales. She exhibits tenderness.   Abdominal: Abdomen is soft. Bowel sounds are normal. She exhibits no distension. There is no abdominal tenderness. There is no rigidity, no rebound and no guarding.   Musculoskeletal:         General: No tenderness  or edema. Normal range of motion.      Cervical back: Normal range of motion and neck supple.     Neurological: She is alert and oriented to person, place, and time. She has normal strength. No cranial nerve deficit or sensory deficit. GCS score is 15. GCS eye subscore is 4. GCS verbal subscore is 5. GCS motor subscore is 6.   Skin: Skin is warm and dry. Capillary refill takes less than 2 seconds. No rash noted.   Psychiatric: She has a normal mood and affect. Her behavior is normal.       ED Course   Critical Care    Date/Time: 1/11/2023 6:38 PM  Performed by: July Dunbar DO  Authorized by: July Dunbar DO   Direct patient critical care time: 8 minutes  Additional history critical care time: 8 minutes  Ordering / reviewing critical care time: 8 minutes  Documentation critical care time: 8 minutes  Consulting other physicians critical care time: 8 minutes  Consult with family critical care time: 8 minutes  Total critical care time (exclusive of procedural time) : 48 minutes  Critical care was necessary to treat or prevent imminent or life-threatening deterioration of the following conditions: cardiac failure and circulatory failure.  Critical care was time spent personally by me on the following activities: evaluation of patient's response to treatment, examination of patient, obtaining history from patient or surrogate, ordering and performing treatments and interventions, ordering and review of laboratory studies, ordering and review of radiographic studies, pulse oximetry, re-evaluation of patient's condition and review of old charts.      Labs Reviewed   POCT CMP - Abnormal; Notable for the following components:       Result Value    POC Glucose 120 (*)     All other components within normal limits   TROPONIN ISTAT   POCT CBC   POCT GLUCOSE, HAND-HELD DEVICE   SARS-COV-2 RDRP GENE   POCT CMP   POCT PROTIME-INR   POCT TROPONIN   POCT B-TYPE NATRIURETIC PEPTIDE (BNP)   POCT B-TYPE NATRIURETIC PEPTIDE (BNP)    ISTAT PROCEDURE     EKG Readings: (Independently Interpreted)   No STEMI. Rate of 98. Normal Sinus Rhythm. Left Axis. Abnormal EKG. LVH appreciated. QTc normal at 469. When compared to prior EKG dated 11/21/21 rate decreased by 9 bpm.    17:57 No STEMI. Rate of 93. Normal Sinus Rhythm. Left Axis. Abnormal EKG. LVH appreciated. QTc normal at 460. When compared to earlier EKG dated 01/11/23 rate decreased by 5 bpm.        Imaging Results              X-Ray Chest PA And Lateral (Final result)  Result time 01/11/23 17:39:22      Final result by Rosaura Cooper MD (01/11/23 17:39:22)                   Impression:      No acute cardiopulmonary process identified.      Electronically signed by: Rosaura Cooper MD  Date:    01/11/2023  Time:    17:39               Narrative:    EXAMINATION:  XR CHEST PA AND LATERAL    CLINICAL HISTORY:  Chest Pain;    TECHNIQUE:  PA and lateral views of the chest were performed.    COMPARISON:  March 2022.    FINDINGS:  Cardiac silhouette is normal in size.  Lungs are symmetrically expanded.  No evidence of focal consolidative process, pneumothorax, or significant pleural effusion.  No acute osseous abnormality identified.                                         Medications   aspirin tablet 325 mg (325 mg Oral Given 1/11/23 1742)   nitroGLYCERIN SL tablet 0.4 mg (0.4 mg Sublingual Given 1/11/23 1833)   aluminum-magnesium hydroxide-simethicone 200-200-20 mg/5 mL suspension 30 mL (30 mLs Oral Given 1/11/23 1832)     And   LIDOcaine HCl 2% oral solution 15 mL (15 mLs Oral Given 1/11/23 1832)     Medical Decision Making:   History:   Old Medical Records: I decided to obtain old medical records.  Independently Interpreted Test(s):   I have ordered and independently interpreted EKG Reading(s) - see prior notes  Clinical Tests:   Lab Tests: Ordered and Reviewed  Radiological Study: Ordered and Reviewed  Medical Tests: Ordered and Reviewed  Additional MDM:   Heart Score:    History:           Highly suspicious.  ECG:             Nonspecific repolarisation disturbance  Age:               >65 years  Risk factors: >= 3 risk factors or history of atherosclerotic disease  Troponin:       Less than or equal to normal limit  Final Score: 7      This is an evaluation of a 72 y.o. female that presents to the Emergency Department for   Chief Complaint   Patient presents with    Chest Pain     Complains of L sided chest pain that radiates upward onset 30 min PTA. Pain described as burning.        Physical Exam shows a non-toxic and well appearing patient. Breath sounds clear and equal bilaterally, no increased work of breathing or respiratory distress. Heart sounds regular rate and rhythm. No murmurs. Abdomen soft and non tender. No palpable masses or bruits. No ripping chest pain to the back. No dysphagia or vomiting and CXR negative for mediastinal air.     Differential diagnosis: Hyperglycemia, STEMI, NSTEMI, Cardiac arrhythmia, Pneumothorax    Cardiac monitor placed for chest pain. Monitor shows Normal Sinus Rhythm. Interpreted by Dr July Dunbar DO    Vital Signs Are Reassuring. If available, previous records reviewed.     RESULTS:  CXR negative acute process.  No evidence of hypoxia.  EKG interpreted by myself with no STEMI   Patient presents with chest pain for greater than 24 hours.  Troponin is negative.  No STEMI on EKG and no acute issues on chest x-ray. Chest pain unlikely to be cardiac in origin.  I recommend follow up with Cardiology in 1 day for further evaluation of chest pain. Discussed need to return to the ED if chest pain worsens or does not resolve.  CBC white blood cell count normal at 6.6, hemoglobin normal at 13.1, hematocrit normal at 39.4 and platelets 262.      ED Course: Treatment in the ED included Physical Exam and   Medications   aspirin tablet 325 mg (325 mg Oral Given 1/11/23 7582)   nitroGLYCERIN SL tablet 0.4 mg (0.4 mg Sublingual Given 1/11/23 6761)   aluminum-magnesium  hydroxide-simethicone 200-200-20 mg/5 mL suspension 30 mL (30 mLs Oral Given 1/11/23 1832)     And   LIDOcaine HCl 2% oral solution 15 mL (15 mLs Oral Given 1/11/23 1832)   . Patient reports feeling better after treatment in the ER. Tolerating PO without difficulty.  Discussed treatment, prescriptions, labs, and imaging results with the patient.    17:42 Patient's chest pain is now 7/10 after Nitro SL.  18:38 after 3rd nitro chest pain of 2/10.  Patient reports feeling very comfortable.    Patient is agreeable to transfer & admission at [facility].    I spoke with utilization ManagementEliana, at 18:20.   Requesting consultation with hospitalist for services not available at this facility.   Discussed patient's presentation, past medical history, physical exam, labs, radiology results, vital signs, and ED course.  Consultation with mid-level provider Alex on-call for Dr. Franklin for transfer and admission at 18:59.  At this time patient will be transferred & admitted.  Patient will be transferred via EMS to accepting facility.      At time of transfer patient is awake alert oriented x4 speaking clearly in full sentences and moving all 4 extremities.           Scribe Attestation:   Scribe #1: I performed the above scribed service and the documentation accurately describes the services I performed. I attest to the accuracy of the note.             I, Dr. July Dunbar, personally performed the services described in this documentation. This document was produced by a scribe under my direction and in my presence. All medical record entries made by the scribe were at my direction and in my presence.  I have reviewed the chart and agree that the record reflects my personal performance and is accurate and complete. July Dunbar, .     01/11/2023 6:39 PM         Clinical Impression:   Final diagnoses:  [R07.9] Chest pain        ED Disposition Condition    Observation Stable                July Dunbar  DO  01/11/23 1902

## 2023-01-12 VITALS
TEMPERATURE: 98 F | HEART RATE: 77 BPM | HEIGHT: 64 IN | RESPIRATION RATE: 18 BRPM | WEIGHT: 212.06 LBS | BODY MASS INDEX: 36.2 KG/M2 | OXYGEN SATURATION: 98 % | SYSTOLIC BLOOD PRESSURE: 135 MMHG | DIASTOLIC BLOOD PRESSURE: 72 MMHG

## 2023-01-12 LAB
ALBUMIN SERPL BCP-MCNC: 3.6 G/DL (ref 3.5–5.2)
ALP SERPL-CCNC: 98 U/L (ref 55–135)
ALT SERPL W/O P-5'-P-CCNC: 12 U/L (ref 10–44)
ANION GAP SERPL CALC-SCNC: 7 MMOL/L (ref 8–16)
AORTIC ROOT ANNULUS: 2.69 CM
AORTIC VALVE CUSP SEPERATION: 1.97 CM
ASCENDING AORTA: 3.12 CM
AST SERPL-CCNC: 12 U/L (ref 10–40)
AV INDEX (PROSTH): 0.7
AV MEAN GRADIENT: 3 MMHG
AV PEAK GRADIENT: 5 MMHG
AV VALVE AREA: 1.98 CM2
AV VELOCITY RATIO: 0.73
BASOPHILS # BLD AUTO: 0 K/UL (ref 0–0.2)
BASOPHILS NFR BLD: 0 % (ref 0–1.9)
BILIRUB SERPL-MCNC: 0.4 MG/DL (ref 0.1–1)
BSA FOR ECHO PROCEDURE: 1.99 M2
BUN SERPL-MCNC: 10 MG/DL (ref 8–23)
CALCIUM SERPL-MCNC: 9.4 MG/DL (ref 8.7–10.5)
CHLORIDE SERPL-SCNC: 103 MMOL/L (ref 95–110)
CO2 SERPL-SCNC: 28 MMOL/L (ref 23–29)
CREAT SERPL-MCNC: 0.8 MG/DL (ref 0.5–1.4)
CV ECHO LV RWT: 0.58 CM
DIFFERENTIAL METHOD: ABNORMAL
DOP CALC AO PEAK VEL: 1.15 M/S
DOP CALC AO VTI: 27.3 CM
DOP CALC LVOT AREA: 2.8 CM2
DOP CALC LVOT DIAMETER: 1.9 CM
DOP CALC LVOT PEAK VEL: 0.84 M/S
DOP CALC LVOT STROKE VOLUME: 54.13 CM3
DOP CALCLVOT PEAK VEL VTI: 19.1 CM
E WAVE DECELERATION TIME: 245.51 MSEC
E/A RATIO: 0.74
E/E' RATIO: 14 M/S
ECHO LV POSTERIOR WALL: 1.06 CM (ref 0.6–1.1)
EJECTION FRACTION: 55 %
EOSINOPHIL # BLD AUTO: 0 K/UL (ref 0–0.5)
EOSINOPHIL NFR BLD: 0.4 % (ref 0–8)
ERYTHROCYTE [DISTWIDTH] IN BLOOD BY AUTOMATED COUNT: 14.4 % (ref 11.5–14.5)
EST. GFR  (NO RACE VARIABLE): >60 ML/MIN/1.73 M^2
FRACTIONAL SHORTENING: 28 % (ref 28–44)
GLUCOSE SERPL-MCNC: 128 MG/DL (ref 70–110)
HCT VFR BLD AUTO: 41.6 % (ref 37–48.5)
HGB BLD-MCNC: 13.5 G/DL (ref 12–16)
IMM GRANULOCYTES # BLD AUTO: 0.02 K/UL (ref 0–0.04)
IMM GRANULOCYTES NFR BLD AUTO: 0.4 % (ref 0–0.5)
INTERVENTRICULAR SEPTUM: 1.41 CM (ref 0.6–1.1)
IVC DIAMETER: 0.74 CM
LA MAJOR: 4.8 CM
LA MINOR: 3.73 CM
LA WIDTH: 3.7 CM
LEFT ATRIUM SIZE: 3.48 CM
LEFT ATRIUM VOLUME INDEX: 22.9 ML/M2
LEFT ATRIUM VOLUME: 45.94 CM3
LEFT INTERNAL DIMENSION IN SYSTOLE: 2.64 CM (ref 2.1–4)
LEFT VENTRICLE DIASTOLIC VOLUME INDEX: 28.36 ML/M2
LEFT VENTRICLE DIASTOLIC VOLUME: 57 ML
LEFT VENTRICLE MASS INDEX: 76 G/M2
LEFT VENTRICLE SYSTOLIC VOLUME INDEX: 12.8 ML/M2
LEFT VENTRICLE SYSTOLIC VOLUME: 25.63 ML
LEFT VENTRICULAR INTERNAL DIMENSION IN DIASTOLE: 3.67 CM (ref 3.5–6)
LEFT VENTRICULAR MASS: 152.06 G
LV LATERAL E/E' RATIO: 14 M/S
LV SEPTAL E/E' RATIO: 14 M/S
LVOT MG: 1.64 MMHG
LVOT MV: 0.6 CM/S
LYMPHOCYTES # BLD AUTO: 1.2 K/UL (ref 1–4.8)
LYMPHOCYTES NFR BLD: 23.3 % (ref 18–48)
MCH RBC QN AUTO: 26.7 PG (ref 27–31)
MCHC RBC AUTO-ENTMCNC: 32.5 G/DL (ref 32–36)
MCV RBC AUTO: 82 FL (ref 82–98)
MONOCYTES # BLD AUTO: 0.4 K/UL (ref 0.3–1)
MONOCYTES NFR BLD: 7.7 % (ref 4–15)
MV PEAK A VEL: 0.95 M/S
MV PEAK E VEL: 0.7 M/S
MV STENOSIS PRESSURE HALF TIME: 56.48 MS
MV VALVE AREA P 1/2 METHOD: 3.9 CM2
NEUTROPHILS # BLD AUTO: 3.5 K/UL (ref 1.8–7.7)
NEUTROPHILS NFR BLD: 68.2 % (ref 38–73)
NRBC BLD-RTO: 0 /100 WBC
PISA MRMAX VEL: 4.2 M/S
PISA TR MAX VEL: 2.44 M/S
PLATELET # BLD AUTO: 272 K/UL (ref 150–450)
PMV BLD AUTO: 10.1 FL (ref 9.2–12.9)
POCT GLUCOSE: 204 MG/DL (ref 70–110)
POTASSIUM SERPL-SCNC: 4.5 MMOL/L (ref 3.5–5.1)
PROT SERPL-MCNC: 6.9 G/DL (ref 6–8.4)
PV PEAK VELOCITY: 0.72 CM/S
RA MAJOR: 3.79 CM
RA PRESSURE: 3 MMHG
RA WIDTH: 3.17 CM
RBC # BLD AUTO: 5.06 M/UL (ref 4–5.4)
RIGHT VENTRICULAR END-DIASTOLIC DIMENSION: 3.15 CM
SINUS: 2.54 CM
SODIUM SERPL-SCNC: 138 MMOL/L (ref 136–145)
STJ: 2.07 CM
TDI LATERAL: 0.05 M/S
TDI SEPTAL: 0.05 M/S
TDI: 0.05 M/S
TR MAX PG: 24 MMHG
TRICUSPID ANNULAR PLANE SYSTOLIC EXCURSION: 2.18 CM
TROPONIN I SERPL DL<=0.01 NG/ML-MCNC: <0.006 NG/ML (ref 0–0.03)
TV REST PULMONARY ARTERY PRESSURE: 27 MMHG
WBC # BLD AUTO: 5.07 K/UL (ref 3.9–12.7)

## 2023-01-12 PROCEDURE — 25000003 PHARM REV CODE 250: Performed by: HOSPITALIST

## 2023-01-12 PROCEDURE — 85025 COMPLETE CBC W/AUTO DIFF WBC: CPT | Performed by: NURSE PRACTITIONER

## 2023-01-12 PROCEDURE — 84484 ASSAY OF TROPONIN QUANT: CPT | Performed by: HOSPITALIST

## 2023-01-12 PROCEDURE — 25000003 PHARM REV CODE 250: Performed by: NURSE PRACTITIONER

## 2023-01-12 PROCEDURE — G0378 HOSPITAL OBSERVATION PER HR: HCPCS

## 2023-01-12 PROCEDURE — 36415 COLL VENOUS BLD VENIPUNCTURE: CPT | Performed by: NURSE PRACTITIONER

## 2023-01-12 PROCEDURE — 36415 COLL VENOUS BLD VENIPUNCTURE: CPT | Performed by: HOSPITALIST

## 2023-01-12 PROCEDURE — 80053 COMPREHEN METABOLIC PANEL: CPT | Performed by: NURSE PRACTITIONER

## 2023-01-12 RX ORDER — PANTOPRAZOLE SODIUM 40 MG/1
40 TABLET, DELAYED RELEASE ORAL DAILY
Qty: 30 TABLET | Refills: 11 | Status: SHIPPED | OUTPATIENT
Start: 2023-01-12 | End: 2023-04-10 | Stop reason: SDUPTHER

## 2023-01-12 RX ORDER — ASPIRIN 81 MG/1
81 TABLET ORAL DAILY
Qty: 30 TABLET | Refills: 3 | Status: ON HOLD | OUTPATIENT
Start: 2023-01-12 | End: 2023-11-08 | Stop reason: HOSPADM

## 2023-01-12 RX ORDER — PANTOPRAZOLE SODIUM 40 MG/1
40 TABLET, DELAYED RELEASE ORAL DAILY
Status: DISCONTINUED | OUTPATIENT
Start: 2023-01-12 | End: 2023-01-12 | Stop reason: HOSPADM

## 2023-01-12 RX ADMIN — PRAVASTATIN SODIUM 20 MG: 10 TABLET ORAL at 11:01

## 2023-01-12 RX ADMIN — FLUOXETINE 20 MG: 20 CAPSULE ORAL at 11:01

## 2023-01-12 RX ADMIN — PANTOPRAZOLE SODIUM 40 MG: 40 TABLET, DELAYED RELEASE ORAL at 11:01

## 2023-01-12 RX ADMIN — AMLODIPINE BESYLATE 10 MG: 5 TABLET ORAL at 11:01

## 2023-01-12 NOTE — PLAN OF CARE
01/12/23 1053   Discharge Planning   Assessment Type Discharge Planning Brief Assessment   Resource/Environmental Concerns none   Support Systems Family members   Current Living Arrangements home   Patient/Family Anticipates Transition to home   Patient/Family Anticipated Services at Transition none   DME Needed Upon Discharge  none   Discharge Plan A Home with family  (with instructions to follow  up)       [facility] - [place] - [address]  [address]  [place] [address]  Phone: [phone] Fax: [phone]    [facility] - [place], [place] - [address]  [address] [address]  Phone: [phone] Fax: [phone]

## 2023-01-12 NOTE — ASSESSMENT & PLAN NOTE
Patient's FSGs are controlled on current medication regimen.  Last A1c reviewed-   Lab Results   Component Value Date    HGBA1C 7.2 (H) 10/31/2022     Most recent fingerstick glucose reviewed- No results for input(s): POCTGLUCOSE in the last 24 hours.  Current correctional scale  Medium  Maintain anti-hyperglycemic dose as follows-   Antihyperglycemics (From admission, onward)    None        Hold Oral hypoglycemics while patient is in the hospital.

## 2023-01-12 NOTE — PLAN OF CARE
Problem: Adult Inpatient Plan of Care  Goal: Plan of Care Review  Outcome: Ongoing, Progressing  Goal: Patient-Specific Goal (Individualized)  Outcome: Ongoing, Progressing     Problem: Pain Acute  Goal: Acceptable Pain Control and Functional Ability  Outcome: Ongoing, Progressing

## 2023-01-12 NOTE — NURSING
Patient is discharged on room air, no distress noted. Tele and IV removed with tip intact. Bed in lowest position, wheels locked, call light in reach. Will continue to monitor. Waiting for ride home

## 2023-01-12 NOTE — NURSING
Nurses Note -- 4 Eyes      1/11/2023   10:42 PM      Skin assessed during: Admit      [x] No Pressure Injuries Present    []Prevention Measures Documented      [] Yes- Altered Skin Integrity Present or Discovered   [] LDA Added if Not in Epic (Describe Wound)   [] New Altered Skin Integrity was Present on Admit and Documented in LDA   [] Wound Image Taken    Wound Care Consulted? No    Attending Nurse:  Rehana Burden RN     Second RN/Staff Member:  Zulema

## 2023-01-12 NOTE — PLAN OF CARE
01/12/23 1054   Final Note   Assessment Type Final Discharge Note   Anticipated Discharge Disposition Home   Hospital Resources/Appts/Education Provided Appointments scheduled and added to AVS   Post-Acute Status   Post-Acute Authorization Other   Other Status No Post-Acute Service Needs     Pts nurse Marie notified that the pt can d/c from CM standpoint

## 2023-01-12 NOTE — PLAN OF CARE
Problem: Adult Inpatient Plan of Care  Goal: Plan of Care Review  Flowsheets (Taken 1/12/2023 1417)  Plan of Care Reviewed With: patient     Problem: Diabetes Comorbidity  Goal: Blood Glucose Level Within Targeted Range  Intervention: Monitor and Manage Glycemia  Flowsheets (Taken 1/12/2023 1417)  Glycemic Management:   blood glucose monitored   supplemental insulin given

## 2023-01-12 NOTE — HPI
72 y.o. female hypertension, hyperlipidemia, DM2, depression, history of renal cell carcinoma s/p partial nephrectomy presents with a complaint of chest pain.  Pain is acute onset this morning while shopping with her , located mid chest, described as hard, attempted self-treatment with Pepcid without relief.  No known exacerbating or alleviating factors.  Denies prior similar episodes.  Also denies fever, chills, cough, SOB, diaphoresis, palpitations, orthopnea, PND, dizziness, syncope.  In the ED, EKG was without evidence of acute ischemia, initial troponin negative.  Otherwise unremarkable for acute abnormality.  She was given sublingual nitroglycerin as well as a GI cocktail.  Placed in observation for ACS rule out.

## 2023-01-12 NOTE — DISCHARGE SUMMARY
[facility] Medicine  Discharge Summary      Patient Name: Valarie Foster  MRN: [id number]  TAYA: [id number]  Patient Class: OP- Observation  Admission Date: 1/11/2023  Hospital Length of Stay: 0 days  Discharge Date and Time:  01/12/2023 12:52 PM  Attending Physician: Solomon Franklin MD   Discharging Provider: Sherri Swift NP  Primary Care Provider: Alena Hernandez MD    Primary Care Team: SHERRI SWIFT    HPI:   72 y.o. female hypertension, hyperlipidemia, DM2, depression, history of renal cell carcinoma s/p partial nephrectomy presents with a complaint of chest pain.  Pain is acute onset this morning while shopping with her , located mid chest, described as hard, attempted self-treatment with Pepcid without relief.  No known exacerbating or alleviating factors.  Denies prior similar episodes.  Also denies fever, chills, cough, SOB, diaphoresis, palpitations, orthopnea, PND, dizziness, syncope.  In the ED, EKG was without evidence of acute ischemia, initial troponin negative.  Otherwise unremarkable for acute abnormality.  She was given sublingual nitroglycerin as well as a GI cocktail.  Placed in observation for ACS rule out.      * No surgery found *      Hospital Course:   Admission to observation for chest pain burning tingling.  ACS ruled out.  Patient contributes her symptoms to acid reflux as similar to her previous acid reflux flare.  Switch to Protonix from home Pepcid.  2D echo showed normal EF, grade 1 diastolic dysfunction and normal wall motion.  Continue statin and start asa until follow-up with Cardiology.  Patient stable for discharge home with close follow-up with PCP and Cardiology.       Goals of Care Treatment Preferences:  Code Status: Full Code      Consults:     No new Assessment & Plan notes have been filed under this hospital service since the last note was generated.  Service: Hospital Medicine    Final Active Diagnoses:    Diagnosis Date Noted POA    PRINCIPAL  PROBLEM:  Chest pain [R07.9] 01/11/2023 Yes    Mild major depression [F32.0] 07/02/2020 Yes     Chronic    History of renal cell cancer [Z85.528] 08/27/2019 Not Applicable     Chronic    Type 2 diabetes mellitus with diabetic neuropathy, without long-term current use of insulin [E11.40] 01/29/2018 Yes     Chronic    Benign hypertension [I10] 01/29/2018 Yes     Chronic    Hyperlipemia [E78.5] 01/29/2018 Yes     Chronic      Problems Resolved During this Admission:       Discharged Condition: good    Disposition: Home or Self Care    Follow Up:   Follow-up Information     Mary Kate Dolan MD Follow up on 3/10/2023.    Specialties: Cardiology, Interventional Cardiology  Why: at 1:20pm  Contact information:  [address]  [address]  [place] [id number] [phone]                       Patient Instructions:      Diet diabetic     Diet Cardiac     Notify your health care provider if you experience any of the following:  temperature >100.4     Notify your health care provider if you experience any of the following:  difficulty breathing or increased cough     Notify your health care provider if you experience any of the following:  increased confusion or weakness     Activity as tolerated       Significant Diagnostic Studies: Labs: All labs within the past 24 hours have been reviewed    Pending Diagnostic Studies:     None         Medications:  Reconciled Home Medications:      Medication List      START taking these medications    aspirin 81 MG EC tablet  Commonly known as: ECOTRIN  Take 1 tablet (81 mg total) by mouth once daily.     pantoprazole 40 MG tablet  Commonly known as: PROTONIX  Take 1 tablet (40 mg total) by mouth once daily.        CONTINUE taking these medications    ACCU-CHEK ONEIL CONTROL SOLN Soln  Generic drug: blood glucose control high,low     amLODIPine 10 MG tablet  Commonly known as: NORVASC  Take 1 tablet (10 mg total) by mouth once daily.     blood-glucose meter kit  Commonly known as: TRUE  METRIX GLUCOSE METER  Use as directed to test glucose     cetirizine 10 MG tablet  Commonly known as: ZYRTEC  Take 1 tablet (10 mg total) by mouth once daily.     cyclobenzaprine 5 MG tablet  Commonly known as: FLEXERIL  TAKE 1 TABLET THREE TIMES DAILY AS NEEDED FOR MUSCLE SPASM(S)     diphenhydrAMINE 25 mg capsule  Commonly known as: BENADRYL  Take 1 capsule (25 mg total) by mouth every 6 (six) hours as needed for Itching.     DROPSAFE ALCOHOL PREP PADS Padm  Generic drug: alcohol swabs  USE 3 EVERY DAY AS DIRECTED     ergocalciferol 50,000 unit Cap  Commonly known as: ERGOCALCIFEROL  TAKE 1 CAPSULE EVERY 7 DAYS.     FLUAD QUAD 2021-22(65Y UP)(PF) 60 mcg (15 mcg x 4)/0.5 mL Syrg  Generic drug: flu vac 2021 [id number]-[id number](PF)     FLUoxetine 20 MG capsule  TAKE 1 CAPSULE EVERY DAY     fluticasone propionate 50 mcg/actuation nasal spray  Commonly known as: FLONASE  USE 1 SPRAY IN EACH NOSTRIL ONCE DAILY     gabapentin 300 MG capsule  Commonly known as: NEURONTIN  TAKE 2 CAPSULES (600 MG TOTAL) BY MOUTH EVERY EVENING.     hydrOXYzine HCL 25 MG tablet  Commonly known as: ATARAX  TAKE 1 TABLET THREE TIMES DAILY AS NEEDED FOR  ITCHING     * lancets 33 gauge Misc  Commonly known as: TRUEPLUS LANCETS  1 lancet by Misc.(Non-Drug; Combo Route) route 2 (two) times a day.     * ACCU-CHEK SOFTCLIX LANCETS Misc  Generic drug: lancets  USE TO TEST BLOOD SUGAR ONE TIME DAILY     meloxicam 15 MG tablet  Commonly known as: MOBIC  Take 1 tablet (15 mg total) by mouth once daily.     metFORMIN 500 MG tablet  Commonly known as: GLUCOPHAGE  Take 1 tablet (500 mg total) by mouth 2 (two) times daily with meals.     pravastatin 20 MG tablet  Commonly known as: PRAVACHOL  Take 1 tablet (20 mg total) by mouth once daily.     * triamcinolone acetonide 0.1% 0.1 % cream  Commonly known as: KENALOG     * KENALOG 40 mg/mL injection  Generic drug: triamcinolone acetonide     TRUE METRIX GLUCOSE TEST STRIP Strp  Generic drug: blood sugar  diagnostic  TEST BLOOD SUGAR TWICE DAILY     TRUE METRIX LEVEL 1 Soln  Generic drug: blood glucose control, low  1 each by Misc.(Non-Drug; Combo Route) route once as needed.         * This list has 4 medication(s) that are the same as other medications prescribed for you. Read the directions carefully, and ask your doctor or other care provider to review them with you.            STOP taking these medications    celecoxib 200 MG capsule  Commonly known as: CeleBREX     famotidine 20 MG tablet  Commonly known as: PEPCID            Indwelling Lines/Drains at time of discharge:   Lines/Drains/Airways     None                 Time spent on the discharge of patient: 30 minutes       Sherri Myers NP  Department of Hospital Medicine  [facility]

## 2023-01-12 NOTE — NURSING
Patient is off the unit via wheelchair, on room air, no distress noted. Going to Cardiology for Echo.

## 2023-01-12 NOTE — SUBJECTIVE & OBJECTIVE
Past Medical History:   Diagnosis Date    Arthritis     Colon polyp     Diabetes mellitus     diet controlled    Diabetes with neurologic complications     Hypertension     Nuclear sclerosis of both eyes 2/14/2022    Renal cell carcinoma        Past Surgical History:   Procedure Laterality Date    COLONOSCOPY N/A 2/5/2018    Procedure: COLONOSCOPY;  Surgeon: Bacilio Mancia MD;  Location: [facility] ENDO;  Service: Endoscopy;  Laterality: N/A;  appt confirmed-ss    COLONOSCOPY N/A 8/13/2020    Procedure: COLONOSCOPY;  Surgeon: Jose West MD;  Location: [facility] ENDO;  Service: Endoscopy;  Laterality: N/A;    HYSTERECTOMY      INTRAOCULAR PROSTHESES INSERTION Left 7/7/2022    Procedure: INSERTION, IOL PROSTHESIS;  Surgeon: Candelario Novoa MD;  Location: [facility] OR;  Service: Ophthalmology;  Laterality: Left;    INTRAOCULAR PROSTHESES INSERTION Right 7/21/2022    Procedure: INSERTION, IOL PROSTHESIS;  Surgeon: Candelario Novoa MD;  Location: [facility] OR;  Service: Ophthalmology;  Laterality: Right;    OOPHORECTOMY      PARTIAL NEPHRECTOMY Right 10/12/2018    Procedure: NEPHRECTOMY, PARTIAL open. Dr Arenas to assist.;  Surgeon: Alejandra Palm MD;  Location: [facility] OR;  Service: Urology;  Laterality: Right;  RN PREOP 10/9/2018----NEEDS H/P    PHACOEMULSIFICATION OF CATARACT Left 7/7/2022    Procedure: PHACOEMULSIFICATION, CATARACT;  Surgeon: Candelario Novoa MD;  Location: [facility] OR;  Service: Ophthalmology;  Laterality: Left;  RN PHONE PREOP 6/27/2022   ARRIVAL 6:00 AM    PHACOEMULSIFICATION OF CATARACT Right 7/21/2022    Procedure: PHACOEMULSIFICATION, CATARACT;  Surgeon: Candelario Novoa MD;  Location: [facility] OR;  Service: Ophthalmology;  Laterality: Right;  RN PHONE PREOP 7/12/2022   ARRIVAL 12:00 NOON       Review of patient's allergies indicates:   Allergen Reactions    Lisinopril Swelling and Shortness Of Breath    Ascorbic acid-ascorbate calc      And citrus fruits       Current  Facility-Administered Medications on File Prior to Encounter   Medication    cyclopentolate 1% ophthalmic solution 1 drop    cyclopentolate 1% ophthalmic solution 1 drop    ofloxacin 0.3 % ophthalmic solution 1 drop    ofloxacin 0.3 % ophthalmic solution 1 drop    sodium chloride 0.9% flush 10 mL    sodium chloride 0.9% flush 10 mL     Current Outpatient Medications on File Prior to Encounter   Medication Sig    ACCU-CHEK ONEIL CONTROL SOLN Soln     ACCU-CHEK SOFTCLIX LANCETS Misc USE TO TEST BLOOD SUGAR ONE TIME DAILY    alcohol swabs (DROPSAFE ALCOHOL PREP PADS) PadM USE 3 EVERY DAY AS DIRECTED    amLODIPine (NORVASC) 10 MG tablet Take 1 tablet (10 mg total) by mouth once daily.    blood glucose control, low (TRUE METRIX LEVEL 1) Soln 1 each by Misc.(Non-Drug; Combo Route) route once as needed.    blood-glucose meter (TRUE METRIX GLUCOSE METER) kit Use as directed to test glucose    celecoxib (CELEBREX) 200 MG capsule Take 1 capsule (200 mg total) by mouth once daily.    cetirizine (ZYRTEC) 10 MG tablet Take 1 tablet (10 mg total) by mouth once daily.    cyclobenzaprine (FLEXERIL) 5 MG tablet TAKE 1 TABLET THREE TIMES DAILY AS NEEDED FOR MUSCLE SPASM(S)    diphenhydrAMINE (BENADRYL) 25 mg capsule Take 1 capsule (25 mg total) by mouth every 6 (six) hours as needed for Itching.    ergocalciferol (ERGOCALCIFEROL) 50,000 unit Cap TAKE 1 CAPSULE EVERY 7 DAYS.    famotidine (PEPCID) 20 MG tablet TAKE 1 TABLET TWICE DAILY    FLUAD QUAD 2021-22,65Y UP,,PF, 60 mcg (15 mcg x 4)/0.5 mL Syrg     FLUoxetine 20 MG capsule TAKE 1 CAPSULE EVERY DAY    fluticasone propionate (FLONASE) 50 mcg/actuation nasal spray USE 1 SPRAY IN EACH NOSTRIL ONCE DAILY    gabapentin (NEURONTIN) 300 MG capsule TAKE 2 CAPSULES (600 MG TOTAL) BY MOUTH EVERY EVENING.    hydrOXYzine HCL (ATARAX) 25 MG tablet TAKE 1 TABLET THREE TIMES DAILY AS NEEDED FOR  ITCHING    KENALOG 40 mg/mL injection     lancets (TRUEPLUS LANCETS) 33 gauge Misc 1 lancet by  Misc.(Non-Drug; Combo Route) route 2 (two) times a day.    meloxicam (MOBIC) 15 MG tablet Take 1 tablet (15 mg total) by mouth once daily.    metFORMIN (GLUCOPHAGE) 500 MG tablet Take 1 tablet (500 mg total) by mouth 2 (two) times daily with meals.    pravastatin (PRAVACHOL) 20 MG tablet Take 1 tablet (20 mg total) by mouth once daily.    triamcinolone acetonide 0.1% (KENALOG) 0.1 % cream     TRUE METRIX GLUCOSE TEST STRIP Strp TEST BLOOD SUGAR TWICE DAILY     Family History       Problem Relation (Age of Onset)    Blindness Sister    Breast cancer Sister    Cancer Sister    Diabetes Sister, Brother    Glaucoma Father, Sister    No Known Problems Mother, Maternal Aunt, Maternal Uncle, Paternal Aunt, Paternal Uncle, Maternal Grandmother, Maternal Grandfather, Paternal Grandmother, Paternal Grandfather    Thyroid disease Sister          Tobacco Use    Smoking status: Never    Smokeless tobacco: Never   Substance and Sexual Activity    Alcohol use: No    Drug use: No    Sexual activity: Not on file     Review of Systems   Constitutional:  Negative for chills, fatigue and fever.   HENT:  Negative for congestion and rhinorrhea.    Eyes:  Negative for photophobia and visual disturbance.   Respiratory:  Negative for cough and shortness of breath.    Cardiovascular:  Positive for chest pain. Negative for palpitations and leg swelling.   Gastrointestinal:  Positive for nausea. Negative for abdominal pain, diarrhea and vomiting.   Genitourinary:  Negative for dysuria, frequency and urgency.   Skin:  Negative for pallor, rash and wound.   Neurological:  Negative for light-headedness and headaches.   Psychiatric/Behavioral:  Negative for confusion and decreased concentration.    Objective:     Vital Signs (Most Recent):  Temp: 97.6 °F (36.4 °C) (01/11/23 2159)  Pulse: 86 (01/11/23 2159)  Resp: 17 (01/11/23 2159)  BP: (!) 177/86 (01/11/23 2159)  SpO2: 97 % (01/11/23 2159)   Vital Signs (24h Range):  Temp:  [97.6 °F (36.4  °C)-98.4 °F (36.9 °C)] 97.6 °F (36.4 °C)  Pulse:  [] 86  Resp:  [17-21] 17  SpO2:  [96 %-100 %] 97 %  BP: (142-177)/(71-86) 177/86     Weight: 96.2 kg (212 lb 1.3 oz)  Body mass index is 36.4 kg/m².    Physical Exam  Vitals and nursing note reviewed.   Constitutional:       General: She is not in acute distress.     Appearance: She is well-developed.   HENT:      Head: Normocephalic and atraumatic.      Right Ear: External ear normal.      Left Ear: External ear normal.      Nose: Nose normal.   Eyes:      Conjunctiva/sclera: Conjunctivae normal.      Pupils: Pupils are equal, round, and reactive to light.   Cardiovascular:      Rate and Rhythm: Normal rate and regular rhythm.   Pulmonary:      Effort: Pulmonary effort is normal. No respiratory distress.      Breath sounds: Normal breath sounds. No wheezing.   Abdominal:      General: Bowel sounds are normal. There is no distension.      Palpations: Abdomen is soft.      Tenderness: There is no abdominal tenderness.      Comments: No palpable hepatomegaly or splenomegaly    Musculoskeletal:         General: No tenderness. Normal range of motion.      Cervical back: Normal range of motion and neck supple.   Skin:     General: Skin is warm and dry.   Neurological:      Mental Status: She is alert and oriented to person, place, and time.   Psychiatric:         Thought Content: Thought content normal.         CRANIAL NERVES     CN III, IV, VI   Pupils are equal, round, and reactive to light.     Significant Labs: All pertinent labs within the past 24 hours have been reviewed.    Significant Imaging: I have reviewed all pertinent imaging results/findings within the past 24 hours.

## 2023-01-12 NOTE — ASSESSMENT & PLAN NOTE
Atypical pain without evidence of acute ischemia on EKG and negative initial troponin.  Currently pain free.  Doubt ACS.  -monitor on tele  -obtain serial troponin  -check echo  -NPO p MN

## 2023-01-12 NOTE — HOSPITAL COURSE
Admission to observation for chest pain burning tingling.  ACS ruled out.  Patient contributes her symptoms to acid reflux as similar to her previous acid reflux flare.  Switch to Protonix from home Pepcid.  2D echo showed normal EF, grade 1 diastolic dysfunction and normal wall motion.  Patient stable for discharge home with close follow-up with PCP and Cardiology.

## 2023-01-12 NOTE — H&P
[facility] Medicine  History & Physical    Patient Name: Valarie Foster  MRN: [id number]  Patient Class: OP- Observation  Admission Date: 1/11/2023  Attending Physician: Solomon Franklin MD   Primary Care Provider: Alena Hernandez MD         Patient information was obtained from patient, past medical records and ER records.     Subjective:     Principal Problem:Chest pain    Chief Complaint:   Chief Complaint   Patient presents with    Chest Pain     Complains of L sided chest pain that radiates upward onset 30 min PTA. Pain described as burning.         HPI: 72 y.o. female hypertension, hyperlipidemia, DM2, depression, history of renal cell carcinoma s/p partial nephrectomy presents with a complaint of chest pain.  Pain is acute onset this morning while shopping with her , located mid chest, described as hard, attempted self-treatment with Pepcid without relief.  No known exacerbating or alleviating factors.  Denies prior similar episodes.  Also denies fever, chills, cough, SOB, diaphoresis, palpitations, orthopnea, PND, dizziness, syncope.  In the ED, EKG was without evidence of acute ischemia, initial troponin negative.  Otherwise unremarkable for acute abnormality.  She was given sublingual nitroglycerin as well as a GI cocktail.  Placed in observation for ACS rule out.      Past Medical History:   Diagnosis Date    Arthritis     Colon polyp     Diabetes mellitus     diet controlled    Diabetes with neurologic complications     Hypertension     Nuclear sclerosis of both eyes 2/14/2022    Renal cell carcinoma        Past Surgical History:   Procedure Laterality Date    COLONOSCOPY N/A 2/5/2018    Procedure: COLONOSCOPY;  Surgeon: Bacilio Mancia MD;  Location: [facility];  Service: Endoscopy;  Laterality: N/A;  appt confirmed-ss    COLONOSCOPY N/A 8/13/2020    Procedure: COLONOSCOPY;  Surgeon: Jose West MD;  Location: [facility];  Service: Endoscopy;  Laterality: N/A;     HYSTERECTOMY      INTRAOCULAR PROSTHESES INSERTION Left 7/7/2022    Procedure: INSERTION, IOL PROSTHESIS;  Surgeon: Candelario Novoa MD;  Location: [facility] OR;  Service: Ophthalmology;  Laterality: Left;    INTRAOCULAR PROSTHESES INSERTION Right 7/21/2022    Procedure: INSERTION, IOL PROSTHESIS;  Surgeon: Candelario Novoa MD;  Location: [facility] OR;  Service: Ophthalmology;  Laterality: Right;    OOPHORECTOMY      PARTIAL NEPHRECTOMY Right 10/12/2018    Procedure: NEPHRECTOMY, PARTIAL open. Dr Arenas to assist.;  Surgeon: Alejandra Palm MD;  Location: [facility] OR;  Service: Urology;  Laterality: Right;  RN PREOP 10/9/2018----NEEDS H/P    PHACOEMULSIFICATION OF CATARACT Left 7/7/2022    Procedure: PHACOEMULSIFICATION, CATARACT;  Surgeon: Candelario Novoa MD;  Location: [facility] OR;  Service: Ophthalmology;  Laterality: Left;  RN PHONE PREOP 6/27/2022   ARRIVAL 6:00 AM    PHACOEMULSIFICATION OF CATARACT Right 7/21/2022    Procedure: PHACOEMULSIFICATION, CATARACT;  Surgeon: Candelario Novoa MD;  Location: [facility] OR;  Service: Ophthalmology;  Laterality: Right;  RN PHONE PREOP 7/12/2022   ARRIVAL 12:00 NOON       Review of patient's allergies indicates:   Allergen Reactions    Lisinopril Swelling and Shortness Of Breath    Ascorbic acid-ascorbate calc      And citrus fruits       Current Facility-Administered Medications on File Prior to Encounter   Medication    cyclopentolate 1% ophthalmic solution 1 drop    cyclopentolate 1% ophthalmic solution 1 drop    ofloxacin 0.3 % ophthalmic solution 1 drop    ofloxacin 0.3 % ophthalmic solution 1 drop    sodium chloride 0.9% flush 10 mL    sodium chloride 0.9% flush 10 mL     Current Outpatient Medications on File Prior to Encounter   Medication Sig    ACCU-CHEK ONEIL CONTROL SOLN Soln     ACCU-CHEK SOFTCLIX LANCETS Misc USE TO TEST BLOOD SUGAR ONE TIME DAILY    alcohol swabs (DROPSAFE ALCOHOL PREP PADS) PadM USE 3 EVERY DAY AS DIRECTED     amLODIPine (NORVASC) 10 MG tablet Take 1 tablet (10 mg total) by mouth once daily.    blood glucose control, low (TRUE METRIX LEVEL 1) Soln 1 each by Misc.(Non-Drug; Combo Route) route once as needed.    blood-glucose meter (TRUE METRIX GLUCOSE METER) kit Use as directed to test glucose    celecoxib (CELEBREX) 200 MG capsule Take 1 capsule (200 mg total) by mouth once daily.    cetirizine (ZYRTEC) 10 MG tablet Take 1 tablet (10 mg total) by mouth once daily.    cyclobenzaprine (FLEXERIL) 5 MG tablet TAKE 1 TABLET THREE TIMES DAILY AS NEEDED FOR MUSCLE SPASM(S)    diphenhydrAMINE (BENADRYL) 25 mg capsule Take 1 capsule (25 mg total) by mouth every 6 (six) hours as needed for Itching.    ergocalciferol (ERGOCALCIFEROL) 50,000 unit Cap TAKE 1 CAPSULE EVERY 7 DAYS.    famotidine (PEPCID) 20 MG tablet TAKE 1 TABLET TWICE DAILY    FLUAD QUAD 2021-22,65Y UP,,PF, 60 mcg (15 mcg x 4)/0.5 mL Syrg     FLUoxetine 20 MG capsule TAKE 1 CAPSULE EVERY DAY    fluticasone propionate (FLONASE) 50 mcg/actuation nasal spray USE 1 SPRAY IN EACH NOSTRIL ONCE DAILY    gabapentin (NEURONTIN) 300 MG capsule TAKE 2 CAPSULES (600 MG TOTAL) BY MOUTH EVERY EVENING.    hydrOXYzine HCL (ATARAX) 25 MG tablet TAKE 1 TABLET THREE TIMES DAILY AS NEEDED FOR  ITCHING    KENALOG 40 mg/mL injection     lancets (TRUEPLUS LANCETS) 33 gauge Misc 1 lancet by Misc.(Non-Drug; Combo Route) route 2 (two) times a day.    meloxicam (MOBIC) 15 MG tablet Take 1 tablet (15 mg total) by mouth once daily.    metFORMIN (GLUCOPHAGE) 500 MG tablet Take 1 tablet (500 mg total) by mouth 2 (two) times daily with meals.    pravastatin (PRAVACHOL) 20 MG tablet Take 1 tablet (20 mg total) by mouth once daily.    triamcinolone acetonide 0.1% (KENALOG) 0.1 % cream     TRUE METRIX GLUCOSE TEST STRIP Strp TEST BLOOD SUGAR TWICE DAILY     Family History       Problem Relation (Age of Onset)    Blindness Sister    Breast cancer Sister    Cancer Sister    Diabetes  Sister, Brother    Glaucoma Father, Sister    No Known Problems Mother, Maternal Aunt, Maternal Uncle, Paternal Aunt, Paternal Uncle, Maternal Grandmother, Maternal Grandfather, Paternal Grandmother, Paternal Grandfather    Thyroid disease Sister          Tobacco Use    Smoking status: Never    Smokeless tobacco: Never   Substance and Sexual Activity    Alcohol use: No    Drug use: No    Sexual activity: Not on file     Review of Systems   Constitutional:  Negative for chills, fatigue and fever.   HENT:  Negative for congestion and rhinorrhea.    Eyes:  Negative for photophobia and visual disturbance.   Respiratory:  Negative for cough and shortness of breath.    Cardiovascular:  Positive for chest pain. Negative for palpitations and leg swelling.   Gastrointestinal:  Positive for nausea. Negative for abdominal pain, diarrhea and vomiting.   Genitourinary:  Negative for dysuria, frequency and urgency.   Skin:  Negative for pallor, rash and wound.   Neurological:  Negative for light-headedness and headaches.   Psychiatric/Behavioral:  Negative for confusion and decreased concentration.    Objective:     Vital Signs (Most Recent):  Temp: 97.6 °F (36.4 °C) (01/11/23 2159)  Pulse: 86 (01/11/23 2159)  Resp: 17 (01/11/23 2159)  BP: (!) 177/86 (01/11/23 2159)  SpO2: 97 % (01/11/23 2159)   Vital Signs (24h Range):  Temp:  [97.6 °F (36.4 °C)-98.4 °F (36.9 °C)] 97.6 °F (36.4 °C)  Pulse:  [] 86  Resp:  [17-21] 17  SpO2:  [96 %-100 %] 97 %  BP: (142-177)/(71-86) 177/86     Weight: 96.2 kg (212 lb 1.3 oz)  Body mass index is 36.4 kg/m².    Physical Exam  Vitals and nursing note reviewed.   Constitutional:       General: She is not in acute distress.     Appearance: She is well-developed.   HENT:      Head: Normocephalic and atraumatic.      Right Ear: External ear normal.      Left Ear: External ear normal.      Nose: Nose normal.   Eyes:      Conjunctiva/sclera: Conjunctivae normal.      Pupils: Pupils are equal,  round, and reactive to light.   Cardiovascular:      Rate and Rhythm: Normal rate and regular rhythm.   Pulmonary:      Effort: Pulmonary effort is normal. No respiratory distress.      Breath sounds: Normal breath sounds. No wheezing.   Abdominal:      General: Bowel sounds are normal. There is no distension.      Palpations: Abdomen is soft.      Tenderness: There is no abdominal tenderness.      Comments: No palpable hepatomegaly or splenomegaly    Musculoskeletal:         General: No tenderness. Normal range of motion.      Cervical back: Normal range of motion and neck supple.   Skin:     General: Skin is warm and dry.   Neurological:      Mental Status: She is alert and oriented to person, place, and time.   Psychiatric:         Thought Content: Thought content normal.         CRANIAL NERVES     CN III, IV, VI   Pupils are equal, round, and reactive to light.     Significant Labs: All pertinent labs within the past 24 hours have been reviewed.    Significant Imaging: I have reviewed all pertinent imaging results/findings within the past 24 hours.    Assessment/Plan:     * Chest pain  Atypical pain without evidence of acute ischemia on EKG and negative initial troponin.  Currently pain free.  Doubt ACS.  -monitor on tele  -obtain serial troponin  -check echo  -NPO p MN    Mild major depression  Patient has persistent depression which is mild and is currently controlled. Will Continue anti-depressant medications. We will not consult psychiatry at this time. Patient does not display psychosis at this time. Continue to monitor closely and adjust plan of care as needed.    History of renal cell cancer  S/p partial nephrectomy    Hyperlipemia  Continue statin    Benign hypertension  Well controlled, continue home medications and monitor blood pressure, adjust as needed.     Type 2 diabetes mellitus with diabetic neuropathy, without long-term current use of insulin  Patient's FSGs are controlled on current medication  regimen.  Last A1c reviewed-   Lab Results   Component Value Date    HGBA1C 7.2 (H) 10/31/2022     Most recent fingerstick glucose reviewed- No results for input(s): POCTGLUCOSE in the last 24 hours.  Current correctional scale  Medium  Maintain anti-hyperglycemic dose as follows-   Antihyperglycemics (From admission, onward)    None        Hold Oral hypoglycemics while patient is in the hospital.      VTE Risk Mitigation (From admission, onward)    None         As clarification, on 01/11/2023, patient should be placed in hospital observation services under my care in collaboration with MD Alex Kim Jr., APRN, [facility]  Hospitalist - Department of Hospital Medicine  [facility] [address] [place] [address]  Office #: [phone]; Pager #: [phone]

## 2023-01-12 NOTE — ASSESSMENT & PLAN NOTE
Patient has persistent depression which is mild and is currently controlled. Will Continue anti-depressant medications. We will not consult psychiatry at this time. Patient does not display psychosis at this time. Continue to monitor closely and adjust plan of care as needed.

## 2023-01-12 NOTE — NURSING
Patient arrived to unit via stretcher with EMS staff x2.  Patient given instructions on unit and call light system with no questions.  Patient denies any needs or wants at this time.  Call light within reach.  Will continue to monitor.

## 2023-01-17 ENCOUNTER — OFFICE VISIT (OUTPATIENT)
Dept: ORTHOPEDICS | Facility: CLINIC | Age: 73
End: 2023-01-17
Payer: MEDICARE

## 2023-01-17 ENCOUNTER — TELEPHONE (OUTPATIENT)
Dept: FAMILY MEDICINE | Facility: CLINIC | Age: 73
End: 2023-01-17
Payer: MEDICARE

## 2023-01-17 VITALS — WEIGHT: 212 LBS | HEIGHT: 64 IN | BODY MASS INDEX: 36.19 KG/M2

## 2023-01-17 DIAGNOSIS — M17.0 PRIMARY OSTEOARTHRITIS OF BOTH KNEES: Primary | ICD-10-CM

## 2023-01-17 PROCEDURE — 20610 DRAIN/INJ JOINT/BURSA W/O US: CPT | Mod: 50,S$GLB,, | Performed by: ORTHOPAEDIC SURGERY

## 2023-01-17 PROCEDURE — 99999 PR PBB SHADOW E&M-EST. PATIENT-LVL III: CPT | Mod: PBBFAC,,, | Performed by: ORTHOPAEDIC SURGERY

## 2023-01-17 PROCEDURE — 1125F AMNT PAIN NOTED PAIN PRSNT: CPT | Mod: CPTII,S$GLB,, | Performed by: ORTHOPAEDIC SURGERY

## 2023-01-17 PROCEDURE — 99499 NO LOS: ICD-10-PCS | Mod: S$GLB,,, | Performed by: ORTHOPAEDIC SURGERY

## 2023-01-17 PROCEDURE — 1159F PR MEDICATION LIST DOCUMENTED IN MEDICAL RECORD: ICD-10-PCS | Mod: CPTII,S$GLB,, | Performed by: ORTHOPAEDIC SURGERY

## 2023-01-17 PROCEDURE — 3072F PR LOW RISK FOR RETINOPATHY: ICD-10-PCS | Mod: CPTII,S$GLB,, | Performed by: ORTHOPAEDIC SURGERY

## 2023-01-17 PROCEDURE — 1125F PR PAIN SEVERITY QUANTIFIED, PAIN PRESENT: ICD-10-PCS | Mod: CPTII,S$GLB,, | Performed by: ORTHOPAEDIC SURGERY

## 2023-01-17 PROCEDURE — 99499 UNLISTED E&M SERVICE: CPT | Mod: S$GLB,,, | Performed by: ORTHOPAEDIC SURGERY

## 2023-01-17 PROCEDURE — 3072F LOW RISK FOR RETINOPATHY: CPT | Mod: CPTII,S$GLB,, | Performed by: ORTHOPAEDIC SURGERY

## 2023-01-17 PROCEDURE — 3008F BODY MASS INDEX DOCD: CPT | Mod: CPTII,S$GLB,, | Performed by: ORTHOPAEDIC SURGERY

## 2023-01-17 PROCEDURE — 99999 PR PBB SHADOW E&M-EST. PATIENT-LVL III: ICD-10-PCS | Mod: PBBFAC,,, | Performed by: ORTHOPAEDIC SURGERY

## 2023-01-17 PROCEDURE — 3008F PR BODY MASS INDEX (BMI) DOCUMENTED: ICD-10-PCS | Mod: CPTII,S$GLB,, | Performed by: ORTHOPAEDIC SURGERY

## 2023-01-17 PROCEDURE — 20610 LARGE JOINT ASPIRATION/INJECTION: BILATERAL KNEE: ICD-10-PCS | Mod: 50,S$GLB,, | Performed by: ORTHOPAEDIC SURGERY

## 2023-01-17 PROCEDURE — 1159F MED LIST DOCD IN RCRD: CPT | Mod: CPTII,S$GLB,, | Performed by: ORTHOPAEDIC SURGERY

## 2023-01-17 NOTE — PROGRESS NOTES
Follow up PATIENT ORTHOPAEDIC: Knee    PRIMARY CARE PHYSICIAN: Alena Hernandez MD   REFERRING PROVIDER: Eric Carbajal MD  [address] [facility]  [place] [address]     ASSESSMENT & PLAN:    Impression:  Bilateral Knee Moderate Osteoarthritis, primary     Follow Up Plan: For orthovisc series      Non operative care:    Valarie Foster has physical exam evidence of above and wishes to pursue an non-operative care. I am recommending the following: visco injections.      To review: Patient has bilateral knee pain left worse than right. She has radiographic imaging that is consistent with moderate arthritis.  I am hesitant to consider surgical intervention on this patient. She has significant tenderness to palpation over multiple areas, but medial joint line is the worst. She has some irritability of her hip but CT scan from 2020 does not demonstrate any significant arthritis here.  She does have some upper lumbar degenerative disc disease on that same CT scan.  She denies any lumbar back issues. She saw [facility] for geniculate nerve ablations which she was not deemed a candidate for. Failed steroid injections. She had visco injections previously, she had about 5-6 months of relief. She is interested in this modality.     She will follow up with PCP re: bilateral lower extremity pitting edema.     The patient has been ordered:  Orthovisc    CONSULTS:   None    ACTIVE PROBLEM LIST  Patient Active Problem List   Diagnosis    Type 2 diabetes mellitus with diabetic neuropathy, without long-term current use of insulin    Benign hypertension    Hyperlipemia    Microhematuria    Left flank pain    Abdominal aortic atherosclerosis    History of renal cell cancer    Renal cell carcinoma of right kidney    Mild major depression    Bilateral chronic knee pain    Colon cancer screening    Pain of left calf    Nuclear sclerosis of both eyes    Refractive error    Primary osteoarthritis of knees, bilateral    Fall    Decreased ROM of lower  extremity    Decreased strength of lower extremity    Decreased mobility and endurance    Nuclear sclerotic cataract of left eye    Nuclear sclerotic cataract of right eye    Radiculopathy    Idiopathic progressive neuropathy    Nerve pain    Chest pain           SUBJECTIVE    CHIEF COMPLAINT: Knee Pain    HPI:   Valarie Foster is a 72 y.o. female here for evaluation and management of bilateral knee pain. There is not a specific incident that brought about this pain. she has had progressive problems with the knee(s) starting 2 years ago but is now progressing to interfere with activities which include: walking, rising from a sitting position, standing for prolonged periods of time and climbing stairs     Currently the pain in the joint is rated at mild with activity. The pain is constant and is located in the knee, at level of joint line, located medially, located laterally, located anterior, located posterior and with radiation. The pain is described as aching, severe, sharp, stabbing and stiffness. Relieving factors include ice or heat, prescription medication and repositioning.     There is associated Catching, Clicking and Popping.     Valarie Foster has no additional complaints.     Interval History 4/7/22: Continued pain. No changes. Saw pain management, not candidate for genicular nerve ablations.   Interval History 4/28/22: Continued pain. Some fear of falling. Follows with [facility]. DDD on lumbar spine. Waiting for PT eval. Here for start of Orthovisc series    Interval History 5/3/22: Here for 2 of 3 visco injections. Pain mildly improved compared to previous.  Interval History 5/12/22: Here for 3 of 3 visco injections. Pain moderately improved compared to previous. Left knee, minimal pain.    Interval History 11/28/22: Here with return of bilateral knee pain. Previous injections help for about 6 months.   Interval History 1/17/23: Here for repeat orthovisc series. She is reporting bilateral lower  extremity edema.     PROGRESSIVE SYMPTOMS:  Pain impacting sleep  Pain worsened by weight bearing    FUNCTIONAL STATUS:   Walk a block or two on level ground     PREVIOUS TREATMENTS:  Medical: Steroid Injections and Biologic Injections  Physical Therapy: Activities Modified  and Formal Physical Therapy for 6 weeks  Previous Orthopaedic Surgery: None    REVIEW OF SYSTEMS:  PAIN ASSESSMENT:  See HPI.  MUSCULOSKELETAL: See HPI.  OTHER 10 point review of systems is negative except as stated in HPI above    PAST MEDICAL HISTORY   has a past medical history of Arthritis, Colon polyp, Diabetes mellitus, Diabetes with neurologic complications, Hypertension, Nuclear sclerosis of both eyes (2/14/2022), and Renal cell carcinoma.     PAST SURGICAL HISTORY   has a past surgical history that includes Hysterectomy; Oophorectomy; Colonoscopy (N/A, 2/5/2018); Partial nephrectomy (Right, 10/12/2018); Colonoscopy (N/A, 8/13/2020); Phacoemulsification of cataract (Left, 7/7/2022); Intraocular prosthesis insertion (Left, 7/7/2022); Phacoemulsification of cataract (Right, 7/21/2022); and Intraocular prosthesis insertion (Right, 7/21/2022).     FAMILY HISTORY  family history includes Blindness in her sister; Breast cancer in her sister; Cancer in her sister; Diabetes in her brother and sister; Glaucoma in her father and sister; No Known Problems in her maternal aunt, maternal grandfather, maternal grandmother, maternal uncle, mother, paternal aunt, paternal grandfather, paternal grandmother, and paternal uncle; Thyroid disease in her sister.     SOCIAL HISTORY   reports that she has never smoked. She has never used smokeless tobacco. She reports that she does not drink alcohol and does not use drugs.     ALLERGIES   Review of patient's allergies indicates:   Allergen Reactions    Lisinopril Swelling and Shortness Of Breath    Ascorbic acid-ascorbate calc      And citrus fruits        MEDICATIONS  Current Outpatient Medications on File  Prior to Visit   Medication Sig Dispense Refill    ACCU-CHEK ONEIL CONTROL SOLN Soln       ACCU-CHEK SOFTCLIX LANCETS [facility] USE TO TEST BLOOD SUGAR ONE TIME DAILY 100 each 3    alcohol swabs (DROPSAFE ALCOHOL PREP PADS) PadM USE 3 EVERY DAY AS DIRECTED 300 each 3    amLODIPine (NORVASC) 10 MG tablet Take 1 tablet (10 mg total) by mouth once daily. 90 tablet 3    aspirin (ECOTRIN) 81 MG EC tablet Take 1 tablet (81 mg total) by mouth once daily. 30 tablet 3    blood-glucose meter (TRUE METRIX GLUCOSE METER) kit Use as directed to test glucose 1 each 0    cyclobenzaprine (FLEXERIL) 5 MG tablet TAKE 1 TABLET THREE TIMES DAILY AS NEEDED FOR MUSCLE SPASM(S) 270 tablet 1    diphenhydrAMINE (BENADRYL) 25 mg capsule Take 1 capsule (25 mg total) by mouth every 6 (six) hours as needed for Itching. 20 capsule 0    ergocalciferol (ERGOCALCIFEROL) 50,000 unit Cap TAKE 1 CAPSULE EVERY 7 DAYS. 12 capsule 1    FLUAD QUAD 2021-22,65Y UP,,PF, 60 mcg (15 mcg x 4)/0.5 mL Syrg       FLUoxetine 20 MG capsule TAKE 1 CAPSULE EVERY DAY 90 capsule 2    fluticasone propionate (FLONASE) 50 mcg/actuation nasal spray USE 1 SPRAY IN EACH NOSTRIL ONCE DAILY 32 g 2    gabapentin (NEURONTIN) 300 MG capsule TAKE 2 CAPSULES (600 MG TOTAL) BY MOUTH EVERY EVENING. 180 capsule 1    hydrOXYzine HCL (ATARAX) 25 MG tablet TAKE 1 TABLET THREE TIMES DAILY AS NEEDED FOR  ITCHING 90 tablet 0    KENALOG 40 mg/mL injection       lancets (TRUEPLUS LANCETS) 33 gauge Misc 1 lancet by Misc.(Non-Drug; Combo Route) route 2 (two) times a day. 200 each 1    meloxicam (MOBIC) 15 MG tablet Take 1 tablet (15 mg total) by mouth once daily. 90 tablet 0    metFORMIN (GLUCOPHAGE) 500 MG tablet Take 1 tablet (500 mg total) by mouth 2 (two) times daily with meals. 180 tablet 3    pantoprazole (PROTONIX) 40 MG tablet Take 1 tablet (40 mg total) by mouth once daily. 30 tablet 11    pravastatin (PRAVACHOL) 20 MG tablet Take 1 tablet (20 mg total) by mouth once daily. 90 tablet 1     "triamcinolone acetonide 0.1% (KENALOG) 0.1 % cream       TRUE METRIX GLUCOSE TEST STRIP Strp TEST BLOOD SUGAR TWICE DAILY 200 strip 1    blood glucose control, low (TRUE METRIX LEVEL 1) Soln 1 each by Misc.(Non-Drug; Combo Route) route once as needed. 1 each 3    cetirizine (ZYRTEC) 10 MG tablet Take 1 tablet (10 mg total) by mouth once daily. 90 tablet 0     Current Facility-Administered Medications on File Prior to Visit   Medication Dose Route Frequency Provider Last Rate Last Admin    cyclopentolate 1% ophthalmic solution 1 drop  1 drop Left Eye On Call Procedure Candelario Novoa MD   1 drop at 07/07/22 0703    cyclopentolate 1% ophthalmic solution 1 drop  1 drop Right Eye On Call Procedure Candelario Novoa MD   1 drop at 07/21/22 1337    ofloxacin 0.3 % ophthalmic solution 1 drop  1 drop Left Eye On Call Procedure Candelario Novoa MD   2 drop at 07/07/22 0754    ofloxacin 0.3 % ophthalmic solution 1 drop  1 drop Right Eye On Call Procedure Candelario Novoa MD   2 drop at 07/21/22 1630    sodium chloride 0.9% flush 10 mL  10 mL Intravenous PRN Candelario Novoa MD        sodium chloride 0.9% flush 10 mL  10 mL Intravenous PRN Candelario Novoa MD              PHYSICAL EXAM   height is 5' 4" (1.626 m) and weight is 96.2 kg (212 lb).   Body mass index is 36.39 kg/m².      All other systems deferred.  GENERAL:  No acute distress  HABITUS: Normal  GAIT: Antalgic  SKIN: Normal  and No erythema, warmth, fluctuance . THERE IS 2+ pitting edema bilaterally.     KNEE EXAM:    bilateral:   Effusion: Minimal joint effusion  TTP: yes over Medial Joint Line,   no crepitus with passive knee ROM  Passive ROM: Extension 15, Flexion 100  No pain with manipulation of patella  Stable to varus/valgus stress. No increased laxity to anterior/posterior drawer testing  positive Constantin's test  No pain with IR/ER rotation of the hip  5/5 strength in knee flexion and extension, ankle plantarflexion and " dorsiflexion  Neurovascular Status: Sensation intact to light touch in Sural, Saphenous, SPN, DPN, Tibial nerve distribution  2+ pulse DP/PT, normal capillary refill, foot has normal warmth    DATA:  Diagnostic tests reviewed for today's visit:     4v of the knee reveal Moderate degenerative changes of the Medial and Patellofemoral compartment. There is evidence of advanced osteoarthritis changes with Subchondral sclerosis, Osteophyte formation and Joint space narrowing. The limb is in netural alignment. The patella is tracking midline. Kellegren-Lawerence grade 3 changes

## 2023-01-17 NOTE — TELEPHONE ENCOUNTER
----- Message from Chanelle Raheem sent at 1/17/2023  4:04 PM CST -----  Regarding: [id number]  Type: Patient Call Back    Who called: self    What is the request in detail: pt states she will like a call from the nurse about the fluid in her leg, she was told by her ortho specialist to contact her primary.    Can the clinic reply by [facility]?no    Would the patient rather a call back or a response via [facility]? Call back    Best call back number:[phone]      Additional Information:

## 2023-01-17 NOTE — TELEPHONE ENCOUNTER
Patient states she was informed by her ortho doctor that she will to start on lasix medication. Patient states the fluid is in both of her legs and her feet are swollen and it have her in pain. Patient states she has been having fluid in her legs for a long time. Patient declined scheduling appointment.  Please advise

## 2023-01-17 NOTE — TELEPHONE ENCOUNTER
Does she want an appointment or refill on lasix?  Unfortunately leg swelling does occur with legs being in a down and with decreased walking.   I recommend she also wear compression stockings and elevate her legs above her heart.

## 2023-01-17 NOTE — PROCEDURES
Large Joint Aspiration/Injection: bilateral knee    Date/Time: 1/17/2023 2:20 PM  Performed by: Eric Carbajal MD  Authorized by: Eric Carbajal MD     Consent Done?:  Yes (Verbal)  Indications:  Arthritis and pain  Site marked: the procedure site was marked    Timeout: prior to procedure the correct patient, procedure, and site was verified    Prep: patient was prepped and draped in usual sterile fashion      Local anesthesia used?: Yes    Local anesthetic:  Topical anesthetic    Details:  Needle Size:  22 G  Approach:  Anterolateral  Location:  Knee  Laterality:  Bilateral  Site:  Bilateral knee  Medications (Right):  30 mg sodium hyaluronate (orthovisc) 30 mg/2 mL  Medications (Left):  30 mg sodium hyaluronate (orthovisc) 30 mg/2 mL  Patient tolerance:  Patient tolerated the procedure well with no immediate complications

## 2023-01-27 ENCOUNTER — OFFICE VISIT (OUTPATIENT)
Dept: ORTHOPEDICS | Facility: CLINIC | Age: 73
End: 2023-01-27
Payer: MEDICARE

## 2023-01-27 VITALS — WEIGHT: 212 LBS | HEIGHT: 64 IN | BODY MASS INDEX: 36.19 KG/M2

## 2023-01-27 DIAGNOSIS — M17.0 PRIMARY OSTEOARTHRITIS OF BOTH KNEES: Primary | ICD-10-CM

## 2023-01-27 PROCEDURE — 99999 PR PBB SHADOW E&M-EST. PATIENT-LVL III: CPT | Mod: PBBFAC,,, | Performed by: ORTHOPAEDIC SURGERY

## 2023-01-27 PROCEDURE — 20610 DRAIN/INJ JOINT/BURSA W/O US: CPT | Mod: 50,S$GLB,, | Performed by: ORTHOPAEDIC SURGERY

## 2023-01-27 PROCEDURE — 1159F MED LIST DOCD IN RCRD: CPT | Mod: CPTII,S$GLB,, | Performed by: ORTHOPAEDIC SURGERY

## 2023-01-27 PROCEDURE — 99999 PR PBB SHADOW E&M-EST. PATIENT-LVL III: ICD-10-PCS | Mod: PBBFAC,,, | Performed by: ORTHOPAEDIC SURGERY

## 2023-01-27 PROCEDURE — 1125F AMNT PAIN NOTED PAIN PRSNT: CPT | Mod: CPTII,S$GLB,, | Performed by: ORTHOPAEDIC SURGERY

## 2023-01-27 PROCEDURE — 20610 LARGE JOINT ASPIRATION/INJECTION: BILATERAL KNEE: ICD-10-PCS | Mod: 50,S$GLB,, | Performed by: ORTHOPAEDIC SURGERY

## 2023-01-27 PROCEDURE — 99499 UNLISTED E&M SERVICE: CPT | Mod: S$GLB,,, | Performed by: ORTHOPAEDIC SURGERY

## 2023-01-27 PROCEDURE — 3072F PR LOW RISK FOR RETINOPATHY: ICD-10-PCS | Mod: CPTII,S$GLB,, | Performed by: ORTHOPAEDIC SURGERY

## 2023-01-27 PROCEDURE — 1159F PR MEDICATION LIST DOCUMENTED IN MEDICAL RECORD: ICD-10-PCS | Mod: CPTII,S$GLB,, | Performed by: ORTHOPAEDIC SURGERY

## 2023-01-27 PROCEDURE — 1125F PR PAIN SEVERITY QUANTIFIED, PAIN PRESENT: ICD-10-PCS | Mod: CPTII,S$GLB,, | Performed by: ORTHOPAEDIC SURGERY

## 2023-01-27 PROCEDURE — 3008F PR BODY MASS INDEX (BMI) DOCUMENTED: ICD-10-PCS | Mod: CPTII,S$GLB,, | Performed by: ORTHOPAEDIC SURGERY

## 2023-01-27 PROCEDURE — 3072F LOW RISK FOR RETINOPATHY: CPT | Mod: CPTII,S$GLB,, | Performed by: ORTHOPAEDIC SURGERY

## 2023-01-27 PROCEDURE — 99499 NO LOS: ICD-10-PCS | Mod: S$GLB,,, | Performed by: ORTHOPAEDIC SURGERY

## 2023-01-27 PROCEDURE — 3008F BODY MASS INDEX DOCD: CPT | Mod: CPTII,S$GLB,, | Performed by: ORTHOPAEDIC SURGERY

## 2023-01-27 NOTE — PROGRESS NOTES
Follow up PATIENT ORTHOPAEDIC: Knee    PRIMARY CARE PHYSICIAN: lAena Hernandez MD   REFERRING PROVIDER: Eric Carbajal MD  [address] [facility]  [place] [address]     ASSESSMENT & PLAN:    Impression:  Bilateral Knee Moderate Osteoarthritis, primary     Follow Up Plan: For orthovisc series      Non operative care:    Valarie Foster has physical exam evidence of above and wishes to pursue an non-operative care. I am recommending the following: visco injections.      To review: Patient has bilateral knee pain left worse than right. She has radiographic imaging that is consistent with moderate arthritis.  I am hesitant to consider surgical intervention on this patient. She has significant tenderness to palpation over multiple areas, but medial joint line is the worst. She has some irritability of her hip but CT scan from 2020 does not demonstrate any significant arthritis here.  She does have some upper lumbar degenerative disc disease on that same CT scan.  She denies any lumbar back issues. She saw [facility] for geniculate nerve ablations which she was not deemed a candidate for. Failed steroid injections. She had visco injections previously, she had about 5-6 months of relief. She is interested in this modality.     She will follow up with PCP re: bilateral lower extremity pitting edema.     The patient has been ordered:  Orthovisc    CONSULTS:   None    ACTIVE PROBLEM LIST  Patient Active Problem List   Diagnosis    Type 2 diabetes mellitus with diabetic neuropathy, without long-term current use of insulin    Benign hypertension    Hyperlipemia    Microhematuria    Left flank pain    Abdominal aortic atherosclerosis    History of renal cell cancer    Renal cell carcinoma of right kidney    Mild major depression    Bilateral chronic knee pain    Colon cancer screening    Pain of left calf    Nuclear sclerosis of both eyes    Refractive error    Primary osteoarthritis of knees, bilateral    Fall    Decreased ROM of lower  extremity    Decreased strength of lower extremity    Decreased mobility and endurance    Nuclear sclerotic cataract of left eye    Nuclear sclerotic cataract of right eye    Radiculopathy    Idiopathic progressive neuropathy    Nerve pain    Chest pain           SUBJECTIVE    CHIEF COMPLAINT: Knee Pain    HPI:   Valarie Foster is a 72 y.o. female here for evaluation and management of bilateral knee pain. There is not a specific incident that brought about this pain. she has had progressive problems with the knee(s) starting 2 years ago but is now progressing to interfere with activities which include: walking, rising from a sitting position, standing for prolonged periods of time and climbing stairs     Currently the pain in the joint is rated at mild with activity. The pain is constant and is located in the knee, at level of joint line, located medially, located laterally, located anterior, located posterior and with radiation. The pain is described as aching, severe, sharp, stabbing and stiffness. Relieving factors include ice or heat, prescription medication and repositioning.     There is associated Catching, Clicking and Popping.     Valarie Foster has no additional complaints.     Interval History 4/7/22: Continued pain. No changes. Saw pain management, not candidate for genicular nerve ablations.   Interval History 4/28/22: Continued pain. Some fear of falling. Follows with [facility]. DDD on lumbar spine. Waiting for PT eval. Here for start of Orthovisc series    Interval History 5/3/22: Here for 2 of 3 visco injections. Pain mildly improved compared to previous.  Interval History 5/12/22: Here for 3 of 3 visco injections. Pain moderately improved compared to previous. Left knee, minimal pain.    Interval History 11/28/22: Here with return of bilateral knee pain. Previous injections help for about 6 months.   Interval History 1/17/23: Here for repeat orthovisc series. She is reporting bilateral lower  extremity edema.   Interval History 1/27/24: Here for 2 of 3 visco injections. Pain moderately improved compared to previous. Able to garden this past weekend.     PROGRESSIVE SYMPTOMS:  Pain impacting sleep  Pain worsened by weight bearing    FUNCTIONAL STATUS:   Walk a block or two on level ground     PREVIOUS TREATMENTS:  Medical: Steroid Injections and Biologic Injections  Physical Therapy: Activities Modified  and Formal Physical Therapy for 6 weeks  Previous Orthopaedic Surgery: None    REVIEW OF SYSTEMS:  PAIN ASSESSMENT:  See HPI.  MUSCULOSKELETAL: See HPI.  OTHER 10 point review of systems is negative except as stated in HPI above    PAST MEDICAL HISTORY   has a past medical history of Arthritis, Colon polyp, Diabetes mellitus, Diabetes with neurologic complications, Hypertension, Nuclear sclerosis of both eyes (2/14/2022), and Renal cell carcinoma.     PAST SURGICAL HISTORY   has a past surgical history that includes Hysterectomy; Oophorectomy; Colonoscopy (N/A, 2/5/2018); Partial nephrectomy (Right, 10/12/2018); Colonoscopy (N/A, 8/13/2020); Phacoemulsification of cataract (Left, 7/7/2022); Intraocular prosthesis insertion (Left, 7/7/2022); Phacoemulsification of cataract (Right, 7/21/2022); and Intraocular prosthesis insertion (Right, 7/21/2022).     FAMILY HISTORY  family history includes Blindness in her sister; Breast cancer in her sister; Cancer in her sister; Diabetes in her brother and sister; Glaucoma in her father and sister; No Known Problems in her maternal aunt, maternal grandfather, maternal grandmother, maternal uncle, mother, paternal aunt, paternal grandfather, paternal grandmother, and paternal uncle; Thyroid disease in her sister.     SOCIAL HISTORY   reports that she has never smoked. She has never used smokeless tobacco. She reports that she does not drink alcohol and does not use drugs.     ALLERGIES   Review of patient's allergies indicates:   Allergen Reactions    Lisinopril  Swelling and Shortness Of Breath    Ascorbic acid-ascorbate calc      And citrus fruits        MEDICATIONS  Current Outpatient Medications on File Prior to Visit   Medication Sig Dispense Refill    ACCU-CHEK ONEIL CONTROL SOLN Soln       ACCU-CHEK SOFTCLIX LANCETS [facility] [place] BLOOD SUGAR ONE TIME DAILY 100 each 3    alcohol swabs (DROPSAFE ALCOHOL PREP PADS) PadM USE 3 EVERY DAY AS DIRECTED 300 each 3    amLODIPine (NORVASC) 10 MG tablet Take 1 tablet (10 mg total) by mouth once daily. 90 tablet 3    aspirin (ECOTRIN) 81 MG EC tablet Take 1 tablet (81 mg total) by mouth once daily. 30 tablet 3    blood-glucose meter (TRUE METRIX GLUCOSE METER) kit Use as directed to test glucose 1 each 0    cyclobenzaprine (FLEXERIL) 5 MG tablet TAKE 1 TABLET THREE TIMES DAILY AS NEEDED FOR MUSCLE SPASM(S) 270 tablet 1    diphenhydrAMINE (BENADRYL) 25 mg capsule Take 1 capsule (25 mg total) by mouth every 6 (six) hours as needed for Itching. 20 capsule 0    ergocalciferol (ERGOCALCIFEROL) 50,000 unit Cap TAKE 1 CAPSULE EVERY 7 DAYS. 12 capsule 1    FLUAD QUAD 2021-22,65Y UP,,PF, 60 mcg (15 mcg x 4)/0.5 mL Syrg       FLUoxetine 20 MG capsule TAKE 1 CAPSULE EVERY DAY 90 capsule 2    fluticasone propionate (FLONASE) 50 mcg/actuation nasal spray USE 1 SPRAY IN EACH NOSTRIL ONCE DAILY 32 g 2    gabapentin (NEURONTIN) 300 MG capsule TAKE 2 CAPSULES (600 MG TOTAL) BY MOUTH EVERY EVENING. 180 capsule 1    hydrOXYzine HCL (ATARAX) 25 MG tablet TAKE 1 TABLET THREE TIMES DAILY AS NEEDED FOR  ITCHING 90 tablet 0    KENALOG 40 mg/mL injection       lancets (TRUEPLUS LANCETS) 33 gauge Misc 1 lancet by Misc.(Non-Drug; Combo Route) route 2 (two) times a day. 200 each 1    meloxicam (MOBIC) 15 MG tablet Take 1 tablet (15 mg total) by mouth once daily. 90 tablet 0    metFORMIN (GLUCOPHAGE) 500 MG tablet Take 1 tablet (500 mg total) by mouth 2 (two) times daily with meals. 180 tablet 3    pantoprazole (PROTONIX) 40 MG tablet Take 1 tablet (40 mg  "total) by mouth once daily. 30 tablet 11    pravastatin (PRAVACHOL) 20 MG tablet Take 1 tablet (20 mg total) by mouth once daily. 90 tablet 1    triamcinolone acetonide 0.1% (KENALOG) 0.1 % cream       TRUE METRIX GLUCOSE TEST STRIP Strp TEST BLOOD SUGAR TWICE DAILY 200 strip 1    blood glucose control, low (TRUE METRIX LEVEL 1) Soln 1 each by Misc.(Non-Drug; Combo Route) route once as needed. 1 each 3    cetirizine (ZYRTEC) 10 MG tablet Take 1 tablet (10 mg total) by mouth once daily. 90 tablet 0     Current Facility-Administered Medications on File Prior to Visit   Medication Dose Route Frequency Provider Last Rate Last Admin    cyclopentolate 1% ophthalmic solution 1 drop  1 drop Left Eye On Call Procedure Candelario Novoa MD   1 drop at 07/07/22 0703    cyclopentolate 1% ophthalmic solution 1 drop  1 drop Right Eye On Call Procedure Candelario Novoa MD   1 drop at 07/21/22 1337    ofloxacin 0.3 % ophthalmic solution 1 drop  1 drop Left Eye On Call Procedure Candelario Novoa MD   2 drop at 07/07/22 0754    ofloxacin 0.3 % ophthalmic solution 1 drop  1 drop Right Eye On Call Procedure Candelario Novoa MD   2 drop at 07/21/22 1630    sodium chloride 0.9% flush 10 mL  10 mL Intravenous PRN Candelario Novoa MD        sodium chloride 0.9% flush 10 mL  10 mL Intravenous PRN Candelario Novoa MD              PHYSICAL EXAM   height is 5' 4" (1.626 m) and weight is 96.2 kg (212 lb).   Body mass index is 36.39 kg/m².      All other systems deferred.  GENERAL:  No acute distress  HABITUS: Normal  GAIT: Antalgic  SKIN: Normal  and No erythema, warmth, fluctuance . THERE IS 2+ pitting edema bilaterally.     KNEE EXAM:    bilateral:   Effusion: Minimal joint effusion  TTP: yes over Medial Joint Line,   no crepitus with passive knee ROM  Passive ROM: Extension 15, Flexion 100  No pain with manipulation of patella  Stable to varus/valgus stress. No increased laxity to anterior/posterior drawer " testing  positive Constantin's test  No pain with IR/ER rotation of the hip  5/5 strength in knee flexion and extension, ankle plantarflexion and dorsiflexion  Neurovascular Status: Sensation intact to light touch in Sural, Saphenous, SPN, DPN, Tibial nerve distribution  2+ pulse DP/PT, normal capillary refill, foot has normal warmth    DATA:  Diagnostic tests reviewed for today's visit:     4v of the knee reveal Moderate degenerative changes of the Medial and Patellofemoral compartment. There is evidence of advanced osteoarthritis changes with Subchondral sclerosis, Osteophyte formation and Joint space narrowing. The limb is in netural alignment. The patella is tracking midline. Kellegren-Lawerence grade 3 changes

## 2023-01-27 NOTE — PROCEDURES
Large Joint Aspiration/Injection: bilateral knee    Date/Time: 1/27/2023 3:40 PM  Performed by: Eric Carbajal MD  Authorized by: Eric Carbajal MD     Consent Done?:  Yes (Verbal)  Indications:  Arthritis and pain  Site marked: the procedure site was marked    Timeout: prior to procedure the correct patient, procedure, and site was verified    Prep: patient was prepped and draped in usual sterile fashion      Local anesthesia used?: Yes    Anesthesia:  Local infiltration  Local anesthetic:  Topical anesthetic    Details:  Needle Size:  22 G  Approach:  Anterolateral  Location:  Knee  Laterality:  Bilateral  Site:  Bilateral knee  Medications (Right):  30 mg sodium hyaluronate (orthovisc) 30 mg/2 mL  Medications (Left):  30 mg sodium hyaluronate (orthovisc) 30 mg/2 mL  Patient tolerance:  Patient tolerated the procedure well with no immediate complications

## 2023-02-03 ENCOUNTER — OFFICE VISIT (OUTPATIENT)
Dept: ORTHOPEDICS | Facility: CLINIC | Age: 73
End: 2023-02-03
Payer: MEDICARE

## 2023-02-03 DIAGNOSIS — M17.0 PRIMARY OSTEOARTHRITIS OF BOTH KNEES: Primary | ICD-10-CM

## 2023-02-03 PROCEDURE — 99999 PR PBB SHADOW E&M-EST. PATIENT-LVL III: ICD-10-PCS | Mod: PBBFAC,,, | Performed by: ORTHOPAEDIC SURGERY

## 2023-02-03 PROCEDURE — 99999 PR PBB SHADOW E&M-EST. PATIENT-LVL III: CPT | Mod: PBBFAC,,, | Performed by: ORTHOPAEDIC SURGERY

## 2023-02-03 PROCEDURE — 1125F PR PAIN SEVERITY QUANTIFIED, PAIN PRESENT: ICD-10-PCS | Mod: CPTII,S$GLB,, | Performed by: ORTHOPAEDIC SURGERY

## 2023-02-03 PROCEDURE — 20610 LARGE JOINT ASPIRATION/INJECTION: BILATERAL KNEE: ICD-10-PCS | Mod: 50,S$GLB,, | Performed by: ORTHOPAEDIC SURGERY

## 2023-02-03 PROCEDURE — 3288F PR FALLS RISK ASSESSMENT DOCUMENTED: ICD-10-PCS | Mod: CPTII,S$GLB,, | Performed by: ORTHOPAEDIC SURGERY

## 2023-02-03 PROCEDURE — 3288F FALL RISK ASSESSMENT DOCD: CPT | Mod: CPTII,S$GLB,, | Performed by: ORTHOPAEDIC SURGERY

## 2023-02-03 PROCEDURE — 1101F PR PT FALLS ASSESS DOC 0-1 FALLS W/OUT INJ PAST YR: ICD-10-PCS | Mod: CPTII,S$GLB,, | Performed by: ORTHOPAEDIC SURGERY

## 2023-02-03 PROCEDURE — 1101F PT FALLS ASSESS-DOCD LE1/YR: CPT | Mod: CPTII,S$GLB,, | Performed by: ORTHOPAEDIC SURGERY

## 2023-02-03 PROCEDURE — 20610 DRAIN/INJ JOINT/BURSA W/O US: CPT | Mod: 50,S$GLB,, | Performed by: ORTHOPAEDIC SURGERY

## 2023-02-03 PROCEDURE — 1159F MED LIST DOCD IN RCRD: CPT | Mod: CPTII,S$GLB,, | Performed by: ORTHOPAEDIC SURGERY

## 2023-02-03 PROCEDURE — 99499 UNLISTED E&M SERVICE: CPT | Mod: S$GLB,,, | Performed by: ORTHOPAEDIC SURGERY

## 2023-02-03 PROCEDURE — 1159F PR MEDICATION LIST DOCUMENTED IN MEDICAL RECORD: ICD-10-PCS | Mod: CPTII,S$GLB,, | Performed by: ORTHOPAEDIC SURGERY

## 2023-02-03 PROCEDURE — 1125F AMNT PAIN NOTED PAIN PRSNT: CPT | Mod: CPTII,S$GLB,, | Performed by: ORTHOPAEDIC SURGERY

## 2023-02-03 PROCEDURE — 99499 NO LOS: ICD-10-PCS | Mod: S$GLB,,, | Performed by: ORTHOPAEDIC SURGERY

## 2023-02-03 PROCEDURE — 3072F LOW RISK FOR RETINOPATHY: CPT | Mod: CPTII,S$GLB,, | Performed by: ORTHOPAEDIC SURGERY

## 2023-02-03 PROCEDURE — 3072F PR LOW RISK FOR RETINOPATHY: ICD-10-PCS | Mod: CPTII,S$GLB,, | Performed by: ORTHOPAEDIC SURGERY

## 2023-02-03 NOTE — PROCEDURES
Large Joint Aspiration/Injection: bilateral knee    Date/Time: 2/3/2023 9:00 AM  Performed by: Eric Carbajal MD  Authorized by: Eric Carbajal MD     Consent Done?:  Yes (Verbal)  Indications:  Arthritis and pain  Site marked: the procedure site was marked    Timeout: prior to procedure the correct patient, procedure, and site was verified    Prep: patient was prepped and draped in usual sterile fashion      Local anesthesia used?: Yes    Anesthesia:  Local infiltration  Local anesthetic:  Topical anesthetic    Details:  Needle Size:  22 G  Approach:  Anterolateral  Location:  Knee  Laterality:  Bilateral  Site:  Bilateral knee  Medications (Right):  30 mg sodium hyaluronate (orthovisc) 30 mg/2 mL  Medications (Left):  30 mg sodium hyaluronate (orthovisc) 30 mg/2 mL  Patient tolerance:  Patient tolerated the procedure well with no immediate complications

## 2023-02-03 NOTE — PROGRESS NOTES
Follow up PATIENT ORTHOPAEDIC: Knee    PRIMARY CARE PHYSICIAN: Alena Hernandez MD   REFERRING PROVIDER: Eric Carbajal MD  [address] [facility]  [place] [address]     ASSESSMENT & PLAN:    Impression:  Bilateral Knee Moderate Osteoarthritis, primary     Follow Up Plan: PRN     Non operative care:    Valarie Foster has physical exam evidence of above and wishes to pursue an non-operative care. I am recommending the following: visco injections.      To review: Patient has bilateral knee pain left worse than right. She has radiographic imaging that is consistent with moderate arthritis.  I am hesitant to consider surgical intervention on this patient. She has significant tenderness to palpation over multiple areas, but medial joint line is the worst. She has some irritability of her hip but CT scan from 2020 does not demonstrate any significant arthritis here.  She does have some upper lumbar degenerative disc disease on that same CT scan.  She denies any lumbar back issues. She saw [facility] for geniculate nerve ablations which she was not deemed a candidate for. Failed steroid injections. She had visco injections previously, she had about 5-6 months of relief. She is interested in this modality.     She will follow up with PCP re: bilateral lower extremity pitting edema.     The patient has been ordered:  None    CONSULTS:   None    ACTIVE PROBLEM LIST  Patient Active Problem List   Diagnosis    Type 2 diabetes mellitus with diabetic neuropathy, without long-term current use of insulin    Benign hypertension    Hyperlipemia    Microhematuria    Left flank pain    Abdominal aortic atherosclerosis    History of renal cell cancer    Renal cell carcinoma of right kidney    Mild major depression    Bilateral chronic knee pain    Colon cancer screening    Pain of left calf    Nuclear sclerosis of both eyes    Refractive error    Primary osteoarthritis of knees, bilateral    Fall    Decreased ROM of lower extremity    Decreased  strength of lower extremity    Decreased mobility and endurance    Nuclear sclerotic cataract of left eye    Nuclear sclerotic cataract of right eye    Radiculopathy    Idiopathic progressive neuropathy    Nerve pain    Chest pain           SUBJECTIVE    CHIEF COMPLAINT: Knee Pain    HPI:   Valarie Foster is a 72 y.o. female here for evaluation and management of bilateral knee pain. There is not a specific incident that brought about this pain. she has had progressive problems with the knee(s) starting 2 years ago but is now progressing to interfere with activities which include: walking, rising from a sitting position, standing for prolonged periods of time and climbing stairs     Currently the pain in the joint is rated at mild with activity. The pain is constant and is located in the knee, at level of joint line, located medially, located laterally, located anterior, located posterior and with radiation. The pain is described as aching, severe, sharp, stabbing and stiffness. Relieving factors include ice or heat, prescription medication and repositioning.     There is associated Catching, Clicking and Popping.     Valarie Foster has no additional complaints.     Interval History 4/7/22: Continued pain. No changes. Saw pain management, not candidate for genicular nerve ablations.   Interval History 4/28/22: Continued pain. Some fear of falling. Follows with [facility]. DDD on lumbar spine. Waiting for PT eval. Here for start of Orthovisc series    Interval History 5/3/22: Here for 2 of 3 visco injections. Pain mildly improved compared to previous.  Interval History 5/12/22: Here for 3 of 3 visco injections. Pain moderately improved compared to previous. Left knee, minimal pain.    Interval History 11/28/22: Here with return of bilateral knee pain. Previous injections help for about 6 months.   Interval History 1/17/23: Here for repeat orthovisc series. She is reporting bilateral lower extremity edema.   Interval  History 1/27/24: Here for 2 of 3 visco injections. Pain moderately improved compared to previous. Able to garden this past weekend.   Interval History 2/3/24: Here for 3 of 3 visco injections. She reports her knees are doing well.      PROGRESSIVE SYMPTOMS:  Pain impacting sleep  Pain worsened by weight bearing    FUNCTIONAL STATUS:   Walk a block or two on level ground     PREVIOUS TREATMENTS:  Medical: Steroid Injections and Biologic Injections  Physical Therapy: Activities Modified  and Formal Physical Therapy for 6 weeks  Previous Orthopaedic Surgery: None    REVIEW OF SYSTEMS:  PAIN ASSESSMENT:  See HPI.  MUSCULOSKELETAL: See HPI.  OTHER 10 point review of systems is negative except as stated in HPI above    PAST MEDICAL HISTORY   has a past medical history of Arthritis, Colon polyp, Diabetes mellitus, Diabetes with neurologic complications, Hypertension, Nuclear sclerosis of both eyes (2/14/2022), and Renal cell carcinoma.     PAST SURGICAL HISTORY   has a past surgical history that includes Hysterectomy; Oophorectomy; Colonoscopy (N/A, 2/5/2018); Partial nephrectomy (Right, 10/12/2018); Colonoscopy (N/A, 8/13/2020); Phacoemulsification of cataract (Left, 7/7/2022); Intraocular prosthesis insertion (Left, 7/7/2022); Phacoemulsification of cataract (Right, 7/21/2022); and Intraocular prosthesis insertion (Right, 7/21/2022).     FAMILY HISTORY  family history includes Blindness in her sister; Breast cancer in her sister; Cancer in her sister; Diabetes in her brother and sister; Glaucoma in her father and sister; No Known Problems in her maternal aunt, maternal grandfather, maternal grandmother, maternal uncle, mother, paternal aunt, paternal grandfather, paternal grandmother, and paternal uncle; Thyroid disease in her sister.     SOCIAL HISTORY   reports that she has never smoked. She has never used smokeless tobacco. She reports that she does not drink alcohol and does not use drugs.     ALLERGIES   Review of  patient's allergies indicates:   Allergen Reactions    Lisinopril Swelling and Shortness Of Breath    Ascorbic acid-ascorbate calc      And citrus fruits        MEDICATIONS  Current Outpatient Medications on File Prior to Visit   Medication Sig Dispense Refill    ACCU-CHEK ONEIL CONTROL SOLN Soln       ACCU-CHEK SOFTCLIX LANCETS [facility] [place] TO [place] BLOOD SUGAR ONE TIME DAILY 100 each 3    alcohol swabs (DROPSAFE ALCOHOL PREP PADS) PadM USE 3 EVERY DAY AS DIRECTED 300 each 3    amLODIPine (NORVASC) 10 MG tablet Take 1 tablet (10 mg total) by mouth once daily. 90 tablet 3    aspirin (ECOTRIN) 81 MG EC tablet Take 1 tablet (81 mg total) by mouth once daily. 30 tablet 3    blood-glucose meter (TRUE METRIX GLUCOSE METER) kit Use as directed to test glucose 1 each 0    cyclobenzaprine (FLEXERIL) 5 MG tablet TAKE 1 TABLET THREE TIMES DAILY AS NEEDED FOR MUSCLE SPASM(S) 270 tablet 1    diphenhydrAMINE (BENADRYL) 25 mg capsule Take 1 capsule (25 mg total) by mouth every 6 (six) hours as needed for Itching. 20 capsule 0    ergocalciferol (ERGOCALCIFEROL) 50,000 unit Cap TAKE 1 CAPSULE EVERY 7 DAYS. 12 capsule 1    FLUAD QUAD 2021-22,65Y UP,,PF, 60 mcg (15 mcg x 4)/0.5 mL Syrg       FLUoxetine 20 MG capsule TAKE 1 CAPSULE EVERY DAY 90 capsule 2    fluticasone propionate (FLONASE) 50 mcg/actuation nasal spray USE 1 SPRAY IN EACH NOSTRIL ONCE DAILY 32 g 2    gabapentin (NEURONTIN) 300 MG capsule TAKE 2 CAPSULES (600 MG TOTAL) BY MOUTH EVERY EVENING. 180 capsule 1    hydrOXYzine HCL (ATARAX) 25 MG tablet TAKE 1 TABLET THREE TIMES DAILY AS NEEDED FOR  ITCHING 90 tablet 0    KENALOG 40 mg/mL injection       lancets (TRUEPLUS LANCETS) 33 gauge Misc 1 lancet by Misc.(Non-Drug; Combo Route) route 2 (two) times a day. 200 each 1    meloxicam (MOBIC) 15 MG tablet Take 1 tablet (15 mg total) by mouth once daily. 90 tablet 0    metFORMIN (GLUCOPHAGE) 500 MG tablet Take 1 tablet (500 mg total) by mouth 2 (two) times daily with meals. 180  tablet 3    pantoprazole (PROTONIX) 40 MG tablet Take 1 tablet (40 mg total) by mouth once daily. 30 tablet 11    pravastatin (PRAVACHOL) 20 MG tablet Take 1 tablet (20 mg total) by mouth once daily. 90 tablet 1    triamcinolone acetonide 0.1% (KENALOG) 0.1 % cream       TRUE METRIX GLUCOSE TEST STRIP Strp TEST BLOOD SUGAR TWICE DAILY 200 strip 1    blood glucose control, low (TRUE METRIX LEVEL 1) Soln 1 each by Misc.(Non-Drug; Combo Route) route once as needed. 1 each 3    cetirizine (ZYRTEC) 10 MG tablet Take 1 tablet (10 mg total) by mouth once daily. 90 tablet 0     Current Facility-Administered Medications on File Prior to Visit   Medication Dose Route Frequency Provider Last Rate Last Admin    cyclopentolate 1% ophthalmic solution 1 drop  1 drop Left Eye On Call Procedure Candelario Novoa MD   1 drop at 07/07/22 0703    cyclopentolate 1% ophthalmic solution 1 drop  1 drop Right Eye On Call Procedure Candelario Novoa MD   1 drop at 07/21/22 1337    ofloxacin 0.3 % ophthalmic solution 1 drop  1 drop Left Eye On Call Procedure Candelario Novoa MD   2 drop at 07/07/22 0754    ofloxacin 0.3 % ophthalmic solution 1 drop  1 drop Right Eye On Call Procedure Candelario Novoa MD   2 drop at 07/21/22 1630    sodium chloride 0.9% flush 10 mL  10 mL Intravenous PRN Candelario Novoa MD        sodium chloride 0.9% flush 10 mL  10 mL Intravenous PRN Candelario Novoa MD              PHYSICAL EXAM   vitals were not taken for this visit.   There is no height or weight on file to calculate BMI.      All other systems deferred.  GENERAL:  No acute distress  HABITUS: Normal  GAIT: Antalgic  SKIN: Normal  and No erythema, warmth, fluctuance . THERE IS 2+ pitting edema bilaterally.     KNEE EXAM:    bilateral:   Effusion: Minimal joint effusion  TTP: yes over Medial Joint Line,   no crepitus with passive knee ROM  Passive ROM: Extension 15, Flexion 100  No pain with manipulation of patella  Stable  to varus/valgus stress. No increased laxity to anterior/posterior drawer testing  positive Constantin's test  No pain with IR/ER rotation of the hip  5/5 strength in knee flexion and extension, ankle plantarflexion and dorsiflexion  Neurovascular Status: Sensation intact to light touch in Sural, Saphenous, SPN, DPN, Tibial nerve distribution  2+ pulse DP/PT, normal capillary refill, foot has normal warmth    DATA:  Diagnostic tests reviewed for today's visit:     4v of the knee reveal Moderate degenerative changes of the Medial and Patellofemoral compartment. There is evidence of advanced osteoarthritis changes with Subchondral sclerosis, Osteophyte formation and Joint space narrowing. The limb is in netural alignment. The patella is tracking midline. Kellegren-Lawerence grade 3 changes

## 2023-02-09 DIAGNOSIS — Z00.00 ENCOUNTER FOR MEDICARE ANNUAL WELLNESS EXAM: ICD-10-CM

## 2023-03-06 ENCOUNTER — PES CALL (OUTPATIENT)
Dept: ADMINISTRATIVE | Facility: CLINIC | Age: 73
End: 2023-03-06
Payer: MEDICARE

## 2023-03-07 ENCOUNTER — TELEPHONE (OUTPATIENT)
Dept: FAMILY MEDICINE | Facility: CLINIC | Age: 73
End: 2023-03-07
Payer: MEDICARE

## 2023-03-07 DIAGNOSIS — M79.89 FOOT SWELLING: Primary | ICD-10-CM

## 2023-03-07 NOTE — TELEPHONE ENCOUNTER
----- Message from Usha Vivar sent at 3/7/2023  9:56 AM CST -----  General Call Back     Patient was told that a referral was going to be send for Podiatry in regards with both of her foot. Please advise patient at [phone]

## 2023-03-07 NOTE — TELEPHONE ENCOUNTER
Spoke with patient stated she is experiencing bilateral feet swelling with pain . Patient stated she recently had bilateral knee injections and was told by Dr. Eric Carbajal to get podiatry referral from PCP.  Podiatry referral  pended , please advise , thank you Dr. Hernandez.

## 2023-03-08 NOTE — TELEPHONE ENCOUNTER
Spoke with patient informed that podiatry referral was ordered , pt verbalized understanding at this time.  Patient also informed that someone from our referral team will be in touch to schedule her.

## 2023-03-23 ENCOUNTER — PATIENT MESSAGE (OUTPATIENT)
Dept: ADMINISTRATIVE | Facility: HOSPITAL | Age: 73
End: 2023-03-23
Payer: MEDICARE

## 2023-03-23 ENCOUNTER — TELEPHONE (OUTPATIENT)
Dept: GASTROENTEROLOGY | Facility: CLINIC | Age: 73
End: 2023-03-23
Payer: MEDICARE

## 2023-03-23 RX ORDER — DEXTROSE 4 G
1 TABLET,CHEWABLE ORAL DAILY
Qty: 1 EACH | Refills: 0 | Status: SHIPPED | OUTPATIENT
Start: 2023-03-23

## 2023-03-23 RX ORDER — BLOOD GLUCOSE CONTROL HIGH,LOW
1 EACH MISCELLANEOUS
Qty: 1 EACH | Refills: 0 | Status: SHIPPED | OUTPATIENT
Start: 2023-03-23

## 2023-03-23 NOTE — TELEPHONE ENCOUNTER
No new care gaps identified.  [facility] Embedded Care Gaps. Reference number: [id number]. 3/23/2023   11:30:44 AM WMT

## 2023-03-23 NOTE — TELEPHONE ENCOUNTER
Contacted patient.  A voice message was left letting patient know that her name will be placed on our waiting list for the next available appointment.

## 2023-03-23 NOTE — TELEPHONE ENCOUNTER
"----- Message from Nina Boston sent at 3/23/2023 11:02 AM CDT -----  Type: Patient Call Back    Who called:self    What is the request in detail:pt states that her stomach is "so big" and would like to schedule to be seen. Please call    Can the clinic reply by [facility]?    Would the patient rather a call back or a response via [facility]? call    Best call back number:[phone] (home)         "

## 2023-03-24 ENCOUNTER — OFFICE VISIT (OUTPATIENT)
Dept: PODIATRY | Facility: CLINIC | Age: 73
End: 2023-03-24
Payer: MEDICARE

## 2023-03-24 VITALS
HEART RATE: 90 BPM | BODY MASS INDEX: 36.19 KG/M2 | WEIGHT: 212 LBS | HEIGHT: 64 IN | DIASTOLIC BLOOD PRESSURE: 92 MMHG | SYSTOLIC BLOOD PRESSURE: 185 MMHG

## 2023-03-24 DIAGNOSIS — M79.89 FOOT SWELLING: ICD-10-CM

## 2023-03-24 DIAGNOSIS — E11.40 TYPE 2 DIABETES MELLITUS WITH DIABETIC NEUROPATHY, WITHOUT LONG-TERM CURRENT USE OF INSULIN: Primary | ICD-10-CM

## 2023-03-24 DIAGNOSIS — B35.1 ONYCHOMYCOSIS DUE TO DERMATOPHYTE: ICD-10-CM

## 2023-03-24 PROCEDURE — 3077F SYST BP >= 140 MM HG: CPT | Mod: CPTII,S$GLB,, | Performed by: PODIATRIST

## 2023-03-24 PROCEDURE — 11721 DEBRIDE NAIL 6 OR MORE: CPT | Mod: Q9,S$GLB,, | Performed by: PODIATRIST

## 2023-03-24 PROCEDURE — 3288F PR FALLS RISK ASSESSMENT DOCUMENTED: ICD-10-PCS | Mod: CPTII,S$GLB,, | Performed by: PODIATRIST

## 2023-03-24 PROCEDURE — 1101F PR PT FALLS ASSESS DOC 0-1 FALLS W/OUT INJ PAST YR: ICD-10-PCS | Mod: CPTII,S$GLB,, | Performed by: PODIATRIST

## 2023-03-24 PROCEDURE — 3072F PR LOW RISK FOR RETINOPATHY: ICD-10-PCS | Mod: CPTII,S$GLB,, | Performed by: PODIATRIST

## 2023-03-24 PROCEDURE — 1125F PR PAIN SEVERITY QUANTIFIED, PAIN PRESENT: ICD-10-PCS | Mod: CPTII,S$GLB,, | Performed by: PODIATRIST

## 2023-03-24 PROCEDURE — 1160F PR REVIEW ALL MEDS BY PRESCRIBER/CLIN PHARMACIST DOCUMENTED: ICD-10-PCS | Mod: CPTII,S$GLB,, | Performed by: PODIATRIST

## 2023-03-24 PROCEDURE — 1159F PR MEDICATION LIST DOCUMENTED IN MEDICAL RECORD: ICD-10-PCS | Mod: CPTII,S$GLB,, | Performed by: PODIATRIST

## 2023-03-24 PROCEDURE — 1125F AMNT PAIN NOTED PAIN PRSNT: CPT | Mod: CPTII,S$GLB,, | Performed by: PODIATRIST

## 2023-03-24 PROCEDURE — 99999 PR PBB SHADOW E&M-EST. PATIENT-LVL IV: CPT | Mod: PBBFAC,,, | Performed by: PODIATRIST

## 2023-03-24 PROCEDURE — 1159F MED LIST DOCD IN RCRD: CPT | Mod: CPTII,S$GLB,, | Performed by: PODIATRIST

## 2023-03-24 PROCEDURE — 3288F FALL RISK ASSESSMENT DOCD: CPT | Mod: CPTII,S$GLB,, | Performed by: PODIATRIST

## 2023-03-24 PROCEDURE — 99214 OFFICE O/P EST MOD 30 MIN: CPT | Mod: 25,S$GLB,, | Performed by: PODIATRIST

## 2023-03-24 PROCEDURE — 3008F PR BODY MASS INDEX (BMI) DOCUMENTED: ICD-10-PCS | Mod: CPTII,S$GLB,, | Performed by: PODIATRIST

## 2023-03-24 PROCEDURE — 1101F PT FALLS ASSESS-DOCD LE1/YR: CPT | Mod: CPTII,S$GLB,, | Performed by: PODIATRIST

## 2023-03-24 PROCEDURE — 11721 PR DEBRIDEMENT OF NAILS, 6 OR MORE: ICD-10-PCS | Mod: Q9,S$GLB,, | Performed by: PODIATRIST

## 2023-03-24 PROCEDURE — 3008F BODY MASS INDEX DOCD: CPT | Mod: CPTII,S$GLB,, | Performed by: PODIATRIST

## 2023-03-24 PROCEDURE — 3080F DIAST BP >= 90 MM HG: CPT | Mod: CPTII,S$GLB,, | Performed by: PODIATRIST

## 2023-03-24 PROCEDURE — 99214 PR OFFICE/OUTPT VISIT, EST, LEVL IV, 30-39 MIN: ICD-10-PCS | Mod: 25,S$GLB,, | Performed by: PODIATRIST

## 2023-03-24 PROCEDURE — 3072F LOW RISK FOR RETINOPATHY: CPT | Mod: CPTII,S$GLB,, | Performed by: PODIATRIST

## 2023-03-24 PROCEDURE — 1160F RVW MEDS BY RX/DR IN RCRD: CPT | Mod: CPTII,S$GLB,, | Performed by: PODIATRIST

## 2023-03-24 PROCEDURE — 99999 PR PBB SHADOW E&M-EST. PATIENT-LVL IV: ICD-10-PCS | Mod: PBBFAC,,, | Performed by: PODIATRIST

## 2023-03-24 PROCEDURE — 3077F PR MOST RECENT SYSTOLIC BLOOD PRESSURE >= 140 MM HG: ICD-10-PCS | Mod: CPTII,S$GLB,, | Performed by: PODIATRIST

## 2023-03-24 PROCEDURE — 3080F PR MOST RECENT DIASTOLIC BLOOD PRESSURE >= 90 MM HG: ICD-10-PCS | Mod: CPTII,S$GLB,, | Performed by: PODIATRIST

## 2023-03-24 RX ORDER — CICLOPIROX 80 MG/ML
SOLUTION TOPICAL NIGHTLY
Qty: 6.6 ML | Refills: 11 | Status: SHIPPED | OUTPATIENT
Start: 2023-03-24

## 2023-03-24 NOTE — PROGRESS NOTES
Subjective:      Patient ID: Valarie Foster is a 72 y.o. female.    Chief Complaint: Foot Problem (Foot swelling)    Valarie is a 72 y.o. female who presents to the clinic for evaluation and treatment of high risk feet. Valarie has a past medical history of Arthritis, Colon polyp, Diabetes mellitus, Diabetes with neurologic complications, Hypertension, Nuclear sclerosis of both eyes (2/14/2022), and Renal cell carcinoma. The patient's chief complaint is long, thick toenails.     PCP: Alena Hernandez MD    Date Last Seen by PCP:   Chief Complaint   Patient presents with    Foot Problem     Foot swelling        Current shoe gear:  Affected Foot: Casual shoes     Unaffected Foot: Casual shoes    Hemoglobin A1C   Date Value Ref Range Status   10/31/2022 7.2 (H) 4.0 - 5.6 % Final     Comment:     ADA Screening Guidelines:  5.7-6.4%  Consistent with prediabetes  >or=6.5%  Consistent with diabetes    High levels of fetal hemoglobin interfere with the HbA1C  assay. Heterozygous hemoglobin variants (HbS, HgC, etc)do  not significantly interfere with this assay.   However, presence of multiple variants may affect accuracy.     05/02/2022 7.6 (H) 4.0 - 5.6 % Final     Comment:     ADA Screening Guidelines:  5.7-6.4%  Consistent with prediabetes  >or=6.5%  Consistent with diabetes    High levels of fetal hemoglobin interfere with the HbA1C  assay. Heterozygous hemoglobin variants (HbS, HgC, etc)do  not significantly interfere with this assay.   However, presence of multiple variants may affect accuracy.     10/28/2021 6.8 (H) 4.0 - 5.6 % Final     Comment:     ADA Screening Guidelines:  5.7-6.4%  Consistent with prediabetes  >or=6.5%  Consistent with diabetes    High levels of fetal hemoglobin interfere with the HbA1C  assay. Heterozygous hemoglobin variants (HbS, HgC, etc)do  not significantly interfere with this assay.   However, presence of multiple variants may affect accuracy.         Review of Systems   Constitutional: Negative  for chills, diaphoresis, fever, malaise/fatigue and night sweats.   Cardiovascular:  Negative for claudication, cyanosis, leg swelling and syncope.   Skin:  Positive for nail changes. Negative for color change, dry skin, rash, suspicious lesions and unusual hair distribution.   Musculoskeletal:  Negative for falls, joint pain, joint swelling, muscle cramps, muscle weakness and stiffness.   Gastrointestinal:  Negative for constipation, diarrhea, nausea and vomiting.   Neurological:  Positive for paresthesias and sensory change. Negative for brief paralysis, disturbances in coordination, focal weakness, numbness and tremors.         Objective:      Physical Exam  Constitutional:       General: She is not in acute distress.     Appearance: She is well-developed. She is not diaphoretic.   Cardiovascular:      Pulses:           Popliteal pulses are 2+ on the right side and 2+ on the left side.        Dorsalis pedis pulses are 2+ on the right side and 2+ on the left side.        Posterior tibial pulses are 2+ on the right side and 2+ on the left side.      Comments: Capillary refill 3 seconds all toes/distal feet, all toes/both feet warm to touch.      Negative lymphadenopathy bilateral popliteal fossa and tarsal tunnel.      Negavie lower extremity edema bilateral.    Musculoskeletal:      Right ankle: No swelling, deformity, ecchymosis or lacerations. Normal range of motion. Normal pulse.      Right Achilles Tendon: Normal. No defects. Maciel's test negative.      Comments: Normal angle, base, station of gait. All ten toes without clubbing, cyanosis, or signs of ischemia.  No pain to palpation bilateral lower extremities.  Range of motion, stability, muscle strength, and muscle tone normal bilateral feet and legs.    Lymphadenopathy:      Lower Body: No right inguinal adenopathy. No left inguinal adenopathy.      Comments: Negative lymphadenopathy bilateral popliteal fossa and tarsal tunnel.    Negative lymphangitic  streaking bilateral feet/ankles/legs.   Skin:     General: Skin is warm and dry.      Capillary Refill: Capillary refill takes 2 to 3 seconds.      Coloration: Skin is not pale.      Findings: No abrasion, bruising, burn, ecchymosis, erythema, laceration, lesion or rash.      Nails: There is no clubbing.      Comments:   Skin is normal age and health appropriate color, turgor, texture, and temperature bilateral lower extremities without ulceration, hyperpigmentation, discoloration, masses nodules or cords palpated.  No ecchymosis, erythema, edema, or cardinal signs of infection bilateral lower extremities.    Toenails 2nd, 3rd, 4th, 5th  bilateral are hypertrophic, dystrophic, discolored tanish brown with tan, gray crumbly subungual debris.  Tender to distal nail plate pressure, without periungual skin abnormality of each.    Visible and palpable ingrowth of toenail lateral and medial borders left and right hallux with pain to palpation, and focal localized erythema and edema,  without ulceration, drainage, pus, tracking, fluctuance, malodor, or cardinal signs infection.         Neurological:      Mental Status: She is alert and oriented to person, place, and time.      Sensory: No sensory deficit.      Motor: No tremor, atrophy or abnormal muscle tone.      Gait: Gait normal.      Deep Tendon Reflexes:      Reflex Scores:       Patellar reflexes are 2+ on the right side and 2+ on the left side.       Achilles reflexes are 2+ on the right side and 2+ on the left side.     Comments: Negative tinel sign to percussion sural, superficial peroneal, deep peroneal, saphenous, and posterior tibial nerves right and left ankles and feet.    Paresthesias, and burning bilateral feet with no clearly identified trigger or source.     Psychiatric:         Behavior: Behavior is cooperative.             Assessment:       Encounter Diagnoses   Name Primary?    Foot swelling     Type 2 diabetes mellitus with diabetic neuropathy,  without long-term current use of insulin Yes    Onychomycosis due to dermatophyte          Plan:       Valarie was seen today for foot problem.    Diagnoses and all orders for this visit:    Type 2 diabetes mellitus with diabetic neuropathy, without long-term current use of insulin    Foot swelling  -     Ambulatory referral/consult to Podiatry    Onychomycosis due to dermatophyte    I counseled the patient on her conditions, their implications and medical management.        - Shoe inspection. Diabetic Foot Education. Patient reminded of the importance of good nutrition and blood sugar control to help prevent podiatric complications of diabetes. Patient instructed on proper foot hygeine. We discussed wearing proper shoe gear, daily foot inspections, never walking without protective shoe gear, never putting sharp instruments to feet, routine podiatric nail visits every 2-3 months.      - With patient's permission, nails were aggressively reduced and debrided x 8 to their soft tissue attachment mechanically , removing all offending nail and debris. Patient relates relief following the procedure. She will continue to monitor the areas daily, inspect her feet, wear protective shoe gear when ambulatory, moisturizer to maintain skin integrity and follow in this office  p.r.n.      Discussed conservative treatment with shoes of adequate dimensions, material, and style to alleviate symptoms and delay or prevent surgical intervention.    penlac    Follow up in about 1 year (around 3/24/2024).

## 2023-03-28 ENCOUNTER — TELEPHONE (OUTPATIENT)
Dept: ADMINISTRATIVE | Facility: CLINIC | Age: 73
End: 2023-03-28
Payer: MEDICARE

## 2023-03-28 DIAGNOSIS — L29.9 ITCHING: ICD-10-CM

## 2023-03-28 RX ORDER — HYDROXYZINE HYDROCHLORIDE 25 MG/1
25 TABLET, FILM COATED ORAL 3 TIMES DAILY PRN
Qty: 90 TABLET | Refills: 3 | Status: SHIPPED | OUTPATIENT
Start: 2023-03-28 | End: 2023-04-10 | Stop reason: SDUPTHER

## 2023-03-28 NOTE — TELEPHONE ENCOUNTER
----- Message from Ale Edwards sent at 3/28/2023 12:31 PM CDT -----  Regarding: Refill Request  Who Called:DARIA DE LA FUENTE [[id number]]      New Prescription or Refill : Refill        RX Name and Strength:  hydrOXYzine HCL (ATARAX) 25 MG tablet       30 day or 90 day RX: 90      Local or Mail Order : Local         Preferred Pharmacy:[facility] [address] [place] [address]        Would the patient rather a call back or a response via [facility]? Call Back           Best Call Back Number:  [phone]           Additional Information: Patient is requesting a call back.  Patient states she is still experiencing numbness in her fingers and the pain in flowing from her fingers to her arms and chest.  Please assist.

## 2023-03-28 NOTE — TELEPHONE ENCOUNTER
Spoke with patient, scheduled appointment.   Path Notes (To The Dermatopathologist): Please check margin.

## 2023-03-29 ENCOUNTER — OFFICE VISIT (OUTPATIENT)
Dept: FAMILY MEDICINE | Facility: CLINIC | Age: 73
End: 2023-03-29
Payer: MEDICARE

## 2023-03-29 VITALS
DIASTOLIC BLOOD PRESSURE: 80 MMHG | HEART RATE: 90 BPM | HEIGHT: 64 IN | TEMPERATURE: 98 F | BODY MASS INDEX: 34.82 KG/M2 | SYSTOLIC BLOOD PRESSURE: 140 MMHG | OXYGEN SATURATION: 95 % | WEIGHT: 203.94 LBS

## 2023-03-29 DIAGNOSIS — E11.40 TYPE 2 DIABETES MELLITUS WITH DIABETIC NEUROPATHY, WITHOUT LONG-TERM CURRENT USE OF INSULIN: Primary | Chronic | ICD-10-CM

## 2023-03-29 DIAGNOSIS — I10 BENIGN HYPERTENSION: Chronic | ICD-10-CM

## 2023-03-29 DIAGNOSIS — I70.0 ABDOMINAL AORTIC ATHEROSCLEROSIS: Chronic | ICD-10-CM

## 2023-03-29 DIAGNOSIS — G56.02 CARPAL TUNNEL SYNDROME ON LEFT: ICD-10-CM

## 2023-03-29 PROCEDURE — 99999 PR PBB SHADOW E&M-EST. PATIENT-LVL V: ICD-10-PCS | Mod: PBBFAC,,, | Performed by: FAMILY MEDICINE

## 2023-03-29 PROCEDURE — 3072F LOW RISK FOR RETINOPATHY: CPT | Mod: CPTII,S$GLB,, | Performed by: FAMILY MEDICINE

## 2023-03-29 PROCEDURE — 3072F PR LOW RISK FOR RETINOPATHY: ICD-10-PCS | Mod: CPTII,S$GLB,, | Performed by: FAMILY MEDICINE

## 2023-03-29 PROCEDURE — 3008F BODY MASS INDEX DOCD: CPT | Mod: CPTII,S$GLB,, | Performed by: FAMILY MEDICINE

## 2023-03-29 PROCEDURE — 1160F PR REVIEW ALL MEDS BY PRESCRIBER/CLIN PHARMACIST DOCUMENTED: ICD-10-PCS | Mod: CPTII,S$GLB,, | Performed by: FAMILY MEDICINE

## 2023-03-29 PROCEDURE — 3077F PR MOST RECENT SYSTOLIC BLOOD PRESSURE >= 140 MM HG: ICD-10-PCS | Mod: CPTII,S$GLB,, | Performed by: FAMILY MEDICINE

## 2023-03-29 PROCEDURE — 3079F PR MOST RECENT DIASTOLIC BLOOD PRESSURE 80-89 MM HG: ICD-10-PCS | Mod: CPTII,S$GLB,, | Performed by: FAMILY MEDICINE

## 2023-03-29 PROCEDURE — 1101F PT FALLS ASSESS-DOCD LE1/YR: CPT | Mod: CPTII,S$GLB,, | Performed by: FAMILY MEDICINE

## 2023-03-29 PROCEDURE — 1125F AMNT PAIN NOTED PAIN PRSNT: CPT | Mod: CPTII,S$GLB,, | Performed by: FAMILY MEDICINE

## 2023-03-29 PROCEDURE — 1125F PR PAIN SEVERITY QUANTIFIED, PAIN PRESENT: ICD-10-PCS | Mod: CPTII,S$GLB,, | Performed by: FAMILY MEDICINE

## 2023-03-29 PROCEDURE — 99999 PR PBB SHADOW E&M-EST. PATIENT-LVL V: CPT | Mod: PBBFAC,,, | Performed by: FAMILY MEDICINE

## 2023-03-29 PROCEDURE — 1159F PR MEDICATION LIST DOCUMENTED IN MEDICAL RECORD: ICD-10-PCS | Mod: CPTII,S$GLB,, | Performed by: FAMILY MEDICINE

## 2023-03-29 PROCEDURE — 99214 OFFICE O/P EST MOD 30 MIN: CPT | Mod: S$GLB,,, | Performed by: FAMILY MEDICINE

## 2023-03-29 PROCEDURE — 3077F SYST BP >= 140 MM HG: CPT | Mod: CPTII,S$GLB,, | Performed by: FAMILY MEDICINE

## 2023-03-29 PROCEDURE — 99214 PR OFFICE/OUTPT VISIT, EST, LEVL IV, 30-39 MIN: ICD-10-PCS | Mod: S$GLB,,, | Performed by: FAMILY MEDICINE

## 2023-03-29 PROCEDURE — 3079F DIAST BP 80-89 MM HG: CPT | Mod: CPTII,S$GLB,, | Performed by: FAMILY MEDICINE

## 2023-03-29 PROCEDURE — 3288F FALL RISK ASSESSMENT DOCD: CPT | Mod: CPTII,S$GLB,, | Performed by: FAMILY MEDICINE

## 2023-03-29 PROCEDURE — 1159F MED LIST DOCD IN RCRD: CPT | Mod: CPTII,S$GLB,, | Performed by: FAMILY MEDICINE

## 2023-03-29 PROCEDURE — 1101F PR PT FALLS ASSESS DOC 0-1 FALLS W/OUT INJ PAST YR: ICD-10-PCS | Mod: CPTII,S$GLB,, | Performed by: FAMILY MEDICINE

## 2023-03-29 PROCEDURE — 3008F PR BODY MASS INDEX (BMI) DOCUMENTED: ICD-10-PCS | Mod: CPTII,S$GLB,, | Performed by: FAMILY MEDICINE

## 2023-03-29 PROCEDURE — 3288F PR FALLS RISK ASSESSMENT DOCUMENTED: ICD-10-PCS | Mod: CPTII,S$GLB,, | Performed by: FAMILY MEDICINE

## 2023-03-29 PROCEDURE — 1160F RVW MEDS BY RX/DR IN RCRD: CPT | Mod: CPTII,S$GLB,, | Performed by: FAMILY MEDICINE

## 2023-03-29 NOTE — PROGRESS NOTES
Patient, Valarie Foster (MRN #[id number]), presented with a recorded BMI of 35 kg/m^2 and a documented comorbidity(s):  - Diabetes Mellitus Type 2  - Hypertension  to which the severe obesity is a contributing factor. This is consistent with the definition of severe obesity (BMI 35.0-39.9) with comorbidity (ICD-10 E66.01, Z68.35). The patient's severe obesity was monitored, evaluated, addressed and/or treated. This addendum to the medical record is made on 03/29/2023.

## 2023-03-29 NOTE — PROGRESS NOTES
Assessment & Plan  Problem List Items Addressed This Visit          Cardiac/Vascular    Benign hypertension (Chronic)    Abdominal aortic atherosclerosis (Chronic)    Overview     Patient with Atherosclerosis of the Aorta noted on CT 4/2019.  Stable/asymptomatic. Currently stable on lipid and b/p monitoring.              Endocrine    Type 2 diabetes mellitus with diabetic neuropathy, without long-term current use of insulin - Primary (Chronic)     Other Visit Diagnoses       Carpal tunnel syndrome on left        Relevant Orders    Ambulatory referral/consult to Neurology              Health Maintenance reviewed.    Follow-up: No follow-ups on file.    ______________________________________________________________________    Chief Complaint  Chief Complaint   Patient presents with    Hand Pain     Left hand numbness and tingling pain travel up arm        HPI  Valarie Foster is a 72 y.o. female with multiple medical diagnoses as listed in the medical history and problem list that presents for hand numbness.  Pt is new to me.   Patient denies any new symptoms including chest pain, SOB, blurry vision, N, diarrhea. One episode of vomiting.  She has noted left hand numbness  with tingling pain going up her arm.  She will run warm water on it.  It can improve.  Symptoms are usually worse at night.  She reports her whole hand will be numb.  It began on month ago after she was seen in the ER for chest pain.  She denies chest pain associated with this hand pain.   She does have an appointment with cardiology scheduled on April 18.  I reviewed her EKG and echo.       PAST MEDICAL HISTORY:  Past Medical History:   Diagnosis Date    Arthritis     Colon polyp     Diabetes mellitus     diet controlled    Diabetes with neurologic complications     Hypertension     Nuclear sclerosis of both eyes 2/14/2022    Renal cell carcinoma        PAST SURGICAL HISTORY:  Past Surgical History:   Procedure Laterality Date    COLONOSCOPY N/A  2/5/2018    Procedure: COLONOSCOPY;  Surgeon: Bacilio Mancia MD;  Location: [facility] ENDO;  Service: Endoscopy;  Laterality: N/A;  appt confirmed-ss    COLONOSCOPY N/A 8/13/2020    Procedure: COLONOSCOPY;  Surgeon: Jose West MD;  Location: [facility] ENDO;  Service: Endoscopy;  Laterality: N/A;    HYSTERECTOMY      INTRAOCULAR PROSTHESES INSERTION Left 7/7/2022    Procedure: INSERTION, IOL PROSTHESIS;  Surgeon: Candelario Novoa MD;  Location: [facility] OR;  Service: Ophthalmology;  Laterality: Left;    INTRAOCULAR PROSTHESES INSERTION Right 7/21/2022    Procedure: INSERTION, IOL PROSTHESIS;  Surgeon: Candelario Novoa MD;  Location: [facility] OR;  Service: Ophthalmology;  Laterality: Right;    OOPHORECTOMY      PARTIAL NEPHRECTOMY Right 10/12/2018    Procedure: NEPHRECTOMY, PARTIAL open. Dr Arenas to assist.;  Surgeon: Alejandra Palm MD;  Location: [facility] OR;  Service: Urology;  Laterality: Right;  RN PREOP 10/9/2018----NEEDS H/P    PHACOEMULSIFICATION OF CATARACT Left 7/7/2022    Procedure: PHACOEMULSIFICATION, CATARACT;  Surgeon: Candelario Novoa MD;  Location: [facility] OR;  Service: Ophthalmology;  Laterality: Left;  RN PHONE PREOP 6/27/2022   ARRIVAL 6:00 AM    PHACOEMULSIFICATION OF CATARACT Right 7/21/2022    Procedure: PHACOEMULSIFICATION, CATARACT;  Surgeon: Candelario Novoa MD;  Location: [facility] OR;  Service: Ophthalmology;  Laterality: Right;  RN PHONE PREOP 7/12/2022   ARRIVAL 12:00 NOON       SOCIAL HISTORY:  Social History     Socioeconomic History    Marital status:    Tobacco Use    Smoking status: Never    Smokeless tobacco: Never   Substance and Sexual Activity    Alcohol use: No    Drug use: No       FAMILY HISTORY:  Family History   Problem Relation Age of Onset    No Known Problems Mother     Glaucoma Father     Breast cancer Sister     Glaucoma Sister     Cancer Sister         breast cancer    Thyroid disease Sister     Blindness Sister         due to trauma during car  accident    Diabetes Sister     No Known Problems Maternal Aunt     No Known Problems Maternal Uncle     No Known Problems Paternal Aunt     No Known Problems Paternal Uncle     No Known Problems Maternal Grandmother     No Known Problems Maternal Grandfather     No Known Problems Paternal Grandmother     No Known Problems Paternal Grandfather     Diabetes Brother     Amblyopia Neg Hx     Cataracts Neg Hx     Hypertension Neg Hx     Macular degeneration Neg Hx     Retinal detachment Neg Hx     Strabismus Neg Hx     Stroke Neg Hx        ALLERGIES AND MEDICATIONS: updated and reviewed.  Review of patient's allergies indicates:   Allergen Reactions    Lisinopril Swelling and Shortness Of Breath    Ascorbic acid-ascorbate calc      And citrus fruits     Current Outpatient Medications   Medication Sig Dispense Refill    ACCU-CHEK ONEIL PLUS TEST STRP Strp TEST BLOOD SUGAR TWICE DAILY 200 strip 1    ACCU-CHEK SOFTCLIX LANCETS [facility] TO TEST BLOOD SUGAR ONE TIME DAILY 100 each 3    alcohol swabs (DROPSAFE ALCOHOL PREP PADS) PadM USE 3 EVERY DAY AS DIRECTED 300 each 3    amLODIPine (NORVASC) 10 MG tablet Take 1 tablet (10 mg total) by mouth once daily. 90 tablet 3    aspirin (ECOTRIN) 81 MG EC tablet Take 1 tablet (81 mg total) by mouth once daily. 30 tablet 3    blood glucose control high,low (ACCU-CHEK ONEIL CONTROL SOLN) Soln 1 Units by Misc.(Non-Drug; Combo Route) route as needed. 1 each 0    blood-glucose meter (ACCU-CHEK ONEIL PLUS METER) Misc 1 kit by Misc.(Non-Drug; Combo Route) route once daily. 1 each 0    blood-glucose meter (TRUE METRIX GLUCOSE METER) kit Use as directed to test glucose 1 each 0    cetirizine (ZYRTEC) 10 MG tablet Take 1 tablet (10 mg total) by mouth once daily. 90 tablet 0    ciclopirox (PENLAC) 8 % Soln Apply topically nightly. 6.6 mL 11    cyclobenzaprine (FLEXERIL) 5 MG tablet TAKE 1 TABLET THREE TIMES DAILY AS NEEDED FOR MUSCLE SPASM(S) 270 tablet 1    diphenhydrAMINE (BENADRYL) 25 mg  capsule Take 1 capsule (25 mg total) by mouth every 6 (six) hours as needed for Itching. 20 capsule 0    ergocalciferol (ERGOCALCIFEROL) 50,000 unit Cap TAKE 1 CAPSULE EVERY 7 DAYS. 12 capsule 1    FLUoxetine 20 MG capsule TAKE 1 CAPSULE EVERY DAY 90 capsule 2    fluticasone propionate (FLONASE) 50 mcg/actuation nasal spray USE 1 SPRAY IN EACH NOSTRIL ONCE DAILY 32 g 2    gabapentin (NEURONTIN) 300 MG capsule TAKE 2 CAPSULES EVERY EVENING 180 capsule 1    hydrOXYzine HCL (ATARAX) 25 MG tablet Take 1 tablet (25 mg total) by mouth 3 (three) times daily as needed for Itching. 90 tablet 3    KENALOG 40 mg/mL injection       meloxicam (MOBIC) 15 MG tablet TAKE 1 TABLET (15 MG TOTAL) BY MOUTH ONCE DAILY. 90 tablet 0    metFORMIN (GLUCOPHAGE) 500 MG tablet Take 1 tablet (500 mg total) by mouth 2 (two) times daily with meals. 180 tablet 3    pantoprazole (PROTONIX) 40 MG tablet Take 1 tablet (40 mg total) by mouth once daily. 30 tablet 11    pravastatin (PRAVACHOL) 20 MG tablet Take 1 tablet (20 mg total) by mouth once daily. 90 tablet 1    triamcinolone acetonide 0.1% (KENALOG) 0.1 % cream       TRUEPLUS LANCETS 33 gauge Misc TEST BLOOD SUGAR TWICE DAILY 200 each 1    blood glucose control, low (TRUE METRIX LEVEL 1) Soln 1 each by Misc.(Non-Drug; Combo Route) route once as needed. 1 each 3     No current facility-administered medications for this visit.     Facility-Administered Medications Ordered in Other Visits   Medication Dose Route Frequency Provider Last Rate Last Admin    cyclopentolate 1% ophthalmic solution 1 drop  1 drop Left Eye On Call Procedure Candelario Novoa MD   1 drop at 07/07/22 0703    cyclopentolate 1% ophthalmic solution 1 drop  1 drop Right Eye On Call Procedure Candelario Novoa MD   1 drop at 07/21/22 1337    ofloxacin 0.3 % ophthalmic solution 1 drop  1 drop Left Eye On Call Procedure Candelario Novoa MD   2 drop at 07/07/22 0754    ofloxacin 0.3 % ophthalmic solution 1 drop  1  "drop Right Eye On Call Procedure Candelario Novoa MD   2 drop at 07/21/22 1630    sodium chloride 0.9% flush 10 mL  10 mL Intravenous PRN Candelario Novoa MD        sodium chloride 0.9% flush 10 mL  10 mL Intravenous PRN Candelario Novoa MD             ROS  Review of Systems   Constitutional:  Negative for activity change, appetite change, fatigue, fever and unexpected weight change.   HENT: Negative.  Negative for ear discharge, ear pain, rhinorrhea and sore throat.    Eyes: Negative.    Respiratory:  Negative for apnea, cough, chest tightness, shortness of breath and wheezing.    Cardiovascular:  Negative for chest pain, palpitations and leg swelling.   Gastrointestinal:  Negative for abdominal distention, abdominal pain, constipation, diarrhea and vomiting.   Endocrine: Negative for cold intolerance, heat intolerance, polydipsia and polyuria.   Genitourinary:  Negative for decreased urine volume and urgency.   Musculoskeletal: Negative.    Skin:  Negative for rash.   Neurological:  Negative for dizziness and headaches.   Hematological:  Does not bruise/bleed easily.   Psychiatric/Behavioral:  Negative for agitation, sleep disturbance and suicidal ideas.          Physical Exam  Vitals:    03/29/23 0710   BP: (!) 140/80   Pulse: 90   Temp: 98.3 °F (36.8 °C)   TempSrc: Oral   SpO2: 95%   Weight: 92.5 kg (203 lb 14.8 oz)   Height: 5' 4" (1.626 m)    Body mass index is 35 kg/m².  Weight: 92.5 kg (203 lb 14.8 oz)   Height: 5' 4" (162.6 cm)   Physical Exam  Vitals reviewed.   Constitutional:       Appearance: Normal appearance. She is well-developed.   HENT:      Head: Normocephalic and atraumatic.      Right Ear: External ear normal.      Left Ear: External ear normal.      Nose: Nose normal.      Mouth/Throat:      Mouth: Mucous membranes are moist.      Pharynx: Oropharynx is clear.   Eyes:      Extraocular Movements: Extraocular movements intact.      Conjunctiva/sclera: Conjunctivae normal.      " Pupils: Pupils are equal, round, and reactive to light.   Cardiovascular:      Rate and Rhythm: Normal rate and regular rhythm.      Heart sounds: Normal heart sounds.   Pulmonary:      Effort: Pulmonary effort is normal.      Breath sounds: Normal breath sounds.   Skin:     General: Skin is warm and dry.   Neurological:      Mental Status: She is alert and oriented to person, place, and time.      Comments: Tinel's tap negative.     Phalen's positive on left side          Health Maintenance         Date Due Completion Date    High Dose Statin Never done ---    DEXA Scan 04/27/2023 4/27/2021    Diabetes Urine Screening 05/02/2023 5/2/2022    Hemoglobin A1c 04/30/2023 10/31/2022    Eye Exam 05/13/2023 5/13/2022    Lipid Panel 10/31/2023 10/31/2022    Foot Exam 11/07/2023 11/7/2022    Override on 11/7/2022: Done    Override on 11/1/2021: Done    Override on 8/16/2018: Done (Dr. Lashay Dee ( Podiatry ))    Mammogram 11/07/2023 11/7/2022    Colorectal Cancer Screening 08/13/2025 8/13/2020    TETANUS VACCINE 04/30/2028 4/30/2018                Patient note was created using [employer].  Any errors in syntax or even information may not have been identified and edited on initial review prior to signing this note.

## 2023-03-30 ENCOUNTER — OFFICE VISIT (OUTPATIENT)
Dept: FAMILY MEDICINE | Facility: CLINIC | Age: 73
End: 2023-03-30
Payer: MEDICARE

## 2023-03-30 VITALS
HEART RATE: 94 BPM | SYSTOLIC BLOOD PRESSURE: 130 MMHG | TEMPERATURE: 99 F | WEIGHT: 206.69 LBS | BODY MASS INDEX: 35.29 KG/M2 | OXYGEN SATURATION: 97 % | HEIGHT: 64 IN | DIASTOLIC BLOOD PRESSURE: 62 MMHG

## 2023-03-30 DIAGNOSIS — I70.0 ABDOMINAL AORTIC ATHEROSCLEROSIS: Chronic | ICD-10-CM

## 2023-03-30 DIAGNOSIS — R41.3 MEMORY LOSS: ICD-10-CM

## 2023-03-30 DIAGNOSIS — E11.40 TYPE 2 DIABETES MELLITUS WITH DIABETIC NEUROPATHY, WITHOUT LONG-TERM CURRENT USE OF INSULIN: Chronic | ICD-10-CM

## 2023-03-30 DIAGNOSIS — M25.562 BILATERAL CHRONIC KNEE PAIN: ICD-10-CM

## 2023-03-30 DIAGNOSIS — M54.10 RADICULOPATHY, UNSPECIFIED SPINAL REGION: ICD-10-CM

## 2023-03-30 DIAGNOSIS — I10 BENIGN HYPERTENSION: Chronic | ICD-10-CM

## 2023-03-30 DIAGNOSIS — M17.0 PRIMARY OSTEOARTHRITIS OF KNEES, BILATERAL: ICD-10-CM

## 2023-03-30 DIAGNOSIS — G60.3 IDIOPATHIC PROGRESSIVE NEUROPATHY: ICD-10-CM

## 2023-03-30 DIAGNOSIS — G89.29 BILATERAL CHRONIC KNEE PAIN: ICD-10-CM

## 2023-03-30 DIAGNOSIS — Z00.00 ENCOUNTER FOR PREVENTIVE HEALTH EXAMINATION: ICD-10-CM

## 2023-03-30 DIAGNOSIS — Z78.0 POST-MENOPAUSAL: ICD-10-CM

## 2023-03-30 DIAGNOSIS — E78.5 HYPERLIPIDEMIA, UNSPECIFIED HYPERLIPIDEMIA TYPE: Chronic | ICD-10-CM

## 2023-03-30 DIAGNOSIS — Z00.00 ENCOUNTER FOR MEDICARE ANNUAL WELLNESS EXAM: Primary | ICD-10-CM

## 2023-03-30 DIAGNOSIS — C64.1 RENAL CELL CARCINOMA OF RIGHT KIDNEY: ICD-10-CM

## 2023-03-30 DIAGNOSIS — M79.2 NERVE PAIN: ICD-10-CM

## 2023-03-30 DIAGNOSIS — M25.561 BILATERAL CHRONIC KNEE PAIN: ICD-10-CM

## 2023-03-30 DIAGNOSIS — F32.0 MILD MAJOR DEPRESSION: Chronic | ICD-10-CM

## 2023-03-30 DIAGNOSIS — Z74.09 DECREASED MOBILITY AND ENDURANCE: ICD-10-CM

## 2023-03-30 DIAGNOSIS — E66.01 SEVERE OBESITY (BMI 35.0-39.9) WITH COMORBIDITY: ICD-10-CM

## 2023-03-30 PROCEDURE — 3075F SYST BP GE 130 - 139MM HG: CPT | Mod: CPTII,S$GLB,, | Performed by: NURSE PRACTITIONER

## 2023-03-30 PROCEDURE — 99999 PR PBB SHADOW E&M-EST. PATIENT-LVL V: ICD-10-PCS | Mod: PBBFAC,,, | Performed by: NURSE PRACTITIONER

## 2023-03-30 PROCEDURE — G0439 PPPS, SUBSEQ VISIT: HCPCS | Mod: S$GLB,,, | Performed by: NURSE PRACTITIONER

## 2023-03-30 PROCEDURE — 1170F FXNL STATUS ASSESSED: CPT | Mod: CPTII,S$GLB,, | Performed by: NURSE PRACTITIONER

## 2023-03-30 PROCEDURE — 3072F LOW RISK FOR RETINOPATHY: CPT | Mod: CPTII,S$GLB,, | Performed by: NURSE PRACTITIONER

## 2023-03-30 PROCEDURE — 3078F DIAST BP <80 MM HG: CPT | Mod: CPTII,S$GLB,, | Performed by: NURSE PRACTITIONER

## 2023-03-30 PROCEDURE — 1101F PT FALLS ASSESS-DOCD LE1/YR: CPT | Mod: CPTII,S$GLB,, | Performed by: NURSE PRACTITIONER

## 2023-03-30 PROCEDURE — 1159F PR MEDICATION LIST DOCUMENTED IN MEDICAL RECORD: ICD-10-PCS | Mod: CPTII,S$GLB,, | Performed by: NURSE PRACTITIONER

## 2023-03-30 PROCEDURE — 1170F PR FUNCTIONAL STATUS ASSESSED: ICD-10-PCS | Mod: CPTII,S$GLB,, | Performed by: NURSE PRACTITIONER

## 2023-03-30 PROCEDURE — 3075F PR MOST RECENT SYSTOLIC BLOOD PRESS GE 130-139MM HG: ICD-10-PCS | Mod: CPTII,S$GLB,, | Performed by: NURSE PRACTITIONER

## 2023-03-30 PROCEDURE — 1160F RVW MEDS BY RX/DR IN RCRD: CPT | Mod: CPTII,S$GLB,, | Performed by: NURSE PRACTITIONER

## 2023-03-30 PROCEDURE — G0439 PR MEDICARE ANNUAL WELLNESS SUBSEQUENT VISIT: ICD-10-PCS | Mod: S$GLB,,, | Performed by: NURSE PRACTITIONER

## 2023-03-30 PROCEDURE — 3072F PR LOW RISK FOR RETINOPATHY: ICD-10-PCS | Mod: CPTII,S$GLB,, | Performed by: NURSE PRACTITIONER

## 2023-03-30 PROCEDURE — 3288F FALL RISK ASSESSMENT DOCD: CPT | Mod: CPTII,S$GLB,, | Performed by: NURSE PRACTITIONER

## 2023-03-30 PROCEDURE — 1101F PR PT FALLS ASSESS DOC 0-1 FALLS W/OUT INJ PAST YR: ICD-10-PCS | Mod: CPTII,S$GLB,, | Performed by: NURSE PRACTITIONER

## 2023-03-30 PROCEDURE — 99999 PR PBB SHADOW E&M-EST. PATIENT-LVL V: CPT | Mod: PBBFAC,,, | Performed by: NURSE PRACTITIONER

## 2023-03-30 PROCEDURE — 3008F PR BODY MASS INDEX (BMI) DOCUMENTED: ICD-10-PCS | Mod: CPTII,S$GLB,, | Performed by: NURSE PRACTITIONER

## 2023-03-30 PROCEDURE — 3008F BODY MASS INDEX DOCD: CPT | Mod: CPTII,S$GLB,, | Performed by: NURSE PRACTITIONER

## 2023-03-30 PROCEDURE — 3078F PR MOST RECENT DIASTOLIC BLOOD PRESSURE < 80 MM HG: ICD-10-PCS | Mod: CPTII,S$GLB,, | Performed by: NURSE PRACTITIONER

## 2023-03-30 PROCEDURE — 1159F MED LIST DOCD IN RCRD: CPT | Mod: CPTII,S$GLB,, | Performed by: NURSE PRACTITIONER

## 2023-03-30 PROCEDURE — 1160F PR REVIEW ALL MEDS BY PRESCRIBER/CLIN PHARMACIST DOCUMENTED: ICD-10-PCS | Mod: CPTII,S$GLB,, | Performed by: NURSE PRACTITIONER

## 2023-03-30 PROCEDURE — 3288F PR FALLS RISK ASSESSMENT DOCUMENTED: ICD-10-PCS | Mod: CPTII,S$GLB,, | Performed by: NURSE PRACTITIONER

## 2023-03-30 NOTE — PATIENT INSTRUCTIONS
Counseling and Referral of Other Preventative  (Italic type indicates deductible and co-insurance are waived)    Patient Name: Valarie Foster  Today's Date: 3/30/2023    Health Maintenance       Date Due Completion Date    High Dose Statin Never done ---    DEXA Scan 04/27/2023 4/27/2021    Diabetes Urine Screening 05/02/2023 5/2/2022    Hemoglobin A1c 04/30/2023 10/31/2022    Eye Exam 05/13/2023 5/13/2022    Lipid Panel 10/31/2023 10/31/2022    Foot Exam 11/07/2023 11/7/2022    Override on 11/7/2022: Done    Override on 11/1/2021: Done    Override on 8/16/2018: Done (Dr. Lashay Dee ( Podiatry ))    Mammogram 11/07/2023 11/7/2022    Colorectal Cancer Screening 08/13/2025 8/13/2020    TETANUS VACCINE 04/30/2028 4/30/2018        No orders of the defined types were placed in this encounter.    The following information is provided to all patients.  This information is to help you find resources for any of the problems found today that may be affecting your health:                Living healthy guide: www.[facility].[place].gov      Understanding Diabetes: www.diabetes.org      Eating healthy: www.cdc.gov/healthyweight      CDC home safety checklist: www.cdc.gov/steadi/patient.html      Agency on Aging: www.goea.[place].[facility]      Alcoholics anonymous (AA): www.aa.org      Physical Activity: www.[place].nih.gov/fg9pgdg      Tobacco use: www.quitwithusla.org

## 2023-03-31 DIAGNOSIS — E78.5 HYPERLIPIDEMIA, UNSPECIFIED HYPERLIPIDEMIA TYPE: Chronic | ICD-10-CM

## 2023-03-31 NOTE — TELEPHONE ENCOUNTER
No new care gaps identified.  [facility] Embedded Care Gaps. Reference number: [id number]. 3/31/2023   4:01:57 PM CDT

## 2023-03-31 NOTE — TELEPHONE ENCOUNTER
----- Message from Mildred Love sent at 3/31/2023  3:55 PM CDT -----  Regarding: Refill Request  .Type: RX Refill Request    Who Called: self  Have you contacted your pharmacy: yes    Refill or New Rx: refill    RX Name and Strength:pravastatin (PRAVACHOL) 20 MG tablet    Preferred Pharmacy with phone number: [facility] [address] [place] [address] [address] [facility]  [address] [facility] [address]  Phone: [phone] Fax: [phone]      Local or Mail Order:local    Ordering Provider:David    Would the patient rather a call back or a response via [facility]? call    Best Call Back Number: .[phone]     Additional Information:

## 2023-04-03 RX ORDER — PRAVASTATIN SODIUM 20 MG/1
20 TABLET ORAL DAILY
Qty: 90 TABLET | Refills: 1 | Status: SHIPPED | OUTPATIENT
Start: 2023-04-03 | End: 2023-04-18

## 2023-04-10 DIAGNOSIS — L29.9 ITCHING: ICD-10-CM

## 2023-04-10 RX ORDER — HYDROXYZINE HYDROCHLORIDE 25 MG/1
25 TABLET, FILM COATED ORAL 3 TIMES DAILY PRN
Qty: 90 TABLET | Refills: 3 | Status: SHIPPED | OUTPATIENT
Start: 2023-04-10 | End: 2023-08-07

## 2023-04-10 RX ORDER — PANTOPRAZOLE SODIUM 40 MG/1
40 TABLET, DELAYED RELEASE ORAL DAILY
Qty: 30 TABLET | Refills: 11 | Status: SHIPPED | OUTPATIENT
Start: 2023-04-10 | End: 2024-02-11

## 2023-04-11 RX ORDER — CELECOXIB 200 MG/1
200 CAPSULE ORAL DAILY
Qty: 90 CAPSULE | Refills: 1 | Status: ON HOLD | OUTPATIENT
Start: 2023-04-11 | End: 2023-11-08 | Stop reason: HOSPADM

## 2023-04-12 ENCOUNTER — TELEPHONE (OUTPATIENT)
Dept: FAMILY MEDICINE | Facility: CLINIC | Age: 73
End: 2023-04-12
Payer: MEDICARE

## 2023-04-18 ENCOUNTER — OFFICE VISIT (OUTPATIENT)
Dept: CARDIOLOGY | Facility: CLINIC | Age: 73
End: 2023-04-18
Payer: MEDICARE

## 2023-04-18 VITALS
BODY MASS INDEX: 35.09 KG/M2 | HEIGHT: 64 IN | HEART RATE: 80 BPM | SYSTOLIC BLOOD PRESSURE: 142 MMHG | WEIGHT: 205.56 LBS | OXYGEN SATURATION: 99 % | RESPIRATION RATE: 15 BRPM | DIASTOLIC BLOOD PRESSURE: 69 MMHG

## 2023-04-18 DIAGNOSIS — H25.12 NUCLEAR SCLEROTIC CATARACT OF LEFT EYE: ICD-10-CM

## 2023-04-18 DIAGNOSIS — H25.13 NUCLEAR SCLEROSIS OF BOTH EYES: ICD-10-CM

## 2023-04-18 DIAGNOSIS — E78.5 HYPERLIPIDEMIA, UNSPECIFIED HYPERLIPIDEMIA TYPE: Chronic | ICD-10-CM

## 2023-04-18 DIAGNOSIS — I70.0 ABDOMINAL AORTIC ATHEROSCLEROSIS: Chronic | ICD-10-CM

## 2023-04-18 DIAGNOSIS — R07.9 CHEST PAIN, UNSPECIFIED TYPE: Primary | ICD-10-CM

## 2023-04-18 DIAGNOSIS — I10 BENIGN HYPERTENSION: Chronic | ICD-10-CM

## 2023-04-18 DIAGNOSIS — E11.40 TYPE 2 DIABETES MELLITUS WITH DIABETIC NEUROPATHY, WITHOUT LONG-TERM CURRENT USE OF INSULIN: Chronic | ICD-10-CM

## 2023-04-18 DIAGNOSIS — Z74.09 DECREASED MOBILITY AND ENDURANCE: ICD-10-CM

## 2023-04-18 PROCEDURE — 1159F PR MEDICATION LIST DOCUMENTED IN MEDICAL RECORD: ICD-10-PCS | Mod: CPTII,S$GLB,, | Performed by: INTERNAL MEDICINE

## 2023-04-18 PROCEDURE — 3077F PR MOST RECENT SYSTOLIC BLOOD PRESSURE >= 140 MM HG: ICD-10-PCS | Mod: CPTII,S$GLB,, | Performed by: INTERNAL MEDICINE

## 2023-04-18 PROCEDURE — 1125F PR PAIN SEVERITY QUANTIFIED, PAIN PRESENT: ICD-10-PCS | Mod: CPTII,S$GLB,, | Performed by: INTERNAL MEDICINE

## 2023-04-18 PROCEDURE — 99999 PR PBB SHADOW E&M-EST. PATIENT-LVL V: CPT | Mod: PBBFAC,,, | Performed by: INTERNAL MEDICINE

## 2023-04-18 PROCEDURE — 1160F RVW MEDS BY RX/DR IN RCRD: CPT | Mod: CPTII,S$GLB,, | Performed by: INTERNAL MEDICINE

## 2023-04-18 PROCEDURE — 99204 PR OFFICE/OUTPT VISIT, NEW, LEVL IV, 45-59 MIN: ICD-10-PCS | Mod: S$GLB,,, | Performed by: INTERNAL MEDICINE

## 2023-04-18 PROCEDURE — 3008F BODY MASS INDEX DOCD: CPT | Mod: CPTII,S$GLB,, | Performed by: INTERNAL MEDICINE

## 2023-04-18 PROCEDURE — 3288F FALL RISK ASSESSMENT DOCD: CPT | Mod: CPTII,S$GLB,, | Performed by: INTERNAL MEDICINE

## 2023-04-18 PROCEDURE — 1160F PR REVIEW ALL MEDS BY PRESCRIBER/CLIN PHARMACIST DOCUMENTED: ICD-10-PCS | Mod: CPTII,S$GLB,, | Performed by: INTERNAL MEDICINE

## 2023-04-18 PROCEDURE — 1159F MED LIST DOCD IN RCRD: CPT | Mod: CPTII,S$GLB,, | Performed by: INTERNAL MEDICINE

## 2023-04-18 PROCEDURE — 1101F PR PT FALLS ASSESS DOC 0-1 FALLS W/OUT INJ PAST YR: ICD-10-PCS | Mod: CPTII,S$GLB,, | Performed by: INTERNAL MEDICINE

## 2023-04-18 PROCEDURE — 3078F PR MOST RECENT DIASTOLIC BLOOD PRESSURE < 80 MM HG: ICD-10-PCS | Mod: CPTII,S$GLB,, | Performed by: INTERNAL MEDICINE

## 2023-04-18 PROCEDURE — 3008F PR BODY MASS INDEX (BMI) DOCUMENTED: ICD-10-PCS | Mod: CPTII,S$GLB,, | Performed by: INTERNAL MEDICINE

## 2023-04-18 PROCEDURE — 3072F LOW RISK FOR RETINOPATHY: CPT | Mod: CPTII,S$GLB,, | Performed by: INTERNAL MEDICINE

## 2023-04-18 PROCEDURE — 3288F PR FALLS RISK ASSESSMENT DOCUMENTED: ICD-10-PCS | Mod: CPTII,S$GLB,, | Performed by: INTERNAL MEDICINE

## 2023-04-18 PROCEDURE — 3072F PR LOW RISK FOR RETINOPATHY: ICD-10-PCS | Mod: CPTII,S$GLB,, | Performed by: INTERNAL MEDICINE

## 2023-04-18 PROCEDURE — 3078F DIAST BP <80 MM HG: CPT | Mod: CPTII,S$GLB,, | Performed by: INTERNAL MEDICINE

## 2023-04-18 PROCEDURE — 1101F PT FALLS ASSESS-DOCD LE1/YR: CPT | Mod: CPTII,S$GLB,, | Performed by: INTERNAL MEDICINE

## 2023-04-18 PROCEDURE — 3077F SYST BP >= 140 MM HG: CPT | Mod: CPTII,S$GLB,, | Performed by: INTERNAL MEDICINE

## 2023-04-18 PROCEDURE — 1125F AMNT PAIN NOTED PAIN PRSNT: CPT | Mod: CPTII,S$GLB,, | Performed by: INTERNAL MEDICINE

## 2023-04-18 PROCEDURE — 99204 OFFICE O/P NEW MOD 45 MIN: CPT | Mod: S$GLB,,, | Performed by: INTERNAL MEDICINE

## 2023-04-18 PROCEDURE — 99999 PR PBB SHADOW E&M-EST. PATIENT-LVL V: ICD-10-PCS | Mod: PBBFAC,,, | Performed by: INTERNAL MEDICINE

## 2023-04-18 RX ORDER — ATORVASTATIN CALCIUM 40 MG/1
40 TABLET, FILM COATED ORAL NIGHTLY
Qty: 90 TABLET | Refills: 3 | Status: SHIPPED | OUTPATIENT
Start: 2023-04-18 | End: 2023-08-07

## 2023-04-18 RX ORDER — NAPROXEN SODIUM 220 MG/1
81 TABLET, FILM COATED ORAL DAILY
Qty: 90 TABLET | Refills: 3 | Status: SHIPPED | OUTPATIENT
Start: 2023-04-18 | End: 2023-09-19 | Stop reason: SDUPTHER

## 2023-04-18 RX ORDER — METOPROLOL SUCCINATE 25 MG/1
25 TABLET, EXTENDED RELEASE ORAL DAILY
Qty: 90 TABLET | Refills: 3 | Status: SHIPPED | OUTPATIENT
Start: 2023-04-18

## 2023-04-18 NOTE — PROGRESS NOTES
CARDIOVASCULAR CONSULTATION    REASON FOR CONSULT:   Valarie Foster is a 72 y.o. female who presents for evaluation of chest pressure     HISTORY OF PRESENT ILLNESS:      Patient is a pleasant 72-year-old female.  Here for evaluation of chest pressure.  Substernal.  On exertion.  Denies orthopnea, PND, swelling of feet.  Multiple risk factors for coronary artery disease    The estimated ejection fraction is 55%.  The left ventricle is normal in size with concentric hypertrophy and normal systolic function.  Grade I left ventricular diastolic dysfunction.  Normal right ventricular size with normal right ventricular systolic function.  Moderate left atrial enlargement.  Mild mitral regurgitation.  Mild right atrial enlargement.  Normal central venous pressure (3 mmHg).  The estimated PA systolic pressure is 27 mmHg.         PAST MEDICAL HISTORY:     Past Medical History:   Diagnosis Date    Arthritis     Colon polyp     Diabetes mellitus     diet controlled    Diabetes with neurologic complications     Hypertension     Nuclear sclerosis of both eyes 2/14/2022    Renal cell carcinoma        PAST SURGICAL HISTORY:     Past Surgical History:   Procedure Laterality Date    COLONOSCOPY N/A 2/5/2018    Procedure: COLONOSCOPY;  Surgeon: Bacilio Mancia MD;  Location: [facility];  Service: Endoscopy;  Laterality: N/A;  appt confirmed-ss    COLONOSCOPY N/A 8/13/2020    Procedure: COLONOSCOPY;  Surgeon: Jose West MD;  Location: Health system ENDO;  Service: Endoscopy;  Laterality: N/A;    HYSTERECTOMY      INTRAOCULAR PROSTHESES INSERTION Left 7/7/2022    Procedure: INSERTION, IOL PROSTHESIS;  Surgeon: Candelario Novoa MD;  Location: Health system OR;  Service: Ophthalmology;  Laterality: Left;    INTRAOCULAR PROSTHESES INSERTION Right 7/21/2022    Procedure: INSERTION, IOL PROSTHESIS;  Surgeon: Candelario Novoa MD;  Location: Health system OR;  Service: Ophthalmology;  Laterality: Right;    OOPHORECTOMY      PARTIAL NEPHRECTOMY  Right 10/12/2018    Procedure: NEPHRECTOMY, PARTIAL open. Dr Arenas to assist.;  Surgeon: Alejandra Palm MD;  Location: [facility] OR;  Service: Urology;  Laterality: Right;  RN PREOP 10/9/2018----NEEDS H/P    PHACOEMULSIFICATION OF CATARACT Left 7/7/2022    Procedure: PHACOEMULSIFICATION, CATARACT;  Surgeon: Candelario Novoa MD;  Location: [facility] OR;  Service: Ophthalmology;  Laterality: Left;  RN PHONE PREOP 6/27/2022   ARRIVAL 6:00 AM    PHACOEMULSIFICATION OF CATARACT Right 7/21/2022    Procedure: PHACOEMULSIFICATION, CATARACT;  Surgeon: Candelario Novoa MD;  Location: [facility] OR;  Service: Ophthalmology;  Laterality: Right;  RN PHONE PREOP 7/12/2022   ARRIVAL 12:00 NOON       ALLERGIES AND MEDICATION:     Review of patient's allergies indicates:   Allergen Reactions    Lisinopril Swelling and Shortness Of Breath    Ascorbic acid-ascorbate calc      And citrus fruits        Medication List            Accurate as of April 18, 2023 10:06 AM. If you have any questions, ask your nurse or doctor.                CONTINUE taking these medications      ACCU-CHEK ONEIL CONTROL SOLN Soln  Generic drug: blood glucose control high,low  1 Units by Misc.(Non-Drug; Combo Route) route as needed.     ACCU-CHEK ONEIL PLUS TEST STRP Strp  Generic drug: blood sugar diagnostic  TEST BLOOD SUGAR TWICE DAILY     * ACCU-CHEK SOFTCLIX LANCETS Misc  Generic drug: lancets  USE TO TEST BLOOD SUGAR ONE TIME DAILY     * TRUEPLUS LANCETS 33 gauge Misc  Generic drug: lancets  TEST BLOOD SUGAR TWICE DAILY     amLODIPine 10 MG tablet  Commonly known as: NORVASC  Take 1 tablet (10 mg total) by mouth once daily.     aspirin 81 MG EC tablet  Commonly known as: ECOTRIN  Take 1 tablet (81 mg total) by mouth once daily.     * blood-glucose meter kit  Commonly known as: TRUE METRIX GLUCOSE METER  Use as directed to test glucose     * blood-glucose meter Misc  Commonly known as: ACCU-CHEK ONEIL PLUS METER  1 kit by Misc.(Non-Drug; Combo Route)  route once daily.     celecoxib 200 MG capsule  Commonly known as: CeleBREX  Take 1 capsule (200 mg total) by mouth once daily.     cetirizine 10 MG tablet  Commonly known as: ZYRTEC  Take 1 tablet (10 mg total) by mouth once daily.     ciclopirox 8 % Soln  Commonly known as: PENLAC  Apply topically nightly.     cyclobenzaprine 5 MG tablet  Commonly known as: FLEXERIL  TAKE 1 TABLET THREE TIMES DAILY AS NEEDED FOR MUSCLE SPASM(S)     diphenhydrAMINE 25 mg capsule  Commonly known as: BENADRYL  Take 1 capsule (25 mg total) by mouth every 6 (six) hours as needed for Itching.     DROPSAFE ALCOHOL PREP PADS Padm  Generic drug: alcohol swabs  USE 3 EVERY DAY AS DIRECTED     ergocalciferol 50,000 unit Cap  Commonly known as: ERGOCALCIFEROL  TAKE 1 CAPSULE EVERY 7 DAYS.     FLUoxetine 20 MG capsule  TAKE 1 CAPSULE EVERY DAY     fluticasone propionate 50 mcg/actuation nasal spray  Commonly known as: FLONASE  USE 1 SPRAY IN EACH NOSTRIL ONCE DAILY     gabapentin 300 MG capsule  Commonly known as: NEURONTIN  TAKE 2 CAPSULES EVERY EVENING     hydrOXYzine HCL 25 MG tablet  Commonly known as: ATARAX  Take 1 tablet (25 mg total) by mouth 3 (three) times daily as needed for Itching.     meloxicam 15 MG tablet  Commonly known as: MOBIC  TAKE 1 TABLET (15 MG TOTAL) BY MOUTH ONCE DAILY.     metFORMIN 500 MG tablet  Commonly known as: GLUCOPHAGE  Take 1 tablet (500 mg total) by mouth 2 (two) times daily with meals.     pantoprazole 40 MG tablet  Commonly known as: PROTONIX  Take 1 tablet (40 mg total) by mouth once daily.     pravastatin 20 MG tablet  Commonly known as: PRAVACHOL  Take 1 tablet (20 mg total) by mouth once daily.     * triamcinolone acetonide 0.1% 0.1 % cream  Commonly known as: KENALOG     * KENALOG 40 mg/mL injection  Generic drug: triamcinolone acetonide     TRUE METRIX LEVEL 1 Soln  Generic drug: blood glucose control, low  1 each by Misc.(Non-Drug; Combo Route) route once as needed.           * This list has 6  medication(s) that are the same as other medications prescribed for you. Read the directions carefully, and ask your doctor or other care provider to review them with you.                  SOCIAL HISTORY:     Social History     Socioeconomic History    Marital status:    Tobacco Use    Smoking status: Never    Smokeless tobacco: Never   Substance and Sexual Activity    Alcohol use: No    Drug use: No     Social Determinants of Health     Financial Resource Strain: Low Risk     Difficulty of Paying Living Expenses: Not hard at all   Food Insecurity: No Food Insecurity    Worried About Running Out of Food in the Last Year: Never true    Ran Out of Food in the Last Year: Never true   Transportation Needs: No Transportation Needs    Lack of Transportation (Medical): No    Lack of Transportation (Non-Medical): No   Physical Activity: Insufficiently Active    Days of Exercise per Week: 7 days    Minutes of Exercise per Session: 10 min   Stress: No Stress Concern Present    Feeling of Stress : Only a little   Social Connections: Moderately Integrated    Frequency of Social Gatherings with Friends and Family: More than three times a week    Attends Christianity Services: More than 4 times per year    Active Member of Clubs or Organizations: No    Attends Club or Organization Meetings: Never    Marital Status:    Housing Stability: Low Risk     Unable to Pay for Housing in the Last Year: No    Number of Places Lived in the Last Year: 1    Unstable Housing in the Last Year: No       FAMILY HISTORY:     Family History   Problem Relation Age of Onset    No Known Problems Mother     Glaucoma Father     Breast cancer Sister     Glaucoma Sister     Cancer Sister         breast cancer    Thyroid disease Sister     Blindness Sister         due to trauma during car accident    Diabetes Sister     No Known Problems Maternal Aunt     No Known Problems Maternal Uncle     No Known Problems Paternal Aunt     No Known Problems  "Paternal Uncle     No Known Problems Maternal Grandmother     No Known Problems Maternal Grandfather     No Known Problems Paternal Grandmother     No Known Problems Paternal Grandfather     Diabetes Brother     Amblyopia Neg Hx     Cataracts Neg Hx     Hypertension Neg Hx     Macular degeneration Neg Hx     Retinal detachment Neg Hx     Strabismus Neg Hx     Stroke Neg Hx        REVIEW OF SYSTEMS:   Review of Systems   Constitutional: Negative.   HENT: Negative.     Eyes: Negative.    Cardiovascular:  Positive for chest pain.   Respiratory: Negative.     Endocrine: Negative.    Hematologic/Lymphatic: Negative.    Skin: Negative.    Musculoskeletal: Negative.    Gastrointestinal: Negative.    Genitourinary: Negative.    Neurological: Negative.    Psychiatric/Behavioral: Negative.     Allergic/Immunologic: Negative.      A 10 point review of systems was performed and all the pertinent positives have been mentioned. Rest of review of systems was negative.        PHYSICAL EXAM:     Vitals:    04/18/23 0941   BP: (!) 142/69   Pulse: 80   Resp: 15    Body mass index is 35.29 kg/m².  Weight: 93.2 kg (205 lb 9.3 oz)   Height: 5' 4" (162.6 cm)     Physical Exam  Constitutional:       Appearance: Normal appearance. She is well-developed.   HENT:      Head: Normocephalic.   Eyes:      Pupils: Pupils are equal, round, and reactive to light.   Cardiovascular:      Rate and Rhythm: Normal rate and regular rhythm.   Pulmonary:      Effort: Pulmonary effort is normal.      Breath sounds: Normal breath sounds.   Abdominal:      General: Bowel sounds are normal.      Palpations: Abdomen is soft.      Tenderness: There is no abdominal tenderness.   Musculoskeletal:         General: Normal range of motion.      Cervical back: Normal range of motion and neck supple.   Skin:     General: Skin is warm.   Neurological:      Mental Status: She is alert and oriented to person, place, and time.         DATA:     Laboratory:  CBC:  Recent " Labs   Lab 10/28/21  1010 10/31/22  0758 01/12/23  0957   WBC 5.59 5.79 5.07   Hemoglobin 13.0 13.5 13.5   Hematocrit 41.8 41.7 41.6   Platelets 293 287 272       CHEMISTRIES:  Recent Labs   Lab 10/28/21  1010 03/17/22  0718 05/02/22  0711 10/31/22  0758 01/12/23  0957   Glucose 120 H 157 H 130 H 131 H 128 H   Sodium 144 138 142 141 138   Potassium 5.0 4.7 4.2 4.4 4.5   BUN 12 15 12 16 10   Creatinine 0.8 0.8 0.8 0.8 0.8   eGFR if African American >60.0 >60 >60.0  --   --    eGFR if non African American >60.0 >60 >60.0  --   --    Calcium 10.0 9.1 9.5 9.6 9.4       CARDIAC BIOMARKERS:  Recent Labs   Lab 01/11/23 2237 01/12/23  0421   Troponin I <0.006 <0.006       COAGS:  Recent Labs   Lab 01/11/23  1740   INR 1.1       LIPIDS/LFTS:  Recent Labs   Lab 12/21/20  0945 10/28/21  1010 05/02/22  0711 10/31/22  0758 01/12/23  0957   Cholesterol 187 207 H  --  225 H  --    Triglycerides 91 95  --  77  --    HDL 62 65  --  74  --    LDL Cholesterol 106.8 123.0  --  135.6  --    Non-HDL Cholesterol 125 142  --  151  --    AST 12 15 13 14 12   ALT 7 L 11 11 11 12       Hemoglobin A1C   Date Value Ref Range Status   10/31/2022 7.2 (H) 4.0 - 5.6 % Final     Comment:     ADA Screening Guidelines:  5.7-6.4%  Consistent with prediabetes  >or=6.5%  Consistent with diabetes    High levels of fetal hemoglobin interfere with the HbA1C  assay. Heterozygous hemoglobin variants (HbS, HgC, etc)do  not significantly interfere with this assay.   However, presence of multiple variants may affect accuracy.     05/02/2022 7.6 (H) 4.0 - 5.6 % Final     Comment:     ADA Screening Guidelines:  5.7-6.4%  Consistent with prediabetes  >or=6.5%  Consistent with diabetes    High levels of fetal hemoglobin interfere with the HbA1C  assay. Heterozygous hemoglobin variants (HbS, HgC, etc)do  not significantly interfere with this assay.   However, presence of multiple variants may affect accuracy.     10/28/2021 6.8 (H) 4.0 - 5.6 % Final     Comment:      ADA Screening Guidelines:  5.7-6.4%  Consistent with prediabetes  >or=6.5%  Consistent with diabetes    High levels of fetal hemoglobin interfere with the HbA1C  assay. Heterozygous hemoglobin variants (HbS, HgC, etc)do  not significantly interfere with this assay.   However, presence of multiple variants may affect accuracy.         TSH  Recent Labs   Lab 04/21/21  1258 10/28/21  1010 10/31/22  0758   TSH 0.492 0.761 0.999       The 10-year ASCVD risk score (Ayush JASON, et al., 2019) is: 42.4%    Values used to calculate the score:      Age: 72 years      Sex: Female      Is Non- : Yes      Diabetic: Yes      Tobacco smoker: No      Systolic Blood Pressure: 142 mmHg      Is BP treated: Yes      HDL Cholesterol: 74 mg/dL      Total Cholesterol: 225 mg/dL       BNP    No results found for: BNP          ASSESSMENT AND PLAN     Patient Active Problem List   Diagnosis    Type 2 diabetes mellitus with diabetic neuropathy, without long-term current use of insulin    Benign hypertension    Hyperlipemia    Microhematuria    Left flank pain    Abdominal aortic atherosclerosis    History of renal cell cancer    Renal cell carcinoma of right kidney    Mild major depression    Bilateral chronic knee pain    Colon cancer screening    Pain of left calf    Nuclear sclerosis of both eyes    Refractive error    Primary osteoarthritis of knees, bilateral    Fall    Decreased ROM of lower extremity    Decreased strength of lower extremity    Decreased mobility and endurance    Nuclear sclerotic cataract of left eye    Nuclear sclerotic cataract of right eye    Radiculopathy    Idiopathic progressive neuropathy    Nerve pain    Chest pain    Severe obesity (BMI 35.0-39.9) with comorbidity         Patient with chest pain and pressure.  Further evaluation with the help of a stress test.    Dyslipidemia: Switch from pravastatin to Lipitor     Initiate Toprol-XL 25 mg daily     Follow-up after testing        Thank  you very much for involving me in the care of your patient.  Please do not hesitate to contact me if there are any questions.      Mary Kate Dolan MD, FACC, [facility]  Interventional Cardiologist, [facility].           This note was dictated with the help of speech recognition software.  There might be un-intended errors and/or substitutions.

## 2023-04-24 ENCOUNTER — OFFICE VISIT (OUTPATIENT)
Dept: UROLOGY | Facility: CLINIC | Age: 73
End: 2023-04-24
Payer: MEDICARE

## 2023-04-24 VITALS — BODY MASS INDEX: 35.87 KG/M2 | WEIGHT: 209 LBS

## 2023-04-24 DIAGNOSIS — C64.1 RENAL CELL CARCINOMA OF RIGHT KIDNEY: Primary | ICD-10-CM

## 2023-04-24 PROCEDURE — 99999 PR PBB SHADOW E&M-EST. PATIENT-LVL II: ICD-10-PCS | Mod: PBBFAC,,, | Performed by: UROLOGY

## 2023-04-24 PROCEDURE — 1101F PR PT FALLS ASSESS DOC 0-1 FALLS W/OUT INJ PAST YR: ICD-10-PCS | Mod: CPTII,S$GLB,, | Performed by: UROLOGY

## 2023-04-24 PROCEDURE — 99999 PR PBB SHADOW E&M-EST. PATIENT-LVL II: CPT | Mod: PBBFAC,,, | Performed by: UROLOGY

## 2023-04-24 PROCEDURE — 3288F PR FALLS RISK ASSESSMENT DOCUMENTED: ICD-10-PCS | Mod: CPTII,S$GLB,, | Performed by: UROLOGY

## 2023-04-24 PROCEDURE — 3072F LOW RISK FOR RETINOPATHY: CPT | Mod: CPTII,S$GLB,, | Performed by: UROLOGY

## 2023-04-24 PROCEDURE — 99213 OFFICE O/P EST LOW 20 MIN: CPT | Mod: S$GLB,,, | Performed by: UROLOGY

## 2023-04-24 PROCEDURE — 1160F PR REVIEW ALL MEDS BY PRESCRIBER/CLIN PHARMACIST DOCUMENTED: ICD-10-PCS | Mod: CPTII,S$GLB,, | Performed by: UROLOGY

## 2023-04-24 PROCEDURE — 1160F RVW MEDS BY RX/DR IN RCRD: CPT | Mod: CPTII,S$GLB,, | Performed by: UROLOGY

## 2023-04-24 PROCEDURE — 3008F BODY MASS INDEX DOCD: CPT | Mod: CPTII,S$GLB,, | Performed by: UROLOGY

## 2023-04-24 PROCEDURE — 3008F PR BODY MASS INDEX (BMI) DOCUMENTED: ICD-10-PCS | Mod: CPTII,S$GLB,, | Performed by: UROLOGY

## 2023-04-24 PROCEDURE — 3288F FALL RISK ASSESSMENT DOCD: CPT | Mod: CPTII,S$GLB,, | Performed by: UROLOGY

## 2023-04-24 PROCEDURE — 1159F MED LIST DOCD IN RCRD: CPT | Mod: CPTII,S$GLB,, | Performed by: UROLOGY

## 2023-04-24 PROCEDURE — 1159F PR MEDICATION LIST DOCUMENTED IN MEDICAL RECORD: ICD-10-PCS | Mod: CPTII,S$GLB,, | Performed by: UROLOGY

## 2023-04-24 PROCEDURE — 3072F PR LOW RISK FOR RETINOPATHY: ICD-10-PCS | Mod: CPTII,S$GLB,, | Performed by: UROLOGY

## 2023-04-24 PROCEDURE — 1101F PT FALLS ASSESS-DOCD LE1/YR: CPT | Mod: CPTII,S$GLB,, | Performed by: UROLOGY

## 2023-04-24 PROCEDURE — 99213 PR OFFICE/OUTPT VISIT, EST, LEVL III, 20-29 MIN: ICD-10-PCS | Mod: S$GLB,,, | Performed by: UROLOGY

## 2023-04-24 NOTE — PROGRESS NOTES
Subjective:       Valarie Foster is a 72 y.o. female who is an established patient who was referred by Ivan CARDONA  for evaluation of renal mass.      CT a/p with contrast 1/26/18 showed a 2.6cm endophytic lesion in R MP, concerning for solid mass vs complex cyst.     She reports L flank pain, contralateral to renal lesion. L flank pain has almost completely resolved. Denies hematuria, LUTS, UTIs. UA macro did show microhematuria recently. Denies family history of  malignancy. She reports being told she had a small kidney on one side in the past. Denies nephrolithiasis. Nonsmoker. Denies previous abdominal surgery though she is s/p hysterectomy. +DM2/HTN.     Cr (1/18) - 0.8  A1c (1/18) - 6.2  UA micro (2/18) - 0 RBCs    CT renal protocol (2/18) - 2.9cm completely endophytic solid enhancing renal mass. Homogenous in appearance.      She is s/p perc renal biopsy to better evaluate lesion (2/18) - oncocytic neoplasm. Mild hematuria < 24 hrs.          CT (6/18) - stable, endophytic enhancing 2.4cm R renal mass. Subtle 6mm focus of enhancement in R lobe of liver - nonspecific.     KISHAN (9/18) - stable 2.8cm R renal mass    She is now s/p open R PNx 10/12/18. Still having incisional pain, worse with palpation. No hernia on CT, mild stranding in subQ.    Path: 1.9cm unclassified RCC, FG 3/4, negative margins. [id number]    Doing well. States chronic fatigue and pain. Still with occasional incisional pain.     CT c/a/p 4/19 - no mets/recurrence  CT a/p (10/19) - no recurrence/mets. Abnormal area in tail of pancreas - recommend PCP follow up. CXR - clear.  CT a/p (10/20) - no recurrence/mets. Pancreatic tail lesion not seen. CXR - clear  CT a/p 3/22 - no recurrence or mets. Post-op changes to R kidney. CXR - wnl    Cr 1/19 - 0.8, 4/19 - 0.8, 10/19 - 0.9, 10/20 - 0.9, 11/21 - 0.8, 3/22 - 0.8    Planned for CT/CXR prior to visit today - not done. CXR 1/23 - clear           The following portions of the patient's history were  reviewed and updated as appropriate: allergies, current medications, past family history, past medical history, past social history, past surgical history and problem list.    Review of Systems  Constitutional: no fever or chills  ENT: no nasal congestion or sore throat  Respiratory: no cough or shortness of breath  Cardiovascular: no chest pain or palpitations  Gastrointestinal: no nausea or vomiting, tolerating diet  Genitourinary: as per HPI  Hematologic/Lymphatic: no easy bruising or lymphadenopathy  Musculoskeletal: no arthralgias or myalgias  Skin: no rashes or lesions  Neurological: no seizures or tremors  Behavioral/Psych: no auditory or visual hallucinations        Objective:    Vitals: Wt 94.8 kg (208 lb 15.9 oz)   BMI 35.87 kg/m²     Physical Exam   General: well developed, well nourished in no acute distress  Head: normocephalic, atraumatic  Neck: supple, trachea midline, no obvious enlargement of thyroid  HEENT: EOMI, mucus membranes moist, sclera anicteric, no hearing impairment  Lungs: symmetric expansion, non-labored breathing  Skin: no rashes or lesions  Neuro: alert and oriented x 3, no gross deficits  Psych: normal judgment and insight, normal mood/affect and non-anxious  Genitourinary:   patient declined exam    Inc - well healed, minor superior fullness of incision, no hernia    Lab Review   Urine analysis today in clinic shows - negative    Lab Results   Component Value Date    WBC 5.07 01/12/2023    HGB 13.5 01/12/2023    HCT 41.6 01/12/2023    MCV 82 01/12/2023     01/12/2023     Lab Results   Component Value Date    CREATININE 0.8 01/12/2023    BUN 10 01/12/2023         Imaging  Images and reports were personally reviewed by me and discussed with patient  CT/KISHAN reviewed       Assessment/Plan:      1. Right RCC    - 2.6cm lesion in R MP kidney.   - CT renal protocol 2.5cm endophytic solid enhancing renal mass   - Due to location of tumor, open partial would be difficult with higher  complication rate of bleeding and/or urine leak. Radical nephrectomy with less complication rate.   - Concern for performing radical nephrectomy in diabetic patient for possible benign lesion.   - Recommend perc biopsy to evaluate for possible benign lesion prior to radical nephrectomy. Discussed limitations/risks of perc biopsy. Understands that perc biopsy may also not be definitive in diagnosis   - Perc biopsy 2/18 - oncocytic neoplasm (less favors oncocytoma)   - Discussed options: observation, lap radical Nx, open partial Nx. She has opted to observe for now.    - CT 6/18 - stable 2.4cm R renal mass   - KISHAN 9/18 - 2.8cm R renal mass   - s/p R open PNx on 10/12/18    - unclassified RCC pT1a   - CT c/a/p 6 months (4/18) - no recurrence/mets   - CT ap (10/19) - no recurrence/mets. Abnormal area in tail of pancreas - recommend PCP follow up. CXR - clear. Cr 0.9.    - CT a/p 3/22 - clear   - CXR, CT, BMP/CBC in 12 months. Will be 5 years out at that time, consider stopping surveillance. Imaging not done.       2. Microhematuria    - UA micro - 0 RBCs     3. L flank pain   - Unsure etiology   - CT negative for L flank pain   - f/u with PCP   - Continues to report pain in b/l flanks      Follow up in  1 months with CT

## 2023-04-29 ENCOUNTER — LAB VISIT (OUTPATIENT)
Dept: LAB | Facility: HOSPITAL | Age: 73
End: 2023-04-29
Attending: FAMILY MEDICINE
Payer: MEDICARE

## 2023-04-29 DIAGNOSIS — E11.40 TYPE 2 DIABETES MELLITUS WITH DIABETIC NEUROPATHY, WITHOUT LONG-TERM CURRENT USE OF INSULIN: Chronic | ICD-10-CM

## 2023-04-29 LAB
ALBUMIN SERPL BCP-MCNC: 3.6 G/DL (ref 3.5–5.2)
ALP SERPL-CCNC: 113 U/L (ref 55–135)
ALT SERPL W/O P-5'-P-CCNC: 9 U/L (ref 10–44)
ANION GAP SERPL CALC-SCNC: 8 MMOL/L (ref 8–16)
AST SERPL-CCNC: 14 U/L (ref 10–40)
BASOPHILS # BLD AUTO: 0.01 K/UL (ref 0–0.2)
BASOPHILS NFR BLD: 0.2 % (ref 0–1.9)
BILIRUB SERPL-MCNC: 0.3 MG/DL (ref 0.1–1)
BUN SERPL-MCNC: 11 MG/DL (ref 8–23)
CALCIUM SERPL-MCNC: 9.9 MG/DL (ref 8.7–10.5)
CHLORIDE SERPL-SCNC: 105 MMOL/L (ref 95–110)
CO2 SERPL-SCNC: 24 MMOL/L (ref 23–29)
CREAT SERPL-MCNC: 0.8 MG/DL (ref 0.5–1.4)
DIFFERENTIAL METHOD: ABNORMAL
EOSINOPHIL # BLD AUTO: 0 K/UL (ref 0–0.5)
EOSINOPHIL NFR BLD: 0.2 % (ref 0–8)
ERYTHROCYTE [DISTWIDTH] IN BLOOD BY AUTOMATED COUNT: 14.6 % (ref 11.5–14.5)
EST. GFR  (NO RACE VARIABLE): >60 ML/MIN/1.73 M^2
ESTIMATED AVG GLUCOSE: 160 MG/DL (ref 68–131)
GLUCOSE SERPL-MCNC: 123 MG/DL (ref 70–110)
HBA1C MFR BLD: 7.2 % (ref 4–5.6)
HCT VFR BLD AUTO: 41.1 % (ref 37–48.5)
HGB BLD-MCNC: 12.8 G/DL (ref 12–16)
IMM GRANULOCYTES # BLD AUTO: 0.02 K/UL (ref 0–0.04)
IMM GRANULOCYTES NFR BLD AUTO: 0.4 % (ref 0–0.5)
LYMPHOCYTES # BLD AUTO: 1.1 K/UL (ref 1–4.8)
LYMPHOCYTES NFR BLD: 21 % (ref 18–48)
MCH RBC QN AUTO: 26.3 PG (ref 27–31)
MCHC RBC AUTO-ENTMCNC: 31.1 G/DL (ref 32–36)
MCV RBC AUTO: 84 FL (ref 82–98)
MONOCYTES # BLD AUTO: 0.4 K/UL (ref 0.3–1)
MONOCYTES NFR BLD: 6.8 % (ref 4–15)
NEUTROPHILS # BLD AUTO: 3.9 K/UL (ref 1.8–7.7)
NEUTROPHILS NFR BLD: 71.4 % (ref 38–73)
NRBC BLD-RTO: 0 /100 WBC
PLATELET # BLD AUTO: 271 K/UL (ref 150–450)
PMV BLD AUTO: 11.4 FL (ref 9.2–12.9)
POTASSIUM SERPL-SCNC: 4.4 MMOL/L (ref 3.5–5.1)
PROT SERPL-MCNC: 6.9 G/DL (ref 6–8.4)
RBC # BLD AUTO: 4.87 M/UL (ref 4–5.4)
SODIUM SERPL-SCNC: 137 MMOL/L (ref 136–145)
WBC # BLD AUTO: 5.42 K/UL (ref 3.9–12.7)

## 2023-04-29 PROCEDURE — 83036 HEMOGLOBIN GLYCOSYLATED A1C: CPT | Performed by: FAMILY MEDICINE

## 2023-04-29 PROCEDURE — 85025 COMPLETE CBC W/AUTO DIFF WBC: CPT | Performed by: FAMILY MEDICINE

## 2023-04-29 PROCEDURE — 36415 COLL VENOUS BLD VENIPUNCTURE: CPT | Mod: PO | Performed by: FAMILY MEDICINE

## 2023-04-29 PROCEDURE — 80053 COMPREHEN METABOLIC PANEL: CPT | Performed by: FAMILY MEDICINE

## 2023-05-10 ENCOUNTER — TELEPHONE (OUTPATIENT)
Dept: GASTROENTEROLOGY | Facility: CLINIC | Age: 73
End: 2023-05-10
Payer: MEDICARE

## 2023-05-18 ENCOUNTER — PATIENT MESSAGE (OUTPATIENT)
Dept: ADMINISTRATIVE | Facility: HOSPITAL | Age: 73
End: 2023-05-18
Payer: MEDICARE

## 2023-05-31 ENCOUNTER — HOSPITAL ENCOUNTER (OUTPATIENT)
Dept: RADIOLOGY | Facility: HOSPITAL | Age: 73
Discharge: HOME | End: 2023-05-31
Attending: UROLOGY
Payer: MEDICARE

## 2023-05-31 DIAGNOSIS — C64.1 RENAL CELL CARCINOMA OF RIGHT KIDNEY: ICD-10-CM

## 2023-05-31 PROCEDURE — 25500020 PHARM REV CODE 255: Performed by: UROLOGY

## 2023-05-31 PROCEDURE — 74178 CT ABDOMEN PELVIS W WO CONTRAST: ICD-10-PCS | Mod: 26,,, | Performed by: INTERNAL MEDICINE

## 2023-05-31 PROCEDURE — 74178 CT ABD&PLV WO CNTR FLWD CNTR: CPT | Mod: TC

## 2023-05-31 PROCEDURE — 74178 CT ABD&PLV WO CNTR FLWD CNTR: CPT | Mod: 26,,, | Performed by: INTERNAL MEDICINE

## 2023-05-31 RX ADMIN — IOHEXOL 100 ML: 350 INJECTION, SOLUTION INTRAVENOUS at 04:05

## 2023-06-01 ENCOUNTER — OFFICE VISIT (OUTPATIENT)
Dept: UROLOGY | Facility: CLINIC | Age: 73
End: 2023-06-01
Payer: MEDICARE

## 2023-06-01 VITALS — BODY MASS INDEX: 35.14 KG/M2 | WEIGHT: 204.69 LBS

## 2023-06-01 DIAGNOSIS — R10.9 FLANK PAIN: ICD-10-CM

## 2023-06-01 DIAGNOSIS — C64.1 RENAL CELL CARCINOMA OF RIGHT KIDNEY: Primary | ICD-10-CM

## 2023-06-01 DIAGNOSIS — R31.29 MICROHEMATURIA: ICD-10-CM

## 2023-06-01 PROCEDURE — 1125F AMNT PAIN NOTED PAIN PRSNT: CPT | Mod: CPTII,S$GLB,, | Performed by: UROLOGY

## 2023-06-01 PROCEDURE — 1160F RVW MEDS BY RX/DR IN RCRD: CPT | Mod: CPTII,S$GLB,, | Performed by: UROLOGY

## 2023-06-01 PROCEDURE — 3072F PR LOW RISK FOR RETINOPATHY: ICD-10-PCS | Mod: CPTII,S$GLB,, | Performed by: UROLOGY

## 2023-06-01 PROCEDURE — 1101F PT FALLS ASSESS-DOCD LE1/YR: CPT | Mod: CPTII,S$GLB,, | Performed by: UROLOGY

## 2023-06-01 PROCEDURE — 1159F MED LIST DOCD IN RCRD: CPT | Mod: CPTII,S$GLB,, | Performed by: UROLOGY

## 2023-06-01 PROCEDURE — 1160F PR REVIEW ALL MEDS BY PRESCRIBER/CLIN PHARMACIST DOCUMENTED: ICD-10-PCS | Mod: CPTII,S$GLB,, | Performed by: UROLOGY

## 2023-06-01 PROCEDURE — 3288F PR FALLS RISK ASSESSMENT DOCUMENTED: ICD-10-PCS | Mod: CPTII,S$GLB,, | Performed by: UROLOGY

## 2023-06-01 PROCEDURE — 3051F PR MOST RECENT HEMOGLOBIN A1C LEVEL 7.0 - < 8.0%: ICD-10-PCS | Mod: CPTII,S$GLB,, | Performed by: UROLOGY

## 2023-06-01 PROCEDURE — 99214 OFFICE O/P EST MOD 30 MIN: CPT | Mod: S$GLB,,, | Performed by: UROLOGY

## 2023-06-01 PROCEDURE — 99999 PR PBB SHADOW E&M-EST. PATIENT-LVL II: CPT | Mod: PBBFAC,,, | Performed by: UROLOGY

## 2023-06-01 PROCEDURE — 3051F HG A1C>EQUAL 7.0%<8.0%: CPT | Mod: CPTII,S$GLB,, | Performed by: UROLOGY

## 2023-06-01 PROCEDURE — 3072F LOW RISK FOR RETINOPATHY: CPT | Mod: CPTII,S$GLB,, | Performed by: UROLOGY

## 2023-06-01 PROCEDURE — 99999 PR PBB SHADOW E&M-EST. PATIENT-LVL II: ICD-10-PCS | Mod: PBBFAC,,, | Performed by: UROLOGY

## 2023-06-01 PROCEDURE — 99214 PR OFFICE/OUTPT VISIT, EST, LEVL IV, 30-39 MIN: ICD-10-PCS | Mod: S$GLB,,, | Performed by: UROLOGY

## 2023-06-01 PROCEDURE — 1159F PR MEDICATION LIST DOCUMENTED IN MEDICAL RECORD: ICD-10-PCS | Mod: CPTII,S$GLB,, | Performed by: UROLOGY

## 2023-06-01 PROCEDURE — 3288F FALL RISK ASSESSMENT DOCD: CPT | Mod: CPTII,S$GLB,, | Performed by: UROLOGY

## 2023-06-01 PROCEDURE — 1101F PR PT FALLS ASSESS DOC 0-1 FALLS W/OUT INJ PAST YR: ICD-10-PCS | Mod: CPTII,S$GLB,, | Performed by: UROLOGY

## 2023-06-01 PROCEDURE — 1125F PR PAIN SEVERITY QUANTIFIED, PAIN PRESENT: ICD-10-PCS | Mod: CPTII,S$GLB,, | Performed by: UROLOGY

## 2023-06-01 NOTE — PROGRESS NOTES
Subjective:       Valarie Foster is a 73 y.o. female who is an established patient who was referred by Ivan CARDONA  for evaluation of renal mass.      CT a/p with contrast 1/26/18 showed a 2.6cm endophytic lesion in R MP, concerning for solid mass vs complex cyst.     She reports L flank pain, contralateral to renal lesion. L flank pain has almost completely resolved. Denies hematuria, LUTS, UTIs. UA macro did show microhematuria recently. Denies family history of  malignancy. She reports being told she had a small kidney on one side in the past. Denies nephrolithiasis. Nonsmoker. Denies previous abdominal surgery though she is s/p hysterectomy. +DM2/HTN.     Cr (1/18) - 0.8  A1c (1/18) - 6.2  UA micro (2/18) - 0 RBCs    CT renal protocol (2/18) - 2.9cm completely endophytic solid enhancing renal mass. Homogenous in appearance.      She is s/p perc renal biopsy to better evaluate lesion (2/18) - oncocytic neoplasm. Mild hematuria < 24 hrs.          CT (6/18) - stable, endophytic enhancing 2.4cm R renal mass. Subtle 6mm focus of enhancement in R lobe of liver - nonspecific.     KISHAN (9/18) - stable 2.8cm R renal mass    She is now s/p open R PNx 10/12/18. Still having incisional pain, worse with palpation. No hernia on CT, mild stranding in subQ.    Path: 1.9cm unclassified RCC, FG 3/4, negative margins. [id number]    Doing well. States chronic fatigue and pain. Still with occasional incisional pain.     CT c/a/p 4/19 - no mets/recurrence  CT a/p (10/19) - no recurrence/mets. Abnormal area in tail of pancreas - recommend PCP follow up. CXR - clear.  CT a/p (10/20) - no recurrence/mets. Pancreatic tail lesion not seen. CXR - clear  CT a/p 3/22 - no recurrence or mets. Post-op changes to R kidney. CXR - wnl  CT ap 5/23 - no recurrence/mets. Post-op changes to R kidney. CXR 1/23 - clear    Cr 1/19 - 0.8, 4/19 - 0.8, 10/19 - 0.9, 10/20 - 0.9, 11/21 - 0.8, 3/22 - 0.8, 5/23 - 0.9           The following portions of the  patient's history were reviewed and updated as appropriate: allergies, current medications, past family history, past medical history, past social history, past surgical history and problem list.    Review of Systems  Constitutional: no fever or chills  ENT: no nasal congestion or sore throat  Respiratory: no cough or shortness of breath  Cardiovascular: no chest pain or palpitations  Gastrointestinal: no nausea or vomiting, tolerating diet  Genitourinary: as per HPI  Hematologic/Lymphatic: no easy bruising or lymphadenopathy  Musculoskeletal: no arthralgias or myalgias  Skin: no rashes or lesions  Neurological: no seizures or tremors  Behavioral/Psych: no auditory or visual hallucinations        Objective:    Vitals: Wt 92.8 kg (204 lb 11.2 oz)   BMI 35.14 kg/m²     Physical Exam   General: well developed, well nourished in no acute distress  Head: normocephalic, atraumatic  Neck: supple, trachea midline, no obvious enlargement of thyroid  HEENT: EOMI, mucus membranes moist, sclera anicteric, no hearing impairment  Lungs: symmetric expansion, non-labored breathing  Skin: no rashes or lesions  Neuro: alert and oriented x 3, no gross deficits  Psych: normal judgment and insight, normal mood/affect and non-anxious  Genitourinary:   patient declined exam    Inc - well healed, minor superior fullness of incision, no hernia    Lab Review   Urine analysis today in clinic shows - 5-10 RBCs    Lab Results   Component Value Date    WBC 5.42 04/29/2023    HGB 12.8 04/29/2023    HCT 41.1 04/29/2023    MCV 84 04/29/2023     04/29/2023     Lab Results   Component Value Date    CREATININE 0.9 05/31/2023    BUN 11 04/29/2023       Imaging  Images and reports were personally reviewed by me and discussed with patient  CT/KISHAN reviewed       Assessment/Plan:      1. Right RCC    - 2.6cm lesion in R MP kidney.   - CT renal protocol 2.5cm endophytic solid enhancing renal mass   - Due to location of tumor, open partial would be  difficult with higher complication rate of bleeding and/or urine leak. Radical nephrectomy with less complication rate.   - Concern for performing radical nephrectomy in diabetic patient for possible benign lesion.   - Recommend perc biopsy to evaluate for possible benign lesion prior to radical nephrectomy. Discussed limitations/risks of perc biopsy. Understands that perc biopsy may also not be definitive in diagnosis   - Perc biopsy 2/18 - oncocytic neoplasm (less favors oncocytoma)   - Discussed options: observation, lap radical Nx, open partial Nx. She has opted to observe for now.    - CT 6/18 - stable 2.4cm R renal mass   - KISHAN 9/18 - 2.8cm R renal mass   - s/p R open PNx on 10/12/18    - unclassified RCC pT1a   - CT c/a/p 6 months (4/18) - no recurrence/mets   - CT ap (10/19) - no recurrence/mets. Abnormal area in tail of pancreas - recommend PCP follow up. CXR - clear. Cr 0.9.    - CT a/p 5/23 - no recurrence   - KISHAN in 1 year (?q2y after that)      2. Microhematuria    - UA micro - 0 RBCs     3. L flank pain   - Unsure etiology   - CT negative for L flank pain   - f/u with PCP   - Continues to report pain in b/l flanks      Follow up in  12 months with KISHAN

## 2023-06-21 DIAGNOSIS — M25.561 CHRONIC PAIN OF BOTH KNEES: ICD-10-CM

## 2023-06-21 DIAGNOSIS — G89.29 CHRONIC PAIN OF BOTH KNEES: ICD-10-CM

## 2023-06-21 DIAGNOSIS — M25.562 CHRONIC PAIN OF BOTH KNEES: ICD-10-CM

## 2023-06-21 NOTE — TELEPHONE ENCOUNTER
No care due was identified.  [facility] Embedded Care Due Messages. Reference number: [id number].   6/21/2023 5:48:55 PM CDT

## 2023-06-23 RX ORDER — FLUOXETINE HYDROCHLORIDE 20 MG/1
CAPSULE ORAL
Qty: 90 CAPSULE | Refills: 2 | Status: SHIPPED | OUTPATIENT
Start: 2023-06-23

## 2023-06-23 RX ORDER — CYCLOBENZAPRINE HCL 5 MG
TABLET ORAL
Qty: 270 TABLET | Refills: 1 | Status: ON HOLD | OUTPATIENT
Start: 2023-06-23 | End: 2023-11-08 | Stop reason: HOSPADM

## 2023-07-12 DIAGNOSIS — E11.9 TYPE 2 DIABETES MELLITUS WITHOUT COMPLICATION: ICD-10-CM

## 2023-07-13 ENCOUNTER — HOSPITAL ENCOUNTER (OUTPATIENT)
Dept: RADIOLOGY | Facility: CLINIC | Age: 73
Discharge: HOME | End: 2023-07-13
Attending: NURSE PRACTITIONER
Payer: MEDICARE

## 2023-07-13 DIAGNOSIS — Z78.0 POST-MENOPAUSAL: ICD-10-CM

## 2023-07-13 PROCEDURE — 77080 DXA BONE DENSITY AXIAL: CPT | Mod: 26,,, | Performed by: INTERNAL MEDICINE

## 2023-07-13 PROCEDURE — 77080 DXA BONE DENSITY AXIAL SKELETON 1 OR MORE SITES: ICD-10-PCS | Mod: 26,,, | Performed by: INTERNAL MEDICINE

## 2023-07-13 PROCEDURE — 77080 DXA BONE DENSITY AXIAL: CPT | Mod: TC,PO

## 2023-07-18 ENCOUNTER — TELEPHONE (OUTPATIENT)
Dept: FAMILY MEDICINE | Facility: CLINIC | Age: 73
End: 2023-07-18
Payer: MEDICARE

## 2023-07-31 ENCOUNTER — PATIENT MESSAGE (OUTPATIENT)
Dept: ADMINISTRATIVE | Facility: HOSPITAL | Age: 73
End: 2023-07-31
Payer: MEDICARE

## 2023-08-07 RX ORDER — ATORVASTATIN CALCIUM 40 MG/1
40 TABLET, FILM COATED ORAL NIGHTLY
Qty: 90 TABLET | Refills: 0 | Status: SHIPPED | OUTPATIENT
Start: 2023-08-07

## 2023-08-07 NOTE — TELEPHONE ENCOUNTER
Care Due:                  Date            Visit Type   Department     Provider  --------------------------------------------------------------------------------                                [facility]                              PRIMARY      MED/ INTERNAL  Last Visit: 03-      CARE ([facility])   MED/ PEDS      Catherine Miguel                              [facility]                              PRIMARY      MED/ INTERNAL  Next Visit: 09-      CARE ([facility])   MED/ PEDS      Alena Hernandez                                                            Last  Test          Frequency    Reason                     Performed    Due Date  --------------------------------------------------------------------------------    HBA1C.......  6 months...  metFORMIN................  04-   10-    [employer] Embedded Care Due Messages. Reference number: [id number].   8/07/2023 9:53:24 AM CDT

## 2023-08-28 ENCOUNTER — OFFICE VISIT (OUTPATIENT)
Dept: GASTROENTEROLOGY | Facility: CLINIC | Age: 73
End: 2023-08-28
Payer: MEDICARE

## 2023-08-28 VITALS
DIASTOLIC BLOOD PRESSURE: 82 MMHG | WEIGHT: 199.75 LBS | BODY MASS INDEX: 34.1 KG/M2 | HEIGHT: 64 IN | HEART RATE: 84 BPM | SYSTOLIC BLOOD PRESSURE: 177 MMHG

## 2023-08-28 DIAGNOSIS — E66.01 SEVERE OBESITY (BMI 35.0-39.9) WITH COMORBIDITY: Primary | ICD-10-CM

## 2023-08-28 DIAGNOSIS — R10.30 LOWER ABDOMINAL PAIN: ICD-10-CM

## 2023-08-28 DIAGNOSIS — E09.22: ICD-10-CM

## 2023-08-28 DIAGNOSIS — N18.31: ICD-10-CM

## 2023-08-28 PROCEDURE — 99204 PR OFFICE/OUTPT VISIT, NEW, LEVL IV, 45-59 MIN: ICD-10-PCS | Mod: S$GLB,,, | Performed by: INTERNAL MEDICINE

## 2023-08-28 PROCEDURE — 1125F PR PAIN SEVERITY QUANTIFIED, PAIN PRESENT: ICD-10-PCS | Mod: CPTII,S$GLB,, | Performed by: INTERNAL MEDICINE

## 2023-08-28 PROCEDURE — 1101F PR PT FALLS ASSESS DOC 0-1 FALLS W/OUT INJ PAST YR: ICD-10-PCS | Mod: CPTII,S$GLB,, | Performed by: INTERNAL MEDICINE

## 2023-08-28 PROCEDURE — 3051F HG A1C>EQUAL 7.0%<8.0%: CPT | Mod: CPTII,S$GLB,, | Performed by: INTERNAL MEDICINE

## 2023-08-28 PROCEDURE — 3079F PR MOST RECENT DIASTOLIC BLOOD PRESSURE 80-89 MM HG: ICD-10-PCS | Mod: CPTII,S$GLB,, | Performed by: INTERNAL MEDICINE

## 2023-08-28 PROCEDURE — 3288F PR FALLS RISK ASSESSMENT DOCUMENTED: ICD-10-PCS | Mod: CPTII,S$GLB,, | Performed by: INTERNAL MEDICINE

## 2023-08-28 PROCEDURE — 1159F MED LIST DOCD IN RCRD: CPT | Mod: CPTII,S$GLB,, | Performed by: INTERNAL MEDICINE

## 2023-08-28 PROCEDURE — 3288F FALL RISK ASSESSMENT DOCD: CPT | Mod: CPTII,S$GLB,, | Performed by: INTERNAL MEDICINE

## 2023-08-28 PROCEDURE — 1125F AMNT PAIN NOTED PAIN PRSNT: CPT | Mod: CPTII,S$GLB,, | Performed by: INTERNAL MEDICINE

## 2023-08-28 PROCEDURE — 99204 OFFICE O/P NEW MOD 45 MIN: CPT | Mod: S$GLB,,, | Performed by: INTERNAL MEDICINE

## 2023-08-28 PROCEDURE — 3079F DIAST BP 80-89 MM HG: CPT | Mod: CPTII,S$GLB,, | Performed by: INTERNAL MEDICINE

## 2023-08-28 PROCEDURE — 3077F SYST BP >= 140 MM HG: CPT | Mod: CPTII,S$GLB,, | Performed by: INTERNAL MEDICINE

## 2023-08-28 PROCEDURE — 3051F PR MOST RECENT HEMOGLOBIN A1C LEVEL 7.0 - < 8.0%: ICD-10-PCS | Mod: CPTII,S$GLB,, | Performed by: INTERNAL MEDICINE

## 2023-08-28 PROCEDURE — 1101F PT FALLS ASSESS-DOCD LE1/YR: CPT | Mod: CPTII,S$GLB,, | Performed by: INTERNAL MEDICINE

## 2023-08-28 PROCEDURE — 3077F PR MOST RECENT SYSTOLIC BLOOD PRESSURE >= 140 MM HG: ICD-10-PCS | Mod: CPTII,S$GLB,, | Performed by: INTERNAL MEDICINE

## 2023-08-28 PROCEDURE — 99999 PR PBB SHADOW E&M-EST. PATIENT-LVL III: ICD-10-PCS | Mod: PBBFAC,,, | Performed by: INTERNAL MEDICINE

## 2023-08-28 PROCEDURE — 99999 PR PBB SHADOW E&M-EST. PATIENT-LVL III: CPT | Mod: PBBFAC,,, | Performed by: INTERNAL MEDICINE

## 2023-08-28 PROCEDURE — 1159F PR MEDICATION LIST DOCUMENTED IN MEDICAL RECORD: ICD-10-PCS | Mod: CPTII,S$GLB,, | Performed by: INTERNAL MEDICINE

## 2023-08-28 NOTE — PROGRESS NOTES
[facility]   Clinic Note              Patient Name: Valarie Foster  Age: 73 y.o.  Sex: female  MRN: [id number]    TODAY'S DATE:  8/28/2023  REFERRING PROVIDER:  Self, Aaareferral     Diagnosis:   1. Severe obesity (BMI 35.0-39.9) with comorbidity    2. 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 Lower abdominal pain        HPI:  Valarie Foster is a 73 y.o. female with HTN, HLD, NIDDM, MDD and RCC s/p partial nephrectomy '18 who was referred for evaluation and treatment of abdominal pain.    For review, patient has not been seen by GI in the past.  She follows with Dr. Palm for RCC where she endorsed abdominal distension and referral to GI was placed.    Today, patient confirms abdominal pain with walking due to heaviness.  She notes pain to be in her panus where excess weight hangs.   Denies epigastric pain or RUQ pain.  Denies worsening with eating.  She has taken pepcid in the past, but unsure that it has been beneficial.    Regarding bowel habits, she tends to have one soft BM/day, without constipation.  No surgeries aside from partial nephrectomy.  She has been trying to lose weight by calorie counting, limiting herself to 1200 calories/day, but not lost any weight to date (has been trying to restrict calories for 1 year now).  Too hot to exercise at present.  She saw a dietician for diabetes, not for weight loss.     PRIOR ENDOSCOPY:  -Colonoscopy: 2020:  Impression:          - The entire examined colon is normal.                        - Diverticulosis in the sigmoid colon.                        - No specimens collected.   Recommendation:      - Discharge patient to home (with escort).                        - Resume previous diet indefinitely.                        - Repeat colonoscopy in 5 years for surveillance.                        - Return to primary care physician as previously                         scheduled.     -EGD - --    2023:  Impression:     Postoperative changes from partial right nephrectomy for RCC. No local recurrence, lymphadenopathy, or metastases.      ROS: Negative other than above      Outpatient Medications Marked as Taking for the 8/28/23 encounter (Office Visit) with Cheng Last MD   Medication Sig Dispense Refill    ACCU-CHEK ONEIL PLUS TEST STRP Strp TEST BLOOD SUGAR TWICE DAILY 200 strip 1    ACCU-CHEK SOFTCLIX LANCETS [facility] USE TO TEST BLOOD SUGAR ONE TIME DAILY 100 each 3    alcohol swabs (DROPSAFE ALCOHOL PREP PADS) PadM USE 3 EVERY DAY AS DIRECTED 300 each 3    amLODIPine (NORVASC) 10 MG tablet Take 1 tablet (10 mg total) by mouth once daily. 90 tablet 3    aspirin (ECOTRIN) 81 MG EC tablet Take 1 tablet (81 mg total) by mouth once daily. 30 tablet 3    aspirin 81 MG Chew Take 1 tablet (81 mg total) by mouth once daily. 90 tablet 3    atorvastatin (LIPITOR) 40 MG tablet  90 tablet 0    blood glucose control high,low (ACCU-CHEK ONEIL CONTROL SOLN) Soln 1 Units by Misc.(Non-Drug; Combo Route) route as needed. 1 each 0    blood-glucose meter (ACCU-CHEK ONEIL PLUS METER) Misc 1 kit by Misc.(Non-Drug; Combo Route) route once daily. 1 each 0    blood-glucose meter (TRUE METRIX GLUCOSE METER) kit Use as directed to test glucose 1 each 0    celecoxib (CELEBREX) 200 MG capsule Take 1 capsule (200 mg total) by mouth once daily. 90 capsule 1    ciclopirox (PENLAC) 8 % Soln Apply topically nightly. 6.6 mL 11    cyclobenzaprine (FLEXERIL) 5 MG tablet TAKE 1 TABLET THREE TIMES DAILY AS NEEDED FOR MUSCLE SPASM(S) 270 tablet 1    diphenhydrAMINE (BENADRYL) 25 mg capsule Take 1 capsule (25 mg total) by mouth every 6 (six) hours as needed for Itching. 20 capsule 0    ergocalciferol (ERGOCALCIFEROL) 50,000 unit Cap TAKE 1 CAPSULE EVERY 7 DAYS. 12 capsule 1    fluticasone propionate (FLONASE) 50 mcg/actuation nasal spray USE 1 SPRAY IN EACH NOSTRIL ONCE DAILY 32 g 2    gabapentin (NEURONTIN) 300  "MG capsule TAKE 2 CAPSULES EVERY EVENING 180 capsule 1    hydrOXYzine HCL (ATARAX) 25 MG tablet TAKE 1 TABLET THREE TIMES DAILY AS NEEDED FOR  ITCHING 90 tablet 3    meloxicam (MOBIC) 15 MG tablet TAKE 1 TABLET (15 MG TOTAL) BY MOUTH ONCE DAILY. 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 Good eye contact     Labs:   Lab Results   Component Value Date    CRP 14.8 (H) 09/24/2019       Lab Results   Component Value Date    LNHHZARZ44XG 12 (L) 12/21/2020    TGQOPQOI22 282 10/31/2022     Lab Results   Component Value Date    WBC 5.42 04/29/2023    HGB 12.8 04/29/2023    HCT 41.1 04/29/2023    MCV 84 04/29/2023     04/29/2023     Lab Results   Component Value Date    CREATININE 0.9 05/31/2023    ALBUMIN 3.6 04/29/2023    BILITOT 0.3 04/29/2023    ALKPHOS 113 04/29/2023    AST 14 04/29/2023    ALT 9 (L) 04/29/2023       Assessment/Plan:  Valarie Foster is a 73 y.o. female with HTN, HLD, NIDDM, MDD and RCC s/p partial nephrectomy '18 who was referred for evaluation and treatment of abdominal pain.    # Severe obesity (BMI 35.0-39.9) with comorbidity [E66.01]    Lower abdominal pain, which she feels worse while walking, not associated with eating, not epigastric, without constipation and without change with bowel " habits.  Today, we discussed I have a very low suspicion for a luminal etiology of her abdominal pain, and feel that it is related to excess weight.  We discussed the importance of weight loss, which she has been trying at home but I feel that she would benefit from help from a professional dietician. We did briefly touch on weight loss medications, which she is very interested in.    -- Referral to dietician to discuss weight loss  -- Consider weight loss medications given multiple morbidity associated comorbidities    RTC 3 mo    Thank you for involving us in the care of this patient.        Chneg Last MD  Department of Gastroenterology & Hepatology

## 2023-09-11 DIAGNOSIS — J30.9 ALLERGIC RHINITIS, UNSPECIFIED SEASONALITY, UNSPECIFIED TRIGGER: ICD-10-CM

## 2023-09-11 RX ORDER — FLUTICASONE PROPIONATE 50 MCG
SPRAY, SUSPENSION (ML) NASAL
Qty: 32 G | Refills: 2 | Status: SHIPPED | OUTPATIENT
Start: 2023-09-11

## 2023-09-11 NOTE — TELEPHONE ENCOUNTER
Care Due:                  Date            Visit Type   Department     Provider  --------------------------------------------------------------------------------                                [facility]                              PRIMARY      MED/ INTERNAL  Last Visit: 03-      CARE ([facility])   MED/ PEDS      Catherine Miguel                              [facility]                              PRIMARY      MED/ INTERNAL  Next Visit: 09-      CARE ([facility])   MED/ PEDS      Alena Hernandez                                                            Last  Test          Frequency    Reason                     Performed    Due Date  --------------------------------------------------------------------------------    Lipid Panel.  12 months..  atorvastatin.............  10-   10-    [facility] Embedded Care Due Messages. Reference number: [id number].   9/11/2023 2:07:03 AM CDT

## 2023-09-12 ENCOUNTER — OFFICE VISIT (OUTPATIENT)
Dept: ORTHOPEDICS | Facility: CLINIC | Age: 73
End: 2023-09-12
Payer: MEDICARE

## 2023-09-12 VITALS — WEIGHT: 199 LBS | BODY MASS INDEX: 33.97 KG/M2 | HEIGHT: 64 IN

## 2023-09-12 DIAGNOSIS — M17.11 PRIMARY OSTEOARTHRITIS OF RIGHT KNEE: Primary | ICD-10-CM

## 2023-09-12 DIAGNOSIS — M17.0 PRIMARY OSTEOARTHRITIS OF BOTH KNEES: Primary | ICD-10-CM

## 2023-09-12 PROCEDURE — 1159F MED LIST DOCD IN RCRD: CPT | Mod: CPTII,S$GLB,, | Performed by: ORTHOPAEDIC SURGERY

## 2023-09-12 PROCEDURE — 99214 PR OFFICE/OUTPT VISIT, EST, LEVL IV, 30-39 MIN: ICD-10-PCS | Mod: S$GLB,,, | Performed by: ORTHOPAEDIC SURGERY

## 2023-09-12 PROCEDURE — 3051F PR MOST RECENT HEMOGLOBIN A1C LEVEL 7.0 - < 8.0%: ICD-10-PCS | Mod: CPTII,S$GLB,, | Performed by: ORTHOPAEDIC SURGERY

## 2023-09-12 PROCEDURE — 3051F HG A1C>EQUAL 7.0%<8.0%: CPT | Mod: CPTII,S$GLB,, | Performed by: ORTHOPAEDIC SURGERY

## 2023-09-12 PROCEDURE — 1159F PR MEDICATION LIST DOCUMENTED IN MEDICAL RECORD: ICD-10-PCS | Mod: CPTII,S$GLB,, | Performed by: ORTHOPAEDIC SURGERY

## 2023-09-12 PROCEDURE — 1125F PR PAIN SEVERITY QUANTIFIED, PAIN PRESENT: ICD-10-PCS | Mod: CPTII,S$GLB,, | Performed by: ORTHOPAEDIC SURGERY

## 2023-09-12 PROCEDURE — 99214 OFFICE O/P EST MOD 30 MIN: CPT | Mod: S$GLB,,, | Performed by: ORTHOPAEDIC SURGERY

## 2023-09-12 PROCEDURE — 99999 PR PBB SHADOW E&M-EST. PATIENT-LVL III: ICD-10-PCS | Mod: PBBFAC,,, | Performed by: ORTHOPAEDIC SURGERY

## 2023-09-12 PROCEDURE — 99999 PR PBB SHADOW E&M-EST. PATIENT-LVL III: CPT | Mod: PBBFAC,,, | Performed by: ORTHOPAEDIC SURGERY

## 2023-09-12 PROCEDURE — 1125F AMNT PAIN NOTED PAIN PRSNT: CPT | Mod: CPTII,S$GLB,, | Performed by: ORTHOPAEDIC SURGERY

## 2023-09-12 NOTE — PROGRESS NOTES
EST PATIENT ORTHOPAEDIC: Knee    PRIMARY CARE PHYSICIAN: Alena Hernandez MD   REFERRING PROVIDER: No referring provider defined for this encounter.     ASSESSMENT & PLAN:    Impression:  Bilateral Knee Moderate Osteoarthritis, primary (right worse than left)    Follow Up Plan: 3 weeks after surgery       Valarie Foster has radiograph and physical exam evidence of the aforementioned impression and wishes to pursue surgery. This patient appears to have sufficient symptoms to warrant surgical intervention and is an appropriate candidate for Right Primary Total Knee Arthroplasty as evidenced by months of unsuccessful non-operative treatment as outlined in the HPI below and progressive symptoms.    We had a lengthy discussion regarding the risk and benefit of surgery, the alternatives, limitations and personnel involved. These included but were not limited to infection, persistent pain, instability, nerve injury, blood clots, loosening, and medical complications. We also discussed the pre-operative course, surgery itself and rehabilitation.    Marisol-operative blood management and transfusion issues were discussed, and options clearly outlined. The patient has consented to the use of the banked allogenic blood if medically necessary.    The patient has elected to schedule surgery at this time or intends to call the office with a surgical date. Shared decision making occurred while obtaining informed consent. The patient will be scheduled for a pre-operative education class at which time they will have their nasal swab completed and will be given CHG cloths along with the verbal and written instructions for their use.    To review: Patient has bilateral knee pain left worse than right. She has radiographic imaging that is consistent with moderate to severe arthritis.  She has significant tenderness to palpation over multiple areas, but medial joint line is the worst. She has some irritability of her hip but CT scan from 2020  does not demonstrate any significant arthritis here.  She does have some upper lumbar degenerative disc disease on that same CT scan.  She denies any lumbar back issues. She saw Dr. Solares for geniculate nerve ablations which she was not deemed a candidate for. Failed steroid injections. She had visco injections previously, she had about 5-6 months of relief. The repeat visco series did not provide greater than 1 month of relief. She is interested in surgical intervention at this time.     The patient has been ordered:  NELSON CT    CONSULTS:   PCP  Anesthesia     ACTIVE PROBLEM LIST  Patient Active Problem List   Diagnosis    Type 2 diabetes mellitus with diabetic neuropathy, without long-term current use of insulin    Benign hypertension    Hyperlipemia    Microhematuria    Left flank pain    Abdominal aortic atherosclerosis    History of renal cell cancer    Renal cell carcinoma of right kidney    Mild major depression    Bilateral chronic knee pain    Colon cancer screening    Pain of left calf    Nuclear sclerosis of both eyes    Refractive error    Primary osteoarthritis of knees, bilateral    Fall    Decreased ROM of lower extremity    Decreased strength of lower extremity    Decreased mobility and endurance    Nuclear sclerotic cataract of left eye    Nuclear sclerotic cataract of right eye    Radiculopathy    Idiopathic progressive neuropathy    Nerve pain    Chest pain    Severe obesity (BMI 35.0-39.9) with comorbidity           SUBJECTIVE    CHIEF COMPLAINT: Knee Pain    HPI:   Valarie Foster is a 73 y.o. female here for evaluation and management of bilateral knee pain. There is not a specific incident that brought about this pain. she has had progressive problems with the knee(s) starting 2 years ago but is now progressing to interfere with activities which include: walking, rising from a sitting position, standing for prolonged periods of time and climbing stairs     Currently the pain in the joint is  rated at mild with activity. The pain is constant and is located in the knee, at level of joint line, located medially, located laterally, located anterior, located posterior and with radiation. The pain is described as aching, severe, sharp, stabbing and stiffness. Relieving factors include ice or heat, prescription medication and repositioning.     There is associated Catching, Clicking and Popping.     Valarie Foster has no additional complaints.     Interval History 4/7/22: Continued pain. No changes. Saw pain management, not candidate for genicular nerve ablations.   Interval History 4/28/22: Continued pain. Some fear of falling. Follows with Sujatha. DDD on lumbar spine. Waiting for PT eval. Here for start of Orthovisc series    Interval History 5/3/22: Here for 2 of 3 visco injections. Pain mildly improved compared to previous.  Interval History 5/12/22: Here for 3 of 3 visco injections. Pain moderately improved compared to previous. Left knee, minimal pain.    Interval History 11/28/22: Here with return of bilateral knee pain. Previous injections help for about 6 months.   Interval History 1/17/23: Here for repeat orthovisc series. She is reporting bilateral lower extremity edema.   Interval History 1/27/24: Here for 2 of 3 visco injections. Pain moderately improved compared to previous. Able to garden this past weekend.   Interval History 2/3/24: Here for 3 of 3 visco injections. She reports her knees are doing well.    Interval History 9/12/23: Here for further considerations of knee pain. Similar pain to previous bouts, recent exacerbation. Right knee more painful than the left. Would like to know if she is a candidate for replacement. Last A1C 7.2    PROGRESSIVE SYMPTOMS:  Pain impacting sleep  Pain worsened by weight bearing    FUNCTIONAL STATUS:   Walk a block or two on level ground     PREVIOUS TREATMENTS:  Medical: Steroid Injections and Biologic Injections  Physical Therapy: Activities Modified   and Formal Physical Therapy for 6 weeks  Previous Orthopaedic Surgery: None    REVIEW OF SYSTEMS:  PAIN ASSESSMENT:  See HPI.  MUSCULOSKELETAL: See HPI.  OTHER 10 point review of systems is negative except as stated in HPI above    PAST MEDICAL HISTORY   has a past medical history of Arthritis, Colon polyp, Diabetes mellitus, Diabetes with neurologic complications, Hypertension, Nuclear sclerosis of both eyes (2/14/2022), and Renal cell carcinoma.     PAST SURGICAL HISTORY   has a past surgical history that includes Hysterectomy; Oophorectomy; Colonoscopy (N/A, 2/5/2018); Partial nephrectomy (Right, 10/12/2018); Colonoscopy (N/A, 8/13/2020); Phacoemulsification of cataract (Left, 7/7/2022); Intraocular prosthesis insertion (Left, 7/7/2022); Phacoemulsification of cataract (Right, 7/21/2022); and Intraocular prosthesis insertion (Right, 7/21/2022).     FAMILY HISTORY  family history includes Blindness in her sister; Breast cancer in her sister; Cancer in her sister; Diabetes in her brother and sister; Glaucoma in her father and sister; No Known Problems in her maternal aunt, maternal grandfather, maternal grandmother, maternal uncle, mother, paternal aunt, paternal grandfather, paternal grandmother, and paternal uncle; Thyroid disease in her sister.     SOCIAL HISTORY   reports that she has never smoked. She has never used smokeless tobacco. She reports that she does not drink alcohol and does not use drugs.     ALLERGIES   Review of patient's allergies indicates:   Allergen Reactions    Lisinopril Swelling and Shortness Of Breath    Ascorbic acid-ascorbate calc      And citrus fruits        MEDICATIONS  Current Outpatient Medications on File Prior to Visit   Medication Sig Dispense Refill    ACCU-CHEK ONEIL PLUS TEST STRP Strp TEST BLOOD SUGAR TWICE DAILY 200 strip 1    ACCU-CHEK SOFTCLIX LANCETS Misc USE TO TEST BLOOD SUGAR ONE TIME DAILY 100 each 3    alcohol swabs (DROPSAFE ALCOHOL PREP PADS) PadM USE 3 EVERY DAY  AS DIRECTED 300 each 3    amLODIPine (NORVASC) 10 MG tablet Take 1 tablet (10 mg total) by mouth once daily. 90 tablet 3    aspirin (ECOTRIN) 81 MG EC tablet Take 1 tablet (81 mg total) by mouth once daily. 30 tablet 3    aspirin 81 MG Chew Take 1 tablet (81 mg total) by mouth once daily. 90 tablet 3    atorvastatin (LIPITOR) 40 MG tablet  90 tablet 0    blood glucose control high,low (ACCU-CHEK ONEIL CONTROL SOLN) Soln 1 Units by Misc.(Non-Drug; Combo Route) route as needed. 1 each 0    blood-glucose meter (ACCU-CHEK ONEIL PLUS METER) Misc 1 kit by Misc.(Non-Drug; Combo Route) route once daily. 1 each 0    blood-glucose meter (TRUE METRIX GLUCOSE METER) kit Use as directed to test glucose 1 each 0    celecoxib (CELEBREX) 200 MG capsule Take 1 capsule (200 mg total) by mouth once daily. 90 capsule 1    ciclopirox (PENLAC) 8 % Soln Apply topically nightly. 6.6 mL 11    cyclobenzaprine (FLEXERIL) 5 MG tablet TAKE 1 TABLET THREE TIMES DAILY AS NEEDED FOR MUSCLE SPASM(S) 270 tablet 1    diphenhydrAMINE (BENADRYL) 25 mg capsule Take 1 capsule (25 mg total) by mouth every 6 (six) hours as needed for Itching. 20 capsule 0    ergocalciferol (ERGOCALCIFEROL) 50,000 unit Cap TAKE 1 CAPSULE EVERY 7 DAYS. 12 capsule 1    FLUoxetine 20 MG capsule TAKE 1 CAPSULE EVERY DAY 90 capsule 2    fluticasone propionate (FLONASE) 50 mcg/actuation nasal spray USE 1 SPRAY IN EACH NOSTRIL ONCE DAILY 32 g 2    gabapentin (NEURONTIN) 300 MG capsule TAKE 2 CAPSULES EVERY EVENING 180 capsule 1    hydrOXYzine HCL (ATARAX) 25 MG tablet TAKE 1 TABLET THREE TIMES DAILY AS NEEDED FOR  ITCHING 90 tablet 3    KENALOG 40 mg/mL injection       meloxicam (MOBIC) 15 MG tablet TAKE 1 TABLET (15 MG TOTAL) BY MOUTH ONCE DAILY. 90 tablet 0    metoprolol succinate (TOPROL-XL) 25 MG 24 hr tablet Take 1 tablet (25 mg total) by mouth once daily. 90 tablet 3    pantoprazole (PROTONIX) 40 MG tablet Take 1 tablet (40 mg total) by mouth once daily. 30 tablet 11     "triamcinolone acetonide 0.1% (KENALOG) 0.1 % cream       TRUEPLUS LANCETS 33 gauge Misc TEST BLOOD SUGAR TWICE DAILY 200 each 1    blood glucose control, low (TRUE METRIX LEVEL 1) Soln 1 each by Misc.(Non-Drug; Combo Route) route once as needed. 1 each 3    cetirizine (ZYRTEC) 10 MG tablet Take 1 tablet (10 mg total) by mouth once daily. 90 tablet 0    metFORMIN (GLUCOPHAGE) 500 MG tablet Take 1 tablet (500 mg total) by mouth 2 (two) times daily with meals. 180 tablet 3     Current Facility-Administered Medications on File Prior to Visit   Medication Dose Route Frequency Provider Last Rate Last Admin    cyclopentolate 1% ophthalmic solution 1 drop  1 drop Left Eye On Call Procedure Candelario Novoa MD   1 drop at 07/07/22 0703    cyclopentolate 1% ophthalmic solution 1 drop  1 drop Right Eye On Call Procedure Candelario Novoa MD   1 drop at 07/21/22 1337    ofloxacin 0.3 % ophthalmic solution 1 drop  1 drop Left Eye On Call Procedure Candelario Novoa MD   2 drop at 07/07/22 0754    ofloxacin 0.3 % ophthalmic solution 1 drop  1 drop Right Eye On Call Procedure Candelario Novoa MD   2 drop at 07/21/22 1630    sodium chloride 0.9% flush 10 mL  10 mL Intravenous PRN Candelario Novoa MD        sodium chloride 0.9% flush 10 mL  10 mL Intravenous PRN Candelario Novoa MD              PHYSICAL EXAM   height is 5' 4" (1.626 m) and weight is 90.3 kg (199 lb).   Body mass index is 34.16 kg/m².      All other systems deferred.  GENERAL:  No acute distress  HABITUS: Normal  GAIT: Antalgic  SKIN: Normal  and No erythema, warmth, fluctuance .     KNEE EXAM:    bilateral:   Effusion: Minimal joint effusion  TTP: yes over Medial Joint Line,   no crepitus with passive knee ROM  Passive ROM: Extension 15, Flexion 100  No pain with manipulation of patella  Stable to varus/valgus stress. No increased laxity to anterior/posterior drawer testing  positive Constantin's test  No pain with IR/ER " rotation of the hip  5/5 strength in knee flexion and extension, ankle plantarflexion and dorsiflexion  Neurovascular Status: Sensation intact to light touch in Sural, Saphenous, SPN, DPN, Tibial nerve distribution  2+ pulse DP/PT, normal capillary refill, foot has normal warmth    DATA:  Diagnostic tests reviewed for today's visit:     4v of the knee reveal Moderate to Severe degenerative changes of the Medial and Patellofemoral compartment. There is evidence of advanced osteoarthritis changes with Subchondral sclerosis, Osteophyte formation and Joint space narrowing. The limb is in netural alignment. The patella is tracking midline. Kellegren-Lawerence grade 4 changes

## 2023-09-19 ENCOUNTER — OFFICE VISIT (OUTPATIENT)
Dept: FAMILY MEDICINE | Facility: CLINIC | Age: 73
End: 2023-09-19
Payer: MEDICARE

## 2023-09-19 VITALS
OXYGEN SATURATION: 96 % | TEMPERATURE: 98 F | HEIGHT: 64 IN | WEIGHT: 197.75 LBS | BODY MASS INDEX: 33.76 KG/M2 | SYSTOLIC BLOOD PRESSURE: 146 MMHG | HEART RATE: 87 BPM | DIASTOLIC BLOOD PRESSURE: 84 MMHG

## 2023-09-19 DIAGNOSIS — G89.29 BILATERAL CHRONIC KNEE PAIN: ICD-10-CM

## 2023-09-19 DIAGNOSIS — E78.5 HYPERLIPIDEMIA, UNSPECIFIED HYPERLIPIDEMIA TYPE: Chronic | ICD-10-CM

## 2023-09-19 DIAGNOSIS — L29.9 PRURITIC CONDITION: ICD-10-CM

## 2023-09-19 DIAGNOSIS — F32.0 MILD MAJOR DEPRESSION: Chronic | ICD-10-CM

## 2023-09-19 DIAGNOSIS — I10 BENIGN HYPERTENSION: Chronic | ICD-10-CM

## 2023-09-19 DIAGNOSIS — E11.40 TYPE 2 DIABETES MELLITUS WITH DIABETIC NEUROPATHY, WITHOUT LONG-TERM CURRENT USE OF INSULIN: Primary | ICD-10-CM

## 2023-09-19 DIAGNOSIS — Z23 NEED FOR PROPHYLACTIC VACCINATION AND INOCULATION AGAINST INFLUENZA: ICD-10-CM

## 2023-09-19 DIAGNOSIS — M25.562 BILATERAL CHRONIC KNEE PAIN: ICD-10-CM

## 2023-09-19 DIAGNOSIS — I70.0 ABDOMINAL AORTIC ATHEROSCLEROSIS: Chronic | ICD-10-CM

## 2023-09-19 DIAGNOSIS — M25.561 BILATERAL CHRONIC KNEE PAIN: ICD-10-CM

## 2023-09-19 PROCEDURE — 1159F MED LIST DOCD IN RCRD: CPT | Mod: CPTII,S$GLB,, | Performed by: FAMILY MEDICINE

## 2023-09-19 PROCEDURE — 3077F SYST BP >= 140 MM HG: CPT | Mod: CPTII,S$GLB,, | Performed by: FAMILY MEDICINE

## 2023-09-19 PROCEDURE — 3077F PR MOST RECENT SYSTOLIC BLOOD PRESSURE >= 140 MM HG: ICD-10-PCS | Mod: CPTII,S$GLB,, | Performed by: FAMILY MEDICINE

## 2023-09-19 PROCEDURE — 99214 PR OFFICE/OUTPT VISIT, EST, LEVL IV, 30-39 MIN: ICD-10-PCS | Mod: S$GLB,,, | Performed by: FAMILY MEDICINE

## 2023-09-19 PROCEDURE — 3288F FALL RISK ASSESSMENT DOCD: CPT | Mod: CPTII,S$GLB,, | Performed by: FAMILY MEDICINE

## 2023-09-19 PROCEDURE — 1159F PR MEDICATION LIST DOCUMENTED IN MEDICAL RECORD: ICD-10-PCS | Mod: CPTII,S$GLB,, | Performed by: FAMILY MEDICINE

## 2023-09-19 PROCEDURE — 3051F HG A1C>EQUAL 7.0%<8.0%: CPT | Mod: CPTII,S$GLB,, | Performed by: FAMILY MEDICINE

## 2023-09-19 PROCEDURE — 1101F PR PT FALLS ASSESS DOC 0-1 FALLS W/OUT INJ PAST YR: ICD-10-PCS | Mod: CPTII,S$GLB,, | Performed by: FAMILY MEDICINE

## 2023-09-19 PROCEDURE — 99999 PR PBB SHADOW E&M-EST. PATIENT-LVL V: ICD-10-PCS | Mod: PBBFAC,,, | Performed by: FAMILY MEDICINE

## 2023-09-19 PROCEDURE — G0008 ADMIN INFLUENZA VIRUS VAC: HCPCS | Mod: S$GLB,,, | Performed by: FAMILY MEDICINE

## 2023-09-19 PROCEDURE — 1101F PT FALLS ASSESS-DOCD LE1/YR: CPT | Mod: CPTII,S$GLB,, | Performed by: FAMILY MEDICINE

## 2023-09-19 PROCEDURE — 99999 PR PBB SHADOW E&M-EST. PATIENT-LVL V: CPT | Mod: PBBFAC,,, | Performed by: FAMILY MEDICINE

## 2023-09-19 PROCEDURE — 1125F AMNT PAIN NOTED PAIN PRSNT: CPT | Mod: CPTII,S$GLB,, | Performed by: FAMILY MEDICINE

## 2023-09-19 PROCEDURE — 90694 VACC AIIV4 NO PRSRV 0.5ML IM: CPT | Mod: S$GLB,,, | Performed by: FAMILY MEDICINE

## 2023-09-19 PROCEDURE — 1125F PR PAIN SEVERITY QUANTIFIED, PAIN PRESENT: ICD-10-PCS | Mod: CPTII,S$GLB,, | Performed by: FAMILY MEDICINE

## 2023-09-19 PROCEDURE — 3051F PR MOST RECENT HEMOGLOBIN A1C LEVEL 7.0 - < 8.0%: ICD-10-PCS | Mod: CPTII,S$GLB,, | Performed by: FAMILY MEDICINE

## 2023-09-19 PROCEDURE — 3288F PR FALLS RISK ASSESSMENT DOCUMENTED: ICD-10-PCS | Mod: CPTII,S$GLB,, | Performed by: FAMILY MEDICINE

## 2023-09-19 PROCEDURE — 99214 OFFICE O/P EST MOD 30 MIN: CPT | Mod: S$GLB,,, | Performed by: FAMILY MEDICINE

## 2023-09-19 PROCEDURE — G0008 FLU VACCINE - QUADRIVALENT - ADJUVANTED: ICD-10-PCS | Mod: S$GLB,,, | Performed by: FAMILY MEDICINE

## 2023-09-19 PROCEDURE — 90694 FLU VACCINE - QUADRIVALENT - ADJUVANTED: ICD-10-PCS | Mod: S$GLB,,, | Performed by: FAMILY MEDICINE

## 2023-09-19 PROCEDURE — 3079F PR MOST RECENT DIASTOLIC BLOOD PRESSURE 80-89 MM HG: ICD-10-PCS | Mod: CPTII,S$GLB,, | Performed by: FAMILY MEDICINE

## 2023-09-19 PROCEDURE — 3079F DIAST BP 80-89 MM HG: CPT | Mod: CPTII,S$GLB,, | Performed by: FAMILY MEDICINE

## 2023-09-19 RX ORDER — HYDROXYZINE HYDROCHLORIDE 25 MG/1
25 TABLET, FILM COATED ORAL 3 TIMES DAILY PRN
Qty: 90 TABLET | Refills: 3 | Status: SHIPPED | OUTPATIENT
Start: 2023-09-19 | End: 2023-10-19 | Stop reason: SDUPTHER

## 2023-09-19 NOTE — PROGRESS NOTES
"Routine Office Visit    Valarie Foster  1950  [id number]      Subjective     Valarie is a 73 y.o. female who presents today for:    Diabetes - Patient is doing well on current regimen. She is taking metformin as prescribed.   Neuropathy - sciatica - Patient  has followed up with neurology. She has follow-up scheduled with Dr. Myers   Renal cell carcinoma s/p partial nephrectomy - urology - Dr. Palm   Degenerative arthritis (right knee) - f/u  with Dr. Carbajal - she is scheduled for surgery 11/8. She will need a pre-op 30 days prior to surgery.   Pruritic condition - Patient is requesting a refill on hydroxyzine. She reports that she has itching and she takes hydroxyzine once daily     Objective     Review of Systems   Constitutional:  Negative for chills and fever.   HENT:  Negative for congestion.    Eyes:  Negative for blurred vision.   Respiratory:  Negative for cough.    Cardiovascular:  Negative for chest pain.   Gastrointestinal:  Negative for abdominal pain, constipation, diarrhea, heartburn, nausea and vomiting.   Genitourinary:  Negative for dysuria.   Musculoskeletal:  Negative for myalgias.   Skin:  Negative for itching and rash.   Neurological:  Negative for dizziness and headaches.   Psychiatric/Behavioral:  Negative for depression.        BP (!) 146/84   Pulse 87   Temp 98 °F (36.7 °C)   Ht 5' 4" (1.626 m)   Wt 89.7 kg (197 lb 12 oz)   SpO2 96%   BMI 33.94 kg/m²   Physical Exam  Constitutional:       Appearance: She is well-developed.   HENT:      Head: Normocephalic and atraumatic.   Eyes:      Conjunctiva/sclera: Conjunctivae normal.      Pupils: Pupils are equal, round, and reactive to light.   Cardiovascular:      Rate and Rhythm: Normal rate and regular rhythm.      Heart sounds: Normal heart sounds. No murmur heard.     No friction rub. No gallop.   Pulmonary:      Effort: No respiratory distress.      Breath sounds: Normal breath sounds.   Abdominal:      General: Bowel sounds are " normal. There is no distension.      Palpations: Abdomen is soft.      Tenderness: There is no abdominal tenderness.   Musculoskeletal:         General: Normal range of motion.      Cervical back: Normal range of motion and neck supple.   Lymphadenopathy:      Cervical: No cervical adenopathy.   Skin:     General: Skin is warm.   Neurological:      Mental Status: She is alert and oriented to person, place, and time.             Assessment     Health Maintenance         Date Due Completion Date    COVID-19 Vaccine (5 - Pfizer risk series) 11/22/2022 9/27/2022    Diabetes Urine Screening 05/02/2023 5/2/2022    Eye Exam 05/13/2023 5/13/2022    Foot Exam 11/07/2023 11/7/2022    Override on 11/7/2022: Done    Override on 11/1/2021: Done    Override on 8/16/2018: Done (Dr. Lashay Dee ( Podiatry ))    Hemoglobin A1c 10/29/2023 4/29/2023    Lipid Panel 10/31/2023 10/31/2022    High Dose Statin 09/19/2024 9/19/2023    DEXA Scan 07/13/2025 7/13/2023    Colorectal Cancer Screening 08/13/2025 8/13/2020    TETANUS VACCINE 04/30/2028 4/30/2018              Problem List Items Addressed This Visit          Psychiatric    Mild major depression (Chronic)  The current medical regimen is effective;  continue present plan and medications.          Cardiac/Vascular    Abdominal aortic atherosclerosis (Chronic)    Overview     Patient with Atherosclerosis of the Aorta noted on CT 4/2019.  Stable/asymptomatic. Currently stable on lipid and b/p monitoring.           Benign hypertension (Chronic)  The current medical regimen is effective;  continue present plan and medications.       Hyperlipemia (Chronic)  The current medical regimen is effective;  continue present plan and medications.          Endocrine    Type 2 diabetes mellitus with diabetic neuropathy, without long-term current use of insulin - Primary (Chronic)    Relevant Orders    CBC Auto Differential    Comprehensive Metabolic Panel    Lipid Panel    Hemoglobin A1C    TSH        Orthopedic    Bilateral chronic knee pain  Noted   Scheduled for surgery   Will schedule pre-op        Other Visit Diagnoses       Need for prophylactic vaccination and inoculation against influenza        Relevant Orders    Influenza (FLUAD) - Quadrivalent (Adjuvanted) *Preferred* (65+) (PF) (Completed)    Pruritic condition        Relevant Medications    hydrOXYzine HCL (ATARAX) 25 MG tablet                  Follow up if symptoms worsen or fail to improve.

## 2023-09-19 NOTE — PROGRESS NOTES
Patient given Flu vaccine via of injection, 0 complaints of, tolerated well. Advised to wait in lobby for 15mins for monitoring. Understanding verbalized.
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
## 2023-09-28 ENCOUNTER — PATIENT MESSAGE (OUTPATIENT)
Dept: ADMINISTRATIVE | Facility: HOSPITAL | Age: 73
End: 2023-09-28
Payer: MEDICARE

## 2023-10-09 ENCOUNTER — HOSPITAL ENCOUNTER (OUTPATIENT)
Dept: RADIOLOGY | Facility: HOSPITAL | Age: 73
Discharge: HOME | End: 2023-10-09
Attending: ORTHOPAEDIC SURGERY
Payer: MEDICARE

## 2023-10-09 DIAGNOSIS — M17.0 PRIMARY OSTEOARTHRITIS OF BOTH KNEES: ICD-10-CM

## 2023-10-09 PROCEDURE — 73700 CT LOWER EXTREMITY W/O DYE: CPT | Mod: 26,RT,, | Performed by: INTERNAL MEDICINE

## 2023-10-09 PROCEDURE — 73700 CT KNEE WITHOUT CONTRAST RIGHT W/MAKO PROTOCOL: ICD-10-PCS | Mod: 26,RT,, | Performed by: INTERNAL MEDICINE

## 2023-10-09 PROCEDURE — 73700 CT LOWER EXTREMITY W/O DYE: CPT | Mod: TC,RT

## 2023-10-12 ENCOUNTER — ANESTHESIA EVENT (OUTPATIENT)
Dept: SURGERY | Facility: HOSPITAL | Age: 73
End: 2023-10-12
Payer: MEDICARE

## 2023-10-13 ENCOUNTER — TELEPHONE (OUTPATIENT)
Dept: PREADMISSION TESTING | Facility: HOSPITAL | Age: 73
End: 2023-10-13
Payer: MEDICARE

## 2023-10-19 ENCOUNTER — TELEPHONE (OUTPATIENT)
Dept: FAMILY MEDICINE | Facility: CLINIC | Age: 73
End: 2023-10-19
Payer: MEDICARE

## 2023-10-19 DIAGNOSIS — L29.9 PRURITIC CONDITION: ICD-10-CM

## 2023-10-19 RX ORDER — HYDROXYZINE HYDROCHLORIDE 25 MG/1
25 TABLET, FILM COATED ORAL 3 TIMES DAILY PRN
Qty: 90 TABLET | Refills: 3 | Status: SHIPPED | OUTPATIENT
Start: 2023-10-19 | End: 2023-11-17 | Stop reason: SDUPTHER

## 2023-10-19 NOTE — TELEPHONE ENCOUNTER
----- Message from Jessica Dennison sent at 10/19/2023  8:02 AM CDT -----  Type: RX Refill Request    Who Called:  self     Have you contacted your pharmacy: no    Refill or New Rx: refill    RX Name and Strength: hydrOXYzine HCL (ATARAX) 25 MG tablet      [facility] with phone number[facility] [place] [id number] [facility]   Phone:  [phone]  Fax:  [phone]    Local or Mail Order: local    Would the patient rather a call back or a response via [facility]?  call    Best Call Back Number: .[phone] (home)      Additional Information:
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
## 2023-10-25 ENCOUNTER — HOSPITAL ENCOUNTER (OUTPATIENT)
Dept: PREADMISSION TESTING | Facility: HOSPITAL | Age: 73
Discharge: HOME | End: 2023-10-25
Attending: ORTHOPAEDIC SURGERY
Payer: MEDICARE

## 2023-10-25 ENCOUNTER — HOSPITAL ENCOUNTER (OUTPATIENT)
Dept: RADIOLOGY | Facility: HOSPITAL | Age: 73
Discharge: HOME | End: 2023-10-25
Attending: ORTHOPAEDIC SURGERY
Payer: MEDICARE

## 2023-10-25 VITALS
HEART RATE: 79 BPM | SYSTOLIC BLOOD PRESSURE: 161 MMHG | HEIGHT: 64 IN | WEIGHT: 199.75 LBS | DIASTOLIC BLOOD PRESSURE: 85 MMHG | TEMPERATURE: 98 F | BODY MASS INDEX: 34.1 KG/M2 | RESPIRATION RATE: 18 BRPM | OXYGEN SATURATION: 99 %

## 2023-10-25 DIAGNOSIS — E78.00 HYPERCHOLESTEREMIA: ICD-10-CM

## 2023-10-25 DIAGNOSIS — I10 HYPERTENSION: ICD-10-CM

## 2023-10-25 DIAGNOSIS — Z86.39 H/O: OBESITY: ICD-10-CM

## 2023-10-25 DIAGNOSIS — Z79.01 ANTICOAGULANT LONG-TERM USE: ICD-10-CM

## 2023-10-25 DIAGNOSIS — Z01.818 PREOPERATIVE TESTING: Primary | ICD-10-CM

## 2023-10-25 LAB
ALBUMIN SERPL BCP-MCNC: 3.9 G/DL (ref 3.5–5.2)
ALP SERPL-CCNC: 129 U/L (ref 55–135)
ALT SERPL W/O P-5'-P-CCNC: 13 U/L (ref 10–44)
ANION GAP SERPL CALC-SCNC: 8 MMOL/L (ref 8–16)
APTT PPP: 33.6 SEC (ref 21–32)
AST SERPL-CCNC: 14 U/L (ref 10–40)
BASOPHILS # BLD AUTO: 0.01 K/UL (ref 0–0.2)
BASOPHILS NFR BLD: 0.2 % (ref 0–1.9)
BILIRUB SERPL-MCNC: 0.4 MG/DL (ref 0.1–1)
BILIRUB UR QL STRIP: NEGATIVE
BUN SERPL-MCNC: 15 MG/DL (ref 8–23)
CALCIUM SERPL-MCNC: 9.5 MG/DL (ref 8.7–10.5)
CHLORIDE SERPL-SCNC: 104 MMOL/L (ref 95–110)
CHOLEST SERPL-MCNC: 155 MG/DL (ref 120–199)
CHOLEST/HDLC SERPL: 2.7 {RATIO} (ref 2–5)
CLARITY UR: CLEAR
CO2 SERPL-SCNC: 27 MMOL/L (ref 23–29)
COLOR UR: YELLOW
CREAT SERPL-MCNC: 0.8 MG/DL (ref 0.5–1.4)
DIFFERENTIAL METHOD: ABNORMAL
EOSINOPHIL # BLD AUTO: 0 K/UL (ref 0–0.5)
EOSINOPHIL NFR BLD: 0.2 % (ref 0–8)
ERYTHROCYTE [DISTWIDTH] IN BLOOD BY AUTOMATED COUNT: 14.6 % (ref 11.5–14.5)
EST. GFR  (NO RACE VARIABLE): >60 ML/MIN/1.73 M^2
ESTIMATED AVG GLUCOSE: 166 MG/DL (ref 68–131)
GLUCOSE SERPL-MCNC: 121 MG/DL (ref 70–110)
GLUCOSE UR QL STRIP: NEGATIVE
HBA1C MFR BLD: 7.4 % (ref 4–5.6)
HCT VFR BLD AUTO: 41.3 % (ref 37–48.5)
HDLC SERPL-MCNC: 57 MG/DL (ref 40–75)
HDLC SERPL: 36.8 % (ref 20–50)
HGB BLD-MCNC: 13 G/DL (ref 12–16)
HGB UR QL STRIP: NEGATIVE
IMM GRANULOCYTES # BLD AUTO: 0.02 K/UL (ref 0–0.04)
IMM GRANULOCYTES NFR BLD AUTO: 0.4 % (ref 0–0.5)
INR PPP: 1.1 (ref 0.8–1.2)
KETONES UR QL STRIP: NEGATIVE
LDLC SERPL CALC-MCNC: 82.4 MG/DL (ref 63–159)
LEUKOCYTE ESTERASE UR QL STRIP: NEGATIVE
LYMPHOCYTES # BLD AUTO: 0.9 K/UL (ref 1–4.8)
LYMPHOCYTES NFR BLD: 17.8 % (ref 18–48)
MCH RBC QN AUTO: 25.9 PG (ref 27–31)
MCHC RBC AUTO-ENTMCNC: 31.5 G/DL (ref 32–36)
MCV RBC AUTO: 82 FL (ref 82–98)
MONOCYTES # BLD AUTO: 0.4 K/UL (ref 0.3–1)
MONOCYTES NFR BLD: 7.3 % (ref 4–15)
NEUTROPHILS # BLD AUTO: 3.8 K/UL (ref 1.8–7.7)
NEUTROPHILS NFR BLD: 74.1 % (ref 38–73)
NITRITE UR QL STRIP: NEGATIVE
NONHDLC SERPL-MCNC: 98 MG/DL
NRBC BLD-RTO: 0 /100 WBC
PH UR STRIP: 7 [PH] (ref 5–8)
PLATELET # BLD AUTO: 231 K/UL (ref 150–450)
PMV BLD AUTO: 11.5 FL (ref 9.2–12.9)
POTASSIUM SERPL-SCNC: 4.3 MMOL/L (ref 3.5–5.1)
PROT SERPL-MCNC: 7.4 G/DL (ref 6–8.4)
PROT UR QL STRIP: NEGATIVE
PROTHROMBIN TIME: 11.4 SEC (ref 9–12.5)
RBC # BLD AUTO: 5.01 M/UL (ref 4–5.4)
SODIUM SERPL-SCNC: 139 MMOL/L (ref 136–145)
SP GR UR STRIP: 1.02 (ref 1–1.03)
TRIGL SERPL-MCNC: 78 MG/DL (ref 30–150)
TSH SERPL DL<=0.005 MIU/L-ACNC: 0.55 UIU/ML (ref 0.4–4)
URN SPEC COLLECT METH UR: NORMAL
UROBILINOGEN UR STRIP-ACNC: NEGATIVE EU/DL
WBC # BLD AUTO: 5.18 K/UL (ref 3.9–12.7)

## 2023-10-25 PROCEDURE — 93010 ELECTROCARDIOGRAM REPORT: CPT | Mod: ,,, | Performed by: INTERNAL MEDICINE

## 2023-10-25 PROCEDURE — 82570 ASSAY OF URINE CREATININE: CPT | Performed by: FAMILY MEDICINE

## 2023-10-25 PROCEDURE — 93010 EKG 12-LEAD: ICD-10-PCS | Mod: ,,, | Performed by: INTERNAL MEDICINE

## 2023-10-25 PROCEDURE — 81003 URINALYSIS AUTO W/O SCOPE: CPT | Performed by: ORTHOPAEDIC SURGERY

## 2023-10-25 PROCEDURE — 80053 COMPREHEN METABOLIC PANEL: CPT | Performed by: ORTHOPAEDIC SURGERY

## 2023-10-25 PROCEDURE — 71046 X-RAY EXAM CHEST 2 VIEWS: CPT | Mod: 26,,, | Performed by: INTERNAL MEDICINE

## 2023-10-25 PROCEDURE — 85025 COMPLETE CBC W/AUTO DIFF WBC: CPT | Performed by: ORTHOPAEDIC SURGERY

## 2023-10-25 PROCEDURE — 93005 ELECTROCARDIOGRAM TRACING: CPT

## 2023-10-25 PROCEDURE — 71046 X-RAY EXAM CHEST 2 VIEWS: CPT | Mod: TC,FY

## 2023-10-25 PROCEDURE — 84443 ASSAY THYROID STIM HORMONE: CPT | Performed by: FAMILY MEDICINE

## 2023-10-25 PROCEDURE — 80061 LIPID PANEL: CPT | Performed by: FAMILY MEDICINE

## 2023-10-25 PROCEDURE — 71046 XR CHEST PA AND LATERAL PRE-OP: ICD-10-PCS | Mod: 26,,, | Performed by: INTERNAL MEDICINE

## 2023-10-25 PROCEDURE — 83036 HEMOGLOBIN GLYCOSYLATED A1C: CPT | Performed by: ORTHOPAEDIC SURGERY

## 2023-10-25 PROCEDURE — 85730 THROMBOPLASTIN TIME PARTIAL: CPT | Performed by: ORTHOPAEDIC SURGERY

## 2023-10-25 PROCEDURE — 85610 PROTHROMBIN TIME: CPT | Performed by: ORTHOPAEDIC SURGERY

## 2023-10-25 NOTE — DISCHARGE INSTRUCTIONS
YOUR PROCEDURE WILL BE AT [facility] at [address] [place] [address]                  Before 7 AM, enter through the Emergency Entrance..   After 7 AM enter through the Main Entrance.                 Report to the Same Day Surgery Registration Desk in the hallway.(Just beside the Same Day Surgery Unit)      Your procedure  is scheduled for __11/8/2023________.    Call [phone] between 2pm and 5pm on __11/7/2023_____to find out your arrival time for the day of surgery.    You may have two visitors.  No children under 12 years old.     You will be going to the Same Day Surgery Unit on the 2nd floor of the hospital.    Important instructions:  Do not eat anything after midnight.  You may have plain water, non carbonated.  You may also have Gatorade or Powerade after midnight.    Stop all fluids 2 hours before your surgery.    It is okay to brush your teeth.  Do not have gum, candy or mints.    SEE MEDICATION SHEET.   TAKE MEDICATIONS AS DIRECTED WITH SIPS OF WATER.      Do not take any diabetic medication on the morning of surgery unless instructed to do so by your doctor or pre op nurse.      All GLP-1 weekly diabetic/weight loss medications must not be taken for one week before your surgery, or your surgery could be canceled.      STOP taking Aspirin, Ibuprofen,  Advil, Motrin, Mobic(meloxicam), Aleve (naproxen), Fish oil, and Vitamin E for at least 7 days before your surgery.     You may take Tylenol if needed which is not a blood thinner.    Please shower the night before and the morning of your surgery.      Use Chlorhexidine soap as instructed by your pre op nurse.   Please place clean linens on your bed the night before surgery. Please wear fresh clean clothing after each shower.    No shaving of procedural area at least 4-5 days before surgery due to increased risk of skin irritation and/or possible infection.    Contact lenses and removable denture work may not be  worn during your procedure.    You may wear deodorant only. If you are having breast surgery, do not wear deodorant on the operative side.    Do not wear powder, body lotion, perfume/cologne or make-up.    Do not wear any jewelry or have any metal on your body.    You will be asked to remove any dentures or partials for the procedure.    If you are going home on the same day of surgery, you must arrange for a family member or a friend to drive you home.  Public transportation is prohibited.  You will not be able to drive home if you were given anesthesia or sedation.    Patients who want to have their Post-op prescriptions filled from our in-house [facility], bring a Credit/Debit Card or cash with you. A co-pay may be required.  The pharmacy closes at 5:30 pm.    Wear loose fitting clothes allowing for bandages.    Please leave money and valuables home.      You may bring your cell phone.    Call the doctor if fever or illness should occur before your surgery.    Call [phone] to contact us here if needed.                            CLOTHES ON DAY OF SURGERY    SHOULDER surgery:  you must have a very oversized shirt.  Very, Very large.  You will probably have a large sling on with your arm strapped to your chest.  You will not be able to put the arm of the operated shoulder into a sleeve.  You can put the arm of the un-operated shoulder into the sleeve, but the shirt will need to be draped over the operated shoulder.       ARM or HAND surgery:  make sure that your sleeves are large and loose enough to pass over large dressings or cast.      BREAST or UNDERARM surgery:  wear a loose, button down shirt so that you can dress without raising your arms over your head.    ABDOMINAL surgery:  wear loose, comfortable clothing.  Nothing tight around the abdomen.  NO JEANS    PENIS or SCROTAL surgery:  loose comfortable clothing.  Large sweat pants, pajama pants or a robe.  ABSOLUTELY NO JEANS      LEG or FOOT surgery:   wear large loose pants that are able to pass over any large dressings or casts.  You could also wear loose shorts or a skirt.

## 2023-10-25 NOTE — ANESTHESIA PREPROCEDURE EVALUATION
10/25/2023  Valarie Foster is a 73 y.o., female scheduled for  ARTHROPLASTY, KNEE, TOTAL. NELSON (Right) - on 11/8/2023.    Past Medical History:   Diagnosis Date    Arthritis     Colon polyp     Diabetes mellitus     diet controlled    Diabetes with neurologic complications     Hypertension     Nuclear sclerosis of both eyes 2/14/2022    Renal cell carcinoma        Past Surgical History:   Procedure Laterality Date    COLONOSCOPY N/A 2/5/2018    Procedure: COLONOSCOPY;  Surgeon: Bacilio Mancia MD;  Location: [facility] ENDO;  Service: Endoscopy;  Laterality: N/A;  appt confirmed-ss    COLONOSCOPY N/A 8/13/2020    Procedure: COLONOSCOPY;  Surgeon: Jose West MD;  Location: [facility] ENDO;  Service: Endoscopy;  Laterality: N/A;    HYSTERECTOMY      INTRAOCULAR PROSTHESES INSERTION Left 7/7/2022    Procedure: INSERTION, IOL PROSTHESIS;  Surgeon: Candelario Novoa MD;  Location: [facility] OR;  Service: Ophthalmology;  Laterality: Left;    INTRAOCULAR PROSTHESES INSERTION Right 7/21/2022    Procedure: INSERTION, IOL PROSTHESIS;  Surgeon: Candelario Novoa MD;  Location: [facility] OR;  Service: Ophthalmology;  Laterality: Right;    OOPHORECTOMY      PARTIAL NEPHRECTOMY Right 10/12/2018    Procedure: NEPHRECTOMY, PARTIAL open. Dr Arenas to assist.;  Surgeon: Alejandra Palm MD;  Location: [facility] OR;  Service: Urology;  Laterality: Right;  RN PREOP 10/9/2018----NEEDS H/P    PHACOEMULSIFICATION OF CATARACT Left 7/7/2022    Procedure: PHACOEMULSIFICATION, CATARACT;  Surgeon: Candelario Novoa MD;  Location: [facility] OR;  Service: Ophthalmology;  Laterality: Left;  RN PHONE PREOP 6/27/2022   ARRIVAL 6:00 AM    PHACOEMULSIFICATION OF CATARACT Right 7/21/2022    Procedure: PHACOEMULSIFICATION, CATARACT;  Surgeon: Candelario Novoa MD;  Location: [facility] OR;  Service: Ophthalmology;  Laterality:  Right;  RN PHONE PREOP 7/12/2022   ARRIVAL 12:00 NOON           Pre-op Assessment    I have reviewed the Patient Summary Reports.     I have reviewed the Nursing Notes. I have reviewed the NPO Status.   I have reviewed the Medications.     Review of Systems  Anesthesia Hx:  No problems with previous Anesthesia  Denies Family Hx of Anesthesia complications.   Denies Personal Hx of Anesthesia complications.   Social:  Non-Smoker, No Alcohol Use    Hematology/Oncology:  Hematology Normal       -- Cancer in past history:  Oncology Comments: Right partial nephrectomy 2018     EENT/Dental:EENT/Dental Normal   Cardiovascular:   Hypertension hyperlipidemia    Pulmonary:  Pulmonary Normal    Renal/:   Chronic Renal Disease    Hepatic/GI:   GERD    Musculoskeletal:   Arthritis  R knee   Neurological:   Denies CVA. Neuromuscular Disease,  Denies Seizures.    Endocrine:   Diabetes, type 2  Obesity / BMI > 30  Psych:   Psychiatric History depression          Physical Exam  General: Well nourished, Cooperative, Alert and Oriented    Airway:  Mouth Opening: Normal  TM Distance: Normal  Tongue: Normal  Neck ROM: Normal ROM    Dental:  Intact    Chest/Lungs:  Normal Respiratory Rate    Heart:  Rate: Normal  Rhythm: Regular Rhythm  Sounds: Normal        Anesthesia Plan  Type of Anesthesia, risks & benefits discussed:    Anesthesia Type: Spinal  Intra-op Monitoring Plan: Standard ASA Monitors  Induction:  IV  Informed Consent: Informed consent signed with the Patient and all parties understand the risks and agree with anesthesia plan.  All questions answered.   ASA Score: 2    Ready For Surgery From Anesthesia Perspective.     .

## 2023-10-25 NOTE — PRE-PROCEDURE INSTRUCTIONS
Patient is seeing PCP tomorrow for clearance  Patient saw Dr. Dolan in April for chest pain. Stress test was ordered but not done. Patient states she did not have a ride.  Discussed the need to cardiac clearance and getting stress test completed.  Set up stress test for 11/1. Patient verbalizes understanding.

## 2023-10-26 ENCOUNTER — TELEPHONE (OUTPATIENT)
Dept: FAMILY MEDICINE | Facility: CLINIC | Age: 73
End: 2023-10-26
Payer: MEDICARE

## 2023-10-26 ENCOUNTER — OFFICE VISIT (OUTPATIENT)
Dept: FAMILY MEDICINE | Facility: CLINIC | Age: 73
End: 2023-10-26
Payer: MEDICARE

## 2023-10-26 VITALS
SYSTOLIC BLOOD PRESSURE: 131 MMHG | WEIGHT: 198.88 LBS | OXYGEN SATURATION: 98 % | HEART RATE: 67 BPM | BODY MASS INDEX: 33.95 KG/M2 | TEMPERATURE: 98 F | HEIGHT: 64 IN | DIASTOLIC BLOOD PRESSURE: 78 MMHG

## 2023-10-26 DIAGNOSIS — R07.89 OTHER CHEST PAIN: ICD-10-CM

## 2023-10-26 DIAGNOSIS — I10 BENIGN HYPERTENSION: ICD-10-CM

## 2023-10-26 DIAGNOSIS — M25.562 BILATERAL CHRONIC KNEE PAIN: ICD-10-CM

## 2023-10-26 DIAGNOSIS — M25.561 CHRONIC PAIN OF RIGHT KNEE: ICD-10-CM

## 2023-10-26 DIAGNOSIS — E11.40 TYPE 2 DIABETES MELLITUS WITH DIABETIC NEUROPATHY, WITHOUT LONG-TERM CURRENT USE OF INSULIN: ICD-10-CM

## 2023-10-26 DIAGNOSIS — G89.29 CHRONIC PAIN OF RIGHT KNEE: ICD-10-CM

## 2023-10-26 DIAGNOSIS — Z01.818 PREOPERATIVE EXAMINATION: Primary | ICD-10-CM

## 2023-10-26 DIAGNOSIS — G89.29 BILATERAL CHRONIC KNEE PAIN: ICD-10-CM

## 2023-10-26 DIAGNOSIS — E78.5 HYPERLIPIDEMIA ASSOCIATED WITH TYPE 2 DIABETES MELLITUS: ICD-10-CM

## 2023-10-26 DIAGNOSIS — E11.69 HYPERLIPIDEMIA ASSOCIATED WITH TYPE 2 DIABETES MELLITUS: ICD-10-CM

## 2023-10-26 DIAGNOSIS — M25.561 BILATERAL CHRONIC KNEE PAIN: ICD-10-CM

## 2023-10-26 DIAGNOSIS — E66.01 SEVERE OBESITY (BMI 35.0-39.9) WITH COMORBIDITY: ICD-10-CM

## 2023-10-26 LAB
ALBUMIN/CREAT UR: 16.3 UG/MG (ref 0–30)
CREAT UR-MCNC: 92 MG/DL (ref 15–325)
MICROALBUMIN UR DL<=1MG/L-MCNC: 15 UG/ML

## 2023-10-26 PROCEDURE — 3061F PR NEG MICROALBUMINURIA RESULT DOCUMENTED/REVIEW: ICD-10-PCS | Mod: CPTII,S$GLB,, | Performed by: FAMILY MEDICINE

## 2023-10-26 PROCEDURE — 3051F HG A1C>EQUAL 7.0%<8.0%: CPT | Mod: CPTII,S$GLB,, | Performed by: FAMILY MEDICINE

## 2023-10-26 PROCEDURE — 1101F PT FALLS ASSESS-DOCD LE1/YR: CPT | Mod: CPTII,S$GLB,, | Performed by: FAMILY MEDICINE

## 2023-10-26 PROCEDURE — 3075F SYST BP GE 130 - 139MM HG: CPT | Mod: CPTII,S$GLB,, | Performed by: FAMILY MEDICINE

## 2023-10-26 PROCEDURE — 99999 PR PBB SHADOW E&M-EST. PATIENT-LVL V: CPT | Mod: PBBFAC,,, | Performed by: FAMILY MEDICINE

## 2023-10-26 PROCEDURE — 3066F NEPHROPATHY DOC TX: CPT | Mod: CPTII,S$GLB,, | Performed by: FAMILY MEDICINE

## 2023-10-26 PROCEDURE — 99215 OFFICE O/P EST HI 40 MIN: CPT | Mod: S$GLB,,, | Performed by: FAMILY MEDICINE

## 2023-10-26 PROCEDURE — 3051F PR MOST RECENT HEMOGLOBIN A1C LEVEL 7.0 - < 8.0%: ICD-10-PCS | Mod: CPTII,S$GLB,, | Performed by: FAMILY MEDICINE

## 2023-10-26 PROCEDURE — 1125F AMNT PAIN NOTED PAIN PRSNT: CPT | Mod: CPTII,S$GLB,, | Performed by: FAMILY MEDICINE

## 2023-10-26 PROCEDURE — 1101F PR PT FALLS ASSESS DOC 0-1 FALLS W/OUT INJ PAST YR: ICD-10-PCS | Mod: CPTII,S$GLB,, | Performed by: FAMILY MEDICINE

## 2023-10-26 PROCEDURE — 1125F PR PAIN SEVERITY QUANTIFIED, PAIN PRESENT: ICD-10-PCS | Mod: CPTII,S$GLB,, | Performed by: FAMILY MEDICINE

## 2023-10-26 PROCEDURE — 3066F PR DOCUMENTATION OF TREATMENT FOR NEPHROPATHY: ICD-10-PCS | Mod: CPTII,S$GLB,, | Performed by: FAMILY MEDICINE

## 2023-10-26 PROCEDURE — 99215 PR OFFICE/OUTPT VISIT, EST, LEVL V, 40-54 MIN: ICD-10-PCS | Mod: S$GLB,,, | Performed by: FAMILY MEDICINE

## 2023-10-26 PROCEDURE — 3288F FALL RISK ASSESSMENT DOCD: CPT | Mod: CPTII,S$GLB,, | Performed by: FAMILY MEDICINE

## 2023-10-26 PROCEDURE — 3078F DIAST BP <80 MM HG: CPT | Mod: CPTII,S$GLB,, | Performed by: FAMILY MEDICINE

## 2023-10-26 PROCEDURE — 3078F PR MOST RECENT DIASTOLIC BLOOD PRESSURE < 80 MM HG: ICD-10-PCS | Mod: CPTII,S$GLB,, | Performed by: FAMILY MEDICINE

## 2023-10-26 PROCEDURE — 3075F PR MOST RECENT SYSTOLIC BLOOD PRESS GE 130-139MM HG: ICD-10-PCS | Mod: CPTII,S$GLB,, | Performed by: FAMILY MEDICINE

## 2023-10-26 PROCEDURE — 99999 PR PBB SHADOW E&M-EST. PATIENT-LVL V: ICD-10-PCS | Mod: PBBFAC,,, | Performed by: FAMILY MEDICINE

## 2023-10-26 PROCEDURE — 3061F NEG MICROALBUMINURIA REV: CPT | Mod: CPTII,S$GLB,, | Performed by: FAMILY MEDICINE

## 2023-10-26 PROCEDURE — 3288F PR FALLS RISK ASSESSMENT DOCUMENTED: ICD-10-PCS | Mod: CPTII,S$GLB,, | Performed by: FAMILY MEDICINE

## 2023-10-26 NOTE — TELEPHONE ENCOUNTER
----- Message from Alena Hernandez MD sent at 10/26/2023  8:01 AM CDT -----  Cholesterol numbers have improved   Keep up the great work!     Have you checked your blood pressure recently?  What is the number?

## 2023-10-26 NOTE — TELEPHONE ENCOUNTER
Informed patient of lab results from previous message. Patient states she came in today for an appointment and her pressure was 131/78

## 2023-10-26 NOTE — TELEPHONE ENCOUNTER
----- Message from Lila Ramos sent at 10/26/2023 10:14 AM CDT -----  Regarding: Return call  .Type:  Patient Returning Call    Who Called: self     Who Left Message for Patient: Avelina Alonso MA     Does the patient know what this is regarding?:no    Would the patient rather a call back or a response via My [facility]? Call     Best Call Back Number:.[phone]      Additional Information:

## 2023-10-26 NOTE — TELEPHONE ENCOUNTER
Noted.   I reviewed this part of the message.   Progress Notes by Wendy Ellis, Pharm.D. at 02/05/18 04:22 PM     Author:  Wendy Ellis, Pharm.D. Service:  (none) Author Type:  Pharmacist     Filed:  02/05/18 04:44 PM Encounter Date:  1/31/2018 Status:  Signed     :  Wendy Ellis, Pharm.D. (Pharmacist)            Ed Posadas is a 68 year old male patient of Dr. Pascual who presents for a follow up diabetes management visit.  Patient states he is not feeling well today as he been having symptoms of the flu since last visit and BG levels have been high since Jan 22 with a fasting range of 160-256 and AVG of 203.  Patient recently hospitalized (around Jan 2). He had weakness, loss of strength, forgetfulness, low blood sugars, and a fever. See hospital notes. It was unclear at discharge whether symptoms were due to a virus, low blood sugars, or a combination of various factors.  He has since stopped the sulfonylurea and lantus was increased to 14 U from Jan 22 visit.     PMH:  T2DM  HTN  Dyslipidemia    ASHD   CAD s/p percutaneous coronary angioplasty, MI 5/14/14  Obstructive sleep apnea  Extrapyramidal Disorder  BPH  Degenerative Disc Disease  Lumbosacral spondylosis  Osteoarthritis  Anxiety  Depression      Insomnia  Fatty liver  Rhinitis  Dermatochalasis  Diabetic neuropathy  Periodic limb movement disorder  Cataract surgery     ALL:   Codeine - anaphylaxis    Amoxicillin - swelling   Hydrocodone - SOB                       Vicodin - sweating, couldn’t breathe  Atorvastatin - abnormal LFTs  Morphine - closed throat      MEDS:   Lantus 14 units daily> increased to 18 U today  Metformin 1000 mg BID with meals  Carvedilol 6.25 mg BID with meals (recently increased per cardiology) Has 3.125 mg tabs and takes 2 tablets twice daily  Lisinopril 5 mg - ½ tablet daily  Rosuvastatin 20 mg - ½ tablet every evening  Gabapentin 300 mg TID - patient taking it only BID since always experiencing dizziness   Albuterol (Proair HFA) - 2 puffs BID prn for cough and  wheezing  Fluticasone 2 sprays per nostril daily - taking prn, last dose: one month ago  Escitalopram 20 mg daily   Imipramine (Tofranil) 25 mg nightly  Ropinirole (Requip) 0.5 mg - 2 tablets nightly   Nitroglycerin 0.4 mg SL - 1 tablet q 5 min prn chest pain. Seek emergency care if need more than 3 tabs in 15 min. Has not used recently.   Cialis 20 mg daily prn utd - reminded of contraindicated use with nitroglycerin.   Aspirin 81 mg daily  Vitamin D 5000 IU daily   Ferrous sulfate 325 mg BID - patient taking daily   Omeprazole 20 mg - 2 caps daily   Fexofenadine (Allegra) 180 mg daily prn  Selenium sulfide 2.5% shampoo - apply to aa daily prn for redness and itching  Metronidazole 0.75% cream BID   FH:  Mother: heart disease  Father: heart disease, diabetes  Sister: heart disease, diabetes, HTN  Brother: heart disease, HTN, cancer (liver/bladder)     SH:  (-) tobacco quit 3/1/73   (-) EtOH Patient reports few drinks over weekend (10/27-10/29) for son’s wedding  (+) Caffeine: Drinks 2 pots of black coffee per day. Patient is open to transitioning to decaf.      DIET:    Patient feels diet has improved and states he has been consistent with his diet but since he has been ill since last week he has not been eating much. Makes healthier choices, trying to avoid late night snacks and does not go out for meals very much. When he does go out for meals, he chooses something comparable to what he would eat at home.      B: Eating out more lately; pancakes a few times/week; eggs, cereal occasionally. Today breakfast is same as stated and said has toast sometimes  L: Porter, yogurt, cottage cheese, fruit, and [place] salad.  D: Meals have been brought in- steak, to other foods. Stuffed chicken breast  Occasional salads, fruits/veg. had taco salad yesterday  EXERCISE:   Not regularly exercising due to being ill in the past week.      ROS:  (+) headache patient thinks from previous neck injury  (+) dizziness, upon  standing (in the morning, sometimes in the evening when laying down)  (-) blurred vision:  (-) hypoglycemic events  (-) shortness of breath, on exertion (mild)  (-) chest pain  (-) polydipsia/polyphagia  (+) polyuria 4-5xs a day  (+) tingling/numbness: on soles of his feet, on gabapentin  (+) muscle aches: muscle aches in forearms and shoulders and states that that woke up at night from the pain in his forearm      SMBG:   January fasting AM average 175, range 100-256  Since Jan 22 visit AM AVG= 203; lunch PZS=146; PM AVG for Jan 163 (6 readings, range 123-201)   -this is while taking 14 U of lantus and the Metformin 1000 BID  Since hospitalization:  AM fasting average 154; range  (n=27)  2 hours after lunch: 112, 146, 188, 286  Pre dinner: Only two readings: 76, 93   2 hours after dinner/ evening: Average 162; range  (n=10)     10/31-11/13/17:  AM Fasting average: 173, range 115-201  2 hours after breakfast: 286, 246, 202  2 hours after lunch: 180, 273, 152  2 hours after dinner: 228      VITALS:      Filed Vitals:      01/22/18 1156                                 01/31/2018   BP: 122/76                                                  122/82   Pulse: 86                                                              69   BMI (kg/m2): 40.10  CrCl: 71 (adjBW)  ASCVD: 28.8%     LABS:  Diabetic/Lipid 11/29 11/13/17 7/26/2017 4/20/2017 4/17/2017 2/13/2017   HEMOGLOBIN A1C   8.7 (H) 8.3 (H) 7.2 (H)   8.0 (H)   CREATININE, SERUM   1.1 1.1 1.0 1.2 1.1   EGFR NON-   >60 >60 >60 60 (L) >60   MICROALBUMIN, URINE     101.0         MICROALBUMIN/CREATININE RATIO     97.9 (H)         CHOLESTEROL   138 149         LDL CHOL, CALCULATED   76 80         HIGH DENSITY LIPOPRO   33 45         TRIGLYCERIDES   147 122         K (POTASSIUM, SERUM) 5.2 5.5 5.0 4.8 4.5 5.1   ASPARTATE AMINOTRANS   22 22   33 29   ALANINE AMINOTRANSFE   29 42   53 (H) 49 (H)      Component Latest Ref Rng & Units 1/10/2018 11/13/2017    WHITE BLOOD CELL COU 4.0 - 10.0 10*3/uL 6.5 8.4   RED BLOOD CELL COUNT 3.90 - 5.70 10*6/uL 3.99 4.62   HEMOGLOBIN 13.7 - 17.5 g/dL 11.8 (L) 13.4 (L)   HEMATOCRIT 40.0 - 51.0 % 36.6 (L) 42.5   MEAN CORPUSCULAR VOL 79.0 - 95.0 fL 91.7 92.0   MEAN CORPUSCULAR HGB 27.0 - 34.0 pg 29.6 29.0   MEAN CORPUSCULAR HGB CONCENTRATION 32.0 - 36.0 % 32.2 31.5 (L)   PLATELET COUNT 150 - 400 10*3/uL 460 (H) 334   PROTEIN, TOTAL SERUM 6.4 - 8.5 g/dL 6.9     NA (SODIUM, SERUM) 136 - 146 mmol/L 142     K (POTASSIUM, SERUM) 3.5 - 5.3 mmol/L 5.3     GLUCOSE, RANDOM 70 - 200 mg/dL 122     CREATININE, SERUM 0.6 - 1.6 mg/dL 1.1     CO2 VENOUS 22 - 32 mmol/L 26     CHLORIDE, SERUM 96 - 107 mmol/L 104     CALCIUM, SERUM 8.6 - 10.6 mg/dL 9.7     BLOOD UREA NITROGEN 6 - 27 mg/dL 32 (H)     ASPARTATE AMINOTRANS 14 - 43 U/L 23     BILIRUBIN, TOTAL 0.0 - 1.3 mg/dL 0.2     ALANINE AMINOTRANSFE 17 - 47 U/L 42     ALKALINE PHOSPHATASE 30 - 130 U/L 108     ALBUMIN 3.6 - 5.1 g/dL 3.8     eGFR AFRICAN AMERICAN >60 mL/min/1.73m2 >60     eGFR NON-AFRICAN AMERICAN >60 mL/min/1.73m2 >60     THYROID PROF(TSH w R 0.30 - 4.82 m[iU]/L   1.07            ASSESSMENT/PLAN     1) Preventative Care:  - Eye exam: 10/2/17, current  - Foot exam: 10/17/17, current  - Influenza Vaccine: 10/17/17, current  - Pneumococcal Vaccine: Pneumovax 11/12/12, Prevnar 10/17/17, current  - Aspirin: 81 mg daily  - Microalbumin screening: (+) 7/26/17 - on Lisinopril   - Depression screening: (-) 10/17/17     2) Glycemic Control, goal A1c <7%:  A1c is currently not at goal. Last A1c on 11/13/17 8.7%. However, patient with a recent hospitalization that may have been in part due to a hypoglycemic episode[KV1.1C], and insulin dose was lowered by half at discharge and his sulfonylurea was d/c'd.  SMBG have been above goal and continuing to increase.[KV1.1M]    Reviewed s/sx and management of hypoglycemia/hyperglycemia. Reviewed what to eat, portion sizes, and what and how lantus works in  the body.  He has been ill in the past week and BG levels were high and he said he is not eating as much so why is that? And I explained that it could be from being sick and because his lantus dose is a small dose compared to what he has had before.  FBG is above goal- will increase lantus to 18 units daily. Continue metformin 1000 mg BID. Will follow up with patient closely considering BG is trending up and his insulin dose changed. Will call in one week t see how things are going and told him to schedule for his A1c test for Feb 13.  Reinforced eating regularly, small frequent meals.      3) Dyslipidemia:   Pt indicated for a high intensity statin due to ASCVD risk, CAD, and stent placement in 2014. Continue Crestor 10 mg daily. Patient has not experienced any muscle pain since the addition of rosuvastatin. Consider increasing dose to 20 mg daily per guideline recommendations but since he has had that muscle pain in his forearms will keep at 10 mg for now (maybe not from crestor because has been having that). Will continue to monitor LFTs as he has had elevations in the past.     4) Hypertension, goal BP <140/90:   BP today within goal (122/82 and HR 58) but patient with occasional dizziness upon awakening and at night. Continue Lisinopril 2.5 mg daily and carvedilol 6.25 mg BID. Will continue to monitor. Review meds today as he brought all his bottles  and confirmed what he is taking at home is what we have listed in the EMR, patient sometimes has difficulty when changes to meds are made.    I also made a med list for him with what he takes and for what each medication is used for and he really like it as it will help him know what is medications are for.  Follow-Up: 1 week via phone call and 2 weeks for A1c  Lab(s) Ordered: None  Med(s) Ordered: None  Diabetes supplies ordered: None[KV1.1C]     Andrew Calr, PharmD Candidate, 2018[KV1.1M]  Electronically Signed by:    Wendy Ellis, Jeanine.D. ,[KV1.2T]  1/31[KV1.1M]/2018[KV1.2T]        Revision History        User Key Date/Time User Provider Type Action    > KV1.2 02/05/18 04:44 PM Wendy Ellis, Pharm.D. Pharmacist Sign     KV1.1 02/05/18 04:22 PM Wendy Ellis, Pharm.D. Pharmacist     C - Copied, M - Manual, T - Template

## 2023-11-01 ENCOUNTER — HOSPITAL ENCOUNTER (OUTPATIENT)
Dept: RADIOLOGY | Facility: HOSPITAL | Age: 73
Discharge: HOME | End: 2023-11-01
Attending: INTERNAL MEDICINE
Payer: MEDICARE

## 2023-11-01 ENCOUNTER — TELEPHONE (OUTPATIENT)
Dept: WOUND CARE | Facility: HOSPITAL | Age: 73
End: 2023-11-01
Payer: MEDICARE

## 2023-11-01 ENCOUNTER — HOSPITAL ENCOUNTER (OUTPATIENT)
Dept: CARDIOLOGY | Facility: HOSPITAL | Age: 73
Discharge: HOME | End: 2023-11-01
Attending: INTERNAL MEDICINE
Payer: MEDICARE

## 2023-11-01 DIAGNOSIS — R07.9 CHEST PAIN, UNSPECIFIED TYPE: ICD-10-CM

## 2023-11-01 LAB
CV STRESS BASE HR: 75 BPM
DIASTOLIC BLOOD PRESSURE: 87 MMHG
NUC STRESS EJECTION FRACTION: 59 %
OHS CV CPX 85 PERCENT MAX PREDICTED HEART RATE MALE: 123
OHS CV CPX MAX PREDICTED HEART RATE: 145
OHS CV CPX PATIENT IS FEMALE: 1
OHS CV CPX PATIENT IS MALE: 0
OHS CV CPX PEAK DIASTOLIC BLOOD PRESSURE: 83 MMHG
OHS CV CPX PEAK HEAR RATE: 96 BPM
OHS CV CPX PEAK RATE PRESSURE PRODUCT: NORMAL
OHS CV CPX PEAK SYSTOLIC BLOOD PRESSURE: 156 MMHG
OHS CV CPX PERCENT MAX PREDICTED HEART RATE ACHIEVED: 69
OHS CV CPX RATE PRESSURE PRODUCT PRESENTING: NORMAL
SYSTOLIC BLOOD PRESSURE: 180 MMHG

## 2023-11-01 PROCEDURE — 93017 CV STRESS TEST TRACING ONLY: CPT

## 2023-11-01 PROCEDURE — 93016 NUCLEAR STRESS - CARDIOLOGY INTERPRETED (CUPID ONLY): ICD-10-PCS | Mod: ,,, | Performed by: INTERNAL MEDICINE

## 2023-11-01 PROCEDURE — 93018 CV STRESS TEST I&R ONLY: CPT | Mod: ,,, | Performed by: INTERNAL MEDICINE

## 2023-11-01 PROCEDURE — 63600175 PHARM REV CODE 636 W HCPCS: Performed by: INTERNAL MEDICINE

## 2023-11-01 PROCEDURE — 93018 NUCLEAR STRESS - CARDIOLOGY INTERPRETED (CUPID ONLY): ICD-10-PCS | Mod: ,,, | Performed by: INTERNAL MEDICINE

## 2023-11-01 PROCEDURE — 78452 HT MUSCLE IMAGE SPECT MULT: CPT | Mod: 26,,, | Performed by: INTERNAL MEDICINE

## 2023-11-01 PROCEDURE — 78452 NUCLEAR STRESS - CARDIOLOGY INTERPRETED (CUPID ONLY): ICD-10-PCS | Mod: 26,,, | Performed by: INTERNAL MEDICINE

## 2023-11-01 PROCEDURE — 78452 HT MUSCLE IMAGE SPECT MULT: CPT

## 2023-11-01 PROCEDURE — 93016 CV STRESS TEST SUPVJ ONLY: CPT | Mod: ,,, | Performed by: INTERNAL MEDICINE

## 2023-11-01 RX ORDER — REGADENOSON 0.08 MG/ML
0.4 INJECTION, SOLUTION INTRAVENOUS ONCE
Status: COMPLETED | OUTPATIENT
Start: 2023-11-01 | End: 2023-11-01

## 2023-11-01 RX ADMIN — REGADENOSON 0.4 MG: 0.08 INJECTION, SOLUTION INTRAVENOUS at 08:11

## 2023-11-06 ENCOUNTER — LAB VISIT (OUTPATIENT)
Dept: LAB | Facility: HOSPITAL | Age: 73
End: 2023-11-06
Attending: ORTHOPAEDIC SURGERY
Payer: MEDICARE

## 2023-11-06 ENCOUNTER — OFFICE VISIT (OUTPATIENT)
Dept: CARDIOLOGY | Facility: CLINIC | Age: 73
End: 2023-11-06
Payer: MEDICARE

## 2023-11-06 VITALS
SYSTOLIC BLOOD PRESSURE: 162 MMHG | DIASTOLIC BLOOD PRESSURE: 82 MMHG | WEIGHT: 200.06 LBS | OXYGEN SATURATION: 96 % | HEART RATE: 96 BPM | BODY MASS INDEX: 34.16 KG/M2 | HEIGHT: 64 IN | RESPIRATION RATE: 15 BRPM

## 2023-11-06 DIAGNOSIS — Z01.818 PREOPERATIVE TESTING: ICD-10-CM

## 2023-11-06 DIAGNOSIS — R07.9 CHEST PAIN, UNSPECIFIED TYPE: Primary | ICD-10-CM

## 2023-11-06 LAB
ABO + RH BLD: NORMAL
BLD GP AB SCN CELLS X3 SERPL QL: NORMAL
SPECIMEN OUTDATE: NORMAL

## 2023-11-06 PROCEDURE — 99214 PR OFFICE/OUTPT VISIT, EST, LEVL IV, 30-39 MIN: ICD-10-PCS | Mod: 25,S$GLB,, | Performed by: INTERNAL MEDICINE

## 2023-11-06 PROCEDURE — 1126F PR PAIN SEVERITY QUANTIFIED, NO PAIN PRESENT: ICD-10-PCS | Mod: CPTII,S$GLB,, | Performed by: INTERNAL MEDICINE

## 2023-11-06 PROCEDURE — 3288F FALL RISK ASSESSMENT DOCD: CPT | Mod: CPTII,S$GLB,, | Performed by: INTERNAL MEDICINE

## 2023-11-06 PROCEDURE — 3051F HG A1C>EQUAL 7.0%<8.0%: CPT | Mod: CPTII,S$GLB,, | Performed by: INTERNAL MEDICINE

## 2023-11-06 PROCEDURE — 3077F SYST BP >= 140 MM HG: CPT | Mod: CPTII,S$GLB,, | Performed by: INTERNAL MEDICINE

## 2023-11-06 PROCEDURE — 1126F AMNT PAIN NOTED NONE PRSNT: CPT | Mod: CPTII,S$GLB,, | Performed by: INTERNAL MEDICINE

## 2023-11-06 PROCEDURE — 86850 RBC ANTIBODY SCREEN: CPT | Performed by: ORTHOPAEDIC SURGERY

## 2023-11-06 PROCEDURE — 3061F PR NEG MICROALBUMINURIA RESULT DOCUMENTED/REVIEW: ICD-10-PCS | Mod: CPTII,S$GLB,, | Performed by: INTERNAL MEDICINE

## 2023-11-06 PROCEDURE — 36415 COLL VENOUS BLD VENIPUNCTURE: CPT | Performed by: ORTHOPAEDIC SURGERY

## 2023-11-06 PROCEDURE — 93000 ELECTROCARDIOGRAM COMPLETE: CPT | Mod: S$GLB,,, | Performed by: INTERNAL MEDICINE

## 2023-11-06 PROCEDURE — 1159F PR MEDICATION LIST DOCUMENTED IN MEDICAL RECORD: ICD-10-PCS | Mod: CPTII,S$GLB,, | Performed by: INTERNAL MEDICINE

## 2023-11-06 PROCEDURE — 99999 PR PBB SHADOW E&M-EST. PATIENT-LVL V: CPT | Mod: PBBFAC,,, | Performed by: INTERNAL MEDICINE

## 2023-11-06 PROCEDURE — 3288F PR FALLS RISK ASSESSMENT DOCUMENTED: ICD-10-PCS | Mod: CPTII,S$GLB,, | Performed by: INTERNAL MEDICINE

## 2023-11-06 PROCEDURE — 3079F DIAST BP 80-89 MM HG: CPT | Mod: CPTII,S$GLB,, | Performed by: INTERNAL MEDICINE

## 2023-11-06 PROCEDURE — 3061F NEG MICROALBUMINURIA REV: CPT | Mod: CPTII,S$GLB,, | Performed by: INTERNAL MEDICINE

## 2023-11-06 PROCEDURE — 3066F NEPHROPATHY DOC TX: CPT | Mod: CPTII,S$GLB,, | Performed by: INTERNAL MEDICINE

## 2023-11-06 PROCEDURE — 3077F PR MOST RECENT SYSTOLIC BLOOD PRESSURE >= 140 MM HG: ICD-10-PCS | Mod: CPTII,S$GLB,, | Performed by: INTERNAL MEDICINE

## 2023-11-06 PROCEDURE — 3051F PR MOST RECENT HEMOGLOBIN A1C LEVEL 7.0 - < 8.0%: ICD-10-PCS | Mod: CPTII,S$GLB,, | Performed by: INTERNAL MEDICINE

## 2023-11-06 PROCEDURE — 1160F RVW MEDS BY RX/DR IN RCRD: CPT | Mod: CPTII,S$GLB,, | Performed by: INTERNAL MEDICINE

## 2023-11-06 PROCEDURE — 1101F PT FALLS ASSESS-DOCD LE1/YR: CPT | Mod: CPTII,S$GLB,, | Performed by: INTERNAL MEDICINE

## 2023-11-06 PROCEDURE — 99999 PR PBB SHADOW E&M-EST. PATIENT-LVL V: ICD-10-PCS | Mod: PBBFAC,,, | Performed by: INTERNAL MEDICINE

## 2023-11-06 PROCEDURE — 99214 OFFICE O/P EST MOD 30 MIN: CPT | Mod: 25,S$GLB,, | Performed by: INTERNAL MEDICINE

## 2023-11-06 PROCEDURE — 1159F MED LIST DOCD IN RCRD: CPT | Mod: CPTII,S$GLB,, | Performed by: INTERNAL MEDICINE

## 2023-11-06 PROCEDURE — 1160F PR REVIEW ALL MEDS BY PRESCRIBER/CLIN PHARMACIST DOCUMENTED: ICD-10-PCS | Mod: CPTII,S$GLB,, | Performed by: INTERNAL MEDICINE

## 2023-11-06 PROCEDURE — 1101F PR PT FALLS ASSESS DOC 0-1 FALLS W/OUT INJ PAST YR: ICD-10-PCS | Mod: CPTII,S$GLB,, | Performed by: INTERNAL MEDICINE

## 2023-11-06 PROCEDURE — 3066F PR DOCUMENTATION OF TREATMENT FOR NEPHROPATHY: ICD-10-PCS | Mod: CPTII,S$GLB,, | Performed by: INTERNAL MEDICINE

## 2023-11-06 PROCEDURE — 3079F PR MOST RECENT DIASTOLIC BLOOD PRESSURE 80-89 MM HG: ICD-10-PCS | Mod: CPTII,S$GLB,, | Performed by: INTERNAL MEDICINE

## 2023-11-06 PROCEDURE — 93000 EKG 12-LEAD: ICD-10-PCS | Mod: S$GLB,,, | Performed by: INTERNAL MEDICINE

## 2023-11-06 NOTE — PROGRESS NOTES
CARDIOVASCULAR CONSULTATION    REASON FOR CONSULT:   Valarie Foster is a 73 y.o. female who presents for evaluation of chest pressure     HISTORY OF PRESENT ILLNESS:      Patient is a pleasant 72-year-old female.  Here for evaluation of chest pressure.  Substernal.  On exertion.  Denies orthopnea, PND, swelling of feet.  Multiple risk factors for coronary artery disease    The estimated ejection fraction is 55%.  The left ventricle is normal in size with concentric hypertrophy and normal systolic function.  Grade I left ventricular diastolic dysfunction.  Normal right ventricular size with normal right ventricular systolic function.  Moderate left atrial enlargement.  Mild mitral regurgitation.  Mild right atrial enlargement.  Normal central venous pressure (3 mmHg).  The estimated PA systolic pressure is 27 mmHg.     11/6:  no more chest pains. Went away on their own    Nov 23:    Normal myocardial perfusion scan. There is no evidence of myocardial ischemia or infarction.    There is a mild intensity perfusion abnormality in the anterior wall of the left ventricle, secondary to breast attenuation.    The gated perfusion images showed an ejection fraction of 59% post stress.    The ECG portion of the study is negative for ischemia.    The patient reported no chest pain during the stress test.    There were no arrhythmias during stress.        PAST MEDICAL HISTORY:     Past Medical History:   Diagnosis Date    Arthritis     Colon polyp     Diabetes mellitus     diet controlled    Diabetes with neurologic complications     Hypertension     Nuclear sclerosis of both eyes 2/14/2022    Renal cell carcinoma        PAST SURGICAL HISTORY:     Past Surgical History:   Procedure Laterality Date    COLONOSCOPY N/A 2/5/2018    Procedure: COLONOSCOPY;  Surgeon: Bacilio Mancia MD;  Location: [facility];  Service: Endoscopy;  Laterality: N/A;  appt confirmed-ss    COLONOSCOPY N/A 8/13/2020    Procedure: COLONOSCOPY;  Surgeon:  Jose West MD;  Location: [facility] ENDO;  Service: Endoscopy;  Laterality: N/A;    HYSTERECTOMY      INTRAOCULAR PROSTHESES INSERTION Left 7/7/2022    Procedure: INSERTION, IOL PROSTHESIS;  Surgeon: Candelario Novoa MD;  Location: [facility] OR;  Service: Ophthalmology;  Laterality: Left;    INTRAOCULAR PROSTHESES INSERTION Right 7/21/2022    Procedure: INSERTION, IOL PROSTHESIS;  Surgeon: Candelario Novoa MD;  Location: [facility] OR;  Service: Ophthalmology;  Laterality: Right;    OOPHORECTOMY      PARTIAL NEPHRECTOMY Right 10/12/2018    Procedure: NEPHRECTOMY, PARTIAL open. Dr Arenas to assist.;  Surgeon: Alejandra Palm MD;  Location: [facility] OR;  Service: Urology;  Laterality: Right;  RN PREOP 10/9/2018----NEEDS H/P    PHACOEMULSIFICATION OF CATARACT Left 7/7/2022    Procedure: PHACOEMULSIFICATION, CATARACT;  Surgeon: Candelario Novoa MD;  Location: [facility] OR;  Service: Ophthalmology;  Laterality: Left;  RN PHONE PREOP 6/27/2022   ARRIVAL 6:00 AM    PHACOEMULSIFICATION OF CATARACT Right 7/21/2022    Procedure: PHACOEMULSIFICATION, CATARACT;  Surgeon: Candelario Novoa MD;  Location: [facility] OR;  Service: Ophthalmology;  Laterality: Right;  RN PHONE PREOP 7/12/2022   ARRIVAL 12:00 NOON       ALLERGIES AND MEDICATION:     Review of patient's allergies indicates:   Allergen Reactions    Lisinopril Swelling and Shortness Of Breath    Ascorbic acid-ascorbate calc      And citrus fruits        Medication List            Accurate as of November 6, 2023  3:24 PM. If you have any questions, ask your nurse or doctor.              Some of the medications listed here do not show instructions, such as how often to take the medication. Ask your doctor or nurse how to use these medications.  Specifically ask about this and similar medications: atorvastatin (LIPITOR) 40 MG tablet       CONTINUE taking these medications      ACCU-CHEK ONEIL CONTROL SOLN Soln  Generic drug: blood glucose control high,low  1 Units  by Misc.(Non-Drug; Combo Route) route as needed.     ACCU-CHEK ONEIL PLUS TEST STRP Strp  Generic drug: blood sugar diagnostic  TEST BLOOD SUGAR TWICE DAILY     * ACCU-CHEK SOFTCLIX LANCETS Misc  Generic drug: lancets  USE TO TEST BLOOD SUGAR ONE TIME DAILY     * TRUEPLUS LANCETS 33 gauge Misc  Generic drug: lancets  TEST BLOOD SUGAR TWICE DAILY     amLODIPine 10 MG tablet  Commonly known as: NORVASC  TAKE 1 TABLET ONE TIME DAILY     aspirin 81 MG EC tablet  Commonly known as: ECOTRIN  Take 1 tablet (81 mg total) by mouth once daily.     atorvastatin 40 MG tablet  Commonly known as: LIPITOR  __________________________     * blood-glucose meter kit  Commonly known as: TRUE METRIX GLUCOSE METER  Use as directed to test glucose     * blood-glucose meter Misc  Commonly known as: ACCU-CHEK ONEIL PLUS METER  1 kit by Misc.(Non-Drug; Combo Route) route once daily.     celecoxib 200 MG capsule  Commonly known as: CeleBREX  Take 1 capsule (200 mg total) by mouth once daily.     cetirizine 10 MG tablet  Commonly known as: ZYRTEC  Take 1 tablet (10 mg total) by mouth once daily.     ciclopirox 8 % Soln  Commonly known as: PENLAC  Apply topically nightly.     cyclobenzaprine 5 MG tablet  Commonly known as: FLEXERIL  TAKE 1 TABLET THREE TIMES DAILY AS NEEDED FOR MUSCLE SPASM(S)     diphenhydrAMINE 25 mg capsule  Commonly known as: BENADRYL  Take 1 capsule (25 mg total) by mouth every 6 (six) hours as needed for Itching.     DROPSAFE ALCOHOL PREP PADS Padm  Generic drug: alcohol swabs  USE 3 EVERY DAY AS DIRECTED     ergocalciferol 50,000 unit Cap  Commonly known as: ERGOCALCIFEROL  TAKE 1 CAPSULE EVERY 7 DAYS.     FLUoxetine 20 MG capsule  TAKE 1 CAPSULE EVERY DAY     fluticasone propionate 50 mcg/actuation nasal spray  Commonly known as: FLONASE  USE 1 SPRAY IN EACH NOSTRIL ONCE DAILY     gabapentin 300 MG capsule  Commonly known as: NEURONTIN  TAKE 2 CAPSULES EVERY EVENING     hydrOXYzine HCL 25 MG tablet  Commonly known as:  ATARAX  Take 1 tablet (25 mg total) by mouth 3 (three) times daily as needed for Itching.     meloxicam 15 MG tablet  Commonly known as: MOBIC  TAKE 1 TABLET EVERY DAY     metFORMIN 500 MG tablet  Commonly known as: GLUCOPHAGE  Take 1 tablet (500 mg total) by mouth 2 (two) times daily with meals.     metoprolol succinate 25 MG 24 hr tablet  Commonly known as: TOPROL-XL  Take 1 tablet (25 mg total) by mouth once daily.     pantoprazole 40 MG tablet  Commonly known as: PROTONIX  Take 1 tablet (40 mg total) by mouth once daily.     TRUE METRIX LEVEL 1 Soln  Generic drug: blood glucose control, low  1 each by Misc.(Non-Drug; Combo Route) route once as needed.           * This list has 4 medication(s) that are the same as other medications prescribed for you. Read the directions carefully, and ask your doctor or other care provider to review them with you.                  SOCIAL HISTORY:     Social History     Socioeconomic History    Marital status:    Tobacco Use    Smoking status: Never    Smokeless tobacco: Never   Substance and Sexual Activity    Alcohol use: No    Drug use: No    Sexual activity: Not Currently     Social Determinants of Health     Financial Resource Strain: Low Risk  (3/30/2023)    Overall Financial Resource Strain (CARDIA)     Difficulty of Paying Living Expenses: Not hard at all   Food Insecurity: No Food Insecurity (3/30/2023)    Hunger Vital Sign     Worried About Running Out of Food in the Last Year: Never true     Ran Out of Food in the Last Year: Never true   Transportation Needs: No Transportation Needs (3/30/2023)    PRAPARE - Transportation     Lack of Transportation (Medical): No     Lack of Transportation (Non-Medical): No   Physical Activity: Insufficiently Active (3/30/2023)    Exercise Vital Sign     Days of Exercise per Week: 7 days     Minutes of Exercise per Session: 10 min   Stress: No Stress Concern Present (3/30/2023)    [employer] -  Occupational Stress Questionnaire     Feeling of Stress : Only a little   Social Connections: Moderately Integrated (3/30/2023)    Social Connection and Isolation Panel [NHANES]     Frequency of Social Gatherings with Friends and Family: More than three times a week     Attends Baptism Services: More than 4 times per year     Active Member of Clubs or Organizations: No     Attends Club or Organization Meetings: Never     Marital Status:    Housing Stability: Low Risk  (3/30/2023)    Housing Stability Vital Sign     Unable to Pay for Housing in the Last Year: No     Number of Places Lived in the Last Year: 1     Unstable Housing in the Last Year: No       FAMILY HISTORY:     Family History   Problem Relation Age of Onset    No Known Problems Mother     Glaucoma Father     Breast cancer Sister     Glaucoma Sister     Cancer Sister         breast cancer    Thyroid disease Sister     Blindness Sister         due to trauma during car accident    Diabetes Sister     No Known Problems Maternal Aunt     No Known Problems Maternal Uncle     No Known Problems Paternal Aunt     No Known Problems Paternal Uncle     No Known Problems Maternal Grandmother     No Known Problems Maternal Grandfather     No Known Problems Paternal Grandmother     No Known Problems Paternal Grandfather     Diabetes Brother     Amblyopia Neg Hx     Cataracts Neg Hx     Hypertension Neg Hx     Macular degeneration Neg Hx     Retinal detachment Neg Hx     Strabismus Neg Hx     Stroke Neg Hx        REVIEW OF SYSTEMS:   Review of Systems   Constitutional: Negative.   HENT: Negative.     Eyes: Negative.    Respiratory: Negative.     Endocrine: Negative.    Hematologic/Lymphatic: Negative.    Skin: Negative.    Musculoskeletal: Negative.    Gastrointestinal: Negative.    Genitourinary: Negative.    Neurological: Negative.    Psychiatric/Behavioral: Negative.     Allergic/Immunologic: Negative.        A 10 point review of systems was performed and  "all the pertinent positives have been mentioned. Rest of review of systems was negative.        PHYSICAL EXAM:     Vitals:    11/06/23 1507   BP: (!) 162/82   Pulse:    Resp:     Body mass index is 34.34 kg/m².  Weight: 90.8 kg (200 lb 1.1 oz)   Height: 5' 4" (162.6 cm)     Physical Exam  Constitutional:       Appearance: Normal appearance. She is well-developed.   HENT:      Head: Normocephalic.   Eyes:      Pupils: Pupils are equal, round, and reactive to light.   Cardiovascular:      Rate and Rhythm: Normal rate and regular rhythm.   Pulmonary:      Effort: Pulmonary effort is normal.      Breath sounds: Normal breath sounds.   Abdominal:      General: Bowel sounds are normal.      Palpations: Abdomen is soft.      Tenderness: There is no abdominal tenderness.   Musculoskeletal:         General: Normal range of motion.      Cervical back: Normal range of motion and neck supple.   Skin:     General: Skin is warm.   Neurological:      Mental Status: She is alert and oriented to person, place, and time.           DATA:     Laboratory:  CBC:  Recent Labs   Lab 01/12/23  0957 04/29/23  1032 10/25/23  0900   WBC 5.07 5.42 5.18   Hemoglobin 13.5 12.8 13.0   Hematocrit 41.6 41.1 41.3   Platelets 272 271 231       CHEMISTRIES:  Recent Labs   Lab 10/28/21  1010 03/17/22  0718 05/02/22  0711 10/31/22  0758 01/12/23  0957 04/29/23  1032 05/31/23  1455 10/25/23  0900   Glucose 120 H 157 H 130 H   < > 128 H 123 H  --  121 H   Sodium 144 138 142   < > 138 137  --  139   Potassium 5.0 4.7 4.2   < > 4.5 4.4  --  4.3   BUN 12 15 12   < > 10 11  --  15   Creatinine 0.8 0.8 0.8   < > 0.8 0.8 0.9 0.8   eGFR if African American >60.0 >60 >60.0  --   --   --   --   --    eGFR if non African American >60.0 >60 >60.0  --   --   --   --   --    Calcium 10.0 9.1 9.5   < > 9.4 9.9  --  9.5    < > = values in this interval not displayed.       CARDIAC BIOMARKERS:  Recent Labs   Lab 01/11/23  2237 01/12/23  0421   Troponin I <0.006 <0.006 "       COAGS:  Recent Labs   Lab 01/11/23  1740 10/25/23  0900   INR 1.1 1.1       LIPIDS/LFTS:  Recent Labs   Lab 10/28/21  1010 05/02/22  0711 10/31/22  0758 01/12/23  0957 04/29/23  1032 10/25/23  0900   Cholesterol 207 H  --  225 H  --   --  155   Triglycerides 95  --  77  --   --  78   HDL 65  --  74  --   --  57   LDL Cholesterol 123.0  --  135.6  --   --  82.4   Non-HDL Cholesterol 142  --  151  --   --  98   AST 15   < > 14 12 14 14   ALT 11   < > 11 12 9 L 13    < > = values in this interval not displayed.       Hemoglobin A1C   Date Value Ref Range Status   10/25/2023 7.4 (H) 4.0 - 5.6 % Final     Comment:     ADA Screening Guidelines:  5.7-6.4%  Consistent with prediabetes  >or=6.5%  Consistent with diabetes    High levels of fetal hemoglobin interfere with the HbA1C  assay. Heterozygous hemoglobin variants (HbS, HgC, etc)do  not significantly interfere with this assay.   However, presence of multiple variants may affect accuracy.     04/29/2023 7.2 (H) 4.0 - 5.6 % Final     Comment:     ADA Screening Guidelines:  5.7-6.4%  Consistent with prediabetes  >or=6.5%  Consistent with diabetes    High levels of fetal hemoglobin interfere with the HbA1C  assay. Heterozygous hemoglobin variants (HbS, HgC, etc)do  not significantly interfere with this assay.   However, presence of multiple variants may affect accuracy.     10/31/2022 7.2 (H) 4.0 - 5.6 % Final     Comment:     ADA Screening Guidelines:  5.7-6.4%  Consistent with prediabetes  >or=6.5%  Consistent with diabetes    High levels of fetal hemoglobin interfere with the HbA1C  assay. Heterozygous hemoglobin variants (HbS, HgC, etc)do  not significantly interfere with this assay.   However, presence of multiple variants may affect accuracy.         TSH  Recent Labs   Lab 10/28/21  1010 10/31/22  0758 10/25/23  0900   TSH 0.761 0.999 0.552       The 10-year ASCVD risk score (Ayush JASON, et al., 2019) is: 36.5%    Values used to calculate the score:      Age: 73  "years      Sex: Female      Is Non- : Yes      Diabetic: Yes      Tobacco smoker: No      Systolic Blood Pressure: 162 mmHg      Is BP treated: Yes      HDL Cholesterol: 57 mg/dL      Total Cholesterol: 155 mg/dL       BNP    No results found for: "BNP"          ASSESSMENT AND PLAN     Patient Active Problem List   Diagnosis    Type 2 diabetes mellitus with diabetic neuropathy, without long-term current use of insulin    Benign hypertension    Hyperlipemia    Microhematuria    Left flank pain    Abdominal aortic atherosclerosis    History of renal cell cancer    Renal cell carcinoma of right kidney    Mild major depression    Bilateral chronic knee pain    Colon cancer screening    Pain of left calf    Nuclear sclerosis of both eyes    Refractive error    Primary osteoarthritis of knees, bilateral    Fall    Decreased ROM of lower extremity    Decreased strength of lower extremity    Decreased mobility and endurance    Nuclear sclerotic cataract of left eye    Nuclear sclerotic cataract of right eye    Radiculopathy    Idiopathic progressive neuropathy    Nerve pain    Chest pain    Severe obesity (BMI 35.0-39.9) with comorbidity         Patient with chest pain and pressure.  Further evaluation with the help of a stress test was done. Stress test did not show any ischemia. Now cp free. Needs to go for knee surgery. May proceed at low / mod risk    Dyslipidemia: Switch from pravastatin to Lipitor     Toprol-XL 25 mg daily     Htn: states bp well controlled at home. Refer to [facility]    Follow-up after 6 m        Thank you very much for involving me in the care of your patient.  Please do not hesitate to contact me if there are any questions.      Mary Kate Dolan MD, FACC, [place] [facility]  Interventional Cardiologist, [facility].           This note was dictated with the help of speech recognition software.  There might be un-intended errors and/or substitutions.                "

## 2023-11-07 ENCOUNTER — TELEPHONE (OUTPATIENT)
Dept: SURGERY | Facility: HOSPITAL | Age: 73
End: 2023-11-07
Payer: MEDICARE

## 2023-11-07 NOTE — PROGRESS NOTES
"Physical Exam  /78   Pulse 67   Temp 98.2 °F (36.8 °C) (Oral)   Ht 5' 4" (1.626 m)   Wt 90.2 kg (198 lb 13.7 oz)   SpO2 98%   BMI 34.13 kg/m²      Office Visit    Patient Name: Valarie Foster    : 1950  MRN: [id number]      Assessment/Plan:  Valarie Foster is a 73 y.o. female who presents today for :    Preoperative examination  Chronic pain of right knee  Bilateral chronic knee pain  Hx chest pressure  Patient is optimized for surgical procedure from primary care standpoint. She does have a Hx of unspecified chest pain as well as risk factors for CAD, for which she has a scheduled appointment for a stress test, as well as to follow up Cardiology in the next 2 weeks for a surgical clearance from cardiac standpoint. She is advised to continue current medication regimen, as well as continue with a healthy diet/exercise/weight loss in anticipation of surgery.    Type 2 diabetes mellitus with diabetic neuropathy, without long-term current use of insulin  Hyperlipidemia associated with type 2 diabetes mellitus  Benign hypertension  Severe obesity (BMI 35.0-39.9) with comorbidity  -continue current medication regimen  -DASH diet, regular cardiovascular exercises  -weight loss as above        F/u PRN        This note was created by combination of typed  and MModal dictation.  Transcription errors may be present.  If there are any questions, please contact me.        ----------------------------------------------------------------------------------------------------------------------      HPI:  Patient Care Team:  Alena Hernandez MD as PCP - General (Family Medicine)  Alyssa Maxwell MA as Care Coordinator    Valarie is a 73 y.o. female with      Patient Active Problem List   Diagnosis    Type 2 diabetes mellitus with diabetic neuropathy, without long-term current use of insulin    Benign hypertension    Hyperlipemia    Microhematuria    Left flank pain    Abdominal aortic atherosclerosis    " History of renal cell cancer    Renal cell carcinoma of right kidney    Mild major depression    Bilateral chronic knee pain    Colon cancer screening    Pain of left calf    Nuclear sclerosis of both eyes    Refractive error    Primary osteoarthritis of knees, bilateral    Fall    Decreased ROM of lower extremity    Decreased strength of lower extremity    Decreased mobility and endurance    Nuclear sclerotic cataract of left eye    Nuclear sclerotic cataract of right eye    Radiculopathy    Idiopathic progressive neuropathy    Nerve pain    Chest pain    Severe obesity (BMI 35.0-39.9) with comorbidity       Valarie presents today for:  Pre-op Exam    Procedure to be performed: right knee surgery    Pt states that pt has had no limitations in activities except for her chronic knee pain.    Patient is not a smoker. She has occasional chest pressure for which she has been scheduled for a stress test as well as follow up with Cardiology in over a week to be cleared for surgery. She denies any SOB/LOVE/PND/dizziness/presyncope or syncope/orthopnea or lower extremity edema.  She otherwise states that her BP and diabetes is controlled on her current medication regimen.        Additional ROS  No vision changes  No F/C/wt changes/fatigue  No dysphagia/sore throat/rhinorrhea  No SOB/palpitations/swelling  No cough/wheezing/SOB  No nausea/vomiting/abd pain  No rashes  No MSK weakness/HA/tingling/numbness  No anxiety/depression  No polyuria/polydipsia/fatigue  No bleeding/bruising        Patient Active Problem List   Diagnosis    Type 2 diabetes mellitus with diabetic neuropathy, without long-term current use of insulin    Benign hypertension    Hyperlipemia    Microhematuria    Left flank pain    Abdominal aortic atherosclerosis    History of renal cell cancer    Renal cell carcinoma of right kidney    Mild major depression    Bilateral chronic knee pain    Colon cancer screening    Pain of left calf    Nuclear sclerosis of  both eyes    Refractive error    Primary osteoarthritis of knees, bilateral    Fall    Decreased ROM of lower extremity    Decreased strength of lower extremity    Decreased mobility and endurance    Nuclear sclerotic cataract of left eye    Nuclear sclerotic cataract of right eye    Radiculopathy    Idiopathic progressive neuropathy    Nerve pain    Chest pain    Severe obesity (BMI 35.0-39.9) with comorbidity       Current Medications  Medications reviewed and updated.       Current Outpatient Medications:     ACCU-CHEK ONEIL PLUS TEST STRP Strp, TEST BLOOD SUGAR TWICE DAILY, Disp: 200 strip, Rfl: 1    ACCU-CHEK SOFTCLIX LANCETS [facility], USE TO TEST BLOOD SUGAR ONE TIME DAILY, Disp: 100 each, Rfl: 3    alcohol swabs (DROPSAFE ALCOHOL PREP PADS) Pad, USE 3 EVERY DAY AS DIRECTED, Disp: 300 each, Rfl: 3    amLODIPine (NORVASC) 10 MG tablet, TAKE 1 TABLET ONE TIME DAILY, Disp: 90 tablet, Rfl: 3    aspirin (ECOTRIN) 81 MG EC tablet, Take 1 tablet (81 mg total) by mouth once daily., Disp: 30 tablet, Rfl: 3    atorvastatin (LIPITOR) 40 MG tablet, , Disp: 90 tablet, Rfl: 0    blood glucose control high,low (ACCU-CHEK ONEIL CONTROL SOLN) Soln, 1 Units by Misc.(Non-Drug; Combo Route) route as needed., Disp: 1 each, Rfl: 0    blood-glucose meter (ACCU-CHEK ONEIL PLUS METER) Misc, 1 kit by Misc.(Non-Drug; Combo Route) route once daily., Disp: 1 each, Rfl: 0    blood-glucose meter (TRUE METRIX GLUCOSE METER) kit, Use as directed to test glucose, Disp: 1 each, Rfl: 0    celecoxib (CELEBREX) 200 MG capsule, Take 1 capsule (200 mg total) by mouth once daily., Disp: 90 capsule, Rfl: 1    cetirizine (ZYRTEC) 10 MG tablet, Take 1 tablet (10 mg total) by mouth once daily., Disp: 90 tablet, Rfl: 0    ciclopirox (PENLAC) 8 % Soln, Apply topically nightly., Disp: 6.6 mL, Rfl: 11    cyclobenzaprine (FLEXERIL) 5 MG tablet, TAKE 1 TABLET THREE TIMES DAILY AS NEEDED FOR MUSCLE SPASM(S), Disp: 270 tablet, Rfl: 1    diphenhydrAMINE (BENADRYL)  25 mg capsule, Take 1 capsule (25 mg total) by mouth every 6 (six) hours as needed for Itching., Disp: 20 capsule, Rfl: 0    ergocalciferol (ERGOCALCIFEROL) 50,000 unit Cap, TAKE 1 CAPSULE EVERY 7 DAYS., Disp: 12 capsule, Rfl: 1    FLUoxetine 20 MG capsule, TAKE 1 CAPSULE EVERY DAY, Disp: 90 capsule, Rfl: 2    fluticasone propionate (FLONASE) 50 mcg/actuation nasal spray, USE 1 SPRAY IN EACH NOSTRIL ONCE DAILY, Disp: 32 g, Rfl: 2    gabapentin (NEURONTIN) 300 MG capsule, TAKE 2 CAPSULES EVERY EVENING, Disp: 180 capsule, Rfl: 1    hydrOXYzine HCL (ATARAX) 25 MG tablet, Take 1 tablet (25 mg total) by mouth 3 (three) times daily as needed for Itching., Disp: 90 tablet, Rfl: 3    meloxicam (MOBIC) 15 MG tablet, TAKE 1 TABLET EVERY DAY, Disp: 90 tablet, Rfl: 0    metoprolol succinate (TOPROL-XL) 25 MG 24 hr tablet, Take 1 tablet (25 mg total) by mouth once daily., Disp: 90 tablet, Rfl: 3    pantoprazole (PROTONIX) 40 MG tablet, Take 1 tablet (40 mg total) by mouth once daily., Disp: 30 tablet, Rfl: 11    TRUEPLUS LANCETS 33 gauge Misc, TEST BLOOD SUGAR TWICE DAILY, Disp: 200 each, Rfl: 1    blood glucose control, low (TRUE METRIX LEVEL 1) Soln, 1 each by Misc.(Non-Drug; Combo Route) route once as needed., Disp: 1 each, Rfl: 3    metFORMIN (GLUCOPHAGE) 500 MG tablet, Take 1 tablet (500 mg total) by mouth 2 (two) times daily with meals., Disp: 180 tablet, Rfl: 3    Past Surgical History:   Procedure Laterality Date    COLONOSCOPY N/A 2/5/2018    Procedure: COLONOSCOPY;  Surgeon: Bacilio Mancia MD;  Location: [facility] ENDO;  Service: Endoscopy;  Laterality: N/A;  appt confirmed-ss    COLONOSCOPY N/A 8/13/2020    Procedure: COLONOSCOPY;  Surgeon: Jose West MD;  Location: [facility] ENDO;  Service: Endoscopy;  Laterality: N/A;    HYSTERECTOMY      INTRAOCULAR PROSTHESES INSERTION Left 7/7/2022    Procedure: INSERTION, IOL PROSTHESIS;  Surgeon: Candelario Novoa MD;  Location: [facility];  Service: Ophthalmology;   Laterality: Left;    INTRAOCULAR PROSTHESES INSERTION Right 7/21/2022    Procedure: INSERTION, IOL PROSTHESIS;  Surgeon: Candelario Novoa MD;  Location: [facility] OR;  Service: Ophthalmology;  Laterality: Right;    OOPHORECTOMY      PARTIAL NEPHRECTOMY Right 10/12/2018    Procedure: NEPHRECTOMY, PARTIAL open. Dr Arenas to assist.;  Surgeon: Alejandra Palm MD;  Location: [facility] OR;  Service: Urology;  Laterality: Right;  RN PREOP 10/9/2018----NEEDS H/P    PHACOEMULSIFICATION OF CATARACT Left 7/7/2022    Procedure: PHACOEMULSIFICATION, CATARACT;  Surgeon: Candelario Novoa MD;  Location: [facility] OR;  Service: Ophthalmology;  Laterality: Left;  RN PHONE PREOP 6/27/2022   ARRIVAL 6:00 AM    PHACOEMULSIFICATION OF CATARACT Right 7/21/2022    Procedure: PHACOEMULSIFICATION, CATARACT;  Surgeon: Candelario Novoa MD;  Location: [facility] OR;  Service: Ophthalmology;  Laterality: Right;  RN PHONE PREOP 7/12/2022   ARRIVAL 12:00 NOON       Family History   Problem Relation Age of Onset    No Known Problems Mother     Glaucoma Father     Breast cancer Sister     Glaucoma Sister     Cancer Sister         breast cancer    Thyroid disease Sister     Blindness Sister         due to trauma during car accident    Diabetes Sister     No Known Problems Maternal Aunt     No Known Problems Maternal Uncle     No Known Problems Paternal Aunt     No Known Problems Paternal Uncle     No Known Problems Maternal Grandmother     No Known Problems Maternal Grandfather     No Known Problems Paternal Grandmother     No Known Problems Paternal Grandfather     Diabetes Brother     Amblyopia Neg Hx     Cataracts Neg Hx     Hypertension Neg Hx     Macular degeneration Neg Hx     Retinal detachment Neg Hx     Strabismus Neg Hx     Stroke Neg Hx        Social History     Socioeconomic History    Marital status:    Tobacco Use    Smoking status: Never    Smokeless tobacco: Never   Substance and Sexual Activity    Alcohol use: No    Drug  "use: No    Sexual activity: Not Currently     Social Determinants of Health     Financial Resource Strain: Low Risk  (3/30/2023)    Overall Financial Resource Strain (CARDIA)     Difficulty of Paying Living Expenses: Not hard at all   Food Insecurity: No Food Insecurity (3/30/2023)    Hunger Vital Sign     Worried About Running Out of Food in the Last Year: Never true     Ran Out of Food in the Last Year: Never true   Transportation Needs: No Transportation Needs (3/30/2023)    PRAPARE - Transportation     Lack of Transportation (Medical): No     Lack of Transportation (Non-Medical): No   Physical Activity: Insufficiently Active (3/30/2023)    Exercise Vital Sign     Days of Exercise per Week: 7 days     Minutes of Exercise per Session: 10 min   Stress: No Stress Concern Present (3/30/2023)    [employer] - Occupational Stress Questionnaire     Feeling of Stress : Only a little   Social Connections: Moderately Integrated (3/30/2023)    Social Connection and Isolation Panel [NHANES]     Frequency of Social Gatherings with Friends and Family: More than three times a week     Attends Druze Services: More than 4 times per year     Active Member of Clubs or Organizations: No     Attends Club or Organization Meetings: Never     Marital Status:    Housing Stability: Low Risk  (3/30/2023)    Housing Stability Vital Sign     Unable to Pay for Housing in the Last Year: No     Number of Places Lived in the Last Year: 1     Unstable Housing in the Last Year: No           Allergies   Review of patient's allergies indicates:   Allergen Reactions    Lisinopril Swelling and Shortness Of Breath    Ascorbic acid-ascorbate calc      And citrus fruits             Review of Systems  See HPI      [unfilled]  /78   Pulse 67   Temp 98.2 °F (36.8 °C) (Oral)   Ht 5' 4" (1.626 m)   Wt 90.2 kg (198 lb 13.7 oz)   SpO2 98%   BMI 34.13 kg/m²     GEN: NAD, well developed  HEENT: ROSHAN CORREA, " EOMI, sclera clear, anicteric, O/P clear, MMM with no lesions  NECK: normal, supple with midline trachea, no LAD, no thyromegaly  LUNGS: CTAB, no w/r/r, no increased work of breathing   HEART: RRR, normal S1 and S2, no m/r/g, no edema  ABD: s/nt/nd, NABS  SKIN: normal turgor, no rashes  PSYCH: AOx3, appropriate mood and affect  MSK: warm/well perfused, normal ROM in all extremities, no c/c/e.

## 2023-11-08 ENCOUNTER — HOSPITAL ENCOUNTER (OUTPATIENT)
Facility: HOSPITAL | Age: 73
Discharge: HOME HEALTH | End: 2023-11-09
Attending: ORTHOPAEDIC SURGERY | Admitting: ORTHOPAEDIC SURGERY
Payer: MEDICARE

## 2023-11-08 ENCOUNTER — ANESTHESIA (OUTPATIENT)
Dept: SURGERY | Facility: HOSPITAL | Age: 73
End: 2023-11-08
Payer: MEDICARE

## 2023-11-08 DIAGNOSIS — M17.11 PRIMARY OSTEOARTHRITIS OF RIGHT KNEE: Primary | ICD-10-CM

## 2023-11-08 DIAGNOSIS — M17.11 OSTEOARTHRITIS OF RIGHT KNEE, UNSPECIFIED OSTEOARTHRITIS TYPE: ICD-10-CM

## 2023-11-08 DIAGNOSIS — Z01.818 PREOPERATIVE TESTING: ICD-10-CM

## 2023-11-08 LAB
POCT GLUCOSE: 126 MG/DL (ref 70–110)
POCT GLUCOSE: 139 MG/DL (ref 70–110)
POCT GLUCOSE: 241 MG/DL (ref 70–110)
POCT GLUCOSE: 316 MG/DL (ref 70–110)

## 2023-11-08 PROCEDURE — 97535 SELF CARE MNGMENT TRAINING: CPT

## 2023-11-08 PROCEDURE — 36000710: Performed by: ORTHOPAEDIC SURGERY

## 2023-11-08 PROCEDURE — 27201423 OPTIME MED/SURG SUP & DEVICES STERILE SUPPLY: Performed by: ORTHOPAEDIC SURGERY

## 2023-11-08 PROCEDURE — 25000003 PHARM REV CODE 250: Performed by: ORTHOPAEDIC SURGERY

## 2023-11-08 PROCEDURE — 27447 TOTAL KNEE ARTHROPLASTY: CPT | Mod: RT,,, | Performed by: ORTHOPAEDIC SURGERY

## 2023-11-08 PROCEDURE — 63600175 PHARM REV CODE 636 W HCPCS: Performed by: STUDENT IN AN ORGANIZED HEALTH CARE EDUCATION/TRAINING PROGRAM

## 2023-11-08 PROCEDURE — 37000008 HC ANESTHESIA 1ST 15 MINUTES: Performed by: ORTHOPAEDIC SURGERY

## 2023-11-08 PROCEDURE — 71000033 HC RECOVERY, INTIAL HOUR: Performed by: ORTHOPAEDIC SURGERY

## 2023-11-08 PROCEDURE — C1713 ANCHOR/SCREW BN/BN,TIS/BN: HCPCS | Performed by: ORTHOPAEDIC SURGERY

## 2023-11-08 PROCEDURE — 63600175 PHARM REV CODE 636 W HCPCS: Performed by: ANESTHESIOLOGY

## 2023-11-08 PROCEDURE — 97116 GAIT TRAINING THERAPY: CPT

## 2023-11-08 PROCEDURE — 82962 GLUCOSE BLOOD TEST: CPT | Performed by: ORTHOPAEDIC SURGERY

## 2023-11-08 PROCEDURE — 25000003 PHARM REV CODE 250: Performed by: ANESTHESIOLOGY

## 2023-11-08 PROCEDURE — 37000009 HC ANESTHESIA EA ADD 15 MINS: Performed by: ORTHOPAEDIC SURGERY

## 2023-11-08 PROCEDURE — 63600175 PHARM REV CODE 636 W HCPCS: Performed by: ORTHOPAEDIC SURGERY

## 2023-11-08 PROCEDURE — 27447 PR TOTAL KNEE ARTHROPLASTY: ICD-10-PCS | Mod: RT,,, | Performed by: ORTHOPAEDIC SURGERY

## 2023-11-08 PROCEDURE — D9220A PRA ANESTHESIA: Mod: CRNA,,, | Performed by: STUDENT IN AN ORGANIZED HEALTH CARE EDUCATION/TRAINING PROGRAM

## 2023-11-08 PROCEDURE — 36000711: Performed by: ORTHOPAEDIC SURGERY

## 2023-11-08 PROCEDURE — 97161 PT EVAL LOW COMPLEX 20 MIN: CPT

## 2023-11-08 PROCEDURE — C1776 JOINT DEVICE (IMPLANTABLE): HCPCS | Performed by: ORTHOPAEDIC SURGERY

## 2023-11-08 PROCEDURE — D9220A PRA ANESTHESIA: Mod: ANES,,, | Performed by: ANESTHESIOLOGY

## 2023-11-08 PROCEDURE — 25000003 PHARM REV CODE 250: Performed by: STUDENT IN AN ORGANIZED HEALTH CARE EDUCATION/TRAINING PROGRAM

## 2023-11-08 PROCEDURE — 97165 OT EVAL LOW COMPLEX 30 MIN: CPT

## 2023-11-08 PROCEDURE — D9220A PRA ANESTHESIA: ICD-10-PCS | Mod: ANES,,, | Performed by: ANESTHESIOLOGY

## 2023-11-08 PROCEDURE — 64447 NJX AA&/STRD FEMORAL NRV IMG: CPT | Mod: 59,RT | Performed by: ANESTHESIOLOGY

## 2023-11-08 PROCEDURE — 71000039 HC RECOVERY, EACH ADD'L HOUR: Performed by: ORTHOPAEDIC SURGERY

## 2023-11-08 PROCEDURE — D9220A PRA ANESTHESIA: ICD-10-PCS | Mod: CRNA,,, | Performed by: STUDENT IN AN ORGANIZED HEALTH CARE EDUCATION/TRAINING PROGRAM

## 2023-11-08 DEVICE — CEMENT BONE SMPLX HV GENTMYCN: Type: IMPLANTABLE DEVICE | Site: HIP | Status: FUNCTIONAL

## 2023-11-08 DEVICE — COMP FEM CEM TRIATHLON SZ 2 R: Type: IMPLANTABLE DEVICE | Site: HIP | Status: FUNCTIONAL

## 2023-11-08 DEVICE — INSERT TIBIAL SZ 3 9MM: Type: IMPLANTABLE DEVICE | Site: HIP | Status: FUNCTIONAL

## 2023-11-08 DEVICE — PATELLA TRIATHLON 29X8 SYMTRC: Type: IMPLANTABLE DEVICE | Site: HIP | Status: FUNCTIONAL

## 2023-11-08 DEVICE — BASEPLATE TIB CEM PRIM SZ 3: Type: IMPLANTABLE DEVICE | Site: HIP | Status: FUNCTIONAL

## 2023-11-08 RX ORDER — TRANEXAMIC ACID 10 MG/ML
1000 INJECTION, SOLUTION INTRAVENOUS
Status: COMPLETED | OUTPATIENT
Start: 2023-11-08 | End: 2023-11-08

## 2023-11-08 RX ORDER — CELECOXIB 100 MG/1
400 CAPSULE ORAL ONCE
Status: COMPLETED | OUTPATIENT
Start: 2023-11-08 | End: 2023-11-08

## 2023-11-08 RX ORDER — ASPIRIN 81 MG/1
81 TABLET ORAL 2 TIMES DAILY
Qty: 60 TABLET | Refills: 0 | Status: SHIPPED | OUTPATIENT
Start: 2023-11-08 | End: 2023-12-09

## 2023-11-08 RX ORDER — CELECOXIB 200 MG/1
200 CAPSULE ORAL 2 TIMES DAILY
Qty: 14 CAPSULE | Refills: 0 | Status: SHIPPED | OUTPATIENT
Start: 2023-11-08 | End: 2024-03-14 | Stop reason: HOSPADM

## 2023-11-08 RX ORDER — AMLODIPINE BESYLATE 5 MG/1
10 TABLET ORAL DAILY
Status: DISCONTINUED | OUTPATIENT
Start: 2023-11-08 | End: 2023-11-09 | Stop reason: HOSPADM

## 2023-11-08 RX ORDER — ATORVASTATIN CALCIUM 40 MG/1
40 TABLET, FILM COATED ORAL NIGHTLY
Status: DISCONTINUED | OUTPATIENT
Start: 2023-11-08 | End: 2023-11-09 | Stop reason: HOSPADM

## 2023-11-08 RX ORDER — PHENYLEPHRINE HYDROCHLORIDE 10 MG/ML
INJECTION INTRAVENOUS
Status: DISCONTINUED | OUTPATIENT
Start: 2023-11-08 | End: 2023-11-08

## 2023-11-08 RX ORDER — LIDOCAINE HYDROCHLORIDE 20 MG/ML
INJECTION INTRAVENOUS
Status: DISCONTINUED | OUTPATIENT
Start: 2023-11-08 | End: 2023-11-08

## 2023-11-08 RX ORDER — FLUOXETINE HYDROCHLORIDE 20 MG/1
20 CAPSULE ORAL DAILY
Status: DISCONTINUED | OUTPATIENT
Start: 2023-11-08 | End: 2023-11-09 | Stop reason: HOSPADM

## 2023-11-08 RX ORDER — DEXAMETHASONE SODIUM PHOSPHATE 10 MG/ML
INJECTION INTRAMUSCULAR; INTRAVENOUS
Status: SHIPPED | OUTPATIENT
Start: 2023-11-08 | End: 2023-11-08

## 2023-11-08 RX ORDER — SODIUM CHLORIDE 9 MG/ML
INJECTION, SOLUTION INTRAVENOUS
Status: COMPLETED | OUTPATIENT
Start: 2023-11-08 | End: 2024-03-15

## 2023-11-08 RX ORDER — ACETAMINOPHEN 500 MG
1000 TABLET ORAL EVERY 6 HOURS
Status: DISPENSED | OUTPATIENT
Start: 2023-11-08

## 2023-11-08 RX ORDER — ROPIVACAINE HYDROCHLORIDE 2 MG/ML
INJECTION, SOLUTION EPIDURAL; INFILTRATION
Status: COMPLETED | OUTPATIENT
Start: 2023-11-08 | End: 2023-11-08

## 2023-11-08 RX ORDER — SODIUM CHLORIDE 9 MG/ML
INJECTION, SOLUTION INTRAVENOUS CONTINUOUS
Status: DISCONTINUED | OUTPATIENT
Start: 2023-11-08 | End: 2023-11-08

## 2023-11-08 RX ORDER — ONDANSETRON 2 MG/ML
4 INJECTION INTRAMUSCULAR; INTRAVENOUS EVERY 8 HOURS PRN
Status: DISPENSED | OUTPATIENT
Start: 2023-11-08

## 2023-11-08 RX ORDER — OXYCODONE HYDROCHLORIDE 5 MG/1
10 TABLET ORAL
Status: DISPENSED | OUTPATIENT
Start: 2023-11-08

## 2023-11-08 RX ORDER — LIDOCAINE HYDROCHLORIDE 10 MG/ML
1 INJECTION, SOLUTION EPIDURAL; INFILTRATION; INTRACAUDAL; PERINEURAL
Status: ACTIVE | OUTPATIENT
Start: 2023-11-08

## 2023-11-08 RX ORDER — METOPROLOL SUCCINATE 25 MG/1
25 TABLET, EXTENDED RELEASE ORAL DAILY
Status: DISCONTINUED | OUTPATIENT
Start: 2023-11-08 | End: 2023-11-09 | Stop reason: HOSPADM

## 2023-11-08 RX ORDER — FENTANYL CITRATE 50 UG/ML
25 INJECTION, SOLUTION INTRAMUSCULAR; INTRAVENOUS EVERY 5 MIN PRN
Status: DISCONTINUED | OUTPATIENT
Start: 2023-11-08 | End: 2023-11-08 | Stop reason: HOSPADM

## 2023-11-08 RX ORDER — IBUPROFEN 200 MG
24 TABLET ORAL
Status: DISCONTINUED | OUTPATIENT
Start: 2023-11-08 | End: 2023-11-09 | Stop reason: HOSPADM

## 2023-11-08 RX ORDER — CEFAZOLIN SODIUM 2 G/50ML
2 SOLUTION INTRAVENOUS
Status: DISCONTINUED | OUTPATIENT
Start: 2023-11-08 | End: 2023-11-08

## 2023-11-08 RX ORDER — PROCHLORPERAZINE EDISYLATE 5 MG/ML
5 INJECTION INTRAMUSCULAR; INTRAVENOUS EVERY 6 HOURS PRN
Status: DISPENSED | OUTPATIENT
Start: 2023-11-08

## 2023-11-08 RX ORDER — ONDANSETRON 2 MG/ML
INJECTION INTRAMUSCULAR; INTRAVENOUS
Status: DISCONTINUED | OUTPATIENT
Start: 2023-11-08 | End: 2023-11-08

## 2023-11-08 RX ORDER — METHOCARBAMOL 500 MG/1
500 TABLET, FILM COATED ORAL 4 TIMES DAILY
Qty: 40 TABLET | Refills: 0 | Status: SHIPPED | OUTPATIENT
Start: 2023-11-08 | End: 2023-11-19

## 2023-11-08 RX ORDER — IBUPROFEN 200 MG
16 TABLET ORAL
Status: DISCONTINUED | OUTPATIENT
Start: 2023-11-08 | End: 2023-11-09 | Stop reason: HOSPADM

## 2023-11-08 RX ORDER — DEXAMETHASONE SODIUM PHOSPHATE 4 MG/ML
INJECTION, SOLUTION INTRA-ARTICULAR; INTRALESIONAL; INTRAMUSCULAR; INTRAVENOUS; SOFT TISSUE
Status: DISCONTINUED | OUTPATIENT
Start: 2023-11-08 | End: 2023-11-08

## 2023-11-08 RX ORDER — LIDOCAINE HYDROCHLORIDE 10 MG/ML
1 INJECTION, SOLUTION EPIDURAL; INFILTRATION; INTRACAUDAL; PERINEURAL ONCE
Status: DISCONTINUED | OUTPATIENT
Start: 2023-11-08 | End: 2023-11-08 | Stop reason: HOSPADM

## 2023-11-08 RX ORDER — CETIRIZINE HYDROCHLORIDE 10 MG/1
10 TABLET ORAL DAILY
Status: DISCONTINUED | OUTPATIENT
Start: 2023-11-08 | End: 2023-11-09 | Stop reason: HOSPADM

## 2023-11-08 RX ORDER — METHOCARBAMOL 750 MG/1
750 TABLET, FILM COATED ORAL 3 TIMES DAILY
Status: DISPENSED | OUTPATIENT
Start: 2023-11-08

## 2023-11-08 RX ORDER — OXYCODONE HYDROCHLORIDE 5 MG/1
5-10 TABLET ORAL EVERY 4 HOURS PRN
Qty: 40 TABLET | Refills: 0 | Status: SHIPPED | OUTPATIENT
Start: 2023-11-08 | End: 2023-12-01

## 2023-11-08 RX ORDER — INSULIN ASPART 100 [IU]/ML
0-10 INJECTION, SOLUTION INTRAVENOUS; SUBCUTANEOUS
Status: DISCONTINUED | OUTPATIENT
Start: 2023-11-08 | End: 2023-11-09 | Stop reason: HOSPADM

## 2023-11-08 RX ORDER — SODIUM CHLORIDE 0.9 % (FLUSH) 0.9 %
10 SYRINGE (ML) INJECTION
Status: ACTIVE | OUTPATIENT
Start: 2023-11-08

## 2023-11-08 RX ORDER — GLUCAGON 1 MG
1 KIT INJECTION
Status: DISCONTINUED | OUTPATIENT
Start: 2023-11-08 | End: 2023-11-09 | Stop reason: HOSPADM

## 2023-11-08 RX ORDER — DOCUSATE SODIUM 100 MG/1
100 CAPSULE, LIQUID FILLED ORAL 2 TIMES DAILY
Qty: 30 CAPSULE | Refills: 0 | Status: SHIPPED | OUTPATIENT
Start: 2023-11-08

## 2023-11-08 RX ORDER — PROPOFOL 10 MG/ML
VIAL (ML) INTRAVENOUS
Status: DISCONTINUED | OUTPATIENT
Start: 2023-11-08 | End: 2023-11-08

## 2023-11-08 RX ORDER — CEFAZOLIN SODIUM 2 G/50ML
2 SOLUTION INTRAVENOUS
Status: COMPLETED | OUTPATIENT
Start: 2023-11-08 | End: 2023-11-09

## 2023-11-08 RX ORDER — ROPIVACAINE/EPI/CLONIDINE/KET 2.46-0.005
SYRINGE (ML) INJECTION
Status: DISCONTINUED | OUTPATIENT
Start: 2023-11-08 | End: 2023-11-08 | Stop reason: HOSPADM

## 2023-11-08 RX ORDER — ACETAMINOPHEN 500 MG
1000 TABLET ORAL EVERY 8 HOURS PRN
Qty: 42 TABLET | Refills: 0 | Status: SHIPPED | OUTPATIENT
Start: 2023-11-08 | End: 2024-03-14 | Stop reason: HOSPADM

## 2023-11-08 RX ORDER — CEFAZOLIN SODIUM 2 G/50ML
2 SOLUTION INTRAVENOUS
Status: COMPLETED | OUTPATIENT
Start: 2023-11-08 | End: 2023-11-08

## 2023-11-08 RX ORDER — OXYCODONE HYDROCHLORIDE 5 MG/1
5 TABLET ORAL
Status: ACTIVE | OUTPATIENT
Start: 2023-11-08

## 2023-11-08 RX ORDER — NALOXONE HCL 0.4 MG/ML
0.02 VIAL (ML) INJECTION
Status: ACTIVE | OUTPATIENT
Start: 2023-11-08

## 2023-11-08 RX ORDER — EPHEDRINE SULFATE 50 MG/ML
INJECTION, SOLUTION INTRAVENOUS
Status: DISCONTINUED | OUTPATIENT
Start: 2023-11-08 | End: 2023-11-08

## 2023-11-08 RX ORDER — FENTANYL CITRATE 50 UG/ML
INJECTION, SOLUTION INTRAMUSCULAR; INTRAVENOUS
Status: DISCONTINUED | OUTPATIENT
Start: 2023-11-08 | End: 2023-11-08

## 2023-11-08 RX ORDER — PROPOFOL 10 MG/ML
VIAL (ML) INTRAVENOUS CONTINUOUS PRN
Status: DISCONTINUED | OUTPATIENT
Start: 2023-11-08 | End: 2023-11-08

## 2023-11-08 RX ORDER — MUPIROCIN 20 MG/G
1 OINTMENT TOPICAL
Status: COMPLETED | OUTPATIENT
Start: 2023-11-08 | End: 2023-11-08

## 2023-11-08 RX ORDER — ACETAMINOPHEN 500 MG
1000 TABLET ORAL
Status: DISPENSED | OUTPATIENT
Start: 2023-11-08

## 2023-11-08 RX ORDER — POLYETHYLENE GLYCOL 3350 17 G/17G
17 POWDER, FOR SOLUTION ORAL DAILY
Status: DISPENSED | OUTPATIENT
Start: 2023-11-08

## 2023-11-08 RX ORDER — ASPIRIN 81 MG/1
81 TABLET ORAL 2 TIMES DAILY
Status: DISPENSED | OUTPATIENT
Start: 2023-11-08

## 2023-11-08 RX ORDER — SODIUM CHLORIDE, SODIUM LACTATE, POTASSIUM CHLORIDE, CALCIUM CHLORIDE 600; 310; 30; 20 MG/100ML; MG/100ML; MG/100ML; MG/100ML
INJECTION, SOLUTION INTRAVENOUS CONTINUOUS
Status: DISCONTINUED | OUTPATIENT
Start: 2023-11-08 | End: 2023-11-08

## 2023-11-08 RX ORDER — SODIUM CHLORIDE 0.9 % (FLUSH) 0.9 %
10 SYRINGE (ML) INJECTION
Status: DISCONTINUED | OUTPATIENT
Start: 2023-11-08 | End: 2023-11-08 | Stop reason: HOSPADM

## 2023-11-08 RX ORDER — ACETAMINOPHEN 500 MG
1000 TABLET ORAL
Status: COMPLETED | OUTPATIENT
Start: 2023-11-08 | End: 2023-11-08

## 2023-11-08 RX ORDER — AMOXICILLIN 250 MG
1 CAPSULE ORAL 2 TIMES DAILY
Status: DISPENSED | OUTPATIENT
Start: 2023-11-08

## 2023-11-08 RX ORDER — FAMOTIDINE 20 MG/1
20 TABLET, FILM COATED ORAL 2 TIMES DAILY
Status: DISPENSED | OUTPATIENT
Start: 2023-11-08

## 2023-11-08 RX ORDER — SODIUM CHLORIDE 9 MG/ML
INJECTION, SOLUTION INTRAVENOUS CONTINUOUS
Status: DISCONTINUED | OUTPATIENT
Start: 2023-11-08 | End: 2023-11-09 | Stop reason: HOSPADM

## 2023-11-08 RX ADMIN — MUPIROCIN 1 G: 20 OINTMENT TOPICAL at 08:11

## 2023-11-08 RX ADMIN — AMLODIPINE BESYLATE 10 MG: 5 TABLET ORAL at 05:11

## 2023-11-08 RX ADMIN — SODIUM CHLORIDE: 0.9 INJECTION, SOLUTION INTRAVENOUS at 12:11

## 2023-11-08 RX ADMIN — PROPOFOL 50 MCG/KG/MIN: 10 INJECTION, EMULSION INTRAVENOUS at 10:11

## 2023-11-08 RX ADMIN — PHENYLEPHRINE HYDROCHLORIDE 100 MCG: 10 INJECTION INTRAVENOUS at 11:11

## 2023-11-08 RX ADMIN — ACETAMINOPHEN 1000 MG: 500 TABLET ORAL at 02:11

## 2023-11-08 RX ADMIN — FENTANYL CITRATE 25 MCG: 50 INJECTION, SOLUTION INTRAMUSCULAR; INTRAVENOUS at 01:11

## 2023-11-08 RX ADMIN — CEFAZOLIN SODIUM 2 G: 2 SOLUTION INTRAVENOUS at 06:11

## 2023-11-08 RX ADMIN — CELECOXIB 400 MG: 100 CAPSULE ORAL at 08:11

## 2023-11-08 RX ADMIN — LIDOCAINE HYDROCHLORIDE 50 MG: 20 INJECTION, SOLUTION INTRAVENOUS at 10:11

## 2023-11-08 RX ADMIN — SODIUM CHLORIDE, POTASSIUM CHLORIDE, SODIUM LACTATE AND CALCIUM CHLORIDE: 600; 310; 30; 20 INJECTION, SOLUTION INTRAVENOUS at 07:11

## 2023-11-08 RX ADMIN — SENNOSIDES AND DOCUSATE SODIUM 1 TABLET: 8.6; 5 TABLET ORAL at 05:11

## 2023-11-08 RX ADMIN — PROPOFOL 20 MG: 10 INJECTION, EMULSION INTRAVENOUS at 10:11

## 2023-11-08 RX ADMIN — ACETAMINOPHEN 1000 MG: 500 TABLET, FILM COATED ORAL at 08:11

## 2023-11-08 RX ADMIN — METHOCARBAMOL TABLETS 750 MG: 750 TABLET, COATED ORAL at 09:11

## 2023-11-08 RX ADMIN — FAMOTIDINE 20 MG: 20 TABLET, FILM COATED ORAL at 10:11

## 2023-11-08 RX ADMIN — FENTANYL CITRATE 25 MCG: 50 INJECTION, SOLUTION INTRAMUSCULAR; INTRAVENOUS at 10:11

## 2023-11-08 RX ADMIN — OXYCODONE HYDROCHLORIDE 10 MG: 5 TABLET ORAL at 09:11

## 2023-11-08 RX ADMIN — SODIUM CHLORIDE: 0.9 INJECTION, SOLUTION INTRAVENOUS at 06:11

## 2023-11-08 RX ADMIN — TRANEXAMIC ACID 1000 MG: 10 INJECTION, SOLUTION INTRAVENOUS at 10:11

## 2023-11-08 RX ADMIN — ROPIVACAINE HYDROCHLORIDE 15 ML: 2 INJECTION, SOLUTION EPIDURAL; INFILTRATION at 10:11

## 2023-11-08 RX ADMIN — ONDANSETRON 4 MG: 2 INJECTION, SOLUTION INTRAMUSCULAR; INTRAVENOUS at 12:11

## 2023-11-08 RX ADMIN — PREGABALIN 75 MG: 25 CAPSULE ORAL at 09:11

## 2023-11-08 RX ADMIN — INSULIN ASPART 4 UNITS: 100 INJECTION, SOLUTION INTRAVENOUS; SUBCUTANEOUS at 09:11

## 2023-11-08 RX ADMIN — FENTANYL CITRATE 25 MCG: 50 INJECTION, SOLUTION INTRAMUSCULAR; INTRAVENOUS at 12:11

## 2023-11-08 RX ADMIN — EPHEDRINE SULFATE 5 MG: 50 INJECTION INTRAVENOUS at 11:11

## 2023-11-08 RX ADMIN — PREGABALIN 75 MG: 25 CAPSULE ORAL at 08:11

## 2023-11-08 RX ADMIN — PHENYLEPHRINE HYDROCHLORIDE 200 MCG: 10 INJECTION INTRAVENOUS at 12:11

## 2023-11-08 RX ADMIN — PHENYLEPHRINE HYDROCHLORIDE 100 MCG: 10 INJECTION INTRAVENOUS at 10:11

## 2023-11-08 RX ADMIN — PROPOFOL 10 MG: 10 INJECTION, EMULSION INTRAVENOUS at 11:11

## 2023-11-08 RX ADMIN — CEFAZOLIN SODIUM 2 G: 2 SOLUTION INTRAVENOUS at 10:11

## 2023-11-08 RX ADMIN — FENTANYL CITRATE 75 MCG: 50 INJECTION, SOLUTION INTRAMUSCULAR; INTRAVENOUS at 10:11

## 2023-11-08 RX ADMIN — ASPIRIN 81 MG: 81 TABLET, COATED ORAL at 05:11

## 2023-11-08 RX ADMIN — MEPIVACAINE HYDROCHLORIDE 3 ML: 15 INJECTION, SOLUTION EPIDURAL; INFILTRATION at 10:11

## 2023-11-08 RX ADMIN — TRANEXAMIC ACID 1000 MG: 10 INJECTION, SOLUTION INTRAVENOUS at 11:11

## 2023-11-08 RX ADMIN — DEXAMETHASONE SODIUM PHOSPHATE 1 MG: 10 INJECTION, SOLUTION INTRAMUSCULAR; INTRAVENOUS at 10:11

## 2023-11-08 RX ADMIN — FAMOTIDINE 20 MG: 20 TABLET, FILM COATED ORAL at 09:11

## 2023-11-08 RX ADMIN — ONDANSETRON 4 MG: 2 INJECTION, SOLUTION INTRAMUSCULAR; INTRAVENOUS at 11:11

## 2023-11-08 RX ADMIN — METOPROLOL SUCCINATE 25 MG: 25 TABLET, EXTENDED RELEASE ORAL at 05:11

## 2023-11-08 RX ADMIN — METHOCARBAMOL TABLETS 750 MG: 750 TABLET, COATED ORAL at 05:11

## 2023-11-08 RX ADMIN — FLUOXETINE 20 MG: 20 CAPSULE ORAL at 05:11

## 2023-11-08 RX ADMIN — OXYCODONE HYDROCHLORIDE 10 MG: 5 TABLET ORAL at 05:11

## 2023-11-08 RX ADMIN — DEXAMETHASONE SODIUM PHOSPHATE 8 MG: 4 INJECTION, SOLUTION INTRAMUSCULAR; INTRAVENOUS at 11:11

## 2023-11-08 RX ADMIN — EPHEDRINE SULFATE 10 MG: 50 INJECTION INTRAVENOUS at 11:11

## 2023-11-08 RX ADMIN — CETIRIZINE HYDROCHLORIDE 10 MG: 10 TABLET, FILM COATED ORAL at 05:11

## 2023-11-08 RX ADMIN — ATORVASTATIN CALCIUM 40 MG: 40 TABLET, FILM COATED ORAL at 09:11

## 2023-11-08 RX ADMIN — PROCHLORPERAZINE EDISYLATE 5 MG: 5 INJECTION INTRAMUSCULAR; INTRAVENOUS at 01:11

## 2023-11-08 NOTE — PT/OT/SLP EVAL
"Physical Therapy Evaluation    Patient Name:  Valarie Foster   MRN:  [id number]    Recommendations:     Discharge Recommendations: Low Intensity Therapy   Discharge Equipment Recommendations: walker, rolling, bedside commode, bath bench   Barriers to discharge:  Pt c/o dizziness during ambulation approx 12'    Assessment:     Valarie Foster is a 73 y.o. female admitted with a medical diagnosis of <principal problem not specified>.  She presents with the following impairments/functional limitations: weakness, impaired functional mobility, decreased safety awareness, impaired coordination, impaired endurance, gait instability, decreased coordination, pain, impaired balance, impaired skin, impaired self care skills, decreased lower extremity function, decreased ROM .    Rehab Prognosis: Good; patient would benefit from acute skilled PT services to address these deficits and reach maximum level of function.    Recent Surgery: Procedure(s) (LRB):  ARTHROPLASTY, KNEE, TOTAL. NELSON (Right) Day of Surgery    Plan:     During this hospitalization, patient to be seen daily to address the identified rehab impairments via gait training, therapeutic activities, therapeutic exercises, neuromuscular re-education and progress toward the following goals:    Plan of Care Expires:  11/10/23    Subjective     Chief Complaint: pain, dizziness, feeling "hot"  Patient/Family Comments/goals: Pt agreeable to evaluation.  Needed to sit down after ambulating approx 12'  Pain/Comfort:  Pain Rating 1: 8/10  Location - Orientation 1:  (R knee)  Pain Addressed 1: Pre-medicate for activity, Reposition, Distraction, Cessation of Activity    Patients cultural, spiritual, Islam conflicts given the current situation: no    Living Environment:  Pt lives Alone in a [facility] with 2STE  Prior to admission, patients level of function was Modified Independent with ambulation and ADL's with Rollator.  Equipment used at home: rollator.  DME owned (not currently " used): none.  Upon discharge, patient will have assistance from SO.    Objective:     Communicated with nsg prior to session.  Patient found HOB elevated with telemetry, blood pressure cuff, pulse ox (continuous), peripheral IV, cryotherapy  with OT present at BS upon PT entry to room.    General Precautions: Standard, fall  Orthopedic Precautions:RLE weight bearing as tolerated   Braces: N/A  Respiratory Status: Room air    Exams:  Cognitive Exam:  Patient is oriented to Person, Place, Time, and Situation  Gross Motor Coordination:  WFL  Postural Exam:  Patient presented with the following abnormalities:    -       Rounded shoulders  -       Forward head  -       Abnormal trunk flexion  Sensation:    -       Intact  light/touch B LE's  Skin Integrity/Edema:      -       Skin integrity: R knee with Sx dressing intact  -       Edema: None noted    RLE ROM: knee approx 5-95*  RLE Strength: Dflx 5/5, knee ext 3-/5  LLE ROM: WFL  LLE Strength: WFL    Functional Mobility:  Bed Mobility:     Scooting: stand by assistance  Supine to Sit: minimum assistance  Transfers:     Sit to Stand:  contact guard assistance with VC/TC for hand placement and forward weight shift over CARLOS with RW  Gait: Gait training with RW and CGA approx 12' with VC/TC for increased trunk ext, distribution of weight through UE's to walker, and sequencing.  Pt c/o feeling Hot and dizzy and was seated in chair.  /62, Gait training with RW and CGA approx 12' with Vc/TC for same   Balance: Fair+ sit, Fair stand      AM-PAC 6 CLICK MOBILITY  Total Score:        Treatment & Education:  Dangle protocol: Pt sat at EOB with SBA.  /65, HR 77, SPO2 92%. Transfer and gait training as above.  Educated pt to perform B AP's and GS per TKA protocol/Handout    Patient left HOB elevated with all lines intact, call button in reach, nsg notified, and OT present.    GOALS:   Multidisciplinary Problems       Physical Therapy Goals          Problem: Physical  Therapy    Goal Priority Disciplines Outcome Goal Variances Interventions   Physical Therapy Goal     PT, PT/OT Ongoing, Progressing     Description: Goals to be met by: 11/10/23     Patient will increase functional independence with mobility by performin. Supine to sit with Stand-by Assistance  2. Sit to stand transfer with Supervision  3. Bed to chair transfer with Supervision    4. Gait  x 50 feet with Stand-by Assistance using Rolling Walker.   5. Ascend/descend 2 stair with  Contact Guard Assistance using Rolling Walker.   6. Lower extremity exercise program x10 reps per handout, with supervision                         History:     Past Medical History:   Diagnosis Date    Arthritis     Colon polyp     Diabetes mellitus     diet controlled    Diabetes with neurologic complications     Hypertension     Nuclear sclerosis of both eyes 2022    Renal cell carcinoma        Past Surgical History:   Procedure Laterality Date    COLONOSCOPY N/A 2018    Procedure: COLONOSCOPY;  Surgeon: Bacilio Mancia MD;  Location: [facility] ENDO;  Service: Endoscopy;  Laterality: N/A;  appt confirmed-ss    COLONOSCOPY N/A 2020    Procedure: COLONOSCOPY;  Surgeon: Jose West MD;  Location: [facility] ENDO;  Service: Endoscopy;  Laterality: N/A;    HYSTERECTOMY      INTRAOCULAR PROSTHESES INSERTION Left 2022    Procedure: INSERTION, IOL PROSTHESIS;  Surgeon: Candelario Novoa MD;  Location: [facility] OR;  Service: Ophthalmology;  Laterality: Left;    INTRAOCULAR PROSTHESES INSERTION Right 2022    Procedure: INSERTION, IOL PROSTHESIS;  Surgeon: Candelario Novoa MD;  Location: [facility] OR;  Service: Ophthalmology;  Laterality: Right;    OOPHORECTOMY      PARTIAL NEPHRECTOMY Right 10/12/2018    Procedure: NEPHRECTOMY, PARTIAL open. Dr Arenas to assist.;  Surgeon: Alejandra Palm MD;  Location: [facility] OR;  Service: Urology;  Laterality: Right;  RN PREOP 10/9/2018----NEEDS H/P    PHACOEMULSIFICATION OF  CATARACT Left 7/7/2022    Procedure: PHACOEMULSIFICATION, CATARACT;  Surgeon: Candelario Novoa MD;  Location: [facility] OR;  Service: Ophthalmology;  Laterality: Left;  RN PHONE PREOP 6/27/2022   ARRIVAL 6:00 AM    PHACOEMULSIFICATION OF CATARACT Right 7/21/2022    Procedure: PHACOEMULSIFICATION, CATARACT;  Surgeon: Candelario Novoa MD;  Location: [facility] OR;  Service: Ophthalmology;  Laterality: Right;  RN PHONE PREOP 7/12/2022   ARRIVAL 12:00 NOON       Time Tracking:     PT Received On: 11/08/23  PT Start Time: 1351     PT Stop Time: 1414  PT Total Time (min): 23 min     Billable Minutes: Evaluation 15 and Gait Training 8 co-evaluation with OT      11/08/2023

## 2023-11-08 NOTE — ANESTHESIA PROCEDURE NOTES
Spinal    Diagnosis: IUP   Patient location during procedure: OR  Start time: 11/8/2023 10:31 AM  Timeout: 11/8/2023 10:25 AM  End time: 11/8/2023 10:36 AM    Staffing  Authorizing Provider: Luli Samuels MD  Performing Provider: Luli Samuels MD    Staffing  Performed by: Luli Samuels MD  Authorized by: Luli Samuels MD    Preanesthetic Checklist  Completed: patient identified, IV checked, site marked, risks and benefits discussed, surgical consent, monitors and equipment checked, pre-op evaluation and timeout performed  Spinal Block  Patient position: sitting  Prep: ChloraPrep  Patient monitoring: heart rate, continuous pulse ox and frequent blood pressure checks  Approach: midline  Location: L3-4  Injection technique: single shot  CSF Fluid: clear free-flowing CSF  Needle  Needle type: pencil-tip   Needle gauge: 25 G  Needle length: 3.5 in  Needle localization: anatomical landmarks  Assessment  Sensory level: T4   Dermatomal levels determined by pinch or prick  Ease of block: easy  Patient's tolerance of the procedure: comfortable throughout block  Medications:    Medications: mepivacaine (CARBOCAINE) injection 15 mg/mL (1.5%) - Other   3 mL - 11/8/2023 10:36:00 AM

## 2023-11-08 NOTE — OR NURSING
1004 - Time out done for block per Dr Samuels  Pt on monitors  1014 - pt block complete - pt tolerated well

## 2023-11-08 NOTE — PLAN OF CARE
Pre-op plan of care reviewed with patient. Admit assessment complete. Questions encouraged and answered. Post-op education begun with pt. Pt ready to proceed. Anesthesia and Dr. Carbajal aware of last dose of ASA. Personal belonging bag including cell phone and dentures placed behind stretcher to go to holding with pt.

## 2023-11-08 NOTE — PLAN OF CARE
Problem: Occupational Therapy  Goal: Occupational Therapy Goal  Description: Goals to be met by: 11/15/23     Patient will increase functional independence with ADLs by performing:    UE Dressing with [place].  LE Dressing with Modified [place].  Grooming while standing at sink with [place].  Toileting from toilet with Modified [place] for hygiene and clothing management.   Bathing from  edge of bed with Set-up Assistance.  Sitting in chair x 60 minutes with [place].  Toilet transfer to toilet with Modified [place].  Upper extremity exercise program x10 reps per handout, with independence.    Outcome: Ongoing, Progressing   Patient seen by occupational therapy for initial evaluation. Patient will need shower chair and BSC at home and will need low intensity therapy. Occupational therapy to see 5x/wk while patient in acute care.

## 2023-11-08 NOTE — PLAN OF CARE
HH orders received and sent via [facility] to [facility].  Patient accepted for services.

## 2023-11-08 NOTE — H&P
EST PATIENT ORTHOPAEDIC: Knee    PRIMARY CARE PHYSICIAN: Alena Hernandez MD   REFERRING PROVIDER: Eric Carbajal MD  [address] [place] [address]  [place] [id number]     ASSESSMENT & PLAN:    Impression:  Bilateral Knee Moderate Osteoarthritis, primary (right worse than left)    Follow Up Plan: 3 weeks after surgery       Valarie Foster has radiograph and physical exam evidence of the aforementioned impression and wishes to pursue surgery. This patient appears to have sufficient symptoms to warrant surgical intervention and is an appropriate candidate for Right Primary Total Knee Arthroplasty as evidenced by months of unsuccessful non-operative treatment as outlined in the HPI below and progressive symptoms.    We had a lengthy discussion regarding the risk and benefit of surgery, the alternatives, limitations and personnel involved. These included but were not limited to infection, persistent pain, instability, nerve injury, blood clots, loosening, and medical complications. We also discussed the pre-operative course, surgery itself and rehabilitation.    Marisol-operative blood management and transfusion issues were discussed, and options clearly outlined. The patient has consented to the use of the banked allogenic blood if medically necessary.    The patient has elected to schedule surgery at this time or intends to call the office with a surgical date. Shared decision making occurred while obtaining informed consent. The patient will be scheduled for a pre-operative education class at which time they will have their nasal swab completed and will be given CHG cloths along with the verbal and written instructions for their use.    To review: Patient has bilateral knee pain left worse than right. She has radiographic imaging that is consistent with moderate to severe arthritis.  She has significant tenderness to palpation over multiple areas, but medial joint line is the worst. She has some irritability of her hip but CT scan  from 2020 does not demonstrate any significant arthritis here.  She does have some upper lumbar degenerative disc disease on that same CT scan.  She denies any lumbar back issues. She saw Dr. Solares for geniculate nerve ablations which she was not deemed a candidate for. Failed steroid injections. She had visco injections previously, she had about 5-6 months of relief. The repeat visco series did not provide greater than 1 month of relief. She is interested in surgical intervention at this time.     The patient has been ordered:  NELSON CT    CONSULTS:   PCP  Anesthesia     ACTIVE PROBLEM LIST  Patient Active Problem List   Diagnosis    Type 2 diabetes mellitus with diabetic neuropathy, without long-term current use of insulin    Benign hypertension    Hyperlipemia    Microhematuria    Left flank pain    Abdominal aortic atherosclerosis    History of renal cell cancer    Renal cell carcinoma of right kidney    Mild major depression    Bilateral chronic knee pain    Colon cancer screening    Pain of left calf    Nuclear sclerosis of both eyes    Refractive error    Primary osteoarthritis of knees, bilateral    Fall    Decreased ROM of lower extremity    Decreased strength of lower extremity    Decreased mobility and endurance    Nuclear sclerotic cataract of left eye    Nuclear sclerotic cataract of right eye    Radiculopathy    Idiopathic progressive neuropathy    Nerve pain    Chest pain    Severe obesity (BMI 35.0-39.9) with comorbidity           SUBJECTIVE    CHIEF COMPLAINT: Knee Pain    HPI:   Valarie Foster is a 73 y.o. female here for evaluation and management of bilateral knee pain. There is not a specific incident that brought about this pain. she has had progressive problems with the knee(s) starting 2 years ago but is now progressing to interfere with activities which include: walking, rising from a sitting position, standing for prolonged periods of time and climbing stairs     Currently the pain in the  joint is rated at mild with activity. The pain is constant and is located in the knee, at level of joint line, located medially, located laterally, located anterior, located posterior and with radiation. The pain is described as aching, severe, sharp, stabbing and stiffness. Relieving factors include ice or heat, prescription medication and repositioning.     There is associated Catching, Clicking and Popping.     Valarie Foster has no additional complaints.     Interval History 4/7/22: Continued pain. No changes. Saw pain management, not candidate for genicular nerve ablations.   Interval History 4/28/22: Continued pain. Some fear of falling. Follows with Sujatha. DDD on lumbar spine. Waiting for PT eval. Here for start of Orthovisc series    Interval History 5/3/22: Here for 2 of 3 visco injections. Pain mildly improved compared to previous.  Interval History 5/12/22: Here for 3 of 3 visco injections. Pain moderately improved compared to previous. Left knee, minimal pain.    Interval History 11/28/22: Here with return of bilateral knee pain. Previous injections help for about 6 months.   Interval History 1/17/23: Here for repeat orthovisc series. She is reporting bilateral lower extremity edema.   Interval History 1/27/24: Here for 2 of 3 visco injections. Pain moderately improved compared to previous. Able to garden this past weekend.   Interval History 2/3/24: Here for 3 of 3 visco injections. She reports her knees are doing well.    Interval History 9/12/23: Here for further considerations of knee pain. Similar pain to previous bouts, recent exacerbation. Right knee more painful than the left. Would like to know if she is a candidate for replacement. Last A1C 7.2    PROGRESSIVE SYMPTOMS:  Pain impacting sleep  Pain worsened by weight bearing    FUNCTIONAL STATUS:   Walk a block or two on level ground     PREVIOUS TREATMENTS:  Medical: Steroid Injections and Biologic Injections  Physical Therapy: Activities  Modified  and Formal Physical Therapy for 6 weeks  Previous Orthopaedic Surgery: None    REVIEW OF SYSTEMS:  PAIN ASSESSMENT:  See HPI.  MUSCULOSKELETAL: See HPI.  OTHER 10 point review of systems is negative except as stated in HPI above    PAST MEDICAL HISTORY   has a past medical history of Arthritis, Colon polyp, Diabetes mellitus, Diabetes with neurologic complications, Hypertension, Nuclear sclerosis of both eyes (2/14/2022), and Renal cell carcinoma.     PAST SURGICAL HISTORY   has a past surgical history that includes Hysterectomy; Oophorectomy; Colonoscopy (N/A, 2/5/2018); Partial nephrectomy (Right, 10/12/2018); Colonoscopy (N/A, 8/13/2020); Phacoemulsification of cataract (Left, 7/7/2022); Intraocular prosthesis insertion (Left, 7/7/2022); Phacoemulsification of cataract (Right, 7/21/2022); and Intraocular prosthesis insertion (Right, 7/21/2022).     FAMILY HISTORY  family history includes Blindness in her sister; Breast cancer in her sister; Cancer in her sister; Diabetes in her brother and sister; Glaucoma in her father and sister; No Known Problems in her maternal aunt, maternal grandfather, maternal grandmother, maternal uncle, mother, paternal aunt, paternal grandfather, paternal grandmother, and paternal uncle; Thyroid disease in her sister.     SOCIAL HISTORY   reports that she has never smoked. She has never used smokeless tobacco. She reports that she does not drink alcohol and does not use drugs.     ALLERGIES   Review of patient's allergies indicates:   Allergen Reactions    Lisinopril Swelling and Shortness Of Breath    Ascorbic acid-ascorbate calc      And citrus fruits        MEDICATIONS  No current facility-administered medications on file prior to encounter.     Current Outpatient Medications on File Prior to Encounter   Medication Sig Dispense Refill    ACCU-CHEK ONEIL PLUS TEST STRP Strp TEST BLOOD SUGAR TWICE DAILY 200 strip 1    ACCU-CHEK SOFTCLIX LANCETS Misc USE TO TEST BLOOD SUGAR  ONE TIME DAILY 100 each 3    alcohol swabs (DROPSAFE ALCOHOL PREP PADS) PadM USE 3 EVERY DAY AS DIRECTED 300 each 3    aspirin (ECOTRIN) 81 MG EC tablet Take 1 tablet (81 mg total) by mouth once daily. 30 tablet 3    atorvastatin (LIPITOR) 40 MG tablet  90 tablet 0    blood glucose control high,low (ACCU-CHEK ONEIL CONTROL SOLN) Soln 1 Units by Misc.(Non-Drug; Combo Route) route as needed. 1 each 0    blood glucose control, low (TRUE METRIX LEVEL 1) Soln 1 each by Misc.(Non-Drug; Combo Route) route once as needed. 1 each 3    blood-glucose meter (ACCU-CHEK ONEIL PLUS METER) Misc 1 kit by Misc.(Non-Drug; Combo Route) route once daily. 1 each 0    blood-glucose meter (TRUE METRIX GLUCOSE METER) kit Use as directed to test glucose 1 each 0    celecoxib (CELEBREX) 200 MG capsule Take 1 capsule (200 mg total) by mouth once daily. 90 capsule 1    cetirizine (ZYRTEC) 10 MG tablet Take 1 tablet (10 mg total) by mouth once daily. 90 tablet 0    ciclopirox (PENLAC) 8 % Soln Apply topically nightly. 6.6 mL 11    cyclobenzaprine (FLEXERIL) 5 MG tablet TAKE 1 TABLET THREE TIMES DAILY AS NEEDED FOR MUSCLE SPASM(S) 270 tablet 1    diphenhydrAMINE (BENADRYL) 25 mg capsule Take 1 capsule (25 mg total) by mouth every 6 (six) hours as needed for Itching. 20 capsule 0    ergocalciferol (ERGOCALCIFEROL) 50,000 unit Cap TAKE 1 CAPSULE EVERY 7 DAYS. 12 capsule 1    FLUoxetine 20 MG capsule TAKE 1 CAPSULE EVERY DAY 90 capsule 2    fluticasone propionate (FLONASE) 50 mcg/actuation nasal spray USE 1 SPRAY IN EACH NOSTRIL ONCE DAILY 32 g 2    gabapentin (NEURONTIN) 300 MG capsule TAKE 2 CAPSULES EVERY EVENING 180 capsule 1    metFORMIN (GLUCOPHAGE) 500 MG tablet Take 1 tablet (500 mg total) by mouth 2 (two) times daily with meals. 180 tablet 3    metoprolol succinate (TOPROL-XL) 25 MG 24 hr tablet Take 1 tablet (25 mg total) by mouth once daily. 90 tablet 3    pantoprazole (PROTONIX) 40 MG tablet Take 1 tablet (40 mg total) by mouth once  daily. 30 tablet 11    TRUEPLUS LANCETS 33 gauge Misc TEST BLOOD SUGAR TWICE DAILY 200 each 1          PHYSICAL EXAM   oral temperature is 98.1 °F (36.7 °C). Her blood pressure is 141/69 (abnormal) and her pulse is 70. Her respiration is 18 and oxygen saturation is 98%.   There is no height or weight on file to calculate BMI.      All other systems deferred.  GENERAL:  No acute distress  HABITUS: Normal  GAIT: Antalgic  SKIN: Normal  and No erythema, warmth, fluctuance .     KNEE EXAM:    bilateral:   Effusion: Minimal joint effusion  TTP: yes over Medial Joint Line,   no crepitus with passive knee ROM  Passive ROM: Extension 15, Flexion 100  No pain with manipulation of patella  Stable to varus/valgus stress. No increased laxity to anterior/posterior drawer testing  positive Constantin's test  No pain with IR/ER rotation of the hip  5/5 strength in knee flexion and extension, ankle plantarflexion and dorsiflexion  Neurovascular Status: Sensation intact to light touch in Sural, Saphenous, SPN, DPN, Tibial nerve distribution  2+ pulse DP/PT, normal capillary refill, foot has normal warmth    DATA:  Diagnostic tests reviewed for today's visit:     4v of the knee reveal Moderate to Severe degenerative changes of the Medial and Patellofemoral compartment. There is evidence of advanced osteoarthritis changes with Subchondral sclerosis, Osteophyte formation and Joint space narrowing. The limb is in netural alignment. The patella is tracking midline. Kellegren-Lawerence grade 4 changes

## 2023-11-08 NOTE — PT/OT/SLP EVAL
Occupational Therapy Evaluation     Name: Valarie Foster  MRN: [id number]  Admitting Diagnosis: <principal problem not specified> Day of Surgery  Recent Surgery: Procedure(s) (LRB):  ARTHROPLASTY, KNEE, TOTAL. NELSON (Right) Day of Surgery    Recommendations:     Discharge Recommendations: Low Intensity Therapy  Level of Assistance Recommended: Intermittent assistance and Intermittent supervision  Discharge Equipment Recommendations: walker, rolling, bedside commode, bath bench  Barriers to discharge: None    Assessment:     Valarie Foster is a 73 y.o. female with a medical diagnosis of <principal problem not specified>. She presents with performance deficits affecting function including weakness, impaired endurance, impaired self care skills, impaired functional mobility, gait instability, impaired balance, decreased lower extremity function, decreased safety awareness, pain, decreased ROM, edema, orthopedic precautions. Patient lying with stretcher HOB elevated on room air with cryotherapy donned with some nausea noted. RN had just given her pain and nausea medicine. She was at 121/65 lying supine before sitting EOB.     Rehab Prognosis: Good; patient would benefit from acute OT services to address these deficits and reach maximum level of function.    Plan:     Patient to be seen 5 x/week to address the above listed problems via self-care/home management, therapeutic activities, therapeutic exercises  Plan of Care Expires: 11/15/23  Plan of Care Reviewed with: patient    Subjective     Chief Complaint: right knee pain   Patient Comments/Goals: to return home   Pain/Comfort:  Pain Rating 1: 8/10  Location - Side 1: Right  Location 1: knee  Pain Addressed 1: Pre-medicate for activity, Reposition, Distraction, Cessation of Activity    Patients cultural, spiritual, Mormonism conflicts given the current situation: no    Social History:  Living Environment: Patient lives alone in a single story home with number of outside  stair(s): 2 with 0 HR   Prior Level of Function: Prior to admission, patient was independent.  Roles and Routines: Patient was driving and retired prior to admission.  Equipment Used at Home: rollator  DME owned (not currently used): none  Assistance Upon Discharge: significant other and family    Objective:     Communicated with RnNiki, prior to session. Patient found HOB elevated with telemetry, blood pressure cuff, pulse ox (continuous), peripheral IV, cryotherapy upon OT entry to room.    General Precautions: Standard, fall   Orthopedic Precautions: RLE weight bearing as tolerated   Braces: N/A    Respiratory Status: Room air    Occupational Performance    Gait belt applied - Yes    Bed Mobility:   Rolling/Turning to Left with contact guard assistance  Scooting to HOB in supine: contact guard assistance  Scooting anteriorly to EOB to have both feet planted on floor: contact guard assistance  Supine to sit from left side of bed with contact guard assistance  Sit to Supine with minimum assistance on left  side of bed    Functional Mobility/Transfers:  Sit <> Stand Transfer with contact guard assistance with rolling walker  Functional Mobility: Patient walked   ~ ' with RW with PT and occupational therapy following closely behind with chair. She became nauseated during walk, so she rested and retook BP at 123/63    Activities of Daily Living:  Upper Body Dressing: set up assistance to don outer gown   Lower Body Dressing: maximal assistance with donning socks and removing boots and scd     Cognitive/Visual Perceptual:  Cognitive/Psychosocial Skills:    -     Oriented to: Person, Place, Time, Situation  -     Follows Commands/attention: Follows multistep  commands  -     Communication: clear/fluent  -     Memory: No Deficits noted  -     Safety awareness/insight to disability: intact  -     Mood/Affect/Coping skills/emotional control: Appropriate to situation  Visual/Perceptual:    -     Intact    Physical  Exam:  Balance: -     Sitting: stand by assistance  -     Standing: contact guard assistance  Postural examination/scapula alignment:    -       No postural abnormalities identified  Skin integrity: Visible skin intact  Edema:  Moderate right lower extremity   Sensation:    -       Intact  Motor Planning: Intact  Dominant hand: Right  Upper Extremity Range of Motion:     -       Right Upper Extremity: WNL  -       Left Upper Extremity: WNL  Upper Extremity Strength:    -       Right Upper Extremity: WNL  -       Left Upper Extremity: WNL   Strength:    -       Right Upper Extremity: WNL  -       Left Upper Extremity: WNL  Fine Motor Coordination:    -       Intact  Gross motor coordination:   WFL    AMPAC 6 Click ADL:  AMPAC Total Score: 12    Treatment & Education:  Educated on the importance of mobility to maximize recovery  Educated on the importance of OOB mobility within safe range in order to decrease adverse effects of prolonged bedrest  Educated on performing functional mobility and ADLs in adherence to orthopedic precautions  Patient is clear to ambulate to/from bathroom with RN/PCT, assist x1  for OOB mobility with RN  Patient needs reminders for sit to stand t/fs to use hands from transfer surface.     Patient clear to ambulate to/from bathroom with RN/PCT, assist x1  .    Patient left HOB elevated with all lines intact, call button in reach, and RN notified.    GOALS:   Multidisciplinary Problems       Occupational Therapy Goals          Problem: Occupational Therapy    Goal Priority Disciplines Outcome Interventions   Occupational Therapy Goal     OT, PT/OT Ongoing, Progressing    Description: Goals to be met by: 11/15/23     Patient will increase functional independence with ADLs by performing:    UE Dressing with [place].  LE Dressing with Modified [place].  Grooming while standing at sink with [place].  Toileting from toilet with Modified [place] for hygiene and clothing  management.   Bathing from  edge of bed with Set-up Assistance.  Sitting in chair x 60 minutes with [place].  Toilet transfer to toilet with Modified [place].  Upper extremity exercise program x10 reps per handout, with independence.                         History:     Past Medical History:   Diagnosis Date    Arthritis     Colon polyp     Diabetes mellitus     diet controlled    Diabetes with neurologic complications     Hypertension     Nuclear sclerosis of both eyes 2/14/2022    Renal cell carcinoma          Past Surgical History:   Procedure Laterality Date    COLONOSCOPY N/A 2/5/2018    Procedure: COLONOSCOPY;  Surgeon: Bacilio Mancia MD;  Location: [facility] ENDO;  Service: Endoscopy;  Laterality: N/A;  appt confirmed-ss    COLONOSCOPY N/A 8/13/2020    Procedure: COLONOSCOPY;  Surgeon: Jose West MD;  Location: [facility] ENDO;  Service: Endoscopy;  Laterality: N/A;    HYSTERECTOMY      INTRAOCULAR PROSTHESES INSERTION Left 7/7/2022    Procedure: INSERTION, IOL PROSTHESIS;  Surgeon: Candelario Novoa MD;  Location: [facility] OR;  Service: Ophthalmology;  Laterality: Left;    INTRAOCULAR PROSTHESES INSERTION Right 7/21/2022    Procedure: INSERTION, IOL PROSTHESIS;  Surgeon: Candelario Novoa MD;  Location: [facility];  Service: Ophthalmology;  Laterality: Right;    OOPHORECTOMY      PARTIAL NEPHRECTOMY Right 10/12/2018    Procedure: NEPHRECTOMY, PARTIAL open. Dr Arenas to assist.;  Surgeon: Alejandra Palm MD;  Location: [facility];  Service: Urology;  Laterality: Right;  RN PREOP 10/9/2018----NEEDS H/P    PHACOEMULSIFICATION OF CATARACT Left 7/7/2022    Procedure: PHACOEMULSIFICATION, CATARACT;  Surgeon: Candelario Novoa MD;  Location: [facility];  Service: Ophthalmology;  Laterality: Left;  RN PHONE PREOP 6/27/2022   ARRIVAL 6:00 AM    PHACOEMULSIFICATION OF CATARACT Right 7/21/2022    Procedure: PHACOEMULSIFICATION, CATARACT;  Surgeon: Candelario Novoa MD;  Location: [facility];   Service: Ophthalmology;  Laterality: Right;  RN PHONE PREOP 7/12/2022   ARRIVAL 12:00 NOON       Time Tracking:     OT Date of Treatment: 11/08/23  OT Start Time: 1340  OT Stop Time: 1415  OT Total Time (min): 35 min    Billable Minutes: Evaluation 15  and Self Care/Home Management 20  Co-eval. with PT     11/8/2023

## 2023-11-08 NOTE — OP NOTE
OPERATIVE NOTE     PATIENT NAME: Valarie Foster   MRN: [id number]      Surgery Date:11/8/23    Surgeon(s) and Assistant(s): Eric Carbajal MD. Wanda Craft CST. Akhil Franklin CFA     Procedure(s): right Primary Total Knee Arthroplasty     BMI:  There is no height or weight on file to calculate BMI.      Anesthesia:  Spinal     Attestation:   I was present for all of the critical portions of the operation and performed all critical portions.  The medical assistant performed the superficial closure under supervision, and assisted during the operation. I was immediately available for the duration of the entire case.      Preoperative Diagnosis:  right  Knee Arthritis     Postoperative Diagnosis: Same     Findings:  See Full Operative Report    Complications: None     EBL: 25cc     Implants:   Implant Name Type Inv. Item Serial No.  Lot No. LRB No. Used Action   CEMENT BONE SMPLX HV GENTMYCN - [id number]  CEMENT BONE SMPLX HV GENTMYCN  [employer] AN. [id number] Right 2 Implanted   BASEPLATE TIB LORIE PRIM SZ 3 - [id number]  BASEPLATE TIB LORIE PRIM SZ 3  [employer] NA. [id number] Right 1 Implanted   COMP FEM LORIE TRIATHLON SZ 2 R - [id number]  COMP FEM LORIE TRIATHLON SZ 2 R  R[employer] AN. [id number] Right 1 Implanted   PATELLA TRIATHLON 29X8 SYMTRC - [id number]  PATELLA TRIATHLON 29X8 SYMTRC  [employer] AN. [id number] Right 1 Implanted   INSERT TIBIAL SZ 3 9MM - [id number]  INSERT TIBIAL SZ 3 9MM  [employer] AN. [id number] Right 1 Implanted       Drains: None     Problem List:   Problem List Items Addressed This Visit    None  Visit Diagnoses       Primary osteoarthritis of right knee    -  Primary    Relevant Orders    Vital signs    Perform Vegas Agitation Sedation Scale (RASS)    Insert peripheral IV    FOOT COMPRESSION PUMP    Place foot compression device    Document RASS    Chlorhexidine (CHG) 2% Wipes    Ortho Pathway Patient    Notify Physician - Potential Need of Opioid Reversal    Notify  Anesthesiologist if patient on home insulin pump    Vital signs - notify MD    Monitor EtCO2    Oxygen Continuous    Transfer patient (Completed)    Vital signs    Assess pain scale    Bromage scale    Perform Vegas Agitation Sedation Scale (RASS)    Intake and output    Neurovascular checks    Polar Ice to site continuously through surgical dressing    Foot pump at all times while in bed or chair    Dressing to be changed by physician only    Notify Physician/Vital Signs Parameters    Notify MD if hemoglobin is less than 8.0    Notify Physician - Potential Need of Opioid Reversal    Incentive spirometry    Oxygen Continuous    OT evaluate and treat    PT evaluate and treat    Perform POSS    Vital signs    Walker at bedside    Discontinue IV    Activity as tolerated    Keep surgical extremity elevated when not ambulating (Completed)    Lifting restrictions    Weight bearing as tolerated    No driving, operating heavy equipment or signing legal documents while taking pain medication    Call MD for:  temperature >100.4    Call MD for:  persistent nausea and vomiting    Call MD for:  severe uncontrolled pain    Call MD for:  difficulty breathing, headache or visual disturbances    Call MD for:  redness, tenderness, or signs of infection (pain, swelling, redness, odor or green/yellow discharge around incision site)    Call MD for:  hives    Call MD for:  persistent dizziness or light-headedness    Call MD for:  extreme fatigue    Place in Outpatient (Completed)    WALKER FOR HOME USE    3 IN 1 COMMODE FOR HOME USE    TRANSFER TUB BENCH FOR HOME USE    Place SEB hose    Place sequential compression device    X-Ray Knee 1 or 2 View Right    Place in Outpatient - Extended Recovery (Completed)    POCT glucose    If any glucose result is less than 50 or greater than 400:    If 2nd result is less than 50 or greater than 400:    If glucose greater than 400 mg/dL treat per correction scale.  If glucose remains elevated  above 400 mg/dL at next scheduled check, notify provider    Do not admin Aspart correction between scheduled prandial Aspart    Recheck Blood Glucose:    Diet diabetic 1500 Calorie    Preoperative testing        Relevant Orders    POCT glucose    Osteoarthritis of right knee, unspecified osteoarthritis type                   OPERATIVE INDICATIONS: The patient has a long history of progressive right knee pain, arthritis, and degeneration. They has developed deformity in the knee from predominantly wear and bone loss. Non-operative treatment and injections have been attempted, but have not improved or controlled the symptoms and pain that occurs during normal daily activities. Knee motion has also become limited and is restricting the patient. A total knee arthroplasty was recommended. The risks, benefits and potential complications of the arthroplasty surgery were discussed with the patient in detail. Specific details of the surgical procedure, hospitalization, recovery, rehabilitation, and long-term precautions were also presented. Pre-operative teaching was provided. Implant/prosthesis selection was outlined, and the many options available were explained; the final choice will be made at the time of the procedure to match the anatomy and condition of the bone, ligaments, tendons, and muscles.      The patient was seen by Internal Medicine/Anesthesia for pre-operative optimization, and risk assessment. Marisol-operative blood management and the potential for blood transfusion were discussed with risks and options clearly outlined. We discussed the risks of the procedure in detail, which included but is not limited to infection, bleeding, scarring, injury to blood vessels, nerves, muscular structures. We discussed specifically fracture and arthrofibrotic complications. Understanding of all topics was conveyed to me by the patient pre-operatively, and patient consent was given to proceed with the total knee replacement.       OPERATIVE PROCEDURE:  The patient was identified and brought into the Operating Room by the anesthesia and nursing teams. Anesthesia was successfully performed with spinal. The patient was then positioned supine on the operating room table, and all bony prominences were padded.  Intravenous antibiotic prophylaxis with Cefazolin dosing was confirmed. A tourniquet was applied to the upper thigh. A full knee exam was done after anesthesia was in full effect.  The leg was prepped and draped in the usual sterile fashion.  A surgical time-out was performed immediately preceding the incision with all personnel in the operating room; the patient identity was again confirmed, the knee-surgical site and extremity were identified and confirmed, X-rays were reviewed, and availability of the appropriate surgical equipment was established. The knee was exsanguinated and exposed using a limited anterior-midline skin incision. 1g of TXA was administed. Dissection was carried down through skin and subcutaneous tissue to the extensor mechanism with a scalpel.  A median parapatellar arthrotomy was made to enter the knee space sharply. A large amount of normal appearing joint fluid was encountered and suctioned.  The synovium was thickened, hypertrophic, and inflamed. A partial synovectomy was performed for exposure, and the medial and lateral gutters were cleared of scar and synovial reflections. The superficial medial collateral ligament was carefully elevated off. The patellar synovial reflections were released and the patella exposed to reveal wear through the articular surface. The trochlea demonstrated similar wear. Attention was then turned to the patella, it was measured and the posterior 9-10 mm was resected leaving a healthy remnant with greater than 11mm thickness.  The patella was then subluxed laterally. The femoral and tibial arrays were placed as well as the checkpoints.     The knee was then flexed up to 90  degrees.  Assessment of the knee joint revealed severe end-stage articular damage with no remaining medial/lateral weight bearing cartilage.  The compartment was severely eburnated with bone loss on the posterior tibia and posterior femoral condyles, resulting in the varus deformity.  The anterior cruciate ligament was found to be functionally compromised and it was excised.  Assessment of the soft tissues were made utilizing 1-6 mm feeler gauges in flexion and extension.  Changes to the preoperative plan were then carried out.     Reassessment of the soft tissue balance was carried out to confirm equal tension in flexion, extension, and medial/lateral balance. The robotic arm was then utilized to execute the plan. We began with the distal femur and chamfer cut. Then blade was swtiched and the remaining femur cuts were excuted. Then the tibia was cut. Soft tissue structures were protected with Z-retractors, richardt. Osteophytes were then further debrided from the femur and the tibia. Degenerative meniscal remnants were excised medially and laterally. We cleaned up posterior osteophytes with curved osteotome and currette. A portion of the pain cocktail was injected into the posterior capsule. Trailing was then initated. Femur trial was placed, then the knee was then brought to extension and the appropriate sized tibial spacer block was placed. This brought the knee to full extension, mid flexion, and 130 degrees of flexion with excellent medial lateral balance. The tray was rotated so that its midpoint was at the junction of middle one-third and lateral two-thirds of the tibial tubercle. Punches and pins fixed the trial in this position. Check points and arrays were removed. The patella was sized with the asymmetric guide, and drill holes were made. A trial button was placed and tracking of the patella and the entire knee trial was excellent; range of motion was excellent. No instability.  Patella tracked midline.  The PCL was intact and function, with a solid endoint to posterior drawer testing.     Punches and drills were then placed through the trials to accommodate the final implants.  All trials were removed.  The wound was copiously lavaged with a pulse irrigation/suction system. This included a solution of dilute betadine. The posterior recess of the knee and areas of known bleeding were treated with the electrocautery to reduce post-operative bleeding. The cut bone surfaces were then irrigated again, suctioned, and dried. The final implants were placed, tibia followed by femur followed by patella. The final CS polyethylene was impacted into place. This was a the knees were held in extension until the cement cured. The tourniquet was released and no excessive bleeding was encountered. Synovial bleeding was further treated with the electrocautery.  The wound was again irrigated. The extensor mechanism and capsule was then anatomically closed with Stratafix suture.  Knee stability and range of motion with the capsule closed was excellent, and range of motion was 0 to 130 without excessive stress on the repair. Instrument and sponge count was completed and confirmed correct. Deep and superficial subcutaneous tissue was closed with interrupted 2-0 Vicryl suture. A running 4-0 Monocryl subcuticular stitch was used to re-approximate the skin edges. Dermabond adhesive was applied.  A sterile silver impregnated dressing was placed.  PAS stockings were placed.  The patient tolerated the procedure, was transferred from the operating room table to a hospital bed, and taken to Recovery/PACU in stable condition.     PAIN COCKTAIL: RUBÉN Formula     POST OPERATIVE PLAN:  Patient will be transferred to the floor once in stable condition and after radiographs confirm no immediate complications. The patient will be treated with multimodal pain management protocols. They will be placed on anticoagulation of ASA 81mg to start on POD1. The  patient will be weight bearing as tolerated. They will work with physical therapy, have a graduated diet, and once medially stable and safe for home can be discharged. Patient will follow up with me 3 weeks post operatively. Dressing should be removed by patient 7 days post operatively.    IMPLANTS:  Bradley Triathlon  Femur: #2 CR, cemented  Tibia: #3, cemented  Poly: 9mm CS  Patella: 29mm, cemented

## 2023-11-08 NOTE — NURSING
Report received and care assumed when arrived from PACU.[facility], [facility]  Nurses Note -- 4 Eyes      11/8/2023       Skin assessed on: Q Shift      [x] No Pressure Injuries Present    [x]Prevention Measures Documented    [] Yes LDA  for Pressure Injury Previously documented     [] Yes New Pressure Injury Discovered   [] LDA for New Pressure Injury Added      Attending RN:  Su Posada, RN     Second RN:  Keturah Osborn  RN     Discussed plan of care and safety with patient . Reviewed call system. No acute distress noted

## 2023-11-08 NOTE — ANESTHESIA POSTPROCEDURE EVALUATION
Anesthesia Post Evaluation    Patient: Valarie Foster    Procedure(s) Performed: Procedure(s) (LRB):  ARTHROPLASTY, KNEE, TOTAL. NELSON (Right)    Final Anesthesia Type: spinal      Patient location during evaluation: PACU  Patient participation: Yes- Able to Participate  Level of consciousness: awake and alert and oriented  Post-procedure vital signs: reviewed and stable  Pain management: adequate  Airway patency: patent    PONV status at discharge: No PONV  Anesthetic complications: no      Cardiovascular status: blood pressure returned to baseline, hemodynamically stable and stable  Respiratory status: unassisted, spontaneous ventilation and room air  Hydration status: euvolemic  Follow-up not needed.          Vitals Value Taken Time   /62 11/08/23 1418   Temp 36.3 °C (97.3 °F) 11/08/23 1234   Pulse 77 11/08/23 1424   Resp 16 11/08/23 1424   SpO2 92 % 11/08/23 1424   Vitals shown include unvalidated device data.      No case tracking events are documented in the log.      Pain/Fabricio Score: Pain Rating Prior to Med Admin: 10 (11/8/2023  1:05 PM)  Pain Rating Post Med Admin: 0 (11/8/2023  9:25 AM)  Fabricio Score: 9 (11/8/2023  1:00 PM)

## 2023-11-08 NOTE — PLAN OF CARE
Discharge Orders: Home [place]         Expected Discharge Date: POD1    Diagnoses:  Post-op knee replacement    Patient is homebound due to:   Pt requires home health services due to taxing effort to leave the home as a result of immobility from Post-op knee replacement    Weight Bearing Status:   full weight bearing: Progress to cane as able.  Set up for outpatient PT as soon as able after 2 weeks, once patient is MOD I with cane.    Physical Therapy:   3 times a week for 2 weeks (Call for further orders beyond 2 weeks)  - Ambulate with a rolling walker  - Progress to cane  - Instruct on ROM and strengthening of knee    Aide to provide assistance with personal care and  ADLs 2 times a week for 2 weeks    Wound Care:   Surgical dressing change: Starting on  post op day 7-10, dressing can be moved. Incision is glued. Protective dressing should be placed with island dressing or apply gauze and cover with tegaderm.  Pt may shower if surgical dressing is waterproof, once removed on POD7 can shower but protect surgical dressing from getting wet by using press and seal saranwrap or garbage bag. Removal and replacement of dressing after shower only needed if incision is suspected to have gotten wet during shower. No soaking in the tub or hot tub use for 6 weeks.    Cold therapy/Ice: Encouraged at least 20 minutes 2-3 times daily or more if desired.  Incision must be kept waterproof while icing.  Wear TEDS Bilateral Thigh High Stockings for 3 weeks. Logan hose x 3 weeks. Ok to remove logan hose 1-2 hours/day max if desired.       Contact:  Please contact [phone] with concerns.         BLOOD THINNER:    If sent home on Lovenox: 28 days post-op for TKR/GEORGE  If sent home home on ASA: 81mg   BID x 4 weeks   If on Plavix, ok to stop hospital prescribed blood thinner and restart Plavix on POD5 after surgery    Once finished with prescribed blood thinner, patients can return to pre-surgical ASA dosage if they took ASA before  surgery.       Outpatient Therapy: [facility] is preference (to begin 2 weeks post op)  [address] [facility]  [place] [address]      DME:  - Per PT/OT recommendation

## 2023-11-08 NOTE — TRANSFER OF CARE
Anesthesia Transfer of Care Note    Patient: Valarie Foster    Procedure(s) Performed: Procedure(s) (LRB):  ARTHROPLASTY, KNEE, TOTAL. NELSON (Right)    Patient location: PACU    Anesthesia Type: general and spinal    Transport from OR: Transported from OR on 6-10 L/min O2 by face mask with adequate spontaneous ventilation    Post pain: adequate analgesia    Post assessment: no apparent anesthetic complications and tolerated procedure well    Post vital signs: stable    Level of consciousness: alert, awake and oriented    Nausea/Vomiting: no nausea/vomiting    Complications: none    Transfer of care protocol was followed      Last vitals:   Visit Vitals  /63 (BP Location: Left arm, Patient Position: Lying)   Pulse 74   Temp 36.3 °C (97.3 °F) (Temporal)   Resp 14   SpO2 95%   Breastfeeding No

## 2023-11-08 NOTE — ANESTHESIA PROCEDURE NOTES
Peripheral Block    Patient location during procedure: pre-op   Block not for primary anesthetic.  Reason for block: at surgeon's request and post-op pain management   Post-op Pain Location: R knee   Start time: 11/8/2023 10:05 AM  Timeout: 11/8/2023 10:03 AM   End time: 11/8/2023 10:10 AM    Staffing  Authorizing Provider: Luli Samuels MD  Performing Provider: Luli Samuels MD    Staffing  Performed by: Luli Samuels MD  Authorized by: Luli Samuels MD    Preanesthetic Checklist  Completed: patient identified, IV checked, site marked, risks and benefits discussed, surgical consent, monitors and equipment checked, pre-op evaluation and timeout performed  Peripheral Block  Patient position: supine  Prep: ChloraPrep  Patient monitoring: heart rate, cardiac monitor, continuous pulse ox, continuous capnometry and frequent blood pressure checks  Block type: adductor canal  Laterality: right  Injection technique: single shot  Needle  Needle type: Stimuplex   Needle gauge: 21 G  Needle length: 4 in  Needle localization: anatomical landmarks and ultrasound guidance     Assessment  Injection assessment: negative aspiration, negative parasthesia and local visualized surrounding nerve  Paresthesia pain: none  Heart rate change: no  Slow fractionated injection: yes  Pain Tolerance: no complaints and comfortable throughout block  Medications:    Medications: ropivacaine (NAROPIN) solution 0.2% - Perineural   15 mL - 11/8/2023 10:10:00 AM  dexAMETHasone sodium phos (PF) injection 10 mg/mL - Other   1 mg - 11/8/2023 10:10:00 AM    Additional Notes  VSS.  Vitals monitored throughout procedure.  Patient tolerated procedure well.

## 2023-11-08 NOTE — PLAN OF CARE
Problem: Physical Therapy  Goal: Physical Therapy Goal  Description: Goals to be met by: 11/10/23     Patient will increase functional independence with mobility by performin. Supine to sit with Stand-by Assistance  2. Sit to stand transfer with Supervision  3. Bed to chair transfer with Supervision    4. Gait  x 50 feet with Stand-by Assistance using Rolling Walker.   5. Ascend/descend 2 stair with  Contact Guard Assistance using Rolling Walker.   6. Lower extremity exercise program x10 reps per handout, with supervision    Outcome: Ongoing, Progressing   Initial PT evaluation performed today.  Pt could benefit from Daily skilled PT services in order to maximize function prior to D/C.  Low Intensity Therapy, RW, BSC, and TTB recommended at time of D/C.  Pt limited today 2/2 c/o Dizziness during ambulation approx 12'.

## 2023-11-08 NOTE — DISCHARGE INSTRUCTIONS
Post-Operative Discharge Instructions: Dr. Carbajal    Physical Therapy  You will have home health/physical therapy working with you 2-3x a week for the first two weeks. After this, it will be necessary to begin formal outpatient physical therapy for an additional 2 weeks for hip replacements and about 4-6 weeks for knee replacements.   Knee replacements can get stiff and as such the post operative therapy is critical after a knee replacement. Range of motion exercises are not limited to therapy sessions and should be done every 1-2 hours on your own.   Hip replacements your therapy for the first month is mostly walking and gradually returning to activities as tolerated. However, you will have to wean off assistive devices and maintain hip precautions for 6 weeks post operatively.     Pain Management  Some degree of pain is normal and expected. You will improve gradually as your muscles become stronger and healing continues. This speed of recovery is different for every patient and you should not compare your recovery to another friend or relative.   You will be discharged with pain medications. You should wean off of these as tolerated by 4-6 weeks. but it is expected you will need them in the immediate post operative period and for therapy.   Pain medications can have common side effects of constipation which you should take a laxative, nausea which you should take medication with food, itching which can be alleviated with Benadryl, and confusion which you should stop taking pain medications and call our office if this occurs.   Be aware of your ingestion of Tylenol if your pain medication has this in it, you should not exceed 3000mg of Acetaminophen as this can damage your liver.   If you require a pain medication refill, you will need to contact our office at least 3 days in advance to prescription running out. Prescriptions cannot be called into a pharmacy. You will need to  a prescription in one of our  office locations depending on our clinic availability.     Swelling  You will have swelling of the post operative leg. Swelling may get worse with activity. It will likely get worse in the 2-3 days after discharge from the hospital. Typically swelling is correlated to pain. It can be helpful to elevate your extremity above the level of your heart and consistent use of ice. Elevating your extremity should not be done as to violate your hip precautions after a hip replacement, and no pillows should be placed under the knee after knee replacement surgery.   You will have compression stockings that should remain on for 3 weeks following surgery. They should only be removed for hygiene purposes. These will help with the swelling.       Anticoagulation  You will be placed on a blood thinner to prevent blood clots. You will need to take this as directed.        Wound Care  Your dressing should come off after 7 days. You may shower over this dressing but it should be covered or kept dry (nichole wrap or garbage bag). Once it is removed, you may leave the wound open to air as so long there is no drainage. You can continue to shower but NO scrubbing the incision, should pat dry. NO soaking the wound in a bathtub, hot tub, pool, ocean etc. for at least 6 weeks after surgery. Your incision will likely be glued on the skin and no sutures should need to be removed post operatively.  You can begin putting on Vitamin E based lotions on the wound around 6-8 weeks post operatively when there is no remaining scabbing of the incision.   If there is any drainage post operatively you should contact our office immediately    Antibiotics  You will need to be placed on prophylactic antibiotics prior to any dental procedure or cleanings, any colonoscopy, cystoscopy, prostate or bladder surgery, kidney surgery or endoscopy. This will avoid risk of subsequent infection of your replacement. We prefer Amoxicillin 2g one hour prior to procedure.  This can be given by our office or your dentist. Although controversial, we prefer lifetime dental prophylaxis.     Other Considerations  No anti-inflammatories should be used for 3 weeks following surgery  No driving for about 2-4 weeks following surgery on the left leg and about 4-6 weeks after surgery on the right leg. You will need to be off pain medications completely. You will need to be able to perform evasive driving maneuvers without hesitation.    You can fly about 2 weeks post-operatively. However, we may recommend alterative blood thinners if this will take place.   Return to work is patient specific. If a desk job, it may be 2-4 weeks for motivated individuals. Patients in strenuous or physical jobs may be out for 12 weeks.     Follow-up appointments  Your first post operative visit will be about 3 weeks after surgery. You will have x-rays at this appointment  Your second post operative visit will be about 3 months after surgery. This will be for a clinical check only unless a reason arises for an x-ray  Your third post operative visit will be about 1 year after surgery. X-rays are taken at this time.

## 2023-11-09 VITALS
RESPIRATION RATE: 18 BRPM | DIASTOLIC BLOOD PRESSURE: 75 MMHG | HEART RATE: 63 BPM | SYSTOLIC BLOOD PRESSURE: 160 MMHG | HEIGHT: 64 IN | WEIGHT: 214.94 LBS | OXYGEN SATURATION: 99 % | BODY MASS INDEX: 36.7 KG/M2 | TEMPERATURE: 98 F

## 2023-11-09 LAB
POCT GLUCOSE: 202 MG/DL (ref 70–110)
POCT GLUCOSE: 244 MG/DL (ref 70–110)

## 2023-11-09 PROCEDURE — 94761 N-INVAS EAR/PLS OXIMETRY MLT: CPT

## 2023-11-09 PROCEDURE — 97535 SELF CARE MNGMENT TRAINING: CPT

## 2023-11-09 PROCEDURE — 97530 THERAPEUTIC ACTIVITIES: CPT | Mod: CQ

## 2023-11-09 PROCEDURE — 25000003 PHARM REV CODE 250: Performed by: ORTHOPAEDIC SURGERY

## 2023-11-09 PROCEDURE — 97116 GAIT TRAINING THERAPY: CPT | Mod: CQ

## 2023-11-09 PROCEDURE — 63600175 PHARM REV CODE 636 W HCPCS: Performed by: ORTHOPAEDIC SURGERY

## 2023-11-09 RX ORDER — ONDANSETRON 4 MG/1
8 TABLET, ORALLY DISINTEGRATING ORAL EVERY 8 HOURS PRN
Qty: 30 TABLET | Refills: 0 | Status: SHIPPED | OUTPATIENT
Start: 2023-11-09 | End: 2023-11-24 | Stop reason: SDUPTHER

## 2023-11-09 RX ADMIN — FLUOXETINE 20 MG: 20 CAPSULE ORAL at 01:11

## 2023-11-09 RX ADMIN — AMLODIPINE BESYLATE 10 MG: 5 TABLET ORAL at 01:11

## 2023-11-09 RX ADMIN — OXYCODONE HYDROCHLORIDE 10 MG: 5 TABLET ORAL at 05:11

## 2023-11-09 RX ADMIN — SENNOSIDES AND DOCUSATE SODIUM 1 TABLET: 8.6; 5 TABLET ORAL at 01:11

## 2023-11-09 RX ADMIN — POLYETHYLENE GLYCOL 3350 17 G: 17 POWDER, FOR SOLUTION ORAL at 01:11

## 2023-11-09 RX ADMIN — ACETAMINOPHEN 1000 MG: 500 TABLET ORAL at 12:11

## 2023-11-09 RX ADMIN — METOPROLOL SUCCINATE 25 MG: 25 TABLET, EXTENDED RELEASE ORAL at 01:11

## 2023-11-09 RX ADMIN — ONDANSETRON 4 MG: 2 INJECTION INTRAMUSCULAR; INTRAVENOUS at 08:11

## 2023-11-09 RX ADMIN — OXYCODONE HYDROCHLORIDE 10 MG: 5 TABLET ORAL at 10:11

## 2023-11-09 RX ADMIN — ASPIRIN 81 MG: 81 TABLET, COATED ORAL at 01:11

## 2023-11-09 RX ADMIN — ACETAMINOPHEN 1000 MG: 500 TABLET ORAL at 05:11

## 2023-11-09 RX ADMIN — CEFAZOLIN SODIUM 2 G: 2 SOLUTION INTRAVENOUS at 03:11

## 2023-11-09 RX ADMIN — CETIRIZINE HYDROCHLORIDE 10 MG: 10 TABLET, FILM COATED ORAL at 01:11

## 2023-11-09 RX ADMIN — FAMOTIDINE 20 MG: 20 TABLET, FILM COATED ORAL at 01:11

## 2023-11-09 RX ADMIN — METHOCARBAMOL TABLETS 750 MG: 750 TABLET, COATED ORAL at 01:11

## 2023-11-09 NOTE — PLAN OF CARE
Problem: Adult Inpatient Plan of Care  Goal: Plan of Care Review  Outcome: Adequate for Care Transition  Goal: Patient-Specific Goal (Individualized)  Outcome: Adequate for Care Transition  Goal: Absence of Hospital-Acquired Illness or Injury  Outcome: Adequate for Care Transition  Goal: Optimal Comfort and Wellbeing  Outcome: Adequate for Care Transition  Goal: Readiness for Transition of Care  Outcome: Adequate for Care Transition     Problem: Diabetes Comorbidity  Goal: Blood Glucose Level Within Targeted Range  Outcome: Adequate for Care Transition     Problem: Pain Acute  Goal: Acceptable Pain Control and Functional Ability  Outcome: Adequate for Care Transition

## 2023-11-09 NOTE — PT/OT/SLP PROGRESS
Occupational Therapy   Treatment    Name: Valarie Foster  MRN: [id number]  Admitting Diagnosis:  <principal problem not specified>  1 Day Post-Op    Recommendations:     Discharge Recommendations: Low Intensity Therapy  Discharge Equipment Recommendations:  walker, rolling, bedside commode, bath bench  Barriers to discharge:  None    Assessment:     Valarie Foster is a 73 y.o. female with a medical diagnosis of <principal problem not specified>.  She presents with HOB elevated and on room air with no pain complaints. Performance deficits affecting function are weakness, impaired endurance, impaired self care skills, impaired functional mobility, gait instability, impaired balance, decreased lower extremity function, decreased ROM, orthopedic precautions.     Rehab Prognosis:  Good; patient would benefit from acute skilled OT services to address these deficits and reach maximum level of function.       Plan:     Patient to be seen 5 x/week to address the above listed problems via self-care/home management, therapeutic activities, therapeutic exercises  Plan of Care Expires: 11/15/23  Plan of Care Reviewed with: patient    Subjective     Chief Complaint: none   Patient/Family Comments/goals: to return home with family assistance   Pain/Comfort:  Pain Rating 1: 0/10    Objective:     Communicated with: RNBoyd, prior to session.  Patient found HOB elevated with peripheral IV, telemetry, cryotherapy upon OT entry to room.    General Precautions: Standard, fall    Orthopedic Precautions:RLE weight bearing as tolerated  Braces: N/A  Respiratory Status: Room air     Occupational Performance:     Bed Mobility:    Patient completed Rolling/Turning to Right with stand by assistance  Patient completed Scooting/Bridging with stand by assistance  Patient completed Supine to Sit with stand by assistance     Functional Mobility/Transfers:  Patient completed Sit <> Stand Transfer with stand by assistance  with  rolling walker    Patient completed Bed <> Chair Transfer using Step Transfer technique with stand by assistance with standard walker  Patient completed Toilet Transfer Step Transfer technique with stand by assistance with  rolling walker  Functional Mobility: Patient walked in room and in hallway with PTA and occupational therapy following closely with chair in case patient had nausea while walking.     Activities of Daily Living:  Grooming: independence standing at sink   Upper Body Dressing: independence donning outer gown   Toileting: stand by assistance with hygiene and clothing management        [facility] 6 Click ADL:      Treatment & Education:  Occupational therapy educated patient re: need for BSC and shower chair at home to ensure safe t/fs at home.   Occupational therapy also educated her about wrapping right lower extremity prior to showering with garbage bags or cast wrap that she can purchase at drug stores.     Patient left up in chair with all lines intact, call button in reach, and RN  notified    GOALS:   Multidisciplinary Problems       Occupational Therapy Goals          Problem: Occupational Therapy    Goal Priority Disciplines Outcome Interventions   Occupational Therapy Goal     OT, PT/OT Ongoing, Progressing    Description: Goals to be met by: 11/15/23     Patient will increase functional independence with ADLs by performing:    UE Dressing with [place].  LE Dressing with Modified [place].  Grooming while standing at sink with [place].  Toileting from toilet with Modified [place] for hygiene and clothing management.   Bathing from  edge of bed with Set-up Assistance.  Sitting in chair x 60 minutes with [place].  Toilet transfer to toilet with Modified [place].  Upper extremity exercise program x10 reps per handout, with independence.                         Time Tracking:     OT Date of Treatment: 11/09/23  OT Start Time: 1327  OT Stop Time: 1348  OT Total Time (min): 21  min    Billable Minutes:Self Care/Home Management 21     Co-treat with PTA     OT/CHRISTINE: OT          11/9/2023

## 2023-11-09 NOTE — NURSING
Pt discharged per MD order. IV removed. Catheter tip intact. Telemetry box removed. No distress noted. AVS given to patient, discharge instructions explained per discharge nurse. Pt verbalized understanding. VSS. Afebrile. No complaints of pain, N/V, diarrhea, or SOB. Pt left with belongings to Main Entrance via wheelchair per staff. Family with patient.

## 2023-11-09 NOTE — PT/OT/SLP PROGRESS
Physical Therapy      Patient Name:  Valarie Foster   MRN:  [id number]    Patient not seen today secondary to Nausea/vomiting. Will follow-up later today as able.

## 2023-11-09 NOTE — NURSING
[facility] [place]  Nurses Note -- 4 Eyes      11/9/2023       Skin assessed on: Q Shift      [x] No Pressure Injuries Present    []Prevention Measures Documented    [] Yes LDA  for Pressure Injury Previously documented     [] Yes New Pressure Injury Discovered   [] LDA for New Pressure Injury Added      Attending RN:  Mari Weber, RN     Second RN:  Delica

## 2023-11-09 NOTE — PLAN OF CARE
11/09/23 0919   Final Note   Assessment Type Final Discharge Note   Anticipated Discharge Disposition Home-Health   Hospital Resources/Appts/Education Provided Appointments scheduled and added to AVS;Post-Acute resouces added to AVS   Post-Acute Status   Post-Acute Authorization Home Health   Home Health Status Set-up Complete/Auth obtained     Pts nurse Mari notified that the pt can d/c from CM standpoint

## 2023-11-09 NOTE — PLAN OF CARE
Problem: Physical Therapy  Goal: Physical Therapy Goal  Description: Goals to be met by: 11/10/23     Patient will increase functional independence with mobility by performin. Supine to sit with Stand-by Assistance  2. Sit to stand transfer with Supervision  3. Bed to chair transfer with Supervision    4. Gait  x 50 feet with Stand-by Assistance using Rolling Walker.   5. Ascend/descend 2 stair with  Contact Guard Assistance using Rolling Walker.   6. Lower extremity exercise program x10 reps per handout, with supervision    Outcome: Ongoing, Progressing       Pt tolerated gait training well today using RW and SBA/CGA.

## 2023-11-09 NOTE — PT/OT/SLP PROGRESS
"Physical Therapy Treatment    Patient Name:  Valarie Foster   MRN:  [id number]    Recommendations:     Discharge Recommendations: Low Intensity Therapy  Discharge Equipment Recommendations: walker, rolling, bedside commode, bath bench  Barriers to discharge: None    Assessment:     Valarie Foster is a 73 y.o. female admitted with a medical diagnosis of <principal problem not specified>.  She presents with the following impairments/functional limitations: weakness, impaired functional mobility, decreased safety awareness, impaired coordination, impaired endurance, gait instability, decreased coordination, pain, impaired balance, impaired skin, impaired self care skills, decreased lower extremity function, decreased ROM.    Pt tolerated gait training well today using RW and SBA/CGA.    Rehab Prognosis: Good; patient would benefit from acute skilled PT services to address these deficits and reach maximum level of function.    Recent Surgery: Procedure(s) (LRB):  ARTHROPLASTY, KNEE, TOTAL. NELSON (Right) 1 Day Post-Op    Plan:     During this hospitalization, patient to be seen daily to address the identified rehab impairments via gait training, therapeutic activities, therapeutic exercises, neuromuscular re-education and progress toward the following goals:    Plan of Care Expires:  11/10/23    Subjective     Chief Complaint: "I am ready to leave"  Patient/Family Comments/goals: Pt agreed to therapy session  Pain/Comfort:  Pain Rating 1: 0/10      Objective:   Patient found HOB elevated with peripheral IV, telemetry, cryotherapy upon PT entry to room.     General Precautions: Standard, fall  Orthopedic Precautions: RLE weight bearing as tolerated  Braces: N/A  Respiratory Status: Room air     Functional Mobility:  Bed Mobility:     Rolling Right: stand by assistance  Scooting: stand by assistance  Supine to Sit: stand by assistance  Transfers: Gait belt donned   Toilet Transfer: CGA using RW and step transfer. Pt able to " ambulate to toilet and sit with CGA; pt independent in perineal cleaning.    Sit to Stand: contact guard assistance with rolling walker  Gait: Pt ambulated ~200ft using RW and SBA/CGA. Pt demo decreased step length and decreased lance during ambulation. Pt required verbal cueing for RW management and safety awareness.  Balance: Fair+ sit, Fair stand. Pt stood static at sink ~5 minutes for personal hygiene using RW and SBA.    AM-PAC 6 CLICK MOBILITY  Turning over in bed (including adjusting bedclothes, sheets and blankets)?: 4  Sitting down on and standing up from a chair with arms (e.g., wheelchair, bedside commode, etc.): 3  Moving from lying on back to sitting on the side of the bed?: 4  Moving to and from a bed to a chair (including a wheelchair)?: 3  Need to walk in hospital room?: 3  Climbing 3-5 steps with a railing?: 2  Basic Mobility Total Score: 19       Treatment & Education:  Pt educated in OOB activities, pt verbalized and demo her exercises to PTA.     Patient left up in chair with all lines intact, call button in reach, and OT present.    GOALS:   Multidisciplinary Problems       Physical Therapy Goals          Problem: Physical Therapy    Goal Priority Disciplines Outcome Goal Variances Interventions   Physical Therapy Goal     PT, PT/OT Ongoing, Progressing     Description: Goals to be met by: 11/10/23     Patient will increase functional independence with mobility by performin. Supine to sit with Stand-by Assistance  2. Sit to stand transfer with Supervision  3. Bed to chair transfer with Supervision    4. Gait  x 50 feet with Stand-by Assistance using Rolling Walker.   5. Ascend/descend 2 stair with  Contact Guard Assistance using Rolling Walker.   6. Lower extremity exercise program x10 reps per handout, with supervision                         Time Tracking:     PT Received On: 23  PT Start Time: 1327     PT Stop Time: 1353  PT Total Time (min): 26 min     Billable Minutes: Gait  Training 15 and Therapeutic Activity 11    Treatment Type: Treatment  PT/PTA: PTA     Number of PTA visits since last PT visit: 1     11/09/2023

## 2023-11-09 NOTE — DISCHARGE SUMMARY
[facility] Surg  Orthopedics  Discharge Summary      Patient Name: Valarie Foster  MRN: [id number]  Admission Date: 11/8/2023  Hospital Length of Stay: 0 days  Discharge Date and Time:  11/09/2023 8:57 AM  Attending Physician: Erick Carbajal MD   Discharging Provider: Erick Carbajal MD  Primary Care Provider: Alena Hernandez MD    HPI: Right Knee OA    Procedure(s) (LRB):  ARTHROPLASTY, KNEE, TOTAL. NELSON (Right)      Hospital Course    The patient is a 74 yo female  who has been followed in clinic for right knee arthritis. It was determined she would benefit from surgery. The procedure, its risks, benefits, and potential complications were discussed in detail with the patient prior to surgery. Understanding of all topics was conveyed by the patient, and consent was given for surgery. The patient was admitted to [facility] on 11/8/2023 for right knee arthroplasty.    The procedure was tolerated well and she was sent to the post-operative recovery room in stable condition, where she also did well. Post-operative x-rays in the recovery room showed well-aligned components without evidence of complication or fracture. She was subsequently sent to his hospital room for postoperative management.  ?  Once on the floor herpostoperative course  was unremarkable and she did well. her diet was advanced which was tolerated. her pain was well controlled on multimodal pain medication; this was eventually transitioned to oral medication alone prior to discharge. She worked with Physical Therapy starting on POD#0 who recommended she be discharged home. Their dressing remained clean dry and intact throughout the hospital stay. She remained afebrile with stable vital signs throughout the stay. He/she was stable for discharge on POD#1.     Consults (From admission, onward)          Status Ordering Provider     IP consult to case management  Once        Provider:  (Not yet assigned)    Acknowledged ERICK CARBAJAL       "      Significant Diagnostic Studies: No pertinent studies.    Pending Diagnostic Studies:       None          There are no hospital problems to display for this patient.     Discharged Condition: good    Disposition: Home-Health Care [facility]    Follow Up:   Follow-up Information       [place], [facility] - Follow up.    Specialty: Home Health Services  Why: Home Health  Contact information:  [address]  [address]  [place] [address]  [phone]                             Justification of Rolling Walker:  The mobility limitation can not be sufficiently resolved by the use of a cane.  Patient's functional mobility deficit can be sufficiently resolved with the use of a rolling walker.  Patient has mobility limitation significantly impairs their ability to participate in 1 or more activities of daily living.  The use of the rolling walker we will significantly improve the patient's ability to participate in MRADLS and the patient will use it on a regular basis in the home.        Patient Instructions:      WALKER FOR HOME USE     Order Specific Question Answer Comments   Type of Walker: Adult (5'4"-6'6")    With wheels? Yes    Height: 5'4    Weight: 200    Length of need (1-99 months): 99    Please check all that apply: Walker will be used for gait training.      3 IN 1 COMMODE FOR HOME USE     Order Specific Question Answer Comments   Type: Standard    Height: 5'4    Weight: 200    Length of need (1-99 months): 99      TRANSFER TUB BENCH FOR HOME USE     Order Specific Question Answer Comments   Type of Transfer Tub Bench: Unpadded    Height: 5'4    Weight: 200    Length of need (1-99 months): 99      Activity as tolerated     Keep surgical extremity elevated when not ambulating     Lifting restrictions   Order Comments: No strenuous exercise or lifting of > 10 lbs     Weight bearing as tolerated     No driving, operating heavy equipment or signing legal documents while taking pain medication     Call " MD for:  temperature >100.4     Call MD for:  persistent nausea and vomiting     Call MD for:  severe uncontrolled pain     Call MD for:  difficulty breathing, headache or visual disturbances     Call MD for:  redness, tenderness, or signs of infection (pain, swelling, redness, odor or green/yellow discharge around incision site)     Call MD for:  hives     Call MD for:  persistent dizziness or light-headedness     Call MD for:  extreme fatigue     Medications:  Reconciled Home Medications:      Medication List      Some of the medications listed here do not show instructions, such as how often to take the medication. Ask your doctor or nurse how to use these medications.  Specifically ask about this and similar medications: atorvastatin (LIPITOR) 40 MG tablet       START taking these medications      acetaminophen 500 MG tablet  Commonly known as: TYLENOL  Take 2 tablets (1,000 mg total) by mouth every 8 (eight) hours as needed for Pain.     docusate sodium 100 MG capsule  Commonly known as: COLACE  Take 1 capsule (100 mg total) by mouth 2 (two) times daily.     methocarbamoL 500 MG Tab  Commonly known as: ROBAXIN  Take 1 tablet (500 mg total) by mouth 4 (four) times daily. for 10 days     oxyCODONE 5 MG immediate release tablet  Commonly known as: ROXICODONE  Take 1-2 tablets (5-10 mg total) by mouth every 4 (four) hours as needed (pain).            CHANGE how you take these medications      aspirin 81 MG EC tablet  Commonly known as: ECOTRIN  Take 1 tablet (81 mg total) by mouth 2 (two) times a day.  What changed: when to take this     celecoxib 200 MG capsule  Commonly known as: CeleBREX  Take 1 capsule (200 mg total) by mouth 2 (two) times daily.  What changed: when to take this            CONTINUE taking these medications      ACCU-CHEK ONEIL CONTROL SOLN Soln  Generic drug: blood glucose control high,low  1 Units by Misc.(Non-Drug; Combo Route) route as needed.     ACCU-CHEK ONEIL PLUS TEST STRP Strp  Generic  drug: blood sugar diagnostic  TEST BLOOD SUGAR TWICE DAILY     * ACCU-CHEK SOFTCLIX LANCETS Misc  Generic drug: lancets  USE TO TEST BLOOD SUGAR ONE TIME DAILY     * TRUEPLUS LANCETS 33 gauge Misc  Generic drug: lancets  TEST BLOOD SUGAR TWICE DAILY     amLODIPine 10 MG tablet  Commonly known as: NORVASC  TAKE 1 TABLET ONE TIME DAILY     atorvastatin 40 MG tablet  Commonly known as: LIPITOR  __________________________     * blood-glucose meter kit  Commonly known as: TRUE METRIX GLUCOSE METER  Use as directed to test glucose     * blood-glucose meter Misc  Commonly known as: ACCU-CHEK ONEIL PLUS METER  1 kit by Misc.(Non-Drug; Combo Route) route once daily.     cetirizine 10 MG tablet  Commonly known as: ZYRTEC  Take 1 tablet (10 mg total) by mouth once daily.     ciclopirox 8 % Soln  Commonly known as: PENLAC  Apply topically nightly.     diphenhydrAMINE 25 mg capsule  Commonly known as: BENADRYL  Take 1 capsule (25 mg total) by mouth every 6 (six) hours as needed for Itching.     DROPSAFE ALCOHOL PREP PADS Padm  Generic drug: alcohol swabs  USE 3 EVERY DAY AS DIRECTED     ergocalciferol 50,000 unit Cap  Commonly known as: ERGOCALCIFEROL  TAKE 1 CAPSULE EVERY 7 DAYS.     FLUoxetine 20 MG capsule  TAKE 1 CAPSULE EVERY DAY     fluticasone propionate 50 mcg/actuation nasal spray  Commonly known as: FLONASE  USE 1 SPRAY IN EACH NOSTRIL ONCE DAILY     gabapentin 300 MG capsule  Commonly known as: NEURONTIN  TAKE 2 CAPSULES EVERY EVENING     hydrOXYzine HCL 25 MG tablet  Commonly known as: ATARAX  Take 1 tablet (25 mg total) by mouth 3 (three) times daily as needed for Itching.     metFORMIN 500 MG tablet  Commonly known as: GLUCOPHAGE  Take 1 tablet (500 mg total) by mouth 2 (two) times daily with meals.     metoprolol succinate 25 MG 24 hr tablet  Commonly known as: TOPROL-XL  Take 1 tablet (25 mg total) by mouth once daily.     pantoprazole 40 MG tablet  Commonly known as: PROTONIX  Take 1 tablet (40 mg total) by  mouth once daily.     TRUE METRIX LEVEL 1 Soln  Generic drug: blood glucose control, low  1 each by Misc.(Non-Drug; Combo Route) route once as needed.           * This list has 4 medication(s) that are the same as other medications prescribed for you. Read the directions carefully, and ask your doctor or other care provider to review them with you.                STOP taking these medications      cyclobenzaprine 5 MG tablet  Commonly known as: FLEXERIL     meloxicam 15 MG tablet  Commonly known as: RUBI Carbajal MD  Orthopedics  [facility] Surg

## 2023-11-10 ENCOUNTER — TELEPHONE (OUTPATIENT)
Dept: ORTHOPEDICS | Facility: CLINIC | Age: 73
End: 2023-11-10
Payer: MEDICARE

## 2023-11-10 DIAGNOSIS — Z96.651 STATUS POST RIGHT KNEE REPLACEMENT: Primary | ICD-10-CM

## 2023-11-10 PROCEDURE — G0180 MD CERTIFICATION HHA PATIENT: HCPCS | Mod: ,,, | Performed by: ORTHOPAEDIC SURGERY

## 2023-11-10 NOTE — TELEPHONE ENCOUNTER
called the patient today regarding her surgery with Dr. Carbajal. The patient had a right TKA on 11/8/23.   Pain Scale: 7 / 10  Any issues with Fever: No.  Any issues with bowel movements: not yet  Completing at home exercises: Yes:   Any concerns regarding their dressing/bandage: No.  Patient confirmed first HH-PT appointment: Coming today.  Any other concerns: No.  The patient was informed that if they have post op EMERGENCY's  to call the orthopedic Post-op Hot Line at [phone] .The patient was reminded that if they have any chest pain or shortness of breath to call 911 or go to the ER.  The patient verbalized understanding and does not have any other questions   The patient verbalized understanding and does not have any other questions

## 2023-11-13 ENCOUNTER — HOSPITAL ENCOUNTER (EMERGENCY)
Facility: HOSPITAL | Age: 73
Discharge: HOME | End: 2023-11-13
Attending: EMERGENCY MEDICINE
Payer: MEDICARE

## 2023-11-13 VITALS
HEIGHT: 64 IN | HEART RATE: 94 BPM | WEIGHT: 200 LBS | DIASTOLIC BLOOD PRESSURE: 76 MMHG | SYSTOLIC BLOOD PRESSURE: 158 MMHG | TEMPERATURE: 98 F | OXYGEN SATURATION: 97 % | RESPIRATION RATE: 19 BRPM | BODY MASS INDEX: 34.15 KG/M2

## 2023-11-13 DIAGNOSIS — M25.569 KNEE PAIN, ACUTE: ICD-10-CM

## 2023-11-13 DIAGNOSIS — I10 HYPERTENSION, UNSPECIFIED TYPE: ICD-10-CM

## 2023-11-13 DIAGNOSIS — M25.561 ACUTE PAIN OF RIGHT KNEE: Primary | ICD-10-CM

## 2023-11-13 PROCEDURE — 96372 THER/PROPH/DIAG INJ SC/IM: CPT | Performed by: EMERGENCY MEDICINE

## 2023-11-13 PROCEDURE — 25000003 PHARM REV CODE 250: Performed by: EMERGENCY MEDICINE

## 2023-11-13 PROCEDURE — 63600175 PHARM REV CODE 636 W HCPCS: Performed by: EMERGENCY MEDICINE

## 2023-11-13 PROCEDURE — 99284 EMERGENCY DEPT VISIT MOD MDM: CPT

## 2023-11-13 RX ORDER — AMLODIPINE BESYLATE 5 MG/1
10 TABLET ORAL
Status: COMPLETED | OUTPATIENT
Start: 2023-11-13 | End: 2023-11-13

## 2023-11-13 RX ORDER — MORPHINE SULFATE 4 MG/ML
6 INJECTION, SOLUTION INTRAMUSCULAR; INTRAVENOUS
Status: COMPLETED | OUTPATIENT
Start: 2023-11-13 | End: 2023-11-13

## 2023-11-13 RX ORDER — ONDANSETRON 4 MG/1
4 TABLET, ORALLY DISINTEGRATING ORAL
Status: COMPLETED | OUTPATIENT
Start: 2023-11-13 | End: 2023-11-13

## 2023-11-13 RX ORDER — OXYCODONE HYDROCHLORIDE 5 MG/1
5 TABLET ORAL
Status: COMPLETED | OUTPATIENT
Start: 2023-11-13 | End: 2023-11-13

## 2023-11-13 RX ADMIN — ONDANSETRON 4 MG: 4 TABLET, ORALLY DISINTEGRATING ORAL at 10:11

## 2023-11-13 RX ADMIN — MORPHINE SULFATE 6 MG: 4 INJECTION, SOLUTION INTRAMUSCULAR; INTRAVENOUS at 07:11

## 2023-11-13 RX ADMIN — AMLODIPINE BESYLATE 10 MG: 5 TABLET ORAL at 07:11

## 2023-11-13 RX ADMIN — OXYCODONE HYDROCHLORIDE 5 MG: 5 TABLET ORAL at 10:11

## 2023-11-13 NOTE — ED PROVIDER NOTES
"Encounter Date: 11/13/2023    SCRIBE #1 NOTE: I, Yajairaester Stratton, am scribing for, and in the presence of,  Marie Gibson MD. I have scribed the following portions of the note - Other sections scribed: HPI, ROS, PE.       History     Chief Complaint   Patient presents with    Knee Pain     Presents to the ED via EMS with c/o R knee pain after getting up to walk and hearing a "pop". Reports getting knee surgery some time last week.      This 73 y.o female, with a medical history of Arthritis, Diabetes, Hypertension, Nuclear sclerosis of both eyes, and Renal cell carcinoma, presents to the ED via EMS transportation c/o right knee pain that began this morning. Pt reports that she got up to walk and heard a "pop" in the right knee. She denies falling. She states that she has since been experiencing pain to the lateral right knee. The symptoms are acute, constant and severe (10/10). Pt notes that she underwent right knee surgery last week (11/8/23) and has been using a roller to ambulate. There are no other associated symptoms at this time. No treatment attempted PTA to the ED. No alleviating factors. She did not take her BP or pain medications this am.    The history is provided by the patient.     Review of patient's allergies indicates:   Allergen Reactions    Lisinopril Swelling and Shortness Of Breath    Ascorbic acid-ascorbate calc      And citrus fruits     Past Medical History:   Diagnosis Date    Arthritis     Colon polyp     Diabetes mellitus     diet controlled    Diabetes with neurologic complications     Hypertension     Nuclear sclerosis of both eyes 2/14/2022    Renal cell carcinoma      Past Surgical History:   Procedure Laterality Date    COLONOSCOPY N/A 2/5/2018    Procedure: COLONOSCOPY;  Surgeon: Bacilio Mancia MD;  Location: [facility];  Service: Endoscopy;  Laterality: N/A;  appt confirmed-ss    COLONOSCOPY N/A 8/13/2020    Procedure: COLONOSCOPY;  Surgeon: Jose West MD;  Location: " [facility] ENDO;  Service: Endoscopy;  Laterality: N/A;    HYSTERECTOMY      INTRAOCULAR PROSTHESES INSERTION Left 7/7/2022    Procedure: INSERTION, IOL PROSTHESIS;  Surgeon: Candelario Novoa MD;  Location: [facility] OR;  Service: Ophthalmology;  Laterality: Left;    INTRAOCULAR PROSTHESES INSERTION Right 7/21/2022    Procedure: INSERTION, IOL PROSTHESIS;  Surgeon: Candelario Novoa MD;  Location: [facility] OR;  Service: Ophthalmology;  Laterality: Right;    OOPHORECTOMY      PARTIAL NEPHRECTOMY Right 10/12/2018    Procedure: NEPHRECTOMY, PARTIAL open. Dr Arenas to assist.;  Surgeon: Alejandra Palm MD;  Location: [facility] OR;  Service: Urology;  Laterality: Right;  RN PREOP 10/9/2018----NEEDS H/P    PHACOEMULSIFICATION OF CATARACT Left 7/7/2022    Procedure: PHACOEMULSIFICATION, CATARACT;  Surgeon: Cadnelario Novoa MD;  Location: [facility] OR;  Service: Ophthalmology;  Laterality: Left;  RN PHONE PREOP 6/27/2022   ARRIVAL 6:00 AM    PHACOEMULSIFICATION OF CATARACT Right 7/21/2022    Procedure: PHACOEMULSIFICATION, CATARACT;  Surgeon: Candelario Novoa MD;  Location: [facility] OR;  Service: Ophthalmology;  Laterality: Right;  RN PHONE PREOP 7/12/2022   ARRIVAL 12:00 NOON    TOTAL KNEE ARTHROPLASTY Right 11/8/2023    Procedure: ARTHROPLASTY, KNEE, TOTAL. NELSNO;  Surgeon: Eric Carbajal MD;  Location: [facility] OR;  Service: Orthopedics;  Laterality: Right;  [place]. Admit  BRADLEY NIEVES [phone] NOTIFIED QUINTIN ON 10/11/2023-  RN PREOP 10/25/2023   T/S ON 11/6/2023--done------CLEARED BY CARDS     Family History   Problem Relation Age of Onset    No Known Problems Mother     Glaucoma Father     Breast cancer Sister     Glaucoma Sister     Cancer Sister         breast cancer    Thyroid disease Sister     Blindness Sister         due to trauma during car accident    Diabetes Sister     No Known Problems Maternal Aunt     No Known Problems Maternal Uncle     No Known Problems Paternal Aunt     No Known Problems  Paternal Uncle     No Known Problems Maternal Grandmother     No Known Problems Maternal Grandfather     No Known Problems Paternal Grandmother     No Known Problems Paternal Grandfather     Diabetes Brother     Amblyopia Neg Hx     Cataracts Neg Hx     Hypertension Neg Hx     Macular degeneration Neg Hx     Retinal detachment Neg Hx     Strabismus Neg Hx     Stroke Neg Hx      Social History     Tobacco Use    Smoking status: Never    Smokeless tobacco: Never   Substance Use Topics    Alcohol use: No    Drug use: No     Review of Systems   Constitutional:  Negative for chills and fever.   HENT:  Negative for congestion and sore throat.    Eyes:  Negative for visual disturbance.   Respiratory:  Negative for cough and shortness of breath.    Cardiovascular:  Negative for chest pain.   Gastrointestinal:  Negative for abdominal pain, nausea and vomiting.   Genitourinary:  Negative for dysuria and vaginal discharge.   Musculoskeletal:  Positive for arthralgias (right knee pain).   Skin:  Negative for rash.   Neurological:  Negative for headaches.   Psychiatric/Behavioral:  Negative for decreased concentration.        Physical Exam     Initial Vitals [11/13/23 0649]   BP Pulse Resp Temp SpO2   (!) 186/84 82 18 98.1 °F (36.7 °C) 98 %      MAP       --         Physical Exam    Nursing note and vitals reviewed.  Constitutional: She appears well-developed and well-nourished. She is not diaphoretic. No distress.   HENT:   Head: Normocephalic and atraumatic.   Eyes: Conjunctivae are normal.   Neck: Neck supple.   Cardiovascular:  Normal rate and regular rhythm.           Pulses:       Dorsalis pedis pulses are 1+ on the right side.   Pulmonary/Chest: Breath sounds normal.   Abdominal: Abdomen is soft.   Musculoskeletal:         General: Tenderness present.      Cervical back: Neck supple.      Comments: The right knee appears generally swollen, but no erythema. There is general tenderness to the right knee.     Neurological:  She is alert. GCS score is 15. GCS eye subscore is 4. GCS verbal subscore is 5. GCS motor subscore is 6.   Sensation to light touch is intact RLE   Skin: Skin is warm and dry.   There is a bandage over the surgical site that is clean, dry and intact.   Psychiatric: She has a normal mood and affect.         ED Course   Procedures  Labs Reviewed - No data to display       Imaging Results               X-Ray Knee 1 or 2 View Right (Final result)  Result time 11/13/23 09:35:50   Procedure changed from X-Ray Knee 3 View Right     Final result by Juan R Lemus MD (11/13/23 09:35:50)                   Impression:      Allowing for the limitations above, there is a right knee effusion and subcutaneous soft tissue stranding.    New osseous resorption questioned along the articular surface of the suboptimally visualized patella, versus artifact of projection.  Infection not excluded.    Correlate clinically, and with follow-up imaging if clinically appropriate.    This report was flagged in Epic as abnormal.      Electronically signed by: Juan R Lemus  Date:    11/13/2023  Time:    09:35               Narrative:    EXAMINATION:  XR KNEE 1 OR 2 VIEW RIGHT    CLINICAL HISTORY:  Knee pain;  Pain in unspecified knee    TECHNIQUE:  AP and lateral views of the right knee were performed.    COMPARISON:  Right knee radiographs 11/08/2023    FINDINGS:  Two lateral views were obtained, but both are unfortunately oblique, limiting assessment, especially of portions of the patella.  Allowing for this, there is a suprapatellar effusion.    The cortical white line at the articular surface of the patella appears less distinct than previously.  Uncertain if this is related to differences in technique and 2 the oblique positioning on today's lateral images, but new osseous resorption in the patella is not excluded, such as could be seen with infection.    The distal femoral and proximal tibial components of the right knee prostheses  demonstrate no adverse interval radiographic change relative to 11/08/2023.    No acute fracture deformity or tibiofemoral dislocation.    Mottled/stranded appearance of the subcutaneous soft tissues, especially in the anteromedial aspect of the visualized right lower extremity.    Small soft tissue calcifications are suggested anterior to the knee joint and tibia, and were present previously.                                       Medications   morphine injection 6 mg (6 mg Intramuscular Given 11/13/23 0748)   amLODIPine tablet 10 mg (10 mg Oral Given 11/13/23 0749)   oxyCODONE immediate release tablet 5 mg (5 mg Oral Given 11/13/23 1018)   ondansetron disintegrating tablet 4 mg (4 mg Oral Given 11/13/23 1018)     Medical Decision Making  73-year-old female with history of hypertension, diabetes, recent right knee total arthroplasty on November 8th, presents to the emergency department with EMS for right knee pain.  The patient reports when she got up this morning she was trying to walk and heard a pop in her knee associated with increased pain.  She did not take any of her medications this morning including her blood pressure medicine will her pain medication.  She had been ambulating with a walker up until this point.  She reports generalized pain and tenderness to the right knee.  On exam, the patient is hypertensive, afebrile.  The right knee is generally swollen and tender, no erythema.  She has a dressing over the surgical site that is clean, dry, intact.  She is neurovascularly intact in her right lower extremity.  Differential includes but not limited to sprain, hardware malfunction, postoperative pain.  Will get x-ray of the right knee, will treat with IM morphine, will give home blood pressure medication.    Amount and/or Complexity of Data Reviewed  External Data Reviewed: notes.     Details: On chart review, patient had a total R knee arthroplasty on 11/8/23. She has follow up wiht her PCP on 11/15, with  Dr. Carbajal on 12/1/23. [place]  shows she is prescribed oxycodone 5 mg, in addition to gabapentin 300 mg tabs.   Radiology: ordered.    Risk  Prescription drug management.            Scribe Attestation:   Scribe #1: I performed the above scribed service and the documentation accurately describes the services I performed. I attest to the accuracy of the note.        ED Course as of 11/13/23 1508   Mon Nov 13, 2023   0838 Patient reassessed, pain improved but still present. I reviewed XRay- I do not appreciate any obvious deformities. Will give home PO oxycodone.  [LH]   1000   X-ray shows some limitations due to positioning, new osseous resorption question long articular surface of the patella, versus artifact a projection, the patient does have a right knee effusion with some soft tissue stranding.  On exam, the patient's knee does not appear to be infected. Case reviewed with Dr. Carbajal who also reviewed patient XRs, he will follow up with patient in clinic, no intervention ok to DC.  [LH]      ED Course User Index  [LH] Marie Gibson MD                    I, Marie Gibson MD, personally performed the services described in this documentation. All medical record entries made by the scribe were at my direction and in my presence. I have reviewed the chart and agree that the record reflects my personal performance and is accurate and complete.     This dictation has been generated using M-Modal Fluency Direct dictation; some phonetic errors may occur.     Clinical Impression:   Final diagnoses:  [M25.561] Acute pain of right knee (Primary)  [M25.569] Knee pain, acute  [M25.569] Knee pain, acute - s/p R knee arthroplasty 11/8  [I10] Hypertension, unspecified type        ED Disposition Condition    Discharge Stable          ED Prescriptions    None       Follow-up Information       Follow up With Specialties Details Why Contact Info    Guerline Mello NP Family Medicine On 11/15/2023  [address]  [facility]  [place] [address]  [phone]      Eric Carbajal MD Orthopedic Surgery Call on 12/1/2023 To recheck your symptoms [address] [facility] [address]  [phone]      [facility] - Emergency Dept Emergency Medicine  As needed, If symptoms worsen [address] [facility] [place] [id number] [phone]             Marie Gibson MD  11/13/23 1504

## 2023-11-13 NOTE — DISCHARGE INSTRUCTIONS
Thank you for coming to our Emergency Department today. It is important to remember that some problems are difficult to diagnose and may not be found during your Emergency Department visit. Be sure to follow up with your primary care doctor and review all labs/imaging/tests that were performed during this visit with them. Some labs/tests may be outside of the normal range and require non-emergent follow-up and further investigation to help diagnose/exclude/prevent complications or other medical conditions.    If you do not have a primary care doctor, you may contact the one listed on your discharge paperwork or you may also call the [facility] Appointment Desk at [phone] to schedule an appointment and establish care with one. It is important to your health that you have a primary care doctor.    Medicaid Escalation Line:   [phone] - Please contact this number if you are having difficulty getting follow up with a Primary Care Provider or Speciality Provider.     Please take all medications as directed. All medications may potentially have side-effects and it is impossible to predict which medications may give you side-effects or what side-effects (if any) they will give you.. If you feel that you are having a negative effect or side-effect of any medication you should immediately stop taking them and seek medical attention. If you feel that you are having a life-threatening reaction call 151.    Return to the ER with any questions/concerns, new/concerning symptoms, worsening or failure to improve.     Do not drive, swim, climb to height, take a bath or make any important decisions for 24 hours if you have received any pain medications, sedatives or mood altering drugs during your ER visit.        BELOW THIS LINE ONLY APPLIES IF YOU HAVE A COVID TEST PENDING OR IF YOU HAVE BEEN DIAGNOSED WITH COVID:  Please access [facility] to review the results of your test. Until the results of your COVID test  return, you should isolate yourself so as not to potentially spread illness to others.   If your COVID test returns positive, you should isolate yourself so as not to spread illness to others. After five full days, if you are feeling better and you have not had fever for 24 hours, you can return to your typical daily activities, but you must wear a mask around others for an additional 5 days.   If your COVID test returns negative and you are either unvaccinated or more than six months out from your two-dose vaccine and are not yet boosted, you should still quarantine for 5 full days followed by strict mask use for an additional 5 full days.   If your COVID test returns negative and you have received your 2-dose initial vaccine as well as a booster, you should continue strict mask use for 10 full days after the exposure.  For all those exposed, best practice includes a test at day 5 after the exposure. This can be a home test or a test through one of the many testing centers throughout our community.   Masking is always advised to limit the spread of COVID. Cdc.gov is an excellent site to obtain the latest up to date recommendations regarding COVID and COVID testing.     CDC Testing and Quarantine Guidelines for patients with exposure to a known-positive COVID-19 person:  A close exposure is defined as anyone who has had an exposure (masked or unmasked) to a known COVID -19 positive person within 6 feet of someone for a cumulative total of 15 minutes or more over a 24-hour period.   Vaccinated and/or if you recently had a positive covid test within 90 days do NOT need to quarantine after contact with someone who had COVID-19 unless you develop symptoms.   Fully vaccinated people who have not had a positive test within 90 days, should get tested 3-5 days after their exposure, even if they don't have symptoms and wear a mask indoors in public for 14 days following exposure or until their test result is  negative.      Unvaccinated and/or NOT had a positive test within 90 days and meet close exposure  You are required by CDC guidelines to quarantine for at least 5 days from time of exposure followed by 5 days of strict masking. It is recommended, but not required to test after 5 days, unless you develop symptoms, in which case you should test at that time.  If you get tested after 5 days and your test is positive, your 5 day period of isolation starts the day of the positive test.    If your exposure does not meet the above definition, you can return to your normal daily activities to include social distancing, wearing a mask and frequent handwashing.      Here is a link to guidance from the CDC:  https://www.cdc.gov/media/releases/2021/s1227-isolation-quarantine-guidance.html      [facility]  Health Testing Sites:  https://ldh.la.gov/page/3934      [facility] website with testing locations and guidance:  [email or web address]

## 2023-11-17 DIAGNOSIS — L29.9 PRURITIC CONDITION: ICD-10-CM

## 2023-11-17 RX ORDER — HYDROXYZINE HYDROCHLORIDE 25 MG/1
25 TABLET, FILM COATED ORAL 3 TIMES DAILY PRN
Qty: 90 TABLET | Refills: 3 | Status: SHIPPED | OUTPATIENT
Start: 2023-11-17

## 2023-11-20 ENCOUNTER — PATIENT MESSAGE (OUTPATIENT)
Dept: ADMINISTRATIVE | Facility: HOSPITAL | Age: 73
End: 2023-11-20
Payer: MEDICARE

## 2023-11-20 DIAGNOSIS — Z96.651 STATUS POST RIGHT KNEE REPLACEMENT: Primary | ICD-10-CM

## 2023-11-24 RX ORDER — ONDANSETRON 4 MG/1
8 TABLET, ORALLY DISINTEGRATING ORAL EVERY 8 HOURS PRN
Qty: 30 TABLET | Refills: 0 | Status: SHIPPED | OUTPATIENT
Start: 2023-11-24 | End: 2023-12-01 | Stop reason: SDUPTHER

## 2023-11-27 ENCOUNTER — CLINICAL SUPPORT (OUTPATIENT)
Dept: REHABILITATION | Facility: HOSPITAL | Age: 73
End: 2023-11-27
Attending: ORTHOPAEDIC SURGERY
Payer: MEDICARE

## 2023-11-27 DIAGNOSIS — R29.898 DECREASED STRENGTH INVOLVING KNEE JOINT: ICD-10-CM

## 2023-11-27 DIAGNOSIS — R26.9 GAIT DIFFICULTY: ICD-10-CM

## 2023-11-27 DIAGNOSIS — M25.661 DECREASED ROM OF RIGHT KNEE: Primary | ICD-10-CM

## 2023-11-27 DIAGNOSIS — Z96.651 STATUS POST RIGHT KNEE REPLACEMENT: ICD-10-CM

## 2023-11-27 PROBLEM — M25.669 DECREASED ROM OF LOWER EXTREMITY: Status: RESOLVED | Noted: 2022-05-03 | Resolved: 2023-11-27

## 2023-11-27 PROBLEM — Z74.09 DECREASED MOBILITY AND ENDURANCE: Status: RESOLVED | Noted: 2022-05-03 | Resolved: 2023-11-27

## 2023-11-27 PROCEDURE — 97161 PT EVAL LOW COMPLEX 20 MIN: CPT | Mod: PN

## 2023-11-27 NOTE — PLAN OF CARE
"[facility]   Physical Therapy Initial Evaluation      Name: Valarie Foster  Clinic Number: [id number]    Therapy Diagnosis:   Encounter Diagnoses   Name Primary?    Status post right knee replacement     Decreased ROM of right knee Yes    Decreased strength involving knee joint     Gait difficulty         Physician: Eric Carbajal MD    Physician Orders: PT Eval and Treat   Medical Diagnosis from Referral: Z96.651 (ICD-10-CM) - Status post right knee replacement   Evaluation Date: 11/27/2023  Authorization Period Expiration: 11/09/2024   Plan of Care Expiration: 2/27/24  Progress Note Due: 12/27/23  Date of Surgery: 11/8/23  Visit # / Visits authorized: 1/ 1   FOTO: 1/ 3    Precautions: Standard, Diabetes, and HTN     Time In: 1:45pm  Time Out: 2:40  Total Billable Time: 55 minutes    POW 2: (11/22-11/29)    Subjective     Date of onset: 11/8/23 Right TKA Dr. Carbajal    History of current condition - Valarie reports: She is still having pain. She presented to therapy with the bandage on. She had therapy at home. She worked on kicking, and sit to stand and bending. She walks with her cane every now and then and reports that she mainly uses it when she is walking in her room. She mainly will grab onto the wall if she needs. She is still taking pain medication and gets sharp pains in her leg. She was walking a couple of weeks ago and she felt a pop. She went to the ED and they told her that it may be the scar tissue. She has MD follow up this Friday.     Falls: none    Imaging: x-ray per patient chart: "Impression:     Allowing for the limitations above, there is a right knee effusion and subcutaneous soft tissue stranding.     New osseous resorption questioned along the articular surface of the suboptimally visualized patella, versus artifact of projection.  Infection not excluded.     Correlate clinically, and with follow-up imaging if clinically appropriate.     This report was flagged in " "Epic as abnormal.        Electronically signed by: Juan R Lemus"    Prior Therapy: PT in 2022 for knees  Social History: 2 ZAYRA  lives alone  Occupation: retired  Prior Level of Function: pain in her knees  Current Level of Function: difficulty getting onto the toilet, difficulty with steps    Pain:  Current 7/10, worst 7/10, best 6/10   Location:  right knee(s)   Description: Shooting  Aggravating Factors: walking  Easing Factors: rest and ice, pain medication     Patients goals: "I want to be able to cook, walk, running"     Medical History:   Past Medical History:   Diagnosis Date    Arthritis     Colon polyp     Diabetes mellitus     diet controlled    Diabetes with neurologic complications     Hypertension     Nuclear sclerosis of both eyes 2/14/2022    Renal cell carcinoma        Surgical History:   Valarie Foster  has a past surgical history that includes Hysterectomy; Oophorectomy; Colonoscopy (N/A, 2/5/2018); Partial nephrectomy (Right, 10/12/2018); Colonoscopy (N/A, 8/13/2020); Phacoemulsification of cataract (Left, 7/7/2022); Intraocular prosthesis insertion (Left, 7/7/2022); Phacoemulsification of cataract (Right, 7/21/2022); Intraocular prosthesis insertion (Right, 7/21/2022); and Total knee arthroplasty (Right, 11/8/2023).    Medications:   Valarie has a current medication list which includes the following prescription(s): accu-chek clifford plus test strp, accu-chek softclix lancets, acetaminophen, dropsafe alcohol prep pads, amlodipine, aspirin, atorvastatin, accu-chek clifford control soln, true metrix level 1, blood-glucose meter, blood-glucose meter, celecoxib, cetirizine, ciclopirox, diphenhydramine, docusate sodium, ergocalciferol, fluoxetine, fluticasone propionate, gabapentin, hydroxyzine hcl, metformin, metoprolol succinate, ondansetron, oxycodone, pantoprazole, and trueplus lancets, and the following Facility-Administered Medications: sodium chloride 0.9%, acetaminophen, acetaminophen, aspirin, " "famotidine, lidocaine (pf) 10 mg/ml (1%), methocarbamol, naloxone, ondansetron, oxycodone, oxycodone, polyethylene glycol, pregabalin capsule 75 mg, prochlorperazine, senna-docusate 8.6-50 mg, and sodium chloride 0.9%.    Allergies:   Review of patient's allergies indicates:   Allergen Reactions    Lisinopril Swelling and Shortness Of Breath    Ascorbic acid-ascorbate calc      And citrus fruits        Objective      Observation: bandage on right lower extremity, antalgic gait,     Range of Motion: Knee   Left Right   Flexion: 100 deg 87 deg   Extension 0 -3      Strength: Knee Microfet    Left Right LSI   Quadriceps 16.6 13 78%   Hamstrings 13 9.4 72%       Strength: Hip   Left Right LSI   Iliopsoas 8.9 7.6 85%       Joint Mobility: hypomobile with tibial range of motion with flexion and extension; decrease in patellar mobility  Palpation: global tenderness around incision   Sensation: intact to light touch    Edema:  Left: absent  Right: moderate      Gait: Kristin ambulated with none.  Level of Assistance: independent  Patient displays antalgic gait with decrease in Terminal knee extension   Balance: NT      Functional testing:    Timed up and Go 10 seconds without use if assistive device   30 Sec sit to stand: 7 with use of hands and with staggered stance and decrease in control with descent      Intake Outcome Measure for FOTO Knee Survey    Therapist reviewed FOTO scores for Valarie Foster on 11/27/2023.   FOTO report - see Media section or FOTO account episode details.    Intake Score: 40%         Treatment     Total Treatment time (time-based codes) separate from Evaluation: 15 minutes     Valarie received the treatments listed below:      therapeutic exercises to develop strength, ROM, and flexibility for 15 minutes including:  Heel prop x5 minutes  Heel prop with calf stretch 30"x4  Quad set 5"x30  Heel slides 5"x5 minutes        Patient Education and Home Exercises     Education provided:   - Home exercise " program  - Plan of Care     Written Home Exercises Provided: yes. Exercises were reviewed and Valarie was able to demonstrate them prior to the end of the session.  Valarie demonstrated good  understanding of the education provided. See EMR under Patient Instructions for exercises provided during therapy sessions.    Assessment     Valarie is a 73 y.o. female referred to outpatient Physical Therapy with a medical diagnosis of Status post right knee replacement  by Dr. Carbajal on 11/8/231. Patient presents with bandage on Right knee with decrease in knee extension with gait. Patient displayed decrease in knee range of motion and strength as compared to contralateral leg. Patient is currently most limited with her ability to navigate steps, perform stairs, and ambulate without difficulty. PT removed bandage and noted small opening and bleeding from incision. However, bandage was not saturated. PT replaced with a new bandage and instructed patient on signs and symptoms of infection to be aware of.     Patient prognosis is Good.   Patient will benefit from skilled outpatient Physical Therapy to address the deficits stated above and in the chart below, provide patient /family education, and to maximize patientt's level of independence.     Plan of care discussed with patient: Yes  Patient's spiritual, cultural and educational needs considered and patient is agreeable to the plan of care and goals as stated below:     Anticipated Barriers for therapy: chronicity of condition     Medical Necessity is demonstrated by the following  History  Co-morbidities and personal factors that may impact the plan of care [] LOW: no personal factors / co-morbidities  [] MODERATE: 1-2 personal factors / co-morbidities  [x] HIGH: 3+ personal factors / co-morbidities    Moderate / High Support Documentation:   Co-morbidities affecting plan of care:   Arthritis    Colon polyp    Diabetes mellitus    diet controlled   Diabetes with neurologic  complications    Hypertension    Nuclear sclerosis of both eyes    Renal cell carcinoma        Personal Factors:   age     Examination  Body Structures and Functions, activity limitations and participation restrictions that may impact the plan of care [x] LOW: addressing 1-2 elements  [] MODERATE: 3+ elements  [] HIGH: 4+ elements (please support below)    Moderate / High Support Documentation:      Clinical Presentation [x] LOW: stable  [] MODERATE: Evolving  [] HIGH: Unstable     Decision Making/ Complexity Score: low       Goals:  Short Term Goals: 5 weeks   Patient demonstrates improved range of motion 0 to 100 deg to improve gait  Patient demonstrates ability to perform 10 sit to  30 seconds to improve functional strength  Patient demonstrates ability to navigate 2 steps with reciprocal pattern to improve ability to enter and exit home  Patient demonstrates ability to ambulate 2 blocks without difficulty to improve tolerance to functional tasks    Long Term Goals: 10 weeks   Patient demonstrates improved knee range of motion 0 to 120 to improve tolerance to functional tasks  Patient demonstrates ability to perform 13 sit to  30 seconds to improve functional strength  Patient demonstrates improved FOTO score to 68 to improve ability to perform functional tasks.   Patient demonstrates ability to walk 1 mile with little to no difficulty to improve tolerance to functional tasks pain free  Patient goal: patient will return to dancing   Plan     Plan of care Certification: 11/27/2023 to 2/27/24.    Outpatient Physical Therapy 2 times weekly for 10 weeks to include the following interventions: Gait Training, Manual Therapy, Moist Heat/ Ice, Neuromuscular Re-ed, Patient Education, Therapeutic Activities, Therapeutic Exercise, and dry needling.     Sunita Mcginnis, PT,DPT  11/27/2023          Physician's Signature: _________________________________________ Date: ________________

## 2023-11-27 NOTE — PROGRESS NOTES
"[facility]   Physical Therapy Initial Evaluation      Name: Valarie Foster  Clinic Number: [id number]    Therapy Diagnosis: No diagnosis found.     Physician: Eric Carbajal MD    Physician Orders: PT Eval and Treat   Medical Diagnosis from Referral: Z96.651 (ICD-10-CM) - Status post right knee replacement   Evaluation Date: 11/27/2023  Authorization Period Expiration: 11/09/2024   Plan of Care Expiration: 2/27/24  Progress Note Due: 12/27/23  Date of Surgery: 11/8/23  Visit # / Visits authorized: 1/ 1   FOTO: 1/ 3    Precautions: Standard, Diabetes, and HTN      Time In: ***  Time Out: ***  Total Billable Time: *** minutes    Subjective     Date of onset: ***    History of current condition - Valarie reports: ***    Falls: ***    Imaging: {Mri/ctscan/bone scan:87533}: ***    Prior Therapy: PT in 2022 for knees  Social History: *** {LIVES WITH:88614}  Occupation: ***  Prior Level of Function: ***  Current Level of Function: ***    Pain:  Current {0-10:57515::"0"}/10, worst {0-10:49422::"0"}/10, best {0-10:20507::"0"}/10   Location:  right knee(s)   Description: {Pain Description:11987}  Aggravating Factors: {Causes; Pain:60470}  Easing Factors: {Pain (activities that relieve):72376}    Patients goals: ***     Medical History:   Past Medical History:   Diagnosis Date    Arthritis     Colon polyp     Diabetes mellitus     diet controlled    Diabetes with neurologic complications     Hypertension     Nuclear sclerosis of both eyes 2/14/2022    Renal cell carcinoma        Surgical History:   Valarie Foster  has a past surgical history that includes Hysterectomy; Oophorectomy; Colonoscopy (N/A, 2/5/2018); Partial nephrectomy (Right, 10/12/2018); Colonoscopy (N/A, 8/13/2020); Phacoemulsification of cataract (Left, 7/7/2022); Intraocular prosthesis insertion (Left, 7/7/2022); Phacoemulsification of cataract (Right, 7/21/2022); Intraocular prosthesis insertion (Right, 7/21/2022); and Total knee " arthroplasty (Right, 11/8/2023).    Medications:   Valarie has a current medication list which includes the following prescription(s): accu-chek clifford plus test strp, accu-chek softclix lancets, acetaminophen, dropsafe alcohol prep pads, amlodipine, aspirin, atorvastatin, accu-chek clifford control soln, true metrix level 1, blood-glucose meter, blood-glucose meter, celecoxib, cetirizine, ciclopirox, diphenhydramine, docusate sodium, ergocalciferol, fluoxetine, fluticasone propionate, gabapentin, hydroxyzine hcl, metformin, metoprolol succinate, ondansetron, oxycodone, pantoprazole, and trueplus lancets, and the following Facility-Administered Medications: sodium chloride 0.9%, acetaminophen, acetaminophen, aspirin, famotidine, lidocaine (pf) 10 mg/ml (1%), methocarbamol, naloxone, ondansetron, oxycodone, oxycodone, polyethylene glycol, pregabalin capsule 75 mg, prochlorperazine, senna-docusate 8.6-50 mg, and sodium chloride 0.9%.    Allergies:   Review of patient's allergies indicates:   Allergen Reactions    Lisinopril Swelling and Shortness Of Breath    Ascorbic acid-ascorbate calc      And citrus fruits        Objective      Observation: ***  Armin's Sign:    Range of Motion: Knee   Left Right   Flexion: *** ***   Extension *** ***     Strength: Knee   Left Right   Quadriceps {AMB PT VESTIBULAR STRENGTH:38917} {AMB PT VESTIBULAR STRENGTH:27530}   Hamstrings {AMB PT VESTIBULAR STRENGTH:62819} {AMB PT VESTIBULAR STRENGTH:78814}       Strength: Hip   Left Right   Iliopsoas {AMB PT VESTIBULAR STRENGTH:86239} {AMB PT VESTIBULAR STRENGTH:80388}   Glute Med {AMB PT VESTIBULAR STRENGTH:25104} {AMB PT VESTIBULAR STRENGTH:57860}   Hip IR {AMB PT VESTIBULAR STRENGTH:31576} {AMB PT VESTIBULAR STRENGTH:65808}   Hip ER {AMB PT VESTIBULAR STRENGTH:15302} {AMB PT VESTIBULAR STRENGTH:10930}   Hip Ext {AMB PT VESTIBULAR STRENGTH:64564} {AMB PT VESTIBULAR STRENGTH:32878}   Ankle PF {AMB PT VESTIBULAR STRENGTH:86747} {AMB PT VESTIBULAR  STRENGTH:31410}   Ankle DF {AMB PT VESTIBULAR STRENGTH:93509} {AMB PT VESTIBULAR STRENGTH:09750}       Joint Mobility: {AMB PT KNEE MOBILITY:79993}  Palpation: ***  Sensation: {AMB PT KNEE SENSATION:31520}    Edema:  Left: {AMB PT KNEE EDEMA:59621}  Right: {AMB PT KNEE EDEMA:74520}    Girth Measurement Left Right   10 cm above mid-patella *** ***   Mid-patella     10 cm below mid-patella *** ***       Gait: Kristin ambulated with {AMB PT KNEE GAIT ASSISTIVE DEVICE:85744}.  Level of Assistance: {AMB PT KNEE LEVEL OF ASSISTANCE:03820}  Patient displays {AMB PT KNEE DISPLAYS:60843}.   Balance: Maintains SLS *** seconds with {GOOD FAIR POOR:74841} balance strategies.      Functional testing:    Timed up and Go  30 Sec sit to stand:       Intake Outcome Measure for FOTO Knee Survey    Therapist reviewed FOTO scores for Valarie Foster on 11/27/2023.   FOTO report - see Media section or FOTO account episode details.    Intake Score: ***%         Treatment     Total Treatment time (time-based codes) separate from Evaluation: *** minutes     Valarie received the treatments listed below:      therapeutic exercises to develop {AMB PT PROGRESS OBJECTIVE:29091} for *** minutes including:  ***    manual therapy techniques: {AMB PT PROGRESS MANUAL THERAPY:98808} were applied to the: *** for *** minutes, including:  ***    neuromuscular re-education activities to improve: {AMB PT PROGRESS NEURO RE-ED:25735} for *** minutes. The following activities were included:  ***    therapeutic activities to improve functional performance for ***  minutes, including:  ***    gait training to improve functional mobility and safety for ***  minutes, including:  ***    direct contact modalities after being cleared for contraindications: {AMB PT PROGRESS DIRECT CONTACT MODES:22955}    supervised modalities after being cleared for contradictions: {AMB PT SUPERVISED MODES:13090}    hot pack for *** minutes to ***.    cold pack for *** minutes to  "***.    Patient Education and Home Exercises     Education provided:   - ***    Written Home Exercises Provided: {Blank single:05639::"yes","Patient instructed to cont prior HEP"}. Exercises were reviewed and Valarie was able to demonstrate them prior to the end of the session.  Valarie demonstrated {Desc; good/fair/poor:19563} understanding of the education provided. See EMR under Patient Instructions for exercises provided during therapy sessions.    Assessment     Valarie is a 73 y.o. female referred to outpatient Physical Therapy with a medical diagnosis of Status post right knee replacement  by Dr. Carbajal on 11/8/231. Patient presents with ***    Patient prognosis is {REHAB PROGNOSIS OHS:65937}.   Patient will benefit from skilled outpatient Physical Therapy to address the deficits stated above and in the chart below, provide patient /family education, and to maximize patientt's level of independence.     Plan of care discussed with patient: {YES:95363}  Patient's spiritual, cultural and educational needs considered and patient is agreeable to the plan of care and goals as stated below:     Anticipated Barriers for therapy: ***    Medical Necessity is demonstrated by the following  History  Co-morbidities and personal factors that may impact the plan of care [] LOW: no personal factors / co-morbidities  [] MODERATE: 1-2 personal factors / co-morbidities  [] HIGH: 3+ personal factors / co-morbidities    Moderate / High Support Documentation:   Co-morbidities affecting plan of care:   Arthritis    Colon polyp    Diabetes mellitus    diet controlled   Diabetes with neurologic complications    Hypertension    Nuclear sclerosis of both eyes    Renal cell carcinoma        Personal Factors:   {Personal Factors:74561}     Examination  Body Structures and Functions, activity limitations and participation restrictions that may impact the plan of care [] LOW: addressing 1-2 elements  [] MODERATE: 3+ elements  [] HIGH: 4+ " "elements (please support below)    Moderate / High Support Documentation: ***     Clinical Presentation [] LOW: stable  [] MODERATE: Evolving  [] HIGH: Unstable     Decision Making/ Complexity Score: {Desc; low/moderate/high:711337}       Goals:  Short Term Goals: *** weeks   ***    Long Term Goals: *** weeks   ***  Plan     Plan of care Certification: 11/27/2023 to ***.    Outpatient Physical Therapy {NUMBERS 1-5:46139} times weekly for {0-10:19357::"0"} weeks to include the following interventions: {[place] PLAN:06441}.     Sunita Mcginnis, PT,DPT  11/27/2023          Physician's Signature: _________________________________________ Date: ________________        "

## 2023-11-28 NOTE — SUBJECTIVE & OBJECTIVE
Interval History: has pain on surgery side.    Review of Systems   Constitutional: Positive for activity change and appetite change.   HENT: Negative for congestion and dental problem.    Eyes: Negative for itching.   Respiratory: Negative for apnea and chest tightness.    Gastrointestinal: Negative for abdominal distention.   Musculoskeletal: Positive for arthralgias.     Objective:     Vital Signs (Most Recent):  Temp: 98 °F (36.7 °C) (10/15/18 0750)  Pulse: 76 (10/15/18 0820)  Resp: 16 (10/15/18 0750)  BP: (!) 155/72 (10/15/18 0750)  SpO2: (!) 94 % (10/15/18 0750) Vital Signs (24h Range):  Temp:  [98 °F (36.7 °C)-98.5 °F (36.9 °C)] 98 °F (36.7 °C)  Pulse:  [65-76] 76  Resp:  [16-18] 16  SpO2:  [94 %-100 %] 94 %  BP: (113-155)/(54-72) 155/72     Weight: 83.5 kg (184 lb 1.4 oz)  Body mass index is 31.6 kg/m².    Intake/Output Summary (Last 24 hours) at 10/15/2018 1013  Last data filed at 10/15/2018 0903  Gross per 24 hour   Intake 1140 ml   Output 1345 ml   Net -205 ml      Physical Exam   Constitutional: She is oriented to person, place, and time. No distress.   HENT:   Head: Atraumatic.   Eyes: EOM are normal. Pupils are equal, round, and reactive to light.   Neck: Normal range of motion.   Cardiovascular: Normal rate and regular rhythm.   Pulmonary/Chest: Effort normal and breath sounds normal.   Abdominal: Soft.   Musculoskeletal: Normal range of motion.   Neurological: She is oriented to person, place, and time. No cranial nerve deficit. Coordination normal.       Significant Labs:   BMP:   Recent Labs   Lab  10/14/18   0419  10/15/18   0643   GLU  125*  80   NA  138  138   K  3.9  3.9   CL  104  102   CO2  28  27   BUN  5*  6*   CREATININE  0.8  0.7   CALCIUM  9.0  9.3   MG  1.7   --      CBC:   Recent Labs   Lab  10/15/18   0643   WBC  7.25   HGB  10.7*   HCT  32.7*   PLT  211       Significant Imaging: reviewed.   Pediatric Gastroenterology New Patient Office Visit    History of Present Illness:   History of Celiac Disease.   Diagnosed about 1 year ago.  Psychological/behavioral presentation. Has a history of constipation.  Intermittent abdominal pain.  Scope showed signs of celiac disease.  On gluten free diet.    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) (CMS/[facility]) 08/30/2023    Other symbolic dysfunctions 08/30/2023    Obstructive sleep apnea of child 08/30/2023    Obsessive-compulsive symptoms 08/30/2023    Insomnia 08/30/2023    Disorder of iron metabolism 08/30/2023    Depression 08/30/2023    Chronic constipation 08/30/2023    Celiac disease in pediatric patient 08/30/2023    Autism spectrum disorder 08/30/2023    ADHD (attention deficit hyperactivity disorder), combined type 08/30/2023    Abnormal celiac antibody panel 08/30/2023     Resolved Ambulatory Problems     Diagnosis Date Noted    No Resolved Ambulatory Problems     No Additional Past Medical History     Social History     Social History Narrative    Not on file       Allergies   Allergen Reactions    Amoxicillin Unknown         Current Outpatient Medications on File Prior to Visit   Medication Sig Dispense Refill    cloNIDine (Catapres) 0.1 mg tablet 1 and 1/2 tablets every evening 135 tablet 3    ferrous sulfate 325 (65 Fe) MG tablet Take by mouth once daily.      melatonin 3 mg tablet extended release Take by mouth.      methylphenidate (Ritalin) 5 mg tablet Take 1 tablet by mouth at lunch time daily  "30 tablet 0    methylphenidate CD (Metadate CD) 20 mg daily capsule Take 1 capsule (20 mg) by mouth once daily in the morning. Do not crush or chew. 30 capsule 0     No current facility-administered medications on file prior to visit.     Vitals:    11/28/23 1433   BP: (!) 93/52   Pulse: 82   Temp: 36.9 °C (98.4 °F)     Anthropometrics:  Wt Readings from Last 3 Encounters:   11/28/23 38.7 kg (81 %, Z= 0.88)*   10/09/23 39 kg (84 %, Z= 0.99)*   12/30/22 36.5 kg (87 %, Z= 1.13)*     * Growth percentiles are based on CDC (Boys, 2-20 Years) data.     Body mass index is 18.08 kg/m².  1.463 m (4' 9.6\")    PHYSICAL EXAMINATION:  Constitutional: in NAD  Head: atraumatic  Eyes: anicteric sclera, normal conjunctiva  Mouth: MMM  Neck: supple,no LAD  Respiratory: normal WOB, no wheezes or crackles  CARD: no murmurs, rales or gallops, normal S1/S2  Abdomen: soft, not tender, non distended, no organomegaly  Skin: no rashes  MSK: no swelling or erythema  Lymph: No LAD  Neuro: alert, moving all extremities      Impression: Nathan Carrera is a 8 y.o. male with celiac disease.  Here to establish care.    Recommendations:  - labs May  - follow up 1 year      Eliza Mariee MD  Division of Pediatric Gastroenterology, Hepatology and Nutrition    "

## 2023-11-30 ENCOUNTER — OFFICE VISIT (OUTPATIENT)
Dept: FAMILY MEDICINE | Facility: CLINIC | Age: 73
End: 2023-11-30
Payer: MEDICARE

## 2023-11-30 VITALS
HEIGHT: 64 IN | HEART RATE: 78 BPM | DIASTOLIC BLOOD PRESSURE: 80 MMHG | TEMPERATURE: 98 F | WEIGHT: 194.56 LBS | SYSTOLIC BLOOD PRESSURE: 142 MMHG | OXYGEN SATURATION: 98 % | BODY MASS INDEX: 33.22 KG/M2

## 2023-11-30 DIAGNOSIS — M25.561 CHRONIC PAIN OF RIGHT KNEE: Primary | ICD-10-CM

## 2023-11-30 DIAGNOSIS — R26.9 GAIT DIFFICULTY: ICD-10-CM

## 2023-11-30 DIAGNOSIS — I10 BENIGN HYPERTENSION: ICD-10-CM

## 2023-11-30 DIAGNOSIS — G89.29 CHRONIC PAIN OF RIGHT KNEE: Primary | ICD-10-CM

## 2023-11-30 PROCEDURE — 1159F PR MEDICATION LIST DOCUMENTED IN MEDICAL RECORD: ICD-10-PCS | Mod: CPTII,S$GLB,,

## 2023-11-30 PROCEDURE — 1101F PR PT FALLS ASSESS DOC 0-1 FALLS W/OUT INJ PAST YR: ICD-10-PCS | Mod: CPTII,S$GLB,,

## 2023-11-30 PROCEDURE — 99999 PR PBB SHADOW E&M-EST. PATIENT-LVL V: ICD-10-PCS | Mod: PBBFAC,,,

## 2023-11-30 PROCEDURE — 3066F PR DOCUMENTATION OF TREATMENT FOR NEPHROPATHY: ICD-10-PCS | Mod: CPTII,S$GLB,,

## 2023-11-30 PROCEDURE — 3079F DIAST BP 80-89 MM HG: CPT | Mod: CPTII,S$GLB,,

## 2023-11-30 PROCEDURE — 3077F SYST BP >= 140 MM HG: CPT | Mod: CPTII,S$GLB,,

## 2023-11-30 PROCEDURE — 3288F FALL RISK ASSESSMENT DOCD: CPT | Mod: CPTII,S$GLB,,

## 2023-11-30 PROCEDURE — 96372 PR INJECTION,THERAP/PROPH/DIAG2ST, IM OR SUBCUT: ICD-10-PCS | Mod: S$GLB,,,

## 2023-11-30 PROCEDURE — 1159F MED LIST DOCD IN RCRD: CPT | Mod: CPTII,S$GLB,,

## 2023-11-30 PROCEDURE — 1125F AMNT PAIN NOTED PAIN PRSNT: CPT | Mod: CPTII,S$GLB,,

## 2023-11-30 PROCEDURE — 3061F NEG MICROALBUMINURIA REV: CPT | Mod: CPTII,S$GLB,,

## 2023-11-30 PROCEDURE — 96372 THER/PROPH/DIAG INJ SC/IM: CPT | Mod: S$GLB,,,

## 2023-11-30 PROCEDURE — 3079F PR MOST RECENT DIASTOLIC BLOOD PRESSURE 80-89 MM HG: ICD-10-PCS | Mod: CPTII,S$GLB,,

## 2023-11-30 PROCEDURE — 3061F PR NEG MICROALBUMINURIA RESULT DOCUMENTED/REVIEW: ICD-10-PCS | Mod: CPTII,S$GLB,,

## 2023-11-30 PROCEDURE — 1125F PR PAIN SEVERITY QUANTIFIED, PAIN PRESENT: ICD-10-PCS | Mod: CPTII,S$GLB,,

## 2023-11-30 PROCEDURE — 99213 OFFICE O/P EST LOW 20 MIN: CPT | Mod: 25,S$GLB,,

## 2023-11-30 PROCEDURE — 3051F PR MOST RECENT HEMOGLOBIN A1C LEVEL 7.0 - < 8.0%: ICD-10-PCS | Mod: CPTII,S$GLB,,

## 2023-11-30 PROCEDURE — 3288F PR FALLS RISK ASSESSMENT DOCUMENTED: ICD-10-PCS | Mod: CPTII,S$GLB,,

## 2023-11-30 PROCEDURE — 99999 PR PBB SHADOW E&M-EST. PATIENT-LVL V: CPT | Mod: PBBFAC,,,

## 2023-11-30 PROCEDURE — 3077F PR MOST RECENT SYSTOLIC BLOOD PRESSURE >= 140 MM HG: ICD-10-PCS | Mod: CPTII,S$GLB,,

## 2023-11-30 PROCEDURE — 3066F NEPHROPATHY DOC TX: CPT | Mod: CPTII,S$GLB,,

## 2023-11-30 PROCEDURE — 3051F HG A1C>EQUAL 7.0%<8.0%: CPT | Mod: CPTII,S$GLB,,

## 2023-11-30 PROCEDURE — 1101F PT FALLS ASSESS-DOCD LE1/YR: CPT | Mod: CPTII,S$GLB,,

## 2023-11-30 PROCEDURE — 99213 PR OFFICE/OUTPT VISIT, EST, LEVL III, 20-29 MIN: ICD-10-PCS | Mod: 25,S$GLB,,

## 2023-11-30 RX ORDER — KETOROLAC TROMETHAMINE 30 MG/ML
30 INJECTION, SOLUTION INTRAMUSCULAR; INTRAVENOUS
Status: COMPLETED | OUTPATIENT
Start: 2023-11-30 | End: 2023-11-30

## 2023-11-30 RX ADMIN — KETOROLAC TROMETHAMINE 30 MG: 30 INJECTION, SOLUTION INTRAMUSCULAR; INTRAVENOUS at 02:11

## 2023-11-30 NOTE — PROGRESS NOTES
Patient tolerated injection  ketorolac 30 mg well, instructed to remain in clinic for 15mins.to monitor for allergic reaction. Verbalized understanding.

## 2023-11-30 NOTE — PROGRESS NOTES
HPI     Chief Complaint:  Chief Complaint   Patient presents with    Follow-up       Valarie Foster is a 73 y.o. female with multiple medical diagnoses as listed in the medical history and problem list that presents for hospital follow up.  Pt is not known to me with her last appointment 10/26/2023.      HPI  Pt presents for hospital follow up right knee replacement. Continue with right knee pain. Completed in home physical therapy and has started PT at [place] this week. Had to return to the hospital after surgery when heard a pop in the knee. Taking Oxycodone and Robaxin for pain. Has follow up with orthopedic surgeon tomorrow.      Hospital Course  The patient is a 72 yo female  who has been followed in clinic for right knee arthritis. It was determined she would benefit from surgery. The procedure, its risks, benefits, and potential complications were discussed in detail with the patient prior to surgery. Understanding of all topics was conveyed by the patient, and consent was given for surgery. The patient was admitted to [facility] on 11/8/2023 for right knee arthroplasty.     The procedure was tolerated well and she was sent to the post-operative recovery room in stable condition, where she also did well. Post-operative x-rays in the recovery room showed well-aligned components without evidence of complication or fracture. She was subsequently sent to his hospital room for postoperative management.  ?  Once on the floor herpostoperative course  was unremarkable and she did well. her diet was advanced which was tolerated. her pain was well controlled on multimodal pain medication; this was eventually transitioned to oral medication alone prior to discharge. She worked with Physical Therapy starting on POD#0 who recommended she be discharged home. Their dressing remained clean dry and intact throughout the hospital stay. She remained afebrile with stable vital signs throughout the stay. He/she was  stable for discharge on POD#1.   Medical Decision Making  73-year-old female with history of hypertension, diabetes, recent right knee total arthroplasty on November 8th, presents to the emergency department with EMS for right knee pain.  The patient reports when she got up this morning she was trying to walk and heard a pop in her knee associated with increased pain.  She did not take any of her medications this morning including her blood pressure medicine will her pain medication.  She had been ambulating with a walker up until this point.  She reports generalized pain and tenderness to the right knee.  On exam, the patient is hypertensive, afebrile.  The right knee is generally swollen and tender, no erythema.  She has a dressing over the surgical site that is clean, dry, intact.  She is neurovascularly intact in her right lower extremity.  Differential includes but not limited to sprain, hardware malfunction, postoperative pain.  Will get x-ray of the right knee, will treat with IM morphine, will give home blood pressure medication.         Assessment & Plan     Problem List Items Addressed This Visit          Cardiac/Vascular    Benign hypertension (Chronic)  BP in clinic today 142/80. Recheck BP in 2 weeks nurse visit.         Other    Gait difficulty  Right knee replacement completed on November 8th.     Other Visit Diagnoses       Chronic pain of right knee    -  Primary  Status post right knee replacement surgery from November 8th.  Has started physical therapy.  Continues with right knee pain.  Takes oxycodone and Robaxin for knee pain.  Has a returned to the hospital after hearing a loud pop in the right knee was given injection of morphine.  Knee pain is 10/10 today requesting medication to help with knee pain.  We will order Toradol injection in clinic today.  Has follow-up appointment with Orthopedic surgery tomorrow.    Relevant Medications    ketorolac injection 30 mg (Completed)               --------------------------------------------      Health Maintenance:  Health Maintenance         Date Due Completion Date    RSV Vaccine (Age 60+ and Pregnant patients) (1 - 1-dose 60+ series) Never done ---    Eye Exam 05/13/2023 5/13/2022    COVID-19 Vaccine (5 - 2023-24 season) 09/01/2023 9/27/2022    Foot Exam 11/07/2023 11/7/2022    Override on 11/7/2022: Done    Override on 11/1/2021: Done    Override on 8/16/2018: Done (Dr. Lashay Dee ( Podiatry ))    Hemoglobin A1c 04/25/2024 10/25/2023    Diabetes Urine Screening 10/25/2024 10/25/2023    Lipid Panel 10/25/2024 10/25/2023    High Dose Statin 11/30/2024 11/30/2023    DEXA Scan 07/13/2025 7/13/2023    Colorectal Cancer Screening 08/13/2025 8/13/2020    TETANUS VACCINE 04/30/2028 4/30/2018            Health maintenance reviewed    Follow Up:  No follow-ups on file.    Exam     Review of Systems:  (as noted above)  Review of Systems   Constitutional:  Negative for fever.   HENT:  Negative for trouble swallowing.    Eyes:  Negative for visual disturbance.   Respiratory:  Negative for chest tightness and shortness of breath.    Cardiovascular:  Negative for chest pain.   Gastrointestinal:  Negative for blood in stool.   Musculoskeletal:  Positive for arthralgias and gait problem.       Physical Exam:   Physical Exam  Constitutional:       General: She is not in acute distress.     Appearance: She is not ill-appearing or diaphoretic.   HENT:      Head: Normocephalic and atraumatic.   Cardiovascular:      Rate and Rhythm: Normal rate and regular rhythm.      Heart sounds: No murmur heard.     No friction rub. No gallop.   Pulmonary:      Effort: No respiratory distress.   Chest:      Chest wall: No tenderness.   Musculoskeletal:      Cervical back: No rigidity.      Right knee: Swelling present. Tenderness present.      Comments: S/p knee replacement right knee, healing surgical wound with no redness or drainage   Neurological:      Mental Status: She is  "alert and oriented to person, place, and time.       Vitals:    11/30/23 1404   BP: (!) 142/80   Pulse: 78   Temp: 98.1 °F (36.7 °C)   TempSrc: Oral   SpO2: 98%   Weight: 88.3 kg (194 lb 8.9 oz)   Height: 5' 4" (1.626 m)      Body mass index is 33.4 kg/m².        History     Past Medical History:  Past Medical History:   Diagnosis Date    Arthritis     Colon polyp     Diabetes mellitus     diet controlled    Diabetes with neurologic complications     Hypertension     Nuclear sclerosis of both eyes 2/14/2022    Renal cell carcinoma        Past Surgical History:  Past Surgical History:   Procedure Laterality Date    COLONOSCOPY N/A 2/5/2018    Procedure: COLONOSCOPY;  Surgeon: Bacilio Mancia MD;  Location: [facility] ENDO;  Service: Endoscopy;  Laterality: N/A;  appt confirmed-ss    COLONOSCOPY N/A 8/13/2020    Procedure: COLONOSCOPY;  Surgeon: Jose West MD;  Location: [facility] ENDO;  Service: Endoscopy;  Laterality: N/A;    HYSTERECTOMY      INTRAOCULAR PROSTHESES INSERTION Left 7/7/2022    Procedure: INSERTION, IOL PROSTHESIS;  Surgeon: Candelario Novoa MD;  Location: [facility] OR;  Service: Ophthalmology;  Laterality: Left;    INTRAOCULAR PROSTHESES INSERTION Right 7/21/2022    Procedure: INSERTION, IOL PROSTHESIS;  Surgeon: Candelario Novoa MD;  Location: [facility] OR;  Service: Ophthalmology;  Laterality: Right;    OOPHORECTOMY      PARTIAL NEPHRECTOMY Right 10/12/2018    Procedure: NEPHRECTOMY, PARTIAL open. Dr Arenas to assist.;  Surgeon: Alejandra Palm MD;  Location: [facility] OR;  Service: Urology;  Laterality: Right;  RN PREOP 10/9/2018----NEEDS H/P    PHACOEMULSIFICATION OF CATARACT Left 7/7/2022    Procedure: PHACOEMULSIFICATION, CATARACT;  Surgeon: Candelario Novoa MD;  Location: [facility] OR;  Service: Ophthalmology;  Laterality: Left;  RN PHONE PREOP 6/27/2022   ARRIVAL 6:00 AM    PHACOEMULSIFICATION OF CATARACT Right 7/21/2022    Procedure: PHACOEMULSIFICATION, CATARACT;  Surgeon: Candelario GUTIERREZ" MD Sommer;  Location: [facility] OR;  Service: Ophthalmology;  Laterality: Right;  RN PHONE PREOP 7/12/2022   ARRIVAL 12:00 NOON    TOTAL KNEE ARTHROPLASTY Right 11/8/2023    Procedure: ARTHROPLASTY, KNEE, TOTAL. NELSON;  Surgeon: Eric Carbajal MD;  Location: [facility] OR;  Service: Orthopedics;  Laterality: Right;  [place]. Admit  BRADLEY QUINTIN NIEVES [phone] NOTIFIED QUINTIN ON 10/11/2023-LO  RN PREOP 10/25/2023   T/S ON 11/6/2023--done------CLEARED BY CARDS       Social History:  Social History     Socioeconomic History    Marital status:    Tobacco Use    Smoking status: Never    Smokeless tobacco: Never   Substance and Sexual Activity    Alcohol use: No    Drug use: No    Sexual activity: Not Currently     Social Determinants of Health     Financial Resource Strain: Low Risk  (3/30/2023)    Overall Financial Resource Strain (CARDIA)     Difficulty of Paying Living Expenses: Not hard at all   Food Insecurity: No Food Insecurity (3/30/2023)    Hunger Vital Sign     Worried About Running Out of Food in the Last Year: Never true     Ran Out of Food in the Last Year: Never true   Transportation Needs: No Transportation Needs (3/30/2023)    PRAPARE - Transportation     Lack of Transportation (Medical): No     Lack of Transportation (Non-Medical): No   Physical Activity: Insufficiently Active (3/30/2023)    Exercise Vital Sign     Days of Exercise per Week: 7 days     Minutes of Exercise per Session: 10 min   Stress: No Stress Concern Present (3/30/2023)    [employer] - Occupational Stress Questionnaire     Feeling of Stress : Only a little   Social Connections: Moderately Integrated (3/30/2023)    Social Connection and Isolation Panel [NHANES]     Frequency of Social Gatherings with Friends and Family: More than three times a week     Attends Latter day Services: More than 4 times per year     Active Member of Clubs or Organizations: No     Attends Club or Organization Meetings:  Never     Marital Status:    Housing Stability: Low Risk  (3/30/2023)    Housing Stability Vital Sign     Unable to Pay for Housing in the Last Year: No     Number of Places Lived in the Last Year: 1     Unstable Housing in the Last Year: No       Family History:  Family History   Problem Relation Age of Onset    No Known Problems Mother     Glaucoma Father     Breast cancer Sister     Glaucoma Sister     Cancer Sister         breast cancer    Thyroid disease Sister     Blindness Sister         due to trauma during car accident    Diabetes Sister     No Known Problems Maternal Aunt     No Known Problems Maternal Uncle     No Known Problems Paternal Aunt     No Known Problems Paternal Uncle     No Known Problems Maternal Grandmother     No Known Problems Maternal Grandfather     No Known Problems Paternal Grandmother     No Known Problems Paternal Grandfather     Diabetes Brother     Amblyopia Neg Hx     Cataracts Neg Hx     Hypertension Neg Hx     Macular degeneration Neg Hx     Retinal detachment Neg Hx     Strabismus Neg Hx     Stroke Neg Hx        Allergies and Medications: (updated and reviewed)  Review of patient's allergies indicates:   Allergen Reactions    Lisinopril Swelling and Shortness Of Breath    Ascorbic acid-ascorbate calc      And citrus fruits     Current Outpatient Medications   Medication Sig Dispense Refill    ACCU-CHEK ONEIL PLUS TEST STRP Strp TEST BLOOD SUGAR TWICE DAILY 200 strip 1    ACCU-CHEK SOFTCLIX LANCETS Misc USE TO TEST BLOOD SUGAR ONE TIME DAILY 100 each 3    acetaminophen (TYLENOL) 500 MG tablet Take 2 tablets (1,000 mg total) by mouth every 8 (eight) hours as needed for Pain. 42 tablet 0    alcohol swabs (DROPSAFE ALCOHOL PREP PADS) PadM USE 3 EVERY DAY AS DIRECTED 300 each 3    amLODIPine (NORVASC) 10 MG tablet TAKE 1 TABLET ONE TIME DAILY 90 tablet 3    aspirin (ECOTRIN) 81 MG EC tablet Take 1 tablet (81 mg total) by mouth 2 (two) times a day. 60 tablet 0    atorvastatin  (LIPITOR) 40 MG tablet  90 tablet 0    blood glucose control high,low (ACCU-CHEK ONEIL CONTROL SOLN) Soln 1 Units by Misc.(Non-Drug; Combo Route) route as needed. 1 each 0    blood-glucose meter (ACCU-CHEK ONEIL PLUS METER) Misc 1 kit by Misc.(Non-Drug; Combo Route) route once daily. 1 each 0    blood-glucose meter (TRUE METRIX GLUCOSE METER) kit Use as directed to test glucose 1 each 0    celecoxib (CELEBREX) 200 MG capsule Take 1 capsule (200 mg total) by mouth 2 (two) times daily. 14 capsule 0    ciclopirox (PENLAC) 8 % Soln Apply topically nightly. 6.6 mL 11    diphenhydrAMINE (BENADRYL) 25 mg capsule Take 1 capsule (25 mg total) by mouth every 6 (six) hours as needed for Itching. 20 capsule 0    docusate sodium (COLACE) 100 MG capsule Take 1 capsule (100 mg total) by mouth 2 (two) times daily. 30 capsule 0    ergocalciferol (ERGOCALCIFEROL) 50,000 unit Cap TAKE 1 CAPSULE EVERY 7 DAYS. 12 capsule 1    FLUoxetine 20 MG capsule TAKE 1 CAPSULE EVERY DAY 90 capsule 2    fluticasone propionate (FLONASE) 50 mcg/actuation nasal spray USE 1 SPRAY IN EACH NOSTRIL ONCE DAILY 32 g 2    gabapentin (NEURONTIN) 300 MG capsule TAKE 2 CAPSULES EVERY EVENING 180 capsule 1    hydrOXYzine HCL (ATARAX) 25 MG tablet Take 1 tablet (25 mg total) by mouth 3 (three) times daily as needed for Itching. 90 tablet 3    metoprolol succinate (TOPROL-XL) 25 MG 24 hr tablet Take 1 tablet (25 mg total) by mouth once daily. 90 tablet 3    ondansetron (ZOFRAN-ODT) 4 MG TbDL Dissolve 2 tablets (8 mg total) by mouth every 8 (eight) hours as needed (nausea). 30 tablet 0    oxyCODONE (ROXICODONE) 5 MG immediate release tablet Take 1-2 tablets (5-10 mg total) by mouth every 4 (four) hours as needed (pain). 40 tablet 0    pantoprazole (PROTONIX) 40 MG tablet Take 1 tablet (40 mg total) by mouth once daily. 30 tablet 11    TRUEPLUS LANCETS 33 gauge Misc TEST BLOOD SUGAR TWICE DAILY 200 each 1    blood glucose control, low (TRUE METRIX LEVEL 1) Soln 1  each by Misc.(Non-Drug; Combo Route) route once as needed. 1 each 3    cetirizine (ZYRTEC) 10 MG tablet Take 1 tablet (10 mg total) by mouth once daily. 90 tablet 0    metFORMIN (GLUCOPHAGE) 500 MG tablet Take 1 tablet (500 mg total) by mouth 2 (two) times daily with meals. 180 tablet 3     No current facility-administered medications for this visit.     Facility-Administered Medications Ordered in Other Visits   Medication Dose Route Frequency Provider Last Rate Last Admin    0.9%  NaCl infusion   Intravenous On Call Procedure Eric Carbajal MD        acetaminophen tablet 1,000 mg  1,000 mg Oral On Call Procedure Eric Carbajal MD        acetaminophen tablet 1,000 mg  1,000 mg Oral Q6H Eric Carbajal MD   1,000 mg at 11/09/23 0506    aspirin EC tablet 81 mg  81 mg Oral BID Eric Carbajal MD   81 mg at 11/09/23 1358    famotidine tablet 20 mg  20 mg Oral BID Eric Carbajal MD   20 mg at 11/09/23 1357    LIDOcaine (PF) 10 mg/ml (1%) injection 10 mg  1 mL Intradermal On Call Procedure Eric Carbajal MD        methocarbamoL tablet 750 mg  750 mg Oral TID Eric Carbajal MD   750 mg at 11/09/23 1357    naloxone 0.4 mg/mL injection 0.02 mg  0.02 mg Intravenous PRN Eric Carbajal MD        ondansetron injection 4 mg  4 mg Intravenous Q8H PRN Eric Carbajal MD   4 mg at 11/09/23 0858    oxyCODONE immediate release tablet 10 mg  10 mg Oral Q3H PRN Eric Carbajal MD   10 mg at 11/09/23 1043    oxyCODONE immediate release tablet 5 mg  5 mg Oral Q3H PRN Eric Carbajal MD        polyethylene glycol packet 17 g  17 g Oral Daily Erci Carbajal MD   17 g at 11/09/23 1356    pregabalin capsule 75 mg  75 mg Oral QHS Eric Carbajal MD   75 mg at 11/08/23 2147    prochlorperazine injection Soln 5 mg  5 mg Intravenous Q6H PRN Eric Carbajal MD   5 mg at 11/08/23 1325    senna-docusate 8.6-50 mg per tablet 1 tablet  1 tablet Oral BID Eric Carbajal MD   1 tablet at 11/09/23 1358    sodium  chloride 0.9% flush 10 mL  10 mL Intravenous PRN Eric Carbajal MD           Patient Care Team:  Alena Hernandez MD as PCP - General (Family Medicine)  Alyssa Maxwell MA as Care Coordinator         - The patient is given an After Visit Summary that lists all medications with directions, allergies, education, orders placed during this encounter and follow-up instructions.      - I have reviewed the patient's medical information including past medical, family, and social history sections including the medications and allergies.      - We discussed the patient's current medications.     This note was created by combination of typed  and MModal dictation.  Transcription errors may be present.  If there are any questions, please contact me.       Guerline Mello NP

## 2023-11-30 NOTE — PATIENT INSTRUCTIONS
[facility]--[phone]  Imaging, Xray, CT, MRI, Ultrasound---[phone]  Bariatrics---[phone]  Breast Surgery---[phone]  Case Management---[phone]  Colonoscopy---[phone]  DME---[phone]  Infectious Disease---[phone]  Interventional Radiology---[phone]  Medical Records---[phone]  [facility]---[phone]  Optometry/Ophthalmology---[phone]  [facility]---[phone]  Physical Therapy---[phone]  Psychiatry---[phone] or [phone]  Plastic Surgery---[phone]  Recovery--[phone] option 2, or [phone].  Sleep Study---[phone]  Smoking Cessation---[phone]  Wound Care---[phone]  Referral Desk---[phone]

## 2023-12-01 ENCOUNTER — OFFICE VISIT (OUTPATIENT)
Dept: ORTHOPEDICS | Facility: CLINIC | Age: 73
End: 2023-12-01
Payer: MEDICARE

## 2023-12-01 ENCOUNTER — DOCUMENTATION ONLY (OUTPATIENT)
Dept: REHABILITATION | Facility: HOSPITAL | Age: 73
End: 2023-12-01

## 2023-12-01 VITALS — HEIGHT: 64 IN | WEIGHT: 194 LBS | BODY MASS INDEX: 33.12 KG/M2

## 2023-12-01 DIAGNOSIS — Z96.651 STATUS POST RIGHT KNEE REPLACEMENT: ICD-10-CM

## 2023-12-01 DIAGNOSIS — Z96.651 STATUS POST RIGHT KNEE REPLACEMENT: Primary | ICD-10-CM

## 2023-12-01 PROCEDURE — 3061F NEG MICROALBUMINURIA REV: CPT | Mod: CPTII,S$GLB,, | Performed by: ORTHOPAEDIC SURGERY

## 2023-12-01 PROCEDURE — 99999 PR PBB SHADOW E&M-EST. PATIENT-LVL III: CPT | Mod: PBBFAC,,, | Performed by: ORTHOPAEDIC SURGERY

## 2023-12-01 PROCEDURE — 1125F PR PAIN SEVERITY QUANTIFIED, PAIN PRESENT: ICD-10-PCS | Mod: CPTII,S$GLB,, | Performed by: ORTHOPAEDIC SURGERY

## 2023-12-01 PROCEDURE — 3051F HG A1C>EQUAL 7.0%<8.0%: CPT | Mod: CPTII,S$GLB,, | Performed by: ORTHOPAEDIC SURGERY

## 2023-12-01 PROCEDURE — 99024 POSTOP FOLLOW-UP VISIT: CPT | Mod: S$GLB,,, | Performed by: ORTHOPAEDIC SURGERY

## 2023-12-01 PROCEDURE — 1159F MED LIST DOCD IN RCRD: CPT | Mod: CPTII,S$GLB,, | Performed by: ORTHOPAEDIC SURGERY

## 2023-12-01 PROCEDURE — 99999 PR PBB SHADOW E&M-EST. PATIENT-LVL III: ICD-10-PCS | Mod: PBBFAC,,, | Performed by: ORTHOPAEDIC SURGERY

## 2023-12-01 PROCEDURE — 3066F PR DOCUMENTATION OF TREATMENT FOR NEPHROPATHY: ICD-10-PCS | Mod: CPTII,S$GLB,, | Performed by: ORTHOPAEDIC SURGERY

## 2023-12-01 PROCEDURE — 99024 PR POST-OP FOLLOW-UP VISIT: ICD-10-PCS | Mod: S$GLB,,, | Performed by: ORTHOPAEDIC SURGERY

## 2023-12-01 PROCEDURE — 3051F PR MOST RECENT HEMOGLOBIN A1C LEVEL 7.0 - < 8.0%: ICD-10-PCS | Mod: CPTII,S$GLB,, | Performed by: ORTHOPAEDIC SURGERY

## 2023-12-01 PROCEDURE — 1159F PR MEDICATION LIST DOCUMENTED IN MEDICAL RECORD: ICD-10-PCS | Mod: CPTII,S$GLB,, | Performed by: ORTHOPAEDIC SURGERY

## 2023-12-01 PROCEDURE — 3066F NEPHROPATHY DOC TX: CPT | Mod: CPTII,S$GLB,, | Performed by: ORTHOPAEDIC SURGERY

## 2023-12-01 PROCEDURE — 1125F AMNT PAIN NOTED PAIN PRSNT: CPT | Mod: CPTII,S$GLB,, | Performed by: ORTHOPAEDIC SURGERY

## 2023-12-01 PROCEDURE — 3061F PR NEG MICROALBUMINURIA RESULT DOCUMENTED/REVIEW: ICD-10-PCS | Mod: CPTII,S$GLB,, | Performed by: ORTHOPAEDIC SURGERY

## 2023-12-01 RX ORDER — ONDANSETRON 4 MG/1
8 TABLET, ORALLY DISINTEGRATING ORAL EVERY 8 HOURS PRN
Qty: 30 TABLET | Refills: 0 | Status: CANCELLED | OUTPATIENT
Start: 2023-12-01

## 2023-12-01 RX ORDER — OXYCODONE HYDROCHLORIDE 5 MG/1
5 TABLET ORAL EVERY 4 HOURS PRN
Qty: 30 TABLET | Refills: 0 | Status: SHIPPED | OUTPATIENT
Start: 2023-12-01 | End: 2024-03-14 | Stop reason: HOSPADM

## 2023-12-01 RX ORDER — ONDANSETRON 4 MG/1
8 TABLET, ORALLY DISINTEGRATING ORAL EVERY 8 HOURS PRN
Qty: 30 TABLET | Refills: 0 | Status: SHIPPED | OUTPATIENT
Start: 2023-12-01 | End: 2023-12-04

## 2023-12-01 NOTE — TELEPHONE ENCOUNTER
----- Message from Paulie Urias sent at 12/1/2023  4:21 PM CST -----  Regarding: [facility]  Type: Patient Call Back    Who called:[facility]    What is the request in detail:ondansetron (ZOFRAN-ODT) 4 MG TbDL calling in regards of switching it to 1 tablet instead of 2 cause the insurance wont cover that     Can the clinic reply by [facility]?no    Would the patient rather a call back or a response via [facility]? callback    Best call back number:[phone]    Additional Information:

## 2023-12-01 NOTE — PROGRESS NOTES
Ortho Knee Follow Up Note    PCP: Alena Hernandez MD   Referring Provider: No referring provider defined for this encounter.        Assessment:  73 y.o. female status post right total Knee Primary completed on 11/8/23. Doing well. Having a fair amount of intermittent pains. Otherwise ROM is going well. Working with outpatient PT. Not using assistive devices.     Plan:  Follow up 3 months  Future Radiographs Indicated at next visit: No    Pain Management: Continue current pain management  Anticoagulation: Continue ASA for total of 4 weeks duration post operatively  Wound Care: Ok to shower. No scrubbing incision. No soaking in pools or tubs for 6 weeks post operative.     Patient Reassurance:   Post-operative course discussed with patient. Patient reassured and supported. All questions answered.    ACTIVE PROBLEM LIST  Patient Active Problem List   Diagnosis    Type 2 diabetes mellitus with diabetic neuropathy, without long-term current use of insulin    Benign hypertension    Hyperlipemia    Microhematuria    Left flank pain    Abdominal aortic atherosclerosis    History of renal cell cancer    Renal cell carcinoma of right kidney    Mild major depression    Bilateral chronic knee pain    Colon cancer screening    Pain of left calf    Nuclear sclerosis of both eyes    Refractive error    Primary osteoarthritis of knees, bilateral    Fall    Nuclear sclerotic cataract of left eye    Nuclear sclerotic cataract of right eye    Radiculopathy    Idiopathic progressive neuropathy    Nerve pain    Chest pain    Severe obesity (BMI 35.0-39.9) with comorbidity    Decreased ROM of right knee    Decreased strength involving knee joint    Gait difficulty           HPI:  Valarie Foster presents today for a post-op visit.     STATUS POST:  right total Knee Primary  BMI: Body mass index is 33.3 kg/m².    Post operative recovery was complicated by: None    Patient rates his condition as improving. Pleased with surgical outcome to  "date.     Functional Assessment:  Started Outpatient PT  Functional Difficulties:  Prolonged standing and Walking  Pain Medication:  Narcotic  Anticoagulation: Aspirin     EXAM:    right POST-OPERATIVE KNEE    Ht 5' 4" (1.626 m)   Wt 88 kg (194 lb)   BMI 33.30 kg/m²     Skin:  Appropriate post op appearance, No evidence of erythema, warmth, discharge, or drainage, and Incision clean/dry/intact  Range of Motion: Flexion: 115, Extension 0  Neurovascular Status: Sensation intact in Sural, Saphenous, SPN, DPN and Tibial nerve distribution. 5 out of 5 strength in hip flexion, knee flexion/extension, ankle plantarflexion/dorsiflexion. 2+ dorsalis pedis    IMAGING:  X-ray Knee:   Implants are well fixed and aligned. There is no evidence of loosening.      "

## 2023-12-01 NOTE — PROGRESS NOTES
Outpatient Therapy Compliance Update     Name: Valarie Foster  Clinic Number: [id number]    Physician Orders: PT Eval and Treat   Medical Diagnosis from Referral: Z96.651 (ICD-10-CM) - Status post right knee replacement      Care Update      Today Valarie Foster canceled her Physical Therapy appointment due to  (having pain not able to make it).      Date of next scheduled appointment: 12/4/2023    Rakel Barlow PTA

## 2023-12-04 ENCOUNTER — CLINICAL SUPPORT (OUTPATIENT)
Dept: REHABILITATION | Facility: HOSPITAL | Age: 73
End: 2023-12-04
Payer: MEDICARE

## 2023-12-04 DIAGNOSIS — Z96.651 STATUS POST RIGHT KNEE REPLACEMENT: ICD-10-CM

## 2023-12-04 DIAGNOSIS — R29.898 DECREASED STRENGTH INVOLVING KNEE JOINT: ICD-10-CM

## 2023-12-04 DIAGNOSIS — M25.661 DECREASED ROM OF RIGHT KNEE: Primary | ICD-10-CM

## 2023-12-04 DIAGNOSIS — R26.9 GAIT DIFFICULTY: ICD-10-CM

## 2023-12-04 PROCEDURE — 97140 MANUAL THERAPY 1/> REGIONS: CPT | Mod: PN

## 2023-12-04 PROCEDURE — 97112 NEUROMUSCULAR REEDUCATION: CPT | Mod: PN

## 2023-12-04 PROCEDURE — 97110 THERAPEUTIC EXERCISES: CPT | Mod: PN

## 2023-12-04 RX ORDER — ONDANSETRON 8 MG/1
8 TABLET, ORALLY DISINTEGRATING ORAL EVERY 8 HOURS PRN
Qty: 30 TABLET | Refills: 0 | Status: SHIPPED | OUTPATIENT
Start: 2023-12-04

## 2023-12-04 NOTE — PROGRESS NOTES
"[facility]   Physical Therapy Treatment Note      Name: Valarie Foster  Clinic Number: [id number]    Therapy Diagnosis:   Encounter Diagnoses   Name Primary?    Decreased ROM of right knee Yes    Decreased strength involving knee joint     Gait difficulty      Physician: Eric Carbajal MD    Visit Date: 12/4/2023      Physician Orders: PT Eval and Treat   Medical Diagnosis from Referral: Z96.651 (ICD-10-CM) - Status post right knee replacement   Evaluation Date: 11/27/2023  Authorization Period Expiration: 11/09/2024   Plan of Care Expiration: 2/27/24  Progress Note Due: 12/27/23  Date of Surgery: 11/8/23  Visit # / Visits authorized: 1/ 20 (+eval)  FOTO: 1/ 3     Precautions: Standard, Diabetes, and HTN      Time In: 0857am  Time Out: 1001a  Total Billable Time: 64 minutes     POW 3: (11/29-12/6)       PTA Visit #: 0/5       Subjective     Patient reports: She was hurting and missed her appointment of Friday. She went to her doctor and he told her that everything looks good but she needs to take it easy. She still has some pain on in the joint itself.  She was compliant with home exercise program.  Response to previous treatment: 1st treatment  Functional change: ongoing    Pain: 6/10  Location: right knee(s)     Objective      Objective Measures updated at progress report unless specified.     12/4/23    Knee active assistive range of motion with heel slides: 102 deg  Treatment     Valarie received the treatments listed below:      therapeutic exercises to develop strength, ROM, flexibility, and posture for 20 minutes including:  Heel prop x5 minutes  Heel prop with calf stretch 30"x4  Heel slides 5"x5 minutes  +Nustep 5 minutes    manual therapy techniques: Joint mobilizations were applied to the: Right knee for 15 minutes, including:  Gentle knee hinge  Patellar mobility is all directions  Fat pad mobilizations  Seated range of motion passive range of motion       neuromuscular " Patient vital signs are a2t baseline: Yes  Patient able to ambulate as they were prior to admission or with assist devices provided by therapies during their stay:  Yes  Patient MUST void prior to discharge:  Yes  Patient able to tolerate oral intake:  Yes  Pain has adequate pain control using Oral analgesics:  Yes  Does patient have an identified :  Yes  Has goal D/C date and time been discussed with patient:  Yes    Patient in chair as of now, not ready to get in bed. Rated right hip pain at 8/10 but declines oxycodone, only wants Tylenol and ibuprofen. Up to bathroom and voided without problem.     "re-education activities to improve: Coordination, Proprioception, and Posture for 29 minutes. The following activities were included:  Quad set 5"x30 with towel under knee  Quad set 5"x30 with towel under ankle  +short arc quad 5" 3x10  +long arc quad 5" 3x10     Patient Education and Home Exercises       Education provided:   - Continue Home exercise program     Written Home Exercises Provided: Patient instructed to cont prior HEP. Exercises were reviewed and Valarie was able to demonstrate them prior to the end of the session.  Valarie demonstrated good  understanding of the education provided. See Electronic Medical Record under Patient Instructions for exercises provided during therapy sessions    Assessment     Valarie was seen for her first follow up since initial evaluation. She arrived to today's session without assistive device and with decrease in knee extension on right lower extremity with gait. She continues to have high amount of pain and soreness of Right knee that improved with exercises. She required verbal cues for Terminal knee extension with short arc quad and long arc quad.     Valarie Is progressing well towards her goals.   Patient prognosis is Good.     Patient will continue to benefit from skilled outpatient physical therapy to address the deficits listed in the problem list box on initial evaluation, provide pt/family education and to maximize pt's level of independence in the home and community environment.     Patient's spiritual, cultural and educational needs considered and pt agreeable to plan of care and goals.     Anticipated barriers to physical therapy: chronicity of condition prior to eval     Goals: Short Term Goals: 5 weeks   Patient demonstrates improved range of motion 0 to 100 deg to improve gait progressing, not met  Patient demonstrates ability to perform 10 sit to  30 seconds to improve functional strength  progressing, not met  Patient demonstrates ability to navigate 2 " steps with reciprocal pattern to improve ability to enter and exit home  progressing, not met  Patient demonstrates ability to ambulate 2 blocks without difficulty to improve tolerance to functional tasks  progressing, not met     Long Term Goals: 10 weeks   Patient demonstrates improved knee range of motion 0 to 120 to improve tolerance to functional tasks  progressing, not met  Patient demonstrates ability to perform 13 sit to  30 seconds to improve functional strength  progressing, not met  Patient demonstrates improved FOTO score to 68 to improve ability to perform functional tasks.  progressing, not met  Patient demonstrates ability to walk 1 mile with little to no difficulty to improve tolerance to functional tasks pain free  progressing, not met  Patient goal: patient will return to dancing  progressing, not met    Plan     Continue to improve range of motion and quad strength    Sunita Mcginnis, PT,DPT  12/04/2023

## 2023-12-05 ENCOUNTER — DOCUMENTATION ONLY (OUTPATIENT)
Dept: REHABILITATION | Facility: HOSPITAL | Age: 73
End: 2023-12-05
Payer: MEDICARE

## 2023-12-05 NOTE — PROGRESS NOTES
[facility]                          Canceled Therapy Appointment     Valarie Foster  MRN: [id number]    Patient canceled today's therapy appt on 12/5/2023 due to Patient Canceled (HAVE A FLAT TIRE COMING TO THERAPY. WAITING ON HELP) .    Rakel Barlow, PTA  12/5/2023

## 2023-12-11 ENCOUNTER — DOCUMENT SCAN (OUTPATIENT)
Dept: HOME HEALTH SERVICES | Facility: HOSPITAL | Age: 73
End: 2023-12-11
Payer: MEDICARE

## 2023-12-11 ENCOUNTER — CLINICAL SUPPORT (OUTPATIENT)
Dept: REHABILITATION | Facility: HOSPITAL | Age: 73
End: 2023-12-11
Payer: MEDICARE

## 2023-12-11 DIAGNOSIS — R26.9 GAIT DIFFICULTY: ICD-10-CM

## 2023-12-11 DIAGNOSIS — R29.898 DECREASED STRENGTH INVOLVING KNEE JOINT: ICD-10-CM

## 2023-12-11 DIAGNOSIS — M25.661 DECREASED ROM OF RIGHT KNEE: Primary | ICD-10-CM

## 2023-12-11 PROCEDURE — 97110 THERAPEUTIC EXERCISES: CPT | Mod: PN

## 2023-12-11 PROCEDURE — 97140 MANUAL THERAPY 1/> REGIONS: CPT | Mod: PN

## 2023-12-11 PROCEDURE — 97112 NEUROMUSCULAR REEDUCATION: CPT | Mod: PN

## 2023-12-11 NOTE — PROGRESS NOTES
"[facility]   Physical Therapy Treatment Note      Name: Valarie Foster  Clinic Number: [id number]    Therapy Diagnosis:   No diagnosis found.    Physician: Eric Carbajal MD    Visit Date: 12/11/2023      Physician Orders: PT Eval and Treat   Medical Diagnosis from Referral: Z96.651 (ICD-10-CM) - Status post right knee replacement   Evaluation Date: 11/27/2023  Authorization Period Expiration: 11/09/2024   Plan of Care Expiration: 2/27/24  Progress Note Due: 12/27/23  Date of Surgery: 11/8/23  Visit # / Visits authorized: 2/ 20 (+eval)  FOTO: 1/ 3     Precautions: Standard, Diabetes, and HTN      Time In: ***am  Time Out: ***a  Total Billable Time: *** minutes     POW43: (12/6-12/13)       PTA Visit #: 0/5       Subjective     Patient reports:** She was hurting and missed her appointment of Friday. She went to her doctor and he told her that everything looks good but she needs to take it easy. She still has some pain on in the joint itself.  She was compliant with home exercise program.  Response to previous treatment: ***  Functional change: ongoing    Pain: 6/10  Location: right knee(s)     Objective      Objective Measures updated at progress report unless specified.     12/4/23    Knee active assistive range of motion with heel slides: 102 deg  Treatment     Valarie received the treatments listed below:      therapeutic exercises to develop strength, ROM, flexibility, and posture for *** minutes including:  Heel prop x5 minutes  Heel prop with calf stretch 30"x4  Heel slides 5"x5 minutes  +Nustep 5 minutes    manual therapy techniques: Joint mobilizations were applied to the: Right knee for *** minutes, including:  Gentle knee hinge  Patellar mobility is all directions  Fat pad mobilizations  Seated range of motion passive range of motion       neuromuscular re-education activities to improve: Coordination, Proprioception, and Posture for *** minutes. The following activities were " "included:  Quad set 5"x30 with towel under knee  Quad set 5"x30 with towel under ankle  +short arc quad 5" 3x10  +long arc quad 5" 3x10     Patient Education and Home Exercises       Education provided:   - Continue Home exercise program     Written Home Exercises Provided: Patient instructed to cont prior HEP. Exercises were reviewed and Valarie was able to demonstrate them prior to the end of the session.  Valarie demonstrated good  understanding of the education provided. See Electronic Medical Record under Patient Instructions for exercises provided during therapy sessions    Assessment     Valarie was seen for her first follow up since initial evaluation. She arrived to today's session without assistive device and with decrease in knee extension on right lower extremity with gait. She continues to have high amount of pain and soreness of Right knee that improved with exercises. She required verbal cues for Terminal knee extension with short arc quad and long arc quad.     Valarie Is progressing well towards her goals.   Patient prognosis is Good.     Patient will continue to benefit from skilled outpatient physical therapy to address the deficits listed in the problem list box on initial evaluation, provide pt/family education and to maximize pt's level of independence in the home and community environment.     Patient's spiritual, cultural and educational needs considered and pt agreeable to plan of care and goals.     Anticipated barriers to physical therapy: chronicity of condition prior to eval     Goals: Short Term Goals: 5 weeks   Patient demonstrates improved range of motion 0 to 100 deg to improve gait progressing, not met  Patient demonstrates ability to perform 10 sit to  30 seconds to improve functional strength  progressing, not met  Patient demonstrates ability to navigate 2 steps with reciprocal pattern to improve ability to enter and exit home  progressing, not met  Patient demonstrates ability " to ambulate 2 blocks without difficulty to improve tolerance to functional tasks  progressing, not met     Long Term Goals: 10 weeks   Patient demonstrates improved knee range of motion 0 to 120 to improve tolerance to functional tasks  progressing, not met  Patient demonstrates ability to perform 13 sit to  30 seconds to improve functional strength  progressing, not met  Patient demonstrates improved FOTO score to 68 to improve ability to perform functional tasks.  progressing, not met  Patient demonstrates ability to walk 1 mile with little to no difficulty to improve tolerance to functional tasks pain free  progressing, not met  Patient goal: patient will return to dancing  progressing, not met    Plan     Continue to improve range of motion and quad strength    Sunita Mcginnis, PT,DPT  12/11/2023

## 2023-12-11 NOTE — PROGRESS NOTES
"[facility]   Physical Therapy Treatment Note      Name: Valarie Foster  Clinic Number: [id number]    Therapy Diagnosis:   Encounter Diagnoses   Name Primary?    Decreased ROM of right knee Yes    Decreased strength involving knee joint     Gait difficulty        Physician: Eric Carbajal MD    Visit Date: 12/11/2023      Physician Orders: PT Eval and Treat   Medical Diagnosis from Referral: Z96.651 (ICD-10-CM) - Status post right knee replacement   Evaluation Date: 11/27/2023  Authorization Period Expiration: 11/09/2024   Plan of Care Expiration: 2/27/24  Progress Note Due: 12/27/23  Date of Surgery: 11/8/23  Visit # / Visits authorized: 12 20 (+eval)  FOTO: 2/ 3     Precautions: Standard, Diabetes, and HTN      Time In: 9:01am  Time Out: 1001a  Total Billable Time: 60 minutes     POW 3: (11/29-12/6)       PTA Visit #: 0/5       Subjective     Patient reports: "I feel cold running down my leg, what's that?" Also reports some achiness this morning bu thinks its due to cold .   She was compliant with home exercise program.  Response to previous treatment: felt fine  Functional change: ongoing    Pain: 4/10  Location: right knee(s)     Objective      Objective Measures updated at progress report unless specified.     12/11/2023  0-3-92 active range of motion  0-1-111 passive range of motion     Treatment     Valarie received the treatments listed below:      therapeutic exercises to develop strength, ROM, flexibility, and posture for 20 minutes including:  Heel prop x5 minutes - with gait belt   Heel prop with calf stretch 30"x4  Heel slides 5"x5 minutes    Not today:  Nustep 5 minutes    manual therapy techniques: Joint mobilizations were applied to the: Right knee for 15 minutes, including:  Gentle knee hinge  Patellar mobility is all directions  Fat pad mobilizations  Scar mobilization    Not today:  Seated range of motion passive range of motion       neuromuscular re-education activities " "to improve: Coordination, Proprioception, and Posture for 29 minutes. The following activities were included:  Quad set 5"x30 with towel under knee  Quad set 5"x30 with towel under ankle  short arc quad 5" 3x10  SLR AA 3x10    Not today:  +long arc quad 5" 3x10     Patient Education and Home Exercises       Education provided:   - Continue Home exercise program     Written Home Exercises Provided: Patient instructed to cont prior HEP. Exercises were reviewed and Valarie was able to demonstrate them prior to the end of the session.  Valarie demonstrated good  understanding of the education provided. See Electronic Medical Record under Patient Instructions for exercises provided during therapy sessions    Assessment     Valarie presents to PT today with limitations into both flexion and terminal knee extension. She has missed several visits which is most likely contributing to delay. Knee flexion improvement made within treatment. Heavy cuing required during straight leg raise in order for pt to perform properly. PT to continue to progress knee range of motion and quad strength.     Valarie Is progressing well towards her goals.   Patient prognosis is Good.     Patient will continue to benefit from skilled outpatient physical therapy to address the deficits listed in the problem list box on initial evaluation, provide pt/family education and to maximize pt's level of independence in the home and community environment.     Patient's spiritual, cultural and educational needs considered and pt agreeable to plan of care and goals.     Anticipated barriers to physical therapy: chronicity of condition prior to eval     Goals: Short Term Goals: 5 weeks   Patient demonstrates improved range of motion 0 to 100 deg to improve gait progressing, not met  Patient demonstrates ability to perform 10 sit to  30 seconds to improve functional strength  progressing, not met  Patient demonstrates ability to navigate 2 steps with " reciprocal pattern to improve ability to enter and exit home  progressing, not met  Patient demonstrates ability to ambulate 2 blocks without difficulty to improve tolerance to functional tasks  progressing, not met     Long Term Goals: 10 weeks   Patient demonstrates improved knee range of motion 0 to 120 to improve tolerance to functional tasks  progressing, not met  Patient demonstrates ability to perform 13 sit to  30 seconds to improve functional strength  progressing, not met  Patient demonstrates improved FOTO score to 68 to improve ability to perform functional tasks.  progressing, not met  Patient demonstrates ability to walk 1 mile with little to no difficulty to improve tolerance to functional tasks pain free  progressing, not met  Patient goal: patient will return to dancing  progressing, not met    Plan     Continue to improve range of motion and quad strength    Yajaira Velarde, PT,DPT  12/11/2023

## 2023-12-13 RX ORDER — ACETAMINOPHEN 500 MG
1000 TABLET ORAL EVERY 8 HOURS PRN
Qty: 42 TABLET | Refills: 0 | OUTPATIENT
Start: 2023-12-13

## 2023-12-13 NOTE — PROGRESS NOTES
Subjective:       Patient ID: Valarie Foster is a 72 y.o. female.    Chief Complaint: No chief complaint on file.    HPI     Pt here for 1 day PCIOL OD.  Pt state pain OD.  S/p PCIOL OS: 07/07/2022     GTTS:  Ofloxacin QID OD  Durezol QID OD  Ilevro QD OD    Last edited by Romulo Alberto on 7/22/2022  4:28 PM. (History)             Assessment:       1. Post-operative state        Plan:       S/p CE OU- Doing well.        CPM OD.  Taper gtts OS.  RTC 1 wk.      No

## 2023-12-13 NOTE — TELEPHONE ENCOUNTER
Spoken with patient about her refill of the medication Tylenol 500 mg .  wants for to get this OTC. Patient understand what was told to her.

## 2023-12-14 ENCOUNTER — CLINICAL SUPPORT (OUTPATIENT)
Dept: REHABILITATION | Facility: HOSPITAL | Age: 73
End: 2023-12-14
Payer: MEDICARE

## 2023-12-14 DIAGNOSIS — M25.661 DECREASED ROM OF RIGHT KNEE: Primary | ICD-10-CM

## 2023-12-14 DIAGNOSIS — R29.898 DECREASED STRENGTH INVOLVING KNEE JOINT: ICD-10-CM

## 2023-12-14 DIAGNOSIS — R26.9 GAIT DIFFICULTY: ICD-10-CM

## 2023-12-14 PROCEDURE — 97140 MANUAL THERAPY 1/> REGIONS: CPT | Mod: PN

## 2023-12-14 PROCEDURE — 97110 THERAPEUTIC EXERCISES: CPT | Mod: PN

## 2023-12-14 PROCEDURE — 97112 NEUROMUSCULAR REEDUCATION: CPT | Mod: PN

## 2023-12-14 PROCEDURE — 97530 THERAPEUTIC ACTIVITIES: CPT | Mod: PN

## 2023-12-14 NOTE — PROGRESS NOTES
"[facility]   Physical Therapy Treatment Note      Name: Valarie Foster  Clinic Number: [id number]    Therapy Diagnosis:   No diagnosis found.      Physician: Eric Carbajal MD    Visit Date: 12/14/2023      Physician Orders: PT Eval and Treat   Medical Diagnosis from Referral: Z96.651 (ICD-10-CM) - Status post right knee replacement   Evaluation Date: 11/27/2023  Authorization Period Expiration: 11/09/2024   Plan of Care Expiration: 2/27/24  Progress Note Due: 12/27/23  Date of Surgery: 11/8/23  Visit # / Visits authorized: 3/ 20 (+eval)  FOTO: 2/ 3     Precautions: Standard, Diabetes, and HTN      Time In: ***am  Time Out: ***a  Total Billable Time: 60 minutes     POW 5: (12/13-12/20)       PTA Visit #: 0/5       Subjective     Patient reports:*** "I feel cold running down my leg, what's that?" Also reports some achiness this morning bu thinks its due to cold .   She was compliant with home exercise program.  Response to previous treatment: felt fine  Functional change: ongoing    Pain: 4/10  Location: right knee(s)     Objective      Objective Measures updated at progress report unless specified.     12/11/2023  0-3-92 active range of motion  0-1-111 passive range of motion     Treatment     Valarie received the treatments listed below:      therapeutic exercises to develop strength, ROM, flexibility, and posture for *** minutes including:  Heel prop x5 minutes - with gait belt   Heel prop with calf stretch 30"x4  Heel slides 5"x5 minutes    Not today:  Nustep 5 minutes    manual therapy techniques: Joint mobilizations were applied to the: Right knee for *** minutes, including:  Gentle knee hinge  Patellar mobility is all directions  Fat pad mobilizations  Scar mobilization    Not today:  Seated range of motion passive range of motion       neuromuscular re-education activities to improve: Coordination, Proprioception, and Posture for *** minutes. The following activities were " "included:  Quad set 5"x30 with towel under knee  Quad set 5"x30 with towel under ankle  short arc quad 5" 3x10  SLR AA 3x10    Not today:  +long arc quad 5" 3x10     Patient Education and Home Exercises       Education provided:   - Continue Home exercise program     Written Home Exercises Provided: Patient instructed to cont prior HEP. Exercises were reviewed and Valarie was able to demonstrate them prior to the end of the session.  Valarie demonstrated good  understanding of the education provided. See Electronic Medical Record under Patient Instructions for exercises provided during therapy sessions    Assessment     ***Valarie presents to PT today with limitations into both flexion and terminal knee extension. She has missed several visits which is most likely contributing to delay. Knee flexion improvement made within treatment. Heavy cuing required during straight leg raise in order for pt to perform properly. PT to continue to progress knee range of motion and quad strength.     Valarie Is progressing well towards her goals.   Patient prognosis is Good.     Patient will continue to benefit from skilled outpatient physical therapy to address the deficits listed in the problem list box on initial evaluation, provide pt/family education and to maximize pt's level of independence in the home and community environment.     Patient's spiritual, cultural and educational needs considered and pt agreeable to plan of care and goals.     Anticipated barriers to physical therapy: chronicity of condition prior to eval     Goals: Short Term Goals: 5 weeks   Patient demonstrates improved range of motion 0 to 100 deg to improve gait progressing, not met  Patient demonstrates ability to perform 10 sit to  30 seconds to improve functional strength  progressing, not met  Patient demonstrates ability to navigate 2 steps with reciprocal pattern to improve ability to enter and exit home  progressing, not met  Patient demonstrates " ability to ambulate 2 blocks without difficulty to improve tolerance to functional tasks  progressing, not met     Long Term Goals: 10 weeks   Patient demonstrates improved knee range of motion 0 to 120 to improve tolerance to functional tasks  progressing, not met  Patient demonstrates ability to perform 13 sit to  30 seconds to improve functional strength  progressing, not met  Patient demonstrates improved FOTO score to 68 to improve ability to perform functional tasks.  progressing, not met  Patient demonstrates ability to walk 1 mile with little to no difficulty to improve tolerance to functional tasks pain free  progressing, not met  Patient goal: patient will return to dancing  progressing, not met    Plan     Continue to improve range of motion and quad strength    Sunita Mcginnis, PT,DPT  12/14/2023

## 2023-12-14 NOTE — PROGRESS NOTES
"[facility]   Physical Therapy Treatment Note      Name: Valarie Foster  Clinic Number: [id number]    Therapy Diagnosis:   Encounter Diagnoses   Name Primary?    Decreased ROM of right knee Yes    Decreased strength involving knee joint     Gait difficulty          Physician: Eric Carbajal MD    Visit Date: 12/14/2023      Physician Orders: PT Eval and Treat   Medical Diagnosis from Referral: Z96.651 (ICD-10-CM) - Status post right knee replacement   Evaluation Date: 11/27/2023  Authorization Period Expiration: 11/09/2024   Plan of Care Expiration: 2/27/24  Progress Note Due: 12/27/23  Date of Surgery: 11/8/23  Visit # / Visits authorized: 3/20 (+eval)  FOTO: 2/ 3     Precautions: Standard, Diabetes, and HTN      Time In: 8:06am  Time Out: 9:04am  Total Billable Time: 58 minutes (1MT, 1NMR, 1TE, 1TA)     POW 5: (12/13-12/20)       PTA Visit #: 0/5       Subjective     Patient reports: She was sore after last visit, but felt fine after she took a pain pill.     She was compliant with home exercise program.  Response to previous treatment: felt fine  Functional change: ongoing    Pain: 6/10  Location: right knee(s)     Objective      Objective Measures updated at progress report unless specified.     12/14/2023  AROM/PROM - Left Beginning of tx End of tx   Flexion 95/100 100/110   Extension -3/0 0/0       Treatment     Valarie received the treatments listed below:      therapeutic exercises to develop strength, ROM, flexibility, and posture for 20 minutes including:  Heel prop x5 minutes - with gait belt   Heel prop with calf stretch 30"x4  Heel slides 5"x5 minutes    Not today:  Nustep 5 minutes    manual therapy techniques: Joint mobilizations were applied to the: Right knee for 10 minutes, including:  Gentle knee hinge  Patellar mobility is all directions  Fat pad mobilizations  Scar mobilization    Not today:  Seated range of motion passive range of motion       neuromuscular " "re-education activities to improve: Coordination, Proprioception, and Posture for 20 minutes. The following activities were included:  Quad set 5"x30 with towel under ankle  short arc quad 5" 2x15 + #2  SLR AA 3x10  +long arc quad 5" 3x10     Not today:  Quad set 5"x30 with towel under knee    Therapeutic activities to improve functional performance for 8  minutes, including:  +Sit to stand 18" box 3x10     Patient Education and Home Exercises       Education provided:   - Continue Home exercise program     Written Home Exercises Provided: Patient instructed to cont prior HEP. Exercises were reviewed and Valarie was able to demonstrate them prior to the end of the session.  Valarie demonstrated good  understanding of the education provided. See Electronic Medical Record under Patient Instructions for exercises provided during therapy sessions    Assessment     Valarie presents to PT today reporting soreness following last visit that abated the next day. She continues with knee ROM deficits in flexion and extension which improved throughout session. She required minimal assistance and cueing for straight leg raise. Added sit to stands today for functional strengthening - she demonstrated good technique and was able to complete all sets with fatigue but no increase in pain. Quadriceps strength and knee extension is improving, however flexion deficits remain.     Valarie Is progressing well towards her goals.   Patient prognosis is Good.     Patient will continue to benefit from skilled outpatient physical therapy to address the deficits listed in the problem list box on initial evaluation, provide pt/family education and to maximize pt's level of independence in the home and community environment.     Patient's spiritual, cultural and educational needs considered and pt agreeable to plan of care and goals.     Anticipated barriers to physical therapy: chronicity of condition prior to eval     Goals: Short Term Goals: 5 weeks "   Patient demonstrates improved range of motion 0 to 100 deg to improve gait progressing, not met  Patient demonstrates ability to perform 10 sit to  30 seconds to improve functional strength  progressing, not met  Patient demonstrates ability to navigate 2 steps with reciprocal pattern to improve ability to enter and exit home  progressing, not met  Patient demonstrates ability to ambulate 2 blocks without difficulty to improve tolerance to functional tasks  progressing, not met     Long Term Goals: 10 weeks   Patient demonstrates improved knee range of motion 0 to 120 to improve tolerance to functional tasks  progressing, not met  Patient demonstrates ability to perform 13 sit to  30 seconds to improve functional strength  progressing, not met  Patient demonstrates improved FOTO score to 68 to improve ability to perform functional tasks.  progressing, not met  Patient demonstrates ability to walk 1 mile with little to no difficulty to improve tolerance to functional tasks pain free  progressing, not met  Patient goal: patient will return to dancing  progressing, not met    Plan     Continue to improve range of motion and quad strength    Linda Ricci, PT  Co-treated by Yajaira Velarde, PT, DPT, OCS  12/14/2023

## 2023-12-18 ENCOUNTER — TELEPHONE (OUTPATIENT)
Dept: FAMILY MEDICINE | Facility: CLINIC | Age: 73
End: 2023-12-18
Payer: MEDICARE

## 2023-12-18 ENCOUNTER — CLINICAL SUPPORT (OUTPATIENT)
Dept: REHABILITATION | Facility: HOSPITAL | Age: 73
End: 2023-12-18
Payer: MEDICARE

## 2023-12-18 DIAGNOSIS — Z12.31 BREAST CANCER SCREENING BY MAMMOGRAM: Primary | ICD-10-CM

## 2023-12-18 DIAGNOSIS — M25.661 DECREASED ROM OF RIGHT KNEE: Primary | ICD-10-CM

## 2023-12-18 DIAGNOSIS — R29.898 DECREASED STRENGTH INVOLVING KNEE JOINT: ICD-10-CM

## 2023-12-18 DIAGNOSIS — R26.9 GAIT DIFFICULTY: ICD-10-CM

## 2023-12-18 PROCEDURE — 97140 MANUAL THERAPY 1/> REGIONS: CPT | Mod: PN

## 2023-12-18 PROCEDURE — 97530 THERAPEUTIC ACTIVITIES: CPT | Mod: PN

## 2023-12-18 NOTE — TELEPHONE ENCOUNTER
----- Message from Jessica Dennison sent at 12/18/2023  3:23 PM CST -----  Type:  Mammogram    Caller is requesting to schedule their annual mammogram appointment.  Order is not listed in EPIC.  Please enter order and contact patient to schedule.  Name of Caller: self     Where would they like the mammogram performed? [facility]    Would the patient rather a call back or a response via [facility]? call    Best Call Back Number: .[phone] (home)

## 2023-12-18 NOTE — PROGRESS NOTES
"[facility]   Physical Therapy Treatment Note      Name: Valarie Foster  Clinic Number: [id number]    Therapy Diagnosis:   Encounter Diagnoses   Name Primary?    Decreased ROM of right knee Yes    Decreased strength involving knee joint     Gait difficulty            Physician: Eric Carbajal MD    Visit Date: 12/18/2023      Physician Orders: PT Eval and Treat   Medical Diagnosis from Referral: Z96.651 (ICD-10-CM) - Status post right knee replacement   Evaluation Date: 11/27/2023  Authorization Period Expiration: 11/09/2024   Plan of Care Expiration: 2/27/24  Progress Note Due: 12/27/23  Date of Surgery: 11/8/23  Visit # / Visits authorized: 4/20 (+eval)  FOTO: 2/ 3     Precautions: Standard, Diabetes, and HTN      Time In: 0908am  Time Out: 1008am  Total Billable Time: 60 minutes (1MT, 1NMR, 1TE, 1TA)     POW 5: (12/13-12/20)       PTA Visit #: 0/5       Subjective     Patient reports: The knee has been doing pretty good. She denies pain right now    She was compliant with home exercise program.  Response to previous treatment: felt fine; soreness  Functional change: ongoing    Pain: 0/10  Location: right knee(s)     Objective      Objective Measures updated at progress report unless specified.     12/18/2023  AROM/PROM - Left Beginning of tx End of tx   Flexion 100/106 NT   Extension 0/0 0/0       Treatment     Valarie received the treatments listed below:      therapeutic exercises to develop strength, ROM, flexibility, and posture for 22 minutes including:  Heel prop x5 minutes - with gait belt   Heel prop with calf stretch 30"x4  Heel slides 5"x5 minutes  +shuttle DL 2 bands 3x10   +single leg shuttle 1 band 3x10 Right   Patient education      Not today:  Nustep 5 minutes    manual therapy techniques: Joint mobilizations were applied to the: Right knee for 8 minutes, including:  Gentle knee hinge  Patellar mobility is all directions  Fat pad mobilizations  Scar mobilization    Not " "today:  Seated range of motion passive range of motion       neuromuscular re-education activities to improve: Coordination, Proprioception, and Posture for 30 minutes. The following activities were included:  Quad set 5"x30 with towel under ankle  short arc quad 5" 2x15 + #2  Supine SLR  3x10  long arc quad 5" 3x10       Therapeutic activities to improve functional performance for 00  minutes, including:  +Sit to stand 18" box 3x10     Patient Education and Home Exercises       Education provided:   - Continue Home exercise program     Written Home Exercises Provided: Patient instructed to cont prior HEP. Exercises were reviewed and Valarie was able to demonstrate them prior to the end of the session.  Valarie demonstrated good  understanding of the education provided. See Electronic Medical Record under Patient Instructions for exercises provided during therapy sessions    Assessment     Valarie arrived to today's treatment session with improvements in knee extension and flexion range of motion. Patient was able to perform straight leg raise without assistance. PT progressed CKC strengthening with shuttle exercises. Patient was fatigued at end of treatment session.     Valarie Is progressing well towards her goals.   Patient prognosis is Good.     Patient will continue to benefit from skilled outpatient physical therapy to address the deficits listed in the problem list box on initial evaluation, provide pt/family education and to maximize pt's level of independence in the home and community environment.     Patient's spiritual, cultural and educational needs considered and pt agreeable to plan of care and goals.     Anticipated barriers to physical therapy: chronicity of condition prior to eval     Goals: Short Term Goals: 5 weeks   Patient demonstrates improved range of motion 0 to 100 deg to improve gait progressing, not met  Patient demonstrates ability to perform 10 sit to  30 seconds to improve functional " strength  progressing, not met  Patient demonstrates ability to navigate 2 steps with reciprocal pattern to improve ability to enter and exit home  progressing, not met  Patient demonstrates ability to ambulate 2 blocks without difficulty to improve tolerance to functional tasks  progressing, not met     Long Term Goals: 10 weeks   Patient demonstrates improved knee range of motion 0 to 120 to improve tolerance to functional tasks  progressing, not met  Patient demonstrates ability to perform 13 sit to  30 seconds to improve functional strength  progressing, not met  Patient demonstrates improved FOTO score to 68 to improve ability to perform functional tasks.  progressing, not met  Patient demonstrates ability to walk 1 mile with little to no difficulty to improve tolerance to functional tasks pain free  progressing, not met  Patient goal: patient will return to dancing  progressing, not met    Plan     Continue to improve range of motion and quad strength    Sunita Mcginnis, PT,DPT  12/18/2023

## 2023-12-19 NOTE — TELEPHONE ENCOUNTER
No care due was identified.  [facility] Embedded Care Due Messages. Reference number: [id number].   12/19/2023 11:45:06 AM CST

## 2023-12-20 RX ORDER — ISOPROPYL ALCOHOL 70 ML/100ML
SWAB TOPICAL
Qty: 200 EACH | Refills: 3 | Status: SHIPPED | OUTPATIENT
Start: 2023-12-20

## 2023-12-21 ENCOUNTER — CLINICAL SUPPORT (OUTPATIENT)
Dept: REHABILITATION | Facility: HOSPITAL | Age: 73
End: 2023-12-21
Payer: MEDICARE

## 2023-12-21 DIAGNOSIS — M25.661 DECREASED ROM OF RIGHT KNEE: Primary | ICD-10-CM

## 2023-12-21 DIAGNOSIS — R29.898 DECREASED STRENGTH INVOLVING KNEE JOINT: ICD-10-CM

## 2023-12-21 DIAGNOSIS — R26.9 GAIT DIFFICULTY: ICD-10-CM

## 2023-12-21 PROCEDURE — 97110 THERAPEUTIC EXERCISES: CPT | Mod: PN

## 2023-12-21 PROCEDURE — 97112 NEUROMUSCULAR REEDUCATION: CPT | Mod: PN

## 2023-12-21 NOTE — PROGRESS NOTES
[facility]   Physical Therapy Treatment Note      Name: Valarie Foster  Clinic Number: [id number]    Therapy Diagnosis:   Encounter Diagnoses   Name Primary?    Decreased ROM of right knee Yes    Decreased strength involving knee joint     Gait difficulty        Physician: Eric Carbajal MD    Visit Date: 12/21/2023      Physician Orders: PT Eval and Treat   Medical Diagnosis from Referral: Z96.651 (ICD-10-CM) - Status post right knee replacement   Evaluation Date: 11/27/2023  Authorization Period Expiration: 11/09/2024   Plan of Care Expiration: 2/27/24  Progress Note Due: 12/27/23  Date of Surgery: 11/8/23  Visit # / Visits authorized: 5/20 (+eval)  FOTO: 2/ 3     Precautions: Standard, Diabetes, and HTN      Time In: 0904am  Time Out: 1004am  Total Billable Time: 30 minutes (1NMR, 1TE,)     POW 6: (12/20-12/27)       PTA Visit #: 0/5       Subjective     Patient reports: Just feeling a little stiff but doing good.     She was compliant with home exercise program.  Response to previous treatment: felt fine; soreness  Functional change: ongoing    Pain: 0/10  Location: right knee(s)     Objective      Objective Measures updated at progress report unless specified.     12/21/2023  AROM/PROM - Right  Beginning of tx End of tx   Flexion 100/106 NT   Extension 0/0 0/0      FOTO knee Survey    Therapist reviewed FOTO scores for Valarie Foster on 12/21/2023.   FOTO documents entered into [employer] - see Media section.    status Score: 99%         Treatment     Valarie received the treatments listed below:      therapeutic exercises to develop strength, ROM, flexibility, and posture for 15 minutes including:  Heel prop x5 minutes - with gait belt     shuttle DL 2 bands 3x10   single leg shuttle 1 band 3x10 Right   Recumbent bike x 5 minutes  Patient education        manual therapy techniques: Joint mobilizations were applied to the: Right knee for 8 minutes, including:  Gentle knee hinge  Patellar  "mobility is all directions  Fat pad mobilizations  Scar mobilization    Not today:  Seated range of motion passive range of motion       neuromuscular re-education activities to improve: Coordination, Proprioception, and Posture for 32 minutes. The following activities were included:  Quad set 5"x30 with towel under ankle  short arc quad 5" 3x20 + #2  Supine SLR  3x10  long arc quad 5" 3x10 +2#      Therapeutic activities to improve functional performance for 5  minutes, including:  Sit to stand 18" box 3x10     Patient Education and Home Exercises       Education provided:   - Continue Home exercise program     Written Home Exercises Provided: Patient instructed to cont prior HEP. Exercises were reviewed and Valarie was able to demonstrate them prior to the end of the session.  Valarie demonstrated good  understanding of the education provided. See Electronic Medical Record under Patient Instructions for exercises provided during therapy sessions    Assessment     Patient arrived to today's session with minor reports of stiffness. Patient continues to display good knee extension. Verbal cues required to decrease extensor lag with straight leg raise She continues to be challenged and fatigued will all strengthening exercises. Patient displayed improvement in functional strength with sit to stand. FOTO given and patient displayed 99% indicating significant improvement in ability to perform functional tasks. . PT to reassess at next visit.     Valarie Is progressing well towards her goals.   Patient prognosis is Good.     Patient will continue to benefit from skilled outpatient physical therapy to address the deficits listed in the problem list box on initial evaluation, provide pt/family education and to maximize pt's level of independence in the home and community environment.     Patient's spiritual, cultural and educational needs considered and pt agreeable to plan of care and goals.     Anticipated barriers to physical " therapy: chronicity of condition prior to eval     Goals: Short Term Goals: 5 weeks   Patient demonstrates improved range of motion 0 to 100 deg to improve gait progressing, not met  Patient demonstrates ability to perform 10 sit to  30 seconds to improve functional strength  progressing, not met  Patient demonstrates ability to navigate 2 steps with reciprocal pattern to improve ability to enter and exit home  progressing, not met  Patient demonstrates ability to ambulate 2 blocks without difficulty to improve tolerance to functional tasks  progressing, not met     Long Term Goals: 10 weeks   Patient demonstrates improved knee range of motion 0 to 120 to improve tolerance to functional tasks  progressing, not met  Patient demonstrates ability to perform 13 sit to  30 seconds to improve functional strength  progressing, not met  Patient demonstrates improved FOTO score to 68 to improve ability to perform functional tasks.  progressing, not met  Patient demonstrates ability to walk 1 mile with little to no difficulty to improve tolerance to functional tasks pain free  progressing, not met  Patient goal: patient will return to dancing  progressing, not met    Plan     Continue to improve range of motion and quad strength    Sunita Mcginnis, PT,DPT  12/21/2023

## 2023-12-26 ENCOUNTER — DOCUMENTATION ONLY (OUTPATIENT)
Dept: REHABILITATION | Facility: HOSPITAL | Age: 73
End: 2023-12-26
Payer: MEDICARE

## 2023-12-26 NOTE — PROGRESS NOTES
[facility]                          Canceled Therapy Appointment     Valarie Foster  MRN: [id number]    Patient canceled today's therapy appt on 12/26/2023, 12/28/2023, and 1/2/2024.    Rakel Barlow, PTA  12/26/2023

## 2023-12-29 ENCOUNTER — EXTERNAL HOME HEALTH (OUTPATIENT)
Dept: HOME HEALTH SERVICES | Facility: HOSPITAL | Age: 73
End: 2023-12-29
Payer: MEDICARE

## 2024-01-04 ENCOUNTER — HOSPITAL ENCOUNTER (OUTPATIENT)
Dept: RADIOLOGY | Facility: HOSPITAL | Age: 74
Discharge: HOME | End: 2024-01-04
Attending: FAMILY MEDICINE
Payer: MEDICARE

## 2024-01-04 ENCOUNTER — DOCUMENTATION ONLY (OUTPATIENT)
Dept: REHABILITATION | Facility: HOSPITAL | Age: 74
End: 2024-01-04
Payer: MEDICARE

## 2024-01-04 DIAGNOSIS — Z12.31 BREAST CANCER SCREENING BY MAMMOGRAM: ICD-10-CM

## 2024-01-04 PROCEDURE — 77063 BREAST TOMOSYNTHESIS BI: CPT | Mod: 26,,, | Performed by: RADIOLOGY

## 2024-01-04 PROCEDURE — 77067 SCR MAMMO BI INCL CAD: CPT | Mod: TC,PO

## 2024-01-04 PROCEDURE — 77067 SCR MAMMO BI INCL CAD: CPT | Mod: 26,,, | Performed by: RADIOLOGY

## 2024-01-04 NOTE — PROGRESS NOTES
Patient cancelled today's visit due to not having transportation.     Sunita Rivera, PT, DPT  01/04/2024

## 2024-01-10 NOTE — PROGRESS NOTES
[facility]   Physical Therapy Treatment Note      Name: Valarie Foster  Clinic Number: [id number]    Therapy Diagnosis:   Encounter Diagnoses   Name Primary?    Decreased ROM of right knee Yes    Decreased strength involving knee joint     Gait difficulty          Physician: Eric Carbajal MD    Visit Date: 1/11/2024      Physician Orders: PT Eval and Treat   Medical Diagnosis from Referral: Z96.651 (ICD-10-CM) - Status post right knee replacement   Evaluation Date: 11/27/2023  Authorization Period Expiration: 11/09/2024   Plan of Care Expiration: 2/27/24  Progress Note Due: 2/11/24  Date of Surgery: 11/8/23  Visit # / Visits authorized: 1/20 (+6)  FOTO: 2/ 3     Precautions: Standard, Diabetes, and HTN      Time In: 0826am  Time Out: 9:19am  Total Billable Time: 20 minutes (1NMR, 1TE,)     POW 9: (1/10-1/17)       PTA Visit #: 0/5       Subjective     Patient reports: She has been doing good. She was sick last week but she has been trying to keep up with her her exercises. She has been getting swelling in bilateral feet and thinks it may be from her BP medication. She is going to talk to her doctor about this.     She was compliant with home exercise program.  Response to previous treatment: felt fine; soreness  Functional change: ongoing    Pain: 0/10  Location: right knee(s)     Objective      Objective Measures updated at progress report unless specified.     1/11/24  AROM/PROM - Right  Beginning of tx End of tx   Flexion 107/112 NT   Extension 0/0 0/0     30 second sit to stand: 9 without use of hands  Stair negotiation: reciprocal pattern going up the stairs with step to pattern coming down    Treatment     Valarie received the treatments listed below:      therapeutic exercises to develop strength, ROM, flexibility, and posture for 20 minutes including:  Heel prop x5 minutes - with gait belt   reassessment  shuttle DL 2 bands 3x10   single leg shuttle 1 band 3x10 Right   Recumbent  "bike x 8 minutes lvl4  Patient education      manual therapy techniques: Joint mobilizations were applied to the: Right knee for 5 minutes, including:  Gentle knee hinge  Patellar mobility is all directions  Fat pad mobilizations    Not today:  Seated range of motion passive range of motion     neuromuscular re-education activities to improve: Coordination, Proprioception, and Posture for 20 minutes. The following activities were included:  Quad set 5"x30 with towel under ankle  short arc quad 5" 3x20 + #3  Supine SLR  3x10  long arc quad 5" 3x10 +4#    Therapeutic activities to improve functional performance for 8 minutes, including:  Sit to stand 18" box 3x10   +step ups 6" 3x10 Right     Patient Education and Home Exercises       Education provided:   - Continue Home exercise program     Written Home Exercises Provided: Patient instructed to cont prior HEP. Exercises were reviewed and Valarie was able to demonstrate them prior to the end of the session.  Valarie demonstrated good  understanding of the education provided. See Electronic Medical Record under Patient Instructions for exercises provided during therapy sessions    Assessment     Patient was reassessed today and displayed improvements in range of motion and functional strength. She was able to complete sit to stand without use of bilateral upper extremity support. Patient also displayed improvement in ability to navigate home environment by displaying reciprocal step pattern on stairs. At this time she would continue to benefit from skilled PT to address remaining gait and functional deficits to decrease risk for falls.     Valarie Is progressing well towards her goals.   Patient prognosis is Good.     Patient will continue to benefit from skilled outpatient physical therapy to address the deficits listed in the problem list box on initial evaluation, provide pt/family education and to maximize pt's level of independence in the home and community " environment.     Patient's spiritual, cultural and educational needs considered and pt agreeable to plan of care and goals.     Anticipated barriers to physical therapy: chronicity of condition prior to eval     Goals: Short Term Goals: 5 weeks   Patient demonstrates improved range of motion 0 to 100 deg to improve gait met  Patient demonstrates ability to perform 10 sit to  30 seconds to improve functional strength  progressing, not met  Patient demonstrates ability to navigate 2 steps with reciprocal pattern to improve ability to enter and exit home  met  Patient demonstrates ability to ambulate 2 blocks without difficulty to improve tolerance to functional tasks   met     Long Term Goals: 10 weeks   Patient demonstrates improved knee range of motion 0 to 120 to improve tolerance to functional tasks  progressing, not met  Patient demonstrates ability to perform 13 sit to  30 seconds to improve functional strength  progressing, not met  Patient demonstrates improved FOTO score to 68 to improve ability to perform functional tasks.   met  Patient demonstrates ability to walk 1 mile with little to no difficulty to improve tolerance to functional tasks pain free  progressing, not met  Patient goal: patient will return to dancing  progressing, not met    Plan     Continue to improve range of motion and quad strength    Sunita Mcginnis, PT,DPT  01/11/2024

## 2024-01-11 ENCOUNTER — CLINICAL SUPPORT (OUTPATIENT)
Dept: REHABILITATION | Facility: HOSPITAL | Age: 74
End: 2024-01-11
Payer: MEDICARE

## 2024-01-11 DIAGNOSIS — M25.661 DECREASED ROM OF RIGHT KNEE: Primary | ICD-10-CM

## 2024-01-11 DIAGNOSIS — R29.898 DECREASED STRENGTH INVOLVING KNEE JOINT: ICD-10-CM

## 2024-01-11 DIAGNOSIS — R26.9 GAIT DIFFICULTY: ICD-10-CM

## 2024-01-11 PROCEDURE — 97110 THERAPEUTIC EXERCISES: CPT | Mod: PN

## 2024-01-22 ENCOUNTER — OFFICE VISIT (OUTPATIENT)
Dept: NEUROLOGY | Facility: CLINIC | Age: 74
End: 2024-01-22
Payer: MEDICARE

## 2024-01-22 ENCOUNTER — TELEPHONE (OUTPATIENT)
Dept: NEUROLOGY | Facility: CLINIC | Age: 74
End: 2024-01-22
Payer: MEDICARE

## 2024-01-22 VITALS
SYSTOLIC BLOOD PRESSURE: 159 MMHG | BODY MASS INDEX: 33.79 KG/M2 | HEART RATE: 82 BPM | WEIGHT: 196.88 LBS | DIASTOLIC BLOOD PRESSURE: 82 MMHG

## 2024-01-22 DIAGNOSIS — R20.2 PARESTHESIA: ICD-10-CM

## 2024-01-22 DIAGNOSIS — M79.2 NERVE PAIN: ICD-10-CM

## 2024-01-22 DIAGNOSIS — G60.3 IDIOPATHIC PROGRESSIVE NEUROPATHY: Primary | ICD-10-CM

## 2024-01-22 DIAGNOSIS — M54.10 RADICULOPATHY, UNSPECIFIED SPINAL REGION: ICD-10-CM

## 2024-01-22 PROCEDURE — 99999 PR PBB SHADOW E&M-EST. PATIENT-LVL IV: CPT | Mod: PBBFAC,,, | Performed by: STUDENT IN AN ORGANIZED HEALTH CARE EDUCATION/TRAINING PROGRAM

## 2024-01-22 PROCEDURE — 3288F FALL RISK ASSESSMENT DOCD: CPT | Mod: CPTII,S$GLB,, | Performed by: STUDENT IN AN ORGANIZED HEALTH CARE EDUCATION/TRAINING PROGRAM

## 2024-01-22 PROCEDURE — 3079F DIAST BP 80-89 MM HG: CPT | Mod: CPTII,S$GLB,, | Performed by: STUDENT IN AN ORGANIZED HEALTH CARE EDUCATION/TRAINING PROGRAM

## 2024-01-22 PROCEDURE — 1159F MED LIST DOCD IN RCRD: CPT | Mod: CPTII,S$GLB,, | Performed by: STUDENT IN AN ORGANIZED HEALTH CARE EDUCATION/TRAINING PROGRAM

## 2024-01-22 PROCEDURE — 99214 OFFICE O/P EST MOD 30 MIN: CPT | Mod: S$GLB,,, | Performed by: STUDENT IN AN ORGANIZED HEALTH CARE EDUCATION/TRAINING PROGRAM

## 2024-01-22 PROCEDURE — 1126F AMNT PAIN NOTED NONE PRSNT: CPT | Mod: CPTII,S$GLB,, | Performed by: STUDENT IN AN ORGANIZED HEALTH CARE EDUCATION/TRAINING PROGRAM

## 2024-01-22 PROCEDURE — 1101F PT FALLS ASSESS-DOCD LE1/YR: CPT | Mod: CPTII,S$GLB,, | Performed by: STUDENT IN AN ORGANIZED HEALTH CARE EDUCATION/TRAINING PROGRAM

## 2024-01-22 PROCEDURE — 3077F SYST BP >= 140 MM HG: CPT | Mod: CPTII,S$GLB,, | Performed by: STUDENT IN AN ORGANIZED HEALTH CARE EDUCATION/TRAINING PROGRAM

## 2024-01-22 RX ORDER — PREGABALIN 50 MG/1
50 CAPSULE ORAL 2 TIMES DAILY
Qty: 60 CAPSULE | Refills: 2 | Status: SHIPPED | OUTPATIENT
Start: 2024-01-22 | End: 2024-04-21

## 2024-01-22 NOTE — PROGRESS NOTES
"  Chief Complaint and Duration     Neuropathy pain  For "years", worsening    History of Present Illness     Valarie Foster is a 73 y.o. female w history of long standing DM, HTN, and degenerative arthritis w significant knee pain (s/p injections) who comes in for "nerve pain" with pain in her chronic lower back that causes radiating numbness and tingling down her buttocks to her legs (L > R).    Denies any bowel or bladder incontinence, no recent falls. Feels stiff when she wakes up in the morning. Radiating pain felt worse w certain positions, she endorses leg stiffness and knee pain as well. Did physical therapy a few months prior w mild relief. Has been on gabapentin 600mg at night w again, mild relief. Sometimes the pain would wake her up from sleep at night.    No history of MVAs. No weakness.     Interim:  1/22/24 - worsening of nerve pain, gabapentin no longer works. Has not tried lyrica.       Review of Systems: (positive bolded)  Constitutional: Negative for fatigue, activity change, fevers, or chills.   HENT:  Negative for new visual disturbances or difficulty swallowing.    Respiratory:  Negative for shortness of breath.    Cardiovascular:  Negative for palpitations.   Gastrointestinal:  Negative for constipation, nausea, or vomiting.   Endocrine: Negative for temperature instability/intolerance.   Genitourinary:  Negative for difficulty urinating.   Musculoskeletal:  Negative for neck pain, back pain, myalgias, joint swelling, or gait problem.  Skin:  Negative for rash or lesions.   Neurological:  Negative for seizures, headaches, dizziness, tremors, syncope, speech difficulty, weakness, light-headedness, or numbness.   Hematological:  Does not bruise/bleed easily.   Psychiatric/Behavioral: Negative for decreased concentration or sleep disturbance.    Review of patient's allergies indicates:   Allergen Reactions    Lisinopril Swelling and Shortness Of Breath    Ascorbic acid-ascorbate calc      And " citrus fruits     Current Outpatient Medications   Medication Sig Dispense Refill    ACCU-CHEK SOFTCLIX LANCETS [facility] USE TO TEST BLOOD SUGAR ONE TIME DAILY 100 each 3    acetaminophen (TYLENOL) 500 MG tablet Take 2 tablets (1,000 mg total) by mouth every 8 (eight) hours as needed for Pain. 42 tablet 0    alcohol swabs (DROPSAFE ALCOHOL PREP PADS) PadM USE 3 EVERY DAY AS DIRECTED 200 each 3    amLODIPine (NORVASC) 10 MG tablet TAKE 1 TABLET ONE TIME DAILY 90 tablet 3    atorvastatin (LIPITOR) 40 MG tablet  90 tablet 0    blood glucose control high,low (ACCU-CHEK ONEIL CONTROL SOLN) Soln 1 Units by Misc.(Non-Drug; Combo Route) route as needed. 1 each 0    blood-glucose meter (ACCU-CHEK ONEIL PLUS METER) Misc 1 kit by Misc.(Non-Drug; Combo Route) route once daily. 1 each 0    blood-glucose meter (TRUE METRIX GLUCOSE METER) kit Use as directed to test glucose 1 each 0    celecoxib (CELEBREX) 200 MG capsule Take 1 capsule (200 mg total) by mouth 2 (two) times daily. 14 capsule 0    ciclopirox (PENLAC) 8 % Soln Apply topically nightly. 6.6 mL 11    diphenhydrAMINE (BENADRYL) 25 mg capsule Take 1 capsule (25 mg total) by mouth every 6 (six) hours as needed for Itching. 20 capsule 0    docusate sodium (COLACE) 100 MG capsule Take 1 capsule (100 mg total) by mouth 2 (two) times daily. 30 capsule 0    ergocalciferol (ERGOCALCIFEROL) 50,000 unit Cap TAKE 1 CAPSULE EVERY 7 DAYS. 12 capsule 1    FLUoxetine 20 MG capsule TAKE 1 CAPSULE EVERY DAY 90 capsule 2    fluticasone propionate (FLONASE) 50 mcg/actuation nasal spray USE 1 SPRAY IN EACH NOSTRIL ONCE DAILY 32 g 2    hydrOXYzine HCL (ATARAX) 25 MG tablet Take 1 tablet (25 mg total) by mouth 3 (three) times daily as needed for Itching. 90 tablet 3    metoprolol succinate (TOPROL-XL) 25 MG 24 hr tablet Take 1 tablet (25 mg total) by mouth once daily. 90 tablet 3    ondansetron (ZOFRAN-ODT) 8 MG TbDL Take 1 tablet (8 mg total) by mouth every 8 (eight) hours as needed (nausea). 30  tablet 0    oxyCODONE (ROXICODONE) 5 MG immediate release tablet Take 1 tablet (5 mg total) by mouth every 4 (four) hours as needed for Pain. 30 tablet 0    pantoprazole (PROTONIX) 40 MG tablet Take 1 tablet (40 mg total) by mouth once daily. 30 tablet 11    ACCU-CHEK ONEIL PLUS TEST STRP Strp TEST BLOOD SUGAR TWICE DAILY (Patient not taking: Reported on 1/22/2024) 200 strip 1    aspirin (ECOTRIN) 81 MG EC tablet Take 1 tablet (81 mg total) by mouth 2 (two) times a day. 60 tablet 0    blood glucose control, low (TRUE METRIX LEVEL 1) Soln 1 each by Misc.(Non-Drug; Combo Route) route once as needed. 1 each 3    cetirizine (ZYRTEC) 10 MG tablet Take 1 tablet (10 mg total) by mouth once daily. 90 tablet 0    metFORMIN (GLUCOPHAGE) 500 MG tablet Take 1 tablet (500 mg total) by mouth 2 (two) times daily with meals. 180 tablet 3    pregabalin (LYRICA) 50 MG capsule Take 1 capsule (50 mg total) by mouth 2 (two) times daily. 60 capsule 2    TRUEPLUS LANCETS 33 gauge Misc TEST BLOOD SUGAR TWICE DAILY (Patient not taking: Reported on 1/22/2024) 200 each 1     No current facility-administered medications for this visit.     Facility-Administered Medications Ordered in Other Visits   Medication Dose Route Frequency Provider Last Rate Last Admin    0.9%  NaCl infusion   Intravenous On Call Procedure Eric Carbajal MD        acetaminophen tablet 1,000 mg  1,000 mg Oral On Call Procedure Eric Carbajal MD        acetaminophen tablet 1,000 mg  1,000 mg Oral Q6H Eric Carbajal MD   1,000 mg at 11/09/23 0506    aspirin EC tablet 81 mg  81 mg Oral BID Eric Carbajal MD   81 mg at 11/09/23 1358    famotidine tablet 20 mg  20 mg Oral BID Eric Carbajal MD   20 mg at 11/09/23 1357    LIDOcaine (PF) 10 mg/ml (1%) injection 10 mg  1 mL Intradermal On Call Procedure Eric Carbajal MD        methocarbamoL tablet 750 mg  750 mg Oral TID Eric Carbajal MD   750 mg at 11/09/23 1357    naloxone 0.4 mg/mL injection 0.02 mg   0.02 mg Intravenous PRN Eric Carbajal MD        ondansetron injection 4 mg  4 mg Intravenous Q8H PRN Eric Carbajal MD   4 mg at 11/09/23 0858    oxyCODONE immediate release tablet 10 mg  10 mg Oral Q3H PRN Eric Carbajal MD   10 mg at 11/09/23 1043    oxyCODONE immediate release tablet 5 mg  5 mg Oral Q3H PRN Eric Carbajal MD        polyethylene glycol packet 17 g  17 g Oral Daily Eric Carbajal MD   17 g at 11/09/23 1356    pregabalin capsule 75 mg  75 mg Oral QHS Eric Carbajal MD   75 mg at 11/08/23 2147    prochlorperazine injection Soln 5 mg  5 mg Intravenous Q6H PRN Eric Carbajal MD   5 mg at 11/08/23 1325    senna-docusate 8.6-50 mg per tablet 1 tablet  1 tablet Oral BID Eric Carbajal MD   1 tablet at 11/09/23 1358    sodium chloride 0.9% flush 10 mL  10 mL Intravenous PRN Eric Carbajal MD           Medical History     Past Medical History:   Diagnosis Date    Arthritis     Colon polyp     Diabetes mellitus     diet controlled    Diabetes with neurologic complications     Hypertension     Nuclear sclerosis of both eyes 2/14/2022    Renal cell carcinoma      Past Surgical History:   Procedure Laterality Date    COLONOSCOPY N/A 2/5/2018    Procedure: COLONOSCOPY;  Surgeon: Bacilio Mancia MD;  Location: [facility] ENDO;  Service: Endoscopy;  Laterality: N/A;  appt confirmed-ss    COLONOSCOPY N/A 8/13/2020    Procedure: COLONOSCOPY;  Surgeon: Jose West MD;  Location: [facility] ENDO;  Service: Endoscopy;  Laterality: N/A;    HYSTERECTOMY      INTRAOCULAR PROSTHESES INSERTION Left 7/7/2022    Procedure: INSERTION, IOL PROSTHESIS;  Surgeon: Candelario Novoa MD;  Location: [facility] OR;  Service: Ophthalmology;  Laterality: Left;    INTRAOCULAR PROSTHESES INSERTION Right 7/21/2022    Procedure: INSERTION, IOL PROSTHESIS;  Surgeon: Candelario Novoa MD;  Location: [facility] OR;  Service: Ophthalmology;  Laterality: Right;    OOPHORECTOMY      PARTIAL NEPHRECTOMY Right 10/12/2018     Procedure: NEPHRECTOMY, PARTIAL open. Dr Arenas to assist.;  Surgeon: Alejandra Palm MD;  Location: [facility] OR;  Service: Urology;  Laterality: Right;  RN PREOP 10/9/2018----NEEDS H/P    PHACOEMULSIFICATION OF CATARACT Left 7/7/2022    Procedure: PHACOEMULSIFICATION, CATARACT;  Surgeon: Candelario Novoa MD;  Location: [facility] OR;  Service: Ophthalmology;  Laterality: Left;  RN PHONE PREOP 6/27/2022   ARRIVAL 6:00 AM    PHACOEMULSIFICATION OF CATARACT Right 7/21/2022    Procedure: PHACOEMULSIFICATION, CATARACT;  Surgeon: Candelario Novoa MD;  Location: [facility] OR;  Service: Ophthalmology;  Laterality: Right;  RN PHONE PREOP 7/12/2022   ARRIVAL 12:00 NOON    TOTAL KNEE ARTHROPLASTY Right 11/8/2023    Procedure: ARTHROPLASTY, KNEE, TOTAL. NELSON;  Surgeon: Eric Carbajal MD;  Location: [facility] OR;  Service: Orthopedics;  Laterality: Right;  [place]. Admit  RAPHAEL QUINTIN NIEVES [phone] NOTIFIED QUINTIN ON 10/11/2023-LO  RN PREOP 10/25/2023   T/S ON 11/6/2023--done------CLEARED BY CARDS     Family History   Problem Relation Age of Onset    No Known Problems Mother     Glaucoma Father     Breast cancer Sister     Glaucoma Sister     Cancer Sister         breast cancer    Thyroid disease Sister     Blindness Sister         due to trauma during car accident    Diabetes Sister     No Known Problems Maternal Aunt     No Known Problems Maternal Uncle     No Known Problems Paternal Aunt     No Known Problems Paternal Uncle     No Known Problems Maternal Grandmother     No Known Problems Maternal Grandfather     No Known Problems Paternal Grandmother     No Known Problems Paternal Grandfather     Diabetes Brother     Amblyopia Neg Hx     Cataracts Neg Hx     Hypertension Neg Hx     Macular degeneration Neg Hx     Retinal detachment Neg Hx     Strabismus Neg Hx     Stroke Neg Hx      Social History     Socioeconomic History    Marital status:    Tobacco Use    Smoking status: Never    Smokeless tobacco: Never    Substance and Sexual Activity    Alcohol use: No    Drug use: No    Sexual activity: Not Currently     Social Determinants of Health     Financial Resource Strain: Low Risk  (3/30/2023)    Overall Financial Resource Strain (CARDIA)     Difficulty of Paying Living Expenses: Not hard at all   Food Insecurity: No Food Insecurity (3/30/2023)    Hunger Vital Sign     Worried About Running Out of Food in the Last Year: Never true     Ran Out of Food in the Last Year: Never true   Transportation Needs: No Transportation Needs (3/30/2023)    PRAPARE - Transportation     Lack of Transportation (Medical): No     Lack of Transportation (Non-Medical): No   Physical Activity: Insufficiently Active (3/30/2023)    Exercise Vital Sign     Days of Exercise per Week: 7 days     Minutes of Exercise per Session: 10 min   Stress: No Stress Concern Present (3/30/2023)    [employer] [place] [employer] - Occupational Stress Questionnaire     Feeling of Stress : Only a little   Social Connections: Moderately Integrated (3/30/2023)    Social Connection and Isolation Panel [NHANES]     Frequency of Social Gatherings with Friends and Family: More than three times a week     Attends Taoist Services: More than 4 times per year     Active Member of Clubs or Organizations: No     Attends Club or Organization Meetings: Never     Marital Status:    Housing Stability: Low Risk  (3/30/2023)    Housing Stability Vital Sign     Unable to Pay for Housing in the Last Year: No     Number of Places Lived in the Last Year: 1     Unstable Housing in the Last Year: No       Exam     Vitals:    01/22/24 0903   BP: (!) 159/82   Pulse: 82      Physical Exam:  General: She is not in acute distress. She is not ill-appearing.   HENT: Normocephalic and atraumatic. Moist mucous membranes.  Eyes: Conjunctivae normal.   Pulmonary: Pulmonary effort is normal.   Abdominal: Abdomen is soft and flat.   Skin: Skin is warm and dry. No rashes.    Psychiatric: Mood normal.        Neurologic Exam   Mental status: oriented to person, place, and time  Attention: Normal. Concentration: normal.  Speech: speech is normal.  Cranial Nerves: PERRL, EOMI intact, V1-V3 Facial sensation intact. Symmetric facies. Hearing grossly intact. Palate and uvula midline, symmetric. No tongue deviation. Trapezius strength intact.     Motor exam: bulk and tone normal. Strength 5/5 in bilateral upper extremities: deltoids, biceps, triceps, wrist flexion/extension, finger abduction/adduction. Strength 5/5 in bilateral lower extremities: hip flexion/extension, thigh adduction/abduction, knee flexion/extension, dorsiflexion/plantarflexion, foot eversion/inversion.    Reflexes: 2+ in bilateral upper extremities: biceps and brachiaradialis, 1+ patellar (patient difficulty w relaxing), difficult to elicit achilles  Plantar reflex: normal    Sensory exam: light touch intact, vibration sense decreased    Gait exam: normal although she walks w stiff gait and tense  Romberg: positive  Coordination: intact    Tremor: none    Labs and Imaging     Labs: reviewed  A1C 5/22 - 7.6. TSH wnl  Imaging: none    Assessment and Plan     Problem List Items Addressed This Visit          Neuro    Radiculopathy    Relevant Medications    pregabalin (LYRICA) 50 MG capsule    Other Relevant Orders    EMG W/ ULTRASOUND AND NERVE CONDUCTION TEST 4 Extremities    Idiopathic progressive neuropathy - Primary    Relevant Medications    pregabalin (LYRICA) 50 MG capsule    Other Relevant Orders    EMG W/ ULTRASOUND AND NERVE CONDUCTION TEST 4 Extremities    Nerve pain    Paresthesia    Relevant Medications    pregabalin (LYRICA) 50 MG capsule    Other Relevant Orders    EMG W/ ULTRASOUND AND NERVE CONDUCTION TEST 4 Extremities     Follow up for her neuropathy.  Had labs done on prior visit. Endorses worsening of nerve pain and paraesthesias in fingers and toes.    Idiopathic length dependent peripheral sensory  neuropathy based on clinical and exam findings, likely 2/2 to DMII. A1C is elevated.     Her nerve pain - multifactorial etiology, from lumbar radiculopathy to a length dependent peripheral neuropathy from underlying DMII  - Prior dosing of gabapentin to 300mg in AM, 300mg during day, and 600mg at night for neuropathic pain. Will stop gabapentin  - lyrica 50mg BID  - also contributing factors w muscle/overall body pain from degenerative arthritis, patient encouraged about lifestyle modifications and physical therapy exercises at home    Follow-up: on EMG

## 2024-01-23 ENCOUNTER — CLINICAL SUPPORT (OUTPATIENT)
Dept: REHABILITATION | Facility: HOSPITAL | Age: 74
End: 2024-01-23
Payer: MEDICARE

## 2024-01-23 DIAGNOSIS — R26.9 GAIT DIFFICULTY: ICD-10-CM

## 2024-01-23 DIAGNOSIS — M25.661 DECREASED ROM OF RIGHT KNEE: Primary | ICD-10-CM

## 2024-01-23 DIAGNOSIS — R29.898 DECREASED STRENGTH INVOLVING KNEE JOINT: ICD-10-CM

## 2024-01-23 DIAGNOSIS — Z96.651 STATUS POST RIGHT KNEE REPLACEMENT: ICD-10-CM

## 2024-01-23 PROCEDURE — 97112 NEUROMUSCULAR REEDUCATION: CPT | Mod: PN,CQ

## 2024-01-23 PROCEDURE — 97110 THERAPEUTIC EXERCISES: CPT | Mod: PN,CQ

## 2024-01-23 PROCEDURE — 97530 THERAPEUTIC ACTIVITIES: CPT | Mod: PN,CQ

## 2024-01-23 NOTE — PROGRESS NOTES
[facility]   Physical Therapy Treatment Note      Name: Valarie Foster  Clinic Number: [id number]    Therapy Diagnosis:   Encounter Diagnoses   Name Primary?    Decreased ROM of right knee Yes    Decreased strength involving knee joint     Gait difficulty     Status post right knee replacement          Physician: Eric Carbajal MD    Visit Date: 1/23/2024      Physician Orders: PT Eval and Treat   Medical Diagnosis from Referral: Z96.651 (ICD-10-CM) - Status post right knee replacement   Evaluation Date: 11/27/2023  Authorization Period Expiration: 11/09/2024   Plan of Care Expiration: 2/27/24  Progress Note Due: 2/11/24  Date of Surgery: 11/8/23  Visit # / Visits authorized: 6/20 (+6)  FOTO: 2/ 3     Precautions: Standard, Diabetes, and HTN      Time In: 0800am  Time Out: 0850am  Total Billable Time: 50 minutes      POW 9: (1/10-1/17)       PTA Visit #: 1/5       Subjective     Patient reports: her right knee is hurting this morning. She got a new prescription of medication.     She was compliant with home exercise program.  Response to previous treatment: felt fine; soreness  Functional change: ongoing    Pain: 8/10  Location: right knee(s)     Objective      Objective Measures updated at progress report unless specified.     1/23/24  AROM/PROM - Right  Beginning of tx End of tx   Flexion 105/108 NT   Extension 0/0 NT     30 second sit to stand: 9 without use of hands  Stair negotiation: reciprocal pattern going up the stairs with step to pattern coming down    Treatment     Valarie received the treatments listed below:      therapeutic exercises to develop strength, ROM, flexibility, and posture for 17 minutes including:  Heel prop x5 minutes - with gait belt     shuttle DL 2 bands 3x10   single leg shuttle 1 band 3x10 Right   Recumbent bike x 8 minutes lvl4  Patient education      manual therapy techniques: Joint mobilizations were applied to the: Right knee for 5 minutes,  "including:  Gentle knee hinge  Patellar mobility is all directions  Fat pad mobilizations    Not today:  Seated range of motion passive range of motion     neuromuscular re-education activities to improve: Coordination, Proprioception, and Posture for 20 minutes. The following activities were included:  Quad set 5"x30 with towel under ankle  short arc quad 5" 3x20 + #3  Supine SLR  3x10  long arc quad 5" 3x10 +4#    Therapeutic activities to improve functional performance for 8 minutes, including:  Sit to stand 18" box 3x10   +step ups 6" 3x10 Right     Patient Education and Home Exercises       Education provided:   - Continue Home exercise program     Written Home Exercises Provided: Patient instructed to cont prior HEP. Exercises were reviewed and Valarie was able to demonstrate them prior to the end of the session.  Valarie demonstrated good  understanding of the education provided. See Electronic Medical Record under Patient Instructions for exercises provided during therapy sessions    Assessment     Noticed patient was able to navigate stairs with UE supports today, reciprocal pattern. Patient achieved 0-105 degrees in right knee this visit. Continued with prescribed exercises which patient tolerated well. At this time, Valarie Is progressing well towards her goals.   Patient prognosis is Good.     Patient will continue to benefit from skilled outpatient physical therapy to address the deficits listed in the problem list box on initial evaluation, provide pt/family education and to maximize pt's level of independence in the home and community environment.     Patient's spiritual, cultural and educational needs considered and pt agreeable to plan of care and goals.     Anticipated barriers to physical therapy: chronicity of condition prior to eval     Goals: Short Term Goals: 5 weeks   Patient demonstrates improved range of motion 0 to 100 deg to improve gait met  Patient demonstrates ability to perform 10 sit to "  30 seconds to improve functional strength  progressing, not met  Patient demonstrates ability to navigate 2 steps with reciprocal pattern to improve ability to enter and exit home  met  Patient demonstrates ability to ambulate 2 blocks without difficulty to improve tolerance to functional tasks   met     Long Term Goals: 10 weeks   Patient demonstrates improved knee range of motion 0 to 120 to improve tolerance to functional tasks  progressing, not met  Patient demonstrates ability to perform 13 sit to  30 seconds to improve functional strength  progressing, not met  Patient demonstrates improved FOTO score to 68 to improve ability to perform functional tasks.   met  Patient demonstrates ability to walk 1 mile with little to no difficulty to improve tolerance to functional tasks pain free  progressing, not met  Patient goal: patient will return to dancing  progressing, not met    Plan     Continue to improve range of motion and quad strength    Rakel Barlow PTA,DPT  01/23/2024

## 2024-01-25 ENCOUNTER — CLINICAL SUPPORT (OUTPATIENT)
Dept: REHABILITATION | Facility: HOSPITAL | Age: 74
End: 2024-01-25
Payer: MEDICARE

## 2024-01-25 DIAGNOSIS — R29.898 DECREASED STRENGTH INVOLVING KNEE JOINT: ICD-10-CM

## 2024-01-25 DIAGNOSIS — R26.9 GAIT DIFFICULTY: ICD-10-CM

## 2024-01-25 DIAGNOSIS — Z96.651 STATUS POST RIGHT KNEE REPLACEMENT: ICD-10-CM

## 2024-01-25 DIAGNOSIS — M25.661 DECREASED ROM OF RIGHT KNEE: Primary | ICD-10-CM

## 2024-01-25 PROCEDURE — 97112 NEUROMUSCULAR REEDUCATION: CPT | Mod: PN,CQ

## 2024-01-25 PROCEDURE — 97530 THERAPEUTIC ACTIVITIES: CPT | Mod: PN,CQ

## 2024-01-25 PROCEDURE — 97110 THERAPEUTIC EXERCISES: CPT | Mod: PN,CQ

## 2024-01-25 NOTE — PROGRESS NOTES
[facility]   Physical Therapy Treatment Note      Name: Valarie Foster  Clinic Number: [id number]    Therapy Diagnosis:   Encounter Diagnoses   Name Primary?    Decreased ROM of right knee Yes    Decreased strength involving knee joint     Gait difficulty     Status post right knee replacement        Physician: Eric Carbajal MD    Visit Date: 1/25/2024    Physician Orders: PT Eval and Treat   Medical Diagnosis from Referral: Z96.651 (ICD-10-CM) - Status post right knee replacement   Evaluation Date: 11/27/2023  Authorization Period Expiration: 11/09/2024   Plan of Care Expiration: 2/27/24  Progress Note Due: 2/11/24  Date of Surgery: 11/8/23  Visit # / Visits authorized: 7/20 (+6)  FOTO: 2/ 3     Precautions: Standard, Diabetes, and HTN      Time In: 10:05am  Time Out: 10:50am  Total Billable Time: 45 minutes      POW 9: (1/10-1/17)       PTA Visit #: 2/5       Subjective     Patient reports: she finds that she's doing better as of late. She's walking around the house more.     She was compliant with home exercise program.  Response to previous treatment: felt fine; soreness  Functional change: ongoing    Pain: 0/10  Location: right knee(s)     Objective      Objective Measures updated at progress report unless specified.     1/23/24  AROM/PROM - Right  Beginning of tx End of tx   Flexion 105/108 NT   Extension 0/0 NT     30 second sit to stand: 9 without use of hands  Stair negotiation: reciprocal pattern going up the stairs with step to pattern coming down    Treatment     Valarie received the treatments listed below:      therapeutic exercises to develop strength, ROM, flexibility, and posture for 15 minutes including:  Heel prop x5 minutes - with gait belt     shuttle DL 2 bands 3x10   single leg shuttle 1 band 3x10 Right   Recumbent bike x 8 minutes lvl4  Patient education      manual therapy techniques: Joint mobilizations were applied to the: Right knee for 5 minutes,  "including:  Gentle knee hinge  Patellar mobility is all directions  Fat pad mobilizations    Not today:  Seated range of motion passive range of motion     neuromuscular re-education activities to improve: Coordination, Proprioception, and Posture for 17 minutes. The following activities were included:  Quad set 5"x30 with towel under ankle  short arc quad 5" 3x20 + #3  Supine SLR  3x10  long arc quad 5" 3x10 +4#      Therapeutic activities to improve functional performance for 8 minutes, including:  Sit to stand 18" box 3x10   +step ups 6" 3x10 Right     Patient Education and Home Exercises       Education provided:   - Continue Home exercise program     Written Home Exercises Provided: Patient instructed to cont prior HEP. Exercises were reviewed and Valarie was able to demonstrate them prior to the end of the session.  Valarie demonstrated good  understanding of the education provided. See Electronic Medical Record under Patient Instructions for exercises provided during therapy sessions    Assessment   Patient tolerated very well with all exercises today. Initially began with manual therapy which patient responded well. Noted patient is demonstrates improved quads strength. As of thus far, Valarie Is progressing well towards her goals.     Patient prognosis is Good.     Patient will continue to benefit from skilled outpatient physical therapy to address the deficits listed in the problem list box on initial evaluation, provide pt/family education and to maximize pt's level of independence in the home and community environment.     Patient's spiritual, cultural and educational needs considered and pt agreeable to plan of care and goals.     Anticipated barriers to physical therapy: chronicity of condition prior to eval     Goals: Short Term Goals: 5 weeks   Patient demonstrates improved range of motion 0 to 100 deg to improve gait met  Patient demonstrates ability to perform 10 sit to  30 seconds to improve " functional strength  progressing, not met  Patient demonstrates ability to navigate 2 steps with reciprocal pattern to improve ability to enter and exit home  met  Patient demonstrates ability to ambulate 2 blocks without difficulty to improve tolerance to functional tasks   met     Long Term Goals: 10 weeks   Patient demonstrates improved knee range of motion 0 to 120 to improve tolerance to functional tasks  progressing, not met  Patient demonstrates ability to perform 13 sit to  30 seconds to improve functional strength  progressing, not met  Patient demonstrates improved FOTO score to 68 to improve ability to perform functional tasks.   met  Patient demonstrates ability to walk 1 mile with little to no difficulty to improve tolerance to functional tasks pain free  progressing, not met  Patient goal: patient will return to dancing  progressing, not met    Plan     Continue to improve range of motion and quad strength    Rakel Barlow, PTA  01/25/2024

## 2024-01-30 ENCOUNTER — CLINICAL SUPPORT (OUTPATIENT)
Dept: REHABILITATION | Facility: HOSPITAL | Age: 74
End: 2024-01-30
Payer: MEDICARE

## 2024-01-30 DIAGNOSIS — M25.661 DECREASED ROM OF RIGHT KNEE: Primary | ICD-10-CM

## 2024-01-30 DIAGNOSIS — R26.9 GAIT DIFFICULTY: ICD-10-CM

## 2024-01-30 DIAGNOSIS — Z96.651 STATUS POST RIGHT KNEE REPLACEMENT: ICD-10-CM

## 2024-01-30 DIAGNOSIS — R29.898 DECREASED STRENGTH INVOLVING KNEE JOINT: ICD-10-CM

## 2024-01-30 PROCEDURE — 97112 NEUROMUSCULAR REEDUCATION: CPT | Mod: PN,CQ

## 2024-01-30 PROCEDURE — 97530 THERAPEUTIC ACTIVITIES: CPT | Mod: PN,CQ

## 2024-01-30 PROCEDURE — 97110 THERAPEUTIC EXERCISES: CPT | Mod: PN,CQ

## 2024-01-30 NOTE — PROGRESS NOTES
[facility]   Physical Therapy Treatment Note      Name: Valarie Foster  Clinic Number: [id number]    Therapy Diagnosis:   Encounter Diagnoses   Name Primary?    Decreased ROM of right knee Yes    Decreased strength involving knee joint     Gait difficulty     Status post right knee replacement        Physician: Eric Carbajal MD    Visit Date: 1/30/2024    Physician Orders: PT Eval and Treat   Medical Diagnosis from Referral: Z96.651 (ICD-10-CM) - Status post right knee replacement   Evaluation Date: 11/27/2023  Authorization Period Expiration: 11/09/2024   Plan of Care Expiration: 2/27/24  Progress Note Due: 2/11/24  Date of Surgery: 11/8/23  Visit # / Visits authorized: 8/20 (+6)  FOTO: 2/ 3     Precautions: Standard, Diabetes, and HTN      Time In: 0800am  Time Out: 0847am  Total Billable Time: 47 minutes      POW 9: (1/10-1/17)       PTA Visit #: 3/5       Subjective     Patient reports: she slipped and fell last Saturday while trying to clean in her yard. Patient states she fell backward and waited about 45 minutes until one of her neighbor saw her on the ground to come help her up. Right now, she feels very sore everywhere. Patient states her arms, shoulder, neck feels sore. She has a visible bruise on the Left shoulder. She will try to go to the emergency room today to get it check out. Patient states good thing she didn't fall on the right knee because that's the surgery knee.     She was compliant with home exercise program.  Response to previous treatment: felt fine; soreness  Functional change: ongoing    Pain: 0/10  Location: right knee(s)     Objective      Objective Measures updated at progress report unless specified.     1/23/24  AROM/PROM - Right  Beginning of tx End of tx   Flexion 108 NT   Extension 0 NT     30 second sit to stand: 9 without use of hands  Stair negotiation: reciprocal pattern going up the stairs with step to pattern coming down    Treatment     Valarie  "received the treatments listed below:      therapeutic exercises to develop strength, ROM, flexibility, and posture for 15 minutes including:  Heel prop x5 minutes - with gait belt     shuttle DL 2 bands 3x10   single leg shuttle 1 band 3x10 Right   Recumbent bike x 8 minutes lvl4  Patient education      manual therapy techniques: Joint mobilizations were applied to the: Right knee for 5 minutes, including:  Gentle knee hinge  Patellar mobility is all directions  Fat pad mobilizations    Not today:  Seated range of motion passive range of motion     neuromuscular re-education activities to improve: Coordination, Proprioception, and Posture for 17 minutes. The following activities were included:  Quad set 5"x30 with towel under ankle  short arc quad 5" 3x20 + #3  Supine SLR  3x10  long arc quad 5" 3x10 +4#      Therapeutic activities to improve functional performance for 10 minutes, including:  Sit to stand 18" box 3x10   Step ups 6" 3x10 Right     Patient Education and Home Exercises       Education provided:   - Continue Home exercise program     Written Home Exercises Provided: Patient instructed to cont prior HEP. Exercises were reviewed and Valarie was able to demonstrate them prior to the end of the session.  [place] demonstrated good  understanding of the education provided. See Electronic Medical Record under Patient Instructions for exercises provided during therapy sessions    Assessment   Patient arrives to therapy clinic reporting of a recent fall at home in her yard. Today she feels very sore especially in the neck, shoulders, and arms. Patient states she did not fall on the right knee which is the post-op knee. Patient would like to do some therapeutic exercises this visit because she wants to loosen up the knee due to stiffness. Patient was able to complete her exercises without much difficulty, but with rest breaks in between sets due to soreness. Advised patient to rest and continue to do her stretches " at home specifically the knee flexion stretch. Patient expressed understanding. Will follow up with patient if she goes to the ED after a recent fall event at home.     Valarie Is progressing well towards her goals.   Patient prognosis is Good.     Patient will continue to benefit from skilled outpatient physical therapy to address the deficits listed in the problem list box on initial evaluation, provide pt/family education and to maximize pt's level of independence in the home and community environment.     Patient's spiritual, cultural and educational needs considered and pt agreeable to plan of care and goals.     Anticipated barriers to physical therapy: chronicity of condition prior to eval     Goals: Short Term Goals: 5 weeks   Patient demonstrates improved range of motion 0 to 100 deg to improve gait met  Patient demonstrates ability to perform 10 sit to  30 seconds to improve functional strength  progressing, not met  Patient demonstrates ability to navigate 2 steps with reciprocal pattern to improve ability to enter and exit home  met  Patient demonstrates ability to ambulate 2 blocks without difficulty to improve tolerance to functional tasks   met     Long Term Goals: 10 weeks   Patient demonstrates improved knee range of motion 0 to 120 to improve tolerance to functional tasks  progressing, not met  Patient demonstrates ability to perform 13 sit to  30 seconds to improve functional strength  progressing, not met  Patient demonstrates improved FOTO score to 68 to improve ability to perform functional tasks.   met  Patient demonstrates ability to walk 1 mile with little to no difficulty to improve tolerance to functional tasks pain free  progressing, not met  Patient goal: patient will return to dancing  progressing, not met    Plan     Continue to improve range of motion and quad strength    Rakel Barlow, PTA  01/30/2024

## 2024-02-02 NOTE — PROGRESS NOTES
[facility]  Urology  Progress Note    Patient Name: Valarie Foster  MRN: [id number]  Admission Date: 10/12/2018  Hospital Length of Stay: 4 days  Code Status: Full Code   Attending Provider: Alejandra Palm MD   Primary Care Physician: Alena Hernandez MD    Subjective:     HPI:  S/p open partial R nephrectomy 10/12/18    Interval History: voiding.  Tolerating regular food.  Ambulating with some difficulty due to pain, but working with PT.      Review of Systems   Constitutional: Negative.  Negative for fever.   HENT: Negative.    Eyes: Negative.    Respiratory: Negative for cough, chest tightness and shortness of breath.    Cardiovascular: Negative for chest pain.   Gastrointestinal: Negative.  Negative for constipation, diarrhea and nausea.   Genitourinary: Positive for flank pain.   Neurological: Negative.    Psychiatric/Behavioral: Negative.      Objective:     Temp:  [97 °F (36.1 °C)-98.5 °F (36.9 °C)] 97.4 °F (36.3 °C)  Pulse:  [63-78] 69  Resp:  [16-18] 16  SpO2:  [93 %-99 %] 96 %  BP: (138-179)/(64-78) 179/74     Body mass index is 31.6 kg/m².            Drains     Drain                 Closed/Suction Drain 10/12/18 1042 Right Abdomen Bulb 19 Fr. 3 days                Physical Exam   Nursing note and vitals reviewed.  Constitutional: She is oriented to person, place, and time. She appears well-developed.   HENT:   Head: Normocephalic.   Eyes: Conjunctivae are normal.   Neck: Normal range of motion. No tracheal deviation present. No thyromegaly present.   Cardiovascular: Normal rate, normal heart sounds and normal pulses.    Pulmonary/Chest: Effort normal and breath sounds normal. No respiratory distress. She has no wheezes.   Abdominal: Soft. She exhibits no distension and no mass. There is no hepatosplenomegaly. There is no tenderness. There is no rebound, no guarding and no CVA tenderness. No hernia.   Incision C/D/I, staples in place.  RAFI with minimal serosanguinous fluid    Musculoskeletal: Normal range of motion. She exhibits no edema or tenderness.   Lymphadenopathy:     She has no cervical adenopathy.   Neurological: She is alert and oriented to person, place, and time.   Skin: Skin is warm and dry. No rash noted. No erythema.     Psychiatric: She has a normal mood and affect. Her behavior is normal. Judgment and thought content normal.       Significant Labs:    BMP:  Recent Labs   Lab  10/14/18   0419  10/15/18   0643  10/16/18   0545   NA  138  138  138   K  3.9  3.9  4.5   CL  104  102  100   CO2  28  27  29   BUN  5*  6*  8   CREATININE  0.8  0.7  0.7   CALCIUM  9.0  9.3  9.7       CBC:   Recent Labs   Lab  10/13/18   0314  10/15/18   0643  10/16/18   0545   WBC  9.42  7.25  6.76   HGB  10.9*  10.7*  12.0   HCT  32.9*  32.7*  35.9*   PLT  232  211  247       Blood Culture: No results for input(s): LABBLOO in the last 168 hours.  Urine Culture: No results for input(s): LABURIN in the last 168 hours.    Significant Imaging:                    Assessment/Plan:     * Right renal mass    -- S/p right open partial nephrectomy 10/12/18  -- [place] out, good UOP. RAFI output remains low. Plan to d/c tomorrow  -- Continue regular diet  -- IS, ambulate   -- PT  -- Medical management per hospitalist - appreciate assistance    She does not feel ready to go today, hopefully will be ready to go home tomorrow            VTE Risk Mitigation (From admission, onward)        Ordered     IP VTE HIGH RISK PATIENT  Once      10/13/18 0314     Place SEB hose  Until discontinued      10/13/18 0314     Place sequential compression device  Until discontinued      10/13/18 0314          YESICA Arenas MD  Urology  [facility]   Fall with Harm Risk

## 2024-02-08 NOTE — TELEPHONE ENCOUNTER
Care Due:                  Date            Visit Type   Department     Provider  --------------------------------------------------------------------------------                                             [facility]                                           MED/ INTERNAL  Last Visit: 10-      PRE-OP       MED/ PEDS      Zack Myers  Next Visit: None Scheduled  None         None Found                                                            Last  Test          Frequency    Reason                     Performed    Due Date  --------------------------------------------------------------------------------    HBA1C.......  6 months...  metFORMIN................  10-   04-    Vitamin D...  12 months..  ergocalciferol...........  Not Found    Overdue    [employer] Embedded Care Due Messages. Reference number: [id number].   2/08/2024 10:35:44 AM CST

## 2024-02-11 RX ORDER — PANTOPRAZOLE SODIUM 40 MG/1
40 TABLET, DELAYED RELEASE ORAL
Qty: 90 TABLET | Refills: 3 | Status: SHIPPED | OUTPATIENT
Start: 2024-02-11

## 2024-02-15 ENCOUNTER — TELEPHONE (OUTPATIENT)
Dept: NEUROLOGY | Facility: CLINIC | Age: 74
End: 2024-02-15
Payer: MEDICARE

## 2024-02-16 ENCOUNTER — TELEPHONE (OUTPATIENT)
Dept: NEUROLOGY | Facility: CLINIC | Age: 74
End: 2024-02-16

## 2024-02-16 ENCOUNTER — PROCEDURE VISIT (OUTPATIENT)
Dept: NEUROLOGY | Facility: CLINIC | Age: 74
End: 2024-02-16
Payer: MEDICARE

## 2024-02-16 DIAGNOSIS — G60.3 IDIOPATHIC PROGRESSIVE NEUROPATHY: ICD-10-CM

## 2024-02-16 DIAGNOSIS — R20.2 PARESTHESIA: ICD-10-CM

## 2024-02-16 DIAGNOSIS — M54.10 RADICULOPATHY, UNSPECIFIED SPINAL REGION: ICD-10-CM

## 2024-02-16 PROCEDURE — 95886 MUSC TEST DONE W/N TEST COMP: CPT | Mod: S$GLB,,, | Performed by: STUDENT IN AN ORGANIZED HEALTH CARE EDUCATION/TRAINING PROGRAM

## 2024-02-16 PROCEDURE — 95913 NRV CNDJ TEST 13/> STUDIES: CPT | Mod: S$GLB,,, | Performed by: STUDENT IN AN ORGANIZED HEALTH CARE EDUCATION/TRAINING PROGRAM

## 2024-02-16 PROCEDURE — 99214 OFFICE O/P EST MOD 30 MIN: CPT | Mod: S$GLB,,, | Performed by: STUDENT IN AN ORGANIZED HEALTH CARE EDUCATION/TRAINING PROGRAM

## 2024-02-16 NOTE — PROCEDURES
"  Chief Complaint and Duration     Neuropathy pain  For "years", worsening    History of Present Illness     Valarie Foster is a 73 y.o. female w history of long standing DM, HTN, and degenerative arthritis w significant knee pain (s/p injections) who comes in for "nerve pain" with pain in her chronic lower back that causes radiating numbness and tingling down her buttocks to her legs (L > R).    Denies any bowel or bladder incontinence, no recent falls. Feels stiff when she wakes up in the morning. Radiating pain felt worse w certain positions, she endorses leg stiffness and knee pain as well. Did physical therapy a few months prior w mild relief. Has been on gabapentin 600mg at night w again, mild relief. Sometimes the pain would wake her up from sleep at night.    No history of MVAs. No weakness.     Interim:  1/22/24 - worsening of nerve pain, gabapentin no longer works. Has not tried lyrica.     2/16/24 - continues to have low back pain, pain in hands that drops objects.       Review of Systems: (positive bolded)  Constitutional: Negative for fatigue, activity change, fevers, or chills.   HENT:  Negative for new visual disturbances or difficulty swallowing.    Respiratory:  Negative for shortness of breath.    Cardiovascular:  Negative for palpitations.   Gastrointestinal:  Negative for constipation, nausea, or vomiting.   Endocrine: Negative for temperature instability/intolerance.   Genitourinary:  Negative for difficulty urinating.   Musculoskeletal:  Negative for neck pain, back pain, myalgias, joint swelling, or gait problem.  Skin:  Negative for rash or lesions.   Neurological:  Negative for seizures, headaches, dizziness, tremors, syncope, speech difficulty, weakness, light-headedness, or numbness.   Hematological:  Does not bruise/bleed easily.   Psychiatric/Behavioral: Negative for decreased concentration or sleep disturbance.    Review of patient's allergies indicates:   Allergen Reactions    " Lisinopril Swelling and Shortness Of Breath    Ascorbic acid-ascorbate calc      And citrus fruits     Current Outpatient Medications   Medication Sig Dispense Refill    ACCU-CHEK ONEIL PLUS TEST STRP Strp TEST BLOOD SUGAR TWICE DAILY (Patient not taking: Reported on 1/22/2024) 200 strip 1    ACCU-CHEK SOFTCLIX LANCETS [facility] USE TO TEST BLOOD SUGAR ONE TIME DAILY 100 each 3    acetaminophen (TYLENOL) 500 MG tablet Take 2 tablets (1,000 mg total) by mouth every 8 (eight) hours as needed for Pain. 42 tablet 0    alcohol swabs (DROPSAFE ALCOHOL PREP PADS) PadM USE 3 EVERY DAY AS DIRECTED 200 each 3    amLODIPine (NORVASC) 10 MG tablet TAKE 1 TABLET ONE TIME DAILY 90 tablet 3    aspirin (ECOTRIN) 81 MG EC tablet Take 1 tablet (81 mg total) by mouth 2 (two) times a day. 60 tablet 0    atorvastatin (LIPITOR) 40 MG tablet  90 tablet 0    blood glucose control high,low (ACCU-CHEK ONEIL CONTROL SOLN) Soln 1 Units by Misc.(Non-Drug; Combo Route) route as needed. 1 each 0    blood glucose control, low (TRUE METRIX LEVEL 1) Soln 1 each by Misc.(Non-Drug; Combo Route) route once as needed. 1 each 3    blood-glucose meter (ACCU-CHEK ONEIL PLUS METER) Misc 1 kit by Misc.(Non-Drug; Combo Route) route once daily. 1 each 0    blood-glucose meter (TRUE METRIX GLUCOSE METER) kit Use as directed to test glucose 1 each 0    celecoxib (CELEBREX) 200 MG capsule Take 1 capsule (200 mg total) by mouth 2 (two) times daily. 14 capsule 0    cetirizine (ZYRTEC) 10 MG tablet Take 1 tablet (10 mg total) by mouth once daily. 90 tablet 0    ciclopirox (PENLAC) 8 % Soln Apply topically nightly. 6.6 mL 11    diphenhydrAMINE (BENADRYL) 25 mg capsule Take 1 capsule (25 mg total) by mouth every 6 (six) hours as needed for Itching. 20 capsule 0    docusate sodium (COLACE) 100 MG capsule Take 1 capsule (100 mg total) by mouth 2 (two) times daily. 30 capsule 0    ergocalciferol (ERGOCALCIFEROL) 50,000 unit Cap TAKE 1 CAPSULE EVERY 7 DAYS. 12 capsule 1     FLUoxetine 20 MG capsule TAKE 1 CAPSULE EVERY DAY 90 capsule 2    fluticasone propionate (FLONASE) 50 mcg/actuation nasal spray USE 1 SPRAY IN EACH NOSTRIL ONCE DAILY 32 g 2    hydrOXYzine HCL (ATARAX) 25 MG tablet Take 1 tablet (25 mg total) by mouth 3 (three) times daily as needed for Itching. 90 tablet 3    metFORMIN (GLUCOPHAGE) 500 MG tablet Take 1 tablet (500 mg total) by mouth 2 (two) times daily with meals. 180 tablet 3    metoprolol succinate (TOPROL-XL) 25 MG 24 hr tablet Take 1 tablet (25 mg total) by mouth once daily. 90 tablet 3    ondansetron (ZOFRAN-ODT) 8 MG TbDL Take 1 tablet (8 mg total) by mouth every 8 (eight) hours as needed (nausea). 30 tablet 0    oxyCODONE (ROXICODONE) 5 MG immediate release tablet Take 1 tablet (5 mg total) by mouth every 4 (four) hours as needed for Pain. 30 tablet 0    pantoprazole (PROTONIX) 40 MG tablet TAKE 1 TABLET EVERY DAY 90 tablet 3    pregabalin (LYRICA) 50 MG capsule Take 1 capsule (50 mg total) by mouth 2 (two) times daily. 60 capsule 2    TRUEPLUS LANCETS 33 gauge Misc TEST BLOOD SUGAR TWICE DAILY (Patient not taking: Reported on 1/22/2024) 200 each 1     No current facility-administered medications for this visit.     Facility-Administered Medications Ordered in Other Visits   Medication Dose Route Frequency Provider Last Rate Last Admin    0.9%  NaCl infusion   Intravenous On Call Procedure Eric Carbajal MD        acetaminophen tablet 1,000 mg  1,000 mg Oral On Call Procedure Eric Carbajal MD        acetaminophen tablet 1,000 mg  1,000 mg Oral Q6H Eric Carbajal MD   1,000 mg at 11/09/23 0506    aspirin EC tablet 81 mg  81 mg Oral BID Eric Carbajal MD   81 mg at 11/09/23 1358    famotidine tablet 20 mg  20 mg Oral BID Eric Carbajal MD   20 mg at 11/09/23 1357    LIDOcaine (PF) 10 mg/ml (1%) injection 10 mg  1 mL Intradermal On Call Procedure Eric Carbajal MD        methocarbamoL tablet 750 mg  750 mg Oral TID Eric Carbajal MD   750  mg at 11/09/23 1357    naloxone 0.4 mg/mL injection 0.02 mg  0.02 mg Intravenous PRN Eric Carbajal MD        ondansetron injection 4 mg  4 mg Intravenous Q8H PRN Eric Carbajal MD   4 mg at 11/09/23 0858    oxyCODONE immediate release tablet 10 mg  10 mg Oral Q3H PRN Eric Carbajal MD   10 mg at 11/09/23 1043    oxyCODONE immediate release tablet 5 mg  5 mg Oral Q3H PRN Eric Carbajal MD        polyethylene glycol packet 17 g  17 g Oral Daily Eric Carbajal MD   17 g at 11/09/23 1356    pregabalin capsule 75 mg  75 mg Oral QHS Eric Carbajal MD   75 mg at 11/08/23 2147    prochlorperazine injection Soln 5 mg  5 mg Intravenous Q6H PRN Eric Carbajal MD   5 mg at 11/08/23 1325    senna-docusate 8.6-50 mg per tablet 1 tablet  1 tablet Oral BID Eric Carbajal MD   1 tablet at 11/09/23 1358    sodium chloride 0.9% flush 10 mL  10 mL Intravenous PRN Eric Carbajal MD           Medical History     Past Medical History:   Diagnosis Date    Arthritis     Colon polyp     Diabetes mellitus     diet controlled    Diabetes with neurologic complications     Hypertension     Nuclear sclerosis of both eyes 2/14/2022    Renal cell carcinoma      Past Surgical History:   Procedure Laterality Date    COLONOSCOPY N/A 2/5/2018    Procedure: COLONOSCOPY;  Surgeon: Bacilio Mancia MD;  Location: [facility] ENDO;  Service: Endoscopy;  Laterality: N/A;  appt confirmed-ss    COLONOSCOPY N/A 8/13/2020    Procedure: COLONOSCOPY;  Surgeon: Jose West MD;  Location: [facility] ENDO;  Service: Endoscopy;  Laterality: N/A;    HYSTERECTOMY      INTRAOCULAR PROSTHESES INSERTION Left 7/7/2022    Procedure: INSERTION, IOL PROSTHESIS;  Surgeon: Candelario Novoa MD;  Location: [facility] OR;  Service: Ophthalmology;  Laterality: Left;    INTRAOCULAR PROSTHESES INSERTION Right 7/21/2022    Procedure: INSERTION, IOL PROSTHESIS;  Surgeon: Candelario Novoa MD;  Location: [facility] OR;  Service: Ophthalmology;  Laterality: Right;     OOPHORECTOMY      PARTIAL NEPHRECTOMY Right 10/12/2018    Procedure: NEPHRECTOMY, PARTIAL open. Dr Arenas to assist.;  Surgeon: Alejandra Palm MD;  Location: [facility] OR;  Service: Urology;  Laterality: Right;  RN PREOP 10/9/2018----NEEDS H/P    PHACOEMULSIFICATION OF CATARACT Left 7/7/2022    Procedure: PHACOEMULSIFICATION, CATARACT;  Surgeon: Candelario Novoa MD;  Location: [facility] OR;  Service: Ophthalmology;  Laterality: Left;  RN PHONE PREOP 6/27/2022   ARRIVAL 6:00 AM    PHACOEMULSIFICATION OF CATARACT Right 7/21/2022    Procedure: PHACOEMULSIFICATION, CATARACT;  Surgeon: Candelario Novoa MD;  Location: [facility] OR;  Service: Ophthalmology;  Laterality: Right;  RN PHONE PREOP 7/12/2022   ARRIVAL 12:00 NOON    TOTAL KNEE ARTHROPLASTY Right 11/8/2023    Procedure: ARTHROPLASTY, KNEE, TOTAL. NELSON;  Surgeon: Eric Carbajal MD;  Location: [facility] OR;  Service: Orthopedics;  Laterality: Right;  [place]. Admit  RAPHAEL QUINTIN NIEVES [phone] NOTIFIED QUINTIN ON 10/11/2023-[place]  RN PREOP 10/25/2023   T/S ON 11/6/2023--done------CLEARED BY CARDS     Family History   Problem Relation Age of Onset    No Known Problems Mother     Glaucoma Father     Breast cancer Sister     Glaucoma Sister     Cancer Sister         breast cancer    Thyroid disease Sister     Blindness Sister         due to trauma during car accident    Diabetes Sister     No Known Problems Maternal Aunt     No Known Problems Maternal Uncle     No Known Problems Paternal Aunt     No Known Problems Paternal Uncle     No Known Problems Maternal Grandmother     No Known Problems Maternal Grandfather     No Known Problems Paternal Grandmother     No Known Problems Paternal Grandfather     Diabetes Brother     Amblyopia Neg Hx     Cataracts Neg Hx     Hypertension Neg Hx     Macular degeneration Neg Hx     Retinal detachment Neg Hx     Strabismus Neg Hx     Stroke Neg Hx      Social History     Socioeconomic History    Marital status:     Tobacco Use    Smoking status: Never    Smokeless tobacco: Never   Substance and Sexual Activity    Alcohol use: No    Drug use: No    Sexual activity: Not Currently     Social Determinants of Health     Financial Resource Strain: Low Risk  (3/30/2023)    Overall Financial Resource Strain (CARDIA)     Difficulty of Paying Living Expenses: Not hard at all   Food Insecurity: No Food Insecurity (3/30/2023)    Hunger Vital Sign     Worried About Running Out of Food in the Last Year: Never true     Ran Out of Food in the Last Year: Never true   Transportation Needs: No Transportation Needs (3/30/2023)    PRAPARE - Transportation     Lack of Transportation (Medical): No     Lack of Transportation (Non-Medical): No   Physical Activity: Insufficiently Active (3/30/2023)    Exercise Vital Sign     Days of Exercise per Week: 7 days     Minutes of Exercise per Session: 10 min   Stress: No Stress Concern Present (3/30/2023)    [place] [employer] - Occupational Stress Questionnaire     Feeling of Stress : Only a little   Social Connections: Moderately Integrated (3/30/2023)    Social Connection and Isolation Panel [NHANES]     Frequency of Social Gatherings with Friends and Family: More than three times a week     Attends Restorationist Services: More than 4 times per year     Active Member of Clubs or Organizations: No     Attends Club or Organization Meetings: Never     Marital Status:    Housing Stability: Low Risk  (3/30/2023)    Housing Stability Vital Sign     Unable to Pay for Housing in the Last Year: No     Number of Places Lived in the Last Year: 1     Unstable Housing in the Last Year: No       Exam     There were no vitals filed for this visit.     Physical Exam:  General: She is not in acute distress. She is not ill-appearing.   HENT: Normocephalic and atraumatic. Moist mucous membranes.  Eyes: Conjunctivae normal.   Pulmonary: Pulmonary effort is normal.   Abdominal: Abdomen is soft and  flat.   Skin: Skin is warm and dry. No rashes.   Psychiatric: Mood normal.        Neurologic Exam   Mental status: oriented to person, place, and time  Attention: Normal. Concentration: normal.  Speech: speech is normal.  Cranial Nerves: PERRL, EOMI intact, V1-V3 Facial sensation intact. Symmetric facies. Hearing grossly intact. Palate and uvula midline, symmetric. No tongue deviation. Trapezius strength intact.     Motor exam: bulk and tone normal. Strength 5/5 in bilateral upper extremities: deltoids, biceps, triceps, wrist flexion/extension, finger abduction/adduction. Strength 5/5 in bilateral lower extremities: hip flexion/extension, thigh adduction/abduction, knee flexion/extension, dorsiflexion/plantarflexion, foot eversion/inversion.    Reflexes: 2+ in bilateral upper extremities: biceps and brachiaradialis, 1+ patellar (patient difficulty w relaxing), difficult to elicit achilles  Plantar reflex: normal    Sensory exam: light touch intact, vibration sense decreased    Gait exam: normal although she walks w stiff gait and tense  Romberg: positive  Coordination: intact    Tremor: none    Labs and Imaging     Labs: reviewed  A1C 5/22 - 7.6. TSH wnl  Imaging: none    Assessment and Plan     Problem List Items Addressed This Visit          Neuro    Radiculopathy    Idiopathic progressive neuropathy    Paresthesia     Follow up for her neuropathy.  Had labs done on prior visit. Endorses worsening of nerve pain and paraesthesias in fingers and toes.    Idiopathic length dependent peripheral sensory neuropathy based on clinical and exam findings, likely 2/2 to DMII. A1C is elevated.     Her nerve pain - multifactorial etiology, from lumbar radiculopathy to a length dependent peripheral neuropathy from underlying DMII  - Prior dosing of gabapentin to 300mg in AM, 300mg during day, and 600mg at night for neuropathic pain. Will stop gabapentin  - lyrica 50mg BID  - also contributing factors w muscle/overall body pain  from degenerative arthritis, patient encouraged about lifestyle modifications and physical therapy exercises at home    NCS/EMG today shows she has a bilateral sensory carpal tunnel, send to ortho for assistance. Chronic lower back pain with chronic denervation in L4-l5 and L5-S1 - pain management for assistance. States neuropathic pain meds are not working.     Follow-up: on EMG

## 2024-02-16 NOTE — TELEPHONE ENCOUNTER
Spoke with patient, patient was asking about an ortho. referral. I informed the patient that she has an upcoming appointment with [facility] 03/01/2024. Patient verbally understands.   ----- Message from Lila Ramos sent at 2/16/2024 12:48 PM CST -----  Regarding: Patient call back  .Type: Patient Call Back    Who called:self     What is the request in detail:would like a call back from the office regarding some appointments that were supposed to be set up for her     Can the clinic reply by [facility]?no     Would the patient rather a call back or a response via [facility]? Call     Best call back number:.[phone]      Additional Information:

## 2024-02-19 DIAGNOSIS — G60.3 IDIOPATHIC PROGRESSIVE NEUROPATHY: Primary | ICD-10-CM

## 2024-02-19 DIAGNOSIS — M54.10 RADICULOPATHY, UNSPECIFIED SPINAL REGION: ICD-10-CM

## 2024-02-19 NOTE — PROGRESS NOTES
[facility]   Physical Therapy Treatment/discharge Note      Name: Valarie Foster  Clinic Number: [id number]    Therapy Diagnosis:   Encounter Diagnoses   Name Primary?    Decreased ROM of right knee Yes    Decreased strength involving knee joint     Gait difficulty          Physician: Eric Carbajal MD    Visit Date: 2/20/2024    Physician Orders: PT Eval and Treat   Medical Diagnosis from Referral: Z96.651 (ICD-10-CM) - Status post right knee replacement   Evaluation Date: 11/27/2023  Authorization Period Expiration: 11/09/2024   Plan of Care Expiration: 2/27/24  Date of Surgery: 11/8/23  Visit # / Visits authorized: 9/20 (+6)  FOTO: 3/ 3     Precautions: Standard, Diabetes, and HTN      Time In: 1:55pm  Time Out: 2:30pm  Total Billable Time: 35 minutes        PTA Visit #: 0/5       Subjective     Patient reports: She has been feeling good. She has not had any issues with her knee and feels like she is back to doing everything that she wants to be doing. She was working out yesterday and she was able to jump ropes.She feels like her leg is 100%.    She was compliant with home exercise program.  Response to previous treatment: felt fine; soreness  Functional change: ongoing    Pain: 0/10  Location: right knee(s)     Objective      Objective Measures updated at progress report unless specified.     2/20/24  AROM/PROM - Right  Beginning of tx End of tx   Flexion 115 NT   Extension 0 NT     30 second sit to stand: 10 without use of hands  Stair negotiation: reciprocal pattern going up the stairs with step to pattern coming down     FOTO knee Survey    Therapist reviewed FOTO scores for Valarie Foster on 2/20/2024.   FOTO documents entered into [employer] - see Media section.    status Score: 53%       Treatment     Valarie received the treatments listed below:      therapeutic exercises to develop strength, ROM, flexibility, and posture for 35 minutes including:    Recumbent bike x 8 minutes  "lvl4  short arc quad 5" 3x20 + #3  Supine SLR  3x10  long arc quad 5" 3x10 +4#  reassessment  Patient education      Patient Education and Home Exercises       Education provided:   - updated Home exercise program and discharge    Written Home Exercises Provided: Patient instructed to cont prior HEP. Exercises were reviewed and Valarie was able to demonstrate them prior to the end of the session.  Valarie demonstrated good  understanding of the education provided. See Electronic Medical Record under Patient Instructions for exercises provided during therapy sessions    Assessment   Patient was discharged with independent Home exercise program. Patient has displayed improvements in mobility and overall range of motion. At this time she reports that she has returned to performing her daily tasks without difficulty. However, patient answered FOTO in such a way that indicated regression. Patient reported this as an error of how she answered the questions. Patient was provided with updated independent Home exercise program to continue to perform at her home gym.     Valarie Is progressing well towards her goals.   Patient prognosis is Good.       Patient's spiritual, cultural and educational needs considered and pt agreeable to plan of care and goals.     Anticipated barriers to physical therapy: chronicity of condition prior to eval     Goals: Short Term Goals: 5 weeks   Patient demonstrates improved range of motion 0 to 100 deg to improve gait met  Patient demonstrates ability to perform 10 sit to  30 seconds to improve functional strength  met  Patient demonstrates ability to navigate 2 steps with reciprocal pattern to improve ability to enter and exit home  met  Patient demonstrates ability to ambulate 2 blocks without difficulty to improve tolerance to functional tasks   met     Long Term Goals: 10 weeks   Patient demonstrates improved knee range of motion 0 to 120 to improve tolerance to functional tasks not " met  Patient demonstrates ability to perform 13 sit to  30 seconds to improve functional strength   not met  Patient demonstrates improved FOTO score to 68 to improve ability to perform functional tasks.   met  Patient demonstrates ability to walk 1 mile with little to no difficulty to improve tolerance to functional tasks pain free  met  Patient goal: patient will return to dancing  (she has not yet tried)    Plan     Discharge with updated independent Home exercise program     Sunita Mcginnis, PT,DPT  02/20/2024

## 2024-02-20 ENCOUNTER — CLINICAL SUPPORT (OUTPATIENT)
Dept: REHABILITATION | Facility: HOSPITAL | Age: 74
End: 2024-02-20
Payer: MEDICARE

## 2024-02-20 DIAGNOSIS — M25.661 DECREASED ROM OF RIGHT KNEE: Primary | ICD-10-CM

## 2024-02-20 DIAGNOSIS — R29.898 DECREASED STRENGTH INVOLVING KNEE JOINT: ICD-10-CM

## 2024-02-20 DIAGNOSIS — R26.9 GAIT DIFFICULTY: ICD-10-CM

## 2024-02-20 PROCEDURE — 97110 THERAPEUTIC EXERCISES: CPT | Mod: PN

## 2024-02-29 ENCOUNTER — TELEPHONE (OUTPATIENT)
Dept: NEUROLOGY | Facility: CLINIC | Age: 74
End: 2024-02-29
Payer: MEDICARE

## 2024-02-29 DIAGNOSIS — G56.00 CARPAL TUNNEL SYNDROME, UNSPECIFIED LATERALITY: Primary | ICD-10-CM

## 2024-02-29 NOTE — TELEPHONE ENCOUNTER
"I spoke with the patient, informed the patient that the provider sent a referral over to ortho. Meredith states that ortho contacted her and she is scheduled to see a provider on 03/01/24 at 8:15 am   ----- Message from Nohelia Myers MD sent at 2/29/2024  8:50 AM CST -----  Regarding: RE: Requesting advice  Referral placed for ortho for her hands for carpal tunnel    ----- Message -----  From: Clau Morelos MA  Sent: 2/29/2024   8:47 AM CST  To: Nohelia Myers MD  Subject: FW: Requesting advice                            Good morning Dr. Myers. The patient was seen by you on 01/22/24 for "Idiopathic Progressive Neuropathy", instructions were to follow-up on EMG, which the patient had completed on 02/16/2024  ----- Message -----  From: Lila Ramos  Sent: 2/29/2024   8:24 AM CST  To: Lucia [facility]  Subject: Requesting advice                                .Type:  Needs Medical Advice/Symptom-based Call    Who Called: self     Symptoms (please be specific): can  in neither hands     How long has patient had these symptoms:  caller states "long time"    Would the patient rather a call back or a response via [facility]? Call     Best Call Back Number ..[phone]          Additional Information:             "

## 2024-03-01 ENCOUNTER — TELEPHONE (OUTPATIENT)
Dept: ORTHOPEDICS | Facility: CLINIC | Age: 74
End: 2024-03-01

## 2024-03-01 ENCOUNTER — OFFICE VISIT (OUTPATIENT)
Dept: ORTHOPEDICS | Facility: CLINIC | Age: 74
End: 2024-03-01
Payer: MEDICARE

## 2024-03-01 ENCOUNTER — HOSPITAL ENCOUNTER (OUTPATIENT)
Dept: RADIOLOGY | Facility: HOSPITAL | Age: 74
Discharge: HOME | End: 2024-03-01
Attending: PHYSICIAN ASSISTANT
Payer: MEDICARE

## 2024-03-01 VITALS — BODY MASS INDEX: 34.15 KG/M2 | WEIGHT: 200 LBS | HEIGHT: 64 IN

## 2024-03-01 VITALS — HEIGHT: 64 IN | WEIGHT: 200.63 LBS | BODY MASS INDEX: 34.25 KG/M2

## 2024-03-01 DIAGNOSIS — Z96.651 STATUS POST RIGHT KNEE REPLACEMENT: Primary | ICD-10-CM

## 2024-03-01 DIAGNOSIS — M79.641 BILATERAL HAND PAIN: ICD-10-CM

## 2024-03-01 DIAGNOSIS — M79.641 BILATERAL HAND PAIN: Primary | ICD-10-CM

## 2024-03-01 DIAGNOSIS — G56.00 CARPAL TUNNEL SYNDROME, UNSPECIFIED LATERALITY: ICD-10-CM

## 2024-03-01 DIAGNOSIS — G56.01 RIGHT CARPAL TUNNEL SYNDROME: Primary | ICD-10-CM

## 2024-03-01 DIAGNOSIS — M79.642 BILATERAL HAND PAIN: Primary | ICD-10-CM

## 2024-03-01 DIAGNOSIS — M79.642 BILATERAL HAND PAIN: ICD-10-CM

## 2024-03-01 PROCEDURE — 1125F AMNT PAIN NOTED PAIN PRSNT: CPT | Mod: CPTII,S$GLB,, | Performed by: PHYSICIAN ASSISTANT

## 2024-03-01 PROCEDURE — 1101F PT FALLS ASSESS-DOCD LE1/YR: CPT | Mod: CPTII,S$GLB,, | Performed by: PHYSICIAN ASSISTANT

## 2024-03-01 PROCEDURE — 99999 PR PBB SHADOW E&M-EST. PATIENT-LVL III: CPT | Mod: PBBFAC,,, | Performed by: ORTHOPAEDIC SURGERY

## 2024-03-01 PROCEDURE — 73130 X-RAY EXAM OF HAND: CPT | Mod: 26,50,, | Performed by: RADIOLOGY

## 2024-03-01 PROCEDURE — 99212 OFFICE O/P EST SF 10 MIN: CPT | Mod: S$GLB,,, | Performed by: ORTHOPAEDIC SURGERY

## 2024-03-01 PROCEDURE — 99214 OFFICE O/P EST MOD 30 MIN: CPT | Mod: S$GLB,,, | Performed by: PHYSICIAN ASSISTANT

## 2024-03-01 PROCEDURE — 1159F MED LIST DOCD IN RCRD: CPT | Mod: CPTII,S$GLB,, | Performed by: PHYSICIAN ASSISTANT

## 2024-03-01 PROCEDURE — 73130 X-RAY EXAM OF HAND: CPT | Mod: TC,50

## 2024-03-01 PROCEDURE — 1125F AMNT PAIN NOTED PAIN PRSNT: CPT | Mod: CPTII,S$GLB,, | Performed by: ORTHOPAEDIC SURGERY

## 2024-03-01 PROCEDURE — 3288F FALL RISK ASSESSMENT DOCD: CPT | Mod: CPTII,S$GLB,, | Performed by: PHYSICIAN ASSISTANT

## 2024-03-01 PROCEDURE — 1159F MED LIST DOCD IN RCRD: CPT | Mod: CPTII,S$GLB,, | Performed by: ORTHOPAEDIC SURGERY

## 2024-03-01 PROCEDURE — 99999 PR PBB SHADOW E&M-EST. PATIENT-LVL V: CPT | Mod: PBBFAC,,, | Performed by: PHYSICIAN ASSISTANT

## 2024-03-01 RX ORDER — CEFAZOLIN SODIUM 2 G/50ML
2 SOLUTION INTRAVENOUS
Status: CANCELLED | OUTPATIENT
Start: 2024-03-01

## 2024-03-01 RX ORDER — MUPIROCIN 20 MG/G
OINTMENT TOPICAL
Status: CANCELLED | OUTPATIENT
Start: 2024-03-01

## 2024-03-01 NOTE — H&P (VIEW-ONLY)
[facility]  History & Physical  Orthopedics    SUBJECTIVE:      Chief Complaint: Bilateral hand pain/numbness    Referring Provider: Nohelia yMers MD Dr. Dunbar is the supervising physician for this encounter/patient    History of Present Illness:  Patient is a 73 y.o. right hand dominant female who presents today with complaints of bilateral hand pain/numbness present for about 1 year and progressing. Right greater than left, pain described as electrical shooting pain, along with numbness. This occurs randomly throughout the day with activity and every night which keeps her awake. She finds writing to be difficult now. She takes gabapentin without relief. No surgical history on the hands.     Onset of symptoms/DOI was 1 year.    Symptoms are aggravated by activity, at night, and during the day.    Symptoms are alleviated by rest.    Symptoms consist of pain and numbness/tingling.    The patient rates their pain as a 9/10.    Attempted treatment(s) and/or interventions include activity modifications, rest,  brace, gabapentin .     The patient denies any fevers, chills, N/V, D/C and presents for evaluation.       Past Medical History:   Diagnosis Date    Arthritis     Colon polyp     Diabetes mellitus     diet controlled    Diabetes with neurologic complications     Hypertension     Nuclear sclerosis of both eyes 2/14/2022    Renal cell carcinoma      Past Surgical History:   Procedure Laterality Date    COLONOSCOPY N/A 2/5/2018    Procedure: COLONOSCOPY;  Surgeon: Bacilio Mancia MD;  Location: [facility] ENDO;  Service: Endoscopy;  Laterality: N/A;  appt confirmed-ss    COLONOSCOPY N/A 8/13/2020    Procedure: COLONOSCOPY;  Surgeon: Jose West MD;  Location: [facility] ENDO;  Service: Endoscopy;  Laterality: N/A;    HYSTERECTOMY      INTRAOCULAR PROSTHESES INSERTION Left 7/7/2022    Procedure: INSERTION, IOL PROSTHESIS;  Surgeon: Candelario Novoa MD;  Location: [facility] OR;  Service:  Ophthalmology;  Laterality: Left;    INTRAOCULAR PROSTHESES INSERTION Right 7/21/2022    Procedure: INSERTION, IOL PROSTHESIS;  Surgeon: Candelario Novoa MD;  Location: [facility] OR;  Service: Ophthalmology;  Laterality: Right;    OOPHORECTOMY      PARTIAL NEPHRECTOMY Right 10/12/2018    Procedure: NEPHRECTOMY, PARTIAL open. Dr Arenas to assist.;  Surgeon: Alejandra Palm MD;  Location: [facility] OR;  Service: Urology;  Laterality: Right;  RN PREOP 10/9/2018----NEEDS H/P    PHACOEMULSIFICATION OF CATARACT Left 7/7/2022    Procedure: PHACOEMULSIFICATION, CATARACT;  Surgeon: Candelario Novoa MD;  Location: [facility] OR;  Service: Ophthalmology;  Laterality: Left;  RN PHONE PREOP 6/27/2022   ARRIVAL 6:00 AM    PHACOEMULSIFICATION OF CATARACT Right 7/21/2022    Procedure: PHACOEMULSIFICATION, CATARACT;  Surgeon: Candelario Novoa MD;  Location: [facility] OR;  Service: Ophthalmology;  Laterality: Right;  RN PHONE PREOP 7/12/2022   ARRIVAL 12:00 NOON    TOTAL KNEE ARTHROPLASTY Right 11/8/2023    Procedure: ARTHROPLASTY, KNEE, TOTAL. NELSON;  Surgeon: Eric Carbajal MD;  Location: [facility] OR;  Service: Orthopedics;  Laterality: Right;  [place]. Admit  BRADLEY QUINTIN [place] [phone] NOTIFIED QUINTIN ON 10/11/2023-  RN PREOP 10/25/2023   T/S ON 11/6/2023--done------CLEARED BY CARDS     Review of patient's allergies indicates:   Allergen Reactions    Lisinopril Swelling and Shortness Of Breath    Ascorbic acid-ascorbate calc      And citrus fruits     Social History     Social History Narrative    Not on file     Family History   Problem Relation Age of Onset    No Known Problems Mother     Glaucoma Father     Breast cancer Sister     Glaucoma Sister     Cancer Sister         breast cancer    Thyroid disease Sister     Blindness Sister         due to trauma during car accident    Diabetes Sister     No Known Problems Maternal Aunt     No Known Problems Maternal Uncle     No Known Problems Paternal Aunt     No Known  Problems Paternal Uncle     No Known Problems Maternal Grandmother     No Known Problems Maternal Grandfather     No Known Problems Paternal Grandmother     No Known Problems Paternal Grandfather     Diabetes Brother     Amblyopia Neg Hx     Cataracts Neg Hx     Hypertension Neg Hx     Macular degeneration Neg Hx     Retinal detachment Neg Hx     Strabismus Neg Hx     Stroke Neg Hx          Current Outpatient Medications:     ACCU-CHEK ONEIL PLUS TEST STRP Strp, TEST BLOOD SUGAR TWICE DAILY, Disp: 200 strip, Rfl: 1    ACCU-CHEK SOFTCLIX LANCETS [facility], USE TO TEST BLOOD SUGAR ONE TIME DAILY, Disp: 100 each, Rfl: 3    acetaminophen (TYLENOL) 500 MG tablet, Take 2 tablets (1,000 mg total) by mouth every 8 (eight) hours as needed for Pain., Disp: 42 tablet, Rfl: 0    alcohol swabs (DROPSAFE ALCOHOL PREP PADS) PadM, USE 3 EVERY DAY AS DIRECTED, Disp: 200 each, Rfl: 3    amLODIPine (NORVASC) 10 MG tablet, TAKE 1 TABLET ONE TIME DAILY, Disp: 90 tablet, Rfl: 3    atorvastatin (LIPITOR) 40 MG tablet, , Disp: 90 tablet, Rfl: 0    blood glucose control high,low (ACCU-CHEK ONEIL CONTROL SOLN) Soln, 1 Units by Misc.(Non-Drug; Combo Route) route as needed., Disp: 1 each, Rfl: 0    blood-glucose meter (ACCU-CHEK ONEIL PLUS METER) Misc, 1 kit by Misc.(Non-Drug; Combo Route) route once daily., Disp: 1 each, Rfl: 0    blood-glucose meter (TRUE METRIX GLUCOSE METER) kit, Use as directed to test glucose, Disp: 1 each, Rfl: 0    celecoxib (CELEBREX) 200 MG capsule, Take 1 capsule (200 mg total) by mouth 2 (two) times daily., Disp: 14 capsule, Rfl: 0    ciclopirox (PENLAC) 8 % Soln, Apply topically nightly., Disp: 6.6 mL, Rfl: 11    diphenhydrAMINE (BENADRYL) 25 mg capsule, Take 1 capsule (25 mg total) by mouth every 6 (six) hours as needed for Itching., Disp: 20 capsule, Rfl: 0    docusate sodium (COLACE) 100 MG capsule, Take 1 capsule (100 mg total) by mouth 2 (two) times daily., Disp: 30 capsule, Rfl: 0    ergocalciferol  (ERGOCALCIFEROL) 50,000 unit Cap, TAKE 1 CAPSULE EVERY 7 DAYS., Disp: 12 capsule, Rfl: 1    FLUoxetine 20 MG capsule, TAKE 1 CAPSULE EVERY DAY, Disp: 90 capsule, Rfl: 2    fluticasone propionate (FLONASE) 50 mcg/actuation nasal spray, USE 1 SPRAY IN EACH NOSTRIL ONCE DAILY, Disp: 32 g, Rfl: 2    hydrOXYzine HCL (ATARAX) 25 MG tablet, Take 1 tablet (25 mg total) by mouth 3 (three) times daily as needed for Itching., Disp: 90 tablet, Rfl: 3    metoprolol succinate (TOPROL-XL) 25 MG 24 hr tablet, Take 1 tablet (25 mg total) by mouth once daily., Disp: 90 tablet, Rfl: 3    ondansetron (ZOFRAN-ODT) 8 MG TbDL, Take 1 tablet (8 mg total) by mouth every 8 (eight) hours as needed (nausea)., Disp: 30 tablet, Rfl: 0    oxyCODONE (ROXICODONE) 5 MG immediate release tablet, Take 1 tablet (5 mg total) by mouth every 4 (four) hours as needed for Pain., Disp: 30 tablet, Rfl: 0    pantoprazole (PROTONIX) 40 MG tablet, TAKE 1 TABLET EVERY DAY, Disp: 90 tablet, Rfl: 3    pregabalin (LYRICA) 50 MG capsule, Take 1 capsule (50 mg total) by mouth 2 (two) times daily., Disp: 60 capsule, Rfl: 2    TRUEPLUS LANCETS 33 gauge Misc, TEST BLOOD SUGAR TWICE DAILY, Disp: 200 each, Rfl: 1    aspirin (ECOTRIN) 81 MG EC tablet, Take 1 tablet (81 mg total) by mouth 2 (two) times a day., Disp: 60 tablet, Rfl: 0    blood glucose control, low (TRUE METRIX LEVEL 1) Soln, 1 each by Misc.(Non-Drug; Combo Route) route once as needed., Disp: 1 each, Rfl: 3    cetirizine (ZYRTEC) 10 MG tablet, Take 1 tablet (10 mg total) by mouth once daily., Disp: 90 tablet, Rfl: 0    metFORMIN (GLUCOPHAGE) 500 MG tablet, Take 1 tablet (500 mg total) by mouth 2 (two) times daily with meals., Disp: 180 tablet, Rfl: 3  No current facility-administered medications for this visit.    Facility-Administered Medications Ordered in Other Visits:     0.9%  NaCl infusion, , Intravenous, On Call Procedure, Eric Carbajal MD    acetaminophen tablet 1,000 mg, 1,000 mg, Oral, On Call  "Procedure, Eric Carbajal MD    acetaminophen tablet 1,000 mg, 1,000 mg, Oral, Q6H, Eric Carbajal MD, 1,000 mg at 11/09/23 0506    aspirin EC tablet 81 mg, 81 mg, Oral, BID, Eric Carbajal MD, 81 mg at 11/09/23 1358    famotidine tablet 20 mg, 20 mg, Oral, BID, Eric Carbajal MD, 20 mg at 11/09/23 1357    LIDOcaine (PF) 10 mg/ml (1%) injection 10 mg, 1 mL, Intradermal, On Call Procedure, Eric Carbajal MD    methocarbamoL tablet 750 mg, 750 mg, Oral, TID, Eric Carbajal MD, 750 mg at 11/09/23 1357    naloxone 0.4 mg/mL injection 0.02 mg, 0.02 mg, Intravenous, PRN, Eric Carbajal MD    ondansetron injection 4 mg, 4 mg, Intravenous, Q8H PRN, Eric Carbajal MD, 4 mg at 11/09/23 0858    oxyCODONE immediate release tablet 10 mg, 10 mg, Oral, Q3H PRN, Eric Carbajal MD, 10 mg at 11/09/23 1043    oxyCODONE immediate release tablet 5 mg, 5 mg, Oral, Q3H PRN, Eric Carbajal MD    polyethylene glycol packet 17 g, 17 g, Oral, Daily, Eric Carbajal MD, 17 g at 11/09/23 1356    pregabalin capsule 75 mg, 75 mg, Oral, QHS, Eric Carbajal MD, 75 mg at 11/08/23 2147    prochlorperazine injection Soln 5 mg, 5 mg, Intravenous, Q6H PRN, Eric Carbajal MD, 5 mg at 11/08/23 1325    senna-docusate 8.6-50 mg per tablet 1 tablet, 1 tablet, Oral, BID, Eric Carbajal MD, 1 tablet at 11/09/23 1358    sodium chloride 0.9% flush 10 mL, 10 mL, Intravenous, PRN, Eric Carbajal MD      Review of Systems:  Constitutional: no fever or chills  Eyes: no visual changes  ENT: no nasal congestion or sore throat  Respiratory: no cough or shortness of breath  Cardiovascular: no chest pain  Gastrointestinal: no nausea or vomiting, tolerating diet  Musculoskeletal: pain and numbness/tingling    OBJECTIVE:      Vital Signs (Most Recent):  Vitals:    03/01/24 0755   Weight: 91 kg (200 lb 9.6 oz)   Height: 5' 4" (1.626 m)     Body mass index is 34.43 kg/m².      Physical Exam:  Constitutional: The patient appears " well-developed and well-nourished. No distress.   Skin: No lesions appreciated  Head: Normocephalic and atraumatic.   Nose: Nose normal.   Ears: No deformities seen  Eyes: Conjunctivae and EOM are normal.   Neck: No tracheal deviation present.   Cardiovascular: Normal rate and intact distal pulses.    Pulmonary/Chest: Effort normal. No respiratory distress.   Abdominal: There is no guarding.   Neurological: The patient is alert.   Psychiatric: The patient has a normal mood and affect.     Bilateral Hand/Wrist Examination:    Observation/Inspection:  Swelling  none    Deformity  none  Discoloration  none     Scars   none    Atrophy  none    HAND/WRIST EXAMINATION:  Finkelstein's Test   Neg  WHAT Test    Neg  Snuff box tenderness   Neg  Costa's Test    Neg  Hook of Hamate Tenderness  Neg  CMC grind    Neg  Circumduction test   Neg    Neurovascular Exam:  Digits WWP, brisk CR < 3s throughout  NVI motor/LTS to M/R/U nerves, radial pulse 2+  Tinel's Test - Carpal Tunnel  POS bilateral  Tinel's Test - Cubital Tunnel  Neg  Phalen's Test    POS bilateral  Median Nerve Compression Test POS bilateral    ROM hand full, painless    ROM wrist full, painless    ROM elbow full, painless    Abdomen not guarded  Respirations nonlabored  Perfusion intact    Diagnostic Results:     Imaging - I independently viewed the patient's imaging as well as the radiology report.  Xrays of the patient's bilateral hand  demonstrate no evidence of any acute fractures or dislocations, positive for significant degenerative changes.    EMG - 2/16/24  Impression: moderate sensory carpal tunnel syndrome    ASSESSMENT/PLAN:      73 y.o. yo female with Bilateral carpal tunnel syndrome    Plan: The patient and I had a thorough discussion today.  We discussed the working diagnosis as well as several other potential alternative diagnoses.  Treatment options were discussed, both conservative and surgical.  Conservative treatment options would include things  such as activity modifications, workplace modifications, a period of rest, oral vs topical OTC and prescription anti-inflammatory medications, occupational therapy, splinting/bracing, immobilization, corticosteroid injections, and others.  Surgical options were discussed as well.     At this time, the patient would like to proceed with surgery. She is consented for Right endoscopic vs open carpal tunnel release with Dr. Mcdermott on 3/15/24. Case ordered for McIntyre.    The patient has not responded to adequate non operative treatment at this time and/or non operative treatment is not indicated. Thus, the risks, benefits and alternatives to surgery were discussed with the patient in detail.  Specific risks include but are not limited to bleeding, infection, vessel and/or nerve damage, pain, numbness, tingling, complex regional pain syndrome, compartment syndrome, failure to return to pre-injury and/or preoperative functional status, scar sensitivity, delayed healing, inability to return to work, pulley injury, tendon injury, bowstringing, partial and/or incomplete relief of symptoms, weakness, persistence of and/or worsening of symptoms, surgical failure, osteomyelitis, amputation, loss of function, stiffness, functional debility, dysfunction, decreased  strength, need for prolonged postoperative rehabilitation, need for further surgery, deep venous thrombosis, pulmonary embolism, arthritis and death.  The patient states an understanding and wishes to proceed with surgery.   All questions were answered.  No guarantees were implied or stated.  Written informed consent was obtained.    Should the patient's symptoms worsen, persist, or fail to improve they should return for reevaluation and I would be happy to see them back anytime.           Please do not hesitate to reach out to us via email, phone, or MyChart with any questions, concerns, or feedback.

## 2024-03-01 NOTE — PROGRESS NOTES
Ortho Knee Follow Up Note    PCP: Alena Hernandez MD   Referring Provider: Eric Carbajal MD  [address] [facility]  [place] [address]        Assessment:  73 y.o. female status post right total Knee Primary completed on 11/8/23. Doing well. Pre op pain significantly improved. ROM is going well. Completed outpatient PT. Upcoming visits with Pain management for lumbar spine and hand for CTR     Plan:  Follow up 12 months  Future Radiographs Indicated at next visit: Yes    Pain Management: OTC  Anticoagulation: None  Wound Care: None    Patient Reassurance:   Post-operative course discussed with patient. Patient reassured and supported. All questions answered.    ACTIVE PROBLEM LIST  Patient Active Problem List   Diagnosis    Type 2 diabetes mellitus with diabetic neuropathy, without long-term current use of insulin    Benign hypertension    Hyperlipemia    Microhematuria    Left flank pain    Abdominal aortic atherosclerosis    History of renal cell cancer    Renal cell carcinoma of right kidney    Mild major depression    Bilateral chronic knee pain    Colon cancer screening    Pain of left calf    Nuclear sclerosis of both eyes    Refractive error    Primary osteoarthritis of knees, bilateral    Fall    Nuclear sclerotic cataract of left eye    Nuclear sclerotic cataract of right eye    Radiculopathy    Idiopathic progressive neuropathy    Nerve pain    Chest pain    Severe obesity (BMI 35.0-39.9) with comorbidity    Decreased ROM of right knee    Decreased strength involving knee joint    Gait difficulty    Paresthesia           HPI:  Valarie Foster presents today for a post-op visit.     STATUS POST:  right total Knee Primary  BMI: Body mass index is 34.33 kg/m².    Post operative recovery was complicated by: None    Patient rates his condition as improving. Pleased with surgical outcome to date.     Functional Assessment:  Completed PT  Functional Difficulties:  Intermittent night time pain  Pain Medication:   "OTC  Anticoagulation: None    EXAM:    right POST-OPERATIVE KNEE    Ht 5' 4" (1.626 m)   Wt 90.7 kg (200 lb)   BMI 34.33 kg/m²     Skin:  Appropriate post op appearance, No evidence of erythema, warmth, discharge, or drainage, and Incision clean/dry/intact  Range of Motion: Flexion: 120, Extension 0  Neurovascular Status: Sensation intact in Sural, Saphenous, SPN, DPN and Tibial nerve distribution. 5 out of 5 strength in hip flexion, knee flexion/extension, ankle plantarflexion/dorsiflexion. 2+ dorsalis pedis    IMAGING:  X-ray Knee:   Implants are well fixed and aligned. There is no evidence of loosening.      "

## 2024-03-01 NOTE — TELEPHONE ENCOUNTER
----- Message from Emerson Duran sent at 3/1/2024  2:19 PM CST -----  Regarding: Valarie  Type:Patient Callback     Who called: Valarie     What is the request in detail:Pt stated that she would like for the doctor to give her a call. This is reference to a surgery on 0315. She wants to have it on the [facility]. Please reach out to the patient.     Can the clinic reply by [facility]? Yes     Would the patient rather a call back or a response [facility]     Best call back number: [phone]      Additional Information:

## 2024-03-01 NOTE — PROGRESS NOTES
[facility]  History & Physical  Orthopedics    SUBJECTIVE:      Chief Complaint: Bilateral hand pain/numbness    Referring Provider: Nohelia Myers MD Dr. Dunbar is the supervising physician for this encounter/patient    History of Present Illness:  Patient is a 73 y.o. right hand dominant female who presents today with complaints of bilateral hand pain/numbness present for about 1 year and progressing. Right greater than left, pain described as electrical shooting pain, along with numbness. This occurs randomly throughout the day with activity and every night which keeps her awake. She finds writing to be difficult now. She takes gabapentin without relief. No surgical history on the hands.     Onset of symptoms/DOI was 1 year.    Symptoms are aggravated by activity, at night, and during the day.    Symptoms are alleviated by rest.    Symptoms consist of pain and numbness/tingling.    The patient rates their pain as a 9/10.    Attempted treatment(s) and/or interventions include activity modifications, rest,  brace, gabapentin .     The patient denies any fevers, chills, N/V, D/C and presents for evaluation.       Past Medical History:   Diagnosis Date    Arthritis     Colon polyp     Diabetes mellitus     diet controlled    Diabetes with neurologic complications     Hypertension     Nuclear sclerosis of both eyes 2/14/2022    Renal cell carcinoma      Past Surgical History:   Procedure Laterality Date    COLONOSCOPY N/A 2/5/2018    Procedure: COLONOSCOPY;  Surgeon: Bacilio Mancia MD;  Location: [facility] ENDO;  Service: Endoscopy;  Laterality: N/A;  appt confirmed-ss    COLONOSCOPY N/A 8/13/2020    Procedure: COLONOSCOPY;  Surgeon: Jose West MD;  Location: [facility] ENDO;  Service: Endoscopy;  Laterality: N/A;    HYSTERECTOMY      INTRAOCULAR PROSTHESES INSERTION Left 7/7/2022    Procedure: INSERTION, IOL PROSTHESIS;  Surgeon: Candelario Novoa MD;  Location: [facility] OR;  Service:  Ophthalmology;  Laterality: Left;    INTRAOCULAR PROSTHESES INSERTION Right 7/21/2022    Procedure: INSERTION, IOL PROSTHESIS;  Surgeon: Candelario Novoa MD;  Location: [facility] OR;  Service: Ophthalmology;  Laterality: Right;    OOPHORECTOMY      PARTIAL NEPHRECTOMY Right 10/12/2018    Procedure: NEPHRECTOMY, PARTIAL open. Dr Arenas to assist.;  Surgeon: Alejandra Palm MD;  Location: [facility] OR;  Service: Urology;  Laterality: Right;  RN PREOP 10/9/2018----NEEDS H/P    PHACOEMULSIFICATION OF CATARACT Left 7/7/2022    Procedure: PHACOEMULSIFICATION, CATARACT;  Surgeon: Candelario Novoa MD;  Location: [facility] OR;  Service: Ophthalmology;  Laterality: Left;  RN PHONE PREOP 6/27/2022   ARRIVAL 6:00 AM    PHACOEMULSIFICATION OF CATARACT Right 7/21/2022    Procedure: PHACOEMULSIFICATION, CATARACT;  Surgeon: Candelario Novoa MD;  Location: [facility] OR;  Service: Ophthalmology;  Laterality: Right;  RN PHONE PREOP 7/12/2022   ARRIVAL 12:00 NOON    TOTAL KNEE ARTHROPLASTY Right 11/8/2023    Procedure: ARTHROPLASTY, KNEE, TOTAL. NELSON;  Surgeon: Eric Carbajal MD;  Location: [facility] OR;  Service: Orthopedics;  Laterality: Right;  [place]. Admit  BRADLEY QUINTIN RIVERAKINGSLEY [phone] NOTIFIED QUINTIN ON 10/11/2023-  RN PREOP 10/25/2023   T/S ON 11/6/2023--done------CLEARED BY CARDS     Review of patient's allergies indicates:   Allergen Reactions    Lisinopril Swelling and Shortness Of Breath    Ascorbic acid-ascorbate calc      And citrus fruits     Social History     Social History Narrative    Not on file     Family History   Problem Relation Age of Onset    No Known Problems Mother     Glaucoma Father     Breast cancer Sister     Glaucoma Sister     Cancer Sister         breast cancer    Thyroid disease Sister     Blindness Sister         due to trauma during car accident    Diabetes Sister     No Known Problems Maternal Aunt     No Known Problems Maternal Uncle     No Known Problems Paternal Aunt     No Known  Problems Paternal Uncle     No Known Problems Maternal Grandmother     No Known Problems Maternal Grandfather     No Known Problems Paternal Grandmother     No Known Problems Paternal Grandfather     Diabetes Brother     Amblyopia Neg Hx     Cataracts Neg Hx     Hypertension Neg Hx     Macular degeneration Neg Hx     Retinal detachment Neg Hx     Strabismus Neg Hx     Stroke Neg Hx          Current Outpatient Medications:     ACCU-CHEK ONEIL PLUS TEST STRP Strp, TEST BLOOD SUGAR TWICE DAILY, Disp: 200 strip, Rfl: 1    ACCU-CHEK SOFTCLIX LANCETS [facility], USE TO TEST BLOOD SUGAR ONE TIME DAILY, Disp: 100 each, Rfl: 3    acetaminophen (TYLENOL) 500 MG tablet, Take 2 tablets (1,000 mg total) by mouth every 8 (eight) hours as needed for Pain., Disp: 42 tablet, Rfl: 0    alcohol swabs (DROPSAFE ALCOHOL PREP PADS) PadM, USE 3 EVERY DAY AS DIRECTED, Disp: 200 each, Rfl: 3    amLODIPine (NORVASC) 10 MG tablet, TAKE 1 TABLET ONE TIME DAILY, Disp: 90 tablet, Rfl: 3    atorvastatin (LIPITOR) 40 MG tablet, , Disp: 90 tablet, Rfl: 0    blood glucose control high,low (ACCU-CHEK ONEIL CONTROL SOLN) Soln, 1 Units by Misc.(Non-Drug; Combo Route) route as needed., Disp: 1 each, Rfl: 0    blood-glucose meter (ACCU-CHEK ONEIL PLUS METER) Misc, 1 kit by Misc.(Non-Drug; Combo Route) route once daily., Disp: 1 each, Rfl: 0    blood-glucose meter (TRUE METRIX GLUCOSE METER) kit, Use as directed to test glucose, Disp: 1 each, Rfl: 0    celecoxib (CELEBREX) 200 MG capsule, Take 1 capsule (200 mg total) by mouth 2 (two) times daily., Disp: 14 capsule, Rfl: 0    ciclopirox (PENLAC) 8 % Soln, Apply topically nightly., Disp: 6.6 mL, Rfl: 11    diphenhydrAMINE (BENADRYL) 25 mg capsule, Take 1 capsule (25 mg total) by mouth every 6 (six) hours as needed for Itching., Disp: 20 capsule, Rfl: 0    docusate sodium (COLACE) 100 MG capsule, Take 1 capsule (100 mg total) by mouth 2 (two) times daily., Disp: 30 capsule, Rfl: 0    ergocalciferol  (ERGOCALCIFEROL) 50,000 unit Cap, TAKE 1 CAPSULE EVERY 7 DAYS., Disp: 12 capsule, Rfl: 1    FLUoxetine 20 MG capsule, TAKE 1 CAPSULE EVERY DAY, Disp: 90 capsule, Rfl: 2    fluticasone propionate (FLONASE) 50 mcg/actuation nasal spray, USE 1 SPRAY IN EACH NOSTRIL ONCE DAILY, Disp: 32 g, Rfl: 2    hydrOXYzine HCL (ATARAX) 25 MG tablet, Take 1 tablet (25 mg total) by mouth 3 (three) times daily as needed for Itching., Disp: 90 tablet, Rfl: 3    metoprolol succinate (TOPROL-XL) 25 MG 24 hr tablet, Take 1 tablet (25 mg total) by mouth once daily., Disp: 90 tablet, Rfl: 3    ondansetron (ZOFRAN-ODT) 8 MG TbDL, Take 1 tablet (8 mg total) by mouth every 8 (eight) hours as needed (nausea)., Disp: 30 tablet, Rfl: 0    oxyCODONE (ROXICODONE) 5 MG immediate release tablet, Take 1 tablet (5 mg total) by mouth every 4 (four) hours as needed for Pain., Disp: 30 tablet, Rfl: 0    pantoprazole (PROTONIX) 40 MG tablet, TAKE 1 TABLET EVERY DAY, Disp: 90 tablet, Rfl: 3    pregabalin (LYRICA) 50 MG capsule, Take 1 capsule (50 mg total) by mouth 2 (two) times daily., Disp: 60 capsule, Rfl: 2    TRUEPLUS LANCETS 33 gauge Misc, TEST BLOOD SUGAR TWICE DAILY, Disp: 200 each, Rfl: 1    aspirin (ECOTRIN) 81 MG EC tablet, Take 1 tablet (81 mg total) by mouth 2 (two) times a day., Disp: 60 tablet, Rfl: 0    blood glucose control, low (TRUE METRIX LEVEL 1) Soln, 1 each by Misc.(Non-Drug; Combo Route) route once as needed., Disp: 1 each, Rfl: 3    cetirizine (ZYRTEC) 10 MG tablet, Take 1 tablet (10 mg total) by mouth once daily., Disp: 90 tablet, Rfl: 0    metFORMIN (GLUCOPHAGE) 500 MG tablet, Take 1 tablet (500 mg total) by mouth 2 (two) times daily with meals., Disp: 180 tablet, Rfl: 3  No current facility-administered medications for this visit.    Facility-Administered Medications Ordered in Other Visits:     0.9%  NaCl infusion, , Intravenous, On Call Procedure, Eric Carbajal MD    acetaminophen tablet 1,000 mg, 1,000 mg, Oral, On Call  "Procedure, Eric Carbajal MD    acetaminophen tablet 1,000 mg, 1,000 mg, Oral, Q6H, Eric Carbajal MD, 1,000 mg at 11/09/23 0506    aspirin EC tablet 81 mg, 81 mg, Oral, BID, Eric Carbajal MD, 81 mg at 11/09/23 1358    famotidine tablet 20 mg, 20 mg, Oral, BID, Eric Carbajal MD, 20 mg at 11/09/23 1357    LIDOcaine (PF) 10 mg/ml (1%) injection 10 mg, 1 mL, Intradermal, On Call Procedure, Eric Carbajal MD    methocarbamoL tablet 750 mg, 750 mg, Oral, TID, Eric Carbajal MD, 750 mg at 11/09/23 1357    naloxone 0.4 mg/mL injection 0.02 mg, 0.02 mg, Intravenous, PRN, Eric Carbajal MD    ondansetron injection 4 mg, 4 mg, Intravenous, Q8H PRN, Eric Carbajal MD, 4 mg at 11/09/23 0858    oxyCODONE immediate release tablet 10 mg, 10 mg, Oral, Q3H PRN, Eric Carbajal MD, 10 mg at 11/09/23 1043    oxyCODONE immediate release tablet 5 mg, 5 mg, Oral, Q3H PRN, Eric Carbajal MD    polyethylene glycol packet 17 g, 17 g, Oral, Daily, Eric Carbajal MD, 17 g at 11/09/23 1356    pregabalin capsule 75 mg, 75 mg, Oral, QHS, Eric Carbajal MD, 75 mg at 11/08/23 2147    prochlorperazine injection Soln 5 mg, 5 mg, Intravenous, Q6H PRN, Eric Carbajal MD, 5 mg at 11/08/23 1325    senna-docusate 8.6-50 mg per tablet 1 tablet, 1 tablet, Oral, BID, Eric Carbajal MD, 1 tablet at 11/09/23 1358    sodium chloride 0.9% flush 10 mL, 10 mL, Intravenous, PRN, Eric Carbajal MD      Review of Systems:  Constitutional: no fever or chills  Eyes: no visual changes  ENT: no nasal congestion or sore throat  Respiratory: no cough or shortness of breath  Cardiovascular: no chest pain  Gastrointestinal: no nausea or vomiting, tolerating diet  Musculoskeletal: pain and numbness/tingling    OBJECTIVE:      Vital Signs (Most Recent):  Vitals:    03/01/24 0755   Weight: 91 kg (200 lb 9.6 oz)   Height: 5' 4" (1.626 m)     Body mass index is 34.43 kg/m².      Physical Exam:  Constitutional: The patient appears " well-developed and well-nourished. No distress.   Skin: No lesions appreciated  Head: Normocephalic and atraumatic.   Nose: Nose normal.   Ears: No deformities seen  Eyes: Conjunctivae and EOM are normal.   Neck: No tracheal deviation present.   Cardiovascular: Normal rate and intact distal pulses.    Pulmonary/Chest: Effort normal. No respiratory distress.   Abdominal: There is no guarding.   Neurological: The patient is alert.   Psychiatric: The patient has a normal mood and affect.     Bilateral Hand/Wrist Examination:    Observation/Inspection:  Swelling  none    Deformity  none  Discoloration  none     Scars   none    Atrophy  none    HAND/WRIST EXAMINATION:  Finkelstein's Test   Neg  WHAT Test    Neg  Snuff box tenderness   Neg  Costa's Test    Neg  Hook of Hamate Tenderness  Neg  CMC grind    Neg  Circumduction test   Neg    Neurovascular Exam:  Digits WWP, brisk CR < 3s throughout  NVI motor/LTS to M/R/U nerves, radial pulse 2+  Tinel's Test - Carpal Tunnel  POS bilateral  Tinel's Test - Cubital Tunnel  Neg  Phalen's Test    POS bilateral  Median Nerve Compression Test POS bilateral    ROM hand full, painless    ROM wrist full, painless    ROM elbow full, painless    Abdomen not guarded  Respirations nonlabored  Perfusion intact    Diagnostic Results:     Imaging - I independently viewed the patient's imaging as well as the radiology report.  Xrays of the patient's bilateral hand  demonstrate no evidence of any acute fractures or dislocations, positive for significant degenerative changes.    EMG - 2/16/24  Impression: moderate sensory carpal tunnel syndrome    ASSESSMENT/PLAN:      73 y.o. yo female with Bilateral carpal tunnel syndrome    Plan: The patient and I had a thorough discussion today.  We discussed the working diagnosis as well as several other potential alternative diagnoses.  Treatment options were discussed, both conservative and surgical.  Conservative treatment options would include things  such as activity modifications, workplace modifications, a period of rest, oral vs topical OTC and prescription anti-inflammatory medications, occupational therapy, splinting/bracing, immobilization, corticosteroid injections, and others.  Surgical options were discussed as well.     At this time, the patient would like to proceed with surgery. She is consented for Right endoscopic vs open carpal tunnel release with Dr. Mcdermott on 3/15/24. Case ordered for [place].    The patient has not responded to adequate non operative treatment at this time and/or non operative treatment is not indicated. Thus, the risks, benefits and alternatives to surgery were discussed with the patient in detail.  Specific risks include but are not limited to bleeding, infection, vessel and/or nerve damage, pain, numbness, tingling, complex regional pain syndrome, compartment syndrome, failure to return to pre-injury and/or preoperative functional status, scar sensitivity, delayed healing, inability to return to work, pulley injury, tendon injury, bowstringing, partial and/or incomplete relief of symptoms, weakness, persistence of and/or worsening of symptoms, surgical failure, osteomyelitis, amputation, loss of function, stiffness, functional debility, dysfunction, decreased  strength, need for prolonged postoperative rehabilitation, need for further surgery, deep venous thrombosis, pulmonary embolism, arthritis and death.  The patient states an understanding and wishes to proceed with surgery.   All questions were answered.  No guarantees were implied or stated.  Written informed consent was obtained.    Should the patient's symptoms worsen, persist, or fail to improve they should return for reevaluation and I would be happy to see them back anytime.           Please do not hesitate to reach out to us via email, phone, or MyChart with any questions, concerns, or feedback.

## 2024-03-04 ENCOUNTER — TELEPHONE (OUTPATIENT)
Dept: FAMILY MEDICINE | Facility: CLINIC | Age: 74
End: 2024-03-04
Payer: MEDICARE

## 2024-03-04 ENCOUNTER — ANESTHESIA EVENT (OUTPATIENT)
Dept: SURGERY | Facility: HOSPITAL | Age: 74
End: 2024-03-04
Payer: MEDICARE

## 2024-03-04 ENCOUNTER — PATIENT MESSAGE (OUTPATIENT)
Dept: ADMINISTRATIVE | Facility: HOSPITAL | Age: 74
End: 2024-03-04
Payer: MEDICARE

## 2024-03-04 VITALS — DIASTOLIC BLOOD PRESSURE: 70 MMHG | SYSTOLIC BLOOD PRESSURE: 120 MMHG

## 2024-03-04 NOTE — TELEPHONE ENCOUNTER
Called and patient states her BP was 120/70 this morning. Patient states her BP has been going high, scheduled nurse visit 3/18 informed patient to keep a log and bring it to her nurse BP appointment.

## 2024-03-04 NOTE — TELEPHONE ENCOUNTER
----- Message from Luis Wylie sent at 3/4/2024 11:20 AM CST -----  Regarding: Returning missed call  Contact: [phone]  Patient is returning a missed call regarding the pt blood pressure. Please call to further discuss.

## 2024-03-05 ENCOUNTER — TELEPHONE (OUTPATIENT)
Dept: ORTHOPEDICS | Facility: CLINIC | Age: 74
End: 2024-03-05
Payer: MEDICARE

## 2024-03-05 NOTE — TELEPHONE ENCOUNTER
MARYANA  ----- Message from Dana Marrero sent at 3/5/2024  2:49 PM CST -----  .Type: Patient Call Back    Who called: Self     What is the request in detail: Calling about Pre Op appointment     Can the clinic reply by [facility]? No     Would the patient rather a call back or a response via [facility]? Call Back     Best call back number: .[phone] (home)       Additional Information:

## 2024-03-08 ENCOUNTER — TELEPHONE (OUTPATIENT)
Dept: ORTHOPEDICS | Facility: CLINIC | Age: 74
End: 2024-03-08
Payer: MEDICARE

## 2024-03-08 NOTE — TELEPHONE ENCOUNTER
Spoke with the patient. Advised no additional pre op visits required. All questions answered. Patient verbalized understanding and was thankful.     Corrie Garcia MS, OTC  Clinical & OR Assistant to Dr. Ross Dunbar [facility]  [phone]      ----- Message from Franci Talamantes sent at 3/8/2024  8:02 AM CST -----  Regarding: Advise  Contact: [phone]  Pt is calling stating she is scheduled for surgery w provider on 3/15 and wants to know when she has to come in for pre op. Pt would like a call back to further discuss today if possible. Please give pt a call back.

## 2024-03-14 ENCOUNTER — TELEPHONE (OUTPATIENT)
Dept: ORTHOPEDICS | Facility: CLINIC | Age: 74
End: 2024-03-14
Payer: MEDICARE

## 2024-03-14 RX ORDER — ACETAMINOPHEN 500 MG
1000 TABLET ORAL 3 TIMES DAILY
Qty: 60 TABLET | Refills: 0 | Status: SHIPPED | OUTPATIENT
Start: 2024-03-14 | End: 2024-03-25

## 2024-03-14 RX ORDER — IBUPROFEN 600 MG/1
600 TABLET ORAL 3 TIMES DAILY
Qty: 30 TABLET | Refills: 0 | Status: SHIPPED | OUTPATIENT
Start: 2024-03-14 | End: 2024-03-25

## 2024-03-14 RX ORDER — TRAMADOL HYDROCHLORIDE 50 MG/1
50 TABLET ORAL EVERY 6 HOURS
Qty: 20 TABLET | Refills: 0 | Status: SHIPPED | OUTPATIENT
Start: 2024-03-14

## 2024-03-14 NOTE — TELEPHONE ENCOUNTER
Spoke with the patient. Notified of 7 AM arrival time to the [facility].  Informed of current visitor policy.  Reminded of NPO and need for transportation. Patient verbalized understanding to all.    Corrie Garcia MS, OTC  Clinical Assistant to Dr. Ross Dunbar [facility]

## 2024-03-15 ENCOUNTER — HOSPITAL ENCOUNTER (OUTPATIENT)
Facility: HOSPITAL | Age: 74
Discharge: HOME | End: 2024-03-15
Attending: ORTHOPAEDIC SURGERY | Admitting: ORTHOPAEDIC SURGERY
Payer: MEDICARE

## 2024-03-15 ENCOUNTER — ANESTHESIA (OUTPATIENT)
Dept: SURGERY | Facility: HOSPITAL | Age: 74
End: 2024-03-15
Payer: MEDICARE

## 2024-03-15 VITALS
OXYGEN SATURATION: 94 % | BODY MASS INDEX: 32.78 KG/M2 | RESPIRATION RATE: 63 BRPM | HEART RATE: 90 BPM | DIASTOLIC BLOOD PRESSURE: 70 MMHG | HEIGHT: 64 IN | WEIGHT: 192 LBS | TEMPERATURE: 98 F | SYSTOLIC BLOOD PRESSURE: 137 MMHG

## 2024-03-15 DIAGNOSIS — G56.01 RIGHT CARPAL TUNNEL SYNDROME: Primary | ICD-10-CM

## 2024-03-15 LAB
POCT GLUCOSE: 171 MG/DL (ref 70–110)
POCT GLUCOSE: 180 MG/DL (ref 70–110)

## 2024-03-15 PROCEDURE — 27200750 HC INSULATED NEEDLE/ STIMUPLEX: Performed by: ANESTHESIOLOGY

## 2024-03-15 PROCEDURE — 71000033 HC RECOVERY, INTIAL HOUR: Performed by: ORTHOPAEDIC SURGERY

## 2024-03-15 PROCEDURE — 25000003 PHARM REV CODE 250: Performed by: PHYSICIAN ASSISTANT

## 2024-03-15 PROCEDURE — 29848 WRIST ENDOSCOPY/SURGERY: CPT | Mod: RT,,, | Performed by: ORTHOPAEDIC SURGERY

## 2024-03-15 PROCEDURE — 82962 GLUCOSE BLOOD TEST: CPT | Performed by: ORTHOPAEDIC SURGERY

## 2024-03-15 PROCEDURE — 99900035 HC TECH TIME PER 15 MIN (STAT)

## 2024-03-15 PROCEDURE — 36000707: Performed by: ORTHOPAEDIC SURGERY

## 2024-03-15 PROCEDURE — 63600175 PHARM REV CODE 636 W HCPCS: Performed by: ANESTHESIOLOGY

## 2024-03-15 PROCEDURE — 36000706: Performed by: ORTHOPAEDIC SURGERY

## 2024-03-15 PROCEDURE — 71000015 HC POSTOP RECOV 1ST HR: Performed by: ORTHOPAEDIC SURGERY

## 2024-03-15 PROCEDURE — 64415 NJX AA&/STRD BRCH PLXS IMG: CPT | Performed by: ANESTHESIOLOGY

## 2024-03-15 PROCEDURE — 25000003 PHARM REV CODE 250: Performed by: ORTHOPAEDIC SURGERY

## 2024-03-15 PROCEDURE — 63600175 PHARM REV CODE 636 W HCPCS: Performed by: NURSE ANESTHETIST, CERTIFIED REGISTERED

## 2024-03-15 PROCEDURE — 37000008 HC ANESTHESIA 1ST 15 MINUTES: Performed by: ORTHOPAEDIC SURGERY

## 2024-03-15 PROCEDURE — 63600175 PHARM REV CODE 636 W HCPCS: Performed by: PHYSICIAN ASSISTANT

## 2024-03-15 PROCEDURE — 37000009 HC ANESTHESIA EA ADD 15 MINS: Performed by: ORTHOPAEDIC SURGERY

## 2024-03-15 PROCEDURE — 25000003 PHARM REV CODE 250: Performed by: NURSE ANESTHETIST, CERTIFIED REGISTERED

## 2024-03-15 PROCEDURE — D9220A PRA ANESTHESIA: Mod: CRNA,,, | Performed by: NURSE ANESTHETIST, CERTIFIED REGISTERED

## 2024-03-15 PROCEDURE — 94761 N-INVAS EAR/PLS OXIMETRY MLT: CPT

## 2024-03-15 PROCEDURE — D9220A PRA ANESTHESIA: Mod: ANES,,, | Performed by: ANESTHESIOLOGY

## 2024-03-15 PROCEDURE — 27201423 OPTIME MED/SURG SUP & DEVICES STERILE SUPPLY: Performed by: ORTHOPAEDIC SURGERY

## 2024-03-15 RX ORDER — MIDAZOLAM HYDROCHLORIDE 1 MG/ML
1 INJECTION INTRAMUSCULAR; INTRAVENOUS
Status: DISCONTINUED | OUTPATIENT
Start: 2024-03-15 | End: 2024-03-15 | Stop reason: HOSPADM

## 2024-03-15 RX ORDER — MUPIROCIN 20 MG/G
OINTMENT TOPICAL
Status: DISCONTINUED | OUTPATIENT
Start: 2024-03-15 | End: 2024-03-15 | Stop reason: HOSPADM

## 2024-03-15 RX ORDER — SODIUM CHLORIDE 0.9 % (FLUSH) 0.9 %
10 SYRINGE (ML) INJECTION
Status: DISCONTINUED | OUTPATIENT
Start: 2024-03-15 | End: 2024-03-15 | Stop reason: HOSPADM

## 2024-03-15 RX ORDER — DEXAMETHASONE SODIUM PHOSPHATE 4 MG/ML
INJECTION, SOLUTION INTRA-ARTICULAR; INTRALESIONAL; INTRAMUSCULAR; INTRAVENOUS; SOFT TISSUE
Status: DISCONTINUED | OUTPATIENT
Start: 2024-03-15 | End: 2024-03-15

## 2024-03-15 RX ORDER — PROPOFOL 10 MG/ML
VIAL (ML) INTRAVENOUS CONTINUOUS PRN
Status: DISCONTINUED | OUTPATIENT
Start: 2024-03-15 | End: 2024-03-15

## 2024-03-15 RX ORDER — PROPOFOL 10 MG/ML
VIAL (ML) INTRAVENOUS
Status: DISCONTINUED | OUTPATIENT
Start: 2024-03-15 | End: 2024-03-15

## 2024-03-15 RX ORDER — ONDANSETRON HYDROCHLORIDE 2 MG/ML
INJECTION, SOLUTION INTRAVENOUS
Status: DISCONTINUED | OUTPATIENT
Start: 2024-03-15 | End: 2024-03-15

## 2024-03-15 RX ORDER — METHOCARBAMOL 500 MG/1
1000 TABLET, FILM COATED ORAL ONCE
Status: DISCONTINUED | OUTPATIENT
Start: 2024-03-15 | End: 2024-03-15 | Stop reason: HOSPADM

## 2024-03-15 RX ORDER — ROPIVACAINE HYDROCHLORIDE 5 MG/ML
INJECTION, SOLUTION EPIDURAL; INFILTRATION; PERINEURAL
Status: COMPLETED | OUTPATIENT
Start: 2024-03-15 | End: 2024-03-15

## 2024-03-15 RX ORDER — CELECOXIB 200 MG/1
400 CAPSULE ORAL
Status: COMPLETED | OUTPATIENT
Start: 2024-03-15 | End: 2024-03-15

## 2024-03-15 RX ORDER — FENTANYL CITRATE 50 UG/ML
INJECTION, SOLUTION INTRAMUSCULAR; INTRAVENOUS
Status: DISCONTINUED | OUTPATIENT
Start: 2024-03-15 | End: 2024-03-15

## 2024-03-15 RX ORDER — LIDOCAINE HYDROCHLORIDE 20 MG/ML
INJECTION INTRAVENOUS
Status: DISCONTINUED | OUTPATIENT
Start: 2024-03-15 | End: 2024-03-15

## 2024-03-15 RX ORDER — ACETAMINOPHEN 500 MG
1000 TABLET ORAL
Status: COMPLETED | OUTPATIENT
Start: 2024-03-15 | End: 2024-03-15

## 2024-03-15 RX ORDER — FENTANYL CITRATE 50 UG/ML
100 INJECTION, SOLUTION INTRAMUSCULAR; INTRAVENOUS
Status: DISCONTINUED | OUTPATIENT
Start: 2024-03-15 | End: 2024-03-15 | Stop reason: HOSPADM

## 2024-03-15 RX ADMIN — PROPOFOL 40 MG: 10 INJECTION, EMULSION INTRAVENOUS at 08:03

## 2024-03-15 RX ADMIN — FENTANYL CITRATE 25 MCG: 50 INJECTION, SOLUTION INTRAMUSCULAR; INTRAVENOUS at 08:03

## 2024-03-15 RX ADMIN — DEXAMETHASONE SODIUM PHOSPHATE 4 MG: 4 INJECTION, SOLUTION INTRAMUSCULAR; INTRAVENOUS at 08:03

## 2024-03-15 RX ADMIN — CEFAZOLIN 2 G: 2 INJECTION, POWDER, FOR SOLUTION INTRAMUSCULAR; INTRAVENOUS at 08:03

## 2024-03-15 RX ADMIN — SODIUM CHLORIDE: 0.9 INJECTION, SOLUTION INTRAVENOUS at 07:03

## 2024-03-15 RX ADMIN — ROPIVACAINE HYDROCHLORIDE 30 ML: 5 INJECTION EPIDURAL; INFILTRATION; PERINEURAL at 08:03

## 2024-03-15 RX ADMIN — FENTANYL CITRATE 25 MCG: 50 INJECTION INTRAMUSCULAR; INTRAVENOUS at 08:03

## 2024-03-15 RX ADMIN — FENTANYL CITRATE 50 MCG: 50 INJECTION, SOLUTION INTRAMUSCULAR; INTRAVENOUS at 08:03

## 2024-03-15 RX ADMIN — PROPOFOL 50 MCG/KG/MIN: 10 INJECTION, EMULSION INTRAVENOUS at 08:03

## 2024-03-15 RX ADMIN — MUPIROCIN: 20 OINTMENT TOPICAL at 07:03

## 2024-03-15 RX ADMIN — MIDAZOLAM HYDROCHLORIDE 1 MG: 1 INJECTION, SOLUTION INTRAMUSCULAR; INTRAVENOUS at 08:03

## 2024-03-15 RX ADMIN — CELECOXIB 400 MG: 200 CAPSULE ORAL at 07:03

## 2024-03-15 RX ADMIN — ONDANSETRON 4 MG: 2 INJECTION INTRAMUSCULAR; INTRAVENOUS at 08:03

## 2024-03-15 RX ADMIN — ACETAMINOPHEN 1000 MG: 500 TABLET ORAL at 07:03

## 2024-03-15 RX ADMIN — LIDOCAINE HYDROCHLORIDE 75 MG: 20 INJECTION INTRAVENOUS at 08:03

## 2024-03-15 NOTE — DISCHARGE INSTRUCTIONS
AFTER HAND SURGERY    DOS:  Keep affected wrist elevated above the level of your heart  Keep dressing clean, dry, and intact until clinic visit  Check circulation frequently in fingers by pressing on the nail bed. Nail bed should turn white and then pink when released.  Exercise fingers to promote circulation.  Advance diet as tolerated  Resume home medications today.  Keep sling on until you regain your feeling.      DONT:  No driving for 24 hours or while taking narcotic pain medication  Don't get dressing wet.      CALL PHYSICIAN FOR:  Obvious bleeding  Excessive swelling  Drainage (pus) from the wound  Pain unrelieved by pain medication.

## 2024-03-15 NOTE — PLAN OF CARE
Discharge instructions given and explained to patient and family with verbalization of understanding all instructions. Patient is AAOx3,v/s stable, denies n/v and tolerating po, rates pain level tolerable, IV removed, and family at bedside. Medications delivered to bedside. Patient discharged to home. Patient transported off unit via wheelchair to private vehicle with family.

## 2024-03-15 NOTE — ANESTHESIA POSTPROCEDURE EVALUATION
Anesthesia Post Evaluation    Patient: Valarie Foster    Procedure(s) Performed: Procedure(s) (LRB):  RELEASE, CARPAL TUNNEL, ENDOSCOPIC - RIGHT (Right)    Final Anesthesia Type: general      Patient location during evaluation: PACU  Patient participation: Yes- Able to Participate  Level of consciousness: awake and alert and oriented  Post-procedure vital signs: reviewed and stable  Pain management: adequate  Airway patency: patent    PONV status at discharge: No PONV  Anesthetic complications: no      Cardiovascular status: hemodynamically stable  Respiratory status: nasal cannula  Hydration status: euvolemic  Follow-up not needed.          Vitals Value Taken Time   /70 03/15/24 0935   Temp 36.7 °C (98 °F) 03/15/24 0904   Pulse 90 03/15/24 0940   Resp 45 03/15/24 0940   SpO2 94 % 03/15/24 0938   Vitals shown include unvalidated device data.      Event Time   Out of Recovery 09:35:00         Pain/Fabricio Score: Pain Rating Prior to Med Admin: 8 (3/15/2024  8:15 AM)  Fabricio Score: 10 (3/15/2024  9:30 AM)

## 2024-03-15 NOTE — PLAN OF CARE
Nursing pre-op complete. Pt calm, will continue to monitor. Call light within reach. Pt's friend Enriqueta at bedside. Dentures removed. Pt's belongings placed in locker #2, 1 bag. Dr Villegas aware of pre-op glucose, no new orders noted.

## 2024-03-15 NOTE — ANESTHESIA PROCEDURE NOTES
Right supraclavicular single shot    Patient location during procedure: pre-op   Block not for primary anesthetic.  Reason for block: at surgeon's request and post-op pain management   Post-op Pain Location: Right hand pain   Start time: 3/15/2024 8:16 AM  Timeout: 3/15/2024 8:15 AM   End time: 3/15/2024 8:20 AM    Staffing  Authorizing Provider: Tiny Villegas MD  Performing Provider: Tiny Villegas MD    Staffing  Performed by: Tiny Villegas MD  Authorized by: Tiny Villegas MD    Preanesthetic Checklist  Completed: patient identified, IV checked, site marked, risks and benefits discussed, surgical consent, monitors and equipment checked, pre-op evaluation and timeout performed  Peripheral Block  Patient position: supine  Prep: ChloraPrep  Patient monitoring: heart rate, cardiac monitor, continuous pulse ox, continuous capnometry and frequent blood pressure checks  Block type: supraclavicular  Laterality: right  Injection technique: single shot  Needle  Needle type: Stimuplex   Needle gauge: 22 G  Needle length: 2 in  Needle localization: anatomical landmarks and ultrasound guidance   -ultrasound image captured on disc.  Assessment  Injection assessment: negative aspiration, negative parasthesia and local visualized surrounding nerve  Paresthesia pain: none  Heart rate change: no  Slow fractionated injection: yes  Pain Tolerance: comfortable throughout block and no complaints  Medications:    Medications: ropivacaine (NAROPIN) injection 0.5% - Perineural   30 mL - 3/15/2024 8:18:00 AM    Additional Notes  VSS.  [facility] RN monitoring vitals throughout procedure.  Patient tolerated procedure well.       1:200,000 epi

## 2024-03-15 NOTE — TRANSFER OF CARE
"Anesthesia Transfer of Care Note    Patient: Valarie Foster    Procedure(s) Performed: Procedure(s) (LRB):  RELEASE, CARPAL TUNNEL, ENDOSCOPIC - RIGHT (Right)    Patient location: PACU    Anesthesia Type: general    Transport from OR: Transported from OR on 6-10 L/min O2 by face mask with adequate spontaneous ventilation    Post pain: adequate analgesia    Post assessment: no apparent anesthetic complications and tolerated procedure well    Post vital signs: stable    Level of consciousness: awake    Nausea/Vomiting: no nausea/vomiting    Complications: none    Transfer of care protocol was followed      Last vitals: Visit Vitals  BP (!) 143/70 (BP Location: Left arm, Patient Position: Lying)   Pulse 92   Temp 36.6 °C (97.9 °F) (Oral)   Resp 18   Ht 5' 4" (1.626 m)   Wt 87.1 kg (192 lb)   SpO2 99%   Breastfeeding No   BMI 32.96 kg/m²     "

## 2024-03-15 NOTE — DISCHARGE SUMMARY
[place] [facility] ([facility])  Discharge Note  Short Stay    Procedure(s) (LRB):  RELEASE, CARPAL TUNNEL, ENDOSCOPIC - RIGHT (Right)      OUTCOME: Patient tolerated treatment/procedure well without complication and is now ready for discharge.    DISPOSITION: Home or Self Care    FINAL DIAGNOSIS:  Carpal Tunnel Syndrome, Right    FOLLOWUP: In clinic    DISCHARGE INSTRUCTIONS:    Discharge Procedure Orders   Diet general     Call MD for:  temperature >100.4     Call MD for:  persistent nausea and vomiting     Call MD for:  severe uncontrolled pain     Call MD for:  difficulty breathing, headache or visual disturbances     Call MD for:  redness, tenderness, or signs of infection (pain, swelling, redness, odor or green/yellow discharge around incision site)     Call MD for:  hives     Call MD for:  persistent dizziness or light-headedness     Call MD for:  extreme fatigue        TIME SPENT ON DISCHARGE: 5 minutes

## 2024-03-15 NOTE — ANESTHESIA PREPROCEDURE EVALUATION
03/15/2024  Valarie Foster is a 73 y.o., female     Procedure: RELEASE, CARPAL TUNNEL, ENDOSCOPIC - RIGHT (Right)   Anesthesia type: Regional   Diagnosis: Right carpal tunnel syndrome [G56.01]     Past Medical History:   Diagnosis Date    Arthritis     Colon polyp     Diabetes mellitus     diet controlled    Diabetes with neurologic complications     Hypertension     Nuclear sclerosis of both eyes 2/14/2022    Renal cell carcinoma        Past Surgical History:   Procedure Laterality Date    COLONOSCOPY N/A 2/5/2018    Procedure: COLONOSCOPY;  Surgeon: Bacilio Mancia MD;  Location: [facility] ENDO;  Service: Endoscopy;  Laterality: N/A;  appt confirmed-ss    COLONOSCOPY N/A 8/13/2020    Procedure: COLONOSCOPY;  Surgeon: Jose West MD;  Location: [facility] ENDO;  Service: Endoscopy;  Laterality: N/A;    HYSTERECTOMY      INTRAOCULAR PROSTHESES INSERTION Left 7/7/2022    Procedure: INSERTION, IOL PROSTHESIS;  Surgeon: Candelario Novoa MD;  Location: [facility] OR;  Service: Ophthalmology;  Laterality: Left;    INTRAOCULAR PROSTHESES INSERTION Right 7/21/2022    Procedure: INSERTION, IOL PROSTHESIS;  Surgeon: Candelario Novoa MD;  Location: [facility] OR;  Service: Ophthalmology;  Laterality: Right;    OOPHORECTOMY      PARTIAL NEPHRECTOMY Right 10/12/2018    Procedure: NEPHRECTOMY, PARTIAL open. Dr Arenas to assist.;  Surgeon: Alejandra Palm MD;  Location: [facility] OR;  Service: Urology;  Laterality: Right;  RN PREOP 10/9/2018----NEEDS H/P    PHACOEMULSIFICATION OF CATARACT Left 7/7/2022    Procedure: PHACOEMULSIFICATION, CATARACT;  Surgeon: Candelario Nvooa MD;  Location: [facility] OR;  Service: Ophthalmology;  Laterality: Left;  RN PHONE PREOP 6/27/2022   ARRIVAL 6:00 AM    PHACOEMULSIFICATION OF CATARACT Right 7/21/2022    Procedure: PHACOEMULSIFICATION, CATARACT;  Surgeon: Candelario Novoa MD;   Location: [facility] OR;  Service: Ophthalmology;  Laterality: Right;  RN PHONE PREOP 7/12/2022   ARRIVAL 12:00 NOON    TOTAL KNEE ARTHROPLASTY Right 11/8/2023    Procedure: ARTHROPLASTY, KNEE, TOTAL. NELSON;  Surgeon: Eric Carbajal MD;  Location: [facility] OR;  Service: Orthopedics;  Laterality: Right;  [place]. Admit  BRADLEY NIEVES [phone] NOTIFIED QUINTIN ON 10/11/2023-LO  RN PREOP 10/25/2023   T/S ON 11/6/2023--done------CLEARED BY CARDS           Pre-op Assessment    I have reviewed the Patient Summary Reports.     I have reviewed the Nursing Notes. I have reviewed the NPO Status.   I have reviewed the Medications.     Review of Systems  Anesthesia Hx:  No problems with previous Anesthesia             Denies Family Hx of Anesthesia complications.    Denies Personal Hx of Anesthesia complications.                    Social:  Non-Smoker, No Alcohol Use       Hematology/Oncology:  Hematology Normal                       --  Cancer in past history:                 Oncology Comments: Right partial nephrectomy 2018     EENT/Dental:  EENT/Dental Normal           Cardiovascular:     Hypertension           hyperlipidemia                             Pulmonary:  Pulmonary Normal                       Renal/:  Chronic Renal Disease                Hepatic/GI:     GERD             Musculoskeletal:  Arthritis   R knee            Neurological:    Denies CVA. Neuromuscular Disease,   Denies Seizures.                                Endocrine:  Diabetes, type 2         Obesity / BMI > 30  Psych:  Psychiatric History  depression                Physical Exam  General: Well nourished, Cooperative, Alert and Oriented    Airway:  Mouth Opening: Normal  TM Distance: Normal  Tongue: Normal  Neck ROM: Normal ROM    Dental:  Intact    Chest/Lungs:  Normal Respiratory Rate    Heart:  Rate: Normal  Rhythm: Regular Rhythm  Sounds: Normal        Anesthesia Plan  Type of Anesthesia, risks & benefits discussed:    Anesthesia Type: Gen  Natural Airway, Regional  Intra-op Monitoring Plan: Standard ASA Monitors  Post Op Pain Control Plan: multimodal analgesia, peripheral nerve block and IV/PO Opioids PRN  Induction:  IV  Informed Consent: Informed consent signed with the Patient and all parties understand the risks and agree with anesthesia plan.  All questions answered.   ASA Score: 2    Ready For Surgery From Anesthesia Perspective.     .

## 2024-03-19 ENCOUNTER — CLINICAL SUPPORT (OUTPATIENT)
Dept: FAMILY MEDICINE | Facility: CLINIC | Age: 74
End: 2024-03-19
Payer: MEDICARE

## 2024-03-19 VITALS — HEART RATE: 90 BPM | SYSTOLIC BLOOD PRESSURE: 132 MMHG | DIASTOLIC BLOOD PRESSURE: 68 MMHG

## 2024-03-19 DIAGNOSIS — I10 BENIGN HYPERTENSION: Primary | ICD-10-CM

## 2024-03-19 NOTE — PROGRESS NOTES
Valarie Foster 73 y.o. female is here today for Blood Pressure check.   History of HTN yes.    Review of patient's allergies indicates:   Allergen Reactions    Lisinopril Swelling and Shortness Of Breath    Ascorbic acid-ascorbate calc      And citrus fruits     Creatinine   Date Value Ref Range Status   10/25/2023 0.8 0.5 - 1.4 mg/dL Final     Sodium   Date Value Ref Range Status   10/25/2023 139 136 - 145 mmol/L Final     Potassium   Date Value Ref Range Status   10/25/2023 4.3 3.5 - 5.1 mmol/L Final   ]  Patient verifies taking blood pressure medications on a regular basis at the same time of the day.     Current Outpatient Medications:     ACCU-CHEK ONEIL PLUS TEST STRP Strp, TEST BLOOD SUGAR TWICE DAILY, Disp: 200 strip, Rfl: 1    ACCU-CHEK SOFTCLIX LANCETS [facility] [place], USE TO TEST BLOOD SUGAR ONE TIME DAILY, Disp: 100 each, Rfl: 3    acetaminophen (TYLENOL) 500 MG tablet, Take 2 tablets (1,000 mg total) by mouth 3 (three) times daily. for 10 days, Disp: 60 tablet, Rfl: 0    alcohol swabs (DROPSAFE ALCOHOL PREP PADS) PadM, USE 3 EVERY DAY AS DIRECTED, Disp: 200 each, Rfl: 3    amLODIPine (NORVASC) 10 MG tablet, TAKE 1 TABLET ONE TIME DAILY, Disp: 90 tablet, Rfl: 3    aspirin (ECOTRIN) 81 MG EC tablet, Take 1 tablet (81 mg total) by mouth 2 (two) times a day., Disp: 60 tablet, Rfl: 0    atorvastatin (LIPITOR) 40 MG tablet, , Disp: 90 tablet, Rfl: 0    blood glucose control high,low (ACCU-CHEK ONEIL CONTROL SOLN) Soln, 1 Units by Misc.(Non-Drug; Combo Route) route as needed., Disp: 1 each, Rfl: 0    blood glucose control, low (TRUE METRIX LEVEL 1) Soln, 1 each by Misc.(Non-Drug; Combo Route) route once as needed., Disp: 1 each, Rfl: 3    blood-glucose meter (ACCU-CHEK ONEIL PLUS METER) Misc, 1 kit by Misc.(Non-Drug; Combo Route) route once daily., Disp: 1 each, Rfl: 0    blood-glucose meter (TRUE METRIX GLUCOSE METER) kit, Use as directed to test glucose, Disp: 1 each, Rfl: 0    cetirizine (ZYRTEC) 10 MG tablet, Take 1  tablet (10 mg total) by mouth once daily., Disp: 90 tablet, Rfl: 0    ciclopirox (PENLAC) 8 % Soln, Apply topically nightly., Disp: 6.6 mL, Rfl: 11    diphenhydrAMINE (BENADRYL) 25 mg capsule, Take 1 capsule (25 mg total) by mouth every 6 (six) hours as needed for Itching., Disp: 20 capsule, Rfl: 0    docusate sodium (COLACE) 100 MG capsule, Take 1 capsule (100 mg total) by mouth 2 (two) times daily., Disp: 30 capsule, Rfl: 0    ergocalciferol (ERGOCALCIFEROL) 50,000 unit Cap, TAKE 1 CAPSULE EVERY 7 DAYS., Disp: 12 capsule, Rfl: 1    FLUoxetine 20 MG capsule, TAKE 1 CAPSULE EVERY DAY, Disp: 90 capsule, Rfl: 2    fluticasone propionate (FLONASE) 50 mcg/actuation nasal spray, USE 1 SPRAY IN EACH NOSTRIL ONCE DAILY, Disp: 32 g, Rfl: 2    hydrOXYzine HCL (ATARAX) 25 MG tablet, Take 1 tablet (25 mg total) by mouth 3 (three) times daily as needed for Itching., Disp: 90 tablet, Rfl: 3    ibuprofen (ADVIL,MOTRIN) 600 MG tablet, Take 1 tablet (600 mg total) by mouth 3 (three) times daily. for 10 days, Disp: 30 tablet, Rfl: 0    metFORMIN (GLUCOPHAGE) 500 MG tablet, Take 1 tablet (500 mg total) by mouth 2 (two) times daily with meals., Disp: 180 tablet, Rfl: 3    metoprolol succinate (TOPROL-XL) 25 MG 24 hr tablet, Take 1 tablet (25 mg total) by mouth once daily., Disp: 90 tablet, Rfl: 3    ondansetron (ZOFRAN-ODT) 8 MG TbDL, Take 1 tablet (8 mg total) by mouth every 8 (eight) hours as needed (nausea)., Disp: 30 tablet, Rfl: 0    pantoprazole (PROTONIX) 40 MG tablet, TAKE 1 TABLET EVERY DAY, Disp: 90 tablet, Rfl: 3    pregabalin (LYRICA) 50 MG capsule, Take 1 capsule (50 mg total) by mouth 2 (two) times daily., Disp: 60 capsule, Rfl: 2    traMADoL (ULTRAM) 50 mg tablet, Take 1 tablet (50 mg total) by mouth every 6 (six) hours., Disp: 20 tablet, Rfl: 0    TRUEPLUS LANCETS 33 gauge Misc, TEST BLOOD SUGAR TWICE DAILY, Disp: 200 each, Rfl: 1  No current facility-administered medications for this visit.    Facility-Administered  Medications Ordered in Other Visits:     acetaminophen tablet 1,000 mg, 1,000 mg, Oral, On Call Procedure, Eric Carbajal MD    acetaminophen tablet 1,000 mg, 1,000 mg, Oral, Q6H, Eric Carbajal MD, 1,000 mg at 11/09/23 0506    aspirin EC tablet 81 mg, 81 mg, Oral, BID, Eric Carbajal MD, 81 mg at 11/09/23 1358    famotidine tablet 20 mg, 20 mg, Oral, BID, Eric Carbajal MD, 20 mg at 11/09/23 1357    LIDOcaine (PF) 10 mg/ml (1%) injection 10 mg, 1 mL, Intradermal, On Call Procedure, Eric Carbajal MD    methocarbamoL tablet 750 mg, 750 mg, Oral, TID, Eric aCrbajal MD, 750 mg at 11/09/23 1357    naloxone 0.4 mg/mL injection 0.02 mg, 0.02 mg, Intravenous, PRN, Eric Carbajal MD    ondansetron injection 4 mg, 4 mg, Intravenous, Q8H PRN, Eric Carbajal MD, 4 mg at 11/09/23 0858    oxyCODONE immediate release tablet 10 mg, 10 mg, Oral, Q3H PRN, Eric Carbajal MD, 10 mg at 11/09/23 1043    oxyCODONE immediate release tablet 5 mg, 5 mg, Oral, Q3H PRN, Eric Carbajal MD    polyethylene glycol packet 17 g, 17 g, Oral, Daily, Eric Carbajal MD, 17 g at 11/09/23 1356    pregabalin capsule 75 mg, 75 mg, Oral, QHS, Eric Carbajal MD, 75 mg at 11/08/23 2147    prochlorperazine injection Soln 5 mg, 5 mg, Intravenous, Q6H PRN, Eric Carbajal MD, 5 mg at 11/08/23 1325    senna-docusate 8.6-50 mg per tablet 1 tablet, 1 tablet, Oral, BID, Eric Carbajal MD, 1 tablet at 11/09/23 1358    sodium chloride 0.9% flush 10 mL, 10 mL, Intravenous, PRN, Eric Carbajal MD  Does patient have record of home blood pressure readings no.   Last dose of blood pressure medication was taken at 6 am.  Patient is asymptomatic.     BP: 132/68 , Pulse: 90 .    Dr. Hernandez notified.

## 2024-03-19 NOTE — OP NOTE
ORTHOPEDIC SURGERY OPERATIVE NOTE    SERVICE: ORTHOPEDICS     DATE OF PROCEDURE: 3/15/2024     SURGEON: Tonio Mcdermott M.D.    ASSISTANT: MD Fozia    PREOPERATIVE DIAGNOSIS: Right sided carpal tunnel syndrome    POSTOPERATIVE DIAGNOSIS: Right sided carpal tunnel syndrome    PROCEDURE PERFORMED: Endoscopic Right sided carpal tunnel release, CPT code 22148    ANESTHESIA: Regional/MAC    ESTIMATED BLOOD LOSS: Minimal           SPECIMENS: None    COMPLICATIONS: None              CONDITION: Stable    IV FLUIDS: Crystalloid per anesthesia    IMPLANTS: None    TOURNIQUET TIME: 12 minutes at 250 mmHg    INDICATIONS FOR PROCEDURE: Valarie Foster is a 73 y.o. female who presented to clinic with findings and workup consistent with carpal tunnel syndrome of the right hand.  The patient had not responded to nonoperative management and wished to proceed with surgical intervention.  The risks, benefits and alternatives to surgery were discussed with the patient at great length and in great detail.  Specific risks discussed include but are not limited to bleeding, infection, vessel damage, nerve damage, pain, numbness, tingling, weakness, stiffness, complex regional pain syndrome, hardware/surgical failure, need for further surgery, DVT, PE, arthritis, scar sensitivity, delayed healing, need for prolonged postoperative rehabilitation, and death amongst others.  The patient stated an understanding of the risks and wished to proceed with surgery.   All questions were answered.  No guarantees were implied or stated.  Informed consent was obtained.    PROCEDURE IN DETAIL: With the patient's participation in the preoperative holding area, the right upper extremity was marked as the surgical site. Preoperative checks were completed and consents were verified.  Regional anesthesia was administered.  The patient was brought to the Operating Room and placed in supine position.  A well-padded nonsterile tourniquet was placed on the upper  arm.  The right arm was then prepped and draped in the usual sterile fashion.  Timeout was called for to verify the correct site, procedure and patient.  All were in agreement and we proceeded.  MAC anesthesia was introduced.  Esmarch was utilized to exsanguinate the arm and tourniquet was insufflated to 250mmHg where it remained for the duration of the procedure.    Next, a small 1 cm transverse incision was made in line with a proximal wrist flexion crease beginning radially at the palmaris longus and extending ulnarly for 1 cm.  Dissection was continued to level of antebrachial fascia.  This was transversely incised in line with the skin incision.  A narrow double skin hook was then utilized to elevate the distal most aspect of the incision to gain access to the carpal tunnel.  A series of small and large dilators were utilized to dilate our planned path with the endoscope.  A synovial elevator was then utilized to free synovium from the undersurface of the transverse carpal ligament.  Washboard effect was confirmed.  At this time, the Arthrex endoscopic carpal tunnel system was introduced into the incision.  Confirmation of placement within the carpal tunnel was confirmed.  The endoscope was advanced the distal aspect of the transverse carpal ligament and the distal fat was visualized.  The median nerve was identified radial to the endoscope and was protected throughout the duration of the procedure.  Excellent visualization of the transverse carpal ligament along its entire length was confirmed with no interposed soft tissue.  I then began my division of the transverse carpal ligament from distally and extending in a proximal direction.  The knife was deployed and complete full-thickness transection of the transverse carpal ligament was performed beginning distally and working my way more proximally.  Complete division of the ligament was performed. Care was taken distally to ensure not to cut past [place]  Cardinal Line or injure the superficial palmar arch. The distal fat was visualized at the distalmost aspect of the ligament division. Tension and pressure within the carpal tunnel was dramatically improved and decreased status post ligament division.  The endoscope was then once again advanced more distally and completion of the division was confirmed.  Fat protruded through the divided ligament throughout.  The median nerve was identified along the length of our ligament division and remained intact and uninjured.  The endoscope was then removed from the wound and the antebrachial forearm fascia was divided in line with the ligament division by hand with tenotomy scissors.  The wound was then irrigated with copious amounts of sterile saline.  Skin was closed with 4 0 nylon suture.        Sterile dressings were applied consisting of Xeroform 4 x 4 gauze cast padding and 2 in Ace wrap to the hand.  Tourniquet was deflated and brisk cap refill in [place] throughout the operative upper extremity.  There was no significant bleeding.      DISPOSITION: The patient will be discharged home with followup in approximately 2 weeks for suture removal.  Early active range of motion in the acute postoperative period was discussed and encouraged.  They are to keep the dressing clean, dry, and intact until that time.

## 2024-03-20 RX ORDER — ERGOCALCIFEROL 1.25 MG/1
CAPSULE ORAL
Qty: 12 CAPSULE | Refills: 3 | Status: SHIPPED | OUTPATIENT
Start: 2024-03-20

## 2024-03-20 NOTE — TELEPHONE ENCOUNTER
No care due was identified.  [facility] Embedded Care Due Messages. Reference number: [id number].   3/20/2024 11:56:33 AM CDT

## 2024-03-26 ENCOUNTER — OFFICE VISIT (OUTPATIENT)
Dept: ORTHOPEDICS | Facility: CLINIC | Age: 74
End: 2024-03-26
Payer: MEDICARE

## 2024-03-26 DIAGNOSIS — G56.01 RIGHT CARPAL TUNNEL SYNDROME: Primary | ICD-10-CM

## 2024-03-26 DIAGNOSIS — Z98.890 POSTOPERATIVE STATE: ICD-10-CM

## 2024-03-26 PROCEDURE — 1159F MED LIST DOCD IN RCRD: CPT | Mod: CPTII,S$GLB,, | Performed by: PHYSICIAN ASSISTANT

## 2024-03-26 PROCEDURE — 1125F AMNT PAIN NOTED PAIN PRSNT: CPT | Mod: CPTII,S$GLB,, | Performed by: PHYSICIAN ASSISTANT

## 2024-03-26 PROCEDURE — 3288F FALL RISK ASSESSMENT DOCD: CPT | Mod: CPTII,S$GLB,, | Performed by: PHYSICIAN ASSISTANT

## 2024-03-26 PROCEDURE — 1101F PT FALLS ASSESS-DOCD LE1/YR: CPT | Mod: CPTII,S$GLB,, | Performed by: PHYSICIAN ASSISTANT

## 2024-03-26 PROCEDURE — 99024 POSTOP FOLLOW-UP VISIT: CPT | Mod: S$GLB,,, | Performed by: PHYSICIAN ASSISTANT

## 2024-03-26 PROCEDURE — 99999 PR PBB SHADOW E&M-EST. PATIENT-LVL III: CPT | Mod: PBBFAC,,, | Performed by: PHYSICIAN ASSISTANT

## 2024-03-26 NOTE — PROGRESS NOTES
Dr. Mcdermott is the supervising physician for this encounter/patient    Valarie Foster presents for post-operative evaluation.  The patient is now 2 weeks s/p right endoscopic carpal tunnel release with Dr. Mcdermott on 3/15/24.  Overall the patient reports doing well.  She reports the same numbness/tingling as preop. Pain otherwise tolerable. Denies any f/c/s. ACE removed and sister used coban for dressing last week.    PE:    AA&O x 4.  NAD  HEENT:  NCAT, sclera nonicteric  Lungs:  Respirations are equal and unlabored.  CV:  2+ bilateral upper and lower extremity pulses.  MSK: The wound is healing well with no signs of erythema or warmth.  There is no drainage.  No clinical signs or symptoms of infection are present.  Full hand/wrist motion present. SILT. DNVI.    A/P: Status post above, doing well  1) Continue with activity as tolerated, she declines OT referral.  2) All sutures removed today. Wound care and signs of infection discussed. Scar massage discussed.  3) F/U as needed.  4) Call with any questions/concerns in the interim      Jamie Russo-DiGeorge PA-C

## 2024-04-12 ENCOUNTER — TELEPHONE (OUTPATIENT)
Dept: GASTROENTEROLOGY | Facility: CLINIC | Age: 74
End: 2024-04-12
Payer: MEDICARE

## 2024-04-12 NOTE — TELEPHONE ENCOUNTER
I left a message for this patient in reference of needing assistance with setting up an appointment.

## 2024-04-12 NOTE — TELEPHONE ENCOUNTER
----- Message from Liliam Cormier sent at 4/11/2024  4:08 PM CDT -----  Regarding: self  [phone]  .Type: Patient Call Back    Who called:self    What is the request in detail: patient requesting to schedule an olivia with office for stomach pain/bloating.    Can the clinic reply by [facility]?no    Would the patient rather a call back or a response via [facility]?call back    Best call back number [phone]

## 2024-04-16 RX ORDER — METOPROLOL SUCCINATE 25 MG/1
25 TABLET, EXTENDED RELEASE ORAL
Qty: 90 TABLET | Refills: 3 | Status: SHIPPED | OUTPATIENT
Start: 2024-04-16

## 2024-04-30 ENCOUNTER — OFFICE VISIT (OUTPATIENT)
Dept: OPTOMETRY | Facility: CLINIC | Age: 74
End: 2024-04-30
Payer: MEDICARE

## 2024-04-30 DIAGNOSIS — E11.9 TYPE 2 DIABETES MELLITUS WITHOUT COMPLICATION, WITHOUT LONG-TERM CURRENT USE OF INSULIN: Primary | ICD-10-CM

## 2024-04-30 DIAGNOSIS — H52.7 REFRACTIVE ERROR: ICD-10-CM

## 2024-04-30 DIAGNOSIS — Z96.1 PSEUDOPHAKIA: ICD-10-CM

## 2024-04-30 DIAGNOSIS — H10.13 ALLERGIC CONJUNCTIVITIS, BILATERAL: ICD-10-CM

## 2024-04-30 PROBLEM — H25.11 NUCLEAR SCLEROTIC CATARACT OF RIGHT EYE: Status: RESOLVED | Noted: 2022-07-21 | Resolved: 2024-04-30

## 2024-04-30 PROBLEM — H25.13 NUCLEAR SCLEROSIS OF BOTH EYES: Status: RESOLVED | Noted: 2022-02-14 | Resolved: 2024-04-30

## 2024-04-30 PROBLEM — H25.12 NUCLEAR SCLEROTIC CATARACT OF LEFT EYE: Status: RESOLVED | Noted: 2022-07-07 | Resolved: 2024-04-30

## 2024-04-30 PROCEDURE — 3288F FALL RISK ASSESSMENT DOCD: CPT | Mod: HCNC,CPTII,S$GLB, | Performed by: OPTOMETRIST

## 2024-04-30 PROCEDURE — 1126F AMNT PAIN NOTED NONE PRSNT: CPT | Mod: HCNC,CPTII,S$GLB, | Performed by: OPTOMETRIST

## 2024-04-30 PROCEDURE — 92014 COMPRE OPH EXAM EST PT 1/>: CPT | Mod: HCNC,S$GLB,, | Performed by: OPTOMETRIST

## 2024-04-30 PROCEDURE — 1160F RVW MEDS BY RX/DR IN RCRD: CPT | Mod: HCNC,CPTII,S$GLB, | Performed by: OPTOMETRIST

## 2024-04-30 PROCEDURE — 92015 DETERMINE REFRACTIVE STATE: CPT | Mod: HCNC,S$GLB,, | Performed by: OPTOMETRIST

## 2024-04-30 PROCEDURE — 2023F DILAT RTA XM W/O RTNOPTHY: CPT | Mod: HCNC,CPTII,S$GLB, | Performed by: OPTOMETRIST

## 2024-04-30 PROCEDURE — 1101F PT FALLS ASSESS-DOCD LE1/YR: CPT | Mod: HCNC,CPTII,S$GLB, | Performed by: OPTOMETRIST

## 2024-04-30 PROCEDURE — 1159F MED LIST DOCD IN RCRD: CPT | Mod: HCNC,CPTII,S$GLB, | Performed by: OPTOMETRIST

## 2024-04-30 PROCEDURE — 99999 PR PBB SHADOW E&M-EST. PATIENT-LVL I: CPT | Mod: PBBFAC,HCNC,, | Performed by: OPTOMETRIST

## 2024-04-30 NOTE — PROGRESS NOTES
Subjective:       Patient ID: Valarie Foster is a 73 y.o. female      Chief Complaint   Patient presents with    Concerns About Ocular Health    Diabetic Eye Exam     History of Present Illness  Dls: 8/19/22 Dr. Novoa     72 y/o female presents today for diabetic eye exam.   Pt c/o pain 5-6 off/on ou blurry vision at distance and near ou.  Pt wears bifocal glasses.      today     + ou tearing  + ou itching  No burning  No pain  + ha's  + ou off/on floaters  No flashes    Eye meds  None    Pohx:   S/p CE Bilateral 2022     Fohx:   Glaucoma - father, sister     Hemoglobin A1C       Date                     Value               Ref Range             Status                10/25/2023               7.4 (H)             4.0 - 5.6 %           Final                  04/29/2023               7.2 (H)             4.0 - 5.6 %           Final                  10/31/2022               7.2 (H)             4.0 - 5.6 %           Final                 Assessment/Plan:     1. Type 2 diabetes mellitus without complication, without long-term current use of insulin  No diabetic retinopathy. Discussed with pt the effects of diabetes on vision, importance of good blood sugar control, compliance with meds, and follow up care with PCP. Return in 1 year for dilated eye exam, sooner PRN.    2. Allergic conjunctivitis, bilateral  Recommend Pataday or Zaditor OU PRN for itchiness.  Discussed that oral anti-histamines can increase MICA and to supplement with AT (refresh/systane) PRN.    3. Pseudophakia  Well-centered. Clear.     4. Refractive error  Educated patient on refractive error and discussed lens options. Dispensed updated spectacle Rx. Educated about adaptation period to new specs.    Eyeglass Final Rx       Eyeglass Final Rx         Sphere Cylinder Axis Add    Right -0.75 +0.75 050 +2.75    Left +0.25 +1.00 160 +2.75      Expiration Date: 4/30/2025    Comments: ANTIMETROPIA                          No follow-ups on file.

## 2024-05-15 NOTE — TELEPHONE ENCOUNTER
Care Due:                  Date            Visit Type   Department     Provider  --------------------------------------------------------------------------------                                             [facility]                                           MED/ INTERNAL  Last Visit: 10-      PRE-OP       MED/ PEDS      Zack Myers  Next Visit: None Scheduled  None         None Found                                                            Last  Test          Frequency    Reason                     Performed    Due Date  --------------------------------------------------------------------------------    Vitamin D...  12 months..  ergocalciferol...........  Not Found    Overdue    [employer] Embedded Care Due Messages. Reference number: [id number].   5/15/2024 11:31:00 AM CDT

## 2024-05-20 RX ORDER — FLUOXETINE HYDROCHLORIDE 20 MG/1
CAPSULE ORAL
Qty: 90 CAPSULE | Refills: 3 | Status: SHIPPED | OUTPATIENT
Start: 2024-05-20

## 2024-05-23 NOTE — TELEPHONE ENCOUNTER
No care due was identified.  [facility] Embedded Care Due Messages. Reference number: [id number].   5/23/2024 10:48:48 AM CDT

## 2024-05-24 RX ORDER — ISOPROPYL ALCOHOL 70 ML/100ML
SWAB TOPICAL
Qty: 100 EACH | Refills: 3 | Status: SHIPPED | OUTPATIENT
Start: 2024-05-24

## 2024-05-29 ENCOUNTER — TELEPHONE (OUTPATIENT)
Dept: ORTHOPEDICS | Facility: CLINIC | Age: 74
End: 2024-05-29
Payer: MEDICARE

## 2024-05-29 NOTE — TELEPHONE ENCOUNTER
s/p right carpal tunnel 3.15.24  numbness/tingling of left wrist  EMG moderate R>L  - scheduled appt with Dr. Mcdermott    Left shoulder pain - scheduled appointment

## 2024-06-13 DIAGNOSIS — M25.512 LEFT SHOULDER PAIN, UNSPECIFIED CHRONICITY: Primary | ICD-10-CM

## 2024-06-14 ENCOUNTER — HOSPITAL ENCOUNTER (OUTPATIENT)
Dept: RADIOLOGY | Facility: HOSPITAL | Age: 74
Discharge: HOME | End: 2024-06-14
Attending: ORTHOPAEDIC SURGERY
Payer: MEDICARE

## 2024-06-14 ENCOUNTER — OFFICE VISIT (OUTPATIENT)
Dept: ORTHOPEDICS | Facility: CLINIC | Age: 74
End: 2024-06-14
Payer: MEDICARE

## 2024-06-14 VITALS — WEIGHT: 192 LBS | BODY MASS INDEX: 32.78 KG/M2 | HEIGHT: 64 IN

## 2024-06-14 DIAGNOSIS — M25.512 LEFT SHOULDER PAIN, UNSPECIFIED CHRONICITY: ICD-10-CM

## 2024-06-14 DIAGNOSIS — L29.9 PRURITIC CONDITION: ICD-10-CM

## 2024-06-14 DIAGNOSIS — G89.29 CHRONIC RIGHT SHOULDER PAIN: ICD-10-CM

## 2024-06-14 DIAGNOSIS — M25.511 RIGHT SHOULDER PAIN, UNSPECIFIED CHRONICITY: ICD-10-CM

## 2024-06-14 DIAGNOSIS — M25.511 CHRONIC RIGHT SHOULDER PAIN: ICD-10-CM

## 2024-06-14 DIAGNOSIS — M25.511 RIGHT SHOULDER PAIN, UNSPECIFIED CHRONICITY: Primary | ICD-10-CM

## 2024-06-14 PROCEDURE — 99999 PR PBB SHADOW E&M-EST. PATIENT-LVL V: CPT | Mod: PBBFAC,,, | Performed by: ORTHOPAEDIC SURGERY

## 2024-06-14 PROCEDURE — 1125F AMNT PAIN NOTED PAIN PRSNT: CPT | Mod: CPTII,S$GLB,, | Performed by: ORTHOPAEDIC SURGERY

## 2024-06-14 PROCEDURE — 1159F MED LIST DOCD IN RCRD: CPT | Mod: CPTII,S$GLB,, | Performed by: ORTHOPAEDIC SURGERY

## 2024-06-14 PROCEDURE — 3288F FALL RISK ASSESSMENT DOCD: CPT | Mod: CPTII,S$GLB,, | Performed by: ORTHOPAEDIC SURGERY

## 2024-06-14 PROCEDURE — 73030 X-RAY EXAM OF SHOULDER: CPT | Mod: 26,HCNC,RT, | Performed by: RADIOLOGY

## 2024-06-14 PROCEDURE — 1101F PT FALLS ASSESS-DOCD LE1/YR: CPT | Mod: CPTII,S$GLB,, | Performed by: ORTHOPAEDIC SURGERY

## 2024-06-14 PROCEDURE — 1160F RVW MEDS BY RX/DR IN RCRD: CPT | Mod: CPTII,S$GLB,, | Performed by: ORTHOPAEDIC SURGERY

## 2024-06-14 PROCEDURE — 99213 OFFICE O/P EST LOW 20 MIN: CPT | Mod: S$GLB,,, | Performed by: ORTHOPAEDIC SURGERY

## 2024-06-14 PROCEDURE — 73030 X-RAY EXAM OF SHOULDER: CPT | Mod: TC,HCNC,RT

## 2024-06-14 RX ORDER — HYDROXYZINE HYDROCHLORIDE 25 MG/1
25 TABLET, FILM COATED ORAL
Qty: 90 TABLET | Refills: 0 | Status: SHIPPED | OUTPATIENT
Start: 2024-06-14

## 2024-06-17 ENCOUNTER — OFFICE VISIT (OUTPATIENT)
Dept: ORTHOPEDICS | Facility: CLINIC | Age: 74
End: 2024-06-17
Payer: MEDICARE

## 2024-06-17 ENCOUNTER — ANESTHESIA EVENT (OUTPATIENT)
Dept: SURGERY | Facility: HOSPITAL | Age: 74
End: 2024-06-17
Payer: MEDICARE

## 2024-06-17 VITALS — HEIGHT: 64 IN | BODY MASS INDEX: 32.78 KG/M2 | WEIGHT: 192 LBS

## 2024-06-17 DIAGNOSIS — G56.02 CARPAL TUNNEL SYNDROME OF LEFT WRIST: Primary | ICD-10-CM

## 2024-06-17 DIAGNOSIS — G56.00 CTS (CARPAL TUNNEL SYNDROME): ICD-10-CM

## 2024-06-17 PROCEDURE — 1159F MED LIST DOCD IN RCRD: CPT | Mod: CPTII,S$GLB,, | Performed by: ORTHOPAEDIC SURGERY

## 2024-06-17 PROCEDURE — 1125F AMNT PAIN NOTED PAIN PRSNT: CPT | Mod: CPTII,S$GLB,, | Performed by: ORTHOPAEDIC SURGERY

## 2024-06-17 PROCEDURE — 3288F FALL RISK ASSESSMENT DOCD: CPT | Mod: CPTII,S$GLB,, | Performed by: ORTHOPAEDIC SURGERY

## 2024-06-17 PROCEDURE — 99999 PR PBB SHADOW E&M-EST. PATIENT-LVL V: CPT | Mod: PBBFAC,,, | Performed by: ORTHOPAEDIC SURGERY

## 2024-06-17 PROCEDURE — 1101F PT FALLS ASSESS-DOCD LE1/YR: CPT | Mod: CPTII,S$GLB,, | Performed by: ORTHOPAEDIC SURGERY

## 2024-06-17 PROCEDURE — 99214 OFFICE O/P EST MOD 30 MIN: CPT | Mod: S$GLB,,, | Performed by: ORTHOPAEDIC SURGERY

## 2024-06-17 RX ORDER — CEFAZOLIN SODIUM 2 G/50ML
2 SOLUTION INTRAVENOUS
OUTPATIENT
Start: 2024-06-17

## 2024-06-17 RX ORDER — MUPIROCIN 20 MG/G
OINTMENT TOPICAL
OUTPATIENT
Start: 2024-06-17

## 2024-06-17 NOTE — H&P
Attestation signed by Tonio Mcdermott MD at 6/17/2024  8:59 AM     I have seen the patient, reviewed the Resident's history and physical. I have personally interviewed and examined the patient at bedside and agree with the findings.      Pt doing well s/p right ECTR with some pillar pain.  Left hand really bothering her with constant symptoms.  Left EvOCTR 6/26/2024.  (+) Tinels left wrist, decreased LTS at baseline.     The patient has not responded to adequate non operative treatment at this time and/or non operative treatment is not indicated. Thus, the risks, benefits and alternatives to surgery were discussed with the patient in detail.  Specific risks include but are not limited to bleeding, infection, vessel and/or nerve damage, pain, numbness, tingling, complex regional pain syndrome, compartment syndrome, failure to return to pre-injury and/or preoperative functional status, scar sensitivity, delayed healing, inability to return to work, pulley injury, tendon injury, bowstringing, partial and/or incomplete relief of symptoms, weakness, persistence of and/or worsening of symptoms, surgical failure, osteomyelitis, amputation, loss of function, stiffness, functional debility, dysfunction, decreased  strength, need for prolonged postoperative rehabilitation, need for further surgery, deep venous thrombosis, pulmonary embolism, arthritis and death.  The patient states an understanding and wishes to proceed with surgery.   All questions were answered.  No guarantees were implied or stated.  Written informed consent was obtained.        Tonio Mcdermott MD  Hand Surgery  [facility]            Expand All Collapse All    [facility]  History & Physical  Orthopedics     SUBJECTIVE:       COVID-19 attestation:  This patient was treated during the COVID-19 pandemic.  This was discussed with the patient, they are aware of our current policies and procedures, were given the option of  delaying their visit and or switching to a virtual visit, delaying their surgery when applicable, and they elect to proceed.     Chief Complaint: R CTR 3/15/24, left CTS     Referring Provider: No ref. provider found      History of Present Illness:  Patient is a 74 y.o. right hand dominant female who presents today with complaints of left hand numbness/tingling. She had a right carpal tunnel release on the 3/15/24 and she is still having some pain in the ulnar aspect of the palm. She does say that the numbness and tingling in the right hand is improved. However, she said the left hand bothers her more and is consistently numb and tingly and she is constantly dropping things with the left hand. She denies any new symptoms.       The patient is a/an retired.     Onset of symptoms/DOI was >1year.     Symptoms are aggravated by activity, driving, and at night.     Symptoms are alleviated by rest.     Symptoms consist of pain and numbness/tingling.     The patient rates their pain as a 10/10.     Attempted treatment(s) and/or interventions include activity modifications, rest, rest and surgical intervention.     The patient denies any fevers, chills, N/V, D/C and presents for evaluation.             Past Medical History:   Diagnosis Date    Arthritis      Colon polyp      Diabetes mellitus       diet controlled    Diabetes with neurologic complications      Hypertension      Nuclear sclerosis of both eyes 2/14/2022    Renal cell carcinoma              Past Surgical History:   Procedure Laterality Date    COLONOSCOPY N/A 2/5/2018     Procedure: COLONOSCOPY;  Surgeon: Bacilio Mancia MD;  Location: [facility];  Service: Endoscopy;  Laterality: N/A;  appt confirmed-ss    COLONOSCOPY N/A 8/13/2020     Procedure: COLONOSCOPY;  Surgeon: Jose West MD;  Location: [facility] ENDO;  Service: Endoscopy;  Laterality: N/A;    ENDOSCOPIC CARPAL TUNNEL RELEASE Right 3/15/2024     Procedure: RELEASE, CARPAL TUNNEL, ENDOSCOPIC -  RIGHT;  Surgeon: Tonio Mcdermott MD;  Location: [facility] OR;  Service: Orthopedics;  Laterality: Right;    HYSTERECTOMY        INTRAOCULAR PROSTHESES INSERTION Left 7/7/2022     Procedure: INSERTION, IOL PROSTHESIS;  Surgeon: Candelario Novoa MD;  Location: [facility] OR;  Service: Ophthalmology;  Laterality: Left;    INTRAOCULAR PROSTHESES INSERTION Right 7/21/2022     Procedure: INSERTION, IOL PROSTHESIS;  Surgeon: Candelario Novoa MD;  Location: [facility] OR;  Service: Ophthalmology;  Laterality: Right;    OOPHORECTOMY        PARTIAL NEPHRECTOMY Right 10/12/2018     Procedure: NEPHRECTOMY, PARTIAL open. Dr Arenas to assist.;  Surgeon: Alejandra Palm MD;  Location: [facility] OR;  Service: Urology;  Laterality: Right;  RN PREOP 10/9/2018----NEEDS H/P    PHACOEMULSIFICATION OF CATARACT Left 7/7/2022     Procedure: PHACOEMULSIFICATION, CATARACT;  Surgeon: Candelario Novoa MD;  Location: [facility];  Service: Ophthalmology;  Laterality: Left;  RN PHONE PREOP 6/27/2022   ARRIVAL 6:00 AM    PHACOEMULSIFICATION OF CATARACT Right 7/21/2022     Procedure: PHACOEMULSIFICATION, CATARACT;  Surgeon: Candelario Novoa MD;  Location: [facility] OR;  Service: Ophthalmology;  Laterality: Right;  RN PHONE PREOP 7/12/2022   ARRIVAL 12:00 NOON    TOTAL KNEE ARTHROPLASTY Right 11/8/2023     Procedure: ARTHROPLASTY, KNEE, TOTAL. NELSON;  Surgeon: Eric Carbajal MD;  Location: [facility];  Service: Orthopedics;  Laterality: Right;  [place]. Admit  BRADLEY NIEVES [phone] NOTIFIED QUINTIN ON 10/11/2023-LO  RN PREOP 10/25/2023   T/S ON 11/6/2023--done------CLEARED BY CARDS            Review of patient's allergies indicates:   Allergen Reactions    Lisinopril Swelling and Shortness Of Breath    Ascorbic acid-ascorbate calc         And citrus fruits      Social History          Social History Narrative    Not on file             Family History   Problem Relation Name Age of Onset    No Known Problems Mother        Glaucoma Father         Breast cancer Sister Giana      Glaucoma Sister Giana      Cancer Sister Giana           breast cancer    Thyroid disease Sister Angeles      Blindness Sister Bryanna           due to trauma during car accident    Diabetes Sister Amanda      No Known Problems Maternal Aunt        No Known Problems Maternal Uncle        No Known Problems Paternal Aunt        No Known Problems Paternal Uncle        No Known Problems Maternal Grandmother        No Known Problems Maternal Grandfather        No Known Problems Paternal Grandmother        No Known Problems Paternal Grandfather        Diabetes Brother Navneet      Amblyopia Neg Hx        Cataracts Neg Hx        Hypertension Neg Hx        Macular degeneration Neg Hx        Retinal detachment Neg Hx        Strabismus Neg Hx        Stroke Neg Hx               Current Medications      Current Outpatient Medications:     ACCU-CHEK ONEIL PLUS TEST STRP Strp, TEST BLOOD SUGAR TWICE DAILY, Disp: 200 strip, Rfl: 1    ACCU-CHEK SOFTCLIX LANCETS Misc, USE TO TEST BLOOD SUGAR ONE TIME DAILY, Disp: 100 each, Rfl: 3    alcohol swabs (DROPSAFE ALCOHOL PREP PADS) Pad, USE 3 EVERY DAY AS DIRECTED, Disp: 100 each, Rfl: 3    amLODIPine (NORVASC) 10 MG tablet, TAKE 1 TABLET ONE TIME DAILY, Disp: 90 tablet, Rfl: 3    aspirin (ECOTRIN) 81 MG EC tablet, Take 1 tablet (81 mg total) by mouth 2 (two) times a day., Disp: 60 tablet, Rfl: 0    atorvastatin (LIPITOR) 40 MG tablet, , Disp: 90 tablet, Rfl: 0    blood glucose control high,low (ACCU-CHEK ONEIL CONTROL SOLN) Soln, 1 Units by Misc.(Non-Drug; Combo Route) route as needed., Disp: 1 each, Rfl: 0    blood glucose control, low (TRUE METRIX LEVEL 1) Soln, 1 each by Misc.(Non-Drug; Combo Route) route once as needed., Disp: 1 each, Rfl: 3    blood-glucose meter (ACCU-CHEK ONEIL PLUS METER) Misc, 1 kit by Misc.(Non-Drug; Combo Route) route once daily., Disp: 1 each, Rfl: 0    blood-glucose meter (TRUE METRIX GLUCOSE METER) kit, Use as  directed to test glucose, Disp: 1 each, Rfl: 0    cetirizine (ZYRTEC) 10 MG tablet, Take 1 tablet (10 mg total) by mouth once daily., Disp: 90 tablet, Rfl: 0    ciclopirox (PENLAC) 8 % Soln, Apply topically nightly., Disp: 6.6 mL, Rfl: 11    diphenhydrAMINE (BENADRYL) 25 mg capsule, Take 1 capsule (25 mg total) by mouth every 6 (six) hours as needed for Itching., Disp: 20 capsule, Rfl: 0    docusate sodium (COLACE) 100 MG capsule, Take 1 capsule (100 mg total) by mouth 2 (two) times daily., Disp: 30 capsule, Rfl: 0    ergocalciferol (ERGOCALCIFEROL) 50,000 unit Cap, TAKE 1 CAPSULE EVERY 7 DAYS., Disp: 12 capsule, Rfl: 3    FLUoxetine 20 MG capsule, TAKE 1 CAPSULE EVERY DAY, Disp: 90 capsule, Rfl: 3    fluticasone propionate (FLONASE) 50 mcg/actuation nasal spray, USE 1 SPRAY IN EACH NOSTRIL ONCE DAILY, Disp: 32 g, Rfl: 2    hydrOXYzine HCL (ATARAX) 25 MG tablet, TAKE 1 TABLET BY MOUTH THREE TIMES DAILY AS NEEDED FOR ITCHING, Disp: 90 tablet, Rfl: 0    metFORMIN (GLUCOPHAGE) 500 MG tablet, Take 1 tablet (500 mg total) by mouth 2 (two) times daily with meals., Disp: 180 tablet, Rfl: 3    metoprolol succinate (TOPROL-XL) 25 MG 24 hr tablet, Take 1 tablet by mouth once daily, Disp: 90 tablet, Rfl: 3    ondansetron (ZOFRAN-ODT) 8 MG TbDL, Take 1 tablet (8 mg total) by mouth every 8 (eight) hours as needed (nausea)., Disp: 30 tablet, Rfl: 0    pantoprazole (PROTONIX) 40 MG tablet, TAKE 1 TABLET EVERY DAY, Disp: 90 tablet, Rfl: 3    pregabalin (LYRICA) 50 MG capsule, Take 1 capsule (50 mg total) by mouth 2 (two) times daily., Disp: 60 capsule, Rfl: 2    traMADoL (ULTRAM) 50 mg tablet, Take 1 tablet (50 mg total) by mouth every 6 (six) hours., Disp: 20 tablet, Rfl: 0    TRUEPLUS LANCETS 33 gauge Misc, TEST BLOOD SUGAR TWICE DAILY, Disp: 200 each, Rfl: 1  No current facility-administered medications for this visit.     Facility-Administered Medications Ordered in Other Visits:     acetaminophen tablet 1,000 mg, 1,000 mg,  "Oral, On Call Procedure, Eric Carbajal MD    acetaminophen tablet 1,000 mg, 1,000 mg, Oral, Q6H, Eric Carbajal MD, 1,000 mg at 11/09/23 0506    aspirin EC tablet 81 mg, 81 mg, Oral, BID, Eric Carbajal MD, 81 mg at 11/09/23 1358    famotidine tablet 20 mg, 20 mg, Oral, BID, Eric Carbajal MD, 20 mg at 11/09/23 1357    LIDOcaine (PF) 10 mg/ml (1%) injection 10 mg, 1 mL, Intradermal, On Call Procedure, Eric Carbajal MD    methocarbamoL tablet 750 mg, 750 mg, Oral, TID, Eric Carbajal MD, 750 mg at 11/09/23 1357    naloxone 0.4 mg/mL injection 0.02 mg, 0.02 mg, Intravenous, PRN, Eric Carbajal MD    ondansetron injection 4 mg, 4 mg, Intravenous, Q8H PRN, Eric Carbajal MD, 4 mg at 11/09/23 0858    oxyCODONE immediate release tablet 10 mg, 10 mg, Oral, Q3H PRN, Eric Carbajal MD, 10 mg at 11/09/23 1043    oxyCODONE immediate release tablet 5 mg, 5 mg, Oral, Q3H PRN, Eric Carbajal MD    polyethylene glycol packet 17 g, 17 g, Oral, Daily, Eric Carbajal MD, 17 g at 11/09/23 1356    pregabalin capsule 75 mg, 75 mg, Oral, QHS, Eric Carbajal MD, 75 mg at 11/08/23 2147    prochlorperazine injection Soln 5 mg, 5 mg, Intravenous, Q6H PRN, Eric Carbajal MD, 5 mg at 11/08/23 1325    senna-docusate 8.6-50 mg per tablet 1 tablet, 1 tablet, Oral, BID, Eric Carbajal MD, 1 tablet at 11/09/23 1358    sodium chloride 0.9% flush 10 mL, 10 mL, Intravenous, PRN, Eric Carbajal MD           Review of Systems:  As per HPI otherwise noncontributory     OBJECTIVE:       Vital Signs (Most Recent):  Vitals       Vitals:     06/17/24 0820   Weight: 87.1 kg (192 lb 0.3 oz)   Height: 5' 4" (1.626 m)         Body mass index is 32.96 kg/m².        Physical Exam:  Constitutional: The patient appears well-developed and well-nourished. No distress.   Skin: No lesions appreciated  Head: Normocephalic and atraumatic.   Nose: Nose normal.   Ears: No deformities seen  Eyes: Conjunctivae and EOM are normal. "   Neck: No tracheal deviation present.   Cardiovascular: Normal rate and intact distal pulses.    Pulmonary/Chest: Effort normal. No respiratory distress.   Abdominal: There is no guarding.   Neurological: The patient is alert.   Psychiatric: The patient has a normal mood and affect.      Bilateral Hand/Wrist Examination:     Observation/Inspection:  Swelling                       none                  Deformity                     none  Discoloration               none                  Scars                           none                  Atrophy                        none     HAND/WRIST EXAMINATION:  Finkelstein's Test                                Neg  WHAT Test                                         Neg  Snuff box tenderness                          Neg  Costa's Test                                     Neg  Hook of Hamate Tenderness              Neg  CMC grind                                           Neg  Circumduction test                              Neg     Neurovascular Exam:  Digits WWP, brisk CR < 3s throughout  NVI motor/LTS to M/R/U nerves, radial pulse 2+  Tinel's Test - Carpal Tunnel                Neg  Tinel's Test - Cubital Tunnel               Neg  Phalen's Test                                      Neg  Median Nerve Compression TestNeg     ROM hand full, painless     ROM wrist full, painless    ROM elbow full, painless     Abdomen not guarded  Respirations nonlabored  Perfusion intact     Diagnostic Results:     Imaging - I independently viewed the patient's imaging as well as the radiology report.  Xrays of the patient's bilateral hands  demonstrate no evidence of any acute fractures or dislocations or significant degenerative changes.     EMG - moderate CTS (R>L), February 2024     ASSESSMENT/PLAN:       74 y.o. yo female with bilateral CTS s/p R CTR on 3/15  Plan: The patient and I had a thorough discussion today.  We discussed the working diagnosis as well as several other potential  alternative diagnoses.  Treatment options were discussed, both conservative and surgical.  Conservative treatment options would include things such as activity modifications, workplace modifications, a period of rest, oral vs topical OTC and prescription anti-inflammatory medications, occupational therapy, splinting/bracing, immobilization, corticosteroid injections, and others.  Surgical options were discussed as well.      At this time, the patient would like to proceed with endoscopic vs open carpal tunnel release for the left hand on 6/26. She is having some pillar pain from the right carpal tunnel release.     Should the patient's symptoms worsen, persist, or fail to improve they should return for reevaluation and I would be happy to see them back anytime.          Tonio Mcdermott M.D.     Please be aware that this note has been generated with the assistance of [employer] voice-to-text.  Please excuse any spelling or grammatical errors.     Thank you for choosing Dr. Tonio Mcdermott for your orthopedic hand and upper extremity care. It is our goal to provide you with exceptional care that will help keep you healthy, active, and get you back in the game.     If you felt that you received exemplary care today, please consider leaving feedback for Dr. Mcdermott on [employer] at [email or web address].     Please do not hesitate to reach out to us via email, phone, or MyChart with any questions, concerns, or feedback.

## 2024-06-17 NOTE — H&P (VIEW-ONLY)
Attestation signed by Tonio Mcdermott MD at 6/17/2024  8:59 AM     I have seen the patient, reviewed the Resident's history and physical. I have personally interviewed and examined the patient at bedside and agree with the findings.      Pt doing well s/p right ECTR with some pillar pain.  Left hand really bothering her with constant symptoms.  Left EvOCTR 6/26/2024.  (+) Tinels left wrist, decreased LTS at baseline.     The patient has not responded to adequate non operative treatment at this time and/or non operative treatment is not indicated. Thus, the risks, benefits and alternatives to surgery were discussed with the patient in detail.  Specific risks include but are not limited to bleeding, infection, vessel and/or nerve damage, pain, numbness, tingling, complex regional pain syndrome, compartment syndrome, failure to return to pre-injury and/or preoperative functional status, scar sensitivity, delayed healing, inability to return to work, pulley injury, tendon injury, bowstringing, partial and/or incomplete relief of symptoms, weakness, persistence of and/or worsening of symptoms, surgical failure, osteomyelitis, amputation, loss of function, stiffness, functional debility, dysfunction, decreased  strength, need for prolonged postoperative rehabilitation, need for further surgery, deep venous thrombosis, pulmonary embolism, arthritis and death.  The patient states an understanding and wishes to proceed with surgery.   All questions were answered.  No guarantees were implied or stated.  Written informed consent was obtained.        Tonio Mcdermott MD  Hand Surgery  [facility]            Expand All Collapse All    [facility]  History & Physical  Orthopedics     SUBJECTIVE:       COVID-19 attestation:  This patient was treated during the COVID-19 pandemic.  This was discussed with the patient, they are aware of our current policies and procedures, were given the option of  delaying their visit and or switching to a virtual visit, delaying their surgery when applicable, and they elect to proceed.     Chief Complaint: R CTR 3/15/24, left CTS     Referring Provider: No ref. provider found      History of Present Illness:  Patient is a 74 y.o. right hand dominant female who presents today with complaints of left hand numbness/tingling. She had a right carpal tunnel release on the 3/15/24 and she is still having some pain in the ulnar aspect of the palm. She does say that the numbness and tingling in the right hand is improved. However, she said the left hand bothers her more and is consistently numb and tingly and she is constantly dropping things with the left hand. She denies any new symptoms.       The patient is a/an retired.     Onset of symptoms/DOI was >1year.     Symptoms are aggravated by activity, driving, and at night.     Symptoms are alleviated by rest.     Symptoms consist of pain and numbness/tingling.     The patient rates their pain as a 10/10.     Attempted treatment(s) and/or interventions include activity modifications, rest, rest and surgical intervention.     The patient denies any fevers, chills, N/V, D/C and presents for evaluation.             Past Medical History:   Diagnosis Date    Arthritis      Colon polyp      Diabetes mellitus       diet controlled    Diabetes with neurologic complications      Hypertension      Nuclear sclerosis of both eyes 2/14/2022    Renal cell carcinoma              Past Surgical History:   Procedure Laterality Date    COLONOSCOPY N/A 2/5/2018     Procedure: COLONOSCOPY;  Surgeon: Bacilio Mancia MD;  Location: [facility];  Service: Endoscopy;  Laterality: N/A;  appt confirmed-ss    COLONOSCOPY N/A 8/13/2020     Procedure: COLONOSCOPY;  Surgeon: Jose West MD;  Location: [facility] ENDO;  Service: Endoscopy;  Laterality: N/A;    ENDOSCOPIC CARPAL TUNNEL RELEASE Right 3/15/2024     Procedure: RELEASE, CARPAL TUNNEL, ENDOSCOPIC -  RIGHT;  Surgeon: Tonio Mcdermott MD;  Location: [facility] OR;  Service: Orthopedics;  Laterality: Right;    HYSTERECTOMY        INTRAOCULAR PROSTHESES INSERTION Left 7/7/2022     Procedure: INSERTION, IOL PROSTHESIS;  Surgeon: Candelario Novoa MD;  Location: [facility] OR;  Service: Ophthalmology;  Laterality: Left;    INTRAOCULAR PROSTHESES INSERTION Right 7/21/2022     Procedure: INSERTION, IOL PROSTHESIS;  Surgeon: Candelario Novoa MD;  Location: [facility] OR;  Service: Ophthalmology;  Laterality: Right;    OOPHORECTOMY        PARTIAL NEPHRECTOMY Right 10/12/2018     Procedure: NEPHRECTOMY, PARTIAL open. Dr Arenas to assist.;  Surgeon: Alejandra Palm MD;  Location: [facility] OR;  Service: Urology;  Laterality: Right;  RN PREOP 10/9/2018----NEEDS H/P    PHACOEMULSIFICATION OF CATARACT Left 7/7/2022     Procedure: PHACOEMULSIFICATION, CATARACT;  Surgeon: Candelario Novoa MD;  Location: [facility];  Service: Ophthalmology;  Laterality: Left;  RN PHONE PREOP 6/27/2022   ARRIVAL 6:00 AM    PHACOEMULSIFICATION OF CATARACT Right 7/21/2022     Procedure: PHACOEMULSIFICATION, CATARACT;  Surgeon: Candelario Novoa MD;  Location: [facility] OR;  Service: Ophthalmology;  Laterality: Right;  RN PHONE PREOP 7/12/2022   ARRIVAL 12:00 NOON    TOTAL KNEE ARTHROPLASTY Right 11/8/2023     Procedure: ARTHROPLASTY, KNEE, TOTAL. NELSON;  Surgeon: Eric Carbajal MD;  Location: [facility];  Service: Orthopedics;  Laterality: Right;  [place]. Admit  BRADLEY NIEVES [phone] NOTIFIED QUINTIN ON 10/11/2023-LO  RN PREOP 10/25/2023   T/S ON 11/6/2023--done------CLEARED BY CARDS            Review of patient's allergies indicates:   Allergen Reactions    Lisinopril Swelling and Shortness Of Breath    Ascorbic acid-ascorbate calc         And citrus fruits      Social History          Social History Narrative    Not on file             Family History   Problem Relation Name Age of Onset    No Known Problems Mother        Glaucoma Father         Breast cancer Sister iGana      Glaucoma Sister Giana      Cancer Sister Giana           breast cancer    Thyroid disease Sister Angeles      Blindness Sister Bryanna           due to trauma during car accident    Diabetes Sister Amanda      No Known Problems Maternal Aunt        No Known Problems Maternal Uncle        No Known Problems Paternal Aunt        No Known Problems Paternal Uncle        No Known Problems Maternal Grandmother        No Known Problems Maternal Grandfather        No Known Problems Paternal Grandmother        No Known Problems Paternal Grandfather        Diabetes Brother Navneet      Amblyopia Neg Hx        Cataracts Neg Hx        Hypertension Neg Hx        Macular degeneration Neg Hx        Retinal detachment Neg Hx        Strabismus Neg Hx        Stroke Neg Hx               Current Medications      Current Outpatient Medications:     ACCU-CHEK ONEIL PLUS TEST STRP Strp, TEST BLOOD SUGAR TWICE DAILY, Disp: 200 strip, Rfl: 1    ACCU-CHEK SOFTCLIX LANCETS Misc, USE TO TEST BLOOD SUGAR ONE TIME DAILY, Disp: 100 each, Rfl: 3    alcohol swabs (DROPSAFE ALCOHOL PREP PADS) Pad, USE 3 EVERY DAY AS DIRECTED, Disp: 100 each, Rfl: 3    amLODIPine (NORVASC) 10 MG tablet, TAKE 1 TABLET ONE TIME DAILY, Disp: 90 tablet, Rfl: 3    aspirin (ECOTRIN) 81 MG EC tablet, Take 1 tablet (81 mg total) by mouth 2 (two) times a day., Disp: 60 tablet, Rfl: 0    atorvastatin (LIPITOR) 40 MG tablet, , Disp: 90 tablet, Rfl: 0    blood glucose control high,low (ACCU-CHEK ONEIL CONTROL SOLN) Soln, 1 Units by Misc.(Non-Drug; Combo Route) route as needed., Disp: 1 each, Rfl: 0    blood glucose control, low (TRUE METRIX LEVEL 1) Soln, 1 each by Misc.(Non-Drug; Combo Route) route once as needed., Disp: 1 each, Rfl: 3    blood-glucose meter (ACCU-CHEK ONEIL PLUS METER) Misc, 1 kit by Misc.(Non-Drug; Combo Route) route once daily., Disp: 1 each, Rfl: 0    blood-glucose meter (TRUE METRIX GLUCOSE METER) kit, Use as  directed to test glucose, Disp: 1 each, Rfl: 0    cetirizine (ZYRTEC) 10 MG tablet, Take 1 tablet (10 mg total) by mouth once daily., Disp: 90 tablet, Rfl: 0    ciclopirox (PENLAC) 8 % Soln, Apply topically nightly., Disp: 6.6 mL, Rfl: 11    diphenhydrAMINE (BENADRYL) 25 mg capsule, Take 1 capsule (25 mg total) by mouth every 6 (six) hours as needed for Itching., Disp: 20 capsule, Rfl: 0    docusate sodium (COLACE) 100 MG capsule, Take 1 capsule (100 mg total) by mouth 2 (two) times daily., Disp: 30 capsule, Rfl: 0    ergocalciferol (ERGOCALCIFEROL) 50,000 unit Cap, TAKE 1 CAPSULE EVERY 7 DAYS., Disp: 12 capsule, Rfl: 3    FLUoxetine 20 MG capsule, TAKE 1 CAPSULE EVERY DAY, Disp: 90 capsule, Rfl: 3    fluticasone propionate (FLONASE) 50 mcg/actuation nasal spray, USE 1 SPRAY IN EACH NOSTRIL ONCE DAILY, Disp: 32 g, Rfl: 2    hydrOXYzine HCL (ATARAX) 25 MG tablet, TAKE 1 TABLET BY MOUTH THREE TIMES DAILY AS NEEDED FOR ITCHING, Disp: 90 tablet, Rfl: 0    metFORMIN (GLUCOPHAGE) 500 MG tablet, Take 1 tablet (500 mg total) by mouth 2 (two) times daily with meals., Disp: 180 tablet, Rfl: 3    metoprolol succinate (TOPROL-XL) 25 MG 24 hr tablet, Take 1 tablet by mouth once daily, Disp: 90 tablet, Rfl: 3    ondansetron (ZOFRAN-ODT) 8 MG TbDL, Take 1 tablet (8 mg total) by mouth every 8 (eight) hours as needed (nausea)., Disp: 30 tablet, Rfl: 0    pantoprazole (PROTONIX) 40 MG tablet, TAKE 1 TABLET EVERY DAY, Disp: 90 tablet, Rfl: 3    pregabalin (LYRICA) 50 MG capsule, Take 1 capsule (50 mg total) by mouth 2 (two) times daily., Disp: 60 capsule, Rfl: 2    traMADoL (ULTRAM) 50 mg tablet, Take 1 tablet (50 mg total) by mouth every 6 (six) hours., Disp: 20 tablet, Rfl: 0    TRUEPLUS LANCETS 33 gauge Misc, TEST BLOOD SUGAR TWICE DAILY, Disp: 200 each, Rfl: 1  No current facility-administered medications for this visit.     Facility-Administered Medications Ordered in Other Visits:     acetaminophen tablet 1,000 mg, 1,000 mg,  "Oral, On Call Procedure, Eric Carbajal MD    acetaminophen tablet 1,000 mg, 1,000 mg, Oral, Q6H, Eric Carbajal MD, 1,000 mg at 11/09/23 0506    aspirin EC tablet 81 mg, 81 mg, Oral, BID, Eric Carbajal MD, 81 mg at 11/09/23 1358    famotidine tablet 20 mg, 20 mg, Oral, BID, Eric Carbajal MD, 20 mg at 11/09/23 1357    LIDOcaine (PF) 10 mg/ml (1%) injection 10 mg, 1 mL, Intradermal, On Call Procedure, Eric Carbajal MD    methocarbamoL tablet 750 mg, 750 mg, Oral, TID, Eric Carbajal MD, 750 mg at 11/09/23 1357    naloxone 0.4 mg/mL injection 0.02 mg, 0.02 mg, Intravenous, PRN, Eric Carbajal MD    ondansetron injection 4 mg, 4 mg, Intravenous, Q8H PRN, Eric Carbajal MD, 4 mg at 11/09/23 0858    oxyCODONE immediate release tablet 10 mg, 10 mg, Oral, Q3H PRN, Eric Carbajal MD, 10 mg at 11/09/23 1043    oxyCODONE immediate release tablet 5 mg, 5 mg, Oral, Q3H PRN, Eric Carbajal MD    polyethylene glycol packet 17 g, 17 g, Oral, Daily, Eric Carbajal MD, 17 g at 11/09/23 1356    pregabalin capsule 75 mg, 75 mg, Oral, QHS, Eric Carbajal MD, 75 mg at 11/08/23 2147    prochlorperazine injection Soln 5 mg, 5 mg, Intravenous, Q6H PRN, Eric Carbajal MD, 5 mg at 11/08/23 1325    senna-docusate 8.6-50 mg per tablet 1 tablet, 1 tablet, Oral, BID, Eric Carbajal MD, 1 tablet at 11/09/23 1358    sodium chloride 0.9% flush 10 mL, 10 mL, Intravenous, PRN, Eric Carbajal MD           Review of Systems:  As per HPI otherwise noncontributory     OBJECTIVE:       Vital Signs (Most Recent):  Vitals       Vitals:     06/17/24 0820   Weight: 87.1 kg (192 lb 0.3 oz)   Height: 5' 4" (1.626 m)         Body mass index is 32.96 kg/m².        Physical Exam:  Constitutional: The patient appears well-developed and well-nourished. No distress.   Skin: No lesions appreciated  Head: Normocephalic and atraumatic.   Nose: Nose normal.   Ears: No deformities seen  Eyes: Conjunctivae and EOM are normal. "   Neck: No tracheal deviation present.   Cardiovascular: Normal rate and intact distal pulses.    Pulmonary/Chest: Effort normal. No respiratory distress.   Abdominal: There is no guarding.   Neurological: The patient is alert.   Psychiatric: The patient has a normal mood and affect.      Bilateral Hand/Wrist Examination:     Observation/Inspection:  Swelling                       none                  Deformity                     none  Discoloration               none                  Scars                           none                  Atrophy                        none     HAND/WRIST EXAMINATION:  Finkelstein's Test                                Neg  WHAT Test                                         Neg  Snuff box tenderness                          Neg  Costa's Test                                     Neg  Hook of Hamate Tenderness              Neg  CMC grind                                           Neg  Circumduction test                              Neg     Neurovascular Exam:  Digits WWP, brisk CR < 3s throughout  NVI motor/LTS to M/R/U nerves, radial pulse 2+  Tinel's Test - Carpal Tunnel                Neg  Tinel's Test - Cubital Tunnel               Neg  Phalen's Test                                      Neg  Median Nerve Compression TestNeg     ROM hand full, painless     ROM wrist full, painless    ROM elbow full, painless     Abdomen not guarded  Respirations nonlabored  Perfusion intact     Diagnostic Results:     Imaging - I independently viewed the patient's imaging as well as the radiology report.  Xrays of the patient's bilateral hands  demonstrate no evidence of any acute fractures or dislocations or significant degenerative changes.     EMG - moderate CTS (R>L), February 2024     ASSESSMENT/PLAN:       74 y.o. yo female with bilateral CTS s/p R CTR on 3/15  Plan: The patient and I had a thorough discussion today.  We discussed the working diagnosis as well as several other potential  alternative diagnoses.  Treatment options were discussed, both conservative and surgical.  Conservative treatment options would include things such as activity modifications, workplace modifications, a period of rest, oral vs topical OTC and prescription anti-inflammatory medications, occupational therapy, splinting/bracing, immobilization, corticosteroid injections, and others.  Surgical options were discussed as well.      At this time, the patient would like to proceed with endoscopic vs open carpal tunnel release for the left hand on 6/26. She is having some pillar pain from the right carpal tunnel release.     Should the patient's symptoms worsen, persist, or fail to improve they should return for reevaluation and I would be happy to see them back anytime.          Tonio Mcdermott M.D.     Please be aware that this note has been generated with the assistance of [employer] voice-to-text.  Please excuse any spelling or grammatical errors.     Thank you for choosing Dr. Tonio Mcdermott for your orthopedic hand and upper extremity care. It is our goal to provide you with exceptional care that will help keep you healthy, active, and get you back in the game.     If you felt that you received exemplary care today, please consider leaving feedback for Dr. Mcderomtt on [employer] at [email or web address].     Please do not hesitate to reach out to us via email, phone, or MyChart with any questions, concerns, or feedback.

## 2024-06-17 NOTE — PROGRESS NOTES
[facility]  History & Physical  Orthopedics    SUBJECTIVE:      COVID-19 attestation:  This patient was treated during the COVID-19 pandemic.  This was discussed with the patient, they are aware of our current policies and procedures, were given the option of delaying their visit and or switching to a virtual visit, delaying their surgery when applicable, and they elect to proceed.    Chief Complaint: R CTR 3/15/24, left CTS    Referring Provider: No ref. provider found     History of Present Illness:  Patient is a 74 y.o. right hand dominant female who presents today with complaints of left hand numbness/tingling. She had a right carpal tunnel release on the 3/15/24 and she is still having some pain in the ulnar aspect of the palm. She does say that the numbness and tingling in the right hand is improved. However, she said the left hand bothers her more and is consistently numb and tingly and she is constantly dropping things with the left hand. She denies any new symptoms.      The patient is a/an retired.    Onset of symptoms/DOI was >1year.    Symptoms are aggravated by activity, driving, and at night.    Symptoms are alleviated by rest.    Symptoms consist of pain and numbness/tingling.    The patient rates their pain as a 10/10.    Attempted treatment(s) and/or interventions include activity modifications, rest, rest and surgical intervention.     The patient denies any fevers, chills, N/V, D/C and presents for evaluation.       Past Medical History:   Diagnosis Date    Arthritis     Colon polyp     Diabetes mellitus     diet controlled    Diabetes with neurologic complications     Hypertension     Nuclear sclerosis of both eyes 2/14/2022    Renal cell carcinoma      Past Surgical History:   Procedure Laterality Date    COLONOSCOPY N/A 2/5/2018    Procedure: COLONOSCOPY;  Surgeon: Bacilio Mancia MD;  Location: [facility];  Service: Endoscopy;  Laterality: N/A;  appt confirmed-ss     COLONOSCOPY N/A 8/13/2020    Procedure: COLONOSCOPY;  Surgeon: Jose West MD;  Location: [facility] ENDO;  Service: Endoscopy;  Laterality: N/A;    ENDOSCOPIC CARPAL TUNNEL RELEASE Right 3/15/2024    Procedure: RELEASE, CARPAL TUNNEL, ENDOSCOPIC - RIGHT;  Surgeon: Tonio Mcdermott MD;  Location: [facility] OR;  Service: Orthopedics;  Laterality: Right;    HYSTERECTOMY      INTRAOCULAR PROSTHESES INSERTION Left 7/7/2022    Procedure: INSERTION, IOL PROSTHESIS;  Surgeon: Candelario Novoa MD;  Location: [facility] OR;  Service: Ophthalmology;  Laterality: Left;    INTRAOCULAR PROSTHESES INSERTION Right 7/21/2022    Procedure: INSERTION, IOL PROSTHESIS;  Surgeon: Candelario Novoa MD;  Location: [facility] OR;  Service: Ophthalmology;  Laterality: Right;    OOPHORECTOMY      PARTIAL NEPHRECTOMY Right 10/12/2018    Procedure: NEPHRECTOMY, PARTIAL open. Dr Arenas to assist.;  Surgeon: Alejandra Palm MD;  Location: [facility];  Service: Urology;  Laterality: Right;  RN PREOP 10/9/2018----NEEDS H/P    PHACOEMULSIFICATION OF CATARACT Left 7/7/2022    Procedure: PHACOEMULSIFICATION, CATARACT;  Surgeon: Candelario Novoa MD;  Location: [facility] OR;  Service: Ophthalmology;  Laterality: Left;  RN PHONE PREOP 6/27/2022   ARRIVAL 6:00 AM    PHACOEMULSIFICATION OF CATARACT Right 7/21/2022    Procedure: PHACOEMULSIFICATION, CATARACT;  Surgeon: Candelario Novoa MD;  Location: [facility] OR;  Service: Ophthalmology;  Laterality: Right;  RN PHONE PREOP 7/12/2022   ARRIVAL 12:00 NOON    TOTAL KNEE ARTHROPLASTY Right 11/8/2023    Procedure: ARTHROPLASTY, KNEE, TOTAL. NELSON;  Surgeon: rEic Carbajal MD;  Location: [facility] OR;  Service: Orthopedics;  Laterality: Right;  [place]. Admit  BRADLEY NIEVES [phone] NOTIFIED QUINTIN ON 10/11/2023-LO  RN PREOP 10/25/2023   T/S ON 11/6/2023--done------CLEARED BY CARDS     Review of patient's allergies indicates:   Allergen Reactions    Lisinopril Swelling and Shortness Of Breath     Ascorbic acid-ascorbate calc      And citrus fruits     Social History     Social History Narrative    Not on file     Family History   Problem Relation Name Age of Onset    No Known Problems Mother      Glaucoma Father      Breast cancer Sister Giana     Glaucoma Sister Warhaim     Cancer Sister Giana         breast cancer    Thyroid disease Sister Angeles     Blindness Sister Bryanna         due to trauma during car accident    Diabetes Sister Amanda     No Known Problems Maternal Aunt      No Known Problems Maternal Uncle      No Known Problems Paternal Aunt      No Known Problems Paternal Uncle      No Known Problems Maternal Grandmother      No Known Problems Maternal Grandfather      No Known Problems Paternal Grandmother      No Known Problems Paternal Grandfather      Diabetes Brother Navneet     Amblyopia Neg Hx      Cataracts Neg Hx      Hypertension Neg Hx      Macular degeneration Neg Hx      Retinal detachment Neg Hx      Strabismus Neg Hx      Stroke Neg Hx           Current Outpatient Medications:     ACCU-CHEK ONEIL PLUS TEST STRP Strp, TEST BLOOD SUGAR TWICE DAILY, Disp: 200 strip, Rfl: 1    ACCU-CHEK SOFTCLIX LANCETS Misc, USE TO TEST BLOOD SUGAR ONE TIME DAILY, Disp: 100 each, Rfl: 3    alcohol swabs (DROPSAFE ALCOHOL PREP PADS) Pad, USE 3 EVERY DAY AS DIRECTED, Disp: 100 each, Rfl: 3    amLODIPine (NORVASC) 10 MG tablet, TAKE 1 TABLET ONE TIME DAILY, Disp: 90 tablet, Rfl: 3    aspirin (ECOTRIN) 81 MG EC tablet, Take 1 tablet (81 mg total) by mouth 2 (two) times a day., Disp: 60 tablet, Rfl: 0    atorvastatin (LIPITOR) 40 MG tablet, , Disp: 90 tablet, Rfl: 0    blood glucose control high,low (ACCU-CHEK ONEIL CONTROL SOLN) Soln, 1 Units by Misc.(Non-Drug; Combo Route) route as needed., Disp: 1 each, Rfl: 0    blood glucose control, low (TRUE METRIX LEVEL 1) Soln, 1 each by Misc.(Non-Drug; Combo Route) route once as needed., Disp: 1 each, Rfl: 3    blood-glucose meter (ACCU-CHEK ONEIL PLUS  METER) Misc, 1 kit by Misc.(Non-Drug; Combo Route) route once daily., Disp: 1 each, Rfl: 0    blood-glucose meter (TRUE METRIX GLUCOSE METER) kit, Use as directed to test glucose, Disp: 1 each, Rfl: 0    cetirizine (ZYRTEC) 10 MG tablet, Take 1 tablet (10 mg total) by mouth once daily., Disp: 90 tablet, Rfl: 0    ciclopirox (PENLAC) 8 % Soln, Apply topically nightly., Disp: 6.6 mL, Rfl: 11    diphenhydrAMINE (BENADRYL) 25 mg capsule, Take 1 capsule (25 mg total) by mouth every 6 (six) hours as needed for Itching., Disp: 20 capsule, Rfl: 0    docusate sodium (COLACE) 100 MG capsule, Take 1 capsule (100 mg total) by mouth 2 (two) times daily., Disp: 30 capsule, Rfl: 0    ergocalciferol (ERGOCALCIFEROL) 50,000 unit Cap, TAKE 1 CAPSULE EVERY 7 DAYS., Disp: 12 capsule, Rfl: 3    FLUoxetine 20 MG capsule, TAKE 1 CAPSULE EVERY DAY, Disp: 90 capsule, Rfl: 3    fluticasone propionate (FLONASE) 50 mcg/actuation nasal spray, USE 1 SPRAY IN EACH NOSTRIL ONCE DAILY, Disp: 32 g, Rfl: 2    hydrOXYzine HCL (ATARAX) 25 MG tablet, TAKE 1 TABLET BY MOUTH THREE TIMES DAILY AS NEEDED FOR ITCHING, Disp: 90 tablet, Rfl: 0    metFORMIN (GLUCOPHAGE) 500 MG tablet, Take 1 tablet (500 mg total) by mouth 2 (two) times daily with meals., Disp: 180 tablet, Rfl: 3    metoprolol succinate (TOPROL-XL) 25 MG 24 hr tablet, Take 1 tablet by mouth once daily, Disp: 90 tablet, Rfl: 3    ondansetron (ZOFRAN-ODT) 8 MG TbDL, Take 1 tablet (8 mg total) by mouth every 8 (eight) hours as needed (nausea)., Disp: 30 tablet, Rfl: 0    pantoprazole (PROTONIX) 40 MG tablet, TAKE 1 TABLET EVERY DAY, Disp: 90 tablet, Rfl: 3    pregabalin (LYRICA) 50 MG capsule, Take 1 capsule (50 mg total) by mouth 2 (two) times daily., Disp: 60 capsule, Rfl: 2    traMADoL (ULTRAM) 50 mg tablet, Take 1 tablet (50 mg total) by mouth every 6 (six) hours., Disp: 20 tablet, Rfl: 0    TRUEPLUS LANCETS 33 gauge Misc, TEST BLOOD SUGAR TWICE DAILY, Disp: 200 each, Rfl: 1  No current  "facility-administered medications for this visit.    Facility-Administered Medications Ordered in Other Visits:     acetaminophen tablet 1,000 mg, 1,000 mg, Oral, On Call Procedure, Eric Carbajal MD    acetaminophen tablet 1,000 mg, 1,000 mg, Oral, Q6H, Eric Carbajal MD, 1,000 mg at 11/09/23 0506    aspirin EC tablet 81 mg, 81 mg, Oral, BID, Eric Carbajal MD, 81 mg at 11/09/23 1358    famotidine tablet 20 mg, 20 mg, Oral, BID, Eric Carbajal MD, 20 mg at 11/09/23 1357    LIDOcaine (PF) 10 mg/ml (1%) injection 10 mg, 1 mL, Intradermal, On Call Procedure, Eric Carbajal MD    methocarbamoL tablet 750 mg, 750 mg, Oral, TID, Eric Carbajal MD, 750 mg at 11/09/23 1357    naloxone 0.4 mg/mL injection 0.02 mg, 0.02 mg, Intravenous, PRN, Eric Carbajal MD    ondansetron injection 4 mg, 4 mg, Intravenous, Q8H PRN, Eric Carbajal MD, 4 mg at 11/09/23 0858    oxyCODONE immediate release tablet 10 mg, 10 mg, Oral, Q3H PRN, Eric Carbajal MD, 10 mg at 11/09/23 1043    oxyCODONE immediate release tablet 5 mg, 5 mg, Oral, Q3H PRN, Eric Carbajal MD    polyethylene glycol packet 17 g, 17 g, Oral, Daily, Eric Carbajal MD, 17 g at 11/09/23 1356    pregabalin capsule 75 mg, 75 mg, Oral, QHS, Eric Carbajal MD, 75 mg at 11/08/23 2147    prochlorperazine injection Soln 5 mg, 5 mg, Intravenous, Q6H PRN, Eric Carbajal MD, 5 mg at 11/08/23 1325    senna-docusate 8.6-50 mg per tablet 1 tablet, 1 tablet, Oral, BID, Eric Carbajal MD, 1 tablet at 11/09/23 1358    sodium chloride 0.9% flush 10 mL, 10 mL, Intravenous, PRN, Eric Carbajal MD      Review of Systems:  As per HPI otherwise noncontributory    OBJECTIVE:      Vital Signs (Most Recent):  Vitals:    06/17/24 0820   Weight: 87.1 kg (192 lb 0.3 oz)   Height: 5' 4" (1.626 m)     Body mass index is 32.96 kg/m².      Physical Exam:  Constitutional: The patient appears well-developed and well-nourished. No distress.   Skin: No lesions " appreciated  Head: Normocephalic and atraumatic.   Nose: Nose normal.   Ears: No deformities seen  Eyes: Conjunctivae and EOM are normal.   Neck: No tracheal deviation present.   Cardiovascular: Normal rate and intact distal pulses.    Pulmonary/Chest: Effort normal. No respiratory distress.   Abdominal: There is no guarding.   Neurological: The patient is alert.   Psychiatric: The patient has a normal mood and affect.     Bilateral Hand/Wrist Examination:    Observation/Inspection:  Swelling  none    Deformity  none  Discoloration  none     Scars   none    Atrophy  none    HAND/WRIST EXAMINATION:  Finkelstein's Test   Neg  WHAT Test    Neg  Snuff box tenderness   Neg  Costa's Test    Neg  Hook of Hamate Tenderness  Neg  CMC grind    Neg  Circumduction test   Neg    Neurovascular Exam:  Digits WWP, brisk CR < 3s throughout  NVI motor/LTS to M/R/U nerves, radial pulse 2+  Tinel's Test - Carpal Tunnel  Neg  Tinel's Test - Cubital Tunnel  Neg  Phalen's Test    Neg  Median Nerve Compression Test Neg    ROM hand full, painless    ROM wrist full, painless    ROM elbow full, painless    Abdomen not guarded  Respirations nonlabored  Perfusion intact    Diagnostic Results:     Imaging - I independently viewed the patient's imaging as well as the radiology report.  Xrays of the patient's bilateral hands  demonstrate no evidence of any acute fractures or dislocations or significant degenerative changes.    EMG - moderate CTS (R>L), February 2024    ASSESSMENT/PLAN:      74 y.o. yo female with bilateral CTS s/p R CTR on 3/15  Plan: The patient and I had a thorough discussion today.  We discussed the working diagnosis as well as several other potential alternative diagnoses.  Treatment options were discussed, both conservative and surgical.  Conservative treatment options would include things such as activity modifications, workplace modifications, a period of rest, oral vs topical OTC and prescription anti-inflammatory  medications, occupational therapy, splinting/bracing, immobilization, corticosteroid injections, and others.  Surgical options were discussed as well.     At this time, the patient would like to proceed with endoscopic vs open carpal tunnel release for the left hand on 6/26. She is having some pillar pain from the right carpal tunnel release.    Should the patient's symptoms worsen, persist, or fail to improve they should return for reevaluation and I would be happy to see them back anytime.        Tonio Mcdermott M.D.    Please be aware that this note has been generated with the assistance of [employer] voice-to-text.  Please excuse any spelling or grammatical errors.    Thank you for choosing Dr. Tonio Mcdermott for your orthopedic hand and upper extremity care. It is our goal to provide you with exceptional care that will help keep you healthy, active, and get you back in the game.     If you felt that you received exemplary care today, please consider leaving feedback for Dr. Mcdermott on [employer] e[employer] at [email or web address].    Please do not hesitate to reach out to us via email, phone, or MyChart with any questions, concerns, or feedback.

## 2024-06-17 NOTE — PROGRESS NOTES
Subjective:       Patient ID: Valarie Foster is a 74 y.o. female.    Chief Complaint:   Pain of the Right Shoulder  She comes in for initial opinion and advice regarding pain of the right shoulder.  This is been a problem for about 4 months.  She is using a heating pad, and Tylenol which helps some.  Pain is up to 8/10, and she has significant interference with ADLs, and night pain interfering with sleep.  No fall, accident, injury, history of pertinent surgery.  No distal numbness or tingling.  No neck pain.  She has a history of partial nephrectomy for renal cell carcinoma.    Past Medical History:   Diagnosis Date    Arthritis     Colon polyp     Diabetes mellitus     diet controlled    Diabetes with neurologic complications     Hypertension     Nuclear sclerosis of both eyes 2/14/2022    Renal cell carcinoma      Past Surgical History:   Procedure Laterality Date    COLONOSCOPY N/A 2/5/2018    Procedure: COLONOSCOPY;  Surgeon: Bacilio Mancia MD;  Location: [facility] ENDO;  Service: Endoscopy;  Laterality: N/A;  appt confirmed-ss    COLONOSCOPY N/A 8/13/2020    Procedure: COLONOSCOPY;  Surgeon: Jose West MD;  Location: [facility] ENDO;  Service: Endoscopy;  Laterality: N/A;    ENDOSCOPIC CARPAL TUNNEL RELEASE Right 3/15/2024    Procedure: RELEASE, CARPAL TUNNEL, ENDOSCOPIC - RIGHT;  Surgeon: Tonio Mcdermott MD;  Location: [facility] OR;  Service: Orthopedics;  Laterality: Right;    HYSTERECTOMY      INTRAOCULAR PROSTHESES INSERTION Left 7/7/2022    Procedure: INSERTION, IOL PROSTHESIS;  Surgeon: Candelario Novoa MD;  Location: [facility] OR;  Service: Ophthalmology;  Laterality: Left;    INTRAOCULAR PROSTHESES INSERTION Right 7/21/2022    Procedure: INSERTION, IOL PROSTHESIS;  Surgeon: Candelario Novoa MD;  Location: [facility] OR;  Service: Ophthalmology;  Laterality: Right;    OOPHORECTOMY      PARTIAL NEPHRECTOMY Right 10/12/2018    Procedure: NEPHRECTOMY, PARTIAL open. Dr Arenas to assist.;  Surgeon: Alejandra  STEPHANE Palm MD;  Location: [facility] OR;  Service: Urology;  Laterality: Right;  RN PREOP 10/9/2018----NEEDS H/P    PHACOEMULSIFICATION OF CATARACT Left 7/7/2022    Procedure: PHACOEMULSIFICATION, CATARACT;  Surgeon: Candelario Novoa MD;  Location: [facility] OR;  Service: Ophthalmology;  Laterality: Left;  RN PHONE PREOP 6/27/2022   ARRIVAL 6:00 AM    PHACOEMULSIFICATION OF CATARACT Right 7/21/2022    Procedure: PHACOEMULSIFICATION, CATARACT;  Surgeon: Candelario Novoa MD;  Location: [facility] OR;  Service: Ophthalmology;  Laterality: Right;  RN PHONE PREOP 7/12/2022   ARRIVAL 12:00 NOON    TOTAL KNEE ARTHROPLASTY Right 11/8/2023    Procedure: ARTHROPLASTY, KNEE, TOTAL. NELSON;  Surgeon: Eric Carbajal MD;  Location: [facility] OR;  Service: Orthopedics;  Laterality: Right;  [place]. Admit  BRADLEY NIEVES [phone] NOTIFIED QUINTIN ON 10/11/2023-  RN PREOP 10/25/2023   T/S ON 11/6/2023--done------CLEARED BY CARDS     Family History   Problem Relation Name Age of Onset    No Known Problems Mother      Glaucoma Father      Breast cancer Sister Warnell     Glaucoma Sister Warnell     Cancer Sister Warnell         breast cancer    Thyroid disease Sister Angeles     Blindness Sister Bryanna         due to trauma during car accident    Diabetes Sister Amanda     No Known Problems Maternal Aunt      No Known Problems Maternal Uncle      No Known Problems Paternal Aunt      No Known Problems Paternal Uncle      No Known Problems Maternal Grandmother      No Known Problems Maternal Grandfather      No Known Problems Paternal Grandmother      No Known Problems Paternal Grandfather      Diabetes Brother Navneet     Amblyopia Neg Hx      Cataracts Neg Hx      Hypertension Neg Hx      Macular degeneration Neg Hx      Retinal detachment Neg Hx      Strabismus Neg Hx      Stroke Neg Hx       Social History     Socioeconomic History    Marital status:    Tobacco Use    Smoking status: Never    Smokeless tobacco: Never    Substance and Sexual Activity    Alcohol use: No    Drug use: No    Sexual activity: Not Currently     Social Determinants of Health     Financial Resource Strain: Low Risk  (3/30/2023)    Overall Financial Resource Strain (CARDIA)     Difficulty of Paying Living Expenses: Not hard at all   Food Insecurity: No Food Insecurity (3/30/2023)    Hunger Vital Sign     Worried About Running Out of Food in the Last Year: Never true     Ran Out of Food in the Last Year: Never true   Transportation Needs: No Transportation Needs (3/30/2023)    PRAPARE - Transportation     Lack of Transportation (Medical): No     Lack of Transportation (Non-Medical): No   Physical Activity: Insufficiently Active (3/30/2023)    Exercise Vital Sign     Days of Exercise per Week: 7 days     Minutes of Exercise per Session: 10 min   Stress: No Stress Concern Present (3/30/2023)    [employer] - Occupational Stress Questionnaire     Feeling of Stress : Only a little   Housing Stability: Low Risk  (3/30/2023)    Housing Stability Vital Sign     Unable to Pay for Housing in the Last Year: No     Number of Places Lived in the Last Year: 1     Unstable Housing in the Last Year: No       Current Outpatient Medications   Medication Sig Dispense Refill    ACCU-CHEK ONEIL PLUS TEST STRP Strp TEST BLOOD SUGAR TWICE DAILY 200 strip 1    ACCU-CHEK SOFTCLIX LANCETS Misc USE TO TEST BLOOD SUGAR ONE TIME DAILY 100 each 3    alcohol swabs (DROPSAFE ALCOHOL PREP PADS) PadM USE 3 EVERY DAY AS DIRECTED 100 each 3    amLODIPine (NORVASC) 10 MG tablet TAKE 1 TABLET ONE TIME DAILY 90 tablet 3    aspirin (ECOTRIN) 81 MG EC tablet Take 1 tablet (81 mg total) by mouth 2 (two) times a day. 60 tablet 0    atorvastatin (LIPITOR) 40 MG tablet  90 tablet 0    blood glucose control high,low (ACCU-CHEK ONEIL CONTROL SOLN) Soln 1 Units by Misc.(Non-Drug; Combo Route) route as needed. 1 each 0    blood glucose control, low (TRUE METRIX LEVEL 1) Soln  1 each by Misc.(Non-Drug; Combo Route) route once as needed. 1 each 3    blood-glucose meter (ACCU-CHEK ONEIL PLUS METER) Misc 1 kit by Misc.(Non-Drug; Combo Route) route once daily. 1 each 0    blood-glucose meter (TRUE METRIX GLUCOSE METER) kit Use as directed to test glucose 1 each 0    cetirizine (ZYRTEC) 10 MG tablet Take 1 tablet (10 mg total) by mouth once daily. 90 tablet 0    ciclopirox (PENLAC) 8 % Soln Apply topically nightly. 6.6 mL 11    diphenhydrAMINE (BENADRYL) 25 mg capsule Take 1 capsule (25 mg total) by mouth every 6 (six) hours as needed for Itching. 20 capsule 0    docusate sodium (COLACE) 100 MG capsule Take 1 capsule (100 mg total) by mouth 2 (two) times daily. 30 capsule 0    ergocalciferol (ERGOCALCIFEROL) 50,000 unit Cap TAKE 1 CAPSULE EVERY 7 DAYS. 12 capsule 3    FLUoxetine 20 MG capsule TAKE 1 CAPSULE EVERY DAY 90 capsule 3    fluticasone propionate (FLONASE) 50 mcg/actuation nasal spray USE 1 SPRAY IN EACH NOSTRIL ONCE DAILY 32 g 2    hydrOXYzine HCL (ATARAX) 25 MG tablet TAKE 1 TABLET BY MOUTH THREE TIMES DAILY AS NEEDED FOR ITCHING 90 tablet 0    metFORMIN (GLUCOPHAGE) 500 MG tablet Take 1 tablet (500 mg total) by mouth 2 (two) times daily with meals. 180 tablet 3    metoprolol succinate (TOPROL-XL) 25 MG 24 hr tablet Take 1 tablet by mouth once daily 90 tablet 3    ondansetron (ZOFRAN-ODT) 8 MG TbDL Take 1 tablet (8 mg total) by mouth every 8 (eight) hours as needed (nausea). 30 tablet 0    pantoprazole (PROTONIX) 40 MG tablet TAKE 1 TABLET EVERY DAY 90 tablet 3    pregabalin (LYRICA) 50 MG capsule Take 1 capsule (50 mg total) by mouth 2 (two) times daily. 60 capsule 2    traMADoL (ULTRAM) 50 mg tablet Take 1 tablet (50 mg total) by mouth every 6 (six) hours. 20 tablet 0    TRUEPLUS LANCETS 33 gauge Misc TEST BLOOD SUGAR TWICE DAILY 200 each 1     No current facility-administered medications for this visit.     Facility-Administered Medications Ordered in Other Visits   Medication  "Dose Route Frequency Provider Last Rate Last Admin    acetaminophen tablet 1,000 mg  1,000 mg Oral On Call Procedure Eric Carbajal MD        acetaminophen tablet 1,000 mg  1,000 mg Oral Q6H Eric Carbajal MD   1,000 mg at 11/09/23 0506    aspirin EC tablet 81 mg  81 mg Oral BID Eric Carbajal MD   81 mg at 11/09/23 1358    famotidine tablet 20 mg  20 mg Oral BID Eric Carbajal MD   20 mg at 11/09/23 1357    LIDOcaine (PF) 10 mg/ml (1%) injection 10 mg  1 mL Intradermal On Call Procedure Eric Carbajal MD        methocarbamoL tablet 750 mg  750 mg Oral TID Eric Carbajal MD   750 mg at 11/09/23 1357    naloxone 0.4 mg/mL injection 0.02 mg  0.02 mg Intravenous PRN Eric Carbajal MD        ondansetron injection 4 mg  4 mg Intravenous Q8H PRN Eric Carbajal MD   4 mg at 11/09/23 0858    oxyCODONE immediate release tablet 10 mg  10 mg Oral Q3H PRN Eric Carbajal MD   10 mg at 11/09/23 1043    oxyCODONE immediate release tablet 5 mg  5 mg Oral Q3H PRN Eric Carbajal MD        polyethylene glycol packet 17 g  17 g Oral Daily Eric Carbajal MD   17 g at 11/09/23 1356    pregabalin capsule 75 mg  75 mg Oral QHS Eric Carbajal MD   75 mg at 11/08/23 2147    prochlorperazine injection Soln 5 mg  5 mg Intravenous Q6H PRN Eric Carbajal MD   5 mg at 11/08/23 1325    senna-docusate 8.6-50 mg per tablet 1 tablet  1 tablet Oral BID Eric Carbajal MD   1 tablet at 11/09/23 1358    sodium chloride 0.9% flush 10 mL  10 mL Intravenous PRN Eric Carbajal MD         Review of patient's allergies indicates:   Allergen Reactions    Lisinopril Swelling and Shortness Of Breath    Ascorbic acid-ascorbate calc      And citrus fruits         Objective:      Vitals:    06/14/24 1547   Weight: 87.1 kg (192 lb 0.3 oz)   Height: 5' 4" (1.626 m)     Physical Exam  She has forward flexion to about 120°, with pain at terminal flexion.  External rotation is to 40°, internal rotation to the belt line.  She " is tender to the biceps tendon and around the acromion.  Positive empty can test.  Positive drop-arm sign.  Distal neurovascular intact.  Normal range of motion of elbow, wrist and hand without pain.  Imaging Review:   X-rays show only mild arthrosis of the glenohumeral joint, with good maintenance of the acromiohumeral interval.  However, there are irregularities at the surface of the greater tuberosity, which are often seen in rotator cuff insertional tendinopathy  Assessment:   Right rotator cuff tear  Plan:       MRI is ordered.  We will call her with the results, and make the appropriate referral once we have discussed that.  The patient's pathophysiology was explained in detail with reference to x-rays, models, other visual aids as appropriate.  Treatment options were discussed in detail.  Questions were invited and answered to the patient's satisfaction.    Kayode Mims MD  Orthopaedic Surgery

## 2024-06-25 ENCOUNTER — PATIENT MESSAGE (OUTPATIENT)
Dept: ORTHOPEDICS | Facility: CLINIC | Age: 74
End: 2024-06-25
Payer: MEDICARE

## 2024-06-25 RX ORDER — IBUPROFEN 600 MG/1
600 TABLET ORAL 3 TIMES DAILY
Qty: 20 TABLET | Refills: 0 | Status: SHIPPED | OUTPATIENT
Start: 2024-06-25

## 2024-06-25 RX ORDER — ACETAMINOPHEN 500 MG
1000 TABLET ORAL 2 TIMES DAILY
Qty: 20 TABLET | Refills: 0 | Status: SHIPPED | OUTPATIENT
Start: 2024-06-25

## 2024-06-25 RX ORDER — TRAMADOL HYDROCHLORIDE 50 MG/1
50 TABLET ORAL EVERY 6 HOURS
Qty: 20 TABLET | Refills: 0 | Status: SHIPPED | OUTPATIENT
Start: 2024-06-25

## 2024-06-26 ENCOUNTER — ANESTHESIA (OUTPATIENT)
Dept: SURGERY | Facility: HOSPITAL | Age: 74
End: 2024-06-26
Payer: MEDICARE

## 2024-06-26 ENCOUNTER — HOSPITAL ENCOUNTER (OUTPATIENT)
Facility: HOSPITAL | Age: 74
Discharge: HOME | End: 2024-06-26
Attending: ORTHOPAEDIC SURGERY | Admitting: ORTHOPAEDIC SURGERY
Payer: MEDICARE

## 2024-06-26 VITALS
BODY MASS INDEX: 32.78 KG/M2 | HEIGHT: 64 IN | OXYGEN SATURATION: 95 % | HEART RATE: 88 BPM | SYSTOLIC BLOOD PRESSURE: 143 MMHG | DIASTOLIC BLOOD PRESSURE: 72 MMHG | TEMPERATURE: 98 F | WEIGHT: 192 LBS | RESPIRATION RATE: 16 BRPM

## 2024-06-26 DIAGNOSIS — G56.00 CTS (CARPAL TUNNEL SYNDROME): Primary | ICD-10-CM

## 2024-06-26 DIAGNOSIS — G56.02 CARPAL TUNNEL SYNDROME OF LEFT WRIST: ICD-10-CM

## 2024-06-26 LAB
POCT GLUCOSE: 158 MG/DL (ref 70–110)
POCT GLUCOSE: 162 MG/DL (ref 70–110)

## 2024-06-26 PROCEDURE — 63600175 PHARM REV CODE 636 W HCPCS: Performed by: NURSE PRACTITIONER

## 2024-06-26 PROCEDURE — 37000008 HC ANESTHESIA 1ST 15 MINUTES: Performed by: ORTHOPAEDIC SURGERY

## 2024-06-26 PROCEDURE — 71000033 HC RECOVERY, INTIAL HOUR: Performed by: ORTHOPAEDIC SURGERY

## 2024-06-26 PROCEDURE — 27201423 OPTIME MED/SURG SUP & DEVICES STERILE SUPPLY: Performed by: ORTHOPAEDIC SURGERY

## 2024-06-26 PROCEDURE — 71000015 HC POSTOP RECOV 1ST HR: Performed by: ORTHOPAEDIC SURGERY

## 2024-06-26 PROCEDURE — 25000003 PHARM REV CODE 250: Performed by: NURSE ANESTHETIST, CERTIFIED REGISTERED

## 2024-06-26 PROCEDURE — 94761 N-INVAS EAR/PLS OXIMETRY MLT: CPT

## 2024-06-26 PROCEDURE — 63600175 PHARM REV CODE 636 W HCPCS: Performed by: STUDENT IN AN ORGANIZED HEALTH CARE EDUCATION/TRAINING PROGRAM

## 2024-06-26 PROCEDURE — 82962 GLUCOSE BLOOD TEST: CPT | Performed by: ORTHOPAEDIC SURGERY

## 2024-06-26 PROCEDURE — 63600175 PHARM REV CODE 636 W HCPCS: Performed by: NURSE ANESTHETIST, CERTIFIED REGISTERED

## 2024-06-26 PROCEDURE — 64415 NJX AA&/STRD BRCH PLXS IMG: CPT | Performed by: STUDENT IN AN ORGANIZED HEALTH CARE EDUCATION/TRAINING PROGRAM

## 2024-06-26 PROCEDURE — 36000707: Performed by: ORTHOPAEDIC SURGERY

## 2024-06-26 PROCEDURE — 29848 WRIST ENDOSCOPY/SURGERY: CPT | Mod: HCNC,LT,, | Performed by: ORTHOPAEDIC SURGERY

## 2024-06-26 PROCEDURE — 25000003 PHARM REV CODE 250: Performed by: STUDENT IN AN ORGANIZED HEALTH CARE EDUCATION/TRAINING PROGRAM

## 2024-06-26 PROCEDURE — 25000003 PHARM REV CODE 250: Performed by: ORTHOPAEDIC SURGERY

## 2024-06-26 PROCEDURE — 99900035 HC TECH TIME PER 15 MIN (STAT)

## 2024-06-26 PROCEDURE — 36000706: Performed by: ORTHOPAEDIC SURGERY

## 2024-06-26 PROCEDURE — 37000009 HC ANESTHESIA EA ADD 15 MINS: Performed by: ORTHOPAEDIC SURGERY

## 2024-06-26 PROCEDURE — 25000003 PHARM REV CODE 250: Performed by: NURSE PRACTITIONER

## 2024-06-26 RX ORDER — LIDOCAINE HYDROCHLORIDE 20 MG/ML
INJECTION INTRAVENOUS
Status: DISCONTINUED | OUTPATIENT
Start: 2024-06-26 | End: 2024-06-26

## 2024-06-26 RX ORDER — FENTANYL CITRATE 50 UG/ML
25 INJECTION, SOLUTION INTRAMUSCULAR; INTRAVENOUS EVERY 5 MIN PRN
Status: DISCONTINUED | OUTPATIENT
Start: 2024-06-26 | End: 2024-06-26 | Stop reason: HOSPADM

## 2024-06-26 RX ORDER — ROPIVACAINE HYDROCHLORIDE 5 MG/ML
INJECTION, SOLUTION EPIDURAL; INFILTRATION; PERINEURAL
Status: COMPLETED | OUTPATIENT
Start: 2024-06-26 | End: 2024-06-26

## 2024-06-26 RX ORDER — CELECOXIB 200 MG/1
400 CAPSULE ORAL
Status: COMPLETED | OUTPATIENT
Start: 2024-06-26 | End: 2024-06-26

## 2024-06-26 RX ORDER — PROPOFOL 10 MG/ML
VIAL (ML) INTRAVENOUS
Status: DISCONTINUED | OUTPATIENT
Start: 2024-06-26 | End: 2024-06-26

## 2024-06-26 RX ORDER — ONDANSETRON HYDROCHLORIDE 2 MG/ML
INJECTION, SOLUTION INTRAVENOUS
Status: DISCONTINUED | OUTPATIENT
Start: 2024-06-26 | End: 2024-06-26

## 2024-06-26 RX ORDER — FAMOTIDINE 10 MG/ML
INJECTION INTRAVENOUS
Status: DISCONTINUED | OUTPATIENT
Start: 2024-06-26 | End: 2024-06-26

## 2024-06-26 RX ORDER — PROPOFOL 10 MG/ML
VIAL (ML) INTRAVENOUS CONTINUOUS PRN
Status: DISCONTINUED | OUTPATIENT
Start: 2024-06-26 | End: 2024-06-26

## 2024-06-26 RX ORDER — MIDAZOLAM HYDROCHLORIDE 1 MG/ML
1 INJECTION, SOLUTION INTRAMUSCULAR; INTRAVENOUS
Status: DISCONTINUED | OUTPATIENT
Start: 2024-06-26 | End: 2024-06-26 | Stop reason: HOSPADM

## 2024-06-26 RX ORDER — OXYCODONE HYDROCHLORIDE 5 MG/1
5 TABLET ORAL
Status: DISCONTINUED | OUTPATIENT
Start: 2024-06-26 | End: 2024-06-26 | Stop reason: HOSPADM

## 2024-06-26 RX ORDER — ACETAMINOPHEN 500 MG
1000 TABLET ORAL
Status: COMPLETED | OUTPATIENT
Start: 2024-06-26 | End: 2024-06-26

## 2024-06-26 RX ORDER — MUPIROCIN 20 MG/G
OINTMENT TOPICAL
Status: DISCONTINUED | OUTPATIENT
Start: 2024-06-26 | End: 2024-06-26 | Stop reason: HOSPADM

## 2024-06-26 RX ORDER — ONDANSETRON HYDROCHLORIDE 2 MG/ML
4 INJECTION, SOLUTION INTRAVENOUS DAILY PRN
Status: DISCONTINUED | OUTPATIENT
Start: 2024-06-26 | End: 2024-06-26 | Stop reason: HOSPADM

## 2024-06-26 RX ORDER — CEFAZOLIN SODIUM 1 G/3ML
INJECTION, POWDER, FOR SOLUTION INTRAMUSCULAR; INTRAVENOUS
Status: DISCONTINUED | OUTPATIENT
Start: 2024-06-26 | End: 2024-06-26

## 2024-06-26 RX ORDER — SODIUM CHLORIDE 0.9 % (FLUSH) 0.9 %
10 SYRINGE (ML) INJECTION
Status: DISCONTINUED | OUTPATIENT
Start: 2024-06-26 | End: 2024-06-26 | Stop reason: HOSPADM

## 2024-06-26 RX ORDER — HALOPERIDOL 5 MG/ML
0.5 INJECTION INTRAMUSCULAR EVERY 10 MIN PRN
Status: DISCONTINUED | OUTPATIENT
Start: 2024-06-26 | End: 2024-06-26 | Stop reason: HOSPADM

## 2024-06-26 RX ADMIN — FAMOTIDINE 20 MG: 10 INJECTION, SOLUTION INTRAVENOUS at 09:06

## 2024-06-26 RX ADMIN — OXYCODONE 5 MG: 5 TABLET ORAL at 10:06

## 2024-06-26 RX ADMIN — MUPIROCIN: 20 OINTMENT TOPICAL at 07:06

## 2024-06-26 RX ADMIN — ACETAMINOPHEN 1000 MG: 500 TABLET ORAL at 07:06

## 2024-06-26 RX ADMIN — PROPOFOL 50 MG: 10 INJECTION, EMULSION INTRAVENOUS at 09:06

## 2024-06-26 RX ADMIN — CELECOXIB 400 MG: 200 CAPSULE ORAL at 07:06

## 2024-06-26 RX ADMIN — LIDOCAINE HYDROCHLORIDE 50 MG: 20 INJECTION INTRAVENOUS at 09:06

## 2024-06-26 RX ADMIN — ONDANSETRON 4 MG: 2 INJECTION INTRAMUSCULAR; INTRAVENOUS at 09:06

## 2024-06-26 RX ADMIN — CEFAZOLIN 2 G: 330 INJECTION, POWDER, FOR SOLUTION INTRAMUSCULAR; INTRAVENOUS at 09:06

## 2024-06-26 RX ADMIN — SODIUM CHLORIDE: 9 INJECTION, SOLUTION INTRAVENOUS at 09:06

## 2024-06-26 RX ADMIN — ROPIVACAINE HYDROCHLORIDE 20 ML: 5 INJECTION EPIDURAL; INFILTRATION; PERINEURAL at 09:06

## 2024-06-26 RX ADMIN — MIDAZOLAM 1 MG: 1 INJECTION INTRAMUSCULAR; INTRAVENOUS at 08:06

## 2024-06-26 RX ADMIN — PROPOFOL 100 MCG/KG/MIN: 10 INJECTION, EMULSION INTRAVENOUS at 09:06

## 2024-06-26 RX ADMIN — FENTANYL CITRATE 50 MCG: 50 INJECTION INTRAMUSCULAR; INTRAVENOUS at 08:06

## 2024-06-26 NOTE — PROGRESS NOTES
Pre op completed.  Patient belongings to be placed in locker. Bed in lowest position. Call light within reach. Family not available. DME not needed. Bear kaitlynn refused. Warm blanket given.

## 2024-06-26 NOTE — PLAN OF CARE
VSS.  Patient tolerating oral liquids without difficulty.   No apparent s&s of distress noted at this time, no complaints voiced at this time.   Discharge instructions reviewed with patient with good verbal feedback received.   Post op medications delivered to bedside, DME arms ling on.  Patient ready for discharge.

## 2024-06-26 NOTE — OP NOTE
ORTHOPEDIC SURGERY OPERATIVE NOTE    SERVICE: ORTHOPEDICS     DATE OF PROCEDURE: 6/26/2024     SURGEON: Tonio Mcdermott M.D.    ASSISTANT: MD Mark    PREOPERATIVE DIAGNOSIS: Left sided carpal tunnel syndrome    POSTOPERATIVE DIAGNOSIS: Left sided carpal tunnel syndrome    PROCEDURE PERFORMED: Endoscopic Left sided carpal tunnel release, CPT code 93326    ANESTHESIA: Regional/MAC    ESTIMATED BLOOD LOSS: Minimal           SPECIMENS: None    COMPLICATIONS: None              CONDITION: Stable    IV FLUIDS: Crystalloid per anesthesia    IMPLANTS: None    TOURNIQUET TIME: 10 minutes at 250 mmHg    INDICATIONS FOR PROCEDURE: Valarie Foster is a 74 y.o. female who presented to clinic with findings and workup consistent with carpal tunnel syndrome of the left hand.  The patient had not responded to nonoperative management and wished to proceed with surgical intervention.  The risks, benefits and alternatives to surgery were discussed with the patient at great length and in great detail.  Specific risks discussed include but are not limited to bleeding, infection, vessel damage, nerve damage, pain, numbness, tingling, weakness, stiffness, complex regional pain syndrome, hardware/surgical failure, need for further surgery, DVT, PE, arthritis, scar sensitivity, delayed healing, need for prolonged postoperative rehabilitation, and death amongst others.  The patient stated an understanding of the risks and wished to proceed with surgery.   All questions were answered.  No guarantees were implied or stated.  Informed consent was obtained.    PROCEDURE IN DETAIL: With the patient's participation in the preoperative holding area, the left upper extremity was marked as the surgical site. Preoperative checks were completed and consents were verified.  Regional anesthesia was administered.  The patient was brought to the Operating Room and placed in supine position.  A well-padded nonsterile tourniquet was placed on the upper arm.   The left arm was then prepped and draped in the usual sterile fashion.  Timeout was called for to verify the correct site, procedure and patient.  All were in agreement and we proceeded.  MAC anesthesia was introduced.  Esmarch was utilized to exsanguinate the arm and tourniquet was insufflated to 250mmHg where it remained for the duration of the procedure.    Next, a small 1 cm transverse incision was made in line with a proximal wrist flexion crease beginning radially at the palmaris longus and extending ulnarly for 1 cm.  Dissection was continued to level of antebrachial fascia.  This was transversely incised in line with the skin incision.  A narrow double skin hook was then utilized to elevate the distal most aspect of the incision to gain access to the carpal tunnel.  A series of small and large dilators were utilized to dilate our planned path with the endoscope.  A synovial elevator was then utilized to free synovium from the undersurface of the transverse carpal ligament.  Washboard effect was confirmed.  At this time, the Arthrex endoscopic carpal tunnel system was introduced into the incision.  Confirmation of placement within the carpal tunnel was confirmed.  The endoscope was advanced the distal aspect of the transverse carpal ligament and the distal fat was visualized.  The median nerve was identified radial to the endoscope and was protected throughout the duration of the procedure.  Excellent visualization of the transverse carpal ligament along its entire length was confirmed with no interposed soft tissue.  I then began my division of the transverse carpal ligament from distally and extending in a proximal direction.  The knife was deployed and complete full-thickness transection of the transverse carpal ligament was performed beginning distally and working my way more proximally.  Complete division of the ligament was performed. Care was taken distally to ensure not to cut past [employer]  Line or injure the superficial palmar arch. The distal fat was visualized at the distalmost aspect of the ligament division. Tension and pressure within the carpal tunnel was dramatically improved and decreased status post ligament division.  The endoscope was then once again advanced more distally and completion of the division was confirmed.  Fat protruded through the divided ligament throughout.  The median nerve was identified along the length of our ligament division and remained intact and uninjured.  The endoscope was then removed from the wound and the antebrachial forearm fascia was divided in line with the ligament division by hand with tenotomy scissors.  The wound was then irrigated with copious amounts of sterile saline.  Skin was closed with 4 0 nylon suture.        Sterile dressings were applied consisting of Xeroform 4 x 4 gauze cast padding and 2 in Ace wrap to the hand.  Tourniquet was deflated and brisk cap refill in [place] throughout the operative upper extremity.  There was no significant bleeding.      DISPOSITION: The patient will be discharged home with followup in approximately 2 weeks for suture removal.  Early active range of motion in the acute postoperative period was discussed and encouraged.  They are to keep the dressing clean, dry, and intact until that time.

## 2024-06-26 NOTE — TRANSFER OF CARE
"Anesthesia Transfer of Care Note    Patient: Valarie Foster    Procedure(s) Performed: Procedure(s) (LRB):  RELEASE, CARPAL TUNNEL, ENDOSCOPIC - left (Left)    Patient location: PACU    Anesthesia Type: general    Transport from OR: Transported from OR on room air with adequate spontaneous ventilation    Post pain: adequate analgesia    Post assessment: no apparent anesthetic complications and tolerated procedure well    Post vital signs: stable    Level of consciousness: awake    Nausea/Vomiting: no nausea/vomiting    Complications: none    Transfer of care protocol was followed    Last vitals: Visit Vitals  BP (!) 143/73 (BP Location: Right arm, Patient Position: Lying)   Pulse 90   Temp 36.6 °C (97.8 °F) (Oral)   Resp 18   Ht 5' 4" (1.626 m)   Wt 87.1 kg (192 lb)   SpO2 95%   Breastfeeding No   BMI 32.96 kg/m²     "

## 2024-06-26 NOTE — BRIEF OP NOTE
[facility] ([facility])  Brief Operative Note    Surgery Date: 6/26/2024     Surgeons and Role:     * Tonio Mcdermott MD - Primary    Assisting Surgeon: None    Pre-op Diagnosis:  Carpal tunnel syndrome of left wrist [G56.02]    Post-op Diagnosis:  Post-Op Diagnosis Codes:     * Carpal tunnel syndrome of left wrist [G56.02]    Procedure(s) (LRB):  RELEASE, CARPAL TUNNEL, ENDOSCOPIC - left (Left)    Anesthesia: Regional    Operative Findings: see full op note    Estimated Blood Loss: * No values recorded between 6/26/2024  9:46 AM and 6/26/2024 10:00 AM *         Specimens:   Specimen (24h ago, onward)      None              Discharge Note    OUTCOME: Patient tolerated treatment/procedure well without complication and is now ready for discharge.    DISPOSITION: Home or Self Care    FINAL DIAGNOSIS:  <principal problem not specified>    FOLLOWUP: In clinic    DISCHARGE INSTRUCTIONS:    Discharge Procedure Orders   Notify your health care provider if you experience any of the following:  increased confusion or weakness     Notify your health care provider if you experience any of the following:  persistent dizziness, light-headedness, or visual disturbances     Notify your health care provider if you experience any of the following:  worsening rash     Notify your health care provider if you experience any of the following:  severe persistent headache     Notify your health care provider if you experience any of the following:  difficulty breathing or increased cough     Notify your health care provider if you experience any of the following:  redness, tenderness, or signs of infection (pain, swelling, redness, odor or green/yellow discharge around incision site)     Notify your health care provider if you experience any of the following:  severe uncontrolled pain     Notify your health care provider if you experience any of the following:  persistent nausea and vomiting or diarrhea     Notify your health care  provider if you experience any of the following:  temperature >100.4

## 2024-06-26 NOTE — ANESTHESIA PROCEDURE NOTES
Peripheral Block    Patient location during procedure: pre-op   Block not for primary anesthetic.  Reason for block: at surgeon's request and post-op pain management   Post-op Pain Location: left hand/arm      Staffing  Authorizing Provider: Rojelio Coley MD  Performing Provider: Rojelio Coley MD    Staffing  Performed by: Rojelio Coley MD  Authorized by: Rojelio Coley MD    Preanesthetic Checklist  Completed: patient identified, IV checked, site marked, risks and benefits discussed, surgical consent, monitors and equipment checked, pre-op evaluation and timeout performed  Peripheral Block  Patient position: supine  Prep: ChloraPrep  Patient monitoring: heart rate, cardiac monitor, continuous pulse ox, continuous capnometry and frequent blood pressure checks  Block type: supraclavicular  Laterality: left  Injection technique: single shot  Needle  Needle type: Stimuplex   Needle gauge: 22 G  Needle length: 2 in  Needle localization: anatomical landmarks and ultrasound guidance   -ultrasound image captured on disc.  Assessment  Injection assessment: negative aspiration, negative parasthesia and local visualized surrounding nerve  Paresthesia pain: none  Heart rate change: no  Slow fractionated injection: yes    Medications:    Medications: ropivacaine (NAROPIN) injection 0.5% - Perineural   20 mL - 6/26/2024 9:11:00 AM    Additional Notes  VSS.  [facility] RN monitoring vitals throughout procedure.  Patient tolerated procedure well.

## 2024-06-26 NOTE — ANESTHESIA PREPROCEDURE EVALUATION
06/26/2024  [facility]  Anesthesia Pre-Operative Evaluation     Patient Name: Valarie Foster  YOB: 1950  MRN: [id number]  CSN: [id number]       Admit Date: 6/26/2024   Admit Team: Networked reference to record PCT   Hospital Day: 1  Date of Procedure: 6/26/2024  Anesthesia: Regional Procedure: Procedure(s) (LRB):  RELEASE, CARPAL TUNNEL, ENDOSCOPIC - left (Left)  Pre-Operative Diagnosis: Carpal tunnel syndrome of left wrist [G56.02]  Proceduralist:Surgeons and Role:     * Tonio Mcdermott MD - Primary  Code Status: Full Code   Advanced Directive: <no information>  Isolation Precautions: No active isolations  Capacity: Full capacity     SUBJECTIVE:   Valarie Foster is a 74 y.o. female who  has a past medical history of Arthritis, Colon polyp, Diabetes mellitus, Diabetes with neurologic complications, Hypertension, Nuclear sclerosis of both eyes (2/14/2022), and Renal cell carcinoma.  No notes on file      Hospital LOS: 0 days  ICU LOS: Patient does not have an ICU stay during this admission.    she has a current medication list which includes the following long-term medication(s): amlodipine, aspirin, atorvastatin, cetirizine, fluoxetine, metformin, metoprolol succinate, pantoprazole, pregabalin, accu-chek softclix lancets, accu-chek clifford control soln, true metrix level 1, blood-glucose meter, blood-glucose meter, ciclopirox, and trueplus lancets.     ALLERGIES:     Review of patient's allergies indicates:   Allergen Reactions    Lisinopril Swelling and Shortness Of Breath    Ascorbic acid-ascorbate calc      And citrus fruits     LDA:   AIRWAY:         [unfilled]     Lines/Drains/Airways       Peripheral Intravenous Line  Duration                  Peripheral IV - Single Lumen 06/26/24 0748 20 G No Posterior;Right Hand <1 day                   Anesthesia Evaluation       Airway   Mallampati: II  TM distance: Normal  Neck ROM: Normal ROM  Dental      Pulmonary    Cardiovascular   (+) hypertension    Neuro/Psych    (+) neuromuscular disease, psychiatric history    GI/Hepatic/Renal    (+) chronic renal disease    Endo/Other    (+) diabetes mellitus, arthritis  Abdominal                    MEDICATIONS:     Current Outpatient Medications on File Prior to Encounter   Medication Sig Dispense Refill Last Dose    amLODIPine (NORVASC) 10 MG tablet TAKE 1 TABLET ONE TIME DAILY 90 tablet 3 6/25/2024 at 0900    aspirin (ECOTRIN) 81 MG EC tablet Take 1 tablet (81 mg total) by mouth 2 (two) times a day. 60 tablet 0 Past Month    atorvastatin (LIPITOR) 40 MG tablet  90 tablet 0 6/25/2024 at 2000    cetirizine (ZYRTEC) 10 MG tablet Take 1 tablet (10 mg total) by mouth once daily. 90 tablet 0 6/25/2024 at 2000    diphenhydrAMINE (BENADRYL) 25 mg capsule Take 1 capsule (25 mg total) by mouth every 6 (six) hours as needed for Itching. 20 capsule 0 6/25/2024 at 2000    ergocalciferol (ERGOCALCIFEROL) 50,000 unit Cap TAKE 1 CAPSULE EVERY 7 DAYS. 12 capsule 3 6/25/2024    FLUoxetine 20 MG capsule TAKE 1 CAPSULE EVERY DAY 90 capsule 3 6/25/2024 at 2000    hydrOXYzine HCL (ATARAX) 25 MG tablet TAKE 1 TABLET BY MOUTH THREE TIMES DAILY AS NEEDED FOR ITCHING 90 tablet 0 6/25/2024 at 2100    metFORMIN (GLUCOPHAGE) 500 MG tablet Take 1 tablet (500 mg total) by mouth 2 (two) times daily with meals. 180 tablet 3 6/25/2024 at 2000    metoprolol succinate (TOPROL-XL) 25 MG 24 hr tablet Take 1 tablet by mouth once daily 90 tablet 3 6/26/2024 at 0500    pantoprazole (PROTONIX) 40 MG tablet TAKE 1 TABLET EVERY DAY 90 tablet 3 6/25/2024 at 0900    pregabalin (LYRICA) 50 MG capsule Take 1 capsule (50 mg total) by mouth 2 (two) times daily. 60 capsule 2 Past Week    ACCU-CHEK ONEIL PLUS TEST STRP Strp TEST BLOOD SUGAR TWICE DAILY 200 strip 1     ACCU-CHEK SOFTCLIX LANCETS [facility] USE TO TEST BLOOD SUGAR ONE TIME DAILY 100  each 3     alcohol swabs (DROPSAFE ALCOHOL PREP PADS) PadM USE 3 EVERY DAY AS DIRECTED 100 each 3     blood glucose control high,low (ACCU-CHEK ONEIL CONTROL SOLN) Soln 1 Units by Misc.(Non-Drug; Combo Route) route as needed. 1 each 0     blood glucose control, low (TRUE METRIX LEVEL 1) Soln 1 each by Misc.(Non-Drug; Combo Route) route once as needed. 1 each 3     blood-glucose meter (ACCU-CHEK ONEIL PLUS METER) Misc 1 kit by Misc.(Non-Drug; Combo Route) route once daily. 1 each 0     blood-glucose meter (TRUE METRIX GLUCOSE METER) kit Use as directed to test glucose 1 each 0     ciclopirox (PENLAC) 8 % Soln Apply topically nightly. 6.6 mL 11     docusate sodium (COLACE) 100 MG capsule Take 1 capsule (100 mg total) by mouth 2 (two) times daily. 30 capsule 0     fluticasone propionate (FLONASE) 50 mcg/actuation nasal spray USE 1 SPRAY IN EACH NOSTRIL ONCE DAILY 32 g 2     ondansetron (ZOFRAN-ODT) 8 MG TbDL Take 1 tablet (8 mg total) by mouth every 8 (eight) hours as needed (nausea). 30 tablet 0     traMADoL (ULTRAM) 50 mg tablet Take 1 tablet (50 mg total) by mouth every 6 (six) hours. 20 tablet 0     TRUEPLUS LANCETS 33 gauge Misc TEST BLOOD SUGAR TWICE DAILY 200 each 1       Inpatient Medications:  Antibiotics (From admission, onward)      Start     Stop Route Frequency Ordered    06/26/24 0711  ceFAZolin 2 g in D5W 50 mL IVPB (MB+)         -- IV On Call Procedure 06/26/24 0711    06/26/24 0711  mupirocin 2 % ointment         -- Nasl On Call Procedure 06/26/24 0711          VTE Risk Mitigation (From admission, onward)           Ordered     IP VTE HIGH RISK PATIENT  Once         06/26/24 0711                      Current Facility-Administered Medications   Medication Dose Route Frequency Provider Last Rate Last Admin    ceFAZolin 2 g in D5W 50 mL IVPB (MB+)  2 g Intravenous On Call Procedure Tonio Mcdermott MD        fentaNYL 50 mcg/mL injection 25 mcg  25 mcg Intravenous Q5 Min PRN Rebecac Bolton FNP         midazolam injection 1 mg  1 mg Intravenous Q3 Min PRN Rebecca Bolton FNP        mupirocin 2 % ointment   Nasal On Call Procedure Tonio Mcdermott MD   Given at 06/26/24 0738     Facility-Administered Medications Ordered in Other Encounters   Medication Dose Route Frequency Provider Last Rate Last Admin    acetaminophen tablet 1,000 mg  1,000 mg Oral On Call Procedure Eric Carbajal MD        acetaminophen tablet 1,000 mg  1,000 mg Oral Q6H Eric Carbajal MD   1,000 mg at 11/09/23 0506    aspirin EC tablet 81 mg  81 mg Oral BID Eric Carbajal MD   81 mg at 11/09/23 1358    famotidine tablet 20 mg  20 mg Oral BID Eric Carbajal MD   20 mg at 11/09/23 1357    LIDOcaine (PF) 10 mg/ml (1%) injection 10 mg  1 mL Intradermal On Call Procedure Eric Carbajal MD        methocarbamoL tablet 750 mg  750 mg Oral TID Eric Carbajal MD   750 mg at 11/09/23 1357    naloxone 0.4 mg/mL injection 0.02 mg  0.02 mg Intravenous PRN Eric Carbajal MD        ondansetron injection 4 mg  4 mg Intravenous Q8H PRN Eric Carbajal MD   4 mg at 11/09/23 0858    oxyCODONE immediate release tablet 10 mg  10 mg Oral Q3H PRN Eric Carbajal MD   10 mg at 11/09/23 1043    oxyCODONE immediate release tablet 5 mg  5 mg Oral Q3H PRN Eric Carbajal MD        polyethylene glycol packet 17 g  17 g Oral Daily Eric Carbajal MD   17 g at 11/09/23 1356    pregabalin capsule 75 mg  75 mg Oral QHS Eric Carbajal MD   75 mg at 11/08/23 2147    prochlorperazine injection Soln 5 mg  5 mg Intravenous Q6H PRN Eric Carbajal MD   5 mg at 11/08/23 1325    senna-docusate 8.6-50 mg per tablet 1 tablet  1 tablet Oral BID Eric Carbajal MD   1 tablet at 11/09/23 1358    sodium chloride 0.9% flush 10 mL  10 mL Intravenous PRN Eric Carbajal MD              History:   There are no hospital problems to display for this patient.    Surgical History:    has a past surgical history that includes Hysterectomy; Oophorectomy;  "Colonoscopy (N/A, 2/5/2018); Partial nephrectomy (Right, 10/12/2018); Colonoscopy (N/A, 8/13/2020); Phacoemulsification of cataract (Left, 7/7/2022); Intraocular prosthesis insertion (Left, 7/7/2022); Phacoemulsification of cataract (Right, 7/21/2022); Intraocular prosthesis insertion (Right, 7/21/2022); Total knee arthroplasty (Right, 11/8/2023); and Endoscopic carpal tunnel release (Right, 3/15/2024).   Social History:    reports that she is not currently sexually active.  reports that she has never smoked. She has never used smokeless tobacco. She reports that she does not drink alcohol and does not use drugs.    Vitals:    06/26/24 0745   BP: (!) 152/72   BP Location: Right arm   Patient Position: Lying   Pulse: 85   Resp: 16   Temp: 36.6 °C (97.8 °F)   TempSrc: Oral   SpO2: 97%   Weight: 87.1 kg (192 lb)   Height: 5' 4" (1.626 m)     Vital Signs Range (Last 24H):  Temp:  [36.6 °C (97.8 °F)]   Pulse:  [85]   Resp:  [16]   BP: (152)/(72)   SpO2:  [97 %]     Body mass index is 32.96 kg/m².  Wt Readings from Last 4 Encounters:   06/26/24 87.1 kg (192 lb)   06/17/24 87.1 kg (192 lb 0.3 oz)   06/14/24 87.1 kg (192 lb 0.3 oz)   03/15/24 87.1 kg (192 lb)        Intake/Output - Last 3 Shifts       None          Lab Results   Component Value Date    WBC 5.18 10/25/2023    HGB 13.0 10/25/2023    HCT 41.3 10/25/2023     10/25/2023     10/25/2023    K 4.3 10/25/2023     10/25/2023    CREATININE 0.8 10/25/2023    BUN 15 10/25/2023    CO2 27 10/25/2023     (H) 10/25/2023    CALCIUM 9.5 10/25/2023    MG 1.7 10/14/2018    PHOS 2.8 10/14/2018    ALKPHOS 129 10/25/2023    ALT 13 10/25/2023    AST 14 10/25/2023    ALBUMIN 3.9 10/25/2023    INR 1.1 10/25/2023    APTT 33.6 (H) 10/25/2023    HGBA1C 7.4 (H) 10/25/2023    TROPONINI <0.006 01/12/2023     Recent Results (from the past 12 hour(s))   POCT glucose    Collection Time: 06/26/24  7:36 AM   Result Value Ref Range    POCT Glucose 158 (H) 70 - 110 mg/dL " "    No results for input(s): "WBC", "HGB", "HCT", "PLT", "NA", "K", "CREATININE", "GLU", "INR" in the last 168 hours.  No LMP recorded. Patient has had a hysterectomy.    EKG:   Results for orders placed or performed in visit on 11/06/23   IN OFFICE EKG 12-LEAD (to [place])    Collection Time: 11/06/23  3:27 PM    Narrative    Test Reason : R07.9,    Vent. Rate : 087 BPM     Atrial Rate : 087 BPM     P-R Int : 148 ms          QRS Dur : 086 ms      QT Int : 386 ms       P-R-T Axes : 055 -17 059 degrees     QTc Int : 464 ms    Normal sinus rhythm  Moderate voltage criteria for LVH, may be normal variant  Nonspecific T wave abnormality  Prolonged QT  Abnormal ECG  When compared with ECG of 25-OCT-2023 09:02,  Nonspecific T wave abnormality no longer evident in Inferior leads  Nonspecific T wave abnormality now evident in Lateral leads  Confirmed by Mary Kate Dolan MD ([id number]) on 11/9/2023 8:50:04 PM    Referred By:  ABDON           Confirmed By:Mary Kate Dolan MD     TTE:  Results for orders placed or performed during the hospital encounter of 01/11/23   Echo   Result Value Ref Range    BSA 1.99 m2    TDI SEPTAL 0.05 m/s    LV LATERAL E/E' RATIO 14.00 m/s    LV SEPTAL E/E' RATIO 14.00 m/s    LA WIDTH 3.70 cm    IVC diameter 0.74 cm    Left Ventricular Outflow Tract Mean Velocity 0.60 cm/s    Left Ventricular Outflow Tract Mean Gradient 1.64 mmHg    AORTIC VALVE CUSP SEPERATION 1.97 cm    TDI LATERAL 0.05 m/s    PV PEAK VELOCITY 0.72 cm/s    LVIDd 3.67 3.5 - 6.0 cm    IVS 1.41 (A) 0.6 - 1.1 cm    Posterior Wall 1.06 0.6 - 1.1 cm    Ao root annulus 2.69 cm    LVIDs 2.64 2.1 - 4.0 cm    FS 28 28 - 44 %    LA volume 45.94 cm3    Sinus 2.54 cm    STJ 2.07 cm    Ascending aorta 3.12 cm    LV mass 152.06 g    LA size 3.48 cm    RVDD 3.15 cm    TAPSE 2.18 cm    Left Ventricle Relative Wall Thickness 0.58 cm    AV mean gradient 3 mmHg    AV valve area 1.98 cm2    AV Velocity Ratio 0.73     AV index (prosthetic) 0.70     MV valve area p " 1/2 method 3.90 cm2    E/A ratio 0.74     Mean e' 0.05 m/s    E wave deceleration time 245.51 msec    LVOT diameter 1.90 cm    LVOT area 2.8 cm2    LVOT peak aly 0.84 m/s    LVOT peak VTI 19.10 cm    Ao peak aly 1.15 m/s    Ao VTI 27.3 cm    Mr max aly 4.20 m/s    LVOT stroke volume 54.13 cm3    AV peak gradient 5 mmHg    E/E' ratio 14.00 m/s    MV Peak E Aly 0.70 m/s    TR Max Aly 2.44 m/s    MV stenosis pressure 1/2 time 56.48 ms    MV Peak A Aly 0.95 m/s    LV Systolic Volume 25.63 mL    LV Systolic Volume Index 12.8 mL/m2    LV Diastolic Volume 57.00 mL    LV Diastolic Volume Index 28.36 mL/m2    LA Volume Index 22.9 mL/m2    LV Mass Index 76 g/m2    RA Major Axis 3.79 cm    Left Atrium Minor Axis 3.73 cm    Left Atrium Major Axis 4.80 cm    Triscuspid Valve Regurgitation Peak Gradient 24 mmHg    RA Width 3.17 cm    Right Atrial Pressure (from IVC) 3 mmHg    EF 55 %    TV resting pulmonary artery pressure 27 mmHg    Narrative    · The estimated ejection fraction is 55%.  · The left ventricle is normal in size with concentric hypertrophy and   normal systolic function.  · Grade I left ventricular diastolic dysfunction.  · Normal right ventricular size with normal right ventricular systolic   function.  · Moderate left atrial enlargement.  · Mild mitral regurgitation.  · Mild right atrial enlargement.  · Normal central venous pressure (3 mmHg).  · The estimated PA systolic pressure is 27 mmHg.        No results found for this or any previous visit.  ESTIVEN:  No results found for this or any previous visit.  Stress Test:  Results for orders placed during the hospital encounter of 11/01/23    Nuclear Stress - Cardiology Interpreted    Interpretation Summary    Normal myocardial perfusion scan. There is no evidence of myocardial ischemia or infarction.    There is a mild intensity perfusion abnormality in the anterior wall of the left ventricle, secondary to breast attenuation.    The gated perfusion images showed an  "ejection fraction of 59% post stress.    The ECG portion of the study is negative for ischemia.    The patient reported no chest pain during the stress test.    There were no arrhythmias during stress.     LHC:  No results found for this or any previous visit.     PFT:  No results found for: "FEV1", "FVC", "QLO4LMT", "TLC", "DLCO"           Pre-op Assessment    I have reviewed the Patient Summary Reports.     I have reviewed the Nursing Notes. I have reviewed the NPO Status.   I have reviewed the Medications.     Review of Systems  Anesthesia Hx:               Denies Personal Hx of Anesthesia complications.                    Cardiovascular:     Hypertension                                        Renal/:  Chronic Renal Disease                Musculoskeletal:  Arthritis               Neurological:    Neuromuscular Disease,                                   Endocrine:  Diabetes           Psych:  Psychiatric History                  Physical Exam  General: Alert, Cooperative and Oriented    Airway:  Mallampati: II   Mouth Opening: Normal  TM Distance: Normal  Neck ROM: Normal ROM        Anesthesia Plan  Type of Anesthesia, risks & benefits discussed:    Anesthesia Type: Gen ETT, Gen Natural Airway, Regional  Intra-op Monitoring Plan: Standard ASA Monitors  Post Op Pain Control Plan: multimodal analgesia  Induction:  IV  Airway Plan: Direct  Informed Consent: Informed consent signed with the Patient and all parties understand the risks and agree with anesthesia plan.  All questions answered.   ASA Score: 3  Day of Surgery Review of History & Physical: H&P Update referred to the surgeon/provider.    Ready For Surgery From Anesthesia Perspective.     .      "

## 2024-06-27 NOTE — ANESTHESIA POSTPROCEDURE EVALUATION
Anesthesia Post Evaluation    Patient: Valarie Foster    Procedure(s) Performed: Procedure(s) (LRB):  RELEASE, CARPAL TUNNEL, ENDOSCOPIC - left (Left)    Final Anesthesia Type: general      Patient location during evaluation: PACU  Patient participation: Yes- Able to Participate  Level of consciousness: awake and alert  Post-procedure vital signs: reviewed and stable  Pain management: adequate  Airway patency: patent    PONV status at discharge: No PONV  Anesthetic complications: no      Cardiovascular status: hemodynamically stable  Hydration status: euvolemic  Follow-up not needed.              Vitals Value Taken Time   /72 06/26/24 1040   Temp 36.7 °C (98.1 °F) 06/26/24 1040   Pulse 88 06/26/24 1040   Resp 16 06/26/24 1040   SpO2 95 % 06/26/24 1040         Event Time   Out of Recovery 10:30:00         Pain/Fabricio Score: Pain Rating Prior to Med Admin: 2 (6/26/2024 10:40 AM)  Pain Rating Post Med Admin: 2 (6/26/2024 10:55 AM)  Fabricio Score: 8 (6/26/2024  9:20 AM)

## 2024-07-08 ENCOUNTER — OFFICE VISIT (OUTPATIENT)
Dept: ORTHOPEDICS | Facility: CLINIC | Age: 74
End: 2024-07-08
Payer: MEDICARE

## 2024-07-08 VITALS — HEIGHT: 64 IN | WEIGHT: 192 LBS | BODY MASS INDEX: 32.78 KG/M2

## 2024-07-08 DIAGNOSIS — G56.02 CARPAL TUNNEL SYNDROME OF LEFT WRIST: Primary | ICD-10-CM

## 2024-07-08 PROCEDURE — 1125F AMNT PAIN NOTED PAIN PRSNT: CPT | Mod: HCNC,CPTII,S$GLB, | Performed by: ORTHOPAEDIC SURGERY

## 2024-07-08 PROCEDURE — 99999 PR PBB SHADOW E&M-EST. PATIENT-LVL II: CPT | Mod: PBBFAC,HCNC,, | Performed by: ORTHOPAEDIC SURGERY

## 2024-07-08 PROCEDURE — 1101F PT FALLS ASSESS-DOCD LE1/YR: CPT | Mod: HCNC,CPTII,S$GLB, | Performed by: ORTHOPAEDIC SURGERY

## 2024-07-08 PROCEDURE — 3288F FALL RISK ASSESSMENT DOCD: CPT | Mod: HCNC,CPTII,S$GLB, | Performed by: ORTHOPAEDIC SURGERY

## 2024-07-08 PROCEDURE — 1159F MED LIST DOCD IN RCRD: CPT | Mod: HCNC,CPTII,S$GLB, | Performed by: ORTHOPAEDIC SURGERY

## 2024-07-08 PROCEDURE — 99024 POSTOP FOLLOW-UP VISIT: CPT | Mod: HCNC,S$GLB,, | Performed by: ORTHOPAEDIC SURGERY

## 2024-07-08 NOTE — PROGRESS NOTES
Valarie Foster presents for post-operative evaluation.  The patient is now 2 weeks s/p left ECTR.  She reports her numbness and tingling is much improved.  She has good range of motion and no major complaints today.  Right hand feeling great..  Overall the patient reports doing well.  The patient reports appropriate postoperative soreness with well controlled overall pain.      PE:    AA&O x 4.  NAD  HEENT:  NCAT, sclera nonicteric  Lungs:  Respirations are equal and unlabored.  CV:  2+ bilateral upper and lower extremity pulses.  MSK: The wound is healing well with no signs of erythema or warmth.  There is no drainage.  No clinical signs or symptoms of infection are present.  All sutures were removed today.  Bilateral upper extremities neurovascular intact.  Full painless range of motion throughout.    A/P: Status post above, doing well  1) Continue with full weight bearing.  No restrictions.  Offered OT but patient declines.  2) F/U as needed/if needed  3) Call with any questions/concerns in the interim    Please be aware that this note has been generated with the assistance of [facility] voice-to-text.  Please excuse any spelling or grammatical errors.

## 2024-07-20 DIAGNOSIS — L29.9 PRURITIC CONDITION: ICD-10-CM

## 2024-07-21 DIAGNOSIS — M17.0 PRIMARY OSTEOARTHRITIS OF BOTH KNEES: Primary | ICD-10-CM

## 2024-07-22 ENCOUNTER — HOSPITAL ENCOUNTER (EMERGENCY)
Facility: HOSPITAL | Age: 74
Discharge: HOME | End: 2024-07-22
Attending: INTERNAL MEDICINE
Payer: MEDICARE

## 2024-07-22 VITALS
HEIGHT: 64 IN | WEIGHT: 192 LBS | SYSTOLIC BLOOD PRESSURE: 169 MMHG | OXYGEN SATURATION: 99 % | TEMPERATURE: 99 F | HEART RATE: 98 BPM | BODY MASS INDEX: 32.78 KG/M2 | RESPIRATION RATE: 19 BRPM | DIASTOLIC BLOOD PRESSURE: 75 MMHG

## 2024-07-22 DIAGNOSIS — U07.1 COVID-19 VIRUS DETECTED: ICD-10-CM

## 2024-07-22 DIAGNOSIS — U07.1 COVID-19: Primary | ICD-10-CM

## 2024-07-22 LAB
CTP QC/QA: YES
SARS-COV-2 RDRP RESP QL NAA+PROBE: POSITIVE

## 2024-07-22 PROCEDURE — 99284 EMERGENCY DEPT VISIT MOD MDM: CPT | Mod: HCNC,ER

## 2024-07-22 PROCEDURE — 87635 SARS-COV-2 COVID-19 AMP PRB: CPT | Mod: HCNC,ER | Performed by: INTERNAL MEDICINE

## 2024-07-22 RX ORDER — IBUPROFEN 800 MG/1
800 TABLET ORAL EVERY 8 HOURS PRN
Qty: 30 TABLET | Refills: 0 | Status: SHIPPED | OUTPATIENT
Start: 2024-07-22

## 2024-07-22 RX ORDER — AZELASTINE 1 MG/ML
2 SPRAY, METERED NASAL 2 TIMES DAILY
Qty: 30 ML | Refills: 0 | Status: SHIPPED | OUTPATIENT
Start: 2024-07-22 | End: 2024-09-15

## 2024-07-22 RX ORDER — FLUTICASONE PROPIONATE 50 MCG
2 SPRAY, SUSPENSION (ML) NASAL DAILY
Qty: 15 G | Refills: 0 | Status: SHIPPED | OUTPATIENT
Start: 2024-07-22

## 2024-07-23 ENCOUNTER — PATIENT OUTREACH (OUTPATIENT)
Dept: EMERGENCY MEDICINE | Facility: HOSPITAL | Age: 74
End: 2024-07-23
Payer: MEDICARE

## 2024-07-23 NOTE — ED PROVIDER NOTES
Encounter Date: 7/22/2024    SCRIBE #1 NOTE: I, Fernie Hathaway Do, am scribing for, and in the presence of,  Pawan Love MD. I have scribed the following portions of the note - Other sections scribed: HPI, ROS, PE.       History     Chief Complaint   Patient presents with    COVID-19 Concerns     Pt was exposed to covid last Wednesday so here for a test     Valarie Foster is a 74 y.o. female, with a pertinent PMHx of HTN and diabetes, who presents to the ED with URI concerns onset 5 days ago. Patient reports associated chills, cough, and general body aches. Patient notes positive sick contact with family members who were COVID positive. No other exacerbating or alleviating factors. Patient denies nausea, emesis, diarrhea, or other associated symptoms.     The history is provided by the patient. No  was used.     Review of patient's allergies indicates:   Allergen Reactions    Lisinopril Swelling and Shortness Of Breath    Ascorbic acid-ascorbate calc      And citrus fruits     Past Medical History:   Diagnosis Date    Arthritis     Colon polyp     Diabetes mellitus     diet controlled    Diabetes with neurologic complications     Hypertension     Nuclear sclerosis of both eyes 2/14/2022    Renal cell carcinoma      Past Surgical History:   Procedure Laterality Date    COLONOSCOPY N/A 2/5/2018    Procedure: COLONOSCOPY;  Surgeon: Bacilio Mancia MD;  Location: [facility] ENDO;  Service: Endoscopy;  Laterality: N/A;  appt confirmed-ss    COLONOSCOPY N/A 8/13/2020    Procedure: COLONOSCOPY;  Surgeon: Jose West MD;  Location: [facility] ENDO;  Service: Endoscopy;  Laterality: N/A;    ENDOSCOPIC CARPAL TUNNEL RELEASE Right 3/15/2024    Procedure: RELEASE, CARPAL TUNNEL, ENDOSCOPIC - RIGHT;  Surgeon: Tonio Mcdermott MD;  Location: [facility] OR;  Service: Orthopedics;  Laterality: Right;    ENDOSCOPIC CARPAL TUNNEL RELEASE Left 6/26/2024    Procedure: RELEASE, CARPAL TUNNEL, ENDOSCOPIC - left;  Surgeon:  Tonio Mcdermott MD;  Location: [facility] OR;  Service: Orthopedics;  Laterality: Left;    HYSTERECTOMY      INTRAOCULAR PROSTHESES INSERTION Left 7/7/2022    Procedure: INSERTION, IOL PROSTHESIS;  Surgeon: Candelario Novoa MD;  Location: [facility] OR;  Service: Ophthalmology;  Laterality: Left;    INTRAOCULAR PROSTHESES INSERTION Right 7/21/2022    Procedure: INSERTION, IOL PROSTHESIS;  Surgeon: Candelario Novoa MD;  Location: [facility] OR;  Service: Ophthalmology;  Laterality: Right;    OOPHORECTOMY      PARTIAL NEPHRECTOMY Right 10/12/2018    Procedure: NEPHRECTOMY, PARTIAL open. Dr Arenas to assist.;  Surgeon: Alejandra Palm MD;  Location: [facility] OR;  Service: Urology;  Laterality: Right;  RN PREOP 10/9/2018----NEEDS H/P    PHACOEMULSIFICATION OF CATARACT Left 7/7/2022    Procedure: PHACOEMULSIFICATION, CATARACT;  Surgeon: Candelario Novoa MD;  Location: [facility] OR;  Service: Ophthalmology;  Laterality: Left;  RN PHONE PREOP 6/27/2022   ARRIVAL 6:00 AM    PHACOEMULSIFICATION OF CATARACT Right 7/21/2022    Procedure: PHACOEMULSIFICATION, CATARACT;  Surgeon: Candelario Novoa MD;  Location: [facility] OR;  Service: Ophthalmology;  Laterality: Right;  RN PHONE PREOP 7/12/2022   ARRIVAL 12:00 NOON    TOTAL KNEE ARTHROPLASTY Right 11/8/2023    Procedure: ARTHROPLASTY, KNEE, TOTAL. NELSON;  Surgeon: Eric Carbajal MD;  Location: [facility] OR;  Service: Orthopedics;  Laterality: Right;  [place]. Admit  BRADLEY NIEVES [phone] NOTIFIED QUINTIN ON 10/11/2023-LO  RN PREOP 10/25/2023   T/S ON 11/6/2023--done------CLEARED BY CARDS     Family History   Problem Relation Name Age of Onset    No Known Problems Mother      Glaucoma Father      Breast cancer Sister Warnell     Glaucoma Sister Warnell     Cancer Sister Warnell         breast cancer    Thyroid disease Sister Angeles     Blindness Sister Bryanna         due to trauma during car accident    Diabetes Sister Amanda     No Known Problems Maternal Aunt       No Known Problems Maternal Uncle      No Known Problems Paternal Aunt      No Known Problems Paternal Uncle      No Known Problems Maternal Grandmother      No Known Problems Maternal Grandfather      No Known Problems Paternal Grandmother      No Known Problems Paternal Grandfather      Diabetes Brother Navneet     Amblyopia Neg Hx      Cataracts Neg Hx      Hypertension Neg Hx      Macular degeneration Neg Hx      Retinal detachment Neg Hx      Strabismus Neg Hx      Stroke Neg Hx       Social History     Tobacco Use    Smoking status: Never    Smokeless tobacco: Never   Substance Use Topics    Alcohol use: No    Drug use: No     Review of Systems   Constitutional:  Positive for chills. Negative for fever.   HENT:  Negative for sore throat.    Respiratory:  Positive for cough. Negative for shortness of breath.    Cardiovascular:  Negative for chest pain.   Gastrointestinal:  Negative for abdominal pain, diarrhea, nausea and vomiting.   Genitourinary:  Negative for dysuria.   Musculoskeletal:  Positive for myalgias. Negative for back pain.   Skin:  Negative for rash.   Neurological:  Negative for weakness and headaches.   Psychiatric/Behavioral:  Negative for behavioral problems.    All other systems reviewed and are negative.      Physical Exam     Initial Vitals [07/22/24 1949]   BP Pulse Resp Temp SpO2   (!) 172/78 102 20 98.5 °F (36.9 °C) 98 %      MAP       --         Physical Exam    Nursing note and vitals reviewed.  Constitutional: She appears well-developed and well-nourished.   HENT:   Head: Normocephalic and atraumatic.   Enlarged nasal turbinates noted. Clear nasal discharge noted. Oropharyngeal erythema present. No oropharyngeal exudate or edema.        Eyes: Conjunctivae are normal.   Neck: Neck supple.   Normal range of motion.  Cardiovascular:  Normal rate, regular rhythm and normal heart sounds.     Exam reveals no gallop and no friction rub.       No murmur heard.  Pulmonary/Chest: Breath sounds  normal. No respiratory distress. She has no wheezes. She has no rhonchi. She has no rales.   Abdominal: Abdomen is soft. There is no abdominal tenderness.   Musculoskeletal:         General: No edema. Normal range of motion.      Cervical back: Normal range of motion and neck supple.     Neurological: She is alert and oriented to person, place, and time. GCS score is 15. GCS eye subscore is 4. GCS verbal subscore is 5. GCS motor subscore is 6.   Skin: Skin is warm and dry.   Psychiatric: She has a normal mood and affect.         ED Course   Procedures  Labs Reviewed   SARS-COV-2 RDRP GENE - Abnormal       Result Value    POC Rapid COVID Positive (*)      Acceptable Yes      Narrative:     This test utilizes isothermal nucleic acid amplification technology to detect the SARS-CoV-2 RdRp nucleic acid segment. The analytical sensitivity (limit of detection) is 500 copies/swab.     A POSITIVE result is indicative of the presence of SARS-CoV-2 RNA; clinical correlation with patient history and other diagnostic information is necessary to determine patient infection status.    A NEGATIVE result means that SARS-CoV-2 nucleic acids are not present above the limit of detection. A NEGATIVE result should be treated as presumptive. It does not rule out the possibility of COVID-19 and should not be the sole basis for treatment decisions. If COVID-19 is strongly suspected based on clinical and exposure history, re-testing using an alternate molecular assay should be considered.     Commercial kits are provided by [employer].   _________________________________________________________________   The authorized Fact Sheet for Healthcare Providers and the authorized Fact Sheet for Patients of the ID NOW COVID-19 are available on the FDA website:    https://www.fda.gov/media/131175/download      https://www.fda.gov/media/610002/download             Imaging Results    None          Medications - No data to  display  Medical Decision Making  Valarie Foster is a 74 y.o. female, with a pertinent PMHx of HTN and diabetes, who presents to the ED with URI concerns onset 5 days ago. Patient reports associated chills, cough, and general body aches. Patient notes positive sick contact with family members who were COVID positive. No other exacerbating or alleviating factors. Patient denies nausea, emesis, diarrhea, or other associated symptoms.   Course of ED stay:   COVID-19 screen was positive.  Patient was given instructions for COVID-19 as well as prescriptions for Astelin/fluticasone/ibuprofen.  She was advised to follow-up with her primary care physician within the next week for re-evaluation/return to the emergency department if condition worsens.    Amount and/or Complexity of Data Reviewed  Labs: ordered. Decision-making details documented in ED Course.    Risk  Prescription drug management.            Scribe Attestation:   Scribe #1: I performed the above scribed service and the documentation accurately describes the services I performed. I attest to the accuracy of the note.              This document was produced by a scribe under my direction and in my presence. I agree with the content of the note and have made any necessary edits.     Dr. Love    07/23/2024 4:06 AM                   Clinical Impression:  Final diagnoses:  [U07.1] COVID-19 (Primary)          ED Disposition Condition    Discharge Stable          ED Prescriptions       Medication Sig Dispense Start Date End Date Auth. Provider    azelastine (ASTELIN) 137 mcg (0.1 %) nasal spray 2 sprays (274 mcg total) by Nasal route 2 (two) times daily. 30 mL 7/22/2024 9/15/2024 Pawan Love MD    fluticasone propionate (FLONASE) 50 mcg/actuation nasal spray 2 sprays (100 mcg total) by Each Nostril route once daily. 15 g 7/22/2024 -- Pawan Love MD    ibuprofen (ADVIL,MOTRIN) 800 MG tablet Take 1 tablet (800 mg total) by mouth every 8 (eight) hours as  needed for Pain. 30 tablet 7/22/2024 -- Pawan Love MD          Follow-up Information       Follow up With Specialties Details Why Contact Info    Alena Hernandez MD Family Medicine, Wound Care Schedule an appointment as soon as possible for a visit in 2 days For reevaluation [address] [facility] [id number]  [phone]               Pawan Love MD  07/23/24 0400

## 2024-07-24 ENCOUNTER — PATIENT MESSAGE (OUTPATIENT)
Dept: ADMINISTRATIVE | Facility: HOSPITAL | Age: 74
End: 2024-07-24
Payer: MEDICARE

## 2024-07-24 RX ORDER — HYDROXYZINE HYDROCHLORIDE 25 MG/1
25 TABLET, FILM COATED ORAL
Qty: 90 TABLET | Refills: 0 | Status: SHIPPED | OUTPATIENT
Start: 2024-07-24

## 2024-07-24 NOTE — TELEPHONE ENCOUNTER
Refill Routing Note   Medication(s) are not appropriate for processing by [facility] for the following reason(s):        Medication outside of protocol    [facility] action(s):  Route               Appointments  past 12m or future 3m with PCP    Date Provider   Last Visit   9/19/2023 Alena Hernandez MD   Next Visit   Visit date not found Alena Hernandez MD   ED visits in past 90 days: 1        Note composed:9:03 AM 07/24/2024

## 2024-07-24 NOTE — PROGRESS NOTES
Patient was seen in the ED on 7/22/24. Phoned patient to assist with Post ED Discharge Navigation. Patient agreed to assistance scheduling a PCP follow-up appointment. In Basket message sent to PCP staff for scheduling assistance.  Tabby Jacob

## 2024-07-26 ENCOUNTER — PATIENT OUTREACH (OUTPATIENT)
Dept: EMERGENCY MEDICINE | Facility: HOSPITAL | Age: 74
End: 2024-07-26
Payer: MEDICARE

## 2024-07-26 NOTE — PROGRESS NOTES
Reminded patient of upcoming appointment on 7/29/24 at 2:30 with Alexandra Buchanan NP [facility], Family Medicine [address] [place] [id number] [phone].  Tabby Jacob

## 2024-07-26 NOTE — PROGRESS NOTES
Phoned patient for follow-up. Patient still feel unwell. Patient has an upcoming ED Discharge follow-up appointment. Patient declined any additional assistance at this time.  Tabby Jacob

## 2024-07-29 ENCOUNTER — OFFICE VISIT (OUTPATIENT)
Dept: FAMILY MEDICINE | Facility: CLINIC | Age: 74
End: 2024-07-29
Payer: MEDICARE

## 2024-07-29 ENCOUNTER — LAB VISIT (OUTPATIENT)
Dept: LAB | Facility: HOSPITAL | Age: 74
End: 2024-07-29
Payer: MEDICARE

## 2024-07-29 VITALS
WEIGHT: 204.06 LBS | TEMPERATURE: 98 F | HEIGHT: 64 IN | SYSTOLIC BLOOD PRESSURE: 138 MMHG | OXYGEN SATURATION: 97 % | DIASTOLIC BLOOD PRESSURE: 72 MMHG | BODY MASS INDEX: 34.84 KG/M2 | HEART RATE: 77 BPM

## 2024-07-29 DIAGNOSIS — F32.0 MILD MAJOR DEPRESSION: ICD-10-CM

## 2024-07-29 DIAGNOSIS — I70.0 ABDOMINAL AORTIC ATHEROSCLEROSIS: ICD-10-CM

## 2024-07-29 DIAGNOSIS — E11.40 TYPE 2 DIABETES MELLITUS WITH DIABETIC NEUROPATHY, WITHOUT LONG-TERM CURRENT USE OF INSULIN: Primary | ICD-10-CM

## 2024-07-29 DIAGNOSIS — U07.1 COVID-19: ICD-10-CM

## 2024-07-29 DIAGNOSIS — C64.1 RENAL CELL CARCINOMA OF RIGHT KIDNEY: ICD-10-CM

## 2024-07-29 DIAGNOSIS — E11.40 TYPE 2 DIABETES MELLITUS WITH DIABETIC NEUROPATHY, WITHOUT LONG-TERM CURRENT USE OF INSULIN: ICD-10-CM

## 2024-07-29 LAB
ESTIMATED AVG GLUCOSE: 189 MG/DL (ref 68–131)
HBA1C MFR BLD: 8.2 % (ref 4–5.6)

## 2024-07-29 PROCEDURE — 99214 OFFICE O/P EST MOD 30 MIN: CPT | Mod: HCNC,S$GLB,,

## 2024-07-29 PROCEDURE — 3078F DIAST BP <80 MM HG: CPT | Mod: HCNC,CPTII,S$GLB,

## 2024-07-29 PROCEDURE — 1160F RVW MEDS BY RX/DR IN RCRD: CPT | Mod: HCNC,CPTII,S$GLB,

## 2024-07-29 PROCEDURE — 3075F SYST BP GE 130 - 139MM HG: CPT | Mod: HCNC,CPTII,S$GLB,

## 2024-07-29 PROCEDURE — 99999 PR PBB SHADOW E&M-EST. PATIENT-LVL V: CPT | Mod: PBBFAC,HCNC,,

## 2024-07-29 PROCEDURE — 1101F PT FALLS ASSESS-DOCD LE1/YR: CPT | Mod: HCNC,CPTII,S$GLB,

## 2024-07-29 PROCEDURE — 1159F MED LIST DOCD IN RCRD: CPT | Mod: HCNC,CPTII,S$GLB,

## 2024-07-29 PROCEDURE — 36415 COLL VENOUS BLD VENIPUNCTURE: CPT | Mod: HCNC,PO

## 2024-07-29 PROCEDURE — 3288F FALL RISK ASSESSMENT DOCD: CPT | Mod: HCNC,CPTII,S$GLB,

## 2024-07-29 PROCEDURE — 83036 HEMOGLOBIN GLYCOSYLATED A1C: CPT | Mod: HCNC

## 2024-07-29 NOTE — PATIENT INSTRUCTIONS
"Will recheck A1C, if not at goal can consider injectable medication but insurance won't cover it for diabetes unless you are at max dose of metformin.     Due for annual with PCP    Continue symptom mgmt, no need to retest.     Prior Guidance: The previous COVID-19 guidance recommended a minimum isolation period of 5 days plus a period of post-isolation precautions. It was created during the public health emergency, at a time when we had lower population immunity, fewer tools to combat respiratory viruses, and higher rates of severe illness, including hospitalizations and deaths.  Updated Guidance: The updated Respiratory Virus Guidance recommends that people stay home and away from others until at least 24 hours after both their symptoms are getting better overall, and they have not had a fever (and are not using fever-reducing medication). Note that depending on the length of symptoms, this period could be shorter, the same, or longer than the previous guidance for COVID-19.  It is important to note that the guidance doesn't end with staying home and away from others when sick. The guidance encourages added precaution over the next five days after time at home, away from others, is over. Since some people remain contagious beyond the "stay-at-home" period, a period of added precaution using prevention strategies, such as:  · Taking more steps for  air;  · Enhancing hygiene practices;  · Wearing a well-fitting mask;  · Keeping a distance from others; and/or  · Getting tested for respiratory viruses can lower the chance of spreading respiratory viruses to others.    I recommend symptom mgmt including:  - Rest.    - Drink plenty of fluids.     - You can take over-the-counter claritin, zyrtec, allegra, or xyzal as directed. These are antihistamines that can help with runny nose, nasal congestion, sneezing, and helps to dry up post-nasal drip, which usually causes sore throat and cough.              - If you do " NOT have high blood pressure, you may use a decongestant form (D)  of this medication (ie. Claritin- D, zyrtec-D, allegra-D) or if you do not take the D form, you can take sudafed (pseudoephedrine) over the counter, which is a decongestant. Do NOT take two decongestant (D) medications at the same time (such as mucinex-D and claritin-D or plain sudafed and claritin D)     - If you DO have Hypertension, anxiety, or palpitations, it is safe to take Coricidin HBP for relief of sinus symptoms.     - You can use Flonase (fluticasone) nasal spray as directed for sinus congestion and postnasal drip. This is a steroid nasal spray that works locally over time to decrease the inflammation in your nose/sinuses and help with allergic symptoms. This is not an quick- relief spray like afrin, but it works well if used daily.  Discontinue if you develop nose bleed  - use nasal saline prior to Flonase.  - Use Ocean Spray Nasal Saline 1-3 puffs each nostril every 2-3 hours then blow out onto tissue. This is to irrigate the nasal passage way to clear the sinus openings. Use until sinus problem resolved.     - you can take plain Mucinex (guaifenesin) 1200 mg twice a day to help loosen mucous.      -warm salt water gargles can help with sore throat     - warm tea with honey can help with cough. Honey is a natural cough suppressant.     - Dextromethorphan (DM) is a cough suppressant over the counter (ie. mucinex DM, robitussin, delsym; dayquil/nyquil has DM as well.)     - Go to the ER if you develop new or worsening symptoms.

## 2024-07-29 NOTE — PROGRESS NOTES
"  HPI     Chief Complaint:  Chief Complaint   Patient presents with    Cough     Post covid        Valarie Foster is a 74 y.o. female with multiple medical diagnoses as listed in the medical history and problem list that presents for   Chief Complaint   Patient presents with    Cough     Post covid    .   Patient is not known to me with her last appointment in this department on 3/19/2024.      HPI    COVID positive 7/22, developed symptoms that day with fever, fatigue, cough and body aches. Still having cough with right ear pain. Overall feeling better. Some green nasal discharge. Cough worse at night. Took mucinex but can't take much because of "kidney problems".    DM - frustrated with weight gain. Taking metformin 500mg daily, glucose controlled but would like to consider injectable medication. Pt expresses interest in Wegovy. Thinks insurance would cover it. Not at max dose of metformin.     Other concerns below  Assessment & Plan       Problem List Items Addressed This Visit          Psychiatric    Mild major depression (Chronic)  The current medical regimen is effective;  continue present plan and medications.         Cardiac/Vascular    Abdominal aortic atherosclerosis (Chronic)    Overview     Patient with Atherosclerosis of the Aorta noted on CT 4/2019.  Stable/asymptomatic. Currently stable on lipid and b/p monitoring.              ID    COVID-19    Current Assessment & Plan     Overall appears well. No acute concerns. Education provided on continued symptom mgmt            Oncology    Renal cell carcinoma of right kidney  Last visit with Urology, Dr. Palm 6/1/2023. Due for f/u appt.        Endocrine    Type 2 diabetes mellitus with diabetic neuropathy, without long-term current use of insulin - Primary (Chronic)    Current Assessment & Plan     Lab Results   Component Value Date    HGBA1C 7.4 (H) 10/25/2023     Metformin 500mg daily. Will recheck today. Consider starting wegovy but not at max dose of " metformin  Pt expressed interest in starting Ozempic.    Denies Hx of medullary thyroid carcinoma (MTC) hx or family hx.    No Hx of pancreatitis.          Relevant Orders    HEMOGLOBIN A1C       --------------------------------------------      Health Maintenance:  Health Maintenance         Date Due Completion Date    COVID-19 Vaccine (5 - 2023-24 season) 09/01/2023 9/27/2022    Hemoglobin A1c 04/25/2024 10/25/2023    Influenza Vaccine (1) 09/01/2024 9/19/2023    Diabetes Urine Screening 10/25/2024 10/25/2023    Lipid Panel 10/25/2024 10/25/2023    Eye Exam 04/30/2025 4/30/2024    DEXA Scan 07/13/2025 7/13/2023    TETANUS VACCINE 04/30/2028 4/30/2018            Health maintenance reviewed    Follow Up:  Follow up if symptoms worsen or fail to improve.    Discussed DDx, condition, and treatment.   Education sent to patient portal/included in after visit summary.  ED precautions given.   Notify provider if symptoms do not resolve or increase in severity.   Patient verbalizes understanding and agrees with plan of care.    Exam     Review of Systems:  (as noted above)  Review of Systems    Physical Exam:   Physical Exam  Constitutional:       General: She is not in acute distress.     Appearance: Normal appearance. She is obese. She is not toxic-appearing.   HENT:      Right Ear: Tympanic membrane and external ear normal. There is no impacted cerumen.      Left Ear: Tympanic membrane and external ear normal. There is no impacted cerumen.      Nose: Nose normal. No congestion.      Mouth/Throat:      Pharynx: No oropharyngeal exudate or posterior oropharyngeal erythema.   Eyes:      Conjunctiva/sclera: Conjunctivae normal.   Cardiovascular:      Rate and Rhythm: Normal rate and regular rhythm.      Pulses: Normal pulses.      Heart sounds: Normal heart sounds.   Pulmonary:      Effort: Pulmonary effort is normal. No respiratory distress.      Breath sounds: Normal breath sounds. No wheezing.   Musculoskeletal:       "Cervical back: Normal range of motion and neck supple. No rigidity.   Skin:     General: Skin is warm.      Capillary Refill: Capillary refill takes less than 2 seconds.   Neurological:      General: No focal deficit present.      Mental Status: She is alert and oriented to person, place, and time.      Gait: Gait abnormal.   Psychiatric:         Mood and Affect: Mood normal.       Vitals:    07/29/24 1420   BP: 138/72   Pulse: 77   Temp: 98.4 °F (36.9 °C)   TempSrc: Oral   SpO2: 97%   Weight: 92.5 kg (204 lb 0.6 oz)   Height: 5' 4" (1.626 m)      Body mass index is 35.02 kg/m².        History     Past Medical History:  Past Medical History:   Diagnosis Date    Arthritis     Colon polyp     Diabetes mellitus     diet controlled    Diabetes with neurologic complications     Hypertension     Nuclear sclerosis of both eyes 2/14/2022    Renal cell carcinoma        Past Surgical History:  Past Surgical History:   Procedure Laterality Date    COLONOSCOPY N/A 2/5/2018    Procedure: COLONOSCOPY;  Surgeon: Bacilio Mancia MD;  Location: [facility] ENDO;  Service: Endoscopy;  Laterality: N/A;  appt confirmed-ss    COLONOSCOPY N/A 8/13/2020    Procedure: COLONOSCOPY;  Surgeon: Jose West MD;  Location: [facility] ENDO;  Service: Endoscopy;  Laterality: N/A;    ENDOSCOPIC CARPAL TUNNEL RELEASE Right 3/15/2024    Procedure: RELEASE, CARPAL TUNNEL, ENDOSCOPIC - RIGHT;  Surgeon: Tonio Mcdermott MD;  Location: [facility];  Service: Orthopedics;  Laterality: Right;    ENDOSCOPIC CARPAL TUNNEL RELEASE Left 6/26/2024    Procedure: RELEASE, CARPAL TUNNEL, ENDOSCOPIC - left;  Surgeon: Tonio Mcdermott MD;  Location: [facility] OR;  Service: Orthopedics;  Laterality: Left;    HYSTERECTOMY      INTRAOCULAR PROSTHESES INSERTION Left 7/7/2022    Procedure: INSERTION, IOL PROSTHESIS;  Surgeon: Candelario Novoa MD;  Location: [facility];  Service: Ophthalmology;  Laterality: Left;    INTRAOCULAR PROSTHESES INSERTION Right 7/21/2022    Procedure: " INSERTION, IOL PROSTHESIS;  Surgeon: Candelario Novoa MD;  Location: [facility] OR;  Service: Ophthalmology;  Laterality: Right;    OOPHORECTOMY      PARTIAL NEPHRECTOMY Right 10/12/2018    Procedure: NEPHRECTOMY, PARTIAL open. Dr Arenas to assist.;  Surgeon: Alejandra Palm MD;  Location: [facility] OR;  Service: Urology;  Laterality: Right;  RN PREOP 10/9/2018----NEEDS H/P    PHACOEMULSIFICATION OF CATARACT Left 7/7/2022    Procedure: PHACOEMULSIFICATION, CATARACT;  Surgeon: Candelario Novoa MD;  Location: [facility] OR;  Service: Ophthalmology;  Laterality: Left;  RN PHONE PREOP 6/27/2022   ARRIVAL 6:00 AM    PHACOEMULSIFICATION OF CATARACT Right 7/21/2022    Procedure: PHACOEMULSIFICATION, CATARACT;  Surgeon: Candelario Novoa MD;  Location: [facility] OR;  Service: Ophthalmology;  Laterality: Right;  RN PHONE PREOP 7/12/2022   ARRIVAL 12:00 NOON    TOTAL KNEE ARTHROPLASTY Right 11/8/2023    Procedure: ARTHROPLASTY, KNEE, TOTAL. NELSON;  Surgeon: Eric Carbajal MD;  Location: [facility] OR;  Service: Orthopedics;  Laterality: Right;  Paia. Admit  RAPHAEL QUINTIN RIVERAKINGSLEY [phone] NOTIFIED QUINTIN ON 10/11/2023-LO  RN PREOP 10/25/2023   T/S ON 11/6/2023--done------CLEARED BY CARDS       Social History:  Social History     Socioeconomic History    Marital status:    Tobacco Use    Smoking status: Never    Smokeless tobacco: Never   Substance and Sexual Activity    Alcohol use: No    Drug use: No    Sexual activity: Not Currently     Social Determinants of Health     Financial Resource Strain: Low Risk  (3/30/2023)    Overall Financial Resource Strain (CARDIA)     Difficulty of Paying Living Expenses: Not hard at all   Food Insecurity: No Food Insecurity (3/30/2023)    Hunger Vital Sign     Worried About Running Out of Food in the Last Year: Never true     Ran Out of Food in the Last Year: Never true   Transportation Needs: No Transportation Needs (3/30/2023)    PRAPARE - Transportation     Lack of  Transportation (Medical): No     Lack of Transportation (Non-Medical): No   Physical Activity: Insufficiently Active (3/30/2023)    Exercise Vital Sign     Days of Exercise per Week: 7 days     Minutes of Exercise per Session: 10 min   Stress: No Stress Concern Present (3/30/2023)    [employer] [place] [employer] - Occupational Stress Questionnaire     Feeling of Stress : Only a little   Housing Stability: Low Risk  (3/30/2023)    Housing Stability Vital Sign     Unable to Pay for Housing in the Last Year: No     Number of Places Lived in the Last Year: 1     Unstable Housing in the Last Year: No       Family History:  Family History   Problem Relation Name Age of Onset    No Known Problems Mother      Glaucoma Father      Breast cancer Sister Warnell     Glaucoma Sister Warnell     Cancer Sister Warnell         breast cancer    Thyroid disease Sister Anglees     Blindness Sister Bryanna         due to trauma during car accident    Diabetes Sister Amanda     No Known Problems Maternal Aunt      No Known Problems Maternal Uncle      No Known Problems Paternal Aunt      No Known Problems Paternal Uncle      No Known Problems Maternal Grandmother      No Known Problems Maternal Grandfather      No Known Problems Paternal Grandmother      No Known Problems Paternal Grandfather      Diabetes Brother Navneet     Amblyopia Neg Hx      Cataracts Neg Hx      Hypertension Neg Hx      Macular degeneration Neg Hx      Retinal detachment Neg Hx      Strabismus Neg Hx      Stroke Neg Hx         Allergies and Medications: (updated and reviewed)  Review of patient's allergies indicates:   Allergen Reactions    Lisinopril Swelling and Shortness Of Breath    Ascorbic acid-ascorbate calc      And citrus fruits     Current Outpatient Medications   Medication Sig Dispense Refill    ACCU-CHEK ONEIL PLUS TEST STRP Strp TEST BLOOD SUGAR TWICE DAILY 200 strip 1    ACCU-CHEK SOFTCLIX LANCETS Misc USE TO TEST BLOOD SUGAR ONE TIME  DAILY 100 each 3    acetaminophen (TYLENOL) 500 MG tablet Take 2 tablets (1,000 mg total) by mouth 2 (two) times a day. 20 tablet 0    alcohol swabs (DROPSAFE ALCOHOL PREP PADS) PadM USE 3 EVERY DAY AS DIRECTED 100 each 3    amLODIPine (NORVASC) 10 MG tablet TAKE 1 TABLET ONE TIME DAILY 90 tablet 3    atorvastatin (LIPITOR) 40 MG tablet  90 tablet 0    azelastine (ASTELIN) 137 mcg (0.1 %) nasal spray 2 sprays (274 mcg total) by Nasal route 2 (two) times daily. 30 mL 0    blood glucose control high,low (ACCU-CHEK ONEIL CONTROL SOLN) Soln 1 Units by Misc.(Non-Drug; Combo Route) route as needed. 1 each 0    blood-glucose meter (ACCU-CHEK ONEIL PLUS METER) Misc 1 kit by Misc.(Non-Drug; Combo Route) route once daily. 1 each 0    blood-glucose meter (TRUE METRIX GLUCOSE METER) kit Use as directed to test glucose 1 each 0    ciclopirox (PENLAC) 8 % Soln Apply topically nightly. 6.6 mL 11    diphenhydrAMINE (BENADRYL) 25 mg capsule Take 1 capsule (25 mg total) by mouth every 6 (six) hours as needed for Itching. 20 capsule 0    docusate sodium (COLACE) 100 MG capsule Take 1 capsule (100 mg total) by mouth 2 (two) times daily. 30 capsule 0    ergocalciferol (ERGOCALCIFEROL) 50,000 unit Cap TAKE 1 CAPSULE EVERY 7 DAYS. 12 capsule 3    FLUoxetine 20 MG capsule TAKE 1 CAPSULE EVERY DAY 90 capsule 3    fluticasone propionate (FLONASE) 50 mcg/actuation nasal spray 2 sprays (100 mcg total) by Each Nostril route once daily. 15 g 0    hydrOXYzine HCL (ATARAX) 25 MG tablet TAKE 1 TABLET BY MOUTH THREE TIMES DAILY AS NEEDED FOR ITCHING 90 tablet 0    ibuprofen (ADVIL,MOTRIN) 800 MG tablet Take 1 tablet (800 mg total) by mouth every 8 (eight) hours as needed for Pain. 30 tablet 0    metoprolol succinate (TOPROL-XL) 25 MG 24 hr tablet Take 1 tablet by mouth once daily 90 tablet 3    ondansetron (ZOFRAN-ODT) 8 MG TbDL Take 1 tablet (8 mg total) by mouth every 8 (eight) hours as needed (nausea). 30 tablet 0    pantoprazole (PROTONIX) 40 MG  tablet TAKE 1 TABLET EVERY DAY 90 tablet 3    traMADoL (ULTRAM) 50 mg tablet Take 1 tablet (50 mg total) by mouth every 6 (six) hours. 20 tablet 0    traMADoL (ULTRAM) 50 mg tablet Take 1 tablet (50 mg total) by mouth every 6 (six) hours. 20 tablet 0    TRUEPLUS LANCETS 33 gauge Misc TEST BLOOD SUGAR TWICE DAILY 200 each 1    aspirin (ECOTRIN) 81 MG EC tablet Take 1 tablet (81 mg total) by mouth 2 (two) times a day. 60 tablet 0    blood glucose control, low (TRUE METRIX LEVEL 1) Soln 1 each by Misc.(Non-Drug; Combo Route) route once as needed. 1 each 3    cetirizine (ZYRTEC) 10 MG tablet Take 1 tablet (10 mg total) by mouth once daily. 90 tablet 0    metFORMIN (GLUCOPHAGE) 500 MG tablet Take 1 tablet (500 mg total) by mouth 2 (two) times daily with meals. 180 tablet 3    pregabalin (LYRICA) 50 MG capsule Take 1 capsule (50 mg total) by mouth 2 (two) times daily. 60 capsule 2     No current facility-administered medications for this visit.     Facility-Administered Medications Ordered in Other Visits   Medication Dose Route Frequency Provider Last Rate Last Admin    acetaminophen tablet 1,000 mg  1,000 mg Oral On Call Procedure Eric Carbajal MD        acetaminophen tablet 1,000 mg  1,000 mg Oral Q6H Eric Carbajal MD   1,000 mg at 11/09/23 0506    aspirin EC tablet 81 mg  81 mg Oral BID Eric Carbajal MD   81 mg at 11/09/23 1358    famotidine tablet 20 mg  20 mg Oral BID Eric Carbajal MD   20 mg at 11/09/23 1357    LIDOcaine (PF) 10 mg/ml (1%) injection 10 mg  1 mL Intradermal On Call Procedure Eric Carbajal MD        methocarbamoL tablet 750 mg  750 mg Oral TID Eric Carbajal MD   750 mg at 11/09/23 1357    naloxone 0.4 mg/mL injection 0.02 mg  0.02 mg Intravenous PRN Eric Carbajal MD        ondansetron injection 4 mg  4 mg Intravenous Q8H PRN Eric Carbajal MD   4 mg at 11/09/23 0858    oxyCODONE immediate release tablet 10 mg  10 mg Oral Q3H PRN Eric Carbajal MD   10 mg at 11/09/23  1043    oxyCODONE immediate release tablet 5 mg  5 mg Oral Q3H PRN Eric Carbajal MD        polyethylene glycol packet 17 g  17 g Oral Daily Eric Carbajal MD   17 g at 11/09/23 1356    pregabalin capsule 75 mg  75 mg Oral QHS Eric Carbajal MD   75 mg at 11/08/23 2147    prochlorperazine injection Soln 5 mg  5 mg Intravenous Q6H PRN Eric Carbajal MD   5 mg at 11/08/23 1325    senna-docusate 8.6-50 mg per tablet 1 tablet  1 tablet Oral BID Eric Carbajal MD   1 tablet at 11/09/23 1358    sodium chloride 0.9% flush 10 mL  10 mL Intravenous PRN Eric Carbajal MD           Patient Care Team:  Alena Hernandez MD as PCP - General (Family Medicine)  Alyssa Maxwell MA as Care Coordinator  Tabby Jacob as ED Navigator         - The patient is given an After Visit Summary that lists all medications with directions, allergies, education, orders placed during this encounter and follow-up instructions.      - I have reviewed the patient's medical information including past medical, family, and social history sections including the medications and allergies.      - We discussed the patient's current medications.     This note was created by combination of typed  and MModal dictation.  Transcription errors may be present.  If there are any questions, please contact me.

## 2024-07-29 NOTE — ASSESSMENT & PLAN NOTE
Lab Results   Component Value Date    HGBA1C 7.4 (H) 10/25/2023     Metformin 500mg daily. Will recheck today. Consider starting wegovy but not at max dose of metformin

## 2024-07-31 ENCOUNTER — TELEPHONE (OUTPATIENT)
Dept: FAMILY MEDICINE | Facility: CLINIC | Age: 74
End: 2024-07-31
Payer: MEDICARE

## 2024-07-31 NOTE — TELEPHONE ENCOUNTER
Left message on Voicemail to call back to the clinic and patient has been schedule for the 3 month F/U

## 2024-07-31 NOTE — TELEPHONE ENCOUNTER
----- Message from Alexandra Buchanan NP sent at 7/30/2024 10:45 AM CDT -----  Please schedule 3 month f/u appt with me or Dr. Hernandez for diabetes mgmt

## 2024-08-09 ENCOUNTER — OFFICE VISIT (OUTPATIENT)
Dept: ORTHOPEDICS | Facility: CLINIC | Age: 74
End: 2024-08-09
Payer: MEDICARE

## 2024-08-09 VITALS — WEIGHT: 203.94 LBS | BODY MASS INDEX: 34.82 KG/M2 | HEIGHT: 64 IN

## 2024-08-09 DIAGNOSIS — M17.12 PRIMARY OSTEOARTHRITIS OF LEFT KNEE: Primary | ICD-10-CM

## 2024-08-09 DIAGNOSIS — Z96.651 S/P TKR (TOTAL KNEE REPLACEMENT), RIGHT: ICD-10-CM

## 2024-08-09 PROCEDURE — 99999 PR PBB SHADOW E&M-EST. PATIENT-LVL II: CPT | Mod: PBBFAC,HCNC,, | Performed by: ORTHOPAEDIC SURGERY

## 2024-08-20 ENCOUNTER — TELEPHONE (OUTPATIENT)
Dept: FAMILY MEDICINE | Facility: CLINIC | Age: 74
End: 2024-08-20
Payer: MEDICARE

## 2024-08-20 DIAGNOSIS — E11.40 TYPE 2 DIABETES MELLITUS WITH DIABETIC NEUROPATHY, WITHOUT LONG-TERM CURRENT USE OF INSULIN: Primary | ICD-10-CM

## 2024-08-20 NOTE — TELEPHONE ENCOUNTER
----- Message from Reta Castellanos sent at 8/20/2024  3:02 PM CDT -----  Regarding: Self  Type: Patient Call Back     What is the request in detail: Pt stated she can't take the metformin anymore due to it making her sick and vomiting, pt wants something else in place of it     Can the clinic reply by [facility]? No     Would the patient rather a call back or a response via [facility]? Call back    Best call back number:.[phone]      Additional Information:    Thank you.

## 2024-08-20 NOTE — TELEPHONE ENCOUNTER
I will send in jardiance   She will need blood work before she sees Alexandra in October  Please schedule   It is one pill once daily

## 2024-08-20 NOTE — TELEPHONE ENCOUNTER
Please advise.    Pt stated she can't take the metformin anymore due to it making her sick and vomiting, pt wants something else in place of it

## 2024-08-20 NOTE — TELEPHONE ENCOUNTER
----- Message from Reta Castellanos sent at 8/20/2024  3:02 PM CDT -----  Regarding: Self  Type: Patient Call Back     What is the request in detail: Pt stated she can't take the metformin anymore due to it making her sick and vomiting, pt wants something else in place of it     Can the clinic reply by [facility]? No     Would the patient rather a call back or a response via [facility]? Call back    Best call back [phone]      Additional Information:    Thank you.

## 2024-08-21 DIAGNOSIS — L29.9 PRURITIC CONDITION: ICD-10-CM

## 2024-08-21 RX ORDER — HYDROXYZINE HYDROCHLORIDE 25 MG/1
25 TABLET, FILM COATED ORAL
Qty: 90 TABLET | Refills: 0 | Status: SHIPPED | OUTPATIENT
Start: 2024-08-21

## 2024-08-21 RX ORDER — ISOPROPYL ALCOHOL 70 ML/100ML
SWAB TOPICAL
Qty: 300 EACH | Refills: 0 | Status: SHIPPED | OUTPATIENT
Start: 2024-08-21

## 2024-08-21 NOTE — TELEPHONE ENCOUNTER
Refill Routing Note   Medication(s) are not appropriate for processing by [facility] for the following reason(s):        Outside of protocol    [facility] action(s):  Route               Appointments  past 12m or future 3m with PCP    Date Provider   Last Visit   9/19/2023 Alena Hernandez MD   Next Visit   1/17/2025 Alena Hernandez MD   ED visits in past 90 days: 1        Note composed:9:16 AM 08/21/2024

## 2024-08-21 NOTE — TELEPHONE ENCOUNTER
Care Due:                  Date            Visit Type   Department     Provider  --------------------------------------------------------------------------------                                             [facility]                                           MED/ INTERNAL  Last Visit: 10-      PRE-OP       MED/ PEDS      Zack DE LOS SANTOS -         [facility]                              PRIMARY      MED/ INTERNAL  Next Visit: 01-      CARE ([facility])   MED/ PEDS      Alena Hernandez                                                            Last  Test          Frequency    Reason                     Performed    Due Date  --------------------------------------------------------------------------------    Office Visit  12 months..  ergocalciferol...........  10-   10-    CMP.........  12 months..  atorvastatin,              10-   10-                             empagliflozin,                             ergocalciferol...........    Lipid Panel.  12 months..  atorvastatin.............  10-   10-    Vitamin D...  12 months..  ergocalciferol...........  Not Found    Overdue    [employer] Embedded Care Due Messages. Reference number: [id number].   8/21/2024 11:20:34 AM CDT

## 2024-08-22 NOTE — TELEPHONE ENCOUNTER
Provider Staff:  Action required for this patient    Requires labs      Please see care gap opportunities below in Care Due Message.    Thanks!  [facility]     Appointments      Date Provider   Last Visit   9/19/2023 Alena Hernandez MD   Next Visit   1/17/2025 Alena Hernandez MD     Refill Decision Note   Valarie Foster  is requesting a refill authorization.  Brief Assessment and Rationale for Refill:  Approve     Medication Therapy Plan:        Comments:     Note composed:10:48 PM 08/21/2024

## 2024-08-28 ENCOUNTER — TELEPHONE (OUTPATIENT)
Dept: FAMILY MEDICINE | Facility: CLINIC | Age: 74
End: 2024-08-28
Payer: MEDICARE

## 2024-08-28 NOTE — TELEPHONE ENCOUNTER
----- Message from Reta Castellanos sent at 8/28/2024  8:11 AM CDT -----  Regarding: Self   Type: RX Refill Request    Who Called: Self     Have you contacted your pharmacy: yes     Refill    RX Name and Strength:hydrOXYzine HCL (ATARAX) 25 MG tablet     Preferred Pharmacy with phone number: .  [facility] [address] [place] [address] - [address] [facility]  [address] [facility] [address]  Phone: [phone] Fax: [phone]          Local or Mail Order: local     Would the patient rather a call back or a response via [facility]? Call back     Best Call Back Number:.[phone]      Additional Information:     Thank you.

## 2024-08-29 ENCOUNTER — TELEPHONE (OUTPATIENT)
Dept: FAMILY MEDICINE | Facility: CLINIC | Age: 74
End: 2024-08-29
Payer: MEDICARE

## 2024-08-29 NOTE — TELEPHONE ENCOUNTER
----- Message from Reta Castellanos sent at 8/29/2024  2:53 PM CDT -----  Regarding: Self  Caller is requesting to schedule their Lab appointment prior to annual appointment.    Order is not listed in EPIC.  Please enter order and contact patient to schedule.    Name of Caller: Self     Preferred Date and Time of Labs: asap     Date of [facility]PP Appointment: 10/31    Where would they like the lab performed? [facility]     Would the patient rather a call back or a response via [facility]? Call back    Best Call Back Number:.[phone]      Additional Information: Pt stated hair called her and requested she get a diabetes, kidney and blood test     Thank you.

## 2024-08-30 DIAGNOSIS — E11.40 TYPE 2 DIABETES MELLITUS WITH DIABETIC NEUROPATHY, WITHOUT LONG-TERM CURRENT USE OF INSULIN: ICD-10-CM

## 2024-08-30 DIAGNOSIS — Z00.00 VISIT FOR ANNUAL HEALTH EXAMINATION: Primary | ICD-10-CM

## 2024-08-31 NOTE — TELEPHONE ENCOUNTER
No care due was identified.  [facility] Embedded Care Due Messages. Reference number: [id number].   8/31/2024 2:32:08 AM CDT

## 2024-09-03 RX ORDER — FLUTICASONE PROPIONATE 50 MCG
1 SPRAY, SUSPENSION (ML) NASAL
Qty: 32 G | Refills: 3 | Status: SHIPPED | OUTPATIENT
Start: 2024-09-03

## 2024-09-25 DIAGNOSIS — L29.9 PRURITIC CONDITION: ICD-10-CM

## 2024-09-30 RX ORDER — HYDROXYZINE HYDROCHLORIDE 25 MG/1
25 TABLET, FILM COATED ORAL
Qty: 90 TABLET | Refills: 0 | Status: SHIPPED | OUTPATIENT
Start: 2024-09-30

## 2024-10-15 ENCOUNTER — TELEPHONE (OUTPATIENT)
Dept: FAMILY MEDICINE | Facility: CLINIC | Age: 74
End: 2024-10-15
Payer: MEDICARE

## 2024-10-15 NOTE — TELEPHONE ENCOUNTER
Patient stated that she been experiencing memory loss and insomnia  plus nausea after eating and taking medicines. Patient stated she haven't been sleeping good at all and doesn't know why. Patient was schedule a In person appointment with DULCE Buchanan.

## 2024-10-15 NOTE — TELEPHONE ENCOUNTER
----- Message from Lenore sent at 10/15/2024  2:00 PM CDT -----  Regarding: call back regarding  Name of caller:guillermina       What is the requesting detail: pt is experiencing memory loss and insomnia. Requesting advice.Please give her a call back to further discuss.       Can the clinic reply by [facility]:       What number to call back: [phone]

## 2024-10-16 ENCOUNTER — OFFICE VISIT (OUTPATIENT)
Dept: FAMILY MEDICINE | Facility: CLINIC | Age: 74
End: 2024-10-16
Payer: MEDICARE

## 2024-10-16 VITALS
HEART RATE: 73 BPM | OXYGEN SATURATION: 98 % | DIASTOLIC BLOOD PRESSURE: 86 MMHG | SYSTOLIC BLOOD PRESSURE: 152 MMHG | TEMPERATURE: 98 F

## 2024-10-16 DIAGNOSIS — E66.01 SEVERE OBESITY (BMI 35.0-39.9) WITH COMORBIDITY: ICD-10-CM

## 2024-10-16 DIAGNOSIS — E78.5 HYPERLIPIDEMIA, UNSPECIFIED HYPERLIPIDEMIA TYPE: Chronic | ICD-10-CM

## 2024-10-16 DIAGNOSIS — R53.83 FATIGUE, UNSPECIFIED TYPE: ICD-10-CM

## 2024-10-16 DIAGNOSIS — R41.3 MEMORY LOSS: ICD-10-CM

## 2024-10-16 DIAGNOSIS — E11.40 TYPE 2 DIABETES MELLITUS WITH DIABETIC NEUROPATHY, WITHOUT LONG-TERM CURRENT USE OF INSULIN: Chronic | ICD-10-CM

## 2024-10-16 DIAGNOSIS — R26.9 GAIT DIFFICULTY: ICD-10-CM

## 2024-10-16 DIAGNOSIS — G47.9 DIFFICULTY SLEEPING: ICD-10-CM

## 2024-10-16 DIAGNOSIS — R10.12 LEFT UPPER QUADRANT PAIN: Primary | ICD-10-CM

## 2024-10-16 DIAGNOSIS — K21.9 GASTROESOPHAGEAL REFLUX DISEASE, UNSPECIFIED WHETHER ESOPHAGITIS PRESENT: ICD-10-CM

## 2024-10-16 DIAGNOSIS — I10 BENIGN HYPERTENSION: Chronic | ICD-10-CM

## 2024-10-16 DIAGNOSIS — R60.0 BILATERAL LOWER EXTREMITY EDEMA: ICD-10-CM

## 2024-10-16 DIAGNOSIS — F32.0 MILD MAJOR DEPRESSION: Chronic | ICD-10-CM

## 2024-10-16 DIAGNOSIS — R11.0 NAUSEA: ICD-10-CM

## 2024-10-16 DIAGNOSIS — Z96.651 STATUS POST RIGHT KNEE REPLACEMENT: ICD-10-CM

## 2024-10-16 PROCEDURE — 1159F MED LIST DOCD IN RCRD: CPT | Mod: HCNC,CPTII,S$GLB, | Performed by: NURSE PRACTITIONER

## 2024-10-16 PROCEDURE — 1125F AMNT PAIN NOTED PAIN PRSNT: CPT | Mod: HCNC,CPTII,S$GLB, | Performed by: NURSE PRACTITIONER

## 2024-10-16 PROCEDURE — 3079F DIAST BP 80-89 MM HG: CPT | Mod: HCNC,CPTII,S$GLB, | Performed by: NURSE PRACTITIONER

## 2024-10-16 PROCEDURE — 99999 PR PBB SHADOW E&M-EST. PATIENT-LVL V: CPT | Mod: PBBFAC,HCNC,, | Performed by: NURSE PRACTITIONER

## 2024-10-16 PROCEDURE — 1101F PT FALLS ASSESS-DOCD LE1/YR: CPT | Mod: HCNC,CPTII,S$GLB, | Performed by: NURSE PRACTITIONER

## 2024-10-16 PROCEDURE — 3077F SYST BP >= 140 MM HG: CPT | Mod: HCNC,CPTII,S$GLB, | Performed by: NURSE PRACTITIONER

## 2024-10-16 PROCEDURE — 99215 OFFICE O/P EST HI 40 MIN: CPT | Mod: HCNC,S$GLB,, | Performed by: NURSE PRACTITIONER

## 2024-10-16 PROCEDURE — 3288F FALL RISK ASSESSMENT DOCD: CPT | Mod: HCNC,CPTII,S$GLB, | Performed by: NURSE PRACTITIONER

## 2024-10-16 RX ORDER — ONDANSETRON 8 MG/1
8 TABLET, ORALLY DISINTEGRATING ORAL 2 TIMES DAILY PRN
Qty: 14 TABLET | Refills: 0 | Status: SHIPPED | OUTPATIENT
Start: 2024-10-16

## 2024-10-16 RX ORDER — AMLODIPINE BESYLATE 10 MG/1
10 TABLET ORAL DAILY
Qty: 30 TABLET | Refills: 3 | Status: SHIPPED | OUTPATIENT
Start: 2024-10-16

## 2024-10-16 RX ORDER — PANTOPRAZOLE SODIUM 40 MG/1
40 TABLET, DELAYED RELEASE ORAL DAILY
Qty: 30 TABLET | Refills: 0 | Status: SHIPPED | OUTPATIENT
Start: 2024-10-16

## 2024-10-16 RX ORDER — FLUOXETINE HYDROCHLORIDE 40 MG/1
40 CAPSULE ORAL DAILY
Qty: 30 CAPSULE | Refills: 3 | Status: SHIPPED | OUTPATIENT
Start: 2024-10-16

## 2024-10-16 RX ORDER — METOPROLOL SUCCINATE 25 MG/1
25 TABLET, EXTENDED RELEASE ORAL DAILY
Qty: 30 TABLET | Refills: 3 | Status: SHIPPED | OUTPATIENT
Start: 2024-10-16

## 2024-10-16 NOTE — PATIENT INSTRUCTIONS
Medical Fitness--[phone]  Imaging, Xray, CT, MRI, Ultrasound---[phone]  Bariatrics---[phone]  Breast Surgery---[phone]  Case Management---[phone]  Colonoscopy---[phone]  DME---[phone]  Infectious Disease---[phone]  Interventional Radiology---[phone]  Medical Records---[phone]  [facility]---[phone]  Optometry/Ophthalmology---[phone]  [facility]---[phone]  Physical Therapy---[phone]  Psychiatry---[phone] or [phone]  Plastic Surgery---[phone]  Recovery--[phone] option 2, or [phone].  Sleep Study---[phone]  Smoking Cessation---[phone]  Wound Care---[phone]  Referral Desk---[phone]      Patient Education       Depression Discharge Instructions, Adult   About this topic   Depression is different than normal sadness. Depression can make it hard for you to work, sleep, and enjoy life. Signs of depression include:  No longer enjoying or caring about doing the things you used to.  Feeling sad, down, hopeless, or cranky most of the day, almost every day.  Losing or gaining weight without trying to do so.  Sleeping too much or too little.  Feeling tired or like you have no energy.  Feeling guilty or like you are worth nothing.  Forgetting things or feeling confused.  Moving and speaking more slowly than usual.  Acting restless or having trouble staying still.  Depression is caused by chemicals in your brain being out of balance. Treatment for depression may take a little while to start working.     What care is needed at home?   Ask your doctor what you need to do when you go home. Make sure you ask questions if you do not understand what the doctor says.  Go to counseling or support groups as suggested by your doctor.  Avoid beer, wine, and mixed drinks. Also avoid marijuana and illegal drugs.  Speak with trusted family and friends about your depression and how they can help.  Exercise each day. Try to spend time  outside each day. Sunshine may make you feel better.  Have a regular sleep pattern and try to get 6 to 8 hours of sleep each night.  Learn how to cope with stress. Guided imagery, yoga, mckenzie chi, or deep breathing may help reduce your signs.  What follow-up care is needed?   Depression needs to be watched closely. Your doctor may ask you to make visits to the office to check on your progress. Be sure to keep these visits.  What drugs may be needed?   The doctor may order drugs to:  Treat low mood  Improve sleep  Relieve distress and tension  Will physical activity be limited?   Physical activity like sports and exercise may help with recovery. Talk to your doctor about what activity will be good. Ask your doctor if you need help to manage any tiredness the drugs may cause.  What changes to diet are needed?   Eating a healthy diet is important during this time. This means:  Eat whole grain foods and foods high in fiber.  Choose many different fruits and vegetables. Fresh or frozen is best.  Eat less solid fats like butter or margarine. Eat less fatty or processed foods.  Eat more low-fat or lean meats like chicken, fish, or turkey. Eat less red meat.  Avoid caffeine. Try not to drink soda, coffee, tea, or energy drinks.  What can be done to prevent this health problem?   Exercise each day.  Try to spend time outside each day. Sunshine can make you feel better.  Have a regular sleep pattern where you get 6 to 8 hours of sleep each night.  Learn how to cope with stress. Guided imagery, yoga, mckenzie chi, or deep breathing may help relieve your signs.  Learn about depression and its signs. Then, you can get help early.  Join a support group. Learn how others are living well with depression.  When do I need to call the doctor?   You have thoughts of hurting yourself or someone else.  Your depression does not get better within 1 or 2 weeks.  Your depression is getting worse.  Your family and friends say they are worried about  you.  You continue to have problems eating or sleeping.  You are functioning poorly at work, at home, or in school.  Teach Back: Helping You Understand   The Teach Back Method helps you understand the information we are giving you. After you talk with the staff, tell them in your own words what you learned. This helps to make sure the staff has described each thing clearly. It also helps to explain things that may have been confusing. Before going home, make sure you can do these:  I can tell you about my condition.  I can tell you what may help me relax and ease my stress.  I can tell you what I will do if I think about hurting myself or someone else or if my depression is not getting better.  Where can I learn more?   American Psychiatric Association  https://www.psychiatry.org/patients-families/depression/what-is-depression   Centers for Disease Control and Prevention  https://www.cdc.gov/learnmorefeelbetter/programs/depression.htm   National Association of Mental Health  https://www.gregg.org/learn-more/mental-health-conditions/depression   UpToDate  https://www.[employer].com/contents/depression-in-adults-beyond-the-basics   Last Reviewed Date   2020-06-10  Consumer Information Use and Disclaimer   This information is not specific medical advice and does not replace information you receive from your health care provider. This is only a brief summary of general information. It does NOT include all information about conditions, illnesses, injuries, tests, procedures, treatments, therapies, discharge instructions or life-style choices that may apply to you. You must talk with your health care provider for complete information about your health and treatment options. This information should not be used to decide whether or not to accept your health care providers advice, instructions or recommendations. Only your health care provider has the knowledge and training to provide advice that is right for you.  Copyright    Copyright © 2021 [employer] and its affiliates and/or licensors. All rights reserved.

## 2024-10-16 NOTE — PROGRESS NOTES
HPI     Valarie Foster is a 74 y.o. female with multiple medical diagnoses as listed in the medical history and problem list that presents for   Chief Complaint   Patient presents with    Memory Loss    Depression    Joint Swelling     Left    Nausea       Depression  Visit Type: follow-up  Patient presents with the following symptoms: decreased concentration, depressed mood, excessive worry, fatigue, feelings of hopelessness, insomnia, irritability, memory impairment, nausea, nervousness/anxiety, restlessness and weight gain.  Patient is not experiencing: compulsions, hyperventilation, palpitations, panic, shortness of breath, suicidal ideas, suicidal planning, thoughts of death and weight loss.    Sleep per night: 3 hours  Sleep quality: poor    Abdominal Pain  This is a new problem. The current episode started more than 1 month ago. The onset quality is gradual. The problem occurs constantly. The problem has been unchanged. The pain is located in the LLQ and LUQ. The pain is at a severity of 7/10. The quality of the pain is sharp (pulling). The abdominal pain radiates to the LLQ and LUQ. Associated symptoms include nausea. Pertinent negatives include no constipation, diarrhea, dysuria, fever, frequency, headaches, hematochezia, hematuria, melena, vomiting or weight loss. Exacerbated by: walking. Relieved by: sitting. She has tried nothing for the symptoms. The treatment provided no relief. Her past medical history is significant for GERD.   Fatigue  This is a chronic problem. The current episode started more than 1 month ago. The problem occurs constantly. The problem has been unchanged. Associated symptoms include abdominal pain, fatigue and nausea. Pertinent negatives include no chest pain, fever, headaches or vomiting. Exacerbated by: not sleeping. She has tried rest for the symptoms. The treatment provided no relief.   She is prescribed pantoprazole 40 mg qd but is not taking medication regularly. She is  not following a GERD diet. Pt is concerned about possible cancer in her abdomen due to hx of renal cell cancer.        Assessment & Plan     Problem List Items Addressed This Visit          Psychiatric    Mild major depression (Chronic)    Reports depressed mood. Denies thoughts of self harm.  Currently prescribed fluoxetine 20 mg.  Patient expressed interest in increase in dose of medication.  Increased to 40 mg q.d..  She also expressed interest in therapy.  Referral placed to behavioral health.      Relevant Medications    FLUoxetine 40 MG capsule    Other Relevant Orders    Ambulatory referral/consult to Behavioral Health    Ambulatory referral/consult to Outpatient Case Management       Cardiac/Vascular    Benign hypertension (Chronic)    BP Readings from Last 3 Encounters:   10/16/24 (!) 152/86   07/29/24 138/72   07/22/24 (!) 169/75     -it is unclear if patient is compliant with taking medication daily.  She does admit to both short and long-term memory loss.  Discussed the importance of taking medication daily and trip she can use to ensure compliance such as weekly pill organizer.  She also does not measure blood pressure regularly at home.  Encouraged her to do so and keep journal.  -due to comorbid conditions, uncontrolled hypertension, and worsening fatigue will refer to Cardiology  -continue current medication regimen  -DASH diet, regular cardiovascular exercises, portion control  -weight loss  -f/u with BP logs in 2 weeks       Relevant Medications    amLODIPine (NORVASC) 10 MG tablet    metoprolol succinate (TOPROL-XL) 25 MG 24 hr tablet    Other Relevant Orders    Ambulatory referral/consult to Cardiology    Hyperlipemia (Chronic)    discussed ways to lower triglycerides such as cutting simple sugars out of diet (white breads, candies, cookies, cakes, etc.) and reducing/eliminating intake of highly processed trans fatty acids.   Exercise 30 minutes a day for 4-5 days a week.   Eat more fiber.       Relevant Orders    Urinalysis    Urine culture    CBC Auto Differential    Comprehensive Metabolic Panel    Lipid Panel    Hemoglobin A1C    TSH    T4, Free       Endocrine    Type 2 diabetes mellitus with diabetic neuropathy, without long-term current use of insulin (Chronic)    -discussed with patient about routine diabetic care that includes but are not limited to regular eye exams, skin care, daily foot exam, proper nutrition, regular BG monitoring at home (fasting and mealtime) and medication compliance in a diabetic.  Target morning BS  and meal-time BS <180      Relevant Orders    Urinalysis    Urine culture    CBC Auto Differential    Comprehensive Metabolic Panel    Lipid Panel    Hemoglobin A1C    TSH    T4, Free    Severe obesity (BMI 35.0-39.9) with comorbidity    We discussed weight issues and safe, effective ways of losing pounds, includin) diet:  low carbohydrate, low fat diet, stay away from fast food, fried and processed food, use whole grain, lot of fruits and vegetables, use healthy fat such as avocado, nuts and olive oil in reasonable quantity, stay away from sodas. Regular meals with lean proteins.  2) physical activity: ideally 150 min a week, with cardiovascular and resistance activity.  Patient was encouraged to set realistic attainable goals for weight loss, and we will follow up periodically.    Discussed Mediterranean Diet recommendations (Adopted from Alison et al, NEJ, 2018.)  - Eat primarily plant-based foods, such as fruits and vegetables, whole grains, legumes (beans) and nuts  - Limit refined carbohydrates (white pasta, bread, rice).  - Replace butter with healthy fats such as olive oil.  - Use herbs and spices instead of salt to flavor foods.  - Limit red meat and processed meats to no more than a few times a month.  - Avoid sugary sodas, bakery goods, and sweets.  - Eat fish and poultry at least twice a week.      Relevant Orders    Urinalysis    Urine culture    CBC  Auto Differential    Comprehensive Metabolic Panel    Lipid Panel    Hemoglobin A1C    TSH    T4, Free       Other    Gait difficulty    Denies falls. Reports near falls and unsteady gait.   She has expressed interest in physical therapy.  Referral placed.   Education provided on fall prevention.    Relevant Orders    Ambulatory referral/consult to Physical/Occupational Therapy     Other Visit Diagnoses       Left upper quadrant pain    -  Primary    Onset over week ago.  Patient is unable to identify cause or injury.  Denies recent changes to diet or medications.  Denies blood in stool or emesis.  Denies constipation or diarrhea.      On exam patient does endorse mild tenderness to palpation of left upper quadrant and left lower quadrant.  No masses palpated.  Bowel sounds are normoactive x4 quadrants.  Afebrile, vital signs stable.    Obtain CT of abdomen  Labs as below    Discussed DDx, condition, and treatment.   Education sent to patient portal/included in after visit summary.  ED precautions given.   Notify provider if symptoms do not resolve or increase in severity.   Patient verbalizes understanding and agrees with plan of care.      Relevant Orders    CT Abdomen Without Contrast    Urinalysis    Urine culture    CBC Auto Differential    Comprehensive Metabolic Panel    Lipid Panel    Hemoglobin A1C    TSH    T4, Free    AMYLASE    LIPASE        Nausea        Intermittent.  She is unsure if it is associated with abdominal pain.  Reports good effect with Zofran in the past.  Medication refilled.    Relevant Medications    ondansetron (ZOFRAN-ODT) 8 MG TbDL    Memory loss        Chronic.  Gradually worsening.  Reports both short-term and long-term memory loss.  This is causing patient's significant distress and depression.  Also likely impacting her compliance with medication.    Referred to neurology  Referral placed to case management to assist with scheduling patient has many appointments, refill request,  and transportation as patient does not drive    Relevant Orders    Ambulatory referral/consult to Neurology    Ambulatory referral/consult to Outpatient Case Management    Gastroesophageal reflux disease, unspecified whether esophagitis present        Noncompliant with anti-reflux diet  Does report generalized abdominal pain which is specifically  Located to left upper quadrant and left lower quadrant   Previously prescribed Protonix but is out of medication  Refill Protonix  Education provided on anti-reflux diet  Advised weight loss    Relevant Medications    pantoprazole (PROTONIX) 40 MG tablet    Fatigue, unspecified type       Chronic.  Gradually worsening.  denies chest pain or shortness of breath.    Refer to cardiology  Obtain labs  Refer to case management      Relevant Orders    Ambulatory referral/consult to Cardiology    Ambulatory referral/consult to Outpatient Case Management    Bilateral lower extremity edema        +1 edema to bilateral lower extremities.    Education provided on sodium restriction, compression stockings, healthy diet, taking medications as prescribed, exercise, and weight loss.    Refer to cardiology    Relevant Orders    Ambulatory referral/consult to Cardiology    Difficulty sleeping        Reports sleeping less than 4 hours per night which has been going on for more than a month.  Patient also suffers with fatigue and depression.    Refer to sleep Medicine  Discussed techniques to improve sleep    Relevant Orders    Ambulatory referral/consult to Sleep Disorders          42 minutes of total time spent on the encounter, which includes face to face time and non-face to face time preparing to see the patient (eg, review of tests), Obtaining and/or reviewing separately obtained history, documenting clinical information in the electronic or other health record, independently interpreting results (not separately reported) and communicating results to the patient/family/caregiver, or  Care coordination (not separately reported).           --------------------------------------------      Health Maintenance:  Health Maintenance         Date Due Completion Date    Diabetes Urine Screening 10/25/2024 10/25/2023    Lipid Panel 10/25/2024 10/25/2023    Hemoglobin A1c 10/29/2024 7/29/2024    Eye Exam 04/30/2025 4/30/2024    DEXA Scan 07/13/2025 7/13/2023    TETANUS VACCINE 04/30/2028 4/30/2018            Advised patient on the importance of completing overdue health maintenance items    Follow Up:  Follow up in about 2 weeks (around 10/30/2024), or if symptoms worsen or fail to improve.    Exam     Review of Systems:  (as noted above)  Review of Systems   Constitutional:  Positive for fatigue, irritability and weight gain. Negative for fever and weight loss.   HENT:  Negative for trouble swallowing.    Eyes:  Negative for visual disturbance.   Respiratory:  Negative for chest tightness and shortness of breath.    Cardiovascular:  Positive for leg swelling. Negative for chest pain and palpitations.   Gastrointestinal:  Positive for abdominal distention, abdominal pain and nausea. Negative for anal bleeding, blood in stool, constipation, diarrhea, hematochezia, melena, rectal pain and vomiting.   Genitourinary:  Negative for dysuria, frequency and hematuria.   Neurological:  Negative for headaches.   Psychiatric/Behavioral:  Positive for decreased concentration, depression and dysphoric mood. Negative for self-injury and suicidal ideas. The patient is nervous/anxious and has insomnia.        Physical Exam:   Physical Exam  Constitutional:       General: She is not in acute distress.     Appearance: She is obese. She is not ill-appearing or diaphoretic.   HENT:      Head: Normocephalic and atraumatic.   Cardiovascular:      Rate and Rhythm: Normal rate and regular rhythm.   Pulmonary:      Effort: Pulmonary effort is normal. No respiratory distress.   Chest:      Chest wall: No tenderness.   Abdominal:       General: There is distension.      Palpations: There is no mass.      Tenderness: There is abdominal tenderness (generalized with palpation). There is no guarding.   Musculoskeletal:      Right lower le+ Edema present.      Left lower le+ Edema present.   Neurological:      General: No focal deficit present.      Mental Status: She is alert and oriented to person, place, and time.   Psychiatric:         Mood and Affect: Mood is depressed.         Thought Content: Thought content does not include suicidal ideation. Thought content does not include suicidal plan.         Cognition and Memory: Memory is impaired. She exhibits impaired recent memory and impaired remote memory.       Vitals:    10/16/24 1706 10/16/24 1735   BP: (!) 160/74 (!) 152/86   BP Location: Left arm    Patient Position: Sitting    Pulse: 73    Temp: 97.9 °F (36.6 °C)    TempSrc: Oral    SpO2: 98%       There is no height or weight on file to calculate BMI.        History     Past Medical History:  Past Medical History:   Diagnosis Date    Arthritis     Colon polyp     Diabetes mellitus     diet controlled    Diabetes with neurologic complications     Hypertension     Nuclear sclerosis of both eyes 2022    Renal cell carcinoma        Past Surgical History:  Past Surgical History:   Procedure Laterality Date    COLONOSCOPY N/A 2018    Procedure: COLONOSCOPY;  Surgeon: Bacilio Mancia MD;  Location: [facility] ENDO;  Service: Endoscopy;  Laterality: N/A;  appt confirmed-ss    COLONOSCOPY N/A 2020    Procedure: COLONOSCOPY;  Surgeon: Jose West MD;  Location: [facility] ENDO;  Service: Endoscopy;  Laterality: N/A;    ENDOSCOPIC CARPAL TUNNEL RELEASE Right 3/15/2024    Procedure: RELEASE, CARPAL TUNNEL, ENDOSCOPIC - RIGHT;  Surgeon: Tonio Mcdermott MD;  Location: [facility] OR;  Service: Orthopedics;  Laterality: Right;    ENDOSCOPIC CARPAL TUNNEL RELEASE Left 2024    Procedure: RELEASE, CARPAL TUNNEL, ENDOSCOPIC - left;  Surgeon:  Tonio Mcdermott MD;  Location: [facility] OR;  Service: Orthopedics;  Laterality: Left;    HYSTERECTOMY      INTRAOCULAR PROSTHESES INSERTION Left 7/7/2022    Procedure: INSERTION, IOL PROSTHESIS;  Surgeon: Candelario Novoa MD;  Location: [facility] OR;  Service: Ophthalmology;  Laterality: Left;    INTRAOCULAR PROSTHESES INSERTION Right 7/21/2022    Procedure: INSERTION, IOL PROSTHESIS;  Surgeon: Candelario Novoa MD;  Location: [facility] OR;  Service: Ophthalmology;  Laterality: Right;    OOPHORECTOMY      PARTIAL NEPHRECTOMY Right 10/12/2018    Procedure: NEPHRECTOMY, PARTIAL open. Dr Arenas to assist.;  Surgeon: Alejandra Palm MD;  Location: [facility] OR;  Service: Urology;  Laterality: Right;  RN PREOP 10/9/2018----NEEDS H/P    PHACOEMULSIFICATION OF CATARACT Left 7/7/2022    Procedure: PHACOEMULSIFICATION, CATARACT;  Surgeon: Candelario Novoa MD;  Location: [facility] OR;  Service: Ophthalmology;  Laterality: Left;  RN PHONE PREOP 6/27/2022   ARRIVAL 6:00 AM    PHACOEMULSIFICATION OF CATARACT Right 7/21/2022    Procedure: PHACOEMULSIFICATION, CATARACT;  Surgeon: Candelario Novoa MD;  Location: [facility] OR;  Service: Ophthalmology;  Laterality: Right;  RN PHONE PREOP 7/12/2022   ARRIVAL 12:00 NOON    TOTAL KNEE ARTHROPLASTY Right 11/8/2023    Procedure: ARTHROPLASTY, KNEE, TOTAL. NELSON;  Surgeon: Eric Carbajal MD;  Location: [facility] OR;  Service: Orthopedics;  Laterality: Right;  [place]. Admit  BRADLEY NIEVES [phone] NOTIFIED QUINTIN ON 10/11/2023-LO  RN PREOP 10/25/2023   T/S ON 11/6/2023--done------CLEARED BY CARDS       Social History:  Social History     Socioeconomic History    Marital status:    Tobacco Use    Smoking status: Never    Smokeless tobacco: Never   Substance and Sexual Activity    Alcohol use: No    Drug use: No    Sexual activity: Not Currently     Social Drivers of Health     Financial Resource Strain: Low Risk  (3/30/2023)    Overall Financial Resource Strain (CARDIA)      Difficulty of Paying Living Expenses: Not hard at all   Food Insecurity: No Food Insecurity (3/30/2023)    Hunger Vital Sign     Worried About Running Out of Food in the Last Year: Never true     Ran Out of Food in the Last Year: Never true   Transportation Needs: No Transportation Needs (3/30/2023)    PRAPARE - Transportation     Lack of Transportation (Medical): No     Lack of Transportation (Non-Medical): No   Physical Activity: Insufficiently Active (3/30/2023)    Exercise Vital Sign     Days of Exercise per Week: 7 days     Minutes of Exercise per Session: 10 min   Stress: No Stress Concern Present (3/30/2023)    [employer] - Occupational Stress Questionnaire     Feeling of Stress : Only a little   Housing Stability: Low Risk  (3/30/2023)    Housing Stability Vital Sign     Unable to Pay for Housing in the Last Year: No     Number of Places Lived in the Last Year: 1     Unstable Housing in the Last Year: No       Family History:  Family History   Problem Relation Name Age of Onset    No Known Problems Mother      Glaucoma Father      Breast cancer Sister Warnell     Glaucoma Sister Warnell     Cancer Sister Warnell         breast cancer    Thyroid disease Sister Angeles     Blindness Sister Bryanna         due to trauma during car accident    Diabetes Sister Amanda     No Known Problems Maternal Aunt      No Known Problems Maternal Uncle      No Known Problems Paternal Aunt      No Known Problems Paternal Uncle      No Known Problems Maternal Grandmother      No Known Problems Maternal Grandfather      No Known Problems Paternal Grandmother      No Known Problems Paternal Grandfather      Diabetes Brother Navneet     Amblyopia Neg Hx      Cataracts Neg Hx      Hypertension Neg Hx      Macular degeneration Neg Hx      Retinal detachment Neg Hx      Strabismus Neg Hx      Stroke Neg Hx         Allergies and Medications: (updated and reviewed)  Review of patient's allergies indicates:    Allergen Reactions    Lisinopril Swelling and Shortness Of Breath    Ascorbic acid-ascorbate calc      And citrus fruits     Current Outpatient Medications   Medication Sig Dispense Refill    ACCU-CHEK ONEIL PLUS TEST STRP Strp TEST BLOOD SUGAR TWICE DAILY 200 strip 1    ACCU-CHEK SOFTCLIX LANCETS [facility] USE TO TEST BLOOD SUGAR ONE TIME DAILY 100 each 3    acetaminophen (TYLENOL) 500 MG tablet Take 2 tablets (1,000 mg total) by mouth 2 (two) times a day. 20 tablet 0    atorvastatin (LIPITOR) 40 MG tablet  90 tablet 0    blood glucose control high,low (ACCU-CHEK ONEIL CONTROL SOLN) Soln 1 Units by Misc.(Non-Drug; Combo Route) route as needed. 1 each 0    blood-glucose meter (ACCU-CHEK ONEIL PLUS METER) Misc 1 kit by Misc.(Non-Drug; Combo Route) route once daily. 1 each 0    blood-glucose meter (TRUE METRIX GLUCOSE METER) kit Use as directed to test glucose 1 each 0    ciclopirox (PENLAC) 8 % Soln Apply topically nightly. 6.6 mL 11    diphenhydrAMINE (BENADRYL) 25 mg capsule Take 1 capsule (25 mg total) by mouth every 6 (six) hours as needed for Itching. 20 capsule 0    docusate sodium (COLACE) 100 MG capsule Take 1 capsule (100 mg total) by mouth 2 (two) times daily. 30 capsule 0    DROPSAFE ALCOHOL PREP PADS PadM USE AS DIRECTED THREE TIMES DAILY 300 each 0    empagliflozin (JARDIANCE) 25 mg tablet Take 1 tablet (25 mg total) by mouth once daily. 30 tablet 3    ergocalciferol (ERGOCALCIFEROL) 50,000 unit Cap TAKE 1 CAPSULE EVERY 7 DAYS. 12 capsule 3    fluticasone propionate (FLONASE) 50 mcg/actuation nasal spray USE 1 SPRAY IN EACH NOSTRIL ONCE DAILY 32 g 3    hydrOXYzine HCL (ATARAX) 25 MG tablet TAKE 1 TABLET BY MOUTH THREE TIMES DAILY AS NEEDED FOR ITCHING 90 tablet 0    ibuprofen (ADVIL,MOTRIN) 800 MG tablet Take 1 tablet (800 mg total) by mouth every 8 (eight) hours as needed for Pain. 30 tablet 0    traMADoL (ULTRAM) 50 mg tablet Take 1 tablet (50 mg total) by mouth every 6 (six) hours. 20 tablet 0     traMADoL (ULTRAM) 50 mg tablet Take 1 tablet (50 mg total) by mouth every 6 (six) hours. 20 tablet 0    TRUEPLUS LANCETS 33 gauge Misc TEST BLOOD SUGAR TWICE DAILY 200 each 1    amLODIPine (NORVASC) 10 MG tablet Take 1 tablet (10 mg total) by mouth once daily. 30 tablet 3    aspirin (ECOTRIN) 81 MG EC tablet Take 1 tablet (81 mg total) by mouth 2 (two) times a day. 60 tablet 0    azelastine (ASTELIN) 137 mcg (0.1 %) nasal spray 2 sprays (274 mcg total) by Nasal route 2 (two) times daily. 30 mL 0    blood glucose control, low (TRUE METRIX LEVEL 1) Soln 1 each by Misc.(Non-Drug; Combo Route) route once as needed. 1 each 3    cetirizine (ZYRTEC) 10 MG tablet Take 1 tablet (10 mg total) by mouth once daily. 90 tablet 0    FLUoxetine 40 MG capsule Take 1 capsule (40 mg total) by mouth once daily. 30 capsule 3    metoprolol succinate (TOPROL-XL) 25 MG 24 hr tablet Take 1 tablet (25 mg total) by mouth once daily. 30 tablet 3    ondansetron (ZOFRAN-ODT) 8 MG TbDL Take 1 tablet (8 mg total) by mouth 2 (two) times daily as needed (nausea). 14 tablet 0    pantoprazole (PROTONIX) 40 MG tablet Take 1 tablet (40 mg total) by mouth once daily. 30 tablet 0    pregabalin (LYRICA) 50 MG capsule Take 1 capsule (50 mg total) by mouth 2 (two) times daily. 60 capsule 2     No current facility-administered medications for this visit.     Facility-Administered Medications Ordered in Other Visits   Medication Dose Route Frequency Provider Last Rate Last Admin    acetaminophen tablet 1,000 mg  1,000 mg Oral On Call Procedure Eric Carbajal MD        acetaminophen tablet 1,000 mg  1,000 mg Oral Q6H Eric Carbajal MD   1,000 mg at 11/09/23 0506    aspirin EC tablet 81 mg  81 mg Oral BID Eric Carbajal MD   81 mg at 11/09/23 1358    famotidine tablet 20 mg  20 mg Oral BID Eric Carbajal MD   20 mg at 11/09/23 1357    LIDOcaine (PF) 10 mg/ml (1%) injection 10 mg  1 mL Intradermal On Call Procedure Eric Carbajal MD         methocarbamoL tablet 750 mg  750 mg Oral TID Eric Carbajal MD   750 mg at 11/09/23 1357    naloxone 0.4 mg/mL injection 0.02 mg  0.02 mg Intravenous PRN Eric Carbajal MD        ondansetron injection 4 mg  4 mg Intravenous Q8H PRN Eric Carbajal MD   4 mg at 11/09/23 0858    oxyCODONE immediate release tablet 10 mg  10 mg Oral Q3H PRN Eric Carbajal MD   10 mg at 11/09/23 1043    oxyCODONE immediate release tablet 5 mg  5 mg Oral Q3H PRN Eric Carbajal MD        polyethylene glycol packet 17 g  17 g Oral Daily Eric Carbajal MD   17 g at 11/09/23 1356    pregabalin capsule 75 mg  75 mg Oral QHS Eric Carbajal MD   75 mg at 11/08/23 2147    prochlorperazine injection Soln 5 mg  5 mg Intravenous Q6H PRN Eric Carbajal MD   5 mg at 11/08/23 1325    senna-docusate 8.6-50 mg per tablet 1 tablet  1 tablet Oral BID Eric Carbajal MD   1 tablet at 11/09/23 1358    sodium chloride 0.9% flush 10 mL  10 mL Intravenous PRN Eric Carbajal MD           Patient Care Team:  Alena Hernandez MD as PCP - General (Family Medicine)  Alyssa Maxwell MA as Care Coordinator  Tabby Jacob as ED Navigator         - The patient is given an After Visit Summary that lists all medications with directions, allergies, education, orders placed during this encounter and follow-up instructions.      - I have reviewed the patient's medical information including past medical, family, and social history sections including the medications and allergies.      - We discussed the patient's current medications.     This note was created by combination of typed  and MModal dictation.  Transcription errors may be present.  If there are any questions, please contact me.       Roland Dawkins NP

## 2024-10-18 ENCOUNTER — PATIENT OUTREACH (OUTPATIENT)
Dept: ADMINISTRATIVE | Facility: OTHER | Age: 74
End: 2024-10-18
Payer: MEDICARE

## 2024-10-18 NOTE — PROGRESS NOTES
CHW - Case Closure    This Community Health Worker spoke to patient via telephone today.   Pt/Caregiver reported: Pt states she is able to pay her bills.  Pt/Caregiver denied any additional needs at this time and agrees with episode closure at this time.  Provided patient with Community Health Worker's contact information and encouraged him/her to contact this Community Health Worker if additional needs arise.

## 2024-10-22 ENCOUNTER — HOSPITAL ENCOUNTER (OUTPATIENT)
Dept: RADIOLOGY | Facility: HOSPITAL | Age: 74
Discharge: HOME | End: 2024-10-22
Attending: NURSE PRACTITIONER
Payer: MEDICARE

## 2024-10-22 DIAGNOSIS — R10.12 LEFT UPPER QUADRANT PAIN: ICD-10-CM

## 2024-10-22 PROCEDURE — 25500020 PHARM REV CODE 255: Mod: HCNC | Performed by: NURSE PRACTITIONER

## 2024-10-22 PROCEDURE — 74150 CT ABDOMEN W/O CONTRAST: CPT | Mod: 26,HCNC,, | Performed by: RADIOLOGY

## 2024-10-22 PROCEDURE — 74150 CT ABDOMEN W/O CONTRAST: CPT | Mod: TC,HCNC

## 2024-10-22 RX ADMIN — IOHEXOL 15 ML: 300 INJECTION, SOLUTION INTRAVENOUS at 10:10

## 2024-10-23 ENCOUNTER — TELEPHONE (OUTPATIENT)
Dept: FAMILY MEDICINE | Facility: CLINIC | Age: 74
End: 2024-10-23
Payer: MEDICARE

## 2024-10-23 DIAGNOSIS — Z96.651 S/P TKR (TOTAL KNEE REPLACEMENT), RIGHT: Primary | ICD-10-CM

## 2024-10-25 ENCOUNTER — PATIENT MESSAGE (OUTPATIENT)
Dept: ADMINISTRATIVE | Facility: HOSPITAL | Age: 74
End: 2024-10-25
Payer: MEDICARE

## 2024-10-28 ENCOUNTER — LAB VISIT (OUTPATIENT)
Dept: LAB | Facility: HOSPITAL | Age: 74
End: 2024-10-28
Attending: FAMILY MEDICINE
Payer: MEDICARE

## 2024-10-28 DIAGNOSIS — Z00.00 VISIT FOR ANNUAL HEALTH EXAMINATION: ICD-10-CM

## 2024-10-28 DIAGNOSIS — E11.40 TYPE 2 DIABETES MELLITUS WITH DIABETIC NEUROPATHY, WITHOUT LONG-TERM CURRENT USE OF INSULIN: ICD-10-CM

## 2024-10-28 LAB
ALBUMIN SERPL BCP-MCNC: 3.8 G/DL (ref 3.5–5.2)
ALP SERPL-CCNC: 116 U/L (ref 40–150)
ALT SERPL W/O P-5'-P-CCNC: 12 U/L (ref 10–44)
ANION GAP SERPL CALC-SCNC: 12 MMOL/L (ref 8–16)
AST SERPL-CCNC: 15 U/L (ref 10–40)
BASOPHILS # BLD AUTO: 0.01 K/UL (ref 0–0.2)
BASOPHILS NFR BLD: 0.2 % (ref 0–1.9)
BILIRUB SERPL-MCNC: 0.4 MG/DL (ref 0.1–1)
BUN SERPL-MCNC: 13 MG/DL (ref 8–23)
CALCIUM SERPL-MCNC: 9.5 MG/DL (ref 8.7–10.5)
CHLORIDE SERPL-SCNC: 105 MMOL/L (ref 95–110)
CHOLEST SERPL-MCNC: 225 MG/DL (ref 120–199)
CHOLEST/HDLC SERPL: 3.8 {RATIO} (ref 2–5)
CO2 SERPL-SCNC: 22 MMOL/L (ref 23–29)
CREAT SERPL-MCNC: 0.8 MG/DL (ref 0.5–1.4)
DIFFERENTIAL METHOD BLD: ABNORMAL
EOSINOPHIL # BLD AUTO: 0 K/UL (ref 0–0.5)
EOSINOPHIL NFR BLD: 0 % (ref 0–8)
ERYTHROCYTE [DISTWIDTH] IN BLOOD BY AUTOMATED COUNT: 15.6 % (ref 11.5–14.5)
EST. GFR  (NO RACE VARIABLE): >60 ML/MIN/1.73 M^2
ESTIMATED AVG GLUCOSE: 169 MG/DL (ref 68–131)
GLUCOSE SERPL-MCNC: 138 MG/DL (ref 70–110)
HBA1C MFR BLD: 7.5 % (ref 4–5.6)
HCT VFR BLD AUTO: 42.6 % (ref 37–48.5)
HDLC SERPL-MCNC: 60 MG/DL (ref 40–75)
HDLC SERPL: 26.7 % (ref 20–50)
HGB BLD-MCNC: 13.5 G/DL (ref 12–16)
IMM GRANULOCYTES # BLD AUTO: 0.02 K/UL (ref 0–0.04)
IMM GRANULOCYTES NFR BLD AUTO: 0.3 % (ref 0–0.5)
LDLC SERPL CALC-MCNC: 144.4 MG/DL (ref 63–159)
LYMPHOCYTES # BLD AUTO: 1.1 K/UL (ref 1–4.8)
LYMPHOCYTES NFR BLD: 18.2 % (ref 18–48)
MCH RBC QN AUTO: 27 PG (ref 27–31)
MCHC RBC AUTO-ENTMCNC: 31.7 G/DL (ref 32–36)
MCV RBC AUTO: 85 FL (ref 82–98)
MONOCYTES # BLD AUTO: 0.5 K/UL (ref 0.3–1)
MONOCYTES NFR BLD: 8.2 % (ref 4–15)
NEUTROPHILS # BLD AUTO: 4.2 K/UL (ref 1.8–7.7)
NEUTROPHILS NFR BLD: 73.1 % (ref 38–73)
NONHDLC SERPL-MCNC: 165 MG/DL
NRBC BLD-RTO: 0 /100 WBC
PLATELET # BLD AUTO: 252 K/UL (ref 150–450)
PMV BLD AUTO: 12.4 FL (ref 9.2–12.9)
POTASSIUM SERPL-SCNC: 4 MMOL/L (ref 3.5–5.1)
PROT SERPL-MCNC: 7.3 G/DL (ref 6–8.4)
RBC # BLD AUTO: 5 M/UL (ref 4–5.4)
SODIUM SERPL-SCNC: 139 MMOL/L (ref 136–145)
T4 FREE SERPL-MCNC: 1.04 NG/DL (ref 0.71–1.51)
TRIGL SERPL-MCNC: 103 MG/DL (ref 30–150)
TSH SERPL DL<=0.005 MIU/L-ACNC: 1.98 UIU/ML (ref 0.4–4)
WBC # BLD AUTO: 5.76 K/UL (ref 3.9–12.7)

## 2024-10-28 PROCEDURE — 83036 HEMOGLOBIN GLYCOSYLATED A1C: CPT | Mod: HCNC | Performed by: FAMILY MEDICINE

## 2024-10-28 PROCEDURE — 84443 ASSAY THYROID STIM HORMONE: CPT | Mod: HCNC

## 2024-10-28 PROCEDURE — 80053 COMPREHEN METABOLIC PANEL: CPT | Mod: HCNC | Performed by: FAMILY MEDICINE

## 2024-10-28 PROCEDURE — 80061 LIPID PANEL: CPT | Mod: HCNC

## 2024-10-28 PROCEDURE — 85025 COMPLETE CBC W/AUTO DIFF WBC: CPT | Mod: HCNC

## 2024-10-28 PROCEDURE — 84439 ASSAY OF FREE THYROXINE: CPT | Mod: HCNC

## 2024-10-28 PROCEDURE — 36415 COLL VENOUS BLD VENIPUNCTURE: CPT | Mod: HCNC,PO | Performed by: FAMILY MEDICINE

## 2024-10-31 ENCOUNTER — TELEPHONE (OUTPATIENT)
Dept: FAMILY MEDICINE | Facility: CLINIC | Age: 74
End: 2024-10-31
Payer: MEDICARE

## 2024-10-31 ENCOUNTER — TELEPHONE (OUTPATIENT)
Dept: BARIATRICS | Facility: CLINIC | Age: 74
End: 2024-10-31
Payer: MEDICARE

## 2024-10-31 ENCOUNTER — OFFICE VISIT (OUTPATIENT)
Dept: FAMILY MEDICINE | Facility: CLINIC | Age: 74
End: 2024-10-31
Payer: MEDICARE

## 2024-10-31 VITALS
TEMPERATURE: 98 F | BODY MASS INDEX: 34.74 KG/M2 | WEIGHT: 203.5 LBS | OXYGEN SATURATION: 99 % | SYSTOLIC BLOOD PRESSURE: 128 MMHG | DIASTOLIC BLOOD PRESSURE: 72 MMHG | HEART RATE: 66 BPM | HEIGHT: 64 IN

## 2024-10-31 DIAGNOSIS — E66.01 SEVERE OBESITY (BMI 35.0-39.9) WITH COMORBIDITY: ICD-10-CM

## 2024-10-31 DIAGNOSIS — F32.0 MILD MAJOR DEPRESSION: Chronic | ICD-10-CM

## 2024-10-31 DIAGNOSIS — E78.5 HYPERLIPIDEMIA, UNSPECIFIED HYPERLIPIDEMIA TYPE: Primary | Chronic | ICD-10-CM

## 2024-10-31 DIAGNOSIS — T14.8XXA MUSCLE STRAIN: ICD-10-CM

## 2024-10-31 DIAGNOSIS — E11.40 TYPE 2 DIABETES MELLITUS WITH DIABETIC NEUROPATHY, WITHOUT LONG-TERM CURRENT USE OF INSULIN: Chronic | ICD-10-CM

## 2024-10-31 PROCEDURE — 1160F RVW MEDS BY RX/DR IN RCRD: CPT | Mod: HCNC,CPTII,S$GLB,

## 2024-10-31 PROCEDURE — 99213 OFFICE O/P EST LOW 20 MIN: CPT | Mod: HCNC,S$GLB,,

## 2024-10-31 PROCEDURE — 1126F AMNT PAIN NOTED NONE PRSNT: CPT | Mod: HCNC,CPTII,S$GLB,

## 2024-10-31 PROCEDURE — 3288F FALL RISK ASSESSMENT DOCD: CPT | Mod: HCNC,CPTII,S$GLB,

## 2024-10-31 PROCEDURE — 1101F PT FALLS ASSESS-DOCD LE1/YR: CPT | Mod: HCNC,CPTII,S$GLB,

## 2024-10-31 PROCEDURE — 99999 PR PBB SHADOW E&M-EST. PATIENT-LVL V: CPT | Mod: PBBFAC,HCNC,,

## 2024-10-31 PROCEDURE — 1159F MED LIST DOCD IN RCRD: CPT | Mod: HCNC,CPTII,S$GLB,

## 2024-10-31 PROCEDURE — 3078F DIAST BP <80 MM HG: CPT | Mod: HCNC,CPTII,S$GLB,

## 2024-10-31 PROCEDURE — 3074F SYST BP LT 130 MM HG: CPT | Mod: HCNC,CPTII,S$GLB,

## 2024-10-31 RX ORDER — ATORVASTATIN CALCIUM 40 MG/1
40 TABLET, FILM COATED ORAL NIGHTLY
Qty: 90 TABLET | Refills: 0 | Status: SHIPPED | OUTPATIENT
Start: 2024-10-31

## 2024-10-31 RX ORDER — LIDOCAINE 50 MG/G
1 PATCH TOPICAL DAILY
Qty: 15 PATCH | Refills: 1 | Status: SHIPPED | OUTPATIENT
Start: 2024-10-31

## 2024-11-01 RX ORDER — GABAPENTIN 300 MG/1
300 CAPSULE ORAL 2 TIMES DAILY
Qty: 180 CAPSULE | Refills: 0 | Status: SHIPPED | OUTPATIENT
Start: 2024-11-01

## 2024-11-02 DIAGNOSIS — L29.9 PRURITIC CONDITION: ICD-10-CM

## 2024-11-04 RX ORDER — HYDROXYZINE HYDROCHLORIDE 25 MG/1
25 TABLET, FILM COATED ORAL
Qty: 90 TABLET | Refills: 0 | Status: SHIPPED | OUTPATIENT
Start: 2024-11-04

## 2024-11-06 RX ORDER — ISOPROPYL ALCOHOL 70 ML/100ML
SWAB TOPICAL
Qty: 300 EACH | Refills: 3 | Status: SHIPPED | OUTPATIENT
Start: 2024-11-06

## 2024-11-06 NOTE — TELEPHONE ENCOUNTER
No care due was identified.  [facility] Embedded Care Due Messages. Reference number: [id number].   11/06/2024 12:25:44 PM CST

## 2024-11-07 ENCOUNTER — OFFICE VISIT (OUTPATIENT)
Dept: ORTHOPEDICS | Facility: CLINIC | Age: 74
End: 2024-11-07
Payer: MEDICARE

## 2024-11-07 ENCOUNTER — APPOINTMENT (OUTPATIENT)
Dept: RADIOLOGY | Facility: HOSPITAL | Age: 74
End: 2024-11-07
Attending: ORTHOPAEDIC SURGERY
Payer: MEDICARE

## 2024-11-07 VITALS — BODY MASS INDEX: 34.74 KG/M2 | HEIGHT: 64 IN | WEIGHT: 203.5 LBS

## 2024-11-07 DIAGNOSIS — M17.12 PRIMARY OSTEOARTHRITIS OF LEFT KNEE: ICD-10-CM

## 2024-11-07 DIAGNOSIS — Z96.651 S/P TKR (TOTAL KNEE REPLACEMENT), RIGHT: ICD-10-CM

## 2024-11-07 DIAGNOSIS — Z96.651 S/P TKR (TOTAL KNEE REPLACEMENT), RIGHT: Primary | ICD-10-CM

## 2024-11-07 PROCEDURE — 99999 PR PBB SHADOW E&M-EST. PATIENT-LVL IV: CPT | Mod: PBBFAC,HCNC,, | Performed by: ORTHOPAEDIC SURGERY

## 2024-11-07 PROCEDURE — 73562 X-RAY EXAM OF KNEE 3: CPT | Mod: 26,HCNC,RT, | Performed by: RADIOLOGY

## 2024-11-07 PROCEDURE — 73562 X-RAY EXAM OF KNEE 3: CPT | Mod: TC,HCNC,FY,PN,RT

## 2024-11-07 RX ORDER — TRIAMCINOLONE ACETONIDE 40 MG/ML
40 INJECTION, SUSPENSION INTRA-ARTICULAR; INTRAMUSCULAR
Status: DISCONTINUED | OUTPATIENT
Start: 2024-11-07 | End: 2024-11-07 | Stop reason: HOSPADM

## 2024-11-07 RX ADMIN — TRIAMCINOLONE ACETONIDE 40 MG: 40 INJECTION, SUSPENSION INTRA-ARTICULAR; INTRAMUSCULAR at 01:11

## 2024-11-07 NOTE — PROGRESS NOTES
EST PATIENT ORTHOPAEDIC: Knee    PRIMARY CARE PHYSICIAN: Alena Hernandez MD   REFERRING PROVIDER: No referring provider defined for this encounter.     ASSESSMENT & PLAN:    Impression:  Left Knee Severe Degenerative Osteoarthritis, Primary   Right Knee s/p total Knee Primary completed on 11/8/23.    Follow Up Plan: In feb for pre surgical planning    Surgery:    Valarie Foster has radiograph and physical exam evidence of the aforementioned impression and wishes to pursue surgery. This patient appears to have sufficient symptoms to warrant surgical intervention and is an appropriate candidate for left Primary Total Knee Arthroplasty as evidenced by months of unsuccessful non-operative treatment as outlined in the HPI below and progressive symptoms.    Patient is doing well with regard to her right knee replacement.  She is 1 year postop.  She has no issues or complaints with her right knee.  She may be interested in having her left knee replaced.  She is going to discuss with her sister who helps with her caretaking postoperatively whether or not this is something she wants to complete short term more in a few months.  She previously underwent gel injections into that knee and had some improvement for a few months but did not last the full 6 months.  Her knee gave out on her recently and she sustained a fall onto her left knee.  She would like to do steroid injection in an issue pre-surgical planning.  She would like a surgery in March and will discuss with her family regarding developing caretakers for this time.  No case request at this time. Tentative time is March 5th, 2025    Right Knee:  3v of the right knee suggest some new mild asymmetry of her joint line with a medial space being slightly smaller compared to the left.  This was not evident some previous films.  Lateral view had some evidence of posterior lucency but I think that is rotational.  This component was cemented into place I do not think it is loose  but can continue to monitor going forward.    The patient has been ordered:  Injection to left knee    CONSULTS:   None    ACTIVE PROBLEM LIST  Patient Active Problem List   Diagnosis    Type 2 diabetes mellitus with diabetic neuropathy, without long-term current use of insulin    Benign hypertension    Hyperlipemia    Microhematuria    Left flank pain    Abdominal aortic atherosclerosis    History of renal cell cancer    Renal cell carcinoma of right kidney    Mild major depression    Bilateral chronic knee pain    Colon cancer screening    Pain of left calf    Refractive error    Primary osteoarthritis of knees, bilateral    Fall    Radiculopathy    Idiopathic progressive neuropathy    Nerve pain    Chest pain    Severe obesity (BMI 35.0-39.9) with comorbidity    Decreased ROM of right knee    Decreased strength involving knee joint    Gait difficulty    Paresthesia    Pseudophakia    COVID-19           SUBJECTIVE    CHIEF COMPLAINT: Knee Pain    HPI:   Valarie Foster is a 74 y.o. female here for evaluation and management of left knee pain. There is not a specific incident that brought about this pain. she has had progressive problems with the knee(s) starting 1 years ago but is now progressing to interfere with activities which include: walking, functional household ADL's, and participating in family activities    Currently the pain in the joint is rated at moderate with activity. The pain is constant and is located in the knee, located medially and located anterior. The pain is described as aching, severe, sharp, and stiffness. Relieving factors include rest, over the counter medication , and repositioning.     There is associated Clicking and Popping.     Valarie Foster has no additional complaints.     11/7/24: Here for one year post op on right knee.  She has no issues or pain with regard to the right knee.  It is functioning well.  Has ongoing pain in left knee.  Has a recent fall that exacerbated some of  her pain.  She is interested in injection treatment today and pre-surgical planning for this knee.      PROGRESSIVE SYMPTOMS:  Pain worsened by weight bearing  Pain effecting living situation    FUNCTIONAL STATUS:   Walk a block or two on level ground     PREVIOUS TREATMENTS:  Medical: Steroid Injections and visco injections  Physical Therapy: Activities Modified   Previous Orthopaedic Surgery: Right TKA    REVIEW OF SYSTEMS:  PAIN ASSESSMENT:  See HPI.  MUSCULOSKELETAL: See HPI.  OTHER 10 point review of systems is negative except as stated in HPI above    PAST MEDICAL HISTORY   has a past medical history of Arthritis, Colon polyp, Diabetes mellitus, Diabetes with neurologic complications, Hypertension, Nuclear sclerosis of both eyes (2/14/2022), and Renal cell carcinoma.     PAST SURGICAL HISTORY   has a past surgical history that includes Hysterectomy; Oophorectomy; Colonoscopy (N/A, 2/5/2018); Partial nephrectomy (Right, 10/12/2018); Colonoscopy (N/A, 8/13/2020); Phacoemulsification of cataract (Left, 7/7/2022); Intraocular prosthesis insertion (Left, 7/7/2022); Phacoemulsification of cataract (Right, 7/21/2022); Intraocular prosthesis insertion (Right, 7/21/2022); Total knee arthroplasty (Right, 11/8/2023); Endoscopic carpal tunnel release (Right, 3/15/2024); and Endoscopic carpal tunnel release (Left, 6/26/2024).     FAMILY HISTORY  family history includes Blindness in her sister; Breast cancer in her sister; Cancer in her sister; Diabetes in her brother and sister; Glaucoma in her father and sister; No Known Problems in her maternal aunt, maternal grandfather, maternal grandmother, maternal uncle, mother, paternal aunt, paternal grandfather, paternal grandmother, and paternal uncle; Thyroid disease in her sister.     SOCIAL HISTORY   reports that she has never smoked. She has never used smokeless tobacco. She reports that she does not drink alcohol and does not use drugs.     ALLERGIES   Review of patient's  allergies indicates:   Allergen Reactions    Lisinopril Swelling and Shortness Of Breath    Ascorbic acid-ascorbate calc      And citrus fruits        MEDICATIONS  Current Outpatient Medications on File Prior to Visit   Medication Sig Dispense Refill    ACCU-CHEK ONEIL PLUS TEST STRP Strp TEST BLOOD SUGAR TWICE DAILY 200 strip 1    ACCU-CHEK SOFTCLIX LANCETS [facility] [place] TO TEST BLOOD SUGAR ONE TIME DAILY 100 each 3    acetaminophen (TYLENOL) 500 MG tablet Take 2 tablets (1,000 mg total) by mouth 2 (two) times a day. 20 tablet 0    alcohol swabs (DROPSAFE ALCOHOL PREP PADS) PadM USE AS DIRECTED THREE TIMES DAILY 300 each 3    amLODIPine (NORVASC) 10 MG tablet Take 1 tablet (10 mg total) by mouth once daily. 30 tablet 3    aspirin (ECOTRIN) 81 MG EC tablet Take 1 tablet (81 mg total) by mouth 2 (two) times a day. 60 tablet 0    atorvastatin (LIPITOR) 40 MG tablet Take 1 tablet (40 mg total) by mouth every evening. 90 tablet 0    azelastine (ASTELIN) 137 mcg (0.1 %) nasal spray 2 sprays (274 mcg total) by Nasal route 2 (two) times daily. 30 mL 0    blood glucose control high,low (ACCU-CHEK ONEIL CONTROL SOLN) Soln 1 Units by Misc.(Non-Drug; Combo Route) route as needed. 1 each 0    blood glucose control, low (TRUE METRIX LEVEL 1) Soln 1 each by Misc.(Non-Drug; Combo Route) route once as needed. 1 each 3    blood-glucose meter (ACCU-CHEK ONEIL PLUS METER) Misc 1 kit by Misc.(Non-Drug; Combo Route) route once daily. 1 each 0    blood-glucose meter (TRUE METRIX GLUCOSE METER) kit Use as directed to test glucose 1 each 0    cetirizine (ZYRTEC) 10 MG tablet Take 1 tablet (10 mg total) by mouth once daily. 90 tablet 0    ciclopirox (PENLAC) 8 % Soln Apply topically nightly. 6.6 mL 11    diphenhydrAMINE (BENADRYL) 25 mg capsule Take 1 capsule (25 mg total) by mouth every 6 (six) hours as needed for Itching. 20 capsule 0    docusate sodium (COLACE) 100 MG capsule Take 1 capsule (100 mg total) by mouth 2 (two) times daily. 30  capsule 0    empagliflozin (JARDIANCE) 25 mg tablet Take 1 tablet (25 mg total) by mouth once daily. 30 tablet 3    ergocalciferol (ERGOCALCIFEROL) 50,000 unit Cap TAKE 1 CAPSULE EVERY 7 DAYS. 12 capsule 3    FLUoxetine 40 MG capsule Take 1 capsule (40 mg total) by mouth once daily. 30 capsule 3    fluticasone propionate (FLONASE) 50 mcg/actuation nasal spray USE 1 SPRAY IN EACH NOSTRIL ONCE DAILY 32 g 3    gabapentin (NEURONTIN) 300 MG capsule Take 1 capsule (300 mg total) by mouth 2 (two) times daily. 180 capsule 0    hydrOXYzine HCL (ATARAX) 25 MG tablet TAKE 1 TABLET BY MOUTH THREE TIMES DAILY AS NEEDED FOR ITCHING 90 tablet 0    ibuprofen (ADVIL,MOTRIN) 800 MG tablet Take 1 tablet (800 mg total) by mouth every 8 (eight) hours as needed for Pain. 30 tablet 0    LIDOcaine (LIDODERM) 5 % Place 1 patch onto the skin once daily. Remove & Discard patch within 12 hours or as directed by MD 15 patch 1    metoprolol succinate (TOPROL-XL) 25 MG 24 hr tablet Take 1 tablet (25 mg total) by mouth once daily. 30 tablet 3    ondansetron (ZOFRAN-ODT) 8 MG TbDL Take 1 tablet (8 mg total) by mouth 2 (two) times daily as needed (nausea). 14 tablet 0    pantoprazole (PROTONIX) 40 MG tablet Take 1 tablet (40 mg total) by mouth once daily. 30 tablet 0    pregabalin (LYRICA) 50 MG capsule Take 1 capsule (50 mg total) by mouth 2 (two) times daily. 60 capsule 2    traMADoL (ULTRAM) 50 mg tablet Take 1 tablet (50 mg total) by mouth every 6 (six) hours. 20 tablet 0    traMADoL (ULTRAM) 50 mg tablet Take 1 tablet (50 mg total) by mouth every 6 (six) hours. 20 tablet 0    TRUEPLUS LANCETS 33 gauge Misc TEST BLOOD SUGAR TWICE DAILY 200 each 1     Current Facility-Administered Medications on File Prior to Visit   Medication Dose Route Frequency Provider Last Rate Last Admin    acetaminophen tablet 1,000 mg  1,000 mg Oral On Call Procedure Eric Carbajal MD        acetaminophen tablet 1,000 mg  1,000 mg Oral Q6H Eric Carbajal MD    1,000 mg at 11/09/23 0506    aspirin EC tablet 81 mg  81 mg Oral BID Eric Carbajal MD   81 mg at 11/09/23 1358    famotidine tablet 20 mg  20 mg Oral BID Eric Carbajal MD   20 mg at 11/09/23 1357    LIDOcaine (PF) 10 mg/ml (1%) injection 10 mg  1 mL Intradermal On Call Procedure Eric Carbajal MD        methocarbamoL tablet 750 mg  750 mg Oral TID Eric Carbajal MD   750 mg at 11/09/23 1357    naloxone 0.4 mg/mL injection 0.02 mg  0.02 mg Intravenous PRN Eric Carbajal MD        ondansetron injection 4 mg  4 mg Intravenous Q8H PRN Eric Carbajal MD   4 mg at 11/09/23 0858    oxyCODONE immediate release tablet 10 mg  10 mg Oral Q3H PRN Eric Carbajal MD   10 mg at 11/09/23 1043    oxyCODONE immediate release tablet 5 mg  5 mg Oral Q3H PRN Eric Carbajal MD        polyethylene glycol packet 17 g  17 g Oral Daily Eric Carbajal MD   17 g at 11/09/23 1356    pregabalin capsule 75 mg  75 mg Oral QHS Eric Carbajal MD   75 mg at 11/08/23 2147    prochlorperazine injection Soln 5 mg  5 mg Intravenous Q6H PRN Eric Carbajal MD   5 mg at 11/08/23 1325    senna-docusate 8.6-50 mg per tablet 1 tablet  1 tablet Oral BID Eric Carbajal MD   1 tablet at 11/09/23 1358    sodium chloride 0.9% flush 10 mL  10 mL Intravenous PRN Eric Carbajal MD              PHYSICAL EXAM   vitals were not taken for this visit.   There is no height or weight on file to calculate BMI.      All other systems deferred.  GENERAL:  No acute distress  HABITUS: Obese  GAIT: Antalgic  SKIN: Normal  and No erythema, warmth, fluctuance     KNEE EXAM:    left:   Effusion: Minimal joint effusion  TTP: Yes over Medial Joint Line   No crepitus with passive knee ROM  Passive ROM: Extension 0, Flexion 130  No pain with manipulation of patella  Stable to varus/valgus stress. No increased laxity to anterior/posterior drawer testing  negative Constantin's test  No pain with IR/ER rotation of the hip  5/5 strength in knee flexion  and extension, ankle plantarflexion and dorsiflexion  Neurovascular Status: Sensation intact to light touch in Sural, Saphenous, SPN, DPN, Tibial nerve distribution  2+ pulse DP/PT, normal capillary refill, foot has normal warmth    Right knee: Effusion: No joint effusion  TTP: No tenderness over Medial Joint Line   Passive ROM: Extension 0, Flexion 130  No pain with manipulation of patella  Stable to varus/valgus stress. No increased laxity to anterior/posterior drawer testing  No pain with IR/ER rotation of the hip  5/5 strength in knee flexion and extension, ankle plantarflexion and dorsiflexion  Neurovascular Status: Sensation intact to light touch in Sural, Saphenous, SPN, DPN, Tibial nerve distribution  2+ pulse DP/PT, normal capillary refill, foot has normal warmth    DATA:  Diagnostic tests reviewed for today's visit:     4v of the left knee reveal Severe degenerative changes of the Medial and Patellofemoral compartment. There is evidence of advanced osteoarthritis changes with Subchondral sclerosis, Osteophyte formation, and Joint space narrowing. The limb is in varus alignment. The patella is tracking subluxed    3v of the right knee suggest some new mild asymmetry of her joint line with a medial space being slightly smaller compared to the left.  This was not evident some previous films.  Lateral view had some evidence of posterior lucency but I think that is rotational.  This component was cemented into place I do not think it is loose but can continue to monitor going forward.

## 2024-11-07 NOTE — PROCEDURES
Large Joint Aspiration/Injection: L knee    Date/Time: 11/7/2024 1:00 PM    Performed by: Eric Carbajal MD  Authorized by: Eric Carbajal MD    Consent Done?:  Yes (Verbal)  Indications:  Arthritis and pain  Prep: patient was prepped and draped in usual sterile fashion      Local anesthesia used?: Yes    Anesthesia:  Local infiltration  Local anesthetic:  Topical anesthetic and lidocaine 1% without epinephrine    Details:  Needle Size:  22 G  Approach:  Anterolateral  Location:  Knee  Site:  L knee  Medications:  40 mg triamcinolone acetonide 40 mg/mL  Patient tolerance:  Patient tolerated the procedure well with no immediate complications

## 2024-11-11 ENCOUNTER — TELEPHONE (OUTPATIENT)
Dept: BARIATRICS | Facility: CLINIC | Age: 74
End: 2024-11-11
Payer: MEDICARE

## 2024-11-11 NOTE — TELEPHONE ENCOUNTER
----- Message from Tanika sent at 10/31/2024  4:24 PM CDT -----  Regarding: check guarantor's note to see if patient would like to move forward  check guarantor's note to see if patient would like to move forward

## 2024-11-18 ENCOUNTER — OFFICE VISIT (OUTPATIENT)
Dept: UROLOGY | Facility: CLINIC | Age: 74
End: 2024-11-18
Payer: MEDICARE

## 2024-11-18 VITALS — WEIGHT: 200.75 LBS | BODY MASS INDEX: 34.46 KG/M2

## 2024-11-18 DIAGNOSIS — R31.29 MICROHEMATURIA: ICD-10-CM

## 2024-11-18 DIAGNOSIS — C64.1 RENAL CELL CARCINOMA OF RIGHT KIDNEY: Primary | ICD-10-CM

## 2024-11-18 DIAGNOSIS — R10.9 FLANK PAIN: ICD-10-CM

## 2024-11-18 PROCEDURE — 3288F FALL RISK ASSESSMENT DOCD: CPT | Mod: HCNC,CPTII,S$GLB, | Performed by: UROLOGY

## 2024-11-18 PROCEDURE — G2211 COMPLEX E/M VISIT ADD ON: HCPCS | Mod: HCNC,S$GLB,, | Performed by: UROLOGY

## 2024-11-18 PROCEDURE — 99999 PR PBB SHADOW E&M-EST. PATIENT-LVL IV: CPT | Mod: PBBFAC,HCNC,, | Performed by: UROLOGY

## 2024-11-18 PROCEDURE — 1160F RVW MEDS BY RX/DR IN RCRD: CPT | Mod: HCNC,CPTII,S$GLB, | Performed by: UROLOGY

## 2024-11-18 PROCEDURE — 1100F PTFALLS ASSESS-DOCD GE2>/YR: CPT | Mod: HCNC,CPTII,S$GLB, | Performed by: UROLOGY

## 2024-11-18 PROCEDURE — 1159F MED LIST DOCD IN RCRD: CPT | Mod: HCNC,CPTII,S$GLB, | Performed by: UROLOGY

## 2024-11-18 PROCEDURE — 1125F AMNT PAIN NOTED PAIN PRSNT: CPT | Mod: HCNC,CPTII,S$GLB, | Performed by: UROLOGY

## 2024-11-18 PROCEDURE — 99213 OFFICE O/P EST LOW 20 MIN: CPT | Mod: HCNC,S$GLB,, | Performed by: UROLOGY

## 2024-11-18 NOTE — PROGRESS NOTES
Subjective:       Valarie Foster is a 74 y.o. female who is an established patient who was referred by Ivan CARDONA  for evaluation of renal mass.      CT a/p with contrast 1/26/18 showed a 2.6cm endophytic lesion in R MP, concerning for solid mass vs complex cyst.     She reports L flank pain, contralateral to renal lesion. L flank pain has almost completely resolved. Denies hematuria, LUTS, UTIs. UA macro did show microhematuria recently. Denies family history of  malignancy. She reports being told she had a small kidney on one side in the past. Denies nephrolithiasis. Nonsmoker. Denies previous abdominal surgery though she is s/p hysterectomy. +DM2/HTN.     Cr (1/18) - 0.8  A1c (1/18) - 6.2  UA micro (2/18) - 0 RBCs    CT renal protocol (2/18) - 2.9cm completely endophytic solid enhancing renal mass. Homogenous in appearance.      She is s/p perc renal biopsy to better evaluate lesion (2/18) - oncocytic neoplasm. Mild hematuria < 24 hrs.          CT (6/18) - stable, endophytic enhancing 2.4cm R renal mass. Subtle 6mm focus of enhancement in R lobe of liver - nonspecific.     KISHAN (9/18) - stable 2.8cm R renal mass    She is now s/p open R PNx 10/12/18. Still having incisional pain, worse with palpation. No hernia on CT, mild stranding in subQ.    Path: 1.9cm unclassified RCC, FG 3/4, negative margins. [id number]    Doing well. States chronic fatigue and pain. Still with occasional incisional pain.     Planned for KISHAN - not done. She notes some flank pain after recent fall - CT RSS done that did not show any issues.       CT c/a/p 4/19 - no mets/recurrence  CT a/p (10/19) - no recurrence/mets. Abnormal area in tail of pancreas - recommend PCP follow up. CXR - clear.  CT a/p (10/20) - no recurrence/mets. Pancreatic tail lesion not seen. CXR - clear  CT a/p 3/22 - no recurrence or mets. Post-op changes to R kidney. CXR - wnl  CT a/p 5/23 - no recurrence/mets. Post-op changes to R kidney. CXR 1/23 - clear  CT RSS 10/24  - no stones    Cr 1/19 - 0.8, 4/19 - 0.8, 10/19 - 0.9, 10/20 - 0.9, 11/21 - 0.8, 3/22 - 0.8, 5/23 - 0.9, 10/24 - 0.8           The following portions of the patient's history were reviewed and updated as appropriate: allergies, current medications, past family history, past medical history, past social history, past surgical history and problem list.    Review of Systems  Constitutional: no fever or chills  ENT: no nasal congestion or sore throat  Respiratory: no cough or shortness of breath  Cardiovascular: no chest pain or palpitations  Gastrointestinal: no nausea or vomiting, tolerating diet  Genitourinary: as per HPI  Hematologic/Lymphatic: no easy bruising or lymphadenopathy  Musculoskeletal: no arthralgias or myalgias  Skin: no rashes or lesions  Neurological: no seizures or tremors  Behavioral/Psych: no auditory or visual hallucinations        Objective:    Vitals: Wt 91 kg (200 lb 11.7 oz)   BMI 34.46 kg/m²     Physical Exam   General: well developed, well nourished in no acute distress  Head: normocephalic, atraumatic  Neck: supple, trachea midline, no obvious enlargement of thyroid  HEENT: EOMI, mucus membranes moist, sclera anicteric, no hearing impairment  Lungs: symmetric expansion, non-labored breathing  Neuro: alert and oriented x 3, no gross deficits  Psych: normal judgment and insight, normal mood/affect and non-anxious  Genitourinary:   deferred    Inc - well healed, minor superior fullness of incision, no hernia    Lab Review   Urine analysis today in clinic shows - 1000 glucose    Lab Results   Component Value Date    WBC 5.76 10/28/2024    HGB 13.5 10/28/2024    HCT 42.6 10/28/2024    MCV 85 10/28/2024     10/28/2024     Lab Results   Component Value Date    CREATININE 0.8 10/28/2024    BUN 13 10/28/2024       Imaging  Images and reports were personally reviewed by me and discussed with patient  CT/KISHAN reviewed       Assessment/Plan:      1. Right RCC    - 2.6cm lesion in R MP kidney.    - CT renal protocol 2.5cm endophytic solid enhancing renal mass   - Due to location of tumor, open partial would be difficult with higher complication rate of bleeding and/or urine leak. Radical nephrectomy with less complication rate.   - Concern for performing radical nephrectomy in diabetic patient for possible benign lesion.   - Recommend perc biopsy to evaluate for possible benign lesion prior to radical nephrectomy. Discussed limitations/risks of perc biopsy. Understands that perc biopsy may also not be definitive in diagnosis   - Perc biopsy 2/18 - oncocytic neoplasm (less favors oncocytoma)   - Discussed options: observation, lap radical Nx, open partial Nx. She has opted to observe for now.    - CT 6/18 - stable 2.4cm R renal mass   - KISHAN 9/18 - 2.8cm R renal mass   - s/p R open PNx on 10/12/18    - unclassified RCC pT1a   - CT c/a/p 6 months (4/18) - no recurrence/mets   - CT ap (10/19) - no recurrence/mets. Abnormal area in tail of pancreas - recommend PCP follow up. CXR - clear. Cr 0.9.    - CT a/p 5/23 - no recurrence   - KISHAN in 1 year (?q2y after that) - due now      2. Microhematuria    - UA micro - 0 RBCs     3. L flank pain   - Unsure etiology   - CT negative for L flank pain   - f/u with PCP   - Continues to report pain in b/l flanks      Follow up in  1 months with KISHAN      Visit today included increased complexity associated with the care of the episodic problem RCC, flank pain addressed and managing the longitudinal care of the patient due to the serious and/or complex managed problem(s) RCC, flank pain.

## 2024-11-21 ENCOUNTER — OUTPATIENT CASE MANAGEMENT (OUTPATIENT)
Dept: ADMINISTRATIVE | Facility: OTHER | Age: 74
End: 2024-11-21
Payer: MEDICARE

## 2024-11-21 NOTE — LETTER
Valarie Foster  [address] [place] [address] [address] [id number]    Dear Valarie Foster,     Welcome to [facility] Outpatient Care Management Program. We are here to assist patients with multiple long-term (chronic) conditions who often need more personalized healthcare.    It was a pleasure talking with you today. My name is Aniyah Marks RN. I look forward to working with you as your Care Manager. I will be contacting you by telephone routinely to help coordinate care and resolve issues.    My goal is to help you function at the healthiest and highest level possible. You can contact me directly at [phone].    As an [facility] patient with [employer], some of the services we provide, at no cost to you, include:     Development of an individualized care plan with a Registered Nurse   Connection with a   Assistance from a Community Health Worker  Connection with available resources and services    Coordinate communication among your care team members   Provide coaching and education  Help you understand your doctor's treatment plan  Help you obtain information about your insurance coverage.    All services provided by [facility]s Outpatient Care Managers and other care team members are coordinated with and communicated to your primary care team.      As part of your enrollment, you will be receiving education materials and more information about these services in your [facility] account, by phone, or through the mail. If you do not wish to participate or receive information, you can Opt Out by contacting our office at [phone].      Sincerely,        Aniyah Marks RN  [facility]   Outpatient Care Management

## 2024-11-22 ENCOUNTER — PATIENT OUTREACH (OUTPATIENT)
Dept: ADMINISTRATIVE | Facility: OTHER | Age: 74
End: 2024-11-22
Payer: MEDICARE

## 2024-11-22 NOTE — PROGRESS NOTES
Outpatient Care Management  Initial Patient Assessment    Patient: Valarie Foster  MRN: [id number]  Date of Service: 11/21/2024  Completed by: Aniyah Marks RN  Referral Date: 10/16/2024  Date of Eligibility: 11/15/2024  Program:   High Risk  Status: Ongoing  Effective Dates: 11/21/2024 - present  Responsible Staff: Aniyah Marks RN        Reason for Visit   Patient presents with    OPCM RN First Assessment Attempt    OPCM Chart Review    OPCM Enrollment Call    Plan Of Care       Brief Summary:  Valarie Foster was referred by provider for mild major depression, fatigue, and memory loss. Patient qualifies for program based on risk score of 71.4%.   Active problem list, medical, surgical and social history reviewed. Active comorbidities include Mild major depression, HTN, dm, fall, and primary osteoarthritis of knees. Areas of need identified by patient include D, and depression.       Disability Status  Is the patient alert and oriented (person, place, time, and situation)?: Alert and oriented x 4  Hearing Difficulty or Deaf: no  Visual Difficulty or Blind: no  Visual and Hearing Needs Conclusion: Pt reports that she has no changes with her vision or hearing.  Difficulty Concentrating, Remembering or Making Decisions: yes (Pt states that she has notifced that she is forgetful at times.)  Communication Difficulty: no  Eating/Swallowing Difficulty: no  Walking or Climbing Stairs Difficulty: yes (Pt reports that her stomach is heavy and is causing her to wobble when she walks. Pt voiced that she had a recent fall in the home. Pt states that her left knee gave out.)  Dressing/Bathing Difficulty: yes (Pt voiced that it is a little difficult to get into the bathtub.)  Toileting : Independent  Continence : Continence - Not a problem  Difficulty Managing Errands Independently: yes  Equipment Currently Used at Home: cane, straight; rollator; shower chair  ADL Conclusion Statement: Pt voiced that she is  independent with her ADLs.  Change in Functional Status Since Onset of Current Illness/Injury: no        Spiritual Beliefs  Spiritual, Cultural Beliefs, Congregation Practices, Values that Affect Care: no      Social History     Socioeconomic History    Marital status:    Tobacco Use    Smoking status: Never     Passive exposure: Past    Smokeless tobacco: Never   Substance and Sexual Activity    Alcohol use: No    Drug use: No    Sexual activity: Not Currently     Social Drivers of Health     Financial Resource Strain: Low Risk  (11/22/2024)    Overall Financial Resource Strain (CARDIA)     Difficulty of Paying Living Expenses: Not very hard   Food Insecurity: No Food Insecurity (11/22/2024)    Hunger Vital Sign     Worried About Running Out of Food in the Last Year: Never true     Ran Out of Food in the Last Year: Never true   Transportation Needs: No Transportation Needs (11/22/2024)    TRANSPORTATION NEEDS     Transportation : No   Physical Activity: Insufficiently Active (3/30/2023)    Exercise Vital Sign     Days of Exercise per Week: 7 days     Minutes of Exercise per Session: 10 min   Stress: No Stress Concern Present (3/30/2023)    [employer] [place] [employer] - Occupational Stress Questionnaire     Feeling of Stress : Only a little   Housing Stability: Low Risk  (3/30/2023)    Housing Stability Vital Sign     Unable to Pay for Housing in the Last Year: No     Number of Places Lived in the Last Year: 1     Unstable Housing in the Last Year: No       Roles and Relationships  Primary Source of Support/Comfort: extended family  Name of Support/Comfort Primary Source: Debora  Secondary Source of Support/Comfort: no one      Advance Directives (For Healthcare)  Advance Directive  (If Adv Dir status is received, view document under Adv Dir in header or Chart Review Media tab): Patient does not have Advance Directive, requests information.  Patient Requests Assistance: Handouts  provided        Patient Reported Insurance  Verified current insurance plan:: [employer]  [employer] benefits discussed:: OTC Prescription Discounts; Transportation            7/29/2024     2:21 PM 3/30/2023     7:38 AM 3/30/2023     7:37 AM 8/27/2019    10:20 AM 7/18/2019     8:25 AM 7/2/2019    11:56 AM 3/29/2018    10:56 AM   Depression Patient Health Questionnaire   Over the last two weeks how often have you been bothered by little interest or pleasure in doing things Not at all Not at all Not at all Not at all Several days Nearly every day Several days   Over the last two weeks how often have you been bothered by feeling down, depressed or hopeless Not at all Not at all Not at all Not at all Several days Nearly every day Several days   PHQ-2 Total Score 0 0 0 0 2 6 2   Over the last two weeks how often have you been bothered by trouble falling or staying asleep, or sleeping too much     Several days More than half the days    Over the last two weeks how often have you been bothered by feeling tired or having little energy     Several days More than half the days    Over the last two weeks how often have you been bothered by a poor appetite or overeating     Several days Several days    Over the last two weeks how often have you been bothered by feeling bad about yourself - or that you are a failure or have let yourself or your family down     Not at all Not at all    Over the last two weeks how often have you been bothered by trouble concentrating on things, such as reading the newspaper or watching television     Several days Several days    Over the last two weeks how often have you been bothered by moving or speaking so slowly that other people could have noticed. Or the opposite - being so fidgety or restless that you have been moving around a lot more than usual.     Several days Not at all    Over the last two weeks how often have you been bothered by thoughts that you would be better off dead,  or of hurting yourself     Not at all Not at all    PHQ-9 Score     7 12        Learning Assessment       11/21/2024 1439 [facility] (11/21/2024 - Present)   Created by Aniyah Marks RN - RN (Nurse) Status: Complete                 PRIMARY LEARNER     Primary Learner Name:  Valarie Foster  - 11/21/2024 1439    Relationship:  Patient  - 11/21/2024 1439    Does the primary learner have any barriers to learning?:  No Barriers  - 11/21/2024 1439    What is the preferred language of the primary learner?:  English  - 11/21/2024 1439    Is an  required?:  No  - 11/21/2024 1439    How does the primary learner prefer to learn new concepts?:  Listening, Reading  - 11/21/2024 1439    How often do you need to have someone help you read instructions, pamphlets, or written material from your doctor or pharmacy?:  Never  - 11/21/2024 1439        CO-LEARNER #1     No question answered        CO-LEARNER #2     No question answered        SPECIAL TOPICS     No question answered        ANSWERED BY:     No question answered        Comments         Edit History       Aniyah Marks RN - RN (Nurse)   11/21/2024 1439

## 2024-11-25 NOTE — PROGRESS NOTES
This Community Health Worker (CHW) completed Social Determinant of Health (SDOH)  Questionnaire with patient/caregiver via telephone today.  Notified OPCM ALFRED Dill RN of completion.      Patient denied any SDOH needs at this time.  Pt states she has a little stress and has notified her doctor and she walks in the house because she gets SOB.

## 2024-11-26 ENCOUNTER — OFFICE VISIT (OUTPATIENT)
Dept: CARDIOLOGY | Facility: CLINIC | Age: 74
End: 2024-11-26
Payer: MEDICARE

## 2024-11-26 VITALS
SYSTOLIC BLOOD PRESSURE: 180 MMHG | HEART RATE: 66 BPM | HEIGHT: 64 IN | RESPIRATION RATE: 15 BRPM | OXYGEN SATURATION: 94 % | DIASTOLIC BLOOD PRESSURE: 98 MMHG | BODY MASS INDEX: 33.84 KG/M2 | WEIGHT: 198.19 LBS

## 2024-11-26 DIAGNOSIS — E78.5 HYPERLIPIDEMIA, UNSPECIFIED HYPERLIPIDEMIA TYPE: Primary | Chronic | ICD-10-CM

## 2024-11-26 DIAGNOSIS — I10 BENIGN HYPERTENSION: Chronic | ICD-10-CM

## 2024-11-26 DIAGNOSIS — R53.83 FATIGUE, UNSPECIFIED TYPE: ICD-10-CM

## 2024-11-26 DIAGNOSIS — R07.9 CHEST PAIN, UNSPECIFIED TYPE: ICD-10-CM

## 2024-11-26 DIAGNOSIS — R60.0 BILATERAL LOWER EXTREMITY EDEMA: ICD-10-CM

## 2024-11-26 DIAGNOSIS — I70.0 ABDOMINAL AORTIC ATHEROSCLEROSIS: Chronic | ICD-10-CM

## 2024-11-26 LAB
OHS QRS DURATION: 76 MS
OHS QTC CALCULATION: 433 MS

## 2024-11-26 PROCEDURE — 99999 PR PBB SHADOW E&M-EST. PATIENT-LVL V: CPT | Mod: PBBFAC,HCNC,, | Performed by: INTERNAL MEDICINE

## 2024-11-26 PROCEDURE — 93000 ELECTROCARDIOGRAM COMPLETE: CPT | Mod: S$GLB,,, | Performed by: INTERNAL MEDICINE

## 2024-11-26 RX ORDER — HYDRALAZINE HYDROCHLORIDE 50 MG/1
50 TABLET, FILM COATED ORAL 3 TIMES DAILY
Qty: 90 TABLET | Refills: 11 | Status: SHIPPED | OUTPATIENT
Start: 2024-11-26 | End: 2025-11-26

## 2024-11-26 RX ORDER — ATORVASTATIN CALCIUM 80 MG/1
80 TABLET, FILM COATED ORAL NIGHTLY
Qty: 90 TABLET | Refills: 3 | Status: SHIPPED | OUTPATIENT
Start: 2024-11-26

## 2024-11-26 NOTE — PROGRESS NOTES
CARDIOVASCULAR CONSULTATION    REASON FOR CONSULT:   Valarie Foster is a 74 y.o. female who presents for evaluation of chest pressure     HISTORY OF PRESENT ILLNESS:      Patient is a pleasant 72-year-old female.  Here for evaluation of chest pressure.  Substernal.  On exertion.  Denies orthopnea, PND, swelling of feet.  Multiple risk factors for coronary artery disease    The estimated ejection fraction is 55%.  The left ventricle is normal in size with concentric hypertrophy and normal systolic function.  Grade I left ventricular diastolic dysfunction.  Normal right ventricular size with normal right ventricular systolic function.  Moderate left atrial enlargement.  Mild mitral regurgitation.  Mild right atrial enlargement.  Normal central venous pressure (3 mmHg).  The estimated PA systolic pressure is 27 mmHg.     11/6:  no more chest pains. Went away on their own    Nov 23:    Normal myocardial perfusion scan. There is no evidence of myocardial ischemia or infarction.    There is a mild intensity perfusion abnormality in the anterior wall of the left ventricle, secondary to breast attenuation.    The gated perfusion images showed an ejection fraction of 59% post stress.    The ECG portion of the study is negative for ischemia.    The patient reported no chest pain during the stress test.    There were no arrhythmias during stress.    NOVEMBER 24:    History of Present Illness    CHIEF COMPLAINT:  Valarie presents today with heartburn and chest pain.    CARDIOVASCULAR:  She reports experiencing heartburn for approximately 4 days with associated chest pain. The chest pain started two days ago and is described as intermittent, coming and going without a clear pattern. She denies exacerbation of the pain with physical activity, such as walking or climbing stairs. No specific triggers or alleviating factors have been identified. Home blood pressure readings show yesterday's measurement at 150/70 and today's at 180.  She checks her blood pressure once in the morning and confirms taking her blood pressure medication this morning.    MEDICATIONS:  Current medications include Amlodipine 10 mg daily, Atorvastatin, and Metoprolol.       EKG DONE TODAY PERSONALLY REVIEWED AND SHOWS SINUS RHYTHM WITH HEART RATE OF 64 BEATS PER MINUTE, MILD LVH.  PAST MEDICAL HISTORY:     Past Medical History:   Diagnosis Date    Arthritis     Colon polyp     Diabetes mellitus     diet controlled    Diabetes with neurologic complications     Hypertension     Nuclear sclerosis of both eyes 2/14/2022    Renal cell carcinoma        PAST SURGICAL HISTORY:     Past Surgical History:   Procedure Laterality Date    COLONOSCOPY N/A 2/5/2018    Procedure: COLONOSCOPY;  Surgeon: Bacilio Mancia MD;  Location: [facility] ENDO;  Service: Endoscopy;  Laterality: N/A;  appt confirmed-ss    COLONOSCOPY N/A 8/13/2020    Procedure: COLONOSCOPY;  Surgeon: Jose West MD;  Location: [facility] ENDO;  Service: Endoscopy;  Laterality: N/A;    ENDOSCOPIC CARPAL TUNNEL RELEASE Right 3/15/2024    Procedure: RELEASE, CARPAL TUNNEL, ENDOSCOPIC - RIGHT;  Surgeon: Tonio Mcdermott MD;  Location: [facility] OR;  Service: Orthopedics;  Laterality: Right;    ENDOSCOPIC CARPAL TUNNEL RELEASE Left 6/26/2024    Procedure: RELEASE, CARPAL TUNNEL, ENDOSCOPIC - left;  Surgeon: Tonio Mcdermott MD;  Location: [facility] OR;  Service: Orthopedics;  Laterality: Left;    HYSTERECTOMY      INTRAOCULAR PROSTHESES INSERTION Left 7/7/2022    Procedure: INSERTION, IOL PROSTHESIS;  Surgeon: Candelario Novoa MD;  Location: [facility] OR;  Service: Ophthalmology;  Laterality: Left;    INTRAOCULAR PROSTHESES INSERTION Right 7/21/2022    Procedure: INSERTION, IOL PROSTHESIS;  Surgeon: Candelario Novoa MD;  Location: [facility] OR;  Service: Ophthalmology;  Laterality: Right;    OOPHORECTOMY      PARTIAL NEPHRECTOMY Right 10/12/2018    Procedure: NEPHRECTOMY, PARTIAL open. Dr Arenas to assist.;  Surgeon: Alejandra CALDERÓN  MD Néstor;  Location: [facility] OR;  Service: Urology;  Laterality: Right;  RN PREOP 10/9/2018----NEEDS H/P    PHACOEMULSIFICATION OF CATARACT Left 7/7/2022    Procedure: PHACOEMULSIFICATION, CATARACT;  Surgeon: Candelario Novoa MD;  Location: [facility] OR;  Service: Ophthalmology;  Laterality: Left;  RN PHONE PREOP 6/27/2022   ARRIVAL 6:00 AM    PHACOEMULSIFICATION OF CATARACT Right 7/21/2022    Procedure: PHACOEMULSIFICATION, CATARACT;  Surgeon: Candelario Novoa MD;  Location: [facility] OR;  Service: Ophthalmology;  Laterality: Right;  RN PHONE PREOP 7/12/2022   ARRIVAL 12:00 NOON    TOTAL KNEE ARTHROPLASTY Right 11/8/2023    Procedure: ARTHROPLASTY, KNEE, TOTAL. NELSON;  Surgeon: Eric Carbajal MD;  Location: [facility] OR;  Service: Orthopedics;  Laterality: Right;  [place]. Admit  RAPHAEL NIEVES [phone] NOTIFIED QUINTIN ON 10/11/2023-  RN PREOP 10/25/2023   T/S ON 11/6/2023--done------CLEARED BY CARDS       ALLERGIES AND MEDICATION:     Review of patient's allergies indicates:   Allergen Reactions    Lisinopril Swelling and Shortness Of Breath    Ascorbic acid-ascorbate calc      And citrus fruits        Medication List            Accurate as of November 26, 2024 10:33 AM. If you have any questions, ask your nurse or doctor.                START taking these medications      hydrALAZINE 50 MG tablet  Commonly known as: APRESOLINE  Take 1 tablet (50 mg total) by mouth 3 (three) times daily.  Started by: Mary Kate Dolan MD            CHANGE how you take these medications      atorvastatin 80 MG tablet  Commonly known as: LIPITOR  Take 1 tablet (80 mg total) by mouth every evening.  What changed:   medication strength  how much to take  Changed by: Mary Kate Dolan MD            CONTINUE taking these medications      ACCU-CHEK ONEIL CONTROL SOLN Soln  Generic drug: blood glucose control high,low  1 Units by Misc.(Non-Drug; Combo Route) route as needed.     ACCU-CHEK ONEIL PLUS TEST STRP Strp  Generic drug: blood  sugar diagnostic  TEST BLOOD SUGAR TWICE DAILY     * ACCU-CHEK SOFTCLIX LANCETS Misc  Generic drug: lancets  USE TO TEST BLOOD SUGAR ONE TIME DAILY     * TRUEPLUS LANCETS 33 gauge Misc  Generic drug: lancets  TEST BLOOD SUGAR TWICE DAILY     acetaminophen 500 MG tablet  Commonly known as: TYLENOL  Take 2 tablets (1,000 mg total) by mouth 2 (two) times a day.     amLODIPine 10 MG tablet  Commonly known as: NORVASC  Take 1 tablet (10 mg total) by mouth once daily.     aspirin 81 MG EC tablet  Commonly known as: ECOTRIN  Take 1 tablet (81 mg total) by mouth 2 (two) times a day.     azelastine 137 mcg (0.1 %) nasal spray  Commonly known as: ASTELIN  2 sprays (274 mcg total) by Nasal route 2 (two) times daily.     * blood-glucose meter kit  Commonly known as: TRUE METRIX GLUCOSE METER  Use as directed to test glucose     * blood-glucose meter Misc  Commonly known as: ACCU-CHEK ONEIL PLUS METER  1 kit by Misc.(Non-Drug; Combo Route) route once daily.     cetirizine 10 MG tablet  Commonly known as: ZYRTEC  Take 1 tablet (10 mg total) by mouth once daily.     ciclopirox 8 % Soln  Commonly known as: PENLAC  Apply topically nightly.     diphenhydrAMINE 25 mg capsule  Commonly known as: BENADRYL  Take 1 capsule (25 mg total) by mouth every 6 (six) hours as needed for Itching.     docusate sodium 100 MG capsule  Commonly known as: COLACE  Take 1 capsule (100 mg total) by mouth 2 (two) times daily.     DROPSAFE ALCOHOL PREP PADS Padm  Generic drug: alcohol swabs  USE AS DIRECTED THREE TIMES DAILY     empagliflozin 25 mg tablet  Commonly known as: JARDIANCE  Take 1 tablet (25 mg total) by mouth once daily.     ergocalciferol 50,000 unit Cap  Commonly known as: ERGOCALCIFEROL  TAKE 1 CAPSULE EVERY 7 DAYS.     FLUoxetine 40 MG capsule  Take 1 capsule (40 mg total) by mouth once daily.     fluticasone propionate 50 mcg/actuation nasal spray  Commonly known as: FLONASE  USE 1 SPRAY IN EACH NOSTRIL ONCE DAILY     gabapentin 300 MG  capsule  Commonly known as: NEURONTIN  Take 1 capsule (300 mg total) by mouth 2 (two) times daily.     hydrOXYzine HCL 25 MG tablet  Commonly known as: ATARAX  TAKE 1 TABLET BY MOUTH THREE TIMES DAILY AS NEEDED FOR ITCHING     ibuprofen 800 MG tablet  Commonly known as: ADVIL,MOTRIN  Take 1 tablet (800 mg total) by mouth every 8 (eight) hours as needed for Pain.     LIDOcaine 5 %  Commonly known as: LIDODERM  Place 1 patch onto the skin once daily. Remove & Discard patch within 12 hours or as directed by MD     metoprolol succinate 25 MG 24 hr tablet  Commonly known as: TOPROL-XL  Take 1 tablet (25 mg total) by mouth once daily.     ondansetron 8 MG Tbdl  Commonly known as: ZOFRAN-ODT  Take 1 tablet (8 mg total) by mouth 2 (two) times daily as needed (nausea).     pantoprazole 40 MG tablet  Commonly known as: PROTONIX  Take 1 tablet (40 mg total) by mouth once daily.     pregabalin 50 MG capsule  Commonly known as: LYRICA  Take 1 capsule (50 mg total) by mouth 2 (two) times daily.     * traMADoL 50 mg tablet  Commonly known as: ULTRAM  Take 1 tablet (50 mg total) by mouth every 6 (six) hours.     * traMADoL 50 mg tablet  Commonly known as: ULTRAM  Take 1 tablet (50 mg total) by mouth every 6 (six) hours.     TRUE METRIX LEVEL 1 Soln  Generic drug: blood glucose control, low  1 each by Misc.(Non-Drug; Combo Route) route once as needed.           * This list has 6 medication(s) that are the same as other medications prescribed for you. Read the directions carefully, and ask your doctor or other care provider to review them with you.                   Where to Get Your Medications        These medications were sent to [facility]  [place] - [address]  [address] [address]      Phone: [phone]   atorvastatin 80 MG tablet  hydrALAZINE 50 MG tablet         SOCIAL HISTORY:     Social History     Socioeconomic History    Marital status:    Tobacco Use    Smoking status: Never      Passive exposure: Past    Smokeless tobacco: Never   Substance and Sexual Activity    Alcohol use: No    Drug use: No    Sexual activity: Not Currently     Social Drivers of Health     Financial Resource Strain: Low Risk  (11/22/2024)    Overall Financial Resource Strain (CARDIA)     Difficulty of Paying Living Expenses: Not very hard   Food Insecurity: No Food Insecurity (11/22/2024)    Hunger Vital Sign     Worried About Running Out of Food in the Last Year: Never true     Ran Out of Food in the Last Year: Never true   Transportation Needs: No Transportation Needs (11/22/2024)    TRANSPORTATION NEEDS     Transportation : No   Physical Activity: Insufficiently Active (11/25/2024)    Exercise Vital Sign     Days of Exercise per Week: 4 days     Minutes of Exercise per Session: 20 min   Stress: No Stress Concern Present (11/25/2024)    Citizen of [place] [employer] - Occupational Stress Questionnaire     Feeling of Stress : Only a little   Housing Stability: Low Risk  (11/25/2024)    Housing Stability Vital Sign     Unable to Pay for Housing in the Last Year: No     Homeless in the Last Year: No       FAMILY HISTORY:     Family History   Problem Relation Name Age of Onset    No Known Problems Mother      Glaucoma Father      Breast cancer Sister Warnell     Glaucoma Sister Warnell     Cancer Sister Warnell         breast cancer    Thyroid disease Sister [place]     Blindness Sister Bryanna         due to trauma during car accident    Diabetes Sister Amanda     No Known Problems Maternal Aunt      No Known Problems Maternal Uncle      No Known Problems Paternal Aunt      No Known Problems Paternal Uncle      No Known Problems Maternal Grandmother      No Known Problems Maternal Grandfather      No Known Problems Paternal Grandmother      No Known Problems Paternal Grandfather      Diabetes Brother Navneet     Amblyopia Neg Hx      Cataracts Neg Hx      Hypertension Neg Hx      Macular degeneration Neg Hx  "     Retinal detachment Neg Hx      Strabismus Neg Hx      Stroke Neg Hx         REVIEW OF SYSTEMS:   Review of Systems   Constitutional: Negative.   HENT: Negative.     Eyes: Negative.    Respiratory: Negative.     Endocrine: Negative.    Hematologic/Lymphatic: Negative.    Skin: Negative.    Musculoskeletal: Negative.    Gastrointestinal: Negative.    Genitourinary: Negative.    Neurological: Negative.    Psychiatric/Behavioral: Negative.     Allergic/Immunologic: Negative.        A 10 point review of systems was performed and all the pertinent positives have been mentioned. Rest of review of systems was negative.        PHYSICAL EXAM:     Vitals:    11/26/24 0951   BP: (!) 180/98   Pulse: 66   Resp: 15    Body mass index is 34.02 kg/m².  Weight: 89.9 kg (198 lb 3.1 oz)   Height: 5' 4" (162.6 cm)     Physical Exam  Constitutional:       Appearance: Normal appearance. She is well-developed.   HENT:      Head: Normocephalic.   Eyes:      Pupils: Pupils are equal, round, and reactive to light.   Cardiovascular:      Rate and Rhythm: Normal rate and regular rhythm.   Pulmonary:      Effort: Pulmonary effort is normal.      Breath sounds: Normal breath sounds.   Abdominal:      General: Bowel sounds are normal.      Palpations: Abdomen is soft.      Tenderness: There is no abdominal tenderness.   Musculoskeletal:         General: Normal range of motion.      Cervical back: Normal range of motion and neck supple.   Skin:     General: Skin is warm.   Neurological:      Mental Status: She is alert and oriented to person, place, and time.           DATA:     Laboratory:  CBC:  Recent Labs   Lab 04/29/23  1032 10/25/23  0900 10/28/24  0706   WBC 5.42 5.18 5.76   Hemoglobin 12.8 13.0 13.5   Hematocrit 41.1 41.3 42.6   Platelets 271 231 252       CHEMISTRIES:  Recent Labs   Lab 03/17/22  0718 05/02/22  0711 10/31/22  0758 04/29/23  1032 05/31/23  1455 10/25/23  0900 10/28/24  0706   Glucose 157 H 130 H   < > 123 H  --  121 H " 138 H   Sodium 138 142   < > 137  --  139 139   Potassium 4.7 4.2   < > 4.4  --  4.3 4.0   BUN 15 12   < > 11  --  15 13   Creatinine 0.8 0.8   < > 0.8 0.9 0.8 0.8   eGFR if African American >60 >60.0  --   --   --   --   --    eGFR if non African American >60 >60.0  --   --   --   --   --    Calcium 9.1 9.5   < > 9.9  --  9.5 9.5    < > = values in this interval not displayed.       CARDIAC BIOMARKERS:  Recent Labs   Lab 01/11/23  2237 01/12/23  0421   Troponin I <0.006 <0.006       COAGS:  Recent Labs   Lab 01/11/23  1740 10/25/23  0900   INR 1.1 1.1       LIPIDS/LFTS:  Recent Labs   Lab 10/31/22  0758 01/12/23  0957 04/29/23  1032 10/25/23  0900 10/28/24  0706   Cholesterol 225 H  --   --  155 225 H   Triglycerides 77  --   --  78 103   HDL 74  --   --  57 60   LDL Cholesterol 135.6  --   --  82.4 144.4   Non-HDL Cholesterol 151  --   --  98 165   AST 14   < > 14 14 15   ALT 11   < > 9 L 13 12    < > = values in this interval not displayed.       Hemoglobin A1C   Date Value Ref Range Status   10/28/2024 7.5 (H) 4.0 - 5.6 % Final     Comment:     ADA Screening Guidelines:  5.7-6.4%  Consistent with prediabetes  >or=6.5%  Consistent with diabetes    High levels of fetal hemoglobin interfere with the HbA1C  assay. Heterozygous hemoglobin variants (HbS, HgC, etc)do  not significantly interfere with this assay.   However, presence of multiple variants may affect accuracy.     07/29/2024 8.2 (H) 4.0 - 5.6 % Final     Comment:     ADA Screening Guidelines:  5.7-6.4%  Consistent with prediabetes  >or=6.5%  Consistent with diabetes    High levels of fetal hemoglobin interfere with the HbA1C  assay. Heterozygous hemoglobin variants (HbS, HgC, etc)do  not significantly interfere with this assay.   However, presence of multiple variants may affect accuracy.     10/25/2023 7.4 (H) 4.0 - 5.6 % Final     Comment:     ADA Screening Guidelines:  5.7-6.4%  Consistent with prediabetes  >or=6.5%  Consistent with diabetes    High  "levels of fetal hemoglobin interfere with the HbA1C  assay. Heterozygous hemoglobin variants (HbS, HgC, etc)do  not significantly interfere with this assay.   However, presence of multiple variants may affect accuracy.         TSH  Recent Labs   Lab 10/31/22  0758 10/25/23  0900 10/28/24  0706   TSH 0.999 0.552 1.984       The 10-year ASCVD risk score (Ayush JASON, et al., 2019) is: 54.8%    Values used to calculate the score:      Age: 74 years      Sex: Female      Is Non- : Yes      Diabetic: Yes      Tobacco smoker: No      Systolic Blood Pressure: 180 mmHg      Is BP treated: Yes      HDL Cholesterol: 60 mg/dL      Total Cholesterol: 225 mg/dL       BNP    No results found for: "BNP"          ASSESSMENT AND PLAN     Patient Active Problem List   Diagnosis    Type 2 diabetes mellitus with diabetic neuropathy, without long-term current use of insulin    Benign hypertension    Hyperlipemia    Microhematuria    Left flank pain    Abdominal aortic atherosclerosis    History of renal cell cancer    Renal cell carcinoma of right kidney    Mild major depression    Bilateral chronic knee pain    Colon cancer screening    Pain of left calf    Refractive error    Primary osteoarthritis of knees, bilateral    Fall    Radiculopathy    Idiopathic progressive neuropathy    Nerve pain    Chest pain    Severe obesity (BMI 35.0-39.9) with comorbidity    Decreased ROM of right knee    Decreased strength involving knee joint    Gait difficulty    Paresthesia    Pseudophakia    COVID-19       Assessment & Plan    - Assessed patient's reported heartburn and chest pain  - Noted elevated blood pressure (180/unknown) during visit  - Considered potential link between hypertension and chest pain  - Will add hydralazine to current antihypertensive regimen  - Ordered cardiac stress test and echocardiogram to evaluate chest pain    HYPERTENSION:  - Valarie to sign up for digital hypertension program   - Started " hydralazine 50 mg 3 times daily.  - Continued amlodipine 10 mg daily, atorvastatin, and metoprolol.  - Referred to digital hypertension program.    Chest pain  - Cardiac stress test (pharmacological) ordered.  - Echocardiogram ordered.    Dyslipidemia  Continue Lipitor.  Increase dose to 80 mg daily    Lab Results   Component Value Date    LDLCALC 144.4 10/28/2024     Abdominal aortic atherosclerosis: On statin      FOLLOW-UP:  - Follow up after completion of cardiac testing.           Thank you very much for involving me in the care of your patient.  Please do not hesitate to contact me if there are any questions.      Mary Kate Dolan MD, FAC, [facility]  Interventional Cardiologist, [facility].     Visit today included increased complexity associated with the care of the episodic problem chest pain, dyslipidemia, aortic atherosclerosis, hypertension addressed and managing the longitudinal care of the patient due to the serious and/or complex managed problem(s)   Patient Active Problem List   Diagnosis    Type 2 diabetes mellitus with diabetic neuropathy, without long-term current use of insulin    Benign hypertension    Hyperlipemia    Microhematuria    Left flank pain    Abdominal aortic atherosclerosis    History of renal cell cancer    Renal cell carcinoma of right kidney    Mild major depression    Bilateral chronic knee pain    Colon cancer screening    Pain of left calf    Refractive error    Primary osteoarthritis of knees, bilateral    Fall    Radiculopathy    Idiopathic progressive neuropathy    Nerve pain    Chest pain    Severe obesity (BMI 35.0-39.9) with comorbidity    Decreased ROM of right knee    Decreased strength involving knee joint    Gait difficulty    Paresthesia    Pseudophakia    COVID-19     .        This note was generated with the assistance of ambient listening technology. Verbal consent was obtained by the patient and accompanying visitor(s) for the recording of patient appointment  to facilitate this note. I attest to having reviewed and edited the generated note for accuracy, though some syntax or spelling errors may persist. Please contact the author of this note for any clarification.      This note was dictated with the help of speech recognition software.  There might be un-intended errors and/or substitutions.

## 2024-11-27 ENCOUNTER — HOSPITAL ENCOUNTER (OUTPATIENT)
Dept: RADIOLOGY | Facility: HOSPITAL | Age: 74
Discharge: HOME | End: 2024-11-27
Attending: UROLOGY
Payer: MEDICARE

## 2024-11-27 DIAGNOSIS — C64.1 RENAL CELL CARCINOMA OF RIGHT KIDNEY: ICD-10-CM

## 2024-11-27 DIAGNOSIS — R31.29 MICROHEMATURIA: ICD-10-CM

## 2024-11-27 PROCEDURE — 76770 US EXAM ABDO BACK WALL COMP: CPT | Mod: TC

## 2024-11-27 PROCEDURE — 76770 US EXAM ABDO BACK WALL COMP: CPT | Mod: 26,,, | Performed by: STUDENT IN AN ORGANIZED HEALTH CARE EDUCATION/TRAINING PROGRAM

## 2024-11-29 DIAGNOSIS — L29.9 PRURITIC CONDITION: ICD-10-CM

## 2024-11-29 RX ORDER — HYDROXYZINE HYDROCHLORIDE 25 MG/1
25 TABLET, FILM COATED ORAL
Qty: 90 TABLET | Refills: 0 | Status: SHIPPED | OUTPATIENT
Start: 2024-11-29

## 2024-12-02 ENCOUNTER — OFFICE VISIT (OUTPATIENT)
Dept: UROLOGY | Facility: CLINIC | Age: 74
End: 2024-12-02
Payer: MEDICARE

## 2024-12-02 VITALS — WEIGHT: 203.69 LBS | BODY MASS INDEX: 34.97 KG/M2

## 2024-12-02 DIAGNOSIS — R10.9 FLANK PAIN: ICD-10-CM

## 2024-12-02 DIAGNOSIS — C64.1 RENAL CELL CARCINOMA OF RIGHT KIDNEY: Primary | ICD-10-CM

## 2024-12-02 DIAGNOSIS — R31.29 MICROHEMATURIA: ICD-10-CM

## 2024-12-02 PROCEDURE — 1160F RVW MEDS BY RX/DR IN RCRD: CPT | Mod: HCNC,CPTII,S$GLB, | Performed by: UROLOGY

## 2024-12-02 PROCEDURE — 99999 PR PBB SHADOW E&M-EST. PATIENT-LVL IV: CPT | Mod: PBBFAC,HCNC,, | Performed by: UROLOGY

## 2024-12-02 PROCEDURE — 1101F PT FALLS ASSESS-DOCD LE1/YR: CPT | Mod: HCNC,CPTII,S$GLB, | Performed by: UROLOGY

## 2024-12-02 PROCEDURE — 3288F FALL RISK ASSESSMENT DOCD: CPT | Mod: HCNC,CPTII,S$GLB, | Performed by: UROLOGY

## 2024-12-02 PROCEDURE — G2211 COMPLEX E/M VISIT ADD ON: HCPCS | Mod: HCNC,S$GLB,, | Performed by: UROLOGY

## 2024-12-02 PROCEDURE — 1159F MED LIST DOCD IN RCRD: CPT | Mod: HCNC,CPTII,S$GLB, | Performed by: UROLOGY

## 2024-12-02 PROCEDURE — 99213 OFFICE O/P EST LOW 20 MIN: CPT | Mod: HCNC,S$GLB,, | Performed by: UROLOGY

## 2024-12-02 NOTE — PROGRESS NOTES
Subjective:       Valarie Foster is a 74 y.o. female who is an established patient who was referred by Ivan CARDONA  for evaluation of renal mass.      CT a/p with contrast 1/26/18 showed a 2.6cm endophytic lesion in R MP, concerning for solid mass vs complex cyst.     She reports L flank pain, contralateral to renal lesion. L flank pain has almost completely resolved. Denies hematuria, LUTS, UTIs. UA macro did show microhematuria recently. Denies family history of  malignancy. She reports being told she had a small kidney on one side in the past. Denies nephrolithiasis. Nonsmoker. Denies previous abdominal surgery though she is s/p hysterectomy. +DM2/HTN.     Cr (1/18) - 0.8  A1c (1/18) - 6.2  UA micro (2/18) - 0 RBCs    CT renal protocol (2/18) - 2.9cm completely endophytic solid enhancing renal mass. Homogenous in appearance.      She is s/p perc renal biopsy to better evaluate lesion (2/18) - oncocytic neoplasm. Mild hematuria < 24 hrs.          CT (6/18) - stable, endophytic enhancing 2.4cm R renal mass. Subtle 6mm focus of enhancement in R lobe of liver - nonspecific.     KISHAN (9/18) - stable 2.8cm R renal mass    She is now s/p open R PNx 10/12/18. Still having incisional pain, worse with palpation. No hernia on CT, mild stranding in subQ.    Path: 1.9cm unclassified RCC, FG 3/4, negative margins. [id number]    Doing well. States chronic fatigue and pain. Still with occasional incisional pain.     She notes some flank pain after recent fall - CT RSS done that did not show any issues. Recent KISHAN without concerning findings.       CT c/a/p 4/19 - no mets/recurrence  CT a/p (10/19) - no recurrence/mets. Abnormal area in tail of pancreas - recommend PCP follow up. CXR - clear.  CT a/p (10/20) - no recurrence/mets. Pancreatic tail lesion not seen. CXR - clear  CT a/p 3/22 - no recurrence or mets. Post-op changes to R kidney. CXR - wnl  CT a/p 5/23 - no recurrence/mets. Post-op changes to R kidney. CXR 1/23 -  clear  CT RSS 10/24 - no stones  KISHAN 11/24 - no hydro/stones. No recurrence.     Cr 1/19 - 0.8, 4/19 - 0.8, 10/19 - 0.9, 10/20 - 0.9, 11/21 - 0.8, 3/22 - 0.8, 5/23 - 0.9, 10/24 - 0.8           The following portions of the patient's history were reviewed and updated as appropriate: allergies, current medications, past family history, past medical history, past social history, past surgical history and problem list.    Review of Systems  Constitutional: no fever or chills  ENT: no nasal congestion or sore throat  Respiratory: no cough or shortness of breath  Cardiovascular: no chest pain or palpitations  Gastrointestinal: no nausea or vomiting, tolerating diet  Genitourinary: as per HPI  Hematologic/Lymphatic: no easy bruising or lymphadenopathy  Musculoskeletal: no arthralgias or myalgias  Skin: no rashes or lesions  Neurological: no seizures or tremors  Behavioral/Psych: no auditory or visual hallucinations        Objective:    Vitals: Wt 92.4 kg (203 lb 11.3 oz)   BMI 34.97 kg/m²     Physical Exam   General: well developed, well nourished in no acute distress  Head: normocephalic, atraumatic  Neck: supple, trachea midline, no obvious enlargement of thyroid  HEENT: EOMI, mucus membranes moist, sclera anicteric, no hearing impairment  Lungs: symmetric expansion, non-labored breathing  Neuro: alert and oriented x 3, no gross deficits  Psych: normal judgment and insight, normal mood/affect and non-anxious  Genitourinary:   deferred    Inc - well healed, minor superior fullness of incision, no hernia    Lab Review   Urine analysis today in clinic shows - 500 glucose    Lab Results   Component Value Date    WBC 5.76 10/28/2024    HGB 13.5 10/28/2024    HCT 42.6 10/28/2024    MCV 85 10/28/2024     10/28/2024     Lab Results   Component Value Date    CREATININE 0.8 10/28/2024    BUN 13 10/28/2024       Imaging  Images and reports were personally reviewed by me and discussed with patient  CT/KISHAN reviewed        Assessment/Plan:      1. Right RCC    - 2.6cm lesion in R MP kidney.   - CT renal protocol 2.5cm endophytic solid enhancing renal mass   - Due to location of tumor, open partial would be difficult with higher complication rate of bleeding and/or urine leak. Radical nephrectomy with less complication rate.   - Concern for performing radical nephrectomy in diabetic patient for possible benign lesion.   - Recommend perc biopsy to evaluate for possible benign lesion prior to radical nephrectomy. Discussed limitations/risks of perc biopsy. Understands that perc biopsy may also not be definitive in diagnosis   - Perc biopsy 2/18 - oncocytic neoplasm (less favors oncocytoma)   - Discussed options: observation, lap radical Nx, open partial Nx. She has opted to observe for now.    - CT 6/18 - stable 2.4cm R renal mass   - KISHAN 9/18 - 2.8cm R renal mass   - s/p R open PNx on 10/12/18    - unclassified RCC pT1a   - CT c/a/p 6 months (4/18) - no recurrence/mets   - CT ap (10/19) - no recurrence/mets. Abnormal area in tail of pancreas - recommend PCP follow up. CXR - clear. Cr 0.9.    - CT a/p 5/23 - no recurrence   - KISHAN in 1-2 years - prefers annual check   2. Microhematuria    - UA micro - 0 RBCs     3. L flank pain   - Unsure etiology   - CT negative for L flank pain   - f/u with PCP   - Continues to report pain in b/l flanks      Follow up in  12 months with KISHAN      Visit today included increased complexity associated with the care of the episodic problem RCC, flank pain addressed and managing the longitudinal care of the patient due to the serious and/or complex managed problem(s) RCC, flank pain.

## 2024-12-06 ENCOUNTER — OUTPATIENT CASE MANAGEMENT (OUTPATIENT)
Dept: ADMINISTRATIVE | Facility: OTHER | Age: 74
End: 2024-12-06
Payer: MEDICARE

## 2024-12-23 ENCOUNTER — HOSPITAL ENCOUNTER (OUTPATIENT)
Dept: CARDIOLOGY | Facility: HOSPITAL | Age: 74
Discharge: HOME | End: 2024-12-23
Attending: INTERNAL MEDICINE
Payer: MEDICARE

## 2024-12-23 ENCOUNTER — HOSPITAL ENCOUNTER (OUTPATIENT)
Dept: RADIOLOGY | Facility: HOSPITAL | Age: 74
Discharge: HOME | End: 2024-12-23
Attending: INTERNAL MEDICINE
Payer: MEDICARE

## 2024-12-23 VITALS — HEIGHT: 64 IN | BODY MASS INDEX: 34.66 KG/M2 | WEIGHT: 203 LBS

## 2024-12-23 DIAGNOSIS — R53.83 FATIGUE, UNSPECIFIED TYPE: ICD-10-CM

## 2024-12-23 DIAGNOSIS — R60.0 BILATERAL LOWER EXTREMITY EDEMA: ICD-10-CM

## 2024-12-23 DIAGNOSIS — R07.9 CHEST PAIN, UNSPECIFIED TYPE: ICD-10-CM

## 2024-12-23 DIAGNOSIS — I10 BENIGN HYPERTENSION: Chronic | ICD-10-CM

## 2024-12-23 LAB
ASCENDING AORTA: 3.33 CM
AV INDEX (PROSTH): 0.58
AV MEAN GRADIENT: 4.9 MMHG
AV PEAK GRADIENT: 9 MMHG
AV VALVE AREA BY VELOCITY RATIO: 1.9 CM²
AV VALVE AREA: 1.8 CM²
AV VELOCITY RATIO: 0.6
BSA FOR ECHO PROCEDURE: 2.04 M2
CV ECHO LV RWT: 0.59 CM
CV STRESS BASE HR: 72 BPM
DIASTOLIC BLOOD PRESSURE: 81 MMHG
DOP CALC AO PEAK VEL: 1.5 M/S
DOP CALC AO VTI: 35.3 CM
DOP CALC LVOT AREA: 3.1 CM2
DOP CALC LVOT DIAMETER: 2 CM
DOP CALC LVOT PEAK VEL: 0.9 M/S
DOP CALC LVOT STROKE VOLUME: 64.1 CM3
DOP CALC MV VTI: 27.5 CM
DOP CALCLVOT PEAK VEL VTI: 20.4 CM
E WAVE DECELERATION TIME: 223.77 MSEC
E/A RATIO: 0.68
E/E' RATIO: 7.47 M/S
ECHO LV POSTERIOR WALL: 1 CM (ref 0.6–1.1)
FRACTIONAL SHORTENING: 41.2 % (ref 28–44)
INTERVENTRICULAR SEPTUM: 1.1 CM (ref 0.6–1.1)
IVC DIAMETER: 1.76 CM
LA MAJOR: 4.82 CM
LA MINOR: 5.33 CM
LA WIDTH: 4.2 CM
LEFT ATRIUM SIZE: 3.95 CM
LEFT ATRIUM VOLUME INDEX: 36.2 ML/M2
LEFT ATRIUM VOLUME: 71.38 CM3
LEFT INTERNAL DIMENSION IN SYSTOLE: 2 CM (ref 2.1–4)
LEFT VENTRICLE DIASTOLIC VOLUME INDEX: 24.75 ML/M2
LEFT VENTRICLE DIASTOLIC VOLUME: 48.75 ML
LEFT VENTRICLE MASS INDEX: 54 G/M2
LEFT VENTRICLE SYSTOLIC VOLUME INDEX: 6.2 ML/M2
LEFT VENTRICLE SYSTOLIC VOLUME: 12.13 ML
LEFT VENTRICULAR INTERNAL DIMENSION IN DIASTOLE: 3.4 CM (ref 3.5–6)
LEFT VENTRICULAR MASS: 106.3 G
LV LATERAL E/E' RATIO: 7.1 M/S
LV SEPTAL E/E' RATIO: 7.89 M/S
LVED V (TEICH): 48.75 ML
LVES V (TEICH): 12.13 ML
LVOT MG: 1.98 MMHG
LVOT MV: 0.67 CM/S
MV MEAN GRADIENT: 1 MMHG
MV PEAK A VEL: 1.05 M/S
MV PEAK E VEL: 0.71 M/S
MV PEAK GRADIENT: 3 MMHG
MV STENOSIS PRESSURE HALF TIME: 64.89 MS
MV VALVE AREA BY CONTINUITY EQUATION: 2.33 CM2
MV VALVE AREA P 1/2 METHOD: 3.39 CM2
NUC STRESS EJECTION FRACTION: 64 %
OHS CV CPX 85 PERCENT MAX PREDICTED HEART RATE MALE: 122
OHS CV CPX MAX PREDICTED HEART RATE: 144
OHS CV CPX PATIENT IS FEMALE: 1
OHS CV CPX PATIENT IS MALE: 0
OHS CV CPX PEAK DIASTOLIC BLOOD PRESSURE: 78 MMHG
OHS CV CPX PEAK HEAR RATE: 88 BPM
OHS CV CPX PEAK RATE PRESSURE PRODUCT: NORMAL
OHS CV CPX PEAK SYSTOLIC BLOOD PRESSURE: 139 MMHG
OHS CV CPX PERCENT MAX PREDICTED HEART RATE ACHIEVED: 63
OHS CV CPX RATE PRESSURE PRODUCT PRESENTING: NORMAL
OHS CV INITIAL DOSE: 10.7 MCG/KG/MIN
OHS CV PEAK DOSE: 30.6 MCG/KG/MIN
OHS CV RV/LV RATIO: 1.03 CM
PISA TR MAX VEL: 2.88 M/S
PV PEAK GRADIENT: 4 MMHG
PV PEAK VELOCITY: 1.04 M/S
RA MAJOR: 4.05 CM
RA PRESSURE ESTIMATED: 3 MMHG
RA WIDTH: 3.4 CM
RIGHT VENTRICLE DIASTOLIC BASEL DIMENSION: 3.5 CM
RIGHT VENTRICULAR END-DIASTOLIC DIMENSION: 3.47 CM
RV TB RVSP: 6 MMHG
RV TISSUE DOPPLER FREE WALL SYSTOLIC VELOCITY 1 (APICAL 4 CHAMBER VIEW): 11.44 CM/S
SINUS: 2.62 CM
STJ: 2.11 CM
SYSTOLIC BLOOD PRESSURE: 147 MMHG
TDI LATERAL: 0.1 M/S
TDI SEPTAL: 0.09 M/S
TDI: 0.1 M/S
TR MAX PG: 33 MMHG
TRICUSPID ANNULAR PLANE SYSTOLIC EXCURSION: 2.17 CM
TV REST PULMONARY ARTERY PRESSURE: 36 MMHG
Z-SCORE OF LEFT VENTRICULAR DIMENSION IN END DIASTOLE: -5.1
Z-SCORE OF LEFT VENTRICULAR DIMENSION IN END SYSTOLE: -4.38

## 2024-12-23 PROCEDURE — 63600175 PHARM REV CODE 636 W HCPCS: Mod: HCNC | Performed by: INTERNAL MEDICINE

## 2024-12-23 PROCEDURE — 78452 HT MUSCLE IMAGE SPECT MULT: CPT | Mod: 26,HCNC,, | Performed by: INTERNAL MEDICINE

## 2024-12-23 PROCEDURE — 78452 HT MUSCLE IMAGE SPECT MULT: CPT | Mod: HCNC

## 2024-12-23 PROCEDURE — 93306 TTE W/DOPPLER COMPLETE: CPT | Mod: HCNC

## 2024-12-23 PROCEDURE — 93017 CV STRESS TEST TRACING ONLY: CPT | Mod: HCNC

## 2024-12-23 PROCEDURE — 93018 CV STRESS TEST I&R ONLY: CPT | Mod: HCNC,,, | Performed by: INTERNAL MEDICINE

## 2024-12-23 PROCEDURE — A9502 TC99M TETROFOSMIN: HCPCS | Mod: HCNC | Performed by: INTERNAL MEDICINE

## 2024-12-23 PROCEDURE — 93306 TTE W/DOPPLER COMPLETE: CPT | Mod: 26,HCNC,, | Performed by: INTERNAL MEDICINE

## 2024-12-23 PROCEDURE — 93016 CV STRESS TEST SUPVJ ONLY: CPT | Mod: HCNC,,, | Performed by: INTERNAL MEDICINE

## 2024-12-23 RX ORDER — REGADENOSON 0.08 MG/ML
0.4 INJECTION, SOLUTION INTRAVENOUS ONCE
Status: COMPLETED | OUTPATIENT
Start: 2024-12-23 | End: 2024-12-23

## 2024-12-23 RX ADMIN — TETROFOSMIN 30.6 MILLICURIE: 1.38 INJECTION, POWDER, LYOPHILIZED, FOR SOLUTION INTRAVENOUS at 08:12

## 2024-12-23 RX ADMIN — REGADENOSON 0.4 MG: 0.08 INJECTION, SOLUTION INTRAVENOUS at 08:12

## 2024-12-23 RX ADMIN — TETROFOSMIN 10.7 MILLICURIE: 1.38 INJECTION, POWDER, LYOPHILIZED, FOR SOLUTION INTRAVENOUS at 07:12

## 2024-12-28 DIAGNOSIS — L29.9 PRURITIC CONDITION: ICD-10-CM

## 2024-12-30 NOTE — TELEPHONE ENCOUNTER
Care Due:                  Date            Visit Type   Department     Provider  --------------------------------------------------------------------------------                                             [facility]                                           MED/ INTERNAL  Last Visit: 10-      PRE-OP       MED/ PEDS      Zack DE LOS SANTOS -         [facility]                              PRIMARY      MED/ INTERNAL  Next Visit: 01-      CARE ([facility])   MED/ PEDS      Alena Hernandez                                                            Last  Test          Frequency    Reason                     Performed    Due Date  --------------------------------------------------------------------------------    Vitamin D...  12 months..  ergocalciferol...........  Not Found    Overdue    [employer] Embedded Care Due Messages. Reference number: [id number].   12/30/2024 10:35:04 AM CST

## 2024-12-31 RX ORDER — ERGOCALCIFEROL 1.25 MG/1
CAPSULE ORAL
Qty: 12 CAPSULE | Refills: 0 | Status: SHIPPED | OUTPATIENT
Start: 2024-12-31

## 2024-12-31 RX ORDER — HYDROXYZINE HYDROCHLORIDE 25 MG/1
25 TABLET, FILM COATED ORAL
Qty: 90 TABLET | Refills: 0 | Status: SHIPPED | OUTPATIENT
Start: 2024-12-31

## 2024-12-31 NOTE — TELEPHONE ENCOUNTER
Refill Routing Note   Medication(s) are not appropriate for processing by [facility] for the following reason(s):        Outside of protocol    [facility] action(s):  Route             Appointments  past 12m or future 3m with PCP    Date Provider   Last Visit   9/19/2023 Alena Hernandez MD   Next Visit   12/30/2024 Alena Hernandez MD   ED visits in past 90 days: 0        Note composed:10:59 AM 12/31/2024

## 2025-01-05 DIAGNOSIS — E11.40 TYPE 2 DIABETES MELLITUS WITH DIABETIC NEUROPATHY, WITHOUT LONG-TERM CURRENT USE OF INSULIN: ICD-10-CM

## 2025-01-05 NOTE — TELEPHONE ENCOUNTER
No care due was identified.  [facility] Embedded Care Due Messages. Reference number: [id number].   1/05/2025 8:04:34 AM CST

## 2025-01-07 ENCOUNTER — OFFICE VISIT (OUTPATIENT)
Dept: CARDIOLOGY | Facility: CLINIC | Age: 75
End: 2025-01-07
Payer: MEDICARE

## 2025-01-07 VITALS
HEART RATE: 73 BPM | WEIGHT: 202.63 LBS | SYSTOLIC BLOOD PRESSURE: 164 MMHG | BODY MASS INDEX: 34.59 KG/M2 | OXYGEN SATURATION: 96 % | DIASTOLIC BLOOD PRESSURE: 95 MMHG | HEIGHT: 64 IN | RESPIRATION RATE: 15 BRPM

## 2025-01-07 DIAGNOSIS — E78.5 HYPERLIPIDEMIA, UNSPECIFIED HYPERLIPIDEMIA TYPE: ICD-10-CM

## 2025-01-07 DIAGNOSIS — I10 BENIGN HYPERTENSION: Primary | ICD-10-CM

## 2025-01-07 DIAGNOSIS — E66.01 SEVERE OBESITY (BMI 35.0-39.9) WITH COMORBIDITY: ICD-10-CM

## 2025-01-07 PROCEDURE — 99999 PR PBB SHADOW E&M-EST. PATIENT-LVL V: CPT | Mod: PBBFAC,HCNC,, | Performed by: INTERNAL MEDICINE

## 2025-01-07 RX ORDER — HYDRALAZINE HYDROCHLORIDE 100 MG/1
100 TABLET, FILM COATED ORAL EVERY 8 HOURS
Qty: 90 TABLET | Refills: 11 | Status: SHIPPED | OUTPATIENT
Start: 2025-01-07 | End: 2026-01-07

## 2025-01-07 RX ORDER — EMPAGLIFLOZIN 25 MG/1
25 TABLET, FILM COATED ORAL
Qty: 30 TABLET | Refills: 0 | Status: SHIPPED | OUTPATIENT
Start: 2025-01-07

## 2025-01-07 NOTE — PROGRESS NOTES
CARDIOVASCULAR CONSULTATION    REASON FOR CONSULT:   Valarie Foster is a 74 y.o. female who presents for evaluation of chest pressure     HISTORY OF PRESENT ILLNESS:      Patient is a pleasant 72-year-old female.  Here for evaluation of chest pressure.  Substernal.  On exertion.  Denies orthopnea, PND, swelling of feet.  Multiple risk factors for coronary artery disease    The estimated ejection fraction is 55%.  The left ventricle is normal in size with concentric hypertrophy and normal systolic function.  Grade I left ventricular diastolic dysfunction.  Normal right ventricular size with normal right ventricular systolic function.  Moderate left atrial enlargement.  Mild mitral regurgitation.  Mild right atrial enlargement.  Normal central venous pressure (3 mmHg).  The estimated PA systolic pressure is 27 mmHg.     11/6:  no more chest pains. Went away on their own    Nov 23:    Normal myocardial perfusion scan. There is no evidence of myocardial ischemia or infarction.    There is a mild intensity perfusion abnormality in the anterior wall of the left ventricle, secondary to breast attenuation.    The gated perfusion images showed an ejection fraction of 59% post stress.    The ECG portion of the study is negative for ischemia.    The patient reported no chest pain during the stress test.    There were no arrhythmias during stress.    NOVEMBER 24:    History of Present Illness    CHIEF COMPLAINT:  Valarie presents today with heartburn and chest pain.    CARDIOVASCULAR:  She reports experiencing heartburn for approximately 4 days with associated chest pain. The chest pain started two days ago and is described as intermittent, coming and going without a clear pattern. She denies exacerbation of the pain with physical activity, such as walking or climbing stairs. No specific triggers or alleviating factors have been identified. Home blood pressure readings show yesterday's measurement at 150/70 and today's at 180.  She checks her blood pressure once in the morning and confirms taking her blood pressure medication this morning.    MEDICATIONS:  Current medications include Amlodipine 10 mg daily, Atorvastatin, and Metoprolol.       EKG DONE TODAY PERSONALLY REVIEWED AND SHOWS SINUS RHYTHM WITH HEART RATE OF 64 BEATS PER MINUTE, MILD LVH.      Jan 25:    History of Present Illness    CHIEF COMPLAINT:  Valarie presents today for knee pain and blood pressure management.    HYPERTENSION:  She reports severely elevated blood pressure reaching almost 200 on Friday. She maintains a blood pressure diary at home. She continues amlodipine 10 mg, aspirin, and atorvastatin.    KNEE PAIN:  She experiences intermittent sharp shooting knee pain lasting for a few seconds. The pain occurs randomly and is not associated with physical exertion or ambulation.    WEIGHT MANAGEMENT:  She reports difficulty with abdominal weight loss despite exercise.             PAST MEDICAL HISTORY:     Past Medical History:   Diagnosis Date    Arthritis     Colon polyp     Diabetes mellitus     diet controlled    Diabetes with neurologic complications     Hypertension     Nuclear sclerosis of both eyes 2/14/2022    Renal cell carcinoma        PAST SURGICAL HISTORY:     Past Surgical History:   Procedure Laterality Date    COLONOSCOPY N/A 2/5/2018    Procedure: COLONOSCOPY;  Surgeon: Bacilio Mancia MD;  Location: [facility];  Service: Endoscopy;  Laterality: N/A;  appt confirmed-ss    COLONOSCOPY N/A 8/13/2020    Procedure: COLONOSCOPY;  Surgeon: Jose West MD;  Location: [facility];  Service: Endoscopy;  Laterality: N/A;    ENDOSCOPIC CARPAL TUNNEL RELEASE Right 3/15/2024    Procedure: RELEASE, CARPAL TUNNEL, ENDOSCOPIC - RIGHT;  Surgeon: Tonio Mcdermott MD;  Location: [facility];  Service: Orthopedics;  Laterality: Right;    ENDOSCOPIC CARPAL TUNNEL RELEASE Left 6/26/2024    Procedure: RELEASE, CARPAL TUNNEL, ENDOSCOPIC - left;  Surgeon: Tonio Mcdermott MD;   Location: [facility] OR;  Service: Orthopedics;  Laterality: Left;    HYSTERECTOMY      INTRAOCULAR PROSTHESES INSERTION Left 7/7/2022    Procedure: INSERTION, IOL PROSTHESIS;  Surgeon: Candelario Novoa MD;  Location: [facility] OR;  Service: Ophthalmology;  Laterality: Left;    INTRAOCULAR PROSTHESES INSERTION Right 7/21/2022    Procedure: INSERTION, IOL PROSTHESIS;  Surgeon: Candelario Novoa MD;  Location: [facility] OR;  Service: Ophthalmology;  Laterality: Right;    OOPHORECTOMY      PARTIAL NEPHRECTOMY Right 10/12/2018    Procedure: NEPHRECTOMY, PARTIAL open. Dr Arenas to assist.;  Surgeon: Alejandra Palm MD;  Location: [facility] OR;  Service: Urology;  Laterality: Right;  RN PREOP 10/9/2018----NEEDS H/P    PHACOEMULSIFICATION OF CATARACT Left 7/7/2022    Procedure: PHACOEMULSIFICATION, CATARACT;  Surgeon: Candelario Novoa MD;  Location: [facility] OR;  Service: Ophthalmology;  Laterality: Left;  RN PHONE PREOP 6/27/2022   ARRIVAL 6:00 AM    PHACOEMULSIFICATION OF CATARACT Right 7/21/2022    Procedure: PHACOEMULSIFICATION, CATARACT;  Surgeon: Candelario Novoa MD;  Location: [facility] OR;  Service: Ophthalmology;  Laterality: Right;  RN PHONE PREOP 7/12/2022   ARRIVAL 12:00 NOON    TOTAL KNEE ARTHROPLASTY Right 11/8/2023    Procedure: ARTHROPLASTY, KNEE, TOTAL. NELSON;  Surgeon: Eric Carbajal MD;  Location: [facility] OR;  Service: Orthopedics;  Laterality: Right;  [place]. Admit  BRADLEY NIEVES [phone] NOTIFIED QUINTIN ON 10/11/2023-  RN PREOP 10/25/2023   T/S ON 11/6/2023--done------CLEARED BY CARDS       ALLERGIES AND MEDICATION:     Review of patient's allergies indicates:   Allergen Reactions    Lisinopril Swelling and Shortness Of Breath    Ascorbic acid-ascorbate calc      And citrus fruits        Medication List            Accurate as of January 7, 2025 10:00 AM. If you have any questions, ask your nurse or doctor.                CHANGE how you take these medications      hydrALAZINE 100 MG  tablet  Commonly known as: APRESOLINE  Take 1 tablet (100 mg total) by mouth every 8 (eight) hours.  What changed:   medication strength  how much to take  when to take this  Changed by: Mary Kate Dolan MD            CONTINUE taking these medications      ACCU-CHEK ONEIL CONTROL SOLN Soln  Generic drug: blood glucose control high,low  1 Units by Misc.(Non-Drug; Combo Route) route as needed.     ACCU-CHEK ONEIL PLUS TEST STRP Strp  Generic drug: blood sugar diagnostic  TEST BLOOD SUGAR TWICE DAILY     * ACCU-CHEK SOFTCLIX LANCETS Misc  Generic drug: lancets  USE TO TEST BLOOD SUGAR ONE TIME DAILY     * TRUEPLUS LANCETS 33 gauge Misc  Generic drug: lancets  TEST BLOOD SUGAR TWICE DAILY     acetaminophen 500 MG tablet  Commonly known as: TYLENOL  Take 2 tablets (1,000 mg total) by mouth 2 (two) times a day.     amLODIPine 10 MG tablet  Commonly known as: NORVASC  Take 1 tablet (10 mg total) by mouth once daily.     aspirin 81 MG EC tablet  Commonly known as: ECOTRIN  Take 1 tablet (81 mg total) by mouth 2 (two) times a day.     atorvastatin 80 MG tablet  Commonly known as: LIPITOR  Take 1 tablet (80 mg total) by mouth every evening.     azelastine 137 mcg (0.1 %) nasal spray  Commonly known as: ASTELIN  2 sprays (274 mcg total) by Nasal route 2 (two) times daily.     * blood-glucose meter kit  Commonly known as: TRUE METRIX GLUCOSE METER  Use as directed to test glucose     * blood-glucose meter Misc  Commonly known as: ACCU-CHEK ONEIL PLUS METER  1 kit by Misc.(Non-Drug; Combo Route) route once daily.     cetirizine 10 MG tablet  Commonly known as: ZYRTEC  Take 1 tablet (10 mg total) by mouth once daily.     ciclopirox 8 % Soln  Commonly known as: PENLAC  Apply topically nightly.     diphenhydrAMINE 25 mg capsule  Commonly known as: BENADRYL  Take 1 capsule (25 mg total) by mouth every 6 (six) hours as needed for Itching.     docusate sodium 100 MG capsule  Commonly known as: COLACE  Take 1 capsule (100 mg total) by  mouth 2 (two) times daily.     DROPSAFE ALCOHOL PREP PADS Padm  Generic drug: alcohol swabs  USE AS DIRECTED THREE TIMES DAILY     ergocalciferol 50,000 unit Cap  Commonly known as: ERGOCALCIFEROL  TAKE 1 CAPSULE EVERY 7 DAYS.     FLUoxetine 40 MG capsule  Take 1 capsule (40 mg total) by mouth once daily.     fluticasone propionate 50 mcg/actuation nasal spray  Commonly known as: FLONASE  USE 1 SPRAY IN EACH NOSTRIL ONCE DAILY     gabapentin 300 MG capsule  Commonly known as: NEURONTIN  Take 1 capsule (300 mg total) by mouth 2 (two) times daily.     hydrOXYzine HCL 25 MG tablet  Commonly known as: ATARAX  TAKE 1 TABLET BY MOUTH THREE TIMES DAILY AS NEEDED FOR ITCHING     ibuprofen 800 MG tablet  Commonly known as: ADVIL,MOTRIN  Take 1 tablet (800 mg total) by mouth every 8 (eight) hours as needed for Pain.     JARDIANCE 25 mg tablet  Generic drug: empagliflozin  Take 1 tablet by mouth once daily     LIDOcaine 5 %  Commonly known as: LIDODERM  Place 1 patch onto the skin once daily. Remove & Discard patch within 12 hours or as directed by MD     metoprolol succinate 25 MG 24 hr tablet  Commonly known as: TOPROL-XL  Take 1 tablet (25 mg total) by mouth once daily.     ondansetron 8 MG Tbdl  Commonly known as: ZOFRAN-ODT  Take 1 tablet (8 mg total) by mouth 2 (two) times daily as needed (nausea).     pantoprazole 40 MG tablet  Commonly known as: PROTONIX  Take 1 tablet (40 mg total) by mouth once daily.     pregabalin 50 MG capsule  Commonly known as: LYRICA  Take 1 capsule (50 mg total) by mouth 2 (two) times daily.     * traMADoL 50 mg tablet  Commonly known as: ULTRAM  Take 1 tablet (50 mg total) by mouth every 6 (six) hours.     * traMADoL 50 mg tablet  Commonly known as: ULTRAM  Take 1 tablet (50 mg total) by mouth every 6 (six) hours.     TRUE METRIX LEVEL 1 Soln  Generic drug: blood glucose control, low  1 each by Misc.(Non-Drug; Combo Route) route once as needed.           * This list has 6 medication(s) that  are the same as other medications prescribed for you. Read the directions carefully, and ask your doctor or other care provider to review them with you.                   Where to Get Your Medications        These medications were sent to [id number] - [place] - [address]  [address] [place] [address]      Phone: [phone]   hydrALAZINE 100 MG tablet         SOCIAL HISTORY:     Social History     Socioeconomic History    Marital status:    Tobacco Use    Smoking status: Never     Passive exposure: Past    Smokeless tobacco: Never   Substance and Sexual Activity    Alcohol use: No    Drug use: No    Sexual activity: Not Currently     Social Drivers of Health     Financial Resource Strain: Low Risk  (11/22/2024)    Overall Financial Resource Strain (CARDIA)     Difficulty of Paying Living Expenses: Not very hard   Food Insecurity: No Food Insecurity (11/22/2024)    Hunger Vital Sign     Worried About Running Out of Food in the Last Year: Never true     Ran Out of Food in the Last Year: Never true   Transportation Needs: No Transportation Needs (11/22/2024)    TRANSPORTATION NEEDS     Transportation : No   Physical Activity: Insufficiently Active (11/25/2024)    Exercise Vital Sign     Days of Exercise per Week: 4 days     Minutes of Exercise per Session: 20 min   Stress: No Stress Concern Present (11/25/2024)    [employer] - Occupational Stress Questionnaire     Feeling of Stress : Only a little   Housing Stability: Low Risk  (11/25/2024)    Housing Stability Vital Sign     Unable to Pay for Housing in the Last Year: No     Homeless in the Last Year: No       FAMILY HISTORY:     Family History   Problem Relation Name Age of Onset    No Known Problems Mother      Glaucoma Father      Breast cancer Sister Warnell     Glaucoma Sister Warnell     Cancer Sister Warnell         breast cancer    Thyroid disease Sister [place]     Blindness Sister Bryanna       "   due to trauma during car accident    Diabetes Sister Amanda     No Known Problems Maternal Aunt      No Known Problems Maternal Uncle      No Known Problems Paternal Aunt      No Known Problems Paternal Uncle      No Known Problems Maternal Grandmother      No Known Problems Maternal Grandfather      No Known Problems Paternal Grandmother      No Known Problems Paternal Grandfather      Diabetes Brother Anvneet     Amblyopia Neg Hx      Cataracts Neg Hx      Hypertension Neg Hx      Macular degeneration Neg Hx      Retinal detachment Neg Hx      Strabismus Neg Hx      Stroke Neg Hx         REVIEW OF SYSTEMS:   Review of Systems   Constitutional: Negative.   HENT: Negative.     Eyes: Negative.    Respiratory: Negative.     Endocrine: Negative.    Hematologic/Lymphatic: Negative.    Skin: Negative.    Musculoskeletal: Negative.    Gastrointestinal: Negative.    Genitourinary: Negative.    Neurological: Negative.    Psychiatric/Behavioral: Negative.     Allergic/Immunologic: Negative.        A 10 point review of systems was performed and all the pertinent positives have been mentioned. Rest of review of systems was negative.        PHYSICAL EXAM:     Vitals:    01/07/25 0927   BP: (!) 164/95   Pulse: 73   Resp: 15    Body mass index is 34.78 kg/m².  Weight: 91.9 kg (202 lb 9.6 oz)   Height: 5' 4" (162.6 cm)     Physical Exam  Constitutional:       Appearance: Normal appearance. She is well-developed.   HENT:      Head: Normocephalic.   Eyes:      Pupils: Pupils are equal, round, and reactive to light.   Cardiovascular:      Rate and Rhythm: Normal rate and regular rhythm.   Pulmonary:      Effort: Pulmonary effort is normal.      Breath sounds: Normal breath sounds.   Abdominal:      General: Bowel sounds are normal.      Palpations: Abdomen is soft.      Tenderness: There is no abdominal tenderness.   Musculoskeletal:         General: Normal range of motion.      Cervical back: Normal range of motion and neck " supple.   Skin:     General: Skin is warm.   Neurological:      Mental Status: She is alert and oriented to person, place, and time.           DATA:     Laboratory:  CBC:  Recent Labs   Lab 04/29/23  1032 10/25/23  0900 10/28/24  0706   WBC 5.42 5.18 5.76   Hemoglobin 12.8 13.0 13.5   Hematocrit 41.1 41.3 42.6   Platelets 271 231 252       CHEMISTRIES:  Recent Labs   Lab 03/17/22  0718 05/02/22  0711 10/31/22  0758 04/29/23  1032 05/31/23  1455 10/25/23  0900 10/28/24  0706   Glucose 157 H 130 H   < > 123 H  --  121 H 138 H   Sodium 138 142   < > 137  --  139 139   Potassium 4.7 4.2   < > 4.4  --  4.3 4.0   BUN 15 12   < > 11  --  15 13   Creatinine 0.8 0.8   < > 0.8 0.9 0.8 0.8   eGFR if African American >60 >60.0  --   --   --   --   --    eGFR if non African American >60 >60.0  --   --   --   --   --    Calcium 9.1 9.5   < > 9.9  --  9.5 9.5    < > = values in this interval not displayed.       CARDIAC BIOMARKERS:  Recent Labs   Lab 01/11/23 2237 01/12/23  0421   Troponin I <0.006 <0.006       COAGS:  Recent Labs   Lab 01/11/23  1740 10/25/23  0900   INR 1.1 1.1       LIPIDS/LFTS:  Recent Labs   Lab 10/31/22  0758 01/12/23  0957 04/29/23  1032 10/25/23  0900 10/28/24  0706   Cholesterol 225 H  --   --  155 225 H   Triglycerides 77  --   --  78 103   HDL 74  --   --  57 60   LDL Cholesterol 135.6  --   --  82.4 144.4   Non-HDL Cholesterol 151  --   --  98 165   AST 14   < > 14 14 15   ALT 11   < > 9 L 13 12    < > = values in this interval not displayed.       Hemoglobin A1C   Date Value Ref Range Status   10/28/2024 7.5 (H) 4.0 - 5.6 % Final     Comment:     ADA Screening Guidelines:  5.7-6.4%  Consistent with prediabetes  >or=6.5%  Consistent with diabetes    High levels of fetal hemoglobin interfere with the HbA1C  assay. Heterozygous hemoglobin variants (HbS, HgC, etc)do  not significantly interfere with this assay.   However, presence of multiple variants may affect accuracy.     07/29/2024 8.2 (H) 4.0 - 5.6  "% Final     Comment:     ADA Screening Guidelines:  5.7-6.4%  Consistent with prediabetes  >or=6.5%  Consistent with diabetes    High levels of fetal hemoglobin interfere with the HbA1C  assay. Heterozygous hemoglobin variants (HbS, HgC, etc)do  not significantly interfere with this assay.   However, presence of multiple variants may affect accuracy.     10/25/2023 7.4 (H) 4.0 - 5.6 % Final     Comment:     ADA Screening Guidelines:  5.7-6.4%  Consistent with prediabetes  >or=6.5%  Consistent with diabetes    High levels of fetal hemoglobin interfere with the HbA1C  assay. Heterozygous hemoglobin variants (HbS, HgC, etc)do  not significantly interfere with this assay.   However, presence of multiple variants may affect accuracy.         TSH  Recent Labs   Lab 10/31/22  0758 10/25/23  0900 10/28/24  0706   TSH 0.999 0.552 1.984       The 10-year ASCVD risk score (Ayush JASON, et al., 2019) is: 50.1%    Values used to calculate the score:      Age: 74 years      Sex: Female      Is Non- : Yes      Diabetic: Yes      Tobacco smoker: No      Systolic Blood Pressure: 164 mmHg      Is BP treated: Yes      HDL Cholesterol: 60 mg/dL      Total Cholesterol: 225 mg/dL       BNP    No results found for: "BNP"          ASSESSMENT AND PLAN     Patient Active Problem List   Diagnosis    Type 2 diabetes mellitus with diabetic neuropathy, without long-term current use of insulin    Benign hypertension    Hyperlipemia    Microhematuria    Left flank pain    Abdominal aortic atherosclerosis    History of renal cell cancer    Renal cell carcinoma of right kidney    Mild major depression    Bilateral chronic knee pain    Colon cancer screening    Pain of left calf    Refractive error    Primary osteoarthritis of knees, bilateral    Fall    Radiculopathy    Idiopathic progressive neuropathy    Nerve pain    Chest pain    Severe obesity (BMI 35.0-39.9) with comorbidity    Decreased ROM of right knee    Decreased " strength involving knee joint    Gait difficulty    Paresthesia    Pseudophakia    COVID-19       Assessment & Plan      Assessment & Plan    IMPRESSION:  Chest pain is likely not cardiac in origin based on characteristics and normal stress test, echo, and EKG results  Blood pressure is significantly elevated, requiring medication adjustment  Cleared patient for potential weight loss medication from cardiac perspective    PLAN SUMMARY:  - Increase hydralazine to 200 mg 3 times daily  - Continue amlodipine 10 mg daily, aspirin, and atorvastatin  - [place] to enroll in digital HTN program through patient olivia  - Follow-up in 3 months    Uncontrolled HYPERTENSION:  - Discussed digital HTN program offered by [facility], including free blood pressure machine and nurse monitoring.  - [place] to sign up for digital HTN program through patient ol[facility].  - Valarie to keep blood pressure diary at home.  - Increased hydralazine to 200 mg 3 times daily.  - Continued amlodipine 10 mg daily, aspirin, and atorvastatin.    Dyslipidemia  Continue Lipitor 80 mg daily    Lab Results   Component Value Date    LDLCALC 144.4 10/28/2024     Abdominal aortic atherosclerosis: On statin    Chest pain:  - st negative  - non cardiac cp      FOLLOW-UP:  - Follow up in 3 months.           Thank you very much for involving me in the care of your patient.  Please do not hesitate to contact me if there are any questions.      Mary Kate Dolan MD, FAC, [facility]  Interventional Cardiologist, [facility].     Visit today included increased complexity associated with the care of the episodic problem chest pain, dyslipidemia, aortic atherosclerosis, hypertension addressed and managing the longitudinal care of the patient due to the serious and/or complex managed problem(s)   Patient Active Problem List   Diagnosis    Type 2 diabetes mellitus with diabetic neuropathy, without long-term current use of insulin    Benign hypertension    Hyperlipemia    Microhematuria     Left flank pain    Abdominal aortic atherosclerosis    History of renal cell cancer    Renal cell carcinoma of right kidney    Mild major depression    Bilateral chronic knee pain    Colon cancer screening    Pain of left calf    Refractive error    Primary osteoarthritis of knees, bilateral    Fall    Radiculopathy    Idiopathic progressive neuropathy    Nerve pain    Chest pain    Severe obesity (BMI 35.0-39.9) with comorbidity    Decreased ROM of right knee    Decreased strength involving knee joint    Gait difficulty    Paresthesia    Pseudophakia    COVID-19     .        This note was generated with the assistance of ambient listening technology. Verbal consent was obtained by the patient and accompanying visitor(s) for the recording of patient appointment to facilitate this note. I attest to having reviewed and edited the generated note for accuracy, though some syntax or spelling errors may persist. Please contact the author of this note for any clarification.      This note was dictated with the help of speech recognition software.  There might be un-intended errors and/or substitutions.

## 2025-01-08 ENCOUNTER — OFFICE VISIT (OUTPATIENT)
Dept: FAMILY MEDICINE | Facility: CLINIC | Age: 75
End: 2025-01-08
Payer: MEDICARE

## 2025-01-08 VITALS
DIASTOLIC BLOOD PRESSURE: 88 MMHG | SYSTOLIC BLOOD PRESSURE: 156 MMHG | HEART RATE: 87 BPM | WEIGHT: 206.13 LBS | HEIGHT: 64 IN | OXYGEN SATURATION: 96 % | TEMPERATURE: 98 F | BODY MASS INDEX: 35.19 KG/M2

## 2025-01-08 DIAGNOSIS — E66.811 CLASS 1 OBESITY DUE TO EXCESS CALORIES WITH SERIOUS COMORBIDITY AND BODY MASS INDEX (BMI) OF 34.0 TO 34.9 IN ADULT: ICD-10-CM

## 2025-01-08 DIAGNOSIS — E11.40 TYPE 2 DIABETES MELLITUS WITH DIABETIC NEUROPATHY, WITHOUT LONG-TERM CURRENT USE OF INSULIN: Primary | Chronic | ICD-10-CM

## 2025-01-08 DIAGNOSIS — E66.09 CLASS 1 OBESITY DUE TO EXCESS CALORIES WITH SERIOUS COMORBIDITY AND BODY MASS INDEX (BMI) OF 34.0 TO 34.9 IN ADULT: ICD-10-CM

## 2025-01-08 DIAGNOSIS — E66.01 SEVERE OBESITY (BMI 35.0-39.9) WITH COMORBIDITY: ICD-10-CM

## 2025-01-08 PROCEDURE — 99999 PR PBB SHADOW E&M-EST. PATIENT-LVL V: CPT | Mod: PBBFAC,HCNC,, | Performed by: INTERNAL MEDICINE

## 2025-01-08 RX ORDER — SEMAGLUTIDE 0.68 MG/ML
0.5 INJECTION, SOLUTION SUBCUTANEOUS
Qty: 3 ML | Refills: 0 | Status: SHIPPED | OUTPATIENT
Start: 2025-02-03

## 2025-01-08 NOTE — PROGRESS NOTES
HISTORY OF PRESENT ILLNESS:  Valarie Foster is a 74 y.o. female who presents to the clinic today for Bariatrics (New patient for weight loss)  .     History of Present Illness    Valarie presents today for weight management and diabetes follow-up.    Her current weight is 206 lbs, which is her highest adult weight, compared to her lowest adult weight of 140 lbs in the past 10 years. She reports feeling stressed about her abdomen weight. She has no history of weight loss surgeries or medications. She is highly motivated to modify her diet and exercise habits.    She currently takes Jardiance 25 mg daily for diabetes management, having previously been on Metformin. Her Hemoglobin A1C shows improvement from 8.2% in July to 7.5% in October, though still elevated.    She has a history of hypertension, GERD, and renal cancer requiring partial nephrectomy.    She experiences depression and takes fluoxetine for management.    She sleeps approximately 3.5 to 4 hours per night, with sleep disruption.    LDL cholesterol is elevated at 144. Liver function, kidney function, complete blood count, and thyroid function are normal. Colonoscopy in 2020 was normal.      Co-morbidities associated with obesity   Essential Hypertension  GERD  Type 2 Diabetes Mellitus       The 10-year ASCVD risk score (Ayush JASON, et al., 2019) is: 48.3%    Values used to calculate the score:      Age: 74 years      Sex: Female      Is Non- : Yes      Diabetic: Yes      Tobacco smoker: No      Systolic Blood Pressure: 158 mmHg      Is BP treated: Yes      HDL Cholesterol: 60 mg/dL      Total Cholesterol: 225 mg/dL      Lowest adult weight: 140 lb  Highest adult weight: 206 lb    For women:  Hysterectomy and postmenopausal    History of Weight Loss Efforts  Surgery or Procedures: no  Medications: no      Kidney stones: no  Glaucoma: no  H/o eating disorder: no  Heart disease or arrhythmia: no  Anxiety or depression:  yes on  "fluoxetine  Personal or family h/o MEN2 or thyroid cancer: no  H/o pancreatitis: no  Current or past use of narcotics: no    Current Physical Activity  Walks every day - 30 minutes.     Current Eating Habits  Breakfast - Wheat bread with one egg white.  Coffee sometimes - sweet n low.  Lunch - salad: boiled egg white, ham diced, low calorie dressing, iceburg lettuce.  Dinner - packet of grits or oatmeal with piece of bread  Snacks - ritz crackers, trina crackers.     Beverages - juice or vitamin water    Weekend eating routine - same    Number of dining out occasions per week - none    Who does the cooking in the Household? self     Alcohol: no     Tobacco: no     Sleep: not well because she report she is stressed out about her weight. 4 hours.    GERALD: not dx'd     Willingness to change (1-10): 10    Weight History  Wt Readings from Last 3 Encounters:   01/07/25 0927 91.9 kg (202 lb 9.6 oz)   12/23/24 0826 92.1 kg (203 lb)   12/02/24 1352 92.4 kg (203 lb 11.3 oz)        Estimated body mass index is 34.78 kg/m² as calculated from the following:    Height as of 1/7/25: 5' 4" (1.626 m).    Weight as of 1/7/25: 91.9 kg (202 lb 9.6 oz).  Patient weight not recorded     Hemoglobin A1C   Date Value Ref Range Status   10/28/2024 7.5 (H) 4.0 - 5.6 % Final     Comment:     ADA Screening Guidelines:  5.7-6.4%  Consistent with prediabetes  >or=6.5%  Consistent with diabetes    High levels of fetal hemoglobin interfere with the HbA1C  assay. Heterozygous hemoglobin variants (HbS, HgC, etc)do  not significantly interfere with this assay.   However, presence of multiple variants may affect accuracy.     07/29/2024 8.2 (H) 4.0 - 5.6 % Final     Comment:     ADA Screening Guidelines:  5.7-6.4%  Consistent with prediabetes  >or=6.5%  Consistent with diabetes    High levels of fetal hemoglobin interfere with the HbA1C  assay. Heterozygous hemoglobin variants (HbS, HgC, etc)do  not significantly interfere with this assay.   However, " presence of multiple variants may affect accuracy.     10/25/2023 7.4 (H) 4.0 - 5.6 % Final     Comment:     ADA Screening Guidelines:  5.7-6.4%  Consistent with prediabetes  >or=6.5%  Consistent with diabetes    High levels of fetal hemoglobin interfere with the HbA1C  assay. Heterozygous hemoglobin variants (HbS, HgC, etc)do  not significantly interfere with this assay.   However, presence of multiple variants may affect accuracy.         ROS:  General: -fever, -chills, -fatigue, -weight gain, -weight loss  Eyes: -vision changes, -redness, -discharge  ENT: -ear pain, -nasal congestion, -sore throat  Cardiovascular: -chest pain, -palpitations, -lower extremity edema  Respiratory: -cough, -shortness of breath  Gastrointestinal: -abdominal pain, -nausea, -vomiting, -diarrhea, -constipation, -blood in stool  Genitourinary: -dysuria, -hematuria, -frequency  Musculoskeletal: +joint pain, -muscle pain  Skin: -rash, -lesion  Neurological: -headache, -dizziness, -numbness, -tingling  Psychiatric: -anxiety, -depression, +sleep difficulty             PAST MEDICAL HISTORY:  Past Medical History:   Diagnosis Date    Arthritis     Colon polyp     Diabetes mellitus     diet controlled    Diabetes with neurologic complications     Hypertension     Nuclear sclerosis of both eyes 2/14/2022    Renal cell carcinoma        PAST SURGICAL HISTORY:  Past Surgical History:   Procedure Laterality Date    COLONOSCOPY N/A 2/5/2018    Procedure: COLONOSCOPY;  Surgeon: Bacilio Mancia MD;  Location: [facility];  Service: Endoscopy;  Laterality: N/A;  appt confirmed-ss    COLONOSCOPY N/A 8/13/2020    Procedure: COLONOSCOPY;  Surgeon: Jose West MD;  Location: [facility];  Service: Endoscopy;  Laterality: N/A;    ENDOSCOPIC CARPAL TUNNEL RELEASE Right 3/15/2024    Procedure: RELEASE, CARPAL TUNNEL, ENDOSCOPIC - RIGHT;  Surgeon: Tonio Mcdermott MD;  Location: [facility];  Service: Orthopedics;  Laterality: Right;    ENDOSCOPIC CARPAL TUNNEL  RELEASE Left 6/26/2024    Procedure: RELEASE, CARPAL TUNNEL, ENDOSCOPIC - left;  Surgeon: Tonio Mcdermott MD;  Location: [facility] OR;  Service: Orthopedics;  Laterality: Left;    HYSTERECTOMY      INTRAOCULAR PROSTHESES INSERTION Left 7/7/2022    Procedure: INSERTION, IOL PROSTHESIS;  Surgeon: Candelario Novoa MD;  Location: [facility] OR;  Service: Ophthalmology;  Laterality: Left;    INTRAOCULAR PROSTHESES INSERTION Right 7/21/2022    Procedure: INSERTION, IOL PROSTHESIS;  Surgeon: Candelario Novoa MD;  Location: [facility] OR;  Service: Ophthalmology;  Laterality: Right;    OOPHORECTOMY      PARTIAL NEPHRECTOMY Right 10/12/2018    Procedure: NEPHRECTOMY, PARTIAL open. Dr Arenas to assist.;  Surgeon: Alejandra Palm MD;  Location: [facility];  Service: Urology;  Laterality: Right;  RN PREOP 10/9/2018----NEEDS H/P    PHACOEMULSIFICATION OF CATARACT Left 7/7/2022    Procedure: PHACOEMULSIFICATION, CATARACT;  Surgeon: Candelario Novoa MD;  Location: [facility];  Service: Ophthalmology;  Laterality: Left;  RN PHONE PREOP 6/27/2022   ARRIVAL 6:00 AM    PHACOEMULSIFICATION OF CATARACT Right 7/21/2022    Procedure: PHACOEMULSIFICATION, CATARACT;  Surgeon: Candelario Novoa MD;  Location: [facility];  Service: Ophthalmology;  Laterality: Right;  RN PHONE PREOP 7/12/2022   ARRIVAL 12:00 NOON    TOTAL KNEE ARTHROPLASTY Right 11/8/2023    Procedure: ARTHROPLASTY, KNEE, TOTAL. NELSON;  Surgeon: Eric Carbajal MD;  Location: [facility];  Service: Orthopedics;  Laterality: Right;  [place]. Admit  BRADLEY NIEVES [phone] NOTIFIED QUINTIN ON 10/11/2023-LO  RN PREOP 10/25/2023   T/S ON 11/6/2023--done------CLEARED BY CARDS       SOCIAL HISTORY:  Social History     Socioeconomic History    Marital status:    Tobacco Use    Smoking status: Never     Passive exposure: Past    Smokeless tobacco: Never   Substance and Sexual Activity    Alcohol use: No    Drug use: No    Sexual activity: Not Currently     Social  Drivers of Health     Financial Resource Strain: Low Risk  (11/22/2024)    Overall Financial Resource Strain (CARDIA)     Difficulty of Paying Living Expenses: Not very hard   Food Insecurity: No Food Insecurity (11/22/2024)    Hunger Vital Sign     Worried About Running Out of Food in the Last Year: Never true     Ran Out of Food in the Last Year: Never true   Transportation Needs: No Transportation Needs (11/22/2024)    TRANSPORTATION NEEDS     Transportation : No   Physical Activity: Insufficiently Active (11/25/2024)    Exercise Vital Sign     Days of Exercise per Week: 4 days     Minutes of Exercise per Session: 20 min   Stress: No Stress Concern Present (11/25/2024)    [employer] - Occupational Stress Questionnaire     Feeling of Stress : Only a little   Housing Stability: Low Risk  (11/25/2024)    Housing Stability Vital Sign     Unable to Pay for Housing in the Last Year: No     Homeless in the Last Year: No       FAMILY HISTORY:  Family History   Problem Relation Name Age of Onset    No Known Problems Mother      Glaucoma Father      Breast cancer Sister Warnell     Glaucoma Sister Warnell     Cancer Sister Warnell         breast cancer    Thyroid disease Sister Angeles     Blindness Sister Bryanna         due to trauma during car accident    Diabetes Sister Amanda     No Known Problems Maternal Aunt      No Known Problems Maternal Uncle      No Known Problems Paternal Aunt      No Known Problems Paternal Uncle      No Known Problems Maternal Grandmother      No Known Problems Maternal Grandfather      No Known Problems Paternal Grandmother      No Known Problems Paternal Grandfather      Diabetes Brother Navneet     Amblyopia Neg Hx      Cataracts Neg Hx      Hypertension Neg Hx      Macular degeneration Neg Hx      Retinal detachment Neg Hx      Strabismus Neg Hx      Stroke Neg Hx         ALLERGIES AND MEDICATIONS: updated and reviewed.  Review of patient's allergies  indicates:   Allergen Reactions    Lisinopril Swelling and Shortness Of Breath    Ascorbic acid-ascorbate calc      And citrus fruits     Medication List with Changes/Refills   New Medications    SEMAGLUTIDE (OZEMPIC) 0.25 MG OR 0.5 MG (2 MG/3 ML) PEN INJECTOR    Inject 0.25 mg into the skin every 7 days.    SEMAGLUTIDE (OZEMPIC) 0.25 MG OR 0.5 MG (2 MG/3 ML) PEN INJECTOR    Inject 0.5 mg into the skin every 7 days.    SEMAGLUTIDE (OZEMPIC) 1 MG/DOSE (2 MG/1.5 ML) PNIJ    Inject 1 mg into the skin every 7 days.   Current Medications    ACCU-CHEK ONEIL PLUS TEST STRP STRP    TEST BLOOD SUGAR TWICE DAILY    ACCU-CHEK SOFTCLIX LANCETS MISC    USE TO TEST BLOOD SUGAR ONE TIME DAILY    ACETAMINOPHEN (TYLENOL) 500 MG TABLET    Take 2 tablets (1,000 mg total) by mouth 2 (two) times a day.    ALCOHOL SWABS (DROPSAFE ALCOHOL PREP PADS) PADM    USE AS DIRECTED THREE TIMES DAILY    AMLODIPINE (NORVASC) 10 MG TABLET    Take 1 tablet (10 mg total) by mouth once daily.    ASPIRIN (ECOTRIN) 81 MG EC TABLET    Take 1 tablet (81 mg total) by mouth 2 (two) times a day.    ATORVASTATIN (LIPITOR) 80 MG TABLET    Take 1 tablet (80 mg total) by mouth every evening.    AZELASTINE (ASTELIN) 137 MCG (0.1 %) NASAL SPRAY    2 sprays (274 mcg total) by Nasal route 2 (two) times daily.    BLOOD GLUCOSE CONTROL HIGH,LOW (ACCU-CHEK ONEIL CONTROL SOLN) SOLN    1 Units by Misc.(Non-Drug; Combo Route) route as needed.    BLOOD GLUCOSE CONTROL, LOW (TRUE METRIX LEVEL 1) SOLN    1 each by Misc.(Non-Drug; Combo Route) route once as needed.    BLOOD-GLUCOSE METER (ACCU-CHEK ONEIL PLUS METER) MISC    1 kit by Misc.(Non-Drug; Combo Route) route once daily.    BLOOD-GLUCOSE METER (TRUE METRIX GLUCOSE METER) KIT    Use as directed to test glucose    CETIRIZINE (ZYRTEC) 10 MG TABLET    Take 1 tablet (10 mg total) by mouth once daily.    CICLOPIROX (PENLAC) 8 % SOLN    Apply topically nightly.    DIPHENHYDRAMINE (BENADRYL) 25 MG CAPSULE    Take 1 capsule (25  mg total) by mouth every 6 (six) hours as needed for Itching.    DOCUSATE SODIUM (COLACE) 100 MG CAPSULE    Take 1 capsule (100 mg total) by mouth 2 (two) times daily.    ERGOCALCIFEROL (ERGOCALCIFEROL) 50,000 UNIT CAP    TAKE 1 CAPSULE EVERY 7 DAYS.    FLUOXETINE 40 MG CAPSULE    Take 1 capsule (40 mg total) by mouth once daily.    FLUTICASONE PROPIONATE (FLONASE) 50 MCG/ACTUATION NASAL SPRAY    USE 1 SPRAY IN EACH NOSTRIL ONCE DAILY    GABAPENTIN (NEURONTIN) 300 MG CAPSULE    Take 1 capsule (300 mg total) by mouth 2 (two) times daily.    HYDRALAZINE (APRESOLINE) 100 MG TABLET    Take 1 tablet (100 mg total) by mouth every 8 (eight) hours.    HYDROXYZINE HCL (ATARAX) 25 MG TABLET    TAKE 1 TABLET BY MOUTH THREE TIMES DAILY AS NEEDED FOR ITCHING    IBUPROFEN (ADVIL,MOTRIN) 800 MG TABLET    Take 1 tablet (800 mg total) by mouth every 8 (eight) hours as needed for Pain.    JARDIANCE 25 MG TABLET    Take 1 tablet by mouth once daily    LIDOCAINE (LIDODERM) 5 %    Place 1 patch onto the skin once daily. Remove & Discard patch within 12 hours or as directed by MD    METOPROLOL SUCCINATE (TOPROL-XL) 25 MG 24 HR TABLET    Take 1 tablet (25 mg total) by mouth once daily.    ONDANSETRON (ZOFRAN-ODT) 8 MG TBDL    Take 1 tablet (8 mg total) by mouth 2 (two) times daily as needed (nausea).    PANTOPRAZOLE (PROTONIX) 40 MG TABLET    Take 1 tablet (40 mg total) by mouth once daily.    PREGABALIN (LYRICA) 50 MG CAPSULE    Take 1 capsule (50 mg total) by mouth 2 (two) times daily.    TRAMADOL (ULTRAM) 50 MG TABLET    Take 1 tablet (50 mg total) by mouth every 6 (six) hours.    TRAMADOL (ULTRAM) 50 MG TABLET    Take 1 tablet (50 mg total) by mouth every 6 (six) hours.    TRUEPLUS LANCETS 33 GAUGE MISC    TEST BLOOD SUGAR TWICE DAILY          CARE TEAM:  Patient Care Team:  Alena Hernandez MD as PCP - General (Family Medicine)  Tabby Jacob as ED Navigator  Favorite, Aniyah MOSES RN as Outpatient          PHYSICAL EXAM:  "  Vitals:    01/08/25 1140   BP: (!) 156/88   Pulse:    Temp:      Weight: 93.5 kg (206 lb 2.1 oz)   Height: 5' 4" (162.6 cm)   Body mass index is 35.38 kg/m².    Physical Exam    Vitals: Weight: 206 lbs.  General: No acute distress. Well-developed. Well-nourished.  Eyes: EOMI. Sclerae anicteric.  HENT: Normocephalic. Atraumatic. Nares patent. Moist oral mucosa.  Ears: Bilateral TMs clear. Bilateral EACs clear.  Cardiovascular: Regular rate. Regular rhythm. No murmurs. No rubs. No gallops. Normal S1, S2.  Respiratory: Normal respiratory effort. Clear to auscultation bilaterally. No rales. No rhonchi. No wheezing.  Abdomen: Soft. Non-tender. Non-distended. Normoactive bowel sounds.  Musculoskeletal: No  obvious deformity.  Extremities: No lower extremity edema.  Neurological: Alert & oriented x3. No slurred speech. Normal gait.  Psychiatric: Normal mood. Normal affect. Good insight. Good judgment.  Skin: Warm. Dry. No rash.             ASSESSMENT AND PLAN:  Assessment & Plan    IMPRESSION:  - Patient with uncontrolled diabetes (A1C 7.5%), hypertension, and reflux seeking weight loss management  - Current regimen of Jardiance 25mg daily for diabetes insufficient for glycemic control  - Recommend addition of Ozempic (semaglutide) injection for improving glycemic control and facilitating weight loss  - Considered alternative GLP-1 agonists (Mounjaro, Trulicity) if insurance coverage issues arise with Ozempic  - Considered oral weight loss medications (e.g., Adipex-phentermine combination) as alternative if injectable options not feasible    Type 2 diabetes mellitus with diabetic neuropathy, without long-term current use of insulin  -     semaglutide (OZEMPIC) 0.25 mg or 0.5 mg (2 mg/3 mL) pen injector; Inject 0.25 mg into the skin every 7 days.  Dispense: 3 mL; Refill: 0  -     semaglutide (OZEMPIC) 0.25 mg or 0.5 mg (2 mg/3 mL) pen injector; Inject 0.5 mg into the skin every 7 days.  Dispense: 3 mL; Refill: 0  -     " semaglutide (OZEMPIC) 1 mg/dose (2 mg/1.5 mL) PnIj; Inject 1 mg into the skin every 7 days.  Dispense: 3 mL; Refill: 5  -     Ambulatory referral/consult to Diabetes Education; Future; Expected date: 01/15/2025  -     Hemoglobin A1C; Future; Expected date: 04/10/2025  -     Comprehensive Metabolic Panel; Future; Expected date: 04/10/2025    Class 1 obesity due to excess calories with serious comorbidity and body mass index (BMI) of 34.0 to 34.9 in adult  -     semaglutide (OZEMPIC) 0.25 mg or 0.5 mg (2 mg/3 mL) pen injector; Inject 0.25 mg into the skin every 7 days.  Dispense: 3 mL; Refill: 0  -     semaglutide (OZEMPIC) 0.25 mg or 0.5 mg (2 mg/3 mL) pen injector; Inject 0.5 mg into the skin every 7 days.  Dispense: 3 mL; Refill: 0  -     semaglutide (OZEMPIC) 1 mg/dose (2 mg/1.5 mL) PnIj; Inject 1 mg into the skin every 7 days.  Dispense: 3 mL; Refill: 5  -     Ambulatory referral/consult to Diabetes Education; Future; Expected date: 01/15/2025  -     Hemoglobin A1C; Future; Expected date: 04/10/2025  -     Comprehensive Metabolic Panel; Future; Expected date: 04/10/2025    Severe obesity (BMI 35.0-39.9) with comorbidity  -     Ambulatory referral/consult to Bariatric/Obesity Medicine    - Evaluated recent labs from October, showing elevated hemoglobin A1C at 7.5% and high LDL cholesterol at 144.  - Discussed the importance of nutrition in managing diabetes and weight.  - Recommend continuing Jardiance 25mg daily and adding Ozempic, a once-weekly injection medication.    - - Week 1-4: 0.25mg once weekly - Week 5-8: 0.5mg once weekly - Week 9 onwards: 1mg once weekly until next appointment  - Patient was counseled on potential side effects of medication.    - Monitored diagnosis of hypertension.  - Evaluated blood pressure during the visit, which was high and attributed to stress.  - Patient to return for nurse visit or upcoming PCP visit to re-assess BP with continue daily home monitoring and goal <135/80.    -  Assessed weight management strategies including nutrition, exercise, and potential medication options.  - Recommend a Mediterranean-style eating plan for diabetes management and weight loss, with increased intake of non starchy vegetables, portion-controlled fruits, quality lean proteins, and whole grains.  - Advised to continue daily 30-minute walks and increase physical activity with at-home resistance exercise routine (1-2 times/week).  - [place] to purchase 5-pound dumbbells for home exercises.  - Referred to diabetic educator for an updated nutrition plan.      - Follow up in 3 months.       This note was generated with the assistance of ambient listening technology. Verbal consent was obtained by the patient and accompanying visitor(s) for the recording of patient appointment to facilitate this note. I attest to having reviewed and edited the generated note for accuracy, though some syntax or spelling errors may persist. Please contact the author of this note for any clarification.

## 2025-01-08 NOTE — PATIENT INSTRUCTIONS
Start Ozempic once a week. Start with 0.25mg once a week x 4 weeks, then 0.5 mg week x 4 weeks, then 1 mg weekly after that..     Decrease portions as soon as you start Ozempic. Avoid fried, greasy, fatty foods.     Some nausea in the first 2 weeks is not unusual.     If you get pain across the upper abdomen and around to your back, please call the office.     Okay to take Miralax or Senokot-S as needed for constipation.      [email or web address] for coupon/videos.      Sample Mediterranean Meal Plan    A Mediterranean meal plan for weight loss focuses on nutrient-dense, whole foods that promote satiety while supporting metabolic health. It is rich in fruits, vegetables, whole grains, legumes, lean proteins, and healthy fats, particularly olive oil. The Mediterranean diet is known for its emphasis on heart health, reducing inflammation, and promoting sustainable weight loss. Here's a balanced, 7-day meal plan that follows these principles:    General Guidelines:  Portion Control: Focus on moderate portion sizes, especially for higher-calorie foods like nuts, oils, and grains.  Hydration: Drink plenty of water (aim for 8-10 cups daily) and limit sugary drinks.  Physical Activity: Incorporate regular physical activity, such as walking or light strength training, alongside the diet.  Limit Processed Foods: Avoid refined sugars, processed snacks, and red meat.    Day 1  Breakfast:  Greek yogurt with a handful of mixed berries, 1 tablespoon of rafa seeds, and a drizzle of honey.    Lunch:  Grilled chicken salad with mixed greens, tomatoes, cucumbers, red onions, [place] olives, and feta cheese. Drizzle with olive oil and lemon juice.    Snack:  A small handful of raw almonds.    Dinner:  Baked salmon with a side of roasted vegetables (zucchini, bell peppers, and eggplant), and quinoa.    Day 2  Breakfast:  Whole grain toast or Zheng toast with avocado, a poached egg, and a sprinkle of chili flakes.    Lunch:  Quinoa  and chickpea bowl with spinach, cherry tomatoes, cucumber, red bell pepper, and a lemon-olive oil dressing.    Snack:  Sliced cucumber and hummus.    Dinner:  Grilled shrimp with a side of steamed broccoli and a small serving of whole grain couscous.    Day 3  Breakfast:  Oatmeal (prefer old fashioned or steel cut oats) made with unsweetened almond milk, topped with sliced strawberries and a sprinkle of cinnamon.    Lunch:  Mediterranean wrap: Whole wheat tortilla filled with grilled chicken, tzatziki, mixed greens, cucumber, and tomatoes.    Snack:  A small portion of mixed nuts (walnuts, almonds, pistachios).    Dinner:  Lentil soup with a side of mixed greens salad (olive oil and vinegar dressing) and a small piece of whole grain bread.    Day 4  Breakfast:  Scrambled eggs with spinach, tomatoes, and a small amount of feta cheese.    Lunch:  Grilled vegetable salad with roasted eggplant, zucchini, bell peppers, tomatoes, and a drizzle of olive oil, topped with a few slices of grilled chicken breast.    Snack:  Apple slices with almond butter.    Dinner:  Baked cod with garlic and lemon, served with a side of sautéed green beans and brown rice.    Day 5  Breakfast:  Smoothie made with spinach, banana, unsweetened almond milk, rafa seeds, and a handful of frozen berries.    Lunch:  Tuna salad (canned tuna in olive oil, mixed greens, tomatoes, red onion, and olives) dressed with lemon juice and olive oil.    Snack:  Carrot sticks with hummus.    Dinner:  Stuffed bell peppers with ground turkey, quinoa, tomatoes, and spices, served with a side of steamed asparagus.    Day 6  Breakfast:  Whole grain toast or Zheng toast with ricotta cheese, sliced figs, and a drizzle of honey.    Lunch:  Grilled chicken Caesar salad (use a light dressing made with Greek yogurt), served with a side of roasted sweet potatoes.    Snack:  A handful of fresh mixed berries.    Dinner:  Grilled lamb chops with a side of roasted [place]  sprouts and a quinoa salad with tomatoes, cucumbers, and olive oil dressing.    Day 7  Breakfast:  Zay pudding made with unsweetened almond milk, topped with fresh berries and a sprinkle of flaxseed.    Lunch:  Mediterranean bowl: Brown rice, grilled shrimp, cucumber, tomatoes, olives, red onion, and a drizzle of olive oil and lemon.    Snack:  A small portion of Greek yogurt with a teaspoon of honey.    Dinner:  Grilled chicken with a side of roasted cauliflower and a small serving of whole grain couscous.    Key Nutritional Points for Weight Loss:  Lean Proteins: Chicken, turkey, fish (especially fatty fish like salmon), and legumes (like lentils and chickpeas) help build muscle, promote satiety, and support metabolism.  Healthy Fats: Olive oil, nuts, and seeds are rich in heart-healthy fats that also help keep you full longer.  Fiber-Rich Vegetables & Fruits: These help with digestion, reduce hunger, and provide essential vitamins and minerals while being low in calories.  Whole Grains: Brown rice, quinoa, and whole wheat bread provide fiber and energy to fuel the body without causing blood sugar spikes.    This meal plan emphasizes nutrient-dense foods, portion control, and balanced meals, which are the core principles for sustainable weight loss on the Mediterranean diet.       Sample Weight Training Plan ( adapted from [facility]):    1.Goblet Squat  How to:    Stand with feet hip-width apart and hold a weight vertically in front of chest, elbows pointing toward the floor.  Push hips back and bend knees to lower into a squat.  Drive through heels to stand back up to starting position. That's 1 rep. Complete three to five reps with a heavy weight.      2.Bent-Over Row  How to:    With a dumbbell in each hand and feet under hips, hinge at hips with knees slightly bent and arms just in front of legs.  Drive one elbow back toward hips, feeling shoulder blades squeeze together, pulling weight toward  side body.  Slowly lower weight back down, then repeat with other arm. That's 1 rep. Complete three to five reps with a medium-heavy weight.        3.Lateral Lunge  How to:    Holding a weight at chest or dumbbells at each side, stand up straight with feet hip-width apart.  Take a large step to the right, sit hips back, and lower down until right knee is nearly parallel with the floor. Your left leg should be straight.  Return to start. That's 1 rep. Complete 10 reps on each side.      4.Chest Press  How to:    Lie flat on back, or on a bench, with feet flat on the ground. With a dumbbell in each hand, extend arms directly over shoulders, palms facing toward feet.  Squeeze shoulder blades together and slowly bend elbows, lowering the weights out to the side, parallel with shoulders, until elbows form 90-degree angles.  Slowly drive the dumbbells back up to start, squeezing shoulder blades the entire time. Thats 1 rep. Complete three to five reps with a medium-heavy weight.      5.Split Stance Shoulder Press    How to:    Grab a pair of dumbbells or a resistance band. Stagger stance into a wide step, one foot forward and one back with hips squared, and hold the weights or band just above shoulders, elbows close to sides.  Leaning forward ever so slightly, bend both knees, and press through front heel while simultaneously lifting the weights or band to the santosh, keeping elbows forward and arms in line with your ears.  Lower weights or band back to shoulders. That's 1 rep. Do 10 reps, alternating side for each set.        6.Alternating Reverse Lunge To Bicep Curl  How to:    Grab a pair of dumbbells and hold them at arm's length next to sides, palms facing each other. Stand tall with feet hip-width apart.  Step backward with right leg and lower body until front knee is bent 90 degrees. At the same time as you lunge, curl both dumbbells up to shoulders.  Lower the dumbbells as you return to the starting position. Step  back with the other leg and repeat.That's 1 rep. Complete 12 reps with a medium weight.

## 2025-01-10 NOTE — TELEPHONE ENCOUNTER
No care due was identified.  [facility] Embedded Care Due Messages. Reference number: [id number].   1/10/2025 5:54:16 PM CST

## 2025-01-13 ENCOUNTER — CLINICAL SUPPORT (OUTPATIENT)
Dept: DIABETES | Facility: CLINIC | Age: 75
End: 2025-01-13
Payer: MEDICARE

## 2025-01-13 VITALS — HEIGHT: 64 IN | BODY MASS INDEX: 34.42 KG/M2 | WEIGHT: 201.63 LBS

## 2025-01-13 DIAGNOSIS — E11.40 TYPE 2 DIABETES MELLITUS WITH DIABETIC NEUROPATHY, WITHOUT LONG-TERM CURRENT USE OF INSULIN: Chronic | ICD-10-CM

## 2025-01-13 DIAGNOSIS — I10 BENIGN HYPERTENSION: Chronic | ICD-10-CM

## 2025-01-13 DIAGNOSIS — E66.09 CLASS 1 OBESITY DUE TO EXCESS CALORIES WITH SERIOUS COMORBIDITY AND BODY MASS INDEX (BMI) OF 34.0 TO 34.9 IN ADULT: ICD-10-CM

## 2025-01-13 DIAGNOSIS — E66.811 CLASS 1 OBESITY DUE TO EXCESS CALORIES WITH SERIOUS COMORBIDITY AND BODY MASS INDEX (BMI) OF 34.0 TO 34.9 IN ADULT: ICD-10-CM

## 2025-01-13 PROCEDURE — 99999 PR PBB SHADOW E&M-EST. PATIENT-LVL I: CPT | Mod: PBBFAC,HCNC,,

## 2025-01-13 PROCEDURE — G0108 DIAB MANAGE TRN  PER INDIV: HCPCS | Mod: HCNC,S$GLB,, | Performed by: FAMILY MEDICINE

## 2025-01-13 NOTE — PROGRESS NOTES
"Diabetes Care Specialist Progress Note  Author: Trina Clark RN  Date: 1/13/2025    Intake  Program Intake  Reason for Diabetes Program Visit:: Initial Diabetes Assessment  Current diabetes risk level:: moderate  In the last month, have you used the ER or been admitted to the hospital: No  Permission to speak with others about care:: yes    Current Diabetes Treatment: Oral Medications, DM Injectables, Diet/Exercise  Diet/Exercise Type/Dose: walks inside 2X's per day for 5-10 mins daily  Oral Medication Type/Dose: Jardiance 25mg daily  DM Injectables Type/Dose: Ozempic 0.25mg weekly    Continuous Glucose Monitoring  Patient has CGM: No    Lab Results   Component Value Date    HGBA1C 7.5 (H) 10/28/2024       Weight: 91.4 kg (201 lb 9.6 oz)   Height: 5' 4" (162.6 cm)   Body mass index is 34.6 kg/m².    Lifestyle Coping Support & Clinical  Lifestyle/Coping/Support  Compared to other people your age, how would you rate your health?: Fair  Does anyone in your family have diabetes or does anyone in your family support you in your diabetes care?: Family Hx: Mother and sister..  List anything about Diabetes that causes you stress?: Developing complications from diabetes.  How do you deal with stress/distress?: Get information and embrace change.  Learning Barriers:: None  Culture or Mormon beliefs that may impact ability to access healthcare: No  Psychosocial/Coping Skills Assessment Completed: : Yes  Assessment indicates:: Instruction Needed  Area of need?: No    Diabetes Self-Management Skills Assessment  Medication Skills Assessment  Patient is able to identify current diabetes medications, dosages, and appropriate timing of medications.: yes  Patient reports problems or concerns with current medication regimen.: no  Patient is  aware that some diabetes medications can cause low blood sugar?: No  Medication Skills Assessment Completed:: Yes  Assessment indicates:: Instruction Needed, Knowledge deficit  Area of " need?: Yes    Diabetes Disease Process/Treatment Options  Diabetes Type?: Type II  When were you diagnosed?: Dx: 10 yrs ago  If previous diabetes education, when/where:: no  What are your goals for this education session?: To learn to eat better and manage diabetes.  Is patient aware of what causes diabetes?: No  Does patient understand the pathophysiology of diabetes?: No  Diabetes Disease Process/Treatment Options: Skills Assessment Completed: Yes  Assessment indicates:: Instruction Needed, Knowledge deficit  Area of need?: Yes    Nutrition/Healthy Eating  Meal Plan 24 Hour Recall - Breakfast: banana  Meal Plan 24 Hour Recall - Lunch: pre-mixed salad  Meal Plan 24 Hour Recall - Dinner: baked fish and green beans  Meal Plan 24 Hour Recall - Snack: 2- cookies  Meal Plan 24 Hour Recall - Beverage: no sugar juice or water  Who shops/cooks?: Patient  Patient can identify foods that impact blood sugar.: no (see comments)  Challenges to healthy eating:: portion control  Nutrition/Healthy Eating Skills Assessment Completed:: Yes  Assessment indicates:: Instruction Needed, Knowledge deficit  Area of need?: Yes    Physical Activity/Exercise  Patient's daily activity level:: lightly active  Patient formally exercises outside of work.: yes  Frequency: four or more times a week  Patient can identify forms of physical activity.: yes  Physical Activity/Exercise Skills Assessment Completed: : Yes  Assessment indicates:: Instruction Needed, Knowledge deficit  Area of need?: Yes    Home Blood Glucose Monitoring  Patient states that blood sugar is checked at home daily.: yes  Monitoring Method:: home glucometer  Home glucometer meter type:: Insurance Preferred Meter  Fasting BG range history:: 120's-180's  What are your blood glucose targets?:   How often do you check your blood sugar?: daily  What is your A1c Target?: 7.0%  Home Blood Glucose Monitoring Skills Assessment Completed: : Yes  Assessment indicates:: Instruction  Needed, Knowledge deficit  Area of need?: Yes    Acute Complications  Acute Complications Skills Assessment Completed: : No  Deffered due to:: Time  Area of need?: Deferred    Chronic Complications  Chronic Complications Skills Assessment Completed: : No  Deferred due to:: Time  Area of need?: Deferred      Assessment Summary and Plan    Based on today's diabetes care assessment, the following areas of need were identified:      Identified Areas of Need      Medication/Current Diabetes Treatment: Yes- Reviewed Patient's current diabetes medication regimen.    Lifestyle Coping/Support: No   Diabetes Disease Process/Treatment Options: Yes- Provided DM management guide and discussed what is diabetes and the significance of current A1c and blood glucose goals.   Nutrition/Healthy Eating: Yes - see care planning   Physical Activity/Exercise: Yes - Pt to continue exercising increasing as tolerated. We discussed benefits of exercise as it relates to insulin resistance and weight loss.    Home Blood Glucose Monitoring: Yes - Discussed BS daily testing, BS goals and importance of documenting BS for review and maintenance of medication. Also encouraged to bring documented BS log to every diabetes clinic visit.   Acute Complications: Deferred    Chronic Complications: Deferred   Today's interventions were provided through individual discussion, instruction, and written materials were provided.      Patient verbalized understanding of instruction and written materials.  Pt was able to return back demonstration of instructions today. Patient understood key points, needs reinforcement and further instruction.     Diabetes Self-Management Care Plan:    Today's Diabetes Self-Management Care Plan was developed with Valarie's input. [place] has agreed to work toward the following goal(s) to improve his/her overall diabetes control.      Care Plan: Diabetes Management   Updates made since 1/14/2024 12:00 AM        Problem: Healthy Eating          Goal: Eat 2-3 meals daily with 30-45g/2-3 servings of Carbohydrate per meal. Limit snacking in between meal to 1 serving/15 grams or up to 20 grams.    Start Date: 1/13/2025   Expected End Date: 2/4/2025   Priority: High   Barriers: No Barriers Identified   Note:    1/13/25 - - Patient eats 2-3 meals a day. We reviewed eating right with diabetes. We discussed the importance of eating balanced meals with vegetables, fruits, lean meats, and whole grains. We concentrated on portion sizes and overall meal planning with various choices for meals. We discussed carb sources of foods, appropriate amount of carbs to have at meals/snacks and acceptable serving sizes of individual carb items. Obtained a 24-hr food recall from patient. We discussed various meal plan options to promote healthy eating.      - - Practiced reading food labels on various food items with patient focusing on serving size and total carbohydrate intake (not sugar intake). Instructed on appropriate serving sizes of specific carb containing foods. Stressed importance of eating a protein at each meal and include non-starchy vegetables at lunch/dinner. Instructed pt to aim for 2-3 evenly spaced meals or a small carb snack in place of a missed meal. Written education information provided to patient for use at home. Pt verbalized understanding of all the above.       Task: Provided visual examples using dry measuring cups, food models, and other familiar objects such as computer mouse, deck or cards, tennis ball etc. to help with visualization of portions. Completed 1/13/2025        Task: Explained how to count carbohydrates using the food label and the use of dry measuring cups for accurate carb counting. Completed 1/13/2025     Task: Review the importance of balancing carbohydrates with each meal using portion control techniques to count servings of carbohydrate and label reading to identify serving size and amount of total carbs per serving.  Completed 1/13/2025        Task: Provided Sample plate method and reviewed the use of the plate to estimate amounts of carbohydrate per meal. Completed 1/13/2025          Follow Up Plan   Follow up in about 22 days (around 2/4/2025) for F/U #1 Review BS log, label reading and meal planning.    Today's care plan and follow up schedule was discussed with patient.  Valarie verbalized understanding of the care plan, goals, and agrees to follow up plan.        The patient was encouraged to communicate with his/her health care provider/physician and care team regarding his/her condition(s) and treatment.  I provided the patient with my contact information today and encouraged to contact me via phone or [facility] Patient Portal as needed.     Length of Visit   Total Time: 60 Minutes

## 2025-01-14 ENCOUNTER — TELEPHONE (OUTPATIENT)
Dept: FAMILY MEDICINE | Facility: CLINIC | Age: 75
End: 2025-01-14
Payer: MEDICARE

## 2025-01-14 DIAGNOSIS — R20.2 PARESTHESIA: ICD-10-CM

## 2025-01-14 DIAGNOSIS — Z12.31 ENCOUNTER FOR SCREENING MAMMOGRAM FOR BREAST CANCER: Primary | ICD-10-CM

## 2025-01-14 DIAGNOSIS — M54.10 RADICULOPATHY, UNSPECIFIED SPINAL REGION: ICD-10-CM

## 2025-01-14 DIAGNOSIS — E11.40 TYPE 2 DIABETES MELLITUS WITH DIABETIC NEUROPATHY, WITHOUT LONG-TERM CURRENT USE OF INSULIN: ICD-10-CM

## 2025-01-14 DIAGNOSIS — L29.9 PRURITIC CONDITION: ICD-10-CM

## 2025-01-14 DIAGNOSIS — G60.3 IDIOPATHIC PROGRESSIVE NEUROPATHY: ICD-10-CM

## 2025-01-14 DIAGNOSIS — Z00.00 ENCOUNTER FOR MEDICARE ANNUAL WELLNESS EXAM: ICD-10-CM

## 2025-01-14 RX ORDER — HYDROXYZINE HYDROCHLORIDE 25 MG/1
25 TABLET, FILM COATED ORAL 3 TIMES DAILY PRN
Qty: 90 TABLET | Refills: 0 | Status: SHIPPED | OUTPATIENT
Start: 2025-01-14 | End: 2025-01-28

## 2025-01-14 NOTE — TELEPHONE ENCOUNTER
Attempted to contact patient to schedule mammogram order placed at this time, no answer message left to call clinic back .

## 2025-01-14 NOTE — TELEPHONE ENCOUNTER
No care due was identified.  [facility] Embedded Care Due Messages. Reference number: [id number].   1/14/2025 8:42:30 AM CST

## 2025-01-14 NOTE — TELEPHONE ENCOUNTER
----- Message from [place] sent at 1/14/2025  9:23 AM CST -----  Regarding: Valarie    Caller is requesting to schedule their annual mammogram appointment.  Order is not listed in EPIC.  Please enter order and contact patient to schedule.    Name of Caller: Valarie    Where would they like the mammogram performed? lap    Would the patient rather a call back or a response via [facility]? Call back    Best Call Back Number:.[phone]      Additional Information:

## 2025-01-15 ENCOUNTER — TELEPHONE (OUTPATIENT)
Dept: FAMILY MEDICINE | Facility: CLINIC | Age: 75
End: 2025-01-15
Payer: MEDICARE

## 2025-01-15 RX ORDER — PREGABALIN 50 MG/1
CAPSULE ORAL
Refills: 0 | OUTPATIENT
Start: 2025-01-15

## 2025-01-15 RX ORDER — AZELASTINE 1 MG/ML
1 SPRAY, METERED NASAL 2 TIMES DAILY
Qty: 90 ML | Refills: 0 | Status: SHIPPED | OUTPATIENT
Start: 2025-01-15

## 2025-01-15 RX ORDER — ATORVASTATIN CALCIUM 40 MG/1
40 TABLET, FILM COATED ORAL DAILY
Qty: 90 TABLET | Refills: 0 | Status: SHIPPED | OUTPATIENT
Start: 2025-01-15 | End: 2025-01-15

## 2025-01-15 RX ORDER — GABAPENTIN 300 MG/1
CAPSULE ORAL
Qty: 180 CAPSULE | Refills: 3 | Status: SHIPPED | OUTPATIENT
Start: 2025-01-15

## 2025-01-15 NOTE — TELEPHONE ENCOUNTER
----- Message from Alina Hess sent at 1/15/2025  1:14 PM CST -----  Regarding: Medication  Refill  Request                Reply in [facility]: NO      Please refill the medication listed below. Please call the patient  [phone] (O)      Medication    gabapentin (NEURONTIN) 300 MG capsule       Preferred Pharmacy:  [facility] - [facility] [address]   Phone: [phone]  Fax: [phone]

## 2025-01-15 NOTE — TELEPHONE ENCOUNTER
Pregabalin was not prescribed by me   Please contact patient. Patient will need to schedule an appointment for future refills.

## 2025-01-17 ENCOUNTER — OFFICE VISIT (OUTPATIENT)
Dept: FAMILY MEDICINE | Facility: CLINIC | Age: 75
End: 2025-01-17
Payer: MEDICARE

## 2025-01-17 VITALS
BODY MASS INDEX: 34.03 KG/M2 | SYSTOLIC BLOOD PRESSURE: 132 MMHG | OXYGEN SATURATION: 97 % | HEART RATE: 88 BPM | HEIGHT: 64 IN | TEMPERATURE: 98 F | WEIGHT: 199.31 LBS | DIASTOLIC BLOOD PRESSURE: 84 MMHG

## 2025-01-17 DIAGNOSIS — E66.01 SEVERE OBESITY (BMI 35.0-39.9) WITH COMORBIDITY: ICD-10-CM

## 2025-01-17 DIAGNOSIS — I10 BENIGN HYPERTENSION: Chronic | ICD-10-CM

## 2025-01-17 DIAGNOSIS — C64.1 RENAL CELL CARCINOMA OF RIGHT KIDNEY: ICD-10-CM

## 2025-01-17 DIAGNOSIS — G89.29 CHRONIC PAIN OF RIGHT KNEE: ICD-10-CM

## 2025-01-17 DIAGNOSIS — M25.561 CHRONIC PAIN OF RIGHT KNEE: ICD-10-CM

## 2025-01-17 DIAGNOSIS — E09.22: ICD-10-CM

## 2025-01-17 DIAGNOSIS — F32.0 MILD MAJOR DEPRESSION: Chronic | ICD-10-CM

## 2025-01-17 DIAGNOSIS — N18.31: ICD-10-CM

## 2025-01-17 DIAGNOSIS — E78.5 HYPERLIPIDEMIA, UNSPECIFIED HYPERLIPIDEMIA TYPE: Chronic | ICD-10-CM

## 2025-01-17 DIAGNOSIS — I70.0 ABDOMINAL AORTIC ATHEROSCLEROSIS: Primary | Chronic | ICD-10-CM

## 2025-01-17 DIAGNOSIS — E11.40 TYPE 2 DIABETES MELLITUS WITH DIABETIC NEUROPATHY, WITHOUT LONG-TERM CURRENT USE OF INSULIN: Chronic | ICD-10-CM

## 2025-01-17 PROBLEM — I27.20 PULMONARY HYPERTENSION: Status: ACTIVE | Noted: 2025-01-17

## 2025-01-17 PROBLEM — I27.20 PULMONARY HYPERTENSION: Status: RESOLVED | Noted: 2025-01-17 | Resolved: 2025-01-17

## 2025-01-17 PROCEDURE — 3079F DIAST BP 80-89 MM HG: CPT | Mod: HCNC,CPTII,S$GLB, | Performed by: FAMILY MEDICINE

## 2025-01-17 PROCEDURE — 3072F LOW RISK FOR RETINOPATHY: CPT | Mod: HCNC,CPTII,S$GLB, | Performed by: FAMILY MEDICINE

## 2025-01-17 PROCEDURE — 3288F FALL RISK ASSESSMENT DOCD: CPT | Mod: HCNC,CPTII,S$GLB, | Performed by: FAMILY MEDICINE

## 2025-01-17 PROCEDURE — 1101F PT FALLS ASSESS-DOCD LE1/YR: CPT | Mod: HCNC,CPTII,S$GLB, | Performed by: FAMILY MEDICINE

## 2025-01-17 PROCEDURE — 1159F MED LIST DOCD IN RCRD: CPT | Mod: HCNC,CPTII,S$GLB, | Performed by: FAMILY MEDICINE

## 2025-01-17 PROCEDURE — G2211 COMPLEX E/M VISIT ADD ON: HCPCS | Mod: HCNC,S$GLB,, | Performed by: FAMILY MEDICINE

## 2025-01-17 PROCEDURE — 1160F RVW MEDS BY RX/DR IN RCRD: CPT | Mod: HCNC,CPTII,S$GLB, | Performed by: FAMILY MEDICINE

## 2025-01-17 PROCEDURE — 3075F SYST BP GE 130 - 139MM HG: CPT | Mod: HCNC,CPTII,S$GLB, | Performed by: FAMILY MEDICINE

## 2025-01-17 PROCEDURE — 99214 OFFICE O/P EST MOD 30 MIN: CPT | Mod: HCNC,S$GLB,, | Performed by: FAMILY MEDICINE

## 2025-01-17 PROCEDURE — 99999 PR PBB SHADOW E&M-EST. PATIENT-LVL V: CPT | Mod: PBBFAC,HCNC,, | Performed by: FAMILY MEDICINE

## 2025-01-17 NOTE — PROGRESS NOTES
Routine Office Visit     Patient Name: Valarie Foster    : 1950  MRN: [id number]    Subjective     History of Present Illness    CHIEF COMPLAINT:  Diabetes   Weight   Neuropathy   RCC s/p partial nephrectomy   Osteoarthritis R knee   Insomnia     HPI:  Patient initiated Ozempic 0.25 mg weekly, prescribed by Dr. Love, due to GI discomfort and mobility issues. The dose is scheduled to increase to 0.5 mg after 4 weeks, then to 1 mg after another 4 weeks. Patient reports weight reduction from 202 lbs to 199 lbs since starting the medication.    Patient discusses ongoing knee issues. After previous knee surgery by Dr. Carbajal, patient now has pain in the contralateral knee. Dr. Carbajal has proposed potential surgery on the affected knee, tentatively scheduled for . Patient reports difficulty ambulating due to knee pain.    Patient reports sleep disturbances and insomnia throughout the night. A sleep study appointment with Dr. Pool is scheduled for the following Tuesday.    Patient mentions completed cancer treatment. Dr. Palm, who managed the cancer care, has not identified any concerning findings.    Patient denies chest pain, shortness of breath, nausea, vomiting, and abdominal pain.    MEDICATIONS:  - Ozempic 0.25 mg, weekly (every Thursday) for 4 weeks, then increase to 0.5 mg for 4 weeks, then increase to 1 mg, for weight loss and possibly diabetes management  - Jardiance    MEDICAL HISTORY:  - Sleep disorder  - Cancer  - Obesity    SURGICAL HISTORY:  - Knee surgery: Performed by Dr. Carbajal  - Upcoming knee surgery: Scheduled for March, to be performed by Dr. Carbajal    TEST RESULTS:  - Sleep study: Scheduled for the coming Tuesday    IMAGING:  - Mammogram: Scheduled for the       ROS:  General: no fever, no chills, no fatigue, no weight gain, +weight loss  Eyes: no vision changes, no redness, no discharge  ENT: no ear pain, no nasal congestion, no sore throat  Cardiovascular: no chest pain,  "no palpitations, no lower extremity edema  Respiratory: no cough, no shortness of breath  Gastrointestinal: no abdominal pain, no nausea, no vomiting, no diarrhea, no constipation, no blood in stool  Genitourinary: no dysuria, no hematuria, no frequency  Musculoskeletal: +joint pain, no muscle pain  Skin: no rash, no lesion  Neurological: no headache, no dizziness, no numbness, no tingling  Psychiatric: no anxiety, no depression, +sleep difficulty           Objective     /84 (BP Location: Left arm, Patient Position: Sitting)   Pulse 88   Temp 97.6 °F (36.4 °C) (Oral)   Ht 5' 4" (1.626 m)   Wt 90.4 kg (199 lb 4.7 oz)   SpO2 97%   BMI 34.21 kg/m²   Physical Exam  Constitutional:       Appearance: She is well-developed.   HENT:      Head: Normocephalic and atraumatic.   Eyes:      Conjunctiva/sclera: Conjunctivae normal.      Pupils: Pupils are equal, round, and reactive to light.   Cardiovascular:      Rate and Rhythm: Normal rate and regular rhythm.      Heart sounds: Normal heart sounds. No murmur heard.     No friction rub. No gallop.   Pulmonary:      Effort: No respiratory distress.      Breath sounds: Normal breath sounds.   Abdominal:      General: Bowel sounds are normal. There is no distension.      Palpations: Abdomen is soft.      Tenderness: There is no abdominal tenderness.   Musculoskeletal:         General: Normal range of motion.      Cervical back: Normal range of motion and neck supple.   Lymphadenopathy:      Cervical: No cervical adenopathy.   Skin:     General: Skin is warm.   Neurological:      Mental Status: She is alert and oriented to person, place, and time.           Assessment     Assessment & Plan      LABS:   Ordered labs and labs for January 31st.   Evaluated current labs schedule, determining next lab work due in April.         Problem List Items Addressed This Visit          Psychiatric    Mild major depression (Chronic)  The current medical regimen is effective;  continue " present plan and medications.          Cardiac/Vascular    Abdominal aortic atherosclerosis - Primary (Chronic)    Overview     Patient with Atherosclerosis of the Aorta noted on CT 4/2019.  Stable/asymptomatic. Currently stable on lipid and b/p monitoring.           Benign hypertension (Chronic)  The current medical regimen is effective;  continue present plan and medications.       Hyperlipemia (Chronic)  The current medical regimen is effective;  continue present plan and medications.       Pulmonary hypertension   Scheduled the patient for a sleep study with Dr. Pool due to reported sleep issues.   Physical exam revealed normal heart sounds         Oncology    Renal cell carcinoma of right kidney   Dr. Palm reported no visible signs of cancer, indicating a good response to treatment.   Acknowledged the patient's good progress and addressed concerns about recurrence.         Endocrine    Type 2 diabetes mellitus with diabetic neuropathy, without long-term current use of insulin (Chronic)    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, without long-term current use of insulin   Planned to review labs results in the chart and continue Ozempic with dose increases.   Scheduled follow-up visit in 6 months.      Severe obesity (BMI 35.0-39.9) with comorbidity  Recently started on ozempic for diabetes and weight loss   The current medical regimen is effective;  continue present plan and medications.        Other Visit Diagnoses       Chronic pain of right knee       Considered potential need for bilateral knee surgery, with Dr. Carbajal planning for possible left knee procedure.   Noted ongoing knee pain affecting mobility and Dr. Carbajal' suggestion that the other knee may need surgery due to compensatory stress.   Instructed the patient to contact the office as soon as surgery date is confirmed with Dr. Carbajal to schedule pre-op visit within 30 days before surgery.   Scheduled  follow-up visit with Dr. Carbajal on January 31st.                This note was generated with the assistance of ambient listening technology. Verbal consent was obtained by the patient and accompanying visitor(s) for the recording of patient appointment to facilitate this note. I attest to having reviewed and edited the generated note for accuracy, though some syntax or spelling errors may persist. Please contact the author of this note for any clarification.

## 2025-01-18 DIAGNOSIS — I10 BENIGN HYPERTENSION: Chronic | ICD-10-CM

## 2025-01-18 DIAGNOSIS — F32.0 MILD MAJOR DEPRESSION: Chronic | ICD-10-CM

## 2025-01-18 DIAGNOSIS — K21.9 GASTROESOPHAGEAL REFLUX DISEASE, UNSPECIFIED WHETHER ESOPHAGITIS PRESENT: ICD-10-CM

## 2025-01-21 ENCOUNTER — OUTPATIENT CASE MANAGEMENT (OUTPATIENT)
Dept: ADMINISTRATIVE | Facility: OTHER | Age: 75
End: 2025-01-21
Payer: MEDICARE

## 2025-01-21 RX ORDER — PANTOPRAZOLE SODIUM 40 MG/1
40 TABLET, DELAYED RELEASE ORAL DAILY
Qty: 90 TABLET | Refills: 0 | Status: SHIPPED | OUTPATIENT
Start: 2025-01-21

## 2025-01-21 RX ORDER — AMLODIPINE BESYLATE 10 MG/1
10 TABLET ORAL DAILY
Qty: 90 TABLET | Refills: 0 | Status: SHIPPED | OUTPATIENT
Start: 2025-01-21

## 2025-01-21 RX ORDER — FLUOXETINE HYDROCHLORIDE 40 MG/1
40 CAPSULE ORAL DAILY
Qty: 90 CAPSULE | Refills: 0 | Status: SHIPPED | OUTPATIENT
Start: 2025-01-21

## 2025-01-21 NOTE — PROGRESS NOTES
Outpatient Care Management  Plan of Care Follow Up Visit    Patient: Valarie Foster  MRN: [id number]  Date of Service: 01/21/2025  Completed by: Aniyah Marks RN  Referral Date: 10/16/2024    No chief complaint on file.      Brief Summary: Pt reports that her blood sugars have been in the 120's. Pt voiced that she is having pain in the left wrist where she had carpal tunnel release on 6/26/2024. Pt voiced that she called and an appt was scheduled for March 2025. Pt voiced that the pain is 8/10. Will send a message to Dr. Mcdermott's staff to inform of pt's request for an appt before March as scheduled. Will send a message to PCP's staff to notify of pt's request for PT due to unsteady gait.

## 2025-01-22 RX ORDER — METOPROLOL SUCCINATE 25 MG/1
25 TABLET, EXTENDED RELEASE ORAL DAILY
Qty: 90 TABLET | Refills: 0 | Status: SHIPPED | OUTPATIENT
Start: 2025-01-22

## 2025-01-22 RX ORDER — HYDRALAZINE HYDROCHLORIDE 100 MG/1
100 TABLET, FILM COATED ORAL EVERY 8 HOURS
Qty: 270 TABLET | Refills: 0 | Status: SHIPPED | OUTPATIENT
Start: 2025-01-22

## 2025-01-22 RX ORDER — ATORVASTATIN CALCIUM 80 MG/1
80 TABLET, FILM COATED ORAL NIGHTLY
Qty: 90 TABLET | Refills: 0 | Status: SHIPPED | OUTPATIENT
Start: 2025-01-22

## 2025-01-23 ENCOUNTER — TELEPHONE (OUTPATIENT)
Dept: FAMILY MEDICINE | Facility: CLINIC | Age: 75
End: 2025-01-23
Payer: MEDICARE

## 2025-01-23 ENCOUNTER — TELEPHONE (OUTPATIENT)
Dept: ORTHOPEDICS | Facility: CLINIC | Age: 75
End: 2025-01-23
Payer: MEDICARE

## 2025-01-23 DIAGNOSIS — R26.81 UNSTEADY GAIT WHEN WALKING: Primary | ICD-10-CM

## 2025-01-23 NOTE — TELEPHONE ENCOUNTER
----- Message from SANTOS Dill sent at 1/22/2025 11:22 AM CST -----  Giovanni this is Aniyah with [facility]. Pt reports that she has lost some wt with the use of the ozepic. She states that she has noticed that she has an unsteady gait and is afraid that it will cause her to fall. Pt is requesting a referral for PT to eval and treat.    Please advise  Thank you

## 2025-01-23 NOTE — TELEPHONE ENCOUNTER
Patient is 6m 28d s/p Left endoscopic CTR.     [facility] regarding message below.     Alfredo Montenegro, MS, OTC   Sports Medicine Assistant   [facility]  (P) [phone]  (F) [phone]      ----- Message from SANTOS Dill sent at 1/22/2025 11:19 AM CST -----  Giovanni this is Aniyah with [facility]. Pt reports that she is having a lot of pain from her left wrist. Pt had a carpal tunnel release in June 2024 and is reporting pain rate of 8/10 at this time. Pt voiced that she has an appt scheduled in March, but is requesting to get an earlier appt.    Please advise  Thank you

## 2025-01-24 ENCOUNTER — TELEPHONE (OUTPATIENT)
Dept: ORTHOPEDICS | Facility: CLINIC | Age: 75
End: 2025-01-24
Payer: MEDICARE

## 2025-01-28 ENCOUNTER — HOSPITAL ENCOUNTER (OUTPATIENT)
Dept: RADIOLOGY | Facility: HOSPITAL | Age: 75
Discharge: HOME | End: 2025-01-28
Attending: FAMILY MEDICINE
Payer: MEDICARE

## 2025-01-28 DIAGNOSIS — Z12.31 ENCOUNTER FOR SCREENING MAMMOGRAM FOR BREAST CANCER: ICD-10-CM

## 2025-01-28 DIAGNOSIS — L29.9 PRURITIC CONDITION: ICD-10-CM

## 2025-01-28 RX ORDER — HYDROXYZINE HYDROCHLORIDE 25 MG/1
25 TABLET, FILM COATED ORAL
Qty: 90 TABLET | Refills: 0 | Status: SHIPPED | OUTPATIENT
Start: 2025-01-28

## 2025-01-31 ENCOUNTER — OFFICE VISIT (OUTPATIENT)
Dept: ORTHOPEDICS | Facility: CLINIC | Age: 75
End: 2025-01-31
Payer: MEDICARE

## 2025-01-31 DIAGNOSIS — M17.12 PRIMARY OSTEOARTHRITIS OF LEFT KNEE: Primary | ICD-10-CM

## 2025-01-31 PROCEDURE — 3288F FALL RISK ASSESSMENT DOCD: CPT | Mod: HCNC,CPTII,S$GLB, | Performed by: ORTHOPAEDIC SURGERY

## 2025-01-31 PROCEDURE — 99214 OFFICE O/P EST MOD 30 MIN: CPT | Mod: HCNC,S$GLB,, | Performed by: ORTHOPAEDIC SURGERY

## 2025-01-31 PROCEDURE — 1159F MED LIST DOCD IN RCRD: CPT | Mod: HCNC,CPTII,S$GLB, | Performed by: ORTHOPAEDIC SURGERY

## 2025-01-31 PROCEDURE — 3072F LOW RISK FOR RETINOPATHY: CPT | Mod: HCNC,CPTII,S$GLB, | Performed by: ORTHOPAEDIC SURGERY

## 2025-01-31 PROCEDURE — 1101F PT FALLS ASSESS-DOCD LE1/YR: CPT | Mod: HCNC,CPTII,S$GLB, | Performed by: ORTHOPAEDIC SURGERY

## 2025-01-31 PROCEDURE — 99999 PR PBB SHADOW E&M-EST. PATIENT-LVL IV: CPT | Mod: PBBFAC,HCNC,, | Performed by: ORTHOPAEDIC SURGERY

## 2025-01-31 NOTE — PROGRESS NOTES
EST PATIENT ORTHOPAEDIC: Knee    PRIMARY CARE PHYSICIAN: Alena Hernandez MD   REFERRING PROVIDER: No referring provider defined for this encounter.     ASSESSMENT & PLAN:    Impression:  Left Knee Severe Degenerative Osteoarthritis, Primary   Right Knee s/p total Knee Primary completed on 11/8/23.    Follow Up Plan: 3 weeks after surgery    Surgery:    Valarie Foster has radiograph and physical exam evidence of the aforementioned impression and wishes to pursue surgery. This patient appears to have sufficient symptoms to warrant surgical intervention and is an appropriate candidate for left Primary Total Knee Arthroplasty as evidenced by months of unsuccessful non-operative treatment as outlined in the HPI below and progressive symptoms.    We had a lengthy discussion regarding the risk and benefit of surgery, the alternatives, limitations and personnel involved. These included but were not limited to infection, persistent pain, instability, nerve injury, blood clots, dislocation, loosening, leg length inequality and medical complications. We also discussed the pre-operative course, surgery itself and rehabilitation.    Marisol-operative blood management and transfusion issues were discussed, and options clearly outlined. The patient has consented to the use of the banked allogenic blood if medically necessary.    The patient has elected to schedule surgery at this time or intends to call the office with a surgical date. Shared decision making occurred while obtaining informed consent. The patient will be scheduled for a pre-operative education class at which time they will have their nasal swab completed and will be given CHG cloths along with the verbal and written instructions for their use.    We will plan for use of Greig components. NELSON Assistance     To review:  Patient is doing well with regard to her right knee replacement.  She has no issues or complaints with her right knee.  She previously underwent gel  injections into that knee and had some improvement for a few months but did not last the full 6 months.  Her knee gave out on her recently and she sustained a fall onto her left knee.  She would like a surgery in March and will discuss with her family regarding developing caretakers for this time.  Case request submitted. Tentative time is March 5th, 2025.  Surgery may be delayed as she is also having some left hand pain being evaluated by Dr. Mcdermott on Monday.  Depending on if she requires surgical intervention for this hand will depend on if we need to delay my surgery.  Her hand is more problematic to her compared to her knee currently.    Right Knee:  3v of the right knee suggest some new mild asymmetry of her joint line with a medial space being slightly smaller compared to the left.  This was not evident some previous films.  Lateral view had some evidence of posterior lucency but I think that is rotational.  This component was cemented into place I do not think it is loose but can continue to monitor going forward.    The patient has been ordered:  NELSON CT    CONSULTS:   Internal Medicine  Anesthesia for optimization    ACTIVE PROBLEM LIST  Patient Active Problem List   Diagnosis    Type 2 diabetes mellitus with diabetic neuropathy, without long-term current use of insulin    Benign hypertension    Hyperlipemia    Microhematuria    Left flank pain    Abdominal aortic atherosclerosis    History of renal cell cancer    Renal cell carcinoma of right kidney    Mild major depression    Bilateral chronic knee pain    Colon cancer screening    Pain of left calf    Refractive error    Primary osteoarthritis of knees, bilateral    Fall    Radiculopathy    Idiopathic progressive neuropathy    Nerve pain    Chest pain    Severe obesity (BMI 35.0-39.9) with comorbidity    Decreased ROM of right knee    Decreased strength involving knee joint    Gait difficulty    Paresthesia    Pseudophakia    COVID-19    Drug or  chemical induced diabetes mellitus with stage 3a chronic kidney disease, without long-term current use of insulin           SUBJECTIVE    CHIEF COMPLAINT: Knee Pain    HPI:   Valarie Foster is a 74 y.o. female here for evaluation and management of left knee pain. There is not a specific incident that brought about this pain. she has had progressive problems with the knee(s) starting 1 years ago but is now progressing to interfere with activities which include: walking, functional household ADL's, and participating in family activities    Currently the pain in the joint is rated at moderate with activity. The pain is constant and is located in the knee, located medially and located anterior. The pain is described as aching, severe, sharp, and stiffness. Relieving factors include rest, over the counter medication , and repositioning.     There is associated Clicking and Popping.     Valarie Foster has no additional complaints.     11/7/24: Here for one year post op on right knee.  She has no issues or pain with regard to the right knee.  It is functioning well.  Has ongoing pain in left knee.  Has a recent fall that exacerbated some of her pain.  She is interested in injection treatment today and pre-surgical planning for this knee.      1/31/25:  Patient here today for pre-surgical planning regarding left knee replacement.  She is also having some hand pain that is being evaluated by Dr. Mcdermott.  Based on that evaluation we will determine whether or not to proceed with our surgery.    PROGRESSIVE SYMPTOMS:  Pain worsened by weight bearing  Pain effecting living situation    FUNCTIONAL STATUS:   Walk a block or two on level ground     PREVIOUS TREATMENTS:  Medical: Steroid Injections and visco injections  Physical Therapy: Activities Modified   Previous Orthopaedic Surgery: Right TKA    REVIEW OF SYSTEMS:  PAIN ASSESSMENT:  See HPI.  MUSCULOSKELETAL: See HPI.  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     PAST SURGICAL HISTORY   has a past surgical history that includes Hysterectomy; Oophorectomy; Colonoscopy (N/A, 2/5/2018); Partial nephrectomy (Right, 10/12/2018); Colonoscopy (N/A, 8/13/2020); Phacoemulsification of cataract (Left, 7/7/2022); Intraocular prosthesis insertion (Left, 7/7/2022); Phacoemulsification of cataract (Right, 7/21/2022); Intraocular prosthesis insertion (Right, 7/21/2022); Total knee arthroplasty (Right, 11/8/2023); Endoscopic carpal tunnel release (Right, 3/15/2024); and Endoscopic carpal tunnel release (Left, 6/26/2024).     FAMILY HISTORY  family history includes Blindness in her sister; Breast cancer in her sister; Cancer in her sister; Diabetes in her brother and sister; Glaucoma in her father and sister; No Known Problems in her maternal aunt, maternal grandfather, maternal grandmother, maternal uncle, mother, paternal aunt, paternal grandfather, paternal grandmother, and paternal uncle; Thyroid disease in her sister.     SOCIAL HISTORY   reports that she has never smoked. She has been exposed to tobacco smoke. She has never used smokeless tobacco. She reports that she does not drink alcohol and does not use drugs.     ALLERGIES   Review of patient's allergies indicates:   Allergen Reactions    Lisinopril Swelling and Shortness Of Breath    Ascorbic acid-ascorbate calc      And citrus fruits        MEDICATIONS  Current Outpatient Medications on File Prior to Visit   Medication Sig Dispense Refill    ACCU-CHEK ONEIL PLUS TEST STRP Strp TEST BLOOD SUGAR TWICE DAILY 200 strip 1    ACCU-CHEK SOFTCLIX LANCETS Misc USE TO TEST BLOOD SUGAR ONE TIME DAILY 100 each 3    acetaminophen (TYLENOL) 500 MG tablet Take 2 tablets (1,000 mg total) by mouth 2  (two) times a day. 20 tablet 0    alcohol swabs (DROPSAFE ALCOHOL PREP PADS) PadM USE AS DIRECTED THREE TIMES DAILY 300 each 3    amLODIPine (NORVASC) 10 MG tablet Take 1 tablet (10 mg total) by mouth once daily. 90 tablet 0    atorvastatin (LIPITOR) 80 MG tablet Take 1 tablet (80 mg total) by mouth every evening. 90 tablet 0    azelastine (ASTELIN) 137 mcg (0.1 %) nasal spray 1 spray (137 mcg total) by Nasal route 2 (two) times daily. 90 mL 0    blood glucose control high,low (ACCU-CHEK ONEIL CONTROL SOLN) Soln 1 Units by Misc.(Non-Drug; Combo Route) route as needed. 1 each 0    blood-glucose meter (ACCU-CHEK ONEIL PLUS METER) Misc 1 kit by Misc.(Non-Drug; Combo Route) route once daily. 1 each 0    blood-glucose meter (TRUE METRIX GLUCOSE METER) kit Use as directed to test glucose 1 each 0    ciclopirox (PENLAC) 8 % Soln Apply topically nightly. 6.6 mL 11    diphenhydrAMINE (BENADRYL) 25 mg capsule Take 1 capsule (25 mg total) by mouth every 6 (six) hours as needed for Itching. 20 capsule 0    docusate sodium (COLACE) 100 MG capsule Take 1 capsule (100 mg total) by mouth 2 (two) times daily. 30 capsule 0    empagliflozin (JARDIANCE) 25 mg tablet Take 1 tablet (25 mg total) by mouth once daily. 90 tablet 0    ergocalciferol (ERGOCALCIFEROL) 50,000 unit Cap TAKE 1 CAPSULE EVERY 7 DAYS. 12 capsule 0    FLUoxetine 40 MG capsule Take 1 capsule (40 mg total) by mouth once daily. 90 capsule 0    fluticasone propionate (FLONASE) 50 mcg/actuation nasal spray USE 1 SPRAY IN EACH NOSTRIL ONCE DAILY 32 g 3    gabapentin (NEURONTIN) 300 MG capsule TAKE 1 CAPSULE TWICE DAILY (NEED MD APPOINTMENT FOR REFILLS) 180 capsule 3    hydrALAZINE (APRESOLINE) 100 MG tablet Take 1 tablet (100 mg total) by mouth every 8 (eight) hours. 270 tablet 0    hydrOXYzine HCL (ATARAX) 25 MG tablet TAKE 1 TABLET BY MOUTH THREE TIMES DAILY AS NEEDED FOR ITCHING 90 tablet 0    ibuprofen (ADVIL,MOTRIN) 800 MG tablet Take 1 tablet (800 mg total) by mouth  every 8 (eight) hours as needed for Pain. 30 tablet 0    LIDOcaine (LIDODERM) 5 % Place 1 patch onto the skin once daily. Remove & Discard patch within 12 hours or as directed by MD 15 patch 1    metoprolol succinate (TOPROL-XL) 25 MG 24 hr tablet Take 1 tablet (25 mg total) by mouth once daily. 90 tablet 0    ondansetron (ZOFRAN-ODT) 8 MG TbDL Take 1 tablet (8 mg total) by mouth 2 (two) times daily as needed (nausea). 14 tablet 0    pantoprazole (PROTONIX) 40 MG tablet Take 1 tablet (40 mg total) by mouth once daily. 90 tablet 0    semaglutide (OZEMPIC) 0.25 mg or 0.5 mg (2 mg/3 mL) pen injector Inject 0.25 mg into the skin every 7 days. 3 mL 0    [START ON 2/3/2025] semaglutide (OZEMPIC) 0.25 mg or 0.5 mg (2 mg/3 mL) pen injector Inject 0.5 mg into the skin every 7 days. 3 mL 0    [START ON 3/3/2025] semaglutide (OZEMPIC) 1 mg/dose (2 mg/1.5 mL) PnIj Inject 1 mg into the skin every 7 days. 3 mL 5    traMADoL (ULTRAM) 50 mg tablet Take 1 tablet (50 mg total) by mouth every 6 (six) hours. 20 tablet 0    traMADoL (ULTRAM) 50 mg tablet Take 1 tablet (50 mg total) by mouth every 6 (six) hours. 20 tablet 0    TRUEPLUS LANCETS 33 gauge Misc TEST BLOOD SUGAR TWICE DAILY 200 each 1    aspirin (ECOTRIN) 81 MG EC tablet Take 1 tablet (81 mg total) by mouth 2 (two) times a day. 60 tablet 0    blood glucose control, low (TRUE METRIX LEVEL 1) Soln 1 each by Misc.(Non-Drug; Combo Route) route once as needed. 1 each 3    cetirizine (ZYRTEC) 10 MG tablet Take 1 tablet (10 mg total) by mouth once daily. 90 tablet 0    pregabalin (LYRICA) 50 MG capsule Take 1 capsule (50 mg total) by mouth 2 (two) times daily. 60 capsule 2     Current Facility-Administered Medications on File Prior to Visit   Medication Dose Route Frequency Provider Last Rate Last Admin    acetaminophen tablet 1,000 mg  1,000 mg Oral On Call Procedure Eric Carbajal MD        acetaminophen tablet 1,000 mg  1,000 mg Oral Q6H Eric Carbajal MD   1,000 mg at  11/09/23 0506    aspirin EC tablet 81 mg  81 mg Oral BID Eric Carbajal MD   81 mg at 11/09/23 1358    famotidine tablet 20 mg  20 mg Oral BID Eric Carbajal MD   20 mg at 11/09/23 1357    LIDOcaine (PF) 10 mg/ml (1%) injection 10 mg  1 mL Intradermal On Call Procedure Eric Carbajal MD        methocarbamoL tablet 750 mg  750 mg Oral TID Eric Carbajal MD   750 mg at 11/09/23 1357    naloxone 0.4 mg/mL injection 0.02 mg  0.02 mg Intravenous PRN Eric Carbajal MD        ondansetron injection 4 mg  4 mg Intravenous Q8H PRN Eric Carbajal MD   4 mg at 11/09/23 0858    oxyCODONE immediate release tablet 10 mg  10 mg Oral Q3H PRN Eric Carbajal MD   10 mg at 11/09/23 1043    oxyCODONE immediate release tablet 5 mg  5 mg Oral Q3H PRN Eric Carbajal MD        polyethylene glycol packet 17 g  17 g Oral Daily Eric Carbajal MD   17 g at 11/09/23 1356    pregabalin capsule 75 mg  75 mg Oral QHS Eric Carbajal MD   75 mg at 11/08/23 2147    prochlorperazine injection Soln 5 mg  5 mg Intravenous Q6H PRN Eric Carbajal MD   5 mg at 11/08/23 1325    senna-docusate 8.6-50 mg per tablet 1 tablet  1 tablet Oral BID Eric Carbajal MD   1 tablet at 11/09/23 1358    sodium chloride 0.9% flush 10 mL  10 mL Intravenous PRN Eric Carbajal MD              PHYSICAL EXAM   vitals were not taken for this visit.   There is no height or weight on file to calculate BMI.      All other systems deferred.  GENERAL:  No acute distress  HABITUS: Obese  GAIT: Antalgic  SKIN: Normal  and No erythema, warmth, fluctuance     KNEE EXAM:    left:   Effusion: Minimal joint effusion  TTP: Yes over Medial Joint Line   No crepitus with passive knee ROM  Passive ROM: Extension 0, Flexion 130  No pain with manipulation of patella  Stable to varus/valgus stress. No increased laxity to anterior/posterior drawer testing  negative Constantin's test  No pain with IR/ER rotation of the hip  5/5 strength in knee flexion and  extension, ankle plantarflexion and dorsiflexion  Neurovascular Status: Sensation intact to light touch in Sural, Saphenous, SPN, DPN, Tibial nerve distribution  2+ pulse DP/PT, normal capillary refill, foot has normal warmth    Right knee: Effusion: No joint effusion  TTP: No tenderness over Medial Joint Line   Passive ROM: Extension 0, Flexion 130  No pain with manipulation of patella  Stable to varus/valgus stress. No increased laxity to anterior/posterior drawer testing  No pain with IR/ER rotation of the hip  5/5 strength in knee flexion and extension, ankle plantarflexion and dorsiflexion  Neurovascular Status: Sensation intact to light touch in Sural, Saphenous, SPN, DPN, Tibial nerve distribution  2+ pulse DP/PT, normal capillary refill, foot has normal warmth    DATA:  Diagnostic tests reviewed for today's visit:     4v of the left knee reveal Severe degenerative changes of the Medial and Patellofemoral compartment. There is evidence of advanced osteoarthritis changes with Subchondral sclerosis, Osteophyte formation, and Joint space narrowing. The limb is in varus alignment. The patella is tracking subluxed    3v of the right knee suggest some new mild asymmetry of her joint line with a medial space being slightly smaller compared to the left.  This was not evident some previous films.  Lateral view had some evidence of posterior lucency but I think that is rotational.  This component was cemented into place I do not think it is loose but can continue to monitor going forward.

## 2025-02-03 ENCOUNTER — PATIENT OUTREACH (OUTPATIENT)
Dept: ADMINISTRATIVE | Facility: HOSPITAL | Age: 75
End: 2025-02-03
Payer: MEDICARE

## 2025-02-03 ENCOUNTER — TELEPHONE (OUTPATIENT)
Dept: DIABETES | Facility: CLINIC | Age: 75
End: 2025-02-03
Payer: MEDICARE

## 2025-02-04 ENCOUNTER — CLINICAL SUPPORT (OUTPATIENT)
Dept: DIABETES | Facility: CLINIC | Age: 75
End: 2025-02-04
Payer: MEDICARE

## 2025-02-04 ENCOUNTER — OFFICE VISIT (OUTPATIENT)
Dept: NEUROLOGY | Facility: CLINIC | Age: 75
End: 2025-02-04
Payer: MEDICARE

## 2025-02-04 VITALS
BODY MASS INDEX: 33.94 KG/M2 | OXYGEN SATURATION: 95 % | BODY MASS INDEX: 33.73 KG/M2 | WEIGHT: 197.56 LBS | HEIGHT: 64 IN | HEIGHT: 64 IN | HEART RATE: 67 BPM | DIASTOLIC BLOOD PRESSURE: 82 MMHG | SYSTOLIC BLOOD PRESSURE: 153 MMHG | WEIGHT: 198.81 LBS

## 2025-02-04 DIAGNOSIS — E11.40 TYPE 2 DIABETES MELLITUS WITH DIABETIC NEUROPATHY, WITHOUT LONG-TERM CURRENT USE OF INSULIN: Primary | ICD-10-CM

## 2025-02-04 DIAGNOSIS — G47.00 INSOMNIA, UNSPECIFIED TYPE: ICD-10-CM

## 2025-02-04 DIAGNOSIS — M79.2 NERVE PAIN: ICD-10-CM

## 2025-02-04 DIAGNOSIS — R41.3 OTHER AMNESIA: ICD-10-CM

## 2025-02-04 DIAGNOSIS — R41.3 MEMORY LOSS: Primary | ICD-10-CM

## 2025-02-04 PROCEDURE — 1101F PT FALLS ASSESS-DOCD LE1/YR: CPT | Mod: HCNC,CPTII,S$GLB, | Performed by: STUDENT IN AN ORGANIZED HEALTH CARE EDUCATION/TRAINING PROGRAM

## 2025-02-04 PROCEDURE — G0108 DIAB MANAGE TRN  PER INDIV: HCPCS | Mod: S$GLB,,, | Performed by: FAMILY MEDICINE

## 2025-02-04 PROCEDURE — 3077F SYST BP >= 140 MM HG: CPT | Mod: HCNC,CPTII,S$GLB, | Performed by: STUDENT IN AN ORGANIZED HEALTH CARE EDUCATION/TRAINING PROGRAM

## 2025-02-04 PROCEDURE — 1125F AMNT PAIN NOTED PAIN PRSNT: CPT | Mod: HCNC,CPTII,S$GLB, | Performed by: STUDENT IN AN ORGANIZED HEALTH CARE EDUCATION/TRAINING PROGRAM

## 2025-02-04 PROCEDURE — 99999 PR PBB SHADOW E&M-EST. PATIENT-LVL V: CPT | Mod: PBBFAC,HCNC,, | Performed by: STUDENT IN AN ORGANIZED HEALTH CARE EDUCATION/TRAINING PROGRAM

## 2025-02-04 PROCEDURE — 99417 PROLNG OP E/M EACH 15 MIN: CPT | Mod: HCNC,S$GLB,, | Performed by: STUDENT IN AN ORGANIZED HEALTH CARE EDUCATION/TRAINING PROGRAM

## 2025-02-04 PROCEDURE — 3288F FALL RISK ASSESSMENT DOCD: CPT | Mod: HCNC,CPTII,S$GLB, | Performed by: STUDENT IN AN ORGANIZED HEALTH CARE EDUCATION/TRAINING PROGRAM

## 2025-02-04 PROCEDURE — 99999 PR PBB SHADOW E&M-EST. PATIENT-LVL I: CPT | Mod: PBBFAC,,,

## 2025-02-04 PROCEDURE — 3079F DIAST BP 80-89 MM HG: CPT | Mod: HCNC,CPTII,S$GLB, | Performed by: STUDENT IN AN ORGANIZED HEALTH CARE EDUCATION/TRAINING PROGRAM

## 2025-02-04 PROCEDURE — 3072F LOW RISK FOR RETINOPATHY: CPT | Mod: HCNC,CPTII,S$GLB, | Performed by: STUDENT IN AN ORGANIZED HEALTH CARE EDUCATION/TRAINING PROGRAM

## 2025-02-04 PROCEDURE — 1159F MED LIST DOCD IN RCRD: CPT | Mod: HCNC,CPTII,S$GLB, | Performed by: STUDENT IN AN ORGANIZED HEALTH CARE EDUCATION/TRAINING PROGRAM

## 2025-02-04 PROCEDURE — 99215 OFFICE O/P EST HI 40 MIN: CPT | Mod: HCNC,S$GLB,, | Performed by: STUDENT IN AN ORGANIZED HEALTH CARE EDUCATION/TRAINING PROGRAM

## 2025-02-04 RX ORDER — GABAPENTIN 300 MG/1
300 CAPSULE ORAL 3 TIMES DAILY
Qty: 270 CAPSULE | Refills: 1 | Status: SHIPPED | OUTPATIENT
Start: 2025-02-04 | End: 2025-08-03

## 2025-02-04 NOTE — PROGRESS NOTES
"  Chief Complaint and Duration     Neuropathy pain  For "years", here for worsening memory    History of Present Illness     Valarie Foster is a 74 y.o. female w history of long standing DM, HTN, and degenerative arthritis w significant knee pain (s/p injections) who comes in for "nerve pain" with pain in her chronic lower back that causes radiating numbness and tingling down her buttocks to her legs (L > R).    Denies any bowel or bladder incontinence, no recent falls. Feels stiff when she wakes up in the morning. Radiating pain felt worse w certain positions, she endorses leg stiffness and knee pain as well. Did physical therapy a few months prior w mild relief. Has been on gabapentin 600mg at night w again, mild relief. Sometimes the pain would wake her up from sleep at night.    No history of MVAs. No weakness.     Interim:  1/22/24 - worsening of nerve pain, gabapentin no longer works. Has not tried lyrica.     2/16/24 - continues to have low back pain, pain in hands that drops objects.     2/4/25 - here for memory. Significant grief w loss of her brother last year. Significant sleep problems.     Review of patient's allergies indicates:   Allergen Reactions    Lisinopril Swelling and Shortness Of Breath    Ascorbic acid-ascorbate calc      And citrus fruits     Current Outpatient Medications   Medication Sig Dispense Refill    ACCU-CHEK ONEIL PLUS TEST STRP Strp TEST BLOOD SUGAR TWICE DAILY 200 strip 1    ACCU-CHEK SOFTCLIX LANCETS [facility] USE TO TEST BLOOD SUGAR ONE TIME DAILY 100 each 3    acetaminophen (TYLENOL) 500 MG tablet Take 2 tablets (1,000 mg total) by mouth 2 (two) times a day. 20 tablet 0    alcohol swabs (DROPSAFE ALCOHOL PREP PADS) PadM USE AS DIRECTED THREE TIMES DAILY 300 each 3    amLODIPine (NORVASC) 10 MG tablet Take 1 tablet (10 mg total) by mouth once daily. 90 tablet 0    atorvastatin (LIPITOR) 80 MG tablet Take 1 tablet (80 mg total) by mouth every evening. 90 tablet 0    azelastine " (ASTELIN) 137 mcg (0.1 %) nasal spray 1 spray (137 mcg total) by Nasal route 2 (two) times daily. 90 mL 0    blood glucose control high,low (ACCU-CHEK ONEIL CONTROL SOLN) Soln 1 Units by Misc.(Non-Drug; Combo Route) route as needed. 1 each 0    blood-glucose meter (ACCU-CHEK ONEIL PLUS METER) Misc 1 kit by Misc.(Non-Drug; Combo Route) route once daily. 1 each 0    blood-glucose meter (TRUE METRIX GLUCOSE METER) kit Use as directed to test glucose 1 each 0    ciclopirox (PENLAC) 8 % Soln Apply topically nightly. 6.6 mL 11    diphenhydrAMINE (BENADRYL) 25 mg capsule Take 1 capsule (25 mg total) by mouth every 6 (six) hours as needed for Itching. 20 capsule 0    docusate sodium (COLACE) 100 MG capsule Take 1 capsule (100 mg total) by mouth 2 (two) times daily. 30 capsule 0    empagliflozin (JARDIANCE) 25 mg tablet Take 1 tablet (25 mg total) by mouth once daily. 90 tablet 0    ergocalciferol (ERGOCALCIFEROL) 50,000 unit Cap TAKE 1 CAPSULE EVERY 7 DAYS. 12 capsule 0    FLUoxetine 40 MG capsule Take 1 capsule (40 mg total) by mouth once daily. 90 capsule 0    fluticasone propionate (FLONASE) 50 mcg/actuation nasal spray USE 1 SPRAY IN EACH NOSTRIL ONCE DAILY 32 g 3    hydrALAZINE (APRESOLINE) 100 MG tablet Take 1 tablet (100 mg total) by mouth every 8 (eight) hours. 270 tablet 0    hydrOXYzine HCL (ATARAX) 25 MG tablet TAKE 1 TABLET BY MOUTH THREE TIMES DAILY AS NEEDED FOR ITCHING 90 tablet 0    ibuprofen (ADVIL,MOTRIN) 800 MG tablet Take 1 tablet (800 mg total) by mouth every 8 (eight) hours as needed for Pain. 30 tablet 0    LIDOcaine (LIDODERM) 5 % Place 1 patch onto the skin once daily. Remove & Discard patch within 12 hours or as directed by MD 15 patch 1    metoprolol succinate (TOPROL-XL) 25 MG 24 hr tablet Take 1 tablet (25 mg total) by mouth once daily. 90 tablet 0    ondansetron (ZOFRAN-ODT) 8 MG TbDL Take 1 tablet (8 mg total) by mouth 2 (two) times daily as needed (nausea). 14 tablet 0    pantoprazole  (PROTONIX) 40 MG tablet Take 1 tablet (40 mg total) by mouth once daily. 90 tablet 0    semaglutide (OZEMPIC) 0.25 mg or 0.5 mg (2 mg/3 mL) pen injector Inject 0.25 mg into the skin every 7 days. 3 mL 0    semaglutide (OZEMPIC) 0.25 mg or 0.5 mg (2 mg/3 mL) pen injector Inject 0.5 mg into the skin every 7 days. 3 mL 0    [START ON 3/3/2025] semaglutide (OZEMPIC) 1 mg/dose (2 mg/1.5 mL) PnIj Inject 1 mg into the skin every 7 days. 3 mL 5    traMADoL (ULTRAM) 50 mg tablet Take 1 tablet (50 mg total) by mouth every 6 (six) hours. 20 tablet 0    traMADoL (ULTRAM) 50 mg tablet Take 1 tablet (50 mg total) by mouth every 6 (six) hours. 20 tablet 0    TRUEPLUS LANCETS 33 gauge Misc TEST BLOOD SUGAR TWICE DAILY 200 each 1    aspirin (ECOTRIN) 81 MG EC tablet Take 1 tablet (81 mg total) by mouth 2 (two) times a day. 60 tablet 0    blood glucose control, low (TRUE METRIX LEVEL 1) Soln 1 each by Misc.(Non-Drug; Combo Route) route once as needed. 1 each 3    cetirizine (ZYRTEC) 10 MG tablet Take 1 tablet (10 mg total) by mouth once daily. 90 tablet 0    gabapentin (NEURONTIN) 300 MG capsule Take 1 capsule (300 mg total) by mouth 3 (three) times daily. 270 capsule 1     No current facility-administered medications for this visit.     Facility-Administered Medications Ordered in Other Visits   Medication Dose Route Frequency Provider Last Rate Last Admin    acetaminophen tablet 1,000 mg  1,000 mg Oral On Call Procedure Eric Carbajal MD        acetaminophen tablet 1,000 mg  1,000 mg Oral Q6H Eric Carbajal MD   1,000 mg at 11/09/23 0506    aspirin EC tablet 81 mg  81 mg Oral BID Eric Carbajal MD   81 mg at 11/09/23 1358    famotidine tablet 20 mg  20 mg Oral BID Eric Carbajal MD   20 mg at 11/09/23 1357    LIDOcaine (PF) 10 mg/ml (1%) injection 10 mg  1 mL Intradermal On Call Procedure Eric Carbajal MD        methocarbamoL tablet 750 mg  750 mg Oral TID Eric Carbajal MD   750 mg at 11/09/23 1357    naloxone  0.4 mg/mL injection 0.02 mg  0.02 mg Intravenous PRN Eric Carbajal MD        ondansetron injection 4 mg  4 mg Intravenous Q8H PRN Eric Carbajal MD   4 mg at 11/09/23 0858    oxyCODONE immediate release tablet 10 mg  10 mg Oral Q3H PRN Eric Carbajal MD   10 mg at 11/09/23 1043    oxyCODONE immediate release tablet 5 mg  5 mg Oral Q3H PRN Eric Carbajal MD        polyethylene glycol packet 17 g  17 g Oral Daily Eric Carbajal MD   17 g at 11/09/23 1356    pregabalin capsule 75 mg  75 mg Oral QHS Eric Carbajal MD   75 mg at 11/08/23 2147    prochlorperazine injection Soln 5 mg  5 mg Intravenous Q6H PRN Eric Carbajal MD   5 mg at 11/08/23 1325    senna-docusate 8.6-50 mg per tablet 1 tablet  1 tablet Oral BID Eric Carbajal MD   1 tablet at 11/09/23 1358    sodium chloride 0.9% flush 10 mL  10 mL Intravenous PRN Eric Carbajal MD           Medical History     Past Medical History:   Diagnosis Date    Arthritis     Colon polyp     Diabetes mellitus     diet controlled    Diabetes with neurologic complications     Hypertension     Nuclear sclerosis of both eyes 2/14/2022    Renal cell carcinoma      Past Surgical History:   Procedure Laterality Date    COLONOSCOPY N/A 2/5/2018    Procedure: COLONOSCOPY;  Surgeon: Bacilio Mancia MD;  Location: [facility];  Service: Endoscopy;  Laterality: N/A;  appt confirmed-ss    COLONOSCOPY N/A 8/13/2020    Procedure: COLONOSCOPY;  Surgeon: Jose West MD;  Location: [facility] ENDO;  Service: Endoscopy;  Laterality: N/A;    ENDOSCOPIC CARPAL TUNNEL RELEASE Right 3/15/2024    Procedure: RELEASE, CARPAL TUNNEL, ENDOSCOPIC - RIGHT;  Surgeon: Tonio Mcdermott MD;  Location: [facility] OR;  Service: Orthopedics;  Laterality: Right;    ENDOSCOPIC CARPAL TUNNEL RELEASE Left 6/26/2024    Procedure: RELEASE, CARPAL TUNNEL, ENDOSCOPIC - left;  Surgeon: Tonio Mcdermott MD;  Location: [facility] OR;  Service: Orthopedics;  Laterality: Left;    HYSTERECTOMY      INTRAOCULAR  PROSTHESES INSERTION Left 7/7/2022    Procedure: INSERTION, IOL PROSTHESIS;  Surgeon: Candelario Novoa MD;  Location: [facility] OR;  Service: Ophthalmology;  Laterality: Left;    INTRAOCULAR PROSTHESES INSERTION Right 7/21/2022    Procedure: INSERTION, IOL PROSTHESIS;  Surgeon: Candelario Novoa MD;  Location: [facility] OR;  Service: Ophthalmology;  Laterality: Right;    OOPHORECTOMY      PARTIAL NEPHRECTOMY Right 10/12/2018    Procedure: NEPHRECTOMY, PARTIAL open. Dr Arenas to assist.;  Surgeon: Alejandra Palm MD;  Location: [facility] OR;  Service: Urology;  Laterality: Right;  RN PREOP 10/9/2018----NEEDS H/P    PHACOEMULSIFICATION OF CATARACT Left 7/7/2022    Procedure: PHACOEMULSIFICATION, CATARACT;  Surgeon: Candelario Novoa MD;  Location: [facility] OR;  Service: Ophthalmology;  Laterality: Left;  RN PHONE PREOP 6/27/2022   ARRIVAL 6:00 AM    PHACOEMULSIFICATION OF CATARACT Right 7/21/2022    Procedure: PHACOEMULSIFICATION, CATARACT;  Surgeon: Candelario Novoa MD;  Location: [facility] OR;  Service: Ophthalmology;  Laterality: Right;  RN PHONE PREOP 7/12/2022   ARRIVAL 12:00 NOON    TOTAL KNEE ARTHROPLASTY Right 11/8/2023    Procedure: ARTHROPLASTY, KNEE, TOTAL. NELSON;  Surgeon: Eric Carbajal MD;  Location: [facility] OR;  Service: Orthopedics;  Laterality: Right;  [place]. Admit  BRADLEY NIEVES [phone] NOTIFIED QUINTIN ON 10/11/2023-  RN PREOP 10/25/2023   T/S ON 11/6/2023--done------CLEARED BY CARDS     Family History   Problem Relation Name Age of Onset    No Known Problems Mother      Glaucoma Father      Breast cancer Sister Warnell     Glaucoma Sister Warnell     Cancer Sister Warnell         breast cancer    Thyroid disease Sister Angeles     Blindness Sister Bryanna         due to trauma during car accident    Diabetes Sister Amanda     No Known Problems Maternal Aunt      No Known Problems Maternal Uncle      No Known Problems Paternal Aunt      No Known Problems Paternal Uncle      No  Known Problems Maternal Grandmother      No Known Problems Maternal Grandfather      No Known Problems Paternal Grandmother      No Known Problems Paternal Grandfather      Diabetes Brother Navneet     Amblyopia Neg Hx      Cataracts Neg Hx      Hypertension Neg Hx      Macular degeneration Neg Hx      Retinal detachment Neg Hx      Strabismus Neg Hx      Stroke Neg Hx       Social History     Socioeconomic History    Marital status:    Tobacco Use    Smoking status: Never     Passive exposure: Past    Smokeless tobacco: Never   Substance and Sexual Activity    Alcohol use: No    Drug use: No    Sexual activity: Not Currently     Social Drivers of Health     Financial Resource Strain: Low Risk  (11/22/2024)    Overall Financial Resource Strain (CARDIA)     Difficulty of Paying Living Expenses: Not very hard   Food Insecurity: No Food Insecurity (11/22/2024)    Hunger Vital Sign     Worried About Running Out of Food in the Last Year: Never true     Ran Out of Food in the Last Year: Never true   Transportation Needs: No Transportation Needs (11/22/2024)    TRANSPORTATION NEEDS     Transportation : No   Physical Activity: Insufficiently Active (11/25/2024)    Exercise Vital Sign     Days of Exercise per Week: 4 days     Minutes of Exercise per Session: 20 min   Stress: No Stress Concern Present (11/25/2024)    [employer] - Occupational Stress Questionnaire     Feeling of Stress : Only a little   Housing Stability: Low Risk  (11/25/2024)    Housing Stability Vital Sign     Unable to Pay for Housing in the Last Year: No     Homeless in the Last Year: No       Exam     Vitals:    02/04/25 1002   BP: (!) 153/82   Pulse: 67        Physical Exam:  General: She is not in acute distress. She is not ill-appearing.   HENT: Normocephalic and atraumatic. Moist mucous membranes.  Eyes: Conjunctivae normal.   Pulmonary: Pulmonary effort is normal.   Abdominal: Abdomen is soft and flat.   Skin: Skin  is warm and dry. No rashes.   Psychiatric: Mood normal.        Neurologic Exam   Mental status: oriented to person, place, and time  Attention: Normal. Concentration: normal.  Speech: speech is normal.  Cranial Nerves: PERRL, EOMI intact, V1-V3 Facial sensation intact. Symmetric facies. Hearing grossly intact. Palate and uvula midline, symmetric. No tongue deviation. Trapezius strength intact.     Motor exam: bulk and tone normal. Strength 5/5 in bilateral upper extremities: deltoids, biceps, triceps, wrist flexion/extension, finger abduction/adduction. Strength 5/5 in bilateral lower extremities: hip flexion/extension, thigh adduction/abduction, knee flexion/extension, dorsiflexion/plantarflexion, foot eversion/inversion.    Reflexes: 2+ in bilateral upper extremities: biceps and brachiaradialis, 1+ patellar (patient difficulty w relaxing), difficult to elicit achilles  Plantar reflex: normal    Sensory exam: light touch intact, vibration sense decreased    Gait exam: normal although she walks w stiff gait and tense  Romberg: positive  Coordination: intact    Tremor: none    Labs and Imaging     Labs: reviewed  A1C 5/22 - 7.6. TSH wnl  Imaging: none    Assessment and Plan     Problem List Items Addressed This Visit          Neuro    Nerve pain    Relevant Medications    gabapentin (NEURONTIN) 300 MG capsule    Memory loss - Primary    Relevant Orders    MRI Brain Without Contrast       Other    Insomnia     Follow up for her neuropathy.  Had labs done on prior visit. Endorses worsening of nerve pain and paraesthesias in fingers and toes.    Idiopathic length dependent peripheral sensory neuropathy based on clinical and exam findings, likely 2/2 to DMII. A1C is elevated.     Her nerve pain - multifactorial etiology, from lumbar radiculopathy to a length dependent peripheral neuropathy from underlying DMII  - Prior dosing of gabapentin to 300mg in AM, 300mg during day, and 600mg at night for neuropathic pain. Was  planned on prior visit to do lyrica, however patient was lost to followup.  On gabapentin 300mg BID w PCP, can increase to gabapentin 300mg TID. Continue at this time, can optimize on next visit.   - also contributing factors w muscle/overall body pain from degenerative arthritis, patient encouraged about lifestyle modifications and physical therapy exercises at home    NCS/EMG today shows she has a bilateral sensory carpal tunnel, send to ortho for assistance. Chronic lower back pain with chronic denervation in L4-l5 and L5-S1 - pain management for assistance. States neuropathic pain meds are not working.     Has upcoming appt w Dr Corine valentine for sleep study eval.  Significant insomnia.    Memory loss / cognitive impairment - lives by herself. Able to do ADLs.  MOCA 14/20 today. Will get mri brain for further characterization.   Multifactorial component.     Follow-up: after imaging    Time spent on this encounter: 70 minutes. This includes face to face time and non-face to face time preparing to see the patient (eg, review of tests), obtaining and/or reviewing separately obtained history, documenting clinical information in the electronic or other health record, independently interpreting results and communicating results to the patient/family/caregiver, or care coordinator.

## 2025-02-06 ENCOUNTER — ANESTHESIA EVENT (OUTPATIENT)
Dept: SURGERY | Facility: HOSPITAL | Age: 75
End: 2025-02-06
Payer: MEDICARE

## 2025-02-06 ENCOUNTER — PATIENT MESSAGE (OUTPATIENT)
Dept: ORTHOPEDICS | Facility: CLINIC | Age: 75
End: 2025-02-06
Payer: MEDICARE

## 2025-02-06 DIAGNOSIS — E11.40 TYPE 2 DIABETES MELLITUS WITH DIABETIC NEUROPATHY, WITHOUT LONG-TERM CURRENT USE OF INSULIN: ICD-10-CM

## 2025-02-06 RX ORDER — EMPAGLIFLOZIN 25 MG/1
25 TABLET, FILM COATED ORAL
Qty: 90 TABLET | Refills: 0 | Status: SHIPPED | OUTPATIENT
Start: 2025-02-06

## 2025-02-06 NOTE — TELEPHONE ENCOUNTER
Refill Routing Note   Medication(s) are not appropriate for processing by [facility] for the following reason(s):        New or recently adjusted medication    [facility] action(s):  Defer      Medication Therapy Plan: FLOS      Appointments  past 12m or future 3m with PCP    Date Provider   Last Visit   1/17/2025 Alena Hernandez MD   Next Visit   7/24/2025 Alena Hernandez MD   ED visits in past 90 days: 0        Note composed:10:48 AM 02/06/2025

## 2025-02-06 NOTE — TELEPHONE ENCOUNTER
Care Due:                  Date            Visit Type   Department     Provider  --------------------------------------------------------------------------------                                [facility]                              PRIMARY      MED/ INTERNAL  Last Visit: 01-      CARE ([facility])   MED/ PEDS      Alena Hernandez                              [facility]                              PRIMARY      MED/ INTERNAL  Next Visit: 07-      CARE ([facility])   MED/ PEDS      Alena Hernandez                                                            Last  Test          Frequency    Reason                     Performed    Due Date  --------------------------------------------------------------------------------    HBA1C.......  6 months...  empagliflozin,             10-   04-                             semaglutide..............    [employer] Embedded Care Due Messages. Reference number: [id number].   2/06/2025 7:02:58 AM CST

## 2025-02-07 ENCOUNTER — LAB VISIT (OUTPATIENT)
Dept: LAB | Facility: HOSPITAL | Age: 75
End: 2025-02-07
Payer: MEDICARE

## 2025-02-07 DIAGNOSIS — E11.40 TYPE 2 DIABETES MELLITUS WITH DIABETIC NEUROPATHY, WITHOUT LONG-TERM CURRENT USE OF INSULIN: Chronic | ICD-10-CM

## 2025-02-07 LAB
ESTIMATED AVG GLUCOSE: 160 MG/DL (ref 68–131)
HBA1C MFR BLD: 7.2 % (ref 4–5.6)

## 2025-02-07 PROCEDURE — 83036 HEMOGLOBIN GLYCOSYLATED A1C: CPT

## 2025-02-07 PROCEDURE — 36415 COLL VENOUS BLD VENIPUNCTURE: CPT | Mod: PO

## 2025-02-07 NOTE — PROGRESS NOTES
"Diabetes Care Specialist Progress Note  Author: Trina Clark RN  Date: 2/4/2025    Intake  Program Intake  Reason for Diabetes Program Visit:: Intervention  Type of Intervention:: Individual  Individual: Education  Education: Self-Management Skill Review, Nutrition and Meal Planning  Current diabetes risk level:: moderate  In the last month, have you used the ER or been admitted to the hospital: No    Current Diabetes Treatment: Oral Medications  Diet/Exercise Type/Dose: walks the hallway at home for 10 mins daily  Oral Medication Type/Dose: Jardiance 25mg daily  DM Injectables Type/Dose: Ozempic 0.25mg weekly    Continuous Glucose Monitoring  Patient has CGM: No    Lab Results   Component Value Date    HGBA1C 7.5 (H) 10/28/2024       Weight: 90.2 kg (198 lb 12.8 oz)   Height: 5' 4" (162.6 cm)   Body mass index is 34.12 kg/m².    Lifestyle Coping Support & Clinical  Lifestyle/Coping/Support  Psychosocial/Coping Skills Assessment Completed: : No  Assessment indicates:: Adequate understanding  Area of need?: No    Diabetes Self-Management Skills Assessment  Medication Skills Assessment  Patient is able to identify current diabetes medications, dosages, and appropriate timing of medications.: yes  Patient reports problems or concerns with current medication regimen.: no  Patient is  aware that some diabetes medications can cause low blood sugar?: Yes  Medication Skills Assessment Completed:: Yes  Assessment indicates:: Instruction Needed  Area of need?: No    Diabetes Disease Process/Treatment Options  Diabetes Type?: Type II  When were you diagnosed?: Dx: 10 yrs ago  If previous diabetes education, when/where:: 1/13/25 @ [facility]  What are your goals for this education session?: Review diabetes self-management skills and nutrition.  Is patient aware of what causes diabetes?: Yes  Does patient understand the pathophysiology of diabetes?: No  Diabetes Disease Process/Treatment Options: Skills Assessment Completed: " Yes  Assessment indicates:: Instruction Needed  Area of need?: Yes    Nutrition/Healthy Eating  Meal Plan 24 Hour Recall - Breakfast: 1pc toast and oatmeal  Meal Plan 24 Hour Recall - Lunch: pre-mixed salad  Meal Plan 24 Hour Recall - Snack: peaches and pears in a can or SF jello  Meal Plan 24 Hour Recall - Beverage: water  Who shops/cooks?: patient  Patient can identify foods that impact blood sugar.: yes  Challenges to healthy eating:: portion control  Nutrition/Healthy Eating Skills Assessment Completed:: Yes  Assessment indicates:: Instruction Needed, Knowledge deficit  Area of need?: Yes    Physical Activity/Exercise  Patient's daily activity level:: lightly active  Patient formally exercises outside of work.: yes  Frequency: four or more times a week  Patient can identify forms of physical activity.: yes  Physical Activity/Exercise Skills Assessment Completed: : Yes  Assessment indicates:: Instruction Needed  Area of need?: Yes    Home Blood Glucose Monitoring  Patient states that blood sugar is checked at home daily.: yes  Monitoring Method:: home glucometer  Home glucometer meter type:: Insurance Preferred Meter  Fasting BG range history:: 123-160  What are your blood glucose targets?:   How often do you check your blood sugar?: daily  What is your A1c Target?: 7.0%  Home Blood Glucose Monitoring Skills Assessment Completed: : Yes  Assessment indicates:: Instruction Needed  Area of need?: Yes    Acute Complications  Have you ever had hypoglycemia (low BG 70 or less)?: yes  How often and what are your symptoms?: rarely.....shaky  How do you treat hypoglycemia?: orange juice  Have you ever had hyperglycemia (high  or more)?: no   Do you know the symptoms of high blood sugar and how to treat: yes  Have you ever had DKA?: no  Do you ever test for ketones?: no  Do you have a sick day plan?: no  Acute Complications Skills Assessment Completed: : Yes  Assessment indicates:: Instruction Needed, Knowledge  deficit  Area of need?: Yes    Chronic Complications  Have you completed your annual diabetes maintenance labwork? : yes  Do you examine your feet daily?: yes  Has your doctor examined your feet?: yes  Do you see a Dentist?: yes  Dentist date of last visit:: 2024  Do you see an eye doctor?: yes  Eye doctor date of last visit:: yearly  Chronic Complications Skills Assessment Completed: : Yes  Assessment indicates:: Instruction Needed  Area of need?: No      Assessment Summary and Plan    Based on today's diabetes care assessment, the following areas of need were identified:      Identified Areas of Need      Medication/Current Diabetes Treatment: No   Lifestyle Coping/Support: No   Diabetes Disease Process/Treatment Options: Yes- Reviewed what is diabetes and the progression of the disease and the significance of current A1c.   Nutrition/Healthy Eating: Yes - see care planning   Physical Activity/Exercise: Yes - Pt to continue exercising. We discussed the ADA recommendation of 150 mins/week and benefits of exercise as it relates to insulin resistance and weight loss.   Home Blood Glucose Monitoring: Yes - Pt to continue testing BS daily. We discussed the BS goals and reminded pt to bring BS logs to every diabetes clinic visit for review and maintenance of meds.    Acute Complications: Yes -Discussed blood sugar values, prevention, detection, signs and symptoms, and treatment of hypoglycemia following rule of 15. Also discussed sick day management.    Chronic Complications: No       Today's interventions were provided through individual discussion, instruction, and written materials were provided.      Patient verbalized understanding of instruction and written materials.  Pt was able to return back demonstration of instructions today. Patient understood key points, needs reinforcement and further instruction.     Diabetes Self-Management Care Plan:    Today's Diabetes Self-Management Care Plan was developed with  Valarie's input. Valarie has agreed to work toward the following goal(s) to improve his/her overall diabetes control.      Care Plan: Diabetes Management   Updates made since 2/8/2024 12:00 AM        Problem: Healthy Eating         Goal: Eat 2-3 meals daily with 30-45g/2-3 servings of Carbohydrate per meal. Limit snacking in between meal to 1 serving/15 grams or up to 20 grams.    Start Date: 1/13/2025   Expected End Date: 2/4/2025   This Visit's Progress: On track   Priority: High   Barriers: No Barriers Identified   Note:    1/13/25 - - Patient eats 2-3 meals a day. We reviewed eating right with diabetes. We discussed the importance of eating balanced meals with vegetables, fruits, lean meats, and whole grains. We concentrated on portion sizes and overall meal planning with various choices for meals. We discussed carb sources of foods, appropriate amount of carbs to have at meals/snacks and acceptable serving sizes of individual carb items. Obtained a 24-hr food recall from patient. We discussed various meal plan options to promote healthy eating.      - - Practiced reading food labels on various food items with patient focusing on serving size and total carbohydrate intake (not sugar intake). Instructed on appropriate serving sizes of specific carb containing foods. Stressed importance of eating a protein at each meal and include non-starchy vegetables at lunch/dinner. Instructed pt to aim for 2-3 evenly spaced meals or a small carb snack in place of a missed meal. Written education information provided to patient for use at home. Pt verbalized understanding of all the above.    Care Plan Update 2/4/25  - - Pt is doing well. Portion sizes of food are being monitored. Daughter and patient is more aware of carb sources of foods. We discussed the importance of eating balanced meals with vegetables, fruits, lean meats, and whole grains and aiming for evenly spaced meals or a small carb snack in place of a missed meal.  Daughter and Pt verbalized understanding. Patient to continue improving.          Task: Provided visual examples using dry measuring cups, food models, and other familiar objects such as computer mouse, deck or cards, tennis ball etc. to help with visualization of portions. Completed 1/13/2025        Task: Explained how to count carbohydrates using the food label and the use of dry measuring cups for accurate carb counting. Completed 1/13/2025     Task: Review the importance of balancing carbohydrates with each meal using portion control techniques to count servings of carbohydrate and label reading to identify serving size and amount of total carbs per serving. Completed 1/13/2025        Task: Provided Sample plate method and reviewed the use of the plate to estimate amounts of carbohydrate per meal. Completed 1/13/2025        Follow Up Plan   Follow up in about 3 months (around 5/7/2025) for 3mth f/u post program visit.    Today's care plan and follow up schedule was discussed with patient.  Valarie verbalized understanding of the care plan, goals, and agrees to follow up plan.        The patient was encouraged to communicate with his/her health care provider/physician and care team regarding his/her condition(s) and treatment.  I provided the patient with my contact information today and encouraged to contact me via phone or [facility] as needed.     Length of Visit   Total Time: 50 Minutes

## 2025-02-11 ENCOUNTER — HOSPITAL ENCOUNTER (OUTPATIENT)
Dept: RADIOLOGY | Facility: HOSPITAL | Age: 75
Discharge: HOME | End: 2025-02-11
Attending: FAMILY MEDICINE
Payer: MEDICARE

## 2025-02-11 PROCEDURE — 77063 BREAST TOMOSYNTHESIS BI: CPT | Mod: 26,,, | Performed by: RADIOLOGY

## 2025-02-11 PROCEDURE — 77063 BREAST TOMOSYNTHESIS BI: CPT | Mod: TC,PO

## 2025-02-11 PROCEDURE — 77067 SCR MAMMO BI INCL CAD: CPT | Mod: 26,,, | Performed by: RADIOLOGY

## 2025-02-13 ENCOUNTER — HOSPITAL ENCOUNTER (OUTPATIENT)
Dept: RADIOLOGY | Facility: HOSPITAL | Age: 75
Discharge: HOME | End: 2025-02-13
Attending: STUDENT IN AN ORGANIZED HEALTH CARE EDUCATION/TRAINING PROGRAM
Payer: MEDICARE

## 2025-02-13 ENCOUNTER — TELEPHONE (OUTPATIENT)
Dept: FAMILY MEDICINE | Facility: CLINIC | Age: 75
End: 2025-02-13
Payer: MEDICARE

## 2025-02-13 DIAGNOSIS — R41.3 MEMORY LOSS: ICD-10-CM

## 2025-02-13 DIAGNOSIS — R41.3 OTHER AMNESIA: ICD-10-CM

## 2025-02-13 PROCEDURE — 70551 MRI BRAIN STEM W/O DYE: CPT | Mod: 26,,, | Performed by: STUDENT IN AN ORGANIZED HEALTH CARE EDUCATION/TRAINING PROGRAM

## 2025-02-13 PROCEDURE — 70551 MRI BRAIN STEM W/O DYE: CPT | Mod: TC

## 2025-02-13 NOTE — TELEPHONE ENCOUNTER
Returned call to [place] with instructions to call [facility] per refills. Patient verbalized understanding.

## 2025-02-13 NOTE — TELEPHONE ENCOUNTER
----- Message from Katharina sent at 2/13/2025  2:58 PM CST -----  Regarding: self  Who called:self    What is the request in detail: pt took last dose of semaglutide (OZEMPIC) 1 mg/dose (2 mg/1.5 mL) PnIj and is asking if she is getting a refill    Can the clinic reply by [facility]? No    Would the patient rather a call back or a response via [facility]? Call back    Best call back number: [phone]      Additional Information:     [facility] [address]  [address]   [place] [address]  Phone: [phone] Fax: [phone]          Thank you.

## 2025-02-18 ENCOUNTER — TELEPHONE (OUTPATIENT)
Dept: ORTHOPEDICS | Facility: CLINIC | Age: 75
End: 2025-02-18
Payer: MEDICARE

## 2025-02-18 ENCOUNTER — PATIENT MESSAGE (OUTPATIENT)
Dept: ORTHOPEDICS | Facility: CLINIC | Age: 75
End: 2025-02-18
Payer: MEDICARE

## 2025-02-18 NOTE — TELEPHONE ENCOUNTER
Spoke with patient informing her of Patient IQ outstanding tasks, as well as patient needs to get an apt. With PCP for surgery clearance. Patient understood and had no further questions. Patient said she was calling PCP phone number was provided.      Wanda Craft, CST  Clinical - OR Assistant to Dr. Ryan Charles [facility]   [phone]

## 2025-02-19 ENCOUNTER — HOSPITAL ENCOUNTER (OUTPATIENT)
Dept: PREADMISSION TESTING | Facility: HOSPITAL | Age: 75
Discharge: HOME | End: 2025-02-19
Attending: ORTHOPAEDIC SURGERY
Payer: MEDICARE

## 2025-02-19 ENCOUNTER — HOSPITAL ENCOUNTER (OUTPATIENT)
Dept: RADIOLOGY | Facility: HOSPITAL | Age: 75
Discharge: HOME | End: 2025-02-19
Attending: ORTHOPAEDIC SURGERY
Payer: MEDICARE

## 2025-02-19 ENCOUNTER — OFFICE VISIT (OUTPATIENT)
Dept: PULMONOLOGY | Facility: CLINIC | Age: 75
End: 2025-02-19
Payer: MEDICARE

## 2025-02-19 VITALS
OXYGEN SATURATION: 96 % | DIASTOLIC BLOOD PRESSURE: 81 MMHG | SYSTOLIC BLOOD PRESSURE: 149 MMHG | BODY MASS INDEX: 33.86 KG/M2 | HEIGHT: 64 IN | HEART RATE: 90 BPM | WEIGHT: 198.31 LBS

## 2025-02-19 VITALS
RESPIRATION RATE: 18 BRPM | WEIGHT: 199.06 LBS | TEMPERATURE: 97 F | HEIGHT: 64 IN | HEART RATE: 79 BPM | OXYGEN SATURATION: 99 % | BODY MASS INDEX: 33.98 KG/M2 | SYSTOLIC BLOOD PRESSURE: 149 MMHG | DIASTOLIC BLOOD PRESSURE: 94 MMHG

## 2025-02-19 DIAGNOSIS — G47.50 PARASOMNIA, UNSPECIFIED TYPE: ICD-10-CM

## 2025-02-19 DIAGNOSIS — Z79.01 ANTICOAGULANT LONG-TERM USE: ICD-10-CM

## 2025-02-19 DIAGNOSIS — G47.33 OSA (OBSTRUCTIVE SLEEP APNEA): Primary | ICD-10-CM

## 2025-02-19 DIAGNOSIS — Z01.818 PREOPERATIVE TESTING: Primary | ICD-10-CM

## 2025-02-19 DIAGNOSIS — M17.12 PRIMARY OSTEOARTHRITIS OF LEFT KNEE: ICD-10-CM

## 2025-02-19 DIAGNOSIS — G47.01 INSOMNIA DUE TO MEDICAL CONDITION: ICD-10-CM

## 2025-02-19 DIAGNOSIS — G47.9 DIFFICULTY SLEEPING: ICD-10-CM

## 2025-02-19 LAB
ALBUMIN SERPL BCP-MCNC: 4.1 G/DL (ref 3.5–5.2)
ALP SERPL-CCNC: 128 U/L (ref 40–150)
ALT SERPL W/O P-5'-P-CCNC: 17 U/L (ref 10–44)
ANION GAP SERPL CALC-SCNC: 12 MMOL/L (ref 8–16)
APTT PPP: 31.5 SEC (ref 21–32)
AST SERPL-CCNC: 16 U/L (ref 10–40)
BASOPHILS # BLD AUTO: 0 K/UL (ref 0–0.2)
BASOPHILS NFR BLD: 0 % (ref 0–1.9)
BILIRUB SERPL-MCNC: 0.4 MG/DL (ref 0.1–1)
BUN SERPL-MCNC: 12 MG/DL (ref 8–23)
CALCIUM SERPL-MCNC: 9.9 MG/DL (ref 8.7–10.5)
CHLORIDE SERPL-SCNC: 104 MMOL/L (ref 95–110)
CO2 SERPL-SCNC: 23 MMOL/L (ref 23–29)
CREAT SERPL-MCNC: 0.9 MG/DL (ref 0.5–1.4)
DIFFERENTIAL METHOD BLD: ABNORMAL
EOSINOPHIL # BLD AUTO: 0 K/UL (ref 0–0.5)
EOSINOPHIL NFR BLD: 0.2 % (ref 0–8)
ERYTHROCYTE [DISTWIDTH] IN BLOOD BY AUTOMATED COUNT: 15.8 % (ref 11.5–14.5)
EST. GFR  (NO RACE VARIABLE): >60 ML/MIN/1.73 M^2
GLUCOSE SERPL-MCNC: 106 MG/DL (ref 70–110)
HCT VFR BLD AUTO: 43.9 % (ref 37–48.5)
HGB BLD-MCNC: 14.1 G/DL (ref 12–16)
IMM GRANULOCYTES # BLD AUTO: 0.04 K/UL (ref 0–0.04)
IMM GRANULOCYTES NFR BLD AUTO: 0.6 % (ref 0–0.5)
INR PPP: 1 (ref 0.8–1.2)
LYMPHOCYTES # BLD AUTO: 1 K/UL (ref 1–4.8)
LYMPHOCYTES NFR BLD: 16.2 % (ref 18–48)
MCH RBC QN AUTO: 26.8 PG (ref 27–31)
MCHC RBC AUTO-ENTMCNC: 32.1 G/DL (ref 32–36)
MCV RBC AUTO: 83 FL (ref 82–98)
MONOCYTES # BLD AUTO: 0.5 K/UL (ref 0.3–1)
MONOCYTES NFR BLD: 7.5 % (ref 4–15)
NEUTROPHILS # BLD AUTO: 4.7 K/UL (ref 1.8–7.7)
NEUTROPHILS NFR BLD: 75.5 % (ref 38–73)
NRBC BLD-RTO: 0 /100 WBC
PLATELET # BLD AUTO: 280 K/UL (ref 150–450)
PMV BLD AUTO: 10.9 FL (ref 9.2–12.9)
POTASSIUM SERPL-SCNC: 4.6 MMOL/L (ref 3.5–5.1)
PROT SERPL-MCNC: 8 G/DL (ref 6–8.4)
PROTHROMBIN TIME: 11.1 SEC (ref 9–12.5)
RBC # BLD AUTO: 5.27 M/UL (ref 4–5.4)
SODIUM SERPL-SCNC: 139 MMOL/L (ref 136–145)
WBC # BLD AUTO: 6.24 K/UL (ref 3.9–12.7)

## 2025-02-19 PROCEDURE — 73700 CT LOWER EXTREMITY W/O DYE: CPT | Mod: TC,LT

## 2025-02-19 PROCEDURE — 85610 PROTHROMBIN TIME: CPT | Performed by: ORTHOPAEDIC SURGERY

## 2025-02-19 PROCEDURE — 85025 COMPLETE CBC W/AUTO DIFF WBC: CPT | Performed by: ORTHOPAEDIC SURGERY

## 2025-02-19 PROCEDURE — 36415 COLL VENOUS BLD VENIPUNCTURE: CPT | Performed by: ORTHOPAEDIC SURGERY

## 2025-02-19 PROCEDURE — 71046 X-RAY EXAM CHEST 2 VIEWS: CPT | Mod: TC,FY

## 2025-02-19 PROCEDURE — 80053 COMPREHEN METABOLIC PANEL: CPT | Performed by: ORTHOPAEDIC SURGERY

## 2025-02-19 PROCEDURE — 85730 THROMBOPLASTIN TIME PARTIAL: CPT | Performed by: ORTHOPAEDIC SURGERY

## 2025-02-19 RX ORDER — ZOLPIDEM TARTRATE 5 MG/1
5 TABLET ORAL NIGHTLY PRN
Qty: 2 TABLET | Refills: 0 | Status: SHIPPED | OUTPATIENT
Start: 2025-02-19 | End: 2025-02-21

## 2025-02-19 NOTE — PROGRESS NOTES
Valarie Foster  was seen as a new patient at the request of  Roland Dawkins NP for the evaluation of  loree.    CHIEF COMPLAINT:    Chief Complaint   Patient presents with    Apnea       HISTORY OF PRESENT ILLNESS: Valarie Foster is a 74 y.o. female is here for sleep evaluation.   Patient was seen and evaluate by pcp for insomnia and chronic fatigue.  Patient was referred for sleep inputs.  Patient is having difficulty with sleep initiation and maintenance x several years.  Poor memory and cannot recall exactly when sleep issues began.  Cannot recall triggering events.  Have not tried any sleep aids in the past    STOPBANG during initial visit:    Snore:  lives alone and is not sure  Tire:  daily  Observed apnea:  denied  Pressure (HTN):  yes    BMI:  Body mass index is 34.04 kg/m².   Age:  74 y.o.  Neck (inch):  14  Gender:  female    Total STOP BANG score = 3/8  Low risk LOREE: 0-2, Intermediate risk LOREE: 3-4, High risk LOREE: 5+    Other sleep ROS:    sleep walking once in 12/2024.  No recurrence.  Fallen out of bed 2 times in 1/2025.    Chronic knee pain and planning on knee cap surgery.  Chronic knee pain during day and night  No cataplexy.        [place] Sleepiness Scale score during initial sleep evaluation was 1.    SLEEP ROUTINE:  Activity the hour prior to sleep: read in kitchen     Bed partner:  alone  Time to bed:  10 pm   Lights off:  off   Sleep onset latency:  hours         Disruptions or awakenings:    multiple times (can difficulty going back to sleep)    Wakeup time:      2-3 am   Perceived sleep quality:  tire       Daytime naps:      denied   Weekend sleep routine:      same  Caffeine use: denied  exercise habit:   walk in the house      PAST MEDICAL HISTORY:    Active Ambulatory Problems     Diagnosis Date Noted    Type 2 diabetes mellitus with diabetic neuropathy, without long-term current use of insulin 01/29/2018    Benign hypertension 01/29/2018    Hyperlipemia 01/29/2018    Microhematuria  02/08/2018    Left flank pain 02/08/2018    Abdominal aortic atherosclerosis 07/01/2019    History of renal cell cancer 08/27/2019    Renal cell carcinoma of right kidney 11/23/2019    Mild major depression 07/02/2020    Bilateral chronic knee pain 07/31/2020    Colon cancer screening 08/13/2020    Pain of left calf 12/08/2021    Refractive error 02/14/2022    Primary osteoarthritis of knees, bilateral 02/21/2022    Fall 04/04/2022    Radiculopathy 10/27/2022    Idiopathic progressive neuropathy 10/27/2022    Nerve pain 10/27/2022    Chest pain 01/11/2023    Severe obesity (BMI 35.0-39.9) with comorbidity 03/30/2023    Decreased ROM of right knee 11/27/2023    Decreased strength involving knee joint 11/27/2023    Gait difficulty 11/27/2023    Paresthesia 01/22/2024    Pseudophakia 04/30/2024    COVID-19 07/22/2024    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, without long-term current use of insulin 01/17/2025    Insomnia 02/04/2025    Memory loss 02/04/2025    GERALD (obstructive sleep apnea) 02/19/2025    Parasomnia 02/19/2025     Resolved Ambulatory Problems     Diagnosis Date Noted    Right renal mass 02/01/2018    Colon cancer screening 02/05/2018    Kidney mass 02/16/2018    Muscle weakness of lower extremity 07/31/2020    Impaired functional mobility, balance, gait, and endurance 07/31/2020    Decreased range of motion of both knees 07/31/2020    Nuclear sclerosis of both eyes 02/14/2022    Decreased ROM of lower extremity 05/03/2022    Decreased strength of lower extremity 05/03/2022    Decreased mobility and endurance 05/03/2022    Nuclear sclerotic cataract of left eye 07/07/2022    Nuclear sclerotic cataract of right eye 07/21/2022    Pulmonary hypertension 01/17/2025     Past Medical History:   Diagnosis Date    Arthritis     Colon polyp     Diabetes mellitus     Diabetes with neurologic complications     Hypertension     Renal cell carcinoma     Sleep apnea                  PAST SURGICAL HISTORY:    Past Surgical History:   Procedure Laterality Date    COLONOSCOPY N/A 2/5/2018    Procedure: COLONOSCOPY;  Surgeon: Bacilio Mancia MD;  Location: [facility] ENDO;  Service: Endoscopy;  Laterality: N/A;  appt confirmed-ss    COLONOSCOPY N/A 8/13/2020    Procedure: COLONOSCOPY;  Surgeon: Jose West MD;  Location: [facility] ENDO;  Service: Endoscopy;  Laterality: N/A;    ENDOSCOPIC CARPAL TUNNEL RELEASE Right 3/15/2024    Procedure: RELEASE, CARPAL TUNNEL, ENDOSCOPIC - RIGHT;  Surgeon: Tonio Mcdermott MD;  Location: [facility] OR;  Service: Orthopedics;  Laterality: Right;    ENDOSCOPIC CARPAL TUNNEL RELEASE Left 6/26/2024    Procedure: RELEASE, CARPAL TUNNEL, ENDOSCOPIC - left;  Surgeon: Tonio Mcdermott MD;  Location: [facility] OR;  Service: Orthopedics;  Laterality: Left;    HYSTERECTOMY      INTRAOCULAR PROSTHESES INSERTION Left 7/7/2022    Procedure: INSERTION, IOL PROSTHESIS;  Surgeon: Candelario Novoa MD;  Location: [facility] OR;  Service: Ophthalmology;  Laterality: Left;    INTRAOCULAR PROSTHESES INSERTION Right 7/21/2022    Procedure: INSERTION, IOL PROSTHESIS;  Surgeon: Candelario Novoa MD;  Location: [facility] OR;  Service: Ophthalmology;  Laterality: Right;    OOPHORECTOMY      PARTIAL NEPHRECTOMY Right 10/12/2018    Procedure: NEPHRECTOMY, PARTIAL open. Dr Arenas to assist.;  Surgeon: Alejandra Palm MD;  Location: [facility] OR;  Service: Urology;  Laterality: Right;  RN PREOP 10/9/2018----NEEDS H/P    PHACOEMULSIFICATION OF CATARACT Left 7/7/2022    Procedure: PHACOEMULSIFICATION, CATARACT;  Surgeon: Candelario Novoa MD;  Location: [facility] OR;  Service: Ophthalmology;  Laterality: Left;  RN PHONE PREOP 6/27/2022   ARRIVAL 6:00 AM    PHACOEMULSIFICATION OF CATARACT Right 7/21/2022    Procedure: PHACOEMULSIFICATION, CATARACT;  Surgeon: Candelario Novoa MD;  Location: [facility] OR;  Service: Ophthalmology;  Laterality: Right;  RN PHONE PREOP 7/12/2022   ARRIVAL 12:00 NOON     TOTAL KNEE ARTHROPLASTY Right 11/8/2023    Procedure: ARTHROPLASTY, KNEE, TOTAL. NELSON;  Surgeon: Eric Carbajal MD;  Location: [facility];  Service: Orthopedics;  Laterality: Right;  [place]. Admit  BRADLEY NIEVES [phone] NOTIFIED QUINTIN ON 10/11/2023-BENTLEY  RN PREOP 10/25/2023   T/S ON 11/6/2023--done------CLEARED BY CARDS         FAMILY HISTORY:                Family History   Problem Relation Name Age of Onset    No Known Problems Mother      Glaucoma Father      Breast cancer Sister Warnell     Glaucoma Sister Warnell     Cancer Sister Warnell         breast cancer    Thyroid disease Sister Angeles     Blindness Sister Bryanna         due to trauma during car accident    Diabetes Sister Amanda     No Known Problems Maternal Aunt      No Known Problems Maternal Uncle      No Known Problems Paternal Aunt      No Known Problems Paternal Uncle      No Known Problems Maternal Grandmother      No Known Problems Maternal Grandfather      No Known Problems Paternal Grandmother      No Known Problems Paternal Grandfather      Diabetes Brother Navneet     Amblyopia Neg Hx      Cataracts Neg Hx      Hypertension Neg Hx      Macular degeneration Neg Hx      Retinal detachment Neg Hx      Strabismus Neg Hx      Stroke Neg Hx         SOCIAL HISTORY:          Tobacco: Tobacco Use History[1]    alcohol use:    Social History     Substance and Sexual Activity   Alcohol Use No                 Occupation:  retire     ALLERGIES:    Review of patient's allergies indicates:   Allergen Reactions    Lisinopril Swelling and Shortness Of Breath    Ascorbic acid-ascorbate calc      And citrus fruits       CURRENT MEDICATIONS:  Current Medications[2]               REVIEW OF SYSTEMS:     Sleep related symptoms as per HPI.  CONST:Denies weight gain    HEENT: Denies sinus congestion  PULM: + dyspnea x block  CARD:  Denies palpitations   GI:  Denies acid reflux  : Denies polyuria  NEURO: Denies headaches  PSYCH: anxious and  "depressed  HEME: Denies anemia   Otherwise, a balance of systems reviewed is negative.          PHYSICAL EXAM:  Vitals:    02/19/25 1252   BP: (!) 149/81   Pulse: 90   SpO2: 96%   Weight: 89.9 kg (198 lb 4.9 oz)   Height: 5' 4" (1.626 m)   PainSc:   4   PainLoc: Knee     Body mass index is 34.04 kg/m².     GENERAL: Normal development, well groomed  HEENT:  Conjunctivae are non-erythematous; Pupils equal, round, and reactive to light; Nose is symmetrical; Nasal mucosa is pink and moist; Septum is midline; Inferior turbinates are normal; Nasal airflow is normal; Posterior pharynx is pink; Modified Mallampati: 4; Posterior palate is normal; Tonsils +1; Uvula is normal and pink;Tongue is normal; Dentition is fair; No TMJ tenderness; Jaw opening and protrusion without click and without discomfort.  NECK: Supple. Neck circumference is 14 inches. No thyromegaly. No palpable nodes.     SKIN: On face and neck: No abrasions, no rashes, no lesions.  No subcutaneous nodules are palpable.  RESPIRATORY: Chest is clear to auscultation.  Normal chest expansion and non-labored breathing at rest.  CARDIOVASCULAR: Normal S1, S2.  No murmurs, gallops or rubs. No carotid bruits bilaterally.  EXTREMITIES: No edema. No clubbing. No cyanosis. Station normal. Gait normal.        NEURO/PSYCH: Oriented to time, place and person. Normal attention span and concentration. Affect is full. Mood is normal.                                              DATA   No prior sleep study  Echo 11/26/24    Left Ventricle: The left ventricle is normal in size. There is mild concentric hypertrophy. There is normal systolic function with a visually estimated ejection fraction of 55 - 60%. Grade I diastolic dysfunction.    Right Ventricle: Normal right ventricular cavity size. Systolic function is normal.    Left Atrium: Left atrium is mildly dilated.    Mitral Valve: There is mild regurgitation.    Pulmonary Artery: The estimated pulmonary artery systolic " pressure is 36 mmHg.    IVC/SVC: Normal venous pressure at 3 mmHg.    Lab Results   Component Value Date    TSH 1.984 10/28/2024       ASSESSMENT/PLAN    Problem List Items Addressed This Visit       Insomnia    Overview   difficulty with sleep initiaton and maintenance.   Will provide ambien for the night of sleep study.           GERALD (obstructive sleep apnea) - Primary    Overview   The patient symptomatically has restless sleep with findings of htn, high grade mallampatti. This warrants further investigation for possible obstructive sleep apnea.  Parasomnia warrants in lab study.  Patient will be contacted after sleep study is done.          Relevant Orders    Polysomnogram (CPAP will be added if patient meets diagnostic criteria.)    Parasomnia    Overview   Endorsed one episode of sleep walking and 2 occasions of falling out of bed.    Currently sleeping on floor mattress.  Will monitor during sleep study.           Relevant Orders    Polysomnogram (CPAP will be added if patient meets diagnostic criteria.)     Other Visit Diagnoses         Difficulty sleeping        Relevant Medications    zolpidem (AMBIEN) 5 MG Tab            Obesity - losing weight with ozempic      Diagnostic: Polysomnogram. The nature of this procedure and its indication was discussed with the patient.     Education: During our discussion today, we talked about the etiology of obstructive sleep apnea as well as the potential ramifications of untreated sleep apnea, which could include daytime sleepiness, hypertension, heart disease and/or stroke.     Precautions: The patient was advised to abstain from driving should they feel sleepy or drowsy.       Thank you for allowing me the opportunity to participate in the care of your patient.    Patient will No follow-ups on file.    Please cc note to  Roland Dawkins NP.    45 minutes of total time spent on the encounter, which includes face to face time and non-face to face time preparing to  see the patient (eg, review of tests), Obtaining and/or reviewing separately obtained history, documenting clinical information in the electronic or other health record, independently interpreting results (not separately reported) and communicating results to the patient/family/caregiver, or Care coordination (not separately reported).               [1]   Social History  Tobacco Use   Smoking Status Never    Passive exposure: Past   Smokeless Tobacco Never   [2]   Current Outpatient Medications   Medication Sig Dispense Refill    ACCU-CHEK ONEIL PLUS TEST STRP Strp TEST BLOOD SUGAR TWICE DAILY 200 strip 1    ACCU-CHEK SOFTCLIX LANCETS [facility] [place] [facility] USE TO TEST BLOOD SUGAR ONE TIME DAILY 100 each 3    acetaminophen (TYLENOL) 500 MG tablet Take 2 tablets (1,000 mg total) by mouth 2 (two) times a day. 20 tablet 0    alcohol swabs (DROPSAFE ALCOHOL PREP PADS) PadM USE AS DIRECTED THREE TIMES DAILY 300 each 3    amLODIPine (NORVASC) 10 MG tablet Take 1 tablet (10 mg total) by mouth once daily. 90 tablet 0    aspirin (ECOTRIN) 81 MG EC tablet Take 1 tablet (81 mg total) by mouth 2 (two) times a day. 60 tablet 0    atorvastatin (LIPITOR) 80 MG tablet Take 1 tablet (80 mg total) by mouth every evening. 90 tablet 0    azelastine (ASTELIN) 137 mcg (0.1 %) nasal spray 1 spray (137 mcg total) by Nasal route 2 (two) times daily. 90 mL 0    blood glucose control high,low (ACCU-CHEK ONEIL CONTROL SOLN) Soln 1 Units by Misc.(Non-Drug; Combo Route) route as needed. 1 each 0    blood glucose control, low (TRUE METRIX LEVEL 1) Soln 1 each by Misc.(Non-Drug; Combo Route) route once as needed. 1 each 3    blood-glucose meter (ACCU-CHEK ONEIL PLUS METER) Misc 1 kit by Misc.(Non-Drug; Combo Route) route once daily. 1 each 0    blood-glucose meter (TRUE METRIX GLUCOSE METER) kit Use as directed to test glucose 1 each 0    cetirizine (ZYRTEC) 10 MG tablet Take 1 tablet (10 mg total) by mouth once daily. 90 tablet 0    ciclopirox (PENLAC) 8 % Soln  Apply topically nightly. 6.6 mL 11    docusate sodium (COLACE) 100 MG capsule Take 1 capsule (100 mg total) by mouth 2 (two) times daily. 30 capsule 0    ergocalciferol (ERGOCALCIFEROL) 50,000 unit Cap TAKE 1 CAPSULE EVERY 7 DAYS. 12 capsule 0    FLUoxetine 40 MG capsule Take 1 capsule (40 mg total) by mouth once daily. 90 capsule 0    fluticasone propionate (FLONASE) 50 mcg/actuation nasal spray USE 1 SPRAY IN EACH NOSTRIL ONCE DAILY 32 g 3    gabapentin (NEURONTIN) 300 MG capsule Take 1 capsule (300 mg total) by mouth 3 (three) times daily. 270 capsule 1    hydrALAZINE (APRESOLINE) 100 MG tablet Take 1 tablet (100 mg total) by mouth every 8 (eight) hours. 270 tablet 0    hydrOXYzine HCL (ATARAX) 25 MG tablet TAKE 1 TABLET BY MOUTH THREE TIMES DAILY AS NEEDED FOR ITCHING 90 tablet 0    ibuprofen (ADVIL,MOTRIN) 800 MG tablet Take 1 tablet (800 mg total) by mouth every 8 (eight) hours as needed for Pain. 30 tablet 0    JARDIANCE 25 mg tablet Take 1 tablet by mouth once daily 90 tablet 0    metoprolol succinate (TOPROL-XL) 25 MG 24 hr tablet Take 1 tablet (25 mg total) by mouth once daily. 90 tablet 0    ondansetron (ZOFRAN-ODT) 8 MG TbDL Take 1 tablet (8 mg total) by mouth 2 (two) times daily as needed (nausea). 14 tablet 0    pantoprazole (PROTONIX) 40 MG tablet Take 1 tablet (40 mg total) by mouth once daily. 90 tablet 0    semaglutide (OZEMPIC) 0.25 mg or 0.5 mg (2 mg/3 mL) pen injector Inject 0.25 mg into the skin every 7 days. 3 mL 0    semaglutide (OZEMPIC) 0.25 mg or 0.5 mg (2 mg/3 mL) pen injector Inject 0.5 mg into the skin every 7 days. 3 mL 0    [START ON 3/3/2025] semaglutide (OZEMPIC) 1 mg/dose (2 mg/1.5 mL) PnIj Inject 1 mg into the skin every 7 days. 3 mL 5    traMADoL (ULTRAM) 50 mg tablet Take 1 tablet (50 mg total) by mouth every 6 (six) hours. 20 tablet 0    TRUEPLUS LANCETS 33 gauge Misc TEST BLOOD SUGAR TWICE DAILY 200 each 1    zolpidem (AMBIEN) 5 MG Tab Take 1 tablet (5 mg total) by mouth  nightly as needed (1-2 pills on the night of sleep study). 2 tablet 0     No current facility-administered medications for this visit.     Facility-Administered Medications Ordered in Other Visits   Medication Dose Route Frequency Provider Last Rate Last Admin    acetaminophen tablet 1,000 mg  1,000 mg Oral On Call Procedure Eric Carbajal MD        acetaminophen tablet 1,000 mg  1,000 mg Oral Q6H Eric Carbajal MD   1,000 mg at 11/09/23 0506    aspirin EC tablet 81 mg  81 mg Oral BID Eric Carbajal MD   81 mg at 11/09/23 1358    famotidine tablet 20 mg  20 mg Oral BID Eric Carbajal MD   20 mg at 11/09/23 1357    LIDOcaine (PF) 10 mg/ml (1%) injection 10 mg  1 mL Intradermal On Call Procedure Eric Carbajal MD        methocarbamoL tablet 750 mg  750 mg Oral TID Eric Carbajal MD   750 mg at 11/09/23 1357    naloxone 0.4 mg/mL injection 0.02 mg  0.02 mg Intravenous PRN Eric Carbajal MD        ondansetron injection 4 mg  4 mg Intravenous Q8H PRN Eric Carbajal MD   4 mg at 11/09/23 0858    oxyCODONE immediate release tablet 10 mg  10 mg Oral Q3H PRN Eric Carbajal MD   10 mg at 11/09/23 1043    oxyCODONE immediate release tablet 5 mg  5 mg Oral Q3H PRN Eric Carbajal MD        polyethylene glycol packet 17 g  17 g Oral Daily Eric Carbajal MD   17 g at 11/09/23 1356    pregabalin capsule 75 mg  75 mg Oral QHS Eric Carbajal MD   75 mg at 11/08/23 2147    prochlorperazine injection Soln 5 mg  5 mg Intravenous Q6H PRN Eric Carbajal MD   5 mg at 11/08/23 1325    senna-docusate 8.6-50 mg per tablet 1 tablet  1 tablet Oral BID Eric Carbajal MD   1 tablet at 11/09/23 1358    sodium chloride 0.9% flush 10 mL  10 mL Intravenous PRN Eric Carbajal MD

## 2025-02-19 NOTE — DISCHARGE INSTRUCTIONS
YOUR PROCEDURE WILL BE AT [facility] at [address] [place] [address]                 Enter through the Main Entrance facing [address].                 Report to the Same Day Surgery Registration Desk in the hallway.(Just beside the Same Day Surgery Unit)      Your procedure  is scheduled for __3/5/2025________.    Call [phone] between 2pm and 5pm on __3/4/2025_____to find out your arrival time for the day of surgery.    You may have two visitors.  No children under 12 years old.     You will be going to the Same Day Surgery Unit on the 2nd floor of the hospital.    Important instructions:  Do not eat anything after midnight.  You may have plain water, non carbonated.  You may also have Gatorade or Powerade after midnight.    Stop all fluids 2 hours before your surgery.    It is okay to brush your teeth.  Do not have gum, candy or mints.    SEE MEDICATION SHEET.   TAKE MEDICATIONS AS DIRECTED.      Do not take any diabetic medication on the morning of surgery unless instructed to do so by your doctor or pre op nurse.      All GLP-1 weekly diabetic/weight loss medications must not be taken for one week before your surgery, or your surgery could be canceled.      STOP taking for 7 days before surgery:    Aspirin           Voltaren (Diclofenac)  Ibuprofen  (Advil, Motrin)                Indomethocin  Mobic (meloxicam, celebrex)      Etodolac   Aleve (naproxen)          Toradol (ketoralac)  Fish oil, Krill oil and Vitamin E  Headache Powders (BC Powder, Goody's Powder, Stanback)                           You may take Tylenol if needed which is not a blood thinner.    Please shower the night before and the morning of your surgery.      Use Chlorhexidine soap as instructed by your pre op nurse.   Please place clean linens on your bed the night before surgery. Please wear fresh clean clothing after each shower.    No shaving of procedural area at least 4-5 days before  surgery due to increased risk of skin irritation and/or possible infection.    Contact lenses and removable denture work may not be worn during your procedure.    You may wear deodorant only. If you are having breast surgery, do not wear deodorant on the operative side.    Do not wear powder, body lotion, perfume/cologne or make-up.    Do not wear any jewelry or have any metal on your body.    You will be asked to remove any dentures or partials for the procedure.    If you are going home on the same day of surgery, you must arrange for a family member or a friend to drive you home.  Public transportation is prohibited.  You will not be able to drive home if you were given anesthesia or sedation.    Patients who want to have their Post-op prescriptions filled from our in-house [facility], bring a Credit/Debit Card or cash with you. A co-pay may be required.  The pharmacy closes at 5:30 pm.    Wear loose fitting clothes allowing for bandages.    Please leave money and valuables home.      You may bring your cell phone.    Call the doctor if fever or illness should occur before your surgery.    Call [phone] to contact us here if needed.                            CLOTHES ON DAY OF SURGERY    SHOULDER surgery:  you must have a very oversized shirt.  Very, Very large.  You will probably have a large sling on with your arm strapped to your chest.  You will not be able to put the arm of the operated shoulder into a sleeve.  You can put the arm of the un-operated shoulder into the sleeve, but the shirt will need to be draped over the operated shoulder.       ARM or HAND surgery:  make sure that your sleeves are large and loose enough to pass over large dressings or cast.      BREAST or UNDERARM surgery:  wear a loose, button down shirt so that you can dress without raising your arms over your head.    ABDOMINAL surgery:  wear loose, comfortable clothing.  Nothing tight around the abdomen.  NO JEANS    PENIS or  SCROTAL surgery:  loose comfortable clothing.  Large sweat pants, pajama pants or a robe.  ABSOLUTELY NO JEANS      LEG or FOOT surgery:  wear large loose pants that are able to pass over any large dressings or casts.  You could also wear loose shorts or a skirt.

## 2025-02-19 NOTE — ANESTHESIA PREPROCEDURE EVALUATION
02/19/2025  Valarie Foster is a 74 y.o., female scheduled for ARTHROPLASTY, KNEE, TOTAL, USING COMPUTER-ASSISTED NAVIGATION (Left: Knee) on 3/5/2025.          Past Medical History:   Diagnosis Date    Arthritis     Colon polyp     Diabetes mellitus     diet controlled    Diabetes with neurologic complications     Hypertension     Nuclear sclerosis of both eyes 2/14/2022    Renal cell carcinoma        Past Surgical History:   Procedure Laterality Date    COLONOSCOPY N/A 2/5/2018    Procedure: COLONOSCOPY;  Surgeon: Bacilio Mancia MD;  Location: [facility] ENDO;  Service: Endoscopy;  Laterality: N/A;  appt confirmed-ss    COLONOSCOPY N/A 8/13/2020    Procedure: COLONOSCOPY;  Surgeon: Jose West MD;  Location: [facility] ENDO;  Service: Endoscopy;  Laterality: N/A;    ENDOSCOPIC CARPAL TUNNEL RELEASE Right 3/15/2024    Procedure: RELEASE, CARPAL TUNNEL, ENDOSCOPIC - RIGHT;  Surgeon: Tonio Mcdermott MD;  Location: [facility] OR;  Service: Orthopedics;  Laterality: Right;    ENDOSCOPIC CARPAL TUNNEL RELEASE Left 6/26/2024    Procedure: RELEASE, CARPAL TUNNEL, ENDOSCOPIC - left;  Surgeon: Tonoi Mcdermott MD;  Location: [facility] OR;  Service: Orthopedics;  Laterality: Left;    HYSTERECTOMY      INTRAOCULAR PROSTHESES INSERTION Left 7/7/2022    Procedure: INSERTION, IOL PROSTHESIS;  Surgeon: Candelario Novoa MD;  Location: [facility] OR;  Service: Ophthalmology;  Laterality: Left;    INTRAOCULAR PROSTHESES INSERTION Right 7/21/2022    Procedure: INSERTION, IOL PROSTHESIS;  Surgeon: Candelario Novoa MD;  Location: [facility] OR;  Service: Ophthalmology;  Laterality: Right;    OOPHORECTOMY      PARTIAL NEPHRECTOMY Right 10/12/2018    Procedure: NEPHRECTOMY, PARTIAL open. Dr Arenas to assist.;  Surgeon: Alejandra Palm MD;  Location: [facility] OR;  Service: Urology;  Laterality: Right;  RN PREOP 10/9/2018----NEEDS H/P     PHACOEMULSIFICATION OF CATARACT Left 7/7/2022    Procedure: PHACOEMULSIFICATION, CATARACT;  Surgeon: Candelario Novoa MD;  Location: [facility] OR;  Service: Ophthalmology;  Laterality: Left;  RN PHONE PREOP 6/27/2022   ARRIVAL 6:00 AM    PHACOEMULSIFICATION OF CATARACT Right 7/21/2022    Procedure: PHACOEMULSIFICATION, CATARACT;  Surgeon: Candelario Novoa MD;  Location: [facility] OR;  Service: Ophthalmology;  Laterality: Right;  RN PHONE PREOP 7/12/2022   ARRIVAL 12:00 NOON    TOTAL KNEE ARTHROPLASTY Right 11/8/2023    Procedure: ARTHROPLASTY, KNEE, TOTAL. NELSON;  Surgeon: Eric Carbajal MD;  Location: [facility] OR;  Service: Orthopedics;  Laterality: Right;  [place]. Admit  BRADLEY QUINTIN NIEVES [phone] NOTIFIED QUINTIN ON 10/11/2023-LO  RN PREOP 10/25/2023   T/S ON 11/6/2023--done------CLEARED BY CARDS           Pre-op Assessment    I have reviewed the Patient Summary Reports.     I have reviewed the Nursing Notes. I have reviewed the NPO Status.   I have reviewed the Medications.     Review of Systems  Anesthesia Hx:  No problems with previous Anesthesia             Denies Family Hx of Anesthesia complications.    Denies Personal Hx of Anesthesia complications.                    Social:  No Alcohol Use, Non-Smoker       Hematology/Oncology:  Hematology Normal                       --  Cancer in past history:              surgery   Oncology Comments: renal     EENT/Dental:  EENT/Dental Normal           Cardiovascular:  Exercise tolerance: good   Hypertension                  Functional Capacity good / => 4 METS                         Pulmonary:        Sleep Apnea                Renal/:  Chronic Renal Disease   S/p right partial nephrectomy             Hepatic/GI:  Hepatic/GI Normal        Taking GLP-1 Agonists Instructed to Hold for 7 Days No Reported GI Symptoms          Musculoskeletal:  Arthritis   L knee            Neurological:    Neuromuscular Disease,                                    Endocrine:  Diabetes         Obesity / BMI > 30  Dermatological:  Skin Normal           Anesthesia Plan  Type of Anesthesia, risks & benefits discussed:    Anesthesia Type: Spinal, Regional  Intra-op Monitoring Plan: Standard ASA Monitors  Post Op Pain Control Plan: multimodal analgesia and peripheral nerve block  Informed Consent: Informed consent signed with the Patient and all parties understand the risks and agree with anesthesia plan.  All questions answered.   ASA Score: 3  Day of Surgery Review of History & Physical: H&P Update referred to the surgeon/provider.  Anesthesia Plan Notes: pt is optimized for surgery from a primary care standpoint and is at low risk for sruthi-operative CV event.   The patient is at acceptable cardiac risk for the planned surgery and associated anesthesia by Dr. Lg Trivedi For Surgery From Anesthesia Perspective.     .

## 2025-02-21 ENCOUNTER — HOSPITAL ENCOUNTER (OUTPATIENT)
Dept: RADIOLOGY | Facility: HOSPITAL | Age: 75
Discharge: HOME | End: 2025-02-21
Payer: MEDICARE

## 2025-02-21 ENCOUNTER — OFFICE VISIT (OUTPATIENT)
Dept: FAMILY MEDICINE | Facility: CLINIC | Age: 75
End: 2025-02-21
Payer: MEDICARE

## 2025-02-21 VITALS
SYSTOLIC BLOOD PRESSURE: 128 MMHG | HEART RATE: 92 BPM | OXYGEN SATURATION: 96 % | HEIGHT: 64 IN | BODY MASS INDEX: 34.06 KG/M2 | DIASTOLIC BLOOD PRESSURE: 78 MMHG | WEIGHT: 199.5 LBS | TEMPERATURE: 98 F

## 2025-02-21 DIAGNOSIS — Z01.818 PREOPERATIVE EXAMINATION: Primary | ICD-10-CM

## 2025-02-21 DIAGNOSIS — M25.562 BILATERAL CHRONIC KNEE PAIN: ICD-10-CM

## 2025-02-21 DIAGNOSIS — M25.561 BILATERAL CHRONIC KNEE PAIN: ICD-10-CM

## 2025-02-21 DIAGNOSIS — Z01.818 PREOPERATIVE EXAMINATION: ICD-10-CM

## 2025-02-21 DIAGNOSIS — I10 BENIGN HYPERTENSION: ICD-10-CM

## 2025-02-21 DIAGNOSIS — G89.29 BILATERAL CHRONIC KNEE PAIN: ICD-10-CM

## 2025-02-21 PROCEDURE — 71046 X-RAY EXAM CHEST 2 VIEWS: CPT | Mod: 26,,, | Performed by: RADIOLOGY

## 2025-02-21 PROCEDURE — 71046 X-RAY EXAM CHEST 2 VIEWS: CPT | Mod: TC,FY,PO

## 2025-02-21 NOTE — Clinical Note
Hi Dr. Carbajal,  She has been cleared for surgery from primary care standpoint. She's scheduling an appointment to see Cardiology next week for clearance. Thanks and please let me know if you need anything else!  -Sofie

## 2025-02-21 NOTE — PATIENT INSTRUCTIONS
Please call the Referral Desk to schedule Cardiology clearance at your earliest convenience:  [phone]

## 2025-02-21 NOTE — PROGRESS NOTES
Patient ID: Valarie Foster is a 74 y.o. female.    Chief Complaint: Pre-op Exam      Valarie Foster is in the office for pre-op exam. PCP Dr. Hernandez with last visit in this clinic on 1/17/25.   Left TKA with Dr. Carbajal on 3/5/25 at the [facility].     HPI    This patient has been seen by me in the past.  Procedure to be performed: Left knee total arthroplasty.  Pt states that she has had no limitations in activities.   she has had prior surgery without any perioperative complications.    Patient is not a smoker.  Does not take any blood-thinning medications.  is able to walk one block without getting CP/SOB/LOVE.  Personal Hx of cardiac diseases--does have history of hypertension, HLD, abdominal aortic atherosclerosis.   Denies F/C/N/V/palpitations/claudication.  Denies weakness/tingling/numbness/vertigo/unsteadiness/changes in mental status/blackouts.    Preop Assessment    Pulmonary risk factors: Denies asthma, GERALD, current smoker. History of allergies.   Systemic risk factors: Diabetes Mellitus Type 2, hypertension.    Revised Cardiac Risk Index for Pre-Operative Risk = 3.9%    Preoperative Mortality Predictor () Score = 5 points/0.1%    The 10-year ASCVD risk score (Ayush DK, et al., 2019) is: 38.6%    Values used to calculate the score:      Age: 74 years      Sex: Female      Is Non- : Yes      Diabetic: Yes      Tobacco smoker: No      Systolic Blood Pressure: 128 mmHg      Is BP treated: Yes      HDL Cholesterol: 60 mg/dL      Total Cholesterol: 225 mg/dL    ROS, Vitals reviewed.   Patient has acceptable exercise capacity as demonstrated in the office today.    Able to achieve 4 METs. RSI Class III (6.6% risk).    Patient has appointment to see Cardiology for surgical clearance from cardiac standpoint.     It is acceptable to hold Aspirin and Plavix perioperatively, and resume them after the patient has recovered at the direction of surgeon.  Advised healthy  diet/exercise/weight loss in anticipation of surgery    Has not been cleared by Cardiology-clearance pending.    Hold Ibuprofen 1 day prior to surgery     Patient has no hx of nor symptoms suggestive of myocardial ischemia, heart failure, arrhythmia and CVA.     No pre-op labs required by surgeon, but surgeon may order any tests at his/her discretion.    Labs reviewed:  Creatinine is below 2 mg/dl.      Imaging reviewed:  Chest xray was unremarkable.     pt is optimized for surgery from a primary care standpoint and is at low risk for sruthi-operative CV event.      Past Medical History:   Diagnosis Date    Arthritis     Colon polyp     Diabetes mellitus     diet controlled    Diabetes with neurologic complications     Hypertension     Nuclear sclerosis of both eyes 02/14/2022    Renal cell carcinoma     Sleep apnea         Current Medications[1]    The 10-year ASCVD risk score (Ayush JASON, et al., 2019) is: 38.6%    Values used to calculate the score:      Age: 74 years      Sex: Female      Is Non- : Yes      Diabetic: Yes      Tobacco smoker: No      Systolic Blood Pressure: 128 mmHg      Is BP treated: Yes      HDL Cholesterol: 60 mg/dL      Total Cholesterol: 225 mg/dL     Wt Readings from Last 3 Encounters:   02/21/25 90.5 kg (199 lb 8.3 oz)   02/19/25 89.9 kg (198 lb 4.9 oz)   02/19/25 90.3 kg (199 lb 1.2 oz)     Temp Readings from Last 3 Encounters:   02/21/25 98 °F (36.7 °C) (Oral)   02/19/25 97.4 °F (36.3 °C) (Oral)   01/17/25 97.6 °F (36.4 °C) (Oral)     BP Readings from Last 3 Encounters:   02/21/25 128/78   02/19/25 (!) 149/81   02/19/25 (!) 149/94     Pulse Readings from Last 3 Encounters:   02/21/25 92   02/19/25 90   02/19/25 79     Resp Readings from Last 3 Encounters:   02/19/25 18   01/07/25 15   11/26/24 15     PF Readings from Last 3 Encounters:   No data found for PF     SpO2 Readings from Last 3 Encounters:   02/21/25 96%   02/19/25 96%   02/19/25 99%        Lab Results    Component Value Date    HGBA1C 7.2 (H) 02/07/2025    HGBA1C 7.5 (H) 10/28/2024    HGBA1C 8.2 (H) 07/29/2024     Lab Results   Component Value Date    LDLCALC 144.4 10/28/2024    CREATININE 0.9 02/19/2025       Review of Systems   Constitutional:  Negative for fever.   HENT:  Negative for trouble swallowing.    Respiratory:  Negative for shortness of breath.    Cardiovascular:  Negative for chest pain.   Gastrointestinal:  Negative for blood in stool and vomiting.   Genitourinary:  Negative for hematuria.   Musculoskeletal:  Positive for arthralgias and joint swelling.   Skin:  Negative for rash.           Objective:      Physical Exam  Constitutional:       General: She is not in acute distress.     Appearance: Normal appearance. She is obese. She is not ill-appearing.   HENT:      Head: Normocephalic and atraumatic.   Cardiovascular:      Rate and Rhythm: Normal rate and regular rhythm.      Pulses: Normal pulses.      Heart sounds: Normal heart sounds. No murmur heard.  Pulmonary:      Effort: Pulmonary effort is normal. No respiratory distress.      Breath sounds: Normal breath sounds. No wheezing.   Musculoskeletal:         General: Swelling and tenderness present. Normal range of motion.      Cervical back: Normal range of motion and neck supple.      Left knee: Swelling present. Normal range of motion. Tenderness present. Normal pulse.   Skin:     General: Skin is warm and dry.      Capillary Refill: Capillary refill takes less than 2 seconds.   Neurological:      General: No focal deficit present.      Mental Status: She is alert and oriented to person, place, and time.   Psychiatric:         Mood and Affect: Mood normal.         Behavior: Behavior normal.         Screening recommendations appropriate to age and health status were reviewed.    Preoperative examination    Recent lab work reviewed. Cleared for surgery from a Primary Care standpoint. Patient will receive Cardiology clearance, will schedule  appointment. Will send clearance to surgeon.     -     X-Ray Chest PA And Lateral; Future; Expected date: 02/21/2025    Bilateral chronic knee pain    Right knee pain subsided. Left knee pain and swelling persist; upcoming TKR.     Benign hypertension    Stable on amlodipine, hydralazine, metoprolol daily. The current medical regimen is effective;  continue present plan and medications.      RCRI risk factors include:  HTN . As such, per RCRI the risk of cardiac death, nonfatal myocardial infarction, or nonfatal cardiac arrest is 5.4% and the risk of myocardial infarction, pulmonary edema, ventricular fibrillation, primary cardiac arrest, or complete heart block is 3.6%.  Overall this patient can be considered low risk for this low risk procedure.     Patient denies any symptoms (as per HPI) concerning for undiagnosed lung disease including GERALD.  Patient is a non-smoker. We discussed the benefits of early mobilization and deep breathing after surgery.      Screened patient for alcohol misuse, use of illicit drugs, and personal or family history of anesthetic complications or bleeding diathesis and no substantial concerns were identified.     All current medications were reviewed and at this time no changes to medications are recommended prior to surgery.     I recommend use of standard pre-op and post-op precautions for this patient. In my opinion, she is medically optimized for this procedure, and can proceed without further evaluation from Primary Care.     KULDEEP Lynch           [1]   Current Outpatient Medications:     ACCU-CHEK ONEIL PLUS TEST STRP Strp, TEST BLOOD SUGAR TWICE DAILY, Disp: 200 strip, Rfl: 1    ACCU-CHEK SOFTCLIX LANCETS [facility], USE TO TEST BLOOD SUGAR ONE TIME DAILY, Disp: 100 each, Rfl: 3    acetaminophen (TYLENOL) 500 MG tablet, Take 2 tablets (1,000 mg total) by mouth 2 (two) times a day., Disp: 20 tablet, Rfl: 0    alcohol swabs (DROPSAFE ALCOHOL PREP PADS) PadM, USE AS DIRECTED THREE  TIMES DAILY, Disp: 300 each, Rfl: 3    amLODIPine (NORVASC) 10 MG tablet, Take 1 tablet (10 mg total) by mouth once daily., Disp: 90 tablet, Rfl: 0    atorvastatin (LIPITOR) 80 MG tablet, Take 1 tablet (80 mg total) by mouth every evening., Disp: 90 tablet, Rfl: 0    azelastine (ASTELIN) 137 mcg (0.1 %) nasal spray, 1 spray (137 mcg total) by Nasal route 2 (two) times daily., Disp: 90 mL, Rfl: 0    blood glucose control high,low (ACCU-CHEK ONEIL CONTROL SOLN) Soln, 1 Units by Misc.(Non-Drug; Combo Route) route as needed., Disp: 1 each, Rfl: 0    blood-glucose meter (ACCU-CHEK ONEIL PLUS METER) Misc, 1 kit by Misc.(Non-Drug; Combo Route) route once daily., Disp: 1 each, Rfl: 0    blood-glucose meter (TRUE METRIX GLUCOSE METER) kit, Use as directed to test glucose, Disp: 1 each, Rfl: 0    ciclopirox (PENLAC) 8 % Soln, Apply topically nightly., Disp: 6.6 mL, Rfl: 11    docusate sodium (COLACE) 100 MG capsule, Take 1 capsule (100 mg total) by mouth 2 (two) times daily., Disp: 30 capsule, Rfl: 0    ergocalciferol (ERGOCALCIFEROL) 50,000 unit Cap, TAKE 1 CAPSULE EVERY 7 DAYS., Disp: 12 capsule, Rfl: 0    FLUoxetine 40 MG capsule, Take 1 capsule (40 mg total) by mouth once daily., Disp: 90 capsule, Rfl: 0    fluticasone propionate (FLONASE) 50 mcg/actuation nasal spray, USE 1 SPRAY IN EACH NOSTRIL ONCE DAILY, Disp: 32 g, Rfl: 3    gabapentin (NEURONTIN) 300 MG capsule, Take 1 capsule (300 mg total) by mouth 3 (three) times daily., Disp: 270 capsule, Rfl: 1    hydrALAZINE (APRESOLINE) 100 MG tablet, Take 1 tablet (100 mg total) by mouth every 8 (eight) hours., Disp: 270 tablet, Rfl: 0    hydrOXYzine HCL (ATARAX) 25 MG tablet, TAKE 1 TABLET BY MOUTH THREE TIMES DAILY AS NEEDED FOR ITCHING, Disp: 90 tablet, Rfl: 0    ibuprofen (ADVIL,MOTRIN) 800 MG tablet, Take 1 tablet (800 mg total) by mouth every 8 (eight) hours as needed for Pain., Disp: 30 tablet, Rfl: 0    JARDIANCE 25 mg tablet, Take 1 tablet by mouth once daily, Disp:  90 tablet, Rfl: 0    metoprolol succinate (TOPROL-XL) 25 MG 24 hr tablet, Take 1 tablet (25 mg total) by mouth once daily., Disp: 90 tablet, Rfl: 0    ondansetron (ZOFRAN-ODT) 8 MG TbDL, Take 1 tablet (8 mg total) by mouth 2 (two) times daily as needed (nausea)., Disp: 14 tablet, Rfl: 0    pantoprazole (PROTONIX) 40 MG tablet, Take 1 tablet (40 mg total) by mouth once daily., Disp: 90 tablet, Rfl: 0    semaglutide (OZEMPIC) 0.25 mg or 0.5 mg (2 mg/3 mL) pen injector, Inject 0.25 mg into the skin every 7 days., Disp: 3 mL, Rfl: 0    semaglutide (OZEMPIC) 0.25 mg or 0.5 mg (2 mg/3 mL) pen injector, Inject 0.5 mg into the skin every 7 days., Disp: 3 mL, Rfl: 0    [START ON 3/3/2025] semaglutide (OZEMPIC) 1 mg/dose (2 mg/1.5 mL) PnIj, Inject 1 mg into the skin every 7 days., Disp: 3 mL, Rfl: 5    traMADoL (ULTRAM) 50 mg tablet, Take 1 tablet (50 mg total) by mouth every 6 (six) hours., Disp: 20 tablet, Rfl: 0    TRUEPLUS LANCETS 33 gauge Misc, TEST BLOOD SUGAR TWICE DAILY, Disp: 200 each, Rfl: 1    zolpidem (AMBIEN) 5 MG Tab, Take 1 tablet (5 mg total) by mouth nightly as needed (1-2 pills on the night of sleep study)., Disp: 2 tablet, Rfl: 0    aspirin (ECOTRIN) 81 MG EC tablet, Take 1 tablet (81 mg total) by mouth 2 (two) times a day., Disp: 60 tablet, Rfl: 0    blood glucose control, low (TRUE METRIX LEVEL 1) Soln, 1 each by Misc.(Non-Drug; Combo Route) route once as needed., Disp: 1 each, Rfl: 3    cetirizine (ZYRTEC) 10 MG tablet, Take 1 tablet (10 mg total) by mouth once daily., Disp: 90 tablet, Rfl: 0  No current facility-administered medications for this visit.    Facility-Administered Medications Ordered in Other Visits:     acetaminophen tablet 1,000 mg, 1,000 mg, Oral, On Call Procedure, Eric Carbajal MD    acetaminophen tablet 1,000 mg, 1,000 mg, Oral, Q6H, Eric Carbajal MD, 1,000 mg at 11/09/23 0506    aspirin EC tablet 81 mg, 81 mg, Oral, BID, Eric Carbajal MD, 81 mg at 11/09/23 1358     famotidine tablet 20 mg, 20 mg, Oral, BID, Eric Carbajal MD, 20 mg at 11/09/23 1357    LIDOcaine (PF) 10 mg/ml (1%) injection 10 mg, 1 mL, Intradermal, On Call Procedure, Eric Carbajal MD    methocarbamoL tablet 750 mg, 750 mg, Oral, TID, Eric Carbajal MD, 750 mg at 11/09/23 1357    naloxone 0.4 mg/mL injection 0.02 mg, 0.02 mg, Intravenous, PRN, Eric Carbajal MD    ondansetron injection 4 mg, 4 mg, Intravenous, Q8H PRN, Eric Carbajal MD, 4 mg at 11/09/23 0858    oxyCODONE immediate release tablet 10 mg, 10 mg, Oral, Q3H PRN, Eric Carbajal MD, 10 mg at 11/09/23 1043    oxyCODONE immediate release tablet 5 mg, 5 mg, Oral, Q3H PRN, Eric Carbajal MD    polyethylene glycol packet 17 g, 17 g, Oral, Daily, Eric Carbajal MD, 17 g at 11/09/23 1356    pregabalin capsule 75 mg, 75 mg, Oral, QHS, Eric Carbajal MD, 75 mg at 11/08/23 2147    prochlorperazine injection Soln 5 mg, 5 mg, Intravenous, Q6H PRN, Eric Carbajal MD, 5 mg at 11/08/23 1325    senna-docusate 8.6-50 mg per tablet 1 tablet, 1 tablet, Oral, BID, Eric Carbajal MD, 1 tablet at 11/09/23 1358    sodium chloride 0.9% flush 10 mL, 10 mL, Intravenous, PRN, Eric Carbajal MD

## 2025-02-23 ENCOUNTER — RESULTS FOLLOW-UP (OUTPATIENT)
Dept: FAMILY MEDICINE | Facility: CLINIC | Age: 75
End: 2025-02-23

## 2025-02-24 ENCOUNTER — TELEPHONE (OUTPATIENT)
Dept: CARDIOLOGY | Facility: CLINIC | Age: 75
End: 2025-02-24
Payer: MEDICARE

## 2025-02-24 ENCOUNTER — TELEPHONE (OUTPATIENT)
Dept: ORTHOPEDICS | Facility: CLINIC | Age: 75
End: 2025-02-24
Payer: MEDICARE

## 2025-02-24 NOTE — TELEPHONE ENCOUNTER
----- Message from Med Assistant Oliveira sent at 2/24/2025  8:15 AM CST -----  Regarding: Sooner Appointment  Good morning, patient called referrals department to schedule a preop clearance w/ Dr. Dolan for Knee Replacement scheduled for 3/5/2025/ Patient states that has been cleared by PCP but needs her cardiac clearance as well. Patient is already established w/ Dr. Dolan but the soonest appointment available is 4/17/2025. Patient has asked that someone from Dr. Dolan contact her a with a possible sooner appointment. Thank you.

## 2025-02-24 NOTE — TELEPHONE ENCOUNTER
Spoke with patient she said Dr. Alvarez said he would place a note in her chart for clearance.       Waiting on this now.    ----- Message from Med Assistant Kari sent at 2/24/2025  2:01 PM CST -----  Spoken with patient and she upset cannot get her cardio/medical clearance for her surgery until 04/17/25 his first available.patient would like to know what next for her.please reached out to her for the update . Thanks Kari

## 2025-02-26 ENCOUNTER — E-CONSULT (OUTPATIENT)
Dept: CARDIOLOGY | Facility: CLINIC | Age: 75
End: 2025-02-26
Payer: MEDICARE

## 2025-02-26 DIAGNOSIS — M17.12 PRIMARY OSTEOARTHRITIS OF LEFT KNEE: Primary | ICD-10-CM

## 2025-02-26 DIAGNOSIS — Z01.810 PREOP CARDIOVASCULAR EXAM: Primary | ICD-10-CM

## 2025-02-26 NOTE — CONSULTS
[facility] - Cardiology  Response for E-Consult     Patient Name: Valarie Foster  MRN: [id number]  Primary Care Provider: Alena Hernandez MD   Requesting Provider: Eric Carbajal MD  E-Consult to General Cardiology  Consult performed by: Kayode Castanon MD  Consult ordered by: Eric Carbajal MD  Reason for consult: cardiology clearance for knee replacement          Recommendation:  Data reviewed including EKG, nuclear stress test, and echocardiogram dated 12/23/2024, all without significant findings (EKG with LVH/left axis deviation without ischemic changes).  Revised cardiac risk index score equals 0.  Her most recent blood pressure dated 02/21/2025 was normal (128/78).  No further preoperative cardiac testing is required.  The patient is at acceptable cardiac risk for the planned surgery and associated anesthesia.    Additional future steps to consider:  P.r.n.    Total time of Consultation: 5 minute    I did not speak to the requesting provider verbally about this.     *This eConsult is based on the clinical data available to me and is furnished without benefit of a physical examination. The eConsult will need to be interpreted in light of any clinical issues or changes in patient status not available to me at the time of filing this eConsults. Significant changes in patient condition or level of acuity should result in immediate formal consultation and reevaluation. Please alert me if you have further questions.    Thank you for this eConsult referral.     Kayode Castanon MD  [facility] - Cardiology

## 2025-02-28 ENCOUNTER — TELEPHONE (OUTPATIENT)
Dept: ORTHOPEDICS | Facility: CLINIC | Age: 75
End: 2025-02-28
Payer: MEDICARE

## 2025-02-28 ENCOUNTER — LAB VISIT (OUTPATIENT)
Dept: LAB | Facility: HOSPITAL | Age: 75
End: 2025-02-28
Attending: ORTHOPAEDIC SURGERY
Payer: MEDICARE

## 2025-02-28 DIAGNOSIS — Z01.818 PREOPERATIVE TESTING: ICD-10-CM

## 2025-02-28 LAB
ABO + RH BLD: NORMAL
BLD GP AB SCN CELLS X3 SERPL QL: NORMAL
SPECIMEN OUTDATE: NORMAL

## 2025-02-28 PROCEDURE — 36415 COLL VENOUS BLD VENIPUNCTURE: CPT | Performed by: ORTHOPAEDIC SURGERY

## 2025-02-28 PROCEDURE — 86850 RBC ANTIBODY SCREEN: CPT | Performed by: ORTHOPAEDIC SURGERY

## 2025-02-28 NOTE — TELEPHONE ENCOUNTER
Spoke with patient. Completed pre-surgery surveys for Patient IQ.     Georgie Cohn MS, ATC, OTC  Clinical/Surgical Assistant - Dr. Monisha Pandya  Orthopedics  Phone: [phone]

## 2025-03-03 ENCOUNTER — TELEPHONE (OUTPATIENT)
Dept: SURGERY | Facility: HOSPITAL | Age: 75
End: 2025-03-03
Payer: MEDICARE

## 2025-03-05 ENCOUNTER — ANESTHESIA (OUTPATIENT)
Dept: SURGERY | Facility: HOSPITAL | Age: 75
End: 2025-03-05
Payer: MEDICARE

## 2025-03-05 ENCOUNTER — HOSPITAL ENCOUNTER (OUTPATIENT)
Facility: HOSPITAL | Age: 75
Discharge: HOME | End: 2025-03-07
Attending: ORTHOPAEDIC SURGERY | Admitting: ORTHOPAEDIC SURGERY
Payer: MEDICARE

## 2025-03-05 DIAGNOSIS — E11.40 TYPE 2 DIABETES MELLITUS WITH DIABETIC NEUROPATHY, WITHOUT LONG-TERM CURRENT USE OF INSULIN: Chronic | ICD-10-CM

## 2025-03-05 DIAGNOSIS — M17.12 ARTHRITIS OF LEFT KNEE: Primary | ICD-10-CM

## 2025-03-05 DIAGNOSIS — M17.12 PRIMARY OSTEOARTHRITIS OF LEFT KNEE: ICD-10-CM

## 2025-03-05 LAB
POCT GLUCOSE: 100 MG/DL (ref 70–110)
POCT GLUCOSE: 122 MG/DL (ref 70–110)
POCT GLUCOSE: 152 MG/DL (ref 70–110)
POCT GLUCOSE: 177 MG/DL (ref 70–110)

## 2025-03-05 PROCEDURE — 71000039 HC RECOVERY, EACH ADD'L HOUR: Mod: HCNC | Performed by: ORTHOPAEDIC SURGERY

## 2025-03-05 PROCEDURE — 25000003 PHARM REV CODE 250: Mod: HCNC | Performed by: ANESTHESIOLOGY

## 2025-03-05 PROCEDURE — 97166 OT EVAL MOD COMPLEX 45 MIN: CPT | Mod: HCNC

## 2025-03-05 PROCEDURE — 63600175 PHARM REV CODE 636 W HCPCS: Mod: HCNC | Performed by: ANESTHESIOLOGY

## 2025-03-05 PROCEDURE — 37000008 HC ANESTHESIA 1ST 15 MINUTES: Mod: HCNC | Performed by: ORTHOPAEDIC SURGERY

## 2025-03-05 PROCEDURE — 0055T BONE SRGRY CMPTR CT/MRI IMAG: CPT | Mod: HCNC,,, | Performed by: ORTHOPAEDIC SURGERY

## 2025-03-05 PROCEDURE — 63600175 PHARM REV CODE 636 W HCPCS: Mod: HCNC | Performed by: ORTHOPAEDIC SURGERY

## 2025-03-05 PROCEDURE — 94761 N-INVAS EAR/PLS OXIMETRY MLT: CPT | Mod: HCNC

## 2025-03-05 PROCEDURE — 27447 TOTAL KNEE ARTHROPLASTY: CPT | Mod: HCNC,LT,, | Performed by: ORTHOPAEDIC SURGERY

## 2025-03-05 PROCEDURE — 37000009 HC ANESTHESIA EA ADD 15 MINS: Mod: HCNC | Performed by: ORTHOPAEDIC SURGERY

## 2025-03-05 PROCEDURE — C1713 ANCHOR/SCREW BN/BN,TIS/BN: HCPCS | Mod: HCNC | Performed by: ORTHOPAEDIC SURGERY

## 2025-03-05 PROCEDURE — 25000003 PHARM REV CODE 250: Mod: HCNC | Performed by: ORTHOPAEDIC SURGERY

## 2025-03-05 PROCEDURE — 71000033 HC RECOVERY, INTIAL HOUR: Mod: HCNC | Performed by: ORTHOPAEDIC SURGERY

## 2025-03-05 PROCEDURE — 36000713 HC OR TIME LEV V EA ADD 15 MIN: Mod: HCNC | Performed by: ORTHOPAEDIC SURGERY

## 2025-03-05 PROCEDURE — 27201423 OPTIME MED/SURG SUP & DEVICES STERILE SUPPLY: Mod: HCNC | Performed by: ORTHOPAEDIC SURGERY

## 2025-03-05 PROCEDURE — 97161 PT EVAL LOW COMPLEX 20 MIN: CPT | Mod: HCNC

## 2025-03-05 PROCEDURE — C1776 JOINT DEVICE (IMPLANTABLE): HCPCS | Mod: HCNC | Performed by: ORTHOPAEDIC SURGERY

## 2025-03-05 PROCEDURE — 36000712 HC OR TIME LEV V 1ST 15 MIN: Mod: HCNC | Performed by: ORTHOPAEDIC SURGERY

## 2025-03-05 PROCEDURE — 63600175 PHARM REV CODE 636 W HCPCS: Mod: HCNC | Performed by: NURSE ANESTHETIST, CERTIFIED REGISTERED

## 2025-03-05 PROCEDURE — 97110 THERAPEUTIC EXERCISES: CPT | Mod: HCNC

## 2025-03-05 DEVICE — SIMPLEX® HV WITH GENTAMICIN IS A FAST-SETTING ACRYLIC RESIN WITH ADDITION OF GENTAMICIN SULFATE FOR USE IN BONE SURGERY. MIXING THE TWO SEPARATE STERILE COMPONENTS PRODUCES A DUCTILE BONE CEMENT WHICH, AFTER HARDENING, FIXES THE IMPLANT AND TRANSFERS STRESSES PRODUCED DURING MOVEMENT EVENLY TO THE BONE. SIMPLEX® HV WITH GENTAMICINN CEMENT POWDER ALSO CONTAINS INSOLUBLE ZIRCONIUM DIOXIDE AS AN X-RAY CONTRAST MEDIUM. SIMPLEX® HV WITH GENTAMICIN DOES NOT EMIT A SIGNAL AND DOES NOT POSE A SAFETY RISK IN A MAGNETIC RESONANCE ENVIRONMENT.
Type: IMPLANTABLE DEVICE | Site: KNEE | Status: FUNCTIONAL
Brand: SIMPLEX HV WITH GENTAMICIN

## 2025-03-05 DEVICE — CRUCIATE RETAINING FEMORAL
Type: IMPLANTABLE DEVICE | Site: KNEE | Status: FUNCTIONAL
Brand: TRIATHLON

## 2025-03-05 DEVICE — TIBIAL BEARING INSERT - CS
Type: IMPLANTABLE DEVICE | Site: KNEE | Status: FUNCTIONAL
Brand: TRIATHLON

## 2025-03-05 DEVICE — PRIMARY TIBIAL BASEPLATE
Type: IMPLANTABLE DEVICE | Site: KNEE | Status: FUNCTIONAL
Brand: TRIATHLON

## 2025-03-05 DEVICE — BONE PINS (4MM X 110MM): Type: IMPLANTABLE DEVICE | Site: KNEE | Status: FUNCTIONAL

## 2025-03-05 DEVICE — ASYMMETRIC PATELLA
Type: IMPLANTABLE DEVICE | Site: KNEE | Status: FUNCTIONAL
Brand: TRIATHLON

## 2025-03-05 DEVICE — BONE PINS (4MM X 140MM): Type: IMPLANTABLE DEVICE | Site: KNEE | Status: FUNCTIONAL

## 2025-03-05 RX ORDER — LIDOCAINE HYDROCHLORIDE 10 MG/ML
1 INJECTION, SOLUTION EPIDURAL; INFILTRATION; INTRACAUDAL; PERINEURAL
Status: DISCONTINUED | OUTPATIENT
Start: 2025-03-05 | End: 2025-03-05 | Stop reason: SDUPTHER

## 2025-03-05 RX ORDER — METHOCARBAMOL 750 MG/1
750 TABLET, FILM COATED ORAL 3 TIMES DAILY
Status: DISCONTINUED | OUTPATIENT
Start: 2025-03-05 | End: 2025-03-07 | Stop reason: HOSPADM

## 2025-03-05 RX ORDER — ACETAMINOPHEN 500 MG
1000 TABLET ORAL EVERY 8 HOURS PRN
Qty: 42 TABLET | Refills: 0 | Status: SHIPPED | OUTPATIENT
Start: 2025-03-05

## 2025-03-05 RX ORDER — OXYCODONE HYDROCHLORIDE 5 MG/1
10 TABLET ORAL
Status: DISCONTINUED | OUTPATIENT
Start: 2025-03-05 | End: 2025-03-07 | Stop reason: HOSPADM

## 2025-03-05 RX ORDER — PROCHLORPERAZINE EDISYLATE 5 MG/ML
5 INJECTION INTRAMUSCULAR; INTRAVENOUS EVERY 6 HOURS PRN
Status: DISCONTINUED | OUTPATIENT
Start: 2025-03-05 | End: 2025-03-07 | Stop reason: HOSPADM

## 2025-03-05 RX ORDER — HYDROMORPHONE HYDROCHLORIDE 2 MG/ML
0.2 INJECTION, SOLUTION INTRAMUSCULAR; INTRAVENOUS; SUBCUTANEOUS EVERY 5 MIN PRN
Status: DISCONTINUED | OUTPATIENT
Start: 2025-03-05 | End: 2025-03-05 | Stop reason: HOSPADM

## 2025-03-05 RX ORDER — SODIUM CHLORIDE 9 MG/ML
INJECTION, SOLUTION INTRAVENOUS
Status: DISCONTINUED | OUTPATIENT
Start: 2025-03-05 | End: 2025-03-05 | Stop reason: HOSPADM

## 2025-03-05 RX ORDER — METHOCARBAMOL 500 MG/1
500 TABLET, FILM COATED ORAL 4 TIMES DAILY
Qty: 40 TABLET | Refills: 0 | Status: SHIPPED | OUTPATIENT
Start: 2025-03-05 | End: 2025-03-16

## 2025-03-05 RX ORDER — IBUPROFEN 200 MG
24 TABLET ORAL
Status: DISCONTINUED | OUTPATIENT
Start: 2025-03-05 | End: 2025-03-07 | Stop reason: HOSPADM

## 2025-03-05 RX ORDER — HYDRALAZINE HYDROCHLORIDE 25 MG/1
100 TABLET, FILM COATED ORAL EVERY 8 HOURS
Status: DISCONTINUED | OUTPATIENT
Start: 2025-03-05 | End: 2025-03-07 | Stop reason: HOSPADM

## 2025-03-05 RX ORDER — MUPIROCIN 20 MG/G
1 OINTMENT TOPICAL
Status: COMPLETED | OUTPATIENT
Start: 2025-03-05 | End: 2025-03-05

## 2025-03-05 RX ORDER — LIDOCAINE HYDROCHLORIDE 20 MG/ML
INJECTION INTRAVENOUS
Status: DISCONTINUED | OUTPATIENT
Start: 2025-03-05 | End: 2025-03-05

## 2025-03-05 RX ORDER — FLUOXETINE HYDROCHLORIDE 20 MG/1
40 CAPSULE ORAL DAILY
Status: DISCONTINUED | OUTPATIENT
Start: 2025-03-06 | End: 2025-03-07 | Stop reason: HOSPADM

## 2025-03-05 RX ORDER — SODIUM CHLORIDE, SODIUM LACTATE, POTASSIUM CHLORIDE, CALCIUM CHLORIDE 600; 310; 30; 20 MG/100ML; MG/100ML; MG/100ML; MG/100ML
INJECTION, SOLUTION INTRAVENOUS CONTINUOUS
Status: DISCONTINUED | OUTPATIENT
Start: 2025-03-05 | End: 2025-03-07 | Stop reason: HOSPADM

## 2025-03-05 RX ORDER — MEPERIDINE HYDROCHLORIDE 25 MG/ML
12.5 INJECTION INTRAMUSCULAR; INTRAVENOUS; SUBCUTANEOUS EVERY 10 MIN PRN
Status: DISCONTINUED | OUTPATIENT
Start: 2025-03-05 | End: 2025-03-05 | Stop reason: HOSPADM

## 2025-03-05 RX ORDER — LIDOCAINE HYDROCHLORIDE 10 MG/ML
1 INJECTION, SOLUTION EPIDURAL; INFILTRATION; INTRACAUDAL; PERINEURAL ONCE
Status: DISCONTINUED | OUTPATIENT
Start: 2025-03-05 | End: 2025-03-05 | Stop reason: SDUPTHER

## 2025-03-05 RX ORDER — SODIUM CHLORIDE 9 MG/ML
INJECTION, SOLUTION INTRAVENOUS CONTINUOUS
Status: ACTIVE | OUTPATIENT
Start: 2025-03-05 | End: 2025-03-06

## 2025-03-05 RX ORDER — FENTANYL CITRATE 50 UG/ML
INJECTION, SOLUTION INTRAMUSCULAR; INTRAVENOUS
Status: DISCONTINUED | OUTPATIENT
Start: 2025-03-05 | End: 2025-03-05

## 2025-03-05 RX ORDER — BUPIVACAINE HYDROCHLORIDE 7.5 MG/ML
INJECTION, SOLUTION INTRASPINAL
Status: DISCONTINUED | OUTPATIENT
Start: 2025-03-05 | End: 2025-03-05

## 2025-03-05 RX ORDER — PROPOFOL 10 MG/ML
VIAL (ML) INTRAVENOUS CONTINUOUS PRN
Status: DISCONTINUED | OUTPATIENT
Start: 2025-03-05 | End: 2025-03-05

## 2025-03-05 RX ORDER — SODIUM CHLORIDE 0.9 % (FLUSH) 0.9 %
10 SYRINGE (ML) INJECTION
Status: DISCONTINUED | OUTPATIENT
Start: 2025-03-05 | End: 2025-03-05 | Stop reason: HOSPADM

## 2025-03-05 RX ORDER — CEFAZOLIN 2 G/1
2 INJECTION, POWDER, FOR SOLUTION INTRAMUSCULAR; INTRAVENOUS
Status: COMPLETED | OUTPATIENT
Start: 2025-03-05 | End: 2025-03-06

## 2025-03-05 RX ORDER — DIPHENHYDRAMINE HYDROCHLORIDE 50 MG/ML
25 INJECTION INTRAMUSCULAR; INTRAVENOUS EVERY 6 HOURS PRN
Status: DISCONTINUED | OUTPATIENT
Start: 2025-03-05 | End: 2025-03-05 | Stop reason: HOSPADM

## 2025-03-05 RX ORDER — ONDANSETRON HYDROCHLORIDE 2 MG/ML
4 INJECTION, SOLUTION INTRAVENOUS EVERY 8 HOURS PRN
Status: DISCONTINUED | OUTPATIENT
Start: 2025-03-05 | End: 2025-03-07 | Stop reason: HOSPADM

## 2025-03-05 RX ORDER — NALOXONE HCL 0.4 MG/ML
0.02 VIAL (ML) INJECTION
Status: DISCONTINUED | OUTPATIENT
Start: 2025-03-05 | End: 2025-03-07 | Stop reason: HOSPADM

## 2025-03-05 RX ORDER — FAMOTIDINE 20 MG/1
20 TABLET, FILM COATED ORAL 2 TIMES DAILY
Status: DISCONTINUED | OUTPATIENT
Start: 2025-03-05 | End: 2025-03-07 | Stop reason: HOSPADM

## 2025-03-05 RX ORDER — OXYCODONE HYDROCHLORIDE 5 MG/1
5-10 TABLET ORAL EVERY 4 HOURS PRN
Qty: 40 TABLET | Refills: 0 | Status: SHIPPED | OUTPATIENT
Start: 2025-03-05

## 2025-03-05 RX ORDER — ACETAMINOPHEN 500 MG
1000 TABLET ORAL EVERY 6 HOURS
Status: DISCONTINUED | OUTPATIENT
Start: 2025-03-05 | End: 2025-03-07 | Stop reason: HOSPADM

## 2025-03-05 RX ORDER — IBUPROFEN 200 MG
16 TABLET ORAL
Status: DISCONTINUED | OUTPATIENT
Start: 2025-03-05 | End: 2025-03-07 | Stop reason: HOSPADM

## 2025-03-05 RX ORDER — AZELASTINE 1 MG/ML
1 SPRAY, METERED NASAL 2 TIMES DAILY
Status: DISCONTINUED | OUTPATIENT
Start: 2025-03-06 | End: 2025-03-07 | Stop reason: HOSPADM

## 2025-03-05 RX ORDER — GLUCAGON 1 MG
1 KIT INJECTION
Status: DISCONTINUED | OUTPATIENT
Start: 2025-03-05 | End: 2025-03-07 | Stop reason: HOSPADM

## 2025-03-05 RX ORDER — OXYCODONE HYDROCHLORIDE 5 MG/1
5 TABLET ORAL
Status: DISCONTINUED | OUTPATIENT
Start: 2025-03-05 | End: 2025-03-07 | Stop reason: HOSPADM

## 2025-03-05 RX ORDER — FLUTICASONE PROPIONATE 50 MCG
1 SPRAY, SUSPENSION (ML) NASAL DAILY
Status: DISCONTINUED | OUTPATIENT
Start: 2025-03-06 | End: 2025-03-07 | Stop reason: HOSPADM

## 2025-03-05 RX ORDER — INSULIN ASPART 100 [IU]/ML
0-5 INJECTION, SOLUTION INTRAVENOUS; SUBCUTANEOUS
Status: DISCONTINUED | OUTPATIENT
Start: 2025-03-05 | End: 2025-03-07 | Stop reason: HOSPADM

## 2025-03-05 RX ORDER — TRANEXAMIC ACID 10 MG/ML
1000 INJECTION, SOLUTION INTRAVENOUS
Status: DISCONTINUED | OUTPATIENT
Start: 2025-03-05 | End: 2025-03-05 | Stop reason: HOSPADM

## 2025-03-05 RX ORDER — ACETAMINOPHEN 500 MG
1000 TABLET ORAL
Status: COMPLETED | OUTPATIENT
Start: 2025-03-05 | End: 2025-03-05

## 2025-03-05 RX ORDER — ASPIRIN 81 MG/1
81 TABLET ORAL 2 TIMES DAILY
Status: DISCONTINUED | OUTPATIENT
Start: 2025-03-05 | End: 2025-03-07 | Stop reason: HOSPADM

## 2025-03-05 RX ORDER — PHENYLEPHRINE HYDROCHLORIDE 10 MG/ML
INJECTION INTRAVENOUS
Status: DISCONTINUED | OUTPATIENT
Start: 2025-03-05 | End: 2025-03-05

## 2025-03-05 RX ORDER — TRANEXAMIC ACID 10 MG/ML
1000 INJECTION, SOLUTION INTRAVENOUS
Status: COMPLETED | OUTPATIENT
Start: 2025-03-05 | End: 2025-03-05

## 2025-03-05 RX ORDER — ATORVASTATIN CALCIUM 40 MG/1
80 TABLET, FILM COATED ORAL NIGHTLY
Status: DISCONTINUED | OUTPATIENT
Start: 2025-03-05 | End: 2025-03-07 | Stop reason: HOSPADM

## 2025-03-05 RX ORDER — CEFAZOLIN 2 G/1
2 INJECTION, POWDER, FOR SOLUTION INTRAMUSCULAR; INTRAVENOUS
Status: COMPLETED | OUTPATIENT
Start: 2025-03-05 | End: 2025-03-05

## 2025-03-05 RX ORDER — ROPIVACAINE/EPI/CLONIDINE/KET 2.46-0.005
SYRINGE (ML) INJECTION
Status: DISCONTINUED | OUTPATIENT
Start: 2025-03-05 | End: 2025-03-05 | Stop reason: HOSPADM

## 2025-03-05 RX ORDER — CELECOXIB 200 MG/1
200 CAPSULE ORAL 2 TIMES DAILY
Qty: 14 CAPSULE | Refills: 0 | Status: SHIPPED | OUTPATIENT
Start: 2025-03-05

## 2025-03-05 RX ORDER — MIDAZOLAM HYDROCHLORIDE 1 MG/ML
INJECTION INTRAMUSCULAR; INTRAVENOUS
Status: DISCONTINUED | OUTPATIENT
Start: 2025-03-05 | End: 2025-03-05

## 2025-03-05 RX ORDER — DOCUSATE SODIUM 100 MG/1
100 CAPSULE, LIQUID FILLED ORAL 2 TIMES DAILY
Qty: 30 CAPSULE | Refills: 0 | Status: SHIPPED | OUTPATIENT
Start: 2025-03-05

## 2025-03-05 RX ORDER — AMLODIPINE BESYLATE 5 MG/1
10 TABLET ORAL DAILY
Status: DISCONTINUED | OUTPATIENT
Start: 2025-03-06 | End: 2025-03-07 | Stop reason: HOSPADM

## 2025-03-05 RX ORDER — ASPIRIN 81 MG/1
81 TABLET ORAL 2 TIMES DAILY
Qty: 60 TABLET | Refills: 0 | Status: SHIPPED | OUTPATIENT
Start: 2025-03-05 | End: 2025-04-05

## 2025-03-05 RX ORDER — CELECOXIB 100 MG/1
400 CAPSULE ORAL ONCE
Status: COMPLETED | OUTPATIENT
Start: 2025-03-05 | End: 2025-03-05

## 2025-03-05 RX ORDER — ACETAMINOPHEN 500 MG
1000 TABLET ORAL
Status: DISCONTINUED | OUTPATIENT
Start: 2025-03-05 | End: 2025-03-05 | Stop reason: SDUPTHER

## 2025-03-05 RX ORDER — POLYETHYLENE GLYCOL 3350 17 G/17G
17 POWDER, FOR SOLUTION ORAL DAILY
Status: DISCONTINUED | OUTPATIENT
Start: 2025-03-05 | End: 2025-03-07 | Stop reason: HOSPADM

## 2025-03-05 RX ORDER — AMOXICILLIN 250 MG
1 CAPSULE ORAL 2 TIMES DAILY
Status: DISCONTINUED | OUTPATIENT
Start: 2025-03-05 | End: 2025-03-07 | Stop reason: HOSPADM

## 2025-03-05 RX ORDER — METOPROLOL SUCCINATE 25 MG/1
25 TABLET, EXTENDED RELEASE ORAL DAILY
Status: DISCONTINUED | OUTPATIENT
Start: 2025-03-06 | End: 2025-03-07 | Stop reason: HOSPADM

## 2025-03-05 RX ADMIN — ACETAMINOPHEN 1000 MG: 500 TABLET ORAL at 06:03

## 2025-03-05 RX ADMIN — SODIUM CHLORIDE, SODIUM LACTATE, POTASSIUM CHLORIDE, AND CALCIUM CHLORIDE: .6; .31; .03; .02 INJECTION, SOLUTION INTRAVENOUS at 01:03

## 2025-03-05 RX ADMIN — PROCHLORPERAZINE EDISYLATE 5 MG: 5 INJECTION INTRAMUSCULAR; INTRAVENOUS at 04:03

## 2025-03-05 RX ADMIN — CEFAZOLIN 2 G: 2 INJECTION, POWDER, FOR SOLUTION INTRAMUSCULAR; INTRAVENOUS at 10:03

## 2025-03-05 RX ADMIN — TRANEXAMIC ACID 1000 MG: 10 INJECTION, SOLUTION INTRAVENOUS at 01:03

## 2025-03-05 RX ADMIN — OXYCODONE 10 MG: 5 TABLET ORAL at 06:03

## 2025-03-05 RX ADMIN — METHOCARBAMOL 750 MG: 750 TABLET ORAL at 02:03

## 2025-03-05 RX ADMIN — CEFAZOLIN 2 G: 2 INJECTION, POWDER, FOR SOLUTION INTRAMUSCULAR; INTRAVENOUS at 11:03

## 2025-03-05 RX ADMIN — PHENYLEPHRINE HYDROCHLORIDE 100 MCG: 10 INJECTION INTRAVENOUS at 01:03

## 2025-03-05 RX ADMIN — SODIUM CHLORIDE, SODIUM LACTATE, POTASSIUM CHLORIDE, AND CALCIUM CHLORIDE: .6; .31; .03; .02 INJECTION, SOLUTION INTRAVENOUS at 11:03

## 2025-03-05 RX ADMIN — PREGABALIN 75 MG: 50 CAPSULE ORAL at 09:03

## 2025-03-05 RX ADMIN — ACETAMINOPHEN 1000 MG: 500 TABLET ORAL at 09:03

## 2025-03-05 RX ADMIN — CELECOXIB 400 MG: 100 CAPSULE ORAL at 09:03

## 2025-03-05 RX ADMIN — BUPIVACAINE HYDROCHLORIDE IN DEXTROSE 2 ML: 7.5 INJECTION, SOLUTION SUBARACHNOID at 12:03

## 2025-03-05 RX ADMIN — PHENYLEPHRINE HYDROCHLORIDE 200 MCG: 10 INJECTION INTRAVENOUS at 12:03

## 2025-03-05 RX ADMIN — ATORVASTATIN CALCIUM 80 MG: 40 TABLET, FILM COATED ORAL at 10:03

## 2025-03-05 RX ADMIN — PROPOFOL 50 MCG/KG/MIN: 10 INJECTION, EMULSION INTRAVENOUS at 12:03

## 2025-03-05 RX ADMIN — OXYCODONE HYDROCHLORIDE 5 MG: 5 TABLET ORAL at 02:03

## 2025-03-05 RX ADMIN — LIDOCAINE HYDROCHLORIDE 100 MG: 20 INJECTION, SOLUTION INTRAVENOUS at 12:03

## 2025-03-05 RX ADMIN — MUPIROCIN 1 G: 20 OINTMENT TOPICAL at 09:03

## 2025-03-05 RX ADMIN — FENTANYL CITRATE 50 MCG: 50 INJECTION, SOLUTION INTRAMUSCULAR; INTRAVENOUS at 11:03

## 2025-03-05 RX ADMIN — PREGABALIN 75 MG: 25 CAPSULE ORAL at 10:03

## 2025-03-05 RX ADMIN — SODIUM CHLORIDE 150 ML/HR: 9 INJECTION, SOLUTION INTRAVENOUS at 02:03

## 2025-03-05 RX ADMIN — MEPERIDINE HYDROCHLORIDE 12.5 MG: 25 INJECTION INTRAMUSCULAR; INTRAVENOUS; SUBCUTANEOUS at 04:03

## 2025-03-05 RX ADMIN — FENTANYL CITRATE 50 MCG: 50 INJECTION, SOLUTION INTRAMUSCULAR; INTRAVENOUS at 02:03

## 2025-03-05 RX ADMIN — MIDAZOLAM HYDROCHLORIDE 1 MG: 1 INJECTION INTRAMUSCULAR; INTRAVENOUS at 11:03

## 2025-03-05 RX ADMIN — ONDANSETRON 4 MG: 2 INJECTION INTRAMUSCULAR; INTRAVENOUS at 03:03

## 2025-03-05 RX ADMIN — SENNOSIDES AND DOCUSATE SODIUM 1 TABLET: 50; 8.6 TABLET ORAL at 10:03

## 2025-03-05 RX ADMIN — FAMOTIDINE 20 MG: 20 TABLET, FILM COATED ORAL at 10:03

## 2025-03-05 RX ADMIN — TRANEXAMIC ACID 1000 MG: 10 INJECTION, SOLUTION INTRAVENOUS at 12:03

## 2025-03-05 RX ADMIN — METHOCARBAMOL 750 MG: 750 TABLET ORAL at 10:03

## 2025-03-05 RX ADMIN — ASPIRIN 81 MG: 81 TABLET, COATED ORAL at 10:03

## 2025-03-05 RX ADMIN — OXYCODONE 10 MG: 5 TABLET ORAL at 10:03

## 2025-03-05 NOTE — PLAN OF CARE
Discharge Orders: Home [place]         Expected Discharge Date: POD1    Diagnoses:  Post-op knee replacement    Patient is homebound due to:   Pt requires home health services due to taxing effort to leave the home as a result of immobility from Post-op knee replacement    Weight Bearing Status:   full weight bearing: Progress to cane as able.  Set up for outpatient PT as soon as able after 2 weeks, once patient is MOD I with cane.    Physical Therapy:   3 times a week for 2 weeks (Call for further orders beyond 2 weeks)  - Ambulate with a rolling walker  - Progress to cane  - Instruct on ROM and strengthening of knee    Aide to provide assistance with personal care and  ADLs 2 times a week for 2 weeks    Wound Care:   Surgical dressing change: Starting on  post op day 7-10, dressing can be moved. Incision is glued. Protective dressing should be placed with island dressing or apply gauze and cover with tegaderm.  Pt may shower if surgical dressing is waterproof, once removed on POD7 can shower but protect surgical dressing from getting wet by using press and seal saranwrap or garbage bag. Removal and replacement of dressing after shower only needed if incision is suspected to have gotten wet during shower. No soaking in the tub or hot tub use for 6 weeks.    Cold therapy/Ice: Encouraged at least 20 minutes 2-3 times daily or more if desired.  Incision must be kept waterproof while icing.  Wear TEDS Bilateral Thigh High Stockings for 3 weeks. Logan hose x 3 weeks. Ok to remove logan hose 1-2 hours/day max if desired.       Contact:  Please contact [phone] with concerns.         BLOOD THINNER:    If sent home on Lovenox: 28 days post-op for TKR/GEORGE  If sent home home on ASA: 81mg   BID x 4 weeks   If on Plavix, ok to stop hospital prescribed blood thinner and restart Plavix on POD5 after surgery    Once finished with prescribed blood thinner, patients can return to pre-surgical ASA dosage if they took ASA before  surgery.       Outpatient Therapy: [facility] is preference (to begin 2 weeks post op)  [address] [facility]  [place] [id number]      DME:  - Per PT/OT recommendation

## 2025-03-05 NOTE — H&P
3/5/25. No interval changes. See primary care note for additional pre-operative details.    EST PATIENT ORTHOPAEDIC: Knee    PRIMARY CARE PHYSICIAN: Alena Hernandez MD   REFERRING PROVIDER: Eric Carbajal MD  [address] [facility]  [place] [id number]     ASSESSMENT & PLAN:    Impression:  Left Knee Severe Degenerative Osteoarthritis, Primary   Right Knee s/p total Knee Primary completed on 11/8/23.    Follow Up Plan: 3 weeks after surgery    Surgery:    Valarie Foster has radiograph and physical exam evidence of the aforementioned impression and wishes to pursue surgery. This patient appears to have sufficient symptoms to warrant surgical intervention and is an appropriate candidate for left Primary Total Knee Arthroplasty as evidenced by months of unsuccessful non-operative treatment as outlined in the HPI below and progressive symptoms.    We had a lengthy discussion regarding the risk and benefit of surgery, the alternatives, limitations and personnel involved. These included but were not limited to infection, persistent pain, instability, nerve injury, blood clots, dislocation, loosening, leg length inequality and medical complications. We also discussed the pre-operative course, surgery itself and rehabilitation.    Marisol-operative blood management and transfusion issues were discussed, and options clearly outlined. The patient has consented to the use of the banked allogenic blood if medically necessary.    The patient has elected to schedule surgery at this time or intends to call the office with a surgical date. Shared decision making occurred while obtaining informed consent. The patient will be scheduled for a pre-operative education class at which time they will have their nasal swab completed and will be given CHG cloths along with the verbal and written instructions for their use.    We will plan for use of [place] components. [facility] Assistance     To review:  Patient is doing well with regard to her right knee  replacement.  She has no issues or complaints with her right knee.  She previously underwent gel injections into that knee and had some improvement for a few months but did not last the full 6 months.  Her knee gave out on her recently and she sustained a fall onto her left knee.  She would like a surgery in March and will discuss with her family regarding developing caretakers for this time.  Case request submitted. Tentative time is March 5th, 2025.  Surgery may be delayed as she is also having some left hand pain being evaluated by Dr. Mcdermott on Monday.  Depending on if she requires surgical intervention for this hand will depend on if we need to delay my surgery.  Her hand is more problematic to her compared to her knee currently.    Right Knee:  3v of the right knee suggest some new mild asymmetry of her joint line with a medial space being slightly smaller compared to the left.  This was not evident some previous films.  Lateral view had some evidence of posterior lucency but I think that is rotational.  This component was cemented into place I do not think it is loose but can continue to monitor going forward.    The patient has been ordered:  NELSON CT    CONSULTS:   Internal Medicine  Anesthesia for optimization    ACTIVE PROBLEM LIST  Patient Active Problem List   Diagnosis    Type 2 diabetes mellitus with diabetic neuropathy, without long-term current use of insulin    Benign hypertension    Hyperlipemia    Microhematuria    Left flank pain    Abdominal aortic atherosclerosis    History of renal cell cancer    Renal cell carcinoma of right kidney    Mild major depression    Bilateral chronic knee pain    Colon cancer screening    Pain of left calf    Refractive error    Primary osteoarthritis of knees, bilateral    Fall    Radiculopathy    Idiopathic progressive neuropathy    Nerve pain    Chest pain    Severe obesity (BMI 35.0-39.9) with comorbidity    Decreased ROM of right knee    Decreased strength  involving knee joint    Gait difficulty    Paresthesia    Pseudophakia    COVID-19    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 female here for evaluation and management of left knee pain. There is not a specific incident that brought about this pain. she has had progressive problems with the knee(s) starting 1 years ago but is now progressing to interfere with activities which include: walking, functional household ADL's, and participating in family activities    Currently the pain in the joint is rated at moderate with activity. The pain is constant and is located in the knee, located medially and located anterior. The pain is described as aching, severe, sharp, and stiffness. Relieving factors include rest, over the counter medication , and repositioning.     There is associated Clicking and Popping.     Valarie Foster has no additional complaints.     11/7/24: Here for one year post op on right knee.  She has no issues or pain with regard to the right knee.  It is functioning well.  Has ongoing pain in left knee.  Has a recent fall that exacerbated some of her pain.  She is interested in injection treatment today and pre-surgical planning for this knee.      1/31/25:  Patient here today for pre-surgical planning regarding left knee replacement.  She is also having some hand pain that is being evaluated by Dr. Mcdermott.  Based on that evaluation we will determine whether or not to proceed with our surgery.    PROGRESSIVE SYMPTOMS:  Pain worsened by weight bearing  Pain effecting living situation    FUNCTIONAL STATUS:   Walk a block or two on level ground     PREVIOUS TREATMENTS:  Medical: Steroid Injections and visco injections  Physical Therapy: Activities Modified   Previous  Orthopaedic Surgery: Right TKA    REVIEW OF SYSTEMS:  PAIN ASSESSMENT:  See HPI.  MUSCULOSKELETAL: See HPI.  OTHER 10 point review of systems is negative except as stated in HPI above    PAST MEDICAL HISTORY   has a past medical history of Arthritis, Colon polyp, Diabetes mellitus, Diabetes with neurologic complications, Hypertension, Nuclear sclerosis of both eyes (02/14/2022), Renal cell carcinoma, and Sleep apnea.     PAST SURGICAL HISTORY   has a past surgical history that includes Hysterectomy; Oophorectomy; Colonoscopy (N/A, 2/5/2018); Partial nephrectomy (Right, 10/12/2018); Colonoscopy (N/A, 8/13/2020); Phacoemulsification of cataract (Left, 7/7/2022); Intraocular prosthesis insertion (Left, 7/7/2022); Phacoemulsification of cataract (Right, 7/21/2022); Intraocular prosthesis insertion (Right, 7/21/2022); Total knee arthroplasty (Right, 11/8/2023); Endoscopic carpal tunnel release (Right, 3/15/2024); and Endoscopic carpal tunnel release (Left, 6/26/2024).     FAMILY HISTORY  family history includes Blindness in her sister; Breast cancer in her sister; Cancer in her sister; Diabetes in her brother and sister; Glaucoma in her father and sister; No Known Problems in her maternal aunt, maternal grandfather, maternal grandmother, maternal uncle, mother, paternal aunt, paternal grandfather, paternal grandmother, and paternal uncle; Thyroid disease in her sister.     SOCIAL HISTORY   reports that she has never smoked. She has been exposed to tobacco smoke. She has never used smokeless tobacco. She reports that she does not drink alcohol and does not use drugs.     ALLERGIES   Review of patient's allergies indicates:   Allergen Reactions    Lisinopril Swelling and Shortness Of Breath    Ascorbic acid-ascorbate calc      And citrus fruits        MEDICATIONS  Current Facility-Administered Medications on File Prior to Encounter   Medication Dose Route Frequency Provider Last Rate Last Admin    acetaminophen tablet  1,000 mg  1,000 mg Oral On Call Procedure Eric Carbajal MD        acetaminophen tablet 1,000 mg  1,000 mg Oral Q6H Eric Carbajal MD   1,000 mg at 11/09/23 0506    aspirin EC tablet 81 mg  81 mg Oral BID Eric Carbajal MD   81 mg at 11/09/23 1358    famotidine tablet 20 mg  20 mg Oral BID Eric Carbajal MD   20 mg at 11/09/23 1357    LIDOcaine (PF) 10 mg/ml (1%) injection 10 mg  1 mL Intradermal On Call Procedure Eric Carbajal MD        methocarbamoL tablet 750 mg  750 mg Oral TID Eric Carbajal MD   750 mg at 11/09/23 1357    naloxone 0.4 mg/mL injection 0.02 mg  0.02 mg Intravenous PRN rEic Carbajal MD        ondansetron injection 4 mg  4 mg Intravenous Q8H PRN Eric Carbajal MD   4 mg at 11/09/23 0858    oxyCODONE immediate release tablet 10 mg  10 mg Oral Q3H PRN Eric Carbajal MD   10 mg at 11/09/23 1043    oxyCODONE immediate release tablet 5 mg  5 mg Oral Q3H PRN Eric Carbajal MD        polyethylene glycol packet 17 g  17 g Oral Daily Eric Carbajal MD   17 g at 11/09/23 1356    pregabalin capsule 75 mg  75 mg Oral QHS Eric Carbajal MD   75 mg at 11/08/23 2147    prochlorperazine injection Soln 5 mg  5 mg Intravenous Q6H PRN Eric Carbajal MD   5 mg at 11/08/23 1325    senna-docusate 8.6-50 mg per tablet 1 tablet  1 tablet Oral BID Eric Carbajal MD   1 tablet at 11/09/23 1358    sodium chloride 0.9% flush 10 mL  10 mL Intravenous PRN Eric Carbajal MD         Current Outpatient Medications on File Prior to Encounter   Medication Sig Dispense Refill    ACCU-CHEK ONEIL PLUS TEST STRP Strp TEST BLOOD SUGAR TWICE DAILY 200 strip 1    ACCU-CHEK SOFTCLIX LANCETS Misc USE TO TEST BLOOD SUGAR ONE TIME DAILY 100 each 3    acetaminophen (TYLENOL) 500 MG tablet Take 2 tablets (1,000 mg total) by mouth 2 (two) times a day. 20 tablet 0    alcohol swabs (DROPSAFE ALCOHOL PREP PADS) PadM USE AS DIRECTED THREE TIMES DAILY 300 each 3    amLODIPine (NORVASC) 10 MG tablet Take  1 tablet (10 mg total) by mouth once daily. 90 tablet 0    aspirin (ECOTRIN) 81 MG EC tablet Take 1 tablet (81 mg total) by mouth 2 (two) times a day. 60 tablet 0    atorvastatin (LIPITOR) 80 MG tablet Take 1 tablet (80 mg total) by mouth every evening. 90 tablet 0    azelastine (ASTELIN) 137 mcg (0.1 %) nasal spray 1 spray (137 mcg total) by Nasal route 2 (two) times daily. 90 mL 0    blood glucose control high,low (ACCU-CHEK ONEIL CONTROL SOLN) Soln 1 Units by Misc.(Non-Drug; Combo Route) route as needed. 1 each 0    blood glucose control, low (TRUE METRIX LEVEL 1) Soln 1 each by Misc.(Non-Drug; Combo Route) route once as needed. 1 each 3    blood-glucose meter (ACCU-CHEK ONEIL PLUS METER) Misc 1 kit by Misc.(Non-Drug; Combo Route) route once daily. 1 each 0    blood-glucose meter (TRUE METRIX GLUCOSE METER) kit Use as directed to test glucose 1 each 0    cetirizine (ZYRTEC) 10 MG tablet Take 1 tablet (10 mg total) by mouth once daily. 90 tablet 0    ciclopirox (PENLAC) 8 % Soln Apply topically nightly. 6.6 mL 11    docusate sodium (COLACE) 100 MG capsule Take 1 capsule (100 mg total) by mouth 2 (two) times daily. 30 capsule 0    ergocalciferol (ERGOCALCIFEROL) 50,000 unit Cap TAKE 1 CAPSULE EVERY 7 DAYS. 12 capsule 0    FLUoxetine 40 MG capsule Take 1 capsule (40 mg total) by mouth once daily. 90 capsule 0    fluticasone propionate (FLONASE) 50 mcg/actuation nasal spray USE 1 SPRAY IN EACH NOSTRIL ONCE DAILY 32 g 3    hydrALAZINE (APRESOLINE) 100 MG tablet Take 1 tablet (100 mg total) by mouth every 8 (eight) hours. 270 tablet 0    hydrOXYzine HCL (ATARAX) 25 MG tablet TAKE 1 TABLET BY MOUTH THREE TIMES DAILY AS NEEDED FOR ITCHING 90 tablet 0    ibuprofen (ADVIL,MOTRIN) 800 MG tablet Take 1 tablet (800 mg total) by mouth every 8 (eight) hours as needed for Pain. 30 tablet 0    metoprolol succinate (TOPROL-XL) 25 MG 24 hr tablet Take 1 tablet (25 mg total) by mouth once daily. 90 tablet 0    ondansetron  (ZOFRAN-ODT) 8 MG TbDL Take 1 tablet (8 mg total) by mouth 2 (two) times daily as needed (nausea). 14 tablet 0    pantoprazole (PROTONIX) 40 MG tablet Take 1 tablet (40 mg total) by mouth once daily. 90 tablet 0    semaglutide (OZEMPIC) 0.25 mg or 0.5 mg (2 mg/3 mL) pen injector Inject 0.25 mg into the skin every 7 days. 3 mL 0    semaglutide (OZEMPIC) 0.25 mg or 0.5 mg (2 mg/3 mL) pen injector Inject 0.5 mg into the skin every 7 days. 3 mL 0    semaglutide (OZEMPIC) 1 mg/dose (2 mg/1.5 mL) PnIj Inject 1 mg into the skin every 7 days. 3 mL 5    traMADoL (ULTRAM) 50 mg tablet Take 1 tablet (50 mg total) by mouth every 6 (six) hours. 20 tablet 0    TRUEPLUS LANCETS 33 gauge Misc TEST BLOOD SUGAR TWICE DAILY 200 each 1          PHYSICAL EXAM   weight is 90.6 kg (199 lb 12.8 oz). Her oral temperature is 98.2 °F (36.8 °C). Her blood pressure is 146/67 (abnormal) and her pulse is 76. Her respiration is 18 and oxygen saturation is 96%.   Body mass index is 34.3 kg/m².      All other systems deferred.  GENERAL:  No acute distress  HABITUS: Obese  GAIT: Antalgic  SKIN: Normal  and No erythema, warmth, fluctuance     KNEE EXAM:    left:   Effusion: Minimal joint effusion  TTP: Yes over Medial Joint Line   No crepitus with passive knee ROM  Passive ROM: Extension 0, Flexion 130  No pain with manipulation of patella  Stable to varus/valgus stress. No increased laxity to anterior/posterior drawer testing  negative Constantin's test  No pain with IR/ER rotation of the hip  5/5 strength in knee flexion and extension, ankle plantarflexion and dorsiflexion  Neurovascular Status: Sensation intact to light touch in Sural, Saphenous, SPN, DPN, Tibial nerve distribution  2+ pulse DP/PT, normal capillary refill, foot has normal warmth    Right knee: Effusion: No joint effusion  TTP: No tenderness over Medial Joint Line   Passive ROM: Extension 0, Flexion 130  No pain with manipulation of patella  Stable to varus/valgus stress. No increased  laxity to anterior/posterior drawer testing  No pain with IR/ER rotation of the hip  5/5 strength in knee flexion and extension, ankle plantarflexion and dorsiflexion  Neurovascular Status: Sensation intact to light touch in Sural, Saphenous, SPN, DPN, Tibial nerve distribution  2+ pulse DP/PT, normal capillary refill, foot has normal warmth    DATA:  Diagnostic tests reviewed for today's visit:     4v of the left knee reveal Severe degenerative changes of the Medial and Patellofemoral compartment. There is evidence of advanced osteoarthritis changes with Subchondral sclerosis, Osteophyte formation, and Joint space narrowing. The limb is in varus alignment. The patella is tracking subluxed    3v of the right knee suggest some new mild asymmetry of her joint line with a medial space being slightly smaller compared to the left.  This was not evident some previous films.  Lateral view had some evidence of posterior lucency but I think that is rotational.  This component was cemented into place I do not think it is loose but can continue to monitor going forward.

## 2025-03-05 NOTE — ANESTHESIA PROCEDURE NOTES
Peripheral Block    Patient location during procedure: OR   Block not for primary anesthetic.  Reason for block: at surgeon's request and post-op pain management   Post-op Pain Location: left knee   Start time: 3/5/2025 11:05 AM  Timeout: 3/5/2025 11:04 AM   End time: 3/5/2025 11:10 AM    Staffing  Authorizing Provider: Gary Santa II, MD  Performing Provider: Gary Santa II, MD    Staffing  Performed by: Gary Santa II, MD  Authorized by: Gary Santa II, MD    Preanesthetic Checklist  Completed: patient identified, IV checked, site marked, risks and benefits discussed, surgical consent, monitors and equipment checked, pre-op evaluation and timeout performed  Peripheral Block  Patient position: supine  Prep: ChloraPrep  Patient monitoring: heart rate, cardiac monitor, continuous pulse ox and frequent blood pressure checks  Block type: adductor canal  Laterality: left  Injection technique: single shot  Needle  Needle type: Stimuplex   Needle gauge: 22 G  Needle length: 4 in  Needle localization: anatomical landmarks and ultrasound guidance     Assessment  Injection assessment: negative aspiration and negative parasthesia  Heart rate change: no  Slow fractionated injection: yes  Pain Tolerance: comfortable throughout block and no complaints      Additional Notes  NO APPARENT ANESTHESIA COMPLICATIONS

## 2025-03-05 NOTE — ANESTHESIA PROCEDURE NOTES
Spinal    Diagnosis: left knee osteoarthritis  Patient location during procedure: OR  Start time: 3/5/2025 11:59 AM  Timeout: 3/5/2025 11:58 AM  End time: 3/5/2025 12:01 PM    Staffing  Authorizing Provider: Gary Santa II, MD  Performing Provider: Gary Santa II, MD    Staffing  Performed by: Gary Santa II, MD  Authorized by: Gary Santa II, MD    Preanesthetic Checklist  Completed: patient identified, IV checked, site marked, risks and benefits discussed, surgical consent, monitors and equipment checked, pre-op evaluation and timeout performed  Spinal Block  Patient position: sitting  Prep: Betadine  Patient monitoring: heart rate, cardiac monitor and continuous pulse ox  Approach: midline  Location: L3-4  Injection technique: single shot  CSF Fluid: clear free-flowing CSF  Needle  Needle type: Gab[place]   Needle gauge: 25 G  Needle length: 3.5 in  Additional Documentation: no paresthesia on injection and negative aspiration for heme  Needle localization: anatomical landmarks  Assessment  Ease of block: easy  Patient's tolerance of the procedure: comfortable throughout block and no complaints

## 2025-03-05 NOTE — TRANSFER OF CARE
Anesthesia Transfer of Care Note    Patient: Valarie Foster    Procedure(s) Performed: Procedure(s) (LRB):  ARTHROPLASTY, KNEE, TOTAL, USING COMPUTER-ASSISTED NAVIGATION (Left)    Patient location: PACU    Anesthesia Type: general    Transport from OR: Transported from OR on room air with adequate spontaneous ventilation    Post pain: adequate analgesia    Post assessment: no apparent anesthetic complications and tolerated procedure well    Post vital signs: stable    Level of consciousness: awake    Nausea/Vomiting: no nausea/vomiting    Complications: none    Transfer of care protocol was followed      Last vitals: Visit Vitals  /61   Pulse 68   Temp 36.4 °C (97.5 °F)   Resp 15   Wt 90.6 kg (199 lb 12.8 oz)   SpO2 (!) 93%   Breastfeeding No   BMI 34.30 kg/m²

## 2025-03-05 NOTE — PROGRESS NOTES
1103 Monitors attached and a timeout was performed  1111 Nerve block complete. Patient tolerated without incident.

## 2025-03-05 NOTE — PT/OT/SLP EVAL
Dr. Medina ([facility]) would like patient to follow up with Dr. Hook within 1 week post discharge dx:cardiogenic shock 2/2 NSTEMI with third degree AV block ; NSTEMI s/p PCI with Romelia to mid/distal RCA  Discharge date 2/3/23. Please contact patient directly to schedule appointment.     Thank you,   SHON Dominique, RN  [facility] Patient Care Coordinator    Occupational Therapy   Evaluation    Name: Valarie Foster  MRN: [id number]  Admitting Diagnosis: <principal problem not specified>  Recent Surgery: Procedure(s) (LRB):  ARTHROPLASTY, KNEE, TOTAL, USING COMPUTER-ASSISTED NAVIGATION (Left) * Day of Surgery *    Recommendations:     Discharge Recommendations:  (TBD)  Discharge Equipment Recommendations:  walker, rolling  Barriers to discharge:  Inaccessible home environment, Decreased caregiver support    Assessment:     Valarie Foster is a 74 y.o. female with a medical diagnosis of <principal problem not specified>.  She presents with pain, general malaise while in stance with possible s/s orthostatic hypotension but BP not assessed in session. Performance deficits affecting function: weakness, impaired endurance, impaired self care skills, impaired functional mobility, impaired sensation, gait instability, impaired balance, decreased ROM, pain, decreased safety awareness, decreased lower extremity function, impaired skin, edema, orthopedic precautions.      Rehab Prognosis: Good; patient would benefit from acute skilled OT services to address these deficits and reach maximum level of function.       Plan:     Patient to be seen  (3-5x/week) to address the above listed problems via self-care/home management, therapeutic activities, therapeutic exercises  Plan of Care Expires: 04/05/25  Plan of Care Reviewed with: patient    Subjective     Chief Complaint: Pt reports feeling poorly in stance and asked to return to supine, diaphoretic and hot.  Patient/Family Comments/goals: To return to PLOF    Occupational Profile:  Living Environment: Pt lives alone in [facility], [address], [facility]/ with [place]  Previous level of function: Mod Indep to PRN assist with ADLs and functional mobility; pt reports that she can put on her own shoes/socks but requires assistance for UE dressing  Roles and Routines: Caretaker to self and home. Family assists for cooking, cleaning, driving, and grocery  shopping  Equipment Used at Home: walker, rolling, shower chair  Assistance upon Discharge: Family, pt reports strong family assistance from many nieces/nephews and states she has someone who will be with her 24/7 until she is able to care for self again    Pain/Comfort:  Pain Rating 1: 10/10  Location - Side 1: Left  Location - Orientation 1: generalized  Location 1: knee  Pain Addressed 1: Reposition, Distraction, Cessation of Activity  Pain Rating Post-Intervention 1: 10/10    Patients cultural, spiritual, L[employer]-day conflicts given the current situation:      Objective:     Communicated with: gabriella prior to session.  Patient found sitting edge of bed with peripheral IV upon OT entry to room.    General Precautions: Standard, fall, diabetic  Orthopedic Precautions: LLE weight bearing as tolerated  Braces: N/A  Respiratory Status: Room air    Occupational Performance:    Bed Mobility:    Patient completed Sit to Supine with minimum assistance and moderate assistance    Functional Mobility/Transfers:  Patient completed Sit <> Stand Transfer with contact guard assistance and minimum assistance  with  rolling walker   Functional Mobility: Pt with fair to fair- dynamic seated and standing balance. Limited ability to take lateral steps with difficulty moving LLE noted, preference for very narrow CARLOS.     Activities of Daily Living:  Upper Body Dressing: moderate assistance to don gown as robe seated EOB    Cognitive/Visual Perceptual:  Cognitive/Psychosocial Skills:     -       Oriented to: Person, Place, Time and Situation   -       Follows Commands/attention:Follows multistep  commands  -       Communication: clear/fluent  -       Memory: No Deficits noted  -       Safety awareness/insight to disability: intact   -       Mood/Affect/Coping skills/emotional control: Appropriate to situation    Physical Exam:  Postural examination/scapula alignment:    -      Rounded shoulders, forward head  Skin integrity: Surgical  wound L knee, not visualized 2/2 post operative dressing, c/d/i  Sensation:    -       Intact  Motor Planning:    -       WFL  Dominant hand:    -       R handed  Upper Extremity Range of Motion: BUE WFL     Upper Extremity Strength:  BUE grossly 3+ to 4-/5   Strength:  B hands WFL  Fine Motor Coordination:    -       Intact  Gross motor coordination:   WFL       AMPAC 6 Click ADL:  AMPAC Total Score: 14    Treatment & Education:  Pt educated on role of OT and POC.   Pt performing skills as listed above.     Patient left HOB elevated with all lines intact, call button in reach, nsg notified, and nsg present    GOALS:   Multidisciplinary Problems       Occupational Therapy Goals          Problem: Occupational Therapy    Goal Priority Disciplines Outcome Interventions   Occupational Therapy Goal     OT, PT/OT Progressing    Description: Goals to be met by: 04/05/2025      Patient will increase functional independence with ADLs by performing:    UE Dressing with Minimum Assistance.  LE Dressing with Moderate Assistance.  Grooming while standing with Modified [place].  Toileting from toilet with Modified [place] for hygiene and clothing management.   Toilet transfer to toilet or BSC with Modified [place].  Increased functional strength to [facility] for self care skills.  Upper extremity exercise program x10 reps per handout, with assistance as needed.                           DME Justifications:   Valarie's mobility limitation cannot be sufficiently resolved by the use of a cane. Her functional mobility deficit can be sufficiently resolved with the use of a Rolling Walker. Patient's mobility limitation significantly impairs their ability to participate in one of more activities of daily living.  The use of a RW will significantly improve the patient's ability to participate in MRADLS and the patient will use it on regular basis in the home.    History:     Past Medical History:   Diagnosis Date    Arthritis      Colon polyp     Diabetes mellitus     diet controlled    Diabetes with neurologic complications     Hypertension     Nuclear sclerosis of both eyes 02/14/2022    Renal cell carcinoma     Sleep apnea          Past Surgical History:   Procedure Laterality Date    COLONOSCOPY N/A 2/5/2018    Procedure: COLONOSCOPY;  Surgeon: Bacilio Mancia MD;  Location: [facility] ENDO;  Service: Endoscopy;  Laterality: N/A;  appt confirmed-ss    COLONOSCOPY N/A 8/13/2020    Procedure: COLONOSCOPY;  Surgeon: Jose West MD;  Location: [facility] ENDO;  Service: Endoscopy;  Laterality: N/A;    ENDOSCOPIC CARPAL TUNNEL RELEASE Right 3/15/2024    Procedure: RELEASE, CARPAL TUNNEL, ENDOSCOPIC - RIGHT;  Surgeon: Tonio Mcdermott MD;  Location: [facility] OR;  Service: Orthopedics;  Laterality: Right;    ENDOSCOPIC CARPAL TUNNEL RELEASE Left 6/26/2024    Procedure: RELEASE, CARPAL TUNNEL, ENDOSCOPIC - left;  Surgeon: Tonio Mcdermott MD;  Location: [facility] OR;  Service: Orthopedics;  Laterality: Left;    HYSTERECTOMY      INTRAOCULAR PROSTHESES INSERTION Left 7/7/2022    Procedure: INSERTION, IOL PROSTHESIS;  Surgeon: Candelario Novoa MD;  Location: [facility] OR;  Service: Ophthalmology;  Laterality: Left;    INTRAOCULAR PROSTHESES INSERTION Right 7/21/2022    Procedure: INSERTION, IOL PROSTHESIS;  Surgeon: Candelario Novoa MD;  Location: [facility] OR;  Service: Ophthalmology;  Laterality: Right;    OOPHORECTOMY      PARTIAL NEPHRECTOMY Right 10/12/2018    Procedure: NEPHRECTOMY, PARTIAL open. Dr Arenas to assist.;  Surgeon: Alejandra Palm MD;  Location: [facility] OR;  Service: Urology;  Laterality: Right;  RN PREOP 10/9/2018----NEEDS H/P    PHACOEMULSIFICATION OF CATARACT Left 7/7/2022    Procedure: PHACOEMULSIFICATION, CATARACT;  Surgeon: Candelario Novoa MD;  Location: [facility] OR;  Service: Ophthalmology;  Laterality: Left;  RN PHONE PREOP 6/27/2022   ARRIVAL 6:00 AM    PHACOEMULSIFICATION OF CATARACT Right 7/21/2022    Procedure:  PHACOEMULSIFICATION, CATARACT;  Surgeon: Candelario Novoa MD;  Location: [facility] OR;  Service: Ophthalmology;  Laterality: Right;  RN PHONE PREOP 7/12/2022   ARRIVAL 12:00 NOON    TOTAL KNEE ARTHROPLASTY Right 11/8/2023    Procedure: ARTHROPLASTY, KNEE, TOTAL. NELSON;  Surgeon: Eric Carbajal MD;  Location: [facility];  Service: Orthopedics;  Laterality: Right;  [place]. Admit  RAPHAEL QUINTIN NIEVES [phone] NOTIFIED QUINTIN ON 10/11/2023-LO  RN PREOP 10/25/2023   T/S ON 11/6/2023--done------CLEARED BY CARDS       Time Tracking:     OT Date of Treatment: 03/05/25  OT Start Time: 1608  OT Stop Time: 1623  OT Total Time (min): 15 min    Billable Minutes:Evaluation 15    3/5/2025
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
## 2025-03-05 NOTE — PLAN OF CARE
Problem: Physical Therapy  Goal: Physical Therapy Goal  Description: Goals to be met by: 3/19/25     Patient will increase functional independence with mobility by performin. Supine to sit with Modified [place]  2. Rolling to Left and Right with Modified [place]  3. Sit to stand transfer with Modified [place] using RW  4. Bed to chair transfer with Modified [place] using Rolling Walker  5. Gait x250 feet with Modified [place] using Rolling Walker   6. Lower extremity exercise program 2 sets x15 reps per handout, with independence    Outcome: Progressing     Gait limited 2* pt c/o feeling hot, sleepy, dizzy, and nauseated.

## 2025-03-05 NOTE — OP NOTE
OPERATIVE NOTE     PATIENT NAME: Valarie Foster   MRN: [id number]      Surgery Date:3/5/25    Surgeon(s) and Assistant(s): [facility]al [facility]. [facility]     Procedure(s): left Primary Total Knee Arthroplasty, [facility] [employer] [facility]     BMI:  Body mass index is 34.3 kg/m².      Anesthesia:  Spinal     Attestation:   I was present for all of the critical portions of the operation and performed all critical portions.  The medical assistant performed the superficial closure under supervision, and assisted during the operation. I was immediately available for the duration of the entire case.      Preoperative Diagnosis:  left  Knee Arthritis     Postoperative Diagnosis: Same     Findings:  See Full Operative Report    Complications: None     EBL: 25cc     Implants:   Implant Name Type Inv. Item Serial No.  Lot No. LRB No. Used Action   CEMENT BONE SMPLX HV GENTMYCN - [id number]  CEMENT BONE SMPLX HV GENTMYCN  [employer]. [id number] Left 1 Implanted   CEMENT BONE SMPLX HV GENTMYCN - [id number]  CEMENT BONE SMPLX HV GENTMYCN  [employer]. [id number] Left 1 Implanted   PIN BONE 4 X 140MM STERILE - [id number]  PIN BONE 4 X 140MM STERILE  [facility] SURGICAL [id number] Left 1 Implanted   PIN BONE 1U667BP - [id number]  PIN BONE [id number]  [employer]. [id number] Left 1 Implanted   COMPONENT FEM CR LORIE LEFT 2 - [id number]  COMPONENT FEM CR LORIE LEFT 2  [employer]. [id number] Left 1 Implanted   BASEPLATE TIB LORIE PRIM SZ 3 - [id number]  BASEPLATE TIB LORIE PRIM SZ 3  [employer]. [id number] Left 1 Implanted   INSERT TRIATHLON X3 SZ3 9MM - [id number]  INSERT TRIATHLON X3 SZ3 9MM  [employer]. [id number] Left 1 Implanted   PATELLA TRI 29X9 X3 POLYETHYLE - [id number]  PATELLA TRI 29X9 X3 POLYETHYLE  [employer]. [id number] Left 1 Implanted       Drains: None     Problem List:   Problem List Items Addressed This Visit           Endocrine    Type 2 diabetes mellitus with diabetic neuropathy, without long-term current use of insulin (Chronic)    Relevant Medications    insulin aspart U-100 pen 0-5 Units    Other Relevant Orders    POCT glucose     Other Visit Diagnoses         Arthritis of left knee    -  Primary    Relevant Medications    oxyCODONE (ROXICODONE) 5 MG immediate release tablet    Other Relevant Orders    POCT glucose    If any glucose result is less than 50 or greater than 400:    If 2nd result is less than 50 or greater than 400:    If glucose greater than 400 mg/dL treat per correction scale.  If glucose remains elevated above 400 mg/dL at next scheduled check, notify provider    Do not admin Aspart correction between scheduled prandial Aspart    Recheck Blood Glucose:    Diet diabetic 2000 Calories (up to 75 gm per meal)    Inpatient consult to Social Work/Case Management    Place in Outpatient - Extended Recovery      Primary osteoarthritis of left knee        Relevant Orders    Vital signs    Perform [place] Agitation Sedation Scale (RASS)    Insert peripheral IV    FOOT COMPRESSION PUMP    Place foot compression device    Document RASS    Chlorhexidine (CHG) 2% Wipes    Ortho Pathway Patient    Notify Physician - Potential Need of Opioid Reversal    Notify Anesthesiologist if patient on home insulin pump    Vital signs - notify MD    Transfer patient (Completed)    Vital signs    Assess pain scale    Bromage scale    Perform [place] Agitation Sedation Scale (RASS)    Nursing to set up ETCO2 module on pump    END TIDAL CO2 MONITOR Q4H    Neurovascular checks:  LLE    Cooling device to site continuously through surgical dressing    Foot pump at all times while in bed or chair    OT evaluate and treat    PT evaluate and treat    Cardiac Monitoring - Adult    Activity as tolerated    Keep surgical extremity elevated when not ambulating (Completed)    Lifting restrictions    Weight bearing as tolerated    No driving,  operating heavy equipment or signing legal documents while taking pain medication    Call MD for:  temperature >100.4    Call MD for:  persistent nausea and vomiting    Call MD for:  severe uncontrolled pain    Call MD for:  difficulty breathing, headache or visual disturbances    Call MD for:  redness, tenderness, or signs of infection (pain, swelling, redness, odor or green/yellow discharge around incision site)    Call MD for:  hives    Call MD for:  persistent dizziness or light-headedness    Call MD for:  extreme fatigue    Place in Outpatient (Completed)    WALKER FOR HOME USE    Place SEB hose    Place sequential compression device    X-Ray Knee 1 or 2 View Left               OPERATIVE INDICATIONS: The patient has a long history of progressive left knee pain, arthritis, and degeneration. They has developed deformity in the knee from predominantly wear and bone loss. Non-operative treatment and injections have been attempted, but have not improved or controlled the symptoms and pain that occurs during normal daily activities. Knee motion has also become limited and is restricting the patient. A total knee arthroplasty was recommended. The risks, benefits and potential complications of the arthroplasty surgery were discussed with the patient in detail. Specific details of the surgical procedure, hospitalization, recovery, rehabilitation, and long-term precautions were also presented. Pre-operative teaching was provided. Implant/prosthesis selection was outlined, and the many options available were explained; the final choice will be made at the time of the procedure to match the anatomy and condition of the bone, ligaments, tendons, and muscles.      The patient was seen by Internal Medicine/Anesthesia for pre-operative optimization, and risk assessment. Marisol-operative blood management and the potential for blood transfusion were discussed with risks and options clearly outlined. We discussed the risks of the  procedure in detail, which included but is not limited to infection, bleeding, scarring, injury to blood vessels, nerves, muscular structures. We discussed specifically fracture and arthrofibrotic complications. Understanding of all topics was conveyed to me by the patient pre-operatively, and patient consent was given to proceed with the total knee replacement.      OPERATIVE PROCEDURE:  The patient was identified and brought into the Operating Room by the anesthesia and nursing teams. Anesthesia was successfully performed with spinal. The patient was then positioned supine on the operating room table, and all bony prominences were padded.  Intravenous antibiotic prophylaxis with Cefazolin dosing was confirmed. A tourniquet was applied to the upper thigh. A full knee exam was done after anesthesia was in full effect.  The leg was prepped and draped in the usual sterile fashion.  A surgical time-out was performed immediately preceding the incision with all personnel in the operating room; the patient identity was again confirmed, the knee-surgical site and extremity were identified and confirmed, X-rays were reviewed, and availability of the appropriate surgical equipment was established. The knee was exsanguinated and exposed using a limited anterior-midline skin incision. 1g of TXA was administed. Dissection was carried down through skin and subcutaneous tissue to the extensor mechanism with a scalpel.  A median parapatellar arthrotomy was made to enter the knee space sharply. A large amount of normal appearing joint fluid was encountered and suctioned.  The synovium was thickened, hypertrophic, and inflamed. A partial synovectomy was performed for exposure, and the medial and lateral gutters were cleared of scar and synovial reflections. The superficial medial collateral ligament was carefully elevated off. The patellar synovial reflections were released and the patella exposed to reveal wear through the  articular surface. The trochlea demonstrated similar wear. Attention was then turned to the patella, it was measured and the posterior 9-10 mm was resected leaving a healthy remnant with greater than 11mm thickness.  The patella was then subluxed laterally. The femoral and tibial arrays were placed as well as the checkpoints.     The knee was then flexed up to 90 degrees.  Assessment of the knee joint revealed severe end-stage articular damage with no remaining medial/lateral weight bearing cartilage.  The compartment was severely eburnated with bone loss on the posterior tibia and posterior femoral condyles, resulting in the varus deformity.  The anterior cruciate ligament was found to be functionally compromised and it was excised.  Assessment of the soft tissues were made utilizing 1-6 mm feeler gauges in flexion and extension.  Changes to the preoperative plan were then carried out.     Reassessment of the soft tissue balance was carried out to confirm equal tension in flexion, extension, and medial/lateral balance. The robotic arm was then utilized to execute the plan. We began with the distal femur and chamfer cut. Then blade was swtiched and the remaining femur cuts were excuted. Then the tibia was cut. Soft tissue structures were protected with Z-retractors, ranawat. Osteophytes were then further debrided from the femur and the tibia. Degenerative meniscal remnants were excised medially and laterally. We cleaned up posterior osteophytes with curved osteotome and currette. A portion of the pain cocktail was injected into the posterior capsule. Trailing was then initated. Femur trial was placed, then the knee was then brought to extension and the appropriate sized tibial spacer block was placed. This brought the knee to full extension, mid flexion, and 130 degrees of flexion with excellent medial lateral balance. The tray was rotated so that its midpoint was at the junction of middle one-third and lateral  two-thirds of the tibial tubercle. Punches and pins fixed the trial in this position. Check points and arrays were removed. The patella was sized with the asymmetric guide, and drill holes were made. A trial button was placed and tracking of the patella and the entire knee trial was excellent; range of motion was excellent. No instability.  Patella tracked midline. The PCL was slightly tight necessitating a mild release of the PCL to balance the flexion gap.     Punches and drills were then placed through the trials to accommodate the final implants.  All trials were removed.  The wound was copiously lavaged with a pulse irrigation/suction system. This included a solution of dilute betadine. The posterior recess of the knee and areas of known bleeding were treated with the electrocautery to reduce post-operative bleeding. The cut bone surfaces were then irrigated again, suctioned, and dried. The final implants were placed, tibia followed by femur followed by patella. The final CS polyethylene was impacted into place. the knees were held in extension until the cement cured. The tourniquet was released and no excessive bleeding was encountered. Synovial bleeding was further treated with the electrocautery.  The wound was again irrigated. The extensor mechanism and capsule was then anatomically closed with Stratafix suture.  Knee stability and range of motion with the capsule closed was excellent, and range of motion was 0 to 130 without excessive stress on the repair. Instrument and sponge count was completed and confirmed correct. Deep and superficial subcutaneous tissue was closed with interrupted 2-0 Vicryl suture. A running 4-0 Monocryl subcuticular stitch was used to re-approximate the skin edges. Dermabond adhesive was applied.  A sterile silver impregnated dressing was placed.  PAS stockings were placed.  The patient tolerated the procedure, was transferred from the operating room table to a hospital bed, and  taken to Recovery/PACU in stable condition.     PAIN COCKTAIL: RUBÉN Formula     POST OPERATIVE PLAN:  Patient will be transferred to the floor once in stable condition and after radiographs confirm no immediate complications. The patient will be treated with multimodal pain management protocols. They will be placed on anticoagulation of ASA 81mg to start on POD1. The patient will be weight bearing as tolerated. They will work with physical therapy, have a graduated diet, and once medially stable and safe for home can be discharged. Patient will follow up with me 3 weeks post operatively. Dressing should be removed by patient 7 days post operatively.    IMPLANTS:  [employer]  Femur: #2 CR, cemented  Tibia: #3, cemented  Poly: 9mm CS  Patella: 29mm, cemented

## 2025-03-05 NOTE — PLAN OF CARE
Problem: Occupational Therapy  Goal: Occupational Therapy Goal  Description: Goals to be met by: 04/05/2025      Patient will increase functional independence with ADLs by performing:    UE Dressing with Minimum Assistance.  LE Dressing with Moderate Assistance.  Grooming while standing with Modified [place].  Toileting from toilet with Modified [place] for hygiene and clothing management.   Toilet transfer to toilet or BSC with Modified [place].  Increased functional strength to WFL for self care skills.  Upper extremity exercise program x10 reps per handout, with assistance as needed.      Outcome: Progressing   Pt would benefit from continued OT to address deficits in self care and functional mobility. Recommendations TBD pending progress with therapies and tolerance for OOB activity; DME needs RW

## 2025-03-05 NOTE — DISCHARGE INSTRUCTIONS
Post-Operative Discharge Instructions: Dr. Carbajal    Physical Therapy  You will have home health/physical therapy working with you 2-3x a week for the first two weeks. After this, it will be necessary to begin formal outpatient physical therapy for an additional 2 weeks for hip replacements and about 4-6 weeks for knee replacements.   Knee replacements can get stiff and as such the post operative therapy is critical after a knee replacement. Range of motion exercises are not limited to therapy sessions and should be done every 1-2 hours on your own.   Hip replacements your therapy for the first month is mostly walking and gradually returning to activities as tolerated. However, you will have to wean off assistive devices and maintain hip precautions for 6 weeks post operatively.     Pain Management  Some degree of pain is normal and expected. You will improve gradually as your muscles become stronger and healing continues. This speed of recovery is different for every patient and you should not compare your recovery to another friend or relative.   You will be discharged with pain medications. You should wean off of these as tolerated by 4-6 weeks. but it is expected you will need them in the immediate post operative period and for therapy.   Pain medications can have common side effects of constipation which you should take a laxative, nausea which you should take medication with food, itching which can be alleviated with Benadryl, and confusion which you should stop taking pain medications and call our office if this occurs.   Be aware of your ingestion of Tylenol if your pain medication has this in it, you should not exceed 3000mg of Acetaminophen as this can damage your liver.   If you require a pain medication refill, you will need to contact our office at least 3 days in advance to prescription running out. Prescriptions cannot be called into a pharmacy. You will need to  a prescription in one of our  office locations depending on our clinic availability.     Swelling  You will have swelling of the post operative leg. Swelling may get worse with activity. It will likely get worse in the 2-3 days after discharge from the hospital. Typically swelling is correlated to pain. It can be helpful to elevate your extremity above the level of your heart and consistent use of ice. Elevating your extremity should not be done as to violate your hip precautions after a hip replacement, and no pillows should be placed under the knee after knee replacement surgery.   You will have compression stockings that should remain on for 3 weeks following surgery. They should only be removed for hygiene purposes. These will help with the swelling.       Anticoagulation  You will be placed on a blood thinner to prevent blood clots. You will need to take this as directed.        Wound Care  Your dressing should be changed after 7 days and every few days thereafter. You may shower over this dressing but it should be covered or kept dry (nichole wrap or garbage bag). We will remove a portion of your bottom glue/gauze dressing at your follow up appointment. You can continue to shower but NO scrubbing the incision, should pat dry. NO soaking the wound in a bathtub, hot tub, pool, ocean etc. for at least 6 weeks after surgery. Your incision will likely be glued on the skin and no sutures should need to be removed post operatively.  You can begin putting on Vitamin E based lotions on the wound around 6-8 weeks post operatively when there is no remaining scabbing of the incision.   If there is any drainage post operatively you should contact our office immediately    Antibiotics  You will need to be placed on prophylactic antibiotics prior to any dental procedure or cleanings, any colonoscopy, cystoscopy, prostate or bladder surgery, kidney surgery or endoscopy. This will avoid risk of subsequent infection of your replacement. We prefer Amoxicillin  2g one hour prior to procedure. This can be given by our office or your dentist. Although controversial, we prefer lifetime dental prophylaxis.     Other Considerations  No additional anti-inflammatories should be used for 3 weeks following surgery  No driving for about 2-4 weeks following surgery on the left leg and about 4-6 weeks after surgery on the right leg. You will need to be off pain medications completely. You will need to be able to perform evasive driving maneuvers without hesitation.    You can fly about 2 weeks post-operatively. However, we may recommend alterative blood thinners if this will take place.   Return to work is patient specific. If a desk job, it may be 2-4 weeks for motivated individuals. Patients in strenuous or physical jobs may be out for 12 weeks.     Follow-up appointments  Your first post operative visit will be about 3 weeks after surgery. You will have x-rays at this appointment  Your second post operative visit will be about 3 months after surgery. This will be for a clinical check only unless a reason arises for an x-ray  Your third post operative visit will be about 1 year after surgery. X-rays are taken at this time.     .Our goal at [facility] is to always give you outstanding care and exceptional service. You may receive a survey from [employer] by mail, text or e-mail in the next 24-48 hours asking about the care you received with us. The survey should only take 5-10 minutes to complete and is very important to us.    ------------     Your feedback provides us with a way to recognize our staff who work tirelessly to provide the best care! Also, your responses help us learn how to improve when your experience was below our aspiration of excellence. We are always looking for ways to improve your stay. We WILL use your feedback to continue making improvements to help us provide the highest quality care. We keep your personal information and feedback confidential. We appreciate  your time completing this survey and can't wait to hear from you!!!    We look forward to your continued care with us! Thanks so much for choosing [facility] for your healthcare needs!    Our goal at [facility] is to always give you outstanding care and exceptional service. You may receive a survey from [employer] by mail, text or e-mail in the next 24-48 hours asking about the care you received with us. The survey should only take 5-10 minutes to complete and is very important to us.     Your feedback provides us with a way to recognize our staff who work tirelessly to provide the best care! Also, your responses help us learn how to improve when your experience was below our aspiration of excellence. We are always looking for ways to improve your stay. We WILL use your feedback to continue making improvements to help us provide the highest quality care. We keep your personal information and feedback confidential. We appreciate your time completing this survey and can't wait to hear from you!!!    We look forward to your continued care with us! Thanks so much for choosing [facility] for your healthcare needs!

## 2025-03-06 ENCOUNTER — OUTPATIENT CASE MANAGEMENT (OUTPATIENT)
Dept: ADMINISTRATIVE | Facility: OTHER | Age: 75
End: 2025-03-06
Payer: MEDICARE

## 2025-03-06 DIAGNOSIS — Z96.652 STATUS POST LEFT KNEE REPLACEMENT: Primary | ICD-10-CM

## 2025-03-06 LAB
POCT GLUCOSE: 130 MG/DL (ref 70–110)
POCT GLUCOSE: 162 MG/DL (ref 70–110)
POCT GLUCOSE: 165 MG/DL (ref 70–110)
POCT GLUCOSE: 174 MG/DL (ref 70–110)

## 2025-03-06 PROCEDURE — 25000003 PHARM REV CODE 250: Mod: HCNC | Performed by: STUDENT IN AN ORGANIZED HEALTH CARE EDUCATION/TRAINING PROGRAM

## 2025-03-06 PROCEDURE — 25000003 PHARM REV CODE 250: Mod: HCNC | Performed by: ORTHOPAEDIC SURGERY

## 2025-03-06 PROCEDURE — 97110 THERAPEUTIC EXERCISES: CPT | Mod: HCNC

## 2025-03-06 PROCEDURE — 63600175 PHARM REV CODE 636 W HCPCS: Mod: HCNC | Performed by: ORTHOPAEDIC SURGERY

## 2025-03-06 PROCEDURE — 97530 THERAPEUTIC ACTIVITIES: CPT | Mod: HCNC

## 2025-03-06 RX ORDER — HYDROXYZINE HYDROCHLORIDE 25 MG/1
25 TABLET, FILM COATED ORAL 3 TIMES DAILY PRN
Status: DISCONTINUED | OUTPATIENT
Start: 2025-03-06 | End: 2025-03-07 | Stop reason: HOSPADM

## 2025-03-06 RX ADMIN — ASPIRIN 81 MG: 81 TABLET, COATED ORAL at 08:03

## 2025-03-06 RX ADMIN — FAMOTIDINE 20 MG: 20 TABLET, FILM COATED ORAL at 08:03

## 2025-03-06 RX ADMIN — METOPROLOL SUCCINATE 25 MG: 25 TABLET, EXTENDED RELEASE ORAL at 08:03

## 2025-03-06 RX ADMIN — ACETAMINOPHEN 1000 MG: 500 TABLET ORAL at 11:03

## 2025-03-06 RX ADMIN — HYDROXYZINE HYDROCHLORIDE 25 MG: 25 TABLET ORAL at 08:03

## 2025-03-06 RX ADMIN — ACETAMINOPHEN 1000 MG: 500 TABLET ORAL at 02:03

## 2025-03-06 RX ADMIN — HYDRALAZINE HYDROCHLORIDE 100 MG: 25 TABLET ORAL at 02:03

## 2025-03-06 RX ADMIN — AMLODIPINE BESYLATE 10 MG: 5 TABLET ORAL at 08:03

## 2025-03-06 RX ADMIN — PREGABALIN 75 MG: 25 CAPSULE ORAL at 08:03

## 2025-03-06 RX ADMIN — METHOCARBAMOL 750 MG: 750 TABLET ORAL at 08:03

## 2025-03-06 RX ADMIN — AZELASTINE HYDROCHLORIDE 137 MCG: 137 SPRAY, METERED NASAL at 08:03

## 2025-03-06 RX ADMIN — METHOCARBAMOL 750 MG: 750 TABLET ORAL at 02:03

## 2025-03-06 RX ADMIN — OXYCODONE 10 MG: 5 TABLET ORAL at 06:03

## 2025-03-06 RX ADMIN — ATORVASTATIN CALCIUM 80 MG: 40 TABLET, FILM COATED ORAL at 08:03

## 2025-03-06 RX ADMIN — SENNOSIDES AND DOCUSATE SODIUM 1 TABLET: 50; 8.6 TABLET ORAL at 08:03

## 2025-03-06 RX ADMIN — ACETAMINOPHEN 1000 MG: 500 TABLET ORAL at 04:03

## 2025-03-06 RX ADMIN — HYDRALAZINE HYDROCHLORIDE 100 MG: 25 TABLET ORAL at 06:03

## 2025-03-06 RX ADMIN — OXYCODONE 10 MG: 5 TABLET ORAL at 02:03

## 2025-03-06 RX ADMIN — FLUOXETINE HYDROCHLORIDE 40 MG: 20 CAPSULE ORAL at 08:03

## 2025-03-06 RX ADMIN — CEFAZOLIN 2 G: 2 INJECTION, POWDER, FOR SOLUTION INTRAMUSCULAR; INTRAVENOUS at 04:03

## 2025-03-06 RX ADMIN — HYDROXYZINE HYDROCHLORIDE 25 MG: 25 TABLET ORAL at 02:03

## 2025-03-06 RX ADMIN — POLYETHYLENE GLYCOL 3350 17 G: 17 POWDER, FOR SOLUTION ORAL at 08:03

## 2025-03-06 NOTE — DISCHARGE SUMMARY
[facility]  Orthopedics  Discharge Summary      Patient Name: Valarie Foster  MRN: [id number]  Admission Date: 3/5/2025  Hospital Length of Stay: 0 days  Discharge Date and Time:  03/07/2025   Attending Physician: Eric Carbajal MD   Discharging Provider: Zari Estrada PA-C  Primary Care Provider: Alena Hernandez MD    HPI: Left knee OA    Procedure(s) (LRB):  ARTHROPLASTY, KNEE, TOTAL, USING COMPUTER-ASSISTED NAVIGATION (Left)      Hospital Course: Hospital Course    The patient is a 73 y/o female  who has been followed in clinic for left knee arthritis. It was determined she would benefit from surgery. The procedure, its risks, benefits, and potential complications were discussed in detail with the patient prior to surgery. Understanding of all topics was conveyed by the patient, and consent was given for surgery. The patient was admitted to [facility] on 3/5/2025 for left knee arthroplasty.    The procedure was tolerated well and she was sent to the post-operative recovery room in stable condition, where she also did well. Post-operative x-rays in the recovery room showed well-aligned components without evidence of complication or fracture. She was subsequently sent to his hospital room for postoperative management.  ?  Once on the floor herpostoperative course  was unremarkable and she did well. her diet was advanced which was tolerated. her pain was well controlled on multimodal pain medication; this was eventually transitioned to oral medication alone prior to discharge. She worked with Physical Therapy starting on POD#0, but she had difficulty ambulating and transitioning from bed to chair. PT recommended she would benefit from acute skilled PT services and she stayed an additional night. Their dressing remained clean dry and intact throughout the hospital stay. She remained afebrile with stable vital signs throughout the stay. He/she was stable for discharge on POD#2.     Consults (From  "admission, onward)          Status Ordering Provider     Inpatient consult to Social Work/Case Management  Once        Provider:  (Not yet assigned)    Acknowledged ERICK RUSSO            Significant Diagnostic Studies: No pertinent studies.    Pending Diagnostic Studies:       None          There are no hospital problems to display for this patient.     Discharged Condition: good    Disposition: Home-Health Care [facility]    Follow Up:   Follow-up Information       [facility] Follow up.    Specialties: Home Health Services, Home Therapy Services, Home Living Aide Services  Why: Nurse will call to schedule first visit  Contact information:  [address], [address]  [facility] [address]  [phone]                         Patient Instructions:   Justification of Rolling Walker:  The mobility limitation can not be sufficiently resolved by the use of a cane.  Patient's functional mobility deficit can be sufficiently resolved with the use of a rolling walker.  Patient has mobility limitation significantly impairs their ability to participate in 1 or more activities of daily living.  The use of the rolling walker we will significantly improve the patient's ability to participate in MRADLS and the patient will use it on a regular basis in the home.      WALKER FOR HOME USE     Order Specific Question Answer Comments   Type of Walker: Adult (5'4"-6'6")    With wheels? Yes    Height: 5'4    Weight: 90.6 kg (199 lb 12.8 oz)    Length of need (1-99 months): 99    Please check all that apply: Walker will be used for gait training.      Activity as tolerated     Keep surgical extremity elevated when not ambulating     Lifting restrictions   Order Comments: No strenuous exercise or lifting of > 10 lbs     Weight bearing as tolerated     No driving, operating heavy equipment or signing legal documents while taking pain medication     Call MD for:  temperature >100.4     Call MD for:  persistent " nausea and vomiting     Call MD for:  severe uncontrolled pain     Call MD for:  difficulty breathing, headache or visual disturbances     Call MD for:  redness, tenderness, or signs of infection (pain, swelling, redness, odor or green/yellow discharge around incision site)     Call MD for:  hives     Call MD for:  persistent dizziness or light-headedness     Call MD for:  extreme fatigue     Medications:  Reconciled Home Medications:      Medication List        START taking these medications      celecoxib 200 MG capsule  Commonly known as: CeleBREX  Take 1 capsule (200 mg total) by mouth 2 (two) times daily.     methocarbamoL 500 MG Tab  Commonly known as: Robaxin  Take 1 tablet (500 mg total) by mouth 4 (four) times daily for 10 days     oxyCODONE 5 MG immediate release tablet  Commonly known as: ROXICODONE  Take 1 to 2 tablets by mouth every 4 (four) hours as needed (pain).            CHANGE how you take these medications      acetaminophen 500 MG tablet  Commonly known as: TYLENOL  Take 2 tablets (1,000 mg total) by mouth every 8 (eight) hours as needed for Pain.  What changed:   when to take this  reasons to take this            CONTINUE taking these medications      ACCU-CHEK ONEIL CONTROL SOLN Soln  Generic drug: blood glucose control high,low  1 Units by Misc.(Non-Drug; Combo Route) route as needed.     ACCU-CHEK ONEIL PLUS TEST STRP Strp  Generic drug: blood sugar diagnostic  TEST BLOOD SUGAR TWICE DAILY     * ACCU-CHEK SOFTCLIX LANCETS Misc  Generic drug: lancets  USE TO TEST BLOOD SUGAR ONE TIME DAILY     * TRUEPLUS LANCETS 33 gauge Misc  Generic drug: lancets  TEST BLOOD SUGAR TWICE DAILY     amLODIPine 10 MG tablet  Commonly known as: NORVASC  Take 1 tablet (10 mg total) by mouth once daily.     aspirin 81 MG EC tablet  Commonly known as: ECOTRIN  Take 1 tablet (81 mg total) by mouth 2 (two) times a day.     atorvastatin 80 MG tablet  Commonly known as: LIPITOR  Take 1 tablet (80 mg total) by mouth  every evening.     azelastine 137 mcg (0.1 %) nasal spray  Commonly known as: ASTELIN  1 spray (137 mcg total) by Nasal route 2 (two) times daily.     * blood-glucose meter kit  Commonly known as: TRUE METRIX GLUCOSE METER  Use as directed to test glucose     * blood-glucose meter Misc  Commonly known as: ACCU-CHEK ONEIL PLUS METER  1 kit by Misc.(Non-Drug; Combo Route) route once daily.     cetirizine 10 MG tablet  Commonly known as: ZYRTEC  Take 1 tablet (10 mg total) by mouth once daily.     docusate sodium 100 MG capsule  Commonly known as: COLACE  Take 1 capsule (100 mg total) by mouth 2 (two) times daily.     DROPSAFE ALCOHOL PREP PADS Padm  Generic drug: alcohol swabs  USE AS DIRECTED THREE TIMES DAILY     ergocalciferol 50,000 unit Cap  Commonly known as: ERGOCALCIFEROL  TAKE 1 CAPSULE EVERY 7 DAYS.     FLUoxetine 40 MG capsule  Take 1 capsule (40 mg total) by mouth once daily.     fluticasone propionate 50 mcg/actuation nasal spray  Commonly known as: FLONASE  USE 1 SPRAY IN EACH NOSTRIL ONCE DAILY     gabapentin 300 MG capsule  Commonly known as: NEURONTIN  Take 1 capsule (300 mg total) by mouth 3 (three) times daily.     hydrALAZINE 100 MG tablet  Commonly known as: APRESOLINE  Take 1 tablet (100 mg total) by mouth every 8 (eight) hours.     hydrOXYzine HCL 25 MG tablet  Commonly known as: ATARAX  TAKE 1 TABLET BY MOUTH THREE TIMES DAILY AS NEEDED FOR ITCHING     JARDIANCE 25 mg tablet  Generic drug: empagliflozin  Take 1 tablet by mouth once daily     metoprolol succinate 25 MG 24 hr tablet  Commonly known as: TOPROL-XL  Take 1 tablet (25 mg total) by mouth once daily.     ondansetron 8 MG Tbdl  Commonly known as: ZOFRAN-ODT  Take 1 tablet (8 mg total) by mouth 2 (two) times daily as needed (nausea).     * OZEMPIC 0.25 mg or 0.5 mg (2 mg/3 mL) pen injector  Generic drug: semaglutide  Inject 0.25 mg into the skin every 7 days.     * OZEMPIC 0.25 mg or 0.5 mg (2 mg/3 mL) pen injector  Generic drug:  semaglutide  Inject 0.5 mg into the skin every 7 days.     * semaglutide 1 mg/dose (2 mg/1.5 mL) Pnij  Commonly known as: OZEMPIC  Inject 1 mg into the skin every 7 days.     pantoprazole 40 MG tablet  Commonly known as: PROTONIX  Take 1 tablet (40 mg total) by mouth once daily.     TRUE METRIX LEVEL 1 Soln  Generic drug: blood glucose control, low  1 each by Misc.(Non-Drug; Combo Route) route once as needed.           * This list has 7 medication(s) that are the same as other medications prescribed for you. Read the directions carefully, and ask your doctor or other care provider to review them with you.                STOP taking these medications      ciclopirox 8 % Soln  Commonly known as: PENLAC     ibuprofen 800 MG tablet  Commonly known as: ADVIL,MOTRIN     traMADoL 50 mg tablet  Commonly known as: ULTRAM     zolpidem 5 MG Tab  Commonly known as: JOSIAH Estrada PA-C  Orthopedics  [facility] - Med Surg

## 2025-03-06 NOTE — ANESTHESIA POSTPROCEDURE EVALUATION
Anesthesia Post Evaluation    Patient: Valarie Foster    Procedure(s) Performed: Procedure(s) (LRB):  ARTHROPLASTY, KNEE, TOTAL, USING COMPUTER-ASSISTED NAVIGATION (Left)    Final Anesthesia Type: spinal      Patient location during evaluation: PACU  Patient participation: Yes- Able to Participate  Level of consciousness: awake and alert  Post-procedure vital signs: reviewed and stable  Pain management: adequate  Airway patency: patent    PONV status at discharge: No PONV  Anesthetic complications: no      Respiratory status: spontaneous ventilation and room air  Hydration status: euvolemic  Follow-up not needed.              Vitals Value Taken Time   /69 03/06/25 04:47   Temp 36.4 °C (97.6 °F) 03/06/25 04:47   Pulse 66 03/06/25 07:18   Resp 16 03/06/25 06:24   SpO2 93 % 03/06/25 04:47         Event Time   Out of Recovery 17:00:01         Pain/Fabricio Score: Pain Rating Prior to Med Admin: 10 (3/6/2025  6:24 AM)  Pain Rating Post Med Admin: 6 (3/6/2025  3:04 AM)  Fabricio Score: 10 (3/5/2025  8:00 PM)

## 2025-03-06 NOTE — PLAN OF CARE
Problem: Adult Inpatient Plan of Care  Goal: Plan of Care Review  Outcome: Progressing  Goal: Patient-Specific Goal (Individualized)  Outcome: Progressing  Goal: Absence of Hospital-Acquired Illness or Injury  Outcome: Progressing  Goal: Optimal Comfort and Wellbeing  Outcome: Progressing  Goal: Readiness for Transition of Care  Outcome: Progressing     Problem: Diabetes Comorbidity  Goal: Blood Glucose Level Within Targeted Range  Outcome: Progressing     Problem: Wound  Goal: Optimal Coping  Outcome: Progressing  Goal: Optimal Functional Ability  Outcome: Progressing  Goal: Absence of Infection Signs and Symptoms  Outcome: Progressing  Goal: Improved Oral Intake  Outcome: Progressing  Goal: Optimal Pain Control and Function  Outcome: Progressing  Goal: Skin Health and Integrity  Outcome: Progressing  Goal: Optimal Wound Healing  Outcome: Progressing     Problem: Violence Risk or Actual  Goal: Anger and Impulse Control  Outcome: Progressing

## 2025-03-06 NOTE — PROGRESS NOTES
"ORTHOPAEDIC POSTOP PROGRESS NOTE       Subjective   Patient seen this morning. Patient states that she is had pain throughout the night that interrupted sleep. When evaluated by PT patient reported pain limited treatment session. Patient with difficulty getting from the chair to the bed with PT this afternoon. Per PT, she would benefit from acute skilled PT services.     Objective   VITAL SIGNS: BP (!) 144/63   Pulse 71   Temp 97.6 °F (36.4 °C) (Oral)   Resp 20   Ht 5' 4" (1.626 m)   Wt 90.6 kg (199 lb 11.8 oz)   SpO2 (!) 93%   Breastfeeding No   BMI 34.28 kg/m²    INTAKE AND OUTPUT:   Intake/Output Summary (Last 24 hours) at 3/6/2025 1511  Last data filed at 3/6/2025 1313  Gross per 24 hour   Intake 240 ml   Output 800 ml   Net -560 ml        PHYSICAL EXAMINATION:  Left Lower Extremity:    Dorsalis pedis pulses palpable     Dorsi flexion 5/5 strength    Plantar flexion 5/5 strength     Extensor hallucis extension: 5/5 strength    Sensory intact to light touch L1-S1    Dressing clean/dry/intact    LABS: Reviewed      Assessment/Plan:  0-120 degrees of active/passive flexion  23 hours IV Abx  Aquacel x7days  PO Pain Meds  PT/OT  ASA 81mg BID [id number] for VTE prophylaxis  Restart Home Meds  Medicine Consult, Appreciate Managed Care  Dispo: Home today pending PT clearance, Home health arranged. 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, without long-term current use of insulin 01/17/2025    COVID-19 07/22/2024    Pseudophakia 04/30/2024    Paresthesia 01/22/2024    Decreased ROM of right knee 11/27/2023    Decreased strength involving knee joint 11/27/2023    Gait difficulty 11/27/2023    Severe obesity (BMI 35.0-39.9) with comorbidity " 03/30/2023    Chest pain 01/11/2023    Radiculopathy 10/27/2022    Idiopathic progressive neuropathy 10/27/2022    Nerve pain 10/27/2022    Fall 04/04/2022    Primary osteoarthritis of knees, bilateral 02/21/2022    Refractive error 02/14/2022    Pain of left calf 12/08/2021    Colon cancer screening 08/13/2020    Bilateral chronic knee pain 07/31/2020    Mild major depression 07/02/2020    Renal cell carcinoma of right kidney 11/23/2019    History of renal cell cancer 08/27/2019    Abdominal aortic atherosclerosis 07/01/2019    Microhematuria 02/08/2018    Left flank pain 02/08/2018    Type 2 diabetes mellitus with diabetic neuropathy, without long-term current use of insulin 01/29/2018    Benign hypertension 01/29/2018    Hyperlipemia 01/29/2018

## 2025-03-06 NOTE — PT/OT/SLP PROGRESS
Physical Therapy Treatment    Patient Name:  Valarie Foster   MRN:  [id number]    Recommendations:     Discharge Recommendations: Low Intensity Therapy  Discharge Equipment Recommendations: walker, rolling  Barriers to discharge: None    Assessment:     Valarie Foster is a 74 y.o. female admitted with a medical diagnosis of <principal problem not specified>.  She presents with the following impairments/functional limitations: impaired endurance, weakness, impaired balance, pain, decreased safety awareness, gait instability, decreased lower extremity function, impaired functional mobility, impaired self care skills, decreased ROM, impaired joint extensibility.    Pt continues to report increased pain which limits her Tx session. She displays decreased WB on L+ LE. She require visual and verbal cues for L+ foot placement as she continued to place L+ foot slightly over midline for unknown reason. She did eventually correct without cueing.     She was not on 02 upon entry, therefore PT kept her off and her 02 sats remained > 91%    Rehab Prognosis: Good; patient would benefit from acute skilled PT services to address these deficits and reach maximum level of function.    Recent Surgery: Procedure(s) (LRB):  ARTHROPLASTY, KNEE, TOTAL, USING COMPUTER-ASSISTED NAVIGATION (Left) 1 Day Post-Op    Plan:     During this hospitalization, patient to be seen BID to address the identified rehab impairments via gait training, therapeutic activities, therapeutic exercises, neuromuscular re-education and progress toward the following goals:    Plan of Care Expires:  03/31/25    Subjective     Chief Complaint: Pt states that the pain is too much so she is unsure of how much she can do. She also states that she feels like she is wheezing  Patient/Family Comments/goals: Want to stay another night   Pain/Comfort:  Pain Rating 1: 5/10  Location - Side 1: Left  Location 1: knee  Pain Addressed 1: Pre-medicate for activity      Objective:      Communicated with nursing prior to session.  Patient found HOB elevated with peripheral IV, PureWick upon PT entry to room.     General Precautions: Standard, fall, diabetic  Orthopedic Precautions: LLE weight bearing as tolerated  Braces: N/A  Respiratory Status: Room air     Functional Mobility:  Bed Mobility:     Scooting: stand by assistance  Supine to Sit: stand by assistance  Sit to Supine: stand by assistance  Transfers:     Sit to Stand:  stand by assistance with rolling walker  Bed to Chair: minimum assistance with  rolling walker  using  Step Transfer  Gait: 10 ft, bed to recliner, step too gait pattern with cues listed above , RW , min A due to instability and lack of WB during stance phase of L+ LE  Balance: Static Sit Good, Static Stand Fair +      AM-PAC 6 CLICK MOBILITY  Turning over in bed (including adjusting bedclothes, sheets and blankets)?: 3  Sitting down on and standing up from a chair with arms (e.g., wheelchair, bedside commode, etc.): 3  Moving from lying on back to sitting on the side of the bed?: 3  Moving to and from a bed to a chair (including a wheelchair)?: 3  Need to walk in hospital room?: 3  Climbing 3-5 steps with a railing?: 2  Basic Mobility Total Score: 17       Treatment & Education:  TA 1:1 x 13 min supine > seated EOB > stand > transfer to chair walking with RW  TE 1:1 x 10 min while seated in chair B+ LE 3 x 10 : glut squeezes, quad sets, ankle pumps     Patient left up in chair with all lines intact, call button in reach, and nursing notified..    GOALS:   Multidisciplinary Problems       Physical Therapy Goals          Problem: Physical Therapy    Goal Priority Disciplines Outcome Interventions   Physical Therapy Goal     PT, PT/OT Progressing    Description: Goals to be met by: 3/19/25     Patient will increase functional independence with mobility by performin. Supine to sit with Modified [place]  2. Rolling to Left and Right with Modified  [place]  3. Sit to stand transfer with Modified [place] using RW  4. Bed to chair transfer with Modified [place] using Rolling Walker  5. Gait x250 feet with Modified [place] using Rolling Walker   6. Lower extremity exercise program 2 sets x15 reps per handout, with independence                         DME Justifications:  No DME recommended requiring DME justifications    Time Tracking:     PT Received On:    PT Start Time: 1100     PT Stop Time: 1125  PT Total Time (min): 25 min     Billable Minutes: Therapeutic Activity 1 and Therapeutic Exercise 1    Treatment Type: Treatment        Number of PTA visits since last PT visit: 0     03/06/2025

## 2025-03-06 NOTE — PROGRESS NOTES
Outpatient Care Management  Plan of Care Follow Up Visit    Patient: Valarie Foster  MRN: [id number]  Date of Service: 03/06/2025  Completed by: Aniyah Marks RN  Referral Date: 10/16/2024    Reason for Visit   Patient presents with    OPCM Chart Review     D/t inpt status       Brief Summary: OPCM chart review d/t inpt status.

## 2025-03-06 NOTE — PT/OT/SLP PROGRESS
Occupational Therapy   Treatment    Name: Valarie Foster  MRN: [id number]  Admitting Diagnosis:  <principal problem not specified>  1 Day Post-Op    Recommendations:     Discharge Recommendations:  (TBD- may require moderate intensity therapy if unable to tolerate adequate functional activity for return home. If pain controlled, may be appropriate for low intensity therapy but will continue to assess)  Discharge Equipment Recommendations:  walker, rolling  Barriers to discharge:  Inaccessible home environment, Decreased caregiver support    Assessment:     Valarie Foster is a 74 y.o. female with a medical diagnosis of <principal problem not specified>.  She presents with pain limiting tolerance for OOB activity at this time. Performance deficits affecting function are weakness, impaired endurance, impaired self care skills, impaired functional mobility, gait instability, decreased ROM, pain, decreased lower extremity function, decreased upper extremity function, impaired skin, edema, orthopedic precautions.     Rehab Prognosis:  Good; patient would benefit from acute skilled OT services to address these deficits and reach maximum level of function.       Plan:     Patient to be seen  (3-5x/week) to address the above listed problems via self-care/home management, therapeutic activities, therapeutic exercises  Plan of Care Expires: 04/05/25  Plan of Care Reviewed with: patient, family    Subjective     Chief Complaint: Pt reports 10/10 pain and wants to return to bed  Patient/Family Comments/goals: Pain relief, to return to PLOF  Pain/Comfort:  Pain Rating 1: 10/10  Location - Side 1: Left  Location - Orientation 1: generalized  Location 1: knee  Pain Addressed 1: Pre-medicate for activity, Reposition, Distraction, Cessation of Activity, Nurse notified  Pain Rating Post-Intervention 1: 10/10    Objective:     Communicated with: nsg prior to session.  Patient found HOB elevated with PureWick, peripheral IV, telemetry upon  OT entry to room.    General Precautions: Standard, fall, diabetic    Orthopedic Precautions:LLE weight bearing as tolerated  Braces: N/A  Respiratory Status: Room air     Occupational Performance:     Bed Mobility:    Patient completed Sit to Supine with minimum assistance and moderate assistance     Functional Mobility/Transfers:  Patient completed Sit <> Stand Transfer with moderate assistance and maximal assistance  with  rolling walker   Patient completed Bed <> Chair Transfer using Stand Pivot technique with minimum assistance with rolling walker  Functional Mobility: Pt with fair to fair- dynamic seated and standing balance.     Activities of Daily Living:  Upper Body Dressing: minimum assistance to don gown as robe seated EOB      [facility] 6 Click ADL: 14    Treatment & Education:  Pt educated on role of OT and POC.   Pt performing skills as listed above.     OT initially returning to pt's room to assist with dressing prior to discharge but per nsg and pt, pt reports that she was in too much pain to return home at this time and declined dressing in favor of return to bed from bedside chair    Patient left HOB elevated with all lines intact, call button in reach, nsg notified, and family present    GOALS:   Multidisciplinary Problems       Occupational Therapy Goals          Problem: Occupational Therapy    Goal Priority Disciplines Outcome Interventions   Occupational Therapy Goal     OT, PT/OT Progressing    Description: Goals to be met by: 04/05/2025      Patient will increase functional independence with ADLs by performing:    UE Dressing with Minimum Assistance.  LE Dressing with Moderate Assistance.  Grooming while standing with Modified [place].  Toileting from toilet with Modified [place] for hygiene and clothing management.   Toilet transfer to toilet or BSC with Modified [place].  Increased functional strength to WFL for self care skills.  Upper extremity exercise program x10 reps per  handout, with assistance as needed.                           DME Justifications:   Valarie's mobility limitation cannot be sufficiently resolved by the use of a cane. Her functional mobility deficit can be sufficiently resolved with the use of a Rolling Walker. Patient's mobility limitation significantly impairs their ability to participate in one of more activities of daily living.  The use of a RW will significantly improve the patient's ability to participate in MRADLS and the patient will use it on regular basis in the home.    Time Tracking:     OT Date of Treatment: 03/06/25  OT Start Time: 1430  OT Stop Time: 1446  OT Total Time (min): 16 min    Billable Minutes:Therapeutic Activity 16    OT/CHRISTINE: OT     Number of CHRISTINE visits since last OT visit: 0    3/6/2025

## 2025-03-06 NOTE — PLAN OF CARE
Case Management Final Discharge Note    Discharge Disposition: Per [facility] willing to accept patient.     New DME ordered / company name: Per [facility], patient received RW in 2023, does not qualify for another at this time.     Relevant SDOH / Transition of Care Barriers:  none    Person available to provide assistance at home when needed and their contact information:  Cecil HermosilloEnriqueta (Friend)  [phone]     Scheduled followup appointment: Cardio scheduled 3/20/25 Ortho follow up scheduled 3/28/25; listed on AVS.     Referrals placed: none    Transportation: Private vehicle     Patient educated on discharge services and updated on DC plan. Bedside RN notified, patient clear to discharge from Case Management Perspective.      03/06/25 0853   Final Note   Assessment Type Final Discharge Note   Anticipated Discharge Disposition [facility]   Hospital Resources/Appts/Education Provided Appointments scheduled and added to AVS   Post-Acute Status   Post-Acute Authorization Home Health   Home Health Status Set-up Complete/Auth obtained   Discharge Delays None known at this time

## 2025-03-06 NOTE — PLAN OF CARE
03/06/25 0852   Discharge Planning   Assessment Type Discharge Planning Brief Assessment   Resource/Environmental Concerns none   Support Systems Family members   Equipment Currently Used at Home walker, rolling;shower chair   Current Living Arrangements home   Patient/Family Anticipates Transition to home   Patient/Family Anticipated Services at Transition none   DME Needed Upon Discharge  none   Discharge Plan A Home Health   Discharge Plan B Home with family

## 2025-03-06 NOTE — PLAN OF CARE
Problem: Diabetes Comorbidity  Goal: Blood Glucose Level Within Targeted Range  Outcome: Progressing  Intervention: Monitor and Manage Glycemia  Flowsheets (Taken 3/6/2025 1703)  Glycemic Management:   oral glucose given   oral hydration promoted

## 2025-03-06 NOTE — PLAN OF CARE
Problem: Adult Inpatient Plan of Care  Goal: Plan of Care Review  Outcome: Progressing     Problem: Diabetes Comorbidity  Goal: Blood Glucose Level Within Targeted Range  Outcome: Progressing     Problem: Wound  Goal: Optimal Wound Healing  Outcome: Progressing

## 2025-03-06 NOTE — PLAN OF CARE
Case Management Final Discharge Note    Discharge Disposition: Per [facility] willing to accept patient.     New DME ordered / company name: Per [facility], patient received RW in 2023, does not qualify for another at this time.     Relevant SDOH / Transition of Care Barriers:  none    Person available to provide assistance at home when needed and their contact information:     Scheduled followup appointment: Ortho follow up scheduled, listed on AVS.     Referrals placed: none    Transportation: Private vehicle     Patient educated on discharge services and updated on DC plan. Bedside RN notified, patient clear to discharge from Case Management Perspective.      03/06/25 0853   Final Note   Assessment Type Final Discharge Note   Anticipated Discharge Disposition [facility]   Hospital Resources/Appts/Education Provided Appointments scheduled and added to AVS   Post-Acute Status   Post-Acute Authorization Home Health   Home Health Status Set-up Complete/Auth obtained   Discharge Delays None known at this time

## 2025-03-07 VITALS
TEMPERATURE: 98 F | OXYGEN SATURATION: 93 % | DIASTOLIC BLOOD PRESSURE: 82 MMHG | WEIGHT: 199.75 LBS | RESPIRATION RATE: 20 BRPM | SYSTOLIC BLOOD PRESSURE: 165 MMHG | HEIGHT: 64 IN | HEART RATE: 84 BPM | BODY MASS INDEX: 34.1 KG/M2

## 2025-03-07 LAB
POCT GLUCOSE: 134 MG/DL (ref 70–110)
POCT GLUCOSE: 150 MG/DL (ref 70–110)

## 2025-03-07 PROCEDURE — 97116 GAIT TRAINING THERAPY: CPT | Mod: HCNC

## 2025-03-07 PROCEDURE — 97110 THERAPEUTIC EXERCISES: CPT | Mod: HCNC

## 2025-03-07 PROCEDURE — 63600175 PHARM REV CODE 636 W HCPCS: Mod: HCNC | Performed by: ORTHOPAEDIC SURGERY

## 2025-03-07 PROCEDURE — 25000003 PHARM REV CODE 250: Mod: HCNC | Performed by: ORTHOPAEDIC SURGERY

## 2025-03-07 PROCEDURE — 94760 N-INVAS EAR/PLS OXIMETRY 1: CPT | Mod: HCNC

## 2025-03-07 PROCEDURE — 97530 THERAPEUTIC ACTIVITIES: CPT | Mod: HCNC

## 2025-03-07 PROCEDURE — 27000221 HC OXYGEN, UP TO 24 HOURS: Mod: HCNC

## 2025-03-07 PROCEDURE — 97535 SELF CARE MNGMENT TRAINING: CPT | Mod: HCNC

## 2025-03-07 RX ORDER — ONDANSETRON 8 MG/1
8 TABLET, ORALLY DISINTEGRATING ORAL
Qty: 30 TABLET | Refills: 0 | Status: SHIPPED | OUTPATIENT
Start: 2025-03-07

## 2025-03-07 RX ADMIN — SENNOSIDES AND DOCUSATE SODIUM 1 TABLET: 50; 8.6 TABLET ORAL at 09:03

## 2025-03-07 RX ADMIN — FAMOTIDINE 20 MG: 20 TABLET, FILM COATED ORAL at 09:03

## 2025-03-07 RX ADMIN — AMLODIPINE BESYLATE 10 MG: 5 TABLET ORAL at 09:03

## 2025-03-07 RX ADMIN — FLUOXETINE HYDROCHLORIDE 40 MG: 20 CAPSULE ORAL at 09:03

## 2025-03-07 RX ADMIN — POLYETHYLENE GLYCOL 3350 17 G: 17 POWDER, FOR SOLUTION ORAL at 09:03

## 2025-03-07 RX ADMIN — METHOCARBAMOL 750 MG: 750 TABLET ORAL at 09:03

## 2025-03-07 RX ADMIN — OXYCODONE 10 MG: 5 TABLET ORAL at 08:03

## 2025-03-07 RX ADMIN — ONDANSETRON 4 MG: 2 INJECTION INTRAMUSCULAR; INTRAVENOUS at 09:03

## 2025-03-07 RX ADMIN — PROCHLORPERAZINE EDISYLATE 5 MG: 5 INJECTION INTRAMUSCULAR; INTRAVENOUS at 12:03

## 2025-03-07 RX ADMIN — ACETAMINOPHEN 1000 MG: 500 TABLET ORAL at 06:03

## 2025-03-07 RX ADMIN — METOPROLOL SUCCINATE 25 MG: 25 TABLET, EXTENDED RELEASE ORAL at 09:03

## 2025-03-07 RX ADMIN — ASPIRIN 81 MG: 81 TABLET, COATED ORAL at 09:03

## 2025-03-07 NOTE — NURSING
[facility], [facility]  Nurses Note -- 4 Eyes      3/7/2025       Skin assessed on: Q Shift      [x] No Pressure Injuries Present    []Prevention Measures Documented    [] Yes LDA  for Pressure Injury Previously documented     [] Yes New Pressure Injury Discovered   [] LDA for New Pressure Injury Added      Attending RN:  Lizzie Ivan RN     Second RN:  IBRAHIMA Erazo

## 2025-03-07 NOTE — PT/OT/SLP PROGRESS
Physical Therapy Treatment    Patient Name:  Valarie Foster   MRN:  [id number]    Recommendations:     Discharge Recommendations: Low Intensity Therapy  Discharge Equipment Recommendations: walker, rolling  Barriers to discharge: None    Assessment:     Valarie Foster is a 74 y.o. female admitted with a medical diagnosis of <principal problem not specified>.  She presents with the following impairments/functional limitations: weakness, impaired endurance, decreased lower extremity function, impaired self care skills, impaired functional mobility, gait instability, impaired balance, decreased ROM, pain, edema, impaired skin .      Rehab Prognosis: Good; patient would benefit from acute skilled PT services to address these deficits and reach maximum level of function.    Recent Surgery: Procedure(s) (LRB):  ARTHROPLASTY, KNEE, TOTAL, USING COMPUTER-ASSISTED NAVIGATION (Left) 2 Days Post-Op    Plan:     During this hospitalization, patient to be seen BID to address the identified rehab impairments via gait training, therapeutic activities, therapeutic exercises, neuromuscular re-education and progress toward the following goals:    Plan of Care Expires:  03/31/25    Subjective     Chief Complaint: Pt states that she is not feeling well, and as a result she would prefer to only do exercises in bed and not get OOB to walk   Patient/Family Comments/goals: return home  Pain/Comfort:  Pain Rating 1: 3/10  Location - Side 1: Left  Location 1: knee      Objective:     Communicated with nursing prior to session.  Patient found HOB elevated with peripheral IV, telemetry upon PT entry to room.     General Precautions: Standard, fall, diabetic  Orthopedic Precautions: LLE weight bearing as tolerated  Braces: N/A  Respiratory Status: Room air     Functional Mobility:  NA      AM-PAC 6 CLICK MOBILITY  Turning over in bed (including adjusting bedclothes, sheets and blankets)?: 4  Sitting down on and standing up from a chair with arms  (e.g., wheelchair, bedside commode, etc.): 4  Moving from lying on back to sitting on the side of the bed?: 4  Moving to and from a bed to a chair (including a wheelchair)?: 3  Need to walk in hospital room?: 3  Climbing 3-5 steps with a railing?: 3  Basic Mobility Total Score: 21       Treatment & Education:  There Ex 1:1 x 10 min 3 x 10 B+ LE: Ankle pumps, SLR with AAROM on L+ AROM on R+, quad sets, and glut squeezez    Patient left HOB elevated with all lines intact and call button in reach..    GOALS:   Multidisciplinary Problems       Physical Therapy Goals          Problem: Physical Therapy    Goal Priority Disciplines Outcome Interventions   Physical Therapy Goal     PT, PT/OT Progressing    Description: Goals to be met by: 3/19/25     Patient will increase functional independence with mobility by performin. Supine to sit with Modified [place]  2. Rolling to Left and Right with Modified [place]  3. Sit to stand transfer with Modified [place] using RW  4. Bed to chair transfer with Modified [place] using Rolling Walker  5. Gait x250 feet with Modified [place] using Rolling Walker   6. Lower extremity exercise program 2 sets x15 reps per handout, with independence                         DME Justifications:  No DME recommended requiring DME justifications    Time Tracking:     PT Received On:    PT Start Time: 350p     PT Stop Time: 400p  PT Total Time (min): 10 min     Billable Minutes: Therapeutic Exercise 1    Treatment Type: Treatment  PT/PTA: PT     Number of PTA visits since last PT visit: 0     2025

## 2025-03-07 NOTE — PROGRESS NOTES
"ORTHOPAEDIC POSTOP PROGRESS NOTE       Subjective   Patient states that they are doing okay. Resting comfortably in bed. Had some nausea earlier this AM, which improved with zofran. No vomiting. She is eager to go home today pending clearance from physical therapy.     Objective   VITAL SIGNS: BP (!) 158/69 (BP Location: Left arm, Patient Position: Lying)   Pulse 82   Temp 98.1 °F (36.7 °C) (Oral)   Resp 20   Ht 5' 4" (1.626 m)   Wt 90.6 kg (199 lb 11.8 oz)   SpO2 (!) 92%   Breastfeeding No   BMI 34.28 kg/m²    INTAKE AND OUTPUT:   Intake/Output Summary (Last 24 hours) at 3/7/2025 0745  Last data filed at 3/7/2025 0610  Gross per 24 hour   Intake 480 ml   Output 1400 ml   Net -920 ml        PHYSICAL EXAMINATION:  Left Lower Extremity:    Dorsalis pedis pulses palpable     Dorsi flexion 5/5 strength    Plantar flexion 5/5 strength     Extensor hallucis extension: 5/5 strength    Sensory intact to light touch L1-S1    Dressing clean/dry/intact    LABS: Reviewed      Assessment/Plan:    0-120 degrees of active/passive flexion  23 hours IV Abx  Aquacel x7days  PO Pain Meds  PT/OT  ASA 81mg BID [id number] for VTE prophylaxis  Restart Home Meds  Medicine Consult, Appreciate Managed Care  Dispo: Home today pending PT clearance, Home health arranged. 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, without long-term current use of insulin 01/17/2025    COVID-19 07/22/2024    Pseudophakia 04/30/2024    Paresthesia 01/22/2024    Decreased ROM of right knee 11/27/2023    Decreased strength involving knee joint 11/27/2023    Gait difficulty 11/27/2023    Severe obesity (BMI 35.0-39.9) with comorbidity 03/30/2023    Chest pain 01/11/2023    Radiculopathy " 10/27/2022    Idiopathic progressive neuropathy 10/27/2022    Nerve pain 10/27/2022    Fall 04/04/2022    Primary osteoarthritis of knees, bilateral 02/21/2022    Refractive error 02/14/2022    Pain of left calf 12/08/2021    Colon cancer screening 08/13/2020    Bilateral chronic knee pain 07/31/2020    Mild major depression 07/02/2020    Renal cell carcinoma of right kidney 11/23/2019    History of renal cell cancer 08/27/2019    Abdominal aortic atherosclerosis 07/01/2019    Microhematuria 02/08/2018    Left flank pain 02/08/2018    Type 2 diabetes mellitus with diabetic neuropathy, without long-term current use of insulin 01/29/2018    Benign hypertension 01/29/2018    Hyperlipemia 01/29/2018

## 2025-03-07 NOTE — PT/OT/SLP PROGRESS
"Physical Therapy Treatment    Patient Name:  Valarie Foster   MRN:  [id number]    Recommendations:     Discharge Recommendations: Low Intensity Therapy  Discharge Equipment Recommendations: walker, rolling  Barriers to discharge: Decreased caregiver support    Assessment:     Valarie Foster is a 74 y.o. female admitted with a medical diagnosis of <principal problem not specified>.  She presents with the following impairments/functional limitations: weakness, impaired endurance, decreased lower extremity function, impaired self care skills, impaired functional mobility, gait instability, impaired balance, decreased ROM, pain, edema, impaired skin.    Upon entry, pt lying supine with HOB elevated. Pharmacy had just administered meds. Pt became suddenly nauseated and threw up onto bed. She was given emesis bag and she vomited again. She sat back up without incident or assist. She was asymptomatic through first half of gait training , approx 25 ft. However, midway, she states "I am getting hot again". She was encouraged to perform deep breathing which helped to reduce s/s. When she returned to the recliner, she was brought an emesis bag and began to vomit again. Nursing was notified and pt was brought Zofran. A cold ice pack was placed on her neck which she stated was helped. Continued to monitor pt until her symptoms were completely resolved. Encouraged pt to remain in recliner as long as possible.     Rehab Prognosis: Good; patient would benefit from acute skilled PT services to address these deficits and reach maximum level of function.    Recent Surgery: Procedure(s) (LRB):  ARTHROPLASTY, KNEE, TOTAL, USING COMPUTER-ASSISTED NAVIGATION (Left) 2 Days Post-Op    Plan:     During this hospitalization, patient to be seen BID to address the identified rehab impairments via gait training, therapeutic activities, therapeutic exercises, neuromuscular re-education and progress toward the following goals:    Plan of Care " Expires:  03/31/25    Subjective     Chief Complaint: Pt reports nausea. States pain in knee is much improved. Unsure if she can go home if she is getting this sick at random points throughout the day   Patient/Family Comments/goals: Return home once medically stable  Pain/Comfort:  Pain Rating 1: 3/10  Location - Side 1: Left  Location 1: knee      Objective:     Communicated with nursing prior to session.  Patient found HOB elevated with peripheral IV, telemetry upon PT entry to room.     General Precautions: Standard, fall, diabetic  Orthopedic Precautions: LLE weight bearing as tolerated  Braces: N/A  Respiratory Status: Room air     Functional Mobility:  Bed Mobility:     Rolling Left:  stand by assistance  Bridging: supervision  Supine to Sit: stand by assistance and use of hand rails with verbal cues for sequencing   Transfers:     Sit to Stand:  stand by assistance and verbal cues for hand placement with rolling walker  Gait: 50 ft, RW, CGA with moderate vc to keep RW close to her, place weight through Ue's  during WB on L+: step to gait pattern, decreased lance and step length B+  Balance: Static Sit and Stand Good       AM-PAC 6 CLICK MOBILITY  Turning over in bed (including adjusting bedclothes, sheets and blankets)?: 4  Sitting down on and standing up from a chair with arms (e.g., wheelchair, bedside commode, etc.): 4  Moving from lying on back to sitting on the side of the bed?: 4  Moving to and from a bed to a chair (including a wheelchair)?: 3  Need to walk in hospital room?: 3  Climbing 3-5 steps with a railing?: 3  Basic Mobility Total Score: 21       Treatment & Education:  TA 1:1 x 15 min to get pt supine > SL > seated EOB > monitor s/s due to vomiting while in bed just prior to PT session > pt asymptomatic , therefore gait training performed > stand to sit into recliner > further monitoring due to vomiting for second time > ice placed on pt and educated on use of ice pump   GT 1:1 x 8 min as  noted above    Patient left up in chair with all lines intact, call button in reach, and nursing notified..    GOALS:   Multidisciplinary Problems       Physical Therapy Goals          Problem: Physical Therapy    Goal Priority Disciplines Outcome Interventions   Physical Therapy Goal     PT, PT/OT Progressing    Description: Goals to be met by: 3/19/25     Patient will increase functional independence with mobility by performin. Supine to sit with Modified [place]  2. Rolling to Left and Right with Modified [place]  3. Sit to stand transfer with Modified [place] using RW  4. Bed to chair transfer with Modified [place] using Rolling Walker  5. Gait x250 feet with Modified [place] using Rolling Walker   6. Lower extremity exercise program 2 sets x15 reps per handout, with independence                         DME Justifications:   Valarie's mobility limitation cannot be sufficiently resolved by the use of a cane. Her functional mobility deficit can be sufficiently resolved with the use of a Rolling Walker. Patient's mobility limitation significantly impairs their ability to participate in one of more activities of daily living.  The use of a RW will significantly improve the patient's ability to participate in MRADLS and the patient will use it on regular basis in the home.    Time Tracking:     PT Received On:    PT Start Time: 935     PT Stop Time: 1000  PT Total Time (min): 25 min     Billable Minutes: Gait Training 1 and Therapeutic Activity 1    Treatment Type: Treatment  PT/PTA: PT     Number of PTA visits since last PT visit: 0     2025

## 2025-03-07 NOTE — PT/OT/SLP PROGRESS
Occupational Therapy   Treatment    Name: Valarie Foster  MRN: [id number]  Admitting Diagnosis:  <principal problem not specified>  2 Days Post-Op    Recommendations:     Discharge Recommendations: Low Intensity Therapy  Discharge Equipment Recommendations:  walker, rolling  Barriers to discharge:  Inaccessible home environment, Decreased caregiver support    Assessment:     Valarie Foster is a 74 y.o. female with a medical diagnosis of <principal problem not specified>.  She presents with nausea earlier in day, reports no nausea in OT session after nsg provided medications but states that she feels worried it may happen when she eats. Performance deficits affecting function are weakness, impaired endurance, impaired self care skills, impaired functional mobility, gait instability, impaired balance, pain, decreased safety awareness, decreased lower extremity function, impaired skin, orthopedic precautions.     Rehab Prognosis:  Good; patient would benefit from acute skilled OT services to address these deficits and reach maximum level of function.       Plan:     Patient to be seen  (3-5x/week) to address the above listed problems via self-care/home management, therapeutic exercises, therapeutic activities  Plan of Care Expires: 04/05/25  Plan of Care Reviewed with: patient    Subjective     Chief Complaint: Pt reports the pain in L knee isn't as bad but recovery from L TKA seems worse than from R TKA  Patient/Family Comments/goals: To feel better before returning home  Pain/Comfort:  Pain Rating 1:  (did not rate)  Location - Side 1: Left  Location - Orientation 1: generalized  Location 1: knee  Pain Addressed 1: Reposition, Distraction, Cessation of Activity  Pain Rating Post-Intervention 1:  (did not rate)    Objective:     Communicated with: nsg prior to session.  Patient found up in chair with telemetry, peripheral IV, PureWick upon OT entry to room.    General Precautions: Standard, fall    Orthopedic  Precautions:LLE weight bearing as tolerated  Braces: N/A  Respiratory Status: Room air     Occupational Performance:     Bed Mobility:    Patient completed Sit to Supine with contact guard assistance     Functional Mobility/Transfers:  Patient completed Sit <> Stand Transfer with supervision  with  rolling walker   Patient completed Bed <> Chair Transfer using Step Transfer technique with supervision with rolling walker  Patient completed Toilet Transfer Step Transfer technique with supervision with  rolling walker  Functional Mobility: Pt with fair dynamic seated and standing balance. Min v/c for proximity to RW and gait pattern but good overall return demo of learning    Activities of Daily Living:  Upper Body Dressing: supervision to don/doff gown seated on commode  Lower Body Dressing: minimum assistance to adjust B socks seated EOB  Toileting: supervision for pericare in seated on commode      [facility] 6 Click ADL: 18    Treatment & Education:  Pt educated on role of OT and POC.   Pt performing skills as listed above.      Patient left HOB elevated with all lines intact, call button in reach, and nsg notified    GOALS:   Multidisciplinary Problems       Occupational Therapy Goals          Problem: Occupational Therapy    Goal Priority Disciplines Outcome Interventions   Occupational Therapy Goal     OT, PT/OT Progressing    Description: Goals to be met by: 04/05/2025      Patient will increase functional independence with ADLs by performing:    UE Dressing with Minimum Assistance.  LE Dressing with Moderate Assistance.  Grooming while standing with Modified [place].  Toileting from toilet with Modified [place] for hygiene and clothing management.   Toilet transfer to toilet or BSC with Modified [place].  Increased functional strength to WFL for self care skills.  Upper extremity exercise program x10 reps per handout, with assistance as needed.                           DME Justifications:   [place]  mobility limitation cannot be sufficiently resolved by the use of a cane. Her functional mobility deficit can be sufficiently resolved with the use of a Rolling Walker. Patient's mobility limitation significantly impairs their ability to participate in one of more activities of daily living.  The use of a RW will significantly improve the patient's ability to participate in MRADLS and the patient will use it on regular basis in the home.    Time Tracking:     OT Date of Treatment: 03/07/25  OT Start Time: 1250  OT Stop Time: 1315  OT Total Time (min): 25 min    Billable Minutes:Self Care/Home Management 13  Therapeutic Activity 12    OT/CHRISTINE: OT     Number of CHRISTINE visits since last OT visit: 0    3/7/2025

## 2025-03-07 NOTE — PLAN OF CARE
03/07/25 0858   Final Note   Assessment Type Final Discharge Note   Anticipated Discharge Disposition Home-Health   Hospital Resources/Appts/Education Provided Appointments scheduled and added to AVS;Post-Acute resouces added to AVS   Post-Acute Status   Post-Acute Authorization Home Health   Home Health Status Set-up Complete/Auth obtained     Pts nurse April notified that the pt can d/c from CM standpoint

## 2025-03-07 NOTE — PLAN OF CARE
Problem: Adult Inpatient Plan of Care  Goal: Plan of Care Review  Outcome: Met  Goal: Patient-Specific Goal (Individualized)  Outcome: Met  Goal: Absence of Hospital-Acquired Illness or Injury  Outcome: Met  Goal: Optimal Comfort and Wellbeing  Outcome: Met  Goal: Readiness for Transition of Care  Outcome: Met     Problem: Diabetes Comorbidity  Goal: Blood Glucose Level Within Targeted Range  Outcome: Met     Problem: Wound  Goal: Optimal Coping  Outcome: Met  Goal: Optimal Functional Ability  Outcome: Met  Goal: Absence of Infection Signs and Symptoms  Outcome: Met  Goal: Improved Oral Intake  Outcome: Met  Goal: Optimal Pain Control and Function  Outcome: Met  Goal: Skin Health and Integrity  Outcome: Met  Goal: Optimal Wound Healing  Outcome: Met     Problem: Violence Risk or Actual  Goal: Anger and Impulse Control  Outcome: Met

## 2025-03-07 NOTE — NURSING
[facility], [facility]  Nurses Note -- 4 Eyes      3/7/2025       Skin assessed on: Q Shift      [x] No Pressure Injuries Present    []Prevention Measures Documented    [] Yes LDA  for Pressure Injury Previously documented     [] Yes New Pressure Injury Discovered   [] LDA for New Pressure Injury Added      Attending RN:  Castor Franklin LPN     Second RN:  SANTOS Barreto

## 2025-03-10 DIAGNOSIS — Z96.652 STATUS POST LEFT KNEE REPLACEMENT: Primary | ICD-10-CM

## 2025-03-11 ENCOUNTER — TELEPHONE (OUTPATIENT)
Dept: FAMILY MEDICINE | Facility: CLINIC | Age: 75
End: 2025-03-11
Payer: MEDICARE

## 2025-03-11 ENCOUNTER — OUTPATIENT CASE MANAGEMENT (OUTPATIENT)
Dept: ADMINISTRATIVE | Facility: OTHER | Age: 75
End: 2025-03-11
Payer: MEDICARE

## 2025-03-11 NOTE — TELEPHONE ENCOUNTER
Spoke with patient states she had knee replacement and states she would like something to where she can put between her legs that has a tube connected to it she she can use the restroom. Patient states it's hard for her to get up out the bed.

## 2025-03-11 NOTE — TELEPHONE ENCOUNTER
----- Message from Med Assistant Love sent at 3/10/2025  1:23 PM CDT -----  Regarding: FW: Saray Rodriguez  Requesting home health prior to knee replacement surgery  ----- Message -----  From: Emerson Duran  Sent: 3/10/2025   1:12 PM CDT  To: [facility] Staff  Subject: Saray with Jennifer                                                                                                Provider InformationProvider: Saray Rodriguez Message: stated that the patient just had a knee replacement on 03/05 and physical therapy was ordered but not home health. They would like to have some skilled nursing home health orders put in for the patient. Please reach out to them as soon as possible. Contact Info:[phone] ext [phone] Info:

## 2025-03-11 NOTE — TELEPHONE ENCOUNTER
She is wanting home health physical therapy?  What else is she needing home health for?    Is it difficult for her to go to a physical therapy center?  Is this something she discussed with Dr. Carbajal?

## 2025-03-11 NOTE — TELEPHONE ENCOUNTER
----- Message from Med Assistant Sawyer sent at 3/11/2025 10:31 AM CDT -----  Name of Who is Calling:DARIA DE LA FUENTE [[id number]]  What is the request in detail: Pt is requesting a call back to discuss getting something for her to urinate in so she do not have to get up to use the restroom. Pt states it is something like a catheter.  Can the clinic reply by [facility]: No  What Number to Call Back if not in [facility]: [phone]
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
## 2025-03-12 ENCOUNTER — TELEPHONE (OUTPATIENT)
Dept: ORTHOPEDICS | Facility: CLINIC | Age: 75
End: 2025-03-12
Payer: MEDICARE

## 2025-03-12 ENCOUNTER — TELEPHONE (OUTPATIENT)
Dept: FAMILY MEDICINE | Facility: CLINIC | Age: 75
End: 2025-03-12
Payer: MEDICARE

## 2025-03-12 DIAGNOSIS — L29.9 PRURITIC CONDITION: ICD-10-CM

## 2025-03-12 RX ORDER — HYDROXYZINE HYDROCHLORIDE 25 MG/1
25 TABLET, FILM COATED ORAL
Qty: 90 TABLET | Refills: 0 | Status: SHIPPED | OUTPATIENT
Start: 2025-03-12
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
## 2025-03-12 NOTE — TELEPHONE ENCOUNTER
----- Message from [employer] sent at 3/12/2025  1:24 PM CDT -----  Type : Patient Call  Who Called : Patient   Does the patient know what this is regarding?: Requesting a call back in regards to a missed call . Please Advise   Would the patient rather a call back or a response via [facility]? Call   Best Call Back Number: [phone]   Additional Information:

## 2025-03-12 NOTE — TELEPHONE ENCOUNTER
I believe that walking is an important part of recovery after a knee surgery.   I think temporarily, if she wants, she can wear an adult diaper for any leakages that may occur while walking to the bathroom. It is not recommended to place a catheter for urination after surgery. This will increase risk of infection.       If she wants, I can also message Dr. Carbajal to confirm this

## 2025-03-14 ENCOUNTER — TELEPHONE (OUTPATIENT)
Dept: ORTHOPEDICS | Facility: CLINIC | Age: 75
End: 2025-03-14
Payer: MEDICARE

## 2025-03-27 ENCOUNTER — OFFICE VISIT (OUTPATIENT)
Dept: ORTHOPEDICS | Facility: CLINIC | Age: 75
End: 2025-03-27
Attending: ORTHOPAEDIC SURGERY
Payer: MEDICARE

## 2025-03-27 ENCOUNTER — APPOINTMENT (OUTPATIENT)
Dept: RADIOLOGY | Facility: HOSPITAL | Age: 75
End: 2025-03-27
Attending: ORTHOPAEDIC SURGERY
Payer: MEDICARE

## 2025-03-27 VITALS — BODY MASS INDEX: 34.1 KG/M2 | WEIGHT: 199.75 LBS | HEIGHT: 64 IN

## 2025-03-27 DIAGNOSIS — M17.12 ARTHRITIS OF LEFT KNEE: ICD-10-CM

## 2025-03-27 DIAGNOSIS — Z96.652 STATUS POST LEFT KNEE REPLACEMENT: ICD-10-CM

## 2025-03-27 DIAGNOSIS — Z96.652 STATUS POST LEFT KNEE REPLACEMENT: Primary | ICD-10-CM

## 2025-03-27 PROCEDURE — 99999 PR PBB SHADOW E&M-EST. PATIENT-LVL IV: CPT | Mod: PBBFAC,HCNC,, | Performed by: ORTHOPAEDIC SURGERY

## 2025-03-27 PROCEDURE — 73562 X-RAY EXAM OF KNEE 3: CPT | Mod: TC,HCNC,FY,PN,LT

## 2025-03-27 PROCEDURE — 73562 X-RAY EXAM OF KNEE 3: CPT | Mod: 26,HCNC,LT, | Performed by: RADIOLOGY

## 2025-03-27 RX ORDER — OXYCODONE HYDROCHLORIDE 5 MG/1
5-10 TABLET ORAL EVERY 4 HOURS PRN
Qty: 40 TABLET | Refills: 0 | Status: SHIPPED | OUTPATIENT
Start: 2025-03-27

## 2025-03-27 RX ORDER — METHYLPREDNISOLONE 4 MG/1
TABLET ORAL
Qty: 21 TABLET | Refills: 0 | Status: SHIPPED | OUTPATIENT
Start: 2025-03-27

## 2025-03-27 RX ORDER — TIZANIDINE 4 MG/1
4 TABLET ORAL EVERY 6 HOURS PRN
Qty: 40 TABLET | Refills: 0 | Status: SHIPPED | OUTPATIENT
Start: 2025-03-27 | End: 2025-04-07

## 2025-03-27 NOTE — PROGRESS NOTES
Ortho Knee Follow Up Note    PCP: Alena Hernandez MD   Referring Provider: Eric Carbajal MD  [address]  [place] [address]        Assessment:  74 y.o. female status post left total Knee Primary completed on 3/5/25.  Patient here today three-week follow up.  Overall she is reporting a significant amount of pain in her knee.  She has in a wheelchair today.  She reports more pain compared to her right knee replacement at this stage.  She has not asked for refills of medications.  But she reports that oxycodone pills are not helping.  Her incision looks good.  She does have some swelling.  She has some diffuse tenderness that extends into her distal tibia along the muscular planes.  I think some of her pain may be neuropathic at this stage.  She maintains reasonable range of motion.  She gets from about 5° of extension to about 95° of flexion.  Although this is painful for her.  There is no firm stops.  Not worried about arthrofibrosis at this stage.  I did review her three-week notes from her 1st knee replacement and describe similar type pains.  I am going to put her on a Medrol Dosepak despite her diabetes diagnosis and refill some of her medications.  I think she needs to continue on with therapy to work on this range of motion and helping with some of her pains.  I am going to follow up with her in 6 weeks for clinical check.  Her x-rays look good.    Plan:  Follow up 6 weeks  Future Radiographs Indicated at next visit: No    Pain Management: Continue current pain management  Anticoagulation: Continue ASA for total of 4 weeks duration post operatively  Wound Care: Ok to shower. No scrubbing incision. No soaking in pools or tubs for 6 weeks post operative.     Patient Reassurance:   Post-operative course discussed with patient. Patient reassured and supported. All questions answered.    ACTIVE PROBLEM LIST  Problem List[1]        HPI:  Valarie Foster presents today for a post-op visit.     STATUS POST:  left  "total Knee Primary  BMI: Body mass index is 34.28 kg/m².    Post operative recovery was complicated by: pain    Patient rates his condition as improving. Pleased with surgical outcome to date.     Functional Assessment:  Completed Home PT  Functional Difficulties:  Interferes with sleep, Arising from chair, and Walking  Pain Medication:  Narcotic  Anticoagulation: Aspirin     EXAM:    left POST-OPERATIVE KNEE    Ht 5' 4" (1.626 m)   Wt 90.6 kg (199 lb 11.8 oz)   BMI 34.28 kg/m²     Skin:  Appropriate post op appearance, No evidence of erythema, warmth, discharge, or drainage, and Incision clean/dry/intact  Range of Motion: Flexion: 95, Extension 5  Neurovascular Status: Sensation intact in Sural, Saphenous, SPN, DPN and Tibial nerve distribution. 5 out of 5 strength in hip flexion, knee flexion/extension, ankle plantarflexion/dorsiflexion. 2+ dorsalis pedis    IMAGING:  X-ray Knee:   Implants are well fixed and aligned. There is no evidence of loosening.            [1]   Patient Active Problem List  Diagnosis    Type 2 diabetes mellitus with diabetic neuropathy, without long-term current use of insulin    Benign hypertension    Hyperlipemia    Microhematuria    Left flank pain    Abdominal aortic atherosclerosis    History of renal cell cancer    Renal cell carcinoma of right kidney    Mild major depression    Bilateral chronic knee pain    Colon cancer screening    Pain of left calf    Refractive error    Primary osteoarthritis of knees, bilateral    Fall    Radiculopathy    Idiopathic progressive neuropathy    Nerve pain    Chest pain    Severe obesity (BMI 35.0-39.9) with comorbidity    Decreased ROM of right knee    Decreased strength involving knee joint    Gait difficulty    Paresthesia    Pseudophakia    COVID-19    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

## 2025-04-07 DIAGNOSIS — L29.9 PRURITIC CONDITION: ICD-10-CM

## 2025-04-09 ENCOUNTER — OUTPATIENT CASE MANAGEMENT (OUTPATIENT)
Dept: ADMINISTRATIVE | Facility: OTHER | Age: 75
End: 2025-04-09
Payer: MEDICARE

## 2025-04-09 ENCOUNTER — OFFICE VISIT (OUTPATIENT)
Dept: FAMILY MEDICINE | Facility: CLINIC | Age: 75
End: 2025-04-09
Payer: MEDICARE

## 2025-04-09 ENCOUNTER — PATIENT MESSAGE (OUTPATIENT)
Dept: ORTHOPEDICS | Facility: CLINIC | Age: 75
End: 2025-04-09
Payer: MEDICARE

## 2025-04-09 VITALS
SYSTOLIC BLOOD PRESSURE: 142 MMHG | HEART RATE: 114 BPM | HEIGHT: 64 IN | BODY MASS INDEX: 32.26 KG/M2 | DIASTOLIC BLOOD PRESSURE: 84 MMHG | WEIGHT: 188.94 LBS | TEMPERATURE: 98 F | OXYGEN SATURATION: 96 %

## 2025-04-09 DIAGNOSIS — E66.811 CLASS 1 OBESITY DUE TO EXCESS CALORIES WITH SERIOUS COMORBIDITY AND BODY MASS INDEX (BMI) OF 34.0 TO 34.9 IN ADULT: Primary | ICD-10-CM

## 2025-04-09 DIAGNOSIS — M79.642 BILATERAL HAND PAIN: Primary | ICD-10-CM

## 2025-04-09 DIAGNOSIS — M79.641 BILATERAL HAND PAIN: Primary | ICD-10-CM

## 2025-04-09 DIAGNOSIS — G47.33 OSA (OBSTRUCTIVE SLEEP APNEA): ICD-10-CM

## 2025-04-09 DIAGNOSIS — E11.40 TYPE 2 DIABETES MELLITUS WITH DIABETIC NEUROPATHY, WITHOUT LONG-TERM CURRENT USE OF INSULIN: Chronic | ICD-10-CM

## 2025-04-09 DIAGNOSIS — E66.09 CLASS 1 OBESITY DUE TO EXCESS CALORIES WITH SERIOUS COMORBIDITY AND BODY MASS INDEX (BMI) OF 34.0 TO 34.9 IN ADULT: Primary | ICD-10-CM

## 2025-04-09 PROCEDURE — 99999 PR PBB SHADOW E&M-EST. PATIENT-LVL V: CPT | Mod: PBBFAC,HCNC,, | Performed by: INTERNAL MEDICINE

## 2025-04-09 RX ORDER — SEMAGLUTIDE 0.68 MG/ML
0.25 INJECTION, SOLUTION SUBCUTANEOUS
Qty: 3 ML | Refills: 0 | Status: SHIPPED | OUTPATIENT
Start: 2025-04-09 | End: 2025-06-04

## 2025-04-09 NOTE — PROGRESS NOTES
HISTORY OF PRESENT ILLNESS:  Valarie Foster is a 74 y.o. female who presents to the clinic today for Follow-up (Follow up for Bariatrics)      Last seen by me 1/2025.    She reports persistent pain following knee arthroplasty performed in March 2024 though improving.    She was scheduled for a sleep study but was unable to attend due to pain. A follow-up visit is pending scheduling.    She discontinued Ozempic since a week prior to surgery. Prior to discontinuation, she was taking Ozempic 0.25mg for first four doses, then increased to 0.5mg without any side effects.    Her current diet consists of breakfast with two pieces of wheat toast and coffee. Lunch includes boiled egg white, diced ham, low calorie dressing, and iceberg lettuce. Dinner comprises baked chicken and green beans. Snacks consist of trina cracker and yogurt.    HbA1c was 7.2% in February, showing improvement from previous measurements.    Prescribed Ozempic, Jardiance.    Co-morbidities associated with obesity   Essential Hypertension  GERD  Type 2 Diabetes Mellitus  GERALD - sleep study is pending.        History of Weight Loss Efforts  Surgery or Procedures: no  Medications: no        Kidney stones: no  Glaucoma: no  H/o eating disorder: no  Heart disease or arrhythmia: no  Anxiety or depression:  yes on fluoxetine  Personal or family h/o MEN2 or thyroid cancer: no  H/o pancreatitis: no  Current or past use of narcotics: no     Current Eating Habits  Breakfast - Wheat bread with one egg white.  Coffee sometimes - sweet n low.  Lunch - salad: boiled egg white, ham diced, low calorie dressing, iceburg lettuce.  Dinner - baked chicken and green beans  Snacks - yogurt greek     1/7/2025   Vitals - 1 value per visit    Weight (lb) 202.6          Wt Readings from Last 3 Encounters:   04/09/25 1215 85.7 kg (188 lb 15 oz)   03/27/25 0827 90.6 kg (199 lb 11.8 oz)   03/05/25 1756 90.6 kg (199 lb 11.8 oz)   02/27/25 1123 90.6 kg (199 lb 12.8 oz)     "    Estimated body mass index is 34.28 kg/m² as calculated from the following:    Height as of 3/27/25: 5' 4" (1.626 m).    Weight as of 3/27/25: 90.6 kg (199 lb 11.8 oz).    Hemoglobin A1C   Date Value Ref Range Status   02/07/2025 7.2 (H) 4.0 - 5.6 % Final     Comment:     ADA Screening Guidelines:  5.7-6.4%  Consistent with prediabetes  >or=6.5%  Consistent with diabetes    High levels of fetal hemoglobin interfere with the HbA1C  assay. Heterozygous hemoglobin variants (HbS, HgC, etc)do  not significantly interfere with this assay.   However, presence of multiple variants may affect accuracy.     10/28/2024 7.5 (H) 4.0 - 5.6 % Final     Comment:     ADA Screening Guidelines:  5.7-6.4%  Consistent with prediabetes  >or=6.5%  Consistent with diabetes    High levels of fetal hemoglobin interfere with the HbA1C  assay. Heterozygous hemoglobin variants (HbS, HgC, etc)do  not significantly interfere with this assay.   However, presence of multiple variants may affect accuracy.     07/29/2024 8.2 (H) 4.0 - 5.6 % Final     Comment:     ADA Screening Guidelines:  5.7-6.4%  Consistent with prediabetes  >or=6.5%  Consistent with diabetes    High levels of fetal hemoglobin interfere with the HbA1C  assay. Heterozygous hemoglobin variants (HbS, HgC, etc)do  not significantly interfere with this assay.   However, presence of multiple variants may affect accuracy.           ROS:  General: -fever, -chills, -fatigue, -weight gain, -weight loss  Eyes: -vision changes, -redness, -discharge  ENT: -ear pain, -nasal congestion, -sore throat  Cardiovascular: -chest pain, -palpitations, -lower extremity edema  Respiratory: -cough, -shortness of breath  Gastrointestinal: -abdominal pain, -nausea, -vomiting, -diarrhea, -constipation, -blood in stool, +loss of appetite  Genitourinary: -dysuria, -hematuria, -frequency  Musculoskeletal: +joint pain, -muscle pain, +limb pain  Skin: -rash, -lesion  Neurological: -headache, -dizziness, " -numbness, -tingling  Psychiatric: -anxiety, -depression, -sleep difficulty             PAST MEDICAL HISTORY:  Past Medical History:   Diagnosis Date    Arthritis     Colon polyp     Diabetes mellitus     diet controlled    Diabetes with neurologic complications     Hypertension     Nuclear sclerosis of both eyes 02/14/2022    Renal cell carcinoma     Sleep apnea        PAST SURGICAL HISTORY:  Past Surgical History:   Procedure Laterality Date    ARTHROPLASTY, KNEE, TOTAL, USING COMPUTER-ASSISTED NAVIGATION Left 3/5/2025    Procedure: ARTHROPLASTY, KNEE, TOTAL, USING COMPUTER-ASSISTED NAVIGATION;  Surgeon: Eric Carbajal MD;  Location: [facility] OR;  Service: Orthopedics;  Laterality: Left;  [place]---CLEARED BY CARDS AND PCP  FIRST MARTIN ASSIST NOTIFIED-GG  RN PREOP 2/19/2025   T/S ON 2/28/2025---NEED ORDERS    COLONOSCOPY N/A 2/5/2018    Procedure: COLONOSCOPY;  Surgeon: Bacilio Mancia MD;  Location: [facility] ENDO;  Service: Endoscopy;  Laterality: N/A;  appt confirmed-ss    COLONOSCOPY N/A 8/13/2020    Procedure: COLONOSCOPY;  Surgeon: Jose West MD;  Location: [facility] ENDO;  Service: Endoscopy;  Laterality: N/A;    ENDOSCOPIC CARPAL TUNNEL RELEASE Right 3/15/2024    Procedure: RELEASE, CARPAL TUNNEL, ENDOSCOPIC - RIGHT;  Surgeon: Tonio Mcdermott MD;  Location: [facility] OR;  Service: Orthopedics;  Laterality: Right;    ENDOSCOPIC CARPAL TUNNEL RELEASE Left 6/26/2024    Procedure: RELEASE, CARPAL TUNNEL, ENDOSCOPIC - left;  Surgeon: Tonio Mcdermott MD;  Location: [facility] OR;  Service: Orthopedics;  Laterality: Left;    HYSTERECTOMY      INTRAOCULAR PROSTHESES INSERTION Left 7/7/2022    Procedure: INSERTION, IOL PROSTHESIS;  Surgeon: Candelario Novoa MD;  Location: [facility] OR;  Service: Ophthalmology;  Laterality: Left;    INTRAOCULAR PROSTHESES INSERTION Right 7/21/2022    Procedure: INSERTION, IOL PROSTHESIS;  Surgeon: Candelario Novoa MD;  Location: [facility] OR;  Service: Ophthalmology;  Laterality:  Right;    OOPHORECTOMY      PARTIAL NEPHRECTOMY Right 10/12/2018    Procedure: NEPHRECTOMY, PARTIAL open. Dr Arenas to assist.;  Surgeon: Alejandra Palm MD;  Location: [facility] OR;  Service: Urology;  Laterality: Right;  RN PREOP 10/9/2018----NEEDS H/P    PHACOEMULSIFICATION OF CATARACT Left 7/7/2022    Procedure: PHACOEMULSIFICATION, CATARACT;  Surgeon: Candelario Novoa MD;  Location: [facility] OR;  Service: Ophthalmology;  Laterality: Left;  RN PHONE PREOP 6/27/2022   ARRIVAL 6:00 AM    PHACOEMULSIFICATION OF CATARACT Right 7/21/2022    Procedure: PHACOEMULSIFICATION, CATARACT;  Surgeon: Candelario Novoa MD;  Location: [facility] OR;  Service: Ophthalmology;  Laterality: Right;  RN PHONE PREOP 7/12/2022   ARRIVAL 12:00 NOON    TOTAL KNEE ARTHROPLASTY Right 11/8/2023    Procedure: ARTHROPLASTY, KNEE, TOTAL. NELSON;  Surgeon: Eric Carbajal MD;  Location: [facility] OR;  Service: Orthopedics;  Laterality: Right;  [place]. Admit  RAPHAEL QUINTIN RIVERAKINGSLEY [phone] NOTIFIED QUINTIN ON 10/11/2023-LO  RN PREOP 10/25/2023   T/S ON 11/6/2023--done------CLEARED BY CARDS       SOCIAL HISTORY:  Social History[1]    FAMILY HISTORY:  Family History   Problem Relation Name Age of Onset    No Known Problems Mother      Glaucoma Father      Breast cancer Sister Warnell     Glaucoma Sister Warnell     Cancer Sister Warnell         breast cancer    Thyroid disease Sister [place]     Blindness Sister Bryanna         due to trauma during car accident    Diabetes Sister Amanda     No Known Problems Maternal Aunt      No Known Problems Maternal Uncle      No Known Problems Paternal Aunt      No Known Problems Paternal Uncle      No Known Problems Maternal Grandmother      No Known Problems Maternal Grandfather      No Known Problems Paternal Grandmother      No Known Problems Paternal Grandfather      Diabetes Brother Navneet     Amblyopia Neg Hx      Cataracts Neg Hx      Hypertension Neg Hx      Macular degeneration Neg Hx      Retinal  detachment Neg Hx      Strabismus Neg Hx      Stroke Neg Hx         ALLERGIES AND MEDICATIONS: updated and reviewed.  Review of patient's allergies indicates:   Allergen Reactions    Lisinopril Swelling and Shortness Of Breath    Ascorbic acid-ascorbate calc      And citrus fruits     Medication List with Changes/Refills   Current Medications    ACCU-CHEK ONEIL PLUS TEST STRP STRP    TEST BLOOD SUGAR TWICE DAILY    ACCU-CHEK SOFTCLIX LANCETS MISC    USE TO TEST BLOOD SUGAR ONE TIME DAILY    ACETAMINOPHEN (TYLENOL) 500 MG TABLET    Take 2 tablets (1,000 mg total) by mouth every 8 (eight) hours as needed for Pain.    ALCOHOL SWABS (DROPSAFE ALCOHOL PREP PADS) PADM    USE AS DIRECTED THREE TIMES DAILY    AMLODIPINE (NORVASC) 10 MG TABLET    Take 1 tablet (10 mg total) by mouth once daily.    ASPIRIN (ECOTRIN) 81 MG EC TABLET    Take 1 tablet (81 mg total) by mouth 2 (two) times a day.    ATORVASTATIN (LIPITOR) 80 MG TABLET    Take 1 tablet (80 mg total) by mouth every evening.    AZELASTINE (ASTELIN) 137 MCG (0.1 %) NASAL SPRAY    1 spray (137 mcg total) by Nasal route 2 (two) times daily.    BLOOD GLUCOSE CONTROL HIGH,LOW (ACCU-CHEK ONEIL CONTROL SOLN) SOLN    1 Units by Misc.(Non-Drug; Combo Route) route as needed.    BLOOD GLUCOSE CONTROL, LOW (TRUE METRIX LEVEL 1) SOLN    1 each by Misc.(Non-Drug; Combo Route) route once as needed.    BLOOD-GLUCOSE METER (ACCU-CHEK ONEIL PLUS METER) MISC    1 kit by Misc.(Non-Drug; Combo Route) route once daily.    BLOOD-GLUCOSE METER (TRUE METRIX GLUCOSE METER) KIT    Use as directed to test glucose    CELECOXIB (CELEBREX) 200 MG CAPSULE    Take 1 capsule (200 mg total) by mouth 2 (two) times daily.    CETIRIZINE (ZYRTEC) 10 MG TABLET    Take 1 tablet (10 mg total) by mouth once daily.    DOCUSATE SODIUM (COLACE) 100 MG CAPSULE    Take 1 capsule (100 mg total) by mouth 2 (two) times daily.    ERGOCALCIFEROL (ERGOCALCIFEROL) 50,000 UNIT CAP    TAKE 1 CAPSULE EVERY 7 DAYS.     FLUOXETINE 40 MG CAPSULE    Take 1 capsule (40 mg total) by mouth once daily.    FLUTICASONE PROPIONATE (FLONASE) 50 MCG/ACTUATION NASAL SPRAY    USE 1 SPRAY IN EACH NOSTRIL ONCE DAILY    GABAPENTIN (NEURONTIN) 300 MG CAPSULE    Take 1 capsule (300 mg total) by mouth 3 (three) times daily.    HYDRALAZINE (APRESOLINE) 100 MG TABLET    Take 1 tablet (100 mg total) by mouth every 8 (eight) hours.    HYDROXYZINE HCL (ATARAX) 25 MG TABLET    TAKE 1 TABLET BY MOUTH THREE TIMES DAILY AS NEEDED FOR ITCHING    JARDIANCE 25 MG TABLET    TAKE 1 TABLET ONE TIME DAILY    METHYLPREDNISOLONE (MEDROL DOSEPACK) 4 MG TABLET    use as directed    METOPROLOL SUCCINATE (TOPROL-XL) 25 MG 24 HR TABLET    Take 1 tablet (25 mg total) by mouth once daily.    ONDANSETRON (ZOFRAN-ODT) 8 MG TBDL    Take 1 tablet (8 mg total) by mouth 2 (two) times daily as needed (nausea).    ONDANSETRON (ZOFRAN-ODT) 8 MG TBDL    Dissolve 1 tablet (8 mg total) by mouth every 24 hours as needed.    OXYCODONE (ROXICODONE) 5 MG IMMEDIATE RELEASE TABLET    Take 1 to 2 tablets by mouth every 4 (four) hours as needed (pain).    PANTOPRAZOLE (PROTONIX) 40 MG TABLET    Take 1 tablet (40 mg total) by mouth once daily.    SEMAGLUTIDE (OZEMPIC) 1 MG/DOSE (2 MG/1.5 ML) PNIJ    Inject 1 mg into the skin every 7 days.    TRUEPLUS LANCETS 33 GAUGE MISC    TEST BLOOD SUGAR TWICE DAILY   Changed and/or Refilled Medications    Modified Medication Previous Medication    SEMAGLUTIDE (OZEMPIC) 0.25 MG OR 0.5 MG (2 MG/3 ML) PEN INJECTOR semaglutide (OZEMPIC) 0.25 mg or 0.5 mg (2 mg/3 mL) pen injector       Inject 0.25 mg into the skin every 7 days.    Inject 0.5 mg into the skin every 7 days.   Discontinued Medications    SEMAGLUTIDE (OZEMPIC) 0.25 MG OR 0.5 MG (2 MG/3 ML) PEN INJECTOR    Inject 0.25 mg into the skin every 7 days.          CARE TEAM:  Patient Care Team:  Alena Hernandez MD as PCP - General (Family Medicine)  Tabby Jacob as ED Navigator  Favorite, Aniyah MOSES RN  "as Outpatient          PHYSICAL EXAM:   Vitals:    04/09/25 1215   BP: (!) 142/84   Pulse: (!) 114   Temp: 98 °F (36.7 °C)     Weight: 85.7 kg (188 lb 15 oz)   Height: 5' 4" (162.6 cm)   Body mass index is 32.43 kg/m².    Physical Exam    Vitals: Weight: 188 lbs 15 ounces.  General: No acute distress. Well-developed. Well-nourished.  Eyes: EOMI. Sclerae anicteric.  HENT: Normocephalic. Atraumatic. Nares patent. Moist oral mucosa.  Ears: Bilateral TMs clear. Bilateral EACs clear.  Cardiovascular: Regular rate. Regular rhythm. No murmurs. No rubs. No gallops. Normal S1, S2.  Respiratory: Normal respiratory effort. Clear to auscultation bilaterally. No rales. No rhonchi. No wheezing.  Abdomen: Soft. Non-tender. Non-distended. Normoactive bowel sounds.  Musculoskeletal: No  obvious deformity.  Extremities: No lower extremity edema.  Neurological: Alert & oriented x3. No slurred speech. Normal gait.  Psychiatric: Normal mood. Normal affect. Good insight. Good judgment.  Skin: Warm. Dry. No rash.             ASSESSMENT AND PLAN:  IMPRESSION:  - Assessed recovery from recent arthroplasty.  - Evaluated Ozempic usage, noting over a month hiatus due to surgery.  - Reviewed weight loss progress, noting decrease from 202 lbs to 188 lbs 15 oz since January.  - Considered diabetic status in dietary recommendations.  - Noted improvement in A1c (7.2 in February).    Class 1 obesity due to excess calories with serious comorbidity and body mass index (BMI) of 34.0 to 34.9 in adult  -     semaglutide (OZEMPIC) 0.25 mg or 0.5 mg (2 mg/3 mL) pen injector; Inject 0.25 mg into the skin every 7 days.  Dispense: 3 mL; Refill: 0  -     Hemoglobin A1C; Future; Expected date: 04/09/2025  -     Comprehensive Metabolic Panel; Future; Expected date: 04/09/2025    Type 2 diabetes mellitus with diabetic neuropathy, without long-term current use of insulin  -     semaglutide (OZEMPIC) 0.25 mg or 0.5 mg (2 mg/3 mL) pen injector; Inject " 0.25 mg into the skin every 7 days.  Dispense: 3 mL; Refill: 0  -     Hemoglobin A1C; Future; Expected date: 04/09/2025  -     Comprehensive Metabolic Panel; Future; Expected date: 04/09/2025    GERALD (obstructive sleep apnea)  -     semaglutide (OZEMPIC) 0.25 mg or 0.5 mg (2 mg/3 mL) pen injector; Inject 0.25 mg into the skin every 7 days.  Dispense: 3 mL; Refill: 0       - Noted that the patient had an arthroplasty (knee replacement) last month and is recovering from the surgery.  - Acknowledged that the recovery process is ongoing and has been painful for the patient.  - Advised the patient on the commitment required for joint replacement recovery and encouraged patience with the process.  - Instructed the patient to continue with planned physical therapy for post-op recovery.    - Noted that the patient's last A1c was 7.2 in February, showing improvement in blood sugar control.  - Observed that the patient has lost weight since starting Ozempic in January, from 202 lbs to 188 lbs 15 ounces.  - Evaluated the patient's current diet, noting improvements in food choices and portion control.  - Advised the patient on proper diet for diabetes management, emphasizing lean protein, non-starchy vegetables, and high-fiber foods.  - Restarted Ozempic treatment for diabetes management, initiating at 0.25 mg once weekly for 4 weeks, then increasing to 0.5 mg once weekly, and finally to 1 mg once weekly to minimize side effects after extended break.  - Advised on potential side effects of Ozempic (nausea, upset stomach, constipation) when restarting.  - Educated on importance of eating right foods with decreased appetite on Ozempic.  - Recommend focus on lean protein, non-starchy vegetables, and high-fiber foods.  - Advised on sugar content in fruits for diabetic patients.  - Recommend limiting fruit intake, particularly grapes and watermelon, to half cup servings due to sugar content.  - [place] to maintain current diet  modifications, including elimination of cookies and crackers.    - Ordered A1c and CMP in 1 month for reassessment.                Follow up 3 months or sooner as needed.    This note was generated with the assistance of ambient listening technology. Verbal consent was obtained by the patient and accompanying visitor(s) for the recording of patient appointment to facilitate this note. I attest to having reviewed and edited the generated note for accuracy, though some syntax or spelling errors may persist. Please contact the author of this note for any clarification.         [1]   Social History  Socioeconomic History    Marital status:    Tobacco Use    Smoking status: Never     Passive exposure: Past    Smokeless tobacco: Never   Substance and Sexual Activity    Alcohol use: No    Drug use: No    Sexual activity: Not Currently     Social Drivers of Health     Financial Resource Strain: Low Risk  (3/5/2025)    Overall Financial Resource Strain (CARDIA)     Difficulty of Paying Living Expenses: Not hard at all   Food Insecurity: No Food Insecurity (3/5/2025)    Hunger Vital Sign     Worried About Running Out of Food in the Last Year: Never true     Ran Out of Food in the Last Year: Never true   Transportation Needs: No Transportation Needs (11/22/2024)    TRANSPORTATION NEEDS     Transportation : No   Physical Activity: Insufficiently Active (11/25/2024)    Exercise Vital Sign     Days of Exercise per Week: 4 days     Minutes of Exercise per Session: 20 min   Stress: No Stress Concern Present (3/5/2025)    [place] [employer] - Occupational Stress Questionnaire     Feeling of Stress : Only a little   Housing Stability: High Risk (3/5/2025)    Housing Stability Vital Sign     Unable to Pay for Housing in the Last Year: Yes     Homeless in the Last Year: No
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
## 2025-04-10 ENCOUNTER — PATIENT MESSAGE (OUTPATIENT)
Dept: ORTHOPEDICS | Facility: CLINIC | Age: 75
End: 2025-04-10
Payer: MEDICARE

## 2025-04-10 RX ORDER — HYDROXYZINE HYDROCHLORIDE 25 MG/1
25 TABLET, FILM COATED ORAL
Qty: 90 TABLET | Refills: 0 | Status: SHIPPED | OUTPATIENT
Start: 2025-04-10

## 2025-04-10 NOTE — TELEPHONE ENCOUNTER
Refill Routing Note   Medication(s) are not appropriate for processing by [facility] for the following reason(s):        Outside of protocol    [facility] action(s):  Route               Appointments  past 12m or future 3m with PCP    Date Provider   Last Visit   1/17/2025 Alena Hernandez MD   Next Visit   7/28/2025 Alena Hernandez MD   ED visits in past 90 days: 0        Note composed:7:32 AM 04/10/2025

## 2025-04-11 ENCOUNTER — CLINICAL SUPPORT (OUTPATIENT)
Dept: REHABILITATION | Facility: HOSPITAL | Age: 75
End: 2025-04-11
Attending: ORTHOPAEDIC SURGERY
Payer: MEDICARE

## 2025-04-11 DIAGNOSIS — M62.81 WEAKNESS OF LEFT QUADRICEPS MUSCLE: ICD-10-CM

## 2025-04-11 DIAGNOSIS — Z96.652 STATUS POST LEFT KNEE REPLACEMENT: ICD-10-CM

## 2025-04-11 DIAGNOSIS — M25.662 IMPAIRED RANGE OF MOTION OF LEFT KNEE: Primary | ICD-10-CM

## 2025-04-11 PROCEDURE — 97112 NEUROMUSCULAR REEDUCATION: CPT | Mod: HCNC,PN

## 2025-04-11 PROCEDURE — 97161 PT EVAL LOW COMPLEX 20 MIN: CPT | Mod: HCNC,PN

## 2025-04-11 PROCEDURE — 97530 THERAPEUTIC ACTIVITIES: CPT | Mod: HCNC,PN

## 2025-04-11 NOTE — PROGRESS NOTES
Outpatient Rehab    Physical Therapy Evaluation    Patient Name: Valarie Foster  MRN: [id number]  YOB: 1950  Encounter Date: 4/11/2025    Therapy Diagnosis:   Encounter Diagnoses   Name Primary?    Status post left knee replacement     Impaired range of motion of left knee Yes    Weakness of left quadriceps muscle      Physician: Eric Carbajal MD    Physician Orders: Eval and Treat  Medical Diagnosis: Status post left knee replacement    Visit # / Visits Authorized:  1 / 1  Insurance Authorization Period: 3/6/2025 to 3/6/2026  Date of Evaluation: 4/11/2025  Plan of Care Certification: 4/11/2025 to 7/11/25     Time In: 1400   Time Out: 1500  Total Time: 60   Total Billable Time: 60    Intake Outcome Measure for FOTO Survey    Therapist reviewed FOTO scores for Valarie Foster on 4/11/2025.   FOTO report - see Media section or FOTO account episode details.     Intake Score: 52.5%    Precautions  Left Lower Extremity Weight-Bearing Status: Full weight-bearing         Subjective   History of Present Illness  Valarie is a 74 y.o. female who reports to physical therapy with a chief concern of left knee pain.     The patient reports a medical diagnosis of s/p Left TKA. The patient has experienced this issue since 04/11/25. Patient reports a surgery of Z96.652 (ICD-10-CM) - Status post left knee replacement.          History of Present Condition/Illness: Ms. Foster is a 74 year old female s/p left TKA 3/5/25 secondary to moderate osteoarthrosis. She presents ambulating with a SPC. She underwent 2 weeks of home health physical therapy prior to today's visit.   She did undergo several visits of PT in our clinic s/p right TKA 2 years ago.  She states she is involved in housework.  Her goal is to get her knee well.     Activities of Daily Living  Previously independent with activities of daily living? Yes     Currently independent with activities of daily living? Yes              Pain     Patient reports a  current pain level of 8/10. Pain at best is reported as 6/10. Pain at worst is reported as 8/10.   Clinical Progression (since onset): Stable  Pain-Relieving Factors: Rest, Medications - prescription  Pain-Aggravating Factors: Bending, Walking, Stretching, Standing, Movement         Living Arrangements  Living Situation  Living Arrangements: Alone  Support Systems: Friends/neighbors    Home Setup  Home Access: Level entry  Number of Levels in Home: One level        Employment  Patient does not report that: Does the patient's condition impact their ability to work?  Employment Status: Retired          Past Medical History/Physical Systems Review:   Valarie Foster  has a past medical history of Arthritis, Colon polyp, Diabetes mellitus, Diabetes with neurologic complications, Hypertension, Nuclear sclerosis of both eyes, Renal cell carcinoma, and Sleep apnea.    Valarie Foster  has a past surgical history that includes Hysterectomy; Oophorectomy; Colonoscopy (N/A, 2/5/2018); Partial nephrectomy (Right, 10/12/2018); Colonoscopy (N/A, 8/13/2020); Phacoemulsification of cataract (Left, 7/7/2022); Intraocular prosthesis insertion (Left, 7/7/2022); Phacoemulsification of cataract (Right, 7/21/2022); Intraocular prosthesis insertion (Right, 7/21/2022); Total knee arthroplasty (Right, 11/8/2023); Endoscopic carpal tunnel release (Right, 3/15/2024); Endoscopic carpal tunnel release (Left, 6/26/2024); and arthroplasty, knee, total, using computer-assisted navigation (Left, 3/5/2025).    Valarie has a current medication list which includes the following prescription(s): accu-chek clifford plus test strp, accu-chek softclix lancets, acetaminophen, dropsafe alcohol prep pads, amlodipine, aspirin, atorvastatin, azelastine, accu-chek clifford control soln, true metrix level 1, blood-glucose meter, blood-glucose meter, celecoxib, cetirizine, docusate sodium, ergocalciferol, fluoxetine, fluticasone propionate, gabapentin, hydralazine,  hydroxyzine hcl, jardiance, methylprednisolone, metoprolol succinate, ondansetron, ondansetron, oxycodone, pantoprazole, ozempic, semaglutide, and trueplus lancets, and the following Facility-Administered Medications: acetaminophen, acetaminophen, aspirin, famotidine, lidocaine (pf) 10 mg/ml (1%), methocarbamol, naloxone, ondansetron, oxycodone, oxycodone, polyethylene glycol, pregabalin capsule 75 mg, prochlorperazine, senna-docusate, and sodium chloride 0.9%.    Review of patient's allergies indicates:   Allergen Reactions    Lisinopril Swelling and Shortness Of Breath    Ascorbic acid-ascorbate calc      And citrus fruits        Objective   Knee Observations     Incision appears to be healing well. Fair vmo response.        Knee Swelling  Location of Measurement Right  (cm) Left  (cm)   20 cm Above Joint Line       10 cm Vastus Medialis Oblique       At Joint Line       15 cm Below Joint Line       Moderate swelling globally         Knee Range of Motion   Right Knee   Active (deg) Passive (deg) Pain   Flexion 120       Extension 0           Left Knee   Active (deg) Passive (deg) Pain   Flexion 90       Extension 8                          Knee Strength   Right Strength Right Pain Left Strength Left  Pain   Flexion (S2) 4+   3+     Prone Flexion 4+   3+     Extension (L3) 4+   3+                   Gait Analysis  Gait Analysis Details  Pt presents ambulating with a SPC with difficulty.          Treatment:  Manual Therapy  MT 1: patellar mobilization  Balance/Neuromuscular Re-Education  NMR 1: heel prop with quad set  NMR 2: quad set 2 x 10  NMR 3: towel stretch with quad set  NMR 4: AA knee flexion x 10  Therapeutic Activity  TA 1: heel raises 2 x 10  TA 2: sit to stand x 5  TA 3: gait training with SPC    Time Entry(in minutes):  PT Evaluation (Low) Time Entry: 30  Neuromuscular Re-Education Time Entry: 15  Therapeutic Activity Time Entry: 15    Assessment & Plan   Assessment  [place] presents with a condition of  Low complexity.   Presentation of Symptoms: Stable       Functional Limitations: Activity tolerance, Decreased ambulation distance/endurance, Maintaining balance, Functional mobility, Gait limitations, Driving  Impairments: Abnormal gait, Impaired balance    Prognosis: Good  Assessment Details:  Pt presents with signs and symptoms consistent with referring diagnosis. Evaluation has determined a decrease in functional status and subjective and objective deficits that can be addressed by physical therapy intervention. Pt demonstrates pain limiting functional activities. Decreased flexibility and strength limiting normal movement patterns. Decreased segmental motion. Decreased postural strength and awareness. Positive special testing. Decreased participation in functional and recreational activities. Subjective and objective measures are addressed by goals in the plan of care.  Patient/family are involved in the development of these goals. Patient/family are educated about current injury and treatment. Pt demonstrates no additional cultural, spiritual or educational need and currently has no barriers to learning. Pt responded well to treatment today. Pt is a good candidate for skilled physical therapy intervention and has a good prognosis and is motivated to return to functional and  recreational activities.     Plan  From a physical therapy perspective, the patient would benefit from: Skilled Rehab Services    Planned therapy interventions include: Therapeutic exercise, Therapeutic activities, Neuromuscular re-education, and Manual therapy.    Planned modalities to include: Electrical stimulation - attended, Cryotherapy (cold pack), and Thermotherapy (hot pack).        Visit Frequency: 2 times Per Week for 12 Weeks.       This plan was discussed with Patient.   Discussion participants: Agreed Upon Plan of Care             Patient's spiritual, cultural, and educational needs considered and patient agreeable to plan of  care and goals.     Education  Education was done with Patient. The patient's learning style includes Demonstration. The patient Demonstrates understanding.                 Goals:   Active       1. short term goals       Pt to increase strength by a 1/2 grade of muscles test to allow for improvement in functional activities such as performing chores.          Start:  04/11/25    Expected End:  05/09/25            2. Pt to improve range of motion by 25% to allow for improved functional mobility to allow for improvement in IADL's.         Start:  04/11/25    Expected End:  05/09/25            3. Pt to report compliance with HEP and demonstrate proper exercise technique to PT to show competence with self management of condition.         Start:  04/11/25    Expected End:  05/09/25               2. Long term goals       Increase ROM to allow improved joint biomechanics during functional activities.          Start:  04/11/25    Expected End:  07/04/25            2. Independent with home exercise program.         Start:  04/11/25    Expected End:  07/04/25            3. Full return to functional activities with manageable complaints.         Start:  04/11/25    Expected End:  07/04/25                Jaxon Simmons, PT

## 2025-04-14 ENCOUNTER — TELEPHONE (OUTPATIENT)
Dept: ORTHOPEDICS | Facility: CLINIC | Age: 75
End: 2025-04-14
Payer: MEDICARE

## 2025-04-14 ENCOUNTER — HOSPITAL ENCOUNTER (OUTPATIENT)
Dept: RADIOLOGY | Facility: OTHER | Age: 75
Discharge: HOME | End: 2025-04-14
Attending: ORTHOPAEDIC SURGERY
Payer: MEDICARE

## 2025-04-14 ENCOUNTER — OFFICE VISIT (OUTPATIENT)
Dept: ORTHOPEDICS | Facility: CLINIC | Age: 75
End: 2025-04-14
Payer: MEDICARE

## 2025-04-14 VITALS — BODY MASS INDEX: 32.26 KG/M2 | WEIGHT: 188.94 LBS | HEIGHT: 64 IN

## 2025-04-14 DIAGNOSIS — M79.641 BILATERAL HAND PAIN: ICD-10-CM

## 2025-04-14 DIAGNOSIS — M19.032 ARTHRITIS OF LEFT WRIST: Primary | ICD-10-CM

## 2025-04-14 DIAGNOSIS — M79.642 BILATERAL HAND PAIN: ICD-10-CM

## 2025-04-14 PROCEDURE — 1101F PT FALLS ASSESS-DOCD LE1/YR: CPT | Mod: HCNC,CPTII,S$GLB, | Performed by: ORTHOPAEDIC SURGERY

## 2025-04-14 PROCEDURE — 99213 OFFICE O/P EST LOW 20 MIN: CPT | Mod: HCNC,25,S$GLB, | Performed by: ORTHOPAEDIC SURGERY

## 2025-04-14 PROCEDURE — 1125F AMNT PAIN NOTED PAIN PRSNT: CPT | Mod: HCNC,CPTII,S$GLB, | Performed by: ORTHOPAEDIC SURGERY

## 2025-04-14 PROCEDURE — 20605 DRAIN/INJ JOINT/BURSA W/O US: CPT | Mod: HCNC,LT,S$GLB, | Performed by: ORTHOPAEDIC SURGERY

## 2025-04-14 PROCEDURE — 1159F MED LIST DOCD IN RCRD: CPT | Mod: HCNC,CPTII,S$GLB, | Performed by: ORTHOPAEDIC SURGERY

## 2025-04-14 PROCEDURE — 99999 PR PBB SHADOW E&M-EST. PATIENT-LVL III: CPT | Mod: PBBFAC,HCNC,, | Performed by: ORTHOPAEDIC SURGERY

## 2025-04-14 PROCEDURE — 73130 X-RAY EXAM OF HAND: CPT | Mod: 26,50,HCNC, | Performed by: RADIOLOGY

## 2025-04-14 PROCEDURE — 3072F LOW RISK FOR RETINOPATHY: CPT | Mod: HCNC,CPTII,S$GLB, | Performed by: ORTHOPAEDIC SURGERY

## 2025-04-14 PROCEDURE — 3288F FALL RISK ASSESSMENT DOCD: CPT | Mod: HCNC,CPTII,S$GLB, | Performed by: ORTHOPAEDIC SURGERY

## 2025-04-14 PROCEDURE — 73130 X-RAY EXAM OF HAND: CPT | Mod: TC,50,HCNC,FY

## 2025-04-14 RX ORDER — TRIAMCINOLONE ACETONIDE 40 MG/ML
40 INJECTION, SUSPENSION INTRA-ARTICULAR; INTRAMUSCULAR
Status: DISCONTINUED | OUTPATIENT
Start: 2025-04-14 | End: 2025-04-14 | Stop reason: HOSPADM

## 2025-04-14 RX ADMIN — TRIAMCINOLONE ACETONIDE 40 MG: 40 INJECTION, SUSPENSION INTRA-ARTICULAR; INTRAMUSCULAR at 01:04

## 2025-04-14 NOTE — PROCEDURES
Intermediate Joint Aspiration/Injection: L radiocarpal    Date/Time: 4/14/2025 1:00 PM    Performed by: Tonio Mcdermott MD  Authorized by: Tonio Mcdermott MD    Consent Done?:  Yes (Verbal)  Indications:  Pain and arthritis  Site marked: The procedure site was marked    Timeout: Prior to procedure the correct patient, procedure, and site was verified      Location:  Wrist  Site:  L radiocarpal  Prep: Patient was prepped and draped in usual sterile fashion    Ultrasonic Guidance for needle placement: No  Needle size:  25 G  Approach:  Dorsal  Medications:  40 mg triamcinolone acetonide 40 mg/mL  Patient tolerance:  Patient tolerated the procedure well with no immediate complications

## 2025-04-14 NOTE — PROGRESS NOTES
Valarie Foster presents for post-operative evaluation.  The patient is now a proximally 1 year status post bilateral endoscopic carpal tunnel releases.  These have done great.  She notes complete resolution of her numbness and tingling and has no numbness or tingling these days.  She is coming in today for some generalized hand pain in the left worse than right hands.  In particular, the left dorsal wrist seems to be the most painful area.  She presents for re-evaluation with no other complaints.     PE:    AA&O x 4.  NAD  HEENT:  NCAT, sclera nonicteric  Lungs:  Respirations are equal and unlabored.  CV:  2+ bilateral upper and lower extremity pulses.  MSK:  Patient has bilateral well-healed surgical scar status post endoscopic carpal tunnel releases.  Bilateral hands are neurovascularly intact with full painless range motion of the digits.  She has significant tenderness palpation over the left dorsal intercarpal and radiocarpal wrist joints with painful left wrist range of motion which is somewhat restricted.      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  She is now suffering from left wrist arthritis.  She would like to try a left wrist corticosteroid injection which will be administered today.  Follow up on an as needed/if needed basis.        Please be aware that this note has been generated with the assistance of [employer] voice-to-text.  Please excuse any spelling or grammatical errors.

## 2025-04-15 ENCOUNTER — CLINICAL SUPPORT (OUTPATIENT)
Dept: REHABILITATION | Facility: HOSPITAL | Age: 75
End: 2025-04-15
Payer: MEDICARE

## 2025-04-15 DIAGNOSIS — M62.81 WEAKNESS OF LEFT QUADRICEPS MUSCLE: ICD-10-CM

## 2025-04-15 DIAGNOSIS — M25.662 IMPAIRED RANGE OF MOTION OF LEFT KNEE: Primary | ICD-10-CM

## 2025-04-15 PROCEDURE — 97530 THERAPEUTIC ACTIVITIES: CPT | Mod: HCNC,PN

## 2025-04-15 PROCEDURE — 97112 NEUROMUSCULAR REEDUCATION: CPT | Mod: HCNC,PN

## 2025-04-15 NOTE — PROGRESS NOTES
Outpatient Rehab    Physical Therapy Visit    Patient Name: Valarie Foster  MRN: [id number]  YOB: 1950  Encounter Date: 4/15/2025    Therapy Diagnosis:   Encounter Diagnoses   Name Primary?    Impaired range of motion of left knee Yes    Weakness of left quadriceps muscle      Physician: Eric Carbajal MD    Physician Orders: Eval and Treat  Medical Diagnosis:     Visit # / Visits Authorized:  1 / 10  Insurance Authorization Period: 3/24/2025 to 5/16/2025  Date of Evaluation: 4/11/25  Plan of Care Certification: 4/11/25 to 7/11/25     PT/PTA:     Number of PTA visits since last PT visit:   Time In: 1245   Time Out: 1345  Total Time: 60   Total Billable Time: 58    FOTO:  Intake Score:  %  Survey Score 1:  %  Survey Score 2:  %    Precautions  Left Lower Extremity Weight-Bearing Status: Full weight-bearing         Subjective   Pt states the knee is feeling much better..  Pain reported as 5/10.      Objective            Treatment:  Manual Therapy  MT 1: patellar mobilization  Balance/Neuromuscular Re-Education  NMR 1: heel prop with quad set  NMR 2: quad set 2 x 10  NMR 3: towel stretch with quad set  NMR 4: TKE ball vs wall x 20  NMR 5: LAQ with quad set x 20  Therapeutic Activity  TA 1: heel raises 2 x 10  TA 2: sit to stand x 5  TA 3: gait training with SPC  TA 4: AA knee flexion at steps  x 20, heel slide supine x 10  TA 5: Nu Step x 8 min    Time Entry(in minutes):  Manual Therapy Time Entry: 5  Neuromuscular Re-Education Time Entry: 25  Therapeutic Activity Time Entry: 28    Assessment & Plan   Assessment: Pt presents ambulating with a spc, decreased knee flex/ext in swing.  Requires moderate cueing to increase knee flexion in swing. Progressed ROM and quad strengthening emphasis wiht good performance. Good response to exercise progressioni per protocol. Pt is progressing well toward established goals.  Evaluation/Treatment Tolerance: Patient tolerated treatment well    Patient will continue to  benefit from skilled outpatient physical therapy to address the deficits listed in the problem list box on initial evaluation, provide pt/family education and to maximize pt's level of independence in the home and community environment.     Patient's spiritual, cultural, and educational needs considered and patient agreeable to plan of care and goals.     Education  Education was done with Patient. The patient's learning style includes Demonstration. The patient Demonstrates understanding.                 Plan: Continue per PT plan of care.    Goals:   Active       1. short term goals       Pt to increase strength by a 1/2 grade of muscles test to allow for improvement in functional activities such as performing chores.          Start:  04/11/25    Expected End:  05/09/25            2. Pt to improve range of motion by 25% to allow for improved functional mobility to allow for improvement in IADL's.         Start:  04/11/25    Expected End:  05/09/25            3. Pt to report compliance with HEP and demonstrate proper exercise technique to PT to show competence with self management of condition.         Start:  04/11/25    Expected End:  05/09/25               2. Long term goals       Increase ROM to allow improved joint biomechanics during functional activities.          Start:  04/11/25    Expected End:  07/04/25            2. Independent with home exercise program.         Start:  04/11/25    Expected End:  07/04/25            3. Full return to functional activities with manageable complaints.         Start:  04/11/25    Expected End:  07/04/25                Jaxon Simmons, PT

## 2025-04-17 ENCOUNTER — OFFICE VISIT (OUTPATIENT)
Dept: CARDIOLOGY | Facility: CLINIC | Age: 75
End: 2025-04-17
Payer: MEDICARE

## 2025-04-17 VITALS
DIASTOLIC BLOOD PRESSURE: 92 MMHG | RESPIRATION RATE: 15 BRPM | OXYGEN SATURATION: 99 % | WEIGHT: 189.38 LBS | HEIGHT: 64 IN | SYSTOLIC BLOOD PRESSURE: 166 MMHG | HEART RATE: 72 BPM | BODY MASS INDEX: 32.33 KG/M2

## 2025-04-17 DIAGNOSIS — R07.9 CHEST PAIN, UNSPECIFIED TYPE: ICD-10-CM

## 2025-04-17 DIAGNOSIS — I10 BENIGN HYPERTENSION: ICD-10-CM

## 2025-04-17 DIAGNOSIS — Z01.810 PREOP CARDIOVASCULAR EXAM: ICD-10-CM

## 2025-04-17 DIAGNOSIS — E78.5 HYPERLIPIDEMIA, UNSPECIFIED HYPERLIPIDEMIA TYPE: Primary | ICD-10-CM

## 2025-04-17 DIAGNOSIS — I70.0 ABDOMINAL AORTIC ATHEROSCLEROSIS: ICD-10-CM

## 2025-04-17 LAB
OHS QRS DURATION: 80 MS
OHS QTC CALCULATION: 408 MS

## 2025-04-17 PROCEDURE — 99999 PR PBB SHADOW E&M-EST. PATIENT-LVL V: CPT | Mod: PBBFAC,HCNC,, | Performed by: INTERNAL MEDICINE

## 2025-04-17 PROCEDURE — 93000 ELECTROCARDIOGRAM COMPLETE: CPT | Mod: HCNC,S$GLB,, | Performed by: INTERNAL MEDICINE

## 2025-04-17 RX ORDER — METOPROLOL SUCCINATE 100 MG/1
100 TABLET, EXTENDED RELEASE ORAL DAILY
Qty: 90 TABLET | Refills: 3 | Status: SHIPPED | OUTPATIENT
Start: 2025-04-17

## 2025-04-17 NOTE — PROGRESS NOTES
CARDIOVASCULAR CONSULTATION    REASON FOR CONSULT:   Valarie Foster is a 74 y.o. female who presents for evaluation of chest pressure     HISTORY OF PRESENT ILLNESS:      Patient is a pleasant 72-year-old female.  Here for evaluation of chest pressure.  Substernal.  On exertion.  Denies orthopnea, PND, swelling of feet.  Multiple risk factors for coronary artery disease    The estimated ejection fraction is 55%.  The left ventricle is normal in size with concentric hypertrophy and normal systolic function.  Grade I left ventricular diastolic dysfunction.  Normal right ventricular size with normal right ventricular systolic function.  Moderate left atrial enlargement.  Mild mitral regurgitation.  Mild right atrial enlargement.  Normal central venous pressure (3 mmHg).  The estimated PA systolic pressure is 27 mmHg.     11/6:  no more chest pains. Went away on their own    Nov 23:    Normal myocardial perfusion scan. There is no evidence of myocardial ischemia or infarction.    There is a mild intensity perfusion abnormality in the anterior wall of the left ventricle, secondary to breast attenuation.    The gated perfusion images showed an ejection fraction of 59% post stress.    The ECG portion of the study is negative for ischemia.    The patient reported no chest pain during the stress test.    There were no arrhythmias during stress.    NOVEMBER 24:    History of Present Illness    CHIEF COMPLAINT:  Valarie presents today with heartburn and chest pain.    CARDIOVASCULAR:  She reports experiencing heartburn for approximately 4 days with associated chest pain. The chest pain started two days ago and is described as intermittent, coming and going without a clear pattern. She denies exacerbation of the pain with physical activity, such as walking or climbing stairs. No specific triggers or alleviating factors have been identified. Home blood pressure readings show yesterday's measurement at 150/70 and today's at 180.  She checks her blood pressure once in the morning and confirms taking her blood pressure medication this morning.    MEDICATIONS:  Current medications include Amlodipine 10 mg daily, Atorvastatin, and Metoprolol.       EKG DONE TODAY PERSONALLY REVIEWED AND SHOWS SINUS RHYTHM WITH HEART RATE OF 64 BEATS PER MINUTE, MILD LVH.      Jan 25:    History of Present Illness    CHIEF COMPLAINT:  Valarie presents today for knee pain and blood pressure management.    HYPERTENSION:  She reports severely elevated blood pressure reaching almost 200 on Friday. She maintains a blood pressure diary at home. She continues amlodipine 10 mg, aspirin, and atorvastatin.    KNEE PAIN:  She experiences intermittent sharp shooting knee pain lasting for a few seconds. The pain occurs randomly and is not associated with physical exertion or ambulation.    WEIGHT MANAGEMENT:  She reports difficulty with abdominal weight loss despite exercise.         Apr 25:    History of Present Illness    CHIEF COMPLAINT:  Valarie presents today for follow up    HYPERTENSION:  She reports elevated BP at home with most recent reading of 166/92.    MEDICAL HISTORY:  She has diabetes and HLD. She acknowledges that her diet choices may contribute to elevated cholesterol.    SURGICAL HISTORY:  She has undergone bilateral knee replacement surgery and reports significant post-op knee pain.    ALLERGIES:  She has a history of adverse reaction to lisinopril manifesting as facial swelling, mouth swelling, and dyspnea.         PAST MEDICAL HISTORY:     Past Medical History:   Diagnosis Date    Arthritis     Colon polyp     Diabetes mellitus     diet controlled    Diabetes with neurologic complications     Hypertension     Nuclear sclerosis of both eyes 02/14/2022    Renal cell carcinoma     Sleep apnea        PAST SURGICAL HISTORY:     Past Surgical History:   Procedure Laterality Date    ARTHROPLASTY, KNEE, TOTAL, USING COMPUTER-ASSISTED NAVIGATION Left 3/5/2025     Procedure: ARTHROPLASTY, KNEE, TOTAL, USING COMPUTER-ASSISTED NAVIGATION;  Surgeon: Eric Carbajal MD;  Location: [facility] OR;  Service: Orthopedics;  Laterality: Left;  [place]---CLEARED BY CARDS AND PCP  FIRST HERMES SALAZAR NOTIFIED-GG  RN PREOP 2/19/2025   T/S ON 2/28/2025---NEED ORDERS    COLONOSCOPY N/A 2/5/2018    Procedure: COLONOSCOPY;  Surgeon: Bacilio Mancia MD;  Location: [facility] ENDO;  Service: Endoscopy;  Laterality: N/A;  appt confirmed-ss    COLONOSCOPY N/A 8/13/2020    Procedure: COLONOSCOPY;  Surgeon: Jose West MD;  Location: [facility] ENDO;  Service: Endoscopy;  Laterality: N/A;    ENDOSCOPIC CARPAL TUNNEL RELEASE Right 3/15/2024    Procedure: RELEASE, CARPAL TUNNEL, ENDOSCOPIC - RIGHT;  Surgeon: Tonio Mcdermott MD;  Location: [facility] OR;  Service: Orthopedics;  Laterality: Right;    ENDOSCOPIC CARPAL TUNNEL RELEASE Left 6/26/2024    Procedure: RELEASE, CARPAL TUNNEL, ENDOSCOPIC - left;  Surgeon: Tonio Mcdermott MD;  Location: [facility] OR;  Service: Orthopedics;  Laterality: Left;    HYSTERECTOMY      INTRAOCULAR PROSTHESES INSERTION Left 7/7/2022    Procedure: INSERTION, IOL PROSTHESIS;  Surgeon: Candelario Novoa MD;  Location: [facility] OR;  Service: Ophthalmology;  Laterality: Left;    INTRAOCULAR PROSTHESES INSERTION Right 7/21/2022    Procedure: INSERTION, IOL PROSTHESIS;  Surgeon: Candelario Novoa MD;  Location: [facility] OR;  Service: Ophthalmology;  Laterality: Right;    OOPHORECTOMY      PARTIAL NEPHRECTOMY Right 10/12/2018    Procedure: NEPHRECTOMY, PARTIAL open. Dr Arenas to assist.;  Surgeon: Alejandra Palm MD;  Location: [facility] OR;  Service: Urology;  Laterality: Right;  RN PREOP 10/9/2018----NEEDS H/P    PHACOEMULSIFICATION OF CATARACT Left 7/7/2022    Procedure: PHACOEMULSIFICATION, CATARACT;  Surgeon: Candelario Noova MD;  Location: [facility] OR;  Service: Ophthalmology;  Laterality: Left;  RN PHONE PREOP 6/27/2022   ARRIVAL 6:00 AM    PHACOEMULSIFICATION OF CATARACT  Right 7/21/2022    Procedure: PHACOEMULSIFICATION, CATARACT;  Surgeon: Candelario Novoa MD;  Location: [facility] OR;  Service: Ophthalmology;  Laterality: Right;  RN PHONE PREOP 7/12/2022   ARRIVAL 12:00 NOON    TOTAL KNEE ARTHROPLASTY Right 11/8/2023    Procedure: ARTHROPLASTY, KNEE, TOTAL. NELSON;  Surgeon: Eric Carbajal MD;  Location: [facility] OR;  Service: Orthopedics;  Laterality: Right;  [place]. Admit  BRADLEY QUINTIN NIEVES [phone] NOTIFIED QUINTIN ON 10/11/2023-LO  RN PREOP 10/25/2023   T/S ON 11/6/2023--done------CLEARED BY CARDS       ALLERGIES AND MEDICATION:     Review of patient's allergies indicates:   Allergen Reactions    Lisinopril Swelling and Shortness Of Breath    Ascorbic acid-ascorbate calc      And citrus fruits        Medication List            Accurate as of April 17, 2025 11:59 PM. If you have any questions, ask your nurse or doctor.                CHANGE how you take these medications      metoprolol succinate 100 MG 24 hr tablet  Commonly known as: TOPROL-XL  Take 1 tablet (100 mg total) by mouth once daily.  What changed:   medication strength  how much to take  Changed by: Mary Kate Dolan MD            CONTINUE taking these medications      ACCU-CHEK ONEIL CONTROL SOLN Soln  Generic drug: blood glucose control high,low  1 Units by Misc.(Non-Drug; Combo Route) route as needed.     ACCU-CHEK ONEIL PLUS TEST STRP Strp  Generic drug: blood sugar diagnostic  TEST BLOOD SUGAR TWICE DAILY     * ACCU-CHEK SOFTCLIX LANCETS Misc  Generic drug: lancets  USE TO TEST BLOOD SUGAR ONE TIME DAILY     * TRUEPLUS LANCETS 33 gauge Misc  Generic drug: lancets  TEST BLOOD SUGAR TWICE DAILY     acetaminophen 500 MG tablet  Commonly known as: TYLENOL  Take 2 tablets (1,000 mg total) by mouth every 8 (eight) hours as needed for Pain.     amLODIPine 10 MG tablet  Commonly known as: NORVASC  Take 1 tablet (10 mg total) by mouth once daily.     aspirin 81 MG EC tablet  Commonly known as: ECOTRIN  Take 1 tablet  (81 mg total) by mouth 2 (two) times a day.     atorvastatin 80 MG tablet  Commonly known as: LIPITOR  Take 1 tablet (80 mg total) by mouth every evening.     azelastine 137 mcg (0.1 %) nasal spray  Commonly known as: ASTELIN  1 spray (137 mcg total) by Nasal route 2 (two) times daily.     * blood-glucose meter kit  Commonly known as: TRUE METRIX GLUCOSE METER  Use as directed to test glucose     * blood-glucose meter Misc  Commonly known as: ACCU-CHEK ONEIL PLUS METER  1 kit by Misc.(Non-Drug; Combo Route) route once daily.     celecoxib 200 MG capsule  Commonly known as: CeleBREX  Take 1 capsule (200 mg total) by mouth 2 (two) times daily.     cetirizine 10 MG tablet  Commonly known as: ZYRTEC  Take 1 tablet (10 mg total) by mouth once daily.     docusate sodium 100 MG capsule  Commonly known as: COLACE  Take 1 capsule (100 mg total) by mouth 2 (two) times daily.     DROPSAFE ALCOHOL PREP PADS Padm  Generic drug: alcohol swabs  USE AS DIRECTED THREE TIMES DAILY     ergocalciferol 50,000 unit Cap  Commonly known as: ERGOCALCIFEROL  TAKE 1 CAPSULE EVERY 7 DAYS.     FLUoxetine 40 MG capsule  Take 1 capsule (40 mg total) by mouth once daily.     fluticasone propionate 50 mcg/actuation nasal spray  Commonly known as: FLONASE  USE 1 SPRAY IN EACH NOSTRIL ONCE DAILY     gabapentin 300 MG capsule  Commonly known as: NEURONTIN  Take 1 capsule (300 mg total) by mouth 3 (three) times daily.     hydrALAZINE 100 MG tablet  Commonly known as: APRESOLINE  Take 1 tablet (100 mg total) by mouth every 8 (eight) hours.     hydrOXYzine HCL 25 MG tablet  Commonly known as: ATARAX  TAKE 1 TABLET BY MOUTH THREE TIMES DAILY AS NEEDED FOR ITCHING     JARDIANCE 25 mg tablet  Generic drug: empagliflozin  TAKE 1 TABLET ONE TIME DAILY     methylPREDNISolone 4 mg tablet  Commonly known as: MEDROL DOSEPACK  use as directed     * ondansetron 8 MG Tbdl  Commonly known as: ZOFRAN-ODT  Take 1 tablet (8 mg total) by mouth 2 (two) times daily as  needed (nausea).     * ondansetron 8 MG Tbdl  Commonly known as: ZOFRAN-ODT  Dissolve 1 tablet (8 mg total) by mouth every 24 hours as needed.     oxyCODONE 5 MG immediate release tablet  Commonly known as: ROXICODONE  Take 1 to 2 tablets by mouth every 4 (four) hours as needed (pain).     pantoprazole 40 MG tablet  Commonly known as: PROTONIX  Take 1 tablet (40 mg total) by mouth once daily.     * semaglutide 1 mg/dose (2 mg/1.5 mL) Pnij  Commonly known as: OZEMPIC  Inject 1 mg into the skin every 7 days.     * OZEMPIC 0.25 mg or 0.5 mg (2 mg/3 mL) pen injector  Generic drug: semaglutide  Inject 0.25 mg into the skin every 7 days.     tiZANidine 4 MG tablet  Commonly known as: ZANAFLEX  Take 1 tablet (4 mg total) by mouth every 6 (six) hours as needed.     TRUE METRIX LEVEL 1 Soln  Generic drug: blood glucose control, low  1 each by Misc.(Non-Drug; Combo Route) route once as needed.           * This list has 8 medication(s) that are the same as other medications prescribed for you. Read the directions carefully, and ask your doctor or other care provider to review them with you.                   Where to Get Your Medications        These medications were sent to [facility]  [place] [address]  [address] [address]      Phone: [phone]   metoprolol succinate 100 MG 24 hr tablet         SOCIAL HISTORY:     Social History     Socioeconomic History    Marital status:    Tobacco Use    Smoking status: Never     Passive exposure: Past    Smokeless tobacco: Never   Substance and Sexual Activity    Alcohol use: No    Drug use: No    Sexual activity: Not Currently     Social Drivers of Health     Financial Resource Strain: Low Risk  (3/5/2025)    Overall Financial Resource Strain (CARDIA)     Difficulty of Paying Living Expenses: Not hard at all   Food Insecurity: No Food Insecurity (3/5/2025)    Hunger Vital Sign     Worried About Running Out of Food in the Last Year:  Never true     Ran Out of Food in the Last Year: Never true   Transportation Needs: No Transportation Needs (11/22/2024)    TRANSPORTATION NEEDS     Transportation : No   Physical Activity: Insufficiently Active (11/25/2024)    Exercise Vital Sign     Days of Exercise per Week: 4 days     Minutes of Exercise per Session: 20 min   Stress: No Stress Concern Present (3/5/2025)    [employer] [place] [employer] - Occupational Stress Questionnaire     Feeling of Stress : Only a little   Housing Stability: High Risk (3/5/2025)    Housing Stability Vital Sign     Unable to Pay for Housing in the Last Year: Yes     Homeless in the Last Year: No       FAMILY HISTORY:     Family History   Problem Relation Name Age of Onset    No Known Problems Mother      Glaucoma Father      Breast cancer Sister Warnell     Glaucoma Sister Warnell     Cancer Sister Warhaim         breast cancer    Thyroid disease Sister [place]     Blindness Sister Bryanna         due to trauma during car accident    Diabetes Sister Amanda     No Known Problems Maternal Aunt      No Known Problems Maternal Uncle      No Known Problems Paternal Aunt      No Known Problems Paternal Uncle      No Known Problems Maternal Grandmother      No Known Problems Maternal Grandfather      No Known Problems Paternal Grandmother      No Known Problems Paternal Grandfather      Diabetes Brother Navneet     Amblyopia Neg Hx      Cataracts Neg Hx      Hypertension Neg Hx      Macular degeneration Neg Hx      Retinal detachment Neg Hx      Strabismus Neg Hx      Stroke Neg Hx         REVIEW OF SYSTEMS:   Review of Systems   Constitutional: Negative.   HENT: Negative.     Eyes: Negative.    Respiratory: Negative.     Endocrine: Negative.    Hematologic/Lymphatic: Negative.    Skin: Negative.    Musculoskeletal: Negative.    Gastrointestinal: Negative.    Genitourinary: Negative.    Neurological: Negative.    Psychiatric/Behavioral: Negative.     Allergic/Immunologic:  "Negative.        A 10 point review of systems was performed and all the pertinent positives have been mentioned. Rest of review of systems was negative.        PHYSICAL EXAM:     Vitals:    04/17/25 0847   BP: (!) 166/92   Pulse: 72   Resp: 15    Body mass index is 32.51 kg/m².  Weight: 85.9 kg (189 lb 6 oz)   Height: 5' 4" (162.6 cm)     Physical Exam  Constitutional:       Appearance: Normal appearance. She is well-developed.   HENT:      Head: Normocephalic.   Eyes:      Pupils: Pupils are equal, round, and reactive to light.   Cardiovascular:      Rate and Rhythm: Normal rate and regular rhythm.   Pulmonary:      Effort: Pulmonary effort is normal.      Breath sounds: Normal breath sounds.   Abdominal:      General: Bowel sounds are normal.      Palpations: Abdomen is soft.      Tenderness: There is no abdominal tenderness.   Musculoskeletal:         General: Normal range of motion.      Cervical back: Normal range of motion and neck supple.   Skin:     General: Skin is warm.   Neurological:      Mental Status: She is alert and oriented to person, place, and time.           DATA:     Laboratory:  CBC:  Recent Labs   Lab 10/25/23  0900 10/28/24  0706 02/19/25  0930   WBC 5.18 5.76 6.24   Hemoglobin 13.0 13.5 14.1   Hematocrit 41.3 42.6 43.9   Platelets 231 252 280       CHEMISTRIES:  Recent Labs   Lab 10/28/24  0706 02/19/25  0930 05/09/25  1018   Glucose 138 H 106 113 H   Sodium 139 139 141   Potassium 4.0 4.6 4.5   BUN 13 12 13   Creatinine 0.8 0.9 0.8   Calcium 9.5 9.9 9.7       CARDIAC BIOMARKERS:  Recent Labs   Lab 01/11/23  2237 01/12/23  0421   Troponin I <0.006 <0.006       COAGS:  Recent Labs   Lab 01/11/23  1740 10/25/23  0900 02/19/25  0930   INR 1.1 1.1 1.0       LIPIDS/LFTS:  Recent Labs   Lab 10/31/22  0758 01/12/23  0957 10/25/23  0900 10/28/24  0706 02/19/25  0930 05/09/25  1018   Cholesterol 225 H  --  155 225 H  --   --    Triglycerides 77  --  78 103  --   --    HDL 74  --  57 60  --   --  "   LDL Cholesterol 135.6  --  82.4 144.4  --   --    Non-HDL Cholesterol 151  --  98 165  --   --    AST 14   < > 14 15 16 15   ALT 11   < > 13 12 17 13    < > = values in this interval not displayed.       Hemoglobin A1C   Date Value Ref Range Status   02/07/2025 7.2 (H) 4.0 - 5.6 % Final     Comment:     ADA Screening Guidelines:  5.7-6.4%  Consistent with prediabetes  >or=6.5%  Consistent with diabetes    High levels of fetal hemoglobin interfere with the HbA1C  assay. Heterozygous hemoglobin variants (HbS, HgC, etc)do  not significantly interfere with this assay.   However, presence of multiple variants may affect accuracy.     10/28/2024 7.5 (H) 4.0 - 5.6 % Final     Comment:     ADA Screening Guidelines:  5.7-6.4%  Consistent with prediabetes  >or=6.5%  Consistent with diabetes    High levels of fetal hemoglobin interfere with the HbA1C  assay. Heterozygous hemoglobin variants (HbS, HgC, etc)do  not significantly interfere with this assay.   However, presence of multiple variants may affect accuracy.     07/29/2024 8.2 (H) 4.0 - 5.6 % Final     Comment:     ADA Screening Guidelines:  5.7-6.4%  Consistent with prediabetes  >or=6.5%  Consistent with diabetes    High levels of fetal hemoglobin interfere with the HbA1C  assay. Heterozygous hemoglobin variants (HbS, HgC, etc)do  not significantly interfere with this assay.   However, presence of multiple variants may affect accuracy.       Hemoglobin A1c   Date Value Ref Range Status   05/09/2025 6.9 (H) 4.0 - 5.6 % Final     Comment:     ADA Screening Guidelines:  5.7-6.4%  Consistent with prediabetes  >=6.5%  Consistent with diabetes    High levels of fetal hemoglobin interfere with the HbA1C  assay. Heterozygous hemoglobin variants (HbS, HgC, etc)do  not significantly interfere with this assay.   However, presence of multiple variants may affect accuracy.       TSH  Recent Labs   Lab 10/31/22  0758 10/25/23  0900 10/28/24  0706   TSH 0.999 0.552 1.984       The  "10-year ASCVD risk score (Ayush JASON, et al., 2019) is: 48.6%    Values used to calculate the score:      Age: 74 years      Sex: Female      Is Non- : Yes      Diabetic: Yes      Tobacco smoker: No      Systolic Blood Pressure: 159 mmHg      Is BP treated: Yes      HDL Cholesterol: 60 mg/dL      Total Cholesterol: 225 mg/dL       BNP    No results found for: "BNP"          ASSESSMENT AND PLAN     Patient Active Problem List   Diagnosis    Type 2 diabetes mellitus with diabetic neuropathy, without long-term current use of insulin    Benign hypertension    Hyperlipemia    Microhematuria    Left flank pain    Abdominal aortic atherosclerosis    History of renal cell cancer    Renal cell carcinoma of right kidney    Mild major depression    Bilateral chronic knee pain    Colon cancer screening    Pain of left calf    Refractive error    Primary osteoarthritis of knees, bilateral    Fall    Radiculopathy    Idiopathic progressive neuropathy    Nerve pain    Chest pain    Severe obesity (BMI 35.0-39.9) with comorbidity    Decreased ROM of right knee    Decreased strength involving knee joint    Gait difficulty    Paresthesia    Pseudophakia    COVID-19    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    PLAN SUMMARY:  - Increased metoprolol to 100 mg daily  - Continued atorvastatin 80 mg daily  - Continued hydralazine  - Continued amlodipine  - Continued aspirin  - [place] advised to improve dietary habits  - Follow-up in 1 month to " reassess blood pressure    ESSENTIAL HYPERTENSION:  - Increased metoprolol from 25 mg to 100 mg daily.  - Continued amlodipine.  - Continued hydralazine.  - Follow up in 1 month to reassess blood pressure.    HYPERLIPIDEMIA:  - Continued atorvastatin 80 mg daily.    GENERAL MANAGEMENT:  - [place] to improve dietary habits, noting current poor food choices.  - Continued aspirin.  - Sending prescription to patient's [facility] pharmacy on [address].         Chest pain is likely not cardiac in origin based on characteristics and normal stress test, echo, and EKG results  Blood pressure is significantly elevated, requiring medication adjustment  Cleared patient for potential weight loss medication from cardiac perspective        Dyslipidemia  Continue Lipitor 80 mg daily    Lab Results   Component Value Date    LDLCALC 144.4 10/28/2024     Abdominal aortic atherosclerosis: On statin    Chest pain:  - st negative  - non cardiac cp        Thank you very much for involving me in the care of your patient.  Please do not hesitate to contact me if there are any questions.      Mary Kate Dolan MD, FACC, [facility]  Interventional Cardiologist, [facility].     Visit today included increased complexity associated with the care of the episodic problem chest pain, dyslipidemia, aortic atherosclerosis, hypertension addressed and managing the longitudinal care of the patient due to the serious and/or complex managed problem(s)   Patient Active Problem List   Diagnosis    Type 2 diabetes mellitus with diabetic neuropathy, without long-term current use of insulin    Benign hypertension    Hyperlipemia    Microhematuria    Left flank pain    Abdominal aortic atherosclerosis    History of renal cell cancer    Renal cell carcinoma of right kidney    Mild major depression    Bilateral chronic knee pain    Colon cancer screening    Pain of left calf    Refractive error    Primary osteoarthritis of knees, bilateral    Fall    Radiculopathy     Idiopathic progressive neuropathy    Nerve pain    Chest pain    Severe obesity (BMI 35.0-39.9) with comorbidity    Decreased ROM of right knee    Decreased strength involving knee joint    Gait difficulty    Paresthesia    Pseudophakia    COVID-19    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        This note was generated with the assistance of ambient listening technology. Verbal consent was obtained by the patient and accompanying visitor(s) for the recording of patient appointment to facilitate this note. I attest to having reviewed and edited the generated note for accuracy, though some syntax or spelling errors may persist. Please contact the author of this note for any clarification.      This note was dictated with the help of speech recognition software.  There might be un-intended errors and/or substitutions.

## 2025-04-23 ENCOUNTER — OFFICE VISIT (OUTPATIENT)
Dept: FAMILY MEDICINE | Facility: CLINIC | Age: 75
End: 2025-04-23
Payer: MEDICARE

## 2025-04-23 ENCOUNTER — OCHSNER VIRTUAL EMERGENCY DEPARTMENT (OUTPATIENT)
Facility: CLINIC | Age: 75
End: 2025-04-23
Payer: MEDICARE

## 2025-04-23 ENCOUNTER — PATIENT OUTREACH (OUTPATIENT)
Facility: OTHER | Age: 75
End: 2025-04-23
Payer: MEDICARE

## 2025-04-23 ENCOUNTER — APPOINTMENT (OUTPATIENT)
Dept: RADIOLOGY | Facility: HOSPITAL | Age: 75
End: 2025-04-23
Payer: MEDICARE

## 2025-04-23 ENCOUNTER — HOSPITAL ENCOUNTER (EMERGENCY)
Facility: HOSPITAL | Age: 75
Discharge: HOME | End: 2025-04-23
Attending: EMERGENCY MEDICINE
Payer: MEDICARE

## 2025-04-23 VITALS
HEART RATE: 60 BPM | DIASTOLIC BLOOD PRESSURE: 84 MMHG | SYSTOLIC BLOOD PRESSURE: 186 MMHG | HEIGHT: 64 IN | WEIGHT: 185 LBS | BODY MASS INDEX: 31.58 KG/M2 | RESPIRATION RATE: 20 BRPM | TEMPERATURE: 98 F | OXYGEN SATURATION: 98 %

## 2025-04-23 VITALS
TEMPERATURE: 98 F | OXYGEN SATURATION: 100 % | BODY MASS INDEX: 32.33 KG/M2 | SYSTOLIC BLOOD PRESSURE: 140 MMHG | WEIGHT: 189.38 LBS | DIASTOLIC BLOOD PRESSURE: 88 MMHG | HEIGHT: 64 IN | HEART RATE: 64 BPM

## 2025-04-23 DIAGNOSIS — R06.02 SOB (SHORTNESS OF BREATH): ICD-10-CM

## 2025-04-23 DIAGNOSIS — I10 BENIGN HYPERTENSION: Chronic | ICD-10-CM

## 2025-04-23 DIAGNOSIS — R10.9 ABDOMINAL PAIN, UNSPECIFIED ABDOMINAL LOCATION: ICD-10-CM

## 2025-04-23 DIAGNOSIS — M79.89 LEG SWELLING: ICD-10-CM

## 2025-04-23 DIAGNOSIS — M54.50 ACUTE BILATERAL LOW BACK PAIN WITHOUT SCIATICA: Primary | ICD-10-CM

## 2025-04-23 DIAGNOSIS — K21.9 GASTROESOPHAGEAL REFLUX DISEASE, UNSPECIFIED WHETHER ESOPHAGITIS PRESENT: ICD-10-CM

## 2025-04-23 DIAGNOSIS — R10.9 FLANK PAIN: ICD-10-CM

## 2025-04-23 DIAGNOSIS — M71.22 SYNOVIAL CYST OF LEFT POPLITEAL SPACE: ICD-10-CM

## 2025-04-23 DIAGNOSIS — J98.11 ATELECTASIS: Primary | ICD-10-CM

## 2025-04-23 DIAGNOSIS — F32.0 MILD MAJOR DEPRESSION: Chronic | ICD-10-CM

## 2025-04-23 LAB
ALBUMIN SERPL-MCNC: 4.1 G/DL (ref 3.3–5.5)
ALBUMIN SERPL-MCNC: 4.3 G/DL (ref 3.3–5.5)
ALP SERPL-CCNC: 89 U/L (ref 42–141)
ALP SERPL-CCNC: 95 U/L (ref 42–141)
BILIRUB SERPL-MCNC: 0.6 MG/DL (ref 0.2–1.6)
BILIRUB SERPL-MCNC: 0.7 MG/DL (ref 0.2–1.6)
BUN SERPL-MCNC: 11 MG/DL (ref 7–22)
CALCIUM SERPL-MCNC: 11.3 MG/DL (ref 8–10.3)
CHLORIDE SERPL-SCNC: 106 MMOL/L (ref 98–108)
CREAT SERPL-MCNC: 0.8 MG/DL (ref 0.6–1.2)
GLUCOSE SERPL-MCNC: 119 MG/DL (ref 73–118)
HCT, POC: NORMAL
HGB, POC: NORMAL (ref 14–18)
MCH, POC: NORMAL
MCHC, POC: NORMAL
MCV, POC: NORMAL
MPV, POC: NORMAL
OHS QRS DURATION: 80 MS
OHS QTC CALCULATION: 429 MS
POC ALT (SGPT): 14 U/L (ref 10–47)
POC ALT (SGPT): 15 U/L (ref 10–47)
POC AMYLASE: 57 U/L (ref 14–97)
POC AST (SGOT): 26 U/L (ref 11–38)
POC AST (SGOT): 27 U/L (ref 11–38)
POC B-TYPE NATRIURETIC PEPTIDE: 115 PG/ML (ref 0–100)
POC CARDIAC TROPONIN I: 0 NG/ML (ref 0–0.08)
POC D-DI: 911 NG/ML (ref 0–450)
POC GGT: 31 U/L (ref 5–65)
POC PLATELET COUNT: NORMAL
POC TCO2: 29 MMOL/L (ref 18–33)
POTASSIUM BLD-SCNC: 5.3 MMOL/L (ref 3.6–5.1)
PROTEIN, POC: 7.6 G/DL (ref 6.4–8.1)
PROTEIN, POC: 7.9 G/DL (ref 6.4–8.1)
RBC, POC: NORMAL
RDW, POC: NORMAL
SAMPLE: NORMAL
SODIUM BLD-SCNC: 143 MMOL/L (ref 128–145)
WBC, POC: NORMAL

## 2025-04-23 PROCEDURE — 3072F LOW RISK FOR RETINOPATHY: CPT | Mod: HCNC,CPTII,S$GLB,

## 2025-04-23 PROCEDURE — G2211 COMPLEX E/M VISIT ADD ON: HCPCS | Mod: HCNC,S$GLB,,

## 2025-04-23 PROCEDURE — 63600175 PHARM REV CODE 636 W HCPCS: Mod: HCNC,ER | Performed by: PHYSICIAN ASSISTANT

## 2025-04-23 PROCEDURE — 96374 THER/PROPH/DIAG INJ IV PUSH: CPT | Mod: HCNC,ER

## 2025-04-23 PROCEDURE — 25000003 PHARM REV CODE 250: Mod: HCNC,ER | Performed by: PHYSICIAN ASSISTANT

## 2025-04-23 PROCEDURE — 82150 ASSAY OF AMYLASE: CPT | Mod: HCNC,ER

## 2025-04-23 PROCEDURE — 1125F AMNT PAIN NOTED PAIN PRSNT: CPT | Mod: HCNC,CPTII,S$GLB,

## 2025-04-23 PROCEDURE — 85025 COMPLETE CBC W/AUTO DIFF WBC: CPT | Mod: HCNC,ER

## 2025-04-23 PROCEDURE — 99999 PR PBB SHADOW E&M-EST. PATIENT-LVL V: CPT | Mod: PBBFAC,HCNC,,

## 2025-04-23 PROCEDURE — 96375 TX/PRO/DX INJ NEW DRUG ADDON: CPT | Mod: HCNC,ER

## 2025-04-23 PROCEDURE — 83880 ASSAY OF NATRIURETIC PEPTIDE: CPT | Mod: HCNC,ER

## 2025-04-23 PROCEDURE — 1160F RVW MEDS BY RX/DR IN RCRD: CPT | Mod: HCNC,CPTII,S$GLB,

## 2025-04-23 PROCEDURE — 71045 X-RAY EXAM CHEST 1 VIEW: CPT | Mod: TC,HCNC,FY

## 2025-04-23 PROCEDURE — 80053 COMPREHEN METABOLIC PANEL: CPT | Mod: HCNC,ER

## 2025-04-23 PROCEDURE — 25500020 PHARM REV CODE 255: Mod: HCNC,ER | Performed by: PHYSICIAN ASSISTANT

## 2025-04-23 PROCEDURE — 99285 EMERGENCY DEPT VISIT HI MDM: CPT | Mod: 25,HCNC,ER

## 2025-04-23 PROCEDURE — 3288F FALL RISK ASSESSMENT DOCD: CPT | Mod: HCNC,CPTII,S$GLB,

## 2025-04-23 PROCEDURE — 3079F DIAST BP 80-89 MM HG: CPT | Mod: HCNC,CPTII,S$GLB,

## 2025-04-23 PROCEDURE — 1159F MED LIST DOCD IN RCRD: CPT | Mod: HCNC,CPTII,S$GLB,

## 2025-04-23 PROCEDURE — 93010 ELECTROCARDIOGRAM REPORT: CPT | Mod: HCNC,,, | Performed by: INTERNAL MEDICINE

## 2025-04-23 PROCEDURE — 84484 ASSAY OF TROPONIN QUANT: CPT | Mod: HCNC,ER

## 2025-04-23 PROCEDURE — 99214 OFFICE O/P EST MOD 30 MIN: CPT | Mod: HCNC,S$GLB,,

## 2025-04-23 PROCEDURE — 1101F PT FALLS ASSESS-DOCD LE1/YR: CPT | Mod: HCNC,CPTII,S$GLB,

## 2025-04-23 PROCEDURE — 71045 X-RAY EXAM CHEST 1 VIEW: CPT | Mod: 26,HCNC,, | Performed by: RADIOLOGY

## 2025-04-23 PROCEDURE — 82040 ASSAY OF SERUM ALBUMIN: CPT | Mod: 59,HCNC,ER

## 2025-04-23 PROCEDURE — 3077F SYST BP >= 140 MM HG: CPT | Mod: HCNC,CPTII,S$GLB,

## 2025-04-23 PROCEDURE — 93005 ELECTROCARDIOGRAM TRACING: CPT | Mod: HCNC,ER

## 2025-04-23 RX ORDER — LIDOCAINE 50 MG/G
1 PATCH TOPICAL DAILY
Qty: 15 PATCH | Refills: 0 | Status: SHIPPED | OUTPATIENT
Start: 2025-04-23 | End: 2025-05-08

## 2025-04-23 RX ORDER — TRAMADOL HYDROCHLORIDE 50 MG/1
50 TABLET ORAL
Status: COMPLETED | OUTPATIENT
Start: 2025-04-23 | End: 2025-04-23

## 2025-04-23 RX ORDER — TRAMADOL HYDROCHLORIDE 50 MG/1
50 TABLET ORAL EVERY 8 HOURS PRN
Qty: 9 TABLET | Refills: 0 | Status: SHIPPED | OUTPATIENT
Start: 2025-04-23 | End: 2025-04-26

## 2025-04-23 RX ORDER — ALBUTEROL SULFATE 90 UG/1
1-2 INHALANT RESPIRATORY (INHALATION) EVERY 6 HOURS PRN
Qty: 18 G | Refills: 0 | Status: SHIPPED | OUTPATIENT
Start: 2025-04-23 | End: 2025-05-23

## 2025-04-23 RX ORDER — KETOROLAC TROMETHAMINE 30 MG/ML
30 INJECTION, SOLUTION INTRAMUSCULAR; INTRAVENOUS
Status: COMPLETED | OUTPATIENT
Start: 2025-04-23 | End: 2025-04-23

## 2025-04-23 RX ORDER — ONDANSETRON 4 MG/1
4 TABLET, ORALLY DISINTEGRATING ORAL EVERY 6 HOURS PRN
Qty: 20 TABLET | Refills: 0 | Status: SHIPPED | OUTPATIENT
Start: 2025-04-23

## 2025-04-23 RX ORDER — FAMOTIDINE 10 MG/ML
20 INJECTION, SOLUTION INTRAVENOUS
Status: COMPLETED | OUTPATIENT
Start: 2025-04-23 | End: 2025-04-23

## 2025-04-23 RX ORDER — ONDANSETRON HYDROCHLORIDE 2 MG/ML
4 INJECTION, SOLUTION INTRAVENOUS
Status: COMPLETED | OUTPATIENT
Start: 2025-04-23 | End: 2025-04-23

## 2025-04-23 RX ORDER — AMLODIPINE BESYLATE 10 MG/1
10 TABLET ORAL DAILY
Qty: 90 TABLET | Refills: 1 | Status: SHIPPED | OUTPATIENT
Start: 2025-04-23

## 2025-04-23 RX ORDER — PANTOPRAZOLE SODIUM 40 MG/1
40 TABLET, DELAYED RELEASE ORAL DAILY
Qty: 90 TABLET | Refills: 1 | Status: SHIPPED | OUTPATIENT
Start: 2025-04-23

## 2025-04-23 RX ORDER — FLUOXETINE HYDROCHLORIDE 40 MG/1
40 CAPSULE ORAL DAILY
Qty: 90 CAPSULE | Refills: 1 | Status: SHIPPED | OUTPATIENT
Start: 2025-04-23

## 2025-04-23 RX ADMIN — FAMOTIDINE 20 MG: 10 INJECTION, SOLUTION INTRAVENOUS at 01:04

## 2025-04-23 RX ADMIN — ONDANSETRON 4 MG: 2 INJECTION INTRAMUSCULAR; INTRAVENOUS at 01:04

## 2025-04-23 RX ADMIN — IOHEXOL 100 ML: 350 INJECTION, SOLUTION INTRAVENOUS at 02:04

## 2025-04-23 RX ADMIN — TRAMADOL HYDROCHLORIDE 50 MG: 50 TABLET, COATED ORAL at 03:04

## 2025-04-23 NOTE — DISCHARGE INSTRUCTIONS

## 2025-04-23 NOTE — PLAN OF CARE-OVED
[facility] Emergency Department Plan of Care Note  Referral Source: Primary Care Provider                               Chief Complaint   Patient presents with    Back Pain    Abdominal Pain       Recommendation: Emergency Department            Emergency Department: [place]               74-year-old female with history of hypertension seen in clinic for acute onset lower back pain as radiating to her upper abdomen that started this morning, severe with no clear precipitant.  No other associated symptoms or history of similar previous episodes.  Per chart review patient had outpatient abdominal CT last year that showed no evidence for gallstones or abdominal aortic dilation, but given acute onset of severe pain may need repeat imaging and workup.  I agree with ER evaluation, patient will go to closest ER at [place] for initial workup

## 2025-04-23 NOTE — ED PROVIDER NOTES
Encounter Date: 4/23/2025       History     Chief Complaint   Patient presents with    Abdominal Pain     Patient presents w/ a c/o upper abdominal pain radiating to her back onset yesterday. Reports nausea. Denies any acute injury or trauma. Denies any fever. Referred to the ED for further evaluation. Given toradol IM w/ some relief.      Chief complaint:  Flank pain    HPI:   74-year-old female history of hypertension, diabetes, renal cell carcinoma status post partial right nephrectomy, GERALD, arthritis presenting for evaluation of 1 day history of sharp, 10/10 intermittent bilateral flank pain radiating to the epigastrium that is worse with deep inspiration.  She reports associated shortness of breath. Has had intermittently dizziness and lightheadedness that feels similar to history of hypoglycemia. Compliant with jardiance and ozempic. Glucose was elevated today. Developed nausea today.  She went to Dr. Kaye her PCP today for evaluation who gave her toradol and instructed her to come to ED for evaluation.  Patient denies abdominal pain, history of GERD gastritis or peptic ulcer disease, melena, hematochezia, dysuria hematuria urgency or frequency,vomiting, diarrhea, constipation. Denies similar episodes.     Of note, pt is s/p L knee replacement on 3/5/25. Did have 3 day hospitalization following surgery. Is now ambulatory with a cane after undergoing PT. She has history of cancer.  Denies history of DVT or PE, recent travel trauma hormone therapy hemoptysis.  Denies dizziness, lightheadedness. Reports she developed some swelling to her left lower extremity and left foot that she noticed today.     Per chart review CT of abdomen pelvis in 10/2024 with no aortic abnormalities or evidence of aneurysm. Denies weakness, paresthesias, bowel or bladder incontinence saddle anesthesia     Denies fever, chills, nasal congestion, rhinorrhea, ear pain, sore throat, cough    PShx of hysterectomy and oophorectomy    The  history is provided by the patient.     Review of patient's allergies indicates:   Allergen Reactions    Lisinopril Swelling and Shortness Of Breath    Ascorbic acid-ascorbate calc      And citrus fruits     Past Medical History:   Diagnosis Date    Arthritis     Colon polyp     Diabetes mellitus     diet controlled    Diabetes with neurologic complications     Hypertension     Nuclear sclerosis of both eyes 02/14/2022    Renal cell carcinoma     Sleep apnea      Past Surgical History:   Procedure Laterality Date    ARTHROPLASTY, KNEE, TOTAL, USING COMPUTER-ASSISTED NAVIGATION Left 3/5/2025    Procedure: ARTHROPLASTY, KNEE, TOTAL, USING COMPUTER-ASSISTED NAVIGATION;  Surgeon: Eric Carbajal MD;  Location: [facility] OR;  Service: Orthopedics;  Laterality: Left;  [place]---CLEARED BY CARDS AND PCP  FIRST MARTIN ASSIST NOTIFIED-GG  RN PREOP 2/19/2025   T/S ON 2/28/2025---NEED ORDERS    COLONOSCOPY N/A 2/5/2018    Procedure: COLONOSCOPY;  Surgeon: Bacilio Mancia MD;  Location: [facility] ENDO;  Service: Endoscopy;  Laterality: N/A;  appt confirmed-ss    COLONOSCOPY N/A 8/13/2020    Procedure: COLONOSCOPY;  Surgeon: Jose West MD;  Location: [facility] ENDO;  Service: Endoscopy;  Laterality: N/A;    ENDOSCOPIC CARPAL TUNNEL RELEASE Right 3/15/2024    Procedure: RELEASE, CARPAL TUNNEL, ENDOSCOPIC - RIGHT;  Surgeon: Tonio Mcdermott MD;  Location: [facility] OR;  Service: Orthopedics;  Laterality: Right;    ENDOSCOPIC CARPAL TUNNEL RELEASE Left 6/26/2024    Procedure: RELEASE, CARPAL TUNNEL, ENDOSCOPIC - left;  Surgeon: Tonio Mcdermott MD;  Location: [facility] OR;  Service: Orthopedics;  Laterality: Left;    HYSTERECTOMY      INTRAOCULAR PROSTHESES INSERTION Left 7/7/2022    Procedure: INSERTION, IOL PROSTHESIS;  Surgeon: Candelario Novoa MD;  Location: [facility] OR;  Service: Ophthalmology;  Laterality: Left;    INTRAOCULAR PROSTHESES INSERTION Right 7/21/2022    Procedure: INSERTION, IOL PROSTHESIS;  Surgeon: Candelario GUTIERREZ  MD Sommer;  Location: [facility] OR;  Service: Ophthalmology;  Laterality: Right;    OOPHORECTOMY      PARTIAL NEPHRECTOMY Right 10/12/2018    Procedure: NEPHRECTOMY, PARTIAL open. Dr Arenas to assist.;  Surgeon: Alejandra Palm MD;  Location: [facility] OR;  Service: Urology;  Laterality: Right;  RN PREOP 10/9/2018----NEEDS H/P    PHACOEMULSIFICATION OF CATARACT Left 7/7/2022    Procedure: PHACOEMULSIFICATION, CATARACT;  Surgeon: Candelario Novoa MD;  Location: [facility] OR;  Service: Ophthalmology;  Laterality: Left;  RN PHONE PREOP 6/27/2022   ARRIVAL 6:00 AM    PHACOEMULSIFICATION OF CATARACT Right 7/21/2022    Procedure: PHACOEMULSIFICATION, CATARACT;  Surgeon: Candelario Novoa MD;  Location: [facility] OR;  Service: Ophthalmology;  Laterality: Right;  RN PHONE PREOP 7/12/2022   ARRIVAL 12:00 NOON    TOTAL KNEE ARTHROPLASTY Right 11/8/2023    Procedure: ARTHROPLASTY, KNEE, TOTAL. NELSON;  Surgeon: Eric Carbajal MD;  Location: [facility] OR;  Service: Orthopedics;  Laterality: Right;  [place]. Admit  BRADLEY QUINTIN LIZBETH [phone] NOTIFIED QUINTIN ON 10/11/2023-LO  RN PREOP 10/25/2023   T/S ON 11/6/2023--done------CLEARED BY CARDS     Family History   Problem Relation Name Age of Onset    No Known Problems Mother      Glaucoma Father      Breast cancer Sister Warnell     Glaucoma Sister Warnell     Cancer Sister Warnell         breast cancer    Thyroid disease Sister Angeles     Blindness Sister Bryanna         due to trauma during car accident    Diabetes Sister Amanda     No Known Problems Maternal Aunt      No Known Problems Maternal Uncle      No Known Problems Paternal Aunt      No Known Problems Paternal Uncle      No Known Problems Maternal Grandmother      No Known Problems Maternal Grandfather      No Known Problems Paternal Grandmother      No Known Problems Paternal Grandfather      Diabetes Brother Navneet     Amblyopia Neg Hx      Cataracts Neg Hx      Hypertension Neg Hx      Macular degeneration  Neg Hx      Retinal detachment Neg Hx      Strabismus Neg Hx      Stroke Neg Hx       Social History[1]  Review of Systems   Constitutional:  Negative for chills and fever.   HENT:  Negative for congestion, ear pain, nosebleeds, rhinorrhea, sore throat and trouble swallowing.    Eyes:  Negative for redness.   Respiratory:  Positive for shortness of breath. Negative for cough and stridor.    Cardiovascular:  Positive for leg swelling. Negative for chest pain and palpitations.   Gastrointestinal:  Positive for abdominal pain and nausea. Negative for constipation, diarrhea and vomiting.   Genitourinary:  Positive for flank pain. Negative for decreased urine volume, dysuria, frequency, hematuria and urgency.   Musculoskeletal:  Positive for arthralgias and back pain. Negative for neck pain.   Skin:  Negative for rash and wound.   Neurological:  Positive for dizziness and light-headedness. Negative for speech difficulty, weakness, numbness and headaches.   Psychiatric/Behavioral:  Negative for confusion.        Physical Exam     Initial Vitals [04/23/25 1154]   BP Pulse Resp Temp SpO2   (!) 186/84 60 20 97.6 °F (36.4 °C) 98 %      MAP       --         Physical Exam    Nursing note and vitals reviewed.  Constitutional: She appears well-developed and well-nourished. No distress.   HENT:   Head: Normocephalic.   Right Ear: External ear normal.   Left Ear: External ear normal.   Nose: Nose normal. Mouth/Throat: Oropharynx is clear and moist.   Eyes: Conjunctivae are normal. Right eye exhibits no discharge. Left eye exhibits no discharge. No scleral icterus.   Neck: No tracheal deviation present.   Cardiovascular:  Normal rate and regular rhythm.     Exam reveals no gallop and no friction rub.       No murmur heard.  Pulses:       Radial pulses are 2+ on the right side and 2+ on the left side.        Posterior tibial pulses are 2+ on the right side and 2+ on the left side.   Pulmonary/Chest: Effort normal and breath sounds  normal. No stridor. No respiratory distress. She has no wheezes. She has no rhonchi. She has no rales.   Abdominal: Abdomen is soft. Bowel sounds are normal. She exhibits no distension. There is no abdominal tenderness. There is no rebound, no guarding, no tenderness at McBurney's point and negative Osorio's sign.   Musculoskeletal:         General: Normal range of motion.      Comments: Healing scar to L knee  Ambulatory with cane   TTP over the L popliteal region  Mild swelling to L leg normal strength to bilateral lower extremities   TTP over the L flank and thoracic paraspinal musculature. Pain reproduced with movement          Lymphadenopathy:     She has no cervical adenopathy.   Neurological: She is alert. She has normal strength. No cranial nerve deficit or sensory deficit.   Skin: Skin is warm and dry. No rash noted. No erythema.   Psychiatric: She has a normal mood and affect. Her behavior is normal. Judgment and thought content normal.         ED Course   Procedures  Labs Reviewed   POCT CMP - Abnormal       Result Value    Albumin, POC 4.1      Alkaline Phosphatase, POC 95      ALT (SGPT), POC 15      AST (SGOT), POC 26      POC BUN 11      Calcium, POC 11.3 (*)     POC Chloride 106      POC Creatinine 0.8      POC Glucose 119 (*)     POC Potassium 5.3 (*)     POC Sodium 143      Bilirubin, POC 0.6      POC TCO2 29      Protein, POC 7.9     POCT B-TYPE NATRIURETIC PEPTIDE (BNP) - Abnormal    POC B-Type Natriuretic Peptide 115 (*)    POCT D DIMER - Abnormal    POC D- (*)    TROPONIN ISTAT    POC Cardiac Troponin I 0.00      Sample unknown     POCT CBC    Hematocrit        Hemoglobin        RBC        WBC        MCV        MCH, POC        MCHC        RDW-CV        Platelet Count, POC        MPV       POCT CMP   POCT TROPONIN   POCT B-TYPE NATRIURETIC PEPTIDE (BNP)   POCT D DIMER   POCT LIVER PANEL   POCT LIVER PANEL    Albumin, POC 4.3      Alkaline Phosphatase, POC 89      ALT (SGPT), POC 14       Amylase, POC 57      AST (SGOT), POC 27      POC GGT 31      Bilirubin, POC 0.7      Protein, POC 7.6       EKG Readings: (Independently Interpreted)   sinus rhythm with sinus arrhythmia with ventricular rate of 63. . QRS 80 Qtc 429     ECG Results              EKG 12-lead (Final result)        Collection Time Result Time QRS Duration OHS QTC Calculation    04/23/25 12:48:03 04/23/25 12:55:02 80 429                     Final result by Interface, Lab In [facility] (04/23/25 12:55:07)                   Narrative:    Test Reason : R06.02,    Vent. Rate :  63 BPM     Atrial Rate :  63 BPM     P-R Int : 144 ms          QRS Dur :  80 ms      QT Int : 420 ms       P-R-T Axes :  13 -16  43 degrees    QTcB Int : 429 ms    Sinus rhythm with marked sinus arrythmia  Cannot rule out Anterior infarct ,age undetermined  Abnormal ECG  No previous ECGs available  Confirmed by Dk Mansfield ([id number]) on 4/23/2025 12:55:00 PM    Referred By:            Confirmed By: Dk Mansfield                                  Imaging Results              CTA Chest Non-Coronary (PE Studies) (Final result)  Result time 04/23/25 14:53:18      Final result by Romario Quezada MD (04/23/25 14:53:18)                   Impression:      No evidence of pulmonary embolism.      Electronically signed by: Romario Quezada MD  Date:    04/23/2025  Time:    14:53               Narrative:    EXAMINATION:  CTA CHEST NON CORONARY (PE STUDIES)    CLINICAL HISTORY:  Pulmonary embolism (PE) suspected, positive D-dimer;    TECHNIQUE:  Low dose axial images, sagittal and coronal reformations were obtained from the thoracic inlet to the lung bases following the IV administration of 100 mL of Omnipaque 350.  Contrast timing was optimized to evaluate the pulmonary arteries.  MIP images were performed.    COMPARISON:  None    FINDINGS:  Pulmonary Arteries: This study is adequate for the evaluation of pulmonary thromboembolism.  No evidence of  pulmonary embolism to the level of the subsegmental arteries bilaterally.    Soft tissues of the neck:  Visualized portion of the thyroid is normal in appearance.    Thoracic aorta:  Normal in caliber and contour.  No evidence of dissection.    Heart: No cardiomegaly.  No pericardial effusion.    Lymph nodes:  No evidence of mediastinal, hilar, or axillary lymphadenopathy.    Trachea and bronchi: Patent.    Lungs:  Lungs are clear.  Linear atelectasis of the lower lobes bilaterally.  No focal consolidation.  No pleural effusion.  No pneumothorax.    Limited evaluation of the upper abdomen:  Small hiatal hernia.  Parenchymal defect of the right kidney.    Osseous structures:  No evidence of fractures or suspicious osseous lesions.                                       US Lower Extremity Veins Left (Final result)  Result time 04/23/25 13:40:07      Final result by Martínez Navas MD (04/23/25 13:40:07)                   Impression:      No evidence of deep venous thrombosis in the left lower extremity.    4.4 cm left popliteal fossa cyst.      Electronically signed by: Martínez Navas  Date:    04/23/2025  Time:    13:40               Narrative:    EXAMINATION:  US LOWER EXTREMITY VEINS LEFT    CLINICAL HISTORY:  Other specified soft tissue disorders    TECHNIQUE:  Duplex and color flow Doppler evaluation and graded compression of the left lower extremity veins was performed.    COMPARISON:  None    FINDINGS:  Left thigh veins: The common femoral, femoral, popliteal, upper greater saphenous, and deep femoral veins are patent and free of thrombus. The veins are normally compressible and have normal phasic flow and augmentation response.    Left calf veins: The visualized calf veins are patent.    Contralateral CFV: The contralateral (right) common femoral vein is patent and free of thrombus.    Miscellaneous: There is a 4.4 x 3.4 x 0.6 cm cystic lesion in the left popliteal fossa.                                        X-Ray Chest AP Portable (Final result)  Result time 04/23/25 12:51:16      Final result by Iglesia Vela MD (04/23/25 12:51:16)                   Impression:      As above.      Electronically signed by: Iglesia Vela MD  Date:    04/23/2025  Time:    12:51               Narrative:    EXAMINATION:  XR CHEST AP PORTABLE    CLINICAL HISTORY:  Shortness of breath    TECHNIQUE:  Single frontal view of the chest was performed.    FINDINGS:  The lungs are clear.  There is no pneumothorax or pleural fluid.  The cardiac silhouette is not enlarged.  The osseous structures demonstrate degenerative change.                                       Medications   ondansetron injection 4 mg (4 mg Intravenous Given 4/23/25 1341)   famotidine (PF) injection 20 mg (20 mg Intravenous Given 4/23/25 1342)   iohexoL (OMNIPAQUE 350) injection 100 mL (100 mLs Intravenous Given 4/23/25 1409)   traMADoL tablet 50 mg (50 mg Oral Given 4/23/25 1540)     Medical Decision Making  74-year-old female history of hypertension, diabetes, renal cell carcinoma status post partial right nephrectomy, and radiating with the epigastrium with deep inspiration.  She reports associated shortness of breath.   Exam above.  Abdomen soft nontender.  Heart lung exam unremarkable.  Flank pain and epigastric pain reproduced with inspiration.  She is PERC positive due to age.  Also did have recent surgery and 3 day hospitalization postop.  Does have history of cancer.  D-dimer elevated.  Ultrasound negative for DVT.  Remarkable for 4.4 cm left popliteal fossa cyst.  CTA negative for PE pneumonia pneumothorax.  Does show linear atelectasis of the lower lobes bilaterally.  May be contributing to the patient's symptoms.  Instead of spirometry ordered.  Chest x-ray with no evidence of mediastinal widening.  Considered but low suspicion for aortic dissection.  2+ pulses bilaterally.  No sensory deficits.  Neurovascularly intact.    CBC with a white count or significant  anemia.  CMP with mild hyperkalemia at 5.3 and hypercalcemia at 11.3.  EKG with no malignant arrhythmia or acute ischemia. No peaked t waves or renal insufficiency.  Troponin normal.  Considered low suspicion for ACS.  BNP mildly elevated at 115.  No evidence of volume overload or CHF exacerbation at this time.  Patient given IV Zofran Pepcid unsure Bentyl p.o. in the ED.  She is feeling better.  Will have her follow up with primary care in 1-2 days.  Ortho referral also placed as well as pulm  Return ER for worsening or as needed.  Discussed with Dr. Shaffer who agrees with assessment and plan.     Amount and/or Complexity of Data Reviewed  Labs: ordered.  Radiology: ordered.    Risk  Prescription drug management.                                      Clinical Impression:  Final diagnoses:  [R06.02] SOB (shortness of breath)  [M79.89] Leg swelling  [J98.11] Atelectasis (Primary)  [R10.9] Flank pain  [M71.22] Synovial cyst of left popliteal space          ED Disposition Condition    Discharge Stable          ED Prescriptions       Medication Sig Dispense Start Date End Date Auth. Provider    albuterol (PROVENTIL/VENTOLIN HFA) 90 mcg/actuation inhaler Inhale 1-2 puffs into the lungs every 6 (six) hours as needed for Wheezing or Shortness of Breath. Rescue 18 g 4/23/2025 5/23/2025 Nohelia Dillon PA-C    ondansetron (ZOFRAN-ODT) 4 MG TbDL Take 1 tablet (4 mg total) by mouth every 6 (six) hours as needed (for nausea). 20 tablet 4/23/2025 -- Nohelia Dillon PA-C    LIDOcaine (LIDODERM) 5 % Place 1 patch onto the skin once daily. Remove & Discard patch within 12 hours or as directed by MD. May use 4% over the counter if not covered by insurance for 15 days 15 patch 4/23/2025 5/8/2025 Nohelia Dillon PA-C    traMADoL (ULTRAM) 50 mg tablet Take 1 tablet (50 mg total) by mouth every 8 (eight) hours as needed for Pain. 9 tablet 4/23/2025 4/26/2025 Nohelia Dillon PA-C          Follow-up Information        Follow up With Specialties Details Why Contact Info    Alena Hernandez MD Family Medicine, Wound Care Schedule an appointment as soon as possible for a visit in 2 days for follow up [address] [id number]  [phone]      [facility] ED Emergency Medicine Go to  As needed, If symptoms worsen [address] [address] [phone]    [facility] ORTHOPEDICS Orthopedics Schedule an appointment as soon as possible for a visit in 2 days for follow up [address] [facility] [place] [address] [phone]    [facility] PULMONARY MEDICINE Pulmonology Schedule an appointment as soon as possible for a visit in 2 days for follow up [address] [facility] [place] [address] [phone]               Nohelia Dillon PA-C  04/24/25 0807         [1]   Social History  Tobacco Use    Smoking status: Never     Passive exposure: Past    Smokeless tobacco: Never   Substance Use Topics    Alcohol use: No    Drug use: No        Nohelia Dillon PA-C  04/24/25 0843

## 2025-04-23 NOTE — PROGRESS NOTES
Patient seen by Leonard Kaye NP at primary care on 4/23/25 for c/o low back pain; [facility] consulted.     [facility] Provider Dr Yan Villagomez disposition recommendation patient to go to ED.    Follow up scheduled for 4/24/25 to assess for additional needs.

## 2025-04-23 NOTE — PROGRESS NOTES
HPI     Valarie Foster is a 74 y.o. female with multiple medical diagnoses as listed in the medical history and problem list that presents for back and abdominal pain. PCP Dr. Hernandez with last visit in this clinic on 1/17/25.     Chief Complaint   Patient presents with    Back Pain     HPI    CHIEF COMPLAINT:  Patient presents with severe lower back pain radiating to the upper abdomen, which started yesterday afternoon and worsened this morning.    HPI:  Patient reports severe lower back pain that began yesterday afternoon and worsened this morning, radiating from the lower back to the front abdominal area. The pain is exacerbated by breathing and standing up. She rates the pain as extremely severe at 10/10. This is the first time she has had pain of this severity. She was unable to sleep last night due to the pain.    She had nausea early this morning, with no vomiting. She has been unable to eat anything and can only drink a little water to take her blood pressure medication. Her last BM was yesterday.     She denies any recent heavy lifting, falls, or new exercises, any dysuria or blood in stool/urine, any incontinence or saddle anesthesia. She denies chest pain or shortness of breath, although pain sometimes causes her to hold her breath as it is so severe. She denies any recent changes to her diet or new medications.     Her symptoms are impacting her daily activities, as she is having difficulty standing up and breathing without pain. She drove to the appointment with a friend, indicating some limitation in her ability to transport herself.    MEDICAL HISTORY:  Patient has a history of heart disease, HTN, DM type II, GERALD, high cholesterol, and renal cell carcinoma.     SURGICAL HISTORY:  Patient underwent a left knee replacement about 6 weeks ago. She is currently in PT, which is expected to be completed on May 1st.         Assessment & Plan     1. Acute bilateral low back pain without sciatica    Pain with  movement and breathing to bilateral lower back, no bony tenderness; no recent falls. Appears in acute distress in clinic, with difficulty speaking due to pain. One time dose of Toradol 30mg given with mild relief of symptoms. Discussed performing work up as an outpatient or with evaluation in ED. Message sent to [facility] team, recommended send to ED for further evaluation. Patient's friend will drive her to the ED.     - ketorolac injection 30 mg  - Refer to Emergency Dept.    2. Abdominal pain, unspecified abdominal location    Pain with movement and breathing to bilateral upper abdomen with guarding. Denies tenderness to palpation. Last BM yesterday.  Appears in acute distress in clinic, with difficulty speaking due to pain. One time dose of Toradol 30mg given with mild relief of symptoms. Discussed performing work up as an outpatient or with evaluation in ED.  Message sent to [facility] team, recommended send to ED for further evaluation. Patient's friend will drive her to the ED.     - ketorolac injection 30 mg  - Refer to Emergency Dept.    3. Benign hypertension    Mildly elevated in clinic due to pain; patient to go to ED for further evaluation. Will refill Norvasc.     BP Readings from Last 3 Encounters:   04/23/25 (!) 140/88   04/23/25 (!) 140/88   04/17/25 (!) 166/92     -continue current medication regimen  -DASH diet, regular cardiovascular exercises, portion control  -weight loss    - amLODIPine (NORVASC) 10 MG tablet; Take 1 tablet (10 mg total) by mouth once daily.  Dispense: 90 tablet; Refill: 1    4. Mild major depression    Stable on prozac daily. The current medical regimen is effective;  continue present plan and medications.  Encouraged the patient to perform self-calming techniques, such as deep breathing/relaxation techniques and exercise.    - FLUoxetine 40 MG capsule; Take 1 capsule (40 mg total) by mouth once daily.  Dispense: 90 capsule; Refill: 1    5. Gastroesophageal reflux disease, unspecified  whether esophagitis present    Stable on protonix daily. The current medical regimen is effective;  continue present plan and medications.    - pantoprazole (PROTONIX) 40 MG tablet; Take 1 tablet (40 mg total) by mouth once daily.  Dispense: 90 tablet; Refill: 1              Discussed DDx, condition, and treatment.   Education sent to patient portal/included in after visit summary.  ED precautions given.   Notify provider if symptoms do not resolve or increase in severity.   Patient verbalizes understanding and agrees with plan of care.  --------------------------------------------      Health Maintenance:  Health Maintenance         Date Due Completion Date    Diabetic Eye Exam 04/30/2025 4/30/2024    DEXA Scan 07/13/2025 7/13/2023    Hemoglobin A1c 08/07/2025 2/7/2025    Diabetes Urine Screening 10/28/2025 10/28/2024    Lipid Panel 10/28/2025 10/28/2024    TETANUS VACCINE 04/30/2028 4/30/2018            Discussed the importance of overdue vaccines which were offered during this encounter. Patient declined overdue vaccines at this time and Advised patient on the importance of completing overdue health maintenance items    Follow Up:  Follow up if symptoms worsen or fail to improve.    Exam     Review of Systems:  (as noted above)  Review of Systems   Constitutional:  Negative for fever.   HENT:  Negative for trouble swallowing.    Respiratory:  Negative for shortness of breath.    Cardiovascular:  Negative for chest pain.   Gastrointestinal:  Positive for abdominal pain. Negative for blood in stool and vomiting.   Genitourinary:  Negative for hematuria.   Musculoskeletal:  Positive for arthralgias and back pain.   Skin:  Negative for rash.       Physical Exam:   Physical Exam  Constitutional:       General: She is not in acute distress.     Appearance: Normal appearance. She is obese. She is not ill-appearing.   HENT:      Head: Normocephalic and atraumatic.   Cardiovascular:      Rate and Rhythm: Normal rate and  "regular rhythm.      Pulses: Normal pulses.      Heart sounds: Normal heart sounds. No murmur heard.  Pulmonary:      Effort: Pulmonary effort is normal. No respiratory distress.      Breath sounds: Normal breath sounds. No wheezing.   Abdominal:      General: Abdomen is flat. Bowel sounds are normal. There is no distension.      Palpations: There is no mass.      Tenderness: There is abdominal tenderness. There is guarding.      Hernia: No hernia is present.   Musculoskeletal:         General: Tenderness present.      Lumbar back: Spasms and tenderness present. No swelling, deformity, signs of trauma or bony tenderness. Decreased range of motion.   Skin:     General: Skin is warm and dry.      Capillary Refill: Capillary refill takes less than 2 seconds.   Neurological:      General: No focal deficit present.      Mental Status: She is alert and oriented to person, place, and time.   Psychiatric:         Mood and Affect: Mood normal.         Behavior: Behavior normal.       Vitals:    04/23/25 1023   BP: (!) 140/88   Pulse: 64   Temp: 97.6 °F (36.4 °C)   TempSrc: Oral   SpO2: 100%   Weight: 85.9 kg (189 lb 6 oz)   Height: 5' 4" (1.626 m)      Body mass index is 32.51 kg/m².        History     Past Medical History:  Past Medical History:   Diagnosis Date    Arthritis     Colon polyp     Diabetes mellitus     diet controlled    Diabetes with neurologic complications     Hypertension     Nuclear sclerosis of both eyes 02/14/2022    Renal cell carcinoma     Sleep apnea        Past Surgical History:  Past Surgical History:   Procedure Laterality Date    ARTHROPLASTY, KNEE, TOTAL, USING COMPUTER-ASSISTED NAVIGATION Left 3/5/2025    Procedure: ARTHROPLASTY, KNEE, TOTAL, USING COMPUTER-ASSISTED NAVIGATION;  Surgeon: Eric Carbajal MD;  Location: [facility];  Service: Orthopedics;  Laterality: Left;  [place]---CLEARED BY CARDS AND PCP  FIRST MARTIN ASSIST NOTIFIED-DELMY  RN PREOP 2/19/2025   T/S ON 2/28/2025---NEED " ORDERS    COLONOSCOPY N/A 2/5/2018    Procedure: COLONOSCOPY;  Surgeon: Bacilio Mancia MD;  Location: [facility] ENDO;  Service: Endoscopy;  Laterality: N/A;  appt confirmed-ss    COLONOSCOPY N/A 8/13/2020    Procedure: COLONOSCOPY;  Surgeon: Jose West MD;  Location: [facility] ENDO;  Service: Endoscopy;  Laterality: N/A;    ENDOSCOPIC CARPAL TUNNEL RELEASE Right 3/15/2024    Procedure: RELEASE, CARPAL TUNNEL, ENDOSCOPIC - RIGHT;  Surgeon: Tonio Mcdermott MD;  Location: [facility] OR;  Service: Orthopedics;  Laterality: Right;    ENDOSCOPIC CARPAL TUNNEL RELEASE Left 6/26/2024    Procedure: RELEASE, CARPAL TUNNEL, ENDOSCOPIC - left;  Surgeon: Tonio Mcdermott MD;  Location: [facility] OR;  Service: Orthopedics;  Laterality: Left;    HYSTERECTOMY      INTRAOCULAR PROSTHESES INSERTION Left 7/7/2022    Procedure: INSERTION, IOL PROSTHESIS;  Surgeon: Candelario Novoa MD;  Location: [facility] OR;  Service: Ophthalmology;  Laterality: Left;    INTRAOCULAR PROSTHESES INSERTION Right 7/21/2022    Procedure: INSERTION, IOL PROSTHESIS;  Surgeon: Candelario Novoa MD;  Location: [facility] OR;  Service: Ophthalmology;  Laterality: Right;    OOPHORECTOMY      PARTIAL NEPHRECTOMY Right 10/12/2018    Procedure: NEPHRECTOMY, PARTIAL open. Dr Arenas to assist.;  Surgeon: Alejandra Palm MD;  Location: [facility] OR;  Service: Urology;  Laterality: Right;  RN PREOP 10/9/2018----NEEDS H/P    PHACOEMULSIFICATION OF CATARACT Left 7/7/2022    Procedure: PHACOEMULSIFICATION, CATARACT;  Surgeon: Candelario Novoa MD;  Location: [facility] OR;  Service: Ophthalmology;  Laterality: Left;  RN PHONE PREOP 6/27/2022   ARRIVAL 6:00 AM    PHACOEMULSIFICATION OF CATARACT Right 7/21/2022    Procedure: PHACOEMULSIFICATION, CATARACT;  Surgeon: Candelario Novoa MD;  Location: [facility] OR;  Service: Ophthalmology;  Laterality: Right;  RN PHONE PREOP 7/12/2022   ARRIVAL 12:00 NOON    TOTAL KNEE ARTHROPLASTY Right 11/8/2023    Procedure: ARTHROPLASTY, KNEE,  TOTAL. NELSON;  Surgeon: Eric Carbajal MD;  Location: [facility] OR;  Service: Orthopedics;  Laterality: Right;  [place]. Admit  RAPHAEL QUINTIN NIEVES [phone] NOTIFIED QUINTIN ON 10/11/2023-[place]  RN PREOP 10/25/2023   T/S ON 11/6/2023--done------CLEARED BY CARDS       Social History:  Social History[1]    Family History:  Family History   Problem Relation Name Age of Onset    No Known Problems Mother      Glaucoma Father      Breast cancer Sister Warnell     Glaucoma Sister Warnell     Cancer Sister Warnell         breast cancer    Thyroid disease Sister Angeles     Blindness Sister Bryanna         due to trauma during car accident    Diabetes Sister Amanda     No Known Problems Maternal Aunt      No Known Problems Maternal Uncle      No Known Problems Paternal Aunt      No Known Problems Paternal Uncle      No Known Problems Maternal Grandmother      No Known Problems Maternal Grandfather      No Known Problems Paternal Grandmother      No Known Problems Paternal Grandfather      Diabetes Brother Navneet     Amblyopia Neg Hx      Cataracts Neg Hx      Hypertension Neg Hx      Macular degeneration Neg Hx      Retinal detachment Neg Hx      Strabismus Neg Hx      Stroke Neg Hx         Allergies and Medications: (updated and reviewed)  Review of patient's allergies indicates:   Allergen Reactions    Lisinopril Swelling and Shortness Of Breath    Ascorbic acid-ascorbate calc      And citrus fruits     Current Medications[2]    Patient Care Team:  Alena Hernandez MD as PCP - General (Family Medicine)  Tabby Jacob as ED Navigator  Favorite, Aniyah MOSES RN as Outpatient          - The patient is given an After Visit Summary that lists all medications with directions, allergies, education, orders placed during this encounter and follow-up instructions.      - I have reviewed the patient's medical information including past medical, family, and social history sections including the medications and allergies.       - We discussed the patient's current medications.     This note was created by combination of typed  and MModal dictation.  Transcription errors may be present.  If there are any questions, please contact me.       This note was generated with the assistance of ambient listening technology. Verbal consent was obtained by the patient and accompanying visitor(s) for the recording of patient appointment to facilitate this note. I attest to having reviewed and edited the generated note for accuracy, though some syntax or spelling errors may persist. Please contact the author of this note for any clarification.                KULDEEP Lynch           [1]   Social History  Socioeconomic History    Marital status:    Tobacco Use    Smoking status: Never     Passive exposure: Past    Smokeless tobacco: Never   Substance and Sexual Activity    Alcohol use: No    Drug use: No    Sexual activity: Not Currently     Social Drivers of Health     Financial Resource Strain: Low Risk  (3/5/2025)    Overall Financial Resource Strain (CARDIA)     Difficulty of Paying Living Expenses: Not hard at all   Food Insecurity: No Food Insecurity (3/5/2025)    Hunger Vital Sign     Worried About Running Out of Food in the Last Year: Never true     Ran Out of Food in the Last Year: Never true   Transportation Needs: No Transportation Needs (11/22/2024)    TRANSPORTATION NEEDS     Transportation : No   Physical Activity: Insufficiently Active (11/25/2024)    Exercise Vital Sign     Days of Exercise per Week: 4 days     Minutes of Exercise per Session: 20 min   Stress: No Stress Concern Present (3/5/2025)    [employer] - Occupational Stress Questionnaire     Feeling of Stress : Only a little   Housing Stability: High Risk (3/5/2025)    Housing Stability Vital Sign     Unable to Pay for Housing in the Last Year: Yes     Homeless in the Last Year: No   [2]   Current Outpatient Medications    Medication Sig Dispense Refill    ACCU-CHEK ONEIL PLUS TEST STRP Strp TEST BLOOD SUGAR TWICE DAILY 200 strip 1    ACCU-CHEK SOFTCLIX LANCETS [facility] USE TO TEST BLOOD SUGAR ONE TIME DAILY 100 each 3    acetaminophen (TYLENOL) 500 MG tablet Take 2 tablets (1,000 mg total) by mouth every 8 (eight) hours as needed for Pain. 42 tablet 0    alcohol swabs (DROPSAFE ALCOHOL PREP PADS) PadM USE AS DIRECTED THREE TIMES DAILY 300 each 3    atorvastatin (LIPITOR) 80 MG tablet Take 1 tablet (80 mg total) by mouth every evening. 90 tablet 0    azelastine (ASTELIN) 137 mcg (0.1 %) nasal spray 1 spray (137 mcg total) by Nasal route 2 (two) times daily. 90 mL 0    blood glucose control high,low (ACCU-CHEK ONEIL CONTROL SOLN) Soln 1 Units by Misc.(Non-Drug; Combo Route) route as needed. 1 each 0    blood-glucose meter (ACCU-CHEK ONEIL PLUS METER) Misc 1 kit by Misc.(Non-Drug; Combo Route) route once daily. 1 each 0    blood-glucose meter (TRUE METRIX GLUCOSE METER) kit Use as directed to test glucose 1 each 0    celecoxib (CELEBREX) 200 MG capsule Take 1 capsule (200 mg total) by mouth 2 (two) times daily. 14 capsule 0    docusate sodium (COLACE) 100 MG capsule Take 1 capsule (100 mg total) by mouth 2 (two) times daily. 30 capsule 0    ergocalciferol (ERGOCALCIFEROL) 50,000 unit Cap TAKE 1 CAPSULE EVERY 7 DAYS. 12 capsule 0    fluticasone propionate (FLONASE) 50 mcg/actuation nasal spray USE 1 SPRAY IN EACH NOSTRIL ONCE DAILY 32 g 3    gabapentin (NEURONTIN) 300 MG capsule Take 1 capsule (300 mg total) by mouth 3 (three) times daily. 270 capsule 1    hydrALAZINE (APRESOLINE) 100 MG tablet Take 1 tablet (100 mg total) by mouth every 8 (eight) hours. 270 tablet 0    hydrOXYzine HCL (ATARAX) 25 MG tablet TAKE 1 TABLET BY MOUTH THREE TIMES DAILY AS NEEDED FOR ITCHING 90 tablet 0    JARDIANCE 25 mg tablet TAKE 1 TABLET ONE TIME DAILY 90 tablet 1    methylPREDNISolone (MEDROL DOSEPACK) 4 mg tablet use as directed 21 tablet 0    metoprolol  succinate (TOPROL-XL) 100 MG 24 hr tablet Take 1 tablet (100 mg total) by mouth once daily. 90 tablet 3    ondansetron (ZOFRAN-ODT) 8 MG TbDL Take 1 tablet (8 mg total) by mouth 2 (two) times daily as needed (nausea). 14 tablet 0    ondansetron (ZOFRAN-ODT) 8 MG TbDL Dissolve 1 tablet (8 mg total) by mouth every 24 hours as needed. 30 tablet 0    oxyCODONE (ROXICODONE) 5 MG immediate release tablet Take 1 to 2 tablets by mouth every 4 (four) hours as needed (pain). 40 tablet 0    semaglutide (OZEMPIC) 0.25 mg or 0.5 mg (2 mg/3 mL) pen injector Inject 0.25 mg into the skin every 7 days. 3 mL 0    semaglutide (OZEMPIC) 1 mg/dose (2 mg/1.5 mL) PnIj Inject 1 mg into the skin every 7 days. 3 mL 5    TRUEPLUS LANCETS 33 gauge Misc TEST BLOOD SUGAR TWICE DAILY 200 each 1    amLODIPine (NORVASC) 10 MG tablet Take 1 tablet (10 mg total) by mouth once daily. 90 tablet 1    aspirin (ECOTRIN) 81 MG EC tablet Take 1 tablet (81 mg total) by mouth 2 (two) times a day. 60 tablet 0    blood glucose control, low (TRUE METRIX LEVEL 1) Soln 1 each by Misc.(Non-Drug; Combo Route) route once as needed. 1 each 3    cetirizine (ZYRTEC) 10 MG tablet Take 1 tablet (10 mg total) by mouth once daily. 90 tablet 0    FLUoxetine 40 MG capsule Take 1 capsule (40 mg total) by mouth once daily. 90 capsule 1    pantoprazole (PROTONIX) 40 MG tablet Take 1 tablet (40 mg total) by mouth once daily. 90 tablet 1     No current facility-administered medications for this visit.     Facility-Administered Medications Ordered in Other Visits   Medication Dose Route Frequency Provider Last Rate Last Admin    acetaminophen tablet 1,000 mg  1,000 mg Oral On Call Procedure Eric Carbajal MD        acetaminophen tablet 1,000 mg  1,000 mg Oral Q6H Eric aCrbajal MD   1,000 mg at 11/09/23 0506    aspirin EC tablet 81 mg  81 mg Oral BID Eric Carbajal MD   81 mg at 11/09/23 1358    famotidine tablet 20 mg  20 mg Oral BID Eric Carbajal MD   20 mg at  11/09/23 1357    LIDOcaine (PF) 10 mg/ml (1%) injection 10 mg  1 mL Intradermal On Call Procedure Eric Carbajal MD        methocarbamoL tablet 750 mg  750 mg Oral TID Eric Carbajal MD   750 mg at 11/09/23 1357    naloxone 0.4 mg/mL injection 0.02 mg  0.02 mg Intravenous PRN Eric Carbajal MD        ondansetron injection 4 mg  4 mg Intravenous Q8H PRN Eric Carbajal MD   4 mg at 11/09/23 0858    oxyCODONE immediate release tablet 10 mg  10 mg Oral Q3H PRN Eric Carbajal MD   10 mg at 11/09/23 1043    oxyCODONE immediate release tablet 5 mg  5 mg Oral Q3H PRN Eric Carbajal MD        polyethylene glycol packet 17 g  17 g Oral Daily Eric Carbajal MD   17 g at 11/09/23 1356    pregabalin capsule 75 mg  75 mg Oral QHS Eric Carbajal MD   75 mg at 11/08/23 2147    prochlorperazine injection Soln 5 mg  5 mg Intravenous Q6H PRN Eric Carbajal MD   5 mg at 11/08/23 1325    senna-docusate 8.6-50 mg per tablet 1 tablet  1 tablet Oral BID Eric Carbajal MD   1 tablet at 11/09/23 1358    sodium chloride 0.9% flush 10 mL  10 mL Intravenous PRN Eric Carbajal MD

## 2025-04-24 ENCOUNTER — PATIENT OUTREACH (OUTPATIENT)
Facility: OTHER | Age: 75
End: 2025-04-24
Payer: MEDICARE

## 2025-04-24 NOTE — PROGRESS NOTES
Patient was seen at [facility] Emergency Department on 4/23/25 for shortness of breath. The After Visit Summary instructs the patient to follow up with primary care (not scheduled), orthopedics (scheduled for 5/8/25), and pulmonology (scheduled for 6/6/25). A follow-up call was made to assess for additional needs and offer scheduling assistance. There was no answer, and a message was left asking for a return call. Assistance will be provided as needed if the patient returns the call. Appointment reminders are scheduled for 5/6/25 and 6/4/25.

## 2025-04-26 RX ORDER — HYDRALAZINE HYDROCHLORIDE 100 MG/1
100 TABLET, FILM COATED ORAL EVERY 8 HOURS
Qty: 90 TABLET | Refills: 3 | Status: SHIPPED | OUTPATIENT
Start: 2025-04-26

## 2025-04-28 ENCOUNTER — CLINICAL SUPPORT (OUTPATIENT)
Dept: REHABILITATION | Facility: HOSPITAL | Age: 75
End: 2025-04-28
Payer: MEDICARE

## 2025-04-28 ENCOUNTER — OFFICE VISIT (OUTPATIENT)
Dept: ORTHOPEDICS | Facility: CLINIC | Age: 75
End: 2025-04-28
Payer: MEDICARE

## 2025-04-28 VITALS — WEIGHT: 184.94 LBS | BODY MASS INDEX: 31.57 KG/M2 | HEIGHT: 64 IN

## 2025-04-28 DIAGNOSIS — Z96.652 STATUS POST LEFT KNEE REPLACEMENT: Primary | ICD-10-CM

## 2025-04-28 DIAGNOSIS — M71.22 SYNOVIAL CYST OF LEFT POPLITEAL SPACE: ICD-10-CM

## 2025-04-28 DIAGNOSIS — M62.81 WEAKNESS OF LEFT QUADRICEPS MUSCLE: ICD-10-CM

## 2025-04-28 DIAGNOSIS — M25.662 IMPAIRED RANGE OF MOTION OF LEFT KNEE: Primary | ICD-10-CM

## 2025-04-28 PROCEDURE — 99999 PR PBB SHADOW E&M-EST. PATIENT-LVL IV: CPT | Mod: PBBFAC,HCNC,, | Performed by: ORTHOPAEDIC SURGERY

## 2025-04-28 PROCEDURE — 97530 THERAPEUTIC ACTIVITIES: CPT | Mod: HCNC,PN

## 2025-04-28 PROCEDURE — 97112 NEUROMUSCULAR REEDUCATION: CPT | Mod: HCNC,PN

## 2025-04-28 RX ORDER — OXYCODONE HYDROCHLORIDE 5 MG/1
5 CAPSULE ORAL EVERY 6 HOURS PRN
Qty: 30 CAPSULE | Refills: 0 | Status: SHIPPED | OUTPATIENT
Start: 2025-04-28

## 2025-04-28 NOTE — PROGRESS NOTES
Ortho Knee Follow Up Note    PCP: Alena Hernandez MD   Referring Provider: Nohelia Dillon, PA-C  [address] [phone]        Assessment:  74 y.o. female status post left total Knee Primary completed on 3/5/25.  Overall the patient is doing really well at this stage.  She is presenting for early postoperative follow up after going to the ER for acute onset swelling and pain in her leg.  She had an ultrasound done that demonstrated a popliteal cyst.  But no VTE concerns.  Since that emergency room visit her pain has returned to normal.  Her swelling has improved.  She has has improvement in her range of motion.  She is ambulating with the use of a cane. I think she needs to continue on with therapy to work on this range of motion and helping with some of her pains.  I am going to follow up with her in 6 weeks for clinical check.  Her x-rays look good.    Plan:  Follow up 6 weeks  Future Radiographs Indicated at next visit: No    Pain Management: Continue current pain management  Anticoagulation: None  Wound Care: None    Patient Reassurance:   Post-operative course discussed with patient. Patient reassured and supported. All questions answered.    ACTIVE PROBLEM LIST  Problem List[1]        HPI:  Valarie Foster presents today for a post-op visit.     STATUS POST:  left total Knee Primary  BMI: There is no height or weight on file to calculate BMI.    Post operative recovery was complicated by: pain    Patient rates his condition as improving. Pleased with surgical outcome to date.     Functional Assessment:  Started outpatient PT  Functional Difficulties:  Walking  Pain Medication:  Narcotic  Anticoagulation: None    EXAM:    left POST-OPERATIVE KNEE    There were no vitals taken for this visit.    Skin:  Appropriate post op appearance, No evidence of erythema, warmth, discharge, or drainage, and Incision clean/dry/intact  Range of Motion: Flexion: 110, Extension 3  Neurovascular Status: Sensation  intact in Sural, Saphenous, SPN, DPN and Tibial nerve distribution. 5 out of 5 strength in hip flexion, knee flexion/extension, ankle plantarflexion/dorsiflexion. 2+ dorsalis pedis    IMAGING:  X-ray Knee:   Implants are well fixed and aligned. There is no evidence of loosening.              [1]   Patient Active Problem List  Diagnosis    Type 2 diabetes mellitus with diabetic neuropathy, without long-term current use of insulin    Benign hypertension    Hyperlipemia    Microhematuria    Left flank pain    Abdominal aortic atherosclerosis    History of renal cell cancer    Renal cell carcinoma of right kidney    Mild major depression    Bilateral chronic knee pain    Colon cancer screening    Pain of left calf    Refractive error    Primary osteoarthritis of knees, bilateral    Fall    Radiculopathy    Idiopathic progressive neuropathy    Nerve pain    Chest pain    Severe obesity (BMI 35.0-39.9) with comorbidity    Decreased ROM of right knee    Decreased strength involving knee joint    Gait difficulty    Paresthesia    Pseudophakia    COVID-19    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

## 2025-04-28 NOTE — PROGRESS NOTES
Outpatient Rehab    Physical Therapy Visit    Patient Name: Valarie Foster  MRN: [id number]  YOB: 1950  Encounter Date: 4/28/2025    Therapy Diagnosis:   Encounter Diagnoses   Name Primary?    Impaired range of motion of left knee Yes    Weakness of left quadriceps muscle      Physician: Eric Carbajal MD    Physician Orders: Eval and Treat  Medical Diagnosis:     Visit # / Visits Authorized:  2 / 10  Insurance Authorization Period: 3/24/2025 to 5/16/2025  Date of Evaluation: 3/24/25  Plan of Care Certification: 3/24/25 to 6/24/25     PT/PTA:     Number of PTA visits since last PT visit:   Time In: 0915   Time Out: 0955  Total Time: 40   Total Billable Time: 38    FOTO:  Intake Score:  %  Survey Score 1:  %  Survey Score 2:  %         Subjective   Pt states the knee continues to improve with home stretching and therapy..  Pain reported as 4/10.      Objective            Treatment:  Manual Therapy  MT 1: patellar mobilization  MT 2: PROM  Balance/Neuromuscular Re-Education  NMR 1: heel prop with quad set  NMR 2: quad set 2 x 10  NMR 3: towel stretch with quad set  NMR 4: TKE ball vs wall x 20  NMR 5: LAQ with quad set x 20  Therapeutic Activity  TA 1: heel raises 2 x 10  TA 2: sit to stand x 5  TA 3: gait training with SPC  TA 4: AA knee flexion at steps  x 20, heel slide supine x 10  TA 5: Nu Step x 8 min    Time Entry(in minutes):  Manual Therapy Time Entry: 5  Neuromuscular Re-Education Time Entry: 25  Therapeutic Activity Time Entry: 25    Assessment & Plan   Assessment: Pt presents ambulating with a spc, decreased knee flex/ext in swing.  Requires decreased cueing to increase knee flexion in swing. Progressed ROM and quad strengthening emphasis wiht good performance. AA knee flexion limited to 98 deg. ext lacking 4 deg. Good response to exercise progressioni per protocol. Pt is progressing well toward established goals.  Evaluation/Treatment Tolerance: Patient tolerated treatment well    Patient  will continue to benefit from skilled outpatient physical therapy to address the deficits listed in the problem list box on initial evaluation, provide pt/family education and to maximize pt's level of independence in the home and community environment.     Patient's spiritual, cultural, and educational needs considered and patient agreeable to plan of care and goals.     Education  Education was done with Patient. The patient's learning style includes Demonstration. The patient Demonstrates understanding.                 Plan: Continue per PT plan of care.    Goals:   Active       1. short term goals       Pt to increase strength by a 1/2 grade of muscles test to allow for improvement in functional activities such as performing chores.          Start:  04/11/25    Expected End:  05/09/25            2. Pt to improve range of motion by 25% to allow for improved functional mobility to allow for improvement in IADL's.         Start:  04/11/25    Expected End:  05/09/25            3. Pt to report compliance with HEP and demonstrate proper exercise technique to PT to show competence with self management of condition.         Start:  04/11/25    Expected End:  05/09/25               2. Long term goals       Increase ROM to allow improved joint biomechanics during functional activities.          Start:  04/11/25    Expected End:  07/04/25            2. Independent with home exercise program.         Start:  04/11/25    Expected End:  07/04/25            3. Full return to functional activities with manageable complaints.         Start:  04/11/25    Expected End:  07/04/25                Jaxon Simmons, PT

## 2025-04-29 RX ORDER — ERGOCALCIFEROL 1.25 MG/1
CAPSULE ORAL
Qty: 12 CAPSULE | Refills: 3 | Status: SHIPPED | OUTPATIENT
Start: 2025-04-29

## 2025-04-29 NOTE — TELEPHONE ENCOUNTER
Refill Encounter    PCP Visits: Recent Visits  Date Type Provider Dept   02/21/25 Office Visit Sofie Kaye FNP [facility]   01/17/25 Office Visit Alena Hernandez MD [facility]   10/31/24 Office Visit Sofie Kaye FNP [facility]   10/16/24 Office Visit Roland Dawkins NP [facility]   07/29/24 Office Visit Alexandra Buchanan NP [facility]   Showing recent visits within past 360 days and meeting all other requirements  Future Appointments  Date Type Provider Dept   05/21/25 Appointment Sofie Kaye FNP [facility]   07/28/25 Appointment Alena Hernandez MD [facility]   Showing future appointments within next 720 days and meeting all other requirements      Last 3 Blood Pressure:   BP Readings from Last 3 Encounters:   04/23/25 (!) 186/84   04/23/25 (!) 140/88   04/23/25 (!) 140/88     Preferred Pharmacy:   [facility] - [place], [place] - [address]  [address] [address]  Phone: [phone] Fax: [phone]    Requested RX:  Requested Prescriptions     Pending Prescriptions Disp Refills    ergocalciferol (ERGOCALCIFEROL) 50,000 unit Cap [Pharmacy Med Name: Vitamin D (Ergocalciferol) Oral Capsule 1.25 MG (31684 UT)] 12 capsule 3     Sig: TAKE 1 CAPSULE EVERY 7 DAYS.      RX Route: Normal

## 2025-04-30 RX ORDER — ATORVASTATIN CALCIUM 80 MG/1
80 TABLET, FILM COATED ORAL NIGHTLY
Qty: 90 TABLET | Refills: 3 | Status: SHIPPED | OUTPATIENT
Start: 2025-04-30

## 2025-05-01 ENCOUNTER — DOCUMENT SCAN (OUTPATIENT)
Dept: HOME HEALTH SERVICES | Facility: HOSPITAL | Age: 75
End: 2025-05-01
Payer: MEDICARE

## 2025-05-01 ENCOUNTER — CLINICAL SUPPORT (OUTPATIENT)
Dept: REHABILITATION | Facility: HOSPITAL | Age: 75
End: 2025-05-01
Payer: MEDICARE

## 2025-05-01 DIAGNOSIS — M62.81 WEAKNESS OF LEFT QUADRICEPS MUSCLE: ICD-10-CM

## 2025-05-01 DIAGNOSIS — Z96.652 STATUS POST LEFT KNEE REPLACEMENT: ICD-10-CM

## 2025-05-01 DIAGNOSIS — M25.662 IMPAIRED RANGE OF MOTION OF LEFT KNEE: Primary | ICD-10-CM

## 2025-05-01 PROCEDURE — 97530 THERAPEUTIC ACTIVITIES: CPT | Mod: HCNC,PN,CQ

## 2025-05-01 NOTE — PROGRESS NOTES
Outpatient Rehab    Physical Therapy Visit    Patient Name: Valarie Foster  MRN: [id number]  YOB: 1950  Encounter Date: 5/1/2025    Therapy Diagnosis:   Encounter Diagnoses   Name Primary?    Impaired range of motion of left knee Yes    Weakness of left quadriceps muscle     Status post left knee replacement      Physician: Eric Carbajal MD    Physician Orders: Eval and Treat  Medical Diagnosis: Status post left knee replacement      Visit # / Visits Authorized:  3 / 10  Insurance Authorization Period: 3/24/2025 to 5/16/2025  Date of Evaluation: 3/24/25  Plan of Care Certification: 3/24/25 to 6/24/25     PT/PTA: PTA   Number of PTA visits since last PT visit:1  Time In: 1030   Time Out: 1120  Total Time: 50   Total Billable Time: 25    FOTO:  Intake Score:  %  Survey Score 1:  %  Survey Score 2:  %    Precautions     Standard. Left TKA.       Subjective   she's doing good. she finds her knee range of motion is getting better..  Pain reported as 0/10.      Objective            Treatment:  Manual Therapy  MT 1: patellar mobilization  MT 2: PROM  Balance/Neuromuscular Re-Education  NMR 1: heel prop with quad set 5 minutes with 4lb weight on anterior knee  NMR 2: quad set 2 x 10  NMR 3: towel stretch with quad set 3 x 30 seconds  NMR 4: TKE ball vs wall x 20  NMR 5: LAQ with quad set x 20  Therapeutic Activity  TA 1: heel raises 2 x 10  TA 2: sit to stand x 5  TA 4: AA knee flexion at steps  x 20, heel slide supine x 10  TA 5: Nu Step x 8 min    Time Entry(in minutes):  Manual Therapy Time Entry: 5  Neuromuscular Re-Education Time Entry: 20  Therapeutic Activity Time Entry: 25    Assessment & Plan   Assessment: Patient arrived with no SPC, ambulating with slight decrease full knee extension in stance phase. Pt was able to gain AAROM of 100 degrees in L knee flexion in heel slide exercise. however she was able to gain PROM of 113 degrees in flexion with mild discomforts at end range. Noted improve knee  extension to almost near full extension at end of session with heel prop exercise. Patient appears to be motivated to get stronger and to improve her functional mobility. at this time, patient is on track with her therapy goals.       Patient will continue to benefit from skilled outpatient physical therapy to address the deficits listed in the problem list box on initial evaluation, provide pt/family education and to maximize pt's level of independence in the home and community environment.     Patient's spiritual, cultural, and educational needs considered and patient agreeable to plan of care and goals.             Plan: Continue per PT plan of care.    Goals:   Active       1. short term goals       Pt to increase strength by a 1/2 grade of muscles test to allow for improvement in functional activities such as performing chores.          Start:  04/11/25    Expected End:  05/09/25            2. Pt to improve range of motion by 25% to allow for improved functional mobility to allow for improvement in IADL's.         Start:  04/11/25    Expected End:  05/09/25            3. Pt to report compliance with HEP and demonstrate proper exercise technique to PT to show competence with self management of condition.         Start:  04/11/25    Expected End:  05/09/25               2. Long term goals       Increase ROM to allow improved joint biomechanics during functional activities.          Start:  04/11/25    Expected End:  07/04/25            2. Independent with home exercise program.         Start:  04/11/25    Expected End:  07/04/25            3. Full return to functional activities with manageable complaints.         Start:  04/11/25    Expected End:  07/04/25                Rakel Barlow PTA

## 2025-05-06 ENCOUNTER — PATIENT OUTREACH (OUTPATIENT)
Facility: OTHER | Age: 75
End: 2025-05-06
Payer: MEDICARE

## 2025-05-06 NOTE — PROGRESS NOTES
Patient was scheduled for an appointment reminder today. The appointment was canceled due to being out of town for a . The next appointment reminder is scheduled for 25.

## 2025-05-07 ENCOUNTER — EXTERNAL HOME HEALTH (OUTPATIENT)
Dept: HOME HEALTH SERVICES | Facility: HOSPITAL | Age: 75
End: 2025-05-07
Payer: MEDICARE

## 2025-05-08 ENCOUNTER — PATIENT MESSAGE (OUTPATIENT)
Dept: ADMINISTRATIVE | Facility: HOSPITAL | Age: 75
End: 2025-05-08
Payer: MEDICARE

## 2025-05-09 ENCOUNTER — LAB VISIT (OUTPATIENT)
Dept: LAB | Facility: HOSPITAL | Age: 75
End: 2025-05-09
Attending: INTERNAL MEDICINE
Payer: MEDICARE

## 2025-05-09 DIAGNOSIS — E66.811 CLASS 1 OBESITY DUE TO EXCESS CALORIES WITH SERIOUS COMORBIDITY AND BODY MASS INDEX (BMI) OF 34.0 TO 34.9 IN ADULT: ICD-10-CM

## 2025-05-09 DIAGNOSIS — E66.09 CLASS 1 OBESITY DUE TO EXCESS CALORIES WITH SERIOUS COMORBIDITY AND BODY MASS INDEX (BMI) OF 34.0 TO 34.9 IN ADULT: ICD-10-CM

## 2025-05-09 DIAGNOSIS — E11.40 TYPE 2 DIABETES MELLITUS WITH DIABETIC NEUROPATHY, WITHOUT LONG-TERM CURRENT USE OF INSULIN: Chronic | ICD-10-CM

## 2025-05-09 LAB
ALBUMIN SERPL BCP-MCNC: 3.8 G/DL (ref 3.5–5.2)
ALP SERPL-CCNC: 107 UNIT/L (ref 40–150)
ALT SERPL W/O P-5'-P-CCNC: 13 UNIT/L (ref 10–44)
ANION GAP (OHS): 7 MMOL/L (ref 8–16)
AST SERPL-CCNC: 15 UNIT/L (ref 11–45)
BILIRUB SERPL-MCNC: 0.4 MG/DL (ref 0.1–1)
BUN SERPL-MCNC: 13 MG/DL (ref 8–23)
CALCIUM SERPL-MCNC: 9.7 MG/DL (ref 8.7–10.5)
CHLORIDE SERPL-SCNC: 105 MMOL/L (ref 95–110)
CO2 SERPL-SCNC: 29 MMOL/L (ref 23–29)
CREAT SERPL-MCNC: 0.8 MG/DL (ref 0.5–1.4)
EAG (OHS): 151 MG/DL (ref 68–131)
GFR SERPLBLD CREATININE-BSD FMLA CKD-EPI: >60 ML/MIN/1.73/M2
GLUCOSE SERPL-MCNC: 113 MG/DL (ref 70–110)
HBA1C MFR BLD: 6.9 % (ref 4–5.6)
POTASSIUM SERPL-SCNC: 4.5 MMOL/L (ref 3.5–5.1)
PROT SERPL-MCNC: 7.4 GM/DL (ref 6–8.4)
SODIUM SERPL-SCNC: 141 MMOL/L (ref 136–145)

## 2025-05-09 PROCEDURE — 36415 COLL VENOUS BLD VENIPUNCTURE: CPT | Mod: HCNC,PO

## 2025-05-09 PROCEDURE — 80053 COMPREHEN METABOLIC PANEL: CPT | Mod: HCNC

## 2025-05-09 PROCEDURE — 83036 HEMOGLOBIN GLYCOSYLATED A1C: CPT | Mod: HCNC

## 2025-05-12 DIAGNOSIS — L29.9 PRURITIC CONDITION: ICD-10-CM

## 2025-05-12 DIAGNOSIS — G47.33 OSA (OBSTRUCTIVE SLEEP APNEA): ICD-10-CM

## 2025-05-12 DIAGNOSIS — E66.811 CLASS 1 OBESITY DUE TO EXCESS CALORIES WITH SERIOUS COMORBIDITY AND BODY MASS INDEX (BMI) OF 34.0 TO 34.9 IN ADULT: ICD-10-CM

## 2025-05-12 DIAGNOSIS — Z96.652 STATUS POST LEFT KNEE REPLACEMENT: ICD-10-CM

## 2025-05-12 DIAGNOSIS — E66.09 CLASS 1 OBESITY DUE TO EXCESS CALORIES WITH SERIOUS COMORBIDITY AND BODY MASS INDEX (BMI) OF 34.0 TO 34.9 IN ADULT: ICD-10-CM

## 2025-05-12 DIAGNOSIS — E11.40 TYPE 2 DIABETES MELLITUS WITH DIABETIC NEUROPATHY, WITHOUT LONG-TERM CURRENT USE OF INSULIN: Chronic | ICD-10-CM

## 2025-05-12 RX ORDER — TIZANIDINE 4 MG/1
4 TABLET ORAL EVERY 6 HOURS PRN
Qty: 40 TABLET | Refills: 0 | Status: SHIPPED | OUTPATIENT
Start: 2025-05-12 | End: 2025-05-23

## 2025-05-12 RX ORDER — HYDROXYZINE HYDROCHLORIDE 25 MG/1
25 TABLET, FILM COATED ORAL
Qty: 90 TABLET | Refills: 0 | Status: SHIPPED | OUTPATIENT
Start: 2025-05-12

## 2025-05-12 RX ORDER — ASPIRIN 81 MG/1
81 TABLET ORAL 2 TIMES DAILY
Qty: 60 TABLET | Refills: 0 | OUTPATIENT
Start: 2025-05-12 | End: 2025-06-11

## 2025-05-12 NOTE — TELEPHONE ENCOUNTER
No care due was identified.  [facility] Embedded Care Due Messages. Reference number: [id number].   5/12/2025 4:43:59 PM CDT

## 2025-05-13 ENCOUNTER — OUTPATIENT CASE MANAGEMENT (OUTPATIENT)
Dept: ADMINISTRATIVE | Facility: OTHER | Age: 75
End: 2025-05-13
Payer: MEDICARE

## 2025-05-13 ENCOUNTER — OFFICE VISIT (OUTPATIENT)
Dept: NEUROLOGY | Facility: CLINIC | Age: 75
End: 2025-05-13
Payer: MEDICARE

## 2025-05-13 VITALS
WEIGHT: 184.94 LBS | BODY MASS INDEX: 31.75 KG/M2 | HEART RATE: 70 BPM | DIASTOLIC BLOOD PRESSURE: 75 MMHG | SYSTOLIC BLOOD PRESSURE: 159 MMHG

## 2025-05-13 DIAGNOSIS — G47.00 INSOMNIA, UNSPECIFIED TYPE: ICD-10-CM

## 2025-05-13 DIAGNOSIS — R41.89 COGNITIVE IMPAIRMENT: Primary | ICD-10-CM

## 2025-05-13 DIAGNOSIS — M79.2 NERVE PAIN: ICD-10-CM

## 2025-05-13 DIAGNOSIS — G60.3 IDIOPATHIC PROGRESSIVE NEUROPATHY: ICD-10-CM

## 2025-05-13 DIAGNOSIS — R41.3 MEMORY LOSS: ICD-10-CM

## 2025-05-13 DIAGNOSIS — M54.10 RADICULOPATHY, UNSPECIFIED SPINAL REGION: ICD-10-CM

## 2025-05-13 PROCEDURE — 99999 PR PBB SHADOW E&M-EST. PATIENT-LVL IV: CPT | Mod: PBBFAC,HCNC,, | Performed by: STUDENT IN AN ORGANIZED HEALTH CARE EDUCATION/TRAINING PROGRAM

## 2025-05-13 PROCEDURE — 1159F MED LIST DOCD IN RCRD: CPT | Mod: CPTII,HCNC,S$GLB, | Performed by: STUDENT IN AN ORGANIZED HEALTH CARE EDUCATION/TRAINING PROGRAM

## 2025-05-13 PROCEDURE — 3072F LOW RISK FOR RETINOPATHY: CPT | Mod: CPTII,HCNC,S$GLB, | Performed by: STUDENT IN AN ORGANIZED HEALTH CARE EDUCATION/TRAINING PROGRAM

## 2025-05-13 PROCEDURE — 99215 OFFICE O/P EST HI 40 MIN: CPT | Mod: HCNC,S$GLB,, | Performed by: STUDENT IN AN ORGANIZED HEALTH CARE EDUCATION/TRAINING PROGRAM

## 2025-05-13 PROCEDURE — 3078F DIAST BP <80 MM HG: CPT | Mod: CPTII,HCNC,S$GLB, | Performed by: STUDENT IN AN ORGANIZED HEALTH CARE EDUCATION/TRAINING PROGRAM

## 2025-05-13 PROCEDURE — 3077F SYST BP >= 140 MM HG: CPT | Mod: CPTII,HCNC,S$GLB, | Performed by: STUDENT IN AN ORGANIZED HEALTH CARE EDUCATION/TRAINING PROGRAM

## 2025-05-13 PROCEDURE — 1101F PT FALLS ASSESS-DOCD LE1/YR: CPT | Mod: CPTII,HCNC,S$GLB, | Performed by: STUDENT IN AN ORGANIZED HEALTH CARE EDUCATION/TRAINING PROGRAM

## 2025-05-13 PROCEDURE — 3288F FALL RISK ASSESSMENT DOCD: CPT | Mod: CPTII,HCNC,S$GLB, | Performed by: STUDENT IN AN ORGANIZED HEALTH CARE EDUCATION/TRAINING PROGRAM

## 2025-05-13 PROCEDURE — 1126F AMNT PAIN NOTED NONE PRSNT: CPT | Mod: CPTII,HCNC,S$GLB, | Performed by: STUDENT IN AN ORGANIZED HEALTH CARE EDUCATION/TRAINING PROGRAM

## 2025-05-13 RX ORDER — SEMAGLUTIDE 0.68 MG/ML
0.5 INJECTION, SOLUTION SUBCUTANEOUS
Qty: 3 ML | Refills: 0 | Status: SHIPPED | OUTPATIENT
Start: 2025-05-13 | End: 2025-06-12

## 2025-05-13 RX ORDER — DONEPEZIL HYDROCHLORIDE 5 MG/1
5 TABLET, FILM COATED ORAL NIGHTLY
Qty: 90 TABLET | Refills: 1 | Status: SHIPPED | OUTPATIENT
Start: 2025-05-13 | End: 2025-11-09

## 2025-05-13 RX ORDER — GABAPENTIN 300 MG/1
300 CAPSULE ORAL 3 TIMES DAILY
Qty: 270 CAPSULE | Refills: 1 | Status: SHIPPED | OUTPATIENT
Start: 2025-05-13 | End: 2025-11-09

## 2025-05-13 NOTE — PROGRESS NOTES
"  Chief Complaint and Duration     Neuropathy pain  For "years", here for worsening memory    History of Present Illness     Valarie Foster is a 74 y.o. female w history of long standing DM, HTN, and degenerative arthritis w significant knee pain (s/p injections) who comes in for "nerve pain" with pain in her chronic lower back that causes radiating numbness and tingling down her buttocks to her legs (L > R).    Denies any bowel or bladder incontinence, no recent falls. Feels stiff when she wakes up in the morning. Radiating pain felt worse w certain positions, she endorses leg stiffness and knee pain as well. Did physical therapy a few months prior w mild relief. Has been on gabapentin 600mg at night w again, mild relief. Sometimes the pain would wake her up from sleep at night.    No history of MVAs. No weakness.     Interim:  1/22/24 - worsening of nerve pain, gabapentin no longer works. Has not tried lyrica.     2/16/24 - continues to have low back pain, pain in hands that drops objects.     2/4/25 - here for memory. Significant grief w loss of her brother last year. Significant sleep problems.     5/13/25 - pending sleep study. F/u memory.       Review of patient's allergies indicates:   Allergen Reactions    Lisinopril Swelling and Shortness Of Breath    Ascorbic acid-ascorbate calc      And citrus fruits     Current Outpatient Medications   Medication Sig Dispense Refill    ACCU-CHEK ONEIL PLUS TEST STRP Strp TEST BLOOD SUGAR TWICE DAILY 200 strip 1    ACCU-CHEK SOFTCLIX LANCETS Misc USE TO TEST BLOOD SUGAR ONE TIME DAILY 100 each 3    acetaminophen (TYLENOL) 500 MG tablet Take 2 tablets (1,000 mg total) by mouth every 8 (eight) hours as needed for Pain. 42 tablet 0    albuterol (PROVENTIL/VENTOLIN HFA) 90 mcg/actuation inhaler Inhale 1-2 puffs into the lungs every 6 (six) hours as needed for Wheezing or Shortness of Breath. Rescue 18 g 0    alcohol swabs (DROPSAFE ALCOHOL PREP PADS) PadM USE AS DIRECTED " THREE TIMES DAILY 300 each 3    amLODIPine (NORVASC) 10 MG tablet Take 1 tablet (10 mg total) by mouth once daily. 90 tablet 1    atorvastatin (LIPITOR) 80 MG tablet TAKE 1 TABLET EVERY EVENING 90 tablet 3    azelastine (ASTELIN) 137 mcg (0.1 %) nasal spray 1 spray (137 mcg total) by Nasal route 2 (two) times daily. 90 mL 0    blood glucose control high,low (ACCU-CHEK ONEIL CONTROL SOLN) Soln 1 Units by Misc.(Non-Drug; Combo Route) route as needed. 1 each 0    blood-glucose meter (ACCU-CHEK ONEIL PLUS METER) Misc 1 kit by Misc.(Non-Drug; Combo Route) route once daily. 1 each 0    blood-glucose meter (TRUE METRIX GLUCOSE METER) kit Use as directed to test glucose 1 each 0    celecoxib (CELEBREX) 200 MG capsule Take 1 capsule (200 mg total) by mouth 2 (two) times daily. 14 capsule 0    docusate sodium (COLACE) 100 MG capsule Take 1 capsule (100 mg total) by mouth 2 (two) times daily. 30 capsule 0    ergocalciferol (ERGOCALCIFEROL) 50,000 unit Cap TAKE 1 CAPSULE EVERY 7 DAYS. 12 capsule 3    FLUoxetine 40 MG capsule Take 1 capsule (40 mg total) by mouth once daily. 90 capsule 1    fluticasone propionate (FLONASE) 50 mcg/actuation nasal spray USE 1 SPRAY IN EACH NOSTRIL ONCE DAILY 32 g 3    hydrALAZINE (APRESOLINE) 100 MG tablet TAKE 1 TABLET EVERY 8 HOURS 90 tablet 3    hydrOXYzine HCL (ATARAX) 25 MG tablet TAKE 1 TABLET BY MOUTH THREE TIMES DAILY AS NEEDED FOR ITCHING 90 tablet 0    JARDIANCE 25 mg tablet TAKE 1 TABLET ONE TIME DAILY 90 tablet 1    methylPREDNISolone (MEDROL DOSEPACK) 4 mg tablet use as directed 21 tablet 0    metoprolol succinate (TOPROL-XL) 100 MG 24 hr tablet Take 1 tablet (100 mg total) by mouth once daily. 90 tablet 3    ondansetron (ZOFRAN-ODT) 4 MG TbDL Take 1 tablet (4 mg total) by mouth every 6 (six) hours as needed (for nausea). 20 tablet 0    oxyCODONE (OXY-IR) 5 mg Cap Take 1 capsule (5 mg total) by mouth every 6 (six) hours as needed for Pain. 30 capsule 0    pantoprazole (PROTONIX) 40 MG  tablet Take 1 tablet (40 mg total) by mouth once daily. 90 tablet 1    semaglutide (OZEMPIC) 0.25 mg or 0.5 mg (2 mg/3 mL) pen injector Inject 0.25 mg into the skin every 7 days. 3 mL 0    semaglutide (OZEMPIC) 1 mg/dose (2 mg/1.5 mL) PnIj Inject 1 mg into the skin every 7 days. 3 mL 5    tiZANidine (ZANAFLEX) 4 MG tablet Take 1 tablet (4 mg total) by mouth every 6 (six) hours as needed. 40 tablet 0    TRUEPLUS LANCETS 33 gauge Misc TEST BLOOD SUGAR TWICE DAILY 200 each 1    aspirin (ECOTRIN) 81 MG EC tablet Take 1 tablet (81 mg total) by mouth 2 (two) times a day. 60 tablet 0    blood glucose control, low (TRUE METRIX LEVEL 1) Soln 1 each by Misc.(Non-Drug; Combo Route) route once as needed. 1 each 3    cetirizine (ZYRTEC) 10 MG tablet Take 1 tablet (10 mg total) by mouth once daily. 90 tablet 0    donepeziL (ARICEPT) 5 MG tablet Take 1 tablet (5 mg total) by mouth every evening. 90 tablet 1    gabapentin (NEURONTIN) 300 MG capsule Take 1 capsule (300 mg total) by mouth 3 (three) times daily. 270 capsule 1     No current facility-administered medications for this visit.     Facility-Administered Medications Ordered in Other Visits   Medication Dose Route Frequency Provider Last Rate Last Admin    acetaminophen tablet 1,000 mg  1,000 mg Oral On Call Procedure Eric Carbajal MD        acetaminophen tablet 1,000 mg  1,000 mg Oral Q6H Eric Carbajal MD   1,000 mg at 11/09/23 0506    aspirin EC tablet 81 mg  81 mg Oral BID Eric Carbajal MD   81 mg at 11/09/23 1358    LIDOcaine (PF) 10 mg/ml (1%) injection 10 mg  1 mL Intradermal On Call Procedure Eric Carbajal MD        methocarbamoL tablet 750 mg  750 mg Oral TID Eric Carbajal MD   750 mg at 11/09/23 1357    naloxone 0.4 mg/mL injection 0.02 mg  0.02 mg Intravenous PRN Eric Carbajal MD        ondansetron injection 4 mg  4 mg Intravenous Q8H PRN Eric Carbajal MD   4 mg at 11/09/23 0858    oxyCODONE immediate release tablet 10 mg  10 mg Oral Q3H  PRN Eric Carbajal MD   10 mg at 11/09/23 1043    oxyCODONE immediate release tablet 5 mg  5 mg Oral Q3H PRN Eric Carbajal MD        polyethylene glycol packet 17 g  17 g Oral Daily Eric Carbajal MD   17 g at 11/09/23 1356    pregabalin capsule 75 mg  75 mg Oral QHS Eric Carbajal MD   75 mg at 11/08/23 2147    prochlorperazine injection Soln 5 mg  5 mg Intravenous Q6H PRN Eric Carbajal MD   5 mg at 11/08/23 1325    senna-docusate 8.6-50 mg per tablet 1 tablet  1 tablet Oral BID Eric Carbajal MD   1 tablet at 11/09/23 1358    sodium chloride 0.9% flush 10 mL  10 mL Intravenous PRN Eric Carbajal MD           Medical History     Past Medical History:   Diagnosis Date    Arthritis     Colon polyp     Diabetes mellitus     diet controlled    Diabetes with neurologic complications     Hypertension     Nuclear sclerosis of both eyes 02/14/2022    Renal cell carcinoma     Sleep apnea      Past Surgical History:   Procedure Laterality Date    ARTHROPLASTY, KNEE, TOTAL, USING COMPUTER-ASSISTED NAVIGATION Left 3/5/2025    Procedure: ARTHROPLASTY, KNEE, TOTAL, USING COMPUTER-ASSISTED NAVIGATION;  Surgeon: Eric Carbajal MD;  Location: [facility] OR;  Service: Orthopedics;  Laterality: Left;  [place]---CLEARED BY CARDS AND PCP  FIRST MARTIN ASSIST NOTIFIED-GG  RN PREOP 2/19/2025   T/S ON 2/28/2025---NEED ORDERS    COLONOSCOPY N/A 2/5/2018    Procedure: COLONOSCOPY;  Surgeon: Bacilio Mancia MD;  Location: [facility] ENDO;  Service: Endoscopy;  Laterality: N/A;  appt confirmed-ss    COLONOSCOPY N/A 8/13/2020    Procedure: COLONOSCOPY;  Surgeon: Jose West MD;  Location: [facility] ENDO;  Service: Endoscopy;  Laterality: N/A;    ENDOSCOPIC CARPAL TUNNEL RELEASE Right 3/15/2024    Procedure: RELEASE, CARPAL TUNNEL, ENDOSCOPIC - RIGHT;  Surgeon: Tonio Mcdermott MD;  Location: [facility] OR;  Service: Orthopedics;  Laterality: Right;    ENDOSCOPIC CARPAL TUNNEL RELEASE Left 6/26/2024    Procedure: RELEASE,  CARPAL TUNNEL, ENDOSCOPIC - left;  Surgeon: Tonio Mcdermott MD;  Location: [facility] OR;  Service: Orthopedics;  Laterality: Left;    HYSTERECTOMY      INTRAOCULAR PROSTHESES INSERTION Left 7/7/2022    Procedure: INSERTION, IOL PROSTHESIS;  Surgeon: Candelario Novoa MD;  Location: [facility] OR;  Service: Ophthalmology;  Laterality: Left;    INTRAOCULAR PROSTHESES INSERTION Right 7/21/2022    Procedure: INSERTION, IOL PROSTHESIS;  Surgeon: Candelario Novoa MD;  Location: [facility] OR;  Service: Ophthalmology;  Laterality: Right;    OOPHORECTOMY      PARTIAL NEPHRECTOMY Right 10/12/2018    Procedure: NEPHRECTOMY, PARTIAL open. Dr Arenas to assist.;  Surgeon: Alejandra Palm MD;  Location: [facility] OR;  Service: Urology;  Laterality: Right;  RN PREOP 10/9/2018----NEEDS H/P    PHACOEMULSIFICATION OF CATARACT Left 7/7/2022    Procedure: PHACOEMULSIFICATION, CATARACT;  Surgeon: Candelario Novoa MD;  Location: [facility] OR;  Service: Ophthalmology;  Laterality: Left;  RN PHONE PREOP 6/27/2022   ARRIVAL 6:00 AM    PHACOEMULSIFICATION OF CATARACT Right 7/21/2022    Procedure: PHACOEMULSIFICATION, CATARACT;  Surgeon: Candelario Novoa MD;  Location: [facility] OR;  Service: Ophthalmology;  Laterality: Right;  RN PHONE PREOP 7/12/2022   ARRIVAL 12:00 NOON    TOTAL KNEE ARTHROPLASTY Right 11/8/2023    Procedure: ARTHROPLASTY, KNEE, TOTAL. NELSON;  Surgeon: Eric Carbajal MD;  Location: [facility];  Service: Orthopedics;  Laterality: Right;  [place]. Admit  RAPHAEL QUINTIN NIEVES [phone] NOTIFIED QUINTIN ON 10/11/2023-LO  RN PREOP 10/25/2023   T/S ON 11/6/2023--done------CLEARED BY CARDS     Family History   Problem Relation Name Age of Onset    No Known Problems Mother      Glaucoma Father      Breast cancer Sister Warnell     Glaucoma Sister Warnell     Cancer Sister Warnell         breast cancer    Thyroid disease Sister Angeles     Blindness Sister Bryanna         due to trauma during car accident    Diabetes Sister  Amanda     No Known Problems Maternal Aunt      No Known Problems Maternal Uncle      No Known Problems Paternal Aunt      No Known Problems Paternal Uncle      No Known Problems Maternal Grandmother      No Known Problems Maternal Grandfather      No Known Problems Paternal Grandmother      No Known Problems Paternal Grandfather      Diabetes Brother Navneet     Amblyopia Neg Hx      Cataracts Neg Hx      Hypertension Neg Hx      Macular degeneration Neg Hx      Retinal detachment Neg Hx      Strabismus Neg Hx      Stroke Neg Hx       Social History     Socioeconomic History    Marital status:    Tobacco Use    Smoking status: Never     Passive exposure: Past    Smokeless tobacco: Never   Substance and Sexual Activity    Alcohol use: No    Drug use: No    Sexual activity: Not Currently     Social Drivers of Health     Financial Resource Strain: Low Risk  (3/5/2025)    Overall Financial Resource Strain (CARDIA)     Difficulty of Paying Living Expenses: Not hard at all   Food Insecurity: No Food Insecurity (3/5/2025)    Hunger Vital Sign     Worried About Running Out of Food in the Last Year: Never true     Ran Out of Food in the Last Year: Never true   Transportation Needs: No Transportation Needs (11/22/2024)    TRANSPORTATION NEEDS     Transportation : No   Physical Activity: Insufficiently Active (11/25/2024)    Exercise Vital Sign     Days of Exercise per Week: 4 days     Minutes of Exercise per Session: 20 min   Stress: No Stress Concern Present (3/5/2025)    [employer] - Occupational Stress Questionnaire     Feeling of Stress : Only a little   Housing Stability: High Risk (3/5/2025)    Housing Stability Vital Sign     Unable to Pay for Housing in the Last Year: Yes     Homeless in the Last Year: No       Exam     Vitals:    05/13/25 0708   BP: (!) 159/75   Pulse: 70        Physical Exam:  General: She is not in acute distress. She is not ill-appearing.   HENT: Normocephalic  and atraumatic. Moist mucous membranes.  Eyes: Conjunctivae normal.   Pulmonary: Pulmonary effort is normal.   Abdominal: Abdomen is soft and flat.   Skin: Skin is warm and dry. No rashes.   Psychiatric: Mood normal.        Neurologic Exam   Mental status: oriented to person, place, and time  Attention: Normal. Concentration: normal.  Speech: speech is normal.  Cranial Nerves: PERRL, EOMI intact, V1-V3 Facial sensation intact. Symmetric facies. Hearing grossly intact. Palate and uvula midline, symmetric. No tongue deviation. Trapezius strength intact.     Motor exam: bulk and tone normal. Strength 5/5 in bilateral upper extremities: deltoids, biceps, triceps, wrist flexion/extension, finger abduction/adduction. Strength 5/5 in bilateral lower extremities: hip flexion/extension, thigh adduction/abduction, knee flexion/extension, dorsiflexion/plantarflexion, foot eversion/inversion.    Reflexes: 2+ in bilateral upper extremities: biceps and brachiaradialis, 1+ patellar (patient difficulty w relaxing), difficult to elicit achilles  Plantar reflex: normal    Sensory exam: light touch intact, vibration sense decreased    Gait exam: normal although she walks w stiff gait and tense  Romberg: positive  Coordination: intact    Tremor: none    Labs and Imaging     Labs: reviewed  A1C 5/22 - 6.9 (down from 8.2). TSH wnl    Imaging:   Mri brain 2025 reviewed    Assessment and Plan     Problem List Items Addressed This Visit          Neuro    Radiculopathy    Idiopathic progressive neuropathy    Nerve pain    Relevant Medications    gabapentin (NEURONTIN) 300 MG capsule    Memory loss    Relevant Medications    donepeziL (ARICEPT) 5 MG tablet    Cognitive impairment - Primary    Relevant Medications    donepeziL (ARICEPT) 5 MG tablet       Other    Insomnia    Overview   difficulty with sleep initiaton and maintenance.   Will provide ambien for the night of sleep study.            Follow up for her neuropathy.  Had labs done on  prior visit. Endorses worsening of nerve pain and paraesthesias in fingers and toes.    Idiopathic length dependent peripheral sensory neuropathy based on clinical and exam findings, likely 2/2 to DMII. A1C is elevated.     Her nerve pain - multifactorial etiology, from lumbar radiculopathy to a length dependent peripheral neuropathy from underlying DMII  - Prior dosing of gabapentin to 300mg in AM, 300mg during day, and 600mg at night for neuropathic pain. Was planned on prior visit to do lyrica, however patient was lost to followup.  On gabapentin 300mg BID w PCP, can increase to gabapentin 300mg TID. Continue at this time, can optimize on next visit.   - also contributing factors w muscle/overall body pain from degenerative arthritis, patient encouraged about lifestyle modifications and physical therapy exercises at home    NCS/EMG today shows she has a bilateral sensory carpal tunnel, send to ortho for assistance. Chronic lower back pain with chronic denervation in L4-l5 and L5-S1 - pain management for assistance. States neuropathic pain meds are not working.     Pending sleep study. Has seen pulm.   Significant insomnia. Will help w getting patient number to reschedule.     Memory loss / cognitive impairment - lives by herself. Able to do ADLs.  MOCA 14/20. Mri brain reviewed - chronic microvascular changes.   Multifactorial component.     Start donepezil 5mg nightly.     Takes aspirin 81mg and statin for 2/2 stroke prevention.     Appreciate opportunity to care for this patient. Mri brain personally reviewed w patient.     Follow-up: 3 months    Time spent on this encounter: 40 minutes. This includes face to face time and non-face to face time preparing to see the patient (eg, review of tests), obtaining and/or reviewing separately obtained history, documenting clinical information in the electronic or other health record, independently interpreting results and communicating results to the  patient/family/caregiver, or care coordinator.      ambulate none bed

## 2025-05-13 NOTE — PROGRESS NOTES
Outpatient Care Management  Plan of Care Follow Up Visit    Patient: Valarie Foster  MRN: [id number]  Date of Service: 05/13/2025  Completed by: Aniyah Marks RN  Referral Date: 10/16/2024    No chief complaint on file.      Brief Summary: Pt reports that she continues to have swelling to her left knee post knee replacement. Pt states that she is unable to get to PT and MD appts because she has used all of her rides provided per [facility]. CHW referred to the case [facility] Pt informed that the CHW  will contact her about transportation resources. Pt voiced that she has been placed on medications per MD appt on today due to being forgetful. Pt aslo states that she will have a sleep study scheduled.

## 2025-05-19 RX ORDER — ONDANSETRON 4 MG/1
4 TABLET, ORALLY DISINTEGRATING ORAL EVERY 6 HOURS PRN
Qty: 20 TABLET | Refills: 0 | Status: SHIPPED | OUTPATIENT
Start: 2025-05-19 | End: 2025-05-21 | Stop reason: SDUPTHER

## 2025-05-19 NOTE — TELEPHONE ENCOUNTER
----- Message from Dana sent at 5/19/2025  8:49 AM CDT -----  .Type: RX Refill RequestWho Called: SelfHave you contacted your pharmacy: No Refill or New Rx: Refill RX Name and Strength:ondansetron (ZOFRAN-ODT) 4 MG TbDLalbuterol (PROVENTIL/VENTOLIN HFA) 90 mcg/actuation inhalerPreferred Pharmacy with phone number:.[facility] [address] [place], [place] [address]IN[place]S [place] [address] [facility] [address]W[place]st: [phone] Fax: [phone] or Mail Order: Local Ordering Provider: Alena Hernandez Would the patient rather a call back or a response via [facility]? Call Back Best Call Back Number: .[phone] (home) Additional Information:

## 2025-05-19 NOTE — TELEPHONE ENCOUNTER
Care Due:                  Date            Visit Type   Department     Provider  --------------------------------------------------------------------------------                                EP -         [facility] FAM[facility] MED                              PRIMARY      / INTERNAL MED  Last Visit: 01-      CARE ([facility])   / CLARITZA Hernandez                               -         [facility]                              PRIMARY      / INTERNAL MED  Next Visit: 07-      CARE ([facility])   / CLARITZA Hernandez                                                            Last  Test          Frequency    Reason                     Performed    Due Date  --------------------------------------------------------------------------------    Vitamin D...  12 months..  ergocalciferol...........  Not Found    Overdue    [employer] Embedded Care Due Messages. Reference number: [id number].   5/19/2025 9:23:36 AM CDT

## 2025-05-20 ENCOUNTER — PATIENT OUTREACH (OUTPATIENT)
Dept: ADMINISTRATIVE | Facility: OTHER | Age: 75
End: 2025-05-20
Payer: MEDICARE

## 2025-05-20 NOTE — PROGRESS NOTES
This Community Health Worker (CHW) completed Social Determinant of Health (SDOH)  Questionnaire with patient/caregiver via telephone today.  Notified OPCM ALFRED Dill RN of completion.      Patient requested financial, food and transportation assistance. [facility]. Pt given [employer] number [phone] for food and [facility] Financial assistance number [phone] for financial assistance.

## 2025-05-21 ENCOUNTER — RESULTS FOLLOW-UP (OUTPATIENT)
Dept: FAMILY MEDICINE | Facility: CLINIC | Age: 75
End: 2025-05-21

## 2025-05-21 ENCOUNTER — PATIENT OUTREACH (OUTPATIENT)
Dept: ADMINISTRATIVE | Facility: OTHER | Age: 75
End: 2025-05-21
Payer: MEDICARE

## 2025-05-21 ENCOUNTER — TELEPHONE (OUTPATIENT)
Dept: FAMILY MEDICINE | Facility: CLINIC | Age: 75
End: 2025-05-21

## 2025-05-21 ENCOUNTER — OFFICE VISIT (OUTPATIENT)
Dept: FAMILY MEDICINE | Facility: CLINIC | Age: 75
End: 2025-05-21
Payer: MEDICARE

## 2025-05-21 ENCOUNTER — HOSPITAL ENCOUNTER (OUTPATIENT)
Dept: RADIOLOGY | Facility: HOSPITAL | Age: 75
Discharge: HOME | End: 2025-05-21
Payer: MEDICARE

## 2025-05-21 VITALS
DIASTOLIC BLOOD PRESSURE: 72 MMHG | WEIGHT: 187.63 LBS | TEMPERATURE: 98 F | OXYGEN SATURATION: 97 % | HEIGHT: 64 IN | HEART RATE: 70 BPM | BODY MASS INDEX: 32.03 KG/M2 | SYSTOLIC BLOOD PRESSURE: 128 MMHG

## 2025-05-21 DIAGNOSIS — R11.0 NAUSEA: ICD-10-CM

## 2025-05-21 DIAGNOSIS — R10.9 ABDOMINAL PAIN, UNSPECIFIED ABDOMINAL LOCATION: ICD-10-CM

## 2025-05-21 DIAGNOSIS — R26.9 ABNORMALITY OF GAIT AND MOBILITY: ICD-10-CM

## 2025-05-21 DIAGNOSIS — Z00.00 ENCOUNTER FOR MEDICARE ANNUAL WELLNESS EXAM: Primary | ICD-10-CM

## 2025-05-21 DIAGNOSIS — Z74.8 ASSISTANCE NEEDED WITH TRANSPORTATION: ICD-10-CM

## 2025-05-21 DIAGNOSIS — B37.9 YEAST DETECTED: Primary | ICD-10-CM

## 2025-05-21 DIAGNOSIS — Z71.89 ADVANCED CARE PLANNING/COUNSELING DISCUSSION: ICD-10-CM

## 2025-05-21 DIAGNOSIS — Z78.0 ASYMPTOMATIC MENOPAUSE: ICD-10-CM

## 2025-05-21 DIAGNOSIS — Z59.41 FOOD INSECURITY: ICD-10-CM

## 2025-05-21 PROCEDURE — 74019 RADEX ABDOMEN 2 VIEWS: CPT | Mod: 26,HCNC,, | Performed by: RADIOLOGY

## 2025-05-21 PROCEDURE — 74019 RADEX ABDOMEN 2 VIEWS: CPT | Mod: TC,HCNC,FY,PO

## 2025-05-21 PROCEDURE — 99999 PR PBB SHADOW E&M-EST. PATIENT-LVL V: CPT | Mod: PBBFAC,HCNC,,

## 2025-05-21 RX ORDER — ONDANSETRON 4 MG/1
4 TABLET, ORALLY DISINTEGRATING ORAL EVERY 6 HOURS PRN
Qty: 20 TABLET | Refills: 0 | Status: SHIPPED | OUTPATIENT
Start: 2025-05-21

## 2025-05-21 RX ORDER — MELOXICAM 15 MG/1
15 TABLET ORAL DAILY PRN
Qty: 14 TABLET | Refills: 0 | Status: SHIPPED | OUTPATIENT
Start: 2025-05-21 | End: 2025-06-04

## 2025-05-21 RX ORDER — KETOROLAC TROMETHAMINE 30 MG/ML
30 INJECTION, SOLUTION INTRAMUSCULAR; INTRAVENOUS
Status: COMPLETED | OUTPATIENT
Start: 2025-05-21 | End: 2025-05-21

## 2025-05-21 RX ORDER — DOCUSATE SODIUM 100 MG/1
100 CAPSULE, LIQUID FILLED ORAL 2 TIMES DAILY
Qty: 30 CAPSULE | Refills: 0 | Status: SHIPPED | OUTPATIENT
Start: 2025-05-21

## 2025-05-21 RX ADMIN — KETOROLAC TROMETHAMINE 30 MG: 30 INJECTION, SOLUTION INTRAMUSCULAR; INTRAVENOUS at 09:05

## 2025-05-21 SDOH — SOCIAL DETERMINANTS OF HEALTH (SDOH): FOOD INSECURITY: Z59.41

## 2025-05-21 NOTE — PATIENT INSTRUCTIONS
Counseling and Referral of Other Preventative  (Italic type indicates deductible and co-insurance are waived)    Patient Name: Valarie Foster  Today's Date: 5/21/2025    Health Maintenance       Date Due Completion Date    Diabetic Eye Exam 04/30/2025 4/30/2024    DEXA Scan 07/13/2025 7/13/2023    Diabetes Urine Screening 10/28/2025 10/28/2024    Lipid Panel 10/28/2025 10/28/2024    Hemoglobin A1c 11/09/2025 5/9/2025    TETANUS VACCINE 04/30/2028 4/30/2018        Orders Placed This Encounter   Procedures    [facility] ENROLLMENT     The following information is provided to all patients.  This information is to help you find resources for any of the problems found today that may be affecting your health:                  Living healthy guide: www.[facility].[place].gov      Understanding Diabetes: www.diabetes.org      Eating healthy: www.cdc.gov/healthyweight      CDC home safety checklist: www.cdc.gov/steadi/patient.html      Agency on Aging: www.goea.[place].[facility]      Alcoholics anonymous (AA): www.aa.org      Physical Activity: www.[place].nih.gov/sa9xnzb      Tobacco use: www.quitwithusla.org

## 2025-05-21 NOTE — PROGRESS NOTES
"Valarie Foster presented for a follow-up Medicare AWV today. The following components were reviewed and updated:    Medical history  Family History  Social history  Allergies and Current Medications  Health Risk Assessment  Health Maintenance  Care Team    **See Completed Assessments for Annual Wellness visit with in the encounter summary    The following assessments were completed:  Depression Screening  Cognitive function Screening  Timed Get Up Test  Whisper Test            Opioid documentation:    Patient does have a current opioid prescription.      Patient accepted further discussion regarding opioid medication use.      Patient is currently taking oxycodone narcotic for knee pain.        Pain level today is 2/10.       In addition to narcotic pain medications, patient is also using physical therapy and NSAIDs for pain control.       Patient is followed by a specialist currently for their pain and will not be referred today.       Patient's opioid risk potential based on ORT-OUD tool:        each box that applies   No   Yes     Family history of substance abuse   Alcohol [x] []   Illegal drugs [x] []   Rx drugs [x] []     Personal history of substance abuse   Alcohol [x] []   Illegal drugs [x] []   Rx drugs [x] []     Age between 16-45 years   [x]   []     Patient with ADD, OCD, Bipolar disorder, schizoprenia   [x]   []     Patient with depression   [x]   []                         Scoring total                             0                                    Non-opioid treatment options have been discussed today and added to the patient's after visit summary.          Vitals:    05/21/25 0832   BP: 128/72   Pulse: 70   Temp: 98.2 °F (36.8 °C)   TempSrc: Oral   SpO2: 97%   Weight: 85.1 kg (187 lb 9.8 oz)   Height: 5' 4" (1.626 m)     Body mass index is 32.2 kg/m².       Physical Exam  Vitals reviewed.   Constitutional:       General: She is not in acute distress.     Appearance: Normal appearance. She is " obese. She is not ill-appearing.   HENT:      Head: Normocephalic and atraumatic.   Cardiovascular:      Rate and Rhythm: Normal rate and regular rhythm.      Pulses: Normal pulses.      Heart sounds: Normal heart sounds. No murmur heard.  Pulmonary:      Effort: Pulmonary effort is normal. No respiratory distress.      Breath sounds: Normal breath sounds. No wheezing.   Chest:      Chest wall: No tenderness.   Abdominal:      General: Abdomen is flat. Bowel sounds are normal. There is no distension.      Palpations: Abdomen is soft.      Tenderness: There is no abdominal tenderness. There is no right CVA tenderness, left CVA tenderness or guarding.   Musculoskeletal:         General: Normal range of motion.      Cervical back: Normal range of motion and neck supple.   Skin:     General: Skin is warm and dry.      Capillary Refill: Capillary refill takes less than 2 seconds.   Neurological:      General: No focal deficit present.      Mental Status: She is alert and oriented to person, place, and time.   Psychiatric:         Mood and Affect: Mood normal.         Behavior: Behavior normal.       Diagnoses and health risks identified today and associated recommendations/orders:    1. Encounter for Medicare annual wellness exam    Counseled on age appropriate medical preventative services including age appropriate cancer screenings, age appropriate eye and dental exams, over all nutritional health, need for a consistent exercise regimen, and an over all push towards maintaining a vigorous and active lifestyle.  Counseled on age appropriate vaccines and discussed upcoming health care needs based on age/gender. Discussed good sleep hygiene and stress management.    2. Advanced care planning/counseling discussion    I offered to discuss advanced care planning, including how to pick a person who would make decisions for you if you were unable to make them for yourself, called a health care power of , and what kind  of decisions you might make such as use of life sustaining treatments such as ventilators and tube feeding when faced with a life limiting illness recorded on a living will that they will need to know. (How you want to be cared for as you near the end of your natural life)     X Patient is interested in learning more about how to make advanced directives.  I provided them paperwork and offered to discuss this with them.    3. Abnormality of gait and mobility    Stable, ambulating without difficulty. Managed by PCP.    4. Food insecurity  5. Assistance needed with transportation    SDoH Intervention:     As of today, the patient has reported risk in the following areas:    Food Insecurity: Food Insecurity Present (5/21/2025)    Hunger Vital Sign     Worried About Running Out of Food in the Last Year: Sometimes true     Ran Out of Food in the Last Year: Sometimes true     Transportation Needs: Unmet Transportation Needs (5/21/2025)    PRAPARE - Transportation     Lack of Transportation (Medical): Yes     Lack of Transportation (Non-Medical): No     Enrollment order placed to Community Health Worker.  Spent approximately 5 minutes assessing social needs impacting patient care.    - COMMUNITY HEALTH PROGRAM ENROLLMENT    6. Abdominal pain, unspecified abdominal location    Recurrence of upper epigastric abdominal pain. Patient reports pain with deep inspiration and with twisting a certain way. Feels similar to last visit when she had pain and went to ED; workup in ED unremarkable except for atelectasis/pleuritic pain. Started Monday radiates to left side. Hurts more with laying down and with standing up. No TTP; bowel sounds positive x 4 quadrants. Lungs CTA bilaterally. Denies CP, speaking without difficulty with no tachypnea noted. Last BM today. Out of caution will order KUB xray and UA. Will give Toradol injection today as it helped relieve her pain last visit. Has Cardiology follow up tomorrow and Pulmonology  appointment in 2 weeks. Strict ED precautions given for any worsening symptoms.     - ketorolac injection 30 mg  - docusate sodium (COLACE) 100 MG capsule; Take 1 capsule (100 mg total) by mouth 2 (two) times daily.  Dispense: 30 capsule; Refill: 0  - meloxicam (MOBIC) 15 MG tablet; Take 1 tablet (15 mg total) by mouth daily as needed for Pain.  Dispense: 14 tablet; Refill: 0  - X-Ray Abdomen Flat And Erect; Future  - Urinalysis, Reflex to Urine Culture Urine, Clean Catch; Future    7. Nausea    Reports nausea in the morning when taking her meds. Requesting refill of zofran, will refill.     - ondansetron (ZOFRAN-ODT) 4 MG TbDL; Take 1 tablet (4 mg total) by mouth every 6 (six) hours as needed (for nausea).  Dispense: 20 tablet; Refill: 0    8. Asymptomatic menopause    - DXA Bone Density Axial Skeleton 1 or more sites; Future      Provided Frannie with a 5-10 year written screening schedule and personal prevention plan. Recommendations were developed using the USPSTF age appropriate recommendations. Education, counseling, and referrals were provided as needed.  After Visit Summary printed and given to patient which includes a list of additional screenings\tests needed.    Follow up if symptoms worsen or fail to improve.      KULDEEP Rivera

## 2025-05-21 NOTE — TELEPHONE ENCOUNTER
Spoke with patient about results of her KUB xray; discussed treatment for constipation; patient will take miralax to help clear stool out of bowels. Reports that she is already feeling much better from Toradol injection earlier. Encouraged to follow up with any concerns or questions. Encouraged to keep Cardiology follow up appt tomorrow.

## 2025-05-22 ENCOUNTER — OFFICE VISIT (OUTPATIENT)
Dept: CARDIOLOGY | Facility: CLINIC | Age: 75
End: 2025-05-22
Payer: MEDICARE

## 2025-05-22 VITALS
BODY MASS INDEX: 31.91 KG/M2 | HEIGHT: 64 IN | DIASTOLIC BLOOD PRESSURE: 90 MMHG | SYSTOLIC BLOOD PRESSURE: 150 MMHG | OXYGEN SATURATION: 96 % | RESPIRATION RATE: 15 BRPM | WEIGHT: 186.94 LBS | HEART RATE: 76 BPM

## 2025-05-22 DIAGNOSIS — Z01.810 PREOP CARDIOVASCULAR EXAM: ICD-10-CM

## 2025-05-22 DIAGNOSIS — R07.9 CHEST PAIN, UNSPECIFIED TYPE: ICD-10-CM

## 2025-05-22 DIAGNOSIS — I70.0 ABDOMINAL AORTIC ATHEROSCLEROSIS: ICD-10-CM

## 2025-05-22 DIAGNOSIS — E78.5 HYPERLIPIDEMIA, UNSPECIFIED HYPERLIPIDEMIA TYPE: Primary | ICD-10-CM

## 2025-05-22 DIAGNOSIS — I10 BENIGN HYPERTENSION: ICD-10-CM

## 2025-05-22 PROCEDURE — 99999 PR PBB SHADOW E&M-EST. PATIENT-LVL V: CPT | Mod: PBBFAC,HCNC,, | Performed by: INTERNAL MEDICINE

## 2025-05-22 RX ORDER — METOPROLOL SUCCINATE 50 MG/1
50 TABLET, EXTENDED RELEASE ORAL DAILY
Qty: 90 TABLET | Refills: 3 | Status: SHIPPED | OUTPATIENT
Start: 2025-05-22

## 2025-05-22 NOTE — TELEPHONE ENCOUNTER
Yeast and glucose in urine, no WBC or nitrites. Patient on Jardiance. Will continue to monitor. Encouraged to reach out with worsening symptoms.

## 2025-05-22 NOTE — PROGRESS NOTES
CARDIOVASCULAR CONSULTATION    REASON FOR CONSULT:   Valarie Foster is a 74 y.o. female who presents for evaluation of chest pressure     HISTORY OF PRESENT ILLNESS:      Patient is a pleasant 72-year-old female.  Here for evaluation of chest pressure.  Substernal.  On exertion.  Denies orthopnea, PND, swelling of feet.  Multiple risk factors for coronary artery disease    The estimated ejection fraction is 55%.  The left ventricle is normal in size with concentric hypertrophy and normal systolic function.  Grade I left ventricular diastolic dysfunction.  Normal right ventricular size with normal right ventricular systolic function.  Moderate left atrial enlargement.  Mild mitral regurgitation.  Mild right atrial enlargement.  Normal central venous pressure (3 mmHg).  The estimated PA systolic pressure is 27 mmHg.     11/6:  no more chest pains. Went away on their own    Nov 23:    Normal myocardial perfusion scan. There is no evidence of myocardial ischemia or infarction.    There is a mild intensity perfusion abnormality in the anterior wall of the left ventricle, secondary to breast attenuation.    The gated perfusion images showed an ejection fraction of 59% post stress.    The ECG portion of the study is negative for ischemia.    The patient reported no chest pain during the stress test.    There were no arrhythmias during stress.    NOVEMBER 24:    History of Present Illness    CHIEF COMPLAINT:  Valarie presents today with heartburn and chest pain.    CARDIOVASCULAR:  She reports experiencing heartburn for approximately 4 days with associated chest pain. The chest pain started two days ago and is described as intermittent, coming and going without a clear pattern. She denies exacerbation of the pain with physical activity, such as walking or climbing stairs. No specific triggers or alleviating factors have been identified. Home blood pressure readings show yesterday's measurement at 150/70 and today's at 180.  She checks her blood pressure once in the morning and confirms taking her blood pressure medication this morning.    MEDICATIONS:  Current medications include Amlodipine 10 mg daily, Atorvastatin, and Metoprolol.       EKG DONE TODAY PERSONALLY REVIEWED AND SHOWS SINUS RHYTHM WITH HEART RATE OF 64 BEATS PER MINUTE, MILD LVH.      Jan 25:    History of Present Illness    CHIEF COMPLAINT:  Valarie presents today for knee pain and blood pressure management.    HYPERTENSION:  She reports severely elevated blood pressure reaching almost 200 on Friday. She maintains a blood pressure diary at home. She continues amlodipine 10 mg, aspirin, and atorvastatin.    KNEE PAIN:  She experiences intermittent sharp shooting knee pain lasting for a few seconds. The pain occurs randomly and is not associated with physical exertion or ambulation.    WEIGHT MANAGEMENT:  She reports difficulty with abdominal weight loss despite exercise.         Apr 25:    History of Present Illness    CHIEF COMPLAINT:  Valarie presents today for follow up    HYPERTENSION:  She reports elevated BP at home with most recent reading of 166/92.    MEDICAL HISTORY:  She has diabetes and HLD. She acknowledges that her diet choices may contribute to elevated cholesterol.    SURGICAL HISTORY:  She has undergone bilateral knee replacement surgery and reports significant post-op knee pain.    ALLERGIES:  She has a history of adverse reaction to lisinopril manifesting as facial swelling, mouth swelling, and dyspnea.     May 25:      History of Present Illness    CHIEF COMPLAINT:  Valarie presents today for follow up of HTN    CARDIOVASCULAR:  She reports experiencing chest pain, primarily occurring at nighttime, lasting less than 1 minute and relieved by aspirin. She denies chest pain with exertion or during daily activities. She reports elevated BP at home. She took BP medication this morning.    CURRENT MEDICATIONS:  She is currently taking Amlodipine, Aspirin,  Atorvastatin for cholesterol, Hydralazine, Metoprolol, Ozempic for weight management with reported effectiveness, and Jocor for diabetes management.             PAST MEDICAL HISTORY:     Past Medical History:   Diagnosis Date    Arthritis     Colon polyp     Diabetes mellitus     diet controlled    Diabetes with neurologic complications     Hypertension     Nuclear sclerosis of both eyes 02/14/2022    Renal cell carcinoma     Sleep apnea        PAST SURGICAL HISTORY:     Past Surgical History:   Procedure Laterality Date    ARTHROPLASTY, KNEE, TOTAL, USING COMPUTER-ASSISTED NAVIGATION Left 3/5/2025    Procedure: ARTHROPLASTY, KNEE, TOTAL, USING COMPUTER-ASSISTED NAVIGATION;  Surgeon: Eric Carbajal MD;  Location: [facility];  Service: Orthopedics;  Laterality: Left;  [place]---CLEARED BY CARDS AND PCP  FIRST MARTIN ASSIST NOTIFIED-GG  RN PREOP 2/19/2025   T/S ON 2/28/2025---NEED ORDERS    COLONOSCOPY N/A 2/5/2018    Procedure: COLONOSCOPY;  Surgeon: Bacilio Mancia MD;  Location: [facility] ENDO;  Service: Endoscopy;  Laterality: N/A;  appt confirmed-ss    COLONOSCOPY N/A 8/13/2020    Procedure: COLONOSCOPY;  Surgeon: Jose West MD;  Location: [facility] ENDO;  Service: Endoscopy;  Laterality: N/A;    ENDOSCOPIC CARPAL TUNNEL RELEASE Right 3/15/2024    Procedure: RELEASE, CARPAL TUNNEL, ENDOSCOPIC - RIGHT;  Surgeon: Tonio Mcdermott MD;  Location: [facility] OR;  Service: Orthopedics;  Laterality: Right;    ENDOSCOPIC CARPAL TUNNEL RELEASE Left 6/26/2024    Procedure: RELEASE, CARPAL TUNNEL, ENDOSCOPIC - left;  Surgeon: Tonio Mcdermott MD;  Location: [facility] OR;  Service: Orthopedics;  Laterality: Left;    HYSTERECTOMY      INTRAOCULAR PROSTHESES INSERTION Left 7/7/2022    Procedure: INSERTION, IOL PROSTHESIS;  Surgeon: Candelario Novoa MD;  Location: [facility] OR;  Service: Ophthalmology;  Laterality: Left;    INTRAOCULAR PROSTHESES INSERTION Right 7/21/2022    Procedure: INSERTION, IOL PROSTHESIS;  Surgeon: Candelario  BRENDA Novoa MD;  Location: [facility] OR;  Service: Ophthalmology;  Laterality: Right;    OOPHORECTOMY      PARTIAL NEPHRECTOMY Right 10/12/2018    Procedure: NEPHRECTOMY, PARTIAL open. Dr Arenas to assist.;  Surgeon: Alejandra Palm MD;  Location: [facility] OR;  Service: Urology;  Laterality: Right;  RN PREOP 10/9/2018----NEEDS H/P    PHACOEMULSIFICATION OF CATARACT Left 7/7/2022    Procedure: PHACOEMULSIFICATION, CATARACT;  Surgeon: Candelario Novoa MD;  Location: [facility] OR;  Service: Ophthalmology;  Laterality: Left;  RN PHONE PREOP 6/27/2022   ARRIVAL 6:00 AM    PHACOEMULSIFICATION OF CATARACT Right 7/21/2022    Procedure: PHACOEMULSIFICATION, CATARACT;  Surgeon: Candelario Novoa MD;  Location: [facility] OR;  Service: Ophthalmology;  Laterality: Right;  RN PHONE PREOP 7/12/2022   ARRIVAL 12:00 NOON    TOTAL KNEE ARTHROPLASTY Right 11/8/2023    Procedure: ARTHROPLASTY, KNEE, TOTAL. NELSON;  Surgeon: Eric Carbajal MD;  Location: [facility] OR;  Service: Orthopedics;  Laterality: Right;  [place]. Admit  RAPHAEL QUINTIN NIEVES [phone] NOTIFIED QUINTIN ON 10/11/2023-[place]  RN PREOP 10/25/2023   T/S ON 11/6/2023--done------CLEARED BY CARDS       ALLERGIES AND MEDICATION:     Review of patient's allergies indicates:   Allergen Reactions    Lisinopril Swelling and Shortness Of Breath    Ascorbic acid-ascorbate calc      And citrus fruits        Medication List            Accurate as of May 22, 2025  9:33 AM. If you have any questions, ask your nurse or doctor.                CHANGE how you take these medications      * metoprolol succinate 100 MG 24 hr tablet  Commonly known as: TOPROL-XL  Take 1 tablet (100 mg total) by mouth once daily.  What changed: Another medication with the same name was added. Make sure you understand how and when to take each.  Changed by: Mary Kate Dolan MD     * metoprolol succinate 50 MG 24 hr tablet  Commonly known as: TOPROL-XL  Take 1 tablet (50 mg total) by mouth once daily.  What changed:  You were already taking a medication with the same name, and this prescription was added. Make sure you understand how and when to take each.  Changed by: Mary Kate Dolan MD           * This list has 2 medication(s) that are the same as other medications prescribed for you. Read the directions carefully, and ask your doctor or other care provider to review them with you.                CONTINUE taking these medications      ACCU-CHEK ONEIL CONTROL SOLN Soln  Generic drug: blood glucose control high,low  1 Units by Misc.(Non-Drug; Combo Route) route as needed.     ACCU-CHEK ONEIL PLUS TEST STRP Strp  Generic drug: blood sugar diagnostic  TEST BLOOD SUGAR TWICE DAILY     * ACCU-CHEK SOFTCLIX LANCETS Misc  Generic drug: lancets  USE TO TEST BLOOD SUGAR ONE TIME DAILY     * TRUEPLUS LANCETS 33 gauge Misc  Generic drug: lancets  TEST BLOOD SUGAR TWICE DAILY     acetaminophen 500 MG tablet  Commonly known as: TYLENOL  Take 2 tablets (1,000 mg total) by mouth every 8 (eight) hours as needed for Pain.     albuterol 90 mcg/actuation inhaler  Commonly known as: PROVENTIL/VENTOLIN HFA  Inhale 1-2 puffs into the lungs every 6 (six) hours as needed for Wheezing or Shortness of Breath. Rescue     amLODIPine 10 MG tablet  Commonly known as: NORVASC  Take 1 tablet (10 mg total) by mouth once daily.     aspirin 81 MG EC tablet  Commonly known as: ECOTRIN  Take 1 tablet (81 mg total) by mouth 2 (two) times a day.     atorvastatin 80 MG tablet  Commonly known as: LIPITOR  TAKE 1 TABLET EVERY EVENING     azelastine 137 mcg (0.1 %) nasal spray  Commonly known as: ASTELIN  1 spray (137 mcg total) by Nasal route 2 (two) times daily.     * blood-glucose meter kit  Commonly known as: TRUE METRIX GLUCOSE METER  Use as directed to test glucose     * blood-glucose meter Misc  Commonly known as: ACCU-CHEK ONEIL PLUS METER  1 kit by Misc.(Non-Drug; Combo Route) route once daily.     celecoxib 200 MG capsule  Commonly known as: CeleBREX  Take 1  capsule (200 mg total) by mouth 2 (two) times daily.     cetirizine 10 MG tablet  Commonly known as: ZYRTEC  Take 1 tablet (10 mg total) by mouth once daily.     docusate sodium 100 MG capsule  Commonly known as: COLACE  Take 1 capsule (100 mg total) by mouth 2 (two) times daily.     donepeziL 5 MG tablet  Commonly known as: ARICEPT  Take 1 tablet (5 mg total) by mouth every evening.     DROPSAFE ALCOHOL PREP PADS Padm  Generic drug: alcohol swabs  USE AS DIRECTED THREE TIMES DAILY     ergocalciferol 50,000 unit Cap  Commonly known as: ERGOCALCIFEROL  TAKE 1 CAPSULE EVERY 7 DAYS.     FLUoxetine 40 MG capsule  Take 1 capsule (40 mg total) by mouth once daily.     fluticasone propionate 50 mcg/actuation nasal spray  Commonly known as: FLONASE  USE 1 SPRAY IN EACH NOSTRIL ONCE DAILY     gabapentin 300 MG capsule  Commonly known as: NEURONTIN  Take 1 capsule (300 mg total) by mouth 3 (three) times daily.     hydrALAZINE 100 MG tablet  Commonly known as: APRESOLINE  TAKE 1 TABLET EVERY 8 HOURS     hydrOXYzine HCL 25 MG tablet  Commonly known as: ATARAX  TAKE 1 TABLET BY MOUTH THREE TIMES DAILY AS NEEDED FOR ITCHING     JARDIANCE 25 mg tablet  Generic drug: empagliflozin  TAKE 1 TABLET ONE TIME DAILY     meloxicam 15 MG tablet  Commonly known as: MOBIC  Take 1 tablet (15 mg total) by mouth daily as needed for Pain.     methylPREDNISolone 4 mg tablet  Commonly known as: MEDROL DOSEPACK  use as directed     ondansetron 4 MG Tbdl  Commonly known as: ZOFRAN-ODT  Take 1 tablet (4 mg total) by mouth every 6 (six) hours as needed (for nausea).     oxyCODONE 5 mg Cap  Commonly known as: OXY-IR  Take 1 capsule (5 mg total) by mouth every 6 (six) hours as needed for Pain.     pantoprazole 40 MG tablet  Commonly known as: PROTONIX  Take 1 tablet (40 mg total) by mouth once daily.     * semaglutide 1 mg/dose (2 mg/1.5 mL) Pnij  Commonly known as: OZEMPIC  Inject 1 mg into the skin every 7 days.     * OZEMPIC 0.25 mg or 0.5 mg (2 mg/3  mL) pen injector  Generic drug: semaglutide  Inject 0.5 mg into the skin every 7 days.     tiZANidine 4 MG tablet  Commonly known as: ZANAFLEX  Take 1 tablet (4 mg total) by mouth every 6 (six) hours as needed.     TRUE METRIX LEVEL 1 Soln  Generic drug: blood glucose control, low  1 each by Misc.(Non-Drug; Combo Route) route once as needed.           * This list has 6 medication(s) that are the same as other medications prescribed for you. Read the directions carefully, and ask your doctor or other care provider to review them with you.                   Where to Get Your Medications        These medications were sent to [facility]  [place] [address] [address]      Phone: [phone]   metoprolol succinate 50 MG 24 hr tablet         SOCIAL HISTORY:     Social History     Socioeconomic History    Marital status:    Tobacco Use    Smoking status: Never     Passive exposure: Past    Smokeless tobacco: Never   Substance and Sexual Activity    Alcohol use: No    Drug use: No    Sexual activity: Not Currently     Social Drivers of Health     Financial Resource Strain: Medium Risk (5/21/2025)    Overall Financial Resource Strain (CARDIA)     Difficulty of Paying Living Expenses: Somewhat hard   Food Insecurity: Food Insecurity Present (5/21/2025)    Hunger Vital Sign     Worried About Running Out of Food in the Last Year: Sometimes true     Ran Out of Food in the Last Year: Sometimes true   Transportation Needs: Unmet Transportation Needs (5/21/2025)    PRAPARE - Transportation     Lack of Transportation (Medical): Yes     Lack of Transportation (Non-Medical): No   Physical Activity: Insufficiently Active (5/21/2025)    Exercise Vital Sign     Days of Exercise per Week: 2 days     Minutes of Exercise per Session: 30 min   Stress: Stress Concern Present (5/21/2025)    [employer] - Occupational Stress Questionnaire     Feeling of  Stress : Very much   Housing Stability: Low Risk  (5/21/2025)    Housing Stability Vital Sign     Unable to Pay for Housing in the Last Year: No     Number of Times Moved in the Last Year: 0     Homeless in the Last Year: No   Recent Concern: Housing Stability - High Risk (3/5/2025)    Housing Stability Vital Sign     Unable to Pay for Housing in the Last Year: Yes     Homeless in the Last Year: No       FAMILY HISTORY:     Family History   Problem Relation Name Age of Onset    No Known Problems Mother      Glaucoma Father      Breast cancer Sister Warhaim     Glaucoma Sister Warnell     Cancer Sister Warnell         breast cancer    Thyroid disease Sister [place]     Blindness Sister Bryanna         due to trauma during car accident    Diabetes Sister Amanda     No Known Problems Maternal Aunt      No Known Problems Maternal Uncle      No Known Problems Paternal Aunt      No Known Problems Paternal Uncle      No Known Problems Maternal Grandmother      No Known Problems Maternal Grandfather      No Known Problems Paternal Grandmother      No Known Problems Paternal Grandfather      Diabetes Brother Navneet     Amblyopia Neg Hx      Cataracts Neg Hx      Hypertension Neg Hx      Macular degeneration Neg Hx      Retinal detachment Neg Hx      Strabismus Neg Hx      Stroke Neg Hx         REVIEW OF SYSTEMS:   Review of Systems   Constitutional: Negative.   HENT: Negative.     Eyes: Negative.    Respiratory: Negative.     Endocrine: Negative.    Hematologic/Lymphatic: Negative.    Skin: Negative.    Musculoskeletal: Negative.    Gastrointestinal: Negative.    Genitourinary: Negative.    Neurological: Negative.    Psychiatric/Behavioral: Negative.     Allergic/Immunologic: Negative.        A 10 point review of systems was performed and all the pertinent positives have been mentioned. Rest of review of systems was negative.        PHYSICAL EXAM:     Vitals:    05/22/25 0834   BP: (!) 150/90   Pulse: 76   Resp: 15    Body  "mass index is 32.09 kg/m².  Weight: 84.8 kg (186 lb 15.2 oz)   Height: 5' 4" (162.6 cm)     Physical Exam  Constitutional:       Appearance: Normal appearance. She is well-developed.   HENT:      Head: Normocephalic.   Eyes:      Pupils: Pupils are equal, round, and reactive to light.   Cardiovascular:      Rate and Rhythm: Normal rate and regular rhythm.   Pulmonary:      Effort: Pulmonary effort is normal.      Breath sounds: Normal breath sounds.   Abdominal:      General: Bowel sounds are normal.      Palpations: Abdomen is soft.      Tenderness: There is no abdominal tenderness.   Musculoskeletal:         General: Normal range of motion.      Cervical back: Normal range of motion and neck supple.   Skin:     General: Skin is warm.   Neurological:      Mental Status: She is alert and oriented to person, place, and time.           DATA:     Laboratory:  CBC:  Recent Labs   Lab 10/25/23  0900 10/28/24  0706 02/19/25  0930   WBC 5.18 5.76 6.24   Hemoglobin 13.0 13.5 14.1   Hematocrit 41.3 42.6 43.9   Platelets 231 252 280       CHEMISTRIES:  Recent Labs   Lab 10/28/24  0706 02/19/25  0930 05/09/25  1018   Glucose 138 H 106 113 H   Sodium 139 139 141   Potassium 4.0 4.6 4.5   BUN 13 12 13   Creatinine 0.8 0.9 0.8   Calcium 9.5 9.9 9.7       CARDIAC BIOMARKERS:  Recent Labs   Lab 01/11/23  2237 01/12/23  0421   Troponin I <0.006 <0.006       COAGS:  Recent Labs   Lab 01/11/23  1740 10/25/23  0900 02/19/25  0930   INR 1.1 1.1 1.0       LIPIDS/LFTS:  Recent Labs   Lab 10/31/22  0758 01/12/23  0957 10/25/23  0900 10/28/24  0706 02/19/25  0930 05/09/25  1018   Cholesterol 225 H  --  155 225 H  --   --    Triglycerides 77  --  78 103  --   --    HDL 74  --  57 60  --   --    LDL Cholesterol 135.6  --  82.4 144.4  --   --    Non-HDL Cholesterol 151  --  98 165  --   --    AST 14   < > 14 15 16 15   ALT 11   < > 13 12 17 13    < > = values in this interval not displayed.       Hemoglobin A1C   Date Value Ref Range Status "   02/07/2025 7.2 (H) 4.0 - 5.6 % Final     Comment:     ADA Screening Guidelines:  5.7-6.4%  Consistent with prediabetes  >or=6.5%  Consistent with diabetes    High levels of fetal hemoglobin interfere with the HbA1C  assay. Heterozygous hemoglobin variants (HbS, HgC, etc)do  not significantly interfere with this assay.   However, presence of multiple variants may affect accuracy.     10/28/2024 7.5 (H) 4.0 - 5.6 % Final     Comment:     ADA Screening Guidelines:  5.7-6.4%  Consistent with prediabetes  >or=6.5%  Consistent with diabetes    High levels of fetal hemoglobin interfere with the HbA1C  assay. Heterozygous hemoglobin variants (HbS, HgC, etc)do  not significantly interfere with this assay.   However, presence of multiple variants may affect accuracy.     07/29/2024 8.2 (H) 4.0 - 5.6 % Final     Comment:     ADA Screening Guidelines:  5.7-6.4%  Consistent with prediabetes  >or=6.5%  Consistent with diabetes    High levels of fetal hemoglobin interfere with the HbA1C  assay. Heterozygous hemoglobin variants (HbS, HgC, etc)do  not significantly interfere with this assay.   However, presence of multiple variants may affect accuracy.       Hemoglobin A1c   Date Value Ref Range Status   05/09/2025 6.9 (H) 4.0 - 5.6 % Final     Comment:     ADA Screening Guidelines:  5.7-6.4%  Consistent with prediabetes  >=6.5%  Consistent with diabetes    High levels of fetal hemoglobin interfere with the HbA1C  assay. Heterozygous hemoglobin variants (HbS, HgC, etc)do  not significantly interfere with this assay.   However, presence of multiple variants may affect accuracy.       TSH  Recent Labs   Lab 10/31/22  0758 10/25/23  0900 10/28/24  0706   TSH 0.999 0.552 1.984       The 10-year ASCVD risk score (Ayush JASON, et al., 2019) is: 45.8%    Values used to calculate the score:      Age: 74 years      Sex: Female      Is Non- : Yes      Diabetic: Yes      Tobacco smoker: No      Systolic Blood Pressure:  "150 mmHg      Is BP treated: Yes      HDL Cholesterol: 60 mg/dL      Total Cholesterol: 225 mg/dL       BNP    No results found for: "BNP"          ASSESSMENT AND PLAN     Patient Active Problem List   Diagnosis    Type 2 diabetes mellitus with diabetic neuropathy, without long-term current use of insulin    Benign hypertension    Hyperlipemia    Microhematuria    Left flank pain    Abdominal aortic atherosclerosis    History of renal cell cancer    Renal cell carcinoma of right kidney    Mild major depression    Bilateral chronic knee pain    Colon cancer screening    Pain of left calf    Refractive error    Primary osteoarthritis of knees, bilateral    Fall    Radiculopathy    Idiopathic progressive neuropathy    Nerve pain    Chest pain    Severe obesity (BMI 35.0-39.9) with comorbidity    Decreased ROM of right knee    Decreased strength involving knee joint    Gait difficulty    Paresthesia    Pseudophakia    COVID-19    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      Assessment & Plan          IMPRESSION:  Ruled out adding ARB for blood pressure management due to history of angioedema with ACE inhibitors.    PLAN SUMMARY:  - Increased metoprolol to 150 mg daily  - Continued atorvastatin 80 mg daily  - Continued hydralazine  - Continued amlodipine  - Continued aspirin  - [place] advised to improve dietary habits  - Follow-up in 1 month to reassess blood pressure    ESSENTIAL HYPERTENSION:  - Increased metoprolol  to 150 mg daily.  - Continued amlodipine.  - Continued hydralazine.  - Follow up in 2 months to reassess blood pressure.    HYPERLIPIDEMIA:  - Continued atorvastatin 80 mg " daily.    GENERAL MANAGEMENT:  - Valarie to improve dietary habits, noting current poor food choices.  - Continued aspirin.  - Sending prescription to patient's [facility] pharmacy on [address].         Chest pain is likely not cardiac in origin based on characteristics and normal stress test, echo, and EKG results  Blood pressure is significantly elevated, requiring medication adjustment  Cleared patient for potential weight loss medication from cardiac perspective        Dyslipidemia  Continue Lipitor 80 mg daily    Lab Results   Component Value Date    LDLCALC 144.4 10/28/2024     Abdominal aortic atherosclerosis: On statin    Chest pain:  - st negative  - non cardiac cp        Thank you very much for involving me in the care of your patient.  Please do not hesitate to contact me if there are any questions.      Mary Kate Dolan MD, FAC, [facility]  Interventional Cardiologist, [facility].     Visit today included increased complexity associated with the care of the episodic problem chest pain, dyslipidemia, aortic atherosclerosis, hypertension addressed and managing the longitudinal care of the patient due to the serious and/or complex managed problem(s)   Patient Active Problem List   Diagnosis    Type 2 diabetes mellitus with diabetic neuropathy, without long-term current use of insulin    Benign hypertension    Hyperlipemia    Microhematuria    Left flank pain    Abdominal aortic atherosclerosis    History of renal cell cancer    Renal cell carcinoma of right kidney    Mild major depression    Bilateral chronic knee pain    Colon cancer screening    Pain of left calf    Refractive error    Primary osteoarthritis of knees, bilateral    Fall    Radiculopathy    Idiopathic progressive neuropathy    Nerve pain    Chest pain    Severe obesity (BMI 35.0-39.9) with comorbidity    Decreased ROM of right knee    Decreased strength involving knee joint    Gait difficulty    Paresthesia    Pseudophakia    COVID-19    Drug or  chemical induced diabetes mellitus with stage 3a chronic kidney disease, without long-term current use of insulin    Insomnia    Memory loss    GERALD (obstructive sleep apnea)    Parasomnia    Impaired range of motion of left knee    Weakness of left quadriceps muscle    Cognitive impairment     .        This note was generated with the assistance of ambient listening technology. Verbal consent was obtained by the patient and accompanying visitor(s) for the recording of patient appointment to facilitate this note. I attest to having reviewed and edited the generated note for accuracy, though some syntax or spelling errors may persist. Please contact the author of this note for any clarification.      This note was dictated with the help of speech recognition software.  There might be un-intended errors and/or substitutions.

## 2025-05-23 ENCOUNTER — TELEPHONE (OUTPATIENT)
Dept: FAMILY MEDICINE | Facility: CLINIC | Age: 75
End: 2025-05-23
Payer: MEDICARE

## 2025-05-23 DIAGNOSIS — B37.41 YEAST CYSTITIS: Primary | ICD-10-CM

## 2025-05-23 RX ORDER — FLUCONAZOLE 150 MG/1
150 TABLET ORAL DAILY
Qty: 2 TABLET | Refills: 0 | Status: SHIPPED | OUTPATIENT
Start: 2025-05-23 | End: 2025-05-25

## 2025-05-23 NOTE — TELEPHONE ENCOUNTER
----- Message from Med Assistant Digna sent at 5/23/2025  2:01 PM CDT -----  Name of Who is Calling:DARIA DE LA FUENTE [[id number]]  What is the request in detail: Pt states someone from the office called her this morning stating she may have a UTI. Pt states she is having some burning and pain when urinating that started today. If medicine will be called in send to [facility] [address] [place] [address], [place] - [address] [place] [address]. Please assist.  Can the clinic reply by [facility]: No  What Number to Call Back if not in [facility]: [phone]

## 2025-05-23 NOTE — TELEPHONE ENCOUNTER
Spoke with patient about  symptoms; vaginal burning and irritation noted. Will send in diflucan 150 mg x 1 dose; if no improvement after 72 hrs can take 2nd dose. Encouraged to follow up if symptoms worsen or fail to improve.

## 2025-05-23 NOTE — TELEPHONE ENCOUNTER
----- Message from KULDEEP Lynch sent at 5/22/2025  6:03 PM CDT -----  Please let her know that her urine test was negative for a UTI but did show some yeast in her urine, which can happen when taking Jardiance. We can just monitor for now. If she starts to have vaginal   discharge or discomfort, or worsening abdominal pain, let us know. Thanks!    Sofie  ----- Message -----  From: Lab, Background User  Sent: 5/21/2025   6:47 PM CDT  To: KULDEEP Rivera

## 2025-05-30 ENCOUNTER — TELEPHONE (OUTPATIENT)
Dept: CARDIOLOGY | Facility: CLINIC | Age: 75
End: 2025-05-30
Payer: MEDICARE

## 2025-06-03 ENCOUNTER — PATIENT OUTREACH (OUTPATIENT)
Dept: ADMINISTRATIVE | Facility: OTHER | Age: 75
End: 2025-06-03
Payer: MEDICARE

## 2025-06-04 ENCOUNTER — HOSPITAL ENCOUNTER (EMERGENCY)
Facility: HOSPITAL | Age: 75
Discharge: HOME | End: 2025-06-04
Attending: EMERGENCY MEDICINE
Payer: MEDICARE

## 2025-06-04 VITALS
OXYGEN SATURATION: 97 % | RESPIRATION RATE: 16 BRPM | BODY MASS INDEX: 31.76 KG/M2 | HEIGHT: 64 IN | DIASTOLIC BLOOD PRESSURE: 84 MMHG | SYSTOLIC BLOOD PRESSURE: 172 MMHG | HEART RATE: 76 BPM | TEMPERATURE: 98 F | WEIGHT: 186 LBS

## 2025-06-04 DIAGNOSIS — T14.8XXA MUSCULOSKELETAL STRAIN: ICD-10-CM

## 2025-06-04 DIAGNOSIS — R10.9 LEFT FLANK PAIN: Primary | ICD-10-CM

## 2025-06-04 LAB
ABSOLUTE EOSINOPHIL (OHS): 0.01 K/UL
ABSOLUTE MONOCYTE (OHS): 0.49 K/UL (ref 0.3–1)
ABSOLUTE NEUTROPHIL COUNT (OHS): 3.36 K/UL (ref 1.8–7.7)
ALBUMIN SERPL BCP-MCNC: 4 G/DL (ref 3.5–5.2)
ALP SERPL-CCNC: 102 UNIT/L (ref 40–150)
ALT SERPL W/O P-5'-P-CCNC: 18 UNIT/L (ref 10–44)
ANION GAP (OHS): 9 MMOL/L (ref 8–16)
AST SERPL-CCNC: 17 UNIT/L (ref 11–45)
BACTERIA #/AREA URNS AUTO: ABNORMAL /HPF
BASOPHILS # BLD AUTO: 0 K/UL
BASOPHILS NFR BLD AUTO: 0 %
BILIRUB SERPL-MCNC: 0.3 MG/DL (ref 0.1–1)
BILIRUB UR QL STRIP.AUTO: NEGATIVE
BUN SERPL-MCNC: 12 MG/DL (ref 8–23)
CALCIUM SERPL-MCNC: 9.5 MG/DL (ref 8.7–10.5)
CHLORIDE SERPL-SCNC: 106 MMOL/L (ref 95–110)
CLARITY UR: CLEAR
CO2 SERPL-SCNC: 23 MMOL/L (ref 23–29)
COLOR UR AUTO: YELLOW
CREAT SERPL-MCNC: 0.7 MG/DL (ref 0.5–1.4)
ERYTHROCYTE [DISTWIDTH] IN BLOOD BY AUTOMATED COUNT: 15.7 % (ref 11.5–14.5)
GFR SERPLBLD CREATININE-BSD FMLA CKD-EPI: >60 ML/MIN/1.73/M2
GLUCOSE SERPL-MCNC: 96 MG/DL (ref 70–110)
GLUCOSE SERPL-MCNC: 97 MG/DL (ref 70–110)
GLUCOSE UR QL STRIP: ABNORMAL
HCT VFR BLD AUTO: 43.8 % (ref 37–48.5)
HGB BLD-MCNC: 13.8 GM/DL (ref 12–16)
HGB UR QL STRIP: NEGATIVE
HOLD SPECIMEN: NORMAL
IMM GRANULOCYTES # BLD AUTO: 0.02 K/UL (ref 0–0.04)
IMM GRANULOCYTES NFR BLD AUTO: 0.4 % (ref 0–0.5)
KETONES UR QL STRIP: NEGATIVE
LEUKOCYTE ESTERASE UR QL STRIP: NEGATIVE
LYMPHOCYTES # BLD AUTO: 0.98 K/UL (ref 1–4.8)
MCH RBC QN AUTO: 26.2 PG (ref 27–31)
MCHC RBC AUTO-ENTMCNC: 31.5 G/DL (ref 32–36)
MCV RBC AUTO: 83 FL (ref 82–98)
MICROSCOPIC COMMENT: ABNORMAL
NITRITE UR QL STRIP: NEGATIVE
NUCLEATED RBC (/100WBC) (OHS): 0 /100 WBC
PH UR STRIP: 5 [PH]
PLATELET # BLD AUTO: 262 K/UL (ref 150–450)
PMV BLD AUTO: 10.8 FL (ref 9.2–12.9)
POCT GLUCOSE: 97 MG/DL (ref 70–110)
POTASSIUM SERPL-SCNC: 3.9 MMOL/L (ref 3.5–5.1)
PROT SERPL-MCNC: 7.5 GM/DL (ref 6–8.4)
PROT UR QL STRIP: NEGATIVE
RBC # BLD AUTO: 5.26 M/UL (ref 4–5.4)
RBC #/AREA URNS AUTO: 2 /HPF (ref 0–4)
RELATIVE EOSINOPHIL (OHS): 0.2 %
RELATIVE LYMPHOCYTE (OHS): 20.2 % (ref 18–48)
RELATIVE MONOCYTE (OHS): 10.1 % (ref 4–15)
RELATIVE NEUTROPHIL (OHS): 69.1 % (ref 38–73)
SODIUM SERPL-SCNC: 138 MMOL/L (ref 136–145)
SP GR UR STRIP: >=1.03
SQUAMOUS #/AREA URNS AUTO: 1 /HPF
UROBILINOGEN UR STRIP-ACNC: NEGATIVE EU/DL
WBC # BLD AUTO: 4.86 K/UL (ref 3.9–12.7)
WBC #/AREA URNS AUTO: 1 /HPF (ref 0–5)
YEAST UR QL AUTO: ABNORMAL /HPF

## 2025-06-04 PROCEDURE — 99284 EMERGENCY DEPT VISIT MOD MDM: CPT | Mod: 25

## 2025-06-04 PROCEDURE — 81001 URINALYSIS AUTO W/SCOPE: CPT

## 2025-06-04 PROCEDURE — 96374 THER/PROPH/DIAG INJ IV PUSH: CPT

## 2025-06-04 PROCEDURE — 96375 TX/PRO/DX INJ NEW DRUG ADDON: CPT

## 2025-06-04 PROCEDURE — 63600175 PHARM REV CODE 636 W HCPCS

## 2025-06-04 PROCEDURE — 96372 THER/PROPH/DIAG INJ SC/IM: CPT

## 2025-06-04 PROCEDURE — 82040 ASSAY OF SERUM ALBUMIN: CPT

## 2025-06-04 PROCEDURE — 82962 GLUCOSE BLOOD TEST: CPT

## 2025-06-04 PROCEDURE — 85025 COMPLETE CBC W/AUTO DIFF WBC: CPT

## 2025-06-04 RX ORDER — ONDANSETRON HYDROCHLORIDE 2 MG/ML
4 INJECTION, SOLUTION INTRAVENOUS
Status: COMPLETED | OUTPATIENT
Start: 2025-06-04 | End: 2025-06-04

## 2025-06-04 RX ORDER — ONDANSETRON 4 MG/1
4 TABLET, FILM COATED ORAL EVERY 6 HOURS
Qty: 12 TABLET | Refills: 0 | Status: SHIPPED | OUTPATIENT
Start: 2025-06-04

## 2025-06-04 RX ORDER — ORPHENADRINE CITRATE 30 MG/ML
60 INJECTION INTRAMUSCULAR; INTRAVENOUS
Status: COMPLETED | OUTPATIENT
Start: 2025-06-04 | End: 2025-06-04

## 2025-06-04 RX ORDER — LIDOCAINE 50 MG/G
1 PATCH TOPICAL DAILY
Qty: 5 PATCH | Refills: 0 | Status: SHIPPED | OUTPATIENT
Start: 2025-06-04

## 2025-06-04 RX ORDER — KETOROLAC TROMETHAMINE 30 MG/ML
15 INJECTION, SOLUTION INTRAMUSCULAR; INTRAVENOUS
Status: COMPLETED | OUTPATIENT
Start: 2025-06-04 | End: 2025-06-04

## 2025-06-04 RX ORDER — METHOCARBAMOL 500 MG/1
1000 TABLET, FILM COATED ORAL 3 TIMES DAILY
Qty: 30 TABLET | Refills: 0 | Status: SHIPPED | OUTPATIENT
Start: 2025-06-04 | End: 2025-06-09

## 2025-06-04 RX ADMIN — ORPHENADRINE CITRATE 60 MG: 30 INJECTION, SOLUTION INTRAMUSCULAR; INTRAVENOUS at 03:06

## 2025-06-04 RX ADMIN — KETOROLAC TROMETHAMINE 15 MG: 30 INJECTION, SOLUTION INTRAMUSCULAR; INTRAVENOUS at 02:06

## 2025-06-04 RX ADMIN — ONDANSETRON 4 MG: 2 INJECTION INTRAMUSCULAR; INTRAVENOUS at 03:06

## 2025-06-04 NOTE — ED PROVIDER NOTES
"Encounter Date: 6/4/2025       History     Chief Complaint   Patient presents with    Flank Pain     Pt reports right sided flank pain x 1 week. Pt reports she is now feeling nauseated. Pt reports she was dx with a UTI 2 weeks ago but that it "wasn't bad" and she took a round of abx for it. Pt denies dysuria or other urinary symptoms.     [age] old female with PMHx arthritis, DM hypertension, renal cell looked at me presents to emergency department complaint of left flank pain x1 week.  Patient reports pain is located to the left flank and isn't radiating anywhere.  She endorses trying Tylenol and tramadol without any relief.  Patient reports that when feeling left flank she can feel some hard knots under the skin.  Patient reports pain is 10/10.  She states when pain gets at its worse makes her feel nauseous.  She denies any episodes of emesis.  No meds prior to arrival.  Patient denies any abdominal pain, diarrhea, urinary symptoms, hematuria, back pain, hip pain, chest pain, shortness breath    The history is provided by the patient.     Review of patient's allergies indicates:   Allergen Reactions    Lisinopril Swelling and Shortness Of Breath    Ascorbic acid-ascorbate calc      And citrus fruits     Past Medical History:   Diagnosis Date    Arthritis     Colon polyp     Diabetes mellitus     diet controlled    Diabetes with neurologic complications     Hypertension     Nuclear sclerosis of both eyes 02/14/2022    Renal cell carcinoma     Sleep apnea      Past Surgical History:   Procedure Laterality Date    ARTHROPLASTY, KNEE, TOTAL, USING COMPUTER-ASSISTED NAVIGATION Left 3/5/2025    Procedure: ARTHROPLASTY, KNEE, TOTAL, USING COMPUTER-ASSISTED NAVIGATION;  Surgeon: Eric Carbajal MD;  Location: [facility];  Service: Orthopedics;  Laterality: Left;  Bradley---CLEARED BY CARDS AND PCP  FIRST MARTIN ASSIST NOTIFIED-DELMY  RN PREOP 2/19/2025   T/S ON 2/28/2025---NEED ORDERS    COLONOSCOPY N/A 2/5/2018    " Procedure: COLONOSCOPY;  Surgeon: Bacilio Mancia MD;  Location: [facility] ENDO;  Service: Endoscopy;  Laterality: N/A;  appt confirmed-ss    COLONOSCOPY N/A 8/13/2020    Procedure: COLONOSCOPY;  Surgeon: Jose West MD;  Location: [facility] ENDO;  Service: Endoscopy;  Laterality: N/A;    ENDOSCOPIC CARPAL TUNNEL RELEASE Right 3/15/2024    Procedure: RELEASE, CARPAL TUNNEL, ENDOSCOPIC - RIGHT;  Surgeon: Tonio Mcdermott MD;  Location: [facility] OR;  Service: Orthopedics;  Laterality: Right;    ENDOSCOPIC CARPAL TUNNEL RELEASE Left 6/26/2024    Procedure: RELEASE, CARPAL TUNNEL, ENDOSCOPIC - left;  Surgeon: Tonio Mcdermott MD;  Location: [facility] OR;  Service: Orthopedics;  Laterality: Left;    HYSTERECTOMY      INTRAOCULAR PROSTHESES INSERTION Left 7/7/2022    Procedure: INSERTION, IOL PROSTHESIS;  Surgeon: Candelario Novoa MD;  Location: [facility] OR;  Service: Ophthalmology;  Laterality: Left;    INTRAOCULAR PROSTHESES INSERTION Right 7/21/2022    Procedure: INSERTION, IOL PROSTHESIS;  Surgeon: Candelario Novoa MD;  Location: [facility] OR;  Service: Ophthalmology;  Laterality: Right;    OOPHORECTOMY      PARTIAL NEPHRECTOMY Right 10/12/2018    Procedure: NEPHRECTOMY, PARTIAL open. Dr Arenas to assist.;  Surgeon: Alejandra Palm MD;  Location: [facility];  Service: Urology;  Laterality: Right;  RN PREOP 10/9/2018----NEEDS H/P    PHACOEMULSIFICATION OF CATARACT Left 7/7/2022    Procedure: PHACOEMULSIFICATION, CATARACT;  Surgeon: Candelario Novoa MD;  Location: [facility];  Service: Ophthalmology;  Laterality: Left;  RN PHONE PREOP 6/27/2022   ARRIVAL 6:00 AM    PHACOEMULSIFICATION OF CATARACT Right 7/21/2022    Procedure: PHACOEMULSIFICATION, CATARACT;  Surgeon: Candelario Novoa MD;  Location: [facility];  Service: Ophthalmology;  Laterality: Right;  RN PHONE PREOP 7/12/2022   ARRIVAL 12:00 NOON    TOTAL KNEE ARTHROPLASTY Right 11/8/2023    Procedure: ARTHROPLASTY, KNEE, TOTAL. NELSON;  Surgeon: Eric Carbajal,  MD;  Location: [facility] OR;  Service: Orthopedics;  Laterality: Right;  Brad[place]. Admit  BRADLEY NIEVES [phone] NOTIFIED QUINTIN ON 10/11/2023-LO  RN PREOP 10/25/2023   T/S ON 11/6/2023--done------CLEARED BY CARDS     Family History   Problem Relation Name Age of Onset    No Known Problems Mother      Glaucoma Father      Breast cancer Sister Warnell     Glaucoma Sister Warnell     Cancer Sister Warnell         breast cancer    Thyroid disease Sister Angeles     Blindness Sister Bryanna         due to trauma during car accident    Diabetes Sister Amanda     No Known Problems Maternal Aunt      No Known Problems Maternal Uncle      No Known Problems Paternal Aunt      No Known Problems Paternal Uncle      No Known Problems Maternal Grandmother      No Known Problems Maternal Grandfather      No Known Problems Paternal Grandmother      No Known Problems Paternal Grandfather      Diabetes Brother Navneet     Amblyopia Neg Hx      Cataracts Neg Hx      Hypertension Neg Hx      Macular degeneration Neg Hx      Retinal detachment Neg Hx      Strabismus Neg Hx      Stroke Neg Hx       Social History[1]  Review of Systems   Constitutional:  Negative for appetite change and fever.   HENT:  Negative for sore throat.    Respiratory:  Negative for shortness of breath.    Cardiovascular:  Negative for chest pain.   Gastrointestinal:  Positive for nausea. Negative for abdominal pain, diarrhea and vomiting.   Genitourinary:  Positive for flank pain (L). Negative for dysuria, frequency and hematuria.   Musculoskeletal:  Negative for back pain.   Skin:  Negative for rash.   Neurological:  Negative for weakness.   Hematological:  Does not bruise/bleed easily.       Physical Exam     Initial Vitals [06/04/25 1342]   BP Pulse Resp Temp SpO2   (!) 174/81 80 18 97.7 °F (36.5 °C) 95 %      MAP       --         Physical Exam    Vitals reviewed.  Constitutional: She appears well-developed and well-nourished. No distress.   HENT:    Head: Normocephalic and atraumatic.   Eyes: Conjunctivae are normal.   Neck: Neck supple.   Cardiovascular:  Normal rate, regular rhythm, normal heart sounds and intact distal pulses.           Pulmonary/Chest: Breath sounds normal. No respiratory distress.   Abdominal: Abdomen is soft. Bowel sounds are normal. She exhibits no distension. There is no abdominal tenderness.   Mild tenderness to palpation of left flank   No right CVA tenderness.  No left CVA tenderness. There is no rebound, no guarding, no tenderness at McBurney's point and negative Osorio's sign.   Musculoskeletal:         General: Normal range of motion.      Cervical back: Neck supple.      Comments: No tenderness to palpation of left hip or lumbar spine.  Patient able to ambulate without assistance or pain.  Full range motion of left hip without pain.     Neurological: She is alert.   Skin: Skin is warm and dry.   Psychiatric: She has a normal mood and affect.         ED Course   Procedures  Labs Reviewed   URINALYSIS, REFLEX TO URINE CULTURE - Abnormal       Result Value    Color, UA Yellow      Appearance, UA Clear      pH, UA 5.0      Spec Grav UA >=1.030 (*)     Protein, UA Negative      Glucose, UA 4+ (*)     Ketones, UA Negative      Bilirubin, UA Negative      Blood, UA Negative      Nitrites, UA Negative      Urobilinogen, UA Negative      Leukocyte Esterase, UA Negative     CBC WITH DIFFERENTIAL - Abnormal    WBC 4.86      RBC 5.26      HGB 13.8      HCT 43.8      MCV 83      MCH 26.2 (*)     MCHC 31.5 (*)     RDW 15.7 (*)     Platelet Count 262      MPV 10.8      Nucleated RBC 0      Neut % 69.1      Lymph % 20.2      Mono % 10.1      Eos % 0.2      Basophil % 0.0      Imm Grans % 0.4      Neut # 3.36      Lymph # 0.98 (*)     Mono # 0.49      Eos # 0.01      Baso # 0.00      Imm Grans # 0.02     URINALYSIS MICROSCOPIC - Abnormal    RBC, UA 2      WBC, UA 1      Bacteria, UA Few (*)     Yeast, UA Rare (*)     Squamous Epithelial Cells,  UA 1      Microscopic Comment       COMPREHENSIVE METABOLIC PANEL - Normal    Sodium 138      Potassium 3.9      Chloride 106      CO2 23      Glucose 96      BUN 12      Creatinine 0.7      Calcium 9.5      Protein Total 7.5      Albumin 4.0      Bilirubin Total 0.3            AST 17      ALT 18      Anion Gap 9      eGFR >60     CBC W/ AUTO DIFFERENTIAL    Narrative:     The following orders were created for panel order CBC auto differential.  Procedure                               Abnormality         Status                     ---------                               -----------         ------                     CBC with Differential[[id number]]       Abnormal            Final result                 Please view results for these tests on the individual orders.   GREY TOP URINE HOLD    Extra Tube Hold for add-ons.     POCT GLUCOSE MONITORING CONTINUOUS    POC Glucose 97            Imaging Results    None          Medications   ketorolac injection 15 mg (15 mg Intravenous Given 6/4/25 1412)   ondansetron injection 4 mg (4 mg Intravenous Given 6/4/25 1522)   orphenadrine injection 60 mg (60 mg Intramuscular Given 6/4/25 1526)     Medical Decision Making  75-year-old female presents to emergency department complaining of left flank pain.  She is overall well-appearing in no acute distress.  Afebrile.  On physical exam there is tenderness appreciated to the left flank.  No CVA tenderness.  There is no abdominal or lumbar tenderness.  No tenderness to palpation of left hip.  Patient is able to ambulate without pain.    Differential diagnosis includes but isn't limited to:  AAA, aortic dissection, SBO/volvulus, intussusception, ileus, appendicitis, cholecystitis, hepatitis, nephrolithiasis, pancreatitis, IBD/IBS, biliary colic, GERD, PUD, constipation, UTI/pyelonephritis, musculoskeletal pain.    Vital signs reassuring.  Labs are unremarkable.  UA showed no signs of infection, patient does have 4+ glucose but  takes Jardiance.  Patient has received Toradol, Norflex and Zofran he while in ED with improvement of symptoms.  My overall impression is musculoskeletal strain to the left flank.  Feel comfortable at this time discharging home.  Will send home with muscle relaxers and lidocaine patch.  We discussed strict return precautions.  Instructed to follow up with PCP in the next 2-3 days.  Patient is understanding agreeable with treatment plan.        Amount and/or Complexity of Data Reviewed  Labs:  Decision-making details documented in ED Course.    Risk  Prescription drug management.               ED Course as of 06/04/25 1644   Wed Jun 04, 2025   1346 BP(!): 174/81 [MB]   1346 Temp: 97.7 °F (36.5 °C) [MB]   1346 Pulse: 80 [MB]   1346 Resp: 18 [MB]   1346 SpO2: 95 % [MB]   1439 CBC auto differential(!)  No leukocytosis or anemia noted [MB]   1505 Comprehensive metabolic panel  No transaminitis, WINDY, electrolyte abnormality [MB]   1505 Urinalysis, Reflex to Urine Culture Urine, Clean Catch(!)  UA showed no signs of infection [MB]   1529 POC Glucose: 97 [MB]      ED Course User Index  [MB] Teagan Vega PA-C                           Clinical Impression:  Final diagnoses:  [R10.9] Left flank pain (Primary)  [T14.8XXA] Musculoskeletal strain          ED Disposition Condition    Discharge Stable          ED Prescriptions       Medication Sig Dispense Start Date End Date Auth. Provider    methocarbamoL (ROBAXIN) 500 MG Tab Take 2 tablets (1,000 mg total) by mouth 3 (three) times daily. for 5 days 30 tablet 6/4/2025 6/9/2025 Teagan Vega PA-C    LIDOcaine (LIDODERM) 5 % Place 1 patch onto the skin once daily. Remove & Discard patch within 12 hours or as directed by MD 5 patch 6/4/2025 -- Teagan Vega PA-C          Follow-up Information       Follow up With Specialties Details Why Contact Info    Alena Hernandez MD Family Medicine, Wound Care Schedule an appointment as soon as possible for a visit in 3 days for  follow up [address]  [place] [address]  [phone]      [facility] - Emergency Dept Emergency Medicine Go to  If symptoms worsen, As needed, shortness of breath, chest pain, fever, worsening cough, nausea, vomiting, abdominal pain [address] [facility] [place] [address] [phone]                   [1]   Social History  Tobacco Use    Smoking status: Never     Passive exposure: Past    Smokeless tobacco: Never   Substance Use Topics    Alcohol use: No    Drug use: No        Teagan Vega PA-C  06/04/25 1647

## 2025-06-04 NOTE — DISCHARGE INSTRUCTIONS
Problem Specific Instructions:  Any bone/joint/muscle injury, regardless of broken bones or not may take between 4-6 weeks to heal.  You may ice it throughout the day, compress it with something like an Ace wrap or compression sleeve and elevate it above your heart regularly to prevent or reduce swelling. If you are not improving in that time, follow-up with your primary care provider or orthopedics for re-evaluation. Return to the Emergency Department if you experience worsening pain, numbness, tingling, change of color in the body part, or any other concerning symptoms.     If you would like to follow up with the [facility] for further care of your fracture, please call the [facility] Scheduling Department at [phone] during business hours. Please let the  know you need a fracture follow-up appointment with Orthopedics, and you will be scheduled in the Orthopedic Clinic. Please bring your original Emergency Department discharge papers and disc with you to the clinic appointment.     If you received or are discharged with pain medicine or muscle relaxers, understand that they can make you sleepy or impair your judgement. Do not make important decisions, drink, drive, swim or perform any other tasks you would not otherwise perform while impaired for at least 24 hours after your last dose.      Ensure you follow up with your Primary Care Provider or any additional providers listed on this discharge sheet. While you may be healthy enough to go home today, I cannot predict the exact course of your diagnoses. It is important to remember that some problems are difficult to diagnose and may not be found during your first visit. As such, it is your responsibility to monitor symptoms, follow-up with another healthcare provider, or return to the emergency room for new or worsening concerns. Unless otherwise instructed, continue all home medications and any new medications prescribed to  you in the Emergency Department.

## 2025-06-04 NOTE — ED TRIAGE NOTES
Patient reports left flank pain that she states started 1 week ago, patient tolerated condition but now has nausea thus this ED visit. Denies any fever, cough, chest pain or shortness of breath.

## 2025-06-06 ENCOUNTER — HOSPITAL ENCOUNTER (OUTPATIENT)
Dept: SLEEP MEDICINE | Facility: HOSPITAL | Age: 75
Discharge: HOME | End: 2025-06-06
Attending: INTERNAL MEDICINE
Payer: MEDICARE

## 2025-06-06 ENCOUNTER — OFFICE VISIT (OUTPATIENT)
Dept: PULMONOLOGY | Facility: CLINIC | Age: 75
End: 2025-06-06
Payer: MEDICARE

## 2025-06-06 VITALS
BODY MASS INDEX: 31.82 KG/M2 | DIASTOLIC BLOOD PRESSURE: 85 MMHG | HEART RATE: 68 BPM | WEIGHT: 186.38 LBS | OXYGEN SATURATION: 98 % | HEIGHT: 64 IN | SYSTOLIC BLOOD PRESSURE: 165 MMHG

## 2025-06-06 DIAGNOSIS — R06.02 SOB (SHORTNESS OF BREATH): ICD-10-CM

## 2025-06-06 DIAGNOSIS — J98.11 ATELECTASIS: ICD-10-CM

## 2025-06-06 DIAGNOSIS — E66.01 SEVERE OBESITY (BMI 35.0-39.9) WITH COMORBIDITY: ICD-10-CM

## 2025-06-06 DIAGNOSIS — G47.50 PARASOMNIA, UNSPECIFIED TYPE: ICD-10-CM

## 2025-06-06 DIAGNOSIS — R10.9 LEFT FLANK PAIN: ICD-10-CM

## 2025-06-06 DIAGNOSIS — G47.33 OSA (OBSTRUCTIVE SLEEP APNEA): Primary | ICD-10-CM

## 2025-06-06 DIAGNOSIS — G47.33 OSA (OBSTRUCTIVE SLEEP APNEA): ICD-10-CM

## 2025-06-06 DIAGNOSIS — G47.01 INSOMNIA DUE TO MEDICAL CONDITION: ICD-10-CM

## 2025-06-06 DIAGNOSIS — R10.9 FLANK PAIN: ICD-10-CM

## 2025-06-06 PROCEDURE — 3079F DIAST BP 80-89 MM HG: CPT | Mod: CPTII,S$GLB,, | Performed by: INTERNAL MEDICINE

## 2025-06-06 PROCEDURE — 99999 PR PBB SHADOW E&M-EST. PATIENT-LVL V: CPT | Mod: PBBFAC,,, | Performed by: INTERNAL MEDICINE

## 2025-06-06 PROCEDURE — 3077F SYST BP >= 140 MM HG: CPT | Mod: CPTII,S$GLB,, | Performed by: INTERNAL MEDICINE

## 2025-06-06 PROCEDURE — 1101F PT FALLS ASSESS-DOCD LE1/YR: CPT | Mod: CPTII,S$GLB,, | Performed by: INTERNAL MEDICINE

## 2025-06-06 PROCEDURE — 3072F LOW RISK FOR RETINOPATHY: CPT | Mod: CPTII,S$GLB,, | Performed by: INTERNAL MEDICINE

## 2025-06-06 PROCEDURE — 1125F AMNT PAIN NOTED PAIN PRSNT: CPT | Mod: CPTII,S$GLB,, | Performed by: INTERNAL MEDICINE

## 2025-06-06 PROCEDURE — 1159F MED LIST DOCD IN RCRD: CPT | Mod: CPTII,S$GLB,, | Performed by: INTERNAL MEDICINE

## 2025-06-06 PROCEDURE — 95810 POLYSOM 6/> YRS 4/> PARAM: CPT | Mod: 26,HCNC,, | Performed by: INTERNAL MEDICINE

## 2025-06-06 PROCEDURE — 95810 POLYSOM 6/> YRS 4/> PARAM: CPT

## 2025-06-06 PROCEDURE — G2211 COMPLEX E/M VISIT ADD ON: HCPCS | Mod: S$GLB,,, | Performed by: INTERNAL MEDICINE

## 2025-06-06 PROCEDURE — 99214 OFFICE O/P EST MOD 30 MIN: CPT | Mod: S$GLB,,, | Performed by: INTERNAL MEDICINE

## 2025-06-06 PROCEDURE — 3288F FALL RISK ASSESSMENT DOCD: CPT | Mod: CPTII,S$GLB,, | Performed by: INTERNAL MEDICINE

## 2025-06-06 NOTE — PROGRESS NOTES
Valarie Foster  was seen as a follow up.      CHIEF COMPLAINT:    Chief Complaint   Patient presents with    Shortness of Breath       HISTORY OF PRESENT ILLNESS: Valarie Foster is a 75 y.o. female is here for sleep evaluation.   Patient was seen and evaluate by pcp for insomnia and chronic fatigue.  Patient was referred for sleep inputs.      Our first encounter was 2/19/25.  At that time, patient endorsed difficulty with sleep initiation and maintenance x several years.  Poor memory and cannot recall exactly when sleep issues began.  Cannot recall triggering events.  Have not tried any sleep aids in the past  Psg was ordered but was not done.    ED presentation 6/4/25 for left flank pain.  Pain improved with toradol.  Ua was negative for blood.  Few bacteria.      STOPBANG during initial visit:    Snore:  lives alone and is not sure  Tire:  daily  Observed apnea:  denied  Pressure (HTN):  yes    BMI:  Body mass index is 32 kg/m².   Age:  75 y.o.  Neck (inch):  14  Gender:  female    Total STOP BANG score = 3/8  Low risk GERALD: 0-2, Intermediate risk GERALD: 3-4, High risk GERALD: 5+    Other sleep ROS:    sleep walking once in 12/2024.  No recurrence.  Fallen out of bed 2 times in 1/2025.    Chronic knee pain and planning on knee cap surgery.  Chronic knee pain during day and night  No cataplexy.        [place] Sleepiness Scale score during initial sleep evaluation was 1.    SLEEP ROUTINE:  Activity the hour prior to sleep: read in kitchen     Bed partner:  alone  Time to bed:  10 pm   Lights off:  off   Sleep onset latency:  hours         Disruptions or awakenings:    multiple times (can difficulty going back to sleep)    Wakeup time:      2-3 am   Perceived sleep quality:  tire       Daytime naps:      denied   Weekend sleep routine:      same  Caffeine use: denied  exercise habit:   walk in the house      PAST MEDICAL HISTORY:    Active Ambulatory Problems     Diagnosis Date Noted    Type 2 diabetes mellitus with  diabetic neuropathy, without long-term current use of insulin 01/29/2018    Benign hypertension 01/29/2018    Hyperlipemia 01/29/2018    Microhematuria 02/08/2018    Left flank pain 02/08/2018    Abdominal aortic atherosclerosis 07/01/2019    History of renal cell cancer 08/27/2019    Renal cell carcinoma of right kidney 11/23/2019    Mild major depression 07/02/2020    Bilateral chronic knee pain 07/31/2020    Colon cancer screening 08/13/2020    Pain of left calf 12/08/2021    Refractive error 02/14/2022    Primary osteoarthritis of knees, bilateral 02/21/2022    Fall 04/04/2022    Radiculopathy 10/27/2022    Idiopathic progressive neuropathy 10/27/2022    Nerve pain 10/27/2022    Chest pain 01/11/2023    Severe obesity (BMI 35.0-39.9) with comorbidity 03/30/2023    Decreased ROM of right knee 11/27/2023    Decreased strength involving knee joint 11/27/2023    Gait difficulty 11/27/2023    Paresthesia 01/22/2024    Pseudophakia 04/30/2024    COVID-19 07/22/2024    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, without long-term current use of insulin 01/17/2025    Insomnia 02/04/2025    Memory loss 02/04/2025    GERALD (obstructive sleep apnea) 02/19/2025    Parasomnia 02/19/2025    Impaired range of motion of left knee 04/11/2025    Weakness of left quadriceps muscle 04/11/2025    Cognitive impairment 05/13/2025     Resolved Ambulatory Problems     Diagnosis Date Noted    Right renal mass 02/01/2018    Colon cancer screening 02/05/2018    Kidney mass 02/16/2018    Muscle weakness of lower extremity 07/31/2020    Impaired functional mobility, balance, gait, and endurance 07/31/2020    Decreased range of motion of both knees 07/31/2020    Nuclear sclerosis of both eyes 02/14/2022    Decreased ROM of lower extremity 05/03/2022    Decreased strength of lower extremity 05/03/2022    Decreased mobility and endurance 05/03/2022    Nuclear sclerotic cataract of left eye 07/07/2022     Nuclear sclerotic cataract of right eye 07/21/2022    Pulmonary hypertension 01/17/2025     Past Medical History:   Diagnosis Date    Arthritis     Colon polyp     Diabetes mellitus     Diabetes with neurologic complications     Hypertension     Renal cell carcinoma     Sleep apnea                 PAST SURGICAL HISTORY:    Past Surgical History:   Procedure Laterality Date    ARTHROPLASTY, KNEE, TOTAL, USING COMPUTER-ASSISTED NAVIGATION Left 3/5/2025    Procedure: ARTHROPLASTY, KNEE, TOTAL, USING COMPUTER-ASSISTED NAVIGATION;  Surgeon: Eric Carbajal MD;  Location: [facility] OR;  Service: Orthopedics;  Laterality: Left;  [place]---CLEARED BY CARDS AND PCP  FIRST MARTIN ASSIST NOTIFIED-GG  RN PREOP 2/19/2025   T/S ON 2/28/2025---NEED ORDERS    COLONOSCOPY N/A 2/5/2018    Procedure: COLONOSCOPY;  Surgeon: Bacilio Mancia MD;  Location: [facility] ENDO;  Service: Endoscopy;  Laterality: N/A;  appt confirmed-ss    COLONOSCOPY N/A 8/13/2020    Procedure: COLONOSCOPY;  Surgeon: Jose West MD;  Location: [facility] ENDO;  Service: Endoscopy;  Laterality: N/A;    ENDOSCOPIC CARPAL TUNNEL RELEASE Right 3/15/2024    Procedure: RELEASE, CARPAL TUNNEL, ENDOSCOPIC - RIGHT;  Surgeon: Tonio Mcdermott MD;  Location: [facility] OR;  Service: Orthopedics;  Laterality: Right;    ENDOSCOPIC CARPAL TUNNEL RELEASE Left 6/26/2024    Procedure: RELEASE, CARPAL TUNNEL, ENDOSCOPIC - left;  Surgeon: Tonio Mcdermott MD;  Location: [facility] OR;  Service: Orthopedics;  Laterality: Left;    HYSTERECTOMY      INTRAOCULAR PROSTHESES INSERTION Left 7/7/2022    Procedure: INSERTION, IOL PROSTHESIS;  Surgeon: Candelario Novoa MD;  Location: [facility] OR;  Service: Ophthalmology;  Laterality: Left;    INTRAOCULAR PROSTHESES INSERTION Right 7/21/2022    Procedure: INSERTION, IOL PROSTHESIS;  Surgeon: Candelario Novoa MD;  Location: [facility] OR;  Service: Ophthalmology;  Laterality: Right;    OOPHORECTOMY      PARTIAL NEPHRECTOMY Right 10/12/2018     Procedure: NEPHRECTOMY, PARTIAL open. Dr Arenas to assist.;  Surgeon: Alejandra Palm MD;  Location: [facility] OR;  Service: Urology;  Laterality: Right;  RN PREOP 10/9/2018----NEEDS H/P    PHACOEMULSIFICATION OF CATARACT Left 7/7/2022    Procedure: PHACOEMULSIFICATION, CATARACT;  Surgeon: Candelario Novoa MD;  Location: [facility] OR;  Service: Ophthalmology;  Laterality: Left;  RN PHONE PREOP 6/27/2022   ARRIVAL 6:00 AM    PHACOEMULSIFICATION OF CATARACT Right 7/21/2022    Procedure: PHACOEMULSIFICATION, CATARACT;  Surgeon: Candelario Novoa MD;  Location: [facility] OR;  Service: Ophthalmology;  Laterality: Right;  RN PHONE PREOP 7/12/2022   ARRIVAL 12:00 NOON    TOTAL KNEE ARTHROPLASTY Right 11/8/2023    Procedure: ARTHROPLASTY, KNEE, TOTAL. NELSON;  Surgeon: Eric Carbajal MD;  Location: [facility] OR;  Service: Orthopedics;  Laterality: Right;  [place]. Admit  RAPHAEL QUINTIN NICOLEKINGSLEY [phone] NOTIFIED QUINTIN ON 10/11/2023-LO  RN PREOP 10/25/2023   T/S ON 11/6/2023--done------CLEARED BY CARDS         FAMILY HISTORY:                Family History   Problem Relation Name Age of Onset    No Known Problems Mother      Glaucoma Father      Breast cancer Sister Warnell     Glaucoma Sister Warnell     Cancer Sister Warnell         breast cancer    Thyroid disease Sister Angeles     Blindness Sister Bryanna         due to trauma during car accident    Diabetes Sister Amanda     No Known Problems Maternal Aunt      No Known Problems Maternal Uncle      No Known Problems Paternal Aunt      No Known Problems Paternal Uncle      No Known Problems Maternal Grandmother      No Known Problems Maternal Grandfather      No Known Problems Paternal Grandmother      No Known Problems Paternal Grandfather      Diabetes Brother Navneet     Amblyopia Neg Hx      Cataracts Neg Hx      Hypertension Neg Hx      Macular degeneration Neg Hx      Retinal detachment Neg Hx      Strabismus Neg Hx      Stroke Neg Hx         SOCIAL HISTORY:        "   Tobacco: Tobacco Use History[1]    alcohol use:    Social History     Substance and Sexual Activity   Alcohol Use No                 Occupation:  retire     ALLERGIES:    Review of patient's allergies indicates:   Allergen Reactions    Lisinopril Swelling and Shortness Of Breath    Ascorbic acid-ascorbate calc      And citrus fruits       CURRENT MEDICATIONS:  Current Medications[2]               REVIEW OF SYSTEMS:     Sleep related symptoms as per HPI.  CONST:Denies weight gain    HEENT: Denies sinus congestion  PULM: + dyspnea x block  CARD:  Denies palpitations   GI:  Denies acid reflux  : Denies polyuria  NEURO: Denies headaches  PSYCH: anxious and depressed  HEME: Denies anemia   Otherwise, a balance of systems reviewed is negative.          PHYSICAL EXAM:  Vitals:    06/06/25 1257   BP: (!) 165/85   Pulse: 68   SpO2: 98%   Weight: 84.5 kg (186 lb 6.4 oz)   Height: 5' 4" (1.626 m)   PainSc:   4   PainLoc: Knee     Body mass index is 32 kg/m².     GENERAL: Normal development, well groomed  HEENT:  Conjunctivae are non-erythematous; Pupils equal, round, and reactive to light; Nose is symmetrical; Nasal mucosa is pink and moist; Septum is midline; Inferior turbinates are normal; Nasal airflow is normal; Posterior pharynx is pink; Modified Mallampati: 4; Posterior palate is normal; Tonsils +1; Uvula is normal and pink;Tongue is normal; Dentition is fair; No TMJ tenderness; Jaw opening and protrusion without click and without discomfort.  NECK: Supple. Neck circumference is 14 inches. No thyromegaly. No palpable nodes.     SKIN: On face and neck: No abrasions, no rashes, no lesions.  No subcutaneous nodules are palpable.  RESPIRATORY: Chest is clear to auscultation.  Normal chest expansion and non-labored breathing at rest.  CARDIOVASCULAR: Normal S1, S2.  No murmurs, gallops or rubs. No carotid bruits bilaterally.  EXTREMITIES: No edema. No clubbing. No cyanosis. Station normal. Gait normal.      "   NEURO/PSYCH: Oriented to time, place and person. Normal attention span and concentration. Affect is full. Mood is normal.                                              DATA   No prior sleep study  Echo 11/26/24    Left Ventricle: The left ventricle is normal in size. There is mild concentric hypertrophy. There is normal systolic function with a visually estimated ejection fraction of 55 - 60%. Grade I diastolic dysfunction.    Right Ventricle: Normal right ventricular cavity size. Systolic function is normal.    Left Atrium: Left atrium is mildly dilated.    Mitral Valve: There is mild regurgitation.    Pulmonary Artery: The estimated pulmonary artery systolic pressure is 36 mmHg.    IVC/SVC: Normal venous pressure at 3 mmHg.    Lab Results   Component Value Date    TSH 1.984 10/28/2024       ASSESSMENT/PLAN    Problem List Items Addressed This Visit       Insomnia    Overview   difficulty with sleep initiaton and maintenance.   Will provide ambien for the night of sleep study.           Left flank pain    Overview   persist x 2 weeks.  Ua with rare bacteria.    Ct abd/pelvis to rule out nephrolithiasis.    Follow up with pcp         GERALD (obstructive sleep apnea) - Primary    Overview   The patient symptomatically has restless sleep with findings of htn, high grade mallampatti. This warrants further investigation for possible obstructive sleep apnea.  Parasomnia warrants in lab study.  Patient will be contacted after sleep study is done.  Will schedule psg          Severe obesity (BMI 35.0-39.9) with comorbidity    Current Assessment & Plan   losing weight with ozempic          Other Visit Diagnoses         SOB (shortness of breath)          Atelectasis          Flank pain        Relevant Orders    CT Chest Abdomen Pelvis Without Contrast (XPD)            Education: During our discussion today, we talked about the etiology of obstructive sleep apnea as well as the potential ramifications of untreated sleep apnea,  which could include daytime sleepiness, hypertension, heart disease and/or stroke.     Precautions: The patient was advised to abstain from driving should they feel sleepy or drowsy.     Patient will No follow-ups on file.      20 minutes of total time spent on the encounter, which includes face to face time and non-face to face time preparing to see the patient (eg, review of tests), Obtaining and/or reviewing separately obtained history, documenting clinical information in the electronic or other health record, independently interpreting results (not separately reported) and communicating results to the patient/family/caregiver, or Care coordination (not separately reported).                 [1]   Social History  Tobacco Use   Smoking Status Never    Passive exposure: Past   Smokeless Tobacco Never   [2]   Current Outpatient Medications   Medication Sig Dispense Refill    ACCU-CHEK ONEIL PLUS TEST STRP Strp TEST BLOOD SUGAR TWICE DAILY 200 strip 1    ACCU-CHEK SOFTCLIX LANCETS [facility] USE TO TEST BLOOD SUGAR ONE TIME DAILY 100 each 3    acetaminophen (TYLENOL) 500 MG tablet Take 2 tablets (1,000 mg total) by mouth every 8 (eight) hours as needed for Pain. 42 tablet 0    alcohol swabs (DROPSAFE ALCOHOL PREP PADS) PadM USE AS DIRECTED THREE TIMES DAILY 300 each 3    amLODIPine (NORVASC) 10 MG tablet Take 1 tablet (10 mg total) by mouth once daily. 90 tablet 1    atorvastatin (LIPITOR) 80 MG tablet TAKE 1 TABLET EVERY EVENING 90 tablet 3    azelastine (ASTELIN) 137 mcg (0.1 %) nasal spray 1 spray (137 mcg total) by Nasal route 2 (two) times daily. 90 mL 0    blood glucose control high,low (ACCU-CHEK ONEIL CONTROL SOLN) Soln 1 Units by Misc.(Non-Drug; Combo Route) route as needed. 1 each 0    blood-glucose meter (ACCU-CHEK ONEIL PLUS METER) Misc 1 kit by Misc.(Non-Drug; Combo Route) route once daily. 1 each 0    blood-glucose meter (TRUE METRIX GLUCOSE METER) kit Use as directed to test glucose 1 each 0    celecoxib  (CELEBREX) 200 MG capsule Take 1 capsule (200 mg total) by mouth 2 (two) times daily. 14 capsule 0    docusate sodium (COLACE) 100 MG capsule Take 1 capsule (100 mg total) by mouth 2 (two) times daily. 30 capsule 0    donepeziL (ARICEPT) 5 MG tablet Take 1 tablet (5 mg total) by mouth every evening. 90 tablet 1    ergocalciferol (ERGOCALCIFEROL) 50,000 unit Cap TAKE 1 CAPSULE EVERY 7 DAYS. 12 capsule 3    FLUoxetine 40 MG capsule Take 1 capsule (40 mg total) by mouth once daily. 90 capsule 1    fluticasone propionate (FLONASE) 50 mcg/actuation nasal spray USE 1 SPRAY IN EACH NOSTRIL ONCE DAILY 32 g 3    gabapentin (NEURONTIN) 300 MG capsule Take 1 capsule (300 mg total) by mouth 3 (three) times daily. 270 capsule 1    hydrALAZINE (APRESOLINE) 100 MG tablet TAKE 1 TABLET EVERY 8 HOURS 90 tablet 3    hydrOXYzine HCL (ATARAX) 25 MG tablet TAKE 1 TABLET BY MOUTH THREE TIMES DAILY AS NEEDED FOR ITCHING 90 tablet 0    JARDIANCE 25 mg tablet TAKE 1 TABLET ONE TIME DAILY 90 tablet 1    LIDOcaine (LIDODERM) 5 % Place 1 patch onto the skin once daily. Remove & Discard patch within 12 hours or as directed by MD 5 patch 0    methocarbamoL (ROBAXIN) 500 MG Tab Take 2 tablets (1,000 mg total) by mouth 3 (three) times daily. for 5 days 30 tablet 0    methylPREDNISolone (MEDROL DOSEPACK) 4 mg tablet use as directed 21 tablet 0    metoprolol succinate (TOPROL-XL) 100 MG 24 hr tablet Take 1 tablet (100 mg total) by mouth once daily. 90 tablet 3    metoprolol succinate (TOPROL-XL) 50 MG 24 hr tablet Take 1 tablet (50 mg total) by mouth once daily. 90 tablet 3    ondansetron (ZOFRAN) 4 MG tablet Take 1 tablet (4 mg total) by mouth every 6 (six) hours. 12 tablet 0    ondansetron (ZOFRAN-ODT) 4 MG TbDL Take 1 tablet (4 mg total) by mouth every 6 (six) hours as needed (for nausea). 20 tablet 0    oxyCODONE (OXY-IR) 5 mg Cap Take 1 capsule (5 mg total) by mouth every 6 (six) hours as needed for Pain. 30 capsule 0    pantoprazole (PROTONIX)  40 MG tablet Take 1 tablet (40 mg total) by mouth once daily. 90 tablet 1    semaglutide (OZEMPIC) 0.25 mg or 0.5 mg (2 mg/3 mL) pen injector Inject 0.5 mg into the skin every 7 days. 3 mL 0    semaglutide (OZEMPIC) 1 mg/dose (2 mg/1.5 mL) PnIj Inject 1 mg into the skin every 7 days. 3 mL 5    TRUEPLUS LANCETS 33 gauge Misc TEST BLOOD SUGAR TWICE DAILY 200 each 1    albuterol (PROVENTIL/VENTOLIN HFA) 90 mcg/actuation inhaler Inhale 1-2 puffs into the lungs every 6 (six) hours as needed for Wheezing or Shortness of Breath. Rescue 18 g 0    aspirin (ECOTRIN) 81 MG EC tablet Take 1 tablet (81 mg total) by mouth 2 (two) times a day. 60 tablet 0    blood glucose control, low (TRUE METRIX LEVEL 1) Soln 1 each by Misc.(Non-Drug; Combo Route) route once as needed. 1 each 3    cetirizine (ZYRTEC) 10 MG tablet Take 1 tablet (10 mg total) by mouth once daily. 90 tablet 0     No current facility-administered medications for this visit.     Facility-Administered Medications Ordered in Other Visits   Medication Dose Route Frequency Provider Last Rate Last Admin    acetaminophen tablet 1,000 mg  1,000 mg Oral On Call Procedure Eric Carbajal MD        acetaminophen tablet 1,000 mg  1,000 mg Oral Q6H Eric Carbjaal MD   1,000 mg at 11/09/23 0506    aspirin EC tablet 81 mg  81 mg Oral BID Eric Carbajal MD   81 mg at 11/09/23 1358    LIDOcaine (PF) 10 mg/ml (1%) injection 10 mg  1 mL Intradermal On Call Procedure Eric Carbajal MD        methocarbamoL tablet 750 mg  750 mg Oral TID Eric Carbajal MD   750 mg at 11/09/23 1357    naloxone 0.4 mg/mL injection 0.02 mg  0.02 mg Intravenous PRN Eric Carbajal MD        ondansetron injection 4 mg  4 mg Intravenous Q8H PRN Eric Carbajal MD   4 mg at 11/09/23 0858    oxyCODONE immediate release tablet 10 mg  10 mg Oral Q3H PRN Eric Carbajal MD   10 mg at 11/09/23 1043    oxyCODONE immediate release tablet 5 mg  5 mg Oral Q3H PRN Eric Carbajal MD         polyethylene glycol packet 17 g  17 g Oral Daily Eric Carbajal MD   17 g at 11/09/23 1356    pregabalin capsule 75 mg  75 mg Oral QHS Eric Carbajal MD   75 mg at 11/08/23 2147    prochlorperazine injection Soln 5 mg  5 mg Intravenous Q6H PRN Eric Carbajal MD   5 mg at 11/08/23 1325    senna-docusate 8.6-50 mg per tablet 1 tablet  1 tablet Oral BID Eric Carbajal MD   1 tablet at 11/09/23 1358    sodium chloride 0.9% flush 10 mL  10 mL Intravenous PRN Eric Carbajal MD

## 2025-06-07 NOTE — PROGRESS NOTES
End of the night summary     Type of study performed on (Essie Kristin-)  PSG parasomnia  ?  Patient education/cpap information prior to study/setup     Pt was informed, of emergency cord in bathroom and given spectra link phone#     EKG Appears to be- NSR     Low spo2 -  88%     Any difficulties recording:  Tir to secure pulse ox     Pt reaction to CPAP:  pt report she is willing to try cpap if she needs it     Tech summary Comments:      pt did not meet criteria for split on cpap. some soft to none snoring observed. most of pts events observed in REM sleep. pt sleep talks alot and moves at night. some movements observed in REM sleep. pt frequently wakes up  out of sleep to eat her cookies . pt slept okay no reports of discomfort

## 2025-06-10 ENCOUNTER — LAB VISIT (OUTPATIENT)
Dept: LAB | Facility: HOSPITAL | Age: 75
End: 2025-06-10
Payer: MEDICARE

## 2025-06-10 ENCOUNTER — OFFICE VISIT (OUTPATIENT)
Dept: FAMILY MEDICINE | Facility: CLINIC | Age: 75
End: 2025-06-10
Payer: MEDICARE

## 2025-06-10 VITALS
WEIGHT: 184.31 LBS | HEIGHT: 64 IN | BODY MASS INDEX: 31.47 KG/M2 | HEART RATE: 82 BPM | TEMPERATURE: 98 F | SYSTOLIC BLOOD PRESSURE: 138 MMHG | DIASTOLIC BLOOD PRESSURE: 80 MMHG | OXYGEN SATURATION: 99 %

## 2025-06-10 DIAGNOSIS — R10.9 LEFT FLANK PAIN: ICD-10-CM

## 2025-06-10 DIAGNOSIS — Z09 HOSPITAL DISCHARGE FOLLOW-UP: Primary | ICD-10-CM

## 2025-06-10 DIAGNOSIS — F51.04 PSYCHOPHYSIOLOGICAL INSOMNIA: ICD-10-CM

## 2025-06-10 DIAGNOSIS — R11.0 NAUSEA: ICD-10-CM

## 2025-06-10 LAB
BACTERIA #/AREA URNS AUTO: ABNORMAL /HPF
BILIRUB UR QL STRIP.AUTO: NEGATIVE
CLARITY UR: CLEAR
COLOR UR AUTO: YELLOW
GLUCOSE UR QL STRIP: ABNORMAL
HGB UR QL STRIP: NEGATIVE
HYALINE CASTS UR QL AUTO: 1 /LPF (ref 0–1)
KETONES UR QL STRIP: NEGATIVE
LEUKOCYTE ESTERASE UR QL STRIP: NEGATIVE
MICROSCOPIC COMMENT: ABNORMAL
NITRITE UR QL STRIP: NEGATIVE
PH UR STRIP: 6 [PH]
PROT UR QL STRIP: NEGATIVE
RBC #/AREA URNS AUTO: <1 /HPF (ref 0–4)
SP GR UR STRIP: >=1.03
SQUAMOUS #/AREA URNS AUTO: <1 /HPF
UROBILINOGEN UR STRIP-ACNC: NEGATIVE EU/DL
WBC #/AREA URNS AUTO: 1 /HPF (ref 0–5)
YEAST UR QL AUTO: ABNORMAL /HPF

## 2025-06-10 PROCEDURE — 99999 PR PBB SHADOW E&M-EST. PATIENT-LVL V: CPT | Mod: PBBFAC,HCNC,,

## 2025-06-10 PROCEDURE — 81001 URINALYSIS AUTO W/SCOPE: CPT | Mod: HCNC

## 2025-06-10 RX ORDER — KETOROLAC TROMETHAMINE 30 MG/ML
30 INJECTION, SOLUTION INTRAMUSCULAR; INTRAVENOUS
Status: COMPLETED | OUTPATIENT
Start: 2025-06-10 | End: 2025-06-10

## 2025-06-10 RX ORDER — TRAZODONE HYDROCHLORIDE 50 MG/1
50 TABLET ORAL NIGHTLY
Qty: 90 TABLET | Refills: 0 | Status: SHIPPED | OUTPATIENT
Start: 2025-06-10 | End: 2025-09-08

## 2025-06-10 RX ORDER — ONDANSETRON 4 MG/1
4 TABLET, ORALLY DISINTEGRATING ORAL EVERY 6 HOURS PRN
Qty: 30 TABLET | Refills: 0 | Status: SHIPPED | OUTPATIENT
Start: 2025-06-10

## 2025-06-10 RX ORDER — ONDANSETRON 4 MG/1
4 TABLET, FILM COATED ORAL EVERY 6 HOURS
Qty: 12 TABLET | Refills: 0 | Status: CANCELLED | OUTPATIENT
Start: 2025-06-10

## 2025-06-10 RX ADMIN — KETOROLAC TROMETHAMINE 30 MG: 30 INJECTION, SOLUTION INTRAMUSCULAR; INTRAVENOUS at 08:06

## 2025-06-10 NOTE — PROGRESS NOTES
Patient given toradol 30mg via injection to L glute per provider order. Tolerated well, 0 complaints of.

## 2025-06-10 NOTE — PROGRESS NOTES
"  HPI     Chief Complaint:  Hospital follow up    Valarie Foster is a 75 y.o. female with multiple medical diagnoses as listed in the medical history and problem list that presents for hospital follow up. PCP Dr. Hernandez with last visit in this clinic on 5/21/25.     HPI    Pleasant patient here for follow up on recurrent left flank pain. Had recent ED visit for this complaint.  Also had sleep study done on Friday 6/6/25; had ambien on the night of sleep study and slept well. Reports not sleeping well since. Has history of ongoing insomnia for many years.     Had birthday recently but didn't get to celebrate due to recurrent flank pain. UA at hospital visit showed few bacteria, rare yeast. Has upcoming CT chest/abdomen/pelvis ordered for further evaluation of this ongoing pain. Of note, has history of right sided kidney cancer with partial nephrectomy in 2018. Is on Ozempic weekly. Staying hydrated with water and cranberry juice with no sugar. Having regular BM's. Nausea at times, treated with Zofran successfully. Denies hematuria, burning with urination.    Recent hospital encounter. See below encounter note from 6/4/2025:      Flank Pain        Pt reports right sided flank pain x 1 week. Pt reports she is now feeling nauseated. Pt reports she was dx with a UTI 2 weeks ago but that it "wasn't bad" and she took a round of abx for it. Pt denies dysuria or other urinary symptoms.      [age] old female with PMHx arthritis, DM hypertension, renal cell looked at me presents to emergency department complaint of left flank pain x1 week.  Patient reports pain is located to the left flank and isn't radiating anywhere.  She endorses trying Tylenol and tramadol without any relief.  Patient reports that when feeling left flank she can feel some hard knots under the skin.  Patient reports pain is 10/10.  She states when pain gets at its worse makes her feel nauseous.  She denies any episodes of emesis.  No meds prior to arrival.  " Patient denies any abdominal pain, diarrhea, urinary symptoms, hematuria, back pain, hip pain, chest pain, shortness breath    Medical Decision Making  75-year-old female presents to emergency department complaining of left flank pain.  She is overall well-appearing in no acute distress.  Afebrile.  On physical exam there is tenderness appreciated to the left flank.  No CVA tenderness.  There is no abdominal or lumbar tenderness.  No tenderness to palpation of left hip.  Patient is able to ambulate without pain.     Differential diagnosis includes but isn't limited to:  AAA, aortic dissection, SBO/volvulus, intussusception, ileus, appendicitis, cholecystitis, hepatitis, nephrolithiasis, pancreatitis, IBD/IBS, biliary colic, GERD, PUD, constipation, UTI/pyelonephritis, musculoskeletal pain.     Vital signs reassuring.  Labs are unremarkable.  UA showed no signs of infection, patient does have 4+ glucose but takes Jardiance.  Patient has received Toradol, Norflex and Zofran he while in ED with improvement of symptoms.  My overall impression is musculoskeletal strain to the left flank.  Feel comfortable at this time discharging home.  Will send home with muscle relaxers and lidocaine patch.  We discussed strict return precautions.  Instructed to follow up with PCP in the next 2-3 days.  Patient is understanding agreeable with treatment plan.    Assessment & Plan     1. Hospital discharge follow-up    Family and/or Caretaker present at visit? No  Medication Reconciliation:  Completed  New Prescriptions filled after discharge: Yes  Hospital encounter notes, objective/subjective data, diagnostics, and plan of care from recent hospital encounter reviewed: Yes  Discharge summary reviewed:  Yes  Disease/illness education: completed  Follow up appointments scheduled:  No  Follow up labs/tests ordered: Yes  Home Health ordered on discharge: Patient does not have home health established from hospital visit.  They do not need  home health.  If needed, we will set up home health for the patient  Establishment or re-establishment of referral orders for community resources: No  DME ordered at discharge: No  How patient is feeling since discharge from the hospital?  Reports symptoms have improved    Discussion with other health care providers: No    2. Psychophysiological insomnia    Discussed importance of sleep hygiene measures. Has tried hydroxyzine which helps somewhat, but interested in trying something for additional relief. Will trial low dose Trazodone 50 mg nightly for insomnia. Follow up with clinic for any worsening symptoms, or if symptoms fail to improve.       - traZODone (DESYREL) 50 MG tablet; Take 1 tablet (50 mg total) by mouth every evening.  Dispense: 90 tablet; Refill: 0    3. Left flank pain    Mild TTP of left flank/CVA region. Will obtain urine for UA today. Has CT scan scheduled this Friday. Denies fever, no dysuria or hematuria. Has had relief in the past with IM Toradol. Will give one time dose today. Will f/u with results of UA. ED precautions given.     - ketorolac injection 30 mg  - Urinalysis, Reflex to Urine Culture Urine, Clean Catch; Future    4. Nausea    Has had success in the past with PRN Zofran for nausea; will refill. Denies vomiting, having regular BM's. Follow up with clinic for any worsening symptoms, or if symptoms fail to improve.       - ondansetron (ZOFRAN-ODT) 4 MG TbDL; Dissolve 1 tablet (4 mg total) by mouth every 6 (six) hours as needed (for nausea).  Dispense: 30 tablet; Refill: 0                  Discussed condition, and treatment.   Education sent to patient portal/included in after visit summary.  ED precautions given.   Notify provider if symptoms do not resolve or increase in severity.   Patient verbalizes understanding and agrees with plan of care.  --------------------------------------------      Health Maintenance:  Health Maintenance         Date Due Completion Date    Diabetic Eye  Exam 04/30/2025 4/30/2024    DEXA Scan 07/13/2025 7/13/2023    Diabetes Urine Screening 10/28/2025 10/28/2024    Lipid Panel 10/28/2025 10/28/2024    Hemoglobin A1c 11/09/2025 5/9/2025    TETANUS VACCINE 04/30/2028 4/30/2018            Discussed the importance of overdue vaccines which were offered during this encounter. Patient declined overdue vaccines at this time and Advised patient on the importance of completing overdue health maintenance items    Follow Up:  Follow up if symptoms worsen or fail to improve.    Exam     Review of Systems:  (as noted above)  Review of Systems   Constitutional:  Negative for fever.   HENT:  Negative for trouble swallowing.    Respiratory:  Negative for shortness of breath.    Cardiovascular:  Negative for chest pain.   Gastrointestinal:  Positive for nausea. Negative for abdominal distention, abdominal pain, blood in stool, constipation, diarrhea and vomiting.   Genitourinary:  Positive for flank pain. Negative for difficulty urinating, dysuria, hematuria and vaginal pain.   Skin:  Negative for rash.   Psychiatric/Behavioral:  Positive for sleep disturbance.        Physical Exam:   Physical Exam  Constitutional:       General: She is not in acute distress.     Appearance: Normal appearance. She is obese. She is not ill-appearing.   HENT:      Head: Normocephalic and atraumatic.   Cardiovascular:      Rate and Rhythm: Normal rate and regular rhythm.      Pulses: Normal pulses.      Heart sounds: Normal heart sounds. No murmur heard.  Pulmonary:      Effort: Pulmonary effort is normal. No respiratory distress.      Breath sounds: Normal breath sounds. No wheezing.   Abdominal:      General: Abdomen is flat. Bowel sounds are normal. There is no distension.      Tenderness: There is no abdominal tenderness. There is left CVA tenderness. There is no right CVA tenderness.   Musculoskeletal:         General: Normal range of motion.   Skin:     General: Skin is warm and dry.       "Capillary Refill: Capillary refill takes less than 2 seconds.   Neurological:      General: No focal deficit present.      Mental Status: She is alert and oriented to person, place, and time.   Psychiatric:         Mood and Affect: Mood normal.         Behavior: Behavior normal.       Vitals:    06/10/25 0759   BP: 138/80   Pulse: 82   Temp: 97.8 °F (36.6 °C)   TempSrc: Oral   SpO2: 99%   Weight: 83.6 kg (184 lb 4.9 oz)   Height: 5' 4" (1.626 m)      Body mass index is 31.64 kg/m².        History     Past Medical History:  Past Medical History:   Diagnosis Date    Arthritis     Colon polyp     Diabetes mellitus     diet controlled    Diabetes with neurologic complications     Hypertension     Nuclear sclerosis of both eyes 02/14/2022    Renal cell carcinoma     Sleep apnea        Past Surgical History:  Past Surgical History:   Procedure Laterality Date    ARTHROPLASTY, KNEE, TOTAL, USING COMPUTER-ASSISTED NAVIGATION Left 3/5/2025    Procedure: ARTHROPLASTY, KNEE, TOTAL, USING COMPUTER-ASSISTED NAVIGATION;  Surgeon: Eric Carbajal MD;  Location: [facility];  Service: Orthopedics;  Laterality: Left;  [place]---CLEARED BY CARDS AND PCP  FIRST HERMES SALAZAR NOTIFIED-GG  RN PREOP 2/19/2025   T/S ON 2/28/2025---NEED ORDERS    COLONOSCOPY N/A 2/5/2018    Procedure: COLONOSCOPY;  Surgeon: Bacilio Mancia MD;  Location: [facility];  Service: Endoscopy;  Laterality: N/A;  appt confirmed-ss    COLONOSCOPY N/A 8/13/2020    Procedure: COLONOSCOPY;  Surgeon: Jose West MD;  Location: [facility] ENDO;  Service: Endoscopy;  Laterality: N/A;    ENDOSCOPIC CARPAL TUNNEL RELEASE Right 3/15/2024    Procedure: RELEASE, CARPAL TUNNEL, ENDOSCOPIC - RIGHT;  Surgeon: Tonio Mcdermott MD;  Location: [facility] OR;  Service: Orthopedics;  Laterality: Right;    ENDOSCOPIC CARPAL TUNNEL RELEASE Left 6/26/2024    Procedure: RELEASE, CARPAL TUNNEL, ENDOSCOPIC - left;  Surgeon: Tonio Mcdermott MD;  Location: [facility] OR;  Service: Orthopedics;  " Laterality: Left;    HYSTERECTOMY      INTRAOCULAR PROSTHESES INSERTION Left 7/7/2022    Procedure: INSERTION, IOL PROSTHESIS;  Surgeon: Candelario Novoa MD;  Location: [facility] OR;  Service: Ophthalmology;  Laterality: Left;    INTRAOCULAR PROSTHESES INSERTION Right 7/21/2022    Procedure: INSERTION, IOL PROSTHESIS;  Surgeon: Candelario Novoa MD;  Location: [facility] OR;  Service: Ophthalmology;  Laterality: Right;    OOPHORECTOMY      PARTIAL NEPHRECTOMY Right 10/12/2018    Procedure: NEPHRECTOMY, PARTIAL open. Dr Arenas to assist.;  Surgeon: Alejandra Palm MD;  Location: [facility] OR;  Service: Urology;  Laterality: Right;  RN PREOP 10/9/2018----NEEDS H/P    PHACOEMULSIFICATION OF CATARACT Left 7/7/2022    Procedure: PHACOEMULSIFICATION, CATARACT;  Surgeon: Candelario Novoa MD;  Location: [facility] OR;  Service: Ophthalmology;  Laterality: Left;  RN PHONE PREOP 6/27/2022   ARRIVAL 6:00 AM    PHACOEMULSIFICATION OF CATARACT Right 7/21/2022    Procedure: PHACOEMULSIFICATION, CATARACT;  Surgeon: Candelario Novoa MD;  Location: [facility] OR;  Service: Ophthalmology;  Laterality: Right;  RN PHONE PREOP 7/12/2022   ARRIVAL 12:00 NOON    TOTAL KNEE ARTHROPLASTY Right 11/8/2023    Procedure: ARTHROPLASTY, KNEE, TOTAL. NELSON;  Surgeon: Eric Carbajal MD;  Location: [facility] OR;  Service: Orthopedics;  Laterality: Right;  [place]. Admit  BRADLYE NIEVES [phone] NOTIFIED QUINTIN ON 10/11/2023-  RN PREOP 10/25/2023   T/S ON 11/6/2023--done------CLEARED BY CARDS       Social History:  Social History[1]    Family History:  Family History   Problem Relation Name Age of Onset    No Known Problems Mother      Glaucoma Father      Breast cancer Sister Warnell     Glaucoma Sister Warnell     Cancer Sister Warnell         breast cancer    Thyroid disease Sister Angeles     Blindness Sister Bryanna         due to trauma during car accident    Diabetes Sister Amanda     No Known Problems Maternal Aunt      No Known  Problems Maternal Uncle      No Known Problems Paternal Aunt      No Known Problems Paternal Uncle      No Known Problems Maternal Grandmother      No Known Problems Maternal Grandfather      No Known Problems Paternal Grandmother      No Known Problems Paternal Grandfather      Diabetes Brother Navneet     Amblyopia Neg Hx      Cataracts Neg Hx      Hypertension Neg Hx      Macular degeneration Neg Hx      Retinal detachment Neg Hx      Strabismus Neg Hx      Stroke Neg Hx         Allergies and Medications: (updated and reviewed)  Review of patient's allergies indicates:   Allergen Reactions    Lisinopril Swelling and Shortness Of Breath    Ascorbic acid-ascorbate calc      And citrus fruits     Current Medications[2]    Patient Care Team:  Alena Hernandez MD as PCP - General (Family Medicine)  Tabby Jacob as ED Navigator  Favorite, Aniyah MOSES RN as Outpatient   Ramone Teague LPN (Inactive) as Licensed Practical Nurse         - The patient is given an After Visit Summary that lists all medications with directions, allergies, education, orders placed during this encounter and follow-up instructions.      - I have reviewed the patient's medical information including past medical, family, and social history sections including the medications and allergies.      - We discussed the patient's current medications.     This note was created by combination of typed  and MModal dictation.  Transcription errors may be present.  If there are any questions, please contact me.                   KULDEEP Lynch         [1]   Social History  Socioeconomic History    Marital status:    Tobacco Use    Smoking status: Never     Passive exposure: Past    Smokeless tobacco: Never   Substance and Sexual Activity    Alcohol use: No    Drug use: No    Sexual activity: Not Currently     Social Drivers of Health     Financial Resource Strain: Medium Risk (5/21/2025)    Overall Financial Resource Strain  (CARDIA)     Difficulty of Paying Living Expenses: Somewhat hard   Food Insecurity: Food Insecurity Present (5/21/2025)    Hunger Vital Sign     Worried About Running Out of Food in the Last Year: Sometimes true     Ran Out of Food in the Last Year: Sometimes true   Transportation Needs: Unmet Transportation Needs (5/21/2025)    PRAPARE - Transportation     Lack of Transportation (Medical): Yes     Lack of Transportation (Non-Medical): No   Physical Activity: Insufficiently Active (5/21/2025)    Exercise Vital Sign     Days of Exercise per Week: 2 days     Minutes of Exercise per Session: 30 min   Stress: Stress Concern Present (5/21/2025)    [employer] - Occupational Stress Questionnaire     Feeling of Stress : Very much   Housing Stability: Low Risk  (5/21/2025)    Housing Stability Vital Sign     Unable to Pay for Housing in the Last Year: No     Number of Times Moved in the Last Year: 0     Homeless in the Last Year: No   Recent Concern: Housing Stability - High Risk (3/5/2025)    Housing Stability Vital Sign     Unable to Pay for Housing in the Last Year: Yes     Homeless in the Last Year: No   [2]   Current Outpatient Medications   Medication Sig Dispense Refill    ACCU-CHEK ONEIL PLUS TEST STRP Strp TEST BLOOD SUGAR TWICE DAILY 200 strip 1    ACCU-CHEK SOFTCLIX LANCETS Misc USE TO TEST BLOOD SUGAR ONE TIME DAILY 100 each 3    acetaminophen (TYLENOL) 500 MG tablet Take 2 tablets (1,000 mg total) by mouth every 8 (eight) hours as needed for Pain. 42 tablet 0    alcohol swabs (DROPSAFE ALCOHOL PREP PADS) PadM USE AS DIRECTED THREE TIMES DAILY 300 each 3    amLODIPine (NORVASC) 10 MG tablet Take 1 tablet (10 mg total) by mouth once daily. 90 tablet 1    atorvastatin (LIPITOR) 80 MG tablet TAKE 1 TABLET EVERY EVENING 90 tablet 3    azelastine (ASTELIN) 137 mcg (0.1 %) nasal spray 1 spray (137 mcg total) by Nasal route 2 (two) times daily. 90 mL 0    blood glucose control high,low  (ACCU-CHEK ONEIL CONTROL SOLN) Soln 1 Units by Misc.(Non-Drug; Combo Route) route as needed. 1 each 0    blood-glucose meter (ACCU-CHEK ONEIL PLUS METER) Misc 1 kit by Misc.(Non-Drug; Combo Route) route once daily. 1 each 0    blood-glucose meter (TRUE METRIX GLUCOSE METER) kit Use as directed to test glucose 1 each 0    celecoxib (CELEBREX) 200 MG capsule Take 1 capsule (200 mg total) by mouth 2 (two) times daily. 14 capsule 0    docusate sodium (COLACE) 100 MG capsule Take 1 capsule (100 mg total) by mouth 2 (two) times daily. 30 capsule 0    donepeziL (ARICEPT) 5 MG tablet Take 1 tablet (5 mg total) by mouth every evening. 90 tablet 1    ergocalciferol (ERGOCALCIFEROL) 50,000 unit Cap TAKE 1 CAPSULE EVERY 7 DAYS. 12 capsule 3    FLUoxetine 40 MG capsule Take 1 capsule (40 mg total) by mouth once daily. 90 capsule 1    fluticasone propionate (FLONASE) 50 mcg/actuation nasal spray USE 1 SPRAY IN EACH NOSTRIL ONCE DAILY 32 g 3    gabapentin (NEURONTIN) 300 MG capsule Take 1 capsule (300 mg total) by mouth 3 (three) times daily. 270 capsule 1    hydrALAZINE (APRESOLINE) 100 MG tablet TAKE 1 TABLET EVERY 8 HOURS 90 tablet 3    hydrOXYzine HCL (ATARAX) 25 MG tablet TAKE 1 TABLET BY MOUTH THREE TIMES DAILY AS NEEDED FOR ITCHING 90 tablet 0    JARDIANCE 25 mg tablet TAKE 1 TABLET ONE TIME DAILY 90 tablet 1    LIDOcaine (LIDODERM) 5 % Place 1 patch onto the skin once daily. Remove & Discard patch within 12 hours or as directed by MD 5 patch 0    methylPREDNISolone (MEDROL DOSEPACK) 4 mg tablet use as directed 21 tablet 0    metoprolol succinate (TOPROL-XL) 100 MG 24 hr tablet Take 1 tablet (100 mg total) by mouth once daily. 90 tablet 3    metoprolol succinate (TOPROL-XL) 50 MG 24 hr tablet Take 1 tablet (50 mg total) by mouth once daily. 90 tablet 3    ondansetron (ZOFRAN) 4 MG tablet Take 1 tablet (4 mg total) by mouth every 6 (six) hours. 12 tablet 0    oxyCODONE (OXY-IR) 5 mg Cap Take 1 capsule (5 mg total) by mouth  every 6 (six) hours as needed for Pain. 30 capsule 0    pantoprazole (PROTONIX) 40 MG tablet Take 1 tablet (40 mg total) by mouth once daily. 90 tablet 1    semaglutide (OZEMPIC) 0.25 mg or 0.5 mg (2 mg/3 mL) pen injector Inject 0.5 mg into the skin every 7 days. 3 mL 0    semaglutide (OZEMPIC) 1 mg/dose (2 mg/1.5 mL) PnIj Inject 1 mg into the skin every 7 days. 3 mL 5    TRUEPLUS LANCETS 33 gauge Misc TEST BLOOD SUGAR TWICE DAILY 200 each 1    albuterol (PROVENTIL/VENTOLIN HFA) 90 mcg/actuation inhaler Inhale 1-2 puffs into the lungs every 6 (six) hours as needed for Wheezing or Shortness of Breath. Rescue 18 g 0    aspirin (ECOTRIN) 81 MG EC tablet Take 1 tablet (81 mg total) by mouth 2 (two) times a day. 60 tablet 0    blood glucose control, low (TRUE METRIX LEVEL 1) Soln 1 each by Misc.(Non-Drug; Combo Route) route once as needed. 1 each 3    cetirizine (ZYRTEC) 10 MG tablet Take 1 tablet (10 mg total) by mouth once daily. 90 tablet 0    ondansetron (ZOFRAN-ODT) 4 MG TbDL Dissolve 1 tablet (4 mg total) by mouth every 6 (six) hours as needed (for nausea). 30 tablet 0    traZODone (DESYREL) 50 MG tablet Take 1 tablet (50 mg total) by mouth every evening. 90 tablet 0     No current facility-administered medications for this visit.     Facility-Administered Medications Ordered in Other Visits   Medication Dose Route Frequency Provider Last Rate Last Admin    acetaminophen tablet 1,000 mg  1,000 mg Oral On Call Procedure Eric Carbajal MD        acetaminophen tablet 1,000 mg  1,000 mg Oral Q6H Eric Carbajal MD   1,000 mg at 11/09/23 0506    aspirin EC tablet 81 mg  81 mg Oral BID Eric Carbajal MD   81 mg at 11/09/23 1358    LIDOcaine (PF) 10 mg/ml (1%) injection 10 mg  1 mL Intradermal On Call Procedure Eric Carbajal MD        methocarbamoL tablet 750 mg  750 mg Oral TID Eric Carbajal MD   750 mg at 11/09/23 1357    naloxone 0.4 mg/mL injection 0.02 mg  0.02 mg Intravenous PRN Eric Carbajal MD         ondansetron injection 4 mg  4 mg Intravenous Q8H PRN Eric Carbajal MD   4 mg at 11/09/23 0858    oxyCODONE immediate release tablet 10 mg  10 mg Oral Q3H PRN Eric Carbajal MD   10 mg at 11/09/23 1043    oxyCODONE immediate release tablet 5 mg  5 mg Oral Q3H PRN Eric Carbajal MD        polyethylene glycol packet 17 g  17 g Oral Daily Eric Carbajal MD   17 g at 11/09/23 1356    pregabalin capsule 75 mg  75 mg Oral QHS Eric Carbajal MD   75 mg at 11/08/23 2147    prochlorperazine injection Soln 5 mg  5 mg Intravenous Q6H PRN Eric Carbajal MD   5 mg at 11/08/23 1325    senna-docusate 8.6-50 mg per tablet 1 tablet  1 tablet Oral BID Eric Carbajal MD   1 tablet at 11/09/23 1358    sodium chloride 0.9% flush 10 mL  10 mL Intravenous PRN Eric Carbajal MD

## 2025-06-11 ENCOUNTER — TELEPHONE (OUTPATIENT)
Dept: CARDIOLOGY | Facility: CLINIC | Age: 75
End: 2025-06-11
Payer: MEDICARE

## 2025-06-11 ENCOUNTER — RESULTS FOLLOW-UP (OUTPATIENT)
Dept: FAMILY MEDICINE | Facility: CLINIC | Age: 75
End: 2025-06-11

## 2025-06-12 ENCOUNTER — TELEPHONE (OUTPATIENT)
Dept: FAMILY MEDICINE | Facility: CLINIC | Age: 75
End: 2025-06-12
Payer: MEDICARE

## 2025-06-12 ENCOUNTER — PATIENT MESSAGE (OUTPATIENT)
Dept: PULMONOLOGY | Facility: CLINIC | Age: 75
End: 2025-06-12
Payer: MEDICARE

## 2025-06-12 DIAGNOSIS — I10 BENIGN HYPERTENSION: Primary | ICD-10-CM

## 2025-06-12 DIAGNOSIS — G47.33 OSA (OBSTRUCTIVE SLEEP APNEA): Primary | ICD-10-CM

## 2025-06-12 DIAGNOSIS — K21.9 GASTROESOPHAGEAL REFLUX DISEASE, UNSPECIFIED WHETHER ESOPHAGITIS PRESENT: ICD-10-CM

## 2025-06-12 RX ORDER — LOSARTAN POTASSIUM 100 MG/1
100 TABLET ORAL DAILY
Qty: 90 TABLET | Refills: 0 | Status: SHIPPED | OUTPATIENT
Start: 2025-06-12

## 2025-06-12 NOTE — TELEPHONE ENCOUNTER
----- Message from KULDEEP Lynch sent at 6/11/2025  3:42 PM CDT -----  Please let her know that her urine results showed no signs of bacteria. Please encourage her to stay hydrated and keep her upcoming CT scan appt. Thank you!  ----- Message -----  From: Lab, Background User  Sent: 6/10/2025   5:42 PM CDT  To: KULDEEP Rivera

## 2025-06-12 NOTE — PROCEDURES
"Dear Provider,     You have ordered sleep LAB services to perform the sleep study for Valarie Foster.  The sleep study that you ordered is complete.      Please find Sleep Study result in "Chart Review" under the "Media tab."      As the ordering provider, you are responsible for reviewing the results and implementing a treatment plan with your patient.    If you need a Sleep Medicine provider to explain the sleep study findings and arrange treatment for the patient, please refer patient for consultation to our Sleep Clinic via [facility] with Ambulatory Consult Sleep.    To do that please place an order for an  "Ambulatory Consult Sleep" - it will go to our clinic work queue for our Medical Assistant to contact the patient for an appointment.     For any questions, please contact our clinic staff at [phone] to talk to clinical staff.    "

## 2025-06-12 NOTE — TELEPHONE ENCOUNTER
Refill Routing Note   Medication(s) are not appropriate for processing by [facility] for the following reason(s):        No active prescription written by provider  ED/Hospital Visit since last OV with provider    [facility] action(s):  Defer           Extended chart review required: Yes     Appointments  past 12m or future 3m with PCP    Date Provider   Last Visit   1/17/2025 Alena Hernandez MD   Next Visit   7/28/2025 Alena Hernandez MD   ED visits in past 90 days: 2        Note composed:4:53 PM 06/12/2025

## 2025-06-12 NOTE — TELEPHONE ENCOUNTER
No care due was identified.  [facility] Embedded Care Due Messages. Reference number: [id number].   6/12/2025 3:53:07 PM CDT

## 2025-06-13 ENCOUNTER — HOSPITAL ENCOUNTER (OUTPATIENT)
Dept: RADIOLOGY | Facility: HOSPITAL | Age: 75
Discharge: HOME | End: 2025-06-13
Attending: INTERNAL MEDICINE
Payer: MEDICARE

## 2025-06-13 DIAGNOSIS — E11.40 TYPE 2 DIABETES MELLITUS WITH DIABETIC NEUROPATHY, WITHOUT LONG-TERM CURRENT USE OF INSULIN: ICD-10-CM

## 2025-06-13 DIAGNOSIS — R10.9 FLANK PAIN: ICD-10-CM

## 2025-06-13 PROCEDURE — 74176 CT ABD & PELVIS W/O CONTRAST: CPT | Mod: 26,HCNC,, | Performed by: INTERNAL MEDICINE

## 2025-06-13 PROCEDURE — 74176 CT ABD & PELVIS W/O CONTRAST: CPT | Mod: TC,HCNC

## 2025-06-13 PROCEDURE — 71250 CT THORAX DX C-: CPT | Mod: 26,HCNC,, | Performed by: INTERNAL MEDICINE

## 2025-06-13 RX ORDER — PANTOPRAZOLE SODIUM 40 MG/1
40 TABLET, DELAYED RELEASE ORAL DAILY
Qty: 90 TABLET | Refills: 1 | Status: SHIPPED | OUTPATIENT
Start: 2025-06-13

## 2025-06-13 NOTE — TELEPHONE ENCOUNTER
----- Message from Med Assistant Banuelos sent at 6/13/2025  8:31 AM CDT -----  Type: RX Refill Request    Who Called:  Pt   Have you contacted your pharmacy:  Yes , she's asking that this be refilled before her 11 am apt due to she'll already be over there and she's completely out   Refill    RX Name and Strength:  JARDIANCE 25 mg tablet  Preferred Pharmacy with phone number:    [facility] [address]  [address]  [facility] [address]  Phone: [phone] Fax: [phone]       Local or Mail Order:  Local   Would the patient rather a call back or a response via [facility]?  Callback   Best Call Back Number:  Telephone Information:  Mobile          [phone]     Additional Information:    Thank you.

## 2025-06-13 NOTE — TELEPHONE ENCOUNTER
No care due was identified.  [facility] Embedded Care Due Messages. Reference number: [id number].   6/13/2025 10:55:34 AM CDT

## 2025-06-15 DIAGNOSIS — L29.9 PRURITIC CONDITION: ICD-10-CM

## 2025-06-16 RX ORDER — HYDROXYZINE HYDROCHLORIDE 25 MG/1
25 TABLET, FILM COATED ORAL
Qty: 90 TABLET | Refills: 0 | Status: SHIPPED | OUTPATIENT
Start: 2025-06-16

## 2025-06-17 ENCOUNTER — TELEPHONE (OUTPATIENT)
Dept: PULMONOLOGY | Facility: CLINIC | Age: 75
End: 2025-06-17
Payer: MEDICARE

## 2025-06-17 DIAGNOSIS — M89.8X5 LYTIC BONE LESION OF FEMUR: Primary | ICD-10-CM

## 2025-06-17 NOTE — TELEPHONE ENCOUNTER
Type:  Test Results            Who Called: self            Name of Test (Lab/Mammo/Etc): CT Scan            Date of Test: 6/13/2025            Ordering Provider: DULCE Kaye            Where the test was performed: [place]            Would the patient rather a call back or a response via [facility]? call            Best Call Back Number: .[phone] (home)            Additional Information:              For Clinical Team:Has the provider reviewed the result             Message sent to Dr. Pool.    BAO Mejía  Pulm/Sleep [facility] [place]  [phone]

## 2025-06-18 ENCOUNTER — TELEPHONE (OUTPATIENT)
Dept: FAMILY MEDICINE | Facility: CLINIC | Age: 75
End: 2025-06-18
Payer: MEDICARE

## 2025-06-18 NOTE — TELEPHONE ENCOUNTER
Copied from CRM #[id number]. Topic: General Inquiry - Return Call  >> Jun 17, 2025  1:18 PM Katharina wrote:  Type:  Patient Returning Call    Who Called: self    Who Left Message for Patient: not in chart    Does the patient know what this is regarding?:mo she said office called her while she was on the phone with another call    Would the patient rather a call back or a response via [facility]? call    Best Call Back Number:[phone]      Additional Information:

## 2025-06-19 ENCOUNTER — RESULTS FOLLOW-UP (OUTPATIENT)
Dept: PULMONOLOGY | Facility: CLINIC | Age: 75
End: 2025-06-19

## 2025-06-19 NOTE — TELEPHONE ENCOUNTER
Result of ct d/w patient.  Unclear significance of lytic lesion.  Will send to ortho for further inputs.

## 2025-06-20 ENCOUNTER — E-CONSULT (OUTPATIENT)
Dept: FAMILY MEDICINE | Facility: CLINIC | Age: 75
End: 2025-06-20
Payer: MEDICARE

## 2025-06-20 DIAGNOSIS — Z85.528 HISTORY OF RENAL CELL CARCINOMA: Primary | ICD-10-CM

## 2025-06-20 DIAGNOSIS — M89.8X9 LYTIC LESION OF BONE ON X-RAY: ICD-10-CM

## 2025-06-20 NOTE — CONSULTS
[facility]  Response for E-Consult     Patient Name: Valarie Foster  MRN: [id number]  Primary Care Provider: Alena Hernandez MD   Requesting Provider: Tyrone Pool MD  Consults    Recommendation: order for ortho placed for lytic lesion on bone     Additional future steps to consider: Patient has scheduled appointment next month    Total time of Consultation: 10 minute    I did not speak to the requesting provider verbally about this.     *This eConsult is based on the clinical data available to me and is furnished without benefit of a physical examination. The eConsult will need to be interpreted in light of any clinical issues or changes in patient status not available to me at the time of filing this eConsults. Significant changes in patient condition or level of acuity should result in immediate formal consultation and reevaluation. Please alert me if you have further questions.    Thank you for this eConsult referral.     Alena Hernandez MD  [facility]

## 2025-06-23 ENCOUNTER — TELEPHONE (OUTPATIENT)
Dept: UROLOGY | Facility: CLINIC | Age: 75
End: 2025-06-23
Payer: MEDICARE

## 2025-06-23 ENCOUNTER — TELEPHONE (OUTPATIENT)
Dept: PULMONOLOGY | Facility: CLINIC | Age: 75
End: 2025-06-23
Payer: MEDICARE

## 2025-06-23 ENCOUNTER — OUTPATIENT CASE MANAGEMENT (OUTPATIENT)
Dept: ADMINISTRATIVE | Facility: OTHER | Age: 75
End: 2025-06-23
Payer: MEDICARE

## 2025-06-23 ENCOUNTER — TELEPHONE (OUTPATIENT)
Dept: ORTHOPEDICS | Facility: CLINIC | Age: 75
End: 2025-06-23
Payer: MEDICARE

## 2025-06-23 NOTE — TELEPHONE ENCOUNTER
Called pt to let her know that she needs to see ortho oncology and she said that she is going to have her  give us a call to see what's going on. Pt appt with Zari will be canceled pt understood.

## 2025-06-23 NOTE — TELEPHONE ENCOUNTER
Copied from CRM #[id number]. Topic: General Inquiry - Patient Advice  >> Jun 23, 2025  9:36 AM Dana wrote:  Type: Patient Call Back    Who called: Self     What is the request in detail: Stated she is having really bad side pain. Ask that the nurse give her a call.     Can the clinic reply by [facility]? No     Would the patient rather a call back or a response via [facility]? Call Back     Best call back number:.[phone] (home)      Additional Information:

## 2025-06-23 NOTE — TELEPHONE ENCOUNTER
Gave patient info below:              ----- Message from Med Assistant Idget sent at 6/19/2025  1:15 PM CDT -----  Hello,      This patient have not read email in regard to sleep study. Could you let them know that      they have mild sleep apnea (trouble breathing loree times an hour).       Leading treatment options for the sleep apnea of this level of severity include CPAP and oral appliances for Obstructive sleep apnea (LOREE).        Patient has the option of starting auto PAP or  if patient would like to review the study in more details and further discuss treatment options alternatives, and we will schedule a follow up appointment.      To minimize delay, I already placed an order for the Durable Medical Equipment (DME) to start working on getting patient set up for APAP. If patient is not ready for the CPAP, patient is not obligated to go through with the set up when you are contacted by the DME providers.         Thanks!  Tyrone

## 2025-06-23 NOTE — PROGRESS NOTES
Outpatient Care Management  Plan of Care Follow Up Visit    Patient: Valarie Foster  MRN: [id number]  Date of Service: 06/23/2025  Completed by: Aniyah Marks RN  Referral Date: 10/16/2024    No chief complaint on file.      Brief Summary: Pt reports that she is having pain to the left side that is unexplained. Pt voiced that she has upcoming MD appts. Pt voiced that she has transportation arranged for her upcoming appts. Pt states that she is using heating pad and ice to help to decrease the pain to her side.

## 2025-06-24 ENCOUNTER — OUTPATIENT CASE MANAGEMENT (OUTPATIENT)
Dept: ADMINISTRATIVE | Facility: OTHER | Age: 75
End: 2025-06-24
Payer: MEDICARE

## 2025-06-24 NOTE — PROGRESS NOTES
Outpatient Care Management  Plan of Care Follow Up Visit    Patient: Valarie Foster  MRN: [id number]  Date of Service: 06/24/2025  Completed by: Aniyah Marks RN  Referral Date: 10/16/2024    Reason for Visit   Patient presents with    OPCM RN Follow Up Call       Brief Summary: Received a call from the pt who reports that the current ortho doctor wants her to see a new orthopedic doctor that she does not know. Informed pt that per chart review that the MA called to inform her that it is recommended for her to see an ortho oncologist. Pt states that she does not want to see a new doctor. Encouragement given to pt to see the specialist that's being recommended. Pt voiced that she was upset at the time and is willing to see the new doctor. Message sent to provider's staff to request for the pt to receive a callback with the information previously provided.

## 2025-06-25 ENCOUNTER — TELEPHONE (OUTPATIENT)
Dept: ORTHOPEDICS | Facility: CLINIC | Age: 75
End: 2025-06-25
Payer: MEDICARE

## 2025-06-25 NOTE — TELEPHONE ENCOUNTER
Spoke to pt to let her know that she needs to see ortho oncology pt understood and is calling [facility]. The Doctor on the list is Dr. Cheng Bailon [phone].

## 2025-06-27 RX ORDER — FLUTICASONE PROPIONATE 50 MCG
1 SPRAY, SUSPENSION (ML) NASAL
Qty: 32 G | Refills: 3 | Status: SHIPPED | OUTPATIENT
Start: 2025-06-27

## 2025-06-27 NOTE — TELEPHONE ENCOUNTER
No care due was identified.  [facility] Embedded Care Due Messages. Reference number: [id number].   6/27/2025 1:28:48 AM CDT

## 2025-06-30 ENCOUNTER — OFFICE VISIT (OUTPATIENT)
Dept: UROLOGY | Facility: CLINIC | Age: 75
End: 2025-06-30
Payer: MEDICARE

## 2025-06-30 ENCOUNTER — TELEPHONE (OUTPATIENT)
Dept: FAMILY MEDICINE | Facility: CLINIC | Age: 75
End: 2025-06-30
Payer: MEDICARE

## 2025-06-30 DIAGNOSIS — C64.1 RENAL CELL CARCINOMA OF RIGHT KIDNEY: ICD-10-CM

## 2025-06-30 DIAGNOSIS — R10.9 FLANK PAIN: Primary | ICD-10-CM

## 2025-06-30 DIAGNOSIS — R31.29 MICROHEMATURIA: ICD-10-CM

## 2025-06-30 PROCEDURE — 99999 PR PBB SHADOW E&M-EST. PATIENT-LVL IV: CPT | Mod: PBBFAC,HCNC,, | Performed by: UROLOGY

## 2025-06-30 NOTE — TELEPHONE ENCOUNTER
Copied from CRM #[id number]. Topic: General Inquiry - Return Call  >> Jun 30, 2025 10:54 AM Med Assistant Lakisha wrote:  Type:  Patient Returning Call    Who Called:Pt   Who Left Message for Patient:staff   Does the patient know what this is regarding?:no  Would the patient rather a call back or a response via [facility]? Callback   Best Call Back Number:Telephone Information:  Mobile          [phone]     Additional Information:

## 2025-06-30 NOTE — PROGRESS NOTES
Subjective:       Valarie Foster is a 75 y.o. female who is an established patient who was referred by Ivan CARDONA for evaluation of renal mass.      CT a/p with contrast 1/26/18 showed a 2.6cm endophytic lesion in R MP, concerning for solid mass vs complex cyst.     She reports L flank pain, contralateral to renal lesion. L flank pain has almost completely resolved. Denies hematuria, LUTS, UTIs. UA macro did show microhematuria recently. Denies family history of  malignancy. She reports being told she had a small kidney on one side in the past. Denies nephrolithiasis. Nonsmoker. Denies previous abdominal surgery though she is s/p hysterectomy. +DM2/HTN.     Cr (1/18) - 0.8  A1c (1/18) - 6.2  UA micro (2/18) - 0 RBCs    CT renal protocol (2/18) - 2.9cm completely endophytic solid enhancing renal mass. Homogenous in appearance.      She is s/p perc renal biopsy to better evaluate lesion (2/18) - oncocytic neoplasm. Mild hematuria < 24 hrs.          CT (6/18) - stable, endophytic enhancing 2.4cm R renal mass. Subtle 6mm focus of enhancement in R lobe of liver - nonspecific.     KISHAN (9/18) - stable 2.8cm R renal mass    She is now s/p open R PNx 10/12/18. Still having incisional pain, worse with palpation. No hernia on CT, mild stranding in subQ.    Path: 1.9cm unclassified RCC, FG 3/4, negative margins. [id number]    Doing well. States chronic fatigue and pain. Still with occasional incisional pain.     She notes some flank pain after recent fall - CT RSS done that did not show any issues. Recent KISHAN without concerning findings.     6/30/2025  Returns again with c/o L flank pain. Present now x 4 weeks. She is unsure if pain is the same intermittent pain as last 7+ years. Recent non-contrast CT c/a/p without obvious renal pathology. No stones/hydro. 1.2cm lytic lesion in R ischium - has been referred to ortho.       CT c/a/p 4/19 - no mets/recurrence  CT a/p (10/19) - no recurrence/mets. Abnormal area in tail of  pancreas - recommend PCP follow up. CXR - clear.  CT a/p (10/20) - no recurrence/mets. Pancreatic tail lesion not seen. CXR - clear  CT a/p 3/22 - no recurrence or mets. Post-op changes to R kidney. CXR - wnl  CT a/p 5/23 - no recurrence/mets. Post-op changes to R kidney. CXR 1/23 - clear  CT RSS 10/24 - no stones  KISHAN 11/24 - no hydro/stones. No recurrence.   CT c/a/p (no contrast) 6/25 - R partial, no recurrence. Ischial lesion.    Cr 1/19 - 0.8, 4/19 - 0.8, 10/19 - 0.9, 10/20 - 0.9, 11/21 - 0.8, 3/22 - 0.8, 5/23 - 0.9, 10/24 - 0.8           The following portions of the patient's history were reviewed and updated as appropriate: allergies, current medications, past family history, past medical history, past social history, past surgical history and problem list.    Review of Systems  Constitutional: no fever or chills  ENT: no nasal congestion or sore throat  Respiratory: no cough or shortness of breath  Cardiovascular: no chest pain or palpitations  Gastrointestinal: no nausea or vomiting, tolerating diet  Genitourinary: as per HPI  Hematologic/Lymphatic: no easy bruising or lymphadenopathy  Musculoskeletal: no arthralgias or myalgias  Skin: no rashes or lesions  Neurological: no seizures or tremors  Behavioral/Psych: no auditory or visual hallucinations        Objective:    Vitals: There were no vitals taken for this visit.    Physical Exam   General: well developed, well nourished in no acute distress  Head: normocephalic, atraumatic  Neck: supple, trachea midline, no obvious enlargement of thyroid  HEENT: EOMI, mucus membranes moist, sclera anicteric, no hearing impairment  Lungs: symmetric expansion, non-labored breathing  Neuro: alert and oriented x 3, no gross deficits  Psych: normal judgment and insight, normal mood/affect and non-anxious  Genitourinary:   deferred    Inc - well healed, minor superior fullness of incision, no hernia  TTP in L flank/side    Lab Review   Urine analysis today in clinic shows  - 1000 glucose    Lab Results   Component Value Date    WBC 4.86 06/04/2025    HGB 13.8 06/04/2025    HGB 14.1 02/19/2025    HCT 43.8 06/04/2025    HCT 43.9 02/19/2025    MCV 83 06/04/2025    MCV 83 02/19/2025     06/04/2025     02/19/2025     Lab Results   Component Value Date    CREATININE 0.7 06/04/2025    BUN 12 06/04/2025       Imaging  Images and reports were personally reviewed by me and discussed with patient  CT/KISHAN reviewed       Assessment/Plan:      1. Right RCC    - 2.6cm lesion in R MP kidney.   - CT renal protocol 2.5cm endophytic solid enhancing renal mass   - Due to location of tumor, open partial would be difficult with higher complication rate of bleeding and/or urine leak. Radical nephrectomy with less complication rate.   - Concern for performing radical nephrectomy in diabetic patient for possible benign lesion.   - Recommend perc biopsy to evaluate for possible benign lesion prior to radical nephrectomy. Discussed limitations/risks of perc biopsy. Understands that perc biopsy may also not be definitive in diagnosis   - Perc biopsy 2/18 - oncocytic neoplasm (less favors oncocytoma)   - Discussed options: observation, lap radical Nx, open partial Nx. She has opted to observe for now.    - CT 6/18 - stable 2.4cm R renal mass   - KISHAN 9/18 - 2.8cm R renal mass   - s/p R open PNx on 10/12/18    - unclassified RCC pT1a   - CT c/a/p 6 months (4/18) - no recurrence/mets   - CT ap (10/19) - no recurrence/mets. Abnormal area in tail of pancreas - recommend PCP follow up. CXR - clear. Cr 0.9.    - CT a/p 5/23 - no recurrence   - KISHAN in 1-2 years - prefers annual check     - CT uro now due to flank pain   2. Microhematuria    - UA micro - 0 RBCs     3. L flank pain   - Unsure etiology   - CT negative for L flank pain   - f/u with PCP   - Continues to report pain in b/l flanks   - UA clear again today   - Will again repeat imaging - CT uro now      Follow up in  1 month with CT uro      Visit  today included increased complexity associated with the care of the episodic problem RCC, flank pain addressed and managing the longitudinal care of the patient due to the serious and/or complex managed problem(s) RCC, flank pain.

## 2025-07-02 ENCOUNTER — HOSPITAL ENCOUNTER (OUTPATIENT)
Dept: RADIOLOGY | Facility: HOSPITAL | Age: 75
Discharge: HOME | End: 2025-07-02
Attending: UROLOGY
Payer: MEDICARE

## 2025-07-02 DIAGNOSIS — R10.9 FLANK PAIN: ICD-10-CM

## 2025-07-02 DIAGNOSIS — C64.1 RENAL CELL CARCINOMA OF RIGHT KIDNEY: ICD-10-CM

## 2025-07-02 PROCEDURE — 25500020 PHARM REV CODE 255: Mod: HCNC | Performed by: UROLOGY

## 2025-07-02 PROCEDURE — 74178 CT ABD&PLV WO CNTR FLWD CNTR: CPT | Mod: 26,HCNC,, | Performed by: RADIOLOGY

## 2025-07-02 PROCEDURE — 74178 CT ABD&PLV WO CNTR FLWD CNTR: CPT | Mod: TC,HCNC

## 2025-07-02 RX ADMIN — IOHEXOL 125 ML: 350 INJECTION, SOLUTION INTRAVENOUS at 07:07

## 2025-07-09 ENCOUNTER — TELEPHONE (OUTPATIENT)
Dept: FAMILY MEDICINE | Facility: CLINIC | Age: 75
End: 2025-07-09
Payer: MEDICARE

## 2025-07-09 ENCOUNTER — OFFICE VISIT (OUTPATIENT)
Dept: ORTHOPEDICS | Facility: CLINIC | Age: 75
End: 2025-07-09
Payer: MEDICARE

## 2025-07-09 VITALS — HEIGHT: 64 IN | WEIGHT: 184.31 LBS | BODY MASS INDEX: 31.47 KG/M2

## 2025-07-09 DIAGNOSIS — Z96.652 STATUS POST LEFT KNEE REPLACEMENT: Primary | ICD-10-CM

## 2025-07-09 PROCEDURE — 99999 PR PBB SHADOW E&M-EST. PATIENT-LVL IV: CPT | Mod: PBBFAC,HCNC,,

## 2025-07-09 NOTE — PROGRESS NOTES
Ortho Knee Follow Up Note    PCP: Alena Hernandez MD   Referring Provider: No referring provider defined for this encounter.        Assessment:  75 y.o. female status post left total Knee Primary completed on 3/5/25.  Overall the patient is doing well at this stage. No pain reported regarding her left knee. Able to get around with assistive device. No longer taking oral pain medications. Completed outpatient PT, continuing to do exercises at home. No acute concerns regarding the knee today. She is happy with her surgical outcome. Is reporting anterolateral shin pain - appears she has a soft tissue bruise to this area. This area is TTP. No pain with weight-bearing. Does not recall injury.    Of note, she did have recent abd/pelvis CT which revealed 1.2 cm lytic lesion in the right ischial tuberosity, which is new from 05/31/2023. She does have history of RCC. She has been referred to ortho oncology at [facility] for further evaluation. She is trying to get scheduled with Dr. Bailon. Does report some intermittent mostly posterolateral based hip pain with ambulation.       Plan:  Follow up 1 year post-op with Dr. Carbajal   Future Radiographs Indicated at next visit: Yes    Pain Management: None Indicated  Anticoagulation: None  Wound Care: None    Patient Reassurance:   Post-operative course discussed with patient. Patient reassured and supported. All questions answered.    ACTIVE PROBLEM LIST  Problem List[1]        HPI:  Valarie Foster presents today for a post-op visit.     STATUS POST:  left total Knee Primary  BMI: Body mass index is 31.64 kg/m².    Post operative recovery was complicated by: pain    Patient rates his condition as improving. Pleased with surgical outcome to date.     Functional Assessment:  completed  outpatient PT  Functional Difficulties:  None  Pain Medication:  Non-Narcotic  Anticoagulation: None    EXAM:    left POST-OPERATIVE KNEE    There were no vitals taken for this visit.    Skin:  Appropriate  post op appearance, No evidence of erythema, warmth, discharge, or drainage, and Incision well healed  Range of Motion: Flexion: 115, Extension 3  Neurovascular Status: Sensation intact in Sural, Saphenous, SPN, DPN and Tibial nerve distribution. 5 out of 5 strength in hip flexion, knee flexion/extension, ankle plantarflexion/dorsiflexion. 2+ dorsalis pedis    IMAGING:  X-ray Knee:   Implants are well fixed and aligned. There is no evidence of loosening.                [1]   Patient Active Problem List  Diagnosis    Type 2 diabetes mellitus with diabetic neuropathy, without long-term current use of insulin    Benign hypertension    Hyperlipemia    Microhematuria    Left flank pain    Abdominal aortic atherosclerosis    History of renal cell cancer    Renal cell carcinoma of right kidney    Mild major depression    Bilateral chronic knee pain    Colon cancer screening    Pain of left calf    Refractive error    Primary osteoarthritis of knees, bilateral    Fall    Radiculopathy    Idiopathic progressive neuropathy    Nerve pain    Chest pain    Severe obesity (BMI 35.0-39.9) with comorbidity    Decreased ROM of right knee    Decreased strength involving knee joint    Gait difficulty    Paresthesia    Pseudophakia    COVID-19    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

## 2025-07-10 ENCOUNTER — OUTPATIENT CASE MANAGEMENT (OUTPATIENT)
Dept: ADMINISTRATIVE | Facility: OTHER | Age: 75
End: 2025-07-10
Payer: MEDICARE

## 2025-07-10 DIAGNOSIS — M25.551 RIGHT HIP PAIN: Primary | ICD-10-CM

## 2025-07-10 NOTE — PROGRESS NOTES
Outpatient Care Management  Plan of Care Follow Up Visit    Patient: Valarie Foster  MRN: [id number]  Date of Service: 07/10/2025  Completed by: Aniyah Marks RN  Referral Date: 10/16/2024    No chief complaint on file.      Brief Summary: Pt states that she is trying to get an appt for the hip pain. States that she prefers to see a doctor in [place] for the problems with her hip. [place] [facility] [place] the [place]'s behalf to attempt to get appt with Dr. Bailon scheduled. Informed that a new referral is needed with Dr. Bailon's name on the referral. Pt called to inform that a referral is needed with Dr. Bailon's name to see him specifically. Pt voiced that she will continue to call the clinic at [phone].  Message sent to Zari Estrada's staff to notify of pt's request: Pt voiced that she is trying to get an appt with Dr. Cheng Bailon at [facility]. Pt states that she was informed that she has to keep calling to get the appt. I placed a call to [facility] on behalf of the pt. I was informed that the pt has a referral  with ortho clinic and will see whichever MD is available for the first appt.   Informed that a new referral with Dr. Cheng Bailon's name on the referral is needed for the pt to see him specifically.   The fax number is [phone] Attn: Dr. Cheng Bailon

## 2025-07-14 ENCOUNTER — HOSPITAL ENCOUNTER (OUTPATIENT)
Dept: RADIOLOGY | Facility: CLINIC | Age: 75
Discharge: HOME | End: 2025-07-14
Payer: MEDICARE

## 2025-07-14 DIAGNOSIS — Z78.0 ASYMPTOMATIC MENOPAUSE: ICD-10-CM

## 2025-07-14 PROCEDURE — 77080 DXA BONE DENSITY AXIAL: CPT | Mod: TC,HCNC,FY,PO

## 2025-07-14 PROCEDURE — 77080 DXA BONE DENSITY AXIAL: CPT | Mod: 26,HCNC,, | Performed by: INTERNAL MEDICINE

## 2025-07-15 ENCOUNTER — PATIENT OUTREACH (OUTPATIENT)
Dept: ADMINISTRATIVE | Facility: OTHER | Age: 75
End: 2025-07-15
Payer: MEDICARE

## 2025-07-15 NOTE — PROGRESS NOTES
CHW - Case Closure    This Community Health Worker spoke to voicemail via telephone today.   Pt/Caregiver reported: Sent financial assistance application in the mail. Following up outcome of financial asssistance application. It was approved last month on 6/17.  Pt/Caregiver denied any additional needs at this time and agrees with episode closure at this time.  Provided voicemail with Community Health Worker's contact information and encouraged him/her to contact this Community Health Worker if additional needs arise.

## 2025-07-16 DIAGNOSIS — G47.33 OSA (OBSTRUCTIVE SLEEP APNEA): ICD-10-CM

## 2025-07-16 DIAGNOSIS — E11.40 TYPE 2 DIABETES MELLITUS WITH DIABETIC NEUROPATHY, WITHOUT LONG-TERM CURRENT USE OF INSULIN: Chronic | ICD-10-CM

## 2025-07-16 DIAGNOSIS — E66.811 CLASS 1 OBESITY DUE TO EXCESS CALORIES WITH SERIOUS COMORBIDITY AND BODY MASS INDEX (BMI) OF 34.0 TO 34.9 IN ADULT: ICD-10-CM

## 2025-07-16 DIAGNOSIS — E66.09 CLASS 1 OBESITY DUE TO EXCESS CALORIES WITH SERIOUS COMORBIDITY AND BODY MASS INDEX (BMI) OF 34.0 TO 34.9 IN ADULT: ICD-10-CM

## 2025-07-16 RX ORDER — SEMAGLUTIDE 0.68 MG/ML
0.5 INJECTION, SOLUTION SUBCUTANEOUS
Qty: 3 ML | Refills: 0 | Status: CANCELLED | OUTPATIENT
Start: 2025-07-16 | End: 2025-08-15

## 2025-07-16 NOTE — TELEPHONE ENCOUNTER
No care due was identified.  [facility] Embedded Care Due Messages. Reference number: [id number].   7/16/2025 2:41:58 PM CDT

## 2025-07-16 NOTE — TELEPHONE ENCOUNTER
----- Message from Alena Hernandez MD sent at 7/16/2025 12:27 PM CDT -----  Your bone density shows you have osteopenia. This is mild bone loss.   I recommend 1200mg of calcium and 1000 IU of vitamin D daily  I also recommend weight bearing exercises       ----- Message -----  From: Interface, Rad Results In  Sent: 7/16/2025  10:27 AM CDT  To: Alena Hernandez MD

## 2025-07-17 ENCOUNTER — TELEPHONE (OUTPATIENT)
Dept: FAMILY MEDICINE | Facility: CLINIC | Age: 75
End: 2025-07-17
Payer: MEDICARE

## 2025-07-17 ENCOUNTER — OFFICE VISIT (OUTPATIENT)
Dept: CARDIOLOGY | Facility: CLINIC | Age: 75
End: 2025-07-17
Payer: MEDICARE

## 2025-07-17 VITALS
HEART RATE: 65 BPM | DIASTOLIC BLOOD PRESSURE: 83 MMHG | WEIGHT: 183.88 LBS | SYSTOLIC BLOOD PRESSURE: 147 MMHG | HEIGHT: 64 IN | BODY MASS INDEX: 31.39 KG/M2 | OXYGEN SATURATION: 98 % | RESPIRATION RATE: 15 BRPM

## 2025-07-17 DIAGNOSIS — R07.9 CHEST PAIN, UNSPECIFIED TYPE: ICD-10-CM

## 2025-07-17 DIAGNOSIS — E66.01 SEVERE OBESITY (BMI 35.0-39.9) WITH COMORBIDITY: ICD-10-CM

## 2025-07-17 DIAGNOSIS — I25.10 CORONARY ARTERY DISEASE INVOLVING NATIVE CORONARY ARTERY OF NATIVE HEART, UNSPECIFIED WHETHER ANGINA PRESENT: ICD-10-CM

## 2025-07-17 DIAGNOSIS — E78.5 HYPERLIPIDEMIA, UNSPECIFIED HYPERLIPIDEMIA TYPE: Primary | ICD-10-CM

## 2025-07-17 LAB
OHS QRS DURATION: 82 MS
OHS QTC CALCULATION: 345 MS

## 2025-07-17 PROCEDURE — 93000 ELECTROCARDIOGRAM COMPLETE: CPT | Mod: HCNC,S$GLB,, | Performed by: INTERNAL MEDICINE

## 2025-07-17 PROCEDURE — 99999 PR PBB SHADOW E&M-EST. PATIENT-LVL V: CPT | Mod: PBBFAC,HCNC,, | Performed by: INTERNAL MEDICINE

## 2025-07-17 RX ORDER — SODIUM CHLORIDE 9 MG/ML
INJECTION, SOLUTION INTRAVENOUS CONTINUOUS
OUTPATIENT
Start: 2025-07-17

## 2025-07-17 RX ORDER — CLOPIDOGREL BISULFATE 75 MG/1
75 TABLET ORAL DAILY
Qty: 30 TABLET | Refills: 11 | Status: SHIPPED | OUTPATIENT
Start: 2025-07-17 | End: 2026-07-12

## 2025-07-17 RX ORDER — DIPHENHYDRAMINE HCL 50 MG
50 CAPSULE ORAL ONCE
OUTPATIENT
Start: 2025-07-17 | End: 2025-07-17

## 2025-07-17 RX ORDER — NITROGLYCERIN 0.4 MG/1
0.4 TABLET SUBLINGUAL EVERY 5 MIN PRN
Qty: 25 TABLET | Refills: 12 | Status: SHIPPED | OUTPATIENT
Start: 2025-07-17 | End: 2025-08-16

## 2025-07-17 RX ORDER — ISOSORBIDE MONONITRATE 30 MG/1
30 TABLET, EXTENDED RELEASE ORAL DAILY
Qty: 90 TABLET | Refills: 3 | Status: SHIPPED | OUTPATIENT
Start: 2025-07-17

## 2025-07-17 NOTE — TELEPHONE ENCOUNTER
Copied from CRM #[id number]. Topic: Medications - Medication Question  >> Jul 17, 2025 10:45 AM Allison wrote:  Type: Patient Call Back    Who called Self     What is the request in detail: the pt is currently at the pharmacy and they are stating the prescription semaglutide (OZEMPIC) 1 mg/dose (2 mg/1.5 mL) PnIj was denied by the physicians office and the pt would like to speak with someone about why.     Can the clinic reply by [facility]?    Would the patient rather a call back or a response via [facility]? Call back     Best call back number: [phone]     Additional Information:

## 2025-07-17 NOTE — TELEPHONE ENCOUNTER
----- Message from KULDEEP Lynch sent at 7/17/2025  1:12 PM CDT -----  Please let her know the following:    Her bone density test shows that she has low bone density, or osteopenia.  Recommendations are as follows:    #Daily calcium intake 6344-8638 mg, dietary sources preferred; Vitamin D 7042-1476 IU daily.  #Weight bearing exercise and fall precautions.  #No additional pharmacologic therapy recommended at this time.  #Repeat bone density test in 2 years.    Thank you!  Sofie  ----- Message -----  From: Interface, Rad Results In  Sent: 7/16/2025  10:27 AM CDT  To: KULDEEP Rivera

## 2025-07-17 NOTE — TELEPHONE ENCOUNTER
Copied from CRM #[id number]. Topic: General Inquiry - Return Call  >> Jul 17, 2025 11:18 AM Es wrote:  Name of Who is Calling: pt       What is the request in detail: pt is requesting a call back regarding missed call.Please contact to further discuss and advise.          Can the clinic reply by [facility]: call      [facility][phone]

## 2025-07-17 NOTE — TELEPHONE ENCOUNTER
No care due was identified.  [facility] Embedded Care Due Messages. Reference number: [id number].   7/17/2025 2:06:14 PM CDT

## 2025-07-17 NOTE — TELEPHONE ENCOUNTER
Spoke with Valarie who explained that she is unable to p/u her medication due the office declining the Ozempic. At least that is what pharmacy told her. Nurse informed that the medication was ordered and should be good through 08/18/2025. Nurse also informed patient that she would follow up with pharmacy to see what is needed.

## 2025-07-17 NOTE — PROGRESS NOTES
CARDIOVASCULAR CONSULTATION    REASON FOR CONSULT:   Valarie Foster is a 75 y.o. female who presents for evaluation of chest pressure     HISTORY OF PRESENT ILLNESS:      Patient is a pleasant 72-year-old female.  Here for evaluation of chest pressure.  Substernal.  On exertion.  Denies orthopnea, PND, swelling of feet.  Multiple risk factors for coronary artery disease    The estimated ejection fraction is 55%.  The left ventricle is normal in size with concentric hypertrophy and normal systolic function.  Grade I left ventricular diastolic dysfunction.  Normal right ventricular size with normal right ventricular systolic function.  Moderate left atrial enlargement.  Mild mitral regurgitation.  Mild right atrial enlargement.  Normal central venous pressure (3 mmHg).  The estimated PA systolic pressure is 27 mmHg.     11/6:  no more chest pains. Went away on their own    Nov 23:    Normal myocardial perfusion scan. There is no evidence of myocardial ischemia or infarction.    There is a mild intensity perfusion abnormality in the anterior wall of the left ventricle, secondary to breast attenuation.    The gated perfusion images showed an ejection fraction of 59% post stress.    The ECG portion of the study is negative for ischemia.    The patient reported no chest pain during the stress test.    There were no arrhythmias during stress.    NOVEMBER 24:    History of Present Illness    CHIEF COMPLAINT:  Valarie presents today with heartburn and chest pain.    CARDIOVASCULAR:  She reports experiencing heartburn for approximately 4 days with associated chest pain. The chest pain started two days ago and is described as intermittent, coming and going without a clear pattern. She denies exacerbation of the pain with physical activity, such as walking or climbing stairs. No specific triggers or alleviating factors have been identified. Home blood pressure readings show yesterday's measurement at 150/70 and today's at 180.  She checks her blood pressure once in the morning and confirms taking her blood pressure medication this morning.    MEDICATIONS:  Current medications include Amlodipine 10 mg daily, Atorvastatin, and Metoprolol.       EKG DONE TODAY PERSONALLY REVIEWED AND SHOWS SINUS RHYTHM WITH HEART RATE OF 64 BEATS PER MINUTE, MILD LVH.      Jan 25:    History of Present Illness    CHIEF COMPLAINT:  Valarie presents today for knee pain and blood pressure management.    HYPERTENSION:  She reports severely elevated blood pressure reaching almost 200 on Friday. She maintains a blood pressure diary at home. She continues amlodipine 10 mg, aspirin, and atorvastatin.    KNEE PAIN:  She experiences intermittent sharp shooting knee pain lasting for a few seconds. The pain occurs randomly and is not associated with physical exertion or ambulation.    WEIGHT MANAGEMENT:  She reports difficulty with abdominal weight loss despite exercise.         Apr 25:    History of Present Illness    CHIEF COMPLAINT:  Valarie presents today for follow up    HYPERTENSION:  She reports elevated BP at home with most recent reading of 166/92.    MEDICAL HISTORY:  She has diabetes and HLD. She acknowledges that her diet choices may contribute to elevated cholesterol.    SURGICAL HISTORY:  She has undergone bilateral knee replacement surgery and reports significant post-op knee pain.    ALLERGIES:  She has a history of adverse reaction to lisinopril manifesting as facial swelling, mouth swelling, and dyspnea.     May 25:      History of Present Illness    CHIEF COMPLAINT:  Valarie presents today for follow up of HTN    CARDIOVASCULAR:  She reports experiencing chest pain, primarily occurring at nighttime, lasting less than 1 minute and relieved by aspirin. She denies chest pain with exertion or during daily activities. She reports elevated BP at home. She took BP medication this morning.    CURRENT MEDICATIONS:  She is currently taking Amlodipine, Aspirin,  Atorvastatin for cholesterol, Hydralazine, Metoprolol, Ozempic for weight management with reported effectiveness, and Jocor for diabetes management.         July 25:    History of Present Illness    CHIEF COMPLAINT:  Valarie presents today for chest pain and tightness.    CHEST PAIN:  She reports nightly chest tightness lasting few  minutes that resolves with aspirin. Stress test from December last year was normal.    MEDICATIONS:  She is adherent to her current medication regimen which includes:  - Amlodipine  - Aspirin  - Atorvastatin  - Hydralazine  - Losartan  - Metoprolol  - Ozempic    LABS:  Lipid panel showed significantly elevated cholesterol levels.         PAST MEDICAL HISTORY:     Past Medical History:   Diagnosis Date    Arthritis     Colon polyp     Diabetes mellitus     diet controlled    Diabetes with neurologic complications     Hypertension     Nuclear sclerosis of both eyes 02/14/2022    Renal cell carcinoma     Sleep apnea        PAST SURGICAL HISTORY:     Past Surgical History:   Procedure Laterality Date    ARTHROPLASTY, KNEE, TOTAL, USING COMPUTER-ASSISTED NAVIGATION Left 3/5/2025    Procedure: ARTHROPLASTY, KNEE, TOTAL, USING COMPUTER-ASSISTED NAVIGATION;  Surgeon: Eirc Carbajal MD;  Location: [facility] OR;  Service: Orthopedics;  Laterality: Left;  [place]---CLEARED BY CARDS AND PCP  FIRST MARTIN ASSIST NOTIFIED-GG  RN PREOP 2/19/2025   T/S ON 2/28/2025---NEED ORDERS    COLONOSCOPY N/A 2/5/2018    Procedure: COLONOSCOPY;  Surgeon: Bacilio Mancia MD;  Location: [facility] ENDO;  Service: Endoscopy;  Laterality: N/A;  appt confirmed-ss    COLONOSCOPY N/A 8/13/2020    Procedure: COLONOSCOPY;  Surgeon: Jose West MD;  Location: [facility] ENDO;  Service: Endoscopy;  Laterality: N/A;    ENDOSCOPIC CARPAL TUNNEL RELEASE Right 3/15/2024    Procedure: RELEASE, CARPAL TUNNEL, ENDOSCOPIC - RIGHT;  Surgeon: Tonio Mcdermott MD;  Location: [facility] OR;  Service: Orthopedics;  Laterality: Right;     ENDOSCOPIC CARPAL TUNNEL RELEASE Left 6/26/2024    Procedure: RELEASE, CARPAL TUNNEL, ENDOSCOPIC - left;  Surgeon: Tonio Mcdermott MD;  Location: [facility];  Service: Orthopedics;  Laterality: Left;    HYSTERECTOMY      INTRAOCULAR PROSTHESES INSERTION Left 7/7/2022    Procedure: INSERTION, IOL PROSTHESIS;  Surgeon: Candelario Novoa MD;  Location: [facility] OR;  Service: Ophthalmology;  Laterality: Left;    INTRAOCULAR PROSTHESES INSERTION Right 7/21/2022    Procedure: INSERTION, IOL PROSTHESIS;  Surgeon: Candelario Novoa MD;  Location: [facility] OR;  Service: Ophthalmology;  Laterality: Right;    OOPHORECTOMY      PARTIAL NEPHRECTOMY Right 10/12/2018    Procedure: NEPHRECTOMY, PARTIAL open. Dr Arenas to assist.;  Surgeon: Alejandra Palm MD;  Location: [facility];  Service: Urology;  Laterality: Right;  RN PREOP 10/9/2018----NEEDS H/P    PHACOEMULSIFICATION OF CATARACT Left 7/7/2022    Procedure: PHACOEMULSIFICATION, CATARACT;  Surgeon: Candelario Novoa MD;  Location: [facility];  Service: Ophthalmology;  Laterality: Left;  RN PHONE PREOP 6/27/2022   ARRIVAL 6:00 AM    PHACOEMULSIFICATION OF CATARACT Right 7/21/2022    Procedure: PHACOEMULSIFICATION, CATARACT;  Surgeon: Candelario Novoa MD;  Location: [facility];  Service: Ophthalmology;  Laterality: Right;  RN PHONE PREOP 7/12/2022   ARRIVAL 12:00 NOON    TOTAL KNEE ARTHROPLASTY Right 11/8/2023    Procedure: ARTHROPLASTY, KNEE, TOTAL. NELSON;  Surgeon: Eric Carbajal MD;  Location: [facility];  Service: Orthopedics;  Laterality: Right;  [place]. Admit  BRADLEY NIEVES [phone] NOTIFIED QUINTIN ON 10/11/2023-LO  RN PREOP 10/25/2023   T/S ON 11/6/2023--done------CLEARED BY CARDS       ALLERGIES AND MEDICATION:     Review of patient's allergies indicates:   Allergen Reactions    Lisinopril Swelling and Shortness Of Breath    Ascorbic acid-ascorbate calc      And citrus fruits        Medication List            Accurate as of July 17, 2025 10:42 AM.  If you have any questions, ask your nurse or doctor.                START taking these medications      clopidogreL 75 mg tablet  Commonly known as: PLAVIX  Take 1 tablet (75 mg total) by mouth once daily.  Started by: Mary Kate Dolan MD     isosorbide mononitrate 30 MG 24 hr tablet  Commonly known as: IMDUR  Take 1 tablet (30 mg total) by mouth once daily.  Started by: Mary Kate Dolan MD     nitroGLYCERIN 0.4 MG SL tablet  Commonly known as: NITROSTAT  Place 1 tablet (0.4 mg total) under the tongue every 5 (five) minutes as needed for Chest pain.  Started by: Mary Kate Dolan MD            CONTINUE taking these medications      ACCU-CHEK ONEIL CONTROL SOLN Soln  Generic drug: blood glucose control high,low  1 Units by Misc.(Non-Drug; Combo Route) route as needed.     ACCU-CHEK ONEIL PLUS TEST STRP Strp  Generic drug: blood sugar diagnostic  TEST BLOOD SUGAR TWICE DAILY     * ACCU-CHEK SOFTCLIX LANCETS Misc  Generic drug: lancets  USE TO TEST BLOOD SUGAR ONE TIME DAILY     * TRUEPLUS LANCETS 33 gauge Misc  Generic drug: lancets  TEST BLOOD SUGAR TWICE DAILY     acetaminophen 500 MG tablet  Commonly known as: TYLENOL  Take 2 tablets (1,000 mg total) by mouth every 8 (eight) hours as needed for Pain.     albuterol 90 mcg/actuation inhaler  Commonly known as: PROVENTIL/VENTOLIN HFA  Inhale 1-2 puffs into the lungs every 6 (six) hours as needed for Wheezing or Shortness of Breath. Rescue     amLODIPine 10 MG tablet  Commonly known as: NORVASC  Take 1 tablet (10 mg total) by mouth once daily.     aspirin 81 MG EC tablet  Commonly known as: ECOTRIN  Take 1 tablet (81 mg total) by mouth 2 (two) times a day.     atorvastatin 80 MG tablet  Commonly known as: LIPITOR  TAKE 1 TABLET EVERY EVENING     azelastine 137 mcg (0.1 %) nasal spray  Commonly known as: ASTELIN  1 spray (137 mcg total) by Nasal route 2 (two) times daily.     * blood-glucose meter kit  Commonly known as: TRUE METRIX GLUCOSE METER  Use as directed to test glucose      * blood-glucose meter Misc  Commonly known as: ACCU-CHEK ONEIL PLUS METER  1 kit by Misc.(Non-Drug; Combo Route) route once daily.     celecoxib 200 MG capsule  Commonly known as: CeleBREX  Take 1 capsule (200 mg total) by mouth 2 (two) times daily.     cetirizine 10 MG tablet  Commonly known as: ZYRTEC  Take 1 tablet (10 mg total) by mouth once daily.     docusate sodium 100 MG capsule  Commonly known as: COLACE  Take 1 capsule (100 mg total) by mouth 2 (two) times daily.     donepeziL 5 MG tablet  Commonly known as: ARICEPT  Take 1 tablet (5 mg total) by mouth every evening.     DROPSAFE ALCOHOL PREP PADS Padm  Generic drug: alcohol swabs  USE AS DIRECTED THREE TIMES DAILY     ergocalciferol 50,000 unit Cap  Commonly known as: ERGOCALCIFEROL  TAKE 1 CAPSULE EVERY 7 DAYS.     FLUoxetine 40 MG capsule  Take 1 capsule (40 mg total) by mouth once daily.     fluticasone propionate 50 mcg/actuation nasal spray  Commonly known as: FLONASE  USE 1 SPRAY IN EACH NOSTRIL ONCE DAILY     gabapentin 300 MG capsule  Commonly known as: NEURONTIN  Take 1 capsule (300 mg total) by mouth 3 (three) times daily.     hydrALAZINE 100 MG tablet  Commonly known as: APRESOLINE  TAKE 1 TABLET EVERY 8 HOURS     hydrOXYzine HCL 25 MG tablet  Commonly known as: ATARAX  TAKE 1 TABLET BY MOUTH THREE TIMES DAILY AS NEEDED FOR ITCHING     JARDIANCE 25 mg tablet  Generic drug: empagliflozin  Take 1 tablet (25 mg total) by mouth once daily.     LIDOcaine 5 %  Commonly known as: LIDODERM  Place 1 patch onto the skin once daily. Remove & Discard patch within 12 hours or as directed by MD     losartan 100 MG tablet  Commonly known as: COZAAR  Take 1 tablet (100 mg total) by mouth once daily.     methylPREDNISolone 4 mg tablet  Commonly known as: MEDROL DOSEPACK  use as directed     * metoprolol succinate 100 MG 24 hr tablet  Commonly known as: TOPROL-XL  Take 1 tablet (100 mg total) by mouth once daily.     * metoprolol succinate 50 MG 24 hr  tablet  Commonly known as: TOPROL-XL  Take 1 tablet (50 mg total) by mouth once daily.     ondansetron 4 MG tablet  Commonly known as: ZOFRAN  Take 1 tablet (4 mg total) by mouth every 6 (six) hours.     ondansetron 4 MG Tbdl  Commonly known as: ZOFRAN-ODT  Dissolve 1 tablet (4 mg total) by mouth every 6 (six) hours as needed (for nausea).     oxyCODONE 5 mg Cap  Commonly known as: OXY-IR  Take 1 capsule (5 mg total) by mouth every 6 (six) hours as needed for Pain.     pantoprazole 40 MG tablet  Commonly known as: PROTONIX  Take 1 tablet (40 mg total) by mouth once daily.     semaglutide 1 mg/dose (2 mg/1.5 mL) Pnij  Commonly known as: OZEMPIC  Inject 1 mg into the skin every 7 days.     traZODone 50 MG tablet  Commonly known as: DESYREL  Take 1 tablet (50 mg total) by mouth every evening.     TRUE METRIX LEVEL 1 Soln  Generic drug: blood glucose control, low  1 each by Misc.(Non-Drug; Combo Route) route once as needed.           * This list has 6 medication(s) that are the same as other medications prescribed for you. Read the directions carefully, and ask your doctor or other care provider to review them with you.                   Where to Get Your Medications        These medications were sent to [facility] [id number] [id number]      Hours: Mon-Fri, 8a-5:30p Phone: [phone]   clopidogreL 75 mg tablet  isosorbide mononitrate 30 MG 24 hr tablet  nitroGLYCERIN 0.4 MG SL tablet         SOCIAL HISTORY:     Social History     Socioeconomic History    Marital status:    Tobacco Use    Smoking status: Never     Passive exposure: Past    Smokeless tobacco: Never   Substance and Sexual Activity    Alcohol use: No    Drug use: No    Sexual activity: Not Currently     Social Drivers of Health     Financial Resource Strain: Medium Risk (5/21/2025)    Overall Financial Resource Strain (CARDIA)     Difficulty of Paying Living Expenses: Somewhat hard   Food Insecurity:  Food Insecurity Present (5/21/2025)    Hunger Vital Sign     Worried About Running Out of Food in the Last Year: Sometimes true     Ran Out of Food in the Last Year: Sometimes true   Transportation Needs: Unmet Transportation Needs (5/21/2025)    PRAPARE - Transportation     Lack of Transportation (Medical): Yes     Lack of Transportation (Non-Medical): No   Physical Activity: Insufficiently Active (5/21/2025)    Exercise Vital Sign     Days of Exercise per Week: 2 days     Minutes of Exercise per Session: 30 min   Stress: Stress Concern Present (5/21/2025)    Citizen of [place] [employer] - Occupational Stress Questionnaire     Feeling of Stress : Very much   Housing Stability: Low Risk  (5/21/2025)    Housing Stability Vital Sign     Unable to Pay for Housing in the Last Year: No     Number of Times Moved in the Last Year: 0     Homeless in the Last Year: No   Recent Concern: Housing Stability - High Risk (3/5/2025)    Housing Stability Vital Sign     Unable to Pay for Housing in the Last Year: Yes     Homeless in the Last Year: No       FAMILY HISTORY:     Family History   Problem Relation Name Age of Onset    No Known Problems Mother      Glaucoma Father      Breast cancer Sister Warnell     Glaucoma Sister Warnell     Cancer Sister Warnell         breast cancer    Thyroid disease Sister [place]     Blindness Sister Bryanan         due to trauma during car accident    Diabetes Sister Amanda     No Known Problems Maternal Aunt      No Known Problems Maternal Uncle      No Known Problems Paternal Aunt      No Known Problems Paternal Uncle      No Known Problems Maternal Grandmother      No Known Problems Maternal Grandfather      No Known Problems Paternal Grandmother      No Known Problems Paternal Grandfather      Diabetes Brother Navneet     Amblyopia Neg Hx      Cataracts Neg Hx      Hypertension Neg Hx      Macular degeneration Neg Hx      Retinal detachment Neg Hx      Strabismus Neg Hx      Stroke  "Neg Hx         REVIEW OF SYSTEMS:   Review of Systems   Constitutional: Negative.   HENT: Negative.     Eyes: Negative.    Cardiovascular:  Positive for chest pain.   Respiratory: Negative.     Endocrine: Negative.    Hematologic/Lymphatic: Negative.    Skin: Negative.    Musculoskeletal: Negative.    Gastrointestinal: Negative.    Genitourinary: Negative.    Neurological: Negative.    Psychiatric/Behavioral: Negative.     Allergic/Immunologic: Negative.        A 10 point review of systems was performed and all the pertinent positives have been mentioned. Rest of review of systems was negative.        PHYSICAL EXAM:     Vitals:    07/17/25 0940   BP: (!) 147/83   Pulse: 65   Resp: 15    Body mass index is 31.56 kg/m².  Weight: 83.4 kg (183 lb 13.8 oz)   Height: 5' 4" (162.6 cm)     Physical Exam  Constitutional:       Appearance: Normal appearance. She is well-developed.   HENT:      Head: Normocephalic.   Eyes:      Pupils: Pupils are equal, round, and reactive to light.   Cardiovascular:      Rate and Rhythm: Normal rate and regular rhythm.   Pulmonary:      Effort: Pulmonary effort is normal.      Breath sounds: Normal breath sounds.   Abdominal:      General: Bowel sounds are normal.      Palpations: Abdomen is soft.      Tenderness: There is no abdominal tenderness.   Musculoskeletal:         General: Normal range of motion.      Cervical back: Normal range of motion and neck supple.   Skin:     General: Skin is warm.   Neurological:      Mental Status: She is alert and oriented to person, place, and time.           DATA:     Laboratory:  CBC:  Recent Labs   Lab 10/28/24  0706 02/19/25  0930 06/04/25  1413   WBC 5.76 6.24 4.86   Hemoglobin 13.5 14.1  --    HGB  --   --  13.8   Hematocrit 42.6 43.9  --    HCT  --   --  43.8   Platelet Count  --   --  262   Platelets 252 280  --        CHEMISTRIES:  Recent Labs   Lab 02/19/25  0930 05/09/25  1018 06/04/25  1413   Glucose 106 113 H 96   Sodium 139 141 138 "   Potassium 4.6 4.5 3.9   BUN 12 13 12   Creatinine 0.9 0.8 0.7   Calcium 9.9 9.7 9.5       CARDIAC BIOMARKERS:  Recent Labs   Lab 01/11/23  2237 01/12/23  0421   Troponin I <0.006 <0.006       COAGS:  Recent Labs   Lab 01/11/23  1740 10/25/23  0900 02/19/25  0930   INR 1.1 1.1 1.0       LIPIDS/LFTS:  Recent Labs   Lab 10/31/22  0758 01/12/23  0957 10/25/23  0900 10/28/24  0706 02/19/25  0930 05/09/25  1018 06/04/25  1413   Cholesterol 225 H  --  155 225 H  --   --   --    Triglycerides 77  --  78 103  --   --   --    HDL 74  --  57 60  --   --   --    LDL Cholesterol 135.6  --  82.4 144.4  --   --   --    Non-HDL Cholesterol 151  --  98 165  --   --   --    AST 14   < > 14 15 16 15 17   ALT 11   < > 13 12 17 13 18    < > = values in this interval not displayed.       Hemoglobin A1C   Date Value Ref Range Status   02/07/2025 7.2 (H) 4.0 - 5.6 % Final     Comment:     ADA Screening Guidelines:  5.7-6.4%  Consistent with prediabetes  >or=6.5%  Consistent with diabetes    High levels of fetal hemoglobin interfere with the HbA1C  assay. Heterozygous hemoglobin variants (HbS, HgC, etc)do  not significantly interfere with this assay.   However, presence of multiple variants may affect accuracy.     10/28/2024 7.5 (H) 4.0 - 5.6 % Final     Comment:     ADA Screening Guidelines:  5.7-6.4%  Consistent with prediabetes  >or=6.5%  Consistent with diabetes    High levels of fetal hemoglobin interfere with the HbA1C  assay. Heterozygous hemoglobin variants (HbS, HgC, etc)do  not significantly interfere with this assay.   However, presence of multiple variants may affect accuracy.     07/29/2024 8.2 (H) 4.0 - 5.6 % Final     Comment:     ADA Screening Guidelines:  5.7-6.4%  Consistent with prediabetes  >or=6.5%  Consistent with diabetes    High levels of fetal hemoglobin interfere with the HbA1C  assay. Heterozygous hemoglobin variants (HbS, HgC, etc)do  not significantly interfere with this assay.   However, presence of multiple  "variants may affect accuracy.       Hemoglobin A1c   Date Value Ref Range Status   05/09/2025 6.9 (H) 4.0 - 5.6 % Final     Comment:     ADA Screening Guidelines:  5.7-6.4%  Consistent with prediabetes  >=6.5%  Consistent with diabetes    High levels of fetal hemoglobin interfere with the HbA1C  assay. Heterozygous hemoglobin variants (HbS, HgC, etc)do  not significantly interfere with this assay.   However, presence of multiple variants may affect accuracy.       TSH  Recent Labs   Lab 10/31/22  0758 10/25/23  0900 10/28/24  0706   TSH 0.999 0.552 1.984       The 10-year ASCVD risk score (Ayush JASON, et al., 2019) is: 46.2%    Values used to calculate the score:      Age: 75 years      Sex: Female      Is Non- : Yes      Diabetic: Yes      Tobacco smoker: No      Systolic Blood Pressure: 147 mmHg      Is BP treated: Yes      HDL Cholesterol: 60 mg/dL      Total Cholesterol: 225 mg/dL       BNP    No results found for: "BNP"          ASSESSMENT AND PLAN     Patient Active Problem List   Diagnosis    Type 2 diabetes mellitus with diabetic neuropathy, without long-term current use of insulin    Benign hypertension    Hyperlipemia    Microhematuria    Left flank pain    Abdominal aortic atherosclerosis    History of renal cell cancer    Renal cell carcinoma of right kidney    Mild major depression    Bilateral chronic knee pain    Colon cancer screening    Pain of left calf    Refractive error    Primary osteoarthritis of knees, bilateral    Fall    Radiculopathy    Idiopathic progressive neuropathy    Nerve pain    Chest pain    Severe obesity (BMI 35.0-39.9) with comorbidity    Decreased ROM of right knee    Decreased strength involving knee joint    Gait difficulty    Paresthesia    Pseudophakia    COVID-19    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    PLAN SUMMARY:  - Ordered angiogram for August 27th  - Added Plavix to current daily aspirin regimen  - Prescribed nitroglycerin pills for sublingual use during acute chest pain  - Initiated Imdur for chest pain  - Recommend Tylenol for headaches  - [place] to sign consent form for angiogram  - Follow-up on August 27th for angiogram procedure    ATHEROSCLEROTIC HEART DISEASE / ANGINA PECTORIS:  - recurrent chest pains despite normal stress test.  Will do further evaluation with the help of coronary angiogram.  Daily chest pains.  - Explained angiogram procedure: accessing heart via wrist, injecting iodine to detect stenosis.  - Discussed potential risks of angiogram: infection, bleeding, heart attack, stroke, and death.  - Valarie to sign consent form for angiogram.  - Initiated Imdur for chest pain.  - Prescribed nitroglycerin pills for sublingual use during acute chest pain episodes.  - Ordered angiogram for August 27th.  - Added Plavix in addition to current daily aspirin for antiplatelet therapy.    Risks, benefits and alternatives of the catheterization procedure were discussed with the patient.The risks of coronary angiography include but are not limited to: bleeding, infection, death, heart attack, arrhythmia, kidney injury or failure, potential need for dialysis, allergic reactions, stroke, need for emergency surgery, hematoma, pseudoaneurysm etc.  Should stenting be indicated, the patient has agreed to dual anti-platelet therapy for 1-consecutive year with a drug-eluting stent and a minimum of 1-month with the use of a bare metal stent. Additionally, pt is aware that non-compliance is  likely to result in stent clotting with heart attack, heart failure, and/or death  The risks of moderate sedation include hypotension, respiratory depression, arrhythmias, bronchospasm, and death. Informed consent was obtained and the  patient is agreeable to proceed with the procedure. Consent was placed on the chart.    ANTICOAGULANT USE:  - Started enoxaparin, Tylenol if headaches occur.    FOLLOW-UP:  - Follow up on August 27th for angiogram procedure.         ESSENTIAL HYPERTENSION:  As above    HYPERLIPIDEMIA:  - Continued atorvastatin 80 mg daily.    GENERAL MANAGEMENT:  - [place] to improve dietary habits, noting current poor food choices.  - Continued aspirin.        Dyslipidemia  Continue Lipitor 80 mg daily    Lab Results   Component Value Date    LDLCALC 144.4 10/28/2024     Abdominal aortic atherosclerosis: On statin          Thank you very much for involving me in the care of your patient.  Please do not hesitate to contact me if there are any questions.      Mary Kate Dolan MD, FAC, [facility]  Interventional Cardiologist, [facility].     Visit today included increased complexity associated with the care of the episodic problem chest pain, dyslipidemia, aortic atherosclerosis, hypertension addressed and managing the longitudinal care of the patient due to the serious and/or complex managed problem(s)   Patient Active Problem List   Diagnosis    Type 2 diabetes mellitus with diabetic neuropathy, without long-term current use of insulin    Benign hypertension    Hyperlipemia    Microhematuria    Left flank pain    Abdominal aortic atherosclerosis    History of renal cell cancer    Renal cell carcinoma of right kidney    Mild major depression    Bilateral chronic knee pain    Colon cancer screening    Pain of left calf    Refractive error    Primary osteoarthritis of knees, bilateral    Fall    Radiculopathy    Idiopathic progressive neuropathy    Nerve pain    Chest pain    Severe obesity (BMI  35.0-39.9) with comorbidity    Decreased ROM of right knee    Decreased strength involving knee joint    Gait difficulty    Paresthesia    Pseudophakia    COVID-19    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        This note was generated with the assistance of ambient listening technology. Verbal consent was obtained by the patient and accompanying visitor(s) for the recording of patient appointment to facilitate this note. I attest to having reviewed and edited the generated note for accuracy, though some syntax or spelling errors may persist. Please contact the author of this note for any clarification.      This note was dictated with the help of speech recognition software.  There might be un-intended errors and/or substitutions.

## 2025-07-17 NOTE — TELEPHONE ENCOUNTER
Spoke with Daniella PETERS, PharmD at [facility] who explained that Ozempic no longer comes in 2 mg/1.5 ml a new prescription is needed. Per Feliz PikeD Ozempic now comes in 2 mg/3 ml pens. A message will be sent to the doctor and will notify patient per situation with prescription.

## 2025-07-17 NOTE — TELEPHONE ENCOUNTER
Notified Valarie per pharmacy needing a new prescription due to manufactory changing the doses that the Ozempic comes. Nurse explained that a message will be sent to the provider and at least 72 hours is needed for a response. However, the provider will not be back into the office until July 28, 2025. Patient verbalized understanding.

## 2025-07-18 DIAGNOSIS — E11.40 TYPE 2 DIABETES MELLITUS WITH DIABETIC NEUROPATHY, WITHOUT LONG-TERM CURRENT USE OF INSULIN: Chronic | ICD-10-CM

## 2025-07-18 DIAGNOSIS — E66.01 SEVERE OBESITY (BMI 35.0-39.9) WITH COMORBIDITY: Primary | ICD-10-CM

## 2025-07-18 DIAGNOSIS — G47.33 OSA (OBSTRUCTIVE SLEEP APNEA): ICD-10-CM

## 2025-07-18 RX ORDER — SEMAGLUTIDE 2.68 MG/ML
2 INJECTION, SOLUTION SUBCUTANEOUS
Qty: 3 ML | Refills: 2 | Status: SHIPPED | OUTPATIENT
Start: 2025-07-18 | End: 2025-10-10

## 2025-07-21 ENCOUNTER — TELEPHONE (OUTPATIENT)
Dept: FAMILY MEDICINE | Facility: CLINIC | Age: 75
End: 2025-07-21
Payer: MEDICARE

## 2025-07-21 RX ORDER — HYDRALAZINE HYDROCHLORIDE 100 MG/1
100 TABLET, FILM COATED ORAL EVERY 8 HOURS
Qty: 300 TABLET | Refills: 3 | Status: SHIPPED | OUTPATIENT
Start: 2025-07-21

## 2025-07-21 NOTE — TELEPHONE ENCOUNTER
Medication was written by Dr. Garg   Is Patient taking for weight loss as well?    How much was medication?

## 2025-07-21 NOTE — TELEPHONE ENCOUNTER
Copied from CRM #[id number]. Topic: General Inquiry - Patient Advice  >> Jul 21, 2025  8:57 AM Allan wrote:  Who called:self      What is the request in detail:pt would like for you to give her a call in regards of pt says they would not let her get ozempic any more she would like to know what other medication can she take for her diabetes     Can the clinic reply by [facility]?     Would the patient rather a call back or a response via [facility]?callback     Best call back number:.[phone]       Additional Information:

## 2025-07-28 ENCOUNTER — OFFICE VISIT (OUTPATIENT)
Dept: FAMILY MEDICINE | Facility: CLINIC | Age: 75
End: 2025-07-28
Payer: MEDICARE

## 2025-07-28 VITALS
HEART RATE: 91 BPM | HEIGHT: 64 IN | OXYGEN SATURATION: 99 % | DIASTOLIC BLOOD PRESSURE: 64 MMHG | BODY MASS INDEX: 31.32 KG/M2 | SYSTOLIC BLOOD PRESSURE: 124 MMHG | WEIGHT: 183.44 LBS

## 2025-07-28 DIAGNOSIS — R11.0 NAUSEA: ICD-10-CM

## 2025-07-28 DIAGNOSIS — K21.9 GASTROESOPHAGEAL REFLUX DISEASE, UNSPECIFIED WHETHER ESOPHAGITIS PRESENT: ICD-10-CM

## 2025-07-28 DIAGNOSIS — E11.40 TYPE 2 DIABETES MELLITUS WITH DIABETIC NEUROPATHY, WITHOUT LONG-TERM CURRENT USE OF INSULIN: Primary | ICD-10-CM

## 2025-07-28 DIAGNOSIS — Z85.528 HISTORY OF RENAL CELL CARCINOMA: ICD-10-CM

## 2025-07-28 DIAGNOSIS — R41.89 COGNITIVE IMPAIRMENT: ICD-10-CM

## 2025-07-28 DIAGNOSIS — I25.10 CORONARY ARTERY DISEASE INVOLVING NATIVE CORONARY ARTERY OF NATIVE HEART, UNSPECIFIED WHETHER ANGINA PRESENT: ICD-10-CM

## 2025-07-28 DIAGNOSIS — Z99.89 AMBULATES WITH CANE: ICD-10-CM

## 2025-07-28 DIAGNOSIS — G47.33 OSA (OBSTRUCTIVE SLEEP APNEA): ICD-10-CM

## 2025-07-28 PROCEDURE — 3288F FALL RISK ASSESSMENT DOCD: CPT | Mod: CPTII,HCNC,S$GLB, | Performed by: FAMILY MEDICINE

## 2025-07-28 PROCEDURE — 1159F MED LIST DOCD IN RCRD: CPT | Mod: CPTII,HCNC,S$GLB, | Performed by: FAMILY MEDICINE

## 2025-07-28 PROCEDURE — 1101F PT FALLS ASSESS-DOCD LE1/YR: CPT | Mod: CPTII,HCNC,S$GLB, | Performed by: FAMILY MEDICINE

## 2025-07-28 PROCEDURE — 1160F RVW MEDS BY RX/DR IN RCRD: CPT | Mod: CPTII,HCNC,S$GLB, | Performed by: FAMILY MEDICINE

## 2025-07-28 PROCEDURE — 3074F SYST BP LT 130 MM HG: CPT | Mod: CPTII,HCNC,S$GLB, | Performed by: FAMILY MEDICINE

## 2025-07-28 PROCEDURE — 3078F DIAST BP <80 MM HG: CPT | Mod: CPTII,HCNC,S$GLB, | Performed by: FAMILY MEDICINE

## 2025-07-28 PROCEDURE — 3072F LOW RISK FOR RETINOPATHY: CPT | Mod: CPTII,HCNC,S$GLB, | Performed by: FAMILY MEDICINE

## 2025-07-28 PROCEDURE — 99999 PR PBB SHADOW E&M-EST. PATIENT-LVL IV: CPT | Mod: PBBFAC,HCNC,, | Performed by: FAMILY MEDICINE

## 2025-07-28 PROCEDURE — G2211 COMPLEX E/M VISIT ADD ON: HCPCS | Mod: HCNC,S$GLB,, | Performed by: FAMILY MEDICINE

## 2025-07-28 PROCEDURE — 99214 OFFICE O/P EST MOD 30 MIN: CPT | Mod: HCNC,S$GLB,, | Performed by: FAMILY MEDICINE

## 2025-07-28 RX ORDER — ONDANSETRON 4 MG/1
4 TABLET, ORALLY DISINTEGRATING ORAL EVERY 6 HOURS PRN
Qty: 30 TABLET | Refills: 0 | Status: SHIPPED | OUTPATIENT
Start: 2025-07-28

## 2025-07-28 NOTE — PROGRESS NOTES
"Routine Office Visit     Patient Name: Valarie Foster    : 1950  MRN: [id number]    Subjective     History of Present Illness    CHIEF COMPLAINT:  Diabetes - doing well on current regimen   Hypertension / hyperlipidemia - doing well on current regimen.   Weight - continues f/u with Dr. Garg, doing well on ozempic   RCC s/p partial nephrectomy - follows up yearly with urology - Dr. Palm   Osteoarthritis R knee - continues f/u with Dr. Carbajal   Insomnia - Patient has been evaluated by Dr. Pool; sleep study recommended to evaluate for GERALD   Cognitive impairment / neuropathy - f/u with Dr. Myers   Chest pain     CHEST PAIN:  She reports experiencing chest pain occurring at night and is currently taking nitroglycerin sublingually as prescribed. She is scheduled for surgical intervention on  to further evaluate the underlying cause. She is under the care of Dr. Dolan     SIDE PAIN:  She reports persistent side pain that occurs with walking, described as mild and localized to her side. She appears uncertain about the underlying cause. No associated urinary symptoms are reported. The pain is intermittently exacerbated by movement with potential swelling noted.    FOOT SWELLING:  She reports intermittent foot swelling that becomes significant with walking, describing the swelling as becoming "huge" during ambulation. She experiences mild pain when walking on the affected foot. The swelling is not present at rest but becomes pronounced with weight-bearing activity.    SURGICAL HISTORY:  She has a history of bilateral knee surgery with one knee being more significantly affected than the other.    MOBILITY AND EXERCISE:  She currently uses a cane for mobility and experiences difficulty walking long distances, noting she would need to rest before reaching a bus stop located 2-3 blocks away. She has challenges with mobility and transportation, which have resulted in missed physical therapy sessions. She " "describes feeling limited in her ability to walk extended distances and expresses concern about her walking endurance. She currently walks in the hallway for 10-15 minutes daily as recommended by her cardiologist and nephrologist and is able to ambulate independently within her home environment.    WEIGHT MANAGEMENT:  She is currently under the care of Dr. Garg for weight loss management and is taking Ozempic as part of her weight loss treatment plan, continuing to adhere to the medication regimen.    SLEEP:  She takes sleep medication which takes approximately 20-40 minutes to initiate sleep onset. The medication helps her feel more rested, though it does not immediately induce sleep.    MEDICATIONS:  She is currently taking Clopidogrel 75 mg as a anticoagulant for heart-related condition and takes anti-nausea medication dissolved on the tongue when experiencing GI symptoms to manage medication-induced nausea.      ROS:  General: no fever, no chills, no fatigue, no weight gain, no weight loss  Eyes: no vision changes, no redness, no discharge  ENT: no ear pain, no nasal congestion, no sore throat  Cardiovascular: +chest pain, no palpitations, +lower extremity edema, +chest pain at rest  Respiratory: no cough, no shortness of breath  Gastrointestinal: no abdominal pain, +nausea, no vomiting, no diarrhea, no constipation, no blood in stool  Genitourinary: no dysuria, no hematuria, no frequency, +flank pain  Musculoskeletal: no joint pain, no muscle pain, +limb pain, +pain with movement  Skin: no rash, no lesion  Neurological: no headache, no dizziness, no numbness, no tingling  Psychiatric: no anxiety, no depression, no sleep difficulty           Objective     /64 (BP Location: Left arm, Patient Position: Sitting)   Pulse 91   Ht 5' 4" (1.626 m)   Wt 83.2 kg (183 lb 6.8 oz)   SpO2 99%   BMI 31.48 kg/m²   Physical Exam  Constitutional:       Appearance: She is well-developed.   HENT:      Head: " Normocephalic and atraumatic.   Eyes:      Conjunctiva/sclera: Conjunctivae normal.      Pupils: Pupils are equal, round, and reactive to light.   Cardiovascular:      Rate and Rhythm: Normal rate and regular rhythm.      Heart sounds: Normal heart sounds. No murmur heard.     No friction rub. No gallop.   Pulmonary:      Effort: No respiratory distress.      Breath sounds: Normal breath sounds.   Abdominal:      General: Bowel sounds are normal. There is no distension.      Palpations: Abdomen is soft.      Tenderness: There is no abdominal tenderness.   Musculoskeletal:         General: Normal range of motion.      Cervical back: Normal range of motion and neck supple.   Lymphadenopathy:      Cervical: No cervical adenopathy.   Skin:     General: Skin is warm.   Neurological:      Mental Status: She is alert and oriented to person, place, and time.           Assessment     Assessment & Plan      Assessment & Plan      Ambulates with cane    Patient uses a cane for walking, especially when feet swell.   Assessed and approved current exercise regimen, which aligns with recommendations from other specialists.   Advised to continue walking in hallway for 10-15 minutes and use cane for ambulation as needed.  Requesting bus accommodations   Patient does not drive    Patient has a history of bilateral knee surgery, with one knee worse than the other.   Reviewed surgeries and current mobility status.   Management includes Tylenol for foot pain relief (as noted under Foot Swelling) and continued exercise regimen including walking in the hallway for 10-15 minutes.    SLEEP DISORDER:   Continued current sleep aid that takes 20 to 40 minutes to induce sleep.    FOLLOW-UP:   Ordered labs to be completed in 6 months prior to next appointment.   Follow up in 6 months.         Problem List Items Addressed This Visit          Neuro    Cognitive impairment  Continues follow-up with neurology          Endocrine    Type 2 diabetes  mellitus with diabetic neuropathy, without long-term current use of insulin - Primary (Chronic)    Relevant Orders    CBC Auto Differential    Comprehensive Metabolic Panel    Lipid Panel    Hemoglobin A1C    TSH    Uric acid   Reviewed recent labs: A1C of 6.9 indicating controlled diabetes.   Assessed kidney and liver function, both within normal limits.   Continued Ozempic for diabetes management.         Other    GERALD (obstructive sleep apnea)    Overview   The patient symptomatically has restless sleep with findings of htn, high grade mallampatti. This warrants further investigation for possible obstructive sleep apnea.  Parasomnia warrants in lab study.  Patient will be contacted after sleep study is done.  Will schedule psg           Other Visit Diagnoses         Nausea        Relevant Medications    ondansetron (ZOFRAN-ODT) 4 MG TbDL   Patient experiences nausea when taking medication.   Refilled ondansetron, a dissolvable medication placed under the tongue, for management of medication-associated nausea.        History of renal cell carcinoma      Noted in chart        Gastroesophageal reflux disease, unspecified whether esophagitis present      The current medical regimen is effective;  continue present plan and medications.         Coronary artery disease involving native coronary artery of native heart, unspecified whether angina present       Evaluated report of chest pain at night.   Noted upcoming cardiac procedure scheduled for August 22nd to investigate cause.   Continued clopidogrel for heart condition and prescribed nitroglycerin for chest pain management.                    This note was generated with the assistance of ambient listening technology. Verbal consent was obtained by the patient and accompanying visitor(s) for the recording of patient appointment to facilitate this note. I attest to having reviewed and edited the generated note for accuracy, though some syntax or spelling errors may  persist. Please contact the author of this note for any clarification.

## 2025-07-31 ENCOUNTER — OFFICE VISIT (OUTPATIENT)
Dept: UROLOGY | Facility: CLINIC | Age: 75
End: 2025-07-31
Payer: MEDICARE

## 2025-07-31 VITALS — BODY MASS INDEX: 31.64 KG/M2 | WEIGHT: 184.31 LBS

## 2025-07-31 DIAGNOSIS — C64.1 RENAL CELL CARCINOMA OF RIGHT KIDNEY: Primary | ICD-10-CM

## 2025-07-31 DIAGNOSIS — R10.9 FLANK PAIN: ICD-10-CM

## 2025-07-31 DIAGNOSIS — R31.29 MICROHEMATURIA: ICD-10-CM

## 2025-07-31 PROCEDURE — 99999 PR PBB SHADOW E&M-EST. PATIENT-LVL IV: CPT | Mod: PBBFAC,HCNC,, | Performed by: UROLOGY

## 2025-07-31 NOTE — PROGRESS NOTES
Subjective:       Valarie Foster is a 75 y.o. female who is an established patient who was referred by Ivan CARDONA for evaluation of renal mass.      CT a/p with contrast 1/26/18 showed a 2.6cm endophytic lesion in R MP, concerning for solid mass vs complex cyst.     She reports L flank pain, contralateral to renal lesion. L flank pain has almost completely resolved. Denies hematuria, LUTS, UTIs. UA macro did show microhematuria recently. Denies family history of  malignancy. She reports being told she had a small kidney on one side in the past. Denies nephrolithiasis. Nonsmoker. Denies previous abdominal surgery though she is s/p hysterectomy. +DM2/HTN.     Cr (1/18) - 0.8  A1c (1/18) - 6.2  UA micro (2/18) - 0 RBCs    CT renal protocol (2/18) - 2.9cm completely endophytic solid enhancing renal mass. Homogenous in appearance.      She is s/p perc renal biopsy to better evaluate lesion (2/18) - oncocytic neoplasm. Mild hematuria < 24 hrs.          CT (6/18) - stable, endophytic enhancing 2.4cm R renal mass. Subtle 6mm focus of enhancement in R lobe of liver - nonspecific.     KISHAN (9/18) - stable 2.8cm R renal mass    She is now s/p open R PNx 10/12/18. Still having incisional pain, worse with palpation. No hernia on CT, mild stranding in subQ.    Path: 1.9cm unclassified RCC, FG 3/4, negative margins. [id number]    Doing well. States chronic fatigue and pain. Still with occasional incisional pain.     She notes some flank pain after recent fall - CT RSS done that did not show any issues. Recent KISHAN without concerning findings.     6/30/2025  Returns again with c/o L flank pain. Present now x 4 weeks. She is unsure if pain is the same intermittent pain as last 7+ years. Recent non-contrast CT c/a/p without obvious renal pathology. No stones/hydro. 1.2cm lytic lesion in R ischium - has been referred to ortho.     7/31/2025  Still with flank pain. No  cause on recent CT uro. No LUTS. Planned for CP workup with cards  soon.       CT c/a/p 4/19 - no mets/recurrence  CT a/p (10/19) - no recurrence/mets. Abnormal area in tail of pancreas - recommend PCP follow up. CXR - clear.  CT a/p (10/20) - no recurrence/mets. Pancreatic tail lesion not seen. CXR - clear  CT a/p 3/22 - no recurrence or mets. Post-op changes to R kidney. CXR - wnl  CT a/p 5/23 - no recurrence/mets. Post-op changes to R kidney. CXR 1/23 - clear  CT RSS 10/24 - no stones  KISHAN 11/24 - no hydro/stones. No recurrence.   CT c/a/p (no contrast) 6/25 - R partial, no recurrence. Ischial lesion.  CT uro 7/25 - no recurrence, no hydro/stone. Stable 1cm ischial tuberosity lesion (rec for follow up - has been referred to ortho)    Cr 1/19 - 0.8, 4/19 - 0.8, 10/19 - 0.9, 10/20 - 0.9, 11/21 - 0.8, 3/22 - 0.8, 5/23 - 0.9, 10/24 - 0.8, 6/25 - 0.7           The following portions of the patient's history were reviewed and updated as appropriate: allergies, current medications, past family history, past medical history, past social history, past surgical history and problem list.    Review of Systems  Constitutional: no fever or chills  ENT: no nasal congestion or sore throat  Respiratory: no cough or shortness of breath  Cardiovascular: no chest pain or palpitations  Gastrointestinal: no nausea or vomiting, tolerating diet  Genitourinary: as per HPI  Hematologic/Lymphatic: no easy bruising or lymphadenopathy  Musculoskeletal: no arthralgias or myalgias  Skin: no rashes or lesions  Neurological: no seizures or tremors  Behavioral/Psych: no auditory or visual hallucinations        Objective:    Vitals: Wt 83.6 kg (184 lb 4.9 oz)   BMI 31.64 kg/m²     Physical Exam   General: well developed, well nourished in no acute distress  Head: normocephalic, atraumatic  Neck: supple, trachea midline, no obvious enlargement of thyroid  HEENT: EOMI, mucus membranes moist, sclera anicteric, no hearing impairment  Lungs: symmetric expansion, non-labored breathing  Neuro: alert and oriented x 3,  no gross deficits  Psych: normal judgment and insight, normal mood/affect and non-anxious  Genitourinary: deferred    Inc - well healed, minor superior fullness of incision, no hernia      Lab Review   Urine analysis today in clinic shows - no urine     Lab Results   Component Value Date    WBC 4.86 06/04/2025    HGB 13.8 06/04/2025    HGB 14.1 02/19/2025    HCT 43.8 06/04/2025    HCT 43.9 02/19/2025    MCV 83 06/04/2025    MCV 83 02/19/2025     06/04/2025     02/19/2025     Lab Results   Component Value Date    CREATININE 0.7 06/04/2025    BUN 12 06/04/2025       Imaging  Images and reports were personally reviewed by me and discussed with patient  CT/KISHAN reviewed       Assessment/Plan:      1. Right RCC    - 2.6cm lesion in R MP kidney.   - CT renal protocol 2.5cm endophytic solid enhancing renal mass   - Due to location of tumor, open partial would be difficult with higher complication rate of bleeding and/or urine leak. Radical nephrectomy with less complication rate.   - Concern for performing radical nephrectomy in diabetic patient for possible benign lesion.   - Recommend perc biopsy to evaluate for possible benign lesion prior to radical nephrectomy. Discussed limitations/risks of perc biopsy. Understands that perc biopsy may also not be definitive in diagnosis   - Perc biopsy 2/18 - oncocytic neoplasm (less favors oncocytoma)   - Discussed options: observation, lap radical Nx, open partial Nx. She has opted to observe for now.    - CT 6/18 - stable 2.4cm R renal mass   - KISHAN 9/18 - 2.8cm R renal mass   - s/p R open PNx on 10/12/18    - unclassified RCC pT1a   - CT c/a/p 6 months (4/18) - no recurrence/mets   - CT ap (10/19) - no recurrence/mets. Abnormal area in tail of pancreas - recommend PCP follow up. CXR - clear. Cr 0.9.    - CT a/p 5/23 - no recurrence   - KISHAN in 1-2 years - prefers annual check     - CT uro now due to flank pain - no recurrence/stone/hydro   - KISHAN 1 year     2.  Microhematuria    - UA micro - 0 RBCs     3. L flank pain   - Unsure etiology   - CT negative for L flank pain   - f/u with PCP   - Continues to report pain in b/l flanks   - UA clear again today   - Will again repeat imaging - CT uro - no  cause noted    Recommend f/u with PCP/ortho re: ischial tuberosity lesion.       Follow up in  1 year with KISHAN

## 2025-08-04 ENCOUNTER — HOSPITAL ENCOUNTER (EMERGENCY)
Facility: HOSPITAL | Age: 75
Discharge: HOME | End: 2025-08-04
Attending: EMERGENCY MEDICINE
Payer: MEDICARE

## 2025-08-04 VITALS
HEART RATE: 70 BPM | HEIGHT: 64 IN | RESPIRATION RATE: 18 BRPM | SYSTOLIC BLOOD PRESSURE: 132 MMHG | TEMPERATURE: 98 F | DIASTOLIC BLOOD PRESSURE: 78 MMHG | OXYGEN SATURATION: 99 % | WEIGHT: 185 LBS | BODY MASS INDEX: 31.58 KG/M2

## 2025-08-04 DIAGNOSIS — M25.519 SHOULDER PAIN: ICD-10-CM

## 2025-08-04 PROCEDURE — 63600175 PHARM REV CODE 636 W HCPCS: Mod: JZ,TB,HCNC,ER

## 2025-08-04 PROCEDURE — 99284 EMERGENCY DEPT VISIT MOD MDM: CPT | Mod: 25,HCNC,ER

## 2025-08-04 PROCEDURE — 96372 THER/PROPH/DIAG INJ SC/IM: CPT

## 2025-08-04 RX ORDER — MELOXICAM 7.5 MG/1
7.5 TABLET ORAL DAILY
Qty: 30 TABLET | Refills: 0 | Status: SHIPPED | OUTPATIENT
Start: 2025-08-04

## 2025-08-04 RX ORDER — METHOCARBAMOL 500 MG/1
1000 TABLET, FILM COATED ORAL 3 TIMES DAILY
Qty: 30 TABLET | Refills: 0 | Status: SHIPPED | OUTPATIENT
Start: 2025-08-04 | End: 2025-08-09

## 2025-08-04 RX ORDER — LIDOCAINE 50 MG/G
1 PATCH TOPICAL DAILY
Qty: 9 PATCH | Refills: 0 | Status: SHIPPED | OUTPATIENT
Start: 2025-08-04

## 2025-08-04 RX ORDER — KETOROLAC TROMETHAMINE 30 MG/ML
30 INJECTION, SOLUTION INTRAMUSCULAR; INTRAVENOUS
Status: COMPLETED | OUTPATIENT
Start: 2025-08-04 | End: 2025-08-04

## 2025-08-04 RX ORDER — ACETAMINOPHEN 500 MG
1000 TABLET ORAL EVERY 8 HOURS PRN
Qty: 30 TABLET | Refills: 0 | Status: SHIPPED | OUTPATIENT
Start: 2025-08-04

## 2025-08-04 RX ADMIN — KETOROLAC TROMETHAMINE 30 MG: 30 INJECTION, SOLUTION INTRAMUSCULAR; INTRAVENOUS at 10:08

## 2025-08-04 NOTE — ED PROVIDER NOTES
Encounter Date: 8/4/2025       History     Chief Complaint   Patient presents with    Shoulder Injury     Right shoulder injury after falling a week ago   Py unable to light shoulder above head      Patient is a 75-year-old female past medical history of arthritis, diabetes, hypertension is presenting for evaluation for right shoulder injury.  Patient reports 1 week ago she slipped and fell.  Denies chest pain, shortness breath, or lightheadedness prior to all.  Denies hitting her head or loss of consciousness.  Patient states after sometime after the fall she started to experience right shoulder pain. Locates pain primarily to the top of her shoulder radiating towards her neck and down to the anterior shoulder. Patient has been taking Tylenol with little relief.  Patient denies numbness or tingling to the arm.        Review of patient's allergies indicates:   Allergen Reactions    Lisinopril Swelling and Shortness Of Breath    Ascorbic acid-ascorbate calc      And citrus fruits     Past Medical History:   Diagnosis Date    Arthritis     Colon polyp     Diabetes mellitus     diet controlled    Diabetes with neurologic complications     Hypertension     Nuclear sclerosis of both eyes 02/14/2022    Renal cell carcinoma     Sleep apnea      Past Surgical History:   Procedure Laterality Date    ARTHROPLASTY, KNEE, TOTAL, USING COMPUTER-ASSISTED NAVIGATION Left 3/5/2025    Procedure: ARTHROPLASTY, KNEE, TOTAL, USING COMPUTER-ASSISTED NAVIGATION;  Surgeon: Eric Carbajal MD;  Location: [facility] OR;  Service: Orthopedics;  Laterality: Left;  Bradley---CLEARED BY CARDS AND PCP  FIRST HERMES SALAZAR NOTIFIED-DELMY  RN PREOP 2/19/2025   T/S ON 2/28/2025---NEED ORDERS    COLONOSCOPY N/A 2/5/2018    Procedure: COLONOSCOPY;  Surgeon: Bacilio Mancia MD;  Location: [facility] ENDO;  Service: Endoscopy;  Laterality: N/A;  appt confirmed-ss    COLONOSCOPY N/A 8/13/2020    Procedure: COLONOSCOPY;  Surgeon: Jose West MD;   Location: [facility] ENDO;  Service: Endoscopy;  Laterality: N/A;    ENDOSCOPIC CARPAL TUNNEL RELEASE Right 3/15/2024    Procedure: RELEASE, CARPAL TUNNEL, ENDOSCOPIC - RIGHT;  Surgeon: Tonio Mcdermott MD;  Location: [facility] OR;  Service: Orthopedics;  Laterality: Right;    ENDOSCOPIC CARPAL TUNNEL RELEASE Left 6/26/2024    Procedure: RELEASE, CARPAL TUNNEL, ENDOSCOPIC - left;  Surgeon: Tonio Mcdermott MD;  Location: [facility] OR;  Service: Orthopedics;  Laterality: Left;    HYSTERECTOMY      INTRAOCULAR PROSTHESES INSERTION Left 7/7/2022    Procedure: INSERTION, IOL PROSTHESIS;  Surgeon: Candelario Novoa MD;  Location: [facility] OR;  Service: Ophthalmology;  Laterality: Left;    INTRAOCULAR PROSTHESES INSERTION Right 7/21/2022    Procedure: INSERTION, IOL PROSTHESIS;  Surgeon: Cadnelario Novoa MD;  Location: [facility] OR;  Service: Ophthalmology;  Laterality: Right;    OOPHORECTOMY      PARTIAL NEPHRECTOMY Right 10/12/2018    Procedure: NEPHRECTOMY, PARTIAL open. Dr Arenas to assist.;  Surgeon: Alejandra Palm MD;  Location: [facility];  Service: Urology;  Laterality: Right;  RN PREOP 10/9/2018----NEEDS H/P    PHACOEMULSIFICATION OF CATARACT Left 7/7/2022    Procedure: PHACOEMULSIFICATION, CATARACT;  Surgeon: Candelario Novoa MD;  Location: [facility] OR;  Service: Ophthalmology;  Laterality: Left;  RN PHONE PREOP 6/27/2022   ARRIVAL 6:00 AM    PHACOEMULSIFICATION OF CATARACT Right 7/21/2022    Procedure: PHACOEMULSIFICATION, CATARACT;  Surgeon: Candelario Novoa MD;  Location: [facility] OR;  Service: Ophthalmology;  Laterality: Right;  RN PHONE PREOP 7/12/2022   ARRIVAL 12:00 NOON    TOTAL KNEE ARTHROPLASTY Right 11/8/2023    Procedure: ARTHROPLASTY, KNEE, TOTAL. NELSON;  Surgeon: Eric Carbajal MD;  Location: [facility];  Service: Orthopedics;  Laterality: Right;  Bradley. Admit  BRADLEY NIEVES [phone] NOTIFIED QUINTIN ON 10/11/2023-LO  RN PREOP 10/25/2023   T/S ON 11/6/2023--done------CLEARED BY CARDS      Family History   Problem Relation Name Age of Onset    No Known Problems Mother      Glaucoma Father      Breast cancer Sister Giana     Glaucoma Sister Warhaim     Cancer Sister Giana         breast cancer    Thyroid disease Sister Angeles     Blindness Sister Bryanna         due to trauma during car accident    Diabetes Sister Amanda     No Known Problems Maternal Aunt      No Known Problems Maternal Uncle      No Known Problems Paternal Aunt      No Known Problems Paternal Uncle      No Known Problems Maternal Grandmother      No Known Problems Maternal Grandfather      No Known Problems Paternal Grandmother      No Known Problems Paternal Grandfather      Diabetes Brother Navneet     Amblyopia Neg Hx      Cataracts Neg Hx      Hypertension Neg Hx      Macular degeneration Neg Hx      Retinal detachment Neg Hx      Strabismus Neg Hx      Stroke Neg Hx       Social History[1]  Review of Systems   Respiratory:  Negative for shortness of breath.    Cardiovascular:  Negative for chest pain.   Musculoskeletal:  Positive for arthralgias.   Neurological:  Negative for light-headedness and numbness.       Physical Exam     Initial Vitals [08/04/25 1007]   BP Pulse Resp Temp SpO2   133/82 71 20 97.6 °F (36.4 °C) 99 %      MAP       --         Physical Exam    Vitals reviewed.  Constitutional: She appears well-developed and well-nourished. She is not diaphoretic. No distress.   HENT:   Head: Normocephalic and atraumatic.   Right Ear: External ear normal.   Left Ear: External ear normal.   Nose: Nose normal.   Eyes: Conjunctivae are normal. Right eye exhibits no discharge. Left eye exhibits no discharge.   Neck:   Normal range of motion.  Cardiovascular:            Pulses:       Radial pulses are 2+ on the right side and 2+ on the left side.   Pulmonary/Chest: Breath sounds normal. No respiratory distress. She has no wheezes. She has no rhonchi. She has no rales.   Musculoskeletal:      Right shoulder: No swelling,  deformity, tenderness or bony tenderness. Normal range of motion. Normal strength.      Left shoulder: Normal.      Right upper arm: Normal.      Left upper arm: Normal.      Cervical back: Normal range of motion.     Neurological: She is alert and oriented to person, place, and time. GCS score is 15. GCS eye subscore is 4. GCS verbal subscore is 5. GCS motor subscore is 6.   Skin: Skin is warm and dry.   Psychiatric: She has a normal mood and affect.         ED Course   Procedures  Labs Reviewed - No data to display       Imaging Results              X-Ray Shoulder Trauma Right (Final result)  Result time 08/04/25 10:52:48      Final result by Jimmy Huynh MD (08/04/25 10:52:48)                   Impression:      No acute displaced fracture.      Electronically signed by: Jimmy Huynh MD  Date:    08/04/2025  Time:    10:52               Narrative:    EXAMINATION:  XR SHOULDER TRAUMA 3 VIEW RIGHT    CLINICAL HISTORY:  Pain in unspecified shoulder    TECHNIQUE:  Three or four views of the right shoulder were performed.    COMPARISON:  06/14/2024.    FINDINGS:  No acute displaced fracture.  No dislocation.  Degenerative changes in the right shoulder as seen previously.  No unexpected radiopaque foreign body.                                       Medications   ketorolac injection 30 mg (30 mg Intramuscular Given 8/4/25 1045)     Medical Decision Making  Patient is a 75-year-old female past medical history of arthritis, diabetes, hypertension is presenting for evaluation for right shoulder injury.    Differential includes but not limited to fracture, dislocation, muscle strain, muscle spasm, soft tissue contusion.    Patient is alert and afebrile.  Patient is nontoxic appearing, not in distress.  Vitals within normal limits.  On physical exam patient ambulating without difficulty.  Lungs are clear to auscultation.  Patient is speaking in full sentences without difficulty.  Normal heart rate and rhythm.   No  erythema, bruising, or rashes present to the right upper extremity.  No tenderness present to the right shoulder or arm.  No midline spinal tenderness.  Normal range of motion of the right shoulder, elbow, and wrist.  No tenderness or deformity present to the right collar bone.    X-ray negative for acute fracture. Stable degenerative changes present. Patient given Toradol for pain. I suspect symptoms most likely due to soft tissue contusion. Discussed taking Tylenol or ibuprofen as directed for pain.  Discussed rice therapy for alleviation of pain and swelling.  Shared decision-making with patient decided for trial of muscle relaxer.  Advised of potential drowsiness and fall risk. Strict return precautions given for new or worsening symptoms.  Recommended follow up with PCP in 2 days.  Patient is in agreeance to this plan, and expresses understanding return precautions and follow up plan.  I thoroughly answered all patient's questions and concerns. Vitals are reassuring.  Patient is stable for discharge at this time.    Amount and/or Complexity of Data Reviewed  Radiology: ordered.    Risk  OTC drugs.  Prescription drug management.                      Mmodal, a voice recognition system was utilized in creating the note.                    Clinical Impression:  Final diagnoses:  [M25.519] Shoulder pain          ED Disposition Condition    Discharge Stable          ED Prescriptions       Medication Sig Dispense Start Date End Date Auth. Provider    meloxicam (MOBIC) 7.5 MG tablet Take 1 tablet (7.5 mg total) by mouth once daily. 30 tablet 8/4/2025 -- Bobbi Davis PA-C    acetaminophen (TYLENOL) 500 MG tablet Take 2 tablets (1,000 mg total) by mouth every 8 (eight) hours as needed for Pain. 30 tablet 8/4/2025 -- Bobbi Davis PA-C    LIDOcaine (LIDODERM) 5 % Place 1 patch onto the skin once daily. Remove & Discard patch within 12 hours or as directed by MD Antonio patch 8/4/2025 -- Bobbi Davis PA-C     methocarbamoL (ROBAXIN) 500 MG Tab Take 2 tablets (1,000 mg total) by mouth 3 (three) times daily. for 5 days 30 tablet 8/4/2025 8/9/2025 Bobbi Davis PA-C          Follow-up Information       Follow up With Specialties Details Why Contact Info    Alena Hernandez MD Family Medicine, Wound Care Schedule an appointment as soon as possible for a visit in 2 days For follow up [address] [facility] [address]  [phone]      [facility] ED Emergency Medicine Go to  If new symptoms develop or symptoms worsen [address] [facility] [address] [phone]                   [1]   Social History  Tobacco Use    Smoking status: Never     Passive exposure: Past    Smokeless tobacco: Never   Substance Use Topics    Alcohol use: No    Drug use: No        Bobbi Davis PA-C  08/04/25 1122

## 2025-08-04 NOTE — DISCHARGE INSTRUCTIONS
Robaxin may causes drowsiness.   Thank you for coming to our Emergency Department today. It is important to remember that some problems or medical conditions are difficult to diagnose and may not be found or addressed during your Emergency Department visit.  These conditions often start with non-specific symptoms and can only be diagnosed on follow up visits with your primary care physician or specialist when the symptoms continue or change. Please remember that all medical conditions can change, and we cannot predict how you will be feeling tomorrow or the next day. Return to the ER with any questions/concerns, new/concerning symptoms, worsening or failure to improve.       Be sure to follow up with your primary care doctor and review all labs/imaging/tests that were performed during your ER visit with them. It is very common for us to identify non-emergent incidental findings which must be followed up with your primary care physician.  Some labs/imaging/tests may be outside of the normal range, and require non-emergent follow-up and/or further investigation/treatment/procedures/testing to help diagnose/exclude/prevent complications or other potentially serious medical conditions. Some abnormalities may not have been discussed or addressed during your ER visit.     An ER visit does not replace a primary care visit, and many screening tests or follow-up tests cannot be ordered by an ER doctor or performed by the ER. Some tests may even require pre-approval.    If you do not have a primary care doctor, you may contact the one listed on your discharge paperwork or you may also call the [facility] Appointment Desk at [phone] , or [address] [place] [address] at  [phone] to schedule an appointment, or establish care with a primary care doctor or even a specialist and to obtain information about local resources. It is important to your health that you have a primary care doctor.    Please take all medications as  directed. We have done our best to select a medication for you that will treat your condition however, all medications may potentially have side-effects and it is impossible to predict which medications may give you side-effects or what those side-effects (if any) those medications may give you.  If you feel that you are having a negative effect or side-effect of any medication you should stop taking those medications immediately and seek medical attention. If you feel that you are having a life-threatening reaction call 911.        Do not drive, swim, climb to height, take a bath, operate heavy machinery, drink alcohol or take potentially sedating medications, sign any legal documents or make any important decisions for 24 hours if you have received any pain medications, sedatives or mood altering drugs during your ER visit or within 24 hours of taking them if they have been prescribed to you.     You can find additional resources for Dentists, hearing aids, durable medical equipment, low cost pharmacies and other resources at [email or web address]

## 2025-08-04 NOTE — ED NOTES
Right upper arm/shoulder pain after slip and fall one week ago. No relief with tylenol. Pain with movement to arm.

## 2025-08-05 ENCOUNTER — TELEPHONE (OUTPATIENT)
Dept: FAMILY MEDICINE | Facility: CLINIC | Age: 75
End: 2025-08-05
Payer: MEDICARE

## 2025-08-05 DIAGNOSIS — Z12.11 ENCOUNTER FOR COLORECTAL CANCER SCREENING: Primary | ICD-10-CM

## 2025-08-05 DIAGNOSIS — Z12.12 ENCOUNTER FOR COLORECTAL CANCER SCREENING: Primary | ICD-10-CM

## 2025-08-05 NOTE — TELEPHONE ENCOUNTER
Copied from CRM #[id number]. Topic: General Inquiry - Patient Advice  >> Aug 5, 2025  9:29 AM Allison wrote:  Type: Patient Call Back    Who called: Self     What is the request in detail:  pt is requesting to speak with someone to verify if  recommended her to a physician to complete a colonoscopy. The pt received a call to schedule with a Dr that she is unfamiliar with.     Can the clinic reply by [facility]?    Would the patient rather a call back or a response via [facility]? Call back     Best call back number: [phone]      Additional Information:

## 2025-08-05 NOTE — TELEPHONE ENCOUNTER
Spoke with patient, advised that last colonoscopy was done through [facility]. Patient states she will call back to see if she can get it switched over to [facility] facility

## 2025-08-05 NOTE — TELEPHONE ENCOUNTER
Copied from CRM #[id number]. Topic: Appointments - Amb Referral  >> Aug 5, 2025 11:34 AM Reta wrote:  .Type:  Patient Requesting Referral    Who Called:self    Referral to What Specialty:endoscopy    Reason for Referral: 5 yrs since last colonoscopy     Does the patient want the referral with a specific physician?:any    [facility] the [facility]    Would the patient rather a call back or a response via [facility]? Call back     Best Call Back Number:.[phone]      Additional Information:

## 2025-08-12 ENCOUNTER — TELEPHONE (OUTPATIENT)
Dept: ENDOSCOPY | Facility: HOSPITAL | Age: 75
End: 2025-08-12

## 2025-08-12 ENCOUNTER — CLINICAL SUPPORT (OUTPATIENT)
Dept: ENDOSCOPY | Facility: HOSPITAL | Age: 75
End: 2025-08-12
Attending: FAMILY MEDICINE
Payer: MEDICARE

## 2025-08-12 VITALS — WEIGHT: 174 LBS | BODY MASS INDEX: 29.71 KG/M2 | HEIGHT: 64 IN

## 2025-08-12 DIAGNOSIS — Z12.11 ENCOUNTER FOR COLORECTAL CANCER SCREENING: ICD-10-CM

## 2025-08-12 DIAGNOSIS — Z12.11 SPECIAL SCREENING FOR MALIGNANT NEOPLASMS, COLON: Primary | ICD-10-CM

## 2025-08-12 DIAGNOSIS — Z12.12 ENCOUNTER FOR COLORECTAL CANCER SCREENING: ICD-10-CM

## 2025-08-13 ENCOUNTER — OFFICE VISIT (OUTPATIENT)
Dept: NEUROLOGY | Facility: CLINIC | Age: 75
End: 2025-08-13
Payer: MEDICARE

## 2025-08-13 VITALS
HEART RATE: 79 BPM | WEIGHT: 183.88 LBS | SYSTOLIC BLOOD PRESSURE: 160 MMHG | BODY MASS INDEX: 31.56 KG/M2 | DIASTOLIC BLOOD PRESSURE: 78 MMHG

## 2025-08-13 DIAGNOSIS — M79.2 NERVE PAIN: ICD-10-CM

## 2025-08-13 DIAGNOSIS — R41.89 COGNITIVE IMPAIRMENT: ICD-10-CM

## 2025-08-13 DIAGNOSIS — G47.00 INSOMNIA, UNSPECIFIED TYPE: Primary | ICD-10-CM

## 2025-08-13 DIAGNOSIS — R41.3 MEMORY LOSS: ICD-10-CM

## 2025-08-13 PROCEDURE — 1126F AMNT PAIN NOTED NONE PRSNT: CPT | Mod: CPTII,HCNC,S$GLB, | Performed by: STUDENT IN AN ORGANIZED HEALTH CARE EDUCATION/TRAINING PROGRAM

## 2025-08-13 PROCEDURE — 3288F FALL RISK ASSESSMENT DOCD: CPT | Mod: CPTII,HCNC,S$GLB, | Performed by: STUDENT IN AN ORGANIZED HEALTH CARE EDUCATION/TRAINING PROGRAM

## 2025-08-13 PROCEDURE — 1101F PT FALLS ASSESS-DOCD LE1/YR: CPT | Mod: CPTII,HCNC,S$GLB, | Performed by: STUDENT IN AN ORGANIZED HEALTH CARE EDUCATION/TRAINING PROGRAM

## 2025-08-13 PROCEDURE — 3078F DIAST BP <80 MM HG: CPT | Mod: CPTII,HCNC,S$GLB, | Performed by: STUDENT IN AN ORGANIZED HEALTH CARE EDUCATION/TRAINING PROGRAM

## 2025-08-13 PROCEDURE — 1159F MED LIST DOCD IN RCRD: CPT | Mod: CPTII,HCNC,S$GLB, | Performed by: STUDENT IN AN ORGANIZED HEALTH CARE EDUCATION/TRAINING PROGRAM

## 2025-08-13 PROCEDURE — 3072F LOW RISK FOR RETINOPATHY: CPT | Mod: CPTII,HCNC,S$GLB, | Performed by: STUDENT IN AN ORGANIZED HEALTH CARE EDUCATION/TRAINING PROGRAM

## 2025-08-13 PROCEDURE — 3077F SYST BP >= 140 MM HG: CPT | Mod: CPTII,HCNC,S$GLB, | Performed by: STUDENT IN AN ORGANIZED HEALTH CARE EDUCATION/TRAINING PROGRAM

## 2025-08-13 PROCEDURE — 99214 OFFICE O/P EST MOD 30 MIN: CPT | Mod: HCNC,S$GLB,, | Performed by: STUDENT IN AN ORGANIZED HEALTH CARE EDUCATION/TRAINING PROGRAM

## 2025-08-13 PROCEDURE — 99999 PR PBB SHADOW E&M-EST. PATIENT-LVL IV: CPT | Mod: PBBFAC,HCNC,, | Performed by: STUDENT IN AN ORGANIZED HEALTH CARE EDUCATION/TRAINING PROGRAM

## 2025-08-13 RX ORDER — DONEPEZIL HYDROCHLORIDE 5 MG/1
5 TABLET, FILM COATED ORAL NIGHTLY
Qty: 90 TABLET | Refills: 1 | Status: SHIPPED | OUTPATIENT
Start: 2025-08-13 | End: 2026-02-09

## 2025-08-13 RX ORDER — GABAPENTIN 600 MG/1
600 TABLET ORAL 3 TIMES DAILY
Qty: 270 TABLET | Refills: 0 | Status: SHIPPED | OUTPATIENT
Start: 2025-08-13 | End: 2025-11-11

## 2025-08-14 ENCOUNTER — OUTPATIENT CASE MANAGEMENT (OUTPATIENT)
Dept: ADMINISTRATIVE | Facility: OTHER | Age: 75
End: 2025-08-14
Payer: MEDICARE

## 2025-08-21 ENCOUNTER — TELEPHONE (OUTPATIENT)
Dept: CARDIOLOGY | Facility: CLINIC | Age: 75
End: 2025-08-21
Payer: MEDICARE

## 2025-08-21 ENCOUNTER — TELEPHONE (OUTPATIENT)
Dept: PREADMISSION TESTING | Facility: HOSPITAL | Age: 75
End: 2025-08-21
Payer: MEDICARE

## 2025-08-21 ENCOUNTER — OFFICE VISIT (OUTPATIENT)
Dept: PULMONOLOGY | Facility: CLINIC | Age: 75
End: 2025-08-21
Payer: MEDICARE

## 2025-08-21 VITALS — SYSTOLIC BLOOD PRESSURE: 112 MMHG | DIASTOLIC BLOOD PRESSURE: 65 MMHG | HEART RATE: 82 BPM | OXYGEN SATURATION: 95 %

## 2025-08-21 DIAGNOSIS — G47.33 OSA (OBSTRUCTIVE SLEEP APNEA): Primary | ICD-10-CM

## 2025-08-21 DIAGNOSIS — G47.01 INSOMNIA DUE TO MEDICAL CONDITION: ICD-10-CM

## 2025-08-21 PROCEDURE — 99999 PR PBB SHADOW E&M-EST. PATIENT-LVL V: CPT | Mod: PBBFAC,HCNC,, | Performed by: INTERNAL MEDICINE

## 2025-08-21 RX ORDER — SUVOREXANT 10 MG/1
10 TABLET, FILM COATED ORAL NIGHTLY
Qty: 30 TABLET | Refills: 2 | Status: SHIPPED | OUTPATIENT
Start: 2025-08-21

## 2025-08-21 RX ORDER — SUVOREXANT 10 MG/1
10 TABLET, FILM COATED ORAL NIGHTLY
Qty: 30 TABLET | Refills: 2 | Status: SHIPPED | OUTPATIENT
Start: 2025-08-21 | End: 2025-08-21

## 2025-08-24 DIAGNOSIS — L29.9 PRURITIC CONDITION: ICD-10-CM

## 2025-08-25 RX ORDER — HYDROXYZINE HYDROCHLORIDE 25 MG/1
25 TABLET, FILM COATED ORAL
Qty: 90 TABLET | Refills: 0 | Status: SHIPPED | OUTPATIENT
Start: 2025-08-25

## 2025-08-27 RX ORDER — IBUPROFEN 800 MG/1
800 TABLET, FILM COATED ORAL EVERY 8 HOURS PRN
Qty: 30 TABLET | Refills: 0 | Status: SHIPPED | OUTPATIENT
Start: 2025-08-27

## 2025-08-27 RX ORDER — LIDOCAINE 50 MG/G
1 PATCH TOPICAL DAILY
Qty: 9 PATCH | Refills: 0 | Status: SHIPPED | OUTPATIENT
Start: 2025-08-27

## 2025-08-30 DIAGNOSIS — I10 BENIGN HYPERTENSION: ICD-10-CM

## 2025-09-02 DIAGNOSIS — Z96.652 STATUS POST LEFT KNEE REPLACEMENT: Primary | ICD-10-CM

## 2025-09-02 RX ORDER — LOSARTAN POTASSIUM 100 MG/1
100 TABLET ORAL
Qty: 90 TABLET | Refills: 3 | Status: SHIPPED | OUTPATIENT
Start: 2025-09-02

## 2025-09-03 ENCOUNTER — OFFICE VISIT (OUTPATIENT)
Dept: FAMILY MEDICINE | Facility: CLINIC | Age: 75
End: 2025-09-03
Payer: MEDICARE

## 2025-09-03 VITALS
OXYGEN SATURATION: 97 % | DIASTOLIC BLOOD PRESSURE: 70 MMHG | SYSTOLIC BLOOD PRESSURE: 140 MMHG | TEMPERATURE: 98 F | BODY MASS INDEX: 31.57 KG/M2 | HEART RATE: 82 BPM | WEIGHT: 184.94 LBS | HEIGHT: 64 IN

## 2025-09-03 DIAGNOSIS — E11.40 TYPE 2 DIABETES MELLITUS WITH DIABETIC NEUROPATHY, WITHOUT LONG-TERM CURRENT USE OF INSULIN: Chronic | ICD-10-CM

## 2025-09-03 DIAGNOSIS — E66.01 SEVERE OBESITY (BMI 35.0-39.9) WITH COMORBIDITY: Primary | ICD-10-CM

## 2025-09-03 DIAGNOSIS — G47.33 OSA (OBSTRUCTIVE SLEEP APNEA): ICD-10-CM

## 2025-09-03 PROCEDURE — 99999 PR PBB SHADOW E&M-EST. PATIENT-LVL V: CPT | Mod: PBBFAC,HCNC,, | Performed by: INTERNAL MEDICINE

## 2025-09-03 RX ORDER — TOPIRAMATE 25 MG/1
25 TABLET, FILM COATED ORAL 2 TIMES DAILY
Qty: 60 TABLET | Refills: 5 | Status: SHIPPED | OUTPATIENT
Start: 2025-09-03 | End: 2025-09-03

## 2025-09-03 RX ORDER — TOPIRAMATE 25 MG/1
25 TABLET, FILM COATED ORAL 2 TIMES DAILY
Qty: 60 TABLET | Refills: 5 | Status: SHIPPED | OUTPATIENT
Start: 2025-09-03 | End: 2026-03-02

## 2025-09-03 RX ORDER — SEMAGLUTIDE 2.68 MG/ML
2 INJECTION, SOLUTION SUBCUTANEOUS
Qty: 9 ML | Refills: 3 | Status: SHIPPED | OUTPATIENT
Start: 2025-09-03 | End: 2025-09-03

## 2025-09-03 RX ORDER — SEMAGLUTIDE 2.68 MG/ML
2 INJECTION, SOLUTION SUBCUTANEOUS
Qty: 9 ML | Refills: 3 | Status: SHIPPED | OUTPATIENT
Start: 2025-09-03 | End: 2026-08-05

## 2025-09-04 ENCOUNTER — OFFICE VISIT (OUTPATIENT)
Dept: ORTHOPEDICS | Facility: CLINIC | Age: 75
End: 2025-09-04
Payer: MEDICARE

## 2025-09-04 VITALS — WEIGHT: 184.94 LBS | HEIGHT: 64 IN | BODY MASS INDEX: 31.57 KG/M2

## 2025-09-04 DIAGNOSIS — Z96.652 PRESENCE OF LEFT ARTIFICIAL KNEE JOINT: Primary | ICD-10-CM

## 2025-09-04 PROCEDURE — 99999 PR PBB SHADOW E&M-EST. PATIENT-LVL IV: CPT | Mod: PBBFAC,HCNC,, | Performed by: ORTHOPAEDIC SURGERY

## 2025-09-04 RX ORDER — IBUPROFEN 800 MG/1
800 TABLET, FILM COATED ORAL EVERY 8 HOURS PRN
Qty: 90 TABLET | Refills: 0 | Status: SHIPPED | OUTPATIENT
Start: 2025-09-04

## 2025-09-05 DIAGNOSIS — F51.04 PSYCHOPHYSIOLOGICAL INSOMNIA: ICD-10-CM

## 2025-09-05 RX ORDER — TRAZODONE HYDROCHLORIDE 50 MG/1
50 TABLET ORAL NIGHTLY
Qty: 90 TABLET | Refills: 0 | Status: SHIPPED | OUTPATIENT
Start: 2025-09-05 | End: 2025-12-04

## (undated) DEVICE — TOURNIQUET SB QC DP 18X4IN

## (undated) DEVICE — UNDERGLOVES BIOGEL PI SIZE 8

## (undated) DEVICE — SOL IRR SOD CHL .9% POUR

## (undated) DEVICE — SYR 10CC LUER LOCK

## (undated) DEVICE — SUT STRATAFIX PDS PLS 45CM

## (undated) DEVICE — SUT 1 36IN PDS II VIO MONO

## (undated) DEVICE — DRAPE U SPLIT SHEET 54X76IN

## (undated) DEVICE — SUT VICRYL PLUS ANTIBACT

## (undated) DEVICE — SPONGE COTTON TRAY 4X4IN

## (undated) DEVICE — COVER OVERHEAD SURG LT BLUE

## (undated) DEVICE — KIT CUSTOM BASIC EYE ST / MEA

## (undated) DEVICE — SUT STRATAFIX PDS 2-0 CT-1 9IN

## (undated) DEVICE — SUT SILK 2-0 STRANDS 30IN

## (undated) DEVICE — SHEILD & GARTERS FOX METAL EYE

## (undated) DEVICE — KIT DRAPE RIO ONE PIECE W/POCK

## (undated) DEVICE — SEE MEDLINE ITEM 157131

## (undated) DEVICE — DEV-O-LOOPS MAXI RED

## (undated) DEVICE — Device

## (undated) DEVICE — TOWEL OR DISP STRL BLUE 4/PK

## (undated) DEVICE — KIT TRIATHLON CR FEM PREP SZ2

## (undated) DEVICE — ARMCRADLE BLUE POLY FOAM

## (undated) DEVICE — SUPPORT ULNA NERVE PROTECTOR

## (undated) DEVICE — SEE MEDLINE ITEM 157150

## (undated) DEVICE — HANDPIECE TRANSFORMER I/A

## (undated) DEVICE — ELECTRODE REM PLYHSV RETURN 9

## (undated) DEVICE — BETADINE OPTHALMIC SOL 5% 30ML

## (undated) DEVICE — SEE MEDLINE ITEM 146417

## (undated) DEVICE — NDL 18GA X1 1/2 REG BEVEL

## (undated) DEVICE — DRAPE STERI INSTRUMENT 1018

## (undated) DEVICE — CARTRIDGE LENS D

## (undated) DEVICE — NDL SPINAL 20GX3.5 HUB

## (undated) DEVICE — SUT VICRYL+ 1 CT1 18IN

## (undated) DEVICE — PULSAVAC ZIMMER

## (undated) DEVICE — GLOVE SIGNATURE ESSNTL LTX 7.5

## (undated) DEVICE — APPLICATOR CHLORAPREP ORN 26ML

## (undated) DEVICE — TOURNIQUET SB QC DP 34X4IN

## (undated) DEVICE — NDL HYPO STD REG BVL 18GX1.5IN

## (undated) DEVICE — UNDERGLOVES BIOGEL PI SZ 7 LF

## (undated) DEVICE — SOL WATER STRL IRR 1000ML

## (undated) DEVICE — SLING ARM LARGE FOAM STRAP

## (undated) DEVICE — SPONGE KITTNER 1/4X 5/8 L STRL

## (undated) DEVICE — KIT ENDO CARPEL TUNNAL SINGLE

## (undated) DEVICE — PAD CAST 2 IN X 4YDS STERILE

## (undated) DEVICE — TRAY FOLEY 16FR INFECTION CONT

## (undated) DEVICE — CATH URETHRAL 16FR RED

## (undated) DEVICE — ELECTRODE BLADE TEFLON 6

## (undated) DEVICE — SOL NACL 0.9% INJ PF/100 150

## (undated) DEVICE — DRAPE STERI U-SHAPED 47X51IN

## (undated) DEVICE — COVER CAMERA OPERATING ROOM

## (undated) DEVICE — BLADE SAW OSC 19.5 X 1.2 X 90

## (undated) DEVICE — SYR 3CC LUER LOC

## (undated) DEVICE — SUT ETHILON 2-0 FSLX 30 BLK

## (undated) DEVICE — GLOVE SURGICAL LATEX SZ 8

## (undated) DEVICE — CONTAINERS SPECIMEN 4OZ CAP

## (undated) DEVICE — SUT VICRYL PLUS 2-0 CT1 18

## (undated) DEVICE — BANDAGE MATRIX HK LOOP 6IN 5YD

## (undated) DEVICE — SOL ELECTROLYTE PH 7.4 500ML

## (undated) DEVICE — MIXER CR PRISM W/BREAK OFF NOZ

## (undated) DEVICE — SOL IRR STRL WATER 500ML

## (undated) DEVICE — LINER GLOVE POWDERFREE 8

## (undated) DEVICE — HEMOSTAT SURGICEL 4X8IN

## (undated) DEVICE — SYS SURGIPHOR STRL IRRG

## (undated) DEVICE — SOL NACL IRR 3000ML

## (undated) DEVICE — GLOVE SURGICAL LATEX SZ 6.5

## (undated) DEVICE — SYS CLSR DERMABOND PRINEO 22CM

## (undated) DEVICE — KIT ANTIFOG W/SPONG & FLUID

## (undated) DEVICE — SYR 50CC LL

## (undated) DEVICE — SUT 4-0 ETHILON 18 PS-2

## (undated) DEVICE — HOOD FLYTE PEELWY STERISHIELD

## (undated) DEVICE — KNIFE ANGLE 1MM

## (undated) DEVICE — SEE MEDLINE ITEM 152487

## (undated) DEVICE — CLIP HEMOLOK POLYMER XL 6 CLIP

## (undated) DEVICE — TIP YANKAUERS BULB NO VENT

## (undated) DEVICE — GAUZE SPONGE 4X4 12PLY

## (undated) DEVICE — GLOVE BIOGEL PI MICRO SZ 7.5

## (undated) DEVICE — NDL ANES SPINAL 18X3.5ST 18G

## (undated) DEVICE — GLOVE SENSICARE PI GRN 8

## (undated) DEVICE — DRAPE STERI-DRAPE 1000 17X11IN

## (undated) DEVICE — BLADE SURG #15 CARBON STEEL

## (undated) DEVICE — INSTRUMENT LIGASURE ATLAS 20CM

## (undated) DEVICE — GLOVE BIOGEL ECLIPSE SZ 7.5

## (undated) DEVICE — BANDAGE ESMARK ELASTIC ST 4X9

## (undated) DEVICE — SUT 0 36IN CHROMIC GUT CT-1

## (undated) DEVICE — BLADE MAKO STANDARD

## (undated) DEVICE — SEE MEDLINE ITEM 152622

## (undated) DEVICE — PAD COLD THERAPY KNEE WRAP ON

## (undated) DEVICE — BLANKET UPPER BODY 78.7X29.9IN

## (undated) DEVICE — BANDAGE MATRIX HK LOOP 2IN 5YD

## (undated) DEVICE — DRAPE TOP 53X102IN

## (undated) DEVICE — BLANKET WARMING UPPER BODY

## (undated) DEVICE — SYR 30CC LUER LOCK

## (undated) DEVICE — SUT ABS CLIP LAPRA-TY CTD

## (undated) DEVICE — SEE MEDLINE ITEM 157194

## (undated) DEVICE — KIT VIZADISC KNEE TRACKING

## (undated) DEVICE — DRAPE THREE-QUARTER 53X77IN

## (undated) DEVICE — KIT EYE PIC PACK WB

## (undated) DEVICE — CHLORAPREP W TINT 26ML APPL

## (undated) DEVICE — DRAPE T EXTRM SURG 121X128X90

## (undated) DEVICE — SEE MEDLINE ITEM 157117

## (undated) DEVICE — BLADE MAKO NARROW

## (undated) DEVICE — COVER LIGHT HANDLE 80/CA

## (undated) DEVICE — DRESSING TRANS 4X4 TEGADERM

## (undated) DEVICE — KIT TRIATHLON CR TIB PREP SZ3

## (undated) DEVICE — POSITIONER IV ARMBOARD FOAM

## (undated) DEVICE — CATH URETHRAL RED RUBBER 18FR

## (undated) DEVICE — NDL SAFETY 22G X 1.5 ECLIPSE

## (undated) DEVICE — COVER SNAP KAP 26IN

## (undated) DEVICE — SUT CTD VICRYL 2-0 UND BR

## (undated) DEVICE — SUT VICRYL 4-0 RB1 27IN UD

## (undated) DEVICE — SPONGE LAP 18X18 PREWASHED

## (undated) DEVICE — STAPLER SKIN ROTATING HEAD

## (undated) DEVICE — SUT 0 8-27IN VICRYL PL CT-1

## (undated) DEVICE — DRAPE SLUSH WARMER WITH DISC

## (undated) DEVICE — PACK ENDOSCOPY GENERAL

## (undated) DEVICE — SEE MEDLINE ITEM 157110

## (undated) DEVICE — UNDERGLOVE BIOGEL PI SZ 6.5 LF

## (undated) DEVICE — SEE MEDLINE ITEM 146313

## (undated) DEVICE — SUT 2-0 VICRYL / SH (J417)

## (undated) DEVICE — SUT STRATAFIX SPRL PS-2 3-0

## (undated) DEVICE — DRESSING ABSRBNT ISLAND 3.6X8

## (undated) DEVICE — SET DECANTER MEDICHOICE

## (undated) DEVICE — SOCKINETTE DOUBLE PLY 4X48IN

## (undated) DEVICE — GOWN ECLIPSE REINF LVL4 TWL XL

## (undated) DEVICE — GOWN B1 X-LG X-LONG

## (undated) DEVICE — SYR ONLY LUER LOCK 20CC